# Patient Record
Sex: FEMALE | Race: BLACK OR AFRICAN AMERICAN | Employment: UNEMPLOYED | URBAN - METROPOLITAN AREA
[De-identification: names, ages, dates, MRNs, and addresses within clinical notes are randomized per-mention and may not be internally consistent; named-entity substitution may affect disease eponyms.]

---

## 2017-05-30 ENCOUNTER — HOSPITAL ENCOUNTER (OUTPATIENT)
Dept: INFUSION THERAPY | Age: 67
Discharge: HOME | End: 2017-05-30
Payer: MEDICARE

## 2017-05-30 PROCEDURE — 36415 COLL VENOUS BLD VENIPUNCTURE: CPT | Performed by: INTERNAL MEDICINE

## 2017-05-30 PROCEDURE — 86900 BLOOD TYPING SEROLOGIC ABO: CPT | Performed by: INTERNAL MEDICINE

## 2017-05-30 PROCEDURE — 86920 COMPATIBILITY TEST SPIN: CPT | Performed by: INTERNAL MEDICINE

## 2017-06-01 ENCOUNTER — HOSPITAL ENCOUNTER (OUTPATIENT)
Dept: INFUSION THERAPY | Age: 67
Discharge: HOME | End: 2017-06-01
Payer: MEDICARE

## 2017-06-01 VITALS
DIASTOLIC BLOOD PRESSURE: 84 MMHG | RESPIRATION RATE: 18 BRPM | OXYGEN SATURATION: 99 % | HEIGHT: 63 IN | BODY MASS INDEX: 28.17 KG/M2 | TEMPERATURE: 98.5 F | WEIGHT: 159 LBS | HEART RATE: 90 BPM | SYSTOLIC BLOOD PRESSURE: 152 MMHG

## 2017-06-01 LAB
ABO + RH BLD: NORMAL
BLD PROD TYP BPU: NORMAL
BLD PROD TYP BPU: NORMAL
BLOOD GROUP ANTIBODIES SERPL: NORMAL
BPU ID: NORMAL
BPU ID: NORMAL
CROSSMATCH RESULT,%XM: NORMAL
CROSSMATCH RESULT,%XM: NORMAL
SPECIMEN EXP DATE BLD: NORMAL
STATUS OF UNIT,%ST: NORMAL
STATUS OF UNIT,%ST: NORMAL
UNIT DIVISION, %UDIV: 0
UNIT DIVISION, %UDIV: 0

## 2017-06-01 PROCEDURE — 86920 COMPATIBILITY TEST SPIN: CPT | Performed by: INTERNAL MEDICINE

## 2017-06-01 PROCEDURE — P9016 RBC LEUKOCYTES REDUCED: HCPCS | Performed by: INTERNAL MEDICINE

## 2017-06-01 PROCEDURE — 77030013169 SET IV BLD ICUM -A

## 2017-06-01 PROCEDURE — 36430 TRANSFUSION BLD/BLD COMPNT: CPT

## 2017-06-01 PROCEDURE — 74011250636 HC RX REV CODE- 250/636: Performed by: INTERNAL MEDICINE

## 2017-06-01 PROCEDURE — 86900 BLOOD TYPING SEROLOGIC ABO: CPT | Performed by: INTERNAL MEDICINE

## 2017-06-01 PROCEDURE — 74011250637 HC RX REV CODE- 250/637: Performed by: INTERNAL MEDICINE

## 2017-06-01 RX ORDER — LANOLIN ALCOHOL/MO/W.PET/CERES
CREAM (GRAM) TOPICAL
COMMUNITY
End: 2018-01-10

## 2017-06-01 RX ORDER — ERGOCALCIFEROL 1.25 MG/1
50000 CAPSULE ORAL
COMMUNITY
End: 2018-01-10

## 2017-06-01 RX ORDER — ACETAMINOPHEN 325 MG/1
650 TABLET ORAL ONCE
Status: COMPLETED | OUTPATIENT
Start: 2017-06-01 | End: 2017-06-01

## 2017-06-01 RX ORDER — SODIUM CHLORIDE 9 MG/ML
250 INJECTION, SOLUTION INTRAVENOUS AS NEEDED
Status: DISCONTINUED | OUTPATIENT
Start: 2017-06-01 | End: 2017-06-05 | Stop reason: HOSPADM

## 2017-06-01 RX ORDER — HYDROCHLOROTHIAZIDE 25 MG/1
25 TABLET ORAL EVERY 8 HOURS
COMMUNITY
End: 2018-01-10

## 2017-06-01 RX ADMIN — SODIUM CHLORIDE 250 ML: 900 INJECTION, SOLUTION INTRAVENOUS at 09:15

## 2017-06-01 RX ADMIN — ACETAMINOPHEN 650 MG: 325 TABLET ORAL at 11:08

## 2017-06-01 NOTE — PROGRESS NOTES
[facility] Progress Note    Date: 2017    Name: Chauncey Hebert    MRN: [id number]         : 1950    Blood transfusion    Ms. Fenton arrived to [facility] at [address] [place] [address]  via wheelchair. Ms. Richmond Blair was assessed and education was provided. Discussed risks and benefits of blood transfusion with patient, including risk of transfusion reaction and disease transmission. Patient verbalized understanding and signed consent placed on chart. Patient arrived without blood armband and stated that she took it off last night because no one told her to leave it on her arm. Explained to Ms Richmond Blair that another type and cross would have to be drawn and that we would have to wait for it to be tested. Patient verbalized understanding. Ms. Nancy Finn vitals were reviewed. Visit Vitals    /77    Pulse 90    Temp 97.3 °F (36.3 °C)    Resp 18    SpO2 100%     Patient Vitals for the past 12 hrs:   Temp Pulse Resp BP SpO2   17 1559 98.5 °F (36.9 °C) 90 18 152/84 99 %   17 1449 98 °F (36.7 °C) 87 18 148/71 99 %   17 1423 98 °F (36.7 °C) 80 18 157/69 100 %   17 1408 97.6 °F (36.4 °C) 89 18 154/70 100 %   17 1211 98 °F (36.7 °C) 88 18 160/80 100 %   17 1142 97.3 °F (36.3 °C) 90 18 161/70 100 %   17 1126 97.3 °F (36.3 °C) 90 18 164/77 100 %   17 0847 98.6 °F (37 °C) 80 18 127/52 100 %         22g IV inserted in patient's Left forearm  X three  attempts. Two attempts by first nurse and one attempt be second nurse. Blood drawn and sent to laboratory for type and cross. Positive for blood return and flushes without difficulty. Normal saline initiated at [facility]. Pre-medication (Tylenol 650 mg) was administered as ordered. Patient refused benadryl. First unit of two ordered units of PRBCs initiated @ 75 ml/hr at 1111. Fifteen minutes into infusion, VS stable and pt denied c/o SOB, itching/hives, lip/tongue/facial swelling, CP or other complaints. Infusion rate increased to 100 ml/hr. Fifteen minutes later, VS stable and pt denied complaints; infusion rate increased to 150 ml/hr for the remainder of the transfusion. Unit finished @ [phone] 39 24. VS stable and no transfusion reaction suspected. Second unit of two ordered units of PRBCs initiated @ 75 ml/hr at 1350. Fifteen minutes into infusion, VS stable and pt denied c/o SOB, itching/hives, lip/tongue/facial swelling, CP or other complaints. Infusion rate increased to 100 ml/hr. Fifteen minutes later, VS stable and pt denied complaints; infusion rate increased to 150 ml/hr for the remainder of the transfusion. Unit finished @ [phone]. VS stable and no transfusion reaction suspected. Ms. Marcelo Lucio tolerated infusion without complaints. IV removed. No irritation, bleeding, or hematoma noted at site. Gauze and coban applied to site. Discharge instructions reviewed with pt. Pt instructed to report SOB, CP, elevated temp, back pain, or other symptoms of transfusion reaction to MD or ED. Pt verbalized understanding. Ms. Marcelo Lucio was discharged from [place] [address] in stable condition at 1620. She is to follow up with Dr Ismael May.     Olga Mahan, RN  June 1, 2017

## 2017-06-02 LAB
ABO + RH BLD: NORMAL
BLD PROD TYP BPU: NORMAL
BLD PROD TYP BPU: NORMAL
BLOOD GROUP ANTIBODIES SERPL: NORMAL
BPU ID: NORMAL
BPU ID: NORMAL
CALLED TO:,BCALL1: NORMAL
CROSSMATCH RESULT,%XM: NORMAL
CROSSMATCH RESULT,%XM: NORMAL
SPECIMEN EXP DATE BLD: NORMAL
STATUS OF UNIT,%ST: NORMAL
STATUS OF UNIT,%ST: NORMAL
UNIT DIVISION, %UDIV: 0
UNIT DIVISION, %UDIV: 0

## 2017-12-15 ENCOUNTER — HOSPITAL ENCOUNTER (INPATIENT)
Age: 67
LOS: 3 days | Discharge: HOME | DRG: 377 | End: 2017-12-18
Attending: EMERGENCY MEDICINE | Admitting: INTERNAL MEDICINE
Payer: MEDICARE

## 2017-12-15 ENCOUNTER — APPOINTMENT (OUTPATIENT)
Dept: GENERAL RADIOLOGY | Age: 67
DRG: 377 | End: 2017-12-15
Attending: PHYSICIAN ASSISTANT
Payer: MEDICARE

## 2017-12-15 DIAGNOSIS — N18.9 CHRONIC KIDNEY DISEASE, UNSPECIFIED CKD STAGE: ICD-10-CM

## 2017-12-15 DIAGNOSIS — K92.2 UPPER GI BLEED: Primary | ICD-10-CM

## 2017-12-15 DIAGNOSIS — D64.9 ANEMIA, UNSPECIFIED TYPE: ICD-10-CM

## 2017-12-15 PROBLEM — N18.6 ESRD (END STAGE RENAL DISEASE) (HCC): Status: ACTIVE | Noted: 2017-12-15

## 2017-12-15 PROBLEM — E11.9 DIABETES MELLITUS TYPE 2, CONTROLLED (HCC): Status: ACTIVE | Noted: 2017-12-15

## 2017-12-15 PROBLEM — I10 HTN (HYPERTENSION): Status: ACTIVE | Noted: 2017-12-15

## 2017-12-15 LAB
ALBUMIN SERPL-MCNC: 3.1 G/DL (ref 3.4–5)
ALBUMIN/GLOB SERPL: 0.9 {RATIO} (ref 0.8–1.7)
ALP SERPL-CCNC: 127 U/L (ref 45–117)
ALT SERPL-CCNC: 26 U/L (ref 13–56)
ANION GAP SERPL CALC-SCNC: 6 MMOL/L (ref 3–18)
AST SERPL-CCNC: 36 U/L (ref 15–37)
BASOPHILS # BLD: 0.1 K/UL (ref 0–0.06)
BASOPHILS NFR BLD: 1 % (ref 0–2)
BILIRUB SERPL-MCNC: 0.4 MG/DL (ref 0.2–1)
BUN SERPL-MCNC: 15 MG/DL (ref 7–18)
BUN/CREAT SERPL: 6 (ref 12–20)
CALCIUM SERPL-MCNC: 9 MG/DL (ref 8.5–10.1)
CHLORIDE SERPL-SCNC: 102 MMOL/L (ref 100–108)
CK MB CFR SERPL CALC: 4.2 % (ref 0–4)
CK MB SERPL-MCNC: 3.6 NG/ML (ref 5–25)
CK SERPL-CCNC: 86 U/L (ref 26–192)
CO2 SERPL-SCNC: 36 MMOL/L (ref 21–32)
CREAT SERPL-MCNC: 2.55 MG/DL (ref 0.6–1.3)
DIFFERENTIAL METHOD BLD: ABNORMAL
EOSINOPHIL # BLD: 0.1 K/UL (ref 0–0.4)
EOSINOPHIL NFR BLD: 2 % (ref 0–5)
ERYTHROCYTE [DISTWIDTH] IN BLOOD BY AUTOMATED COUNT: 20.2 % (ref 11.6–14.5)
EST. AVERAGE GLUCOSE BLD GHB EST-MCNC: NORMAL MG/DL
GLOBULIN SER CALC-MCNC: 3.5 G/DL (ref 2–4)
GLUCOSE BLD STRIP.AUTO-MCNC: 124 MG/DL (ref 70–110)
GLUCOSE SERPL-MCNC: 217 MG/DL (ref 74–99)
HBA1C MFR BLD: 4.9 % (ref 4.5–5.6)
HCT VFR BLD AUTO: 21 % (ref 35–45)
HGB BLD-MCNC: 6.2 G/DL (ref 12–16)
INR PPP: 1 (ref 0.8–1.2)
LYMPHOCYTES # BLD: 0.8 K/UL (ref 0.9–3.6)
LYMPHOCYTES NFR BLD: 12 % (ref 21–52)
MCH RBC QN AUTO: 28.6 PG (ref 24–34)
MCHC RBC AUTO-ENTMCNC: 29.5 G/DL (ref 31–37)
MCV RBC AUTO: 96.8 FL (ref 74–97)
MONOCYTES # BLD: 0.5 K/UL (ref 0.05–1.2)
MONOCYTES NFR BLD: 8 % (ref 3–10)
NEUTS SEG # BLD: 4.8 K/UL (ref 1.8–8)
NEUTS SEG NFR BLD: 77 % (ref 40–73)
PLATELET # BLD AUTO: 178 K/UL (ref 135–420)
PMV BLD AUTO: 9.6 FL (ref 9.2–11.8)
POTASSIUM SERPL-SCNC: 3.7 MMOL/L (ref 3.5–5.5)
PROT SERPL-MCNC: 6.6 G/DL (ref 6.4–8.2)
PROTHROMBIN TIME: 12.7 SEC (ref 11.5–15.2)
RBC # BLD AUTO: 2.17 M/UL (ref 4.2–5.3)
RBC MORPH BLD: ABNORMAL
SODIUM SERPL-SCNC: 144 MMOL/L (ref 136–145)
TROPONIN I SERPL-MCNC: 0.08 NG/ML (ref 0–0.06)
WBC # BLD AUTO: 6.3 K/UL (ref 4.6–13.2)

## 2017-12-15 PROCEDURE — 85610 PROTHROMBIN TIME: CPT | Performed by: INTERNAL MEDICINE

## 2017-12-15 PROCEDURE — C9113 INJ PANTOPRAZOLE SODIUM, VIA: HCPCS | Performed by: PHYSICIAN ASSISTANT

## 2017-12-15 PROCEDURE — 77030032490 HC SLV COMPR SCD KNE COVD -B

## 2017-12-15 PROCEDURE — 86920 COMPATIBILITY TEST SPIN: CPT | Performed by: PHYSICIAN ASSISTANT

## 2017-12-15 PROCEDURE — 86900 BLOOD TYPING SEROLOGIC ABO: CPT | Performed by: PHYSICIAN ASSISTANT

## 2017-12-15 PROCEDURE — 65660000000 HC RM CCU STEPDOWN

## 2017-12-15 PROCEDURE — 74011250636 HC RX REV CODE- 250/636: Performed by: PHYSICIAN ASSISTANT

## 2017-12-15 PROCEDURE — 99282 EMERGENCY DEPT VISIT SF MDM: CPT

## 2017-12-15 PROCEDURE — 36430 TRANSFUSION BLD/BLD COMPNT: CPT

## 2017-12-15 PROCEDURE — 82962 GLUCOSE BLOOD TEST: CPT

## 2017-12-15 PROCEDURE — 85025 COMPLETE CBC W/AUTO DIFF WBC: CPT | Performed by: PHYSICIAN ASSISTANT

## 2017-12-15 PROCEDURE — 80053 COMPREHEN METABOLIC PANEL: CPT | Performed by: PHYSICIAN ASSISTANT

## 2017-12-15 PROCEDURE — 71010 XR CHEST PORT: CPT

## 2017-12-15 PROCEDURE — 74011000250 HC RX REV CODE- 250: Performed by: PHYSICIAN ASSISTANT

## 2017-12-15 PROCEDURE — 93005 ELECTROCARDIOGRAM TRACING: CPT

## 2017-12-15 PROCEDURE — P9016 RBC LEUKOCYTES REDUCED: HCPCS | Performed by: PHYSICIAN ASSISTANT

## 2017-12-15 PROCEDURE — 83036 HEMOGLOBIN GLYCOSYLATED A1C: CPT | Performed by: INTERNAL MEDICINE

## 2017-12-15 PROCEDURE — 74011250637 HC RX REV CODE- 250/637: Performed by: INTERNAL MEDICINE

## 2017-12-15 PROCEDURE — 82550 ASSAY OF CK (CPK): CPT | Performed by: PHYSICIAN ASSISTANT

## 2017-12-15 RX ORDER — PANTOPRAZOLE SODIUM 40 MG/10ML
40 INJECTION, POWDER, LYOPHILIZED, FOR SOLUTION INTRAVENOUS
Status: DISCONTINUED | OUTPATIENT
Start: 2017-12-15 | End: 2017-12-15 | Stop reason: CLARIF

## 2017-12-15 RX ORDER — INSULIN LISPRO 100 [IU]/ML
INJECTION, SOLUTION INTRAVENOUS; SUBCUTANEOUS
Status: DISCONTINUED | OUTPATIENT
Start: 2017-12-15 | End: 2017-12-18 | Stop reason: HOSPADM

## 2017-12-15 RX ORDER — FLUTICASONE PROPIONATE 50 MCG
2 SPRAY, SUSPENSION (ML) NASAL AS NEEDED
Status: DISCONTINUED | OUTPATIENT
Start: 2017-12-15 | End: 2017-12-18 | Stop reason: HOSPADM

## 2017-12-15 RX ORDER — MAGNESIUM SULFATE 100 %
4 CRYSTALS MISCELLANEOUS AS NEEDED
Status: DISCONTINUED | OUTPATIENT
Start: 2017-12-15 | End: 2017-12-18 | Stop reason: HOSPADM

## 2017-12-15 RX ORDER — DEXTROSE 50 % IN WATER (D50W) INTRAVENOUS SYRINGE
25-50 AS NEEDED
Status: DISCONTINUED | OUTPATIENT
Start: 2017-12-15 | End: 2017-12-18 | Stop reason: HOSPADM

## 2017-12-15 RX ORDER — SIMETHICONE 80 MG
80 TABLET,CHEWABLE ORAL
Status: DISCONTINUED | OUTPATIENT
Start: 2017-12-15 | End: 2017-12-18 | Stop reason: HOSPADM

## 2017-12-15 RX ORDER — ONDANSETRON 2 MG/ML
4 INJECTION INTRAMUSCULAR; INTRAVENOUS
Status: DISCONTINUED | OUTPATIENT
Start: 2017-12-15 | End: 2017-12-18 | Stop reason: HOSPADM

## 2017-12-15 RX ORDER — SODIUM CHLORIDE 9 MG/ML
250 INJECTION, SOLUTION INTRAVENOUS AS NEEDED
Status: DISCONTINUED | OUTPATIENT
Start: 2017-12-15 | End: 2017-12-18 | Stop reason: HOSPADM

## 2017-12-15 RX ORDER — ACETAMINOPHEN 325 MG/1
650 TABLET ORAL
Status: DISCONTINUED | OUTPATIENT
Start: 2017-12-15 | End: 2017-12-18 | Stop reason: HOSPADM

## 2017-12-15 RX ADMIN — FLUTICASONE PROPIONATE 2 SPRAY: 50 SPRAY, METERED NASAL at 23:53

## 2017-12-15 RX ADMIN — SODIUM CHLORIDE 40 MG: 9 INJECTION INTRAMUSCULAR; INTRAVENOUS; SUBCUTANEOUS at 18:10

## 2017-12-15 NOTE — PROGRESS NOTES
1835 Patient arrived on unit, transferred from Ed to room 335 via stretcher. 1845 Skin assessment completed with JOAN Mcelroy. Keloids scattered to BLE, some scabs present. Patient stated \"I had these since I started Dialysis\". No other skin issues noted. Patient stated that she had a cell phone in ED, no cell phone found in belongings on arrival to unit. Artis Felty will check in ED to see if cell phone may have been left. Blood transfusion @ 125 mL/hr, no signs or symptoms of a reaction, vital signs stable, will continue to monitor.

## 2017-12-15 NOTE — IP AVS SNAPSHOT
Neida Cedeno [facility] [address] 
[phone] Patient: Jovan Thomas MRN: [id number] UTI:6/52/1889 About your hospitalization You were admitted on:  December 15, 2017 You last received care in the:  [address] [place] [address] You were discharged on:  December 18, 2017 Why you were hospitalized Your primary diagnosis was:  Not on File Your diagnoses also included:  Gi Bleed, Esrd (End Stage Renal Disease) (Hcc), Diabetes Mellitus Type 2, Controlled (Hcc), Htn (Hypertension), Anemia In Chronic Kidney Disease, Back Pain, Constipation, Cirrhosis (Hcc) Things You Need To Do (next 8 weeks) Follow up with GI in [place] tomorrow Pt has a previously scheduled appointment at the [place] [address]. Follow up with hepatology for cirrhosis  in 1 week(s) Follow up with [facility]osen to continue managing your healthcare needs. Where:  [phone] Follow up with Hunter Rico Pt. Danuta Zarate Patient will see physician while on dialysis. Where:  [phone] Thursday Dec 28, 2017 Follow up with Royal Arroyo MD  
Follow up appointment scheduled for December 28, 2017 at 9:00 a.m. with Dr. Nigel Bloch. Phone:  [phone] Where:  [facility] Discharge Orders None A check richmond indicates which time of day the medication should be taken. My Medications TAKE these medications as instructed Instructions Each Dose to Equal  
 Morning Noon Evening Bedtime  
 albuterol 90 mcg/actuation inhaler Commonly known as:  PROVENTIL HFA, VENTOLIN HFA, PROAIR HFA Your last dose was: Your next dose is: Take 2 Puffs by inhalation as needed for Wheezing. 2 Puff  
    
   
   
   
  
 ferrous sulfate 325 mg (65 mg iron) tablet Your last dose was: Your next dose is: Take  by mouth Daily (before breakfast). FLONASE 50 mcg/actuation nasal spray Generic drug:  fluticasone Your last dose was: Your next dose is: 2 Sprays by Both Nostrils route daily. 2 Spray  
    
   
   
   
  
 lactulose 10 gram/15 mL solution Commonly known as:  Conchita Common Your last dose was: Your next dose is: Take 30 g by mouth two (2) times a day. 30 g  
    
   
   
   
  
 metoprolol succinate 25 mg XL tablet Commonly known as:  TOPROL-XL Your last dose was: Your next dose is: Take 25 mg by mouth daily. 25 mg  
    
   
   
   
  
 psyllium packet Commonly known as:  METAMUCIL Your last dose was: Your next dose is: Take 1 Packet by mouth daily. 1 Packet RENVELA 800 mg Tab tab Generic drug:  sevelamer carbonate Your last dose was: Your next dose is: Take 2,400 mg by mouth three (3) times daily (with meals). 2400 mg  
    
   
   
   
  
 simethicone 80 mg chewable tablet Commonly known as:  Gilda Kussmaul Your last dose was: Your next dose is: Take 80 mg by mouth every six (6) hours as needed for Flatulence. 80 mg  
    
   
   
   
  
 VITAMIN D2 50,000 unit capsule Generic drug:  ergocalciferol Your last dose was: Your next dose is: Take 50,000 Units by mouth. 49264 Units ASK your physician about these medications Instructions Each Dose to Equal  
 Morning Noon Evening Bedtime  
 hydroCHLOROthiazide 25 mg tablet Commonly known as:  HYDRODIURIL Your last dose was: Your next dose is: Take 25 mg by mouth every eight (8) hours. 25 mg Discharge Instructions None [facility] Announcement We are excited to announce that we are making your provider's discharge notes available to you in [employer]. You will see these notes when they are completed and signed by the physician that discharged you from your recent hospital stay. If you have any questions or concerns about any information you see in [employer], please call the Health Information Department where you were seen or reach out to your Primary Care Provider for more information about your plan of care. Introducing [facility]! [facility] introduces [employer] patient portal. Now you can access parts of your medical record, email your doctor's office, and request medication refills online. 1. In your internet browser, go to [email or web address] 2. Click on the First Time User? Click Here link in the Sign In box. You will see the New Member Sign Up page. 3. Enter your [employer] Access Code exactly as it appears below. You will not need to use this code after youve completed the sign-up process. If you do not sign up before the expiration date, you must request a new code. · [employer] Access Code: [email or web address] Expires: 3/18/2018 11:55 AM 
 
4. Enter the last four digits of your Social Security Number (xxxx) and Date of Birth (mm/dd/yyyy) as indicated and click Submit. You will be taken to the next sign-up page. 5. Create a [employer] ID. This will be your [employer] login ID and cannot be changed, so think of one that is secure and easy to remember. 6. Create a [employer] password. You can change your password at any time. 7. Enter your Password Reset Question and Answer. This can be used at a later time if you forget your password. 8. Enter your e-mail address. You will receive e-mail notification when new information is available in [address] 9. Click Sign Up. You can now view and download portions of your medical record.  
10. Click the Download Summary menu link to download a portable copy of your medical information. If you have questions, please visit the Frequently Asked Questions section of the MyChart website. Remember, [employer] is NOT to be used for urgent needs. For medical emergencies, dial 911. Now available from your iPhone and Android! Unresulted Labs-Please follow up with your PCP about these lab tests Order Current Status AFP, TUMOR MARKER In process ALPHA-1-ANTITRYPSIN, TOTAL In process HEP A AB, TOTAL In process HEPATITIS B CORE AB, TOTAL In process EKG, 12 LEAD, INITIAL Preliminary result H PYLORI ABS, IGA AND IGG Preliminary result Providers Seen During Your Hospitalization Provider Specialty Primary office phone Sisi Bertrand MD Emergency Medicine [phone] [address] Internal Medicine [phone] Your Primary Care Physician (PCP) Primary Care Physician Office Phone Office Fax OTHER, PHYS ** None ** ** None ** You are allergic to the following Allergen Reactions Aspirin Other (comments) Heparin (Porcine) Unknown (comments) Metformin Not Reported This Time Norvasc (Amlodipine) Other (comments) Recent Documentation Height Weight Breastfeeding? BMI Smoking Status 1.6 m 76.2 kg Unknown 29.78 kg/m2 Never Smoker Emergency Contacts Name Discharge Info Relation Home Work Mobile Fenton,[place] DISCHARGE CAREGIVER [3] Son [22] [phone] Patient Belongings The following personal items are in your possession at time of discharge: 
  Dental Appliances: Partials (upper)  Visual Aid: None      Home Medications: None   Jewelry: Earrings (one earring)  Clothing: Hat, Socks, Sweater, With patient, Footwear, Undergarments    Other Valuables: Cell Phone (Flip phone)  Personal Items Sent to Safe: non Please provide this summary of care documentation to your next provider. Signatures-by signing, you are acknowledging that this After Visit Summary has been reviewed with you and you have received a copy. Patient Signature:  ____________________________________________________________ Date:  ____________________________________________________________  
  
Shelli Sleight Provider Signature:  ____________________________________________________________ Date:  ____________________________________________________________

## 2017-12-15 NOTE — H&P
History & Physical    Patient: Umu Barnes MRN: [id number]  CSN: [id number]    YOB: 1950  Age: 75 y.o. Sex: female      DOA: 12/15/2017  Primary Care Provider:  Afshin Peña MD      Assessment/Plan   1. GI bleed, likely upper. She is hemodynamically stable at this time  2. ESRD on HD, dialyzed today  3. HTn contorlled  4. DM2 diet controlled      PLAN:  - Admit to medical service with telemetry monitoring  - start IV protonix  - transfuse 2 units prbc  - check PT/INR  - monitor h/h q 8 and transfuse as required  - GI consulted by ER and Dr Mehul Abdalla to see  - npo for now  - monitor accuchecks and utilize correction insulin as required  - consult nephrology to maintain dialysis while inpt  - full code        Patient Active Problem List   Diagnosis Code    GI bleed K92.2    ESRD (end stage renal disease) ([facility] [address] N18.6    Diabetes mellitus type 2, controlled ([facility] [address] E11.9    HTN (hypertension) I10     HPI:   CC:\" Im bleeding\"  Umu Barnes is a 75 y.o. female with past medical history significant for ESRD on HD, HTN, Dm2, GERD presents to the ER with significant fatigue and was told she was anemic. She was recently hospitalized at the [place] and unfortunately records are unavailable, but for what Ms Cooper Arriaga states was GI bleed. She was told she had \" stopped\" and she was scheduled for outpt EGD and colonoscopy next week at [place] [address]. Over the last few days she has had progressive weakness, dyspnea on exertion and fatigue. Currently she is afebrile, normotensive and in no distress. Exam reveals a chronically ill appearing female w TCD in R chest cdi, and melena on rectal examination. Her hemoglobin is 6.8. Medicine is asked to admit for further management.        Past Medical History:   Diagnosis Date    Arthritis     Asthma     Chronic kidney disease     Chronic pain     Diabetes ([facility] [address] diet controlled    GERD (gastroesophageal reflux disease)     Hypertension      Past Surgical History:   Procedure Laterality Date    HX GYN  c section      Social History   Substance Use Topics    Smoking status: Never Smoker    Smokeless tobacco: Never Used    Alcohol use No     History reviewed. No pertinent family history. No current facility-administered medications on file prior to encounter. Current Outpatient Prescriptions on File Prior to Encounter   Medication Sig Dispense Refill    hydroCHLOROthiazide (HYDRODIURIL) 25 mg tablet Take 25 mg by mouth every eight (8) hours.  ferrous sulfate 325 mg (65 mg iron) tablet Take  by mouth Daily (before breakfast).  ergocalciferol (VITAMIN D2) 50,000 unit capsule Take 50,000 Units by mouth.         Allergies   Allergen Reactions    Aspirin Other (comments)    Heparin (Porcine) Unknown (comments)    Metformin Not Reported This Time    Norvasc [Amlodipine] Other (comments)       Review of Systems  Constitutional: No fever, chills, diaphoresis, malaise, +fatigue - weight gain/loss or falls  Skin: no itching or rashes  HEENT: no neck stiffness, hearing loss, tinnitus, epistaxis or sore throat  EYES: no blurry vision, double vision or photophobia  CARDIOVASCULAR: no, cp, palpitations, orthopnea, pnd or LE edema  PULMONARY: no cough, wheeze, shortness of breath or sputum production  GI: no nausea, vomiting, diarrhea, abdominal pain, +melena, -hematemesis or brbpr  : no dysuria, hematuria  MUSCULOSKELETAL: no back pain, joint pain or myalgias  ENDOCRINE: no heat/cold intolerance, polyuria or polydipsia  HEME: no easy bruising or bleeding  NEURO: no unilateral weakness, numbness, tingling or seizures      Physical Exam:        Visit Vitals    BP 97/50 (BP Patient Position: At rest)    Pulse 87    Temp 98.3 °F (36.8 °C)    Resp 20    Ht 5' 3\" (1.6 m)    Wt 70.3 kg (155 lb)    SpO2 100%    BMI 27.46 kg/m2      O2 Device: Room air    Temp (24hrs), Av.3 °F (36.8 °C), Min:98.3 °F (36.8 °C), Max:98.3 °F (36.8 °C)           Body mass index is 27.46 kg/(m^2). General:  Awake, cooperative, no distress. Head:  Normocephalic, without obvious abnormality, atraumatic. Eyes:  Conjunctivae/corneas clear, sclera anicteric, PERRL, EOMs intact. Nose: Nares normal. No drainage or sinus tenderness. Throat: Lips, mucosa, and tongue normal. .   Neck: Supple, symmetrical, trachea midline, no adenopathy. Lungs:   Clear to auscultation bilaterally, equal expansion       Heart:  Regular rate and rhythm, S1, S2 normal, no murmur, click, rub or gallop, cap refill normal      Abdomen: Soft, non-tender. Bowel sounds normal. No masses,  No organomegaly. Extremities: Extremities normal, atraumatic, no cyanosis or edema. Pulses: 2+ and symmetric all extremities. Skin: Skin color pale, texture, turgor normal. No rashes or lesions   Neurologic: CNII-XII intact. No focal motor or sensory deficit.            Laboratory Studies:    CMP:   Lab Results   Component Value Date/Time     12/15/2017 03:22 PM    K 3.7 12/15/2017 03:22 PM     12/15/2017 03:22 PM    CO2 36 (H) 12/15/2017 03:22 PM    AGAP 6 12/15/2017 03:22 PM     (H) 12/15/2017 03:22 PM    BUN 15 12/15/2017 03:22 PM    CREA 2.55 (H) 12/15/2017 03:22 PM    GFRAA 23 (L) 12/15/2017 03:22 PM    GFRNA 19 (L) 12/15/2017 03:22 PM    CA 9.0 12/15/2017 03:22 PM    ALB 3.1 (L) 12/15/2017 03:22 PM    TP 6.6 12/15/2017 03:22 PM    GLOB 3.5 12/15/2017 03:22 PM    AGRAT 0.9 12/15/2017 03:22 PM    SGOT 36 12/15/2017 03:22 PM    ALT 26 12/15/2017 03:22 PM     CBC:   Lab Results   Component Value Date/Time    WBC 6.3 12/15/2017 03:22 PM    HGB 6.2 (L) 12/15/2017 03:22 PM    HCT 21.0 (L) 12/15/2017 03:22 PM     12/15/2017 03:22 PM       Imaging studies personally reviewed:  CXR: cardiomegaly  EKG: LBBB      Oralee Patience, DO  Internal Medicine/Geriatrics

## 2017-12-15 NOTE — ED TRIAGE NOTES
Patient went to dialysis this morning and had started feeling bad. Patient was told that her blood count was low. Patient feeling weak and unable to walk.

## 2017-12-15 NOTE — IP AVS SNAPSHOT
Katharine Rodriguez 
 
 
 [address] [facility] [address] 
[phone] Patient: Cody Castillo MRN: [id number] NCD:0/02/5354 My Medications TAKE these medications as instructed Instructions Each Dose to Equal  
 Morning Noon Evening Bedtime  
 albuterol 90 mcg/actuation inhaler Commonly known as:  PROVENTIL HFA, VENTOLIN HFA, PROAIR HFA Your last dose was: Your next dose is: Take 2 Puffs by inhalation as needed for Wheezing. 2 Puff  
    
   
   
   
  
 ferrous sulfate 325 mg (65 mg iron) tablet Your last dose was: Your next dose is: Take  by mouth Daily (before breakfast). FLONASE 50 mcg/actuation nasal spray Generic drug:  fluticasone Your last dose was: Your next dose is: 2 Sprays by Both Nostrils route daily. 2 Spray  
    
   
   
   
  
 lactulose 10 gram/15 mL solution Commonly known as:  Frankey Haw Your last dose was: Your next dose is: Take 30 g by mouth two (2) times a day. 30 g  
    
   
   
   
  
 metoprolol succinate 25 mg XL tablet Commonly known as:  TOPROL-XL Your last dose was: Your next dose is: Take 25 mg by mouth daily. 25 mg  
    
   
   
   
  
 psyllium packet Commonly known as:  METAMUCIL Your last dose was: Your next dose is: Take 1 Packet by mouth daily. 1 Packet RENVELA 800 mg Tab tab Generic drug:  sevelamer carbonate Your last dose was: Your next dose is: Take 2,400 mg by mouth three (3) times daily (with meals). 2400 mg  
    
   
   
   
  
 simethicone 80 mg chewable tablet Commonly known as:  Lannette Libel Your last dose was: Your next dose is: Take 80 mg by mouth every six (6) hours as needed for Flatulence.   
 80 mg  
    
   
   
   
  
 VITAMIN D2 50,000 unit capsule Generic drug:  ergocalciferol Your last dose was: Your next dose is: Take 50,000 Units by mouth. 57096 Units ASK your physician about these medications Instructions Each Dose to Equal  
 Morning Noon Evening Bedtime  
 hydroCHLOROthiazide 25 mg tablet Commonly known as:  HYDRODIURIL Your last dose was: Your next dose is: Take 25 mg by mouth every eight (8) hours.   
 25 mg

## 2017-12-15 NOTE — ED PROVIDER NOTES
EMERGENCY DEPARTMENT HISTORY AND PHYSICAL EXAM    Date: 12/15/2017  Patient Name: uZlma Miller    History of Presenting Illness     Chief Complaint   Patient presents with    Abnormal Lab Results         History Provided By: Patient    Chief Complaint: weakness  Duration: 3 days  Timing:  Gradual and Worsening  Location: generalized  Associated Symptoms: low hgb. No other sxs    Additional History (Context):   1:29 PM   Zulma Miller is a 75 y.o. female dialysis patient with hx of anemia who presents to the emergency department C/O gradually worsening generalized weakness starting 3 days ago. Reports she had a low hemoglobin count which was a 6.8. Pt goes to dialysis on Mondays, Wednesdays, and Fridays; pt went to dialysis today and completed treatment. Reports hx of hospitalization 1 week ago due to weakness and low hgb count and received a blood transfusion. Reports Hx of approximately 7 blood transfusions. Pt denies hematochezia, melena, vaginal bleeding, and any other sxs or complaints. Patient is a poor historian.     PCP: Jennyfer Beckford MD    Current Facility-Administered Medications   Medication Dose Route Frequency Provider Last Rate Last Dose    pantoprazole (PROTONIX) 40 mg in sodium chloride 0.9 % 10 mL injection  40 mg IntraVENous NOW SUSAN Narvaez        0.9% sodium chloride infusion 250 mL  250 mL IntraVENous PRN SUSAN Leach        pantoprazole (PROTONIX) 40 mg in sodium chloride 0.9 % 10 mL injection  40 mg IntraVENous NOW SUSAN Isbell        [START ON 12/16/2017] pantoprazole (PROTONIX) 40 mg in sodium chloride 0.9 % 10 mL injection  40 mg IntraVENous DAILY Almeida Smoker, DO        acetaminophen (TYLENOL) tablet 650 mg  650 mg Oral Q4H PRN Almeida Smoker, DO        [facility]etron [facility]) [facility] 4 mg  4 mg IntraVENous Q4H PRN Almeida Smoker, DO         Current Outpatient Prescriptions   Medication Sig Dispense Refill    hydroCHLOROthiazide (HYDRODIURIL) 25 mg tablet Take 25 mg by mouth every eight (8) hours.  ferrous sulfate 325 mg (65 mg iron) tablet Take  by mouth Daily (before breakfast).  ergocalciferol (VITAMIN D2) 50,000 unit capsule Take 50,000 Units by mouth. Past History     Past Medical History:  Past Medical History:   Diagnosis Date    Arthritis     Asthma     Chronic kidney disease     Chronic pain     Diabetes ([address] diet controlled    GERD (gastroesophageal reflux disease)     Hypertension        Past Surgical History:  Past Surgical History:   Procedure Laterality Date    HX GYN  c section       Family History:  History reviewed. No pertinent family history. Social History:  Social History   Substance Use Topics    Smoking status: Never Smoker    Smokeless tobacco: Never Used    Alcohol use No       Allergies: Allergies   Allergen Reactions    Aspirin Other (comments)    Heparin (Porcine) Unknown (comments)    Metformin Not Reported This Time    Norvasc [Amlodipine] Other (comments)         Review of Systems   Review of Systems   Gastrointestinal: Negative for blood in stool. Genitourinary: Negative for vaginal bleeding. Neurological: Positive for weakness (generalized). All other systems reviewed and are negative. Physical Exam     Vitals:    12/15/17 1232   BP: 97/50   Pulse: 87   Resp: 20   Temp: 98.3 °F (36.8 °C)   SpO2: 100%   Weight: 70.3 kg (155 lb)   Height: 5' 3\" (1.6 m)     Physical Exam   Constitutional: She is oriented to person, place, and time. She appears well-developed and well-nourished. No distress. HENT:   Head: Normocephalic and atraumatic. Eyes: Conjunctivae and EOM are normal. Pupils are equal, round, and reactive to light. Neck: Normal range of motion. Neck supple. Cardiovascular: Normal rate and regular rhythm. Pulmonary/Chest: Effort normal and breath sounds normal.   Abdominal: Soft. Bowel sounds are normal. She exhibits no distension. There is no tenderness.  There is no rebound and no guarding. Genitourinary: Rectal exam shows guaiac positive stool (black stool). Musculoskeletal: Normal range of motion. She exhibits no edema or tenderness. Neurological: She is alert and oriented to person, place, and time. Skin: Skin is warm and dry. Psychiatric: She has a normal mood and affect. Her behavior is normal.   Nursing note and vitals reviewed. Diagnostic Study Results     Labs -     Recent Results (from the past 12 hour(s))   TYPE & SCREEN    Collection Time: 12/15/17  1:45 PM   Result Value Ref Range    Crossmatch Expiration 12/18/2017     ABO/Rh(D) Vernona Yuanfen~Flowâ„¢ POSITIVE     Antibody screen NEG    CBC WITH AUTOMATED DIFF    Collection Time: 12/15/17  3:22 PM   Result Value Ref Range    WBC 6.3 4.6 - 13.2 K/uL    RBC 2.17 (L) 4.20 - 5.30 M/uL    HGB 6.2 (L) 12.0 - 16.0 g/dL    HCT 21.0 (L) 35.0 - 45.0 %    MCV 96.8 74.0 - 97.0 FL    MCH 28.6 24.0 - 34.0 PG    MCHC 29.5 (L) 31.0 - 37.0 g/dL    RDW 20.2 (H) 11.6 - 14.5 %    PLATELET 943 926 - 982 K/uL    MPV 9.6 9.2 - 11.8 FL    NEUTROPHILS 77 (H) 40 - 73 %    LYMPHOCYTES 12 (L) 21 - 52 %    MONOCYTES 8 3 - 10 %    EOSINOPHILS 2 0 - 5 %    BASOPHILS 1 0 - 2 %    ABS. NEUTROPHILS 4.8 1.8 - 8.0 K/UL    ABS. LYMPHOCYTES 0.8 (L) 0.9 - 3.6 K/UL    ABS. MONOCYTES 0.5 0.05 - 1.2 K/UL    ABS. EOSINOPHILS 0.1 0.0 - 0.4 K/UL    ABS.  BASOPHILS 0.1 (H) 0.0 - 0.06 K/UL    RBC COMMENTS ANISOCYTOSIS  3+        RBC COMMENTS MICROCYTOSIS  1+        RBC COMMENTS HYPOCHROMIA  1+        RBC COMMENTS POLYCHROMASIA  SLIGHT  OVALOCYTES  1+        RBC COMMENTS SCHISTOCYTES  OCCASIONAL        DF AUTOMATED     METABOLIC PANEL, COMPREHENSIVE    Collection Time: 12/15/17  3:22 PM   Result Value Ref Range    Sodium 144 136 - 145 mmol/L    Potassium 3.7 3.5 - 5.5 mmol/L    Chloride 102 100 - 108 mmol/L    CO2 36 (H) 21 - 32 mmol/L    Anion gap 6 3.0 - 18 mmol/L    Glucose 217 (H) 74 - 99 mg/dL    BUN 15 7.0 - 18 MG/DL    Creatinine 2.55 (H) 0.6 - 1.3 MG/DL BUN/Creatinine ratio 6 (L) 12 - 20      GFR est AA 23 (L) >60 ml/min/1.73m2    GFR est non-AA 19 (L) >60 ml/min/1.73m2    Calcium 9.0 8.5 - 10.1 MG/DL    Bilirubin, total 0.4 0.2 - 1.0 MG/DL    ALT (SGPT) 26 13 - 56 U/L    AST (SGOT) 36 15 - 37 U/L    Alk. phosphatase 127 (H) 45 - 117 U/L    Protein, total 6.6 6.4 - 8.2 g/dL    Albumin 3.1 (L) 3.4 - 5.0 g/dL    Globulin 3.5 2.0 - 4.0 g/dL    A-G Ratio 0.9 0.8 - 1.7     CARDIAC PANEL,(CK, CKMB & TROPONIN)    Collection Time: 12/15/17  3:22 PM   Result Value Ref Range    CK 86 26 - 192 U/L    CK - MB 3.6 (H) <3.6 ng/ml    CK-MB Index 4.2 (H) 0.0 - 4.0 %    Troponin-I, Qt. 0.08 (H) 0.00 - 0.06 NG/ML   EKG, 12 LEAD, INITIAL    Collection Time: 12/15/17  4:03 PM   Result Value Ref Range    Ventricular Rate 79 BPM    Atrial Rate 79 BPM    P-R Interval 174 ms    QRS Duration 148 ms    Q-T Interval 454 ms    QTC Calculation (Bezet) 520 ms    Calculated P Axis 60 degrees    Calculated R Axis -41 degrees    Calculated T Axis 111 degrees    Diagnosis       Normal sinus rhythm  Left axis deviation  Left bundle branch block  Abnormal ECG  No previous ECGs available         Radiologic Studies -   CT Results  (Last 48 hours)    None        CXR Results  (Last 48 hours)               12/15/17 1436  XR CHEST PORT Final result    Impression:  Impression:       1. Right IJ approach dialysis catheter in position as above. 2. Cardiomegaly. No superimposed acute radiographic abnormality. Narrative:  Chest, single view       Indication: Chest pain and weakness       Comparison: None       Findings:  Portable upright AP view of the chest was obtained. There is a right   IJ approach dialysis catheter with tip projecting over the superior cavoatrial   junction. The lungs are clear. No focal pneumonic opacity, pneumothorax, or   pleural effusion. Cardiac size is enlarged. Normal mediastinal contours. No   acute osseous abnormality.                  Medications given in the ED-  Medications   pantoprazole (PROTONIX) 40 mg in sodium chloride 0.9 % 10 mL injection (not administered)   0.9% sodium chloride infusion 250 mL (not administered)   pantoprazole (PROTONIX) 40 mg in sodium chloride 0.9 % 10 mL injection (not administered)   pantoprazole (PROTONIX) 40 mg in sodium chloride 0.9 % 10 mL injection (not administered)   acetaminophen (TYLENOL) tablet 650 mg (not administered)   ondansetron (ZOFRAN) injection 4 mg (not administered)         Medical Decision Making   I am the first provider for this patient. I reviewed the vital signs, available nursing notes, past medical history, past surgical history, family history and social history. Vital Signs-Reviewed the patient's vital signs. Pulse Oximetry Analysis - 100 % on room air     Cardiac Monitor:  Rate: 87 bpm  Rhythm: NSR    EKG interpretation: (Preliminary)  4:03 PM   NSR. Rate 79 bpm. Left axis deviation. Left BBB. EKG read by Roly Shannon PA-C     Records Reviewed: Nursing Notes and Old Medical Records   Reviewed discharge summary from the [facility] on 12/8/17. Patient was admitted for severe anemia and rectal bleeding. Upon note review, patient has extensive GI history of recurrent anemia and GI bleeding. Of note, 12/5/16 the patient had a colonoscopy and EGD found with gastritis and duodenitis. The patient had routine polypectomy at that time and suffered life threatening bleeding despite clip placement and had to undergo emergency IR embolization. Also noted past history of GI bleeding following NSAID use. Patients H&H was 4.3 and 15.5 on 12/6/17. She was admitted to the ICU and received 3 units of PRBC. Given PPI. GI was consulted. Bleeding stopped and she has an EGD and colonoscopy scheduled at the [place] [address] under anesthesia for 12/19. Discharge hemoglobin on 12/8 was 7.5; Creatinine was 5.8.     Procedures:  Procedures     PROCEDURE NOTE - RECTAL EXAM:   4:25 PM  Performed by: Roly Shannon PA-C  Rectal exam performed. Black stool was collected. Stool was Hemoccult tested, and found to be heme Positive. The procedure took 1-15 minutes, and pt tolerated well. Written by Anais Daniels, ED Scribe, as dictated by Jace Velasco PA-C.       ED Course:   1:29 PM Initial assessment performed. The patients presenting problems have been discussed, and they are in agreement with the care plan formulated and outlined with them. I have encouraged them to ask questions as they arise throughout their visit.    4:26 PM 4:25 PM Went in to do a rectal exam. Patient is asking for medication for indigestion. Rectal exam performed revealed black stool which was heme positive. Will give Protonix. Will need to consult GI and hospitalist for hospital admission. 4:42 PM Discussed patient's history, exam, and available diagnostics results with Dionna Saavedra MD, ED Attending, who agrees with consulting out GI and hospitalist for admission and transfusion. 4:51 PM Discussed patient's history, exam, and available diagnostics results with JAILENE Perry MD, GI, who agrees that the patient needs to be admitted. Suggests 80 mg of Protonix, clear liquid diet, and admission. 4:59 PM Discussed patient's history, exam, and available diagnostics results with Juan Pablo Escobar DO, internal medicine, who agrees with admission to telemetry. Suggests 2 units of blood    Diagnosis and Disposition     Critical Care Time:  I have spent 40 minutes of critical care time involved in lab review, consultations with specialist, family decision-making, and documentation. During this entire length of time I was immediately available to the patient. Critical Care: The reason for providing this level of medical care for this critically ill patient was due a critical illness that impaired one or more vital organ systems such that there was a high probability of imminent or life threatening deterioration in the patients condition.  This care involved high complexity decision making to assess, manipulate, and support vital system functions, to treat this degreee vital organ system failure and to prevent further life threatening deterioration of the patients condition. Core Measures:  For Hospitalized Patients:    1. Hospitalization Decision Time:  The decision to hospitalize the patient was made by Emily Morales DO at 4:59 PM on 12/15/2017    2. Aspirin: Aspirin was not given because the patient is a bleeding risk    5:02 PM  Patient is being admitted to the hospital by Emily Morales DO. The results of their tests and reasons for their admission have been discussed with them and/or available family. They convey agreement and understanding for the need to be admitted and for their admission diagnosis. CONDITIONS ON ADMISSION:  Sepsis is not present at the time of admission. Deep Vein Thrombosis is not present at the time of admission. Thrombosis is not present at the time of admission. Pneumonia is not present at the time of admission. MRSA is not present at the time of admission. Wound infection is not present at the time of admission. Pressure Ulcer is not present at the time of admission. CLINICAL IMPRESSION:    1. Upper GI bleed    2. Anemia, unspecified type    3. Chronic kidney disease, unspecified CKD stage      _______________________________    Attestations: This note is prepared by Nasrin Evans, acting as Scribe for Jace Velasco PA-C. Jace Velasco PA-C:  The scribe's documentation has been prepared under my direction and personally reviewed by me in its entirety.   I confirm that the note above accurately reflects all work, treatment, procedures, and medical decision making performed by me.  _______________________________

## 2017-12-16 ENCOUNTER — APPOINTMENT (OUTPATIENT)
Dept: CT IMAGING | Age: 67
DRG: 377 | End: 2017-12-16
Attending: INTERNAL MEDICINE
Payer: MEDICARE

## 2017-12-16 ENCOUNTER — APPOINTMENT (OUTPATIENT)
Dept: ULTRASOUND IMAGING | Age: 67
DRG: 377 | End: 2017-12-16
Attending: INTERNAL MEDICINE
Payer: MEDICARE

## 2017-12-16 PROBLEM — D63.1 ANEMIA IN CHRONIC KIDNEY DISEASE: Status: ACTIVE | Noted: 2017-12-16

## 2017-12-16 PROBLEM — N18.9 ANEMIA IN CHRONIC KIDNEY DISEASE: Status: ACTIVE | Noted: 2017-12-16

## 2017-12-16 LAB
ABO + RH BLD: NORMAL
ANION GAP SERPL CALC-SCNC: 7 MMOL/L (ref 3–18)
BASOPHILS # BLD: 0.1 K/UL (ref 0–0.06)
BASOPHILS NFR BLD: 1 % (ref 0–2)
BLD PROD TYP BPU: NORMAL
BLD PROD TYP BPU: NORMAL
BLOOD GROUP ANTIBODIES SERPL: NORMAL
BPU ID: NORMAL
BPU ID: NORMAL
BUN SERPL-MCNC: 19 MG/DL (ref 7–18)
BUN/CREAT SERPL: 6 (ref 12–20)
CALCIUM SERPL-MCNC: 8.6 MG/DL (ref 8.5–10.1)
CALLED TO:,BCALL1: NORMAL
CHLORIDE SERPL-SCNC: 105 MMOL/L (ref 100–108)
CO2 SERPL-SCNC: 34 MMOL/L (ref 21–32)
CREAT SERPL-MCNC: 3.41 MG/DL (ref 0.6–1.3)
CROSSMATCH RESULT,%XM: NORMAL
CROSSMATCH RESULT,%XM: NORMAL
DIFFERENTIAL METHOD BLD: ABNORMAL
EOSINOPHIL # BLD: 0.2 K/UL (ref 0–0.4)
EOSINOPHIL NFR BLD: 3 % (ref 0–5)
ERYTHROCYTE [DISTWIDTH] IN BLOOD BY AUTOMATED COUNT: 20.7 % (ref 11.6–14.5)
GLUCOSE BLD STRIP.AUTO-MCNC: 186 MG/DL (ref 70–110)
GLUCOSE BLD STRIP.AUTO-MCNC: 280 MG/DL (ref 70–110)
GLUCOSE BLD STRIP.AUTO-MCNC: 89 MG/DL (ref 70–110)
GLUCOSE BLD STRIP.AUTO-MCNC: 94 MG/DL (ref 70–110)
GLUCOSE BLD STRIP.AUTO-MCNC: 99 MG/DL (ref 70–110)
GLUCOSE SERPL-MCNC: 107 MG/DL (ref 74–99)
HCT VFR BLD AUTO: 26.1 % (ref 35–45)
HCT VFR BLD AUTO: 27.7 % (ref 35–45)
HCT VFR BLD AUTO: 29.3 % (ref 35–45)
HGB BLD-MCNC: 8.1 G/DL (ref 12–16)
HGB BLD-MCNC: 8.4 G/DL (ref 12–16)
HGB BLD-MCNC: 9 G/DL (ref 12–16)
IRON SATN MFR SERPL: 27 %
IRON SERPL-MCNC: 83 UG/DL (ref 50–175)
LYMPHOCYTES # BLD: 1 K/UL (ref 0.9–3.6)
LYMPHOCYTES NFR BLD: 15 % (ref 21–52)
MAGNESIUM SERPL-MCNC: 2.1 MG/DL (ref 1.6–2.6)
MCH RBC QN AUTO: 28.7 PG (ref 24–34)
MCHC RBC AUTO-ENTMCNC: 31 G/DL (ref 31–37)
MCV RBC AUTO: 92.6 FL (ref 74–97)
MONOCYTES # BLD: 0.6 K/UL (ref 0.05–1.2)
MONOCYTES NFR BLD: 8 % (ref 3–10)
NEUTS SEG # BLD: 5.1 K/UL (ref 1.8–8)
NEUTS SEG NFR BLD: 73 % (ref 40–73)
PLATELET # BLD AUTO: 129 K/UL (ref 135–420)
PMV BLD AUTO: 10 FL (ref 9.2–11.8)
POTASSIUM SERPL-SCNC: 4.1 MMOL/L (ref 3.5–5.5)
RBC # BLD AUTO: 2.82 M/UL (ref 4.2–5.3)
RETICS/RBC NFR AUTO: 7.7 % (ref 0.5–2.3)
SODIUM SERPL-SCNC: 146 MMOL/L (ref 136–145)
SPECIMEN EXP DATE BLD: NORMAL
STATUS OF UNIT,%ST: NORMAL
STATUS OF UNIT,%ST: NORMAL
TIBC SERPL-MCNC: 308 UG/DL (ref 250–450)
UNIT DIVISION, %UDIV: 0
UNIT DIVISION, %UDIV: 0
WBC # BLD AUTO: 6.9 K/UL (ref 4.6–13.2)

## 2017-12-16 PROCEDURE — 85045 AUTOMATED RETICULOCYTE COUNT: CPT | Performed by: INTERNAL MEDICINE

## 2017-12-16 PROCEDURE — 83735 ASSAY OF MAGNESIUM: CPT | Performed by: INTERNAL MEDICINE

## 2017-12-16 PROCEDURE — 97161 PT EVAL LOW COMPLEX 20 MIN: CPT

## 2017-12-16 PROCEDURE — 86677 HELICOBACTER PYLORI ANTIBODY: CPT | Performed by: INTERNAL MEDICINE

## 2017-12-16 PROCEDURE — 74011250636 HC RX REV CODE- 250/636: Performed by: INTERNAL MEDICINE

## 2017-12-16 PROCEDURE — 97116 GAIT TRAINING THERAPY: CPT

## 2017-12-16 PROCEDURE — 74011636637 HC RX REV CODE- 636/637: Performed by: INTERNAL MEDICINE

## 2017-12-16 PROCEDURE — C9113 INJ PANTOPRAZOLE SODIUM, VIA: HCPCS | Performed by: INTERNAL MEDICINE

## 2017-12-16 PROCEDURE — 36415 COLL VENOUS BLD VENIPUNCTURE: CPT | Performed by: INTERNAL MEDICINE

## 2017-12-16 PROCEDURE — 65660000000 HC RM CCU STEPDOWN

## 2017-12-16 PROCEDURE — 85018 HEMOGLOBIN: CPT | Performed by: INTERNAL MEDICINE

## 2017-12-16 PROCEDURE — 76700 US EXAM ABDOM COMPLETE: CPT

## 2017-12-16 PROCEDURE — 82962 GLUCOSE BLOOD TEST: CPT

## 2017-12-16 PROCEDURE — 74176 CT ABD & PELVIS W/O CONTRAST: CPT

## 2017-12-16 PROCEDURE — 74011250637 HC RX REV CODE- 250/637: Performed by: INTERNAL MEDICINE

## 2017-12-16 PROCEDURE — 83540 ASSAY OF IRON: CPT | Performed by: INTERNAL MEDICINE

## 2017-12-16 PROCEDURE — 30233P1 TRANSFUSION OF NONAUTOLOGOUS FROZEN RED CELLS INTO PERIPHERAL VEIN, PERCUTANEOUS APPROACH: ICD-10-PCS | Performed by: INTERNAL MEDICINE

## 2017-12-16 PROCEDURE — 74011000250 HC RX REV CODE- 250: Performed by: INTERNAL MEDICINE

## 2017-12-16 PROCEDURE — 80048 BASIC METABOLIC PNL TOTAL CA: CPT | Performed by: INTERNAL MEDICINE

## 2017-12-16 PROCEDURE — 97165 OT EVAL LOW COMPLEX 30 MIN: CPT

## 2017-12-16 PROCEDURE — 85025 COMPLETE CBC W/AUTO DIFF WBC: CPT | Performed by: INTERNAL MEDICINE

## 2017-12-16 RX ORDER — SEVELAMER CARBONATE 800 MG/1
1600 TABLET, FILM COATED ORAL
Status: DISCONTINUED | OUTPATIENT
Start: 2017-12-17 | End: 2017-12-18 | Stop reason: HOSPADM

## 2017-12-16 RX ORDER — POLYETHYLENE GLYCOL 3350 17 G/17G
17 POWDER, FOR SOLUTION ORAL
Status: DISCONTINUED | OUTPATIENT
Start: 2017-12-16 | End: 2017-12-18 | Stop reason: HOSPADM

## 2017-12-16 RX ORDER — SEVELAMER CARBONATE 800 MG/1
1600 TABLET, FILM COATED ORAL
Status: DISCONTINUED | OUTPATIENT
Start: 2017-12-16 | End: 2017-12-17

## 2017-12-16 RX ORDER — MORPHINE SULFATE 2 MG/ML
2 INJECTION, SOLUTION INTRAMUSCULAR; INTRAVENOUS
Status: COMPLETED | OUTPATIENT
Start: 2017-12-16 | End: 2017-12-16

## 2017-12-16 RX ORDER — DOCUSATE SODIUM 100 MG/1
100 CAPSULE, LIQUID FILLED ORAL
Status: DISCONTINUED | OUTPATIENT
Start: 2017-12-16 | End: 2017-12-18 | Stop reason: HOSPADM

## 2017-12-16 RX ORDER — SEVELAMER CARBONATE 800 MG/1
800 TABLET, FILM COATED ORAL
Status: DISCONTINUED | OUTPATIENT
Start: 2017-12-16 | End: 2017-12-16

## 2017-12-16 RX ADMIN — MORPHINE SULFATE 2 MG: 2 INJECTION, SOLUTION INTRAMUSCULAR; INTRAVENOUS at 00:31

## 2017-12-16 RX ADMIN — ACETAMINOPHEN 650 MG: 325 TABLET ORAL at 20:57

## 2017-12-16 RX ADMIN — SODIUM CHLORIDE 40 MG: 9 INJECTION INTRAMUSCULAR; INTRAVENOUS; SUBCUTANEOUS at 09:04

## 2017-12-16 RX ADMIN — SEVELAMER CARBONATE 800 MG: 800 TABLET, FILM COATED ORAL at 18:39

## 2017-12-16 RX ADMIN — SEVELAMER CARBONATE 1600 MG: 800 TABLET, FILM COATED ORAL at 20:57

## 2017-12-16 RX ADMIN — SIMETHICONE CHEW TAB 80 MG 80 MG: 80 TABLET ORAL at 00:30

## 2017-12-16 RX ADMIN — INSULIN LISPRO 2 UNITS: 100 INJECTION, SOLUTION INTRAVENOUS; SUBCUTANEOUS at 23:44

## 2017-12-16 RX ADMIN — INSULIN LISPRO 6 UNITS: 100 INJECTION, SOLUTION INTRAVENOUS; SUBCUTANEOUS at 18:17

## 2017-12-16 RX ADMIN — POLYETHYLENE GLYCOL 3350 17 G: 17 POWDER, FOR SOLUTION ORAL at 23:44

## 2017-12-16 NOTE — PROGRESS NOTES
Problem: Mobility Impaired (Adult and Pediatric)  Goal: *Acute Goals and Plan of Care (Insert Text)  Physical Therapy Goals  Initiated 12/16/2017 and to be accomplished within 7 day(s)  1. Patient will move from supine to sit and sit to supine  in bed with supervision/set-up. 2.  Patient will transfer from bed to chair and chair to bed with supervision/set-up using the least restrictive device. 3.  Patient will perform sit to stand with supervision/set-up. 4.  Patient will ambulate with supervision/set-up for >150 feet with the least restrictive device. 5.  Patient will ascend/descend a full flight of stairs with handrail(s) with minimal assistance/contact guard assist for safe home entry. Outcome: Progressing Towards Goal  physical Therapy EVALUATION    Patient: Lucina Perez (1 y.o. female)  Date: 12/16/2017  Primary Diagnosis: GI bleed  Precautions:   Fall    ASSESSMENT :  Based on the objective data described below, the patient presents with Patient present to PT with decreased functional mobility with regard to bed mobility, transfers, gait, balance, weakness, and overall tolerance for activity. Pt very motivated to participate with PT at this time. Pt presented with fair- dynamic standing balance thus RW was utilized. During gait training pt ambulated a total of 60 feet with RW/GB use with CGA demonstrating an wide VERÓNICA and a slow gary with 3 standing rest breaks due to decreased tolerance to activity. Left pt sitting up at the EOB at the end of PT session with Dr. Kenroy Oliveros MD in the room. Recommend HHPT at time of discharge. Pt did report slight lightheadedness during gait training. Patient will benefit from skilled intervention to address the above impairments.   Patients rehabilitation potential is considered to be Good  Factors which may influence rehabilitation potential include:   []         None noted  []         Mental ability/status  [x]         Medical condition  [] Home/family situation and support systems  []         Safety awareness  []         Pain tolerance/management  []         Other:      PLAN :  Recommendations and Planned Interventions:  [x]           Bed Mobility Training             []    Neuromuscular Re-Education  [x]           Transfer Training                   []    Orthotic/Prosthetic Training  [x]           Gait Training                          []    Modalities  [x]           Therapeutic Exercises          []    Edema Management/Control  [x]           Therapeutic Activities            [x]    Patient and Family Training/Education  []           Other (comment):    Frequency/Duration: Patient will be followed by physical therapy 1-2 times per day/4-7 days per week to address goals. Discharge Recommendations: Home Health  Further Equipment Recommendations for Discharge: rolling walker     SUBJECTIVE:   Patient stated I am feeling a little better now.     OBJECTIVE DATA SUMMARY:     Past Medical History:   Diagnosis Date    Arthritis     Asthma     Chronic kidney disease     Chronic pain     Diabetes ([address] diet controlled    GERD (gastroesophageal reflux disease)     Hypertension      Past Surgical History:   Procedure Laterality Date    HX GYN  c section     Barriers to Learning/Limitations: None  Compensate with: visual, verbal, tactile, kinesthetic cues/model  Prior Level of Function/Home Situation: Pt stated she was independent with ADLs and mobility. Home Situation  Home Environment: Private residence  # Steps to Enter: 14  Rails to Enter: Yes  One/Two Story Residence: One story  # of Interior Steps: 0  Height of Each Step (in): 12 inches  Interior Rails: Right  Lift Chair Available: No  Living Alone: No  Support Systems: Child(yair), Family member(s)  Patient Expects to be Discharged to[de-identified] Private residence  Current DME Used/Available at Home: Shower chair  Critical Behavior:  Neurologic State: Alert; Appropriate for age  Orientation Level: Oriented X4  Cognition: Appropriate decision making; Appropriate for age attention/concentration; Appropriate safety awareness; Follows commands  Safety/Judgement: Awareness of environment; Fall prevention;Good awareness of safety precautions; Insight into deficits  Psychosocial  Patient Behaviors: Calm; Cooperative; Talkative  Purposeful Interaction: Yes  Pt Identified Daily Priority: Clinical issues (comment)  Caritas Process: Nurture loving kindness;Establish trust;Teaching/learning; Attend basic human needs;Create healing environment  Caring Interventions: Reassure; Therapeutic modalities  Reassure: Therapeutic listening; Informing; Acceptance;Caring rounds  Therapeutic Modalities: Humor; Intentional therapeutic touch  Skin Condition/Temp: Dry;Warm   Skin Integumentary  Skin Condition/Temp: Dry;Warm   Strength:    Strength: Generally decreased, functional   Tone & Sensation:   Tone: Normal   Sensation: Impaired   Range Of Motion:  AROM: Generally decreased, functional   PROM: Generally decreased, functional   Functional Mobility:  Bed Mobility:  Rolling: Supervision  Supine to Sit: Supervision; Additional time   Transfers:  Sit to Stand: Contact guard assistance; Additional time  Stand to Sit: Supervision; Additional time   Balance:   Sitting: Intact  Standing: Impaired; With support  Standing - Static: Fair  Standing - Dynamic : Fair  Ambulation/Gait Training:  Distance (ft): 60 Feet (ft)  Assistive Device: Walker, rolling;Gait belt  Ambulation - Level of Assistance: Contact guard assistance   Gait Description (WDL): Exceptions to WDL  Gait Abnormalities: Decreased step clearance   Base of Support: Widened  Stance: Weight shift  Speed/Cristy: Slow  Step Length: Right shortened;Left shortened  Swing Pattern: Right asymmetrical;Left asymmetrical   Interventions: Visual/Demos; Verbal cues; Tactile cues; Safety awareness training   Pain:  Pain Scale 1: Numeric (0 - 10)  Pain Intensity 1: 0   Activity Tolerance:   Fair  Please refer to the flowsheet for vital signs taken during this treatment. After treatment:   []         Patient left in no apparent distress sitting up in chair  [x]         Patient left in no apparent distress in bed  [x]         Call bell left within reach  [x]         Nursing notified  []         Caregiver present  []         Bed alarm activated    COMMUNICATION/EDUCATION:   [x]         Fall prevention education was provided and the patient/caregiver indicated understanding. [x]         Patient/family have participated as able in goal setting and plan of care. [x]         Patient/family agree to work toward stated goals and plan of care. []         Patient understands intent and goals of therapy, but is neutral about his/her participation. []         Patient is unable to participate in goal setting and plan of care.     Thank you for this referral.  Quirino Toro PT, DPT       Time Calculation: 33 mins

## 2017-12-16 NOTE — PROGRESS NOTES
OT orders received and chart reviewed. Pt is off floor for Ultrasound. Will follow-up to complete evaluation if time allows today.     Thank you for referral,  Silviano Demarco MS, OTR/L

## 2017-12-16 NOTE — PROGRESS NOTES
Problem: Falls - Risk of  Goal: *Absence of Falls  Document Iza Fall Risk and appropriate interventions in the flowsheet.    Outcome: Progressing Towards Goal  Fall Risk Interventions:            Medication Interventions: Teach patient to arise slowly    Elimination Interventions: Call light in reach, Patient to call for help with toileting needs

## 2017-12-16 NOTE — PROGRESS NOTES
Problem: Self Care Deficits Care Plan (Adult)  Goal: *Acute Goals and Plan of Care (Insert Text)  Occupational Therapy Goals  Initiated 12/16/2017 within 7 day(s). 1.  Patient will perform lower body dressing with  modified independence while sitting on EOB  2. Patient will perform grooming with modified independence while standing at sink. 3.  Patient will perform toilet transfers with  modified independence. 4.  Patient will perform all aspects of toileting with  modified independence. 5.  Patient will participate in standing for up to 10 minutes to complete basic cleaning tasks in her room to simulate home environment with Mod I.  6.  Patient will utilize energy conservation techniques during functional activities with verbal cues. Occupational Therapy EVALUATION    Patient: Cam Aldana (25 y.o. female)  Date: 12/16/2017  Primary Diagnosis: GI bleed  Precautions:   Fall    ASSESSMENT :  Based on the objective data described below, the patient presents with decreased ability to perform functional mobility, bed mobility, LE dressing and functional transfers. Pt limited by decreased strength/endurance secondary to lower hemoglobin which is affecting  activity tolerance to complete ADLs/IADLs and functional mobility/transfers. Pt was agreeable and pleasant upon arrival. Pt presented with limited endurance during session. Pt required CGA/HHA with functional mobility within her room/bathroom secondary to feeling \"woozy. \" Pt completed toilet transfer with CGA and washed leticia hands at sink. Pt washed her UB secondary to having US gel and changed gown with Setup. Pt would benefit from skilled OT during hospitalization to improve her strength/endurance to complete her ADLs and IADLs as safely and independently upon discharge to home. Pt is highly motivated and wishes to participate in OT to address her goals. Pt was left in supine with all needs met.     Patient will benefit from skilled intervention to address the above impairments. Patients rehabilitation potential is considered to be Good  Factors which may influence rehabilitation potential include:   [x]             None noted  []             Mental ability/status  []             Medical condition  []             Home/family situation and support systems  []             Safety awareness  []             Pain tolerance/management  []             Other:      PLAN :  Recommendations and Planned Interventions:  [x]               Self Care Training                  [x]        Therapeutic Activities  [x]               Functional Mobility Training    []        Cognitive Retraining  []               Therapeutic Exercises           []        Endurance Activities  []               Balance Training                   []        Neuromuscular Re-Education  []               Visual/Perceptual Training     [x]   Home Safety Training  [x]               Patient Education                 [x]        Family Training/Education  []               Other (comment):    Frequency/Duration: Patient will be followed by occupational therapy 3-5x/wk for 1week   to address goals. Discharge Recommendations: Home Health if needed at discharge  Further Equipment Recommendations for Discharge: TBD     SUBJECTIVE:   Patient stated I'm feeling better.     OBJECTIVE DATA SUMMARY:     Past Medical History:   Diagnosis Date    Arthritis     Asthma     Chronic kidney disease     Chronic pain     Diabetes ([address] diet controlled    GERD (gastroesophageal reflux disease)     Hypertension      Past Surgical History:   Procedure Laterality Date    HX GYN  c section     Barriers to Learning/Limitations: None  Compensate with: visual, verbal, tactile, kinesthetic cues/model  Prior Level of Function/Home Situation: Ind with ADLs/IADLs. Driving.   Home Situation  Home Environment: Private residence  # Steps to Enter: 15  Rails to Enter: Yes  One/Two Story Residence: One story  # of Interior Steps: 0  Height of Each Step (in): 12 inches  Interior Rails: Right  Lift Chair Available: No  Living Alone: No  Support Systems: Child(yair), Family member(s)  Patient Expects to be Discharged to[de-identified] Private residence  Current DME Used/Available at Home: Shower chair  Cognitive/Behavioral Status:  Neurologic State: Alert; Appropriate for age  Orientation Level: Appropriate for age;Oriented X4  Cognition: Appropriate decision making; Appropriate for age attention/concentration; Appropriate safety awareness; Follows commands  Safety/Judgement: Awareness of environment  Skin: BUE skin intact  Edema:  None noted  Coordination:  Coordination: Generally decreased, functional  Fine Motor Skills-Upper: Right Intact; Left Intact    Gross Motor Skills-Upper: Left Intact; Right Intact  Balance:  Sitting: Intact  Standing: Impaired; With support  Standing - Static: Fair  Standing - Dynamic : Fair  Strength:(B) UE   Strength: Generally decreased, functional   Tone & Sensation:(B) UE  Tone: Normal  Sensation: Impaired  Range of Motion:(B) UE  AROM: Generally decreased, functional  PROM: Generally decreased, functional   Functional Mobility and Transfers for ADLs:  Bed Mobility:  Rolling: Supervision  Supine to Sit: Supervision; Additional time   Sit to supine: Supervision; additional time   Transfers:  Sit to Stand: Contact guard assistance    Toilet Transfer : Contact guard assistance    ADL Assessment/Intervention:   Oral Facial Hygiene/Grooming: Stand-by assistance (standing at sink to wash leticia hands)   Upper Body Dressing: Setup  Lower Body Dressing: Setup (doffed/donned leticia socks)   Upper Body Bathing: Setup while seated on EOB   Cognitive Retraining  Safety/Judgement: Awareness of environment  Pain:  Pain Scale 1: Numeric (0 - 10)  Pain Intensity 1: 0  Activity Tolerance:   Pt tolerated tx/eval well with no signs of fatigue or increase in pain after treatment. Please refer to the flowsheet for vital signs taken during this treatment.   After treatment:   [] Patient left in no apparent distress sitting up in chair  [] Patient left sitting on EOB  [x] Patient left in no apparent distress in bed  [] Patient declined to be OOB at this time due to   [x] Call bell left within reach  [x] Nursing notified()  [] Caregiver present  [] Bed alarm activated  [] CPM placed on  knee  COMMUNICATION/EDUCATION:   [x] Home safety education was provided and the patient/caregiver indicated understanding. [x] Patient/family have participated as able in goal setting and plan of care. [x] Patient/family agree to work toward stated goals and plan of care. [] Patient understands intent and goals of therapy, but is neutral about his/her participation. [] Patient is unable to participate in goal setting and plan of care. Thank you for this referral.  Aileen Jara, OT  Time Calculation: 19 mins     G-Codes (GP)  Self Care   Current  CJ= 20-39%   Goal  CI= 1-19%    The severity rating is based on the Level of Assistance required for Functional Mobility and ADLs.

## 2017-12-16 NOTE — PROGRESS NOTES
Hospitalist Progress Note-critical care note     Patient: Jovan Thomas MRN: [id number]  CSN: [id number]    YOB: 1950  Age: 75 y.o. Sex: female    DOA: 12/15/2017 LOS:  LOS: 1 day            Chief complaint: anemia, gi bleeding, esrd , dm     Assessment/Plan         Hospital Problems  Date Reviewed: 12/15/2017          Codes Class Noted POA    GI bleed ICD-10-CM: K92.2  ICD-9-CM: 578.9  12/15/2017 Unknown        ESRD (end stage renal disease) ([facility] ICD-10-CM: N18.6  ICD-9-CM: 585.6  12/15/2017 Unknown        Diabetes mellitus type 2, controlled ([facility] ICD-10-CM: E11.9  ICD-9-CM: 250.00  12/15/2017 Unknown        HTN (hypertension) ICD-10-CM: I10  ICD-9-CM: 401.9  12/15/2017 Unknown            1. GI bleed,   H/h stable now ,supposed to have scheduled upper egd per [facility]  Gi was called   Will continue h/h monitor   On ppi   2. ESRD on HD,   dialyzed yesterday   4 anemia, acute on chronic   Received transfusion  2 units prbc, doing well. 3. HTn contorlled  Well controlled   4. DM2 diet controlled  Continue ssi     Subjective: no n/v, black -greenish stool, supposed to have upper egd on 19th       Nurse: no acute issue   Review of systems:    General: No fevers or chills. Cardiovascular: No chest pain or pressure. No palpitations. Pulmonary: No shortness of breath. Gastrointestinal: No nausea, vomiting. Black/greenish stool     Vital signs/Intake and Output:  Visit Vitals    /58 (BP 1 Location: Right leg, BP Patient Position: At rest)    Pulse 81    Temp 98.3 °F (36.8 °C)    Resp 20    Ht 5' 3\" (1.6 m)    Wt 72.5 kg (159 lb 13.3 oz)    SpO2 99%    Breastfeeding Unknown    BMI 28.31 kg/m2     Current Shift:     Last three shifts:  12/14 1901 - 12/16 0700  In: 620   Out: -     Physical Exam:  General: WD, WN. Alert, cooperative, no acute distress    HEENT: NC, Atraumatic. PERRLA, anicteric sclerae. Lungs: CTA Bilaterally. No Wheezing/Rhonchi/Rales.   Heart:  Regular  rhythm,  + murmur, No Rubs, No Gallops  Abdomen: Soft, Non distended, Non tender.  +Bowel sounds,   Extremities: No c/c/e  Psych:   Not anxious or agitated. Neurologic:  No acute neurological deficit. Labs: Results:       Chemistry Recent Labs      12/16/17   0339  12/15/17   1522   GLU  107*  217*   NA  146*  144   K  4.1  3.7   CL  105  102   CO2  34*  36*   BUN  19*  15   CREA  3.41*  2.55*   CA  8.6  9.0   AGAP  7  6   BUCR  6*  6*   AP   --   127*   TP   --   6.6   ALB   --   3.1*   GLOB   --   3.5   AGRAT   --   0.9      CBC w/Diff Recent Labs      12/16/17   0830  12/16/17   0339  12/15/17   1522   WBC   --   6.9  6.3   RBC   --   2.82*  2.17*   HGB  8.4*  8.1*  6.2*   HCT  27.7*  26.1*  21.0*   PLT   --   129*  178   GRANS   --   73  77*   LYMPH   --   15*  12*   EOS   --   3  2      Cardiac Enzymes Recent Labs      12/15/17   1522   CPK  86   CKND1  4.2*      Coagulation Recent Labs      12/15/17   1522   PTP  12.7   INR  1.0       Lipid Panel No results found for: CHOL, CHOLPOCT, CHOLX, CHLST, CHOLV, 963350, HDL, LDL, LDLC, DLDLP, 556447, VLDLC, VLDL, TGLX, TRIGL, TRIGP, TGLPOCT, CHHD, CHHDX   BNP No results for input(s): BNPP in the last 72 hours.    Liver Enzymes Recent Labs      12/15/17   1522   TP  6.6   ALB  3.1*   AP  127*   SGOT  36      Thyroid Studies No results found for: T4, T3U, TSH, TSHEXT     Procedures/imaging: see electronic medical records for all procedures/Xrays and details which were not copied into this note but were reviewed prior to creation of Veronica Butler MD

## 2017-12-16 NOTE — PROGRESS NOTES
Gastrointestinal Progress Note    Patient Name: Sadaf Mack    OAQRR'T Date: 12/16/2017    Admit Date: 12/15/2017    Subjective:     Diet: Patient is on NPO and is tolerating. Nausea is not present. Vomiting is not present. Pain: Patient does not complain of abdominal pain. Bowel Movements: None    Bleeding:  None    Current Facility-Administered Medications   Medication Dose Route Frequency    0.9% sodium chloride infusion 250 mL  250 mL IntraVENous PRN    pantoprazole (PROTONIX) 40 mg in sodium chloride 0.9 % 10 mL injection  40 mg IntraVENous DAILY    acetaminophen (TYLENOL) tablet 650 mg  650 mg Oral Q4H PRN    ondansetron (ZOFRAN) injection 4 mg  4 mg IntraVENous Q4H PRN    insulin lispro (HUMALOG) injection   SubCUTAneous AC&HS    glucose chewable tablet 16 g  4 Tab Oral PRN    glucagon (GLUCAGEN) injection 1 mg  1 mg IntraMUSCular PRN    dextrose (D50W) injection syrg 12.5-25 g  25-50 mL IntraVENous PRN    fluticasone (FLONASE) 50 mcg/actuation nasal spray 2 Spray  2 Spray Both Nostrils PRN    simethicone (MYLICON) tablet 80 mg  80 mg Oral Q8H PRN          Objective:     Visit Vitals    /58 (BP 1 Location: Right leg, BP Patient Position: At rest)    Pulse 81    Temp 98.3 °F (36.8 °C)    Resp 20    Ht 5' 3\" (1.6 m)    Wt 72.5 kg (159 lb 13.3 oz)    SpO2 99%    Breastfeeding Unknown    BMI 28.31 kg/m2       12/14 1901 - 12/16 0700  In: 620   Out: -     General appearance: alert, fatigued, cooperative, no distress, appears stated age, slow  Abdomen: distended protuberant with ascites and enlarged liver. soft, non-tender.  Bowel sounds normal. No masses,      Data Review:    Labs: Results:       Chemistry Recent Labs      12/16/17   0339  12/15/17   1522   GLU  107*  217*   NA  146*  144   K  4.1  3.7   CL  105  102   CO2  34*  36*   BUN  19*  15   CREA  3.41*  2.55*   CA  8.6  9.0   AGAP  7  6   BUCR  6*  6*   AP   --   127*   TP   --   6.6   ALB   --   3.1*   GLOB   --   3.5 AGRAT   --   0.9      CBC w/Diff Recent Labs      12/16/17   0830  12/16/17   0339  12/15/17   1522   WBC   --   6.9  6.3   RBC   --   2.82*  2.17*   HGB  8.4*  8.1*  6.2*   HCT  27.7*  26.1*  21.0*   PLT   --   129*  178   GRANS   --   73  77*   LYMPH   --   15*  12*   EOS   --   3  2   RETIC   --   7.7*   --       Coagulation Recent Labs      12/15/17   1522   PTP  12.7   INR  1.0       Liver Enzymes Recent Labs      12/15/17   1522   TP  6.6   ALB  3.1*   AP  127*   SGOT  36          Assessment:     Active Problems:    GI bleed (12/15/2017)      ESRD (end stage renal disease) ([facility] (12/15/2017)      Diabetes mellitus type 2, controlled ([facility] (12/15/2017)      HTN (hypertension) (12/15/2017)      Anemia in chronic kidney disease (12/16/2017)        Plan:     Hem positive Iron deficiency anemia. Appears to be related to the portal hypertension and cirrhosis with Ascites. No active GI bleeding but claimed to have had melena a week ago for 2 days. So possibly has varices. Plan - would be done Monday. Need to investigate the cause of her cirrhosis which is most likely related to ISAAC as she has been diabetic for 25 years. R/o chronic Hep C??? being on hemodialysis x 8 years. only    Cirrhosis confirmed by abdominal US and clinical presentation on physical exam with Ascites and enlarged liver. She denied Hx of alcohol abuse.  Strangely no thrombocytopenia 129  ESRD on HD  DM  Early dementia and or hepatic encephalopathy  History of 3 colonic polyps 3 removed 2 still in would need another colonoscopy           Edd Leon MD  December 16, 2017

## 2017-12-16 NOTE — ROUTINE PROCESS
Bedside and Verbal shift change report given to [address] (oncoming nurse)  Report included the following information SBAR, Kardex, Intake/Output, MAR and Recent Results.

## 2017-12-16 NOTE — CONSULTS
[id number] [facility]    Severiano Peach  MR#: [id number]  : 1950  ACCOUNT #: [de-identified]   DATE OF SERVICE: 12/15/2017    HISTORY OF PRESENT ILLNESS:  This is a 68-year-old female who was sent today from the dialysis facility because of severe anemia. Apparently on Monday, she was told that her hemoglobin was 6.8 and today was 6.2. Patient has been complaining of weakness. She claimed that about a week ago she had burgundy red stools, foul smelling. She went to [facility], she was given three blood transfusions and was told to come on Tuesday to the [facility] in [place] to have an EGD and colonoscopy. The patient apparently had a year ago an EGD and colonoscopy that were remarkable for 3 colonic polyps. One polyp was removed and was complicated by GI bleeding. Repeat colonoscopy was made to make endoscopic hemostasis, but the two other polyps apparently were not removed. The patient unfortunately, is a very poor historian. She claimed on 2017, she had a capsule enteroscopy at the [facility] and was told that the study was negative. The patient has no prior history of peptic ulcer disease. Denied taking any aspirin or NSAIDs. She is not on blood thinners. However, in the last week, she has been complaining of frequent belching, heartburn, and nausea. Last Monday and Wednesday, she vomited some clear mucus, but never any hematemesis. She denies having any dysphagia. Her weight has been stable. She denies having liver disease or history of alcohol abuse. She quit smoking many years ago. She has been diabetic for 25 years and hemodialysis for about 8 years. She has no coronary artery disease or CVA. She claims that she had 8 pregnancies and deliveries including 1 . She claims that her abdomen has been always large. There is no leg edema. She did not lose consciousness or fall.   In the last week; however, she has been very weak and has hard time ambulating. FAMILY HISTORY:  Negative for cancer. ALLERGIES:  SHE IS ALLERGIC TO ASPIRIN, HEPARIN, METFORMIN, AND NORVASC. MEDICATIONS AT HOME:  Hydrochlorothiazide, ferrous sulfate 325, vitamin D 500 mg capsule. FUNCTION INQUIRY:  The patient claims that she has regular bowel movement everyday. She denies having any more recent melena, but at the emergency room, her stools have been green, dark, because she has been taking iron pills for the last two months. We are not sure if this tested positive for occult blood. PHYSICAL EXAMINATION:  GENERAL:  We have a 59-year-old -American female who appears to be in no distress. She looks much older than her true age. She is not a good historian, but alert and oriented. VITAL SIGNS:  Weight 155 pounds, temperature 98.3, pulse 80, blood pressure 133/55, breathing 16, saturation is 100% on room air. SKIN:  Dry. No rash. HEENT:  Mucosa remarkable for pale conjunctivae. The pupils are equal, react to light. Sclerae are anicteric. The oropharyngeal cavity, she has her own teeth and mucous membranes are dry and pale. NECK:  Supple. No palpable mass. No enlarged thyroid. LUNGS:  Clear to auscultation. CARDIAC:  Rhythm is regular. S1, S2 normal.  There is a 2/6 systolic murmur at the left parasternal border and also at the apex, but there is no thrill, no gallop. ABDOMEN:  Large, protuberant, difficult to palpate in the epigastric area. I cannot exclude an enlarged liver. Bowel sounds are normal.  EXTREMITIES:  Unremarkable. No pedal edema, no clubbing, no tremor. NEUROLOGIC:  Nonfocal neurological signs. She moves all 4 extremities. She is alert and oriented, but has slow mentation. LABORATORY DATA:  Hemoglobin 6.2, normal MCV and MCH, but MCHC is 29.5, for a normal 31. Platelets 686, WBC 6.3, 77% neutrophils. Complete metabolic panels, we have a glucose of 217, creatinine 2.55, BUN 15.   LFT normal except for alkaline phosphatase of 127. CPK MB 3.6. Troponin 0.08. Hemoglobin A1c 4.9. IN CONCLUSION:  A 58-year-old female who comes with what appeared to be reoccurring iron deficiency anemia, but apparently she did have melena or red burgundy stools about a week ago. She claimed that she has a negative capsule endoscopy on December 4th that we need to get official record. She also did have an EGD and colonoscopy a year ago in [place] where 1 polyp was removed, complicated by bleeding, but two other polyps were left behind. Therefore, before we do anything we would like to get her records, but possibly tomorrow, I may repeat her EGD. I would like to have CT scan of the abdomen, mostly looking for any enlarged spleen or liver. This patient has been a diabetic for about 20 years with good control. She has been on hemodialysis for the past eight years. Only in the last week that she started to have symptoms of dyspepsia which may be reflecting peptic ulcer disease. At the [facility] she was prescribed Protonix, but she did not fill that prescription. We are going to also follow. LOYDA Benitez MD MBE / [place]  D: 12/15/2017 20:37     T: 12/16/2017 12:22  JOB #: [id number]  CC: Marlene Cunha M.D.  CC: Rosaura Fernandez DO

## 2017-12-16 NOTE — ROUTINE PROCESS
Bedside and Verbal shift change report given to  GEORGE Sultana (oncoming nurse) by Julio (offgoing nurse). Report included the following information SBAR, Kardex, Intake/Output, MAR and Recent Results. Admission assessment, database and urine sample need. Patient has 1 additional unit of Packed red blood cells to be transfused when 1 first unit completed.

## 2017-12-16 NOTE — PROGRESS NOTES
8164:  Assumed care for patient, received bedside report from Jaylyn Aly RN. Patient resting quietly with no complaints of pain or discomfort at the time. Whiteboard updated, bed at the lowest position with call bell within reach. 0820:  Patient given items to perform morning care, IV dressing reinforced. No complaints of pain or discomfort. 1100:  Patient working with PT.    [phone] [id number]:  Patient taken down for US.    1346:  Patient back from [facility]:  Spoke with Dr. Nova Otto in regard to patient diet status. 1507:  Paged Dr. Nova Otto in regard to diet, given verbal order for Diabetic/Renal diet. 1822:  Paged Dr. Maria Del Rosario Adame in regard to patient asking for order of Renvela 800mg, given verbal order for medication. 1840:  Patient stated that medication Renvela is 1600mg and not 800mg, order modified. 1940:  Bedside and Verbal shift change report given to Kaci Crawford RN (oncoming nurse) by Camelia Jeff RN   (offgoing nurse). Report included the following information SBAR, Kardex, ED Summary, Intake/Output, MAR, Med Rec Status, Cardiac Rhythm NSR and Alarm Parameters .

## 2017-12-16 NOTE — PROGRESS NOTES
Bedside and Verbal shift change report received from Vero Benedict (offgoing nurse). Report included the following information SBAR, Kardex, Intake/Output, MAR and Recent Results . First unit blood infusing at 125ml/hr. 2045 Admission assessment completed, see EMR. Blood infusion continues. Patient phone was found in bed with patient in a black socks. 2100 1st unit blood completed, no infusion reaction noted    2138 Patient complaint of gas, called Dr. Tracy Scott and notified him of patient's complaint. New order given for simethicone every 8 hours as needed PRN    2310 2nd unit blood started. Branden Scott to inform him that patient is NPO, and has only oral pain medication and schedule to have CT ABD and pelvis in the morning. New order given for morphine 2mg once and communicated that it was ok to give the chewable simethicone.      0030 Reassessment completed, see EMR.     0145 2nd Unit blood completed see EMR    0430 Reassessment completed,see EMR    6692 Patient off unit for CT of abdomin with PCT

## 2017-12-17 PROBLEM — K74.60 CIRRHOSIS (HCC): Status: ACTIVE | Noted: 2017-12-17

## 2017-12-17 PROBLEM — K59.00 CONSTIPATION: Status: ACTIVE | Noted: 2017-12-17

## 2017-12-17 PROBLEM — M54.9 BACK PAIN: Status: ACTIVE | Noted: 2017-12-17

## 2017-12-17 LAB
ANION GAP SERPL CALC-SCNC: 10 MMOL/L (ref 3–18)
BASOPHILS # BLD: 0.1 K/UL (ref 0–0.06)
BASOPHILS NFR BLD: 1 % (ref 0–2)
BUN SERPL-MCNC: 27 MG/DL (ref 7–18)
BUN/CREAT SERPL: 5 (ref 12–20)
CALCIUM SERPL-MCNC: 9 MG/DL (ref 8.5–10.1)
CHLORIDE SERPL-SCNC: 103 MMOL/L (ref 100–108)
CO2 SERPL-SCNC: 31 MMOL/L (ref 21–32)
CREAT SERPL-MCNC: 5.06 MG/DL (ref 0.6–1.3)
DIFFERENTIAL METHOD BLD: ABNORMAL
EOSINOPHIL # BLD: 0.2 K/UL (ref 0–0.4)
EOSINOPHIL NFR BLD: 2 % (ref 0–5)
ERYTHROCYTE [DISTWIDTH] IN BLOOD BY AUTOMATED COUNT: 22 % (ref 11.6–14.5)
GLUCOSE BLD STRIP.AUTO-MCNC: 165 MG/DL (ref 70–110)
GLUCOSE BLD STRIP.AUTO-MCNC: 171 MG/DL (ref 70–110)
GLUCOSE BLD STRIP.AUTO-MCNC: 189 MG/DL (ref 70–110)
GLUCOSE BLD STRIP.AUTO-MCNC: 241 MG/DL (ref 70–110)
GLUCOSE SERPL-MCNC: 242 MG/DL (ref 74–99)
HCT VFR BLD AUTO: 26.4 % (ref 35–45)
HCT VFR BLD AUTO: 27 % (ref 35–45)
HCT VFR BLD AUTO: 28.5 % (ref 35–45)
HGB BLD-MCNC: 8.2 G/DL (ref 12–16)
HGB BLD-MCNC: 8.4 G/DL (ref 12–16)
HGB BLD-MCNC: 8.6 G/DL (ref 12–16)
LYMPHOCYTES # BLD: 1.1 K/UL (ref 0.9–3.6)
LYMPHOCYTES NFR BLD: 16 % (ref 21–52)
MCH RBC QN AUTO: 28.6 PG (ref 24–34)
MCHC RBC AUTO-ENTMCNC: 30.2 G/DL (ref 31–37)
MCV RBC AUTO: 94.7 FL (ref 74–97)
MONOCYTES # BLD: 0.5 K/UL (ref 0.05–1.2)
MONOCYTES NFR BLD: 8 % (ref 3–10)
NEUTS SEG # BLD: 5 K/UL (ref 1.8–8)
NEUTS SEG NFR BLD: 73 % (ref 40–73)
PLATELET # BLD AUTO: 160 K/UL (ref 135–420)
PMV BLD AUTO: 9.7 FL (ref 9.2–11.8)
POTASSIUM SERPL-SCNC: 4 MMOL/L (ref 3.5–5.5)
RBC # BLD AUTO: 3.01 M/UL (ref 4.2–5.3)
SODIUM SERPL-SCNC: 144 MMOL/L (ref 136–145)
WBC # BLD AUTO: 6.8 K/UL (ref 4.6–13.2)

## 2017-12-17 PROCEDURE — 87340 HEPATITIS B SURFACE AG IA: CPT | Performed by: INTERNAL MEDICINE

## 2017-12-17 PROCEDURE — 86803 HEPATITIS C AB TEST: CPT | Performed by: INTERNAL MEDICINE

## 2017-12-17 PROCEDURE — 80048 BASIC METABOLIC PNL TOTAL CA: CPT | Performed by: INTERNAL MEDICINE

## 2017-12-17 PROCEDURE — 65660000000 HC RM CCU STEPDOWN

## 2017-12-17 PROCEDURE — 82105 ALPHA-FETOPROTEIN SERUM: CPT | Performed by: INTERNAL MEDICINE

## 2017-12-17 PROCEDURE — 74011636637 HC RX REV CODE- 636/637: Performed by: HOSPITALIST

## 2017-12-17 PROCEDURE — 86706 HEP B SURFACE ANTIBODY: CPT | Performed by: INTERNAL MEDICINE

## 2017-12-17 PROCEDURE — 85018 HEMOGLOBIN: CPT | Performed by: INTERNAL MEDICINE

## 2017-12-17 PROCEDURE — 74011250637 HC RX REV CODE- 250/637: Performed by: HOSPITALIST

## 2017-12-17 PROCEDURE — C9113 INJ PANTOPRAZOLE SODIUM, VIA: HCPCS | Performed by: INTERNAL MEDICINE

## 2017-12-17 PROCEDURE — 74011000250 HC RX REV CODE- 250: Performed by: INTERNAL MEDICINE

## 2017-12-17 PROCEDURE — 82103 ALPHA-1-ANTITRYPSIN TOTAL: CPT | Performed by: INTERNAL MEDICINE

## 2017-12-17 PROCEDURE — 74011636637 HC RX REV CODE- 636/637: Performed by: INTERNAL MEDICINE

## 2017-12-17 PROCEDURE — 82962 GLUCOSE BLOOD TEST: CPT

## 2017-12-17 PROCEDURE — 86704 HEP B CORE ANTIBODY TOTAL: CPT | Performed by: INTERNAL MEDICINE

## 2017-12-17 PROCEDURE — 74011250637 HC RX REV CODE- 250/637: Performed by: INTERNAL MEDICINE

## 2017-12-17 PROCEDURE — 74011250636 HC RX REV CODE- 250/636: Performed by: INTERNAL MEDICINE

## 2017-12-17 PROCEDURE — 85025 COMPLETE CBC W/AUTO DIFF WBC: CPT | Performed by: INTERNAL MEDICINE

## 2017-12-17 PROCEDURE — 97116 GAIT TRAINING THERAPY: CPT

## 2017-12-17 PROCEDURE — 86708 HEPATITIS A ANTIBODY: CPT | Performed by: INTERNAL MEDICINE

## 2017-12-17 PROCEDURE — 36415 COLL VENOUS BLD VENIPUNCTURE: CPT | Performed by: INTERNAL MEDICINE

## 2017-12-17 RX ORDER — FLUTICASONE PROPIONATE 50 MCG
2 SPRAY, SUSPENSION (ML) NASAL DAILY
Status: DISCONTINUED | OUTPATIENT
Start: 2017-12-18 | End: 2017-12-17 | Stop reason: SDUPTHER

## 2017-12-17 RX ORDER — ALBUTEROL SULFATE 90 UG/1
2 AEROSOL, METERED RESPIRATORY (INHALATION) AS NEEDED
Status: DISCONTINUED | OUTPATIENT
Start: 2017-12-17 | End: 2017-12-18 | Stop reason: HOSPADM

## 2017-12-17 RX ORDER — LANOLIN ALCOHOL/MO/W.PET/CERES
1 CREAM (GRAM) TOPICAL
Status: DISCONTINUED | OUTPATIENT
Start: 2017-12-18 | End: 2017-12-18 | Stop reason: HOSPADM

## 2017-12-17 RX ORDER — METOPROLOL SUCCINATE 25 MG/1
25 TABLET, EXTENDED RELEASE ORAL DAILY
Status: DISCONTINUED | OUTPATIENT
Start: 2017-12-18 | End: 2017-12-18 | Stop reason: HOSPADM

## 2017-12-17 RX ORDER — FLUTICASONE PROPIONATE 50 MCG
2 SPRAY, SUSPENSION (ML) NASAL
COMMUNITY
End: 2018-01-13

## 2017-12-17 RX ORDER — HYDROCODONE BITARTRATE AND ACETAMINOPHEN 10; 325 MG/1; MG/1
1 TABLET ORAL ONCE
Status: COMPLETED | OUTPATIENT
Start: 2017-12-17 | End: 2017-12-17

## 2017-12-17 RX ORDER — SIMETHICONE 80 MG
80 TABLET,CHEWABLE ORAL
COMMUNITY
End: 2018-01-13

## 2017-12-17 RX ORDER — ALBUTEROL SULFATE 90 UG/1
2 AEROSOL, METERED RESPIRATORY (INHALATION)
COMMUNITY

## 2017-12-17 RX ORDER — SEVELAMER CARBONATE 800 MG/1
2400 TABLET, FILM COATED ORAL
COMMUNITY
End: 2018-01-10

## 2017-12-17 RX ORDER — INSULIN GLARGINE 100 [IU]/ML
3 INJECTION, SOLUTION SUBCUTANEOUS
Status: DISCONTINUED | OUTPATIENT
Start: 2017-12-17 | End: 2017-12-18 | Stop reason: HOSPADM

## 2017-12-17 RX ORDER — METOPROLOL SUCCINATE 25 MG/1
25 TABLET, EXTENDED RELEASE ORAL DAILY
COMMUNITY
End: 2018-01-10

## 2017-12-17 RX ADMIN — SEVELAMER CARBONATE 1600 MG: 800 TABLET, FILM COATED ORAL at 12:01

## 2017-12-17 RX ADMIN — INSULIN LISPRO 2 UNITS: 100 INJECTION, SOLUTION INTRAVENOUS; SUBCUTANEOUS at 07:48

## 2017-12-17 RX ADMIN — LACTULOSE 30 G: 20 SOLUTION ORAL at 03:20

## 2017-12-17 RX ADMIN — SEVELAMER CARBONATE 1600 MG: 800 TABLET, FILM COATED ORAL at 17:25

## 2017-12-17 RX ADMIN — FLUTICASONE PROPIONATE 2 SPRAY: 50 SPRAY, METERED NASAL at 14:13

## 2017-12-17 RX ADMIN — INSULIN LISPRO 2 UNITS: 100 INJECTION, SOLUTION INTRAVENOUS; SUBCUTANEOUS at 22:33

## 2017-12-17 RX ADMIN — INSULIN LISPRO 2 UNITS: 100 INJECTION, SOLUTION INTRAVENOUS; SUBCUTANEOUS at 12:00

## 2017-12-17 RX ADMIN — SIMETHICONE CHEW TAB 80 MG 80 MG: 80 TABLET ORAL at 14:13

## 2017-12-17 RX ADMIN — SEVELAMER CARBONATE 1600 MG: 800 TABLET, FILM COATED ORAL at 08:00

## 2017-12-17 RX ADMIN — INSULIN GLARGINE 3 UNITS: 100 INJECTION, SOLUTION SUBCUTANEOUS at 22:32

## 2017-12-17 RX ADMIN — SODIUM CHLORIDE 40 MG: 9 INJECTION INTRAMUSCULAR; INTRAVENOUS; SUBCUTANEOUS at 08:37

## 2017-12-17 RX ADMIN — INSULIN LISPRO 4 UNITS: 100 INJECTION, SOLUTION INTRAVENOUS; SUBCUTANEOUS at 17:25

## 2017-12-17 RX ADMIN — HYDROCODONE BITARTRATE AND ACETAMINOPHEN 1 TABLET: 10; 325 TABLET ORAL at 01:57

## 2017-12-17 RX ADMIN — LACTULOSE 30 G: 20 SOLUTION ORAL at 12:00

## 2017-12-17 RX ADMIN — LACTULOSE 20 G: 20 SOLUTION ORAL at 22:31

## 2017-12-17 NOTE — PROGRESS NOTES
In patient nephrology progress note    Admit Date:    12/15/2017  Name:  Kirstie Stanley  Age :  75 y.o. Impression:   1. ESRD on hemodialysis MWF . No indication for HD today . Access ; right IJ TDC   2. Recurrent GIB , no active bleeding , EGD tomorrow per GI   3. DM   4. HTN   5.  Anemia s/p blood transfusion , hemoglobin is slight lower than yesterday      Recommendations:   Plan for dialysis tomorrow   Hope to have it done before endoscopy   Keep renal diet  Add EPO with HD   We will continue to follow   Discussed with nursing staff     Subjective:   No acute event over night   Patient denied CP/SOB/N/V     Objective:   Temp (24hrs), Av.2 °F (36.8 °C), Min:97.4 °F (36.3 °C), Max:98.9 °F (37.2 °C)      Intake/Output Summary (Last 24 hours) at 17 1617  Last data filed at 17 0912   Gross per 24 hour   Intake              960 ml   Output                0 ml   Net              960 ml     Last 3 Recorded Weights in this Encounter    12/15/17 1232 17 0329 17 0633   Weight: 70.3 kg (155 lb) 72.5 kg (159 lb 13.3 oz) 73.5 kg (162 lb)       Physical Exam:     General Appearance: no pain, no distress  Head: atraumatic  HEENT: no jaundice, moist oral mucosa  Neck: no JVD noted  Chest: LS clear to auscultation bilaterally  Heart: S1/S2, no murmur, gallop or rub, RRR  Adbomen: soft, nontender, BS active   : no flank tenderness, no cai catheter  Skin: intact, no rash  Extremity: no edema, cyanosis or clubbing  MS: No joint deformity or tenderness  Neuro: conscious, alert, oriented x 3, no focal deficit    Data Review:    BMP  Lab Results   Component Value Date/Time    Sodium 144 2017 01:13 AM    Potassium 4.0 2017 01:13 AM    Chloride 103 2017 01:13 AM    CO2 31 2017 01:13 AM    Anion gap 10 2017 01:13 AM    Glucose 242 2017 01:13 AM    BUN 27 2017 01:13 AM    Creatinine 5.06 2017 01:13 AM    BUN/Creatinine ratio 5 2017 01:13 AM    GFR est AA 10 12/17/2017 01:13 AM    GFR est non-AA 8 12/17/2017 01:13 AM    Calcium 9.0 12/17/2017 01:13 AM       CBC  Lab Results   Component Value Date/Time    WBC 6.8 12/17/2017 01:13 AM    HGB 8.4 12/17/2017 09:15 AM    HCT 27.0 12/17/2017 09:15 AM    PLATELET 720 03/83/9981 01:13 AM    MCV 94.7 12/17/2017 01:13 AM       No components found for: PTHINT  Lab Results   Component Value Date    IRON 83 12/16/2017         Yuly Bowen MD  [facility] [phone]  Pager [phone]

## 2017-12-17 NOTE — CONSULTS
Consult Note  Consult requested by: Dr. Basilio Baker is a 75 y.o. female [address] who is being seen on consult for ESRD and dialysis management . Chief Complaint   Patient presents with    Abnormal Lab Results       Impression:   1. ESRD on hemodialysis MWF . No indication for HD today . Access ; right IJ TDC   2. Recurrent GIB , no active bleeding   3. DM   4. HTN   5. Anemia s/p blood transfusion     Recommendations:   No indication for dialysis today   Plan for Monday if patient remain in hospital    May have urgent one if patient has developed respiratory distress or other indications  Keep renal diet  Add EPO with HD   We will continue to follow     HPI: This is 75years old AAF who is in ESRD on hemodialysis in center [facility] on MWF. Patient admitted last night to hospital for severe anemia and GIB. Renal consulted for hospital dialysis management. GI had seen patient . Patient had blood transfusion in ER . Hemoglobin is up to 9.0 gm from 6.2. She may need repeat EGD per GI. Patient feels well today , she wants to know if she can go home tomorrow     Past Medical History:   Diagnosis Date    Arthritis     Asthma     Chronic kidney disease     Chronic pain     Diabetes ([address] diet controlled    GERD (gastroesophageal reflux disease)     Hypertension      Past Surgical History:   Procedure Laterality Date    HX GYN  c section     Social History     Social History    Marital status: SINGLE     Spouse name: N/A    Number of children: N/A    Years of education: N/A     Occupational History    Not on file.      Social History Main Topics    Smoking status: Never Smoker    Smokeless tobacco: Never Used    Alcohol use No    Drug use: No    Sexual activity: Not on file     Other Topics Concern    Not on file     Social History Narrative       Allergies   Allergen Reactions    Aspirin Other (comments)    Heparin (Porcine) Unknown (comments)    Metformin Not Reported This Time    Norvasc [Amlodipine] Other (comments)       Medications:     Current Facility-Administered Medications   Medication Dose Route Frequency    [START ON 12/17/2017] sevelamer carbonate (RENVELA) tab 1,600 mg  1,600 mg Oral TID WITH MEALS    sevelamer carbonate (RENVELA) tab 1,600 mg  1,600 mg Oral TID WITH MEALS    0.9% sodium chloride infusion 250 mL  250 mL IntraVENous PRN    pantoprazole (PROTONIX) 40 mg in sodium chloride 0.9 % 10 mL injection  40 mg IntraVENous DAILY    acetaminophen (TYLENOL) tablet 650 mg  650 mg Oral Q4H PRN    ondansetron (ZOFRAN) injection 4 mg  4 mg IntraVENous Q4H PRN    insulin lispro (HUMALOG) injection   SubCUTAneous AC&HS    glucose chewable tablet 16 g  4 Tab Oral PRN    glucagon (GLUCAGEN) injection 1 mg  1 mg IntraMUSCular PRN    dextrose (D50W) injection syrg 12.5-25 g  25-50 mL IntraVENous PRN    fluticasone (FLONASE) 50 mcg/actuation nasal spray 2 Spray  2 Spray Both Nostrils PRN    simethicone (MYLICON) tablet 80 mg  80 mg Oral Q8H PRN     No current facility-administered medications on file prior to encounter. Current Outpatient Prescriptions on File Prior to Encounter   Medication Sig Dispense Refill    hydroCHLOROthiazide (HYDRODIURIL) 25 mg tablet Take 25 mg by mouth every eight (8) hours.  ferrous sulfate 325 mg (65 mg iron) tablet Take  by mouth Daily (before breakfast).  ergocalciferol (VITAMIN D2) 50,000 unit capsule Take 50,000 Units by mouth.          Review of Systems:   Review of System is negative except per HPI    Physical Assessment:     Visit Vitals    /67 (BP 1 Location: Right arm, BP Patient Position: Sitting)    Pulse 80    Temp 98 °F (36.7 °C)    Resp 20    Ht 5' 3\" (1.6 m)    Wt 72.5 kg (159 lb 13.3 oz)    SpO2 100%    Breastfeeding Unknown    BMI 28.31 kg/m2       Intake/Output Summary (Last 24 hours) at 12/16/17 2122  Last data filed at 12/16/17 0145   Gross per 24 hour   Intake              310 ml Output                0 ml   Net              310 ml     Last 3 Recorded Weights in this Encounter    12/15/17 1232 12/16/17 0329   Weight: 70.3 kg (155 lb) 72.5 kg (159 lb 13.3 oz)       General Appearance: no pain, no distress  Head: atraumatic  HEENT: no jaundice, moist oral mucosa  Neck: no JVD noted, right IJ TDC side no tender   Chest: LS clear to auscultation bilaterally  Heart: S1/S2, no murmur, gallop or rub, RRR  Adbomen: soft, nontender, BS active   : no flank tenderness, no cai catheter  Skin: intact, no rash  Extremity: no edema, cyanosis or clubbing  MS: No joint deformity or tenderness  Neuro: conscious, alert, oriented x 3, no focal deficit      BMP  Lab Results   Component Value Date/Time    Sodium 146 12/16/2017 03:39 AM    Potassium 4.1 12/16/2017 03:39 AM    Chloride 105 12/16/2017 03:39 AM    CO2 34 12/16/2017 03:39 AM    Anion gap 7 12/16/2017 03:39 AM    Glucose 107 12/16/2017 03:39 AM    BUN 19 12/16/2017 03:39 AM    Creatinine 3.41 12/16/2017 03:39 AM    BUN/Creatinine ratio 6 12/16/2017 03:39 AM    GFR est AA 16 12/16/2017 03:39 AM    GFR est non-AA 13 12/16/2017 03:39 AM    Calcium 8.6 12/16/2017 03:39 AM       CBC  Lab Results   Component Value Date/Time    WBC 6.9 12/16/2017 03:39 AM    HGB 9.0 12/16/2017 05:15 PM    HCT 29.3 12/16/2017 05:15 PM    PLATELET 730 73/44/1396 03:39 AM    MCV 92.6 12/16/2017 03:39 AM       No components found for: PTHINT  Lab Results   Component Value Date    IRON 83 12/16/2017           Cruz Jones MD    Pager: [phone]

## 2017-12-17 NOTE — PROGRESS NOTES
Problem: Falls - Risk of  Goal: *Absence of Falls  Document Iza Fall Risk and appropriate interventions in the flowsheet.    Outcome: Progressing Towards Goal  Fall Risk Interventions:            Medication Interventions: Teach patient to arise slowly, Patient to call before getting OOB    Elimination Interventions: Call light in reach, Toileting schedule/hourly rounds No

## 2017-12-17 NOTE — PROGRESS NOTES
Hospitalist Progress Note-critical care note     Patient: Fallon Ramirez MRN: [id number]  [facility]: [id number]    YOB: 1950  Age: 75 y.o. Sex: female    DOA: 12/15/2017 LOS:  LOS: 2 days            Chief complaint: anemia, gi bleeding, esrd , dm ,back pain. constipation    Assessment/Plan         Hospital Problems  Date Reviewed: 12/15/2017          Codes Class Noted POA    Back pain ICD-10-CM: M54.9  ICD-9-CM: 724.5  12/17/2017 Unknown        Constipation ICD-10-CM: K59.00  ICD-9-CM: 564.00  12/17/2017 Unknown        Cirrhosis ([facility] ICD-10-CM: K74.60  ICD-9-CM: 571.5  12/17/2017 Unknown        Anemia in chronic kidney disease ICD-10-CM: N18.9, D63.1  ICD-9-CM: 285.21  12/16/2017 Unknown        GI bleed ICD-10-CM: K92.2  ICD-9-CM: 578.9  12/15/2017 Unknown        ESRD (end stage renal disease) ([facility] ICD-10-CM: N18.6  ICD-9-CM: 585.6  12/15/2017 Unknown        Diabetes mellitus type 2, controlled ([facility] ICD-10-CM: E11.9  ICD-9-CM: 250.00  12/15/2017 Unknown        HTN (hypertension) ICD-10-CM: I10  ICD-9-CM: 401.9  12/15/2017 Unknown            1. GI bleed,   H/h stable now ,  Will have egd per gi tomorrow, will npo after midnight   Will continue h/h monitor   On ppi   2. ESRD on HD,   dialyzed tomorrow   4 anemia, acute on chronic   Received transfusion  2 units prbc, doing well. H/h stable   3. HTn contorlled  Well controlled   4. DM2 diet controlled  Continue ssi ,add low dose lantus    5back pain   due to muscular issue, will try heating pad, reported taking pain medication before, but not sure what it was , she will bring home medication list   6 constipation  bm regimen     7 cirrhosis   From ultrasound, will have upper egd to rule out varices    Need f/u with Dr. Jerry Tovar as out-pt       Subjective: back pain, tried lidocaine patch, not working     Nurse: asked for pain medication     Review of systems:    General: No fevers or chills. Cardiovascular: No chest pain or pressure. No palpitations. Pulmonary: No shortness of breath. Gastrointestinal: No nausea, vomiting. Constipation  Msk: rt side back pain     Vital signs/Intake and Output:  Visit Vitals    /68    Pulse 75    Temp 98.6 °F (37 °C)    Resp 18    Ht 5' 3\" (1.6 m)    Wt 73.5 kg (162 lb)    SpO2 100%    Breastfeeding Unknown    BMI 28.7 kg/m2     Current Shift:  12/17 0701 - 12/17 1900  In: 240 [P.O.:240]  Out: -   Last three shifts:  12/15 1901 - 12/17 0700  In: 1030 [P.O.:720]  Out: -     Physical Exam:  General: WD, WN. Alert, cooperative, no acute distress    HEENT: NC, Atraumatic. PERRLA, anicteric sclerae. Lungs: CTA Bilaterally. No Wheezing/Rhonchi/Rales. Heart:  Regular  rhythm,  + murmur, No Rubs, No Gallops  Abdomen: Soft, Non distended, Non tender.  +Bowel sounds,   Back: reproducible tenderness of rt side back, skin intact   Extremities: No c/c/e  Psych:   Not anxious or agitated. Neurologic:  No acute neurological deficit. Labs: Results:       Chemistry Recent Labs      12/17/17   0113  12/16/17   0339  12/15/17   1522   GLU  242*  107*  217*   NA  144  146*  144   K  4.0  4.1  3.7   CL  103  105  102   CO2  31  34*  36*   BUN  27*  19*  15   CREA  5.06*  3.41*  2.55*   CA  9.0  8.6  9.0   AGAP  10  7  6   BUCR  5*  6*  6*   AP   --    --   127*   TP   --    --   6.6   ALB   --    --   3.1*   GLOB   --    --   3.5   AGRAT   --    --   0.9      CBC w/Diff Recent Labs      12/17/17   0915  12/17/17   0113  12/16/17   1715   12/16/17   0339  12/15/17   1522   WBC   --   6.8   --    --   6.9  6.3   RBC   --   3.01*   --    --   2.82*  2.17*   HGB  8.4*  8.6*  9.0*   < >  8.1*  6.2*   HCT  27.0*  28.5*  29.3*   < >  26.1*  21.0*   PLT   --   160   --    --   129*  178   GRANS   --   73   --    --   73  77*   LYMPH   --   16*   --    --   15*  12*   EOS   --   2   --    --   3  2    < > = values in this interval not displayed.       Cardiac Enzymes Recent Labs      12/15/17   1522   CPK  86   CKND1  4.2* Coagulation Recent Labs      12/15/17   1522   PTP  12.7   INR  1.0       Lipid Panel No results found for: CHOL, CHOLPOCT, CHOLX, CHLST, CHOLV, 205604, HDL, LDL, LDLC, DLDLP, 077818, VLDLC, VLDL, TGLX, TRIGL, TRIGP, TGLPOCT, CHHD, CHHDX   BNP No results for input(s): BNPP in the last 72 hours.    Liver Enzymes Recent Labs      12/15/17   1522   TP  6.6   ALB  3.1*   AP  127*   SGOT  36      Thyroid Studies No results found for: T4, T3U, TSH, TSHEXT, TSHEXT     Procedures/imaging: see electronic medical records for all procedures/Xrays and details which were not copied into this note but were reviewed prior to creation of Mell Renteria MD

## 2017-12-17 NOTE — PROGRESS NOTES
0697- Patient in bed, talking on phone this time, AM medications tolerated well. A/O x 4. Lungs clear, abdomen soft and non-distended. Bowel sounds present, 20G IV to LFA, capped. HD port to right IJ. No signs of phlebitis or infiltration noted. Skin warm ,dry and intact. Patient denies pain or discomfort. Bed placed in lowest position, call bell within reach. 1413- Patient complained of gas and nasal congestion, medicated per [facility]    Shift Summary: Patient complained of pain early in shift,  contacted and made aware, order for heating pad given, patient informed to notify nurse when she lays in bed so pad can be applied. Patient has been sitting on side of bed for most of this shift, so pad has not been applied. New order has been placed for lactulose BID for constipation. Patient to be NPO after midnight for EGD in morning. Patient to have dialysis before procedure, passed on to oncoming nurse. IV remains intact and capped at this time.

## 2017-12-17 NOTE — PROGRESS NOTES
Problem: Mobility Impaired (Adult and Pediatric)  Goal: *Acute Goals and Plan of Care (Insert Text)  Physical Therapy Goals  Initiated 12/16/2017 and to be accomplished within 7 day(s)  1. Patient will move from supine to sit and sit to supine  in bed with supervision/set-up. 2.  Patient will transfer from bed to chair and chair to bed with supervision/set-up using the least restrictive device. 3.  Patient will perform sit to stand with supervision/set-up. 4.  Patient will ambulate with supervision/set-up for >150 feet with the least restrictive device. 5.  Patient will ascend/descend a full flight of stairs with handrail(s) with minimal assistance/contact guard assist for safe home entry. Outcome: Progressing Towards Goal  physical Therapy TREATMENT    Patient: Clifford Oneill (86 y.o. female)  Date: 12/17/2017  Diagnosis: GI bleed <principal problem not specified>  Precautions: Fall   Chart, physical therapy assessment, plan of care and goals were reviewed. ASSESSMENT:  Pt agreeable to participate. Pt ambulating 100' with RW CGA. Mild fatigue. No standing rest needed though. Pt willing to remain sitting EOB with family present. Cont POC. Progression toward goals:  []      Improving appropriately and progressing toward goals  [x]      Improving slowly and progressing toward goals  []      Not making progress toward goals and plan of care will be adjusted     PLAN:  Patient continues to benefit from skilled intervention to address the above impairments. Continue treatment per established plan of care.   Discharge Recommendations:  Home Health  Further Equipment Recommendations for Discharge:  rolling walker     SUBJECTIVE:   Patient stated \" My family is here visiting\"     OBJECTIVE DATA SUMMARY:   Critical Behavior:  Neurologic State: Alert, Appropriate for age  Orientation Level: Oriented X4  Cognition: Appropriate decision making, Appropriate for age attention/concentration, Appropriate safety awareness  Safety/Judgement: Awareness of environment  Functional Mobility Training:    Transfers:  Sit to Stand: Contact guard assistance  Stand to Sit: Supervision  Balance:  Sitting: Intact  Standing: Intact; With support  Standing - Static: Fair  Standing - Dynamic : Fair  Ambulation/Gait Training:  Distance (ft): 100 Feet (ft)  Assistive Device: Walker, rolling;Gait belt  Ambulation - Level of Assistance: Contact guard assistance  Gait Abnormalities: Decreased step clearance  Base of Support: Widened  Stance: Weight shift  Speed/Cristy: Slow  Step Length: Right shortened;Left shortened  Swing Pattern: Left asymmetrical;Right asymmetrical  Interventions: Verbal cues    Pain:  Pain Scale 1: Numeric (0 - 10)  Pain Intensity 1: 8  Pain Location 1: Back  Pain Intervention(s) 1: Relaxation technique  Activity Tolerance:   Fair     After treatment:   [] Patient left in no apparent distress sitting up in chair  [x] Patient left in no apparent distress in bed  [x] Call bell left within reach  [] Nursing notified  [x] Caregiver present  [] Bed alarm activated      Nereida Valdivia PTA   Time Calculation: 15 mins

## 2017-12-17 NOTE — CONSULTS
Consult Note  Consult requested by: Dr. Bruce Villela is a 75 y.o. female [address] who is being seen on consult for ESRD and dialysis management . Chief Complaint   Patient presents with    Abnormal Lab Results       Impression:   1. ESRD on hemodialysis MWF . No indication for HD today   2. Recurrent GIB   3. DM   4. HTN   5. Anemia s/p blood transfusion     Recommendations:   No indication for dialysis today   Plan for Monday   May have urgent one if patient has developed respiratory distress or other indications  Keep renal diet  Add EPO with HD   We will continue to follow     HPI: This is 75years old AAF who is in ESRD on hemodialysis in center [facility] on MWF. Patient admitted last night to hospital for severe anemia and GIB. Renal consulted for hospital dialysis management. GI had seen patient . Patient had blood transfusion in ER . Hemoglobin is up to 9.0 gm from 6.2. She may need repeat EGD per GI. Past Medical History:   Diagnosis Date    Arthritis     Asthma     Chronic kidney disease     Chronic pain     Diabetes ([address] diet controlled    GERD (gastroesophageal reflux disease)     Hypertension      Past Surgical History:   Procedure Laterality Date    HX GYN  c section     Social History     Social History    Marital status: SINGLE     Spouse name: N/A    Number of children: N/A    Years of education: N/A     Occupational History    Not on file.      Social History Main Topics    Smoking status: Never Smoker    Smokeless tobacco: Never Used    Alcohol use No    Drug use: No    Sexual activity: Not on file     Other Topics Concern    Not on file     Social History Narrative       Allergies   Allergen Reactions    Aspirin Other (comments)    Heparin (Porcine) Unknown (comments)    Metformin Not Reported This Time    Norvasc [Amlodipine] Other (comments)       Medications:     Current Facility-Administered Medications   Medication Dose Route Frequency    [START ON 12/17/2017] sevelamer carbonate (RENVELA) tab 1,600 mg  1,600 mg Oral TID WITH MEALS    sevelamer carbonate (RENVELA) tab 1,600 mg  1,600 mg Oral TID WITH MEALS    0.9% sodium chloride infusion 250 mL  250 mL IntraVENous PRN    pantoprazole (PROTONIX) 40 mg in sodium chloride 0.9 % 10 mL injection  40 mg IntraVENous DAILY    acetaminophen (TYLENOL) tablet 650 mg  650 mg Oral Q4H PRN    ondansetron (ZOFRAN) injection 4 mg  4 mg IntraVENous Q4H PRN    insulin lispro (HUMALOG) injection   SubCUTAneous AC&HS    glucose chewable tablet 16 g  4 Tab Oral PRN    glucagon (GLUCAGEN) injection 1 mg  1 mg IntraMUSCular PRN    dextrose (D50W) injection syrg 12.5-25 g  25-50 mL IntraVENous PRN    fluticasone (FLONASE) 50 mcg/actuation nasal spray 2 Spray  2 Spray Both Nostrils PRN    simethicone (MYLICON) tablet 80 mg  80 mg Oral Q8H PRN     No current facility-administered medications on file prior to encounter. Current Outpatient Prescriptions on File Prior to Encounter   Medication Sig Dispense Refill    hydroCHLOROthiazide (HYDRODIURIL) 25 mg tablet Take 25 mg by mouth every eight (8) hours.  ferrous sulfate 325 mg (65 mg iron) tablet Take  by mouth Daily (before breakfast).  ergocalciferol (VITAMIN D2) 50,000 unit capsule Take 50,000 Units by mouth.          Review of Systems:   Review of System is negative except per HPI    Physical Assessment:     Visit Vitals    /67 (BP 1 Location: Right arm, BP Patient Position: Sitting)    Pulse 80    Temp 98 °F (36.7 °C)    Resp 20    Ht 5' 3\" (1.6 m)    Wt 72.5 kg (159 lb 13.3 oz)    SpO2 100%    Breastfeeding Unknown    BMI 28.31 kg/m2       Intake/Output Summary (Last 24 hours) at 12/16/17 3827  Last data filed at 12/16/17 0145   Gross per 24 hour   Intake              310 ml   Output                0 ml   Net              310 ml     Last 3 Recorded Weights in this Encounter    12/15/17 1232 12/16/17 0329   Weight: 70.3 kg (155 lb) 72.5 kg (159 lb 13.3 oz)       General Appearance: no pain, no distress  Head: atraumatic  HEENT: no jaundice, moist oral mucosa  Neck: no JVD noted  Chest: LS clear to auscultation bilaterally  Heart: S1/S2, no murmur, gallop or rub, RRR  Adbomen: soft, nontender, BS active   : no flank tenderness, no cai catheter  Skin: intact, no rash  Extremity: no edema, cyanosis or clubbing  MS: No joint deformity or tenderness  Neuro: conscious, alert, oriented x 3, no focal deficit      BMP  Lab Results   Component Value Date/Time    Sodium 146 12/16/2017 03:39 AM    Potassium 4.1 12/16/2017 03:39 AM    Chloride 105 12/16/2017 03:39 AM    CO2 34 12/16/2017 03:39 AM    Anion gap 7 12/16/2017 03:39 AM    Glucose 107 12/16/2017 03:39 AM    BUN 19 12/16/2017 03:39 AM    Creatinine 3.41 12/16/2017 03:39 AM    BUN/Creatinine ratio 6 12/16/2017 03:39 AM    GFR est AA 16 12/16/2017 03:39 AM    GFR est non-AA 13 12/16/2017 03:39 AM    Calcium 8.6 12/16/2017 03:39 AM       CBC  Lab Results   Component Value Date/Time    WBC 6.9 12/16/2017 03:39 AM    HGB 9.0 12/16/2017 05:15 PM    HCT 29.3 12/16/2017 05:15 PM    PLATELET 401 79/86/0685 03:39 AM    MCV 92.6 12/16/2017 03:39 AM       No components found for: PTHINT  Lab Results   Component Value Date    IRON 83 12/16/2017           Fabiola Mane MD    Pager: [phone]

## 2017-12-17 NOTE — PROGRESS NOTES
26:  Assumed care for patient, received bedside report from Genoveva Nunes RN. Patient sitting on toilet requesting medication for \"constipation. \"  When asked if a BM has been successful, patient stated yes but \"small, hard balls. \"

## 2017-12-18 ENCOUNTER — HOME HEALTH ADMISSION (OUTPATIENT)
Dept: HOME HEALTH SERVICES | Facility: HOME HEALTH | Age: 67
End: 2017-12-18

## 2017-12-18 VITALS
BODY MASS INDEX: 29.79 KG/M2 | DIASTOLIC BLOOD PRESSURE: 82 MMHG | OXYGEN SATURATION: 100 % | TEMPERATURE: 98.7 F | HEIGHT: 63 IN | RESPIRATION RATE: 18 BRPM | WEIGHT: 168.1 LBS | SYSTOLIC BLOOD PRESSURE: 155 MMHG | HEART RATE: 77 BPM

## 2017-12-18 LAB
ANION GAP SERPL CALC-SCNC: 11 MMOL/L (ref 3–18)
ATRIAL RATE: 79 BPM
BASOPHILS # BLD: 0.1 K/UL (ref 0–0.06)
BASOPHILS NFR BLD: 1 % (ref 0–2)
BUN SERPL-MCNC: 35 MG/DL (ref 7–18)
BUN/CREAT SERPL: 6 (ref 12–20)
CALCIUM SERPL-MCNC: 9.3 MG/DL (ref 8.5–10.1)
CALCULATED P AXIS, ECG09: 60 DEGREES
CALCULATED R AXIS, ECG10: -41 DEGREES
CALCULATED T AXIS, ECG11: 111 DEGREES
CHLORIDE SERPL-SCNC: 103 MMOL/L (ref 100–108)
CO2 SERPL-SCNC: 30 MMOL/L (ref 21–32)
CREAT SERPL-MCNC: 6.23 MG/DL (ref 0.6–1.3)
DIAGNOSIS, 93000: NORMAL
DIFFERENTIAL METHOD BLD: ABNORMAL
EOSINOPHIL # BLD: 0.2 K/UL (ref 0–0.4)
EOSINOPHIL NFR BLD: 3 % (ref 0–5)
ERYTHROCYTE [DISTWIDTH] IN BLOOD BY AUTOMATED COUNT: 21 % (ref 11.6–14.5)
GLUCOSE BLD STRIP.AUTO-MCNC: 140 MG/DL (ref 70–110)
GLUCOSE BLD STRIP.AUTO-MCNC: 174 MG/DL (ref 70–110)
GLUCOSE BLD STRIP.AUTO-MCNC: 225 MG/DL (ref 70–110)
GLUCOSE BLD STRIP.AUTO-MCNC: 230 MG/DL (ref 70–110)
GLUCOSE SERPL-MCNC: 161 MG/DL (ref 74–99)
H PYLORI IGA SER-ACNC: <9 UNITS (ref 0–8.9)
H PYLORI IGG SER IA-ACNC: 1.1 U/ML (ref 0–0.8)
HBV SURFACE AB SER QL IA: POSITIVE
HBV SURFACE AB SERPL IA-ACNC: 32.2 MIU/ML
HBV SURFACE AG SER QL: <0.1 INDEX
HBV SURFACE AG SER QL: NEGATIVE
HCT VFR BLD AUTO: 27.2 % (ref 35–45)
HCT VFR BLD AUTO: 28.5 % (ref 35–45)
HCV AB SER IA-ACNC: 0.13 INDEX
HCV AB SERPL QL IA: NEGATIVE
HCV COMMENT,HCGAC: NORMAL
HEP BS AB COMMENT,HBSAC: NORMAL
HGB BLD-MCNC: 8.1 G/DL (ref 12–16)
HGB BLD-MCNC: 8.6 G/DL (ref 12–16)
LYMPHOCYTES # BLD: 1 K/UL (ref 0.9–3.6)
LYMPHOCYTES NFR BLD: 13 % (ref 21–52)
MCH RBC QN AUTO: 28.6 PG (ref 24–34)
MCHC RBC AUTO-ENTMCNC: 30.2 G/DL (ref 31–37)
MCV RBC AUTO: 94.7 FL (ref 74–97)
MONOCYTES # BLD: 0.6 K/UL (ref 0.05–1.2)
MONOCYTES NFR BLD: 8 % (ref 3–10)
NEUTS SEG # BLD: 5.5 K/UL (ref 1.8–8)
NEUTS SEG NFR BLD: 75 % (ref 40–73)
P-R INTERVAL, ECG05: 174 MS
PLATELET # BLD AUTO: 154 K/UL (ref 135–420)
PMV BLD AUTO: 10.3 FL (ref 9.2–11.8)
POTASSIUM SERPL-SCNC: 4.4 MMOL/L (ref 3.5–5.5)
Q-T INTERVAL, ECG07: 454 MS
QRS DURATION, ECG06: 148 MS
QTC CALCULATION (BEZET), ECG08: 520 MS
RBC # BLD AUTO: 3.01 M/UL (ref 4.2–5.3)
SODIUM SERPL-SCNC: 144 MMOL/L (ref 136–145)
VENTRICULAR RATE, ECG03: 79 BPM
WBC # BLD AUTO: 7.3 K/UL (ref 4.6–13.2)

## 2017-12-18 PROCEDURE — 74011636637 HC RX REV CODE- 636/637: Performed by: INTERNAL MEDICINE

## 2017-12-18 PROCEDURE — C9113 INJ PANTOPRAZOLE SODIUM, VIA: HCPCS | Performed by: INTERNAL MEDICINE

## 2017-12-18 PROCEDURE — 85025 COMPLETE CBC W/AUTO DIFF WBC: CPT | Performed by: HOSPITALIST

## 2017-12-18 PROCEDURE — 82962 GLUCOSE BLOOD TEST: CPT

## 2017-12-18 PROCEDURE — 74011250636 HC RX REV CODE- 250/636: Performed by: INTERNAL MEDICINE

## 2017-12-18 PROCEDURE — 74011250637 HC RX REV CODE- 250/637: Performed by: INTERNAL MEDICINE

## 2017-12-18 PROCEDURE — 97116 GAIT TRAINING THERAPY: CPT

## 2017-12-18 PROCEDURE — 36415 COLL VENOUS BLD VENIPUNCTURE: CPT | Performed by: HOSPITALIST

## 2017-12-18 PROCEDURE — 74011000250 HC RX REV CODE- 250: Performed by: INTERNAL MEDICINE

## 2017-12-18 PROCEDURE — 85018 HEMOGLOBIN: CPT | Performed by: INTERNAL MEDICINE

## 2017-12-18 PROCEDURE — 74011250637 HC RX REV CODE- 250/637: Performed by: HOSPITALIST

## 2017-12-18 PROCEDURE — 80048 BASIC METABOLIC PNL TOTAL CA: CPT | Performed by: HOSPITALIST

## 2017-12-18 PROCEDURE — 97530 THERAPEUTIC ACTIVITIES: CPT

## 2017-12-18 RX ADMIN — ERYTHROPOIETIN 10000 UNITS: 10000 INJECTION, SOLUTION INTRAVENOUS; SUBCUTANEOUS at 12:54

## 2017-12-18 RX ADMIN — LACTULOSE 30 G: 20 SOLUTION ORAL at 10:14

## 2017-12-18 RX ADMIN — INSULIN LISPRO 2 UNITS: 100 INJECTION, SOLUTION INTRAVENOUS; SUBCUTANEOUS at 07:00

## 2017-12-18 RX ADMIN — SODIUM CHLORIDE 40 MG: 9 INJECTION INTRAMUSCULAR; INTRAVENOUS; SUBCUTANEOUS at 10:14

## 2017-12-18 RX ADMIN — PSYLLIUM HUSK 1 PACKET: 3.4 POWDER ORAL at 10:22

## 2017-12-18 RX ADMIN — SEVELAMER CARBONATE 1600 MG: 800 TABLET, FILM COATED ORAL at 17:13

## 2017-12-18 RX ADMIN — SEVELAMER CARBONATE 1600 MG: 800 TABLET, FILM COATED ORAL at 10:14

## 2017-12-18 RX ADMIN — FERROUS SULFATE TAB 325 MG (65 MG ELEMENTAL FE) 325 MG: 325 (65 FE) TAB at 10:14

## 2017-12-18 NOTE — ROUTINE PROCESS
Bedside and Verbal shift change report given to QUINTIN Garcia by Zoraida Donovan. Report included the following information SBAR, Kardex, OR Summary, Intake/Output and MAR.

## 2017-12-18 NOTE — ROUTINE PROCESS
Bedside and Verbal shift change report given to Marie Lopez RN by Adriana Jimenez RN.  Report included the following information SBAR, Kardex, OR Summary, Intake/Output and MAR

## 2017-12-18 NOTE — PROGRESS NOTES
Pt scheduled for EGD/colonoscopy at [place] tomorrow. Dr. Caitlin Hartley paged - will cancel plan for EGD here, and pt can complete both tomorrow at [place]. Dr. Richi Soto notified of change in plan.

## 2017-12-18 NOTE — PROGRESS NOTES
[facility] Nephrology Daily Progress Note    Admitting time: 12/15/2017  1:08 PM  Today 12/18/2017      Impression:   1. ESRD on hemodialysis MWF   2. Recurrent GIB , no active bleeding, planned for colonoscopy at [address] [facility] tomorrow  3. DM   4. HTN   5. Anemia s/p blood transfusion , hemoglobin stable today     Recommendations:   Dialysis today for clearance and volume control   Keep renal diet  Cont EPO with HD   Discussed with Dr Dhaval Gomes and pt    Subjective  No SOB/CP/N/V, no new complaint. Planned for colonoscopy today, which was canceled, now planned for scope tomorrow at [address] [facility]. Objective  Blood pressure 155/82, pulse 77, temperature 98.7 °F (37.1 °C), resp. rate 18, height 5' 3\" (1.6 m), weight 76.2 kg (168 lb 1.6 oz), SpO2 100 %, unknown if currently breastfeeding.     Intake/Output Summary (Last 24 hours) at 12/18/17 1215  Last data filed at 12/18/17 0523   Gross per 24 hour   Intake              440 ml   Output                0 ml   Net              440 ml     Wt Readings from Last 3 Encounters:   12/18/17 76.2 kg (168 lb 1.6 oz)   06/01/17 72.1 kg (159 lb)       Gen: NAD  HEENT: NC/AT  Neck: supple, no JVD  Chest: CTAB  CVS: RRR  ABD: Soft, ND, NT  EXT: no edema  Access: right IJ [facility] c/d/i    Medications  [unfilled]    Lab      Basic Metabolic Profile   Recent Labs      12/18/17   0123  12/17/17   0113  12/16/17   0339   NA  144  144  146*   CO2  30  31  34*   BUN  35*  27*  19*      [unfilled] No components found for: PTHINT       CBC w/Diff    Recent Labs      12/18/17   0933  12/18/17   0123  12/17/17   1705   12/17/17   0113   12/16/17   0339   WBC   --   7.3   --    --   6.8   --   6.9   RBC   --   3.01*   --    --   3.01*   --   2.82*   HCT  27.2*  28.5*  26.4*   < >  28.5*   < >  26.1*   MCV   --   94.7   --    --   94.7   --   92.6   MCH   --   28.6   --    --   28.6   --   28.7   MCHC   --   30.2*   --    --   30.2*   --   31.0   RDW   --   21.0*   --    --   22.0* --   20.7*    < > = values in this interval not displayed.     Recent Labs      12/18/17   0123  12/17/17   0113  12/16/17   0339   MONOS  8  8  8   EOS  3  2  3   BASOS  1  1  1   RDW  21.0*  22.0*  20.7*        Ariel Ledezma MD  Pager: [phone]   Office: [phone]

## 2017-12-18 NOTE — PROGRESS NOTES
Problem: Mobility Impaired (Adult and Pediatric)  Goal: *Acute Goals and Plan of Care (Insert Text)  Physical Therapy Goals  Initiated 12/16/2017 and to be accomplished within 7 day(s)  1. Patient will move from supine to sit and sit to supine  in bed with supervision/set-up. 2.  Patient will transfer from bed to chair and chair to bed with supervision/set-up using the least restrictive device. 3.  Patient will perform sit to stand with supervision/set-up. 4.  Patient will ambulate with supervision/set-up for >150 feet with the least restrictive device. 5.  Patient will ascend/descend a full flight of stairs with handrail(s) with minimal assistance/contact guard assist for safe home entry. Outcome: Progressing Towards Goal  physical Therapy TREATMENT    Patient: Fallon Ramirez (43 y.o. female)  Date: 12/18/2017  Diagnosis: GI bleed <principal problem not specified>  Precautions: Fall   Chart, physical therapy assessment, plan of care and goals were reviewed. ASSESSMENT:  Pt showing continued progress with mobility. Pt also increasing ambulation distance with RW. Mild/moderate fatigue noted post tx. Cont POC. Progression toward goals:  []      Improving appropriately and progressing toward goals  [x]      Improving slowly and progressing toward goals  []      Not making progress toward goals and plan of care will be adjusted     PLAN:  Patient continues to benefit from skilled intervention to address the above impairments. Continue treatment per established plan of care.   Discharge Recommendations:  Home Health  Further Equipment Recommendations for Discharge:  rolling walker     SUBJECTIVE:   Patient stated  I am feeling much better  \"    OBJECTIVE DATA SUMMARY:   Critical Behavior:  Neurologic State: Alert, Appropriate for age  Orientation Level: Appropriate for age, Oriented X4  Cognition: Appropriate decision making, Appropriate for age attention/concentration, Appropriate safety awareness  Safety/Judgement: Awareness of environment  Functional Mobility Training:  Transfers:  Sit to Stand: Supervision  Stand to Sit: Supervision  Balance:  Sitting: Intact  Standing: Intact; With support  Standing - Static: Good  Standing - Dynamic : Fair  Ambulation/Gait Training:  Distance (ft): 120 Feet (ft)  Assistive Device: Walker, rolling;Gait belt  Ambulation - Level of Assistance: Contact guard assistance  Gait Abnormalities: Decreased step clearance  Base of Support: Widened  Stance: Weight shift  Speed/Cristy: Slow  Step Length: Right shortened;Left shortened  Swing Pattern: Left asymmetrical;Right asymmetrical  Interventions: Verbal cues    Pain:  Pain Scale 1: Numeric (0 - 10)  Pain Intensity 1: 0  Activity Tolerance:   Good-     After treatment:   [] Patient left in no apparent distress sitting up in chair  [x] Patient left in no apparent distress in bed(EOB)   [x] Call bell left within reach  [] Nursing notified  [] Caregiver present  [] Bed alarm activated      Rocio Morejon PTA   Time Calculation: 24 mins

## 2017-12-18 NOTE — PROGRESS NOTES
1925 - Bedside report received from [facility]. Patient in bed. Pain 2/10. Heating pad applied. 2238 - Patient in bed at this time. IV to L FA  intact and patent.  + CMS. Pt A & O x 4. LS clear, on RA. Abdomen soft, NT and ND. + BS to all 4 quadrants. Denies nausea. Pain 3/10. Call light within reach. Medications given. Potential side effects explained to patient, patient verbalizes understanding, opportunities for questions provided. Patient stable, No apparent distress at this time, bed in locked position, call bell and phone within reach. 0435-Pt sleeping comfortably. 0650-A nurse from [facility] outpatient clinic, Eloise Bernal, called to let RN know that pt had a recent EGD at the [place], and that pt is scheduled for a colonoscopy at the [place] tomorrow. She said to let MD know. Pt had uneventful shift. Uses IS every hour while awake. Pt ambulated without assistance. No issues/concerns at this time.  Call bell within reach

## 2017-12-18 NOTE — DISCHARGE SUMMARY
Discharge Summary    Patient: Jake Smith MRN: [id number]  CSN: [id number]    YOB: 1950  Age: 75 y.o. Sex: female    DOA: 12/15/2017 LOS:  LOS: 3 days   Discharge Date:      Primary Care Provider:  Molina Curtis MD    Admission Diagnoses: GI bleed    Discharge Diagnoses:    Hospital Problems  Date Reviewed: 12/15/2017          Codes Class Noted POA    Back pain ICD-10-CM: M54.9  ICD-9-CM: 724.5  12/17/2017 Unknown        Constipation ICD-10-CM: K59.00  ICD-9-CM: 564.00  12/17/2017 Unknown        Cirrhosis ([facility] ICD-10-CM: K74.60  ICD-9-CM: 571.5  12/17/2017 Unknown        Anemia in chronic kidney disease ICD-10-CM: N18.9, D63.1  ICD-9-CM: 285.21  12/16/2017 Unknown        GI bleed ICD-10-CM: K92.2  ICD-9-CM: 578.9  12/15/2017 Unknown        ESRD (end stage renal disease) ([facility] ICD-10-CM: N18.6  ICD-9-CM: 585.6  12/15/2017 Unknown        Diabetes mellitus type 2, controlled ([facility] ICD-10-CM: E11.9  ICD-9-CM: 250.00  12/15/2017 Unknown        HTN (hypertension) ICD-10-CM: I10  ICD-9-CM: 401.9  12/15/2017 Unknown              Discharge Condition: Stable    Discharge Medications:     Current Discharge Medication List      CONTINUE these medications which have NOT CHANGED    Details   lactulose (CHRONULAC) 10 gram/15 mL solution Take 30 g by mouth two (2) times a day. psyllium (METAMUCIL) packet Take 1 Packet by mouth daily. sevelamer carbonate (RENVELA) 800 mg tab tab Take 2,400 mg by mouth three (3) times daily (with meals). fluticasone (FLONASE) 50 mcg/actuation nasal spray 2 Sprays by Both Nostrils route daily. simethicone (MYLICON) 80 mg chewable tablet Take 80 mg by mouth every six (6) hours as needed for Flatulence. ferrous sulfate 325 mg (65 mg iron) tablet Take  by mouth Daily (before breakfast). ergocalciferol (VITAMIN D2) 50,000 unit capsule Take 50,000 Units by mouth.      metoprolol succinate (TOPROL-XL) 25 mg XL tablet Take 25 mg by mouth daily. albuterol (PROVENTIL HFA, VENTOLIN HFA, PROAIR HFA) 90 mcg/actuation inhaler Take 2 Puffs by inhalation as needed for Wheezing. Procedures : none     Consults: Gastroenterology and Nephrology      PHYSICAL EXAM   Visit Vitals    /64 (BP 1 Location: Left arm, BP Patient Position: At rest)    Pulse 74    Temp 98.9 °F (37.2 °C)    Resp 17    Ht 5' 3\" (1.6 m)    Wt 76.2 kg (168 lb 1.6 oz)    SpO2 100%    Breastfeeding Unknown    BMI 29.78 kg/m2     General: Awake, cooperative, no acute distress    HEENT: NC, Atraumatic. PERRLA, EOMI. Anicteric sclerae. Lungs:  CTA Bilaterally. No Wheezing/Rhonchi/Rales. Heart:  Regular  rhythm,  +murmur, No Rubs, No Gallops  Abdomen: Soft, Non distended, Non tender. +Bowel sounds,   Extremities: No c/c/e  Psych:   Not anxious or agitated. Neurologic:  No acute neurological deficits. Admission HPI :   Ailyn Awan is a 75 y.o. female with past medical history significant for ESRD on HD, HTN, Dm2, GERD presents to the ER with significant fatigue and was told she was anemic. She was recently hospitalized at the [place] and unfortunately records are unavailable, but for what Ms Denice Alfaro states was GI bleed. She was told she had \" stopped\" and she was scheduled for outpt EGD and colonoscopy next week at [place] [address]. Over the last few days she has had progressive weakness, dyspnea on exertion and fatigue.      Currently she is afebrile, normotensive and in no distress. Exam reveals a chronically ill appearing female w TCD in R chest cdi, and melena on rectal examination. Her hemoglobin is 6.8. Medicine is asked to admit for further management. Hospital Course : For her GI bleed, H/h was stable after blood transfusion and no active bleeding during her stay. ppi was continued. Gi was on board and recommend to have upper and lower EGD in [place] as scheduled tomorrow. She received 2 units prbc and h/h remained stable.  Also she received HD per nephrology. We controlled her DM per ssi which will be continued to be controlled per diet at home. She has chronic back pain which f/u with her pcm at [address] [place] [address].     Abdomen ultrasound was performed and cirrhosis noted . Recommend to f/u with hepatology. Activity: Activity as tolerated    Diet: Regular Diet and Diabetic Diet    Follow-up: pcm and gi tomorrow, HD clinic, hepatology     Disposition: home     Minutes spent on discharge: 45 min       Labs: Results:       Chemistry Recent Labs      12/18/17   0123  12/17/17   0113  12/16/17   0339  12/15/17   1522   GLU  161*  242*  107*  217*   NA  144  144  146*  144   K  4.4  4.0  4.1  3.7   CL  103  103  105  102   CO2  30  31  34*  36*   BUN  35*  27*  19*  15   CREA  6.23*  5.06*  3.41*  2.55*   CA  9.3  9.0  8.6  9.0   AGAP  11  10  7  6   BUCR  6*  5*  6*  6*   AP   --    --    --   127*   TP   --    --    --   6.6   ALB   --    --    --   3.1*   GLOB   --    --    --   3.5   AGRAT   --    --    --   0.9      CBC w/Diff Recent Labs      12/18/17   0933  12/18/17   0123  12/17/17   1705   12/17/17   0113   12/16/17   0339   WBC   --   7.3   --    --   6.8   --   6.9   RBC   --   3.01*   --    --   3.01*   --   2.82*   HGB  8.1*  8.6*  8.2*   < >  8.6*   < >  8.1*   HCT  27.2*  28.5*  26.4*   < >  28.5*   < >  26.1*   PLT   --   154   --    --   160   --   129*   GRANS   --   75*   --    --   73   --   73   LYMPH   --   13*   --    --   16*   --   15*   EOS   --   3   --    --   2   --   3    < > = values in this interval not displayed. Cardiac Enzymes Recent Labs      12/15/17   1522   CPK  86   CKND1  4.2*      Coagulation Recent Labs      12/15/17   1522   PTP  12.7   INR  1.0       Lipid Panel No results found for: CHOL, CHOLPOCT, CHOLX, CHLST, CHOLV, 870925, HDL, LDL, LDLC, DLDLP, 213561, VLDLC, VLDL, TGLX, TRIGL, TRIGP, TGLPOCT, CHHD, CHHDX   BNP No results for input(s): BNPP in the last 72 hours.    Liver Enzymes Recent Labs 12/15/17   1522   TP  6.6   ALB  3.1*   AP  127*   SGOT  36      Thyroid Studies No results found for: T4, T3U, TSH, TSHEXT         Significant Diagnostic Studies: Ct Abd Pelv Wo Cont    Result Date: 12/16/2017  EXAM: CT of the abdomen and pelvis INDICATION: Abdominal distention and anemia. Dialysis patient. COMPARISON: None. TECHNIQUE: Helical volumetric scanning through the abdomen and pelvis was performed without oral or intravenous contrast. Axial, sagittal and coronal reconstructions were generated. One or more dose reduction techniques were used on this CT: automated exposure control, adjustment of the mAs and/or kVp according to patient's size, and iterative reconstruction techniques. The specific techniques utilized on this CT exam have been documented in the patient's electronic medical record. _______________ FINDINGS: LOWER CHEST: Several small bilateral noncalcified pulmonary nodules are present, largest measures 4 mm in the right middle lobe. Cardiomegaly. Dense mitral annulus calcification is present. LIVER, BILIARY: A couple punctate calcifications are present in the central liver, probably calcified granulomata. No focal liver lesion appreciated. No biliary dilation. Gallbladder lumen is slightly more dense than expected, suggesting sludge or cholestasis. PANCREAS: Normal. SPLEEN: Normal. ADRENALS: Slight fullness right adrenal apex with a density reading of 3 Hounsfield units, very likely benign adenoma. KIDNEYS: Atrophic. Scattered renal artery vascular calcifications and possible tiny nonobstructing bilateral renal calculi. LYMPH NODES: No enlarged lymph nodes. GASTROINTESTINAL TRACT: Mild to moderate volume ascites is present in the abdomen and pelvis, water attenuation. No bowel dilatation or wall thickening. Normal appendix. PELVIC ORGANS: Prominent calcifications are present along the uterine arteries. No pelvic masses. VASCULATURE: Calcific atherosclerosis, no AAA.  BONES: No acute or aggressive osseous abnormalities identified. OTHER: No retroperitoneal, rectus sheath or other hematoma. Surgical clips are present adjacent to the distal right colon. _______________     IMPRESSION: 1. No hematoma. No signs of obstruction. 2. Mild to moderate volume ascites in the abdomen and pelvis. 3. Scattered bibasilar 4 mm and smaller noncalcified pulmonary nodules. If the patient is low risk, follow-up is not necessary. If the patient is high risk, complete CT of the chest is suggested. Us Abd Comp    Result Date: 12/16/2017  EXAM: Ultrasound abdomen complete INDICATION: Iron deficiency anemia. Ascites on CT. Normal LFTs. Rule out liver disease or portal hypertension. COMPARISON: CT 12/16/2017 _______________ FINDINGS: A complete ultrasound of the abdomen was performed including Doppler. Proximal abdominal aorta is mildly ectatic, transverse measurement of 3.4 cm. Mid and distal aorta are normal in caliber. Common iliac artery origins are normal in caliber. The IVC is patent. The head and body are normal, the pancreatic tail was incompletely seen due to shadowing from overlying bowel gas. Liver is prominent with a sagittal span of 20.4 cm. Hepatic echotexture is mildly increased throughout. Liver capsule is slightly undulating. No focal liver lesion. Portal venous flow is hepatopedal. Portal vein caliber is normal measuring 11 mm. Spleen is mildly prominent with a sagittal span of 14.4 cm and the volume measured at 503 cc. Low volume ascites is present in the right upper quadrant and adjacent to the liver, minimal ascites is noted adjacent to the spleen. Gallbladder is normal without calculi, or wall thickening. No biliary dilatation. The common duct is top normal for patient age measuring 7.7 mm. Kidneys are small with thin, echogenic parenchymal parenchymal mantles. No hydronephrosis.  A couple bilateral tiny cysts are present, largest on the right measures 5 mm at the upper pole, largest on the left measures 8 mm at the upper pole. A couple left renal echogenic foci are present, vascular versus calculi, largest measures 4 mm. IMPRESSION: 1. Hepatomegaly, with increased hepatic echotexture and slight undulation of the capsule. Differential considerations include hepatic steatosis, and cirrhosis. 2. Low volume ascites. 3. Mild splenomegaly. 4. Patent portal vein with appropriate flow direction. 5. Small, thin, echogenic kidneys in keeping with medical renal disease. Tiny bilateral renal cysts. Left renal nonobstructing calculi versus vascular calcifications. Xr Chest Port    Result Date: 12/15/2017  Chest, single view Indication: Chest pain and weakness Comparison: None Findings:  Portable upright AP view of the chest was obtained. There is a right IJ approach dialysis catheter with tip projecting over the superior cavoatrial junction. The lungs are clear. No focal pneumonic opacity, pneumothorax, or pleural effusion. Cardiac size is enlarged. Normal mediastinal contours. No acute osseous abnormality. Impression: 1. Right IJ approach dialysis catheter in position as above. 2. Cardiomegaly. No superimposed acute radiographic abnormality.             [facility]     CC: Manuel Mcdermott MD

## 2017-12-18 NOTE — PROGRESS NOTES
Pt admitted due to GI bleed. Pt is currently living with her son and does not have or use an assistive devise. Noted therapy has recommended HH and a RW for this pt. Met with pt to discuss plan of care and recommendations. Pt is agreeable to [facility]. Offered FOC and provided pt with a list of area [facility] agencies to refer to. Pt would like to have clinical sent to  [facility] as a first choice and [facility] as a second choice. RW has been provided. No other plan of care needs have been identified. Pt's son will transport pt home later today. CM remains available. Discharge Reassessment Plan:  High Risk and MSSP/Good Help ACO patients    RRAT Score:  21 or greater    High Risk Care Transition Interventions:  1. Discharge transition plan:  Home Health and Physician follow up   2. Involved patient/caregiver in assessment, planning, education and implement of intervention. 3. CM daily patient care huddles/interdisciplinary rounds were completed. 4. PCP/Specialist appointment to be scheduled within 48 hours unless otherwise specified. 5. Facilitated transportation and logistics for follow-up appointments. 6. Facilitated Medication reconciliation - Pharmacy. Date/Time  7. Formal handoff between hospital provider and post-acute provider to transition patient completed. 8. Handoff to [facility] Nurse Navigator or PCP practice completed. Discharge follow-up phone call within 2 - 4 days (NN, [facility], Nursing) CM follow up as assigned. Care Management Interventions  PCP Verified by CM: Yes  Mode of Transport at Discharge:  Other (see comment) (son)  Transition of Care Consult (CM Consult): [address]: Yes  Health Maintenance Reviewed: Yes  Physical Therapy Consult: Yes  Current Support Network: Relative's Home  Confirm Follow Up Transport: Self  Plan discussed with Pt/Family/Caregiver: Yes  Freedom of Choice Offered: Yes  Discharge Location  Discharge Placement: Home with home health        Pt will be going to her son's home ([address] [facility] ; [phone]) upon discharge.

## 2017-12-19 LAB
A1AT SERPL-MCNC: 174 MG/DL (ref 90–200)
AFP-TM SERPL-MCNC: 3.5 NG/ML (ref 0–8.3)
HAV AB SER QL IA: NEGATIVE
HBV CORE AB SERPL QL IA: NEGATIVE

## 2017-12-20 ENCOUNTER — HOME CARE VISIT (OUTPATIENT)
Dept: SCHEDULING | Facility: HOME HEALTH | Age: 67
End: 2017-12-20

## 2018-01-10 ENCOUNTER — HOSPITAL ENCOUNTER (INPATIENT)
Age: 68
LOS: 3 days | Discharge: HOME | DRG: 377 | End: 2018-01-13
Attending: EMERGENCY MEDICINE | Admitting: HOSPITALIST
Payer: MEDICARE

## 2018-01-10 ENCOUNTER — APPOINTMENT (OUTPATIENT)
Dept: GENERAL RADIOLOGY | Age: 68
DRG: 377 | End: 2018-01-10
Attending: PHYSICIAN ASSISTANT
Payer: MEDICARE

## 2018-01-10 DIAGNOSIS — D64.9 SYMPTOMATIC ANEMIA: ICD-10-CM

## 2018-01-10 DIAGNOSIS — Z99.2 ESRD ON HEMODIALYSIS (HCC): ICD-10-CM

## 2018-01-10 DIAGNOSIS — N18.6 ESRD ON HEMODIALYSIS (HCC): ICD-10-CM

## 2018-01-10 DIAGNOSIS — K92.2 UPPER GI BLEED: Primary | ICD-10-CM

## 2018-01-10 LAB
ALBUMIN SERPL-MCNC: 3 G/DL (ref 3.4–5)
ALBUMIN/GLOB SERPL: 0.9 {RATIO} (ref 0.8–1.7)
ALP SERPL-CCNC: 129 U/L (ref 45–117)
ALT SERPL-CCNC: 16 U/L (ref 13–56)
ANION GAP SERPL CALC-SCNC: 6 MMOL/L (ref 3–18)
AST SERPL-CCNC: 16 U/L (ref 15–37)
BASOPHILS # BLD: 0 K/UL (ref 0–0.06)
BASOPHILS NFR BLD: 1 % (ref 0–2)
BILIRUB SERPL-MCNC: 0.5 MG/DL (ref 0.2–1)
BUN SERPL-MCNC: 13 MG/DL (ref 7–18)
BUN/CREAT SERPL: 4 (ref 12–20)
CALCIUM SERPL-MCNC: 8.7 MG/DL (ref 8.5–10.1)
CHLORIDE SERPL-SCNC: 100 MMOL/L (ref 100–108)
CK MB CFR SERPL CALC: 5.9 % (ref 0–4)
CK MB SERPL-MCNC: 3.6 NG/ML (ref 5–25)
CK SERPL-CCNC: 61 U/L (ref 26–192)
CO2 SERPL-SCNC: 36 MMOL/L (ref 21–32)
CREAT SERPL-MCNC: 2.95 MG/DL (ref 0.6–1.3)
DIFFERENTIAL METHOD BLD: ABNORMAL
EOSINOPHIL # BLD: 0.1 K/UL (ref 0–0.4)
EOSINOPHIL NFR BLD: 3 % (ref 0–5)
ERYTHROCYTE [DISTWIDTH] IN BLOOD BY AUTOMATED COUNT: 20.8 % (ref 11.6–14.5)
GLOBULIN SER CALC-MCNC: 3.3 G/DL (ref 2–4)
GLUCOSE BLD STRIP.AUTO-MCNC: 196 MG/DL (ref 70–110)
GLUCOSE BLD STRIP.AUTO-MCNC: 237 MG/DL (ref 70–110)
GLUCOSE SERPL-MCNC: 268 MG/DL (ref 74–99)
HCT VFR BLD AUTO: 20.1 % (ref 35–45)
HGB BLD-MCNC: 6 G/DL (ref 12–16)
LYMPHOCYTES # BLD: 0.8 K/UL (ref 0.9–3.6)
LYMPHOCYTES NFR BLD: 18 % (ref 21–52)
MCH RBC QN AUTO: 29.9 PG (ref 24–34)
MCHC RBC AUTO-ENTMCNC: 29.9 G/DL (ref 31–37)
MCV RBC AUTO: 100 FL (ref 74–97)
MONOCYTES # BLD: 0.3 K/UL (ref 0.05–1.2)
MONOCYTES NFR BLD: 8 % (ref 3–10)
NEUTS SEG # BLD: 3 K/UL (ref 1.8–8)
NEUTS SEG NFR BLD: 70 % (ref 40–73)
PLATELET # BLD AUTO: 151 K/UL (ref 135–420)
PMV BLD AUTO: 10.1 FL (ref 9.2–11.8)
POTASSIUM SERPL-SCNC: 3.4 MMOL/L (ref 3.5–5.5)
PROT SERPL-MCNC: 6.3 G/DL (ref 6.4–8.2)
RBC # BLD AUTO: 2.01 M/UL (ref 4.2–5.3)
RBC MORPH BLD: ABNORMAL
SODIUM SERPL-SCNC: 142 MMOL/L (ref 136–145)
TROPONIN I SERPL-MCNC: 0.09 NG/ML (ref 0–0.06)
WBC # BLD AUTO: 4.2 K/UL (ref 4.6–13.2)

## 2018-01-10 PROCEDURE — 0D778ZZ DILATION OF STOMACH, PYLORUS, VIA NATURAL OR ARTIFICIAL OPENING ENDOSCOPIC: ICD-10-PCS | Performed by: INTERNAL MEDICINE

## 2018-01-10 PROCEDURE — 65660000000 HC RM CCU STEPDOWN

## 2018-01-10 PROCEDURE — 36430 TRANSFUSION BLD/BLD COMPNT: CPT

## 2018-01-10 PROCEDURE — C9113 INJ PANTOPRAZOLE SODIUM, VIA: HCPCS | Performed by: PHYSICIAN ASSISTANT

## 2018-01-10 PROCEDURE — 74011250636 HC RX REV CODE- 250/636: Performed by: PHYSICIAN ASSISTANT

## 2018-01-10 PROCEDURE — 99153 MOD SED SAME PHYS/QHP EA: CPT | Performed by: INTERNAL MEDICINE

## 2018-01-10 PROCEDURE — 77030022875 HC PRB AR PLSM COAG ERBE -C: Performed by: INTERNAL MEDICINE

## 2018-01-10 PROCEDURE — 74011250636 HC RX REV CODE- 250/636

## 2018-01-10 PROCEDURE — 82550 ASSAY OF CK (CPK): CPT | Performed by: PHYSICIAN ASSISTANT

## 2018-01-10 PROCEDURE — 74011000250 HC RX REV CODE- 250: Performed by: HOSPITALIST

## 2018-01-10 PROCEDURE — 74011250637 HC RX REV CODE- 250/637: Performed by: HOSPITALIST

## 2018-01-10 PROCEDURE — 86920 COMPATIBILITY TEST SPIN: CPT | Performed by: PHYSICIAN ASSISTANT

## 2018-01-10 PROCEDURE — P9016 RBC LEUKOCYTES REDUCED: HCPCS | Performed by: PHYSICIAN ASSISTANT

## 2018-01-10 PROCEDURE — 74011000250 HC RX REV CODE- 250: Performed by: INTERNAL MEDICINE

## 2018-01-10 PROCEDURE — 80053 COMPREHEN METABOLIC PANEL: CPT | Performed by: PHYSICIAN ASSISTANT

## 2018-01-10 PROCEDURE — 0W3P8ZZ CONTROL BLEEDING IN GASTROINTESTINAL TRACT, VIA NATURAL OR ARTIFICIAL OPENING ENDOSCOPIC: ICD-10-PCS | Performed by: INTERNAL MEDICINE

## 2018-01-10 PROCEDURE — 86900 BLOOD TYPING SEROLOGIC ABO: CPT | Performed by: PHYSICIAN ASSISTANT

## 2018-01-10 PROCEDURE — 86677 HELICOBACTER PYLORI ANTIBODY: CPT | Performed by: HOSPITALIST

## 2018-01-10 PROCEDURE — 74011250636 HC RX REV CODE- 250/636: Performed by: INTERNAL MEDICINE

## 2018-01-10 PROCEDURE — 77030011640 HC PAD GRND REM COVD -A: Performed by: INTERNAL MEDICINE

## 2018-01-10 PROCEDURE — C9113 INJ PANTOPRAZOLE SODIUM, VIA: HCPCS | Performed by: HOSPITALIST

## 2018-01-10 PROCEDURE — 93005 ELECTROCARDIOGRAM TRACING: CPT

## 2018-01-10 PROCEDURE — 74011250637 HC RX REV CODE- 250/637: Performed by: INTERNAL MEDICINE

## 2018-01-10 PROCEDURE — 74011636637 HC RX REV CODE- 636/637: Performed by: HOSPITALIST

## 2018-01-10 PROCEDURE — 74011250636 HC RX REV CODE- 250/636: Performed by: HOSPITALIST

## 2018-01-10 PROCEDURE — 30233N1 TRANSFUSION OF NONAUTOLOGOUS RED BLOOD CELLS INTO PERIPHERAL VEIN, PERCUTANEOUS APPROACH: ICD-10-PCS | Performed by: HOSPITALIST

## 2018-01-10 PROCEDURE — 85025 COMPLETE CBC W/AUTO DIFF WBC: CPT | Performed by: PHYSICIAN ASSISTANT

## 2018-01-10 PROCEDURE — G0500 MOD SEDAT ENDO SERVICE >5YRS: HCPCS | Performed by: INTERNAL MEDICINE

## 2018-01-10 PROCEDURE — 99284 EMERGENCY DEPT VISIT MOD MDM: CPT

## 2018-01-10 PROCEDURE — C1726 CATH, BAL DIL, NON-VASCULAR: HCPCS | Performed by: INTERNAL MEDICINE

## 2018-01-10 PROCEDURE — 82962 GLUCOSE BLOOD TEST: CPT

## 2018-01-10 PROCEDURE — 76040000008: Performed by: INTERNAL MEDICINE

## 2018-01-10 PROCEDURE — 71045 X-RAY EXAM CHEST 1 VIEW: CPT

## 2018-01-10 RX ORDER — DEXTROSE 50 % IN WATER (D50W) INTRAVENOUS SYRINGE
50 AS NEEDED
Status: DISCONTINUED | OUTPATIENT
Start: 2018-01-10 | End: 2018-01-13 | Stop reason: HOSPADM

## 2018-01-10 RX ORDER — HYDROXYZINE HYDROCHLORIDE 10 MG/1
10 TABLET, FILM COATED ORAL
COMMUNITY
End: 2019-05-06

## 2018-01-10 RX ORDER — NALOXONE HYDROCHLORIDE 0.4 MG/ML
0.4 INJECTION, SOLUTION INTRAMUSCULAR; INTRAVENOUS; SUBCUTANEOUS
Status: DISCONTINUED | OUTPATIENT
Start: 2018-01-10 | End: 2018-01-10 | Stop reason: HOSPADM

## 2018-01-10 RX ORDER — SODIUM CHLORIDE 9 MG/ML
1000 INJECTION, SOLUTION INTRAVENOUS CONTINUOUS
Status: DISCONTINUED | OUTPATIENT
Start: 2018-01-10 | End: 2018-01-10 | Stop reason: HOSPADM

## 2018-01-10 RX ORDER — CYCLOBENZAPRINE HCL 10 MG
5 TABLET ORAL
COMMUNITY
End: 2018-01-13

## 2018-01-10 RX ORDER — FLUMAZENIL 0.1 MG/ML
0.2 INJECTION INTRAVENOUS
Status: DISCONTINUED | OUTPATIENT
Start: 2018-01-10 | End: 2018-01-10 | Stop reason: HOSPADM

## 2018-01-10 RX ORDER — POLYETHYLENE GLYCOL 3350 17 G/17G
17 POWDER, FOR SOLUTION ORAL DAILY
COMMUNITY
End: 2018-01-13

## 2018-01-10 RX ORDER — OMEPRAZOLE 20 MG/1
20 CAPSULE, DELAYED RELEASE ORAL DAILY
COMMUNITY
End: 2018-01-13

## 2018-01-10 RX ORDER — MAGNESIUM SULFATE 100 %
16 CRYSTALS MISCELLANEOUS AS NEEDED
Status: DISCONTINUED | OUTPATIENT
Start: 2018-01-10 | End: 2018-01-13 | Stop reason: HOSPADM

## 2018-01-10 RX ORDER — AMOXICILLIN 500 MG/1
2000 CAPSULE ORAL AS NEEDED
COMMUNITY
End: 2018-01-13

## 2018-01-10 RX ORDER — SODIUM CHLORIDE 9 MG/ML
250 INJECTION, SOLUTION INTRAVENOUS AS NEEDED
Status: DISCONTINUED | OUTPATIENT
Start: 2018-01-10 | End: 2018-01-13 | Stop reason: HOSPADM

## 2018-01-10 RX ORDER — INSULIN LISPRO 100 [IU]/ML
INJECTION, SOLUTION INTRAVENOUS; SUBCUTANEOUS
Status: DISCONTINUED | OUTPATIENT
Start: 2018-01-10 | End: 2018-01-13 | Stop reason: HOSPADM

## 2018-01-10 RX ORDER — METOPROLOL SUCCINATE 50 MG/1
25 TABLET, EXTENDED RELEASE ORAL DAILY
Status: ON HOLD | COMMUNITY
End: 2018-05-03

## 2018-01-10 RX ORDER — ACETAMINOPHEN 325 MG/1
325 TABLET ORAL
Status: DISCONTINUED | OUTPATIENT
Start: 2018-01-10 | End: 2018-01-13 | Stop reason: HOSPADM

## 2018-01-10 RX ORDER — HYDRALAZINE HYDROCHLORIDE 25 MG/1
25 TABLET, FILM COATED ORAL 3 TIMES DAILY
Status: DISCONTINUED | OUTPATIENT
Start: 2018-01-10 | End: 2018-01-13 | Stop reason: HOSPADM

## 2018-01-10 RX ORDER — ACETAMINOPHEN 325 MG/1
325 TABLET ORAL
Status: ON HOLD | COMMUNITY
End: 2020-10-06 | Stop reason: SDUPTHER

## 2018-01-10 RX ORDER — CARBOXYMETHYLCELLULOSE SODIUM 5 MG/ML
1 SOLUTION/ DROPS OPHTHALMIC AS NEEDED
Status: ON HOLD | COMMUNITY
End: 2020-10-06 | Stop reason: RX

## 2018-01-10 RX ORDER — MELATONIN
2000 DAILY
Status: DISCONTINUED | OUTPATIENT
Start: 2018-01-11 | End: 2018-01-13 | Stop reason: HOSPADM

## 2018-01-10 RX ORDER — DOCUSATE SODIUM 100 MG/1
100 CAPSULE, LIQUID FILLED ORAL 2 TIMES DAILY
Status: DISCONTINUED | OUTPATIENT
Start: 2018-01-10 | End: 2018-01-13 | Stop reason: HOSPADM

## 2018-01-10 RX ORDER — METOPROLOL SUCCINATE 25 MG/1
25 TABLET, EXTENDED RELEASE ORAL DAILY
Status: DISCONTINUED | OUTPATIENT
Start: 2018-01-11 | End: 2018-01-13 | Stop reason: HOSPADM

## 2018-01-10 RX ORDER — SEVELAMER HYDROCHLORIDE 800 MG/1
2400 TABLET, FILM COATED ORAL
Status: ON HOLD | COMMUNITY
End: 2022-10-31

## 2018-01-10 RX ORDER — MIDAZOLAM HYDROCHLORIDE 1 MG/ML
.5-5 INJECTION, SOLUTION INTRAMUSCULAR; INTRAVENOUS
Status: DISCONTINUED | OUTPATIENT
Start: 2018-01-10 | End: 2018-01-10 | Stop reason: HOSPADM

## 2018-01-10 RX ORDER — POLYETHYLENE GLYCOL 3350 17 G/17G
17 POWDER, FOR SOLUTION ORAL 2 TIMES DAILY
Status: DISCONTINUED | OUTPATIENT
Start: 2018-01-10 | End: 2018-01-13 | Stop reason: HOSPADM

## 2018-01-10 RX ORDER — MELATONIN
4000 DAILY
COMMUNITY

## 2018-01-10 RX ORDER — DOCUSATE SODIUM 100 MG/1
100 CAPSULE, LIQUID FILLED ORAL 2 TIMES DAILY
Status: ON HOLD | COMMUNITY
End: 2018-03-09 | Stop reason: CLARIF

## 2018-01-10 RX ORDER — LANOLIN ALCOHOL/MO/W.PET/CERES
325 CREAM (GRAM) TOPICAL
Status: DISCONTINUED | OUTPATIENT
Start: 2018-01-11 | End: 2018-01-13 | Stop reason: HOSPADM

## 2018-01-10 RX ORDER — LANOLIN ALCOHOL/MO/W.PET/CERES
325 CREAM (GRAM) TOPICAL
COMMUNITY

## 2018-01-10 RX ORDER — CALCIUM CARBONATE 200(500)MG
200 TABLET,CHEWABLE ORAL
Status: COMPLETED | OUTPATIENT
Start: 2018-01-11 | End: 2018-01-11

## 2018-01-10 RX ORDER — SODIUM CHLORIDE 0.9 % (FLUSH) 0.9 %
5-10 SYRINGE (ML) INJECTION EVERY 8 HOURS
Status: DISCONTINUED | OUTPATIENT
Start: 2018-01-10 | End: 2018-01-13 | Stop reason: HOSPADM

## 2018-01-10 RX ORDER — HYDRALAZINE HYDROCHLORIDE 25 MG/1
25 TABLET, FILM COATED ORAL 3 TIMES DAILY
Status: ON HOLD | COMMUNITY
End: 2020-10-06

## 2018-01-10 RX ORDER — FENTANYL CITRATE 50 UG/ML
100 INJECTION, SOLUTION INTRAMUSCULAR; INTRAVENOUS
Status: DISCONTINUED | OUTPATIENT
Start: 2018-01-10 | End: 2018-01-10 | Stop reason: HOSPADM

## 2018-01-10 RX ORDER — PANTOPRAZOLE SODIUM 40 MG/10ML
40 INJECTION, POWDER, LYOPHILIZED, FOR SOLUTION INTRAVENOUS
Status: COMPLETED | OUTPATIENT
Start: 2018-01-10 | End: 2018-01-10

## 2018-01-10 RX ORDER — SEVELAMER HYDROCHLORIDE 800 MG/1
1600 TABLET, FILM COATED ORAL
Status: DISCONTINUED | OUTPATIENT
Start: 2018-01-11 | End: 2018-01-11 | Stop reason: CLARIF

## 2018-01-10 RX ORDER — SODIUM CHLORIDE 0.9 % (FLUSH) 0.9 %
5-10 SYRINGE (ML) INJECTION AS NEEDED
Status: DISCONTINUED | OUTPATIENT
Start: 2018-01-10 | End: 2018-01-13 | Stop reason: HOSPADM

## 2018-01-10 RX ADMIN — PANTOPRAZOLE SODIUM 40 MG: 40 INJECTION, POWDER, FOR SOLUTION INTRAVENOUS at 15:30

## 2018-01-10 RX ADMIN — SODIUM CHLORIDE 40 MG: 9 INJECTION INTRAMUSCULAR; INTRAVENOUS; SUBCUTANEOUS at 21:14

## 2018-01-10 RX ADMIN — Medication 10 ML: at 21:05

## 2018-01-10 RX ADMIN — INSULIN LISPRO 2 UNITS: 100 INJECTION, SOLUTION INTRAVENOUS; SUBCUTANEOUS at 21:15

## 2018-01-10 RX ADMIN — DOCUSATE SODIUM 100 MG: 100 CAPSULE, LIQUID FILLED ORAL at 21:14

## 2018-01-10 RX ADMIN — HYDRALAZINE HYDROCHLORIDE 25 MG: 25 TABLET, FILM COATED ORAL at 21:14

## 2018-01-10 RX ADMIN — POLYETHYLENE GLYCOL 3350 17 G: 17 POWDER, FOR SOLUTION ORAL at 21:18

## 2018-01-10 NOTE — H&P
History & Physical    Patient: Mirian Snell MRN: [id number]  CSN: [id number]    YOB: 1950  Age: 75 y.o. Sex: female      DOA: 1/10/2018  Primary Care Provider:  Savannah Farrell MD      Assessment/Plan     Patient Active Problem List   Diagnosis Code    GI bleed K92.2    ESRD (end stage renal disease) ([facility] N18.6    Diabetes mellitus type 2, controlled ([facility] E11.9    HTN (hypertension) I10    Anemia in chronic kidney disease N18.9, D63.1    Back pain M54.9    Constipation K59.00    Cirrhosis ([facility] K74.60    Symptomatic anemia D64.9       Admit to telemetry    Severe symptomatic anemia - transfuse PRBC, follow H/H. Admit to medical floor  GI bleed - Clear liquid. ER consulted GI, start on protonix. Stop any anticoagulation/antiplatelets. History of cirrhosis, possible varices. She was supposed to get EGD as outpatient at [facility] in [place]. ESRD - HD per nephrology    Cirrhosis - need to follow up with hepatology    DM - start on SSI. Chronic back pain,     Estimated length of stay : 1-2 da7s    CC: anemia       HPI:     Mirian Snlel is a 75 y.o. female who has past history of cirrhosis, ESRD, HTN, DM recurrent anemia is sent to ER from dialysis center secondary to anemia. Patient had blood work done that showed Hb <7. She reports her symptoms are fatigue, palpitations and occasional dizziness. She denies any chest pain, SOB on exertion or other symptoms. She was admitted last month for similar problem. Patient is followed at [facility] and she was scheduled to get EGD. She had colonoscopy about a year ago. She also has cirrhosis. In ER her H/H 6.0/20. 1.     Past Medical History:   Diagnosis Date    Arthritis     Asthma     Chronic kidney disease     Chronic pain     Cirrhosis ([facility] 12/17/2017    [facility] diet controlled    GERD (gastroesophageal reflux disease)     Hypertension        Past Surgical History:   Procedure Laterality Date    HX GYN  c section       History reviewed. No pertinent family history. Social History     Social History    Marital status: SINGLE     Spouse name: N/A    Number of children: N/A    Years of education: N/A     Social History Main Topics    Smoking status: Never Smoker    Smokeless tobacco: Never Used    Alcohol use No    Drug use: No    Sexual activity: Not Asked     Other Topics Concern    None     Social History Narrative       Prior to Admission medications    Medication Sig Start Date End Date Taking? Authorizing Provider   cholecalciferol (VITAMIN D3) 1,000 unit tablet Take 2,000 Units by mouth daily. Yes Historical Provider   ferrous sulfate 325 mg (65 mg iron) tablet Take 325 mg by mouth three (3) times daily (with meals). Yes Historical Provider   hydrALAZINE (APRESOLINE) 25 mg tablet Take 25 mg by mouth three (3) times daily. Yes Historical Provider   lactulose (CHRONULAC) 10 gram/15 mL solution Take 20 g by mouth daily as needed. Yes Historical Provider   metoprolol succinate (TOPROL XL) 50 mg XL tablet Take 25 mg by mouth daily. Yes Historical Provider   sevelamer (RENAGEL) 800 mg tablet Take 1,600 mg by mouth three (3) times daily (with meals). Yes Historical Provider   amoxicillin (AMOXIL) 500 mg capsule Take 2,000 mg by mouth as needed. Prior to dental appointment   Yes Historical Provider   b complex-vitamin c-folic acid (NEPHROCAPS) 1 mg capsule Take 1 Cap by mouth daily. Yes Historical Provider   acetaminophen (TYLENOL) 325 mg tablet Take 325 mg by mouth every six (6) hours as needed for Pain. Yes Historical Provider   AFLIBERCEPT INTRAVITREAL 1 Ampule by IntraVITreal route every twenty-eight (28) days. Yes Historical Provider   carboxymethylcellulose sodium (REFRESH PLUS) 0.5 % dpet Administer 1 Drop to both eyes as needed. Yes Historical Provider   cyclobenzaprine (FLEXERIL) 10 mg tablet Take 5 mg by mouth three (3) times daily as needed for Muscle Spasm(s).    Yes Historical Provider   docusate sodium (COLACE) 100 mg capsule Take 100 mg by mouth two (2) times a day. Yes Historical Provider   hydrOXYzine HCl (ATARAX) 10 mg tablet Take 10 mg by mouth nightly as needed for Itching. Yes Historical Provider   polyethylene glycol (MIRALAX) 17 gram packet Take 17 g by mouth daily. Yes Historical Provider   omeprazole (PRILOSEC) 20 mg capsule Take 20 mg by mouth daily. Yes Historical Provider   albuterol (PROVENTIL HFA, VENTOLIN HFA, PROAIR HFA) 90 mcg/actuation inhaler Take 2 Puffs by inhalation every four (4) hours as needed for Wheezing. Historical Provider   fluticasone (FLONASE) 50 mcg/actuation nasal spray 2 Sprays by Both Nostrils route daily as needed. Historical Provider   simethicone (MYLICON) 80 mg chewable tablet Take 80 mg by mouth every six (6) hours as needed for Flatulence. Historical Provider       Allergies   Allergen Reactions    Aspirin Other (comments)     GI bleed    Heparin (Porcine) Other (comments)     GI bleed    Metformin Other (comments)     swelling    Norvasc [Amlodipine] Rash       Review of Systems  Gen: No fever, chills, malaise, weight loss/gain. See above  Heent: No headache, rhinorrhea, epistaxis, ear pain, hearing loss, sinus pain, neck pain/stiffness, sore throat. Heart: No chest pain, SARKAR, pnd, or orthopnea. Resp: No cough, hemoptysis, wheezing and shortness of breath. GI:see above. : No urinary obstruction, dysuria or hematuria. Derm: No rash, new skin lesion or pruritis. Musc/skeletal: no bone or joint complains. Vasc: No edema, cyanosis or claudication. Endo: No heat/cold intolerance, no polyuria,polydipsia or polyphagia. Neuro: No unilateral weakness, numbness, tingling. No seizures. See above  Heme: No easy bruising or bleeding.           Physical Exam:     Physical Exam:  Visit Vitals    /52    Pulse 79    Temp 97.8 °F (36.6 °C)    Resp 16    Ht 5' 3\" (1.6 m)    Wt 70.7 kg (155 lb 13.8 oz)    SpO2 100%    BMI 27.61 kg/m2      O2 Device: Room air    Temp (24hrs), Av °F (36.7 °C), Min:97.6 °F (36.4 °C), Max:98.5 °F (36.9 °C)             General:  Awake, cooperative, no distress. Head:  Normocephalic, without obvious abnormality, atraumatic. Eyes:  Conjunctivae/corneas clear, sclera anicteric, PERRL, EOMs intact. Nose: Nares normal. No drainage or sinus tenderness. Throat: Lips, mucosa, and tongue normal.    Neck: Supple, symmetrical, trachea midline, no adenopathy. Lungs:   Clear to auscultation bilaterally. Heart:  Regular rate and rhythm, S1, S2 normal, no murmur, click, rub or gallop. Abdomen: Soft, non-tender. Bowel sounds normal. No masses,  No organomegaly. Extremities: Extremities normal, atraumatic, no cyanosis or edema. Capillary refill normal.   Pulses: 2+ and symmetric all extremities. Skin: Skin color pink, turgor normal. No rashes or lesions   Neurologic: CNII-XII intact. No focal motor or sensory deficit.        Labs Reviewed:    CMP:   Lab Results   Component Value Date/Time     01/10/2018 01:38 PM    K 3.4 (L) 01/10/2018 01:38 PM     01/10/2018 01:38 PM    CO2 36 (H) 01/10/2018 01:38 PM    AGAP 6 01/10/2018 01:38 PM     (H) 01/10/2018 01:38 PM    BUN 13 01/10/2018 01:38 PM    CREA 2.95 (H) 01/10/2018 01:38 PM    GFRAA 19 (L) 01/10/2018 01:38 PM    GFRNA 16 (L) 01/10/2018 01:38 PM    CA 8.7 01/10/2018 01:38 PM    ALB 3.0 (L) 01/10/2018 01:38 PM    TP 6.3 (L) 01/10/2018 01:38 PM    GLOB 3.3 01/10/2018 01:38 PM    AGRAT 0.9 01/10/2018 01:38 PM    SGOT 16 01/10/2018 01:38 PM    ALT 16 01/10/2018 01:38 PM     CBC:   Lab Results   Component Value Date/Time    WBC 4.2 (L) 01/10/2018 01:38 PM    HGB 6.0 (L) 01/10/2018 01:38 PM    HCT 20.1 (L) 01/10/2018 01:38 PM     01/10/2018 01:38 PM         Procedures/imaging: see electronic medical records for all procedures/Xrays and details which were not copied into this note but were reviewed prior to creation of Plan        CC: Phys Other, MD

## 2018-01-10 NOTE — ROUTINE PROCESS
TRANSFER - IN REPORT:    Verbal report received from [facility] ROSARN(name) on [facility]  being received from ED(unit) for routine progression of care      Report consisted of patients Situation, Background, Assessment and   Recommendations(SBAR). Information from the following report(s) SBAR, Kardex, Intake/Output, MAR, Accordion and Recent Results was reviewed with the receiving nurse. Opportunity for questions and clarification was provided.

## 2018-01-10 NOTE — ED TRIAGE NOTES
Pt states doctor's office instructed her to come because blood work done Monday showed she was anemic. Pt with H& H of 6 & 19. Pt went to dialysis today;  Denies vomiting, fever, illness;   Has had some abdominal pain that is intermittent

## 2018-01-10 NOTE — PROGRESS NOTES
Physical Therapy Screening:  Services are not indicated at this time. [facility] therapy based on results obtained during the nursing admission assessment. The patients chart was reviewed and the patient is not appropriate for a skilled therapy evaluation at this time. Please consult physical therapy if any therapy needs arise. Thank you.     Victoriano Kauffman, PT

## 2018-01-10 NOTE — ED NOTES
Second IV line started so that blood can be transfused;  Protonix given;  Pt to be transported upstairs to telemetry;  Called telemetry unit to speak with nurse to update her, unable to speak with her;  Report given previously by CAROLINE Esparza RN

## 2018-01-10 NOTE — IP AVS SNAPSHOT
Summary of Care Report The Summary of Care report has been created to help improve care coordination. Users with access to [facility] [employer] or [address] (Web-based application) may access additional patient information including the Discharge Summary. If you are not currently a [address] user and need more information, please call the number listed below in the [place] [address] section and ask to be connected with Medical Records. Facility Information Name Address Phone [address] [address] [phone] Patient Information Patient Name Sex NIELS Buck ([id number]) Female 1950 Discharge Information Admitting Provider Service Area Unit Sabine Burleson MD / [phone] [facility]/Med / [phone] Discharge Provider Discharge Date/Time Discharge Disposition Destination (none) 2018 (Pending) AHR (none) Patient Language Language ENGLISH [13] Hospital Problems as of 2018  Reviewed: 12/15/2017  5:49 PM by Reyes Merlos DO Class Noted - Resolved Last Modified POA Active Problems GI bleed  12/15/2017 - Present 1/10/2018 by SUSAN Lees Unknown Entered by SUSAN Molina Symptomatic anemia  1/10/2018 - Present 1/10/2018 by SUSAN Lees Unknown Entered by SUSAN Lees Non-Hospital Problems as of 2018  Reviewed: 12/15/2017  5:49 PM by Reyes Merlos DO Class Noted - Resolved Last Modified Active Problems ESRD (end stage renal disease) ([facility]  12/15/2017 - Present 12/15/2017 by Reyes Merlos DO Entered by Reyes Merlos DO Diabetes mellitus type 2, controlled ([facility]  12/15/2017 - Present 12/15/2017 by Reyes Merlos DO   Entered by Reeys Merlos DO  
 HTN (hypertension)  12/15/2017 - Present 12/15/2017 by Michelle Roach, DO Entered by Michelle Roach DO Anemia in chronic kidney disease  12/16/2017 - Present 12/16/2017 by Jennifer Otoole MD  
  Entered by Jennifer Otoole MD  
  Back pain  12/17/2017 - Present 12/17/2017 by Jennifer Otoole MD  
  Entered by Jennifer Otoole MD  
  Constipation  12/17/2017 - Present 12/17/2017 by Jennifer Otoole MD  
  Entered by Jennifer Otoole MD  
  Cirrhosis [facility])  12/17/2017 - Present 12/17/2017 by Jennifer Otoole MD  
  Entered by Jennifer Otoole MD  
  
You are allergic to the following Allergen Reactions Aspirin Other (comments) GI bleed Heparin (Porcine) Other (comments) GI bleed Metformin Other (comments)  
 swelling Norvasc (Amlodipine) Rash Current Discharge Medication List  
  
START taking these medications Dose & Instructions Dispensing Information Comments  
 pantoprazole 40 mg tablet Commonly known as:  PROTONIX Dose:  40 mg Take 1 Tab by mouth daily. Quantity:  30 Tab Refills:  2 CONTINUE these medications which have NOT CHANGED Dose & Instructions Dispensing Information Comments AFLIBERCEPT INTRAVITREAL Dose:  1 Ampule 1 Ampule by IntraVITreal route every twenty-eight (28) days. Refills:  0  
   
 albuterol 90 mcg/actuation inhaler Commonly known as:  PROVENTIL HFA, VENTOLIN HFA, PROAIR HFA Dose:  2 Puff Take 2 Puffs by inhalation every four (4) hours as needed for Wheezing. Refills:  0  
   
 carboxymethylcellulose sodium 0.5 % Dpet Commonly known as:  REFRESH PLUS Dose:  1 Drop Administer 1 Drop to both eyes as needed. Refills:  0  
   
 docusate sodium 100 mg capsule Commonly known as:  Carletha Benne Dose:  100 mg Take 100 mg by mouth two (2) times a day. Refills:  0  
   
 ferrous sulfate 325 mg (65 mg iron) tablet Dose:  325 mg Take 325 mg by mouth three (3) times daily (with meals). Refills:  0  
   
 hydrALAZINE 25 mg tablet Commonly known as:  APRESOLINE Dose:  25 mg Take 25 mg by mouth three (3) times daily. Refills:  0  
   
 hydrOXYzine HCl 10 mg tablet Commonly known as:  ATARAX Dose:  10 mg Take 10 mg by mouth nightly as needed for Itching. Refills:  0  
   
 lactulose 10 gram/15 mL solution Commonly known as:  Jeanell Angeles Dose:  20 g Take 20 g by mouth daily as needed. Refills:  0 NEPHROCAPS 1 mg capsule Generic drug:  b complex-vitamin c-folic acid Dose:  1 Cap Take 1 Cap by mouth daily. Refills:  0  
   
 sevelamer 800 mg tablet Commonly known as:  RENAGEL Dose:  1600 mg Take 1,600 mg by mouth three (3) times daily (with meals). Refills:  0  
   
 TOPROL XL 50 mg XL tablet Generic drug:  metoprolol succinate Dose:  25 mg Take 25 mg by mouth daily. Refills:  0  
   
 TYLENOL 325 mg tablet Generic drug:  acetaminophen Dose:  325 mg Take 325 mg by mouth every six (6) hours as needed for Pain. Refills:  0  
   
 VITAMIN D3 1,000 unit tablet Generic drug:  cholecalciferol Dose:  2000 Units Take 2,000 Units by mouth daily. Refills:  0 STOP taking these medications Comments  
 amoxicillin 500 mg capsule Commonly known as:  AMOXIL  
   
   
 cyclobenzaprine 10 mg tablet Commonly known as:  FLEXERIL  
   
   
 FLONASE 50 mcg/actuation nasal spray Generic drug:  fluticasone MIRALAX 17 gram packet Generic drug:  polyethylene glycol  
   
   
 omeprazole 20 mg capsule Commonly known as:  PRILOSEC  
   
   
 simethicone 80 mg chewable tablet Commonly known as:  [facility] Information ID Date/Time Status Primary Surgeon All Procedures Location [id number] 1/10/2018 Chrystal Parham MD ESOPHAGOGASTRODUODENOSCOPY (EGD), BALLOON DILATATION, APC CAUTERY OF AVM [facility] ENDOSCOPY    
 [id number] 1/12/2018 1400 Mike Mcgee MD COLONOSCOPY [facility] ENDOSCOPY Follow-up Information Follow up With Details Comments Contact Info Savannah Farrell MD   Patient can only remember the practice name and not the physician Discharge Instructions DISCHARGE SUMMARY from Nurse PATIENT INSTRUCTIONS: 
 
 
F-face looks uneven A-arms unable to move or move unevenly S-speech slurred or non-existent T-time-call 911 as soon as signs and symptoms begin-DO NOT go Back to bed or wait to see if you get better-TIME IS BRAIN. Warning Signs of HEART ATTACK Call 911 if you have these symptoms: 
? Chest discomfort. Most heart attacks involve discomfort in the center of the chest that lasts more than a few minutes, or that goes away and comes back. It can feel like uncomfortable pressure, squeezing, fullness, or pain. ? Discomfort in other areas of the upper body. Symptoms can include pain or discomfort in one or both arms, the back, neck, jaw, or stomach. ? Shortness of breath with or without chest discomfort. ? Other signs may include breaking out in a cold sweat, nausea, or lightheadedness. Don't wait more than five minutes to call [address]! Fast action can save your life. Calling 911 is almost always the fastest way to get lifesaving treatment. Emergency Medical Services staff can begin treatment when they arrive  up to an hour sooner than if someone gets to the hospital by car. The discharge information has been reviewed with the patient. The patient verbalized understanding. Discharge medications reviewed with the patient and appropriate educational materials and side effects teaching were provided. ___________________________________________________________________________________________________________________________________ Patient armband removed and shredded Chart Review Routing History Recipient Method Report Sent By Shae Guillaume DO Fax: [phone] Phone: [phone] Fax IP Auto Routed Trans Leta Iqbal MD [phone] [id number] 12/17/2017  7:16 PM 12/17/2017

## 2018-01-10 NOTE — PROGRESS NOTES
Admission Medication Reconciliation has been performed on this ED patient consisting of interview of the patient regarding their PTA Home Medication List, Allergies and PMH as well as obtaining outpatient pharmacy information. Interviewed patient who was not a good historian and is not health literate. Patient did not provide a written list of home medications. Patient's outpatient pharmacy is [facility]  Smoking status is denies  Alcohol use denies  Illicit drug use denies  Patient ABX use within the past 30 days = none  Has patient received any antineoplastics in the past 30 days? na  Has patient received any radiation treatments in the past 45 days? na      Medication Reconciliation Interventions:   Wrong Medication Identified 2  Wrong/missing medication strength or dose identified  2  Wrong/missing Interval Identified 3  Wrong/missing Route Identified 0  Medication Duplication 0  Omissions 10  Commissions 0  Other Issue(s) Identified (Indicate): 0            Medication Compliance Issues and/or Medication Concerns: pt states her PCP is Dr. Don Murguia (spelling?) [phone].   Obtained current med list from [employer] at [facility]    [address] [place] [address] Pharmacist  [phone]

## 2018-01-10 NOTE — PROGRESS NOTES
1600 Received client from ED . Client is A/O X 4. Client is calm and cooperative. Skin is warm , dry and skin color is appropriate to race. Bibasilar breath sounds clear. Bowel sounds active . Abdomen is soft and non-tender. Pt complaining of dizzy Client has 20gauge IV present in LFA . Clients pain is 0/10 on 0-10 scale. Client oriented to call bell use as well as bed use. Client oriented to phone and how to order meals. Call bell within reach. Bed in low position. Three side rails up.    1642 started blood transfusion at this time. Took the consent and informed her about the complications, pt verbalize understanding, no complain and concern at this time    1720 pt transported to Endoscopy and blood transfusion verify with the nurse. 1858 pt came from EGD. Pt is drowsy but Alert and oriented X3 . Still blood transfusion running.  Pt left on comfortable position

## 2018-01-10 NOTE — PROGRESS NOTES
EGD COMPLETED    PATIENT RECEIVED 9 MG OF VERSED, 100 MCG OF FENTANYL, 2 MG OF GLUCAGON,  ML OF NS. BENZOCAINE SPRAY RECEIVED AT 1745. PATIENT HAS TO WAIT ONE HOUR BEFORE EATING OR DRINKING.      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

## 2018-01-10 NOTE — IP AVS SNAPSHOT
[address] [address] 
[phone] Patient: Dmitri Tucker MRN: [id number] LSI:[id number] About your hospitalization You were admitted on:  January 10, 2018 You last received care in the:  [address] You were discharged on:  January 13, 2018 Why you were hospitalized Your primary diagnosis was:  Not on File Your diagnoses also included:  Gi Bleed, Symptomatic Anemia Follow-up Information Follow up With Details Comments Contact Info Savannah Farrell MD   Patient can only remember the practice name and not the physician Discharge Orders None A check richmond indicates which time of day the medication should be taken. My Medications START taking these medications Instructions Each Dose to Equal  
 Morning Noon Evening Bedtime  
 pantoprazole 40 mg tablet Commonly known as:  PROTONIX Your last dose was: Your next dose is: Take 1 Tab by mouth daily. 40 mg CONTINUE taking these medications Instructions Each Dose to Equal  
 Morning Noon Evening Bedtime AFLIBERCEPT INTRAVITREAL Your last dose was: Your next dose is:    
   
   
 1 Ampule by IntraVITreal route every twenty-eight (28) days. 1 Ampule  
    
   
   
   
  
 albuterol 90 mcg/actuation inhaler Commonly known as:  PROVENTIL HFA, VENTOLIN HFA, PROAIR HFA Your last dose was: Your next dose is: Take 2 Puffs by inhalation every four (4) hours as needed for Wheezing. 2 Puff  
    
   
   
   
  
 carboxymethylcellulose sodium 0.5 % Dpet Commonly known as:  REFRESH PLUS Your last dose was: Your next dose is:    
   
   
 Administer 1 Drop to both eyes as needed. 1 Drop  
    
   
   
   
  
 docusate sodium 100 mg capsule Commonly known as:  Krystal Caicedo Your last dose was: Your next dose is: Take 100 mg by mouth two (2) times a day. 100 mg  
    
   
   
   
  
 ferrous sulfate 325 mg (65 mg iron) tablet Your last dose was: Your next dose is: Take 325 mg by mouth three (3) times daily (with meals). 325 mg  
    
   
   
   
  
 hydrALAZINE 25 mg tablet Commonly known as:  APRESOLINE Your last dose was: Your next dose is: Take 25 mg by mouth three (3) times daily. 25 mg  
    
   
   
   
  
 hydrOXYzine HCl 10 mg tablet Commonly known as:  ATARAX Your last dose was: Your next dose is: Take 10 mg by mouth nightly as needed for Itching. 10 mg  
    
   
   
   
  
 lactulose 10 gram/15 mL solution Commonly known as:  Edil Akil Your last dose was: Your next dose is: Take 20 g by mouth daily as needed. 20 g NEPHROCAPS 1 mg capsule Generic drug:  b complex-vitamin c-folic acid Your last dose was: Your next dose is: Take 1 Cap by mouth daily. 1 Cap  
    
   
   
   
  
 sevelamer 800 mg tablet Commonly known as:  RENAGEL Your last dose was: Your next dose is: Take 1,600 mg by mouth three (3) times daily (with meals). 1600 mg  
    
   
   
   
  
 TOPROL XL 50 mg XL tablet Generic drug:  metoprolol succinate Your last dose was: Your next dose is: Take 25 mg by mouth daily. 25 mg  
    
   
   
   
  
 TYLENOL 325 mg tablet Generic drug:  acetaminophen Your last dose was: Your next dose is: Take 325 mg by mouth every six (6) hours as needed for Pain. 325 mg  
    
   
   
   
  
 VITAMIN D3 1,000 unit tablet Generic drug:  cholecalciferol Your last dose was: Your next dose is: Take 2,000 Units by mouth daily. 2000 Units STOP taking these medications   
 amoxicillin 500 mg capsule Commonly known as:  AMOXIL  
   
  
 cyclobenzaprine 10 mg tablet Commonly known as:  FLEXERIL  
   
  
 FLONASE 50 mcg/actuation nasal spray Generic drug:  fluticasone MIRALAX 17 gram packet Generic drug:  polyethylene glycol  
   
  
 omeprazole 20 mg capsule Commonly known as:  PRILOSEC  
   
  
 simethicone 80 mg chewable tablet Commonly known as:  Jonny Jeong Where to Get Your Medications Information on where to get these meds will be given to you by the nurse or doctor. ! Ask your nurse or doctor about these medications  
  pantoprazole 40 mg tablet Discharge Instructions DISCHARGE SUMMARY from Nurse PATIENT INSTRUCTIONS: 
 
 
F-face looks uneven A-arms unable to move or move unevenly S-speech slurred or non-existent T-time-call 911 as soon as signs and symptoms begin-DO NOT go Back to bed or wait to see if you get better-TIME IS BRAIN. Warning Signs of HEART ATTACK Call 911 if you have these symptoms: 
? Chest discomfort. Most heart attacks involve discomfort in the center of the chest that lasts more than a few minutes, or that goes away and comes back. It can feel like uncomfortable pressure, squeezing, fullness, or pain. ? Discomfort in other areas of the upper body. Symptoms can include pain or discomfort in one or both arms, the back, neck, jaw, or stomach. ? Shortness of breath with or without chest discomfort. ? Other signs may include breaking out in a cold sweat, nausea, or lightheadedness. Don't wait more than five minutes to call [address]! Fast action can save your life. Calling 911 is almost always the fastest way to get lifesaving treatment.  Emergency Medical Services staff can begin treatment when they arrive  up to an hour sooner than if someone gets to the hospital by car. The discharge information has been reviewed with the patient. The patient verbalized understanding. Discharge medications reviewed with the patient and appropriate educational materials and side effects teaching were provided. ___________________________________________________________________________________________________________________________________ Patient armband removed and shredded Introducing [facility]! Tyrell Patton introduces [facility] patient portal. Now you can access parts of your medical record, email your doctor's office, and request medication refills online. 1. In your internet browser, go to [email or web address] [employer] 2. Click on the First Time User? Click Here link in the Sign In box. You will see the New Member Sign Up page. 3. Enter your [facility] Access Code exactly as it appears below. You will not need to use this code after youve completed the sign-up process. If you do not sign up before the expiration date, you must request a new code. · [facility] Access Code: [email or web address] Expires: 3/18/2018 11:55 AM 
 
4. Enter the last four digits of your Social Security Number (xxxx) and Date of Birth (mm/dd/yyyy) as indicated and click Submit. You will be taken to the next sign-up page. 5. Create a [employer] ID. This will be your [facility] login ID and cannot be changed, so think of one that is secure and easy to remember. 6. Create a [facility] password. You can change your password at any time. 7. Enter your Password Reset Question and Answer. This can be used at a later time if you forget your password. 8. Enter your e-mail address. You will receive e-mail notification when new information is available in [address] 9. Click Sign Up. You can now view and download portions of your medical record. 10. Click the Download Summary menu link to download a portable copy of your medical information. If you have questions, please visit the Frequently Asked Questions section of the MyChart website. Remember, [employer] is NOT to be used for urgent needs. For medical emergencies, dial 911. Now available from your iPhone and Android! Unresulted Labs-Please follow up with your PCP about these lab tests Order Current Status AFP, TUMOR MARKER In process US LIVER In process Providers Seen During Your Hospitalization Provider Specialty Primary office phone Jordyn Olsen MD Emergency Medicine [phone] Coty Baeza MD [facility] [phone] Your Primary Care Physician (PCP) Primary Care Physician Office Phone Office Fax OTHER, PHYS ** None ** ** None ** You are allergic to the following Allergen Reactions Aspirin Other (comments) GI bleed Heparin (Porcine) Other (comments) GI bleed Metformin Other (comments)  
 swelling Norvasc (Amlodipine) Rash Recent Documentation Height Weight Breastfeeding? BMI Smoking Status 1.6 m 71.6 kg No 27.96 kg/m2 Never Smoker Emergency Contacts Name Discharge Info Relation Home Work Mobile [place] DISCHARGE CAREGIVER [3] Son [22] [phone] Patient Belongings The following personal items are in your possession at time of discharge: 
  Dental Appliances: With patient  Visual Aid: None      Home Medications: None   Jewelry: None  Clothing: At bedside, Footwear    Other Valuables: Cell Phone Please provide this summary of care documentation to your next provider. Signatures-by signing, you are acknowledging that this After Visit Summary has been reviewed with you and you have received a copy. Patient Signature:  ____________________________________________________________ Date:  ____________________________________________________________  
  
Ifeanyi Lion Provider Signature:  ____________________________________________________________ Date:  ____________________________________________________________

## 2018-01-10 NOTE — PROCEDURES
[facility]  Esophagogastroduodenoscopy Procedure Report  _______________________________________________________  Patient: Adriana Allan                                         Attending Physician: Rosina Fenton MD    Patient ID: [id number]                                      Referring Physician: Hoang Stacy MD    Exam Date: January 10, 2018 _______________________________________________________      Introduction: A  75 y.o. female patient, presents for Esophagogastroduodenoscopy Procedure. Indication: 26-year-old female diabetic for 25 years with ESRD on Hemodialysis for about 8 years. was admitted on Dec 15, 2017 because of severe anemia 6.2. Patient has been complaining of heartburns belching, nausea, weakness and was having intermittent melena in the last one month. She went before to [facility] [place] [facility] where she was given three blood transfusions and was set for an appointment at the [facility] to have an EGD and colonoscopy few days later. With her appointment already made the patient got released from our hospital after receiving 2 blood transfusions and a prescription of Omeprazole 20 mg to get these procedures done as planned at the [place]. Unfortunately, the procedure never took place and patient came back to us with the the same problem of severe anemia Hgb was once again very low at 6. She was found to have hem positive dark stools . The patient apparently had a year ago an EGD and colonoscopy that were remarkable for 3 colonic polyps. One polyp was removed and was complicated by GI bleeding. Repeat colonoscopy was made to make endoscopic hemostasis, but the two other polyps apparently were not removed. She claimed on December 1, 2017, she had a negative capsule enteroscopy at the [facility]. The patient has no prior history of peptic ulcer disease. She denied taking any aspirin or NSAIDs. She is not on blood thinners.   She denies having liver disease or history of alcohol abuse. She quit smoking many years ago. She claims that she she has regular bm daily with the colace apparently she used to be constipated and was poorly prepped during her last colonoscopy. She was told she needed two complete bowel preparation to clean her out.     : Salima Aleman MD    Sedation:    Versed 9 mg IV, fentanyl 100 mcg IV, topical Hurricaine spray, glucagon 2 mg IV  Procedure Details:  After infomed consent was obtained for the procedure, with all risks and benefits of procedure explained the patient was taken to the endoscopy suite and placed in the left lateral decubitus position. Following sequential administration of sedation as per above, the endoscope was inserted into the mouth and advanced under direct vision to fourth portion of the duodenum. A careful inspection was made as the gastroscope was withdrawn, including a retroflexed view of the proximal stomach; findings and interventions are described below. Findings:   HYPOPHARYNX AND LARYNX: Normal  Esophagus: Normal proximal, middle and distal esophagus. No esophagitis. Z line slightly irregular cm Hiatal hernia. Diaphragmatic pinch located at cm. Stomach: is very large contained large amount of thick liquid retention mixed with some food. Patient claims that she has not eaten for 24 hours. Large part of that liquid was laboriously suctioned as it made the exam difficult and the inspection of the stomach less optimal particularly of the fundus. Normal, cardia, body, lesser curvature, incisura and antrum. Mild prepyloric deformity with a large inflammatory fold converging on the pylorus and causing valve effect. The pylorus was also very spastic and hard to penetrate. Later I had to dilate it with gradual balloon from 18 to 20 mm which made the pylorus larger accepting later the pediatric colonocscope.  Mucosa is grossely normal.    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 Also in the second portion of the duodenum across from the major papilla there was a cystic submucosal swelling that deflated later it had a tiny hole that drained at one time tiny amount of blood then stopped draining. It was cauterized with a couple of pulses of APC. There is one medium sized AVM in the 4th portion of the duodenum that was hard to reach with the gastroscope but I was able to get at it and cauterize it with the APC with the help of the pediatric colonoscope. Therapies:    Pyloric dilation with a gradual pyloric balloon 18 to 20 mm  APC of one AVM lesion at D4      Specimen:   none           Complications:   None    EBL:  Minimal  IMPRESSION: LARGE QUANTITY OF GASTRIC LIQUID AND SOLID RETENTION APPEARS TO BE CAUSED MORE BY PYLORIC SPASM AND POSSIBLY STENOSIS. THE PYLORUS WAS DILATED USING AN 18 TO 20 MM BALLOON. MEDIUM SIZED AVM IN THE FOURTH PORTION OF THE DUODENUM WAS CAUTERIZED AND OBLITERATED USING APC. Recommendations: -Acid suppression with a proton pump inhibitor. , -Follow symptoms. , -Clear liquid diet and advance as tolerated but for now stay on low fat and low residue diet until her stomach starts to function properly. , -No NSAIDS, -do  colonoscopy this Friday in 2 days after giving her 2 separate bowel prep as apparently her exam was done twice last year she was poorly prepped and 2 polyps could not be removed  Assistant: none    Carrie Salgado MD  1/10/2018  5:30 PM

## 2018-01-10 NOTE — ED NOTES
TRANSFER - OUT REPORT:    Verbal report given to Cande RIBEIRO(name) on [facility]  being transferred to Room 334(unit) for routine progression of care       Report consisted of patients Situation, Background, Assessment and   Recommendations(SBAR). Information from the following report(s) SBAR, Kardex and MAR was reviewed with the receiving nurse. Lines:   Peripheral IV 01/10/18 Left Forearm (Active)        Opportunity for questions and clarification was provided.       Patient transported with:   Monitor

## 2018-01-10 NOTE — ED PROVIDER NOTES
EMERGENCY DEPARTMENT HISTORY AND PHYSICAL EXAM    Date: 1/10/2018  Patient Name: Moe Corona    History of Presenting Illness     Chief Complaint   Patient presents with    Anemia         History Provided By: Patient    Chief Complaint: Abnormal lab results (6.6 hemoglobin)  Duration: this morning  Timing:  Acute  Location: N/A  Quality: N/A  Severity: N/A  Modifying Factors: N/A  Associated Symptoms: generalized weakness, palpitations, left-sided chest tightness radiating to her left arm, suprapubic abdominal pain described as \"weak\". Additional History (Context):   12:24 PM  Moe Corona is a 75 y.o. female with PMHx of HTN, CKD, arthritis, asthma, GERD and diabetes who presents to the emergency department C/O abnormal lab results (6.6 hemoglobin) onset this morning. Associated sxs include generalized weakness, palpitations, left-sided chest tightness radiating to her left arm, suprapubic abdominal pain described as \"weak\". Pt reports she was at dialysis ([facility]) this morning, was told her hemoglobin was 6.6 and was told to go to the ED. States her hemoglobin was 7.4 5 days ago. Pt has MWF dialysis x8 years. Pt reports she was admitted approximately 3 weeks ago at THE [facility] GI bleed, anemia requiring blood transfusions . Pt reports she was supposed to have a endoscopy/colonoscopy on 12/19/17. Last bowel movement was within the past 30 minutes and was normal. Dr. Nora Roman is her nephrologist at the [place]. Pt reports her PCP is at the [place]. Pt denies nausea, vomiting, diarrhea, bloody/black stools, SOB, and any other sxs or complaints.      PCP: Savannah Farrell MD    Current Facility-Administered Medications   Medication Dose Route Frequency Provider Last Rate Last Dose    0.9% sodium chloride infusion 250 mL  250 mL IntraVENous PRN SUSAN Atkinson        midazolam (VERSED) injection 0.5-5 mg  0.5-5 mg IntraVENous Tyler Toro MD        fentaNYL citrate (PF) injection 100 mcg  100 mcg IntraVENous Multiple M Demarco Brink MD        naloxone [facility]) injection 0.4 mg  0.4 mg IntraVENous Multiple Alonso Mcgee MD        flumazenil (ROMAZICON) 0.1 mg/mL injection 0.2 mg  0.2 mg IntraVENous Multiple Alonso Mcgee MD        benzocaine (HURRICANE) 20 % spray   Mucous Membrane ONCE Alonso Mcgee MD        0.9% sodium chloride infusion 1,000 mL  1,000 mL IntraVENous CONTINUOUS Ardath Bilis Demarco Brink MD           Past History     Past Medical History:  Past Medical History:   Diagnosis Date    Arthritis     Asthma     Chronic kidney disease     Chronic pain     Cirrhosis ([facility] 12/17/2017    Diabetes ([facility] diet controlled    GERD (gastroesophageal reflux disease)     Hypertension        Past Surgical History:  Past Surgical History:   Procedure Laterality Date    HX GYN  c section       Family History:  History reviewed. No pertinent family history. Social History:  Social History   Substance Use Topics    Smoking status: Never Smoker    Smokeless tobacco: Never Used    Alcohol use No       Allergies: Allergies   Allergen Reactions    Aspirin Other (comments)     GI bleed    Heparin (Porcine) Other (comments)     GI bleed    Metformin Other (comments)     swelling    Norvasc [Amlodipine] Rash         Review of Systems   Review of Systems   Respiratory: Positive for chest tightness (left-sided). Negative for shortness of breath. Cardiovascular: Positive for palpitations. Gastrointestinal: Positive for abdominal pain. Negative for blood in stool, nausea and vomiting. Musculoskeletal: Positive for myalgias (left arm tightness). All other systems reviewed and are negative.       Physical Exam     Vitals:    01/10/18 1633 01/10/18 1643 01/10/18 1648 01/10/18 1657   BP: 135/52 132/47 125/56 131/52   Pulse: 81 81 79 79   Resp: 16 16 16 16   Temp: 97.8 °F (36.6 °C) 97.8 °F (36.6 °C) 97.6 °F (36.4 °C) 97.8 °F (36.6 °C)   SpO2: 100% 100% 100% 100%   Weight:       Height: Physical Exam   Nursing note and vitals reviewed. Vital signs and nursing notes reviewed. CONSTITUTIONAL: Alert. Well-appearing; well-nourished; in no apparent distress. HEAD: Normocephalic; atraumatic. EYES: PERRL; Conjunctiva clear. ENT:  Moist mucus membranes. NECK: Supple; FROM without difficulty, non-tender; no cervical lymphadenopathy. CV: Normal S1, S2; no murmurs, rubs, or gallops. No chest wall tenderness. RESPIRATORY: Normal chest excursion with respiration; breath sounds clear and equal bilaterally; no wheezes, rhonchi, or rales. GI: Normal bowel sounds; non-distended; non-tender; no guarding or rigidity; No CVA tenderness. RECTAL: Dark brown stool, heme positive. EXT: Normal ROM in all four extremities; non-tender to palpation. No edema. SKIN: Normal for age and race; warm; dry; good turgor; no apparent lesions or exudate. NEURO: A & O x3. Cranial nerves 2-12 intact. Motor 5/5 bilaterally. Sensation intact. PSYCH:  Mood and affect appropriate.        Diagnostic Study Results     Labs -     Recent Results (from the past 12 hour(s))   TYPE & SCREEN    Collection Time: 01/10/18 12:45 PM   Result Value Ref Range    Crossmatch Expiration 01/13/2018     ABO/Rh(D) Dawnaa Kalyan POSITIVE     Antibody screen NEG     CALLED TO: Seth Bansal AT 8663 ON 1/10/18     Unit number [id number]     Blood component type  LR AS1     Unit division 00     Status of unit ISSUED     Crossmatch result Compatible     Unit number [id number]     Blood component type  LR AS1     Unit division 00     Status of unit ALLOCATED     Crossmatch result Compatible    EKG, 12 LEAD, INITIAL    Collection Time: 01/10/18  1:20 PM   Result Value Ref Range    Ventricular Rate 86 BPM    Atrial Rate 86 BPM    P-R Interval 180 ms    QRS Duration 144 ms    Q-T Interval 460 ms    QTC Calculation (Bezet) 550 ms    Calculated P Axis 70 degrees    Calculated R Axis -43 degrees    Calculated T Axis 119 degrees    Diagnosis Normal sinus rhythm  Left axis deviation  Left bundle branch block  Abnormal ECG  When compared with ECG of 15-DEC-2017 16:03,  T wave inversion more evident in Lateral leads     CBC WITH AUTOMATED DIFF    Collection Time: 01/10/18  1:38 PM   Result Value Ref Range    WBC 4.2 (L) 4.6 - 13.2 K/uL    RBC 2.01 (L) 4.20 - 5.30 M/uL    HGB 6.0 (L) 12.0 - 16.0 g/dL    HCT 20.1 (L) 35.0 - 45.0 %    .0 (H) 74.0 - 97.0 FL    MCH 29.9 24.0 - 34.0 PG    MCHC 29.9 (L) 31.0 - 37.0 g/dL    RDW 20.8 (H) 11.6 - 14.5 %    PLATELET 218 755 - 235 K/uL    MPV 10.1 9.2 - 11.8 FL    NEUTROPHILS 70 40 - 73 %    LYMPHOCYTES 18 (L) 21 - 52 %    MONOCYTES 8 3 - 10 %    EOSINOPHILS 3 0 - 5 %    BASOPHILS 1 0 - 2 %    ABS. NEUTROPHILS 3.0 1.8 - 8.0 K/UL    ABS. LYMPHOCYTES 0.8 (L) 0.9 - 3.6 K/UL    ABS. MONOCYTES 0.3 0.05 - 1.2 K/UL    ABS. EOSINOPHILS 0.1 0.0 - 0.4 K/UL    ABS. BASOPHILS 0.0 0.0 - 0.06 K/UL    RBC COMMENTS ANISOCYTOSIS  1+        RBC COMMENTS MACROCYTOSIS  1+        RBC COMMENTS POIKILOCYTOSIS  1+        RBC COMMENTS OVALOCYTES  1+        DF AUTOMATED     METABOLIC PANEL, COMPREHENSIVE    Collection Time: 01/10/18  1:38 PM   Result Value Ref Range    Sodium 142 136 - 145 mmol/L    Potassium 3.4 (L) 3.5 - 5.5 mmol/L    Chloride 100 100 - 108 mmol/L    CO2 36 (H) 21 - 32 mmol/L    Anion gap 6 3.0 - 18 mmol/L    Glucose 268 (H) 74 - 99 mg/dL    BUN 13 7.0 - 18 MG/DL    Creatinine 2.95 (H) 0.6 - 1.3 MG/DL    BUN/Creatinine ratio 4 (L) 12 - 20      GFR est AA 19 (L) >60 ml/min/1.73m2    GFR est non-AA 16 (L) >60 ml/min/1.73m2    Calcium 8.7 8.5 - 10.1 MG/DL    Bilirubin, total 0.5 0.2 - 1.0 MG/DL    ALT (SGPT) 16 13 - 56 U/L    AST (SGOT) 16 15 - 37 U/L    Alk.  phosphatase 129 (H) 45 - 117 U/L    Protein, total 6.3 (L) 6.4 - 8.2 g/dL    Albumin 3.0 (L) 3.4 - 5.0 g/dL    Globulin 3.3 2.0 - 4.0 g/dL    A-G Ratio 0.9 0.8 - 1.7     CARDIAC PANEL,(CK, CKMB & TROPONIN)    Collection Time: 01/10/18  1:38 PM   Result Value Ref Range    CK 61 26 - 192 U/L    CK - MB 3.6 (H) <3.6 ng/ml    CK-MB Index 5.9 (H) 0.0 - 4.0 %    Troponin-I, Qt. 0.09 (H) 0.00 - 0.06 NG/ML   GLUCOSE, POC    Collection Time: 01/10/18  5:01 PM   Result Value Ref Range    Glucose (POC) 237 (H) 70 - 110 mg/dL       Radiologic Studies -   XR CHEST PORT   Final Result   IMPRESSION:     No significant change or acute pulmonary process identified. Stable enlarged  cardiac silhouette. As read by the radiologist.     CT Results  (Last 48 hours)    None        CXR Results  (Last 48 hours)               01/10/18 1248  XR CHEST PORT Final result    Impression:  IMPRESSION:       No significant change or acute pulmonary process identified. Stable enlarged   cardiac silhouette. Narrative:  EXAM: One-view chest       CLINICAL HISTORY: Chest pain ,       COMPARISON: 12/15/2017       FINDINGS:       Frontal view of the chest demonstrate clear lungs. Cardiac silhouette is is   enlarged, stable in size and contour. No acute bony or soft tissue abnormality. Right approach dialysis type catheter tip in the lower SVC. Medications given in the ED-  Medications   0.9% sodium chloride infusion 250 mL (not administered)   midazolam (VERSED) injection 0.5-5 mg (not administered)   fentaNYL citrate (PF) injection 100 mcg (not administered)   naloxone (NARCAN) injection 0.4 mg (not administered)   flumazenil (ROMAZICON) 0.1 mg/mL injection 0.2 mg (not administered)   benzocaine (HURRICANE) 20 % spray (not administered)   0.9% sodium chloride infusion 1,000 mL (not administered)   pantoprazole (PROTONIX) injection 40 mg (40 mg IntraVENous Given 1/10/18 1530)         Medical Decision Making   I am the first provider for this patient. I reviewed the vital signs, available nursing notes, past medical history, past surgical history, family history and social history. Vital Signs-Reviewed the patient's vital signs. Pulse Oximetry Analysis - 100% on RA.      Cardiac Monitor:  Rate: 90 bpm  Rhythm: NSR    EKG interpretation: (Preliminary)  Rhythm: NSR. Rate: 86 bpm; Left axis deviation, LBBB, Similar to 12/15/17. EKG read by Winston Toro PA-C at 1:20 PM     Records Reviewed: Nursing Notes and Old Medical Records  Review of recent hospitalization at THE [facility] 12/15 - 12/18 with diagnosis of recurrent GI bleed and anemia s/p blood transfusion. She received two units of PRVC's, hemodialysis and was discharged as she had an upper and lower EGD scheduled at the [place] the following day. She also had an US on the abdomen on 12/16/17, which showed hepatomegaly and cirrhosis. Procedures:  Procedures     PROCEDURE NOTE - RECTAL EXAM:   1:59 PM  Performed by: Winston Toro PA-C  Rectal exam performed. Dark brown stool was collected. Stool was Hemoccult tested, and found to be heme Positive. The procedure took 1-15 minutes, and pt tolerated well. Written by Alfonso Feliz, ED Scribe, as dictated by Winston Toro PA-C.    ED Course:   12:24 PM Initial assessment performed. The patients presenting problems have been discussed, and they are in agreement with the care plan formulated and outlined with them. I have encouraged them to ask questions as they arise throughout their visit. PROGRESS NOTE:   1:59 PM  Pt reports Hx of GI bleed. Discussed risks and benefits of blood transfusion. Pt understands and agrees with transfusion and admission. CONSULT NOTE:   2:07 PM  Winston Toro PA-C spoke with Layla Boone MD   Specialty: ED Attending  Discussed pt's hx, disposition, and available diagnostic and imaging results in person. Reviewed care plans. Consulting physician is aware of the pt and agrees with blood transfusion. States to start with 1 unit. CONSULT NOTE:   2:12 PM  Winston Toro PA-C spoke with Jayna Magaña MD   Specialty: Hospitalist  Discussed pt's hx, disposition, and available diagnostic and imaging results over the telephone. Reviewed care plans. Consulting physician agrees to admit pt to telemetry. CONSULT NOTE:   2:15 PM  Vernon Cortes PA-C spoke with Bhupendra Donovan MD   Specialty: GI  Discussed pt's hx, disposition, and available diagnostic and imaging results over the telephone. Reviewed care plans. Consulting physician states give PPI, add H. Pylori panel and will consult. It is unclear if pt is on PPI since last discharge. PROGRESS NOTE:   2:12 PM  Bhupendra Donovan MD called back. Pt has not eaten today. He plans to take her to endoscopy in the next 2 hours. She can have sips of water/clear liquids, but no solids. Diagnosis and Disposition     Critical Care Time:   I have spent 40 minutes of critical care time involved in lab review, consultations with specialist, family decision-making, and documentation. During this entire length of time I was immediately available to the patient. Critical Care: The reason for providing this level of medical care for this critically ill patient was due a critical illness that impaired one or more vital organ systems such that there was a high probability of imminent or life threatening deterioration in the patients condition. This care involved high complexity decision making to assess, manipulate, and support vital system functions, to treat this degreee vital organ system failure and to prevent further life threatening deterioration of the patients condition. Core Measures:  For Hospitalized Patients:    1. Hospitalization Decision Time:  The decision to hospitalize the patient was made by Vernon Cortes PA-C at 2:02 PM on 1/10/2018.    2. Aspirin: Aspirin was not given because the patient did not present with a stroke at the time of their Emergency Department evaluation    2:12 PM  Patient is being admitted to the hospital by Mariposa Treviño MD. The results of their tests and reasons for their admission have been discussed with them and/or available family.  They convey agreement and understanding for the need to be admitted and for their admission diagnosis. CONDITIONS ON ADMISSION:  Sepsis is not present at the time of admission. Deep Vein Thrombosis is not present at the time of admission. Thrombosis is not present at the time of admission. Urinary Tract Infection is not present at the time of admission. Pneumonia is not present at the time of admission. MRSA is not present at the time of admission. Wound infection is not present at the time of admission. Pressure Ulcer is not present at the time of admission. CLINICAL IMPRESSION:    1. Upper GI bleed    2. Symptomatic anemia    3. ESRD on hemodialysis ([facility]      _______________________________    Attestations: This note is prepared by Kuldip Gonzales, acting as Scribe for Melida Carrion PA-C. Melida Carrion PA-C:  The scribe's documentation has been prepared under my direction and personally reviewed by me in its entirety.   I confirm that the note above accurately reflects all work, treatment, procedures, and medical decision making performed by me.  _______________________________

## 2018-01-11 LAB
ANION GAP SERPL CALC-SCNC: 7 MMOL/L (ref 3–18)
BUN SERPL-MCNC: 22 MG/DL (ref 7–18)
BUN/CREAT SERPL: 6 (ref 12–20)
CALCIUM SERPL-MCNC: 8.7 MG/DL (ref 8.5–10.1)
CHLORIDE SERPL-SCNC: 103 MMOL/L (ref 100–108)
CO2 SERPL-SCNC: 34 MMOL/L (ref 21–32)
CREAT SERPL-MCNC: 3.92 MG/DL (ref 0.6–1.3)
ERYTHROCYTE [DISTWIDTH] IN BLOOD BY AUTOMATED COUNT: 21.1 % (ref 11.6–14.5)
GLUCOSE BLD STRIP.AUTO-MCNC: 134 MG/DL (ref 70–110)
GLUCOSE BLD STRIP.AUTO-MCNC: 139 MG/DL (ref 70–110)
GLUCOSE BLD STRIP.AUTO-MCNC: 204 MG/DL (ref 70–110)
GLUCOSE BLD STRIP.AUTO-MCNC: 99 MG/DL (ref 70–110)
GLUCOSE SERPL-MCNC: 134 MG/DL (ref 74–99)
HCT VFR BLD AUTO: 25.9 % (ref 35–45)
HGB BLD-MCNC: 7.9 G/DL (ref 12–16)
MCH RBC QN AUTO: 29.8 PG (ref 24–34)
MCHC RBC AUTO-ENTMCNC: 30.5 G/DL (ref 31–37)
MCV RBC AUTO: 97.7 FL (ref 74–97)
PHOSPHATE SERPL-MCNC: 3.4 MG/DL (ref 2.5–4.9)
PLATELET # BLD AUTO: 136 K/UL (ref 135–420)
PMV BLD AUTO: 9.5 FL (ref 9.2–11.8)
POTASSIUM SERPL-SCNC: 3.7 MMOL/L (ref 3.5–5.5)
RBC # BLD AUTO: 2.65 M/UL (ref 4.2–5.3)
SODIUM SERPL-SCNC: 144 MMOL/L (ref 136–145)
WBC # BLD AUTO: 5.7 K/UL (ref 4.6–13.2)

## 2018-01-11 PROCEDURE — 85027 COMPLETE CBC AUTOMATED: CPT | Performed by: HOSPITALIST

## 2018-01-11 PROCEDURE — 74011250636 HC RX REV CODE- 250/636: Performed by: HOSPITALIST

## 2018-01-11 PROCEDURE — 77010033678 HC OXYGEN DAILY

## 2018-01-11 PROCEDURE — 82962 GLUCOSE BLOOD TEST: CPT

## 2018-01-11 PROCEDURE — 74011250637 HC RX REV CODE- 250/637: Performed by: HOSPITALIST

## 2018-01-11 PROCEDURE — 65660000000 HC RM CCU STEPDOWN

## 2018-01-11 PROCEDURE — 74011250637 HC RX REV CODE- 250/637: Performed by: INTERNAL MEDICINE

## 2018-01-11 PROCEDURE — 84100 ASSAY OF PHOSPHORUS: CPT | Performed by: HOSPITALIST

## 2018-01-11 PROCEDURE — 80048 BASIC METABOLIC PNL TOTAL CA: CPT | Performed by: HOSPITALIST

## 2018-01-11 PROCEDURE — 74011000250 HC RX REV CODE- 250: Performed by: INTERNAL MEDICINE

## 2018-01-11 PROCEDURE — 74011000250 HC RX REV CODE- 250: Performed by: HOSPITALIST

## 2018-01-11 PROCEDURE — C9113 INJ PANTOPRAZOLE SODIUM, VIA: HCPCS | Performed by: HOSPITALIST

## 2018-01-11 PROCEDURE — 36415 COLL VENOUS BLD VENIPUNCTURE: CPT | Performed by: HOSPITALIST

## 2018-01-11 RX ORDER — ONDANSETRON 2 MG/ML
4 INJECTION INTRAMUSCULAR; INTRAVENOUS
Status: DISCONTINUED | OUTPATIENT
Start: 2018-01-11 | End: 2018-01-13 | Stop reason: HOSPADM

## 2018-01-11 RX ORDER — SEVELAMER HYDROCHLORIDE 800 MG/1
1600 TABLET, FILM COATED ORAL
Status: DISCONTINUED | OUTPATIENT
Start: 2018-01-11 | End: 2018-01-11 | Stop reason: CLARIF

## 2018-01-11 RX ORDER — POLYVINYL ALCOHOL 14 MG/ML
1 SOLUTION/ DROPS OPHTHALMIC AS NEEDED
Status: DISCONTINUED | OUTPATIENT
Start: 2018-01-11 | End: 2018-01-13 | Stop reason: HOSPADM

## 2018-01-11 RX ORDER — SEVELAMER CARBONATE 800 MG/1
1600 TABLET, FILM COATED ORAL
Status: DISCONTINUED | OUTPATIENT
Start: 2018-01-11 | End: 2018-01-13 | Stop reason: HOSPADM

## 2018-01-11 RX ADMIN — FERROUS SULFATE TAB 325 MG (65 MG ELEMENTAL FE) 325 MG: 325 (65 FE) TAB at 17:32

## 2018-01-11 RX ADMIN — HYDRALAZINE HYDROCHLORIDE 25 MG: 25 TABLET, FILM COATED ORAL at 17:32

## 2018-01-11 RX ADMIN — SODIUM CHLORIDE 40 MG: 9 INJECTION INTRAMUSCULAR; INTRAVENOUS; SUBCUTANEOUS at 08:33

## 2018-01-11 RX ADMIN — Medication 1 TABLET: at 08:33

## 2018-01-11 RX ADMIN — ANTACID TABLETS 200 MG: 500 TABLET, CHEWABLE ORAL at 00:23

## 2018-01-11 RX ADMIN — ONDANSETRON 4 MG: 2 INJECTION INTRAMUSCULAR; INTRAVENOUS at 17:32

## 2018-01-11 RX ADMIN — SEVELAMER CARBONATE 1600 MG: 800 TABLET, FILM COATED ORAL at 18:51

## 2018-01-11 RX ADMIN — ANTACID TABLETS 200 MG: 500 TABLET, CHEWABLE ORAL at 08:33

## 2018-01-11 RX ADMIN — ACETAMINOPHEN 325 MG: 325 TABLET ORAL at 10:47

## 2018-01-11 RX ADMIN — FERROUS SULFATE TAB 325 MG (65 MG ELEMENTAL FE) 325 MG: 325 (65 FE) TAB at 13:46

## 2018-01-11 RX ADMIN — ACETAMINOPHEN 325 MG: 325 TABLET ORAL at 17:31

## 2018-01-11 RX ADMIN — METOPROLOL SUCCINATE 25 MG: 25 TABLET, EXTENDED RELEASE ORAL at 08:34

## 2018-01-11 RX ADMIN — POLYETHYLENE GLYCOL 3350 17 G: 17 POWDER, FOR SOLUTION ORAL at 08:33

## 2018-01-11 RX ADMIN — HYDRALAZINE HYDROCHLORIDE 25 MG: 25 TABLET, FILM COATED ORAL at 08:33

## 2018-01-11 RX ADMIN — DOCUSATE SODIUM 100 MG: 100 CAPSULE, LIQUID FILLED ORAL at 08:34

## 2018-01-11 RX ADMIN — SEVELAMER CARBONATE 1600 MG: 800 TABLET, FILM COATED ORAL at 13:46

## 2018-01-11 RX ADMIN — Medication 10 ML: at 13:46

## 2018-01-11 RX ADMIN — ONDANSETRON 4 MG: 2 INJECTION INTRAMUSCULAR; INTRAVENOUS at 10:48

## 2018-01-11 RX ADMIN — POLYETHYLENE GLYCOL 3350, SODIUM CHLORIDE, POTASSIUM CHLORIDE, SODIUM BICARBONATE, AND SODIUM SULFATE 4000 ML: 240; 5.84; 2.98; 6.72; 22.72 POWDER, FOR SOLUTION ORAL at 19:30

## 2018-01-11 RX ADMIN — DOCUSATE SODIUM 100 MG: 100 CAPSULE, LIQUID FILLED ORAL at 20:30

## 2018-01-11 RX ADMIN — ACETAMINOPHEN 325 MG: 325 TABLET ORAL at 23:30

## 2018-01-11 RX ADMIN — VITAMIN D, TAB 1000IU (100/BT) 2000 UNITS: 25 TAB at 08:33

## 2018-01-11 RX ADMIN — SODIUM CHLORIDE 40 MG: 9 INJECTION INTRAMUSCULAR; INTRAVENOUS; SUBCUTANEOUS at 20:31

## 2018-01-11 RX ADMIN — FERROUS SULFATE TAB 325 MG (65 MG ELEMENTAL FE) 325 MG: 325 (65 FE) TAB at 08:33

## 2018-01-11 NOTE — PROGRESS NOTES
Problem: Falls - Risk of  Goal: *Absence of Falls  Document Iza Fall Risk and appropriate interventions in the flowsheet.    Outcome: Progressing Towards Goal  Fall Risk Interventions:  Mobility Interventions: Communicate number of staff needed for ambulation/transfer, Assess mobility with egress test, Patient to call before getting OOB, Utilize walker, cane, or other assitive device         Medication Interventions: Evaluate medications/consider consulting pharmacy, Patient to call before getting OOB, Teach patient to arise slowly

## 2018-01-11 NOTE — ROUTINE PROCESS
Bedside shift change report given to Preeti RIBERA RN (oncoming nurse) by Chaparrita DENT RN (offgoing nurse). Report included the following information SBAR, Kardex and MAR.

## 2018-01-11 NOTE — WOUND CARE
Pt assessed by wound care during monthly skin prevalence. Pt has a danyel score of 20, no pressure injuries noted at this time.

## 2018-01-11 NOTE — PROGRESS NOTES
0730 Assumed care of pt from [address] [facility]. Pt resting quietly  , no signs of distress, call bell within reach. [phone] by  to give one time dose of Zofran 4 mg IV     1200 Sent request to [facility] to get pts medication list, pt states that she cannot recall what she takes. 1800 Signed colonoscopy consent form, in chart. Waiting for Go Lytely to be sent up form pharmacy    Shift Summary- Shift uneventful. Pt rested throughout shift, given prn Zofran and Tylenol for pain in her abdomen.  aware no new orders were given. Pt signed colonoscopy consent form and bowel prep initiated. Denied chest pain or shortness of breath.

## 2018-01-11 NOTE — PROGRESS NOTES
Hospitalist Progress Note    Patient: Zaina Velasquez MRN: [id number]  CSN: [id number]    YOB: 1950  Age: 75 y.o. Sex: female    DOA: 1/10/2018 LOS:  LOS: 1 day                Assessment/Plan     Patient Active Problem List   Diagnosis Code    GI bleed K92.2    ESRD (end stage renal disease) ([facility] N18.6    Diabetes mellitus type 2, [facility] E11.9    HTN (hypertension) I10    Anemia in chronic kidney disease N18.9, D63.1    Back pain M54.9    Constipation K59.00    Cirrhosis ([facility] K74.60    Symptomatic anemia D64.9            74 yo female admitted for anemia    Severe symptomatic anemia - s/p PRBC, H/H improved.     GI bleed - Clear liquid. S/p EGD with findings of AVM, cauterized  For colonoscopy tomorrow     ESRD - HD per nephrology     Cirrhosis - need to follow up with hepatology     DM - on SSI.      Chronic back pain,     Disposition : 1-2 days    Review of systems  General: No fevers or chills. Cardiovascular: No chest pain or pressure. No palpitations. Pulmonary: No shortness of breath. Gastrointestinal: No nausea, vomiting. Physical Exam:  General: Awake, cooperative, no acute distress    HEENT: NC, Atraumatic. PERRLA, anicteric sclerae. Lungs: CTA Bilaterally. No Wheezing/Rhonchi/Rales. Heart:  Regular  rhythm,  No murmur, No Rubs, No Gallops  Abdomen: Soft, Non distended, Non tender.  +Bowel sounds,   Extremities: No c/c/e  Psych:   Not anxious or agitated. Neurologic:  No acute neurological deficit.            Vital signs/Intake and Output:  Visit Vitals    /50 (BP 1 Location: Right arm, BP Patient Position: At rest)    Pulse 70    Temp 98.2 °F (36.8 °C)    Resp 18    Ht 5' 3\" (1.6 m)    Wt 71.7 kg (158 lb 1.1 oz)    SpO2 98%    Breastfeeding No    BMI 28 kg/m2     Current Shift:  01/11 0701 - 01/11 1900  In: 480 [P.O.:480]  Out: -   Last three shifts:  01/09 1901 - 01/11 0700  In: 379   Out: -             Labs: Results:       Chemistry Recent Labs      01/11/18   0427  01/10/18   1338   GLU  134*  268*   NA  144  142   K  3.7  3.4*   CL  103  100   CO2  34*  36*   BUN  22*  13   CREA  3.92*  2.95*   CA  8.7  8.7   AGAP  7  6   BUCR  6*  4*   AP   --   129*   TP   --   6.3*   ALB   --   3.0*   GLOB   --   3.3   AGRAT   --   0.9      CBC w/Diff Recent Labs      01/11/18   0427  01/10/18   1338   WBC  5.7  4.2*   RBC  2.65*  2.01*   HGB  7.9*  6.0*   HCT  25.9*  20.1*   PLT  136  151   GRANS   --   70   LYMPH   --   18*   EOS   --   3      Cardiac Enzymes Recent Labs      01/10/18   1338   CPK  61   CKND1  5.9*      Coagulation No results for input(s): PTP, INR, APTT in the last 72 hours. No lab exists for component: INREXT    Lipid Panel No results found for: CHOL, CHOLPOCT, CHOLX, CHLST, CHOLV, 278576, HDL, LDL, LDLC, DLDLP, 941638, VLDLC, VLDL, TGLX, TRIGL, TRIGP, TGLPOCT, CHHD, CHHDX   BNP No results for input(s): BNPP in the last 72 hours.    Liver Enzymes Recent Labs      01/10/18   1338   TP  6.3*   ALB  3.0*   AP  129*   SGOT  16      Thyroid Studies No results found for: T4, T3U, TSH, TSHEXT     Procedures/imaging: see electronic medical records for all procedures/Xrays and details which were not copied into this note but were reviewed prior to creation of Plan

## 2018-01-11 NOTE — ROUTINE PROCESS
Bedside and Verbal shift change report given to Leopoldo Dallas, RN (oncoming nurse) by Janae Pastor RN  (offgoing nurse). Report included the following information SBAR, Kardex, Intake/Output and MAR.

## 2018-01-11 NOTE — PROGRESS NOTES
3470 Pt c/o indigestion after eating diabetic snack of crackers. Informed Dr. Christine Mcclain, on-call hospitalist. She will place order for Tums if appropriate.

## 2018-01-11 NOTE — PROGRESS NOTES
1945 Informed by charge nurse that nephrolgist Dr. Bryon Gordon has been informed about consult; pt to be seen by Nephrology in the morning. 2129 Pt alert. Asst to restroom. Pt states that she had dialysis prior to coming to hospital.     2317 Pt c/o indigestion after eating diabetic snack of crackers. Informed Dr. Glenn La, on-call hospitalist. She will place order for Tums if appropriate. 0023 Tums given. Informed pt that she is on clear liquid diet at this time, and recommended to avoid crackers. Pt agreed.

## 2018-01-11 NOTE — PROGRESS NOTES
1600 Received client from ED . Client is A/O X 4. Client is calm and cooperative. Skin is warm , dry and skin color is appropriate to race. Bibasilar breath sounds clear. Bowel sounds active . Abdomen is soft and non-tender. Pt complaining of dizzy Client has 20gauge IV present in LFA . Clients pain is 0/10 on 0-10 scale. Client oriented to call bell use as well as bed use. Client oriented to phone and how to order meals. Call bell within reach. Bed in low position. Three side rails up.    1642 started blood transfusion at this time. Took the consent and informed her about the complications, pt verbalize understanding, no complain and concern at this time    1720 pt transported to Endoscopy and blood transfusion verify with the nurse. 1858 pt came from EGD. Pt is drowsy . Still blood transfusion running.  Pt left on comfortable position

## 2018-01-11 NOTE — PROGRESS NOTES
Nephrology Progress Note    Subjective:     Margaret Owen is a 75 y.o. female with Anemia, chronic back pain, cirrhosis, DM, ESRD on HD MWF, who was admitted for GI bleed. S/p EGD with cauterization of AVM. GI plans to do colonoscopy tomorrow. Pt otherwise reports no cp/sob today.     Admit Date: 1/10/2018  Patient Active Problem List   Diagnosis Code    GI bleed K92.2    ESRD (end stage renal disease) ([facility] N18.6    Diabetes mellitus type 2, controlled ([facility] E11.9    HTN (hypertension) I10    Anemia in chronic kidney disease N18.9, D63.1    Back pain M54.9    Constipation K59.00    Cirrhosis ([facility] K74.60    Symptomatic anemia D64.9     Current Facility-Administered Medications   Medication Dose Route Frequency    0.9% sodium chloride infusion 250 mL  250 mL IntraVENous PRN    acetaminophen (TYLENOL) tablet 325 mg  325 mg Oral Q6H PRN    vit B Cmplx 3-FA-Vit C-Biotin (NEPHRO CATARINA RX) tablet 1 Tab  1 Tab Oral DAILY    cholecalciferol (VITAMIN D3) tablet 2,000 Units  2,000 Units Oral DAILY    ferrous sulfate tablet 325 mg  325 mg Oral TID WITH MEALS    hydrALAZINE (APRESOLINE) tablet 25 mg  25 mg Oral TID    metoprolol succinate (TOPROL-XL) XL tablet 25 mg  25 mg Oral DAILY    sevelamer (RENAGEL) tablet 1,600 mg  1,600 mg Oral TID WITH MEALS    sodium chloride (NS) flush 5-10 mL  5-10 mL IntraVENous Q8H    sodium chloride (NS) flush 5-10 mL  5-10 mL IntraVENous PRN    pantoprazole (PROTONIX) 40 mg in sodium chloride 0.9 % 10 mL injection  40 mg IntraVENous Q12H    glucose chewable tablet 16 g  16 g Oral PRN    glucagon (GLUCAGEN) injection 1 mg  1 mg IntraMUSCular PRN    dextrose (D50W) injection syrg 25 g  50 mL IntraVENous PRN    insulin lispro (HUMALOG) injection   SubCUTAneous AC&HS    polyethylene glycol (MIRALAX) packet 17 g  17 g Oral BID    docusate sodium (COLACE) capsule 100 mg  100 mg Oral BID    glucagon (GLUCAGEN) injection    PRN       Allergy:   Allergies   Allergen Reactions    Aspirin Other (comments)     GI bleed    Heparin (Porcine) Other (comments)     GI bleed    Metformin Other (comments)     swelling    Norvasc [Amlodipine] Rash        Objective:     Visit Vitals    /66 (BP 1 Location: Right arm, BP Patient Position: At rest)    Pulse 76    Temp 98.5 °F (36.9 °C)    Resp 18    Ht 5' 3\" (1.6 m)    Wt 71.7 kg (158 lb 1.1 oz)    SpO2 100%    Breastfeeding No    BMI 28 kg/m2       Intake/Output Summary (Last 24 hours) at 01/11/18 0929  Last data filed at 01/11/18 0800   Gross per 24 hour   Intake              379 ml   Output                0 ml   Net              379 ml       Physical Exam:       General: No acute distress   HENT: Atraumatic and normocephalic   Eyes: Normal conjunctiva   Neck: Supple    Cardiovascular: Normal S1 & S2, no m/r/g   Pulmonary/Chest Wall: Clear to auscultation bilaterally   Abdominal: Soft and non-tender   Musculoskeletal: No edema LE bilaterally   Neurological: No focal deficits       Data Review:  Recent Labs      01/11/18   0427  01/10/18   1338   NA  144  142   K  3.7  3.4*   CL  103  100   CO2  34*  36*   BUN  22*  13   CREA  3.92*  2.95*   GLU  134*  268*   CA  8.7  8.7     Recent Labs      01/11/18   0427  01/10/18   1338   WBC  5.7  4.2*   HGB  7.9*  6.0*   HCT  25.9*  20.1*   PLT  136  151     Recent Labs      01/10/18   1338   SGOT  16   AP  129*   TP  6.3*   ALB  3.0*   GLOB  3.3     No results for input(s): INR, PTP, APTT in the last 72 hours. No lab exists for component: INREXT   No results for input(s): IRON, FE, TIBC, IBCT, PSAT, FERR in the last 72 hours. Most recent labs: reviewed. CXR:  No significant change or acute pulmonary process identified. Stable enlarged  cardiac silhouette.       Impression:     ESRD on HD MWF  HTN  Anemia with worsening H/H, required multiple transfusion recently  DM    Plan:     Next HD Friday  Use current access for HD  GI plans to do a colonoscopy tomorrow  D/w pt    MD Jasbir Richardson  [phone]

## 2018-01-11 NOTE — CONSULTS
[id number] [facility]    Lilo Mcdonald  MR#: [id number]  : 1950  ACCOUNT #: [de-identified]   DATE OF SERVICE: 01/10/2018    HISTORY OF PRESENT ILLNESS:  A 56-year-old female who has diabetic nephropathy and has end-stage renal disease, has been on hemodialysis for the past eight years. She is known to have iron deficiency anemia. In fact, a year ago she had EGD/colonoscopy, and more recently on 2017 she has a capsule endoscopy that were apparently negative except for some polyps that were removed a year ago. She still has some other polyps which were not removed because of poor bowel prep. The patient was referred to our hospital a month ago on December 15, 2017 because her hemoglobin was at 6.2, and the patient had been feeling weak. She also admitted at that time that she was having intermittently black tarry stools. She was supposed to do an endoscopy, but then the patient got released as she already had an appointment at the [facility] in [place] for EGD and colonoscopy in a couple more days. She received two blood transfusions and was sent home with a hemoglobin of 8.1. About a week ago, she had three blood transfusions at the [facility] in [place]. There was no active bleeding at that time. The patient has been complaining in the last months of dyspepsia in the form of belching, heartburn and nausea, but no abdominal pain or vomiting. She claims that she has been having regular bowel movements every day with the help of Colace, but she used to be constipated when she was younger and she has had very poor bowel prep during her last 2 colonoscopies that were done about a year ago and was told that the next time she needs to have two separate bowel preps to clean her out. The patient denies taking any aspirin or NSAIDs. She is not on blood thinners. ALLERGIES:  SHE IS ALLERGIC TO ASPIRIN, HEPARIN, METFORMIN AND NORVASC.     HOME MEDICATIONS:  Vitamin D3, ferrous sulfate 325, Apresoline 25 mg t.i.d., lactulose 20 grams daily p.r.n., Toprol 25 mg, Renagel, multivitamins, B complex, Tylenol, aflibercept intravitreal every 28 days, Flexeril 5 mg t.i.d. as needed, Colace 100 mg b.i.d., Atarax 10 mg, Prilosec 20 mg, Proventil 2 puffs p.r.n., Flonase, Ventolin and simethicone. She told me that she was taking some painkillers, but I did not see that she was taking any narcotic medications. PAST MEDICAL HISTORY:  The patient besides her diabetes and end-stage renal disease, has hypertension, chronic anemia secondary to her chronic kidney disease, chronic back pain, constipation. She was told to have cirrhosis. The patient has no prior history of alcohol abuse. As well, the patient has no coronary artery disease, no stroke. The patient has at least 8 pregnancies, including one . LABORATORY DATA:  A CT scan of the abdomen and pelvis, 2017, spoke about mild to moderate volume ascites in the abdomen and pelvis, but the pancreas and spleen were normal.  The liver appeared to be of normal size and consistency. There was possibility of sludge in the gallbladder. Abdominal ultrasound on 2017, showed hepatomegaly with increased hepatic echo-structures and slight angulation of the capsule. There was mention of mild splenomegaly, low volume ascites, patent portal vein with appropriate flow direction. Serology for hepatitis A, B, C was remarkable only for positive antibodies for hepatitis B surface antibodies. Her LFTs have been normal except for alkaline phosphatase of 129. Her platelets have been in general normal.  Only once it dropped, on 2017 dropped to 129, but presently they are about 151-164. Blood tests: We have a WBC of 4.2, hemoglobin 6, , platelets 481, normal differential.  INR 1. Complete metabolic panel: We have BUN of 13, creatinine 2.95. Alkaline phosphatase is 129, albumin 3 normal AST and ALT.   Troponins are 0.09, CPK MB 3.6 and CPK-MB index 5.9. All of them are slightly elevated. This time, the patient received two blood transfusions. I do not have any control of her hemoglobin since her first one at 5. FAMILY HISTORY:  She has no family history of cancers. REVIEW OF SYSTEMS:  Her functional inquiry is mostly remarkable for chronic back pain, for which she takes Tylenol. PHYSICAL EXAMINATION:  GENERAL:  We have a 16-year-old -American female who appears to be in no distress. She is alert and oriented. VITAL SIGNS:  She weighs 155 pounds. Temperature is 97.9, pulse 70, blood pressure 103/60, breathing 20, saturation 100% on room air. SKIN:  Normal.  No evidence of any rash. HEENT:  Eyes are remarkable for pale conjunctivae. The sclerae are anicteric. The pupils are equal and react to light. Oropharynx clear cavity, she has upper denture. The mucous membranes are pale and moist.  NECK:  Supple. No palpable mass. No enlarged thyroid. LUNGS:  Clear to auscultation. CARDIAC:  Rhythm is regular. There is II/VI to III/VI systolic murmur at the left parasternal border. There is no thrill, no gallop. ABDOMEN:  Large, hard to palpate but soft, nontender, no mass or organomegaly. Bowel sounds are normal.  EXTREMITIES:  Unremarkable, no pedal edema, no clubbing, no tremor. NEUROLOGIC:  No focal neurological signs. She is alert and oriented. ASSESSMENT AND PLAN:  In conclusion, this is a 16-year-old female who is coming for reactive and heme-positive iron deficiency anemia. 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 She could have easily portal hypertensive gastropathy or varices. We need to do an endoscopy. This patient also has a history of polyps. Apparently, two of them have not been removed yet. Therefore, we would like to start with an esophagogastroduodenoscopy and complete with a colonoscopy to be complete.   The patient also has been complaining of some dyspepsia with heartburn and belching in the last months, which deserve to be investigated. The patient has been taking omeprazole 20 mg daily at least according to her list of medications, but we are not sure if she is taking it or not. This patient is on hemodialysis. 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 It was dilated up to 20 mm. There was in fact a tiny hiatus hernia and a possibility of a very tiny and esophageal varix. There was possibility of portal hypertensive gastropathy, but this was very hard to evaluate with the presence of the huge quantities of fluid in the stomach. 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 Patients in these conditions can have other lesions throughout the small bowel. She has to make sure that she does never consume any nonsteroidal anti-inflammatory drugs, and it is better for now to avoid any aspirin. On Friday, we are going to do her colonoscopy as the patient requested to have the procedure done here while she is still in the hospital.      Jim Toussaint MD MBE / Theron Damian  D: 01/10/2018 20:03     T: 01/10/2018 21:31  JOB #: [id number]

## 2018-01-11 NOTE — PROGRESS NOTES
Chart reviewed. Pt admitted to hospital for GI bleed. Pt has PMH of GI bleed, DM, ESRD, HTN, anemia, cirrhosis. CM will be available for discharge planning. 0945  Met with patient at bedside. Pt states [facility] [address] [place], [place]. Pt offered FOC for HH. Pt refusing at this time. No needs identified. CM will cont to follow. 1200  Attended IDRs. Pt discharge date: TBD. Discharge Reassessment Plan:  High Risk and MSSP/[facility] Help ACO patients    RRAT Score:  21 or greater    High Risk Care Transition Interventions:  1. Discharge transition plan:  Home with [facility] vs SNF (TBD)  2. Involved patient/caregiver in assessment, planning, education and implement of intervention. 3. CM daily patient care huddles/interdisciplinary rounds were completed. 4. PCP/Specialist appointment to be scheduled within 48 hours unless otherwise specified. 5. Facilitated transportation and logistics for follow-up appointments. 6. Facilitated Medication reconciliation - Pharmacy. Date/Time  7. Formal handoff between hospital provider and post-acute provider to transition patient completed. 8. Handoff to [address] [place] [address] Nurse Navigator or PCP practice completed. Discharge follow-up phone call within 2 - 4 days (NN, [facility], Nursing) CM follow up as assigned. Care Management Interventions  Mode of Transport at Discharge:  Other (see comment) (son)  Transition of Care Consult (CM Consult): Discharge Planning  Health Maintenance Reviewed: Yes  Current Support Network: Relative's Home  Confirm Follow Up Transport: Other (see comment) (son)  Discharge Location  Discharge Placement: Home with family assistance

## 2018-01-12 ENCOUNTER — APPOINTMENT (OUTPATIENT)
Dept: GENERAL RADIOLOGY | Age: 68
DRG: 377 | End: 2018-01-12
Attending: INTERNAL MEDICINE
Payer: MEDICARE

## 2018-01-12 LAB
ABO + RH BLD: NORMAL
ANION GAP SERPL CALC-SCNC: 6 MMOL/L (ref 3–18)
BLD PROD TYP BPU: NORMAL
BLD PROD TYP BPU: NORMAL
BLOOD GROUP ANTIBODIES SERPL: NORMAL
BPU ID: NORMAL
BPU ID: NORMAL
BUN SERPL-MCNC: 28 MG/DL (ref 7–18)
BUN/CREAT SERPL: 5 (ref 12–20)
CALCIUM SERPL-MCNC: 8.6 MG/DL (ref 8.5–10.1)
CALLED TO:,BCALL1: NORMAL
CHLORIDE SERPL-SCNC: 99 MMOL/L (ref 100–108)
CO2 SERPL-SCNC: 33 MMOL/L (ref 21–32)
CREAT SERPL-MCNC: 5.11 MG/DL (ref 0.6–1.3)
CROSSMATCH RESULT,%XM: NORMAL
CROSSMATCH RESULT,%XM: NORMAL
ERYTHROCYTE [DISTWIDTH] IN BLOOD BY AUTOMATED COUNT: 19.3 % (ref 11.6–14.5)
GLUCOSE BLD STRIP.AUTO-MCNC: 113 MG/DL (ref 70–110)
GLUCOSE BLD STRIP.AUTO-MCNC: 196 MG/DL (ref 70–110)
GLUCOSE BLD STRIP.AUTO-MCNC: 82 MG/DL (ref 70–110)
GLUCOSE BLD STRIP.AUTO-MCNC: 90 MG/DL (ref 70–110)
GLUCOSE BLD STRIP.AUTO-MCNC: 93 MG/DL (ref 70–110)
GLUCOSE SERPL-MCNC: 99 MG/DL (ref 74–99)
H PYLORI IGA SER-ACNC: <9 UNITS (ref 0–8.9)
H PYLORI IGG SER IA-ACNC: <0.8 INDEX VALUE (ref 0–0.79)
H PYLORI IGM SER-ACNC: <9 UNITS (ref 0–8.9)
HCT VFR BLD AUTO: 28.9 % (ref 35–45)
HGB BLD-MCNC: 8.5 G/DL (ref 12–16)
MCH RBC QN AUTO: 29.5 PG (ref 24–34)
MCHC RBC AUTO-ENTMCNC: 29.4 G/DL (ref 31–37)
MCV RBC AUTO: 100.3 FL (ref 74–97)
PLATELET # BLD AUTO: 154 K/UL (ref 135–420)
PMV BLD AUTO: 10.1 FL (ref 9.2–11.8)
POTASSIUM SERPL-SCNC: 4.4 MMOL/L (ref 3.5–5.5)
RBC # BLD AUTO: 2.88 M/UL (ref 4.2–5.3)
SODIUM SERPL-SCNC: 138 MMOL/L (ref 136–145)
SPECIMEN EXP DATE BLD: NORMAL
STATUS OF UNIT,%ST: NORMAL
STATUS OF UNIT,%ST: NORMAL
UNIT DIVISION, %UDIV: 0
UNIT DIVISION, %UDIV: 0
WBC # BLD AUTO: 6 K/UL (ref 4.6–13.2)

## 2018-01-12 PROCEDURE — 94640 AIRWAY INHALATION TREATMENT: CPT

## 2018-01-12 PROCEDURE — 36415 COLL VENOUS BLD VENIPUNCTURE: CPT | Performed by: HOSPITALIST

## 2018-01-12 PROCEDURE — 74011636637 HC RX REV CODE- 636/637: Performed by: HOSPITALIST

## 2018-01-12 PROCEDURE — 77030020256 HC SOL INJ NACL 0.9%  500ML: Performed by: INTERNAL MEDICINE

## 2018-01-12 PROCEDURE — 74011250636 HC RX REV CODE- 250/636

## 2018-01-12 PROCEDURE — 74011250636 HC RX REV CODE- 250/636: Performed by: INTERNAL MEDICINE

## 2018-01-12 PROCEDURE — 82105 ALPHA-FETOPROTEIN SERUM: CPT | Performed by: INTERNAL MEDICINE

## 2018-01-12 PROCEDURE — 85027 COMPLETE CBC AUTOMATED: CPT | Performed by: HOSPITALIST

## 2018-01-12 PROCEDURE — 74011250637 HC RX REV CODE- 250/637: Performed by: INTERNAL MEDICINE

## 2018-01-12 PROCEDURE — 82962 GLUCOSE BLOOD TEST: CPT

## 2018-01-12 PROCEDURE — G0500 MOD SEDAT ENDO SERVICE >5YRS: HCPCS | Performed by: INTERNAL MEDICINE

## 2018-01-12 PROCEDURE — 74011250636 HC RX REV CODE- 250/636: Performed by: HOSPITALIST

## 2018-01-12 PROCEDURE — 80048 BASIC METABOLIC PNL TOTAL CA: CPT | Performed by: HOSPITALIST

## 2018-01-12 PROCEDURE — 94760 N-INVAS EAR/PLS OXIMETRY 1: CPT

## 2018-01-12 PROCEDURE — 74011000250 HC RX REV CODE- 250: Performed by: HOSPITALIST

## 2018-01-12 PROCEDURE — 77030013991 HC SNR POLYP ENDOSC BSC -A: Performed by: INTERNAL MEDICINE

## 2018-01-12 PROCEDURE — 71045 X-RAY EXAM CHEST 1 VIEW: CPT

## 2018-01-12 PROCEDURE — 0DBL8ZZ EXCISION OF TRANSVERSE COLON, VIA NATURAL OR ARTIFICIAL OPENING ENDOSCOPIC: ICD-10-PCS | Performed by: INTERNAL MEDICINE

## 2018-01-12 PROCEDURE — 99153 MOD SED SAME PHYS/QHP EA: CPT | Performed by: INTERNAL MEDICINE

## 2018-01-12 PROCEDURE — 0DBP8ZZ EXCISION OF RECTUM, VIA NATURAL OR ARTIFICIAL OPENING ENDOSCOPIC: ICD-10-PCS | Performed by: INTERNAL MEDICINE

## 2018-01-12 PROCEDURE — 74011250637 HC RX REV CODE- 250/637: Performed by: HOSPITALIST

## 2018-01-12 PROCEDURE — 74011000250 HC RX REV CODE- 250: Performed by: INTERNAL MEDICINE

## 2018-01-12 PROCEDURE — 76040000009: Performed by: INTERNAL MEDICINE

## 2018-01-12 PROCEDURE — 88305 TISSUE EXAM BY PATHOLOGIST: CPT | Performed by: INTERNAL MEDICINE

## 2018-01-12 PROCEDURE — 65660000000 HC RM CCU STEPDOWN

## 2018-01-12 RX ORDER — ATROPINE SULFATE 0.1 MG/ML
0.5 INJECTION INTRAVENOUS
Status: ACTIVE | OUTPATIENT
Start: 2018-01-12 | End: 2018-01-12

## 2018-01-12 RX ORDER — SODIUM CHLORIDE 9 MG/ML
100 INJECTION, SOLUTION INTRAVENOUS CONTINUOUS
Status: DISPENSED | OUTPATIENT
Start: 2018-01-12 | End: 2018-01-12

## 2018-01-12 RX ORDER — FENTANYL CITRATE 50 UG/ML
100 INJECTION, SOLUTION INTRAMUSCULAR; INTRAVENOUS
Status: DISCONTINUED | OUTPATIENT
Start: 2018-01-12 | End: 2018-01-12 | Stop reason: HOSPADM

## 2018-01-12 RX ORDER — NALOXONE HYDROCHLORIDE 0.4 MG/ML
0.4 INJECTION, SOLUTION INTRAMUSCULAR; INTRAVENOUS; SUBCUTANEOUS
Status: DISCONTINUED | OUTPATIENT
Start: 2018-01-12 | End: 2018-01-12 | Stop reason: HOSPADM

## 2018-01-12 RX ORDER — DEXTROMETHORPHAN/PSEUDOEPHED 2.5-7.5/.8
1.2 DROPS ORAL
Status: DISCONTINUED | OUTPATIENT
Start: 2018-01-12 | End: 2018-01-13 | Stop reason: HOSPADM

## 2018-01-12 RX ORDER — EPINEPHRINE 0.1 MG/ML
1 INJECTION INTRACARDIAC; INTRAVENOUS
Status: ACTIVE | OUTPATIENT
Start: 2018-01-12 | End: 2018-01-12

## 2018-01-12 RX ORDER — FLUMAZENIL 0.1 MG/ML
0.2 INJECTION INTRAVENOUS
Status: DISCONTINUED | OUTPATIENT
Start: 2018-01-12 | End: 2018-01-12 | Stop reason: HOSPADM

## 2018-01-12 RX ORDER — DEXTROMETHORPHAN/PSEUDOEPHED 2.5-7.5/.8
1.2 DROPS ORAL
Status: DISCONTINUED | OUTPATIENT
Start: 2018-01-12 | End: 2018-01-13 | Stop reason: SDUPTHER

## 2018-01-12 RX ORDER — MIDAZOLAM HYDROCHLORIDE 1 MG/ML
.5-5 INJECTION, SOLUTION INTRAMUSCULAR; INTRAVENOUS
Status: DISCONTINUED | OUTPATIENT
Start: 2018-01-12 | End: 2018-01-12 | Stop reason: HOSPADM

## 2018-01-12 RX ORDER — IPRATROPIUM BROMIDE AND ALBUTEROL SULFATE 2.5; .5 MG/3ML; MG/3ML
3 SOLUTION RESPIRATORY (INHALATION)
Status: DISCONTINUED | OUTPATIENT
Start: 2018-01-12 | End: 2018-01-13 | Stop reason: HOSPADM

## 2018-01-12 RX ORDER — FUROSEMIDE 10 MG/ML
40 INJECTION INTRAMUSCULAR; INTRAVENOUS ONCE
Status: COMPLETED | OUTPATIENT
Start: 2018-01-12 | End: 2018-01-12

## 2018-01-12 RX ADMIN — HYDRALAZINE HYDROCHLORIDE 25 MG: 25 TABLET, FILM COATED ORAL at 17:41

## 2018-01-12 RX ADMIN — FUROSEMIDE 40 MG: 10 INJECTION, SOLUTION INTRAMUSCULAR; INTRAVENOUS at 21:13

## 2018-01-12 RX ADMIN — FERROUS SULFATE TAB 325 MG (65 MG ELEMENTAL FE) 325 MG: 325 (65 FE) TAB at 17:41

## 2018-01-12 RX ADMIN — SODIUM CHLORIDE 40 MG: 9 INJECTION INTRAMUSCULAR; INTRAVENOUS; SUBCUTANEOUS at 21:12

## 2018-01-12 RX ADMIN — Medication 10 ML: at 14:00

## 2018-01-12 RX ADMIN — SEVELAMER CARBONATE 1600 MG: 800 TABLET, FILM COATED ORAL at 17:41

## 2018-01-12 RX ADMIN — INSULIN LISPRO 2 UNITS: 100 INJECTION, SOLUTION INTRAVENOUS; SUBCUTANEOUS at 21:23

## 2018-01-12 RX ADMIN — Medication 10 ML: at 06:00

## 2018-01-12 RX ADMIN — HYDRALAZINE HYDROCHLORIDE 25 MG: 25 TABLET, FILM COATED ORAL at 21:12

## 2018-01-12 RX ADMIN — IPRATROPIUM BROMIDE AND ALBUTEROL SULFATE 3 ML: .5; 3 SOLUTION RESPIRATORY (INHALATION) at 19:59

## 2018-01-12 RX ADMIN — ACETAMINOPHEN 325 MG: 325 TABLET ORAL at 12:23

## 2018-01-12 NOTE — PROGRESS NOTES
Problem: Falls - Risk of  Goal: *Absence of Falls  Document Iza Fall Risk and appropriate interventions in the flowsheet.    Outcome: Progressing Towards Goal  Fall Risk Interventions:  Mobility Interventions: Bed/chair exit alarm, Assess mobility with egress test, PT Consult for mobility concerns, Patient to call before getting OOB         Medication Interventions: Bed/chair exit alarm, Patient to call before getting OOB, Teach patient to arise slowly

## 2018-01-12 NOTE — PROCEDURES
[facility]  Colonoscopy Procedure Report  _______________________________________________________  Patient: Leo Yo                                         Attending Physician: Amelia Engel MD    Patient ID: [id number]                                      Referring Physician: Keshav Mckeon MD    Exam Date: January 12, 2018 _______________________________________________________      Introduction: A  75 y.o. female patient, presents for outpatient Colonoscopy    Indications: hem positive iron deficiency anemia. History of multiple colonic polyps removed last year at the [facility] in [place] but some were not removed because of poor bowel prep on 2 occasions? Consent: The benefits, risks, and alternatives to the procedure were discussed and informed consent was obtained from the patient. Preparation: EKG, pulse, pulse oximetry and blood pressure were monitored throughout the procedure. ASA Classification: Class 3 - . The heart is an S1-S2 and regular heart rate and rhythm. Lungs are clear to auscultation and percussion. Abdomen is soft, nondistended, and nontender. Mental Status: awake, alert, and oriented to person, place, and time    Medications:  · Fentanyl 50 mcg IV and Versed 3 mg IV, Glucagon 1 mg IV throughout the procedure. Rectal Exam: Significantly decreased anal sphincter tone. No Blood. Pathology Specimens: Three specimens removed. Procedure:  Patient received already 8 gallons of Golytely over 2 days to assure adequate bowel prep. The pediatric colonoscope was passed with ease through the anus under direct visualization and advanced to the cecum The patient required positioning on the back to aid in the passage of the scope. The scope was withdrawn and the mucosa was carefully examined. The quality of the preparation was fair to good despite the agressive bowel prep. The views were good. The patient's toleration of the procedure was excellent.  Retroflexion was preformed in the ascending colon and hepatic flexure. The exam was done twice to the cecum. Patient unable to hold air because of severely hypotonic anal sphincter. Total time is 46 minutes and withdrawal time is 33 minutes. Findings:  Difficult colonoscopy fair bowel prep despite the intake of 2 gallon Golytely over 48 hours. Rectum:  8 mm sessile polyp in the distal rectum hot snared. Medium large internal hemorrhoids. Sigmoid:   Tortuous loopy sigmoid with mild diverticulosis. Many benign hyperplastic nodules in the distal sigmoid not touched   Descending Colon:   2 small 5 mm sessile polyps hot snared  Transverse Colon:   3 sessile polyps 5 to 10 mm in the transverse colon, all were hot snared  Ascending Colon:   Few non threatening and non bleeding AVM in the ascending colon not cautherized. Cecum:   normal  Terminal ileum:   not entered      Unplanned Events: There were no unplanned events. Estimated Blood Loss: None  Impressions:    Difficult and long colonoscopy procedure in part because of the fair bowel prep despite the intake of 2 gallon Golytely over 48 hours. Significantly decreased anal sphincter tone not able to hold air. 8 mm sessile polyp in the distal rectum hot snared. Medium to large internal hemorrhoids. Tortuous loopy sigmoid with mild diverticulosis. Many benign hyperplastic nodules in the distal sigmoid not touched. 2 small 5 mm sessile polyps hot snared. 3 sessile polyps 5 to 10 mm in the transverse colon, all were hot snared. Few non threatening and non bleeding AVM in the ascending colon not caustherized Normal Mucosa. Complications: None; patient tolerated the procedure well. Recommendations:  · Can go home when standard parameters are met. · Resume diabetic and renal diet. · Colonoscopy recommendation in no longer than 3 years always with at least 2 bowel preps and more.     Procedure Codes:    · Beverly Canalescairn [[id number]]    Endoscope Information:  Model Number(s) [id number]     Assistant: None    Signed By: Mason Otriz MD Date: January 12, 2018

## 2018-01-12 NOTE — PROGRESS NOTES
0030: Off going nurse began pt. drinking GoLytely earlier than ordered; Pt. to begin remainder of GoLytely after dialysis today per orders    0630: Shift Summary: Vital signs remained at pt.'s baseline throughout shift; Pain addressed with Tylenol; Cardiac rhythm: NSR; Call bell left at reach; bed at lowest position; and wheels locked     0800: Bedside shift change report given to ROSA Winters (oncoming nurse) by Sherice Krisnhan (offgoing nurse). Report included the following information SBAR and Kardex.

## 2018-01-12 NOTE — PROGRESS NOTES
Gastrointestinal Progress Note    Patient Name: Margaret Owne    URXTI'R Date: 1/12/2018    Admit Date: 1/10/2018    Subjective:     Diet: Patient is on Clear Liquid and is tolerating. Nausea is not present. Vomiting is not present. Pain: Patient does not complain of abdominal pain.       Bowel Movements: Diarrhea    Bleeding:  None    Current Facility-Administered Medications   Medication Dose Route Frequency    ondansetron (ZOFRAN) injection 4 mg  4 mg IntraVENous Q4H PRN    sevelamer carbonate (RENVELA) tab 1,600 mg  1,600 mg Oral TID WITH MEALS    polyvinyl alcohol (LIQUIFILM TEARS) 1.4 % ophthalmic solution 1 Drop  1 Drop Both Eyes PRN    epoetin reshma (EPOGEN;PROCRIT) injection 7,200 Units  100 Units/kg IntraVENous DIALYSIS MON, WED & FRI    0.9% sodium chloride infusion 250 mL  250 mL IntraVENous PRN    acetaminophen (TYLENOL) tablet 325 mg  325 mg Oral Q6H PRN    vit B Cmplx 3-FA-Vit C-Biotin (NEPHRO CATARINA RX) tablet 1 Tab  1 Tab Oral DAILY    cholecalciferol (VITAMIN D3) tablet 2,000 Units  2,000 Units Oral DAILY    ferrous sulfate tablet 325 mg  325 mg Oral TID WITH MEALS    hydrALAZINE (APRESOLINE) tablet 25 mg  25 mg Oral TID    metoprolol succinate (TOPROL-XL) XL tablet 25 mg  25 mg Oral DAILY    sodium chloride (NS) flush 5-10 mL  5-10 mL IntraVENous Q8H    sodium chloride (NS) flush 5-10 mL  5-10 mL IntraVENous PRN    pantoprazole (PROTONIX) 40 mg in sodium chloride 0.9 % 10 mL injection  40 mg IntraVENous Q12H    glucose chewable tablet 16 g  16 g Oral PRN    glucagon (GLUCAGEN) injection 1 mg  1 mg IntraMUSCular PRN    dextrose (D50W) injection syrg 25 g  50 mL IntraVENous PRN    insulin lispro (HUMALOG) injection   SubCUTAneous AC&HS    polyethylene glycol (MIRALAX) packet 17 g  17 g Oral BID    docusate sodium (COLACE) capsule 100 mg  100 mg Oral BID    glucagon (GLUCAGEN) injection    PRN          Objective:     Visit Vitals    /63 (BP 1 Location: Right arm)    Pulse 68    Temp 97.4 °F (36.3 °C)    Resp 20    Ht 5' 3\" (1.6 m)    Wt 73.2 kg (161 lb 4.8 oz)    SpO2 100%    Breastfeeding No    BMI 28.57 kg/m2       01/10 1901 - 01/12 0700  In: 3059 [P.O.:2680]  Out: -     General appearance: alert, cooperative, no distress, appears stated age  Abdomen: soft, non-tender. Bowel sounds normal. No masses,  no organomegaly    Data Review:    Labs: Results:       Chemistry Recent Labs      01/12/18   0318  01/11/18   0427  01/10/18   1338   GLU  99  134*  268*   NA  138  144  142   K  4.4  3.7  3.4*   CL  99*  103  100   CO2  33*  34*  36*   BUN  28*  22*  13   CREA  5.11*  3.92*  2.95*   CA  8.6  8.7  8.7   AGAP  6  7  6   BUCR  5*  6*  4*   AP   --    --   129*   TP   --    --   6.3*   ALB   --    --   3.0*   GLOB   --    --   3.3   AGRAT   --    --   0.9      CBC w/Diff Recent Labs      01/12/18   0318  01/11/18   0427  01/10/18   1338   WBC  6.0  5.7  4.2*   RBC  2.88*  2.65*  2.01*   HGB  8.5*  7.9*  6.0*   HCT  28.9*  25.9*  20.1*   PLT  154  136  151   GRANS   --    --   70   LYMPH   --    --   18*   EOS   --    --   3      Coagulation No results for input(s): PTP, INR, APTT in the last 72 hours. No lab exists for component: INREXT    Liver Enzymes Recent Labs      01/10/18   1338   TP  6.3*   ALB  3.0*   AP  129*   SGOT  16          Assessment:     Active Problems:    GI bleed (12/15/2017)      Symptomatic anemia (1/10/2018)        Plan:     Chronic Iron deficiency anemia requiring multiple blood transfusions. 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 There is a good chance that there other similar lesions in the small bowel. Need to FU the Hgb/ Hct on regular basis. Recommend repeat EGD in 2 to 3 months because the last EGD  Was sub-optimal because of the presence of large quantity of liquid and food in a very dilated stomach.   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 Was dilated on Molina 10, 2018 to 20 mm. Diabetes mellitus  ESRD on hemodialysis x 8 years  Cryptogenic Cirrhosis on abdominal US done Dec 16, 2017 with normal AST and ALT  Liver is prominent with a sagittal span of 20.4 cm. Hepatic echotexture is  mildly increased throughout. Liver capsule is slightly undulating. No focal  liver lesion. Portal venous flow is hepatopedal. Portal vein caliber is normal measuring 11  mm. Spleen is mildly prominent with a sagittal span of 14.4 cm and the volume  measured at 503 cc. Low volume ascites is present in the right upper quadrant and adjacent to the  liver, minimal ascites is noted adjacent to the spleen  But these finding were not seen on CT scan of the abdomen and Pelvis done the same day on Dec 16, 2017  FINDINGS:  LOWER CHEST: Several small bilateral noncalcified pulmonary nodules are present,  largest measures 4 mm in the right middle lobe. Cardiomegaly. Dense mitral  annulus calcification is present. LIVER, BILIARY: A couple punctate calcifications are present in the central  liver, probably calcified granulomata. No focal liver lesion appreciated. No  biliary dilation. Gallbladder lumen is slightly more dense than expected,  suggesting sludge or cholestasis. PANCREAS: Normal.  SPLEEN: Normal.  ADRENALS: Slight fullness right adrenal apex with a density reading of 3  Hounsfield units, very likely benign adenoma. KIDNEYS: Atrophic. Scattered renal artery vascular calcifications and possible  tiny nonobstructing bilateral renal calculi. LYMPH NODES: No enlarged lymph nodes. GASTROINTESTINAL TRACT: Mild to moderate volume ascites is present in the  abdomen and pelvis, water attenuation. No bowel dilatation or wall thickening. Normal appendix. PELVIC ORGANS: Prominent calcifications are present along the uterine arteries. No pelvic masses. VASCULATURE: Calcific atherosclerosis, no AAA.   BONES: No acute or aggressive osseous abnormalities identified.   OTHER: No retroperitoneal, rectus sheath or other hematoma. Surgical clips are  present adjacent to the distal right colon. History of colon polyps some were removed a year ago but others were left because of poor bowel prep on 2 occasions.  Today repeat colonoscopy and polypectomy                Kaykay Portillo MD  January 12, 2018

## 2018-01-12 NOTE — PROGRESS NOTES
D/c plan; anticipate home tomorrow  Met with patient during IDR's. Patient would like to retrun marc when she is ready for d/c. Patient states she goes to [facility] her dialysis needs on MWF. States she will need her HD today. Hd nurse in house and she is planning to do her HD today. Patient is scheduled for colonscopy today  At 1530. States she drives her self to HD. Pt. Declines HHC. States she does not have any needs for d/c home nor any DME equipment. Care Management Interventions  Mode of Transport at Discharge:  Other (see comment) (son)  Transition of Care Consult (CM Consult): Discharge Planning  Health Maintenance Reviewed: Yes  Current Support Network: Relative's Home  Confirm Follow Up Transport: Other (see comment) (son)  Discharge Location  Discharge Placement: Home with family assistance

## 2018-01-12 NOTE — PROGRESS NOTES
Assumed care of pt. Pt is alert no sign of distress. Pt to have dialysis early today. and colonoscopy at 1530.  1400: pt off floor for procedure. 1500: spoke with  regarding dialysis. Dr. Ambar Yen pt will receive dialysis in the morning. 1600: Pt came back from colonoscopy with no problems. Alert and oriented x4. See endo nurse notes for findings. 1630: pt alert no sign of distress. Vitals stable. 1700: Pt up on side of bed waiting to eat dinner.

## 2018-01-12 NOTE — PERIOP NOTES
Post Endoscopy SBAR report    Report given post procedure to: FLOOR RN  Patient tolerated procedure well.   Vital signs stable  Findings:  DIVERTICULOSIS, HEMORRHOIDS, POLYPS X 6, AVMS, TORTUOUS COLON      Medications given in the procedure:  Fentanyl  50  Mcg  Versed   3   Mg

## 2018-01-12 NOTE — ROUTINE PROCESS
Bedside and Verbal shift change report given to [address] [place] [address] (oncoming nurse) by Simab Fields RN (offgoing nurse). Report included the following information SBAR, Kardex, ED Summary, Intake/Output, MAR, Accordion, Recent Results and Med Rec Status.

## 2018-01-12 NOTE — PROGRESS NOTES
Hospitalist Progress Note    Patient: Chandni York MRN: [id number]  CSN: [id number]    YOB: 1950  Age: 75 y.o. Sex: female    DOA: 1/10/2018 LOS:  LOS: 2 days                Assessment/Plan     Patient Active Problem List   Diagnosis Code    GI bleed K92.2    ESRD (end stage renal disease) ([facility] N18.6    Diabetes mellitus type 2, controlled ([facility] [facility] E11.9    HTN (hypertension) I10    Anemia in chronic kidney disease N18.9, D63.1    Back pain M54.9    Constipation K59.00    Cirrhosis ([facility] [facility] K74.60    Symptomatic anemia D64.9            76 yo female admitted for anemia    Severe symptomatic anemia - s/p PRBC, H/H improved.     GI bleed - Clear liquid. S/p EGD with findings of AVM, cauterized  For colonoscopy today     ESRD - HD per nephrology     Cirrhosis - need to follow up with hepatology     DM - on SSI.      Chronic back pain,     Disposition : 1-2 days    Review of systems  General: No fevers or chills. Cardiovascular: No chest pain or pressure. No palpitations. Pulmonary: No shortness of breath. Gastrointestinal: No nausea, vomiting. Physical Exam:  General: Awake, cooperative, no acute distress    HEENT: NC, Atraumatic. PERRLA, anicteric sclerae. Lungs: CTA Bilaterally. No Wheezing/Rhonchi/Rales. Heart:  Regular  rhythm,  No murmur, No Rubs, No Gallops  Abdomen: Soft, Non distended, Non tender.  +Bowel sounds,   Extremities: No c/c/e  Psych:   Not anxious or agitated. Neurologic:  No acute neurological deficit.            Vital signs/Intake and Output:  Visit Vitals    /60    Pulse 65    Temp 98.7 °F (37.1 °C)    Resp 16    Ht 5' 3\" (1.6 m)    Wt 73.2 kg (161 lb 4.8 oz)    SpO2 99%    Breastfeeding No    BMI 28.57 kg/m2     Current Shift:     Last three shifts:  01/10 1901 - 01/12 0700  In: 5190 [P.O.:2680]  Out: -             Labs: Results:       Chemistry Recent Labs      01/12/18   0318  01/11/18   0427  01/10/18   1338   GLU  99  134*  268*   NA  138 144  142   K  4.4  3.7  3.4*   CL  99*  103  100   CO2  33*  34*  36*   BUN  28*  22*  13   CREA  5.11*  3.92*  2.95*   CA  8.6  8.7  8.7   AGAP  6  7  6   BUCR  5*  6*  4*   AP   --    --   129*   TP   --    --   6.3*   ALB   --    --   3.0*   GLOB   --    --   3.3   AGRAT   --    --   0.9      CBC w/Diff Recent Labs      01/12/18   0318  01/11/18   0427  01/10/18   1338   WBC  6.0  5.7  4.2*   RBC  2.88*  2.65*  2.01*   HGB  8.5*  7.9*  6.0*   HCT  28.9*  25.9*  20.1*   PLT  154  136  151   GRANS   --    --   70   LYMPH   --    --   18*   EOS   --    --   3      Cardiac Enzymes Recent Labs      01/10/18   1338   CPK  61   CKND1  5.9*      Coagulation No results for input(s): PTP, INR, APTT in the last 72 hours. No lab exists for component: INREXT, INREXT    Lipid Panel No results found for: CHOL, CHOLPOCT, CHOLX, CHLST, CHOLV, 747256, HDL, LDL, LDLC, DLDLP, 997334, VLDLC, VLDL, TGLX, TRIGL, TRIGP, TGLPOCT, CHHD, CHHDX   BNP No results for input(s): BNPP in the last 72 hours.    Liver Enzymes Recent Labs      01/10/18   1338   TP  6.3*   ALB  3.0*   AP  129*   SGOT  16      Thyroid Studies No results found for: T4, T3U, TSH, TSHEXT, TSHEXT     Procedures/imaging: see electronic medical records for all procedures/Xrays and details which were not copied into this note but were reviewed prior to creation of Plan

## 2018-01-13 ENCOUNTER — APPOINTMENT (OUTPATIENT)
Dept: ULTRASOUND IMAGING | Age: 68
DRG: 377 | End: 2018-01-13
Attending: INTERNAL MEDICINE
Payer: MEDICARE

## 2018-01-13 VITALS
BODY MASS INDEX: 27.97 KG/M2 | TEMPERATURE: 98.6 F | HEART RATE: 66 BPM | SYSTOLIC BLOOD PRESSURE: 120 MMHG | HEIGHT: 63 IN | DIASTOLIC BLOOD PRESSURE: 61 MMHG | OXYGEN SATURATION: 99 % | WEIGHT: 157.85 LBS | RESPIRATION RATE: 14 BRPM

## 2018-01-13 LAB
ANION GAP SERPL CALC-SCNC: 9 MMOL/L (ref 3–18)
ATRIAL RATE: 86 BPM
BUN SERPL-MCNC: 34 MG/DL (ref 7–18)
BUN/CREAT SERPL: 5 (ref 12–20)
CALCIUM SERPL-MCNC: 8.5 MG/DL (ref 8.5–10.1)
CALCULATED P AXIS, ECG09: 70 DEGREES
CALCULATED R AXIS, ECG10: -43 DEGREES
CALCULATED T AXIS, ECG11: 119 DEGREES
CHLORIDE SERPL-SCNC: 98 MMOL/L (ref 100–108)
CO2 SERPL-SCNC: 30 MMOL/L (ref 21–32)
CREAT SERPL-MCNC: 6.85 MG/DL (ref 0.6–1.3)
DIAGNOSIS, 93000: NORMAL
ERYTHROCYTE [DISTWIDTH] IN BLOOD BY AUTOMATED COUNT: 18.2 % (ref 11.6–14.5)
GLUCOSE BLD STRIP.AUTO-MCNC: 106 MG/DL (ref 70–110)
GLUCOSE BLD STRIP.AUTO-MCNC: 321 MG/DL (ref 70–110)
GLUCOSE BLD STRIP.AUTO-MCNC: 92 MG/DL (ref 70–110)
GLUCOSE SERPL-MCNC: 314 MG/DL (ref 74–99)
HCT VFR BLD AUTO: 27 % (ref 35–45)
HGB BLD-MCNC: 8 G/DL (ref 12–16)
MCH RBC QN AUTO: 29.6 PG (ref 24–34)
MCHC RBC AUTO-ENTMCNC: 29.6 G/DL (ref 31–37)
MCV RBC AUTO: 100 FL (ref 74–97)
P-R INTERVAL, ECG05: 180 MS
PLATELET # BLD AUTO: 153 K/UL (ref 135–420)
PMV BLD AUTO: 9.9 FL (ref 9.2–11.8)
POTASSIUM SERPL-SCNC: 4.3 MMOL/L (ref 3.5–5.5)
Q-T INTERVAL, ECG07: 460 MS
QRS DURATION, ECG06: 144 MS
QTC CALCULATION (BEZET), ECG08: 550 MS
RBC # BLD AUTO: 2.7 M/UL (ref 4.2–5.3)
SODIUM SERPL-SCNC: 137 MMOL/L (ref 136–145)
VENTRICULAR RATE, ECG03: 86 BPM
WBC # BLD AUTO: 6.8 K/UL (ref 4.6–13.2)

## 2018-01-13 PROCEDURE — 36415 COLL VENOUS BLD VENIPUNCTURE: CPT | Performed by: HOSPITALIST

## 2018-01-13 PROCEDURE — 80048 BASIC METABOLIC PNL TOTAL CA: CPT | Performed by: HOSPITALIST

## 2018-01-13 PROCEDURE — 77010033678 HC OXYGEN DAILY

## 2018-01-13 PROCEDURE — 74011250636 HC RX REV CODE- 250/636: Performed by: HOSPITALIST

## 2018-01-13 PROCEDURE — 82962 GLUCOSE BLOOD TEST: CPT

## 2018-01-13 PROCEDURE — 74011250637 HC RX REV CODE- 250/637: Performed by: HOSPITALIST

## 2018-01-13 PROCEDURE — 74011636637 HC RX REV CODE- 636/637: Performed by: HOSPITALIST

## 2018-01-13 PROCEDURE — 85027 COMPLETE CBC AUTOMATED: CPT | Performed by: HOSPITALIST

## 2018-01-13 PROCEDURE — 74011000250 HC RX REV CODE- 250: Performed by: HOSPITALIST

## 2018-01-13 PROCEDURE — 90935 HEMODIALYSIS ONE EVALUATION: CPT

## 2018-01-13 PROCEDURE — 74011250637 HC RX REV CODE- 250/637: Performed by: INTERNAL MEDICINE

## 2018-01-13 PROCEDURE — 74011250636 HC RX REV CODE- 250/636: Performed by: INTERNAL MEDICINE

## 2018-01-13 PROCEDURE — 76705 ECHO EXAM OF ABDOMEN: CPT

## 2018-01-13 RX ORDER — PANTOPRAZOLE SODIUM 40 MG/1
40 TABLET, DELAYED RELEASE ORAL DAILY
Qty: 30 TAB | Refills: 2 | Status: ON HOLD | OUTPATIENT
Start: 2018-01-13 | End: 2018-05-03

## 2018-01-13 RX ADMIN — FERROUS SULFATE TAB 325 MG (65 MG ELEMENTAL FE) 325 MG: 325 (65 FE) TAB at 17:29

## 2018-01-13 RX ADMIN — DOCUSATE SODIUM 100 MG: 100 CAPSULE, LIQUID FILLED ORAL at 09:00

## 2018-01-13 RX ADMIN — ACETAMINOPHEN 325 MG: 325 TABLET ORAL at 09:39

## 2018-01-13 RX ADMIN — SODIUM CHLORIDE 40 MG: 9 INJECTION INTRAMUSCULAR; INTRAVENOUS; SUBCUTANEOUS at 09:01

## 2018-01-13 RX ADMIN — HYDRALAZINE HYDROCHLORIDE 25 MG: 25 TABLET, FILM COATED ORAL at 09:01

## 2018-01-13 RX ADMIN — POLYETHYLENE GLYCOL 3350 17 G: 17 POWDER, FOR SOLUTION ORAL at 09:00

## 2018-01-13 RX ADMIN — FERROUS SULFATE TAB 325 MG (65 MG ELEMENTAL FE) 325 MG: 325 (65 FE) TAB at 09:01

## 2018-01-13 RX ADMIN — METOPROLOL SUCCINATE 25 MG: 25 TABLET, EXTENDED RELEASE ORAL at 09:01

## 2018-01-13 RX ADMIN — SEVELAMER CARBONATE 1600 MG: 800 TABLET, FILM COATED ORAL at 09:01

## 2018-01-13 RX ADMIN — VITAMIN D, TAB 1000IU (100/BT) 2000 UNITS: 25 TAB at 09:01

## 2018-01-13 RX ADMIN — SEVELAMER CARBONATE 1600 MG: 800 TABLET, FILM COATED ORAL at 17:29

## 2018-01-13 RX ADMIN — Medication 1 TABLET: at 09:01

## 2018-01-13 RX ADMIN — INSULIN LISPRO 8 UNITS: 100 INJECTION, SOLUTION INTRAVENOUS; SUBCUTANEOUS at 06:55

## 2018-01-13 RX ADMIN — Medication 5 ML: at 14:00

## 2018-01-13 RX ADMIN — HYDRALAZINE HYDROCHLORIDE 25 MG: 25 TABLET, FILM COATED ORAL at 17:29

## 2018-01-13 RX ADMIN — ERYTHROPOIETIN 7200 UNITS: 10000 INJECTION, SOLUTION INTRAVENOUS; SUBCUTANEOUS at 13:00

## 2018-01-13 RX ADMIN — ACETAMINOPHEN 325 MG: 325 TABLET ORAL at 04:34

## 2018-01-13 NOTE — ROUTINE PROCESS
Dual AVS reviewed with caitlin arteaga. All medications reviewed individually with patient. Opportunities for questions and concerns provided. Patient discharged via (mode of transport ie. Car, ambulance or air transport) car  Patient's arm band appropriately discarded.

## 2018-01-13 NOTE — PROGRESS NOTES
0730 Pt care and report received from previous nurse. Pt is alert and oriented x4. Previous nurse stated pt experienced SOB last night and orders were received for lasix, supplemental o2, and chest xray. Currently, pt denies SOB and is resting quietly in bed. Assessment completed and charting in progress. Pt awaiting to have dialysis and liver ultrasound.

## 2018-01-13 NOTE — PROGRESS NOTES
1928: Pt. C/o difficulty breathing; SpO2 100% on RA; On Auscultation bilateral lung sound clear; Pt. Placed on supplemental O2 @ 2L; Physician paged     1934: Dr. Jono An made aware; instructed to administered Nebulizer treatment, lasix 40mg IV Once; Chest XR     2210: Pt.  Voice improvement in respiratory status; Pt. Currently resting quietly with eyes closed; status stable

## 2018-01-13 NOTE — DISCHARGE INSTRUCTIONS
DISCHARGE SUMMARY from Nurse    PATIENT INSTRUCTIONS:    After general anesthesia or intravenous sedation, for 24 hours or while taking prescription Narcotics:  · Limit your activities  · Do not drive and operate hazardous machinery  · Do not make important personal or business decisions  · Do  not drink alcoholic beverages  · If you have not urinated within 8 hours after discharge, please contact your surgeon on call. Report the following to your surgeon:  · Excessive pain, swelling, redness or odor of or around the surgical area  · Temperature over 100.5  · Nausea and vomiting lasting longer than 4 hours or if unable to take medications  · Any signs of decreased circulation or nerve impairment to extremity: change in color, persistent  numbness, tingling, coldness or increase pain  · Any questions    What to do at Home:  Recommended activity: Activity as tolerated    *  Please give a list of your current medications to your Primary Care Provider. *  Please update this list whenever your medications are discontinued, doses are      changed, or new medications (including over-the-counter products) are added. *  Please carry medication information at all times in case of emergency situations. These are general instructions for a healthy lifestyle:    No smoking/ No tobacco products/ Avoid exposure to second hand smoke  Surgeon General's Warning:  Quitting smoking now greatly reduces serious risk to your health. Obesity, smoking, and sedentary lifestyle greatly increases your risk for illness    A healthy diet, regular physical exercise & weight monitoring are important for maintaining a healthy lifestyle    You may be retaining fluid if you have a history of heart failure or if you experience any of the following symptoms:  Weight gain of 3 pounds or more overnight or 5 pounds in a week, increased swelling in our hands or feet or shortness of breath while lying flat in bed.   Please call your doctor as soon as you notice any of these symptoms; do not wait until your next office visit. Recognize signs and symptoms of STROKE:    F-face looks uneven    A-arms unable to move or move unevenly    S-speech slurred or non-existent    T-time-call 911 as soon as signs and symptoms begin-DO NOT go       Back to bed or wait to see if you get better-TIME IS BRAIN. Warning Signs of HEART ATTACK     Call 911 if you have these symptoms:   Chest discomfort. Most heart attacks involve discomfort in the center of the chest that lasts more than a few minutes, or that goes away and comes back. It can feel like uncomfortable pressure, squeezing, fullness, or pain.  Discomfort in other areas of the upper body. Symptoms can include pain or discomfort in one or both arms, the back, neck, jaw, or stomach.  Shortness of breath with or without chest discomfort.  Other signs may include breaking out in a cold sweat, nausea, or lightheadedness. Don't wait more than five minutes to call 911 - MINUTES MATTER! Fast action can save your life. Calling 911 is almost always the fastest way to get lifesaving treatment. Emergency Medical Services staff can begin treatment when they arrive -- up to an hour sooner than if someone gets to the hospital by car. The discharge information has been reviewed with the patient. The patient verbalized understanding. Discharge medications reviewed with the patient and appropriate educational materials and side effects teaching were provided.   ___________________________________________________________________________________________________________________________________      Patient armband removed and shredded

## 2018-01-13 NOTE — PROGRESS NOTES
Nephrology Progress Note    Subjective:     Everette Telles is a 75 y.o. female with Anemia, chronic back pain, cirrhosis, DM, ESRD on HD MWF, who was admitted for GI bleed. S/p EGD with cauterization of AVM. Colonoscopy shows diverticulosis, AVM. Pt otherwise reports no cp/sob today.   Seen on HD- no complaints  Admit Date: 1/10/2018  Patient Active Problem List   Diagnosis Code    GI bleed K92.2    ESRD (end stage renal disease) ([facility] [address] N18.6    Diabetes mellitus type 2, controlled ([facility] [address] E11.9    HTN (hypertension) I10    Anemia in chronic kidney disease N18.9, D63.1    Back pain M54.9    Constipation K59.00    Cirrhosis ([facility]) K74.60    Symptomatic anemia D64.9     Current Facility-Administered Medications   Medication Dose Route Frequency    simethicone (MYLICON) 92AW/9.7IV oral drops 80 mg  1.2 mL Oral Multiple    glucagon (GLUCAGEN) injection 0.5 mg  0.5 mg IntraVENous PRN    simethicone (MYLICON) 59RT/9.2RB oral drops 80 mg  1.2 mL Oral Multiple    albuterol-ipratropium (DUO-NEB) 2.5 MG-0.5 MG/3 ML  3 mL Nebulization Q4H PRN    ondansetron (ZOFRAN) injection 4 mg  4 mg IntraVENous Q4H PRN    sevelamer carbonate (RENVELA) tab 1,600 mg  1,600 mg Oral TID WITH MEALS    polyvinyl alcohol (LIQUIFILM TEARS) 1.4 % ophthalmic solution 1 Drop  1 Drop Both Eyes PRN    epoetin reshma (EPOGEN;PROCRIT) injection 7,200 Units  100 Units/kg IntraVENous DIALYSIS MON, WED & FRI    0.9% sodium chloride infusion 250 mL  250 mL IntraVENous PRN    acetaminophen (TYLENOL) tablet 325 mg  325 mg Oral Q6H PRN    vit B Cmplx 3-FA-Vit C-Biotin (NEPHRO CATARINA RX) tablet 1 Tab  1 Tab Oral DAILY    cholecalciferol (VITAMIN D3) tablet 2,000 Units  2,000 Units Oral DAILY    ferrous sulfate tablet 325 mg  325 mg Oral TID WITH MEALS    hydrALAZINE (APRESOLINE) tablet 25 mg  25 mg Oral TID    metoprolol succinate (TOPROL-XL) XL tablet 25 mg  25 mg Oral DAILY    sodium chloride (NS) flush 5-10 mL  5-10 mL IntraVENous Q8H    sodium chloride (NS) flush 5-10 mL  5-10 mL IntraVENous PRN    pantoprazole (PROTONIX) 40 mg in sodium chloride 0.9 % 10 mL injection  40 mg IntraVENous Q12H    glucose chewable tablet 16 g  16 g Oral PRN    glucagon (GLUCAGEN) injection 1 mg  1 mg IntraMUSCular PRN    dextrose (D50W) injection syrg 25 g  50 mL IntraVENous PRN    insulin lispro (HUMALOG) injection   SubCUTAneous AC&HS    polyethylene glycol (MIRALAX) packet 17 g  17 g Oral BID    docusate sodium (COLACE) capsule 100 mg  100 mg Oral BID    glucagon (GLUCAGEN) injection    PRN       Allergy:   Allergies   Allergen Reactions    Aspirin Other (comments)     GI bleed    Heparin (Porcine) Other (comments)     GI bleed    Metformin Other (comments)     swelling    Norvasc [Amlodipine] Rash        Objective:     Visit Vitals    /60 (BP 1 Location: Left arm, BP Patient Position: At rest;Supine; Head of bed elevated (Comment degrees))    Pulse 70    Temp 97.5 °F (36.4 °C)    Resp 14    Ht 5' 3\" (1.6 m)    Wt 71.6 kg (157 lb 13.6 oz)    SpO2 99%    Breastfeeding No    BMI 27.96 kg/m2       Intake/Output Summary (Last 24 hours) at 01/13/18 1413  Last data filed at 01/13/18 0917   Gross per 24 hour   Intake              480 ml   Output                0 ml   Net              480 ml       Physical Exam:       General: No acute distress   HENT: Atraumatic and normocephalic   Eyes: Normal conjunctiva   Neck: Supple    Cardiovascular: Normal S1 & S2, no m/r/g   Pulmonary/Chest Wall: Clear to auscultation bilaterally   Abdominal: Soft and non-tender   Musculoskeletal: No edema LE bilaterally   Neurological: No focal deficits       Data Review:  Recent Labs      01/13/18 0344 01/12/18   0318  01/11/18   0427   NA  137  138  144   K  4.3  4.4  3.7   CL  98*  99*  103   CO2  30  33*  34*   BUN  34*  28*  22*   CREA  6.85*  5.11*  3.92*   GLU  314*  99  134*   PHOS   --    --   3.4   CA  8.5  8.6  8.7     Recent Labs      01/13/18   0343 01/12/18   0318   WBC  6.8  6.0   HGB  8.0*  8.5*   HCT  27.0*  28.9*   PLT  153  154     No results for input(s): SGOT, GPT, AP, TBIL, TP, ALB, GLOB, GGT in the last 72 hours. No results for input(s): INR, PTP, APTT in the last 72 hours. No lab exists for component: INREXT, INREXT   No results for input(s): IRON, FE, TIBC, IBCT, PSAT, FERR in the last 72 hours. Most recent labs: reviewed. CXR:  No significant change or acute pulmonary process identified. Stable enlarged  cardiac silhouette.       Impression:     ESRD on HD MWF  HTN  Anemia with worsening H/H, required multiple transfusion recently  DM  Diverticulosis, AVM  Plan:     HD today  Use current access for HD  D/w pt    MD Jasbir Gonzalez  [phone]

## 2018-01-13 NOTE — PROGRESS NOTES
1051: Shift Summary: Uneventful shift; Vital signs remained stable; Pain addressed with PRN Tylenol; Cardiac rhythm: NSR: Pm meds administered without diffculty; Call bell left at reach; bed at lowest position; and wheels locked     0738: Bedside shift change report given to MARKO Lion (oncoming nurse) by BRIDGER Gee (offgoing nurse). Report included the following information SBAR and Kardex.

## 2018-01-13 NOTE — DISCHARGE SUMMARY
Discharge Summary    Patient: Deshawn Roach MRN: [id number]  CSN: [id number]    YOB: 1950  Age: 75 y.o. Sex: female    DOA: 1/10/2018 LOS:  LOS: 3 days   Discharge Date:      Primary Care Provider:  Savannah Farrell MD    Admission Diagnoses: GI bleed  Symptomatic anemia  GI BLEED  GI BLEED    Discharge Diagnoses:    Problem List as of 1/13/2018  Date Reviewed: 12/15/2017          Codes Class Noted - Resolved    Symptomatic anemia ICD-10-CM: D64.9  ICD-9-CM: 285.9  1/10/2018 - Present        Back pain ICD-10-CM: M54.9  ICD-9-CM: 724.5  12/17/2017 - Present        Constipation ICD-10-CM: K59.00  ICD-9-CM: 564.00  12/17/2017 - Present        Cirrhosis ([facility] ICD-10-CM: K74.60  ICD-9-CM: 571.5  12/17/2017 - Present        Anemia in chronic kidney disease ICD-10-CM: N18.9, D63.1  ICD-9-CM: 285.21  12/16/2017 - Present        GI bleed ICD-10-CM: K92.2  ICD-9-CM: 578.9  12/15/2017 - Present        ESRD (end stage renal disease) ([facility] ICD-10-CM: N18.6  ICD-9-CM: 585.6  12/15/2017 - Present        Diabetes mellitus type 2, controlled ([facility] ICD-10-CM: E11.9  ICD-9-CM: 250.00  12/15/2017 - Present        HTN (hypertension) ICD-10-CM: I10  ICD-9-CM: 401.9  12/15/2017 - Present              Discharge Medications:     Current Discharge Medication List      START taking these medications    Details   pantoprazole (PROTONIX) 40 mg tablet Take 1 Tab by mouth daily. Qty: 30 Tab, Refills: 2         CONTINUE these medications which have NOT CHANGED    Details   cholecalciferol (VITAMIN D3) 1,000 unit tablet Take 2,000 Units by mouth daily. ferrous sulfate 325 mg (65 mg iron) tablet Take 325 mg by mouth three (3) times daily (with meals). hydrALAZINE (APRESOLINE) 25 mg tablet Take 25 mg by mouth three (3) times daily. lactulose (CHRONULAC) 10 gram/15 mL solution Take 20 g by mouth daily as needed. metoprolol succinate (TOPROL XL) 50 mg XL tablet Take 25 mg by mouth daily.       sevelamer (RENAGEL) 800 mg tablet Take 1,600 mg by mouth three (3) times daily (with meals). b complex-vitamin c-folic acid (NEPHROCAPS) 1 mg capsule Take 1 Cap by mouth daily. acetaminophen (TYLENOL) 325 mg tablet Take 325 mg by mouth every six (6) hours as needed for Pain. AFLIBERCEPT INTRAVITREAL 1 Ampule by IntraVITreal route every twenty-eight (28) days. carboxymethylcellulose sodium (REFRESH PLUS) 0.5 % dpet Administer 1 Drop to both eyes as needed. docusate sodium (COLACE) 100 mg capsule Take 100 mg by mouth two (2) times a day.      hydrOXYzine HCl (ATARAX) 10 mg tablet Take 10 mg by mouth nightly as needed for Itching. albuterol (PROVENTIL HFA, VENTOLIN HFA, PROAIR HFA) 90 mcg/actuation inhaler Take 2 Puffs by inhalation every four (4) hours as needed for Wheezing. STOP taking these medications       amoxicillin (AMOXIL) 500 mg capsule Comments:   Reason for Stopping:         cyclobenzaprine (FLEXERIL) 10 mg tablet Comments:   Reason for Stopping:         polyethylene glycol (MIRALAX) 17 gram packet Comments:   Reason for Stopping:         omeprazole (PRILOSEC) 20 mg capsule Comments:   Reason for Stopping:         fluticasone (FLONASE) 50 mcg/actuation nasal spray Comments:   Reason for Stopping:         simethicone (MYLICON) 80 mg chewable tablet Comments:   Reason for Stopping:               Discharge Condition: Good    Procedures : EGD, colonoscopy    Consults: Gastroenterology and Nephrology      PHYSICAL EXAM   Visit Vitals    /61 (BP 1 Location: Left arm, BP Patient Position: At rest;Supine; Head of bed elevated (Comment degrees))    Pulse 66    Temp 98.6 °F (37 °C)    Resp 14    Ht 5' 3\" (1.6 m)    Wt 71.6 kg (157 lb 13.6 oz)    SpO2 99%    Breastfeeding No    BMI 27.96 kg/m2     General: Awake, cooperative, no acute distress    HEENT: NC, Atraumatic. PERRLA, EOMI. Anicteric sclerae. Lungs:  CTA Bilaterally. No Wheezing/Rhonchi/Rales.   Heart:  Regular  rhythm,  No murmur, No Rubs, No Gallops  Abdomen: Soft, Non distended, Non tender. +Bowel sounds,   Extremities: No c/c/e  Psych:   Not anxious or agitated. Neurologic:  No acute neurological deficits. Admission HPI : Megan Tidwell is a 75 y.o. female who has past history of cirrhosis, ESRD, HTN, DM recurrent anemia is sent to ER from dialysis center secondary to anemia. Patient had blood work done that showed Hb <7. She reports her symptoms are fatigue, palpitations and occasional dizziness. She denies any chest pain, SOB on exertion or other symptoms. She was admitted last month for similar problem. Patient is followed at [place] and she was scheduled to get EGD. She had colonoscopy about a year ago. She also has cirrhosis. In ER her H/H 6.0/20. 1. Hospital Course :   She was admitted to telemetry, she was seen and followed by GI and nephrology. Anemia - secondary to GI bleed and ESRD. She received blood transfusion with improvement in her H/H.     GI bleed - she was admitted and started on clear liquid diet and protonix. She underwent EGD with findings of medium sized AVM in the fourth portion of duodenum which was cauterized. Pyloric spasm and possible stenosis that was dilated. GI recommended continuing on PPI. She also underwent colonoscopy with findings of8 mm sessile polyp in the distal rectum hot snared. Medium to large internal hemorrhoids. Tortuous loopy sigmoid with mild diverticulosis. Many benign hyperplastic nodules in the distal sigmoid not touched. 2 small 5 mm sessile polyps hot snared. 3 sessile polyps 5 to 10 mm in the transverse colon, all were hot snared. Few non threatening and non bleeding AVM in the ascending colon not caustherized Normal Mucosa. Recommended colonoscopy no later than 3 years. ESRD - received HD per nephrology. She need to follow up with her PCP and GI.     Activity: Activity as tolerated    Diet: Diabetic Diet    Follow-up: PCP, GI    Disposition: home    Minutes spent on discharge: 45       Labs: Results:       Chemistry Recent Labs      01/13/18 0344 01/12/18 0318 01/11/18   0427   GLU  314*  99  134*   NA  137  138  144   K  4.3  4.4  3.7   CL  98*  99*  103   CO2  30  33*  34*   BUN  34*  28*  22*   CREA  6.85*  5.11*  3.92*   CA  8.5  8.6  8.7   AGAP  9  6  7   BUCR  5*  5*  6*      CBC w/Diff Recent Labs      01/13/18 0344 01/12/18 0318 01/11/18 0427   WBC  6.8  6.0  5.7   RBC  2.70*  2.88*  2.65*   HGB  8.0*  8.5*  7.9*   HCT  27.0*  28.9*  25.9*   PLT  153  154  136      Cardiac Enzymes No results for input(s): CPK, CKND1, NICOLE in the last 72 hours. No lab exists for component: CKRMB, TROIP   Coagulation No results for input(s): PTP, INR, APTT in the last 72 hours. No lab exists for component: INREXT    Lipid Panel No results found for: CHOL, CHOLPOCT, CHOLX, CHLST, CHOLV, 587110, HDL, LDL, LDLC, DLDLP, 289856, VLDLC, VLDL, TGLX, TRIGL, TRIGP, TGLPOCT, CHHD, CHHDX   BNP No results for input(s): BNPP in the last 72 hours. Liver Enzymes No results for input(s): TP, ALB, TBIL, AP, SGOT, GPT in the last 72 hours. No lab exists for component: DBIL   Thyroid Studies No results found for: T4, T3U, TSH, TSHEXT         Significant Diagnostic Studies: Ct Abd Pelv Wo Cont    Result Date: 12/16/2017  EXAM: CT of the abdomen and pelvis INDICATION: Abdominal distention and anemia. Dialysis patient. COMPARISON: None. TECHNIQUE: Helical volumetric scanning through the abdomen and pelvis was performed without oral or intravenous contrast. Axial, sagittal and coronal reconstructions were generated. One or more dose reduction techniques were used on this CT: automated exposure control, adjustment of the mAs and/or kVp according to patient's size, and iterative reconstruction techniques.  The specific techniques utilized on this CT exam have been documented in the patient's electronic medical record. _______________ FINDINGS: LOWER CHEST: Several small bilateral noncalcified pulmonary nodules are present, largest measures 4 mm in the right middle lobe. Cardiomegaly. Dense mitral annulus calcification is present. LIVER, BILIARY: A couple punctate calcifications are present in the central liver, probably calcified granulomata. No focal liver lesion appreciated. No biliary dilation. Gallbladder lumen is slightly more dense than expected, suggesting sludge or cholestasis. PANCREAS: Normal. SPLEEN: Normal. ADRENALS: Slight fullness right adrenal apex with a density reading of 3 Hounsfield units, very likely benign adenoma. KIDNEYS: Atrophic. Scattered renal artery vascular calcifications and possible tiny nonobstructing bilateral renal calculi. LYMPH NODES: No enlarged lymph nodes. GASTROINTESTINAL TRACT: Mild to moderate volume ascites is present in the abdomen and pelvis, water attenuation. No bowel dilatation or wall thickening. Normal appendix. PELVIC ORGANS: Prominent calcifications are present along the uterine arteries. No pelvic masses. VASCULATURE: Calcific atherosclerosis, no AAA. BONES: No acute or aggressive osseous abnormalities identified. OTHER: No retroperitoneal, rectus sheath or other hematoma. Surgical clips are present adjacent to the distal right colon. _______________     IMPRESSION: 1. No hematoma. No signs of obstruction. 2. Mild to moderate volume ascites in the abdomen and pelvis. 3. Scattered bibasilar 4 mm and smaller noncalcified pulmonary nodules. If the patient is low risk, follow-up is not necessary. If the patient is high risk, complete CT of the chest is suggested. Us Abd Comp    Result Date: 12/16/2017  EXAM: Ultrasound abdomen complete INDICATION: Iron deficiency anemia. Ascites on CT. Normal LFTs. Rule out liver disease or portal hypertension. COMPARISON: CT 12/16/2017 _______________ FINDINGS: A complete ultrasound of the abdomen was performed including Doppler.  Proximal abdominal aorta is mildly ectatic, transverse measurement of 3.4 cm. Mid and distal aorta are normal in caliber. Common iliac artery origins are normal in caliber. The IVC is patent. The head and body are normal, the pancreatic tail was incompletely seen due to shadowing from overlying bowel gas. Liver is prominent with a sagittal span of 20.4 cm. Hepatic echotexture is mildly increased throughout. Liver capsule is slightly undulating. No focal liver lesion. Portal venous flow is hepatopedal. Portal vein caliber is normal measuring 11 mm. Spleen is mildly prominent with a sagittal span of 14.4 cm and the volume measured at 503 cc. Low volume ascites is present in the right upper quadrant and adjacent to the liver, minimal ascites is noted adjacent to the spleen. Gallbladder is normal without calculi, or wall thickening. No biliary dilatation. The common duct is top normal for patient age measuring 7.7 mm. Kidneys are small with thin, echogenic parenchymal parenchymal mantles. No hydronephrosis. A couple bilateral tiny cysts are present, largest on the right measures 5 mm at the upper pole, largest on the left measures 8 mm at the upper pole. A couple left renal echogenic foci are present, vascular versus calculi, largest measures 4 mm. IMPRESSION: 1. Hepatomegaly, with increased hepatic echotexture and slight undulation of the capsule. Differential considerations include hepatic steatosis, and cirrhosis. 2. Low volume ascites. 3. Mild splenomegaly. 4. Patent portal vein with appropriate flow direction. 5. Small, thin, echogenic kidneys in keeping with medical renal disease. Tiny bilateral renal cysts. Left renal nonobstructing calculi versus vascular calcifications.      Xr Chest Port    Result Date: 1/12/2018  EXAM: AP portable upright chest INDICATION: Shortness of breath COMPARISON: January 10, 2018 _______________ FINDINGS: There is a right internal jugular dialysis catheter with tip at the junction of the right atrium and superior vena cava. The heart size is enlarged. The mediastinal contour is normal. The lungs are clear. There are no effusions or pneumothorax. _______________     IMPRESSION: No acute process      Xr Chest Port    Result Date: 1/10/2018  EXAM: One-view chest CLINICAL HISTORY: Chest pain , COMPARISON: 12/15/2017 FINDINGS: Frontal view of the chest demonstrate clear lungs. Cardiac silhouette is is enlarged, stable in size and contour. No acute bony or soft tissue abnormality. Right approach dialysis type catheter tip in the lower SVC. IMPRESSION: No significant change or acute pulmonary process identified. Stable enlarged cardiac silhouette. Xr Chest Port    Result Date: 12/15/2017  Chest, single view Indication: Chest pain and weakness Comparison: None Findings:  Portable upright AP view of the chest was obtained. There is a right IJ approach dialysis catheter with tip projecting over the superior cavoatrial junction. The lungs are clear. No focal pneumonic opacity, pneumothorax, or pleural effusion. Cardiac size is enlarged. Normal mediastinal contours. No acute osseous abnormality. Impression: 1. Right IJ approach dialysis catheter in position as above. 2. Cardiomegaly. No superimposed acute radiographic abnormality. No results found for this or any previous visit.         CC: Savannah Farrell MD

## 2018-01-13 NOTE — ROUTINE PROCESS
Bedside and Verbal shift change report given to BRIDGER Drake RN  (oncoming nurse) by ROSA Darling RN  (offgoing nurse). Report included the following information SBAR, Kardex, Intake/Output, MAR and Recent Results.

## 2018-01-13 NOTE — PROGRESS NOTES
Problem: Falls - Risk of  Goal: *Absence of Falls  Document Iza Fall Risk and appropriate interventions in the flowsheet.    Fall Risk Interventions:  Mobility Interventions: Bed/chair exit alarm         Medication Interventions: Bed/chair exit alarm

## 2018-01-14 LAB — AFP-TM SERPL-MCNC: 3.5 NG/ML (ref 0–8.3)

## 2018-03-09 ENCOUNTER — HOSPITAL ENCOUNTER (INPATIENT)
Age: 68
LOS: 3 days | Discharge: HOME | DRG: 377 | End: 2018-03-12
Attending: EMERGENCY MEDICINE | Admitting: HOSPITALIST
Payer: MEDICARE

## 2018-03-09 ENCOUNTER — APPOINTMENT (OUTPATIENT)
Dept: NUCLEAR MEDICINE | Age: 68
DRG: 377 | End: 2018-03-09
Attending: EMERGENCY MEDICINE
Payer: MEDICARE

## 2018-03-09 DIAGNOSIS — N18.6 ESRD (END STAGE RENAL DISEASE) ON DIALYSIS (HCC): ICD-10-CM

## 2018-03-09 DIAGNOSIS — Z99.2 ESRD (END STAGE RENAL DISEASE) ON DIALYSIS (HCC): ICD-10-CM

## 2018-03-09 DIAGNOSIS — K92.1 MELENA: Primary | ICD-10-CM

## 2018-03-09 DIAGNOSIS — K92.1 GASTROINTESTINAL HEMORRHAGE WITH MELENA: ICD-10-CM

## 2018-03-09 PROBLEM — D64.9 SYMPTOMATIC ANEMIA: Status: RESOLVED | Noted: 2018-01-10 | Resolved: 2018-03-09

## 2018-03-09 PROBLEM — D62 ACUTE BLOOD LOSS ANEMIA: Status: ACTIVE | Noted: 2018-03-09

## 2018-03-09 PROBLEM — K59.00 CONSTIPATION: Status: RESOLVED | Noted: 2017-12-17 | Resolved: 2018-03-09

## 2018-03-09 PROBLEM — M54.9 BACK PAIN: Status: RESOLVED | Noted: 2017-12-17 | Resolved: 2018-03-09

## 2018-03-09 LAB
ALBUMIN SERPL-MCNC: 3.7 G/DL (ref 3.4–5)
ALBUMIN/GLOB SERPL: 1.1 {RATIO} (ref 0.8–1.7)
ALP SERPL-CCNC: 152 U/L (ref 45–117)
ALT SERPL-CCNC: 18 U/L (ref 13–56)
ANION GAP SERPL CALC-SCNC: 13 MMOL/L (ref 3–18)
APTT PPP: 20.7 SEC (ref 23–36.4)
AST SERPL-CCNC: 29 U/L (ref 15–37)
BASOPHILS # BLD: 0.1 K/UL (ref 0–0.06)
BASOPHILS NFR BLD: 1 % (ref 0–2)
BILIRUB SERPL-MCNC: 0.5 MG/DL (ref 0.2–1)
BUN SERPL-MCNC: 58 MG/DL (ref 7–18)
BUN/CREAT SERPL: 9 (ref 12–20)
CALCIUM SERPL-MCNC: 9.6 MG/DL (ref 8.5–10.1)
CHLORIDE SERPL-SCNC: 102 MMOL/L (ref 100–108)
CO2 SERPL-SCNC: 28 MMOL/L (ref 21–32)
CREAT SERPL-MCNC: 6.14 MG/DL (ref 0.6–1.3)
DIFFERENTIAL METHOD BLD: ABNORMAL
EOSINOPHIL # BLD: 0.3 K/UL (ref 0–0.4)
EOSINOPHIL NFR BLD: 4 % (ref 0–5)
ERYTHROCYTE [DISTWIDTH] IN BLOOD BY AUTOMATED COUNT: 17.7 % (ref 11.6–14.5)
GLOBULIN SER CALC-MCNC: 3.4 G/DL (ref 2–4)
GLUCOSE SERPL-MCNC: 157 MG/DL (ref 74–99)
HCT VFR BLD AUTO: 24.8 % (ref 35–45)
HGB BLD-MCNC: 7.3 G/DL (ref 12–16)
INR PPP: 1 (ref 0.8–1.2)
LYMPHOCYTES # BLD: 1 K/UL (ref 0.9–3.6)
LYMPHOCYTES NFR BLD: 14 % (ref 21–52)
MCH RBC QN AUTO: 27.5 PG (ref 24–34)
MCHC RBC AUTO-ENTMCNC: 29.4 G/DL (ref 31–37)
MCV RBC AUTO: 93.6 FL (ref 74–97)
MONOCYTES # BLD: 0.5 K/UL (ref 0.05–1.2)
MONOCYTES NFR BLD: 8 % (ref 3–10)
NEUTS SEG # BLD: 4.9 K/UL (ref 1.8–8)
NEUTS SEG NFR BLD: 73 % (ref 40–73)
PLATELET # BLD AUTO: 108 K/UL (ref 135–420)
PMV BLD AUTO: 10.8 FL (ref 9.2–11.8)
POTASSIUM SERPL-SCNC: 5.4 MMOL/L (ref 3.5–5.5)
PROT SERPL-MCNC: 7.1 G/DL (ref 6.4–8.2)
PROTHROMBIN TIME: 12.6 SEC (ref 11.5–15.2)
RBC # BLD AUTO: 2.65 M/UL (ref 4.2–5.3)
RBC MORPH BLD: ABNORMAL
SODIUM SERPL-SCNC: 143 MMOL/L (ref 136–145)
WBC # BLD AUTO: 6.8 K/UL (ref 4.6–13.2)

## 2018-03-09 PROCEDURE — A9560 TC99M LABELED RBC: HCPCS

## 2018-03-09 PROCEDURE — C9113 INJ PANTOPRAZOLE SODIUM, VIA: HCPCS | Performed by: HOSPITALIST

## 2018-03-09 PROCEDURE — 85025 COMPLETE CBC W/AUTO DIFF WBC: CPT | Performed by: EMERGENCY MEDICINE

## 2018-03-09 PROCEDURE — 36430 TRANSFUSION BLD/BLD COMPNT: CPT

## 2018-03-09 PROCEDURE — 30233N1 TRANSFUSION OF NONAUTOLOGOUS RED BLOOD CELLS INTO PERIPHERAL VEIN, PERCUTANEOUS APPROACH: ICD-10-PCS | Performed by: HOSPITALIST

## 2018-03-09 PROCEDURE — 86677 HELICOBACTER PYLORI ANTIBODY: CPT | Performed by: HOSPITALIST

## 2018-03-09 PROCEDURE — 94762 N-INVAS EAR/PLS OXIMTRY CONT: CPT

## 2018-03-09 PROCEDURE — 85610 PROTHROMBIN TIME: CPT | Performed by: EMERGENCY MEDICINE

## 2018-03-09 PROCEDURE — 86900 BLOOD TYPING SEROLOGIC ABO: CPT | Performed by: EMERGENCY MEDICINE

## 2018-03-09 PROCEDURE — 99285 EMERGENCY DEPT VISIT HI MDM: CPT

## 2018-03-09 PROCEDURE — 65660000000 HC RM CCU STEPDOWN

## 2018-03-09 PROCEDURE — 86920 COMPATIBILITY TEST SPIN: CPT | Performed by: EMERGENCY MEDICINE

## 2018-03-09 PROCEDURE — 80053 COMPREHEN METABOLIC PANEL: CPT | Performed by: EMERGENCY MEDICINE

## 2018-03-09 PROCEDURE — P9016 RBC LEUKOCYTES REDUCED: HCPCS | Performed by: EMERGENCY MEDICINE

## 2018-03-09 PROCEDURE — 74011250636 HC RX REV CODE- 250/636: Performed by: HOSPITALIST

## 2018-03-09 PROCEDURE — C9113 INJ PANTOPRAZOLE SODIUM, VIA: HCPCS | Performed by: EMERGENCY MEDICINE

## 2018-03-09 PROCEDURE — 74011250636 HC RX REV CODE- 250/636: Performed by: EMERGENCY MEDICINE

## 2018-03-09 PROCEDURE — 85730 THROMBOPLASTIN TIME PARTIAL: CPT | Performed by: EMERGENCY MEDICINE

## 2018-03-09 PROCEDURE — 74011250637 HC RX REV CODE- 250/637: Performed by: HOSPITALIST

## 2018-03-09 RX ORDER — SODIUM CHLORIDE 9 MG/ML
25 INJECTION, SOLUTION INTRAVENOUS
Status: DISCONTINUED | OUTPATIENT
Start: 2018-03-09 | End: 2018-03-13 | Stop reason: HOSPADM

## 2018-03-09 RX ORDER — MELATONIN
2000 DAILY
Status: DISCONTINUED | OUTPATIENT
Start: 2018-03-10 | End: 2018-03-13 | Stop reason: HOSPADM

## 2018-03-09 RX ORDER — FLUCONAZOLE 100 MG/1
100 TABLET ORAL DAILY
Status: DISCONTINUED | OUTPATIENT
Start: 2018-03-09 | End: 2018-03-11

## 2018-03-09 RX ORDER — ONDANSETRON 2 MG/ML
4 INJECTION INTRAMUSCULAR; INTRAVENOUS
Status: DISCONTINUED | OUTPATIENT
Start: 2018-03-09 | End: 2018-03-13 | Stop reason: HOSPADM

## 2018-03-09 RX ORDER — METOPROLOL SUCCINATE 25 MG/1
25 TABLET, EXTENDED RELEASE ORAL DAILY
Status: DISCONTINUED | OUTPATIENT
Start: 2018-03-10 | End: 2018-03-13 | Stop reason: HOSPADM

## 2018-03-09 RX ORDER — SODIUM CHLORIDE 9 MG/ML
250 INJECTION, SOLUTION INTRAVENOUS AS NEEDED
Status: DISCONTINUED | OUTPATIENT
Start: 2018-03-09 | End: 2018-03-13 | Stop reason: HOSPADM

## 2018-03-09 RX ORDER — HYDROXYZINE 25 MG/1
25 TABLET, FILM COATED ORAL
Status: DISCONTINUED | OUTPATIENT
Start: 2018-03-09 | End: 2018-03-13 | Stop reason: HOSPADM

## 2018-03-09 RX ORDER — PANTOPRAZOLE SODIUM 40 MG/10ML
40 INJECTION, POWDER, LYOPHILIZED, FOR SOLUTION INTRAVENOUS
Status: DISCONTINUED | OUTPATIENT
Start: 2018-03-09 | End: 2018-03-09

## 2018-03-09 RX ORDER — HYDROCORTISONE 25 MG/G
CREAM TOPICAL 4 TIMES DAILY
Status: DISCONTINUED | OUTPATIENT
Start: 2018-03-09 | End: 2018-03-13 | Stop reason: HOSPADM

## 2018-03-09 RX ORDER — HYDROXYZINE HYDROCHLORIDE 10 MG/1
10 TABLET, FILM COATED ORAL
Status: DISCONTINUED | OUTPATIENT
Start: 2018-03-09 | End: 2018-03-13 | Stop reason: HOSPADM

## 2018-03-09 RX ORDER — METOCLOPRAMIDE HYDROCHLORIDE 5 MG/ML
5 INJECTION INTRAMUSCULAR; INTRAVENOUS ONCE
Status: COMPLETED | OUTPATIENT
Start: 2018-03-09 | End: 2018-03-09

## 2018-03-09 RX ORDER — NYSTATIN 100000 [USP'U]/G
POWDER TOPICAL 2 TIMES DAILY
Status: DISCONTINUED | OUTPATIENT
Start: 2018-03-09 | End: 2018-03-13 | Stop reason: HOSPADM

## 2018-03-09 RX ORDER — SEVELAMER HYDROCHLORIDE 800 MG/1
1600 TABLET, FILM COATED ORAL
Status: DISCONTINUED | OUTPATIENT
Start: 2018-03-09 | End: 2018-03-09 | Stop reason: RX

## 2018-03-09 RX ORDER — HYDRALAZINE HYDROCHLORIDE 25 MG/1
25 TABLET, FILM COATED ORAL 3 TIMES DAILY
Status: DISCONTINUED | OUTPATIENT
Start: 2018-03-09 | End: 2018-03-13 | Stop reason: HOSPADM

## 2018-03-09 RX ORDER — ACETAMINOPHEN 325 MG/1
650 TABLET ORAL
Status: DISCONTINUED | OUTPATIENT
Start: 2018-03-09 | End: 2018-03-13 | Stop reason: HOSPADM

## 2018-03-09 RX ORDER — PANTOPRAZOLE SODIUM 40 MG/1
40 TABLET, DELAYED RELEASE ORAL DAILY
Status: DISCONTINUED | OUTPATIENT
Start: 2018-03-10 | End: 2018-03-09

## 2018-03-09 RX ORDER — SEVELAMER CARBONATE 800 MG/1
1600 TABLET, FILM COATED ORAL
Status: DISCONTINUED | OUTPATIENT
Start: 2018-03-09 | End: 2018-03-13 | Stop reason: HOSPADM

## 2018-03-09 RX ADMIN — PANTOPRAZOLE SODIUM 40 MG: 40 INJECTION, POWDER, FOR SOLUTION INTRAVENOUS at 11:32

## 2018-03-09 RX ADMIN — SEVELAMER CARBONATE 1600 MG: 800 TABLET, FILM COATED ORAL at 19:00

## 2018-03-09 RX ADMIN — METOCLOPRAMIDE 5 MG: 5 INJECTION, SOLUTION INTRAMUSCULAR; INTRAVENOUS at 14:44

## 2018-03-09 RX ADMIN — FLUCONAZOLE 100 MG: 100 TABLET ORAL at 14:37

## 2018-03-09 RX ADMIN — SODIUM CHLORIDE 40 MG: 9 INJECTION INTRAMUSCULAR; INTRAVENOUS; SUBCUTANEOUS at 14:37

## 2018-03-09 RX ADMIN — SODIUM CHLORIDE 40 MG: 9 INJECTION INTRAMUSCULAR; INTRAVENOUS; SUBCUTANEOUS at 22:07

## 2018-03-09 RX ADMIN — HYDRALAZINE HYDROCHLORIDE 25 MG: 25 TABLET, FILM COATED ORAL at 19:00

## 2018-03-09 RX ADMIN — HYDROXYZINE HYDROCHLORIDE 25 MG: 25 TABLET, FILM COATED ORAL at 22:06

## 2018-03-09 RX ADMIN — HYDRALAZINE HYDROCHLORIDE 25 MG: 25 TABLET, FILM COATED ORAL at 22:06

## 2018-03-09 NOTE — H&P
[facility] [place]  HISTORY AND PHYSICAL      Mariam Dubose  MR#: [id number]  : 1950  ACCOUNT #: [de-identified]   ADMIT DATE: 2018    DIAGNOSES ON ADMISSION:  1. Gastrointestinal bleeding. 2.  Acute blood loss anemia. 3.  End-stage renal disease, on dialysis. 4.  History of gastrointestinal bleeding. 5.  Hypertension. 6.  Calciphylaxis. HISTORY OF PRESENT ILLNESS:  This is a 59-year-old  female on dialysis Monday, Wednesday and Friday. She had abdominal pain this morning, noticed blood in the stool and in the toilet, and went to dialysis, where she was told that she should come to the emergency room. She has hemoglobin of 7.4. She has had GI bleeding in the past and was admitted in January, where she had an AVM in her duodenum that was cauterized and multiple colonic polyps that were removed. MEDICATIONS:  At home include Protonix, vitamin D, ferrous sulfate, hydralazine, lactulose, Toprol, Renagel, Nephrocaps, Tylenol Refresh eyedrops, Colace, Atarax and Proventil. PAST MEDICAL HISTORY:  Includes chronic kidney disease, on dialysis, cirrhosis, diabetes, not currently on treatment, asthma, arthritis, GERD and hypertension. PAST SURGICAL HISTORY:  GYN surgery. FAMILY HISTORY:  Hypertension and diabetes. SOCIAL HISTORY:  Nonsmoker, does not drink alcohol at this point. Lives independently. ALLERGIES:  ASPIRIN, HEPARIN, METFORMIN AND NORVASC. REVIEW OF SYSTEMS:  GENERAL:  No fevers or chills at home. CARDIOVASCULAR:  No chest pain, palpitations or leg swelling. GASTROINTESTINAL:  She had abdominal pain and \"stomach gurgling\" this morning, with rectal bleeding, blood in the stool, noted blood in the toilet water. She had been constipated prior to that, and states she was passing balls of stool. She thought it may be her hemorrhoids only that were bleeding. No nausea or vomiting.   NEUROLOGIC:  No syncopal symptoms, headaches, dizziness or lightheadedness. HEMATOLOGIC:  No other bleeding or bruisability recently. GENITOURINARY:  No hematuria, but she does complain of vaginal itching, and thinks she has a yeast infection. SKIN:  She has multiple calciphylaxis lesions on her lower extremities, which have been there for about 2 or 3 years. They started to itch recently, and she has been taking Atarax for those. PHYSICAL EXAMINATION:  GENERAL:  This is an elderly-appearing, debilitated  female in no acute distress. VITAL SIGNS:  Her blood pressure is 119/92, pulse 98, temp 97.7, respiratory rate 18, SaO2 100% on room air. HEENT:  Sclerae are anicteric. Conjunctivae pink. Oropharynx is dry, no lesions. NECK:  Supple. No thyromegaly or lymphadenopathy. LUNGS:  Clear bilaterally. No rales, rhonchi or wheezes. HEART:  Regular rate and rhythm. No murmur, rub or gallop. ABDOMEN:  Soft, nontender, no distention. She has normal bowel sounds. EXTREMITIES:  Lower extremities, multiple calciphylactic lesions on the legs. These are black in appearance. They are hard and excoriated. There is no surrounding erythema, discharge or fluctuance. They are raised. LABORATORY DATA:  Show hemoglobin and hematocrit of 7.3 and 24.8, her platelets are 550. Her BUN and creatinine 58 and 6.14. Her LFTs were unremarkable. She had no other testing done in the emergency room. ASSESSMENT:  1. 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 She also has hemorrhoids, which could have bled. 2.  Acute blood loss anemia. 3.  End-stage renal disease. 4.  Hypertension. 5.  History of diabetes. PLAN:  Admission to the telemetry unit. We are going to resume her vitamin D, Procrit with dialysis. We are going to treat her vaginitis with Diflucan for 3 days.   We will continue her hydralazine for blood pressure, give her 1 dose of Reglan IV until she can eat, and then take maybe Maalox for gaseous distention or pain. We will resume her Toprol-XL. We will give her Protonix IV q.12 h. Continue her binders with Renvela with meals, and Nephro-Danna 1 tablet daily. We have a CBC ordered for tomorrow morning, a BMP for tomorrow. If she has further bleeding, we will do a repeat hemoglobin and hematocrit. We are trying to get an acute GI bleeding scan this afternoon, as recommended by Gastroenterology. They have been consulted from the ER. We will put her on SCDs for DVT prophylaxis and transfuse 1 unit of packed red blood cells this afternoon. Otherwise, we expect her to be in the hospital about 3 days. We will consult PT for tomorrow. After she has had her bleeding scan, she can be on a renal diet.       MD FRANCESCA Gramajo/AUGUSTIN  D: 03/09/2018 14:29     T: 03/09/2018 14:52  JOB #: [id number]

## 2018-03-09 NOTE — IP AVS SNAPSHOT
Summary of Care Report The Summary of Care report has been created to help improve care coordination. Users with access to [employer] or [address] (Web-based application) may access additional patient information including the Discharge Summary. If you are not currently a [address] user and need more information, please call the number listed below in the [place] [address] section and ask to be connected with Medical Records. Facility Information Name Address Phone [address] [address] [phone] Patient Information Patient Name Sex NIELS Castillo ([id number]) Female 1950 Discharge Information Admitting Provider Service Area Unit Bell Garcia MD / [phone] [address]/Med / [phone] Discharge Provider Discharge Date/Time Discharge Disposition Destination (none) 3/12/2018 (Pending) AHR (none) Patient Language Language ENGLISH [13] Hospital Problems as of 3/12/2018  Reviewed: 12/15/2017  5:49 PM by Hollis Sales DO Class Noted - Resolved Last Modified POA Active Problems * (Principal)GI bleed  12/15/2017 - Present 3/9/2018 by Bell Garcia MD Yes Entered by SUSAN Doshi  
  ESRD (end stage renal disease) ([facility] [address]  12/15/2017 - Present 3/9/2018 by Bell Garcia MD Yes Entered by Hollis Sales DO Diabetes mellitus type 2, controlled ([facility] [address]  12/15/2017 - Present 3/9/2018 by Bell Garcia MD Yes Entered by Hollis Sales DO  
  HTN (hypertension)  12/15/2017 - Present 3/9/2018 by Bell Garcia MD Yes Entered by Hollis Sales DO Anemia in chronic kidney disease  2017 - Present 3/9/2018 by Bell Garcia MD Yes Entered by Lynette Dakin, MD  
  Cirrhosis ([facility] [address]  2017 - Present 3/9/2018 by Bell Garcia MD Yes Entered by Milena Miller MD  
  Acute blood loss anemia  3/9/2018 - Present 3/9/2018 by Tate Cisneros MD Yes Entered by Tate Cisneros MD  
  
Non-Hospital Problems as of 3/12/2018  Reviewed: 12/15/2017  5:49 PM by John Bowling, DO None You are allergic to the following Allergen Reactions Aspirin Other (comments) GI bleed Heparin (Porcine) Other (comments) GI bleed Metformin Other (comments)  
 swelling Norvasc (Amlodipine) Rash Current Discharge Medication List  
  
START taking these medications Dose & Instructions Dispensing Information Comments  
 nystatin powder Commonly known as:  MYCOSTATIN Apply  to affected area two (2) times a day. Quantity:  1 Bottle Refills:  0 CONTINUE these medications which have NOT CHANGED Dose & Instructions Dispensing Information Comments AFLIBERCEPT INTRAVITREAL Dose:  1 Ampule 1 Ampule by IntraVITreal route every twenty-eight (28) days. Refills:  0  
   
 albuterol 90 mcg/actuation inhaler Commonly known as:  PROVENTIL HFA, VENTOLIN HFA, PROAIR HFA Dose:  2 Puff Take 2 Puffs by inhalation every four (4) hours as needed for Wheezing. Refills:  0  
   
 carboxymethylcellulose sodium 0.5 % Dpet Commonly known as:  REFRESH PLUS Dose:  1 Drop Administer 1 Drop to both eyes as needed. Refills:  0  
   
 ferrous sulfate 325 mg (65 mg iron) tablet Dose:  325 mg Take 325 mg by mouth three (3) times daily (with meals). Refills:  0  
   
 hydrALAZINE 25 mg tablet Commonly known as:  APRESOLINE Dose:  25 mg Take 25 mg by mouth three (3) times daily. Refills:  0  
   
 hydrOXYzine HCl 10 mg tablet Commonly known as:  ATARAX Dose:  10 mg Take 10 mg by mouth nightly as needed for Itching. Refills:  0  
   
 lactulose 10 gram/15 mL solution Commonly known as:  Hammad Mixer Dose:  20 g Take 20 g by mouth daily as needed. Refills:  0 NEPHROCAPS 1 mg capsule Generic drug:  b complex-vitamin c-folic acid Dose:  1 Cap Take 1 Cap by mouth daily. Refills:  0  
   
 pantoprazole 40 mg tablet Commonly known as:  PROTONIX Dose:  40 mg Take 1 Tab by mouth daily. Quantity:  30 Tab Refills:  2  
   
 sevelamer 800 mg tablet Commonly known as:  RENAGEL Dose:  1600 mg Take 1,600 mg by mouth three (3) times daily (with meals). Refills:  0  
   
 TOPROL XL 50 mg XL tablet Generic drug:  metoprolol succinate Dose:  25 mg Take 25 mg by mouth daily. Refills:  0  
   
 TYLENOL 325 mg tablet Generic drug:  acetaminophen Dose:  325 mg Take 325 mg by mouth every six (6) hours as needed for Pain. Refills:  0  
   
 VITAMIN D3 1,000 unit tablet Generic drug:  cholecalciferol Dose:  2000 Units Take 2,000 Units by mouth daily. Refills:  0 Surgery Information ID Date/Time Status Primary Surgeon All Procedures Location [id number] 3/12/2018 1400 Unposted Maricarmen Hong MD ESOPHAGOGASTRODUODENAL (EGD) BIOPSY [facility] ENDOSCOPY Follow-up Information Follow up With Details Comments Contact Info Patient has a dermatology appointment on 3/13 and a podiatry appointment on 3/14. [address] D[facility]  Patient will see physician while on dialysis. [phone] Discharge Instructions Anemia From Chronic Disease: Care Instructions Your Care Instructions Anemia is a low level of red blood cells. Red blood cells carry oxygen from your lungs to the rest of your body. Sometimes when you have a long-term (chronic) disease, such as kidney disease, arthritis, diabetes, cancer, or an infection, your body does not make enough red blood cells. Follow-up care is a key part of your treatment and safety. Be sure to make and go to all appointments, and call your doctor if you are having problems.  It's also a good idea to know your test results and keep a list of the medicines you take. How can you care for yourself at home? · Follow your doctor's instructions to treat the chronic condition that is causing the anemia. · Be safe with medicines. Take your medicine to treat your chronic condition exactly as prescribed. Call your doctor if you think you are having a problem with your medicine. · Take your medicine for anemia exactly as prescribed. Call your doctor if you think you are having a problem with your medicine. Medicines to increase the number of red blood cells (such as epoetin or darbepoetin) may be given as an injection. ¨ If you miss a dose, take it as soon as you can, unless it is almost time for your next dose. In that case, get back on your regular schedule and take only one dose. ¨ Do not freeze this medicine. Store it in the refrigerator. Do not shake the bottle before you prepare the shot. · Keep all your appointments for blood tests to check on your hemoglobin levels. When should you call for help? Call 911 anytime you think you may need emergency care. For example, call if: 
? · You passed out (lost consciousness). ?Call your doctor now or seek immediate medical care if: 
? · You are short of breath. ? · You are dizzy or light-headed, or you feel like you may faint. ? · You have new or worse bleeding. ? Watch closely for changes in your health, and be sure to contact your doctor if: 
? · You feel weaker or more tired than usual.  
? · You do not get better as expected. Where can you learn more? Go to http://rosi-awa.info/. Enter E502 in the search box to learn more about \"Anemia From Chronic Disease: Care Instructions. \" Current as of: October 13, 2016 Content Version: 11.4 © 9480-7631 [email or web address]. Care instructions adapted under license by [employer] (which disclaims liability or warranty for this information).  If you have questions about a medical condition or this instruction, always ask your healthcare professional. [address] any warranty or liability for your use of this information. Learning About Blood Transfusions What is a blood transfusion? Blood transfusion is a medical treatment to replace the blood or parts of blood that your body has lost. The blood goes through a tube from a bag to an intravenous (IV) catheter and into your vein. You may need a blood transfusion after losing blood from an injury, a major surgery, an illness that causes bleeding, or an illness that destroys blood cells. Transfusions are also used to give you the parts of blood-such as platelets, plasma, or substances that cause clotting-that your body needs to fight an illness or stop bleeding. How is a blood transfusion done? Before you receive a blood transfusion, your blood is tested to find out what your blood type is. Blood or blood parts that are a match with your blood type are ordered by your doctor. Blood is typed as A, B, AB, or O. It is also typed as Rh-positive or Rh-negative. Your blood is also screened to look for antibodies that might react with the blood that is given to you. The blood you are getting is checked and rechecked to make sure that it's the right type for you. A sample of your blood is mixed with a sample of the blood you will receive to check for problems. Before actually giving you the transfusion, a doctor and nurses will look at the label on the package of blood and compare it to your hospital ID bracelet and medical records. The transfusion begins only when all agree that this is the correct blood and that you are the correct person to receive it. To receive the transfusion, you will have an intravenous (IV) catheter inserted into a vein. A tube connects the catheter to the bag containing the blood, which is placed higher than your body.  The blood then flows slowly into your vein. A doctor or nurse will check you several times during the transfusion to watch for a reaction or other problems. What are the possible risks? Blood transfusions have many benefits and are often life-saving. But they also have a few risks. Possible risks include: 
· Your body's reaction to receiving new blood. This may include: ¨ Fever. ¨ Allergic reactions. ¨ Breathing problems. · An infection from the blood. This risk is small because of the strict rules placed on handling and storing blood. Getting a viral infection, such as HIV or hepatitis B or C, through blood transfusions has become very rare. The U.S. Food and Drug Administration (FDA) enforces strict guidelines on the collection, testing, storage, and use of blood. · Getting the wrong blood type by accident. Severe reactions, which can be life-threatening, are very rare. What can you expect after a blood transfusion? Here are some things you can do at home to help prevent infection at the transfusion site: 
· Wash the area daily with warm, soapy water, and pat it dry. Don't use hydrogen peroxide or alcohol, which can slow healing. You may cover the area with a gauze bandage if it weeps or rubs against clothing. Change the bandage every day. · Keep the area clean and dry. When should you call for help? Call 911 anytime you think you may need emergency care. For example, call if: 
· You have severe trouble breathing. Call your doctor now or seek immediate medical care if: 
· You have a fever. · You feel weaker or more tired than usual. 
· You have a yellow tint to your skin or the whites of your eyes. Watch closely for changes in your health, and be sure to contact your doctor if you have any problems. Follow-up care is a key part of your treatment and safety. Be sure to make and go to all appointments, and call your doctor if you are having problems.  It's also a good idea to know your test results and keep a list of the medicines you take. Where can you learn more? Go to http://rosi-awa.info/. Enter V588 in the search box to learn more about \"Learning About Blood Transfusions. \" Current as of: October 13, 2016 Content Version: 11.4 © 4881-7973 Finalta. Care instructions adapted under license by [employer] (which disclaims liability or warranty for this information). If you have questions about a medical condition or this instruction, always ask your healthcare professional. [address] any warranty or liability for your use of this information. Colonoscopy: Before Your Procedure What is a colonoscopy? A colonoscopy is a test that lets a doctor look inside your colon. The doctor uses a thin, lighted tube called a colonoscope to look for problems. These include small growths called polyps, cancer, or bleeding. During the test, the doctor can take samples of tissue that can be checked for cancer or other problems. This is called a biopsy. The doctor can also take out polyps. Before the test, you will need to stop eating solid foods. You also will drink a liquid or take a tablet that cleans out your colon. This helps your doctor be able to see inside your colon during the test. 
Follow-up care is a key part of your treatment and safety. Be sure to make and go to all appointments, and call your doctor if you are having problems. It's also a good idea to know your test results and keep a list of the medicines you take. What happens before the procedure? ?Preparing for the procedure ? · Understand exactly what procedure is planned, along with the risks, benefits, and other options. · Tell your doctors ALL the medicines, vitamins, supplements, and herbal remedies you take. Some of these can increase the risk of bleeding or interact with anesthesia. ?  · If you take blood thinners, such as warfarin (Coumadin), clopidogrel (Plavix), or aspirin, be sure to talk to your doctor. He or she will tell you if you should stop taking these medicines before your procedure. Make sure that you understand exactly what your doctor wants you to do.  
? · Your doctor will tell you which medicines to take or stop before your procedure. You may need to stop taking certain medicines a week or more before the procedure. So talk to your doctor as soon as you can.  
? · If you have an advance directive, let your doctor know. It may include a living will and a durable power of  for health care. Bring a copy to the hospital. If you don't have one, you may want to prepare one. It lets your doctor and loved ones know your health care wishes. Doctors advise that everyone prepare these papers before any type of surgery or procedure. ? Before the procedure ? · Follow your doctor's directions about when to stop eating solid foods and drink only clear liquids. You can drink water, clear juices, clear broths, flavored ice pops, and gelatin (such as Jell-O). Do not eat or drink anything red or purple. This includes grape juice and grape-flavored ice pops. It also includes fruit punch and cherry gelatin. ? · Drink the \"colon prep\" liquid as your doctor tells you. You will want to stay home, because the liquid will make you go to the bathroom a lot. Your stools will be loose and watery. It is very important to drink all of the liquid. If you have problems drinking it, call your doctor. Some doctors may have you take a tablet rather than drink a liquid. ? · Do not eat any solid foods after you drink the colon prep. ? · Stop drinking clear liquids 6 to 8 hours before the test.  
Procedures can be stressful. This information will help you understand what you can expect. And it will help you safely prepare for your procedure. What happens on the day of the procedure? · Follow the instructions exactly about when to stop eating and drinking. If you don't, your procedure may be canceled. If your doctor told you to take your medicines on the day of the procedure, take them with only a sip of water. ? · Take a bath or shower before you come in for your procedure. Do not apply lotions, perfumes, deodorants, or nail polish. ? · Take off all jewelry and piercings. And take out contact lenses, if you wear them. ? At the [address] [place] [address] or [facility] 
 · Bring a picture ID. ? · You will be kept comfortable and safe by your anesthesia provider. The anesthesia may make you sleep. ? · You will lie on your back or your side with your knees drawn up toward your belly. The doctor will gently put a gloved finger into your anus. Then the doctor puts the scope in and moves it into your colon. The scope goes in easily because it is lubricated. ? · The doctor may also use small tools to take tissue samples for a biopsy or to remove polyps. This does not hurt. ? · The test usually takes 30 to 45 minutes. But it may take longer. It depends on what is found and what is done. Going home · Be sure you have someone to drive you home. Anesthesia and pain medicine make it unsafe for you to drive. ? · You will be given more specific instructions about recovering from your procedure. When should you call your doctor? · You have questions or concerns. ? · You don't understand how to prepare for your procedure. ? · You are having trouble with the bowel prep. ? · You become ill before the procedure (such as fever, flu, or a cold). ? · You need to reschedule or have changed your mind about having the procedure. Where can you learn more? Go to http://rosi-awa.info/. Enter C315 in the search box to learn more about \"Colonoscopy: Before Your Procedure. \" Current as of: May 12, 2017 Content Version: 11.4 © 2799-3136 [employer]wise[employer].  Care instructions adapted under license by [facility] [facility] (which disclaims liability or warranty for this information). If you have questions about a medical condition or this instruction, always ask your healthcare professional. [address] any warranty or liability for your use of this information. Learning About Diabetes Food Guidelines Your Care Instructions Meal planning is important to manage diabetes. It helps keep your blood sugar at a target level (which you set with your doctor). You don't have to eat special foods. You can eat what your family eats, including sweets once in a while. But you do have to pay attention to how often you eat and how much you eat of certain foods. You may want to work with a dietitian or a certified diabetes educator (CDE) to help you plan meals and snacks. A dietitian or CDE can also help you lose weight if that is one of your goals. What should you know about eating carbs? Managing the amount of carbohydrate (carbs) you eat is an important part of healthy meals when you have diabetes. Carbohydrate is found in many foods. · Learn which foods have carbs. And learn the amounts of carbs in different foods. ¨ Bread, cereal, pasta, and rice have about 15 grams of carbs in a serving. A serving is 1 slice of bread (1 ounce), ½ cup of cooked cereal, or 1/3 cup of cooked pasta or rice. ¨ Fruits have 15 grams of carbs in a serving. A serving is 1 small fresh fruit, such as an apple or orange; ½ of a banana; ½ cup of cooked or canned fruit; ½ cup of fruit juice; 1 cup of melon or raspberries; or 2 tablespoons of dried fruit. ¨ Milk and no-sugar-added yogurt have 15 grams of carbs in a serving. A serving is 1 cup of milk or 2/3 cup of no-sugar-added yogurt. ¨ Starchy vegetables have 15 grams of carbs in a serving.  A serving is ½ cup of mashed potatoes or sweet potato; 1 cup winter squash; ½ of a small baked potato; ½ cup of cooked beans; or ½ cup cooked corn or green peas. · Learn how much carbs to eat each day and at each meal. A dietitian or CDE can teach you how to keep track of the amount of carbs you eat. This is called carbohydrate counting. · If you are not sure how to count carbohydrate grams, use the Plate Method to plan meals. It is a good, quick way to make sure that you have a balanced meal. It also helps you spread carbs throughout the day. ¨ Divide your plate by types of foods. Put non-starchy vegetables on half the plate, meat or other protein food on one-quarter of the plate, and a grain or starchy vegetable in the final quarter of the plate. To this you can add a small piece of fruit and 1 cup of milk or yogurt, depending on how many carbs you are supposed to eat at a meal. 
· Try to eat about the same amount of carbs at each meal. Do not \"save up\" your daily allowance of carbs to eat at one meal. 
· Proteins have very little or no carbs per serving. Examples of proteins are beef, chicken, turkey, fish, eggs, tofu, cheese, cottage cheese, and peanut butter. A serving size of meat is 3 ounces, which is about the size of a deck of cards. Examples of meat substitute serving sizes (equal to 1 ounce of meat) are 1/4 cup of cottage cheese, 1 egg, 1 tablespoon of peanut butter, and ½ cup of tofu. How can you eat out and still eat healthy? · Learn to estimate the serving sizes of foods that have carbohydrate. If you measure food at home, it will be easier to estimate the amount in a serving of restaurant food. · If the meal you order has too much carbohydrate (such as potatoes, corn, or baked beans), ask to have a low-carbohydrate food instead. Ask for a salad or green vegetables. · If you use insulin, check your blood sugar before and after eating out to help you plan how much to eat in the future.  
· If you eat more carbohydrate at a meal than you had planned, take a walk or do other exercise. This will help lower your blood sugar. What else should you know? · Limit saturated fat, such as the fat from meat and dairy products. This is a healthy choice because people who have diabetes are at higher risk of heart disease. So choose lean cuts of meat and nonfat or low-fat dairy products. Use olive or canola oil instead of butter or shortening when cooking. · Don't skip meals. Your blood sugar may drop too low if you skip meals and take insulin or certain medicines for diabetes. · Check with your doctor before you drink alcohol. Alcohol can cause your blood sugar to drop too low. Alcohol can also cause a bad reaction if you take certain diabetes medicines. Follow-up care is a key part of your treatment and safety. Be sure to make and go to all appointments, and call your doctor if you are having problems. It's also a good idea to know your test results and keep a list of the medicines you take. Where can you learn more? Go to http://rosi-awa.info/. Enter F971 in the search box to learn more about \"Learning About Diabetes Food Guidelines. \" Current as of: March 13, 2017 Content Version: 11.4 © 3583-1055 St[employer]. Care instructions adapted under license by [employer] (which disclaims liability or warranty for this information). If you have questions about a medical condition or this instruction, always ask your healthcare professional. [place] [address] any warranty or liability for your use of this information. Upper GI Endoscopy: What to Expect at [facility] Your Recovery After you have an endoscopy, you will stay at the hospital or clinic for 1 to 2 hours. This will allow the medicine to wear off. You will be able to go home after your doctor or nurse checks to make sure you are not having any problems.  
You may have to stay overnight if you had treatment during the test. You may have a sore throat for a day or two after the test. 
This care sheet gives you a general idea about what to expect after the test. 
How can you care for yourself at home? Activity · Rest as much as you need to after you go home. · You should be able to go back to your usual activities the day after the test. 
Diet · Follow your doctor's directions for eating after the test. 
· Drink plenty of fluids (unless your doctor has told you not to). Medications · If you have a sore throat the day after the test, use an over-the-counter spray to numb your throat. Follow-up care is a key part of your treatment and safety. Be sure to make and go to all appointments, and call your doctor if you are having problems. It's also a good idea to know your test results and keep a list of the medicines you take. When should you call for help? Call 911 anytime you think you may need emergency care. For example, call if: 
? · You passed out (loses consciousness). ? · You have trouble breathing. ? · You pass maroon or bloody stools. ?Call your doctor now or seek immediate medical care if: 
? · You have pain that does not get better after your take pain medicine. ? · You have new or worse belly pain. ? · You have blood in your stools. ? · You are sick to your stomach and cannot keep fluids down. ? · You have a fever. ? · You cannot pass stools or gas. ? Watch closely for changes in your health, and be sure to contact your doctor if: 
? · Your throat still hurts after a day or two. ? · You do not get better as expected. Where can you learn more? Go to http://rosi-awa.info/. Enter [phone] in the search box to learn more about \"Upper GI Endoscopy: What to Expect at Home. \" Current as of: May 12, 2017 Content Version: 11.4 © 9752-5260 [employer], [employer].  Care instructions adapted under license by [employer] (which disclaims liability or warranty for this information). If you have questions about a medical condition or this instruction, always ask your healthcare professional. [address] any warranty or liability for your use of this information. High Blood Pressure: Care Instructions Your Care Instructions If your blood pressure is usually above 140/90, you have high blood pressure, or hypertension. That means the top number is 140 or higher or the bottom number is 90 or higher, or both. Despite what a lot of people think, high blood pressure usually doesn't cause headaches or make you feel dizzy or lightheaded. It usually has no symptoms. But it does increase your risk for heart attack, stroke, and kidney or eye damage. The higher your blood pressure, the more your risk increases. Your doctor will give you a goal for your blood pressure. Your goal will be based on your health and your age. An example of a goal is to keep your blood pressure below 140/90. Lifestyle changes, such as eating healthy and being active, are always important to help lower blood pressure. You might also take medicine to reach your blood pressure goal. 
Follow-up care is a key part of your treatment and safety. Be sure to make and go to all appointments, and call your doctor if you are having problems. It's also a good idea to know your test results and keep a list of the medicines you take. How can you care for yourself at home? Medical treatment · If you stop taking your medicine, your blood pressure will go back up. You may take one or more types of medicine to lower your blood pressure. Be safe with medicines. Take your medicine exactly as prescribed. Call your doctor if you think you are having a problem with your medicine. · Talk to your doctor before you start taking aspirin every day. Aspirin can help certain people lower their risk of a heart attack or stroke.  But taking aspirin isn't right for everyone, because it can cause serious bleeding. · See your doctor regularly. You may need to see the doctor more often at first or until your blood pressure comes down. · If you are taking blood pressure medicine, talk to your doctor before you take decongestants or anti-inflammatory medicine, such as ibuprofen. Some of these medicines can raise blood pressure. · Learn how to check your blood pressure at home. Lifestyle changes · Stay at a healthy weight. This is especially important if you put on weight around the waist. Losing even 10 pounds can help you lower your blood pressure. · If your doctor recommends it, get more exercise. Walking is a good choice. Bit by bit, increase the amount you walk every day. Try for at least 30 minutes on most days of the week. You also may want to swim, bike, or do other activities. · Avoid or limit alcohol. Talk to your doctor about whether you can drink any alcohol. · Try to limit how much sodium you eat to less than 2,300 milligrams (mg) a day. Your doctor may ask you to try to eat less than 1,500 mg a day. · Eat plenty of fruits (such as bananas and oranges), vegetables, legumes, whole grains, and low-fat dairy products. · Lower the amount of saturated fat in your diet. Saturated fat is found in animal products such as milk, cheese, and meat. Limiting these foods may help you lose weight and also lower your risk for heart disease. · Do not smoke. Smoking increases your risk for heart attack and stroke. If you need help quitting, talk to your doctor about stop-smoking programs and medicines. These can increase your chances of quitting for good. When should you call for help? Call 911 anytime you think you may need emergency care. This may mean having symptoms that suggest that your blood pressure is causing a serious heart or blood vessel problem. Your blood pressure may be over 180/110. ? For example, call 911 if: ? · You have symptoms of a heart attack. These may include: ¨ Chest pain or pressure, or a strange feeling in the chest. 
¨ Sweating. ¨ Shortness of breath. ¨ Nausea or vomiting. ¨ Pain, pressure, or a strange feeling in the back, neck, jaw, or upper belly or in one or both shoulders or arms. ¨ Lightheadedness or sudden weakness. ¨ A fast or irregular heartbeat. ? · You have symptoms of a stroke. These may include: 
¨ Sudden numbness, tingling, weakness, or loss of movement in your face, arm, or leg, especially on only one side of your body. ¨ Sudden vision changes. ¨ Sudden trouble speaking. ¨ Sudden confusion or trouble understanding simple statements. ¨ Sudden problems with walking or balance. ¨ A sudden, severe headache that is different from past headaches. ? · You have severe back or belly pain. ?Do not wait until your blood pressure comes down on its own. Get help right away. ?Call your doctor now or seek immediate care if: 
? · Your blood pressure is much higher than normal (such as 180/110 or higher), but you don't have symptoms. ? · You think high blood pressure is causing symptoms, such as: ¨ Severe headache. ¨ Blurry vision. ? Watch closely for changes in your health, and be sure to contact your doctor if: 
? · Your blood pressure measures 140/90 or higher at least 2 times. That means the top number is 140 or higher or the bottom number is 90 or higher, or both. ? · You think you may be having side effects from your blood pressure medicine. ? · Your blood pressure is usually normal, but it goes above normal at least 2 times. Where can you learn more? Go to http://rosi-awa.info/. DISCHARGE SUMMARY from Nurse PATIENT INSTRUCTIONS: 
 
 
F-face looks uneven A-arms unable to move or move unevenly S-speech slurred or non-existent T-time-call 911 as soon as signs and symptoms begin-DO NOT go Back to bed or wait to see if you get better-TIME IS BRAIN. Warning Signs of HEART ATTACK Call 911 if you have these symptoms: 
? Chest discomfort. Most heart attacks involve discomfort in the center of the chest that lasts more than a few minutes, or that goes away and comes back. It can feel like uncomfortable pressure, squeezing, fullness, or pain. ? Discomfort in other areas of the upper body. Symptoms can include pain or discomfort in one or both arms, the back, neck, jaw, or stomach. ? Shortness of breath with or without chest discomfort. ? Other signs may include breaking out in a cold sweat, nausea, or lightheadedness. Don't wait more than five minutes to call [address]! Fast action can save your life. Calling 911 is almost always the fastest way to get lifesaving treatment. Emergency Medical Services staff can begin treatment when they arrive  up to an hour sooner than if someone gets to the hospital by car. The discharge information has been reviewed with the patient. The patient verbalized understanding. Discharge medications reviewed with the patient and appropriate educational materials and side effects teaching were provided. ___________________________________________________________________________________________________________________________________ Enter U449 in the search box to learn more about \"High Blood Pressure: Care Instructions. \" Current as of: September 21, 2016 Content Version: 11.4 © 4504-0299 [employer]. Care instructions adapted under license by "[employer] (which disclaims liability or warranty for this information). If you have questions about a medical condition or this instruction, always ask your healthcare professional. [place] [address] any warranty or liability for your use of this information. Anemia From Chronic Disease: Care Instructions Your Care Instructions Anemia is a low level of red blood cells. Red blood cells carry oxygen from your lungs to the rest of your body. Sometimes when you have a long-term (chronic) disease, such as kidney disease, arthritis, diabetes, cancer, or an infection, your body does not make enough red blood cells. Follow-up care is a key part of your treatment and safety. Be sure to make and go to all appointments, and call your doctor if you are having problems. It's also a good idea to know your test results and keep a list of the medicines you take. How can you care for yourself at home? · Follow your doctor's instructions to treat the chronic condition that is causing the anemia. · Be safe with medicines. Take your medicine to treat your chronic condition exactly as prescribed. Call your doctor if you think you are having a problem with your medicine. · Take your medicine for anemia exactly as prescribed. Call your doctor if you think you are having a problem with your medicine. Medicines to increase the number of red blood cells (such as epoetin or darbepoetin) may be given as an injection. ¨ If you miss a dose, take it as soon as you can, unless it is almost time for your next dose. In that case, get back on your regular schedule and take only one dose. ¨ Do not freeze this medicine. Store it in the refrigerator. Do not shake the bottle before you prepare the shot. · Keep all your appointments for blood tests to check on your hemoglobin levels. When should you call for help? Call 911 anytime you think you may need emergency care. For example, call if: 
? · You passed out (lost consciousness). ?Call your doctor now or seek immediate medical care if: 
? · You are short of breath. ? · You are dizzy or light-headed, or you feel like you may faint. ? · You have new or worse bleeding. ? Watch closely for changes in your health, and be sure to contact your doctor if: 
? · You feel weaker or more tired than usual.  
? · You do not get better as expected. Where can you learn more? Go to http://rosi-awa.info/. Enter E502 in the search box to learn more about \"Anemia From Chronic Disease: Care Instructions. \" Current as of: October 13, 2016 Content Version: 11.4 © 8214-5630 [employer]. Care instructions adapted under license by [employer] (which disclaims liability or warranty for this information). If you have questions about a medical condition or this instruction, always ask your healthcare professional. [address] any warranty or liability for your use of this information. Iron Deficiency Anemia: Care Instructions Your Care Instructions Anemia means that you do not have enough red blood cells. Red blood cells carry oxygen around your body. When you have anemia, it can make you pale, weak, and tired. Many things can cause anemia. The most common cause is loss of blood. This can happen if you have heavy menstrual periods. It can also happen if you have bleeding in your stomach or bowel. You can also get anemia if you don't have enough iron in your diet or if it's hard for your body to absorb iron. In some cases, pregnancy causes anemia. That's because a pregnant woman needs more iron. Your doctor may do more tests to find the cause of your anemia. If a disease or other health problem is causing it, your doctor will treat that problem. It's important to follow up with your doctor to make sure that your iron level returns to normal. 
Follow-up care is a key part of your treatment and safety. Be sure to make and go to all appointments, and call your doctor if you are having problems. It's also a good idea to know your test results and keep a list of the medicines you take. How can you care for yourself at home? · If your doctor recommended iron pills, take them as directed. ¨ Try to take the pills on an empty stomach. You can do this about 1 hour before or 2 hours after meals. But you may need to take iron with food to avoid an upset stomach. ¨ Do not take antacids or drink milk or anything with caffeine within 2 hours of when you take your iron. They can keep your body from absorbing the iron well. ¨ Vitamin C helps your body absorb iron. You may want to take iron pills with a glass of orange juice or some other food high in vitamin C. 
¨ Iron pills may cause stomach problems. These include heartburn, nausea, diarrhea, constipation, and cramps. It can help to drink plenty of fluids and include fruits, vegetables, and fiber in your diet. ¨ It's normal for iron pills to make your stool a greenish or grayish black. But internal bleeding can also cause dark stool. So it's important to tell your doctor about any color changes. ¨ Call your doctor if you think you are having a problem with your iron pills. Even after you start to feel better, it will take several months for your body to build up its supply of iron. ¨ If you miss a pill, don't take a double dose. ¨ Keep iron pills out of the reach of small children. Too much iron can be very dangerous. · Eat foods with a lot of iron. These include red meat, shellfish, poultry, and eggs. They also include beans, raisins, whole-grain bread, and leafy green vegetables. · Steam your vegetables. This is the best way to prepare them if you want to get as much iron as possible. · Be safe with medicines. Do not take nonsteroidal anti-inflammatory pain relievers unless your doctor tells you to. These include aspirin, naproxen (Aleve), and ibuprofen (Advil, Motrin). · Liquid iron can stain your teeth. But you can mix it with water or juice and drink it with a straw. Then it won't get on your teeth. When should you call for help? Call 911 anytime you think you may need emergency care. For example, call if: ? · You passed out (lost consciousness). ?Call your doctor now or seek immediate medical care if: 
? · You are short of breath. ? · You are dizzy or light-headed, or you feel like you may faint. ? · You have new or worse bleeding. ? Watch closely for changes in your health, and be sure to contact your doctor if: 
? · You feel weaker or more tired than usual.  
? · You do not get better as expected. Where can you learn more? Go to http://rosi-awa.info/. Enter L753 in the search box to learn more about \"Iron Deficiency Anemia: Care Instructions. \" Current as of: October 13, 2016 Content Version: 11.4 © 5941-1398 [employer]. Care instructions adapted under license by [employer] (which disclaims liability or warranty for this information). If you have questions about a medical condition or this instruction, always ask your healthcare professional. [place] [address] any warranty or liability for your use of this information. Kidney Dialysis: Care Instructions Your Care Instructions Dialysis is a process that filters wastes from the blood when your kidneys can no longer do the job. It is not a cure, but it can help you live longer and feel better. It is a lifesaving treatment when you have kidney failure. Normal kidneys work 24 hours a day to clean wastes from your blood. Your kidneys are not able to do this job, so a process called dialysis will do some of the work for your kidneys. You and your doctor will decide which type of dialysis you should have. Peritoneal dialysis uses the lining of your belly (peritoneum) to filter your blood. You can do it at home, on a daily basis. Hemodialysis uses a man-made filter called a dialyzer to clean your blood. Most people need to go to a hospital or clinic 3 days a week for several hours each time. Sometimes hemodialysis can be done at home. It is normal to have questions about your treatment, and you have a right to know what is happening to you. Learning about dialysis can help you take an active role in your treatment. Dialysis does not cure kidney disease, but it can help you live longer and feel better. You will need to follow your diet and treatment schedule carefully. Follow-up care is a key part of your treatment and safety. Be sure to make and go to all appointments, and call your doctor if you are having problems. It's also a good idea to know your test results and keep a list of the medicines you take. What do you need to know about peritoneal dialysis? Peritoneal dialysis uses the lining of your belly (or peritoneal membrane) to filter your blood. Before you can begin peritoneal dialysis, your doctor will need to place a thin tube called a catheter in your belly. This is the dialysis access. · Peritoneal dialysis can be done at home or in any clean place. You may be able to do it while you sleep. · You can do it by yourself. You do not have to rely on help from others. · You can do it at the times you choose as long as you do the right number of treatments. · It has to be done every day of the week. · Some people find it hard to do all the required steps. · It increases your chance for a serious infection of the lining of the belly (peritoneum). What do you need to know about hemodialysis? Hemodialysis uses a man-made membrane called a dialyzer to clean your blood. You are connected to the dialyzer by tubes attached to your blood vessels. Before you start hemodialysis, your doctor will create a site where the blood can flow in and out of your body during your dialysis sessions. This site is called the vascular access. It may be a fistula, made by connecting an artery and a vein. Or it may be a graft, which is a tube implanted under your skin. · Hemodialysis is done mainly by trained health workers who can watch for any problems. · It allows you to be in contact with other people having dialysis. This can help provide emotional support. · You can schedule your treatments in the evenings so you can keep working. · You may be able to do home hemodialysis, which gives you more control over your schedule. · It usually needs to be done on a set schedule 3 times a week. · It can cause side effects. The most common side effects are low blood pressure and muscle cramps. These can often be treated easily. · It requires needle sticks during every treatment, which bothers some people. Others get used to it and even do the needle sticks themselves. How can you care for yourself at home? · Be sure to have all of your dialysis sessions. Do not try to shorten or skip your sessions. You have a better chance of a longer and healthier life by getting your full treatment. · Your doctor or health care team will show you the steps you need to go through each day before, during, and after dialysis. Be sure to follow these steps. If you do not understand a step, talk to your team. 
· Your doctor and dietitian will help you design menus that follow your diet. Be sure to follow your diet guidelines. ¨ You will need to limit fluids and certain foods that contain salt (sodium), potassium, and phosphorus. ¨ You may need to follow a heart-healthy diet to keep the fat (cholesterol) in your blood under control. ¨ You may need higher levels of protein in your diet. · Your doctor may recommend certain vitamins. But do not take any other medicine, including over-the-counter medicines, vitamins, and herbal products, without talking to your doctor first. 
· Do not smoke. Smoking raises your risk of many health problems, including more kidney damage. If you need help quitting, talk to your doctor about stop-smoking programs and medicines. These can increase your chances of quitting for good.  
· Do not take ibuprofen (Advil, Motrin), naproxen (Aleve), or similar medicines, unless your doctor tells you to. These medicines may make kidney problems worse. When should you call for help? Call your doctor now or seek immediate medical care if: 
? · You have a fever. ? · You are dizzy or lightheaded, or you feel like you may faint. ? · You are confused or cannot think clearly. ? · You have new or worse nausea or vomiting. ? · You have new or more blood in your urine. ? · You have new swelling. ? Watch closely for changes in your health, and be sure to contact your doctor if: 
? · You do not get better as expected. Where can you learn more? Go to http://rosi-awa.info/. Enter W516 in the search box to learn more about \"Kidney Dialysis: Care Instructions. \" Current as of: May 12, 2017 Content Version: 11.4 © 3318-4853 [employer]. Care instructions adapted under license by "[employer]" (which disclaims liability or warranty for this information). If you have questions about a medical condition or this instruction, always ask your healthcare professional. [place] [address] any warranty or liability for your use of this information. Patient armband removed and shredded Chart Review Routing History Recipient Method Report Sent By Tapan Peoples DO Fax: [phone] Phone: [phone] Fax IP Auto Routed Maddy Oliver MD [phone] [id number] 12/17/2017  7:16 PM 12/17/2017 Sharon Marie MD  
Phone: [phone] In Basket IP Auto Routed Maddy Oliver MD [[id number]] 3/12/2018 11:57 AM 03/12/2018

## 2018-03-09 NOTE — ROUTINE PROCESS
SBAR read waiting for patient arrival to unit. Pt arrived to unit no sign of distress. call light within reach. 140: Pt educated on blood transfusion. Consent was signed. 1519: Transfusion started. 1530 No signs of distress. Call light within reach.   1640: pt off floor with this nurse for NM test.

## 2018-03-09 NOTE — ED TRIAGE NOTES
Pt states she started having rectal bleeding this am;  Stomach has felt full off and on for a month;   Hx of polyps in the past;  Went to dialysis, did not receiving dialysis treatment, sent to ED for eval first

## 2018-03-09 NOTE — IP AVS SNAPSHOT
[address] [place] [id number] 
[phone] Patient: Mat Jain MRN: [id number] ROMEL:[id number] About your hospitalization You were admitted on:  March 9, 2018 You last received care in the:  [facility] You were discharged on:  March 12, 2018 Why you were hospitalized Your primary diagnosis was:  Gi Bleed Your diagnoses also included:  Acute Blood Loss Anemia, Esrd (End Stage Renal Disease) (Hcc), Diabetes Mellitus Type 2, Controlled (Hcc), Htn (Hypertension), Anemia In Chronic Kidney Disease, Cirrhosis (Hcc) Follow-up Information Follow up With Details Comments Contact Info Patient has a dermatology appointment on 3/13 and a podiatry appointment on 3/14. [facility]  Patient will see physician while on dialysis. [phone] Discharge Orders None A check richmond indicates which time of day the medication should be taken. My Medications START taking these medications Instructions Each Dose to Equal  
 Morning Noon Evening Bedtime  
 nystatin powder Commonly known as:  MYCOSTATIN Your last dose was: Your next dose is:    
   
   
 Apply  to affected area two (2) times a day. CONTINUE taking these medications Instructions Each Dose to Equal  
 Morning Noon Evening Bedtime AFLIBERCEPT INTRAVITREAL Your last dose was: Your next dose is:    
   
   
 1 Ampule by IntraVITreal route every twenty-eight (28) days. 1 Ampule  
    
   
   
   
  
 albuterol 90 mcg/actuation inhaler Commonly known as:  PROVENTIL HFA, VENTOLIN HFA, PROAIR HFA Your last dose was: Your next dose is: Take 2 Puffs by inhalation every four (4) hours as needed for Wheezing. 2 Puff  
    
   
   
   
  
 carboxymethylcellulose sodium 0.5 % Dpet Commonly known as:  REFRESH PLUS  
   
 Your last dose was: Your next dose is:    
   
   
 Administer 1 Drop to both eyes as needed. 1 Drop  
    
   
   
   
  
 ferrous sulfate 325 mg (65 mg iron) tablet Your last dose was: Your next dose is: Take 325 mg by mouth three (3) times daily (with meals). 325 mg  
    
   
   
   
  
 hydrALAZINE 25 mg tablet Commonly known as:  APRESOLINE Your last dose was: Your next dose is: Take 25 mg by mouth three (3) times daily. 25 mg  
    
   
   
   
  
 hydrOXYzine HCl 10 mg tablet Commonly known as:  ATARAX Your last dose was: Your next dose is: Take 10 mg by mouth nightly as needed for Itching. 10 mg  
    
   
   
   
  
 lactulose 10 gram/15 mL solution Commonly known as:  Ivin Stalker Your last dose was: Your next dose is: Take 20 g by mouth daily as needed. 20 g NEPHROCAPS 1 mg capsule Generic drug:  b complex-vitamin c-folic acid Your last dose was: Your next dose is: Take 1 Cap by mouth daily. 1 Cap  
    
   
   
   
  
 pantoprazole 40 mg tablet Commonly known as:  PROTONIX Your last dose was: Your next dose is: Take 1 Tab by mouth daily. 40 mg  
    
   
   
   
  
 sevelamer 800 mg tablet Commonly known as:  RENAGEL Your last dose was: Your next dose is: Take 1,600 mg by mouth three (3) times daily (with meals). 1600 mg  
    
   
   
   
  
 TOPROL XL 50 mg XL tablet Generic drug:  metoprolol succinate Your last dose was: Your next dose is: Take 25 mg by mouth daily. 25 mg  
    
   
   
   
  
 TYLENOL 325 mg tablet Generic drug:  acetaminophen Your last dose was: Your next dose is: Take 325 mg by mouth every six (6) hours as needed for Pain.   
 325 mg  
    
   
   
   
  
 VITAMIN D3 1,000 unit tablet Generic drug:  cholecalciferol Your last dose was: Your next dose is: Take 2,000 Units by mouth daily. 2000 Units Where to Get Your Medications Information on where to get these meds will be given to you by the nurse or doctor. ! Ask your nurse or doctor about these medications  
  nystatin powder Discharge Instructions Anemia From Chronic Disease: Care Instructions Your Care Instructions Anemia is a low level of red blood cells. Red blood cells carry oxygen from your lungs to the rest of your body. Sometimes when you have a long-term (chronic) disease, such as kidney disease, arthritis, diabetes, cancer, or an infection, your body does not make enough red blood cells. Follow-up care is a key part of your treatment and safety. Be sure to make and go to all appointments, and call your doctor if you are having problems. It's also a good idea to know your test results and keep a list of the medicines you take. How can you care for yourself at home? · Follow your doctor's instructions to treat the chronic condition that is causing the anemia. · Be safe with medicines. Take your medicine to treat your chronic condition exactly as prescribed. Call your doctor if you think you are having a problem with your medicine. · Take your medicine for anemia exactly as prescribed. Call your doctor if you think you are having a problem with your medicine. Medicines to increase the number of red blood cells (such as epoetin or darbepoetin) may be given as an injection. ¨ If you miss a dose, take it as soon as you can, unless it is almost time for your next dose. In that case, get back on your regular schedule and take only one dose. ¨ Do not freeze this medicine. Store it in the refrigerator. Do not shake the bottle before you prepare the shot. · Keep all your appointments for blood tests to check on your hemoglobin levels. When should you call for help? Call 911 anytime you think you may need emergency care. For example, call if: 
? · You passed out (lost consciousness). ?Call your doctor now or seek immediate medical care if: 
? · You are short of breath. ? · You are dizzy or light-headed, or you feel like you may faint. ? · You have new or worse bleeding. ? Watch closely for changes in your health, and be sure to contact your doctor if: 
? · You feel weaker or more tired than usual.  
? · You do not get better as expected. Where can you learn more? Go to http://rosi-awa.info/. Enter E502 in the search box to learn more about \"Anemia From Chronic Disease: Care Instructions. \" Current as of: October 13, 2016 Content Version: 11.4 © 8899-7434 [employer]. Care instructions adapted under license by [employer] (which disclaims liability or warranty for this information). If you have questions about a medical condition or this instruction, always ask your healthcare professional. [place] [address] any warranty or liability for your use of this information. Learning About Blood Transfusions What is a blood transfusion? Blood transfusion is a medical treatment to replace the blood or parts of blood that your body has lost. The blood goes through a tube from a bag to an intravenous (IV) catheter and into your vein. You may need a blood transfusion after losing blood from an injury, a major surgery, an illness that causes bleeding, or an illness that destroys blood cells. Transfusions are also used to give you the parts of blood-such as platelets, plasma, or substances that cause clotting-that your body needs to fight an illness or stop bleeding. How is a blood transfusion done?  
Before you receive a blood transfusion, your blood is tested to find out what your blood type is. Blood or blood parts that are a match with your blood type are ordered by your doctor. Blood is typed as A, B, AB, or O. It is also typed as Rh-positive or Rh-negative. Your blood is also screened to look for antibodies that might react with the blood that is given to you. The blood you are getting is checked and rechecked to make sure that it's the right type for you. A sample of your blood is mixed with a sample of the blood you will receive to check for problems. Before actually giving you the transfusion, a doctor and nurses will look at the label on the package of blood and compare it to your hospital ID bracelet and medical records. The transfusion begins only when all agree that this is the correct blood and that you are the correct person to receive it. To receive the transfusion, you will have an intravenous (IV) catheter inserted into a vein. A tube connects the catheter to the bag containing the blood, which is placed higher than your body. The blood then flows slowly into your vein. A doctor or nurse will check you several times during the transfusion to watch for a reaction or other problems. What are the possible risks? Blood transfusions have many benefits and are often life-saving. But they also have a few risks. Possible risks include: 
· Your body's reaction to receiving new blood. This may include: ¨ Fever. ¨ Allergic reactions. ¨ Breathing problems. · An infection from the blood. This risk is small because of the strict rules placed on handling and storing blood. Getting a viral infection, such as HIV or hepatitis B or C, through blood transfusions has become very rare. The U.S. Food and Drug Administration (FDA) enforces strict guidelines on the collection, testing, storage, and use of blood. · Getting the wrong blood type by accident. Severe reactions, which can be life-threatening, are very rare. What can you expect after a blood transfusion? Here are some things you can do at home to help prevent infection at the transfusion site: 
· Wash the area daily with warm, soapy water, and pat it dry. Don't use hydrogen peroxide or alcohol, which can slow healing. You may cover the area with a gauze bandage if it weeps or rubs against clothing. Change the bandage every day. · Keep the area clean and dry. When should you call for help? Call 911 anytime you think you may need emergency care. For example, call if: 
· You have severe trouble breathing. Call your doctor now or seek immediate medical care if: 
· You have a fever. · You feel weaker or more tired than usual. 
· You have a yellow tint to your skin or the whites of your eyes. Watch closely for changes in your health, and be sure to contact your doctor if you have any problems. Follow-up care is a key part of your treatment and safety. Be sure to make and go to all appointments, and call your doctor if you are having problems. It's also a good idea to know your test results and keep a list of the medicines you take. Where can you learn more? Go to http://rosi-awa.info/. Enter V588 in the search box to learn more about \"Learning About Blood Transfusions. \" Current as of: October 13, 2016 Content Version: 11.4 © 8906-7822 [employer]. Care instructions adapted under license by [facility] (which disclaims liability or warranty for this information). If you have questions about a medical condition or this instruction, always ask your healthcare professional. [place] [address] any warranty or liability for your use of this information. Colonoscopy: Before Your Procedure What is a colonoscopy? A colonoscopy is a test that lets a doctor look inside your colon. The doctor uses a thin, lighted tube called a colonoscope to look for problems. These include small growths called polyps, cancer, or bleeding. During the test, the doctor can take samples of tissue that can be checked for cancer or other problems. This is called a biopsy. The doctor can also take out polyps. Before the test, you will need to stop eating solid foods. You also will drink a liquid or take a tablet that cleans out your colon. This helps your doctor be able to see inside your colon during the test. 
Follow-up care is a key part of your treatment and safety. Be sure to make and go to all appointments, and call your doctor if you are having problems. It's also a good idea to know your test results and keep a list of the medicines you take. What happens before the procedure? ?Preparing for the procedure ? · Understand exactly what procedure is planned, along with the risks, benefits, and other options. · Tell your doctors ALL the medicines, vitamins, supplements, and herbal remedies you take. Some of these can increase the risk of bleeding or interact with anesthesia. ? · If you take blood thinners, such as warfarin (Coumadin), clopidogrel (Plavix), or aspirin, be sure to talk to your doctor. He or she will tell you if you should stop taking these medicines before your procedure. Make sure that you understand exactly what your doctor wants you to do.  
? · Your doctor will tell you which medicines to take or stop before your procedure. You may need to stop taking certain medicines a week or more before the procedure. So talk to your doctor as soon as you can.  
? · If you have an advance directive, let your doctor know. It may include a living will and a durable power of  for health care. Bring a copy to the hospital. If you don't have one, you may want to prepare one. It lets your doctor and loved ones know your health care wishes. Doctors advise that everyone prepare these papers before any type of surgery or procedure. ? Before the procedure ?  · Follow your doctor's directions about when to stop eating solid foods and drink only clear liquids. You can drink water, clear juices, clear broths, flavored ice pops, and gelatin (such as Jell-O). Do not eat or drink anything red or purple. This includes grape juice and grape-flavored ice pops. It also includes fruit punch and cherry gelatin. ? · Drink the \"colon prep\" liquid as your doctor tells you. You will want to stay home, because the liquid will make you go to the bathroom a lot. Your stools will be loose and watery. It is very important to drink all of the liquid. If you have problems drinking it, call your doctor. Some doctors may have you take a tablet rather than drink a liquid. ? · Do not eat any solid foods after you drink the colon prep. ? · Stop drinking clear liquids 6 to 8 hours before the test.  
Procedures can be stressful. This information will help you understand what you can expect. And it will help you safely prepare for your procedure. What happens on the day of the procedure? · Follow the instructions exactly about when to stop eating and drinking. If you don't, your procedure may be canceled. If your doctor told you to take your medicines on the day of the procedure, take them with only a sip of water. ? · Take a bath or shower before you come in for your procedure. Do not apply lotions, perfumes, deodorants, or nail polish. ? · Take off all jewelry and piercings. And take out contact lenses, if you wear them. ? At the [address] [place] [address] or hospital 
 · Bring a picture ID. ? · You will be kept comfortable and safe by your anesthesia provider. The anesthesia may make you sleep. ? · You will lie on your back or your side with your knees drawn up toward your belly. The doctor will gently put a gloved finger into your anus. Then the doctor puts the scope in and moves it into your colon. The scope goes in easily because it is lubricated.   
? · The doctor may also use small tools to take tissue samples for a biopsy or to remove polyps. This does not hurt. ? · The test usually takes 30 to 45 minutes. But it may take longer. It depends on what is found and what is done. Going home · Be sure you have someone to drive you home. Anesthesia and pain medicine make it unsafe for you to drive. ? · You will be given more specific instructions about recovering from your procedure. When should you call your doctor? · You have questions or concerns. ? · You don't understand how to prepare for your procedure. ? · You are having trouble with the bowel prep. ? · You become ill before the procedure (such as fever, flu, or a cold). ? · You need to reschedule or have changed your mind about having the procedure. Where can you learn more? Go to http://rosi-awa.info/. Enter C315 in the search box to learn more about \"Colonoscopy: Before Your Procedure. \" Current as of: May 12, 2017 Content Version: 11.4 © 3869-8894 [employer]. Care instructions adapted under license by [employer] (which disclaims liability or warranty for this information). If you have questions about a medical condition or this instruction, always ask your healthcare professional. [address] any warranty or liability for your use of this information. Learning About Diabetes Food Guidelines Your Care Instructions Meal planning is important to manage diabetes. It helps keep your blood sugar at a target level (which you set with your doctor). You don't have to eat special foods. You can eat what your family eats, including sweets once in a while. But you do have to pay attention to how often you eat and how much you eat of certain foods. You may want to work with a dietitian or a certified diabetes educator (CDE) to help you plan meals and snacks. A dietitian or CDE can also help you lose weight if that is one of your goals. What should you know about eating carbs? Managing the amount of carbohydrate (carbs) you eat is an important part of healthy meals when you have diabetes. Carbohydrate is found in many foods. · Learn which foods have carbs. And learn the amounts of carbs in different foods. ¨ Bread, cereal, pasta, and rice have about 15 grams of carbs in a serving. A serving is 1 slice of bread (1 ounce), ½ cup of cooked cereal, or 1/3 cup of cooked pasta or rice. ¨ Fruits have 15 grams of carbs in a serving. A serving is 1 small fresh fruit, such as an apple or orange; ½ of a banana; ½ cup of cooked or canned fruit; ½ cup of fruit juice; 1 cup of melon or raspberries; or 2 tablespoons of dried fruit. ¨ Milk and no-sugar-added yogurt have 15 grams of carbs in a serving. A serving is 1 cup of milk or 2/3 cup of no-sugar-added yogurt. ¨ Starchy vegetables have 15 grams of carbs in a serving. A serving is ½ cup of mashed potatoes or sweet potato; 1 cup winter squash; ½ of a small baked potato; ½ cup of cooked beans; or ½ cup cooked corn or green peas. · Learn how much carbs to eat each day and at each meal. A dietitian or CDE can teach you how to keep track of the amount of carbs you eat. This is called carbohydrate counting. · If you are not sure how to count carbohydrate grams, use the Plate Method to plan meals. It is a good, quick way to make sure that you have a balanced meal. It also helps you spread carbs throughout the day. ¨ Divide your plate by types of foods. Put non-starchy vegetables on half the plate, meat or other protein food on one-quarter of the plate, and a grain or starchy vegetable in the final quarter of the plate.  To this you can add a small piece of fruit and 1 cup of milk or yogurt, depending on how many carbs you are supposed to eat at a meal. 
· Try to eat about the same amount of carbs at each meal. Do not \"save up\" your daily allowance of carbs to eat at one meal. 
 · Proteins have very little or no carbs per serving. Examples of proteins are beef, chicken, turkey, fish, eggs, tofu, cheese, cottage cheese, and peanut butter. A serving size of meat is 3 ounces, which is about the size of a deck of cards. Examples of meat substitute serving sizes (equal to 1 ounce of meat) are 1/4 cup of cottage cheese, 1 egg, 1 tablespoon of peanut butter, and ½ cup of tofu. How can you eat out and still eat healthy? · Learn to estimate the serving sizes of foods that have carbohydrate. If you measure food at home, it will be easier to estimate the amount in a serving of restaurant food. · If the meal you order has too much carbohydrate (such as potatoes, corn, or baked beans), ask to have a low-carbohydrate food instead. Ask for a salad or green vegetables. · If you use insulin, check your blood sugar before and after eating out to help you plan how much to eat in the future. · If you eat more carbohydrate at a meal than you had planned, take a walk or do other exercise. This will help lower your blood sugar. What else should you know? · Limit saturated fat, such as the fat from meat and dairy products. This is a healthy choice because people who have diabetes are at higher risk of heart disease. So choose lean cuts of meat and nonfat or low-fat dairy products. Use olive or canola oil instead of butter or shortening when cooking. · Don't skip meals. Your blood sugar may drop too low if you skip meals and take insulin or certain medicines for diabetes. · Check with your doctor before you drink alcohol. Alcohol can cause your blood sugar to drop too low. Alcohol can also cause a bad reaction if you take certain diabetes medicines. Follow-up care is a key part of your treatment and safety. Be sure to make and go to all appointments, and call your doctor if you are having problems. It's also a good idea to know your test results and keep a list of the medicines you take. Where can you learn more? Go to http://rosi-awa.info/. Enter U632 in the search box to learn more about \"Learning About Diabetes Food Guidelines. \" Current as of: March 13, 2017 Content Version: 11.4 © 8749-7888 [employer]. Care instructions adapted under license by [employer] (which disclaims liability or warranty for this information). If you have questions about a medical condition or this instruction, always ask your healthcare professional. [address] any warranty or liability for your use of this information. Upper GI Endoscopy: What to Expect at [facility] Your Recovery After you have an endoscopy, you will stay at the hospital or clinic for 1 to 2 hours. This will allow the medicine to wear off. You will be able to go home after your doctor or nurse checks to make sure you are not having any problems. You may have to stay overnight if you had treatment during the test. You may have a sore throat for a day or two after the test. 
This care sheet gives you a general idea about what to expect after the test. 
How can you care for yourself at home? Activity · Rest as much as you need to after you go home. · You should be able to go back to your usual activities the day after the test. 
Diet · Follow your doctor's directions for eating after the test. 
· Drink plenty of fluids (unless your doctor has told you not to). Medications · If you have a sore throat the day after the test, use an over-the-counter spray to numb your throat. Follow-up care is a key part of your treatment and safety. Be sure to make and go to all appointments, and call your doctor if you are having problems. It's also a good idea to know your test results and keep a list of the medicines you take. When should you call for help? Call 911 anytime you think you may need emergency care. For example, call if: 
? · You passed out (loses consciousness). ? · You have trouble breathing. ? · You pass maroon or bloody stools. ?Call your doctor now or seek immediate medical care if: 
? · You have pain that does not get better after your take pain medicine. ? · You have new or worse belly pain. ? · You have blood in your stools. ? · You are sick to your stomach and cannot keep fluids down. ? · You have a fever. ? · You cannot pass stools or gas. ? Watch closely for changes in your health, and be sure to contact your doctor if: 
? · Your throat still hurts after a day or two. ? · You do not get better as expected. Where can you learn more? Go to http://rosi-awa.info/. Enter [phone] in the search box to learn more about \"Upper GI Endoscopy: What to Expect at Home. \" Current as of: May 12, 2017 Content Version: 11.4 © 8465-2198 [employer]. Care instructions adapted under license by [employer] (which disclaims liability or warranty for this information). If you have questions about a medical condition or this instruction, always ask your healthcare professional. [place] [address] any warranty or liability for your use of this information. High Blood Pressure: Care Instructions Your Care Instructions If your blood pressure is usually above 140/90, you have high blood pressure, or hypertension. That means the top number is 140 or higher or the bottom number is 90 or higher, or both. Despite what a lot of people think, high blood pressure usually doesn't cause headaches or make you feel dizzy or lightheaded. It usually has no symptoms. But it does increase your risk for heart attack, stroke, and kidney or eye damage. The higher your blood pressure, the more your risk increases. Your doctor will give you a goal for your blood pressure. Your goal will be based on your health and your age. An example of a goal is to keep your blood pressure below 140/90. Lifestyle changes, such as eating healthy and being active, are always important to help lower blood pressure. You might also take medicine to reach your blood pressure goal. 
Follow-up care is a key part of your treatment and safety. Be sure to make and go to all appointments, and call your doctor if you are having problems. It's also a good idea to know your test results and keep a list of the medicines you take. How can you care for yourself at home? Medical treatment · If you stop taking your medicine, your blood pressure will go back up. You may take one or more types of medicine to lower your blood pressure. Be safe with medicines. Take your medicine exactly as prescribed. Call your doctor if you think you are having a problem with your medicine. · Talk to your doctor before you start taking aspirin every day. Aspirin can help certain people lower their risk of a heart attack or stroke. But taking aspirin isn't right for everyone, because it can cause serious bleeding. · See your doctor regularly. You may need to see the doctor more often at first or until your blood pressure comes down. · If you are taking blood pressure medicine, talk to your doctor before you take decongestants or anti-inflammatory medicine, such as ibuprofen. Some of these medicines can raise blood pressure. · Learn how to check your blood pressure at home. Lifestyle changes · Stay at a healthy weight. This is especially important if you put on weight around the waist. Losing even 10 pounds can help you lower your blood pressure. · If your doctor recommends it, get more exercise. Walking is a good choice. Bit by bit, increase the amount you walk every day. Try for at least 30 minutes on most days of the week. You also may want to swim, bike, or do other activities. · Avoid or limit alcohol. Talk to your doctor about whether you can drink any alcohol.  
· Try to limit how much sodium you eat to less than 2,300 milligrams (mg) a day. Your doctor may ask you to try to eat less than 1,500 mg a day. · Eat plenty of fruits (such as bananas and oranges), vegetables, legumes, whole grains, and low-fat dairy products. · Lower the amount of saturated fat in your diet. Saturated fat is found in animal products such as milk, cheese, and meat. Limiting these foods may help you lose weight and also lower your risk for heart disease. · Do not smoke. Smoking increases your risk for heart attack and stroke. If you need help quitting, talk to your doctor about stop-smoking programs and medicines. These can increase your chances of quitting for good. When should you call for help? Call 911 anytime you think you may need emergency care. This may mean having symptoms that suggest that your blood pressure is causing a serious heart or blood vessel problem. Your blood pressure may be over 180/110. ? For example, call 911 if: 
? · You have symptoms of a heart attack. These may include: ¨ Chest pain or pressure, or a strange feeling in the chest. 
¨ Sweating. ¨ Shortness of breath. ¨ Nausea or vomiting. ¨ Pain, pressure, or a strange feeling in the back, neck, jaw, or upper belly or in one or both shoulders or arms. ¨ Lightheadedness or sudden weakness. ¨ A fast or irregular heartbeat. ? · You have symptoms of a stroke. These may include: 
¨ Sudden numbness, tingling, weakness, or loss of movement in your face, arm, or leg, especially on only one side of your body. ¨ Sudden vision changes. ¨ Sudden trouble speaking. ¨ Sudden confusion or trouble understanding simple statements. ¨ Sudden problems with walking or balance. ¨ A sudden, severe headache that is different from past headaches. ? · You have severe back or belly pain. ?Do not wait until your blood pressure comes down on its own. Get help right away. ?Call your doctor now or seek immediate care if: 
? · Your blood pressure is much higher than normal (such as 180/110 or higher), but you don't have symptoms. ? · You think high blood pressure is causing symptoms, such as: ¨ Severe headache. ¨ Blurry vision. ? Watch closely for changes in your health, and be sure to contact your doctor if: 
? · Your blood pressure measures 140/90 or higher at least 2 times. That means the top number is 140 or higher or the bottom number is 90 or higher, or both. ? · You think you may be having side effects from your blood pressure medicine. ? · Your blood pressure is usually normal, but it goes above normal at least 2 times. Where can you learn more? Go to http://rosi-awa.info/. DISCHARGE SUMMARY from Nurse PATIENT INSTRUCTIONS: 
 
 
F-face looks uneven A-arms unable to move or move unevenly S-speech slurred or non-existent T-time-call 911 as soon as signs and symptoms begin-DO NOT go Back to bed or wait to see if you get better-TIME IS BRAIN. Warning Signs of HEART ATTACK Call 911 if you have these symptoms: 
? Chest discomfort. Most heart attacks involve discomfort in the center of the chest that lasts more than a few minutes, or that goes away and comes back. It can feel like uncomfortable pressure, squeezing, fullness, or pain. ? Discomfort in other areas of the upper body. Symptoms can include pain or discomfort in one or both arms, the back, neck, jaw, or stomach. ? Shortness of breath with or without chest discomfort. ? Other signs may include breaking out in a cold sweat, nausea, or lightheadedness. Don't wait more than five minutes to call [address]! Fast action can save your life. Calling 911 is almost always the fastest way to get lifesaving treatment. Emergency Medical Services staff can begin treatment when they arrive  up to an hour sooner than if someone gets to the hospital by car. The discharge information has been reviewed with the patient. The patient verbalized understanding. Discharge medications reviewed with the patient and appropriate educational materials and side effects teaching were provided. ___________________________________________________________________________________________________________________________________ Enter T802 in the search box to learn more about \"High Blood Pressure: Care Instructions. \" Current as of: September 21, 2016 Content Version: 11.4 © 5427-4965 [address] Care instructions adapted under license by [email or web address] (which disclaims liability or warranty for this information). If you have questions about a medical condition or this instruction, always ask your healthcare professional. [place] [address] any warranty or liability for your use of this information. Anemia From Chronic Disease: Care Instructions Your Care Instructions Anemia is a low level of red blood cells. Red blood cells carry oxygen from your lungs to the rest of your body. Sometimes when you have a long-term (chronic) disease, such as kidney disease, arthritis, diabetes, cancer, or an infection, your body does not make enough red blood cells. Follow-up care is a key part of your treatment and safety. Be sure to make and go to all appointments, and call your doctor if you are having problems. It's also a good idea to know your test results and keep a list of the medicines you take. How can you care for yourself at home? · Follow your doctor's instructions to treat the chronic condition that is causing the anemia. · Be safe with medicines. Take your medicine to treat your chronic condition exactly as prescribed. Call your doctor if you think you are having a problem with your medicine. · Take your medicine for anemia exactly as prescribed. Call your doctor if you think you are having a problem with your medicine.  Medicines to increase the number of red blood cells (such as epoetin or darbepoetin) may be given as an injection. ¨ If you miss a dose, take it as soon as you can, unless it is almost time for your next dose. In that case, get back on your regular schedule and take only one dose. ¨ Do not freeze this medicine. Store it in the refrigerator. Do not shake the bottle before you prepare the shot. · Keep all your appointments for blood tests to check on your hemoglobin levels. When should you call for help? Call 911 anytime you think you may need emergency care. For example, call if: 
? · You passed out (lost consciousness). ?Call your doctor now or seek immediate medical care if: 
? · You are short of breath. ? · You are dizzy or light-headed, or you feel like you may faint. ? · You have new or worse bleeding. ? Watch closely for changes in your health, and be sure to contact your doctor if: 
? · You feel weaker or more tired than usual.  
? · You do not get better as expected. Where can you learn more? Go to http://rosi-awa.info/. Enter E502 in the search box to learn more about \"Anemia From Chronic Disease: Care Instructions. \" Current as of: October 13, 2016 Content Version: 11.4 © 0772-3813 [employer]. Care instructions adapted under license by [employer] (which disclaims liability or warranty for this information). If you have questions about a medical condition or this instruction, always ask your healthcare professional. [place] [address] any warranty or liability for your use of this information. Iron Deficiency Anemia: Care Instructions Your Care Instructions Anemia means that you do not have enough red blood cells. Red blood cells carry oxygen around your body. When you have anemia, it can make you pale, weak, and tired. Many things can cause anemia. The most common cause is loss of blood.  This can happen if you have heavy menstrual periods. It can also happen if you have bleeding in your stomach or bowel. You can also get anemia if you don't have enough iron in your diet or if it's hard for your body to absorb iron. In some cases, pregnancy causes anemia. That's because a pregnant woman needs more iron. Your doctor may do more tests to find the cause of your anemia. If a disease or other health problem is causing it, your doctor will treat that problem. It's important to follow up with your doctor to make sure that your iron level returns to normal. 
Follow-up care is a key part of your treatment and safety. Be sure to make and go to all appointments, and call your doctor if you are having problems. It's also a good idea to know your test results and keep a list of the medicines you take. How can you care for yourself at home? · If your doctor recommended iron pills, take them as directed. ¨ Try to take the pills on an empty stomach. You can do this about 1 hour before or 2 hours after meals. But you may need to take iron with food to avoid an upset stomach. ¨ Do not take antacids or drink milk or anything with caffeine within 2 hours of when you take your iron. They can keep your body from absorbing the iron well. ¨ Vitamin C helps your body absorb iron. You may want to take iron pills with a glass of orange juice or some other food high in vitamin C. 
¨ Iron pills may cause stomach problems. These include heartburn, nausea, diarrhea, constipation, and cramps. It can help to drink plenty of fluids and include fruits, vegetables, and fiber in your diet. ¨ It's normal for iron pills to make your stool a greenish or grayish black. But internal bleeding can also cause dark stool. So it's important to tell your doctor about any color changes. ¨ Call your doctor if you think you are having a problem with your iron pills.  Even after you start to feel better, it will take several months for your body to build up its supply of iron. ¨ If you miss a pill, don't take a double dose. ¨ Keep iron pills out of the reach of small children. Too much iron can be very dangerous. · Eat foods with a lot of iron. These include red meat, shellfish, poultry, and eggs. They also include beans, raisins, whole-grain bread, and leafy green vegetables. · Steam your vegetables. This is the best way to prepare them if you want to get as much iron as possible. · Be safe with medicines. Do not take nonsteroidal anti-inflammatory pain relievers unless your doctor tells you to. These include aspirin, naproxen (Aleve), and ibuprofen (Advil, Motrin). · Liquid iron can stain your teeth. But you can mix it with water or juice and drink it with a straw. Then it won't get on your teeth. When should you call for help? Call 911 anytime you think you may need emergency care. For example, call if: 
? · You passed out (lost consciousness). ?Call your doctor now or seek immediate medical care if: 
? · You are short of breath. ? · You are dizzy or light-headed, or you feel like you may faint. ? · You have new or worse bleeding. ? Watch closely for changes in your health, and be sure to contact your doctor if: 
? · You feel weaker or more tired than usual.  
? · You do not get better as expected. Where can you learn more? Go to http://rosi-awa.info/. Enter M603 in the search box to learn more about \"Iron Deficiency Anemia: Care Instructions. \" Current as of: October 13, 2016 Content Version: 11.4 © 6284-3265 [employer]. Care instructions adapted under license by [employer] (which disclaims liability or warranty for this information). If you have questions about a medical condition or this instruction, always ask your healthcare professional. [address] any warranty or liability for your use of this information. Kidney Dialysis: Care Instructions Your Care Instructions Dialysis is a process that filters wastes from the blood when your kidneys can no longer do the job. It is not a cure, but it can help you live longer and feel better. It is a lifesaving treatment when you have kidney failure. Normal kidneys work 24 hours a day to clean wastes from your blood. Your kidneys are not able to do this job, so a process called dialysis will do some of the work for your kidneys. You and your doctor will decide which type of dialysis you should have. Peritoneal dialysis uses the lining of your belly (peritoneum) to filter your blood. You can do it at home, on a daily basis. Hemodialysis uses a man-made filter called a dialyzer to clean your blood. Most people need to go to a hospital or clinic 3 days a week for several hours each time. Sometimes hemodialysis can be done at home. It is normal to have questions about your treatment, and you have a right to know what is happening to you. Learning about dialysis can help you take an active role in your treatment. Dialysis does not cure kidney disease, but it can help you live longer and feel better. You will need to follow your diet and treatment schedule carefully. Follow-up care is a key part of your treatment and safety. Be sure to make and go to all appointments, and call your doctor if you are having problems. It's also a good idea to know your test results and keep a list of the medicines you take. What do you need to know about peritoneal dialysis? Peritoneal dialysis uses the lining of your belly (or peritoneal membrane) to filter your blood. Before you can begin peritoneal dialysis, your doctor will need to place a thin tube called a catheter in your belly. This is the dialysis access. · Peritoneal dialysis can be done at home or in any clean place. You may be able to do it while you sleep. · You can do it by yourself. You do not have to rely on help from others. · You can do it at the times you choose as long as you do the right number of treatments. · It has to be done every day of the week. · Some people find it hard to do all the required steps. · It increases your chance for a serious infection of the lining of the belly (peritoneum). What do you need to know about hemodialysis? Hemodialysis uses a man-made membrane called a dialyzer to clean your blood. You are connected to the dialyzer by tubes attached to your blood vessels. Before you start hemodialysis, your doctor will create a site where the blood can flow in and out of your body during your dialysis sessions. This site is called the vascular access. It may be a fistula, made by connecting an artery and a vein. Or it may be a graft, which is a tube implanted under your skin. · Hemodialysis is done mainly by trained health workers who can watch for any problems. · It allows you to be in contact with other people having dialysis. This can help provide emotional support. · You can schedule your treatments in the evenings so you can keep working. · You may be able to do home hemodialysis, which gives you more control over your schedule. · It usually needs to be done on a set schedule 3 times a week. · It can cause side effects. The most common side effects are low blood pressure and muscle cramps. These can often be treated easily. · It requires needle sticks during every treatment, which bothers some people. Others get used to it and even do the needle sticks themselves. How can you care for yourself at home? · Be sure to have all of your dialysis sessions. Do not try to shorten or skip your sessions. You have a better chance of a longer and healthier life by getting your full treatment. · Your doctor or health care team will show you the steps you need to go through each day before, during, and after dialysis. Be sure to follow these steps.  If you do not understand a step, talk to your team. 
 · Your doctor and dietitian will help you design menus that follow your diet. Be sure to follow your diet guidelines. ¨ You will need to limit fluids and certain foods that contain salt (sodium), potassium, and phosphorus. ¨ You may need to follow a heart-healthy diet to keep the fat (cholesterol) in your blood under control. ¨ You may need higher levels of protein in your diet. · Your doctor may recommend certain vitamins. But do not take any other medicine, including over-the-counter medicines, vitamins, and herbal products, without talking to your doctor first. 
· Do not smoke. Smoking raises your risk of many health problems, including more kidney damage. If you need help quitting, talk to your doctor about stop-smoking programs and medicines. These can increase your chances of quitting for good. · Do not take ibuprofen (Advil, Motrin), naproxen (Aleve), or similar medicines, unless your doctor tells you to. These medicines may make kidney problems worse. When should you call for help? Call your doctor now or seek immediate medical care if: 
? · You have a fever. ? · You are dizzy or lightheaded, or you feel like you may faint. ? · You are confused or cannot think clearly. ? · You have new or worse nausea or vomiting. ? · You have new or more blood in your urine. ? · You have new swelling. ? Watch closely for changes in your health, and be sure to contact your doctor if: 
? · You do not get better as expected. Where can you learn more? Go to http://rosi-awa.info/. Enter C438 in the search box to learn more about \"Kidney Dialysis: Care Instructions. \" Current as of: May 12, 2017 Content Version: 11.4 © 9763-1005 "[employer]. Care instructions adapted under license by [employer] (which disclaims liability or warranty for this information).  If you have questions about a medical condition or this instruction, always ask your healthcare professional. [place] [address] any warranty or liability for your use of this information. Patient armband removed and shredded [facility] Announcement We are excited to announce that we are making your provider's discharge notes available to you in [facility]. You will see these notes when they are completed and signed by the physician that discharged you from your recent hospital stay. If you have any questions or concerns about any information you see in [facility], please call the Health Information Department where you were seen or reach out to your Primary Care Provider for more information about your plan of care. Introducing [facility]! [address] introduces [facility] patient portal. Now you can access parts of your medical record, email your doctor's office, and request medication refills online. 1. In your internet browser, go to [email or web address] 2. Click on the First Time User? Click Here link in the Sign In box. You will see the New Member Sign Up page. 3. Enter your [facility] Access Code exactly as it appears below. You will not need to use this code after youve completed the sign-up process. If you do not sign up before the expiration date, you must request a new code. · [facility] Access Code: [email or web address] Expires: 3/18/2018 12:55 PM 
 
4. Enter the last four digits of your Social Security Number (xxxx) and Date of Birth (mm/dd/yyyy) as indicated and click Submit. You will be taken to the next sign-up page. 5. Create a [facility] ID. This will be your [facility] login ID and cannot be changed, so think of one that is secure and easy to remember. 6. Create a [facility] password. You can change your password at any time. 7. Enter your Password Reset Question and Answer. This can be used at a later time if you forget your password. 8. Enter your e-mail address. You will receive e-mail notification when new information is available in [address]. 9. Click Sign Up. You can now view and download portions of your medical record. 10. Click the Download Summary menu link to download a portable copy of your medical information. If you have questions, please visit the Frequently Asked Questions section of the [employer] website. Remember, [employer] is NOT to be used for urgent needs. For medical emergencies, dial 911. Now available from your iPhone and Android! Unresulted Labs-Please follow up with your PCP about these lab tests Order Current Status H PYLORI AB, IGA In process Providers Seen During Your Hospitalization Provider Specialty Primary office phone Neihsa Mayen MD Emergency Medicine [phone] Leslie Kidd MD Family Practice [phone] Your Primary Care Physician (PCP) Primary Care Physician Office Phone Office Fax OTHER, PHYS ** None ** ** None ** You are allergic to the following Allergen Reactions Aspirin Other (comments) GI bleed Heparin (Porcine) Other (comments) GI bleed Metformin Other (comments)  
 swelling Norvasc (Amlodipine) Rash Recent Documentation Weight BMI OB Status Smoking Status 73.1 kg 28.55 kg/m2 Postmenopausal Never Smoker Emergency Contacts Name Discharge Info Relation Home Work Mobile [place] DISCHARGE CAREGIVER [3] Son [22] [phone] Patient Belongings The following personal items are in your possession at time of discharge: 
  Dental Appliances: None  Visual Aid: None      Home Medications: None   Jewelry: Earrings  Clothing: Footwear, Shirt, Pants, Socks    Other Valuables: None Please provide this summary of care documentation to your next provider.  
  
  
 
  
Signatures-by signing, you are acknowledging that this After Visit Summary has been reviewed with you and you have received a copy. Patient Signature:  ____________________________________________________________ Date:  ____________________________________________________________  
  
Kiley Deiters Provider Signature:  ____________________________________________________________ Date:  ____________________________________________________________

## 2018-03-09 NOTE — PROGRESS NOTES
Nephrology Progress note    Subjective:     Vidhi Pérez is a 75 y.o. female with PMH DM, HTN, cirrhosis, hx dicerticulosis/AVMs s/p cautery, ESRD on HD MWF at [facility] sent over from dialysis for rectal bleeding which started this AM. Pt did not receive dialysis today. No SOB, CP. She c/o abdominal discomfort, no N/V. Had episode GIB in January.     Admit Date: 3/9/2018    Allergy:  Allergies   Allergen Reactions    Aspirin Other (comments)     GI bleed    Heparin (Porcine) Other (comments)     GI bleed    Metformin Other (comments)     swelling    Norvasc [Amlodipine] Rash        Objective:     Visit Vitals    BP (!) 119/92    Pulse 98    Temp 97.7 °F (36.5 °C)    Resp 18    SpO2 100%       No intake or output data in the 24 hours ending 03/09/18 1331    Physical Exam:       General: No acute distress   HENT: Atraumatic and normocephalic   Eyes: Normal conjunctiva   Neck: Supple    Cardiovascular: Normal S1 & S2, no m/r/g   Pulmonary/Chest Wall: Clear to auscultation bilaterally   Abdominal: Soft and non-tender   Musculoskeletal: no edema   Neurological: No focal deficits       Data Review:    Lab Results   Component Value Date/Time    WBC 6.8 03/09/2018 09:30 AM    RBC 2.65 (L) 03/09/2018 09:30 AM    HCT 24.8 (L) 03/09/2018 09:30 AM    MCV 93.6 03/09/2018 09:30 AM    MCH 27.5 03/09/2018 09:30 AM    MCHC 29.4 (L) 03/09/2018 09:30 AM    RDW 17.7 (H) 03/09/2018 09:30 AM      Lab Results   Component Value Date    IRON 83 12/16/2017   No components found for: FERRITIN  No components found for: PTHINT  Urinalysis  No results found for: UGLU         Impression:     ESRD  Rectal bleeding, hx diverticulosis, AVMs  DM  HTN  Anemia  Cirrhosis    Plan:     HD today per usual schedule, no heparin  Epo w/ HD  transfuse prn  GI consulted      MD Jasbir Benton  [phone]

## 2018-03-09 NOTE — ED PROVIDER NOTES
EMERGENCY DEPARTMENT HISTORY AND PHYSICAL EXAM    Date: 3/9/2018  Patient Name: Joan Root    History of Presenting Illness     Chief Complaint   Patient presents with    Rectal Bleeding         History Provided By: Patient    Chief Complaint: rectal bleeding/hematochezia  Duration: since this morning prior to going to her dialysis   Timing:  Acute  Location: GI  Quality: \"gurgling\"  Modifying Factors: ASA makes sxs worse, so advised not to take it. Associated Symptoms: \"stomach gurgling\" and constipation prior to this morning    Additional History (Context):   8:34 AM  Joan Root is a 75 y.o. female with PMHX of HTN, CKD, arthritis, GERD, DM, chronic pain, hemorrhoids, and liver cirrhosis and PSHx of colonoscopy (with polyp removal with lots of bleeding and need for blood transfusions; scheduled every 3 years) who presents to the emergency department C/O rectal bleeding/ blood in stoolonset this morning while on her way to dialysis causing dialysis not to be done today (as part of her [facility] schedule). Associated sxs include \"stomach gurgling\" and constipation prior to this morning. Pt still able to make urine. Pt not able to take ASA due to past hx of bleeding. Other PSHx of GYN surgery. Pt was told that her hemoglobin was 7.4 this morning. Pt is a non smoker and a non EtOH user. Allergies reported to ASA, Heparin, Metformin, and Norvasc. Pt denies N/V, chest pain, HA, and any other sxs or complaints. PCP: Savannah Farrell MD    Current Facility-Administered Medications   Medication Dose Route Frequency Provider Last Rate Last Dose    pantoprazole (PROTONIX) 40 mg in sodium chloride 10 mL injection  40 mg IntraVENous NOW Storm Arredondo MD         Current Outpatient Prescriptions   Medication Sig Dispense Refill    pantoprazole (PROTONIX) 40 mg tablet Take 1 Tab by mouth daily. 30 Tab 2    cholecalciferol (VITAMIN D3) 1,000 unit tablet Take 2,000 Units by mouth daily.       ferrous sulfate 325 mg (65 mg iron) tablet Take 325 mg by mouth three (3) times daily (with meals).  hydrALAZINE (APRESOLINE) 25 mg tablet Take 25 mg by mouth three (3) times daily.  lactulose (CHRONULAC) 10 gram/15 mL solution Take 20 g by mouth daily as needed.  metoprolol succinate (TOPROL XL) 50 mg XL tablet Take 25 mg by mouth daily.  sevelamer (RENAGEL) 800 mg tablet Take 1,600 mg by mouth three (3) times daily (with meals).  b complex-vitamin c-folic acid (NEPHROCAPS) 1 mg capsule Take 1 Cap by mouth daily.  acetaminophen (TYLENOL) 325 mg tablet Take 325 mg by mouth every six (6) hours as needed for Pain.  AFLIBERCEPT INTRAVITREAL 1 Ampule by IntraVITreal route every twenty-eight (28) days.  carboxymethylcellulose sodium (REFRESH PLUS) 0.5 % dpet Administer 1 Drop to both eyes as needed.  docusate sodium (COLACE) 100 mg capsule Take 100 mg by mouth two (2) times a day.  hydrOXYzine HCl (ATARAX) 10 mg tablet Take 10 mg by mouth nightly as needed for Itching.  albuterol (PROVENTIL HFA, VENTOLIN HFA, PROAIR HFA) 90 mcg/actuation inhaler Take 2 Puffs by inhalation every four (4) hours as needed for Wheezing. Past History     Past Medical History:  Past Medical History:   Diagnosis Date    Arthritis     Asthma     Chronic kidney disease     Chronic pain     Cirrhosis ([facility] [address] 12/17/2017    Diabetes ([facility] [address] diet controlled    GERD (gastroesophageal reflux disease)     Hypertension        Past Surgical History:  Past Surgical History:   Procedure Laterality Date    COLONOSCOPY N/A 1/12/2018    COLONOSCOPY performed by Derinda Shone, MD at [facility] ENDOSCOPY    HX GYN  c section       Family History:  No family history on file. Social History:  Social History   Substance Use Topics    Smoking status: Never Smoker    Smokeless tobacco: Never Used    Alcohol use No       Allergies:   Allergies   Allergen Reactions    Aspirin Other (comments)     GI bleed    Heparin (Porcine) Other (comments)     GI bleed    Metformin Other (comments)     swelling    Norvasc [Amlodipine] Rash         Review of Systems   Review of Systems   Cardiovascular: Negative for chest pain. Gastrointestinal: Positive for abdominal pain (\"stomach gurgling\"), anal bleeding, blood in stool and constipation. Negative for nausea and vomiting. Neurological: Negative for headaches. All other systems reviewed and are negative. Physical Exam     Vitals:    03/09/18 0900 03/09/18 0930 03/09/18 1000 03/09/18 1100   BP: 134/53 129/59 123/59 142/67   Pulse: 81  78 86   Resp: 16  22 29   Temp:       SpO2: 100%        Physical Exam   Nursing note and vitals reviewed. Constitutional: Elderly appearing, no acute distress  Head: Normocephalic, Atraumatic  Eyes: EOMI  Neck: Supple  Cardiovascular: Regular rate and rhythm, no murmurs, rubs, or gallops  Chest: Normal work of breathing and chest excursion bilaterally  Lungs: Clear to ausculation bilaterally  Abdomen: Soft, non tender, non distended, normoactive bowel sounds  Back: No evidence of trauma or deformity  Rectal: Renetta Allen Melena and heme positive on rectal exam.  Extremities: No evidence of trauma or deformity, no LE edema  Skin: Warm and dry, normal cap refill  Neuro: Alert and appropriate. Psychiatric: Normal mood and affect      Diagnostic Study Results     Labs -     Recent Results (from the past 12 hour(s))   CBC WITH AUTOMATED DIFF    Collection Time: 03/09/18  9:30 AM   Result Value Ref Range    WBC 6.8 4.6 - 13.2 K/uL    RBC 2.65 (L) 4.20 - 5.30 M/uL    HGB 7.3 (L) 12.0 - 16.0 g/dL    HCT 24.8 (L) 35.0 - 45.0 %    MCV 93.6 74.0 - 97.0 FL    MCH 27.5 24.0 - 34.0 PG    MCHC 29.4 (L) 31.0 - 37.0 g/dL    RDW 17.7 (H) 11.6 - 14.5 %    PLATELET 853 (L) 452 - 420 K/uL    MPV 10.8 9.2 - 11.8 FL    NEUTROPHILS 73 40 - 73 %    LYMPHOCYTES 14 (L) 21 - 52 %    MONOCYTES 8 3 - 10 %    EOSINOPHILS 4 0 - 5 %    BASOPHILS 1 0 - 2 %    ABS.  NEUTROPHILS 4.9 1.8 - 8.0 K/UL ABS. LYMPHOCYTES 1.0 0.9 - 3.6 K/UL    ABS. MONOCYTES 0.5 0.05 - 1.2 K/UL    ABS. EOSINOPHILS 0.3 0.0 - 0.4 K/UL    ABS. BASOPHILS 0.1 (H) 0.0 - 0.06 K/UL    RBC COMMENTS ANISOCYTOSIS  1+        RBC COMMENTS OVALOCYTES  3+        RBC COMMENTS HYPOCHROMIA  1+        RBC COMMENTS TARGET CELLS  1+        RBC COMMENTS TEARDROP CELLS  1+        RBC COMMENTS SCHISTOCYTES  OCCASIONAL  POIKILOCYTOSIS  2+        DF AUTOMATED     TYPE & SCREEN    Collection Time: 03/09/18  9:30 AM   Result Value Ref Range    Crossmatch Expiration 03/12/2018     ABO/Rh(D) Claudia Castillo POSITIVE     Antibody screen NEG    PROTHROMBIN TIME + INR    Collection Time: 03/09/18  9:30 AM   Result Value Ref Range    Prothrombin time 12.6 11.5 - 15.2 sec    INR 1.0 0.8 - 1.2     METABOLIC PANEL, COMPREHENSIVE    Collection Time: 03/09/18  9:30 AM   Result Value Ref Range    Sodium 143 136 - 145 mmol/L    Potassium 5.4 3.5 - 5.5 mmol/L    Chloride 102 100 - 108 mmol/L    CO2 28 21 - 32 mmol/L    Anion gap 13 3.0 - 18 mmol/L    Glucose 157 (H) 74 - 99 mg/dL    BUN 58 (H) 7.0 - 18 MG/DL    Creatinine 6.14 (H) 0.6 - 1.3 MG/DL    BUN/Creatinine ratio 9 (L) 12 - 20      GFR est AA 8 (L) >60 ml/min/1.73m2    GFR est non-AA 7 (L) >60 ml/min/1.73m2    Calcium 9.6 8.5 - 10.1 MG/DL    Bilirubin, total 0.5 0.2 - 1.0 MG/DL    ALT (SGPT) 18 13 - 56 U/L    AST (SGOT) 29 15 - 37 U/L    Alk.  phosphatase 152 (H) 45 - 117 U/L    Protein, total 7.1 6.4 - 8.2 g/dL    Albumin 3.7 3.4 - 5.0 g/dL    Globulin 3.4 2.0 - 4.0 g/dL    A-G Ratio 1.1 0.8 - 1.7     PTT    Collection Time: 03/09/18  9:30 AM   Result Value Ref Range    aPTT 20.7 (L) 23.0 - 36.4 SEC       Radiologic Studies -     NM ACUTE GI BLEED SCAN    (Results Pending)         CT Results  (Last 48 hours)    None        CXR Results  (Last 48 hours)    None          Medications given in the ED-  Medications   pantoprazole (PROTONIX) 40 mg in sodium chloride 10 mL injection (not administered)         Medical Decision Making   I am the first provider for this patient. I reviewed the vital signs, available nursing notes, past medical history, past surgical history, family history and social history. Vital Signs-Reviewed the patient's vital signs. Pulse Oximetry Analysis - 100% on room air. Cardiac Monitor:  Rate: 95 bpm  Rhythm: Ventricular rhythm    Records Reviewed: Nursing Notes    Provider Notes (Medical Decision Making): 75year old female with ESRD on HD presenting for Melena. Hemodynamically stable. Very anemic. Discussed with GI and hospitalist for further inpatient management. Procedures:  Procedures    PROCEDURE NOTE - RECTAL EXAM:   8:50 AM  Performed by: Rima Vergara MD  Rectal exam performed. Dark stool was collected. Stool was Hemoccult tested, and found to be heme Positive. The procedure took 1-15 minutes, and pt tolerated well. Written by Brandi Foley, ED Scribe, as dictated by Rima Vergara MD.     ED Course:   8:34 AM Initial assessment performed. The patients presenting problems have been discussed, and they are in agreement with the care plan formulated and outlined with them. I have encouraged them to ask questions as they arise throughout their visit. 10:25 AM Discussed patient's history, exam, and available diagnostics results with Tanya Sandhu MD, gastroenterology, who recommends a GI bleeding scan and Protonix. 10:34 AM Discussed patient's history, exam, and available diagnostics results with Shena Linares MD, internal medicine, who agree with accepting pt to telemetry. Diagnosis and Disposition     Critical Care Time:     Core Measures:  For Hospitalized Patients:    1. Hospitalization Decision Time:  The decision to hospitalize the patient was made by Rima Vergara MD at 10:25 AM on 3/9/2018    2. Aspirin: Aspirin was not given because the patient is a bleeding risk and did not present for CVA like sxs.     11:20 AM  Patient is being admitted to the hospital by Shena Linares MD. The results of their tests and reasons for their admission have been discussed with them and/or available family. They convey agreement and understanding for the need to be admitted and for their admission diagnosis. CONDITIONS ON ADMISSION:  Sepsis is not present at the time of admission. Deep Vein Thrombosis is not present at the time of admission. Thrombosis is not present at the time of admission. Urinary Tract Infection is not present at the time of admission. Pneumonia is not present at the time of admission. MRSA is not present at the time of admission. Wound infection is not present at the time of admission. Pressure Ulcer is not present at the time of admission. CLINICAL IMPRESSION:    1. Melena    2. Gastrointestinal hemorrhage with melena    3. ESRD (end stage renal disease) on dialysis ([facility] [address]        PLAN:  1. ADMIT  _______________________________    Attestations: This note is prepared by Caitlyn Boo, acting as Scribe for Afua Hooper MD.    Afua Hooper MD:  The scribe's documentation has been prepared under my direction and personally reviewed by me in its entirety.   I confirm that the note above accurately reflects all work, treatment, procedures, and medical decision making performed by me.  _______________________________

## 2018-03-09 NOTE — ED NOTES
Skin assessment done by Nghia Giron RN and this RN, no skin breakdown noted. Noted to have multiple painful \"knots\" on both legs from knees down. Pt states they started from not taking phosphorus binders when she eats. Pt states she is supposed to be getting these area removed.

## 2018-03-09 NOTE — IP AVS SNAPSHOT
[address] [address] 
[phone] Patient: Tresea Blocker MRN: [id number] OM A check richmond indicates which time of day the medication should be taken. My Medications START taking these medications Instructions Each Dose to Equal  
 Morning Noon Evening Bedtime  
 nystatin powder Commonly known as:  MYCOSTATIN Your last dose was: Your next dose is:    
   
   
 Apply  to affected area two (2) times a day. CONTINUE taking these medications Instructions Each Dose to Equal  
 Morning Noon Evening Bedtime AFLIBERCEPT INTRAVITREAL Your last dose was: Your next dose is:    
   
   
 1 Ampule by IntraVITreal route every twenty-eight (28) days. 1 Ampule  
    
   
   
   
  
 albuterol 90 mcg/actuation inhaler Commonly known as:  PROVENTIL HFA, VENTOLIN HFA, PROAIR HFA Your last dose was: Your next dose is: Take 2 Puffs by inhalation every four (4) hours as needed for Wheezing. 2 Puff  
    
   
   
   
  
 carboxymethylcellulose sodium 0.5 % Dpet Commonly known as:  REFRESH PLUS Your last dose was: Your next dose is:    
   
   
 Administer 1 Drop to both eyes as needed. 1 Drop  
    
   
   
   
  
 ferrous sulfate 325 mg (65 mg iron) tablet Your last dose was: Your next dose is: Take 325 mg by mouth three (3) times daily (with meals). 325 mg  
    
   
   
   
  
 hydrALAZINE 25 mg tablet Commonly known as:  APRESOLINE Your last dose was: Your next dose is: Take 25 mg by mouth three (3) times daily. 25 mg  
    
   
   
   
  
 hydrOXYzine HCl 10 mg tablet Commonly known as:  ATARAX Your last dose was: Your next dose is: Take 10 mg by mouth nightly as needed for Itching.   
 10 mg  
    
   
   
   
  
 lactulose 10 gram/15 mL solution Commonly known as:  Angelo Angeles Your last dose was: Your next dose is: Take 20 g by mouth daily as needed. 20 g NEPHROCAPS 1 mg capsule Generic drug:  b complex-vitamin c-folic acid Your last dose was: Your next dose is: Take 1 Cap by mouth daily. 1 Cap  
    
   
   
   
  
 pantoprazole 40 mg tablet Commonly known as:  PROTONIX Your last dose was: Your next dose is: Take 1 Tab by mouth daily. 40 mg  
    
   
   
   
  
 sevelamer 800 mg tablet Commonly known as:  RENAGEL Your last dose was: Your next dose is: Take 1,600 mg by mouth three (3) times daily (with meals). 1600 mg  
    
   
   
   
  
 TOPROL XL 50 mg XL tablet Generic drug:  metoprolol succinate Your last dose was: Your next dose is: Take 25 mg by mouth daily. 25 mg  
    
   
   
   
  
 TYLENOL 325 mg tablet Generic drug:  acetaminophen Your last dose was: Your next dose is: Take 325 mg by mouth every six (6) hours as needed for Pain. 325 mg  
    
   
   
   
  
 VITAMIN D3 1,000 unit tablet Generic drug:  cholecalciferol Your last dose was: Your next dose is: Take 2,000 Units by mouth daily. 2000 Units Where to Get Your Medications Information on where to get these meds will be given to you by the nurse or doctor. ! Ask your nurse or doctor about these medications  
  nystatin powder

## 2018-03-10 LAB
ABO + RH BLD: NORMAL
ANION GAP SERPL CALC-SCNC: 14 MMOL/L (ref 3–18)
BLD PROD TYP BPU: NORMAL
BLOOD GROUP ANTIBODIES SERPL: NORMAL
BPU ID: NORMAL
BUN SERPL-MCNC: 74 MG/DL (ref 7–18)
BUN/CREAT SERPL: 9 (ref 12–20)
CALCIUM SERPL-MCNC: 8.9 MG/DL (ref 8.5–10.1)
CALLED TO:,BCALL1: NORMAL
CHLORIDE SERPL-SCNC: 104 MMOL/L (ref 100–108)
CO2 SERPL-SCNC: 25 MMOL/L (ref 21–32)
CREAT SERPL-MCNC: 7.81 MG/DL (ref 0.6–1.3)
CROSSMATCH RESULT,%XM: NORMAL
ERYTHROCYTE [DISTWIDTH] IN BLOOD BY AUTOMATED COUNT: 18.7 % (ref 11.6–14.5)
GLUCOSE BLD STRIP.AUTO-MCNC: 129 MG/DL (ref 70–110)
GLUCOSE BLD STRIP.AUTO-MCNC: 192 MG/DL (ref 70–110)
GLUCOSE BLD STRIP.AUTO-MCNC: 204 MG/DL (ref 70–110)
GLUCOSE BLD STRIP.AUTO-MCNC: 258 MG/DL (ref 70–110)
GLUCOSE SERPL-MCNC: 210 MG/DL (ref 74–99)
HCT VFR BLD AUTO: 25.6 % (ref 35–45)
HGB BLD-MCNC: 7.7 G/DL (ref 12–16)
MCH RBC QN AUTO: 26.7 PG (ref 24–34)
MCHC RBC AUTO-ENTMCNC: 30.1 G/DL (ref 31–37)
MCV RBC AUTO: 88.9 FL (ref 74–97)
PLATELET # BLD AUTO: 140 K/UL (ref 135–420)
PMV BLD AUTO: 10.6 FL (ref 9.2–11.8)
POTASSIUM SERPL-SCNC: 4.7 MMOL/L (ref 3.5–5.5)
RBC # BLD AUTO: 2.88 M/UL (ref 4.2–5.3)
SODIUM SERPL-SCNC: 143 MMOL/L (ref 136–145)
SPECIMEN EXP DATE BLD: NORMAL
STATUS OF UNIT,%ST: NORMAL
UNIT DIVISION, %UDIV: 0
WBC # BLD AUTO: 4.9 K/UL (ref 4.6–13.2)

## 2018-03-10 PROCEDURE — 90935 HEMODIALYSIS ONE EVALUATION: CPT

## 2018-03-10 PROCEDURE — 97162 PT EVAL MOD COMPLEX 30 MIN: CPT

## 2018-03-10 PROCEDURE — 74011250637 HC RX REV CODE- 250/637: Performed by: HOSPITALIST

## 2018-03-10 PROCEDURE — 85027 COMPLETE CBC AUTOMATED: CPT | Performed by: HOSPITALIST

## 2018-03-10 PROCEDURE — 65660000000 HC RM CCU STEPDOWN

## 2018-03-10 PROCEDURE — 74011250636 HC RX REV CODE- 250/636: Performed by: HOSPITALIST

## 2018-03-10 PROCEDURE — 74011250637 HC RX REV CODE- 250/637: Performed by: INTERNAL MEDICINE

## 2018-03-10 PROCEDURE — 80048 BASIC METABOLIC PNL TOTAL CA: CPT | Performed by: HOSPITALIST

## 2018-03-10 PROCEDURE — 74011000250 HC RX REV CODE- 250: Performed by: HOSPITALIST

## 2018-03-10 PROCEDURE — C9113 INJ PANTOPRAZOLE SODIUM, VIA: HCPCS | Performed by: HOSPITALIST

## 2018-03-10 PROCEDURE — 82962 GLUCOSE BLOOD TEST: CPT

## 2018-03-10 PROCEDURE — 36415 COLL VENOUS BLD VENIPUNCTURE: CPT | Performed by: HOSPITALIST

## 2018-03-10 RX ORDER — ALBUTEROL SULFATE 90 UG/1
2 AEROSOL, METERED RESPIRATORY (INHALATION)
Status: DISCONTINUED | OUTPATIENT
Start: 2018-03-10 | End: 2018-03-13 | Stop reason: HOSPADM

## 2018-03-10 RX ORDER — MAG HYDROX/ALUMINUM HYD/SIMETH 200-200-20
30 SUSPENSION, ORAL (FINAL DOSE FORM) ORAL
Status: DISCONTINUED | OUTPATIENT
Start: 2018-03-10 | End: 2018-03-13 | Stop reason: HOSPADM

## 2018-03-10 RX ADMIN — ALUMINUM HYDROXIDE, MAGNESIUM HYDROXIDE, AND SIMETHICONE 30 ML: 200; 200; 20 SUSPENSION ORAL at 15:50

## 2018-03-10 RX ADMIN — SEVELAMER CARBONATE 1600 MG: 800 TABLET, FILM COATED ORAL at 18:06

## 2018-03-10 RX ADMIN — VITAMIN D, TAB 1000IU (100/BT) 2000 UNITS: 25 TAB at 08:58

## 2018-03-10 RX ADMIN — HYDROCORTISONE 2.5%: 25 CREAM TOPICAL at 13:40

## 2018-03-10 RX ADMIN — HYDROCORTISONE 2.5%: 25 CREAM TOPICAL at 02:54

## 2018-03-10 RX ADMIN — FLUCONAZOLE 100 MG: 100 TABLET ORAL at 13:37

## 2018-03-10 RX ADMIN — SEVELAMER CARBONATE 1600 MG: 800 TABLET, FILM COATED ORAL at 08:33

## 2018-03-10 RX ADMIN — HYDROXYZINE HYDROCHLORIDE 10 MG: 10 TABLET ORAL at 20:48

## 2018-03-10 RX ADMIN — NYSTATIN: 100000 POWDER TOPICAL at 02:56

## 2018-03-10 RX ADMIN — HYDROCORTISONE 2.5%: 25 CREAM TOPICAL at 22:13

## 2018-03-10 RX ADMIN — NYSTATIN: 100000 POWDER TOPICAL at 08:55

## 2018-03-10 RX ADMIN — HYDRALAZINE HYDROCHLORIDE 25 MG: 25 TABLET, FILM COATED ORAL at 22:12

## 2018-03-10 RX ADMIN — SODIUM CHLORIDE 40 MG: 9 INJECTION INTRAMUSCULAR; INTRAVENOUS; SUBCUTANEOUS at 20:48

## 2018-03-10 RX ADMIN — ACETAMINOPHEN 650 MG: 325 TABLET ORAL at 20:48

## 2018-03-10 RX ADMIN — HYDRALAZINE HYDROCHLORIDE 25 MG: 25 TABLET, FILM COATED ORAL at 08:32

## 2018-03-10 RX ADMIN — HYDROCORTISONE 2.5%: 25 CREAM TOPICAL at 18:00

## 2018-03-10 RX ADMIN — SODIUM CHLORIDE 40 MG: 9 INJECTION INTRAMUSCULAR; INTRAVENOUS; SUBCUTANEOUS at 08:31

## 2018-03-10 RX ADMIN — ACETAMINOPHEN 650 MG: 325 TABLET ORAL at 08:31

## 2018-03-10 RX ADMIN — SEVELAMER CARBONATE 1600 MG: 800 TABLET, FILM COATED ORAL at 13:37

## 2018-03-10 RX ADMIN — NYSTATIN: 100000 POWDER TOPICAL at 20:52

## 2018-03-10 RX ADMIN — Medication 1 TABLET: at 08:32

## 2018-03-10 NOTE — PROGRESS NOTES
Gastrointestinal Progress Note    Patient Name: Adriana STONE Date: 3/10/2018    Admit Date: 3/9/2018    Subjective:     Diet: Patient is on Regular diabetic and renal diet and is not tolerating. Nausea is present. Vomiting is not present. Pain: Patient does not complain of abdominal pain. Bowel Movements: None    Bleeding:  None    Current Facility-Administered Medications   Medication Dose Route Frequency    alum-mag hydroxide-simeth (MYLANTA) oral suspension 30 mL  30 mL Oral Q4H PRN    albuterol (PROVENTIL HFA, VENTOLIN HFA, PROAIR HFA) inhaler 2 Puff  2 Puff Inhalation Q4H PRN    vit B Cmplx 3-FA-Vit C-Biotin (NEPHRO CATARINA RX) tablet 1 Tab  1 Tab Oral DAILY    cholecalciferol (VITAMIN D3) tablet 2,000 Units  2,000 Units Oral DAILY    hydrALAZINE (APRESOLINE) tablet 25 mg  25 mg Oral TID    hydrOXYzine HCl (ATARAX) tablet 10 mg  10 mg Oral QHS PRN    metoprolol succinate (TOPROL-XL) XL tablet 25 mg  25 mg Oral DAILY    0.9% sodium chloride infusion  25 mL/hr IntraVENous DIALYSIS PRN    [START ON 3/12/2018] epoetin reshma (EPOGEN;PROCRIT) injection 10,000 Units  10,000 Units IntraVENous DIALYSIS MON, WED & FRI    0.9% sodium chloride infusion 250 mL  250 mL IntraVENous PRN    hydrOXYzine HCl (ATARAX) tablet 25 mg  25 mg Oral TID PRN    fluconazole (DIFLUCAN) tablet 100 mg  100 mg Oral DAILY    hydrocortisone (ANUSOL-HC) 2.5 % rectal cream   PeriANAL QID    nystatin (MYCOSTATIN) 100,000 unit/gram powder   Topical BID    pantoprazole (PROTONIX) 40 mg in sodium chloride 10 mL injection  40 mg IntraVENous Q12H    acetaminophen (TYLENOL) tablet 650 mg  650 mg Oral Q6H PRN    ondansetron (ZOFRAN) injection 4 mg  4 mg IntraVENous Q6H PRN    sevelamer carbonate (RENVELA) tab 1,600 mg  1,600 mg Oral TID WITH MEALS          Objective:     Visit Vitals    /49 (BP 1 Location: Left arm, BP Patient Position: At rest;Supine; Head of bed elevated (Comment degrees))    Pulse 69    Temp 97.8 °F (36.6 °C)    Resp 12    Wt 72.1 kg (158 lb 15.2 oz)    SpO2 100%    BMI 28.16 kg/m2       03/08 1901 - 03/10 0700  In: 720 [P.O.:720]  Out: -     General appearance: alert, cooperative, no distress, appears stated age. She is on the dialysis machine  Abdomen: large small ascites. soft, non-tender. Bowel sounds normal. No masses,  no organomegaly  Extremities: no edema    Data Review:    Labs: Results:       Chemistry Recent Labs      03/10/18   0530  03/09/18   0930   GLU  210*  157*   NA  143  143   K  4.7  5.4   CL  104  102   CO2  25  28   BUN  74*  58*   CREA  7.81*  6.14*   CA  8.9  9.6   AGAP  14  13   BUCR  9*  9*   AP   --   152*   TP   --   7.1   ALB   --   3.7   GLOB   --   3.4   AGRAT   --   1.1      CBC w/Diff Recent Labs      03/10/18   0530  03/09/18   0930   WBC  4.9  6.8   RBC  2.88*  2.65*   HGB  7.7*  7.3*   HCT  25.6*  24.8*   PLT  140  108*   GRANS   --   73   LYMPH   --   14*   EOS   --   4      Coagulation Recent Labs      03/09/18 0930   PTP  12.6   INR  1.0   APTT  20.7*       Liver Enzymes Recent Labs      03/09/18 0930   TP  7.1   ALB  3.7   AP  152*   SGOT  29          Assessment:     Principal Problem:    GI bleed (12/15/2017)    Active Problems:    ESRD (end stage renal disease) ([facility] [address] (12/15/2017)      Diabetes mellitus type 2, controlled ([facility] [address] (12/15/2017)      HTN (hypertension) (12/15/2017)      Anemia in chronic kidney disease (12/16/2017)      Cirrhosis ([facility] [address] (12/17/2017)      Acute blood loss anemia (3/9/2018)        Plan:     Acute GI bleeding started on 3/9/ 2018 at 6 am in the form of multiple large rectal bleeding and then darker. Last yesterday 4 pm. Hgb dropped from 8 to 7. In my opinion the bleeding is most likely diverticular but it could be also from AVM in the right colon however it didn't look threatening or a culprit during colonoscopy on January 12, 2018.  The other is distal duodenal AVM in the distal duodenum cauterized on Molina 10, 2018 or other small bowel lesions? ?? However it is rare that AVM bleed acutely and profusely, most of the time the bleeding is more occult and intermittent causing iron deficiency anemia instead of acute blood loss anemia. Complicating all of these the patient has Cirrhosis with splenomegaly and Ascites but no Varices. Plan - in case of recurrence of bleeding I would like to obtain acute GI bleeding scan to try to localize the source of the bleeding. I would not repeat the colonoscopy for now but in my opinion need to repeat the EGD, It is not urgent. Would be done Monday but may do it tomorrow if conditions permit. Cirrhosis most likely related to ISAAC caused by DM x 30 years and history of morbid obesity where for years she was >300 lbs/ She has splenomegaly , ascites but no varices according to EGD 1/10/ 2018 Platelet 635 and today 140. INR 1. She denied hx of alcohol abuse. No viral or autoimmune hepatitis    GERD and Dyspepsia in the form of postprandial nausea and belching most likely related to diabetic gastroparesis and possibly gastric outlet obstruction? There was significant gastric food retention on the last EGD 1/10. 2018 where the pylorus was tight and spastic and had to be dilated with balloon to 20 mm. Need to repeat the EGD. For now keep the Protonix 40 mg at bid. Avoid all narcotics/ May need to put her on Reglan if no gastric obstruction! DM with ESRD on Hemodialysis x 8 years  Acute blood loss on chronic anemia from CKD and AVM. Hgb stable now at 7.7 was before the GI  Bleeding. Thrombocytopenia mild 108 to 140 is related to the cirrhosis  History of multiple large colonic adenoma polyps removed in 12/ 2016 and 6 in 1/12/ 2018 next colonoscopy in 3 years  She has very redundant difficult colon and chronic constipation requiring double bowel prep for the colonoscopy.  Keep on daily MIRALX  Duodenal and right colonic AVM not sure if they are the culprit in GI blood losses or there are others more prominent in the small bowel?                  Emma Bonner MD  March 10, 2018

## 2018-03-10 NOTE — PROGRESS NOTES
[id number]  Received report. Patient A/OX4. Denies pain. No acute distress noted. Call light within reach. 1125  Patient anxious to be discharged home. Patient to be discharged if ok with gastro per Dr. Ike Posadas. 1400  At this time, patient receiving dialysis at bedside. Vitals stable no acute distress noted. Bedside and Verbal shift change report given to [facility] (oncoming nurse) by Anna Main (offgoing nurse). Report included the following information SBAR and Kardex.

## 2018-03-10 NOTE — PROGRESS NOTES
Shift Summary Note:  Assumed care of patient in bed awake with multiple requests throughout the night. MD Julieta Alfredo in to see and talk with patient. Remains on telemetry, Dialysis not done last night. No change in status VSS, call bell within reach.   Patient Vitals for the past 12 hrs:   Temp Pulse Resp BP SpO2   03/10/18 0246 98.3 °F (36.8 °C) 81 16 (!) 127/92 100 %   03/09/18 2318 98.2 °F (36.8 °C) 81 18 120/58 100 %   03/09/18 2037 98.2 °F (36.8 °C) 85 18 114/63 100 %

## 2018-03-10 NOTE — PROGRESS NOTES
Hospitalist Progress Note    Patient: Mat Jain MRN: [id number]  CSN: [id number]    YOB: 1950  Age: 75 y.o. Sex: female    DOA: 3/9/2018 LOS:  LOS: 1 day            Assessment/Plan     1. GI bleed, had negative bleeding scan and no further episodes of bleeding noted. H/H impoved w transfusion  2. ABL anemia  3. ESRD on HD  4. Vaginitis    Plan:  - continue PPI  - repeat H/H this afternoon  - awaiting GI recommendations, disucssed w Dr Nikki Colbert   - HD per nephrology      Patient Active Problem List   Diagnosis Code    GI bleed K92.2    ESRD (end stage renal disease) ([facility] N18.6    Diabetes mellitus type 2, controlled ([facility] E11.9    HTN (hypertension) I10    Anemia in chronic kidney disease N18.9, D63.1    Cirrhosis ([facility] K74.60    Acute blood loss anemia D62               Subjective:    cc: GI bleed  No acute events overnight  She reports no further episodes of rectal bleeding    REVIEW OF SYSTEMS:  General: No fevers or chills. Cardiovascular: No chest pain or pressure. No palpitations. Pulmonary: No shortness of breath. Gastrointestinal: No nausea, vomiting. Objective:        Vital signs/Intake and Output:  Visit Vitals    /61 (BP 1 Location: Left arm, BP Patient Position: At rest;Lying right side; Head of bed elevated (Comment degrees))    Pulse 91    Temp 98.1 °F (36.7 °C)    Resp 14    Wt 72.1 kg (158 lb 15.2 oz)    SpO2 100%    BMI 28.16 kg/m2     Current Shift:     Last three shifts:  03/08 1901 - 03/10 0700  In: 720 [P.O.:720]  Out: -     Body mass index is 28.16 kg/(m^2).     Physical Exam:  GEN: Alert and oriented times three NAD  EYES: conjunctiva normal, lids with out lesions  HEENT: MMM, No thyromegaly, no lymphadenopathy  HEART: RRR +S1 +S2, no JVD, pulses 2+ distally  LUNGS: CTA B/L no wheezes, rales or rhonchi  ABDOMEN: + BS, soft NT/ND no organomegaly,  No rebound  EXTREMITIES: No edema cyanosis, cap refill normal   SKIN: no rashes or skin breakdown, no nodules, normal turgor  Current Facility-Administered Medications   Medication Dose Route Frequency    vit B Cmplx 3-FA-Vit C-Biotin (NEPHRO CATARINA RX) tablet 1 Tab  1 Tab Oral DAILY    cholecalciferol (VITAMIN D3) tablet 2,000 Units  2,000 Units Oral DAILY    hydrALAZINE (APRESOLINE) tablet 25 mg  25 mg Oral TID    hydrOXYzine HCl (ATARAX) tablet 10 mg  10 mg Oral QHS PRN    metoprolol succinate (TOPROL-XL) XL tablet 25 mg  25 mg Oral DAILY    0.9% sodium chloride infusion  25 mL/hr IntraVENous DIALYSIS PRN    [START ON 3/12/2018] epoetin reshma (EPOGEN;PROCRIT) injection 10,000 Units  10,000 Units IntraVENous DIALYSIS MON, WED & FRI    0.9% sodium chloride infusion 250 mL  250 mL IntraVENous PRN    hydrOXYzine HCl (ATARAX) tablet 25 mg  25 mg Oral TID PRN    fluconazole (DIFLUCAN) tablet 100 mg  100 mg Oral DAILY    hydrocortisone (ANUSOL-HC) 2.5 % rectal cream   PeriANAL QID    nystatin (MYCOSTATIN) 100,000 unit/gram powder   Topical BID    pantoprazole (PROTONIX) 40 mg in sodium chloride 10 mL injection  40 mg IntraVENous Q12H    acetaminophen (TYLENOL) tablet 650 mg  650 mg Oral Q6H PRN    ondansetron (ZOFRAN) injection 4 mg  4 mg IntraVENous Q6H PRN    sevelamer carbonate (RENVELA) tab 1,600 mg  1,600 mg Oral TID WITH MEALS         All the patient's labs over the past 24 hours were reviewed both during my initial daily workflow process and at the time notated as \"note time\" in [address] [place] [address]. (It is not time stamped separately in this workflow.)  Select labs are listed below.         Labs: Results:       Chemistry Recent Labs      03/10/18   0530  03/09/18   0930   GLU  210*  157*   NA  143  143   K  4.7  5.4   CL  104  102   CO2  25  28   BUN  74*  58*   CREA  7.81*  6.14*   CA  8.9  9.6   AGAP  14  13   BUCR  9*  9*   AP   --   152*   TP   --   7.1   ALB   --   3.7   GLOB   --   3.4   AGRAT   --   1.1      CBC w/Diff Recent Labs      03/10/18   0530  03/09/18   0930 WBC  4.9  6.8   RBC  2.88*  2.65*   HGB  7.7*  7.3*   HCT  25.6*  24.8*   PLT  140  108*   GRANS   --   73   LYMPH   --   14*   EOS   --   4          Coagulation Recent Labs      03/09/18   0930   PTP  12.6   INR  1.0   APTT  20.7*               Liver Enzymes Recent Labs      03/09/18 0930   TP  7.1   ALB  3.7   AP  152*   SGOT  29          Procedures/imaging: see electronic medical records for all procedures/Xrays and details which were not copied into this note but were reviewed prior to creation of Plan    Imaging personally reviewed:  Bleeding scan negative            Durga Hernandez, DO  Internal Medicine/Geriatrics

## 2018-03-10 NOTE — PROGRESS NOTES
Assumed care of pt at this time. Assessment complete. Pt alert and oriented x 4. Denies SOB and chest pain. Pt lungs clear/ diminished  bilaterally. Cap refill  less than 3 seconds. Pt denies numbness and tingling to all extremities. Stated pain 7/10. Pt has 20 G IV to left forearm. Pt has transparent dressing to right chest for hemodialysis cath . SCD's in place. Call light and possessions within reach. Bed in low position. Will continue to monitor.

## 2018-03-10 NOTE — PROGRESS NOTES
Attempted to see pt for PT session. Pt washing up at EOB. Will return later for PT eval as pt schedule allows.

## 2018-03-10 NOTE — ROUTINE PROCESS
Bedside shift change report given to Ashish Tomlinson RN (oncoming nurse) by Ludy Min RN (offgoing nurse). Report included the following information SBAR, Kardex and MAR.

## 2018-03-10 NOTE — ROUTINE PROCESS
Bedside and Verbal shift change report given to LAVONNE Loomis (oncoming nurse) by Leonides Forte (offgoing nurse). Report included the following information SBAR, Kardex, Intake/Output, MAR and Recent Results.

## 2018-03-10 NOTE — PROGRESS NOTES
Nephrology Progress note    Subjective:     Megan Tidwell is a 75 y.o. female with PMH DM, HTN, cirrhosis, hx dicerticulosis/AVMs s/p cautery, ESRD on HD MWF at [facility] sent over from dialysis for rectal bleeding which started this AM. Pt did not receive dialysis today. No SOB, CP. She c/o abdominal discomfort, no N/V. Had episode GIB in January. Pt was not dialyzed yesterday. Reports nausea after eating this morning. Admit Date: 3/9/2018    Allergy:  Allergies   Allergen Reactions    Aspirin Other (comments)     GI bleed    Heparin (Porcine) Other (comments)     GI bleed    Metformin Other (comments)     swelling    Norvasc [Amlodipine] Rash        Objective:     Visit Vitals    /61 (BP 1 Location: Left arm, BP Patient Position: At rest;Lying right side; Head of bed elevated (Comment degrees))    Pulse 91    Temp 98.1 °F (36.7 °C)    Resp 14    Wt 72.1 kg (158 lb 15.2 oz)    SpO2 100%    BMI 28.16 kg/m2         Intake/Output Summary (Last 24 hours) at 03/10/18 1001  Last data filed at 03/10/18 0900   Gross per 24 hour   Intake              840 ml   Output                0 ml   Net              840 ml       Physical Exam:       General: No acute distress   HENT: Atraumatic and normocephalic   Eyes: Normal conjunctiva   Neck: Supple    Cardiovascular: Normal S1 & S2, no m/r/g   Pulmonary/Chest Wall: Clear to auscultation bilaterally   Abdominal: Soft and non-tender   Musculoskeletal: no edema   Neurological: No focal deficits       Data Review:    Lab Results   Component Value Date/Time    WBC 4.9 03/10/2018 05:30 AM    RBC 2.88 (L) 03/10/2018 05:30 AM    HCT 25.6 (L) 03/10/2018 05:30 AM    MCV 88.9 03/10/2018 05:30 AM    MCH 26.7 03/10/2018 05:30 AM    MCHC 30.1 (L) 03/10/2018 05:30 AM    RDW 18.7 (H) 03/10/2018 05:30 AM      Lab Results   Component Value Date    IRON 83 12/16/2017   No components found for: FERRITIN  No components found for: PTHINT  Urinalysis  No results found for: UGLU Impression:     ESRD on HD MWF  Rectal bleeding, hx diverticulosis, AVMs  DM  HTN  Anemia  Cirrhosis    Plan:     HD today for clearance and volume control, no heparin  Resume HD MWF next week  Epo w/ HD  Monitor H&H, transfuse prn      Saran Mcdowell MD  VIA [facility]

## 2018-03-10 NOTE — PROGRESS NOTES
Problem: Mobility Impaired (Adult and Pediatric)  Goal: *Acute Goals and Plan of Care (Insert Text)  Physical Therapy Goals  Initiated 3/10/2018 and to be accomplished within 3-7 day(s)  1. Patient will move from supine to sit and sit to supine  in bed with supervision/set-up. 2.  Patient will transfer from bed to chair and chair to bed with supervision/set-up using the least restrictive device. 3.  Patient will perform sit to stand with supervision/set-up. 4.  Patient will ambulate with supervision/set-up for 150 feet with the least restrictive device. 5.  Patient will ascend/descend 7 stairs with B handrail(s) with supervision/set-up. Outcome: Progressing Towards Goal  physical Therapy EVALUATION    Patient: Everette Telles (82 y.o. female)  Date: 3/10/2018  Primary Diagnosis: GI bleed        Precautions:   Fall    ASSESSMENT :  Based on the objective data described below, the patient presents with lower extremity weakness, decreased gait quality and endurance, and overall limitations in functional mobility. Pt performed supine to sit with supervision, sit to stand with CGA. Patient ambulated 5 feet with RW, GB applied, CGA; gait duration limited by lightheadedness/dizziness. Pt has walking boot in room. She reports L foot fracture 2 years ago for which she was casted then transitioned to walking boot. She reports the MD told her she did not have to wear the boot after appointment 9 minutes ago. However recently she has noticed some increased pain so she has been wearing in again. Patient would benefit from skilled inpatient physical therapy to address deficits, progress as tolerated to achieve long term goals and allow safe discharge. Patient will benefit from skilled intervention to address the above impairments.   Patients rehabilitation potential is considered to be Good  Factors which may influence rehabilitation potential include:   []         None noted  []         Mental ability/status  [x] Medical condition  [x]         Home/family situation and support systems  [x]         Safety awareness  [x]         Pain tolerance/management  []         Other:      PLAN :  Recommendations and Planned Interventions:  [x]           Bed Mobility Training             [x]    Neuromuscular Re-Education  [x]           Transfer Training                   []    Orthotic/Prosthetic Training  [x]           Gait Training                          []    Modalities  [x]           Therapeutic Exercises          []    Edema Management/Control  [x]           Therapeutic Activities            [x]    Patient and Family Training/Education  []           Other (comment):    Frequency/Duration: Patient will be followed by physical therapy 1-2 times per day to address goals. Discharge Recommendations: Home Health  Further Equipment Recommendations for Discharge: N/A     SUBJECTIVE:   Patient stated I am not feeling so good.     OBJECTIVE DATA SUMMARY:     Past Medical History:   Diagnosis Date    Arthritis     Asthma     Chronic kidney disease     Chronic pain     Cirrhosis ([address] 12/17/2017    Diabetes (HCC) diet controlled    GERD (gastroesophageal reflux disease)     Hypertension      Past Surgical History:   Procedure Laterality Date    COLONOSCOPY N/A 1/12/2018    COLONOSCOPY performed [facility] Sam[facility]ilva [facility] at [facility] ENDOSCOPY    HX GYN  c section     Barriers to Learning/Limitations: None  Compensate with: N/A  Prior Level of Function/Home Situation: Independent amb c/RW  Home Situation  Home Environment: Private residence  # Steps to Enter: 7  Rails to Enter: Yes  Hand Rails : Bilateral  One/Two Story Residence: One story  Living Alone: No  Support Systems: Family member(s)  Patient Expects to be Discharged to[de-identified] Private residence  Current DME Used/Available at Home: [place] Behavior:  Psychosocial  Purposeful Interaction: Yes  Pt Identified Daily Priority: Clinical issues (comment)  Saidas Process: Establish trust  Caring Interventions: Reassure  Reassure: Caring rounds  Therapeutic Modalities: Intentional therapeutic touch  Skin Condition/Temp: Poor turgor (comment)  Skin Integrity: Lesion (comment)  Skin Integumentary  Skin Color: Pink  Skin Condition/Temp: Poor turgor (comment)  Skin Integrity: Lesion (comment)  Turgor: Epidermis thin w/ loss of subcut tissue  Hair Growth: Absent  Varicosities: Absent  Strength:    Strength: Generally decreased, functional  Tone & Sensation:   Tone: Normal  Sensation: Impaired  Range Of Motion:  AROM: Generally decreased, functional  Functional Mobility:  Bed Mobility:  Supine to Sit: Supervision  Sit to Supine: Supervision  Transfers:  Sit to Stand: Contact guard assistance  Stand to Sit: Contact guard assistance  Balance:   Sitting: Intact  Standing: Intact; With support  Ambulation/Gait Training:  Distance (ft): 5 Feet (ft)  Assistive Device: Gait belt;Walker, rolling  Ambulation - Level of Assistance: Contact guard assistance  Gait Description (WDL): Exceptions to WDL  Gait Abnormalities: Decreased step clearance  Base of Support: Shift to right  Speed/Cristy: Slow  Step Length: Right shortened;Left shortened  Swing Pattern: Left asymmetrical;Right asymmetrical  Pain:  Pain Scale 1: Numeric (0 - 10)  Pain Intensity 1: 5  Pain Location 1: Back  Pain Description 1: Dull  Pain Intervention(s) 1: Medication (see MAR)  Activity Tolerance:   Good  Please refer to the flowsheet for vital signs taken during this treatment. After treatment:   []         Patient left in no apparent distress sitting up in chair  [x]         Patient left in no apparent distress in bed  [x]         Call bell left within reach  [x]         Nursing notified  []         Caregiver present  []         Bed alarm activated    COMMUNICATION/EDUCATION:   [x]         Fall prevention education was provided and the patient/caregiver indicated understanding.   [x]         Patient/family have participated as able in goal setting and plan of care. [x]         Patient/family agree to work toward stated goals and plan of care. []         Patient understands intent and goals of therapy, but is neutral about his/her participation. []         Patient is unable to participate in goal setting and plan of care. Thank you for this referral.  Julianna Nielsenarieldarcie Samara   Time Calculation: 16 mins   Eval Complexity: History: MEDIUM  Complexity : 1-2 comorbidities / personal factors will impact the outcome/ POC Exam:LOW Complexity : 1-2 Standardized tests and measures addressing body structure, function, activity limitation and / or participation in recreation  Presentation: MEDIUM Complexity : Evolving with changing characteristics  Clinical Decision Making:Medium Complexity amb <30 ft c/RW, CG/supervision for safety Overall Complexity:LOW  Mobility  Current  CJ= 20-39%   Goal  CI= 1-19%. The severity rating is based on the Level of Assistance required for Functional Mobility and ADLs.

## 2018-03-10 NOTE — CONSULTS
[address] [facility]    Aj Gomez  MR#: [id number]  : 1950  ACCOUNT #: [de-identified]   DATE OF SERVICE: 2018    HISTORY OF PRESENT ILLNESS:  This is a 58-year-old pleasant female who was admitted today because of rectal bleeding. It started around 5:30 this morning; she passed on three occasions, first of bright blood per rectum in moderate quantities. Then it happened on two other occasions, one at around 1:30 this afternoon and with this, the blood was darker and around 4 o'clock where the blood was maroon. Initially this morning, the patient felt lightheaded and dizzy. She was supposed to get her dialysis, but this was canceled because of the GI bleeding. She denied having abdominal pain. She claims that she has been complaining of frequent belching and daily heartburn after eating. She used to take PPI, but she was told to stop during her last admission in December. In fact, I have seen her in 12/15/2017 because of severe anemia down to 6.2. The patient at that time was released because she had an appointment at the [facility] in [place] for EGD and colonoscopy, but she was unable to go for this appointment and came back to us a second time on 01/10/2018 for persistent anemia requiring multiple blood transfusions. So finally I performed an EGD and colonoscopy. EGD on 01/10/2018 initially with the gastroscope but then with a pediatric colonoscope. It showed a very large distended stomach containing large amount of thick liquid retention mixed with some food when the patient has been fasting for 24 hours. There was mild prepyloric deformity with a large inflammatory folds converging to the pylorus causing valve effect. The pylorus itself was very spastic and tight, making it hard to intubate.   This was achieved; however, after I performed pyloric channel dilatation from 18-20 mm, there was some benign polypoid nodule 8 mm in the distal bulb, which appeared to be benign. There was submucosal cyst that drained a small amount of blood when it was punctured. It was cauterized, but at a fourth portion of the duodenum, there was a medium size AVM that was cauterized with the help of APC after we changed to the pediatric colonoscope as it was very hard to reach. This patient has been complaining in the last 2 months of constipation where she has 2 very hard pellet-like stools twice a week where before she used to have a 2-3 times daily and it used to be soft. There is, however, no history of melena. At this time there is no history of melena. There is no abdominal pain, nausea, or vomiting. Her colonoscopy was done on 2018 after giving her 8 liters of GoLYTELY because apparently she had difficult and suboptimal colonoscopy a year earlier at the [facility]. Her bowel preparation that time was fair to good. The colonoscopy itself was difficult, done with the pediatric colonoscope. She has a very tortuous, loopy sigmoid with mild sigmoid diverticulosis, many benign hyperplastic polyps that were not in the sigmoid that were not touched. There were a total of 6 polyps, the largest one was 8 mm in the distal rectum, two 5 mm sessile polyps in the descending colon and 3 sessile between 5-10 mm in the transverse colon, all of them were hot snared and recovered. There was a medium size internal hemorrhoids. There were a few nonthreatening AVM in the proximal ascending colon that were not cauterized because they would bleed. Cecum however, appeared to be normal anatomy and the terminal ileum was not intubated. We recommended in this case repeat colonoscopy in 3 years with better bowel prep. This patient has been diabetic for at least 25-30 years, has been on hemodialysis for about 8 years. She has no coronary artery disease, no stroke. She has only previous . She has hypertension.   She has also cirrhosis of the liver that was discovered in 2018 of unknown cause because serology for autoimmune and viral hepatitis were negative, alpha antitrypsin was normal.  In fact, on the CT scan there was a mention of ascites. The patient denied having any leg edema or increase in abdominal volume. She denied ever abusing alcohol. She does not smoke. She has been on hemodialysis three times a week. Last one was about Wednesday. She has asthma, arthritis, GERD. FAMILY HISTORY:  Remarkable for hypertension, diabetes. ALLERGIES:  ASPIRIN, HEPARIN, METFORMIN, AND NORVASC. HOME MEDICATIONS:  Protonix 40 mg once a day, but the patient tells me that she is not taking any, vitamin D3, ferrous sulfate 325, Apresoline 25 mg 3 times daily, lactulose 10 grams, metoprolol XL 25 mg, Renagel 1600 mg t.i.d., B complex, Tylenol, Proventil. REVIEW OF SYSTEMS:  The patient has no chest pain, no shortness of breath. Her weight has been stable. No dysphagia. No dysuria, hematuria, or burning sensation. She has been having a raised eruption on her legs and knees in the last few months, but she does not know the reason for it. PHYSICAL EXAMINATION:  GENERAL:  We have a 57-year-old [facility] American female who appears to be older than her true age, in no distress. She weighs 156 pounds. VITAL SIGNS:  Temperature 98.2, pulse 85, blood pressure 114/63, breathing 18, saturating 100% on room air. HEENT:  Eyes are remarkable for pale conjunctivae. The sclerae are anicteric. The pupils are equal and reactive to light. Oropharyngeal cavity, she has pale and moist mucous membranes. She has her own teeth. NECK:  Supple. No palpable mass or enlarged thyroid. LUNGS:  Clear to auscultation. CARDIAC:  Regular. S1, S2 normal with intermittent ventricular ectopy. There is a II/VI systolic murmur at the apex and also at the left parasternal border. There is no gallop. ABDOMEN:  Large, rounded, even protuberant but soft, nontender, no mass, organomegaly. I do not feel any ascites. Her bowel sounds are slightly increased and gurgling. EXTREMITIES:  There is no leg edema or clubbing. She does have these dark, raised skin  lesions that looks like multiple impetigo, but there is no erythema. NEUROLOGIC:  She is alert and oriented, moves all her four extremities. LABORATORY DATA:  Hemoglobin on arrival 7.3, was 8 on 01/13/2018. She has low MCHC 29.4, MCV 93.6, platelets dropped to 108 when it used to be 153-136. Coagulation normal.  Complete metabolic panel is remarkable for a BUN of 58, creatinine 6.14, albumin 3.7, alkaline phosphatase 152. Troponin 0.09 and CPK MB 3.6. On 12/16/2017 her CT scan of the abdomen and pelvis was remarkable for mild to moderate volume ascites, nothing else. The ultrasound of the abdomen on 12/16/2017 spoke about hepatomegaly with increased hepatic echo structures and slight angulation of the capsule suggestive of cirrhosis, a low volume ascites, mild splenomegaly, patent portal vein, echogenic kidneys atrophied. A repeat ultrasound of the abdomen on 01/13/2018 showed that there was hepatomegaly 20 cm with a nodular surface, but no focal hepatic lesions. No bile duct dilatation. Portal vein patent at 13 mm. Gallbladder contracted, but no gallstones. Pancreas not well seen. In conclusion, this is a 48-year-old female who comes with repeated episodes of acute GI bleeding with acute blood loss anemia, so most likely the bleeding is colonic. 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 The problem is that visually AVMs cause rather a chronic, reoccurring iron-deficiency anemia and rarely can cause significant GI bleeding. The patient is not taking any anticoagulant or antiplatelet agent to exacerbate her bleeding. Unfortunately, the GI bleeding scan was completely negative, so we do not know the source of the bleeding.   I do think that at present, I would repeat her colonoscopy, but I may want to repeat her EGD, especially at the last time, there was mention of a gastric outlet obstruction versus diabetic gastroparesis with dilated stomach containing a huge amount of liquid and food  This could explain the frequent belching and heartburns. In my opinion the patient needs to be on PPI for life. She did have an AVM at the fourth portion of the duodenum that was cauterized and it is not impossible that she may have other similar lesions further down into the small bowel. For now, I recommend for sure that she goes back on the PPI. On Monday, I will try to do an EGD. I told her that we need to monitor her stools by allowing the nurse to look at them. We keep repeating her hemoglobin to see whether it is going to remain stable or she may require to have other blood transfusions. So as this patient also has cirrhosis of the liver, appears to be cryptogenic from being diabetic for 25-30 years. Her cirrhosis is well compensated with mild splenomegaly, new onset thrombocytopenia, but normal LFTs. 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 According to the last endoscopy on 01/13 there were no varices and there was no mention of portal hypertensive gastropathy, but the endoscopy was suboptimal because of the presence of large quantities of food. So as this patient has been diabetic for 25 years and has been on hemodialysis for eight years, needs to go back on her hemodialysis. Her Helicobacter pylori serology was negative. This patient has also a rash on her lower extremities that needs to be investigated by dermatology. Also, she has multiple colonic polyps. I removed 6 and I believe 4 were removed a year earlier in [place]. Her next colonoscopy should be done in 3 more years.   She does have internal hemorrhoids, mild sigmoid diverticulosis, and deep AVM of the ascending colon that sometime it may cause GI bleeding or even chronic anemia. Further notes will follow. LOYDA Lima MD MBE / TN  D: 03/09/2018 20:55     T: 03/09/2018 22:34  JOB #: [id number]  CC: Troy Coker MD

## 2018-03-11 LAB
ANION GAP SERPL CALC-SCNC: 11 MMOL/L (ref 3–18)
BASOPHILS # BLD: 0 K/UL (ref 0–0.06)
BASOPHILS NFR BLD: 1 % (ref 0–2)
BUN SERPL-MCNC: 49 MG/DL (ref 7–18)
BUN/CREAT SERPL: 9 (ref 12–20)
CALCIUM SERPL-MCNC: 8.5 MG/DL (ref 8.5–10.1)
CHLORIDE SERPL-SCNC: 105 MMOL/L (ref 100–108)
CO2 SERPL-SCNC: 29 MMOL/L (ref 21–32)
CREAT SERPL-MCNC: 5.54 MG/DL (ref 0.6–1.3)
DIFFERENTIAL METHOD BLD: ABNORMAL
EOSINOPHIL # BLD: 0.1 K/UL (ref 0–0.4)
EOSINOPHIL NFR BLD: 3 % (ref 0–5)
ERYTHROCYTE [DISTWIDTH] IN BLOOD BY AUTOMATED COUNT: 18.7 % (ref 11.6–14.5)
GLUCOSE BLD STRIP.AUTO-MCNC: 134 MG/DL (ref 70–110)
GLUCOSE BLD STRIP.AUTO-MCNC: 152 MG/DL (ref 70–110)
GLUCOSE BLD STRIP.AUTO-MCNC: 205 MG/DL (ref 70–110)
GLUCOSE BLD STRIP.AUTO-MCNC: 250 MG/DL (ref 70–110)
GLUCOSE SERPL-MCNC: 175 MG/DL (ref 74–99)
HCT VFR BLD AUTO: 24.1 % (ref 35–45)
HGB BLD-MCNC: 7.2 G/DL (ref 12–16)
LYMPHOCYTES # BLD: 0.8 K/UL (ref 0.9–3.6)
LYMPHOCYTES NFR BLD: 19 % (ref 21–52)
MCH RBC QN AUTO: 26.9 PG (ref 24–34)
MCHC RBC AUTO-ENTMCNC: 29.9 G/DL (ref 31–37)
MCV RBC AUTO: 89.9 FL (ref 74–97)
MONOCYTES # BLD: 0.4 K/UL (ref 0.05–1.2)
MONOCYTES NFR BLD: 10 % (ref 3–10)
NEUTS SEG # BLD: 3.1 K/UL (ref 1.8–8)
NEUTS SEG NFR BLD: 67 % (ref 40–73)
PLATELET # BLD AUTO: 142 K/UL (ref 135–420)
PLATELET COMMENTS,PCOM: ABNORMAL
PMV BLD AUTO: 10.4 FL (ref 9.2–11.8)
POTASSIUM SERPL-SCNC: 4.4 MMOL/L (ref 3.5–5.5)
RBC # BLD AUTO: 2.68 M/UL (ref 4.2–5.3)
RBC MORPH BLD: ABNORMAL
SODIUM SERPL-SCNC: 145 MMOL/L (ref 136–145)
WBC # BLD AUTO: 4.4 K/UL (ref 4.6–13.2)

## 2018-03-11 PROCEDURE — 36415 COLL VENOUS BLD VENIPUNCTURE: CPT | Performed by: INTERNAL MEDICINE

## 2018-03-11 PROCEDURE — 74011636637 HC RX REV CODE- 636/637: Performed by: INTERNAL MEDICINE

## 2018-03-11 PROCEDURE — 97530 THERAPEUTIC ACTIVITIES: CPT

## 2018-03-11 PROCEDURE — 85025 COMPLETE CBC W/AUTO DIFF WBC: CPT | Performed by: INTERNAL MEDICINE

## 2018-03-11 PROCEDURE — 74011250636 HC RX REV CODE- 250/636: Performed by: HOSPITALIST

## 2018-03-11 PROCEDURE — 74011250637 HC RX REV CODE- 250/637: Performed by: INTERNAL MEDICINE

## 2018-03-11 PROCEDURE — 74011000250 HC RX REV CODE- 250: Performed by: HOSPITALIST

## 2018-03-11 PROCEDURE — C9113 INJ PANTOPRAZOLE SODIUM, VIA: HCPCS | Performed by: HOSPITALIST

## 2018-03-11 PROCEDURE — 82962 GLUCOSE BLOOD TEST: CPT

## 2018-03-11 PROCEDURE — 74011250637 HC RX REV CODE- 250/637: Performed by: HOSPITALIST

## 2018-03-11 PROCEDURE — 80048 BASIC METABOLIC PNL TOTAL CA: CPT | Performed by: INTERNAL MEDICINE

## 2018-03-11 PROCEDURE — 97116 GAIT TRAINING THERAPY: CPT

## 2018-03-11 PROCEDURE — 65660000000 HC RM CCU STEPDOWN

## 2018-03-11 RX ORDER — LIDOCAINE 50 MG/G
1 PATCH TOPICAL ONCE
Status: COMPLETED | OUTPATIENT
Start: 2018-03-11 | End: 2018-03-12

## 2018-03-11 RX ORDER — POLYETHYLENE GLYCOL 3350 17 G/17G
17 POWDER, FOR SOLUTION ORAL DAILY
Status: DISCONTINUED | OUTPATIENT
Start: 2018-03-12 | End: 2018-03-13 | Stop reason: HOSPADM

## 2018-03-11 RX ORDER — INSULIN LISPRO 100 [IU]/ML
INJECTION, SOLUTION INTRAVENOUS; SUBCUTANEOUS
Status: DISCONTINUED | OUTPATIENT
Start: 2018-03-11 | End: 2018-03-13 | Stop reason: HOSPADM

## 2018-03-11 RX ADMIN — INSULIN LISPRO 2 UNITS: 100 INJECTION, SOLUTION INTRAVENOUS; SUBCUTANEOUS at 11:58

## 2018-03-11 RX ADMIN — ALUMINUM HYDROXIDE, MAGNESIUM HYDROXIDE, AND SIMETHICONE 30 ML: 200; 200; 20 SUSPENSION ORAL at 10:30

## 2018-03-11 RX ADMIN — MENTHOL, CAMPHOR: 1.11; 1.11 LOTION TOPICAL at 20:38

## 2018-03-11 RX ADMIN — SODIUM CHLORIDE 40 MG: 9 INJECTION INTRAMUSCULAR; INTRAVENOUS; SUBCUTANEOUS at 08:15

## 2018-03-11 RX ADMIN — ACETAMINOPHEN 650 MG: 325 TABLET ORAL at 04:54

## 2018-03-11 RX ADMIN — INSULIN LISPRO 6 UNITS: 100 INJECTION, SOLUTION INTRAVENOUS; SUBCUTANEOUS at 09:44

## 2018-03-11 RX ADMIN — MENTHOL, CAMPHOR: 1.11; 1.11 LOTION TOPICAL at 17:41

## 2018-03-11 RX ADMIN — SEVELAMER CARBONATE 1600 MG: 800 TABLET, FILM COATED ORAL at 11:56

## 2018-03-11 RX ADMIN — VITAMIN D, TAB 1000IU (100/BT) 2000 UNITS: 25 TAB at 08:14

## 2018-03-11 RX ADMIN — HYDROCORTISONE 2.5%: 25 CREAM TOPICAL at 17:43

## 2018-03-11 RX ADMIN — HYDROCORTISONE 2.5%: 25 CREAM TOPICAL at 22:44

## 2018-03-11 RX ADMIN — HYDROCORTISONE 2.5%: 25 CREAM TOPICAL at 12:00

## 2018-03-11 RX ADMIN — HYDROCORTISONE 2.5%: 25 CREAM TOPICAL at 08:17

## 2018-03-11 RX ADMIN — NYSTATIN: 100000 POWDER TOPICAL at 20:39

## 2018-03-11 RX ADMIN — NYSTATIN: 100000 POWDER TOPICAL at 08:17

## 2018-03-11 RX ADMIN — SODIUM CHLORIDE 40 MG: 9 INJECTION INTRAMUSCULAR; INTRAVENOUS; SUBCUTANEOUS at 20:46

## 2018-03-11 RX ADMIN — INSULIN LISPRO 4 UNITS: 100 INJECTION, SOLUTION INTRAVENOUS; SUBCUTANEOUS at 22:34

## 2018-03-11 RX ADMIN — Medication 1 TABLET: at 08:15

## 2018-03-11 RX ADMIN — HYDROXYZINE HYDROCHLORIDE 25 MG: 25 TABLET, FILM COATED ORAL at 22:43

## 2018-03-11 RX ADMIN — ALUMINUM HYDROXIDE, MAGNESIUM HYDROXIDE, AND SIMETHICONE 30 ML: 200; 200; 20 SUSPENSION ORAL at 20:46

## 2018-03-11 RX ADMIN — SEVELAMER CARBONATE 1600 MG: 800 TABLET, FILM COATED ORAL at 08:14

## 2018-03-11 RX ADMIN — SEVELAMER CARBONATE 1600 MG: 800 TABLET, FILM COATED ORAL at 17:42

## 2018-03-11 RX ADMIN — HYDROXYZINE HYDROCHLORIDE 25 MG: 25 TABLET, FILM COATED ORAL at 06:57

## 2018-03-11 RX ADMIN — ACETAMINOPHEN 650 MG: 325 TABLET ORAL at 20:46

## 2018-03-11 NOTE — ROUTINE PROCESS
Bedside and Verbal shift change report given to Dominga RN (oncoming nurse) by Yeny Powell RN (offgoing nurse). Report included the following information SBAR, Kardex, MAR and Recent Results.

## 2018-03-11 NOTE — PROGRESS NOTES
1910 Received bedside report from [address]. Assumed patient care. Patient AO x4, in bed no complains voiced at this point. 2230 Patient complained of back pain. Received verbal orders from Dr. Van Medina for one time lidocaine patch. Patch applied. And tylenol administered. Patient had uneventful night. Bedside and Verbal shift change report given to Geovanna Lorenzo RN (oncoming nurse) by Kaykay Carranza (offgoing nurse). Report included the following information SBAR, Kardex and MAR.

## 2018-03-11 NOTE — PROGRESS NOTES
Received bedside report from [address] [place] [address]. Assumed patient care. Assessment completed meds administered, Pt stated a pain of 9/10, pain meds administered. Continued to monitor. The beginning of Daylight Saving Time occurred at 0200 hrs. Documentation of patient care and medications administered is done with respect to the time change. 0600 Patient had uneventful night. Bedside and Verbal shift change report given to Ry LEE RN (oncoming nurse) by Makenna Lacey (offgoing nurse). Report included the following information SBAR, Kardex and MAR.

## 2018-03-11 NOTE — PROGRESS NOTES
Nephrology Progress note    Subjective:     Franco Astudillo is a 75 y.o. female with PMH DM, HTN, cirrhosis, hx dicerticulosis/AVMs s/p cautery, ESRD on HD MWF at [facility] sent over from dialysis for rectal bleeding which started this AM. Pt did not receive dialysis today. No SOB, CP. She c/o abdominal discomfort, no N/V. Had episode GIB in January. Pt c/o constipation and dysdigestion, denies CP/SOB. Tolerated dialysis well yesterday, no issues.       Admit Date: 3/9/2018    Allergy:  Allergies   Allergen Reactions    Aspirin Other (comments)     GI bleed    Heparin (Porcine) Other (comments)     GI bleed    Metformin Other (comments)     swelling    Norvasc [Amlodipine] Rash        Objective:     Visit Vitals    /54 (BP 1 Location: Left arm, BP Patient Position: At rest;Supine)    Pulse 63    Temp 97.7 °F (36.5 °C)    Resp 18    Wt 71.7 kg (158 lb 1.1 oz)    SpO2 100%    BMI 28 kg/m2       No intake or output data in the 24 hours ending 03/11/18 1021    Physical Exam:       General: No acute distress   HENT: Atraumatic and normocephalic   Eyes: Normal conjunctiva   Neck: Supple    Cardiovascular: Normal S1 & S2, no m/r/g   Pulmonary/Chest Wall: Clear to auscultation bilaterally   Abdominal: Soft and non-tender   Musculoskeletal: no edema   Neurological: No focal deficits       Data Review:    Lab Results   Component Value Date/Time    WBC 4.4 (L) 03/11/2018 09:35 AM    RBC 2.68 (L) 03/11/2018 09:35 AM    HCT 24.1 (L) 03/11/2018 09:35 AM    MCV 89.9 03/11/2018 09:35 AM    MCH 26.9 03/11/2018 09:35 AM    MCHC 29.9 (L) 03/11/2018 09:35 AM    RDW 18.7 (H) 03/11/2018 09:35 AM      Lab Results   Component Value Date    IRON 83 12/16/2017   No components found for: FERRITIN  No components found for: PTHINT  Urinalysis  No results found for: UGLU         Impression:     ESRD on HD MWF  Rectal bleeding, hx diverticulosis, AVMs  DM  HTN  Anemia  Cirrhosis    Plan:     No need for HD today, plan for tomorrow  Epo w/ HD  Monitor H&H, transfuse prn      Mirian Hernández MD  VIA [facility]

## 2018-03-11 NOTE — PROGRESS NOTES
Problem: Mobility Impaired (Adult and Pediatric)  Goal: *Acute Goals and Plan of Care (Insert Text)  Physical Therapy Goals  Initiated 3/10/2018 and to be accomplished within 3-7 day(s)  1. Patient will move from supine to sit and sit to supine  in bed with supervision/set-up. 2.  Patient will transfer from bed to chair and chair to bed with supervision/set-up using the least restrictive device. 3.  Patient will perform sit to stand with supervision/set-up. 4.  Patient will ambulate with supervision/set-up for 150 feet with the least restrictive device. 5.  Patient will ascend/descend 7 stairs with B handrail(s) with supervision/set-up. Outcome: Progressing Towards Goal  physical Therapy TREATMENT    Patient: Arcelia Bowling (57 y.o. female)  Date: 3/11/2018  Diagnosis: GI bleed GI bleed       Precautions: Fall   Chart, physical therapy assessment, plan of care and goals were reviewed. ASSESSMENT:  Pt reports feeling unable to push herself for activity increase today. Pt able to amb T/F wall, with use of walking boot. UE strength is a limiting factor. Placement may be needed, but pt refuses to consider at this time. Progression toward goals:  []      Improving appropriately and progressing toward goals  [x]      Improving slowly and progressing toward goals  []      Not making progress toward goals and plan of care will be adjusted     PLAN:  Patient continues to benefit from skilled intervention to address the above impairments. Continue treatment per established plan of care. Discharge Recommendations:  Home Health Or SNF  Further Equipment Recommendations for Discharge:  bedside commode and rolling walker     SUBJECTIVE:   Patient stated I can't do it today.     OBJECTIVE DATA SUMMARY:   Functional Mobility Training:  Bed Mobility:  Supine to Sit: Minimum assistance  Sit to Supine: Minimum assistance  Transfers:  Sit to Stand: Minimum assistance  Stand to Sit: Minimum assistance  Balance:  Sitting: Intact  Standing: Intact; With support  Ambulation/Gait Training:  Distance (ft): 10 Feet (ft)  Assistive Device: Gait belt;Walker, rolling  Ambulation - Level of Assistance: Contact guard assistance  Gait Abnormalities: Decreased step clearance  Base of Support: Shift to right  Speed/Cristy: Slow  Step Length: Right shortened;Left shortened  Swing Pattern: Left asymmetrical;Right asymmetrical  Pain:  Pain Scale 1: Numeric (0 - 10)  Pain Intensity 1: 10 (Bilateral LE)  Pain out: 10  Activity Tolerance:   Fair-  Please refer to the flowsheet for vital signs taken during this treatment.   After treatment:   [] Patient left in no apparent distress sitting up in chair  [x] Patient left in no apparent distress in bed  [x] Call bell left within reach  [x] Nursing notified  [x] Caregiver present  [] Bed alarm activated      Vani Real PTA   Time Calculation: 35 mins

## 2018-03-11 NOTE — PROGRESS NOTES
Problem: Falls - Risk of  Goal: *Absence of Falls  Document Iza Fall Risk and appropriate interventions in the flowsheet.    Outcome: Progressing Towards Goal  Fall Risk Interventions:  Mobility Interventions: Patient to call before getting OOB, Bed/chair exit alarm         Medication Interventions: Patient to call before getting OOB, Teach patient to arise slowly    Elimination Interventions: Call light in reach, Patient to call for help with toileting needs

## 2018-03-11 NOTE — PROGRESS NOTES
Hospitalist Progress Note    Patient: Francisco J Garner MRN: [id number]  CSN: [id number]    YOB: 1950  Age: 75 y.o. Sex: female    DOA: 3/9/2018 LOS:  LOS: 2 days            Assessment/Plan     1. GI bleed, had negative bleeding scan and no further episodes of bleeding noted. H/H impoved w transfusion, awaiting AM h/h  2. ABL anemia  3. ESRD on HD  4. Vaginitis     Plan:  - cbc now  - for EGD Monday  - continue PPI  - DC diflucan  - contineu antihypertensives  - HD per nephrology    Patient Active Problem List   Diagnosis Code    GI bleed K92.2    ESRD (end stage renal disease) ([facility] N18.6    Diabetes mellitus type 2, controlled ([facility] E11.9    HTN (hypertension) I10    Anemia in chronic kidney disease N18.9, D63.1    Cirrhosis ([facility] [facility] K74.60    Acute blood loss anemia D62               Subjective:    cc: can I go home? No acute events overnight  No furtehr bleeding episodes      REVIEW OF SYSTEMS:  General: No fevers or chills. Cardiovascular: No chest pain or pressure. No palpitations. Pulmonary: No shortness of breath. Gastrointestinal: No nausea, vomiting. Objective:        Vital signs/Intake and Output:  Visit Vitals    /54 (BP 1 Location: Left arm, BP Patient Position: At rest;Supine)    Pulse 63    Temp 97.7 °F (36.5 °C)    Resp 18    Wt 71.7 kg (158 lb 1.1 oz)    SpO2 100%    BMI 28 kg/m2     Current Shift:     Last three shifts:  03/09 1901 - 03/11 0700  In: 840 [P.O.:840]  Out: -     Body mass index is 28 kg/(m^2).     Physical Exam:  GEN: Alert and oriented times three NAD  EYES: conjunctiva normal, lids with out lesions  HEENT: MMM, No thyromegaly, no lymphadenopathy  HEART: RRR +S1 +S2, no JVD, pulses 2+ distally  LUNGS: CTA B/L no wheezes, rales or rhonchi  ABDOMEN: + BS, soft NT/ND no organomegaly,  No rebound  EXTREMITIES: No edema cyanosis, cap refill normal   SKIN: no rashes or skin breakdown, no nodules, normal turgor  Current Facility-Administered Medications   Medication Dose Route Frequency    insulin lispro (HUMALOG) injection   SubCUTAneous AC&HS    alum-mag hydroxide-simeth (MYLANTA) oral suspension 30 mL  30 mL Oral Q4H PRN    albuterol (PROVENTIL HFA, VENTOLIN HFA, PROAIR HFA) inhaler 2 Puff  2 Puff Inhalation Q4H PRN    vit B Cmplx 3-FA-Vit C-Biotin (NEPHRO CATARINA RX) tablet 1 Tab  1 Tab Oral DAILY    cholecalciferol (VITAMIN D3) tablet 2,000 Units  2,000 Units Oral DAILY    hydrALAZINE (APRESOLINE) tablet 25 mg  25 mg Oral TID    hydrOXYzine HCl (ATARAX) tablet 10 mg  10 mg Oral QHS PRN    metoprolol succinate (TOPROL-XL) XL tablet 25 mg  25 mg Oral DAILY    0.9% sodium chloride infusion  25 mL/hr IntraVENous DIALYSIS PRN    [START ON 3/12/2018] epoetin reshma (EPOGEN;PROCRIT) injection 10,000 Units  10,000 Units IntraVENous DIALYSIS MON, WED & FRI    0.9% sodium chloride infusion 250 mL  250 mL IntraVENous PRN    hydrOXYzine HCl (ATARAX) tablet 25 mg  25 mg Oral TID PRN    hydrocortisone (ANUSOL-HC) 2.5 % rectal cream   PeriANAL QID    nystatin (MYCOSTATIN) 100,000 unit/gram powder   Topical BID    pantoprazole (PROTONIX) 40 mg in sodium chloride 10 mL injection  40 mg IntraVENous Q12H    acetaminophen (TYLENOL) tablet 650 mg  650 mg Oral Q6H PRN    ondansetron (ZOFRAN) injection 4 mg  4 mg IntraVENous Q6H PRN    sevelamer carbonate (RENVELA) tab 1,600 mg  1,600 mg Oral TID WITH MEALS         All the patient's labs over the past 24 hours were reviewed both during my initial daily workflow process and at the time notated as \"note time\" in [employer]. (It is not time stamped separately in this workflow.)  Select labs are listed below.         Labs: Results:       Chemistry Recent Labs      03/10/18   0530  03/09/18   0930   GLU  210*  157*   NA  143  143   K  4.7  5.4   CL  104  102   CO2  25  28   BUN  74*  58*   CREA  7.81*  6.14*   CA  8.9  9.6   AGAP  14  13   BUCR  9*  9*   AP   --   152*   TP   --   7.1 ALB   --   3.7   GLOB   --   3.4   AGRAT   --   1.1      CBC w/Diff Recent Labs      03/10/18   0530  03/09/18   0930   WBC  4.9  6.8   RBC  2.88*  2.65*   HGB  7.7*  7.3*   HCT  25.6*  24.8*   PLT  140  108*   GRANS   --   73   LYMPH   --   14*   EOS   --   4          Coagulation Recent Labs      03/09/18   0930   PTP  12.6   INR  1.0   APTT  20.7*               Liver Enzymes Recent Labs      03/09/18   0930   TP  7.1   ALB  3.7   AP  152*   SGOT  29          Procedures/imaging: see electronic medical records for all procedures/Xrays and details which were not copied into this note but were reviewed prior to creation of  [address], DO  Internal Medicine/Geriatrics

## 2018-03-11 NOTE — ROUTINE PROCESS
Bedside and Verbal shift change report given to Ingrid Collins RN (oncoming nurse) by Sammy Ornelas RN   (offgoing nurse). Report included the following information SBAR, Kardex, Intake/Output and MAR.

## 2018-03-12 VITALS
BODY MASS INDEX: 28.55 KG/M2 | RESPIRATION RATE: 16 BRPM | OXYGEN SATURATION: 100 % | DIASTOLIC BLOOD PRESSURE: 66 MMHG | SYSTOLIC BLOOD PRESSURE: 121 MMHG | WEIGHT: 161.16 LBS | TEMPERATURE: 98.2 F | HEART RATE: 77 BPM

## 2018-03-12 LAB
GLUCOSE BLD STRIP.AUTO-MCNC: 118 MG/DL (ref 70–110)
GLUCOSE BLD STRIP.AUTO-MCNC: 194 MG/DL (ref 70–110)
GLUCOSE BLD STRIP.AUTO-MCNC: 250 MG/DL (ref 70–110)
H PYLORI IGA SER-ACNC: <9 UNITS (ref 0–8.9)

## 2018-03-12 PROCEDURE — 0DJ08ZZ INSPECTION OF UPPER INTESTINAL TRACT, VIA NATURAL OR ARTIFICIAL OPENING ENDOSCOPIC: ICD-10-PCS | Performed by: INTERNAL MEDICINE

## 2018-03-12 PROCEDURE — 97530 THERAPEUTIC ACTIVITIES: CPT

## 2018-03-12 PROCEDURE — 74011250636 HC RX REV CODE- 250/636: Performed by: HOSPITALIST

## 2018-03-12 PROCEDURE — 74011250636 HC RX REV CODE- 250/636: Performed by: INTERNAL MEDICINE

## 2018-03-12 PROCEDURE — 74011250637 HC RX REV CODE- 250/637: Performed by: PHYSICIAN ASSISTANT

## 2018-03-12 PROCEDURE — 74011250636 HC RX REV CODE- 250/636

## 2018-03-12 PROCEDURE — 5A1D70Z PERFORMANCE OF URINARY FILTRATION, INTERMITTENT, LESS THAN 6 HOURS PER DAY: ICD-10-PCS | Performed by: HOSPITALIST

## 2018-03-12 PROCEDURE — 65660000000 HC RM CCU STEPDOWN

## 2018-03-12 PROCEDURE — 97116 GAIT TRAINING THERAPY: CPT

## 2018-03-12 PROCEDURE — 77030011640 HC PAD GRND REM COVD -A: Performed by: INTERNAL MEDICINE

## 2018-03-12 PROCEDURE — 74011250637 HC RX REV CODE- 250/637: Performed by: HOSPITALIST

## 2018-03-12 PROCEDURE — 82962 GLUCOSE BLOOD TEST: CPT

## 2018-03-12 PROCEDURE — 76040000007: Performed by: INTERNAL MEDICINE

## 2018-03-12 PROCEDURE — C9113 INJ PANTOPRAZOLE SODIUM, VIA: HCPCS | Performed by: HOSPITALIST

## 2018-03-12 PROCEDURE — G0500 MOD SEDAT ENDO SERVICE >5YRS: HCPCS | Performed by: INTERNAL MEDICINE

## 2018-03-12 PROCEDURE — 74011000250 HC RX REV CODE- 250: Performed by: INTERNAL MEDICINE

## 2018-03-12 PROCEDURE — 90935 HEMODIALYSIS ONE EVALUATION: CPT

## 2018-03-12 PROCEDURE — 74011636637 HC RX REV CODE- 636/637: Performed by: INTERNAL MEDICINE

## 2018-03-12 RX ORDER — METOCLOPRAMIDE HYDROCHLORIDE 5 MG/ML
5 INJECTION INTRAMUSCULAR; INTRAVENOUS EVERY 6 HOURS
Status: DISCONTINUED | OUTPATIENT
Start: 2018-03-12 | End: 2018-03-13 | Stop reason: HOSPADM

## 2018-03-12 RX ORDER — MIDAZOLAM HYDROCHLORIDE 1 MG/ML
.5-5 INJECTION, SOLUTION INTRAMUSCULAR; INTRAVENOUS
Status: DISCONTINUED | OUTPATIENT
Start: 2018-03-12 | End: 2018-03-12 | Stop reason: HOSPADM

## 2018-03-12 RX ORDER — NALOXONE HYDROCHLORIDE 0.4 MG/ML
0.4 INJECTION, SOLUTION INTRAMUSCULAR; INTRAVENOUS; SUBCUTANEOUS
Status: DISCONTINUED | OUTPATIENT
Start: 2018-03-12 | End: 2018-03-12 | Stop reason: HOSPADM

## 2018-03-12 RX ORDER — ATROPINE SULFATE 0.1 MG/ML
0.5 INJECTION INTRAVENOUS
Status: CANCELLED | OUTPATIENT
Start: 2018-03-12 | End: 2018-03-12

## 2018-03-12 RX ORDER — EPINEPHRINE 0.1 MG/ML
1 INJECTION INTRACARDIAC; INTRAVENOUS
Status: CANCELLED | OUTPATIENT
Start: 2018-03-12 | End: 2018-03-12

## 2018-03-12 RX ORDER — FLUMAZENIL 0.1 MG/ML
0.2 INJECTION INTRAVENOUS
Status: DISCONTINUED | OUTPATIENT
Start: 2018-03-12 | End: 2018-03-12 | Stop reason: HOSPADM

## 2018-03-12 RX ORDER — SODIUM CHLORIDE 9 MG/ML
100 INJECTION, SOLUTION INTRAVENOUS CONTINUOUS
Status: CANCELLED | OUTPATIENT
Start: 2018-03-12 | End: 2018-03-12

## 2018-03-12 RX ORDER — BISACODYL 5 MG
5 TABLET, DELAYED RELEASE (ENTERIC COATED) ORAL DAILY PRN
Status: DISCONTINUED | OUTPATIENT
Start: 2018-03-12 | End: 2018-03-13 | Stop reason: HOSPADM

## 2018-03-12 RX ORDER — SIMETHICONE 80 MG
80 TABLET,CHEWABLE ORAL
Status: DISCONTINUED | OUTPATIENT
Start: 2018-03-12 | End: 2018-03-13 | Stop reason: HOSPADM

## 2018-03-12 RX ORDER — FENTANYL CITRATE 50 UG/ML
100 INJECTION, SOLUTION INTRAMUSCULAR; INTRAVENOUS
Status: DISCONTINUED | OUTPATIENT
Start: 2018-03-12 | End: 2018-03-12 | Stop reason: HOSPADM

## 2018-03-12 RX ORDER — DEXTROMETHORPHAN/PSEUDOEPHED 2.5-7.5/.8
1.2 DROPS ORAL
Status: CANCELLED | OUTPATIENT
Start: 2018-03-12

## 2018-03-12 RX ORDER — NYSTATIN 100000 [USP'U]/G
POWDER TOPICAL 2 TIMES DAILY
Qty: 1 BOTTLE | Refills: 0 | Status: ON HOLD | OUTPATIENT
Start: 2018-03-12 | End: 2020-10-06

## 2018-03-12 RX ADMIN — ACETAMINOPHEN 650 MG: 325 TABLET ORAL at 04:47

## 2018-03-12 RX ADMIN — SEVELAMER CARBONATE 1600 MG: 800 TABLET, FILM COATED ORAL at 09:02

## 2018-03-12 RX ADMIN — MENTHOL, CAMPHOR: 1.11; 1.11 LOTION TOPICAL at 09:09

## 2018-03-12 RX ADMIN — HYDROCORTISONE 2.5%: 25 CREAM TOPICAL at 09:07

## 2018-03-12 RX ADMIN — Medication 1 TABLET: at 09:01

## 2018-03-12 RX ADMIN — NYSTATIN: 100000 POWDER TOPICAL at 09:08

## 2018-03-12 RX ADMIN — INSULIN LISPRO 6 UNITS: 100 INJECTION, SOLUTION INTRAVENOUS; SUBCUTANEOUS at 06:49

## 2018-03-12 RX ADMIN — HYDROCORTISONE 2.5%: 25 CREAM TOPICAL at 18:25

## 2018-03-12 RX ADMIN — BISACODYL 5 MG: 5 TABLET, COATED ORAL at 12:36

## 2018-03-12 RX ADMIN — INSULIN LISPRO 2 UNITS: 100 INJECTION, SOLUTION INTRAVENOUS; SUBCUTANEOUS at 12:36

## 2018-03-12 RX ADMIN — VITAMIN D, TAB 1000IU (100/BT) 2000 UNITS: 25 TAB at 09:02

## 2018-03-12 RX ADMIN — SODIUM CHLORIDE 40 MG: 9 INJECTION INTRAMUSCULAR; INTRAVENOUS; SUBCUTANEOUS at 09:03

## 2018-03-12 RX ADMIN — SIMETHICONE CHEW TAB 80 MG 80 MG: 80 TABLET ORAL at 12:36

## 2018-03-12 RX ADMIN — POLYETHYLENE GLYCOL 3350 17 G: 17 POWDER, FOR SOLUTION ORAL at 09:39

## 2018-03-12 RX ADMIN — ERYTHROPOIETIN 10000 UNITS: 10000 INJECTION, SOLUTION INTRAVENOUS; SUBCUTANEOUS at 18:14

## 2018-03-12 NOTE — PROGRESS NOTES
Nephrology Progress/HD note    Subjective:     Francisco J Garner is a 75 y.o. female with PMH DM, HTN, cirrhosis, hx dicerticulosis/AVMs s/p cautery, ESRD on HD MWF at [facility] sent over from dialysis for rectal bleeding which started this AM. Pt did not receive dialysis today. No SOB, CP. She c/o abdominal discomfort, no N/V. Had episode GIB in January. Seen this AM and later on HD in the afternoon. Stable with no issue.     Admit Date: 3/9/2018    Allergy:  Allergies   Allergen Reactions    Aspirin Other (comments)     GI bleed    Heparin (Porcine) Other (comments)     GI bleed    Metformin Other (comments)     swelling    Norvasc [Amlodipine] Rash        Objective:     Visit Vitals    /82 (BP 1 Location: Left arm, BP Patient Position: At rest)    Pulse 70    Temp 98 °F (36.7 °C)    Resp 18    Wt 73.1 kg (161 lb 2.5 oz)    SpO2 100%    BMI 28.55 kg/m2       No intake or output data in the 24 hours ending 03/12/18 1040    Physical Exam:       General: No acute distress   HENT: Atraumatic and normocephalic   Eyes: Normal conjunctiva   Neck: Supple with no JVD   Cardiovascular: Normal S1 & S2, no m/r/g   Pulmonary/Chest Wall: Clear to auscultation bilaterally   Abdominal: Soft and non-tender   Musculoskeletal: no edema   Neurological: No focal deficits   Access: TDC with good Qb    Data Review:    Lab Results   Component Value Date/Time    WBC 4.4 (L) 03/11/2018 09:35 AM    RBC 2.68 (L) 03/11/2018 09:35 AM    HCT 24.1 (L) 03/11/2018 09:35 AM    MCV 89.9 03/11/2018 09:35 AM    MCH 26.9 03/11/2018 09:35 AM    MCHC 29.9 (L) 03/11/2018 09:35 AM    RDW 18.7 (H) 03/11/2018 09:35 AM      Lab Results   Component Value Date    IRON 83 12/16/2017   No components found for: FERRITIN  No components found for: PTHINT  Urinalysis  No results found for: UGLU         Impression:     ESRD on HD MWF schedule  Rectal bleeding, hx diverticulosis, AVMs  DM  HTN  Anemia in CKD  Cirrhosis    Plan:     HD today  Use current access for HD  Cont Epo w/ HD  Monitor H&H, transfuse prn  D/w pt and HD staff    Miri Castelan MD  VIA [facility]

## 2018-03-12 NOTE — PROGRESS NOTES
18:    3673:  Called Dr. FIGUEROA [facility] in regard to status of possible EGD procedure today. No answer, unable to leave a message. Will try again later. 1032:  Called Endo to verify time of EGD, told scheduled for 1400.    5313:  Spoke with SUSAN Gray in regards of patient request for \"something for gas. \"  Orders put in.    2092:  Patient off floor for EGD.    9111:  Call from Maricarmen Cabrera from Endo, finding are small esophogeal varices, hiatal hernia, and gastroparesis. HR68, , R19, /46. Patient on clear liquid diet. 1415: Patient back on floor from EGD.    1650:  Patient in with Dialysis. 1850:  Preparing patient for discharge.

## 2018-03-12 NOTE — PROCEDURES
[facility]  Esophagogastroduodenoscopy Procedure Report  _______________________________________________________  Patient: Mercedez Lennon                                         Attending Physician: Kirt Castellanos MD    Patient ID: [id number]                                      Referring Physician: Sujit Raymond MD    Exam Date: March 12, 2018 _______________________________________________________      Introduction: A  75 y.o. female patient, presents for Esophagogastroduodenoscopy Procedure. 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 GI bleeding started on 3/9/ 2018 at 6 am in the form of multiple large rectal bleeding and then darker. Hgb dropped from 8.5  to 7.3. So far one blood transfusion. 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 EGD on Molina 10, 2018 gastroparesis and  distal duodenal AVM cauterized on Mol[place] 10, 2018. 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 INR 1. She has been c/o GERD and Dyspepsia in the form of postprandial nausea and belching There was significant gastric food retention on the last EGD 1/10. 2018 where the pylorus was tight and spastic and had to be dilated with balloon to 20 mm. CT scan of the abdomen showed thickening of the distal esophagus and cardia       : Kirt Castellanos MD    Sedation:    Versed 3 mg IV, fentanyl 25 mcg IV, topical Hurricaine spray    Procedure Details:  After infomed consent was obtained for the procedure, with all risks and benefits of procedure explained the patient was taken to the endoscopy suite and placed in the left lateral decubitus position.   Following sequential administration of sedation as per above, the endoscope was inserted into the mouth and advanced under direct vision to proximal jejunum. A careful inspection was made as the gastroscope was withdrawn, including a retroflexed view of the proximal stomach; findings and interventions are described below. Findings:   HYPOPHARYNX AND LARYNX: Normal  Esophagus: Normal proximal esophagus. Very tiny mid and distal esophageal varices barely visible. Normal Z line. No evidence of esophagitis, scarring or tumor. 1 cm reducible Hiatal hernia. Stomach: Large quantitiy of food retention. Normal, cardia, fundus, body, lesser curvature, incisura, antrum and pylorus. Small 5 mm sessile benign polyp in the distal gastric body on the greater curvature. No removed  Mucosa is normal. No portal hypertensive gastropathy. The pylorus was not spastic or stenotic  Duodenum/jejunum: small 7 mm mucosal nodule or sessile polyp in the distal bulb not removed. It appeared to be benign. The second, third, fourth portions and major papilla are normal No AVM    Therapies:    none    Specimen:   none           Complications:   None    EBL:  None  IMPRESSION  DIABETIC GASTROPARESIS with significant solid food retention. TINY ESOPHAGEAL VARICES AND A TINY < 1 CM HIATAL HERNIA  BUT NO ESOPHAGITIS, AVM OR TUMOR. Small distal gastric body benign polyp and distal bulbar 7 mm polyp           Recommendations: -Continue acid suppression with a Protonix 40 mg once or twice daily. Recommend to try small dose Reglan 5 mg ac and HS for a month and then as needed and reevaluate, -Low fat and residue diabetic diet. Avoid roughage, fried and fatty foods. peppermint, chocolate, alcohol, coffee, tomoato products; avoid lying down for 2 to 3 hours after eating., -No NSAID'S.     Assistant: none    Jazz Daugherty MD  3/12/2018  1:30 PM

## 2018-03-12 NOTE — PROGRESS NOTES
D/c plan: anticipate home when medically cleared    Met with patient at bedside during rounds. Patient is to have egd  and HD today if egd is cleared she will be d/c home this pm. Patient denies needs she [facility] Linda is her PCP, she also has an appointment with Dermatologist 3/13 and fooot doctor on 3/14 per jacinta. States she lives with her son. She goess to [facility] for HD on [facility] chair time 06:30 am. Cms will send out referrals and updates. Patient declines [facility] or any other dme at this time. States her son will pick her up when she is ready for d/c home. Denies other needs or DME for d/c home with family  Care Management Interventions  PCP Verified by CM: Yes ([facility])  Mode of Transport at Discharge: Other (see comment) (family)  Transition of Care Consult (CM Consult): Discharge Planning  Current Support Network:  Other (lives with son and grandson)  Confirm Follow Up Transport: Family  Plan discussed with Pt/Family/Caregiver: Yes  Freedom of Choice Offered: Yes  Discharge Location  Discharge Placement: Home with family assistance

## 2018-03-12 NOTE — DIABETES MGMT
GLYCEMIC CONTROL PROGRESS NOTE:    -discussed in rounds, known h/o T2DM, ESRD on HD, pt not currently on DM med regimen  -BG out of target range non-ICU: < 140 mg/dL, requiring corrective coverage  -TDD = 12 - Humalog Normal Insulin Sensitivity Corrective Coverage  -POCT + corrective coverage in place, recommend continue & obtain current A1C (orders entered per protocol) monitor BG trends and make adjustments as needed    Recent Glucose Results:   Lab Results   Component Value Date/Time    GLUCPOC 194 (H) 03/12/2018 12:20 PM    GLUCPOC 250 (H) 03/12/2018 06:20 AM    GLUCPOC 205 (H) 03/11/2018 09:03 PM           Zarina Park RN, MS  Glycemic Control Team

## 2018-03-12 NOTE — DIALYSIS
Received Report from  Lena Madera RN , reviewed labs and HD order which includes a 2K bath with 2.5CA. Attempt to remove 1000 ML as tolerated by PT.         Equipment set up per order and policy and Pt assessed prior to [place]. Pt was found to have a  RIJ which was patent. Aseptically accessed  & initialized tx w/0 issues. PT ran entire 3 hours of tx. Removed 1000 ml. BVP 56.0 All blood returned to Pt. Aseptically de-accessed ,  Both ports  Saline Locked and  capped. Pt stable upon departure.   Handoff given to Lena Madera RN

## 2018-03-12 NOTE — DISCHARGE SUMMARY
Discharge Summary    Patient: Franco Astudillo MRN: [id number]  CSN: [id number]    YOB: 1950  Age: 75 y.o. Sex: female    DOA: 3/9/2018 LOS:  LOS: 3 days   Discharge Date:      Primary Care Provider:  Savannah Farrell MD    Admission Diagnoses: GI bleed  gi bleeding     Discharge Diagnoses:    Problem List as of 3/12/2018  Date Reviewed: 12/15/2017          Codes Class Noted - Resolved    Acute blood loss anemia ICD-10-CM: D62  ICD-9-CM: 285.1  3/9/2018 - Present        Cirrhosis ([facility] ICD-10-CM: K74.60  ICD-9-CM: 571.5  12/17/2017 - Present        Anemia in chronic kidney disease ICD-10-CM: N18.9, D63.1  ICD-9-CM: 285.21  12/16/2017 - Present        * (Principal)GI bleed ICD-10-CM: K92.2  ICD-9-CM: 578.9  12/15/2017 - Present        ESRD (end stage renal disease) ([facility] ICD-10-CM: N18.6  ICD-9-CM: 585.6  12/15/2017 - Present        Diabetes mellitus type 2, controlled ([facility] ICD-10-CM: E11.9  ICD-9-CM: 250.00  12/15/2017 - Present        HTN (hypertension) ICD-10-CM: I10  ICD-9-CM: 401.9  12/15/2017 - Present        RESOLVED: Symptomatic anemia ICD-10-CM: D64.9  ICD-9-CM: 285.9  1/10/2018 - 3/9/2018        RESOLVED: Back pain ICD-10-CM: M54.9  ICD-9-CM: 724.5  12/17/2017 - 3/9/2018        RESOLVED: Constipation ICD-10-CM: K59.00  ICD-9-CM: 564.00  12/17/2017 - 3/9/2018              Discharge Medications:     Current Discharge Medication List      START taking these medications    Details   nystatin (MYCOSTATIN) powder Apply  to affected area two (2) times a day. Qty: 1 Bottle, Refills: 0         CONTINUE these medications which have NOT CHANGED    Details   pantoprazole (PROTONIX) 40 mg tablet Take 1 Tab by mouth daily. Qty: 30 Tab, Refills: 2      cholecalciferol (VITAMIN D3) 1,000 unit tablet Take 2,000 Units by mouth daily. ferrous sulfate 325 mg (65 mg iron) tablet Take 325 mg by mouth three (3) times daily (with meals).       hydrALAZINE (APRESOLINE) 25 mg tablet Take 25 mg by mouth three (3) times daily. lactulose (CHRONULAC) 10 gram/15 mL solution Take 20 g by mouth daily as needed. metoprolol succinate (TOPROL XL) 50 mg XL tablet Take 25 mg by mouth daily. sevelamer (RENAGEL) 800 mg tablet Take 1,600 mg by mouth three (3) times daily (with meals). b complex-vitamin c-folic acid (NEPHROCAPS) 1 mg capsule Take 1 Cap by mouth daily. acetaminophen (TYLENOL) 325 mg tablet Take 325 mg by mouth every six (6) hours as needed for Pain. AFLIBERCEPT INTRAVITREAL 1 Ampule by IntraVITreal route every twenty-eight (28) days. carboxymethylcellulose sodium (REFRESH PLUS) 0.5 % dpet Administer 1 Drop to both eyes as needed. hydrOXYzine HCl (ATARAX) 10 mg tablet Take 10 mg by mouth nightly as needed for Itching. albuterol (PROVENTIL HFA, VENTOLIN HFA, PROAIR HFA) 90 mcg/actuation inhaler Take 2 Puffs by inhalation every four (4) hours as needed for Wheezing. Discharge Condition: Stable    Procedures : EGD    Consults: Gastroenterology and Nephrology      PHYSICAL EXAM    Visit Vitals    /57    Pulse 68    Temp 98 °F (36.7 °C)    Resp 17    Wt 73.1 kg (161 lb 2.5 oz)    SpO2 100%    BMI 28.55 kg/m2     General: Awake, cooperative, no acute distress    HEENT: NC, Atraumatic. PERRLA, EOMI. Anicteric sclerae. Lungs:  CTA Bilaterally. No Wheezing/Rhonchi/Rales. Heart:  Regular  rhythm,  No murmur, No Rubs, No Gallops  Abdomen: Soft, Non distended, Non tender. +Bowel sounds,   Extremities: No c/c/e  Psych:   Not anxious or agitated. Neurologic:  No acute neurological deficits. Admission HPI : [facility] 67[facility] Monday, Wednesday and Friday. She had abdominal pain this morning, noticed blood in the stool and in the toilet, and went to dialysis, where she was told that she should come to the emergency room. She has hemoglobin of 7.4.   She has had GI bleeding in the past and was admitted in January, where she had an AVM in her duodenum that was cauterized and multiple colonic polyps that were removed. Hospital Course : This patient was admitted to medical services on March 9, 2018 for abdominal pain, anemia, and blood per rectum. The patient was resumed on her usual MWF dialysis schedule upon admission. The patient's H/H remained stable throughout her admission. She underwent a bleeding scan which was negative, and was seen by Dr Mike Esquivel in consultation. She underwent EGD on the day of her discharge which was largely unremarkable. She was instructed to continue her PPI upon discharge. Activity: Activity as tolerated    Diet: Diabetic Diet and Renal Diet    Follow-up: This patient has appointments already scheduled at the [place], which she was instructed to keep upon discharge. Disposition: This patient will be discharged home in stable condition. Her hospital course was largely unremarkable and the patient has appointments at the [place], which she is anxious to be discharged so she does not miss them. Minutes spent on discharge: 35       Labs: Results:       Chemistry Recent Labs      03/11/18   0935  03/10/18   0530   GLU  175*  210*   NA  145  143   K  4.4  4.7   CL  105  104   CO2  29  25   BUN  49*  74*   CREA  5.54*  7.81*   CA  8.5  8.9   AGAP  11  14   BUCR  9*  9*      CBC w/Diff Recent Labs      03/11/18   0935  03/10/18   0530   WBC  4.4*  4.9   RBC  2.68*  2.88*   HGB  7.2*  7.7*   HCT  24.1*  25.6*   PLT  142  140   GRANS  67   --    LYMPH  19*   --    EOS  3   --       Cardiac Enzymes No results for input(s): CPK, CKND1, NICOLE in the last 72 hours. No lab exists for component: CKRMB, TROIP   Coagulation No results for input(s): PTP, INR, APTT in the last 72 hours.     No lab exists for component: INREXT    Lipid Panel No results found for: CHOL, CHOLPOCT, CHOLX, CHLST, CHOLV, 288122, HDL, LDL, LDLC, DLDLP, 087209, VLDLC, VLDL, TGLX, TRIGL, TRIGP, TGLPOCT, CHHD, CHHDX BNP No results for input(s): BNPP in the last 72 hours. Liver Enzymes No results for input(s): TP, ALB, TBIL, AP, SGOT, GPT in the last 72 hours. No lab exists for component: DBIL   Thyroid Studies No results found for: T4, T3U, TSH, TSHEXT         Significant Diagnostic Studies: Nm Acute Gi Bleed Scan    Result Date: 3/9/2018  EXAM: Nuclear medicine acute GI bleeding scan INDICATION: GI bleed COMPARISON: None. _______________ FINDINGS: Following the administration of 32 mCi of technetium 99 M red blood cells injected in the left wrist, abdominal imaging was performed. There is no abnormal focus of radiotracer activity which increases with time to suggest intraluminal hemorrhage. _______________     IMPRESSION: No definite evidence of acute intraluminal hemorrhage         No results found for this or any previous visit.         CC: Savannah Farrell MD

## 2018-03-12 NOTE — PROGRESS NOTES
Problem: Mobility Impaired (Adult and Pediatric)  Goal: *Acute Goals and Plan of Care (Insert Text)  Physical Therapy Goals  Initiated 3/10/2018 and to be accomplished within 3-7 day(s)  1. Patient will move from supine to sit and sit to supine  in bed with supervision/set-up. 2.  Patient will transfer from bed to chair and chair to bed with supervision/set-up using the least restrictive device. 3.  Patient will perform sit to stand with supervision/set-up. 4.  Patient will ambulate with supervision/set-up for 150 feet with the least restrictive device. 5.  Patient will ascend/descend 7 stairs with B handrail(s) with supervision/set-up. physical Therapy TREATMENT    Patient: Edwin Singh (85 y.o. female)  Date: 3/12/2018  Diagnosis: GI bleed  gi bleeding  GI bleed  Procedure(s) (LRB):  ESOPHAGOGASTRODUODENAL (EGD) BIOPSY (N/A)    Precautions: Fall   Chart, physical therapy assessment, plan of care and goals were reviewed. ASSESSMENT:  Pt motivated to participate with PT at this time. Assisted pt with donning walking boot to L LE; pt indicated her family assists with donning and doffing boot. Pt was able to complete sit-stand transfer with Juan with RW/GB use. During gait training pt ambulated a total of 12 feet with RW/GB and walking boot with 1 LOB requiring Juan to regain VERÓNICA. Left pt in a chair as requested with all needs met and call bell within reach. Recommend rehab vs. HHPT with care at home. Progression toward goals:  []      Improving appropriately and progressing toward goals  [x]      Improving slowly and progressing toward goals  []      Not making progress toward goals and plan of care will be adjusted     PLAN:  Patient continues to benefit from skilled intervention to address the above impairments. Continue treatment per established plan of care.   Discharge Recommendations:  Home Health with caregivers at home vs. [facility]  Further Equipment Recommendations for Discharge:  rolling walker     SUBJECTIVE:   Patient stated I am ok, just having indigestion and can't stop burping.     OBJECTIVE DATA SUMMARY:   Critical Behavior:  Neurologic State: Alert, Appropriate for age  Orientation Level: Appropriate for age, Oriented X4  Cognition: Appropriate decision making, Appropriate safety awareness, Appropriate for age attention/concentration, Follows commands  Safety/Judgement: Awareness of environment, Fall prevention, Good awareness of safety precautions, Insight into deficits  Functional Mobility Training:  Bed Mobility:   Supine to Sit: Contact guard assistance   Transfers:  Sit to Stand: Minimum assistance; Additional time  Stand to Sit: Contact guard assistance; Additional time  Balance:  Sitting: Intact  Standing: Intact; With support  Ambulation/Gait Training:  Distance (ft): 12 Feet (ft)  Assistive Device: Walker, rolling;Gait belt; Other (comment) (waling boot on the left)  Ambulation - Level of Assistance: Minimal assistance   Gait Abnormalities: Decreased step clearance; Step to gait   Base of Support: Shift to right   Speed/Cristy: Slow  Step Length: Right shortened;Left shortened  Swing Pattern: Left asymmetrical;Right asymmetrical   Therapeutic Exercises:   HEP included LAQ and seated marching x 5 reps  Pain:  Pain Scale 1: Numeric (0 - 10)  Pain Intensity 1: 0  Pain Location 1: Back  Pain Orientation 1: Right  Pain Description 1: Aching  Pain Intervention(s) 1: Medication (see MAR)  Activity Tolerance:   Fair  Please refer to the flowsheet for vital signs taken during this treatment.   After treatment:   [x] Patient left in no apparent distress sitting up in chair  [] Patient left in no apparent distress in bed  [x] Call bell left within reach  [x] Nursing notified  [] Caregiver present  [] Bed alarm activated        Jacey Toro PT, DPT     Time Calculation: 23 mins

## 2018-03-12 NOTE — PROGRESS NOTES
EGD COMPLETED    Patient received 3 mg of versed and 25 mcg of fentanyl. 150 ml of normal saline    Benzocaine spray given at 1335. Patient needs to wait one hour prior eating or drinking    Findings: hiatal hernia, diabetic gastroparesis, small esophageal varices. vss at this time.  Patient arouses to name and light touch      Report called to Laney Cruz

## 2018-03-12 NOTE — PROGRESS NOTES
Problem: Chronic Renal Failure  Goal: *Fluid and electrolytes stabilized  Outcome: Progressing Towards Goal  PROBLEM: HEMODIALYSIS (ADULT)  Goal: Signs and symptoms of listed potential  Problems will be absent, minimized or managed by discharge/transtion of care    INTERVENTION: Prevent/manage dialysis procedure complications    INTERVENTION: Hemodynamic Stabilization  Maintain BP WNL for this patient while on Hemodialysis    INTERVENTION: Fluid Management  Will attempt 1000 ml total fluid removal as tolerated     INTERVENTION: Metabolic/ Electrolyte Imbalance Management  K+ 2 Potassium. 2.5 CA  bath today with Dialysis    INTERVENTION:  Hemodialysis Access Site Management  Right IJ accessed and de-accessed  Using aseptic technique     GOAL: Signs and symptoms of listed potential problems will be absent  or managed.     OUTCOME: Progressing  TX  planned for 3.0 hours today

## 2018-03-12 NOTE — PROGRESS NOTES
Chart Reviewed. Noted patient admitted to the hospital for further medical management. Care Management to follow up with patient and/or family for transition of care needs prn. Readmission Risk Assessment:     High Risk and MSSP/Good Help ACO patients    RRAT Score:  21 or greater    Initial Assessment: presents to the emergency department C/O rectal bleeding/ blood in stoolonset this morning while on her way to dialysis causing dialysis  Patient admitted for medical management of GI bleed     Emergency Contact:  See face sheet    Pertinent Medical Hx:    HTN, CKD, arthritis, GERD, Chroniic pain, Cirrhosis    PCP/Specialists:  None listed on chart at time of chart review     Community Services:      DME:         High Risk Care Transition Plan:  1. Evaluate for [facility] or [facility], SNF, acute rehab, community care coordination of Resources. 2. Involve patient/caregiver in assessment, planning, education and implement of intervention. 3. CM daily patient care huddles/interdisciplinary rounds. 4. PCP/Specialist appointment within 48 hours of discharge unless otherwise specified. 5. Facilitate transportation and logistics for follow-up appointments. 6. Medication reconciliation - Pharmacy  7. Discharge follow-up phone call within 2 - 4 days (NN, [facility], Nursing) CM follow up as assigned. 8. Formal handoff between hospital provider and post-acute provider to transition patient  9. Handoff to [address] Nurse Navigator or PCP practice.

## 2018-03-12 NOTE — DISCHARGE INSTRUCTIONS
Anemia From Chronic Disease: Care Instructions  Your Care Instructions    Anemia is a low level of red blood cells. Red blood cells carry oxygen from your lungs to the rest of your body. Sometimes when you have a long-term (chronic) disease, such as kidney disease, arthritis, diabetes, cancer, or an infection, your body does not make enough red blood cells. Follow-up care is a key part of your treatment and safety. Be sure to make and go to all appointments, and call your doctor if you are having problems. It's also a good idea to know your test results and keep a list of the medicines you take. How can you care for yourself at home? · Follow your doctor's instructions to treat the chronic condition that is causing the anemia. · Be safe with medicines. Take your medicine to treat your chronic condition exactly as prescribed. Call your doctor if you think you are having a problem with your medicine. · Take your medicine for anemia exactly as prescribed. Call your doctor if you think you are having a problem with your medicine. Medicines to increase the number of red blood cells (such as epoetin or darbepoetin) may be given as an injection. ¨ If you miss a dose, take it as soon as you can, unless it is almost time for your next dose. In that case, get back on your regular schedule and take only one dose. ¨ Do not freeze this medicine. Store it in the refrigerator. Do not shake the bottle before you prepare the shot. · Keep all your appointments for blood tests to check on your hemoglobin levels. When should you call for help? Call 911 anytime you think you may need emergency care. For example, call if:  ? · You passed out (lost consciousness). ?Call your doctor now or seek immediate medical care if:  ? · You are short of breath. ? · You are dizzy or light-headed, or you feel like you may faint. ? · You have new or worse bleeding. ? Watch closely for changes in your health, and be sure to contact your doctor if:  ? · You feel weaker or more tired than usual.   ? · You do not get better as expected. Where can you learn more? Go to http://rosi-awa.info/. Enter E502 in the search box to learn more about \"Anemia From Chronic Disease: Care Instructions. \"  Current as of: October 13, 2016  Content Version: 11.4  © 2601-5450 [employer]. Care instructions adapted under license by [employer] (which disclaims liability or warranty for this information). If you have questions about a medical condition or this instruction, always ask your healthcare professional. [place] [address] any warranty or liability for your use of this information. Learning About Blood Transfusions  What is a blood transfusion? Blood transfusion is a medical treatment to replace the blood or parts of blood that your body has lost. The blood goes through a tube from a bag to an intravenous (IV) catheter and into your vein. You may need a blood transfusion after losing blood from an injury, a major surgery, an illness that causes bleeding, or an illness that destroys blood cells. Transfusions are also used to give you the parts of blood-such as platelets, plasma, or substances that cause clotting-that your body needs to fight an illness or stop bleeding. How is a blood transfusion done? Before you receive a blood transfusion, your blood is tested to find out what your blood type is. Blood or blood parts that are a match with your blood type are ordered by your doctor. Blood is typed as A, B, AB, or O. It is also typed as Rh-positive or Rh-negative. Your blood is also screened to look for antibodies that might react with the blood that is given to you. The blood you are getting is checked and rechecked to make sure that it's the right type for you. A sample of your blood is mixed with a sample of the blood you will receive to check for problems.  Before actually giving you the transfusion, a doctor and nurses will look at the label on the package of blood and compare it to your hospital ID bracelet and medical records. The transfusion begins only when all agree that this is the correct blood and that you are the correct person to receive it. To receive the transfusion, you will have an intravenous (IV) catheter inserted into a vein. A tube connects the catheter to the bag containing the blood, which is placed higher than your body. The blood then flows slowly into your vein. A doctor or nurse will check you several times during the transfusion to watch for a reaction or other problems. What are the possible risks? Blood transfusions have many benefits and are often life-saving. But they also have a few risks. Possible risks include:  · Your body's reaction to receiving new blood. This may include:  ¨ Fever. ¨ Allergic reactions. ¨ Breathing problems. · An infection from the blood. This risk is small because of the strict rules placed on handling and storing blood. Getting a viral infection, such as HIV or hepatitis B or C, through blood transfusions has become very rare. The U.S. Food and Drug Administration (FDA) enforces strict guidelines on the collection, testing, storage, and use of blood. · Getting the wrong blood type by accident. Severe reactions, which can be life-threatening, are very rare. What can you expect after a blood transfusion? Here are some things you can do at home to help prevent infection at the transfusion site:  · Wash the area daily with warm, soapy water, and pat it dry. Don't use hydrogen peroxide or alcohol, which can slow healing. You may cover the area with a gauze bandage if it weeps or rubs against clothing. Change the bandage every day. · Keep the area clean and dry. When should you call for help? Call 911 anytime you think you may need emergency care. For example, call if:  · You have severe trouble breathing.   Call your doctor now or seek immediate medical care if:  · You have a fever. · You feel weaker or more tired than usual.  · You have a yellow tint to your skin or the whites of your eyes. Watch closely for changes in your health, and be sure to contact your doctor if you have any problems. Follow-up care is a key part of your treatment and safety. Be sure to make and go to all appointments, and call your doctor if you are having problems. It's also a good idea to know your test results and keep a list of the medicines you take. Where can you learn more? Go to http://rosi-awa.info/. Enter V588 in the search box to learn more about \"Learning About Blood Transfusions. \"  Current as of: October 13, 2016  Content Version: 11.4  © 3120-8643 [employer]. Care instructions adapted under license by [employer] (which disclaims liability or warranty for this information). If you have questions about a medical condition or this instruction, always ask your healthcare professional. [place] [address] any warranty or liability for your use of this information. Colonoscopy: Before Your Procedure  What is a colonoscopy? A colonoscopy is a test that lets a doctor look inside your colon. The doctor uses a thin, lighted tube called a colonoscope to look for problems. These include small growths called polyps, cancer, or bleeding. During the test, the doctor can take samples of tissue that can be checked for cancer or other problems. This is called a biopsy. The doctor can also take out polyps. Before the test, you will need to stop eating solid foods. You also will drink a liquid or take a tablet that cleans out your colon. This helps your doctor be able to see inside your colon during the test.  Follow-up care is a key part of your treatment and safety. Be sure to make and go to all appointments, and call your doctor if you are having problems.  It's also a good idea to know your test results and keep a list of the medicines you take. What happens before the procedure? ?Preparing for the procedure  ? · Understand exactly what procedure is planned, along with the risks, benefits, and other options. · Tell your doctors ALL the medicines, vitamins, supplements, and herbal remedies you take. Some of these can increase the risk of bleeding or interact with anesthesia. ? · If you take blood thinners, such as warfarin (Coumadin), clopidogrel (Plavix), or aspirin, be sure to talk to your doctor. He or she will tell you if you should stop taking these medicines before your procedure. Make sure that you understand exactly what your doctor wants you to do.   ? · Your doctor will tell you which medicines to take or stop before your procedure. You may need to stop taking certain medicines a week or more before the procedure. So talk to your doctor as soon as you can.   ? · If you have an advance directive, let your doctor know. It may include a living will and a durable power of  for health care. Bring a copy to the hospital. If you don't have one, you may want to prepare one. It lets your doctor and loved ones know your health care wishes. Doctors advise that everyone prepare these papers before any type of surgery or procedure. ? Before the procedure  ? · Follow your doctor's directions about when to stop eating solid foods and drink only clear liquids. You can drink water, clear juices, clear broths, flavored ice pops, and gelatin (such as Jell-O). Do not eat or drink anything red or purple. This includes grape juice and grape-flavored ice pops. It also includes fruit punch and cherry gelatin. ? · Drink the \"colon prep\" liquid as your doctor tells you. You will want to stay home, because the liquid will make you go to the bathroom a lot. Your stools will be loose and watery. It is very important to drink all of the liquid.  If you have problems drinking it, call your doctor. Some doctors may have you take a tablet rather than drink a liquid. ? · Do not eat any solid foods after you drink the colon prep. ? · Stop drinking clear liquids 6 to 8 hours before the test.   Procedures can be stressful. This information will help you understand what you can expect. And it will help you safely prepare for your procedure. What happens on the day of the procedure? · Follow the instructions exactly about when to stop eating and drinking. If you don't, your procedure may be canceled. If your doctor told you to take your medicines on the day of the procedure, take them with only a sip of water. ? · Take a bath or shower before you come in for your procedure. Do not apply lotions, perfumes, deodorants, or nail polish. ? · Take off all jewelry and piercings. And take out contact lenses, if you wear them. ? At the [address] [place] [address] or [facility]   · Bring a picture ID. ? · You will be kept comfortable and safe by your anesthesia provider. The anesthesia may make you sleep. ? · You will lie on your back or your side with your knees drawn up toward your belly. The doctor will gently put a gloved finger into your anus. Then the doctor puts the scope in and moves it into your colon. The scope goes in easily because it is lubricated. ? · The doctor may also use small tools to take tissue samples for a biopsy or to remove polyps. This does not hurt. ? · The test usually takes 30 to 45 minutes. But it may take longer. It depends on what is found and what is done. Going home   · Be sure you have someone to drive you home. Anesthesia and pain medicine make it unsafe for you to drive. ? · You will be given more specific instructions about recovering from your procedure. When should you call your doctor? · You have questions or concerns. ? · You don't understand how to prepare for your procedure. ? · You are having trouble with the bowel prep.    ? · You become ill before the procedure (such as fever, flu, or a cold). ? · You need to reschedule or have changed your mind about having the procedure. Where can you learn more? Go to http://rosi-awa.info/. Enter C315 in the search box to learn more about \"Colonoscopy: Before Your Procedure. \"  Current as of: May 12, 2017  Content Version: 11.4  © 5180-2970 [employer]. Care instructions adapted under license by [employer] (which disclaims liability or warranty for this information). If you have questions about a medical condition or this instruction, always ask your healthcare professional. [place] [address] any warranty or liability for your use of this information. Learning About Diabetes Food Guidelines  Your Care Instructions    Meal planning is important to manage diabetes. It helps keep your blood sugar at a target level (which you set with your doctor). You don't have to eat special foods. You can eat what your family eats, including sweets once in a while. But you do have to pay attention to how often you eat and how much you eat of certain foods. You may want to work with a dietitian or a certified diabetes educator (CDE) to help you plan meals and snacks. A dietitian or CDE can also help you lose weight if that is one of your goals. What should you know about eating carbs? Managing the amount of carbohydrate (carbs) you eat is an important part of healthy meals when you have diabetes. Carbohydrate is found in many foods. · Learn which foods have carbs. And learn the amounts of carbs in different foods. ¨ Bread, cereal, pasta, and rice have about 15 grams of carbs in a serving. A serving is 1 slice of bread (1 ounce), ½ cup of cooked cereal, or 1/3 cup of cooked pasta or rice. ¨ Fruits have 15 grams of carbs in a serving.  A serving is 1 small fresh fruit, such as an apple or orange; ½ of a banana; ½ cup of cooked or canned fruit; ½ cup of fruit juice; 1 cup of melon or raspberries; or 2 tablespoons of dried fruit. ¨ Milk and no-sugar-added yogurt have 15 grams of carbs in a serving. A serving is 1 cup of milk or 2/3 cup of no-sugar-added yogurt. ¨ Starchy vegetables have 15 grams of carbs in a serving. A serving is ½ cup of mashed potatoes or sweet potato; 1 cup winter squash; ½ of a small baked potato; ½ cup of cooked beans; or ½ cup cooked corn or green peas. · Learn how much carbs to eat each day and at each meal. A dietitian or CDE can teach you how to keep track of the amount of carbs you eat. This is called carbohydrate counting. · If you are not sure how to count carbohydrate grams, use the Plate Method to plan meals. It is a good, quick way to make sure that you have a balanced meal. It also helps you spread carbs throughout the day. ¨ Divide your plate by types of foods. Put non-starchy vegetables on half the plate, meat or other protein food on one-quarter of the plate, and a grain or starchy vegetable in the final quarter of the plate. To this you can add a small piece of fruit and 1 cup of milk or yogurt, depending on how many carbs you are supposed to eat at a meal.  · Try to eat about the same amount of carbs at each meal. Do not \"save up\" your daily allowance of carbs to eat at one meal.  · Proteins have very little or no carbs per serving. Examples of proteins are beef, chicken, turkey, fish, eggs, tofu, cheese, cottage cheese, and peanut butter. A serving size of meat is 3 ounces, which is about the size of a deck of cards. Examples of meat substitute serving sizes (equal to 1 ounce of meat) are 1/4 cup of cottage cheese, 1 egg, 1 tablespoon of peanut butter, and ½ cup of tofu. How can you eat out and still eat healthy? · Learn to estimate the serving sizes of foods that have carbohydrate. If you measure food at home, it will be easier to estimate the amount in a serving of restaurant food.   · If the meal you order has too much carbohydrate (such as potatoes, corn, or baked beans), ask to have a low-carbohydrate food instead. Ask for a salad or green vegetables. · If you use insulin, check your blood sugar before and after eating out to help you plan how much to eat in the future. · If you eat more carbohydrate at a meal than you had planned, take a walk or do other exercise. This will help lower your blood sugar. What else should you know? · Limit saturated fat, such as the fat from meat and dairy products. This is a healthy choice because people who have diabetes are at higher risk of heart disease. So choose lean cuts of meat and nonfat or low-fat dairy products. Use olive or canola oil instead of butter or shortening when cooking. · Don't skip meals. Your blood sugar may drop too low if you skip meals and take insulin or certain medicines for diabetes. · Check with your doctor before you drink alcohol. Alcohol can cause your blood sugar to drop too low. Alcohol can also cause a bad reaction if you take certain diabetes medicines. Follow-up care is a key part of your treatment and safety. Be sure to make and go to all appointments, and call your doctor if you are having problems. It's also a good idea to know your test results and keep a list of the medicines you take. Where can you learn more? Go to http://rosi-awa.info/. Enter R446 in the search box to learn more about \"Learning About Diabetes Food Guidelines. \"  Current as of: March 13, 2017  Content Version: 11.4  © 7223-8504 [employer]. Care instructions adapted under license by [employer] (which disclaims liability or warranty for this information). If you have questions about a medical condition or this instruction, always ask your healthcare professional. [place] [address] any warranty or liability for your use of this information.          Upper GI Endoscopy: What to Expect at [facility]  After you have an endoscopy, you will stay at the hospital or clinic for 1 to 2 hours. This will allow the medicine to wear off. You will be able to go home after your doctor or nurse checks to make sure you are not having any problems. You may have to stay overnight if you had treatment during the test. You may have a sore throat for a day or two after the test.  This care sheet gives you a general idea about what to expect after the test.  How can you care for yourself at home? Activity  · Rest as much as you need to after you go home. · You should be able to go back to your usual activities the day after the test.  Diet  · Follow your doctor's directions for eating after the test.  · Drink plenty of fluids (unless your doctor has told you not to). Medications  · If you have a sore throat the day after the test, use an over-the-counter spray to numb your throat. Follow-up care is a key part of your treatment and safety. Be sure to make and go to all appointments, and call your doctor if you are having problems. It's also a good idea to know your test results and keep a list of the medicines you take. When should you call for help? Call 911 anytime you think you may need emergency care. For example, call if:  ? · You passed out (loses consciousness). ? · You have trouble breathing. ? · You pass maroon or bloody stools. ?Call your doctor now or seek immediate medical care if:  ? · You have pain that does not get better after your take pain medicine. ? · You have new or worse belly pain. ? · You have blood in your stools. ? · You are sick to your stomach and cannot keep fluids down. ? · You have a fever. ? · You cannot pass stools or gas. ? Watch closely for changes in your health, and be sure to contact your doctor if:  ? · Your throat still hurts after a day or two. ? · You do not get better as expected. Where can you learn more? Go to http://rosi-awa.info/.   Enter J454 in the search box to learn more about \"Upper GI Endoscopy: What to Expect at Home. \"  Current as of: May 12, 2017  Content Version: 11.4  © 1729-0185 [employer]. Care instructions adapted under license by [employer] (which disclaims liability or warranty for this information). If you have questions about a medical condition or this instruction, always ask your healthcare professional. [place] [address] any warranty or liability for your use of this information. High Blood Pressure: Care Instructions  Your Care Instructions    If your blood pressure is usually above 140/90, you have high blood pressure, or hypertension. That means the top number is 140 or higher or the bottom number is 90 or higher, or both. Despite what a lot of people think, high blood pressure usually doesn't cause headaches or make you feel dizzy or lightheaded. It usually has no symptoms. But it does increase your risk for heart attack, stroke, and kidney or eye damage. The higher your blood pressure, the more your risk increases. Your doctor will give you a goal for your blood pressure. Your goal will be based on your health and your age. An example of a goal is to keep your blood pressure below 140/90. Lifestyle changes, such as eating healthy and being active, are always important to help lower blood pressure. You might also take medicine to reach your blood pressure goal.  Follow-up care is a key part of your treatment and safety. Be sure to make and go to all appointments, and call your doctor if you are having problems. It's also a good idea to know your test results and keep a list of the medicines you take. How can you care for yourself at home? Medical treatment  · If you stop taking your medicine, your blood pressure will go back up. You may take one or more types of medicine to lower your blood pressure. Be safe with medicines. Take your medicine exactly as prescribed.  Call your doctor if you think you are having a problem with your medicine. · Talk to your doctor before you start taking aspirin every day. Aspirin can help certain people lower their risk of a heart attack or stroke. But taking aspirin isn't right for everyone, because it can cause serious bleeding. · See your doctor regularly. You may need to see the doctor more often at first or until your blood pressure comes down. · If you are taking blood pressure medicine, talk to your doctor before you take decongestants or anti-inflammatory medicine, such as ibuprofen. Some of these medicines can raise blood pressure. · Learn how to check your blood pressure at home. Lifestyle changes  · Stay at a healthy weight. This is especially important if you put on weight around the waist. Losing even 10 pounds can help you lower your blood pressure. · If your doctor recommends it, get more exercise. Walking is a good choice. Bit by bit, increase the amount you walk every day. Try for at least 30 minutes on most days of the week. You also may want to swim, bike, or do other activities. · Avoid or limit alcohol. Talk to your doctor about whether you can drink any alcohol. · Try to limit how much sodium you eat to less than 2,300 milligrams (mg) a day. Your doctor may ask you to try to eat less than 1,500 mg a day. · Eat plenty of fruits (such as bananas and oranges), vegetables, legumes, whole grains, and low-fat dairy products. · Lower the amount of saturated fat in your diet. Saturated fat is found in animal products such as milk, cheese, and meat. Limiting these foods may help you lose weight and also lower your risk for heart disease. · Do not smoke. Smoking increases your risk for heart attack and stroke. If you need help quitting, talk to your doctor about stop-smoking programs and medicines. These can increase your chances of quitting for good. When should you call for help?   Call 911 anytime you think you may need emergency care. This may mean having symptoms that suggest that your blood pressure is causing a serious heart or blood vessel problem. Your blood pressure may be over 180/110. ? For example, call 911 if:  ? · You have symptoms of a heart attack. These may include:  ¨ Chest pain or pressure, or a strange feeling in the chest.  ¨ Sweating. ¨ Shortness of breath. ¨ Nausea or vomiting. ¨ Pain, pressure, or a strange feeling in the back, neck, jaw, or upper belly or in one or both shoulders or arms. ¨ Lightheadedness or sudden weakness. ¨ A fast or irregular heartbeat. ? · You have symptoms of a stroke. These may include:  ¨ Sudden numbness, tingling, weakness, or loss of movement in your face, arm, or leg, especially on only one side of your body. ¨ Sudden vision changes. ¨ Sudden trouble speaking. ¨ Sudden confusion or trouble understanding simple statements. ¨ Sudden problems with walking or balance. ¨ A sudden, severe headache that is different from past headaches. ? · You have severe back or belly pain. ?Do not wait until your blood pressure comes down on its own. Get help right away. ?Call your doctor now or seek immediate care if:  ? · Your blood pressure is much higher than normal (such as 180/110 or higher), but you don't have symptoms. ? · You think high blood pressure is causing symptoms, such as:  ¨ Severe headache. ¨ Blurry vision. ? Watch closely for changes in your health, and be sure to contact your doctor if:  ? · Your blood pressure measures 140/90 or higher at least 2 times. That means the top number is 140 or higher or the bottom number is 90 or higher, or both. ? · You think you may be having side effects from your blood pressure medicine. ? · Your blood pressure is usually normal, but it goes above normal at least 2 times. Where can you learn more? Go to http://rosi-awa.info/.   DISCHARGE SUMMARY from Nurse    PATIENT INSTRUCTIONS:    After general anesthesia or intravenous sedation, for 24 hours or while taking prescription Narcotics:  · Limit your activities  · Do not drive and operate hazardous machinery  · Do not make important personal or business decisions  · Do  not drink alcoholic beverages  · If you have not urinated within 8 hours after discharge, please contact your surgeon on call. Report the following to your surgeon:  · Excessive pain, swelling, redness or odor of or around the surgical area  · Temperature over 100.5  · Nausea and vomiting lasting longer than 4 hours or if unable to take medications  · Any signs of decreased circulation or nerve impairment to extremity: change in color, persistent  numbness, tingling, coldness or increase pain  · Any questions    What to do at Home:  Recommended activity: Activity as tolerated,       *  Please give a list of your current medications to your Primary Care Provider. *  Please update this list whenever your medications are discontinued, doses are      changed, or new medications (including over-the-counter products) are added. *  Please carry medication information at all times in case of emergency situations. These are general instructions for a healthy lifestyle:    No smoking/ No tobacco products/ Avoid exposure to second hand smoke  Surgeon General's Warning:  Quitting smoking now greatly reduces serious risk to your health. Obesity, smoking, and sedentary lifestyle greatly increases your risk for illness    A healthy diet, regular physical exercise & weight monitoring are important for maintaining a healthy lifestyle    You may be retaining fluid if you have a history of heart failure or if you experience any of the following symptoms:  Weight gain of 3 pounds or more overnight or 5 pounds in a week, increased swelling in our hands or feet or shortness of breath while lying flat in bed.   Please call your doctor as soon as you notice any of these symptoms; do not wait until your next office visit. Recognize signs and symptoms of STROKE:    F-face looks uneven    A-arms unable to move or move unevenly    S-speech slurred or non-existent    T-time-call 911 as soon as signs and symptoms begin-DO NOT go       Back to bed or wait to see if you get better-TIME IS BRAIN. Warning Signs of HEART ATTACK     Call 911 if you have these symptoms:   Chest discomfort. Most heart attacks involve discomfort in the center of the chest that lasts more than a few minutes, or that goes away and comes back. It can feel like uncomfortable pressure, squeezing, fullness, or pain.  Discomfort in other areas of the upper body. Symptoms can include pain or discomfort in one or both arms, the back, neck, jaw, or stomach.  Shortness of breath with or without chest discomfort.  Other signs may include breaking out in a cold sweat, nausea, or lightheadedness. Don't wait more than five minutes to call 911 - MINUTES MATTER! Fast action can save your life. Calling 911 is almost always the fastest way to get lifesaving treatment. Emergency Medical Services staff can begin treatment when they arrive -- up to an hour sooner than if someone gets to the hospital by car. The discharge information has been reviewed with the patient. The patient verbalized understanding. Discharge medications reviewed with the patient and appropriate educational materials and side effects teaching were provided. __________________________________________________________________________________________________________________________________Maria Esther Marion J998 in the search box to learn more about \"High Blood Pressure: Care Instructions. \"  Current as of: September 21, 2016  Content Version: 11.4  © 3659-0742 [employer]. Care instructions adapted under license by [employer] (which disclaims liability or warranty for this information).  If you have questions about a medical condition or this instruction, always ask your healthcare professional. [place] [address] any warranty or liability for your use of this information. Anemia From Chronic Disease: Care Instructions  Your Care Instructions    Anemia is a low level of red blood cells. Red blood cells carry oxygen from your lungs to the rest of your body. Sometimes when you have a long-term (chronic) disease, such as kidney disease, arthritis, diabetes, cancer, or an infection, your body does not make enough red blood cells. Follow-up care is a key part of your treatment and safety. Be sure to make and go to all appointments, and call your doctor if you are having problems. It's also a good idea to know your test results and keep a list of the medicines you take. How can you care for yourself at home? · Follow your doctor's instructions to treat the chronic condition that is causing the anemia. · Be safe with medicines. Take your medicine to treat your chronic condition exactly as prescribed. Call your doctor if you think you are having a problem with your medicine. · Take your medicine for anemia exactly as prescribed. Call your doctor if you think you are having a problem with your medicine. Medicines to increase the number of red blood cells (such as epoetin or darbepoetin) may be given as an injection. ¨ If you miss a dose, take it as soon as you can, unless it is almost time for your next dose. In that case, get back on your regular schedule and take only one dose. ¨ Do not freeze this medicine. Store it in the refrigerator. Do not shake the bottle before you prepare the shot. · Keep all your appointments for blood tests to check on your hemoglobin levels. When should you call for help? Call 911 anytime you think you may need emergency care. For example, call if:  ? · You passed out (lost consciousness). ?Call your doctor now or seek immediate medical care if:  ? · You are short of breath.    ? · You are dizzy or light-headed, or you feel like you may faint. ? · You have new or worse bleeding. ? Watch closely for changes in your health, and be sure to contact your doctor if:  ? · You feel weaker or more tired than usual.   ? · You do not get better as expected. Where can you learn more? Go to http://rosi-awa.info/. Enter E502 in the search box to learn more about \"Anemia From Chronic Disease: Care Instructions. \"  Current as of: October 13, 2016  Content Version: 11.4  © 9331-8866 [employer]. Care instructions adapted under license by [employer] (which disclaims liability or warranty for this information). If you have questions about a medical condition or this instruction, always ask your healthcare professional. [address] any warranty or liability for your use of this information. Iron Deficiency Anemia: Care Instructions  Your Care Instructions    Anemia means that you do not have enough red blood cells. Red blood cells carry oxygen around your body. When you have anemia, it can make you pale, weak, and tired. Many things can cause anemia. The most common cause is loss of blood. This can happen if you have heavy menstrual periods. It can also happen if you have bleeding in your stomach or bowel. You can also get anemia if you don't have enough iron in your diet or if it's hard for your body to absorb iron. In some cases, pregnancy causes anemia. That's because a pregnant woman needs more iron. Your doctor may do more tests to find the cause of your anemia. If a disease or other health problem is causing it, your doctor will treat that problem. It's important to follow up with your doctor to make sure that your iron level returns to normal.  Follow-up care is a key part of your treatment and safety. Be sure to make and go to all appointments, and call your doctor if you are having problems.  It's also a good idea to know your test results and keep a list of the medicines you take. How can you care for yourself at home? · If your doctor recommended iron pills, take them as directed. ¨ Try to take the pills on an empty stomach. You can do this about 1 hour before or 2 hours after meals. But you may need to take iron with food to avoid an upset stomach. ¨ Do not take antacids or drink milk or anything with caffeine within 2 hours of when you take your iron. They can keep your body from absorbing the iron well. ¨ Vitamin C helps your body absorb iron. You may want to take iron pills with a glass of orange juice or some other food high in vitamin C.  ¨ Iron pills may cause stomach problems. These include heartburn, nausea, diarrhea, constipation, and cramps. It can help to drink plenty of fluids and include fruits, vegetables, and fiber in your diet. ¨ It's normal for iron pills to make your stool a greenish or grayish black. But internal bleeding can also cause dark stool. So it's important to tell your doctor about any color changes. ¨ Call your doctor if you think you are having a problem with your iron pills. Even after you start to feel better, it will take several months for your body to build up its supply of iron. ¨ If you miss a pill, don't take a double dose. ¨ Keep iron pills out of the reach of small children. Too much iron can be very dangerous. · Eat foods with a lot of iron. These include red meat, shellfish, poultry, and eggs. They also include beans, raisins, whole-grain bread, and leafy green vegetables. · Steam your vegetables. This is the best way to prepare them if you want to get as much iron as possible. · Be safe with medicines. Do not take nonsteroidal anti-inflammatory pain relievers unless your doctor tells you to. These include aspirin, naproxen (Aleve), and ibuprofen (Advil, Motrin). · Liquid iron can stain your teeth. But you can mix it with water or juice and drink it with a straw. Then it won't get on your teeth.   When should you call for help? Call 911 anytime you think you may need emergency care. For example, call if:  ? · You passed out (lost consciousness). ?Call your doctor now or seek immediate medical care if:  ? · You are short of breath. ? · You are dizzy or light-headed, or you feel like you may faint. ? · You have new or worse bleeding. ? Watch closely for changes in your health, and be sure to contact your doctor if:  ? · You feel weaker or more tired than usual.   ? · You do not get better as expected. Where can you learn more? Go to http://rosi-awa.info/. Enter O858 in the search box to learn more about \"Iron Deficiency Anemia: Care Instructions. \"  Current as of: October 13, 2016  Content Version: 11.4  © 7238-7191 [employer]. Care instructions adapted under license by [employer] (which disclaims liability or warranty for this information). If you have questions about a medical condition or this instruction, always ask your healthcare professional. [place] [address] any warranty or liability for your use of this information. Kidney Dialysis: Care Instructions  Your Care Instructions    Dialysis is a process that filters wastes from the blood when your kidneys can no longer do the job. It is not a cure, but it can help you live longer and feel better. It is a lifesaving treatment when you have kidney failure. Normal kidneys work 24 hours a day to clean wastes from your blood. Your kidneys are not able to do this job, so a process called dialysis will do some of the work for your kidneys. You and your doctor will decide which type of dialysis you should have. Peritoneal dialysis uses the lining of your belly (peritoneum) to filter your blood. You can do it at home, on a daily basis. Hemodialysis uses a man-made filter called a dialyzer to clean your blood.  Most people need to go to a hospital or clinic 3 days a week for several hours each time. Sometimes hemodialysis can be done at home. It is normal to have questions about your treatment, and you have a right to know what is happening to you. Learning about dialysis can help you take an active role in your treatment. Dialysis does not cure kidney disease, but it can help you live longer and feel better. You will need to follow your diet and treatment schedule carefully. Follow-up care is a key part of your treatment and safety. Be sure to make and go to all appointments, and call your doctor if you are having problems. It's also a good idea to know your test results and keep a list of the medicines you take. What do you need to know about peritoneal dialysis? Peritoneal dialysis uses the lining of your belly (or peritoneal membrane) to filter your blood. Before you can begin peritoneal dialysis, your doctor will need to place a thin tube called a catheter in your belly. This is the dialysis access. · Peritoneal dialysis can be done at home or in any clean place. You may be able to do it while you sleep. · You can do it by yourself. You do not have to rely on help from others. · You can do it at the times you choose as long as you do the right number of treatments. · It has to be done every day of the week. · Some people find it hard to do all the required steps. · It increases your chance for a serious infection of the lining of the belly (peritoneum). What do you need to know about hemodialysis? Hemodialysis uses a man-made membrane called a dialyzer to clean your blood. You are connected to the dialyzer by tubes attached to your blood vessels. Before you start hemodialysis, your doctor will create a site where the blood can flow in and out of your body during your dialysis sessions. This site is called the vascular access. It may be a fistula, made by connecting an artery and a vein. Or it may be a graft, which is a tube implanted under your skin.   · Hemodialysis is done mainly by trained health workers who can watch for any problems. · It allows you to be in contact with other people having dialysis. This can help provide emotional support. · You can schedule your treatments in the evenings so you can keep working. · You may be able to do home hemodialysis, which gives you more control over your schedule. · It usually needs to be done on a set schedule 3 times a week. · It can cause side effects. The most common side effects are low blood pressure and muscle cramps. These can often be treated easily. · It requires needle sticks during every treatment, which bothers some people. Others get used to it and even do the needle sticks themselves. How can you care for yourself at home? · Be sure to have all of your dialysis sessions. Do not try to shorten or skip your sessions. You have a better chance of a longer and healthier life by getting your full treatment. · Your doctor or health care team will show you the steps you need to go through each day before, during, and after dialysis. Be sure to follow these steps. If you do not understand a step, talk to your team.  · Your doctor and dietitian will help you design menus that follow your diet. Be sure to follow your diet guidelines. ¨ You will need to limit fluids and certain foods that contain salt (sodium), potassium, and phosphorus. ¨ You may need to follow a heart-healthy diet to keep the fat (cholesterol) in your blood under control. ¨ You may need higher levels of protein in your diet. · Your doctor may recommend certain vitamins. But do not take any other medicine, including over-the-counter medicines, vitamins, and herbal products, without talking to your doctor first.  · Do not smoke. Smoking raises your risk of many health problems, including more kidney damage. If you need help quitting, talk to your doctor about stop-smoking programs and medicines. These can increase your chances of quitting for good.   · Do not take ibuprofen (Advil, Motrin), naproxen (Aleve), or similar medicines, unless your doctor tells you to. These medicines may make kidney problems worse. When should you call for help? Call your doctor now or seek immediate medical care if:  ? · You have a fever. ? · You are dizzy or lightheaded, or you feel like you may faint. ? · You are confused or cannot think clearly. ? · You have new or worse nausea or vomiting. ? · You have new or more blood in your urine. ? · You have new swelling. ? Watch closely for changes in your health, and be sure to contact your doctor if:  ? · You do not get better as expected. Where can you learn more? Go to http://rosi-awa.info/. Enter G340 in the search box to learn more about \"Kidney Dialysis: Care Instructions. \"  Current as of: May 12, 2017  Content Version: 11.4  © 0996-7837 [email or web address]. Care instructions adapted under license by [employer] (which disclaims liability or warranty for this information). If you have questions about a medical condition or this instruction, always ask your healthcare professional. [place] [address] any warranty or liability for your use of this information.   Patient armband removed and shredded

## 2018-03-16 ENCOUNTER — HOSPITAL ENCOUNTER (INPATIENT)
Age: 68
LOS: 3 days | Discharge: HOME HEALTH | DRG: 377 | End: 2018-03-19
Attending: EMERGENCY MEDICINE | Admitting: HOSPITALIST
Payer: MEDICARE

## 2018-03-16 ENCOUNTER — APPOINTMENT (OUTPATIENT)
Dept: GENERAL RADIOLOGY | Age: 68
DRG: 377 | End: 2018-03-16
Attending: EMERGENCY MEDICINE
Payer: MEDICARE

## 2018-03-16 DIAGNOSIS — R53.1 GENERALIZED WEAKNESS: ICD-10-CM

## 2018-03-16 DIAGNOSIS — N18.6 ESRD (END STAGE RENAL DISEASE) (HCC): ICD-10-CM

## 2018-03-16 DIAGNOSIS — K62.5 RECTAL BLEEDING: Primary | ICD-10-CM

## 2018-03-16 DIAGNOSIS — R42 DIZZINESS: ICD-10-CM

## 2018-03-16 DIAGNOSIS — D64.9 SYMPTOMATIC ANEMIA: ICD-10-CM

## 2018-03-16 LAB
ALBUMIN SERPL-MCNC: 3.4 G/DL (ref 3.4–5)
ALBUMIN/GLOB SERPL: 0.9 {RATIO} (ref 0.8–1.7)
ALP SERPL-CCNC: 152 U/L (ref 45–117)
ALT SERPL-CCNC: 20 U/L (ref 13–56)
ANION GAP SERPL CALC-SCNC: 8 MMOL/L (ref 3–18)
APTT PPP: 30.4 SEC (ref 23–36.4)
AST SERPL-CCNC: 28 U/L (ref 15–37)
BASOPHILS # BLD: 0 K/UL (ref 0–0.06)
BASOPHILS NFR BLD: 1 % (ref 0–2)
BILIRUB SERPL-MCNC: 0.4 MG/DL (ref 0.2–1)
BUN SERPL-MCNC: 14 MG/DL (ref 7–18)
BUN/CREAT SERPL: 5 (ref 12–20)
CALCIUM SERPL-MCNC: 9.2 MG/DL (ref 8.5–10.1)
CHLORIDE SERPL-SCNC: 99 MMOL/L (ref 100–108)
CK MB CFR SERPL CALC: 2.8 % (ref 0–4)
CK MB SERPL-MCNC: 3.4 NG/ML (ref 5–25)
CK SERPL-CCNC: 120 U/L (ref 26–192)
CO2 SERPL-SCNC: 34 MMOL/L (ref 21–32)
CREAT SERPL-MCNC: 2.76 MG/DL (ref 0.6–1.3)
DIFFERENTIAL METHOD BLD: ABNORMAL
EOSINOPHIL # BLD: 0.1 K/UL (ref 0–0.4)
EOSINOPHIL NFR BLD: 3 % (ref 0–5)
ERYTHROCYTE [DISTWIDTH] IN BLOOD BY AUTOMATED COUNT: 18.3 % (ref 11.6–14.5)
EST. AVERAGE GLUCOSE BLD GHB EST-MCNC: 128 MG/DL
GLOBULIN SER CALC-MCNC: 3.6 G/DL (ref 2–4)
GLUCOSE BLD STRIP.AUTO-MCNC: 198 MG/DL (ref 70–110)
GLUCOSE SERPL-MCNC: 234 MG/DL (ref 74–99)
HBA1C MFR BLD: 6.1 % (ref 4.5–5.6)
HCT VFR BLD AUTO: 24.2 % (ref 35–45)
HCT VFR BLD AUTO: 24.5 % (ref 35–45)
HGB BLD-MCNC: 6.9 G/DL (ref 12–16)
HGB BLD-MCNC: 7.1 G/DL (ref 12–16)
INR PPP: 1.1 (ref 0.8–1.2)
LYMPHOCYTES # BLD: 0.7 K/UL (ref 0.9–3.6)
LYMPHOCYTES NFR BLD: 17 % (ref 21–52)
MCH RBC QN AUTO: 25.3 PG (ref 24–34)
MCHC RBC AUTO-ENTMCNC: 28.5 G/DL (ref 31–37)
MCV RBC AUTO: 88.6 FL (ref 74–97)
MONOCYTES # BLD: 0.4 K/UL (ref 0.05–1.2)
MONOCYTES NFR BLD: 9 % (ref 3–10)
NEUTS SEG # BLD: 3.1 K/UL (ref 1.8–8)
NEUTS SEG NFR BLD: 70 % (ref 40–73)
PLATELET # BLD AUTO: 173 K/UL (ref 135–420)
PMV BLD AUTO: 10.7 FL (ref 9.2–11.8)
POTASSIUM SERPL-SCNC: 3.5 MMOL/L (ref 3.5–5.5)
PROT SERPL-MCNC: 7 G/DL (ref 6.4–8.2)
PROTHROMBIN TIME: 13.7 SEC (ref 11.5–15.2)
RBC # BLD AUTO: 2.73 M/UL (ref 4.2–5.3)
RBC MORPH BLD: ABNORMAL
SODIUM SERPL-SCNC: 141 MMOL/L (ref 136–145)
TROPONIN I SERPL-MCNC: 0.08 NG/ML (ref 0–0.06)
WBC # BLD AUTO: 4.3 K/UL (ref 4.6–13.2)

## 2018-03-16 PROCEDURE — 80053 COMPREHEN METABOLIC PANEL: CPT | Performed by: EMERGENCY MEDICINE

## 2018-03-16 PROCEDURE — 74011636637 HC RX REV CODE- 636/637: Performed by: HOSPITALIST

## 2018-03-16 PROCEDURE — 65660000000 HC RM CCU STEPDOWN

## 2018-03-16 PROCEDURE — 99284 EMERGENCY DEPT VISIT MOD MDM: CPT

## 2018-03-16 PROCEDURE — 83036 HEMOGLOBIN GLYCOSYLATED A1C: CPT | Performed by: HOSPITALIST

## 2018-03-16 PROCEDURE — 93005 ELECTROCARDIOGRAM TRACING: CPT

## 2018-03-16 PROCEDURE — 82962 GLUCOSE BLOOD TEST: CPT

## 2018-03-16 PROCEDURE — 71045 X-RAY EXAM CHEST 1 VIEW: CPT

## 2018-03-16 PROCEDURE — 86901 BLOOD TYPING SEROLOGIC RH(D): CPT | Performed by: EMERGENCY MEDICINE

## 2018-03-16 PROCEDURE — 85730 THROMBOPLASTIN TIME PARTIAL: CPT | Performed by: EMERGENCY MEDICINE

## 2018-03-16 PROCEDURE — 74011250636 HC RX REV CODE- 250/636: Performed by: HOSPITALIST

## 2018-03-16 PROCEDURE — 85018 HEMOGLOBIN: CPT | Performed by: HOSPITALIST

## 2018-03-16 PROCEDURE — 82553 CREATINE MB FRACTION: CPT | Performed by: EMERGENCY MEDICINE

## 2018-03-16 PROCEDURE — 85610 PROTHROMBIN TIME: CPT | Performed by: EMERGENCY MEDICINE

## 2018-03-16 PROCEDURE — 74011250637 HC RX REV CODE- 250/637: Performed by: HOSPITALIST

## 2018-03-16 PROCEDURE — 86920 COMPATIBILITY TEST SPIN: CPT | Performed by: EMERGENCY MEDICINE

## 2018-03-16 PROCEDURE — 74011250637 HC RX REV CODE- 250/637: Performed by: INTERNAL MEDICINE

## 2018-03-16 PROCEDURE — 85025 COMPLETE CBC W/AUTO DIFF WBC: CPT | Performed by: EMERGENCY MEDICINE

## 2018-03-16 PROCEDURE — 36415 COLL VENOUS BLD VENIPUNCTURE: CPT | Performed by: HOSPITALIST

## 2018-03-16 RX ORDER — METOCLOPRAMIDE HYDROCHLORIDE 5 MG/ML
5 INJECTION INTRAMUSCULAR; INTRAVENOUS
Status: DISCONTINUED | OUTPATIENT
Start: 2018-03-16 | End: 2018-03-19 | Stop reason: HOSPADM

## 2018-03-16 RX ORDER — METOCLOPRAMIDE HYDROCHLORIDE 5 MG/ML
5 INJECTION INTRAMUSCULAR; INTRAVENOUS EVERY 8 HOURS
Status: DISCONTINUED | OUTPATIENT
Start: 2018-03-16 | End: 2018-03-19 | Stop reason: HOSPADM

## 2018-03-16 RX ORDER — MAGNESIUM SULFATE 100 %
4 CRYSTALS MISCELLANEOUS AS NEEDED
Status: DISCONTINUED | OUTPATIENT
Start: 2018-03-16 | End: 2018-03-19 | Stop reason: HOSPADM

## 2018-03-16 RX ORDER — SODIUM CHLORIDE 9 MG/ML
250 INJECTION, SOLUTION INTRAVENOUS AS NEEDED
Status: DISCONTINUED | OUTPATIENT
Start: 2018-03-16 | End: 2018-03-19 | Stop reason: HOSPADM

## 2018-03-16 RX ORDER — NALOXONE HYDROCHLORIDE 0.4 MG/ML
0.4 INJECTION, SOLUTION INTRAMUSCULAR; INTRAVENOUS; SUBCUTANEOUS AS NEEDED
Status: DISCONTINUED | OUTPATIENT
Start: 2018-03-16 | End: 2018-03-19 | Stop reason: HOSPADM

## 2018-03-16 RX ORDER — HYDROXYZINE HYDROCHLORIDE 10 MG/1
10 TABLET, FILM COATED ORAL
Status: DISCONTINUED | OUTPATIENT
Start: 2018-03-16 | End: 2018-03-19 | Stop reason: HOSPADM

## 2018-03-16 RX ORDER — ACETAMINOPHEN 325 MG/1
650 TABLET ORAL
Status: DISCONTINUED | OUTPATIENT
Start: 2018-03-16 | End: 2018-03-19 | Stop reason: HOSPADM

## 2018-03-16 RX ORDER — DEXTROSE 50 % IN WATER (D50W) INTRAVENOUS SYRINGE
25-50 AS NEEDED
Status: DISCONTINUED | OUTPATIENT
Start: 2018-03-16 | End: 2018-03-19 | Stop reason: HOSPADM

## 2018-03-16 RX ORDER — ALBUTEROL SULFATE 90 UG/1
2 AEROSOL, METERED RESPIRATORY (INHALATION)
Status: DISCONTINUED | OUTPATIENT
Start: 2018-03-16 | End: 2018-03-19 | Stop reason: HOSPADM

## 2018-03-16 RX ORDER — SEVELAMER CARBONATE 800 MG/1
1600 TABLET, FILM COATED ORAL
Status: DISCONTINUED | OUTPATIENT
Start: 2018-03-16 | End: 2018-03-19 | Stop reason: HOSPADM

## 2018-03-16 RX ORDER — LUBIPROSTONE 8 UG/1
24 CAPSULE, GELATIN COATED ORAL 2 TIMES DAILY WITH MEALS
Status: DISCONTINUED | OUTPATIENT
Start: 2018-03-16 | End: 2018-03-18

## 2018-03-16 RX ORDER — DIPHENHYDRAMINE HYDROCHLORIDE 50 MG/ML
12.5 INJECTION, SOLUTION INTRAMUSCULAR; INTRAVENOUS
Status: DISCONTINUED | OUTPATIENT
Start: 2018-03-16 | End: 2018-03-19 | Stop reason: HOSPADM

## 2018-03-16 RX ORDER — INSULIN LISPRO 100 [IU]/ML
INJECTION, SOLUTION INTRAVENOUS; SUBCUTANEOUS
Status: DISCONTINUED | OUTPATIENT
Start: 2018-03-16 | End: 2018-03-19 | Stop reason: HOSPADM

## 2018-03-16 RX ORDER — SODIUM CHLORIDE 9 MG/ML
100 INJECTION, SOLUTION INTRAVENOUS CONTINUOUS
Status: DISPENSED | OUTPATIENT
Start: 2018-03-16 | End: 2018-03-16

## 2018-03-16 RX ORDER — HYDRALAZINE HYDROCHLORIDE 25 MG/1
25 TABLET, FILM COATED ORAL 3 TIMES DAILY
Status: DISCONTINUED | OUTPATIENT
Start: 2018-03-16 | End: 2018-03-19 | Stop reason: HOSPADM

## 2018-03-16 RX ORDER — ONDANSETRON 2 MG/ML
4 INJECTION INTRAMUSCULAR; INTRAVENOUS
Status: DISCONTINUED | OUTPATIENT
Start: 2018-03-16 | End: 2018-03-19 | Stop reason: HOSPADM

## 2018-03-16 RX ORDER — METOPROLOL SUCCINATE 25 MG/1
25 TABLET, EXTENDED RELEASE ORAL DAILY
Status: DISCONTINUED | OUTPATIENT
Start: 2018-03-17 | End: 2018-03-19 | Stop reason: HOSPADM

## 2018-03-16 RX ORDER — DOCUSATE SODIUM 100 MG/1
100 CAPSULE, LIQUID FILLED ORAL 2 TIMES DAILY
Status: DISCONTINUED | OUTPATIENT
Start: 2018-03-16 | End: 2018-03-18

## 2018-03-16 RX ORDER — POLYETHYLENE GLYCOL 3350 17 G/17G
17 POWDER, FOR SOLUTION ORAL 2 TIMES DAILY
Status: DISCONTINUED | OUTPATIENT
Start: 2018-03-16 | End: 2018-03-18

## 2018-03-16 RX ADMIN — SEVELAMER CARBONATE 1600 MG: 800 TABLET, FILM COATED ORAL at 18:41

## 2018-03-16 RX ADMIN — POLYETHYLENE GLYCOL 3350 17 G: 17 POWDER, FOR SOLUTION ORAL at 21:26

## 2018-03-16 RX ADMIN — METOCLOPRAMIDE 5 MG: 5 INJECTION, SOLUTION INTRAMUSCULAR; INTRAVENOUS at 21:26

## 2018-03-16 RX ADMIN — DOCUSATE SODIUM 100 MG: 100 CAPSULE, LIQUID FILLED ORAL at 21:23

## 2018-03-16 RX ADMIN — INSULIN LISPRO 2 UNITS: 100 INJECTION, SOLUTION INTRAVENOUS; SUBCUTANEOUS at 19:20

## 2018-03-16 RX ADMIN — LUBIPROSTONE 24 MCG: 8 CAPSULE, GELATIN COATED ORAL at 18:41

## 2018-03-16 RX ADMIN — ACETAMINOPHEN 650 MG: 325 TABLET ORAL at 18:42

## 2018-03-16 NOTE — IP AVS SNAPSHOT
[address] [place] [id number] 
[phone] Patient: Nirali Santamaria MRN: [id number] A check richmond indicates which time of day the medication should be taken. My Medications CONTINUE taking these medications Instructions Each Dose to Equal  
 Morning Noon Evening Bedtime AFLIBERCEPT INTRAVITREAL Your last dose was: Your next dose is:    
   
   
 1 Ampule by IntraVITreal route every twenty-eight (28) days. 1 Ampule  
    
   
   
   
  
 albuterol 90 mcg/actuation inhaler Commonly known as:  PROVENTIL HFA, VENTOLIN HFA, PROAIR HFA Your last dose was: Your next dose is: Take 2 Puffs by inhalation every four (4) hours as needed for Wheezing. 2 Puff  
    
   
   
   
  
 carboxymethylcellulose sodium 0.5 % Dpet Commonly known as:  REFRESH PLUS Your last dose was: Your next dose is:    
   
   
 Administer 1 Drop to both eyes as needed. 1 Drop  
    
   
   
   
  
 ferrous sulfate 325 mg (65 mg iron) tablet Your last dose was: Your next dose is: Take 325 mg by mouth three (3) times daily (with meals). 325 mg  
    
   
   
   
  
 hydrALAZINE 25 mg tablet Commonly known as:  APRESOLINE Your last dose was: Your next dose is: Take 25 mg by mouth three (3) times daily. 25 mg  
    
   
   
   
  
 hydrOXYzine HCl 10 mg tablet Commonly known as:  ATARAX Your last dose was: Your next dose is: Take 10 mg by mouth nightly as needed for Itching. 10 mg  
    
   
   
   
  
 lactulose 10 gram/15 mL solution Commonly known as:  Tamia Balk Your last dose was: Your next dose is: Take 20 g by mouth daily as needed. 20 g NEPHROCAPS 1 mg capsule Generic drug:  b complex-vitamin c-folic acid Your last dose was: Your next dose is: Take 1 Cap by mouth daily. 1 Cap  
    
   
   
   
  
 nystatin powder Commonly known as:  MYCOSTATIN Your last dose was: Your next dose is:    
   
   
 Apply  to affected area two (2) times a day. pantoprazole 40 mg tablet Commonly known as:  PROTONIX Your last dose was: Your next dose is: Take 1 Tab by mouth daily. 40 mg  
    
   
   
   
  
 sevelamer 800 mg tablet Commonly known as:  RENAGEL Your last dose was: Your next dose is: Take 1,600 mg by mouth three (3) times daily (with meals). 1600 mg  
    
   
   
   
  
 TOPROL XL 50 mg XL tablet Generic drug:  metoprolol succinate Your last dose was: Your next dose is: Take 25 mg by mouth daily. 25 mg  
    
   
   
   
  
 TYLENOL 325 mg tablet Generic drug:  acetaminophen Your last dose was: Your next dose is: Take 325 mg by mouth every six (6) hours as needed for Pain. 325 mg  
    
   
   
   
  
 VITAMIN D3 1,000 unit tablet Generic drug:  cholecalciferol Your last dose was: Your next dose is: Take 2,000 Units by mouth daily. 2000 Units

## 2018-03-16 NOTE — IP AVS SNAPSHOT
[address] [address] 
[phone] Patient: Matteo Marrero MRN: [id number] About your hospitalization You were admitted on:  March 16, 2018 You last received care in the:  [facility] You were discharged on:  March 19, 2018 Why you were hospitalized Your primary diagnosis was:  Symptomatic Anemia Your diagnoses also included:  Esrd (End Stage Renal Disease) (Hcc), Dizziness, Generalized Weakness, Rectal Bleed, Symptomatic Anemia, Diabetes Mellitus Type 2, Controlled (Hcc), Htn (Hypertension), Constipation Follow-up Information Follow up With Details Comments Contact Info Savannah Farrell MD In 3 days  Patient can only remember the practice name and not the physician HD clinic as scheduled      
 dr. Calin Contreras  In 1 week Discharge Orders None A check richmond indicates which time of day the medication should be taken. My Medications CONTINUE taking these medications Instructions Each Dose to Equal  
 Morning Noon Evening Bedtime AFLIBERCEPT INTRAVITREAL Your last dose was: Your next dose is:    
   
   
 1 Ampule by IntraVITreal route every twenty-eight (28) days. 1 Ampule  
    
   
   
   
  
 albuterol 90 mcg/actuation inhaler Commonly known as:  PROVENTIL HFA, VENTOLIN HFA, PROAIR HFA Your last dose was: Your next dose is: Take 2 Puffs by inhalation every four (4) hours as needed for Wheezing. 2 Puff  
    
   
   
   
  
 carboxymethylcellulose sodium 0.5 % Dpet Commonly known as:  REFRESH PLUS Your last dose was: Your next dose is:    
   
   
 Administer 1 Drop to both eyes as needed. 1 Drop  
    
   
   
   
  
 ferrous sulfate 325 mg (65 mg iron) tablet Your last dose was: Your next dose is: Take 325 mg by mouth three (3) times daily (with meals). 325 mg  
    
   
   
   
  
 hydrALAZINE 25 mg tablet Commonly known as:  APRESOLINE Your last dose was: Your next dose is: Take 25 mg by mouth three (3) times daily. 25 mg  
    
   
   
   
  
 hydrOXYzine HCl 10 mg tablet Commonly known as:  ATARAX Your last dose was: Your next dose is: Take 10 mg by mouth nightly as needed for Itching. 10 mg  
    
   
   
   
  
 lactulose 10 gram/15 mL solution Commonly known as:  Ivin Stalker Your last dose was: Your next dose is: Take 20 g by mouth daily as needed. 20 g NEPHROCAPS 1 mg capsule Generic drug:  b complex-vitamin c-folic acid Your last dose was: Your next dose is: Take 1 Cap by mouth daily. 1 Cap  
    
   
   
   
  
 nystatin powder Commonly known as:  MYCOSTATIN Your last dose was: Your next dose is:    
   
   
 Apply  to affected area two (2) times a day. pantoprazole 40 mg tablet Commonly known as:  PROTONIX Your last dose was: Your next dose is: Take 1 Tab by mouth daily. 40 mg  
    
   
   
   
  
 sevelamer 800 mg tablet Commonly known as:  RENAGEL Your last dose was: Your next dose is: Take 1,600 mg by mouth three (3) times daily (with meals). 1600 mg  
    
   
   
   
  
 TOPROL XL 50 mg XL tablet Generic drug:  metoprolol succinate Your last dose was: Your next dose is: Take 25 mg by mouth daily. 25 mg  
    
   
   
   
  
 TYLENOL 325 mg tablet Generic drug:  acetaminophen Your last dose was: Your next dose is: Take 325 mg by mouth every six (6) hours as needed for Pain. 325 mg  
    
   
   
   
  
 VITAMIN D3 1,000 unit tablet Generic drug:  cholecalciferol Your last dose was: Your next dose is: Take 2,000 Units by mouth daily. 2000 Units Discharge Instructions DISCHARGE SUMMARY from Nurse PATIENT INSTRUCTIONS: 
 
Recommended activity: Activity as tolerated. *  Please give a list of your current medications to your Primary Care Provider. *  Please update this list whenever your medications are discontinued, doses are 
    changed, or new medications (including over-the-counter products) are added. *  Please carry medication information at all times in case of emergency situations. These are general instructions for a healthy lifestyle: No smoking/ No tobacco products/ Avoid exposure to second hand smoke Surgeon General's Warning:  Quitting smoking now greatly reduces serious risk to your health. Obesity, smoking, and sedentary lifestyle greatly increases your risk for illness A healthy diet, regular physical exercise & weight monitoring are important for maintaining a healthy lifestyle You may be retaining fluid if you have a history of heart failure or if you experience any of the following symptoms:  Weight gain of 3 pounds or more overnight or 5 pounds in a week, increased swelling in our hands or feet or shortness of breath while lying flat in bed. Please call your doctor as soon as you notice any of these symptoms; do not wait until your next office visit. Recognize signs and symptoms of STROKE: 
 
F-face looks uneven A-arms unable to move or move unevenly S-speech slurred or non-existent T-time-call 911 as soon as signs and symptoms begin-DO NOT go Back to bed or wait to see if you get better-TIME IS BRAIN. Warning Signs of HEART ATTACK Call 911 if you have these symptoms: 
? Chest discomfort.  Most heart attacks involve discomfort in the center of the chest that lasts more than a few minutes, or that goes away and comes back. It can feel like uncomfortable pressure, squeezing, fullness, or pain. ? Discomfort in other areas of the upper body. Symptoms can include pain or discomfort in one or both arms, the back, neck, jaw, or stomach. ? Shortness of breath with or without chest discomfort. ? Other signs may include breaking out in a cold sweat, nausea, or lightheadedness. Don't wait more than five minutes to call [address]! Fast action can save your life. Calling 911 is almost always the fastest way to get lifesaving treatment. Emergency Medical Services staff can begin treatment when they arrive  up to an hour sooner than if someone gets to the hospital by car. The discharge information has been reviewed with the {PATIENT PARENT GUARDIAN:67115}. The {PATIENT PARENT GUARDIAN:65687} verbalized understanding. Discharge medications reviewed with the {Dishcarge meds reviewed EIEW:41602} and appropriate educational materials and side effects teaching were provided. ___________________________________________________________________________________________________________________________________ Anemia: Care Instructions Your Care Instructions Anemia is a low level of red blood cells, which carry oxygen throughout your body. Many things can cause anemia. Lack of iron is one of the most common causes. Your body needs iron to make hemoglobin, a substance in red blood cells that carries oxygen from the lungs to your body's cells. Without enough iron, the body produces fewer and smaller red blood cells. As a result, your body's cells do not get enough oxygen, and you feel tired and weak. And you may have trouble concentrating. Bleeding is the most common cause of a lack of iron. You may have heavy menstrual bleeding or bleeding caused by conditions such as ulcers, hemorrhoids, or cancer. Regular use of aspirin or other anti-inflammatory medicines (such as ibuprofen) also can cause bleeding in some people.  A lack of iron in your diet also can cause anemia, especially at times when the body needs more iron, such as during pregnancy, infancy, and the teen years. Your doctor may have prescribed iron pills. It may take several months of treatment for your iron levels to return to normal. Your doctor also may suggest that you eat foods that are rich in iron, such as meat and beans. There are many other causes of anemia. It is not always due to a lack of iron. Finding the specific cause of your anemia will help your doctor find the right treatment for you. Follow-up care is a key part of your treatment and safety. Be sure to make and go to all appointments, and call your doctor if you are having problems. It's also a good idea to know your test results and keep a list of the medicines you take. How can you care for yourself at home? · Take your medicines exactly as prescribed. Call your doctor if you think you are having a problem with your medicine. · If your doctor recommends iron pills, take them as directed: ¨ Try to take the pills on an empty stomach about 1 hour before or 2 hours after meals. But you may need to take iron with food to avoid an upset stomach. ¨ Do not take antacids or drink milk or caffeine drinks (such as coffee, tea, or cola) at the same time or within 2 hours of the time that you take your iron. They can make it hard for your body to absorb the iron. ¨ Vitamin C (from food or supplements) helps your body absorb iron. Try taking iron pills with a glass of orange juice or some other food that is high in vitamin C, such as citrus fruits. ¨ Iron pills may cause stomach problems, such as heartburn, nausea, diarrhea, constipation, and cramps. Be sure to drink plenty of fluids, and include fruits, vegetables, and fiber in your diet each day. Iron pills often make your bowel movements dark or green.  
¨ If you forget to take an iron pill, do not take a double dose of iron the next time you take a pill. ¨ Keep iron pills out of the reach of small children. An overdose of iron can be very dangerous. · Follow your doctor's advice about eating iron-rich foods. These include red meat, shellfish, poultry, eggs, beans, raisins, whole-grain bread, and leafy green vegetables. · Steam vegetables to help them keep their iron content. When should you call for help? Call 911 anytime you think you may need emergency care. For example, call if: 
? · You have symptoms of a heart attack. These may include: ¨ Chest pain or pressure, or a strange feeling in the chest. 
¨ Sweating. ¨ Shortness of breath. ¨ Nausea or vomiting. ¨ Pain, pressure, or a strange feeling in the back, neck, jaw, or upper belly or in one or both shoulders or arms. ¨ Lightheadedness or sudden weakness. ¨ A fast or irregular heartbeat. After you call 911, the  may tell you to chew 1 adult-strength or 2 to 4 low-dose aspirin. Wait for an ambulance. Do not try to drive yourself. ? · You passed out (lost consciousness). ?Call your doctor now or seek immediate medical care if: 
? · You have new or increased shortness of breath. ? · You are dizzy or lightheaded, or you feel like you may faint. ? · Your fatigue and weakness continue or get worse. ? · You have any abnormal bleeding, such as: 
¨ Nosebleeds. ¨ Vaginal bleeding that is different (heavier, more frequent, at a different time of the month) than what you are used to. ¨ Bloody or black stools, or rectal bleeding. ¨ Bloody or pink urine. ? Watch closely for changes in your health, and be sure to contact your doctor if: 
? · You do not get better as expected. Where can you learn more? Go to http://rosi-awa.info/. Enter R301 in the search box to learn more about \"Anemia: Care Instructions. \" Current as of: October 13, 2016 Content Version: 11.4 © 6534-8107 Healthwise, [employer].  Care instructions adapted under license by [address] (which disclaims liability or warranty for this information). If you have questions about a medical condition or this instruction, always ask your healthcare professional. [address] any warranty or liability for your use of this information. Colonoscopy: What to Expect at [facility] Your Recovery After you have a colonoscopy, you will stay at the clinic for 1 to 2 hours until the medicines wear off. Then you can go home. But you will need to arrange for a ride. Your doctor will tell you when you can eat and do your other usual activities. Your doctor will talk to you about when you will need your next colonoscopy. Your doctor can help you decide how often you need to be checked. This will depend on the results of your test and your risk for colorectal cancer. After the test, you may be bloated or have gas pains. You may need to pass gas. If a biopsy was done or a polyp was removed, you may have streaks of blood in your stool (feces) for a few days. This care sheet gives you a general idea about how long it will take for you to recover. But each person recovers at a different pace. Follow the steps below to get better as quickly as possible. How can you care for yourself at home? Activity ? · Rest when you feel tired. ? · You can do your normal activities when it feels okay to do so. Diet ? · Follow your doctor's directions for eating. ? · Unless your doctor has told you not to, drink plenty of fluids. This helps to replace the fluids that were lost during the colon prep. ? · Do not drink alcohol. Medicines ? · Your doctor will tell you if and when you can restart your medicines. He or she will also give you instructions about taking any new medicines. ? · If you take blood thinners, such as warfarin (Coumadin), clopidogrel (Plavix), or aspirin, be sure to talk to your doctor.  He or she will tell you if and when to start taking those medicines again. Make sure that you understand exactly what your doctor wants you to do. ? · If polyps were removed or a biopsy was done during the test, your doctor may tell you not to take aspirin or other anti-inflammatory medicines for a few days. These include ibuprofen (Advil, Motrin) and naproxen (Aleve). Other instructions ? · For your safety, do not drive or operate machinery until the medicine wears off and you can think clearly. Your doctor may tell you not to drive or operate machinery until the day after your test.  
? · Do not sign legal documents or make major decisions until the medicine wears off and you can think clearly. The anesthesia can make it hard for you to fully understand what you are agreeing to. Follow-up care is a key part of your treatment and safety. Be sure to make and go to all appointments, and call your doctor if you are having problems. It's also a good idea to know your test results and keep a list of the medicines you take. When should you call for help? Call 911 anytime you think you may need emergency care. For example, call if: 
? · You passed out (lost consciousness). ? · You pass maroon or bloody stools. ? · You have trouble breathing. ?Call your doctor now or seek immediate medical care if: 
? · You have pain that does not get better after you take pain medicine. ? · You are sick to your stomach or cannot drink fluids. ? · You have new or worse belly pain. ? · You have blood in your stools. ? · You have a fever. ? · You cannot pass stools or gas. ? Watch closely for changes in your health, and be sure to contact your doctor if you have any problems. Where can you learn more? Go to http://rosi-awa.info/. Enter E264 in the search box to learn more about \"Colonoscopy: What to Expect at Home. \" Current as of: May 12, 2017 Content Version: 11.4 © 7311-3066 Healthwise, [employer]. Care instructions adapted under license by [employer] (which disclaims liability or warranty for this information). If you have questions about a medical condition or this instruction, always ask your healthcare professional. [address] any warranty or liability for your use of this information. Lower Gastrointestinal Bleeding: Care Instructions Your Care Instructions The digestive or gastrointestinal tract goes from the mouth to the anus. It is often called the GI tract. Bleeding in the lower GI tract can happen anywhere in your small or large intestine. It can also happen in your rectum or anus. In some cases, it is caused by an infection, cancer, or inflammatory bowel disease. Or it may be caused by hemorrhoids, diverticulitis, or clotting problems. Light bleeding may not cause any symptoms at first. But if you continue to bleed for a while, you may feel very weak or tired. Sudden, heavy bleeding means you need to see a doctor right away. This kind of bleeding can be very dangerous. But it can usually be cured or controlled. The doctor may do some tests to find the cause of your bleeding. Follow-up care is a key part of your treatment and safety. Be sure to make and go to all appointments, and call your doctor if you are having problems. It's also a good idea to know your test results and keep a list of the medicines you take. How can you care for yourself at home? · Be safe with medicines. Take your medicines exactly as prescribed. Call your doctor if you think you are having a problem with your medicine. You will get more details on the specific medicines your doctor prescribes. · Do not take aspirin or other anti-inflammatory medicines, such as naproxen (Aleve) or ibuprofen (Advil, Motrin), without talking to your doctor first. Ask your doctor if it is okay to use acetaminophen (Tylenol). · Do not drink alcohol. · The bleeding may make you lose iron. So it's important to eat foods that have a lot of iron. These include red meat, shellfish, poultry, and eggs. They also include beans, raisins, whole-grain breads, and leafy green vegetables. If you want help planning meals, you can meet with a dietitian. When should you call for help? Call 911 anytime you think you may need emergency care. For example, call if: 
? · You have sudden, severe belly pain. ? · You vomit blood or what looks like coffee grounds. ? · You passed out (lost consciousness). ? · Your stools are maroon or very bloody. ?Call your doctor now or seek immediate medical care if: 
? · You are dizzy or lightheaded, or you feel like you may faint. ? · Your stools are black and look like tar, or they have streaks of blood. ? · You have belly pain. ? · You vomit or have nausea. ? Watch closely for changes in your health, and be sure to contact your doctor if you do not get better as expected. Where can you learn more? Go to http://rosi-awa.info/. Enter L881 in the search box to learn more about \"Lower Gastrointestinal Bleeding: Care Instructions. \" Current as of: March 20, 2017 Content Version: 11.4 © 2792-8991 [employer]. Care instructions adapted under license by [employer] (which disclaims liability or warranty for this information). If you have questions about a medical condition or this instruction, always ask your healthcare professional. [place] [address] any warranty or liability for your use of this information. [employer] Announcement We are excited to announce that we are making your provider's discharge notes available to you in [employer]. You will see these notes when they are completed and signed by the physician that discharged you from your recent hospital stay.   If you have any questions or concerns about any information you see in [employer], please call the Health Information Department where you were seen or reach out to your Primary Care Provider for more information about your plan of care. Introducing [facility]! [facility] introduces [employer] patient portal. Now you can access parts of your medical record, email your doctor's office, and request medication refills online. 1. In your internet browser, go to [email or web address] 2. Click on the First Time User? Click Here link in the Sign In box. You will see the New Member Sign Up page. 3. Enter your [employer] Access Code exactly as it appears below. You will not need to use this code after youve completed the sign-up process. If you do not sign up before the expiration date, you must request a new code. · [employer] Access Code: [email or web address] Expires: 6/17/2018  4:17 PM 
 
4. Enter the last four digits of your Social Security Number (xxxx) and Date of Birth (mm/dd/yyyy) as indicated and click Submit. You will be taken to the next sign-up page. 5. Create a [employer] ID. This will be your [employer] login ID and cannot be changed, so think of one that is secure and easy to remember. 6. Create a [employer] password. You can change your password at any time. 7. Enter your Password Reset Question and Answer. This can be used at a later time if you forget your password. 8. Enter your e-mail address. You will receive e-mail notification when new information is available in [address] 9. Click Sign Up. You can now view and download portions of your medical record. 10. Click the Download Summary menu link to download a portable copy of your medical information. If you have questions, please visit the Frequently Asked Questions section of the [employer] website. Remember, [employer] is NOT to be used for urgent needs. For medical emergencies, dial 911. Now available from your iPhone and Android! Providers Seen During Your Hospitalization Provider Specialty Primary office phone [employer], DO Emergency Medicine [phone] Desiree Devries MD Internal Medicine [phone] Your Primary Care Physician (PCP) Primary Care Physician Office Phone Office Fax OTHER, PHYS ** None ** ** None ** You are allergic to the following Allergen Reactions Aspirin Other (comments) GI bleed Heparin (Porcine) Other (comments) GI bleed Metformin Other (comments)  
 swelling Norvasc (Amlodipine) Rash Recent Documentation Height Weight BMI OB Status Smoking Status 1.6 m 77 kg 30.07 kg/m2 Postmenopausal Never Smoker Emergency Contacts Name Discharge Info Relation Home Work Mobile Fenton,Mejia DISCHARGE CAREGIVER [3] Son [22] [phone] Patient Belongings The following personal items are in your possession at time of discharge: 
     Visual Aid: None             Clothing: At bedside    Other Valuables: At bedside, Cell Phone, Splint Please provide this summary of care documentation to your next provider. Signatures-by signing, you are acknowledging that this After Visit Summary has been reviewed with you and you have received a copy. Patient Signature:  ____________________________________________________________ Date:  ____________________________________________________________  
  
Mayito Pena Provider Signature:  ____________________________________________________________ Date:  ____________________________________________________________

## 2018-03-16 NOTE — PROGRESS NOTES
ESRD on HD has Progressive iron deficiency anemia at 6.9 hem positive stools. LAst time she was here on Wednesday she declined the colonoscopy because she refused to drink the Colyte bowel prep. Would re attempt on Monday. She has diabetic gastroparesis so we need to give her Reglan.

## 2018-03-16 NOTE — ROUTINE PROCESS
Bedside and Verbal shift change report given to LESTER Conn RN (oncoming nurse) by Khris Rothman   (offgoing nurse). Report included the following information SBAR, Intake/Output and MAR.

## 2018-03-16 NOTE — IP AVS SNAPSHOT
Summary of Care Report The Summary of Care report has been created to help improve care coordination. Users with access to [facility] or [address] (Web-based application) may access additional patient information including the Discharge Summary. If you are not currently a [address] user and need more information, please call the number listed below in the [place] [address] section and ask to be connected with Medical Records. Facility Information Name Address Phone [address] [address] [phone] Patient Information Patient Name Sex DOB Duwaine Severin ([id number]) Female 1950 Discharge Information Admitting Provider Service Area Unit Melissa Apple MD / [id number] [facility]/Med / [phone] Discharge Provider Discharge Date/Time Discharge Disposition Destination (none) 3/19/2018 (Pending) AHR (none) Patient Language Language ENGLISH [13] Hospital Problems as of 3/19/2018  Reviewed: 12/15/2017  5:49 PM by Wendi Augustine,  Class Noted - Resolved Last Modified POA Active Problems ESRD (end stage renal disease) ([facility] [address]  12/15/2017 - Present 3/16/2018 by Miguelina Cifuentes, DO Unknown Entered by Wendi Augustine,  Diabetes mellitus type 2, controlled ([facility] [address]  12/15/2017 - Present 3/16/2018 by Melissa Apple MD Yes Entered by Wendi Augustine,   
  HTN (hypertension)  12/15/2017 - Present 3/16/2018 by Melissa Apple MD Yes Entered by Wendi Augustine, DO  
  Dizziness  3/16/2018 - Present 3/16/2018 by Miguelina Cifuentes, DO Unknown Entered by Miguelina Cifuentes, DO Generalized weakness  3/16/2018 - Present 3/16/2018 by Miguelina Cifuentes, DO Unknown Entered by Miguelina Cifuentes, DO  
  Rectal bleed  3/16/2018 - Present 3/16/2018 by Miguelina Cifuentes, DO Unknown   Entered by Miguelina Cifuentes, DO  
 Symptomatic anemia  3/16/2018 - Present 3/16/2018 by Edel Grullon DO Unknown Entered by Edel Grullon DO Non-Hospital Problems as of 3/19/2018  Reviewed: 12/15/2017  5:49 PM by Senthil Alba DO Class Noted - Resolved Last Modified Active Problems GI bleed  12/15/2017 - Present 3/9/2018 by Dante Meadows MD  
  Entered by SUSAN Holland Anemia in chronic kidney disease  12/16/2017 - Present 3/9/2018 by Dante Meadows MD  
  Entered by Wei Thakkar MD  
  [facility])  12/17/2017 - Present 3/9/2018 by Dante Meadows MD  
  Entered by Wei Thakkar MD  
  Acute blood loss anemia  3/9/2018 - Present 3/9/2018 by Dante Meadows MD  
  Entered by Dante Meadows MD  
  
You are allergic to the following Allergen Reactions Aspirin Other (comments) GI bleed Heparin (Porcine) Other (comments) GI bleed Metformin Other (comments)  
 swelling Norvasc (Amlodipine) Rash Current Discharge Medication List  
  
CONTINUE these medications which have NOT CHANGED Dose & Instructions Dispensing Information Comments AFLIBERCEPT INTRAVITREAL Dose:  1 Ampule 1 Ampule by IntraVITreal route every twenty-eight (28) days. Refills:  0  
   
 albuterol 90 mcg/actuation inhaler Commonly known as:  PROVENTIL HFA, VENTOLIN HFA, PROAIR HFA Dose:  2 Puff Take 2 Puffs by inhalation every four (4) hours as needed for Wheezing. Refills:  0  
   
 carboxymethylcellulose sodium 0.5 % Dpet Commonly known as:  REFRESH PLUS Dose:  1 Drop Administer 1 Drop to both eyes as needed. Refills:  0  
   
 ferrous sulfate 325 mg (65 mg iron) tablet Dose:  325 mg Take 325 mg by mouth three (3) times daily (with meals). Refills:  0  
   
 hydrALAZINE 25 mg tablet Commonly known as:  APRESOLINE Dose:  25 mg Take 25 mg by mouth three (3) times daily. Refills:  0  
   
 hydrOXYzine HCl 10 mg tablet Commonly known as:  ATARAX  Dose:  10 mg  
 Take 10 mg by mouth nightly as needed for Itching. Refills:  0  
   
 lactulose 10 gram/15 mL solution Commonly known as:  Caye Purpura Dose:  20 g Take 20 g by mouth daily as needed. Refills:  0 NEPHROCAPS 1 mg capsule Generic drug:  b complex-vitamin c-folic acid Dose:  1 Cap Take 1 Cap by mouth daily. Refills:  0  
   
 nystatin powder Commonly known as:  MYCOSTATIN Apply  to affected area two (2) times a day. Quantity:  1 Bottle Refills:  0  
   
 pantoprazole 40 mg tablet Commonly known as:  PROTONIX Dose:  40 mg Take 1 Tab by mouth daily. Quantity:  30 Tab Refills:  2  
   
 sevelamer 800 mg tablet Commonly known as:  RENAGEL Dose:  1600 mg Take 1,600 mg by mouth three (3) times daily (with meals). Refills:  0  
   
 TOPROL XL 50 mg XL tablet Generic drug:  metoprolol succinate Dose:  25 mg Take 25 mg by mouth daily. Refills:  0  
   
 TYLENOL 325 mg tablet Generic drug:  acetaminophen Dose:  325 mg Take 325 mg by mouth every six (6) hours as needed for Pain. Refills:  0  
   
 VITAMIN D3 1,000 unit tablet Generic drug:  cholecalciferol Dose:  2000 Units Take 2,000 Units by mouth daily. Refills:  0 Surgery Information ID Date/Time Status Primary Surgeon All Procedures Location [id number] 3/19/2018 1615 Unposted Bruce Toro MD COLONOSCOPY [facility] ENDOSCOPY Follow-up Information Follow up With Details Comments Contact Meño Farrell MD In 3 days  Patient can only remember the practice name and not the physician HD clinic as scheduled      
 dr. Shira Barrios  In 1 week Discharge Instructions DISCHARGE SUMMARY from Nurse PATIENT INSTRUCTIONS: 
 
Recommended activity: Activity as tolerated. *  Please give a list of your current medications to your Primary Care Provider.  
 
*  Please update this list whenever your medications are discontinued, doses are 
 changed, or new medications (including over-the-counter products) are added. *  Please carry medication information at all times in case of emergency situations. These are general instructions for a healthy lifestyle: No smoking/ No tobacco products/ Avoid exposure to second hand smoke Surgeon General's Warning:  Quitting smoking now greatly reduces serious risk to your health. Obesity, smoking, and sedentary lifestyle greatly increases your risk for illness A healthy diet, regular physical exercise & weight monitoring are important for maintaining a healthy lifestyle You may be retaining fluid if you have a history of heart failure or if you experience any of the following symptoms:  Weight gain of 3 pounds or more overnight or 5 pounds in a week, increased swelling in our hands or feet or shortness of breath while lying flat in bed. Please call your doctor as soon as you notice any of these symptoms; do not wait until your next office visit. Recognize signs and symptoms of STROKE: 
 
F-face looks uneven A-arms unable to move or move unevenly S-speech slurred or non-existent T-time-call 911 as soon as signs and symptoms begin-DO NOT go Back to bed or wait to see if you get better-TIME IS BRAIN. Warning Signs of HEART ATTACK Call 911 if you have these symptoms: 
? Chest discomfort. Most heart attacks involve discomfort in the center of the chest that lasts more than a few minutes, or that goes away and comes back. It can feel like uncomfortable pressure, squeezing, fullness, or pain. ? Discomfort in other areas of the upper body. Symptoms can include pain or discomfort in one or both arms, the back, neck, jaw, or stomach. ? Shortness of breath with or without chest discomfort. ? Other signs may include breaking out in a cold sweat, nausea, or lightheadedness. Don't wait more than five minutes to call [address]!  Fast action can save your life. Calling 911 is almost always the fastest way to get lifesaving treatment. Emergency Medical Services staff can begin treatment when they arrive  up to an hour sooner than if someone gets to the hospital by car. The discharge information has been reviewed with the {PATIENT PARENT GUARDIAN:[id number]}. The {PATIENT PARENT GUARDIAN:42703} verbalized understanding. Discharge medications reviewed with the {Dishcarge meds reviewed [facility]} and appropriate educational materials and side effects teaching were provided. ___________________________________________________________________________________________________________________________________ Anemia: Care Instructions Your Care Instructions Anemia is a low level of red blood cells, which carry oxygen throughout your body. Many things can cause anemia. Lack of iron is one of the most common causes. Your body needs iron to make hemoglobin, a substance in red blood cells that carries oxygen from the lungs to your body's cells. Without enough iron, the body produces fewer and smaller red blood cells. As a result, your body's cells do not get enough oxygen, and you feel tired and weak. And you may have trouble concentrating. Bleeding is the most common cause of a lack of iron. You may have heavy menstrual bleeding or bleeding caused by conditions such as ulcers, hemorrhoids, or cancer. Regular use of aspirin or other anti-inflammatory medicines (such as ibuprofen) also can cause bleeding in some people. A lack of iron in your diet also can cause anemia, especially at times when the body needs more iron, such as during pregnancy, infancy, and the teen years. Your doctor may have prescribed iron pills. It may take several months of treatment for your iron levels to return to normal. Your doctor also may suggest that you eat foods that are rich in iron, such as meat and beans. There are many other causes of anemia. It is not always due to a lack of iron. Finding the specific cause of your anemia will help your doctor find the right treatment for you. Follow-up care is a key part of your treatment and safety. Be sure to make and go to all appointments, and call your doctor if you are having problems. It's also a good idea to know your test results and keep a list of the medicines you take. How can you care for yourself at home? · Take your medicines exactly as prescribed. Call your doctor if you think you are having a problem with your medicine. · If your doctor recommends iron pills, take them as directed: ¨ Try to take the pills on an empty stomach about 1 hour before or 2 hours after meals. But you may need to take iron with food to avoid an upset stomach. ¨ Do not take antacids or drink milk or caffeine drinks (such as coffee, tea, or cola) at the same time or within 2 hours of the time that you take your iron. They can make it hard for your body to absorb the iron. ¨ Vitamin C (from food or supplements) helps your body absorb iron. Try taking iron pills with a glass of orange juice or some other food that is high in vitamin C, such as citrus fruits. ¨ Iron pills may cause stomach problems, such as heartburn, nausea, diarrhea, constipation, and cramps. Be sure to drink plenty of fluids, and include fruits, vegetables, and fiber in your diet each day. Iron pills often make your bowel movements dark or green. ¨ If you forget to take an iron pill, do not take a double dose of iron the next time you take a pill. ¨ Keep iron pills out of the reach of small children. An overdose of iron can be very dangerous. · Follow your doctor's advice about eating iron-rich foods. These include red meat, shellfish, poultry, eggs, beans, raisins, whole-grain bread, and leafy green vegetables. · Steam vegetables to help them keep their iron content. When should you call for help? Call 911 anytime you think you may need emergency care. For example, call if: 
? · You have symptoms of a heart attack. These may include: ¨ Chest pain or pressure, or a strange feeling in the chest. 
¨ Sweating. ¨ Shortness of breath. ¨ Nausea or vomiting. ¨ Pain, pressure, or a strange feeling in the back, neck, jaw, or upper belly or in one or both shoulders or arms. ¨ Lightheadedness or sudden weakness. ¨ A fast or irregular heartbeat. After you call 911, the  may tell you to chew 1 adult-strength or 2 to 4 low-dose aspirin. Wait for an ambulance. Do not try to drive yourself. ? · You passed out (lost consciousness). ?Call your doctor now or seek immediate medical care if: 
? · You have new or increased shortness of breath. ? · You are dizzy or lightheaded, or you feel like you may faint. ? · Your fatigue and weakness continue or get worse. ? · You have any abnormal bleeding, such as: 
¨ Nosebleeds. ¨ Vaginal bleeding that is different (heavier, more frequent, at a different time of the month) than what you are used to. ¨ Bloody or black stools, or rectal bleeding. ¨ Bloody or pink urine. ? Watch closely for changes in your health, and be sure to contact your doctor if: 
? · You do not get better as expected. Where can you learn more? Go to http://rosi-awa.info/. Enter R301 in the search box to learn more about \"Anemia: Care Instructions. \" Current as of: October 13, 2016 Content Version: 11.4 © 3086-9226 [employer]. Care instructions adapted under license by [employer] (which disclaims liability or warranty for this information). If you have questions about a medical condition or this instruction, always ask your healthcare professional. [place] [address] any warranty or liability for your use of this information. Colonoscopy: What to Expect at [facility] Your Recovery After you have a colonoscopy, you will stay at the clinic for 1 to 2 hours until the medicines wear off. Then you can go home. But you will need to arrange for a ride. Your doctor will tell you when you can eat and do your other usual activities. Your doctor will talk to you about when you will need your next colonoscopy. Your doctor can help you decide how often you need to be checked. This will depend on the results of your test and your risk for colorectal cancer. After the test, you may be bloated or have gas pains. You may need to pass gas. If a biopsy was done or a polyp was removed, you may have streaks of blood in your stool (feces) for a few days. This care sheet gives you a general idea about how long it will take for you to recover. But each person recovers at a different pace. Follow the steps below to get better as quickly as possible. How can you care for yourself at home? Activity ? · Rest when you feel tired. ? · You can do your normal activities when it feels okay to do so. Diet ? · Follow your doctor's directions for eating. ? · Unless your doctor has told you not to, drink plenty of fluids. This helps to replace the fluids that were lost during the colon prep. ? · Do not drink alcohol. Medicines ? · Your doctor will tell you if and when you can restart your medicines. He or she will also give you instructions about taking any new medicines. ? · If you take blood thinners, such as warfarin (Coumadin), clopidogrel (Plavix), or aspirin, be sure to talk to your doctor. He or she will tell you if and when to start taking those medicines again. Make sure that you understand exactly what your doctor wants you to do. ? · If polyps were removed or a biopsy was done during the test, your doctor may tell you not to take aspirin or other anti-inflammatory medicines for a few days. These include ibuprofen (Advil, Motrin) and naproxen (Aleve). Other instructions ? · For your safety, do not drive or operate machinery until the medicine wears off and you can think clearly. Your doctor may tell you not to drive or operate machinery until the day after your test.  
? · Do not sign legal documents or make major decisions until the medicine wears off and you can think clearly. The anesthesia can make it hard for you to fully understand what you are agreeing to. Follow-up care is a key part of your treatment and safety. Be sure to make and go to all appointments, and call your doctor if you are having problems. It's also a good idea to know your test results and keep a list of the medicines you take. When should you call for help? Call 911 anytime you think you may need emergency care. For example, call if: 
? · You passed out (lost consciousness). ? · You pass maroon or bloody stools. ? · You have trouble breathing. ?Call your doctor now or seek immediate medical care if: 
? · You have pain that does not get better after you take pain medicine. ? · You are sick to your stomach or cannot drink fluids. ? · You have new or worse belly pain. ? · You have blood in your stools. ? · You have a fever. ? · You cannot pass stools or gas. ? Watch closely for changes in your health, and be sure to contact your doctor if you have any problems. Where can you learn more? Go to http://rosi-awa.info/. Enter E264 in the search box to learn more about \"Colonoscopy: What to Expect at Home. \" Current as of: May 12, 2017 Content Version: 11.4 © 7323-5424 [employer]wise[employer]orporated. Care instructions adapted under license by [employer] (which disclaims liability or warranty for this information). If you have questions about a medical condition or this instruction, always ask your healthcare professional. [address] any warranty or liability for your use of this information. Lower Gastrointestinal Bleeding: Care Instructions Your Care Instructions The digestive or gastrointestinal tract goes from the mouth to the anus. It is often called the GI tract. Bleeding in the lower GI tract can happen anywhere in your small or large intestine. It can also happen in your rectum or anus. In some cases, it is caused by an infection, cancer, or inflammatory bowel disease. Or it may be caused by hemorrhoids, diverticulitis, or clotting problems. Light bleeding may not cause any symptoms at first. But if you continue to bleed for a while, you may feel very weak or tired. Sudden, heavy bleeding means you need to see a doctor right away. This kind of bleeding can be very dangerous. But it can usually be cured or controlled. The doctor may do some tests to find the cause of your bleeding. Follow-up care is a key part of your treatment and safety. Be sure to make and go to all appointments, and call your doctor if you are having problems. It's also a good idea to know your test results and keep a list of the medicines you take. How can you care for yourself at home? · Be safe with medicines. Take your medicines exactly as prescribed. Call your doctor if you think you are having a problem with your medicine. You will get more details on the specific medicines your doctor prescribes. · Do not take aspirin or other anti-inflammatory medicines, such as naproxen (Aleve) or ibuprofen (Advil, Motrin), without talking to your doctor first. Ask your doctor if it is okay to use acetaminophen (Tylenol). · Do not drink alcohol. · The bleeding may make you lose iron. So it's important to eat foods that have a lot of iron. These include red meat, shellfish, poultry, and eggs. They also include beans, raisins, whole-grain breads, and leafy green vegetables. If you want help planning meals, you can meet with a dietitian. When should you call for help? Call 911 anytime you think you may need emergency care. For example, call if: 
? · You have sudden, severe belly pain. ? · You vomit blood or what looks like coffee grounds. ? · You passed out (lost consciousness). ? · Your stools are maroon or very bloody. ?Call your doctor now or seek immediate medical care if: 
? · You are dizzy or lightheaded, or you feel like you may faint. ? · Your stools are black and look like tar, or they have streaks of blood. ? · You have belly pain. ? · You vomit or have nausea. ? Watch closely for changes in your health, and be sure to contact your doctor if you do not get better as expected. Where can you learn more? Go to http://rosi-awa.info/. Enter D123 in the search box to learn more about \"Lower Gastrointestinal Bleeding: Care Instructions. \" Current as of: March 20, 2017 Content Version: 11.4 © 7003-2446 [employer]. Care instructions adapted under license by [employer] (which disclaims liability or warranty for this information). If you have questions about a medical condition or this instruction, always ask your healthcare professional. [place] [address] any warranty or liability for your use of this information. Chart Review Routing History Recipient Method Report Sent By Minh Madden DO Fax: [phone] Phone: [phone] Fax IP Auto Routed Maddy Malcolm MD [phone] [id number] 12/17/2017  7:16 PM 12/17/2017 Reese Longo MD  
Phone: [phone] In Basket IP Auto Routed Maddy Malcolm MD [[id number]] 3/12/2018 11:57 AM 03/12/2018

## 2018-03-16 NOTE — ROUTINE PROCESS
TRANSFER - IN REPORT:    Verbal report received from BRIAN Pedraza RN(name) on [facility]  being received from ED(unit) for routine progression of care      Report consisted of patients Situation, Background, Assessment and   Recommendations(SBAR). Information from the following report(s) SBAR, ED Summary, Intake/Output and MAR was reviewed with the receiving nurse. Opportunity for questions and clarification was provided. Assessment completed upon patients arrival to unit and care assumed.

## 2018-03-16 NOTE — ED TRIAGE NOTES
Pt sent from dialysis for low blood count level. Pt reports finishing dialysis today. Sepsis Screening completed    (  )Patient meets SIRS criteria. ( x )Patient does not meet SIRS criteria.       SIRS Criteria is achieved when two or more of the following are present   Temperature < 96.8°F (36°C) or > 100.9°F (38.3°C)   Heart Rate > 90 beats per minute   Respiratory Rate > 20 breaths per minute   WBC count > 12,000 or <4,000 or > 10% bands

## 2018-03-16 NOTE — ED NOTES
Late entry:    Primary Nurse Evelio Mendez and QUINTIN Barrera performed a dual skin assessment on this patient No impairment noted  Vic score is 21    TRANSFER - OUT REPORT:    Verbal report given to Bruce RIBEIRO(name) on [facility]  being transferred to Telemetry(unit) for routine progression of care       Report consisted of patients Situation, Background, Assessment and   Recommendations(SBAR). Information from the following report(s) SBAR, Kardex, ED Summary, MAR, Recent Results and Cardiac Rhythm sinus rhythm was reviewed with the receiving nurse. Lines:   Peripheral IV 03/16/18 Right Forearm (Active)   Site Assessment Clean, dry, & intact 3/16/2018  1:19 PM   Phlebitis Assessment 0 3/16/2018  1:19 PM   Infiltration Assessment 0 3/16/2018  1:19 PM   Dressing Status Clean, dry, & intact 3/16/2018  1:19 PM   Dressing Type Transparent 3/16/2018  1:19 PM   Hub Color/Line Status Patent; Flushed 3/16/2018  1:19 PM   Action Taken Blood drawn 3/16/2018  1:19 PM   Alcohol Cap Used Yes 3/16/2018  1:19 PM        Opportunity for questions and clarification was provided.       Patient transported with:   Monitor  Registered Nurse

## 2018-03-16 NOTE — ED NOTES
MD at bedside. IV placed and specimens sent to lab, pt tolerated well. Pt resting in stretcher on CCM and pulse ox in NAD. Side rails raised x 2 and call light within reach. No expressed at this time. Primary RN aware.

## 2018-03-16 NOTE — PROGRESS NOTES
Chart reviewed noted pt recently discharged 3-12-18 from [facility] at time of discharge Home health services offered pt declined , came to [facility] from [place] [facility] for low blood level,lives at home with son if discharge home pt likely would benefit from home health services cm will remain available if needed.

## 2018-03-16 NOTE — ED PROVIDER NOTES
EMERGENCY DEPARTMENT HISTORY AND PHYSICAL EXAM    Date: 3/16/2018  Patient Name: Severo Finn    History of Presenting Illness     Chief Complaint   Patient presents with    Abnormal Lab Results         History Provided By: Patient    Chief Complaint: abnormal lab result  Duration: drawn a few days ago at dialysis which pt was told about the results of today  Timing:  Acute and 1 lab draw  Location: generalized  Quality: blood work  Severity: \"Low blood level\"  Associated Symptoms: generalized weakness this morning, hematochezia, leg swelling, abd soreness, and dizziness    Additional History (Context):   1:22 PM  Severo Finn is a 75 y.o. female with PMHX of HTN, ESRD on HD (Monday, Wednesday, Friday; doing dialysis for 8 years), Arthritis, GERD, DM, and Cirrhosis and PSHx of  and colonoscopy who presents to the emergency department for evaluation of a lab result (low blood count level, but pt unable to recall specific results). Pt did finish dialysis today where she had the blood checked 2 days ago and learned today her results. Associated sxs include generalized weakness this morning, hematochezia, leg swelling, abd soreness, and dizziness. Pt came here 1 week ago and got 1 unit of blood. Pt states her blood pressure was also low this morning. Pt not ambulating due to being told not to in the past after breaking ankle in the past and is wearing an ortho boot on the LLE. Her foot is taken care of at the [facility]. Allergies reported to ASA, Heparin, Metformin, and Norvasc. Followed by Dr. Liliana Mayo as PCP. Pt is a non smoker and a non EtOH user. Pt denies SOB, hematuria, fever, cough, vomiting, and any other sxs or complaints.          PCP: Savannah Farrell MD    Current Facility-Administered Medications   Medication Dose Route Frequency Provider Last Rate Last Dose    0.9% sodium chloride infusion 250 mL  250 mL IntraVENous PRN Stef Soda, DO         Current Outpatient Prescriptions   Medication Sig Dispense Refill    nystatin (MYCOSTATIN) powder Apply  to affected area two (2) times a day. 1 Bottle 0    pantoprazole (PROTONIX) 40 mg tablet Take 1 Tab by mouth daily. 30 Tab 2    cholecalciferol (VITAMIN D3) 1,000 unit tablet Take 2,000 Units by mouth daily.  ferrous sulfate 325 mg (65 mg iron) tablet Take 325 mg by mouth three (3) times daily (with meals).  hydrALAZINE (APRESOLINE) 25 mg tablet Take 25 mg by mouth three (3) times daily.  lactulose (CHRONULAC) 10 gram/15 mL solution Take 20 g by mouth daily as needed.  metoprolol succinate (TOPROL XL) 50 mg XL tablet Take 25 mg by mouth daily.  sevelamer (RENAGEL) 800 mg tablet Take 1,600 mg by mouth three (3) times daily (with meals).  b complex-vitamin c-folic acid (NEPHROCAPS) 1 mg capsule Take 1 Cap by mouth daily.  acetaminophen (TYLENOL) 325 mg tablet Take 325 mg by mouth every six (6) hours as needed for Pain.  AFLIBERCEPT INTRAVITREAL 1 Ampule by IntraVITreal route every twenty-eight (28) days.  carboxymethylcellulose sodium (REFRESH PLUS) 0.5 % dpet Administer 1 Drop to both eyes as needed.  hydrOXYzine HCl (ATARAX) 10 mg tablet Take 10 mg by mouth nightly as needed for Itching.  albuterol (PROVENTIL HFA, VENTOLIN HFA, PROAIR HFA) 90 mcg/actuation inhaler Take 2 Puffs by inhalation every four (4) hours as needed for Wheezing. Past History     Past Medical History:  Past Medical History:   Diagnosis Date    Arthritis     Asthma     Chronic kidney disease     Chronic pain     Cirrhosis ([facility] [address] 12/17/2017    Diabetes ([facility] [address] diet controlled    GERD (gastroesophageal reflux disease)     Hypertension        Past Surgical History:  Past Surgical History:   Procedure Laterality Date    COLONOSCOPY N/A 1/12/2018    COLONOSCOPY performed by Alonso Mcgee MD at [address] [facility] GYN  c section       Family History:  History reviewed. No pertinent family history.     Social History:  Social History   Substance Use Topics    Smoking status: Never Smoker    Smokeless tobacco: Never Used    Alcohol use No       Allergies: Allergies   Allergen Reactions    Aspirin Other (comments)     GI bleed    Heparin (Porcine) Other (comments)     GI bleed    Metformin Other (comments)     swelling    Norvasc [Amlodipine] Rash         Review of Systems   Review of Systems   Constitutional: Negative for fever. HENT: Negative for congestion and sore throat. Eyes: Negative for redness. Respiratory: Negative for cough, shortness of breath and wheezing. Cardiovascular: Positive for leg swelling. Negative for chest pain. Gastrointestinal: Positive for blood in stool. Negative for abdominal distention, constipation, nausea and vomiting. Genitourinary: Negative for dysuria and hematuria. Musculoskeletal: Negative for arthralgias, back pain, myalgias, neck pain and neck stiffness. Skin: Negative for pallor. Neurological: Positive for dizziness and weakness. Negative for headaches. Hematological: Does not bruise/bleed easily. All other systems reviewed and are negative. Physical Exam     Vitals:    03/16/18 1202 03/16/18 1316 03/16/18 1330 03/16/18 1415   BP: 120/50 131/56 115/71 153/44   Pulse: 84 83 82 86   Resp: 18 16 16 19   Temp: 97.9 °F (36.6 °C)      SpO2: 99% 100% 100%    Weight: 70.3 kg (155 lb)      Height: 5' 3\" (1.6 m)        Physical Exam   Constitutional: She is oriented to person, place, and time. She appears well-developed and well-nourished. No distress. HENT:   Head: Normocephalic and atraumatic. Mouth/Throat: Oropharynx is clear and moist.   Eyes: Pupils are equal, round, and reactive to light. No scleral icterus. Pale conjunctiva. Neck: Normal range of motion. Neck supple. Cardiovascular: Normal rate and intact distal pulses. Capillary refill < 3 seconds   Pulmonary/Chest: Effort normal and breath sounds normal. No respiratory distress.  She has no wheezes. Lungs clear   Abdominal: Soft. Bowel sounds are normal. She exhibits no distension. There is no tenderness. Genitourinary:   Genitourinary Comments: No hemorrhoids, good tone. Positive guaiac. No gross blood per rectum. Musculoskeletal: Normal range of motion. She exhibits edema (mild lower extremity). No calf tenderness. Lymphadenopathy:     She has no cervical adenopathy. Neurological: She is alert and oriented to person, place, and time. No cranial nerve deficit. She exhibits normal muscle tone. Coordination normal.   Generalized weakness. No slurred speech. No facial droop. Sensation intact. Strength 5/5. Skin: Skin is warm and dry. She is not diaphoretic. Right chest wall dialysis catheter without signs of infection. Ortho boot on the left lower extremity. Psychiatric: Her behavior is normal.   Nursing note and vitals reviewed. Diagnostic Study Results     Labs -     Recent Results (from the past 12 hour(s))   CBC WITH AUTOMATED DIFF    Collection Time: 03/16/18  1:18 PM   Result Value Ref Range    WBC 4.3 (L) 4.6 - 13.2 K/uL    RBC 2.73 (L) 4.20 - 5.30 M/uL    HGB 6.9 (L) 12.0 - 16.0 g/dL    HCT 24.2 (L) 35.0 - 45.0 %    MCV 88.6 74.0 - 97.0 FL    MCH 25.3 24.0 - 34.0 PG    MCHC 28.5 (L) 31.0 - 37.0 g/dL    RDW 18.3 (H) 11.6 - 14.5 %    PLATELET 207 894 - 822 K/uL    MPV 10.7 9.2 - 11.8 FL    NEUTROPHILS 70 40 - 73 %    LYMPHOCYTES 17 (L) 21 - 52 %    MONOCYTES 9 3 - 10 %    EOSINOPHILS 3 0 - 5 %    BASOPHILS 1 0 - 2 %    ABS. NEUTROPHILS 3.1 1.8 - 8.0 K/UL    ABS. LYMPHOCYTES 0.7 (L) 0.9 - 3.6 K/UL    ABS. MONOCYTES 0.4 0.05 - 1.2 K/UL    ABS. EOSINOPHILS 0.1 0.0 - 0.4 K/UL    ABS.  BASOPHILS 0.0 0.0 - 0.06 K/UL    RBC COMMENTS ANISOCYTOSIS  1+        RBC COMMENTS HYPOCHROMIA  2+        RBC COMMENTS POIKILOCYTOSIS  2+        RBC COMMENTS TARGET CELLS  1+        RBC COMMENTS OVALOCYTES  1+        RBC COMMENTS SCHISTOCYTES  FEW  ACANTHOCYTES  FEW        DF AUTOMATED METABOLIC PANEL, COMPREHENSIVE    Collection Time: 03/16/18  1:18 PM   Result Value Ref Range    Sodium 141 136 - 145 mmol/L    Potassium 3.5 3.5 - 5.5 mmol/L    Chloride 99 (L) 100 - 108 mmol/L    CO2 34 (H) 21 - 32 mmol/L    Anion gap 8 3.0 - 18 mmol/L    Glucose 234 (H) 74 - 99 mg/dL    BUN 14 7.0 - 18 MG/DL    Creatinine 2.76 (H) 0.6 - 1.3 MG/DL    BUN/Creatinine ratio 5 (L) 12 - 20      GFR est AA 21 (L) >60 ml/min/1.73m2    GFR est non-AA 17 (L) >60 ml/min/1.73m2    Calcium 9.2 8.5 - 10.1 MG/DL    Bilirubin, total 0.4 0.2 - 1.0 MG/DL    ALT (SGPT) 20 13 - 56 U/L    AST (SGOT) 28 15 - 37 U/L    Alk. phosphatase 152 (H) 45 - 117 U/L    Protein, total 7.0 6.4 - 8.2 g/dL    Albumin 3.4 3.4 - 5.0 g/dL    Globulin 3.6 2.0 - 4.0 g/dL    A-G Ratio 0.9 0.8 - 1.7     PROTHROMBIN TIME + INR    Collection Time: 03/16/18  1:18 PM   Result Value Ref Range    Prothrombin time 13.7 11.5 - 15.2 sec    INR 1.1 0.8 - 1.2     PTT    Collection Time: 03/16/18  1:18 PM   Result Value Ref Range    aPTT 30.4 23.0 - 36.4 SEC   TYPE & SCREEN    Collection Time: 03/16/18  2:00 PM   Result Value Ref Range    Crossmatch Expiration 03/19/2018     ABO/Rh(D) O POSITIVE     Antibody screen NEG        Radiologic Studies -   XR CHEST PORT   Final Result   IMPRESSION:  Stable enlarged cardiac silhouette. No acute pulmonary process identified. As read by the radiologist.    CT Results  (Last 48 hours)    None        CXR Results  (Last 48 hours)    None          Medications given in the ED-  Medications   0.9% sodium chloride infusion 250 mL (not administered)         Medical Decision Making   I am the first provider for this patient. I reviewed the vital signs, available nursing notes, past medical history, past surgical history, family history and social history. Vital Signs-Reviewed the patient's vital signs.     Pulse Oximetry Analysis - 99% on room air     Cardiac Monitor:  Rate: 91 bpm  Rhythm: sinus rhythm    EKG interpretation: (Preliminary)  2:40 PM  NSR at 83 bpm. LBBB. LAD. No ST elevation. There is an ST depression in V6. EKG is similar to EKG on 1/13/2018, old LBBB, but depression now is not in V5. EKG read by Nannette Jimenez DO     Records Reviewed: Nursing Notes    Provider Notes (Medical Decision Making): DDx: anemia of chronic disease, metabolic, malaise, generalized weakness. Pt had dialysis today. Found to have apparently low hgb. Will check labs. May need to transfuse blood. Procedures:  Procedures    PROCEDURE NOTE - RECTAL EXAM:   2:35 PM  Performed by: Nannette Jimenez DO  Rectal exam performed. Brown stool was collected. Stool was Hemoccult tested, and found to be heme Positive. Good rectal tone. No gross blood per rectum. Supervised by Miah female RN. The procedure took 1-15 minutes, and pt tolerated well. Written by Eduard Pedersen ED Scribe, as dictated by Nannette Jimenez DO.       ED Course:   1:22 PM Initial assessment performed. The patients presenting problems have been discussed, and they are in agreement with the care plan formulated and outlined with them. I have encouraged them to ask questions as they arise throughout their visit.    hbg 6.9, (+)guaiac. Pt symptomatic with dizziness and generalized weakness. 2:08 PM Discussed in person patient's history, exam, and available diagnostics results with Vilma Greer MD, internal medicine, who recommends checking Guaiac. May need admission and GI consult as GI bleed may be needing colonoscopy to evaluate. She agrees with type and cross for red blood cells. States she will accept pt pending GI consult. Will call her back with plan. 2:38 PM Discussed patient's history, exam, and available diagnostics results with JAILENE Tipton MD, gastroenterology, who agree with transfusing pt and to bring her in. States he will have to prep her slowly due to her dialysis and that this will take a couple days to do that. I will type and screen her for 2 units of prbc's. Admit. Discussed plan with pt who agrees. 3:00 PM Updated Wei Thakkar MD, internal medicine, on pt plan. She agrees to admit pt to telemetry. Diagnosis and Disposition       Critical Care Time:   2:41 PM  I have spent 30 minutes of critical care time involved in lab review, consultations with specialist, family decision-making, and documentation. During this entire length of time I was immediately available to the patient. Critical Care: The reason for providing this level of medical care for this critically ill patient was due a critical illness that impaired one or more vital organ systems such that there was a high probability of imminent or life threatening deterioration in the patients condition. This care involved high complexity decision making to assess, manipulate, and support vital system functions, to treat this degreee vital organ system failure and to prevent further life threatening deterioration of the patients condition. Core Measures:  For Hospitalized Patients:    1. Hospitalization Decision Time:  The decision to hospitalize the patient was made by Edel Grullon DO at 2:38 PM on 3/16/2018    2. Aspirin: Aspirin was not given because the patient did not present with a stroke at the time of their Emergency Department evaluation    2:41 PM  Patient is being admitted to the hospital by Wei Thakkar MD. The results of their tests and reasons for their admission have been discussed with them and/or available family. They convey agreement and understanding for the need to be admitted and for their admission diagnosis. CONDITIONS ON ADMISSION:  Sepsis is not present at the time of admission. Deep Vein Thrombosis is not present at the time of admission. Thrombosis is not present at the time of admission. Pneumonia is not present at the time of admission. Wound infection is not present at the time of admission. Pressure Ulcer is not present at the time of admission.          CLINICAL IMPRESSION:    1. Rectal bleeding    2. Symptomatic anemia    3. Dizziness    4. Generalized weakness    5. ESRD (end stage renal disease) ([facility] [address]        PLAN:  1. ADMIT    _______________________________    Attestations: This note is prepared by Trenton Crespo, acting as Scribe for [employer], DO. [employer], DO:  The scribe's documentation has been prepared under my direction and personally reviewed by me in its entirety.   I confirm that the note above accurately reflects all work, treatment, procedures, and medical decision making performed by me.  _______________________________

## 2018-03-16 NOTE — H&P
History & Physical    Patient: Edwin Singh MRN: [id number]  CSN: [id number]    YOB: 1950  Age: 75 y.o. Sex: female      DOA: 3/16/2018  Primary Care Provider:  Shreyas Armstrong MD      Assessment/Plan     Hospital Problems  Date Reviewed: 12/15/2017          Codes Class Noted POA    Dizziness ICD-10-CM: R42  ICD-9-CM: 780.4  3/16/2018 Unknown        Generalized weakness ICD-10-CM: R53.1  ICD-9-CM: 780.79  3/16/2018 Unknown        Rectal bleed ICD-10-CM: K62.5  ICD-9-CM: 569.3  3/16/2018 Unknown        Symptomatic anemia ICD-10-CM: D64.9  ICD-9-CM: 285.9  3/16/2018 Unknown        ESRD (end stage renal disease) ([facility] ICD-10-CM: N18.6  ICD-9-CM: 585.6  12/15/2017 Unknown        Diabetes mellitus type 2, controlled ([facility] ICD-10-CM: E11.9  ICD-9-CM: 250.00  12/15/2017 Yes        HTN (hypertension) ICD-10-CM: I10  ICD-9-CM: 401.9  12/15/2017 Yes              Admit to tele     1 symptomatic  Anemia   Will give two units prbc   Continue h/h monitoring     2. Gi bleeding   completed upper egd last admission, will have colonoscopy on Monday, will prap tomorrow , case discussed with Dr. Natalia Fernández   Will continue ppi and h/h monitoring     4 DM type II ,   -ssi, diabetic diet , hypoglycemia protocol       5 HTN, accelerated  Continue home medication. 6 ESRD on HD  HD MWF , completed HD today. F/u with Dr. Lorena Lujan , case discussed with Dr. Isaias Menendez      7 foot pain, left    f/u with podiatry as out pt   Pain control   8 constipation   amitiza ordered per gi     Full code   DVT : scd. ppi proph  CC: abnormal lab        HPI:     Edwin Singh is a 75 y.o. female who hx of DM, HTN, chronic anemia was sent to ER per due to abnormal lab. She was told her h/h was low. She reported on and off dizziness. She was admitted recently due to gi bleeding, upper egd was done per Dr. Tad Sidhu. She reported on and off dizziness, no chest pain. Occult stool was positive. H/h 6.9/24.2 .  Reported one time black stool since discharge and constipation     Denies any slurred speech/headache/cp/n/v/blurred vission/d/c/palpitation/gait change. Denies smoking/ any alcohol or drug use. Visit Vitals    /61    Pulse 79    Temp 97.9 °F (36.6 °C)    Resp 14    Ht 5' 3\" (1.6 m)    Wt 70.3 kg (155 lb)    SpO2 100%    BMI 27.46 kg/m2      O2 Device: Room air      Past Medical History:   Diagnosis Date    Arthritis     Asthma     Chronic kidney disease     Chronic pain     Cirrhosis ([facility] [address] 12/17/2017    Diabetes ([facility] [address] diet controlled    GERD (gastroesophageal reflux disease)     Hypertension        Past Surgical History:   Procedure Laterality Date    COLONOSCOPY N/A 1/12/2018    COLONOSCOPY performed by Reina Aquino MD at [address] [facility] GYN  c section       History reviewed. No pertinent family history. Social History     Social History    Marital status: SINGLE     Spouse name: N/A    Number of children: N/A    Years of education: N/A     Social History Main Topics    Smoking status: Never Smoker    Smokeless tobacco: Never Used    Alcohol use No    Drug use: No    Sexual activity: Not Asked     Other Topics Concern    None     Social History Narrative       Prior to Admission medications    Medication Sig Start Date End Date Taking? Authorizing Provider   nystatin (MYCOSTATIN) powder Apply  to affected area two (2) times a day. 3/12/18   Ning Cortés PA-C   pantoprazole (PROTONIX) 40 mg tablet Take 1 Tab by mouth daily. 1/13/18   Sabine Burleson MD   cholecalciferol (VITAMIN D3) 1,000 unit tablet Take 2,000 Units by mouth daily. Historical Provider   ferrous sulfate 325 mg (65 mg iron) tablet Take 325 mg by mouth three (3) times daily (with meals). Historical Provider   hydrALAZINE (APRESOLINE) 25 mg tablet Take 25 mg by mouth three (3) times daily. Historical Provider   lactulose (CHRONULAC) 10 gram/15 mL solution Take 20 g by mouth daily as needed.     Historical Provider metoprolol succinate (TOPROL XL) 50 mg XL tablet Take 25 mg by mouth daily. Historical Provider   sevelamer (RENAGEL) 800 mg tablet Take 1,600 mg by mouth three (3) times daily (with meals). Historical Provider   b complex-vitamin c-folic acid (NEPHROCAPS) 1 mg capsule Take 1 Cap by mouth daily. Historical Provider   acetaminophen (TYLENOL) 325 mg tablet Take 325 mg by mouth every six (6) hours as needed for Pain. Historical Provider   AFLIBERCEPT INTRAVITREAL 1 Ampule by IntraVITreal route every twenty-eight (28) days. Historical Provider   carboxymethylcellulose sodium (REFRESH PLUS) 0.5 % dpet Administer 1 Drop to both eyes as needed. Historical Provider   hydrOXYzine HCl (ATARAX) 10 mg tablet Take 10 mg by mouth nightly as needed for Itching. Historical Provider   albuterol (PROVENTIL HFA, VENTOLIN HFA, PROAIR HFA) 90 mcg/actuation inhaler Take 2 Puffs by inhalation every four (4) hours as needed for Wheezing. Historical Provider       Allergies   Allergen Reactions    Aspirin Other (comments)     GI bleed    Heparin (Porcine) Other (comments)     GI bleed    Metformin Other (comments)     swelling    Norvasc [Amlodipine] Rash       Review of Systems  Gen: No fever, chills, +malaise, no  weight loss/gain. Heent: No headache, rhinorrhea, epistaxis, ear pain, hearing loss, sinus pain, neck pain/stiffness, sore throat. Heart: No chest pain, palpitations, SARKAR, pnd, or orthopnea. Resp: No cough, hemoptysis, wheezing and shortness of breath. GI: No nausea, vomiting, diarrhea, + constipation, melena or hematochezia. : No urinary obstruction, dysuria or hematuria. Derm: No rash, new skin lesion or pruritis. Musc/skeletal: left foot pain   Vasc: No edema, cyanosis or claudication. Endo: No heat/cold intolerance, no polyuria,polydipsia or polyphagia. Neuro: No unilateral weakness, numbness, tingling. No seizures. Heme: No easy bruising or bleeding.           Physical Exam:     Physical Exam:  Visit Vitals    /61    Pulse 79    Temp 97.9 °F (36.6 °C)    Resp 14    Ht 5' 3\" (1.6 m)    Wt 70.3 kg (155 lb)    SpO2 100%    BMI 27.46 kg/m2      O2 Device: Room air    Temp (24hrs), Av.9 °F (36.6 °C), Min:97.9 °F (36.6 °C), Max:97.9 °F (36.6 °C)             General:  Awake, cooperative, no distress. Head:  Normocephalic, without obvious abnormality, atraumatic. Eyes:  Conjunctivae/corneas clear, sclera anicteric, PERRL, EOMs intact. Nose: Nares normal. No drainage or sinus tenderness. Throat: Lips, mucosa, and tongue normal. .   Neck: Supple, symmetrical, trachea midline, no adenopathy. Lungs:   Clear to auscultation bilaterally. TDC noted        Heart:  Regular rate and rhythm, S1, S2 normal, + murmur, click, rub or gallop. Abdomen: Soft, non-tender. Bowel sounds normal. No masses,  No organomegaly. Extremities: Extremities normal, atraumatic, no cyanosis or edema. Pulses: 2+ and symmetric all extremities. Skin: Skin color-pink, texture, turgor normal. No rashes or lesions. Capillary refill normal    Neurologic: CNII-XII intact. No focal motor or sensory deficit.        Labs Reviewed:    BMP:   Lab Results   Component Value Date/Time     2018 01:18 PM    K 3.5 2018 01:18 PM    CL 99 (L) 2018 01:18 PM    CO2 34 (H) 2018 01:18 PM    AGAP 8 2018 01:18 PM     (H) 2018 01:18 PM    BUN 14 2018 01:18 PM    CREA 2.76 (H) 2018 01:18 PM    GFRAA 21 (L) 2018 01:18 PM    GFRNA 17 (L) 2018 01:18 PM     CMP:   Lab Results   Component Value Date/Time     2018 01:18 PM    K 3.5 2018 01:18 PM    CL 99 (L) 2018 01:18 PM    CO2 34 (H) 2018 01:18 PM    AGAP 8 2018 01:18 PM     (H) 2018 01:18 PM    BUN 14 2018 01:18 PM    CREA 2.76 (H) 2018 01:18 PM    GFRAA 21 (L) 2018 01:18 PM    GFRNA 17 (L) 2018 01:18 PM    CA 9.2 03/16/2018 01:18 PM    ALB 3.4 03/16/2018 01:18 PM    TP 7.0 03/16/2018 01:18 PM    GLOB 3.6 03/16/2018 01:18 PM    AGRAT 0.9 03/16/2018 01:18 PM    SGOT 28 03/16/2018 01:18 PM    ALT 20 03/16/2018 01:18 PM     CBC:   Lab Results   Component Value Date/Time    WBC 4.3 (L) 03/16/2018 01:18 PM    HGB 6.9 (L) 03/16/2018 01:18 PM    HCT 24.2 (L) 03/16/2018 01:18 PM     03/16/2018 01:18 PM     All Cardiac Markers in the last 24 hours:   Lab Results   Component Value Date/Time     03/16/2018 01:18 PM    CKMB 3.4 03/16/2018 01:18 PM    CKND1 2.8 03/16/2018 01:18 PM    TROIQ 0.08 (H) 03/16/2018 01:18 PM     Recent Glucose Results:   Lab Results   Component Value Date/Time     (H) 03/16/2018 01:18 PM     ABG: No results found for: PH, PHI, PCO2, PCO2I, PO2, PO2I, HCO3, HCO3I, FIO2, FIO2I  COAGS:   Lab Results   Component Value Date/Time    APTT 30.4 03/16/2018 01:18 PM    PTP 13.7 03/16/2018 01:18 PM    INR 1.1 03/16/2018 01:18 PM     Liver Panel:   Lab Results   Component Value Date/Time    ALB 3.4 03/16/2018 01:18 PM    TP 7.0 03/16/2018 01:18 PM    GLOB 3.6 03/16/2018 01:18 PM    AGRAT 0.9 03/16/2018 01:18 PM    SGOT 28 03/16/2018 01:18 PM    ALT 20 03/16/2018 01:18 PM     (H) 03/16/2018 01:18 PM     Pancreatic Markers: No results found for: AMYLPOCT, AML, LIPPOCT, LPSE    Procedures/imaging: see electronic medical records for all procedures/Xrays and details which were not copied into this note but were reviewed prior to creation of Marsha Bacon MD, Internal Medicine     CC: Savannah Farrell MD

## 2018-03-17 LAB
ANION GAP SERPL CALC-SCNC: 7 MMOL/L (ref 3–18)
BASOPHILS # BLD: 0 K/UL (ref 0–0.06)
BASOPHILS NFR BLD: 0 % (ref 0–2)
BUN SERPL-MCNC: 22 MG/DL (ref 7–18)
BUN/CREAT SERPL: 5 (ref 12–20)
CALCIUM SERPL-MCNC: 8.8 MG/DL (ref 8.5–10.1)
CHLORIDE SERPL-SCNC: 100 MMOL/L (ref 100–108)
CO2 SERPL-SCNC: 33 MMOL/L (ref 21–32)
CREAT SERPL-MCNC: 4.31 MG/DL (ref 0.6–1.3)
DIFFERENTIAL METHOD BLD: ABNORMAL
EOSINOPHIL # BLD: 0.2 K/UL (ref 0–0.4)
EOSINOPHIL NFR BLD: 3 % (ref 0–5)
ERYTHROCYTE [DISTWIDTH] IN BLOOD BY AUTOMATED COUNT: 16.7 % (ref 11.6–14.5)
GLUCOSE BLD STRIP.AUTO-MCNC: 120 MG/DL (ref 70–110)
GLUCOSE BLD STRIP.AUTO-MCNC: 123 MG/DL (ref 70–110)
GLUCOSE BLD STRIP.AUTO-MCNC: 131 MG/DL (ref 70–110)
GLUCOSE BLD STRIP.AUTO-MCNC: 158 MG/DL (ref 70–110)
GLUCOSE SERPL-MCNC: 168 MG/DL (ref 74–99)
HCT VFR BLD AUTO: 30.7 % (ref 35–45)
HCT VFR BLD AUTO: 31.6 % (ref 35–45)
HGB BLD-MCNC: 9.3 G/DL (ref 12–16)
HGB BLD-MCNC: 9.8 G/DL (ref 12–16)
LYMPHOCYTES # BLD: 0.9 K/UL (ref 0.9–3.6)
LYMPHOCYTES NFR BLD: 15 % (ref 21–52)
MAGNESIUM SERPL-MCNC: 1.9 MG/DL (ref 1.6–2.6)
MCH RBC QN AUTO: 27 PG (ref 24–34)
MCHC RBC AUTO-ENTMCNC: 30.3 G/DL (ref 31–37)
MCV RBC AUTO: 89.2 FL (ref 74–97)
MONOCYTES # BLD: 0.4 K/UL (ref 0.05–1.2)
MONOCYTES NFR BLD: 7 % (ref 3–10)
NEUTS SEG # BLD: 4.2 K/UL (ref 1.8–8)
NEUTS SEG NFR BLD: 75 % (ref 40–73)
PLATELET # BLD AUTO: 136 K/UL (ref 135–420)
PMV BLD AUTO: 9.3 FL (ref 9.2–11.8)
POTASSIUM SERPL-SCNC: 3.7 MMOL/L (ref 3.5–5.5)
RBC # BLD AUTO: 3.44 M/UL (ref 4.2–5.3)
SODIUM SERPL-SCNC: 140 MMOL/L (ref 136–145)
WBC # BLD AUTO: 5.7 K/UL (ref 4.6–13.2)

## 2018-03-17 PROCEDURE — 74011250637 HC RX REV CODE- 250/637: Performed by: INTERNAL MEDICINE

## 2018-03-17 PROCEDURE — 83735 ASSAY OF MAGNESIUM: CPT | Performed by: HOSPITALIST

## 2018-03-17 PROCEDURE — 97116 GAIT TRAINING THERAPY: CPT

## 2018-03-17 PROCEDURE — 74011000250 HC RX REV CODE- 250: Performed by: HOSPITALIST

## 2018-03-17 PROCEDURE — P9016 RBC LEUKOCYTES REDUCED: HCPCS | Performed by: EMERGENCY MEDICINE

## 2018-03-17 PROCEDURE — 74011250637 HC RX REV CODE- 250/637: Performed by: HOSPITALIST

## 2018-03-17 PROCEDURE — 97161 PT EVAL LOW COMPLEX 20 MIN: CPT

## 2018-03-17 PROCEDURE — 74011250636 HC RX REV CODE- 250/636: Performed by: EMERGENCY MEDICINE

## 2018-03-17 PROCEDURE — 80048 BASIC METABOLIC PNL TOTAL CA: CPT | Performed by: HOSPITALIST

## 2018-03-17 PROCEDURE — 65660000000 HC RM CCU STEPDOWN

## 2018-03-17 PROCEDURE — 82962 GLUCOSE BLOOD TEST: CPT

## 2018-03-17 PROCEDURE — 74011250636 HC RX REV CODE- 250/636: Performed by: HOSPITALIST

## 2018-03-17 PROCEDURE — 74011636637 HC RX REV CODE- 636/637: Performed by: HOSPITALIST

## 2018-03-17 PROCEDURE — 97165 OT EVAL LOW COMPLEX 30 MIN: CPT

## 2018-03-17 PROCEDURE — 85018 HEMOGLOBIN: CPT | Performed by: HOSPITALIST

## 2018-03-17 PROCEDURE — 77030011256 HC DRSG MEPILEX <16IN NO BORD MOLN -A

## 2018-03-17 PROCEDURE — 36430 TRANSFUSION BLD/BLD COMPNT: CPT

## 2018-03-17 PROCEDURE — 36415 COLL VENOUS BLD VENIPUNCTURE: CPT | Performed by: HOSPITALIST

## 2018-03-17 PROCEDURE — 85025 COMPLETE CBC W/AUTO DIFF WBC: CPT | Performed by: HOSPITALIST

## 2018-03-17 PROCEDURE — 30233N1 TRANSFUSION OF NONAUTOLOGOUS RED BLOOD CELLS INTO PERIPHERAL VEIN, PERCUTANEOUS APPROACH: ICD-10-PCS | Performed by: HOSPITALIST

## 2018-03-17 RX ORDER — LIDOCAINE 50 MG/G
1 PATCH TOPICAL EVERY 24 HOURS
Status: DISCONTINUED | OUTPATIENT
Start: 2018-03-17 | End: 2018-03-19 | Stop reason: HOSPADM

## 2018-03-17 RX ORDER — SODIUM CHLORIDE 9 MG/ML
100 INJECTION, SOLUTION INTRAVENOUS
Status: DISCONTINUED | OUTPATIENT
Start: 2018-03-17 | End: 2018-03-19 | Stop reason: HOSPADM

## 2018-03-17 RX ADMIN — INSULIN LISPRO 2 UNITS: 100 INJECTION, SOLUTION INTRAVENOUS; SUBCUTANEOUS at 17:40

## 2018-03-17 RX ADMIN — ACETAMINOPHEN 650 MG: 325 TABLET ORAL at 21:40

## 2018-03-17 RX ADMIN — SODIUM CHLORIDE 250 ML: 900 INJECTION, SOLUTION INTRAVENOUS at 01:14

## 2018-03-17 RX ADMIN — SEVELAMER CARBONATE 1600 MG: 800 TABLET, FILM COATED ORAL at 17:40

## 2018-03-17 RX ADMIN — METOCLOPRAMIDE 5 MG: 5 INJECTION, SOLUTION INTRAMUSCULAR; INTRAVENOUS at 05:51

## 2018-03-17 RX ADMIN — LUBIPROSTONE 24 MCG: 8 CAPSULE, GELATIN COATED ORAL at 08:29

## 2018-03-17 RX ADMIN — SEVELAMER CARBONATE 1600 MG: 800 TABLET, FILM COATED ORAL at 08:30

## 2018-03-17 RX ADMIN — DOCUSATE SODIUM 100 MG: 100 CAPSULE, LIQUID FILLED ORAL at 08:30

## 2018-03-17 RX ADMIN — POLYETHYLENE GLYCOL 3350, SODIUM CHLORIDE, POTASSIUM CHLORIDE, SODIUM BICARBONATE, AND SODIUM SULFATE 4000 ML: 240; 5.84; 2.98; 6.72; 22.72 POWDER, FOR SOLUTION ORAL at 17:49

## 2018-03-17 RX ADMIN — Medication 1 TABLET: at 08:29

## 2018-03-17 RX ADMIN — SEVELAMER CARBONATE 1600 MG: 800 TABLET, FILM COATED ORAL at 12:51

## 2018-03-17 RX ADMIN — ACETAMINOPHEN 650 MG: 325 TABLET ORAL at 01:48

## 2018-03-17 RX ADMIN — LUBIPROSTONE 24 MCG: 8 CAPSULE, GELATIN COATED ORAL at 17:40

## 2018-03-17 NOTE — PROGRESS NOTES
0730 Assumed responsibility for patient from Brielle Chowdhury RN    0830 Morning assessment and medications given    1251 Meds administered    1410 Patient refused reglan. Checked for incontinence episode as patient complained of being wet. No incontinence episode. New brief applied per patient preference. 1750 Started bowel prep    Bedside shift change report given to Marilyn Peres RN (oncoming nurse) by Laura Velez RN (offgoing nurse). Report included the following information SBAR, Kardex and MAR.

## 2018-03-17 NOTE — PROGRESS NOTES
Problem: Falls - Risk of  Goal: *Absence of Falls  Document Iza Fall Risk and appropriate interventions in the flowsheet.    Outcome: Progressing Towards Goal  Fall Risk Interventions:  Mobility Interventions: Patient to call before getting OOB         Medication Interventions: Patient to call before getting OOB         History of Falls Interventions: Door open when patient unattended

## 2018-03-17 NOTE — PROGRESS NOTES
3/17/2018 PT note: consult received and chart reviewed. Spoke with OT who has just completed evaluation and advise pt be allowed to rest prior to PT evaluation. Will f/u at later time as pt schedule allows. Thank you.    Riri Amador, PT

## 2018-03-17 NOTE — ROUTINE PROCESS
Bedside and Verbal shift change report given to Mehran Miles RN (oncoming nurse) by Maddy Choi RN   (offgoing nurse). Report included the following information SBAR, Kardex, MAR and Recent Results.

## 2018-03-17 NOTE — PROGRESS NOTES
Pt completed 1st unit of blood. Second unit currently infusing. Pt tolerated first transfusion without reaction. Vitals remained stable. Second unit due to complete at approx. 0900.

## 2018-03-17 NOTE — PROGRESS NOTES
Nephrology Progress note    Subjective:     Lala Yi is a 75 y.o. female with PMH DM, HTN, Cirrhosis, Diverticulosis/AVMs s/p cautery, ESRD on HD MWF at [facility] who was referred to the ED due weakness and decrease in  Hgb 6.9. Patient was admitted and transfused 2 units PRBC. Hgb today has increased to 9.3. She reports feeling less fatigued today. She denies CP/SOB at rest.  She has been seen by GI specialist with plans for Colonoscopy on Monday.     Admit Date: 3/16/2018  Active Problems:    ESRD (end stage renal disease) ([facility] (12/15/2017)      Diabetes mellitus type 2, controlled ([facility] (12/15/2017)      HTN (hypertension) (12/15/2017)      Dizziness (3/16/2018)      Generalized weakness (3/16/2018)      Rectal bleed (3/16/2018)      Symptomatic anemia (3/16/2018)      Current Facility-Administered Medications   Medication Dose Route Frequency    0.9% sodium chloride infusion 250 mL  250 mL IntraVENous PRN    hydrALAZINE (APRESOLINE) tablet 25 mg  25 mg Oral TID    hydrOXYzine HCl (ATARAX) tablet 10 mg  10 mg Oral QHS PRN    metoprolol succinate (TOPROL-XL) XL tablet 25 mg  25 mg Oral DAILY    sevelamer carbonate (RENVELA) tab 1,600 mg  1,600 mg Oral TID WITH MEALS    albuterol (PROVENTIL HFA, VENTOLIN HFA, PROAIR HFA) inhaler 2 Puff  2 Puff Inhalation Q4H PRN    acetaminophen (TYLENOL) tablet 650 mg  650 mg Oral Q4H PRN    naloxone (NARCAN) injection 0.4 mg  0.4 mg IntraVENous PRN    diphenhydrAMINE (BENADRYL) injection 12.5 mg  12.5 mg IntraVENous Q4H PRN    ondansetron (ZOFRAN) injection 4 mg  4 mg IntraVENous Q4H PRN    docusate sodium (COLACE) capsule 100 mg  100 mg Oral BID    polyethylene glycol (MIRALAX) packet 17 g  17 g Oral BID    metoclopramide HCl (REGLAN) injection 5 mg  5 mg IntraVENous Q6H PRN    lubiPROStone (AMITIZA) capsule 24 mcg  24 mcg Oral BID WITH MEALS    insulin lispro (HUMALOG) injection   SubCUTAneous TIDAC    glucose chewable tablet 16 g  4 Tab Oral PRN    glucagon (GLUCAGEN) injection 1 mg  1 mg IntraMUSCular PRN    dextrose (D50W) injection syrg 12.5-25 g  25-50 mL IntraVENous PRN    metoclopramide HCl (REGLAN) injection 5 mg  5 mg IntraVENous Q8H    vit B Cmplx 3-FA-Vit C-Biotin (NEPHRO CATARINA RX) tablet 1 Tab  1 Tab Oral DAILY         Allergy:   Allergies   Allergen Reactions    Aspirin Other (comments)     GI bleed    Heparin (Porcine) Other (comments)     GI bleed    Metformin Other (comments)     swelling    Norvasc [Amlodipine] Rash        Objective:     Visit Vitals    /87 (BP 1 Location: Left arm, BP Patient Position: At rest)    Pulse 73    Temp 97.3 °F (36.3 °C)    Resp 18    Ht 5' 3\" (1.6 m)    Wt 70.6 kg (155 lb 10.3 oz)    SpO2 99%    BMI 27.57 kg/m2         Intake/Output Summary (Last 24 hours) at 03/17/18 1441  Last data filed at 03/17/18 0827   Gross per 24 hour   Intake            882.5 ml   Output                0 ml   Net            882.5 ml       Physical Exam:       General: No acute distress   HENT: Atraumatic and normocephalic   Eyes: Normal conjunctiva   Neck: No JVD   Cardiovascular: Normal S1 & S2, no m/r/g   Pulmonary/Chest Wall: Clear to auscultation bilaterally   Abdominal: Soft and non-tender   Musculoskeletal: No edema   Neurological: No focal deficits     [facility] TDC in place  Data Review:  Lab Results   Component Value Date/Time    Sodium 140 03/17/2018 10:45 AM    Potassium 3.7 03/17/2018 10:45 AM    Chloride 100 03/17/2018 10:45 AM    CO2 33 (H) 03/17/2018 10:45 AM    Anion gap 7 03/17/2018 10:45 AM    Glucose 168 (H) 03/17/2018 10:45 AM    BUN 22 (H) 03/17/2018 10:45 AM    Creatinine 4.31 (H) 03/17/2018 10:45 AM    BUN/Creatinine ratio 5 (L) 03/17/2018 10:45 AM    GFR est AA 12 (L) 03/17/2018 10:45 AM    GFR est non-AA 10 (L) 03/17/2018 10:45 AM    Calcium 8.8 03/17/2018 10:45 AM     Lab Results   Component Value Date/Time    WBC 5.7 03/17/2018 10:45 AM    HGB 9.3 (L) 03/17/2018 10:45 AM    HCT 30.7 (L) 03/17/2018 10:45 AM    PLATELET 881 27/19/5160 10:45 AM    MCV 89.2 03/17/2018 10:45 AM     Lab Results   Component Value Date/Time    Calcium 8.8 03/17/2018 10:45 AM    Phosphorus 3.4 01/11/2018 04:27 AM     Lab Results   Component Value Date/Time    Iron 83 12/16/2017 03:39 AM    TIBC 308 12/16/2017 03:39 AM    Iron % saturation 27 12/16/2017 03:39 AM     No results found for: FERR      Impression:     ESRD on HD MWF schedule  Anemia of CKD + GI blood loss  H/o Diverticulosis, AVMs  DM  HTN  Cirrhosis    Plan:     1. Transfuse prn  2. HD without Heparin on Monday 3/19  3. Procrit on HD  4.  Anticipate Colonoscopy on Monday    Alfonzo Yin MD, [address] [facility]  [phone]

## 2018-03-17 NOTE — PROGRESS NOTES
Hospitalist Progress Note-critical care note     Patient: Julia Carlin MRN: [id number]  CSN: [id number]    YOB: 1950  Age: 75 y.o. Sex: female    DOA: 3/16/2018 LOS:  LOS: 1 day            Chief complaint: anemia, gi bleeding. esrd on hd ,weakness     Assessment/Plan         Hospital Problems  Date Reviewed: 12/15/2017          Codes Class Noted POA    Dizziness ICD-10-CM: R42  ICD-9-CM: 780.4  3/16/2018 Unknown        Generalized weakness ICD-10-CM: R53.1  ICD-9-CM: 780.79  3/16/2018 Unknown        Rectal bleed ICD-10-CM: K62.5  ICD-9-CM: 569.3  3/16/2018 Unknown        Symptomatic anemia ICD-10-CM: D64.9  ICD-9-CM: 285.9  3/16/2018 Unknown        ESRD (end stage renal disease) ([facility] ICD-10-CM: N18.6  ICD-9-CM: 585.6  12/15/2017 Unknown        Diabetes mellitus type 2, controlled ([facility] ICD-10-CM: E11.9  ICD-9-CM: 250.00  12/15/2017 Yes        HTN (hypertension) ICD-10-CM: I10  ICD-9-CM: 401.9  12/15/2017 Yes            1 symptomatic  Anemia   Received two units prbc and respond it very well   Continue h/h monitoring      2. Gi bleeding   case discussed with Dr. Janneth Arriaga yesterday   [place] Last today   Will continue ppi and h/h monitoring    reported two episodes per nurse   4 DM type II ,   -ssi, diabetic diet , hypoglycemia protocol        5 HTN, accelerated  Continue home medication.     6 ESRD on HD  HD MWF , renal on board         7 foot pain, left    f/u with podiatry as out pt   Pain control   8 constipation   Resolved     Subjective: having bm , not sure what color, feel better now   Nurse; black stool two times   Disposition :2-3 days   Review of systems:    General: No fevers or chills. Cardiovascular: No chest pain or pressure. No palpitations. Pulmonary: No shortness of breath. Gastrointestinal: No nausea, vomiting.      Vital signs/Intake and Output:  Visit Vitals    /87 (BP 1 Location: Left arm, BP Patient Position: At rest)    Pulse 73    Temp 97.3 °F (36.3 °C)    Resp 18    Ht 5' 3\" (1.6 m)    Wt 70.6 kg (155 lb 10.3 oz)    SpO2 99%    BMI 27.57 kg/m2     Current Shift:  03/17 0701 - 03/17 1900  In: 290   Out: -   Last three shifts:  03/15 1901 - 03/17 0700  In: 592.5 [P.O.:240]  Out: -     Physical Exam:  General: WD, WN. Alert, cooperative, no acute distress    HEENT: NC, Atraumatic. PERRLA, anicteric sclerae. Lungs: CTA Bilaterally. No Wheezing/Rhonchi/Rales. Heart:  Regular  rhythm,  + murmur, No Rubs, No Gallops  Abdomen: Soft, Non distended, Non tender.  +Bowel sounds,   Extremities: No c/c/e  Psych:   Not anxious or agitated. Neurologic:  No acute neurological deficit. Labs: Results:       Chemistry Recent Labs      03/17/18   1045  03/16/18   1318   GLU  168*  234*   NA  140  141   K  3.7  3.5   CL  100  99*   CO2  33*  34*   BUN  22*  14   CREA  4.31*  2.76*   CA  8.8  9.2   AGAP  7  8   BUCR  5*  5*   AP   --   152*   TP   --   7.0   ALB   --   3.4   GLOB   --   3.6   AGRAT   --   0.9      CBC w/Diff Recent Labs      03/17/18   1045  03/16/18   1817  03/16/18   1318   WBC  5.7   --   4.3*   RBC  3.44*   --   2.73*   HGB  9.3*  7.1*  6.9*   HCT  30.7*  24.5*  24.2*   PLT  136   --   173   GRANS  75*   --   70   LYMPH  15*   --   17*   EOS  3   --   3      Cardiac Enzymes Recent Labs      03/16/18   1318   CPK  120   CKND1  2.8      Coagulation Recent Labs      03/16/18   1318   PTP  13.7   INR  1.1   APTT  30.4       Lipid Panel No results found for: CHOL, CHOLPOCT, CHOLX, CHLST, CHOLV, 802177, HDL, LDL, LDLC, DLDLP, 788843, VLDLC, VLDL, TGLX, TRIGL, TRIGP, TGLPOCT, CHHD, CHHDX   BNP No results for input(s): BNPP in the last 72 hours.    Liver Enzymes Recent Labs      03/16/18   1318   TP  7.0   ALB  3.4   AP  152*   SGOT  28      Thyroid Studies No results found for: T4, T3U, TSH, TSHEXT     Procedures/imaging: see electronic medical records for all procedures/Xrays and details which were not copied into this note but were reviewed prior to creation of Jennifer Mcgill MD

## 2018-03-17 NOTE — PROGRESS NOTES
Problem: Mobility Impaired (Adult and Pediatric)  Goal: *Acute Goals and Plan of Care (Insert Text)  Physical Therapy Goals LT/ST  Initiated 3/17/2018 and to be accomplished within 3-5 day(s)  1. Patient will move from supine <> sit with independence in prep for out of bed activity and change of position. 2.  Patient will perform sit<> stand with mod I-S/RW/walking boot L foot in prep for transfers/ambulation. 3.  Patient will transfer from bed <> chair with mod I-S/RW/walking boot L foot for time up in chair for completion of ADL activity. 4.  Patient will ambulate 30-40 feet mod I-S/RW/walking boot L foot for increased independence during short distance home mobility/safe discharge. 5.  Patient will ascend/descend 3-5 stairs with handrail(s) with minimal assistance/contact guard assist for home re-entry as needed. Outcome: Progressing Towards Goal  physical Therapy EVALUATION    Patient: Raul Huddleston (79 y.o. female)  Date: 3/17/2018  Primary Diagnosis: Rectal bleed  Symptomatic anemia  Dizziness  Generalized weakness  ESRD (end stage renal disease) ([facility])  Precautions:Fall    ASSESSMENT :  Based on the objective data described below, the patient presents with decreased motor performance U/LE's, decreased independence in functional mobility with regard to bed mobility, transfers, gait, and activity tolerance following admission for diagnosis as noted above. Pt reports ambulation with RW/walking boot L foot PTA, though admits not wearing boot when recent fall occurred ~1 month ago. Required assist for donning/doffing walking boot. Pt able to complete bed mobility tasks with supervision/mod I; transfers with CGA and ambulate with RW/CGA/walking boot L foot 35ft using reciprocal gt pattern without LOB. Patient reports pain 8/10 throughout session in L foot (h/o metatarsal fx and awaiting surgery since not healing). Nurse Blanca Rodriguez notified of pt request for pain.   Pt left supine in bed with all needs in reach. Recommend HHPT for follow up physical therapy upon discharge to reach maximal level of independence/safety with functional mobility. Pt Education: pt educated in safety/technique during functional mobility tasks     Patient will benefit from skilled intervention to address the above impairments. Patients rehabilitation potential is considered to be Good  Factors which may influence rehabilitation potential include:   []         None noted  []         Mental ability/status  [x]         Medical condition  []         Home/family situation and support systems  []         Safety awareness  []         Pain tolerance/management  []         Other:      PLAN :  Recommendations and Planned Interventions:  [x]           Bed Mobility Training             []    Neuromuscular Re-Education  [x]           Transfer Training                   []    Orthotic/Prosthetic Training  [x]           Gait Training                          []    Modalities  [x]           Therapeutic Exercises          []    Edema Management/Control  [x]           Therapeutic Activities            [x]    Patient and Family Training/Education  []           Other (comment):    Frequency/Duration: Patient will be followed by physical therapy 1-2 times per day to address goals. Discharge Recommendations: Home Health  Further Equipment Recommendations for Discharge: N/A     SUBJECTIVE:   Patient stated I'm tired and worn out.     OBJECTIVE DATA SUMMARY:     Past Medical History:   Diagnosis Date    Arthritis     Asthma     Chronic kidney disease     Chronic pain     Cirrhosis ([facility] 12/17/2017    Diabetes ([facility] diet controlled    GERD (gastroesophageal reflux disease)     Hypertension      Past Surgical History:   Procedure Laterality Date    COLONOSCOPY N/A 1/12/2018    COLON[facility] Vishal Davidson MD at [facility] END[facility]COPY    HX GYN  c section     Barriers to Learning/Limitations: yes;  physical  Compensate with:  Other physical re: left foot-pt reports being told to \"stay off foot as much as possible\"   Prior Level of Function/Home Situation: as noted above  Home Situation  Home Environment: Private residence  # Steps to Enter: 7  Rails to Enter: Yes  Hand Rails : Bilateral  One/Two Story Residence: One story  Living Alone: No  Support Systems: Family member(s), Child(yair)  Patient Expects to be Discharged to[de-identified] Private residence  Current DME Used/Available at Home: Ly Juniper, rolling, Brace/Splint (has RW, but reports furniture walking at home)  Tub or Shower Type: Tub/Shower combination  Critical Behavior:  Neurologic State: Alert; Appropriate for age  Orientation Level: Appropriate for age;Oriented X4  Cognition: Appropriate safety awareness; Appropriate decision making; Appropriate for age attention/concentration; Follows commands  Safety/Judgement: Awareness of environment  Psychosocial  Patient Behaviors: Calm; Cooperative  Purposeful Interaction: Yes  Pt Identified Daily Priority: Clinical issues (comment)  Caritas Process: Establish trust  Caring Interventions: Reassure  Reassure: Informing  Therapeutic Modalities: Intentional therapeutic touch  Strength:    Strength: Generally decreased, functional  Range Of Motion:  AROM: Generally decreased, functional  Functional Mobility:  Bed Mobility:  Rolling: Independent  Supine to Sit: Modified independent;Supervision  Sit to Supine: Modified independent;Supervision  Scooting: Modified independent  Transfers:  Sit to Stand: Contact guard assistance  Stand to Sit: Contact guard assistance  Balance:   Sitting: Intact  Standing: Intact; With support  Ambulation/Gait Training:  Distance (ft): 35 Feet (ft)  Assistive Device: Brace/Splint;Gait belt;Walker, rolling (walking boot R foot)  Ambulation - Level of Assistance: Contact guard assistance  Gait Description (WDL): Exceptions to WDL  Gait Abnormalities: Decreased step clearance  Speed/Cristy: Pace decreased (<100 feet/min); Slow  Step Length: Left shortened;Right shortened  Swing Pattern: Left asymmetrical;Right asymmetrical  Interventions: Safety awareness training  Pain:  Pain Scale 1: Numeric (0 - 10)  Pain Intensity 1: 8  Pain Location 1: Back; Foot (L foot )  Pain Orientation 1: Lower;Right (lower right back/side)  Pain Description 1: Sore;Aching  Pain Intervention(s) 1: Nurse notified  Activity Tolerance:   Fair   Please refer to the flowsheet for vital signs taken during this treatment. After treatment:   []         Patient left in no apparent distress sitting up in chair  [x]         Patient left in no apparent distress in bed  [x]         Call bell left within reach  [x]         Nursing notified  []         Caregiver present  []         Bed alarm activated    COMMUNICATION/EDUCATION:   [x]         Fall prevention education was provided and the patient/caregiver indicated understanding. [x]         Patient/family have participated as able in goal setting and plan of care. [x]         Patient/family agree to work toward stated goals and plan of care. []         Patient understands intent and goals of therapy, but is neutral about his/her participation. []         Patient is unable to participate in goal setting and plan of care. Eval Complexity: History: HIGH Complexity :3+ comorbidities / personal factors will impact the outcome/ POC Exam:MEDIUM Complexity : 3 Standardized tests and measures addressing body structure, function, activity limitation and / or participation in recreation  Presentation: LOW Complexity : Stable, uncomplicated  Clinical Decision Making:Low Complexity amb >30ft with RW/CGA/walking boot in place L foot Overall Complexity:LOW     G-Codes (GP)  Mobility  [id number] Current  CI= 1-19%   Goal  CI= 1-19%  The severity rating is based on the functional mobility assessment.     Thank you for this referral.  Deven Vargas, PT   Time Calculation: 23 mins

## 2018-03-17 NOTE — PROGRESS NOTES
Problem: Falls - Risk of  Goal: *Absence of Falls  Document Iza Fall Risk and appropriate interventions in the flowsheet. Outcome: Progressing Towards Goal  Fall Risk Interventions:  Mobility Interventions: Patient to call before getting OOB         Medication Interventions: Patient to call before getting OOB         History of Falls Interventions:  Investigate reason for fall, Door open when patient unattended

## 2018-03-17 NOTE — PROGRESS NOTES
Problem: Self Care Deficits Care Plan (Adult)  Goal: *Acute Goals and Plan of Care (Insert Text)  Occupational Therapy Goals  Initiated 3/17/2018 within 7 day(s). 1.  Patient will perform lower body dressing with  modified independence while sitting on EOB  2. Patient will perform grooming with modified independence while standing at sink. 3.  Patient will perform toilet transfers with  modified independence. 4.  Patient will perform all aspects of toileting with  modified independence. 5.  Patient will participate in upper extremity therapeutic exercise/activities with  modified independence for 15 minutes. 6.  Patient will utilize energy conservation techniques during functional activities with verbal cues. Occupational Therapy EVALUATION    Patient: Severo Finn (42 y.o. female)  Date: 3/17/2018  Primary Diagnosis: Rectal bleed  Symptomatic anemia  Dizziness  Generalized weakness  ESRD (end stage renal disease) ([facility])  Precautions: fall risk    ASSESSMENT :  Based on the objective data described below, the patient presents with decreased ability to perform functional mobility, bed mobility, LE dressing and functional transfers. Pt limited by pain of L foot, decreased strength, decreased endurance which is affecting  activity tolerance to complete ADLs and functional mobility/transfers. Pt was sitting on EOB upon arrival--had completed her bathing tasks. Pt was setup with items needed and had completed on her own. Pt did require max A to don brief secondary to having tabs instead of like underwear-briefs. Pt performed sit/stand transfer with RW and CGA--was able to tolerate standing for approx 30 seconds prior to needing to sit back down. After seated break, pt performed SPT from EOB to [facility] with RW and CGA then back. Pt reported of that task to be fatiguing. Pt transferred back to supine with SBA and all needs met.  Pt would benefit from OT services to work on performance with ADLs and functional mobility/transfers in order to return closely to PLOF of being Mod I. Pt is highly motivated and agreeable with OT goals. Patient will benefit from skilled intervention to address the above impairments. Patients rehabilitation potential is considered to be Excellent  Factors which may influence rehabilitation potential include:   [x]             None noted  []             Mental ability/status  []             Medical condition  []             Home/family situation and support systems  []             Safety awareness  []             Pain tolerance/management  []             Other:      PLAN :  Recommendations and Planned Interventions:  [x]               Self Care Training                  [x]        Therapeutic Activities  [x]               Functional Mobility Training    []        Cognitive Retraining  [x]               Therapeutic Exercises           [x]        Endurance Activities  []               Balance Training                   []        Neuromuscular Re-Education  []               Visual/Perceptual Training     [x]   Home Safety Training  [x]               Patient Education                 [x]        Family Training/Education  []               Other (comment):    Frequency/Duration: Patient will be followed by occupational therapy 3-5x/wk for 1week   to address goals. Discharge Recommendations: Home Health OT  Further Equipment Recommendations for Discharge: TBD     SUBJECTIVE:   Patient stated I just finished washing up.     OBJECTIVE DATA SUMMARY:     Past Medical History:   Diagnosis Date    Arthritis     Asthma     Chronic kidney disease     Chronic pain     Cirrhosis ([facility] 12/17/2017    Diabetes ([facility] diet controlled    GERD (gastroesophageal reflux disease)     Hypertension      Past Surgical History:   Procedure Laterality Date    COLONOSCOPY N/A 1/12/2018    COLONOSCOPY performed by Coco Farah MD at [address]  [facility]  c section     Barriers to Learning/Limitations: None  Compensate with: visual, verbal, tactile, kinesthetic cues/model  Prior Level of Function/Home Situation: Pt was Mod I with ADLs and used RW for functional mobility/transfers. Home Situation  Home Environment: Private residence  # Steps to Enter: 7  Rails to Enter: Yes  Hand Rails : Bilateral  One/Two Story Residence: One story  Living Alone: No  Support Systems: Family member(s), Child(yair)  Patient Expects to be Discharged to[de-identified] Private residence  Current DME Used/Available at Home: flower Rm, [address] [facility] [email or web address] chair  Tub or Shower Type: Tub/Shower combination  Cognitive/Behavioral Status:  Neurologic State: Alert; Appropriate for age  Orientation Level: Appropriate for age;Oriented X4  Cognition: Appropriate safety awareness; Appropriate decision making; Appropriate for age attention/concentration; Follows commands  Safety/Judgement: Awareness of environment  Skin: BUE skin intact  Edema: none noted  Vision/Perceptual:    Corrective Lenses: Reading glasses  Coordination:  Coordination: Generally decreased, functional  Fine Motor Skills-Upper: Left Intact; Right Intact    Gross Motor Skills-Upper: Left Intact; Right Intact  Strength:(B) UE   Strength: Generally decreased, functional  Range of Motion:(B) UE  AROM: Generally decreased, functional   Functional Mobility and Transfers for ADLs:  Bed Mobility:   Sit to Supine: Stand-by assistance   Transfers:  Sit to Stand: Contact guard assistance    Toilet Transfer : Contact guard assistance    ADL Assessment/Intervention:   Bathing: Setup (completed bathing tasks while sitting on EOB w/ setup)      Cognitive Retraining  Safety/Judgement: Awareness of environment  Pain:  Pain Scale 1: Numeric (0 - 10)  Pain Intensity 1: 0  Pain Location 1: Hand  Pain Orientation 1: Left;Right  Pain Description 1: Aching  Pain Intervention(s) 1: Medication (see MAR)  Activity Tolerance:   Pt tolerated tx/eval well with no increase in pain after treatment.      Please refer to the flowsheet for vital signs taken during this treatment. After treatment:   [] Patient left in no apparent distress sitting up in chair  [] Patient left sitting on EOB  [x] Patient left in no apparent distress in bed  [] Patient declined to be OOB at this time due to   [x] Call bell left within reach  [x] Nursing notified(Simba)  [] Caregiver present  [] Bed alarm activated  [] CPM placed on  knee  COMMUNICATION/EDUCATION:   [] Home safety education was provided and the patient/caregiver indicated understanding. [x] Patient/family have participated as able in goal setting and plan of care. [x] Patient/family agree to work toward stated goals and plan of care. [] Patient understands intent and goals of therapy, but is neutral about his/her participation. [] Patient is unable to participate in goal setting and plan of care. Thank you for this referral.  Gabby Moore OT  Time Calculation: 20 mins    G-Codes (GP)  Self Care   Current  CJ= 20-39%   Goal  CI= 1-19%    The severity rating is based on the Level of Assistance required for Functional Mobility and ADLs.

## 2018-03-17 NOTE — PROGRESS NOTES
Chart reviewed. Pt admitted to hospital for symptomatic anemia, rectal bleed. CM will follow for discharge planning needs. [address] with patient at bedside. Pt states she lives with her son, Tiffani Ramos and his family. Pt denies using any home health services at home. Pt states she has a RW at home for use. Pt goes to dialysis [facility]. Pt states she lives in [place] and sees PCP in [place]. Readmission Risk Assessment:     High Risk and MSSP/Good Help ACO patients    RRAT Score:  21 or greater    Initial Assessment:  Per chart, pt is a 75 y.o. female who hx of DM, HTN, chronic anemia was sent to ER per due to abnormal lab. She was told her h/h was low. She reported on and off dizziness. She was admitted recently due to gi bleeding, upper egd was done per Dr. Pat Gaines. She reported on and off dizziness, no chest pain. Occult stool was positive. H/h 6.9/24.2 . Reported one time black stool since discharge and constipation      Emergency Contact:    Name Relation Home Work Mobile     Fenton,Mejia Son [phone]          Pertinent Medical Hx:  DM, HTN, ESRD (on HD on MWF, MD [facility]ol[facility] Claude)      PCP/Specialists:       Community Services:      DME:         High Risk Care Transition Plan:  1. Evaluate for [facility], SNF, acute rehab, community care coordination of Resources. 2. Involve patient/caregiver in assessment, planning, education and implement of intervention. 3. CM daily patient care huddles/interdisciplinary rounds. 4. PCP/Specialist appointment within 48 hours of discharge unless otherwise specified. 5. Facilitate transportation and logistics for follow-up appointments. 6. Medication reconciliation - Pharmacy  7. Discharge follow-up phone call within 2 - 4 days (NN, [employer] [employer], Nursing) CM follow up as assigned. 8. Formal handoff between hospital provider and post-acute provider to transition patient  9. Handoff to [facility] Nurse Navigator or PCP practice.     Care Management Interventions  Transition of Care Consult (CM Consult): Discharge Planning  Physical Therapy Consult: Yes  Occupational Therapy Consult:  Yes

## 2018-03-18 ENCOUNTER — APPOINTMENT (OUTPATIENT)
Dept: GENERAL RADIOLOGY | Age: 68
DRG: 377 | End: 2018-03-18
Attending: HOSPITALIST
Payer: MEDICARE

## 2018-03-18 LAB
ABO + RH BLD: NORMAL
ALBUMIN SERPL-MCNC: 3 G/DL (ref 3.4–5)
ANION GAP SERPL CALC-SCNC: 8 MMOL/L (ref 3–18)
ATRIAL RATE: 83 BPM
BASOPHILS # BLD: 0 K/UL (ref 0–0.06)
BASOPHILS NFR BLD: 1 % (ref 0–2)
BLD PROD TYP BPU: NORMAL
BLD PROD TYP BPU: NORMAL
BLOOD GROUP ANTIBODIES SERPL: NORMAL
BPU ID: NORMAL
BPU ID: NORMAL
BUN SERPL-MCNC: 24 MG/DL (ref 7–18)
BUN/CREAT SERPL: 4 (ref 12–20)
CALCIUM SERPL-MCNC: 9 MG/DL (ref 8.5–10.1)
CALCULATED P AXIS, ECG09: 65 DEGREES
CALCULATED R AXIS, ECG10: -46 DEGREES
CALCULATED T AXIS, ECG11: 107 DEGREES
CALLED TO:,BCALL1: NORMAL
CHLORIDE SERPL-SCNC: 100 MMOL/L (ref 100–108)
CO2 SERPL-SCNC: 31 MMOL/L (ref 21–32)
CREAT SERPL-MCNC: 5.4 MG/DL (ref 0.6–1.3)
CROSSMATCH RESULT,%XM: NORMAL
CROSSMATCH RESULT,%XM: NORMAL
DIAGNOSIS, 93000: NORMAL
DIFFERENTIAL METHOD BLD: ABNORMAL
EOSINOPHIL # BLD: 0.2 K/UL (ref 0–0.4)
EOSINOPHIL NFR BLD: 4 % (ref 0–5)
ERYTHROCYTE [DISTWIDTH] IN BLOOD BY AUTOMATED COUNT: 16.9 % (ref 11.6–14.5)
GLUCOSE BLD STRIP.AUTO-MCNC: 125 MG/DL (ref 70–110)
GLUCOSE BLD STRIP.AUTO-MCNC: 188 MG/DL (ref 70–110)
GLUCOSE BLD STRIP.AUTO-MCNC: 99 MG/DL (ref 70–110)
GLUCOSE SERPL-MCNC: 92 MG/DL (ref 74–99)
HCT VFR BLD AUTO: 30.5 % (ref 35–45)
HCT VFR BLD AUTO: 34.7 % (ref 35–45)
HGB BLD-MCNC: 10.4 G/DL (ref 12–16)
HGB BLD-MCNC: 9.1 G/DL (ref 12–16)
LYMPHOCYTES # BLD: 1 K/UL (ref 0.9–3.6)
LYMPHOCYTES NFR BLD: 18 % (ref 21–52)
MAGNESIUM SERPL-MCNC: 2 MG/DL (ref 1.6–2.6)
MCH RBC QN AUTO: 26.8 PG (ref 24–34)
MCHC RBC AUTO-ENTMCNC: 29.8 G/DL (ref 31–37)
MCV RBC AUTO: 89.7 FL (ref 74–97)
MONOCYTES # BLD: 0.5 K/UL (ref 0.05–1.2)
MONOCYTES NFR BLD: 10 % (ref 3–10)
NEUTS SEG # BLD: 3.7 K/UL (ref 1.8–8)
NEUTS SEG NFR BLD: 67 % (ref 40–73)
P-R INTERVAL, ECG05: 174 MS
PHOSPHATE SERPL-MCNC: 3.5 MG/DL (ref 2.5–4.9)
PLATELET # BLD AUTO: 153 K/UL (ref 135–420)
PMV BLD AUTO: 9.9 FL (ref 9.2–11.8)
POTASSIUM SERPL-SCNC: 3.2 MMOL/L (ref 3.5–5.5)
Q-T INTERVAL, ECG07: 446 MS
QRS DURATION, ECG06: 152 MS
QTC CALCULATION (BEZET), ECG08: 524 MS
RBC # BLD AUTO: 3.4 M/UL (ref 4.2–5.3)
SODIUM SERPL-SCNC: 139 MMOL/L (ref 136–145)
SPECIMEN EXP DATE BLD: NORMAL
STATUS OF UNIT,%ST: NORMAL
STATUS OF UNIT,%ST: NORMAL
UNIT DIVISION, %UDIV: 0
UNIT DIVISION, %UDIV: 0
VENTRICULAR RATE, ECG03: 83 BPM
WBC # BLD AUTO: 5.5 K/UL (ref 4.6–13.2)

## 2018-03-18 PROCEDURE — 82962 GLUCOSE BLOOD TEST: CPT

## 2018-03-18 PROCEDURE — 36415 COLL VENOUS BLD VENIPUNCTURE: CPT | Performed by: INTERNAL MEDICINE

## 2018-03-18 PROCEDURE — 80069 RENAL FUNCTION PANEL: CPT | Performed by: INTERNAL MEDICINE

## 2018-03-18 PROCEDURE — 73090 X-RAY EXAM OF FOREARM: CPT

## 2018-03-18 PROCEDURE — 83735 ASSAY OF MAGNESIUM: CPT | Performed by: INTERNAL MEDICINE

## 2018-03-18 PROCEDURE — 85025 COMPLETE CBC W/AUTO DIFF WBC: CPT | Performed by: INTERNAL MEDICINE

## 2018-03-18 PROCEDURE — 74011250637 HC RX REV CODE- 250/637: Performed by: HOSPITALIST

## 2018-03-18 PROCEDURE — 74011250636 HC RX REV CODE- 250/636: Performed by: HOSPITALIST

## 2018-03-18 PROCEDURE — 73060 X-RAY EXAM OF HUMERUS: CPT

## 2018-03-18 PROCEDURE — 65660000000 HC RM CCU STEPDOWN

## 2018-03-18 PROCEDURE — 85018 HEMOGLOBIN: CPT | Performed by: HOSPITALIST

## 2018-03-18 PROCEDURE — 74011250637 HC RX REV CODE- 250/637: Performed by: INTERNAL MEDICINE

## 2018-03-18 PROCEDURE — 74011636637 HC RX REV CODE- 636/637: Performed by: HOSPITALIST

## 2018-03-18 RX ORDER — MAG HYDROX/ALUMINUM HYD/SIMETH 200-200-20
30 SUSPENSION, ORAL (FINAL DOSE FORM) ORAL
Status: DISCONTINUED | OUTPATIENT
Start: 2018-03-18 | End: 2018-03-19 | Stop reason: HOSPADM

## 2018-03-18 RX ORDER — AMOXICILLIN 250 MG/1
250 CAPSULE ORAL EVERY 24 HOURS
Status: DISCONTINUED | OUTPATIENT
Start: 2018-03-19 | End: 2018-03-19 | Stop reason: HOSPADM

## 2018-03-18 RX ORDER — SIMETHICONE 80 MG
80 TABLET,CHEWABLE ORAL
Status: DISCONTINUED | OUTPATIENT
Start: 2018-03-18 | End: 2018-03-19 | Stop reason: HOSPADM

## 2018-03-18 RX ORDER — OXYCODONE HYDROCHLORIDE 5 MG/1
5 TABLET ORAL ONCE
Status: COMPLETED | OUTPATIENT
Start: 2018-03-18 | End: 2018-03-18

## 2018-03-18 RX ORDER — AMOXICILLIN 250 MG/1
250 CAPSULE ORAL EVERY 8 HOURS
Status: DISCONTINUED | OUTPATIENT
Start: 2018-03-18 | End: 2018-03-18

## 2018-03-18 RX ADMIN — SEVELAMER CARBONATE 1600 MG: 800 TABLET, FILM COATED ORAL at 11:50

## 2018-03-18 RX ADMIN — SEVELAMER CARBONATE 1600 MG: 800 TABLET, FILM COATED ORAL at 17:48

## 2018-03-18 RX ADMIN — INSULIN LISPRO 2 UNITS: 100 INJECTION, SOLUTION INTRAVENOUS; SUBCUTANEOUS at 11:49

## 2018-03-18 RX ADMIN — ACETAMINOPHEN 650 MG: 325 TABLET ORAL at 13:08

## 2018-03-18 RX ADMIN — OXYCODONE HYDROCHLORIDE 5 MG: 5 TABLET ORAL at 21:44

## 2018-03-18 RX ADMIN — AMOXICILLIN 250 MG: 250 CAPSULE ORAL at 13:08

## 2018-03-18 RX ADMIN — HYDRALAZINE HYDROCHLORIDE 25 MG: 25 TABLET, FILM COATED ORAL at 17:58

## 2018-03-18 RX ADMIN — Medication 1 TABLET: at 08:30

## 2018-03-18 RX ADMIN — HYDRALAZINE HYDROCHLORIDE 25 MG: 25 TABLET, FILM COATED ORAL at 21:44

## 2018-03-18 RX ADMIN — METOCLOPRAMIDE 5 MG: 5 INJECTION, SOLUTION INTRAMUSCULAR; INTRAVENOUS at 22:08

## 2018-03-18 RX ADMIN — SEVELAMER CARBONATE 1600 MG: 800 TABLET, FILM COATED ORAL at 08:30

## 2018-03-18 RX ADMIN — ACETAMINOPHEN 650 MG: 325 TABLET ORAL at 17:48

## 2018-03-18 NOTE — PROGRESS NOTES
Problem: Falls - Risk of  Goal: *Absence of Falls  Document Iza Fall Risk and appropriate interventions in the flowsheet.    Outcome: Progressing Towards Goal  Fall Risk Interventions:  Mobility Interventions: Patient to call before getting OOB, Assess mobility with egress test         Medication Interventions: Patient to call before getting OOB         History of Falls Interventions: Door open when patient unattended

## 2018-03-18 NOTE — PROGRESS NOTES
18 Assumed responsibility for patient from Nilo Block RN    4408 Patient assessment completed and medications administered. Patient refused some medications because, \"I am fine and don't need that. \"    1181 Blood sugar checked    1155 Insulin given. Patch taken off    1236 Patient called about lidocaine patch. Reviewed with patient that patch is on for 12 hours and then off for 12 hours. Patient requesting tylenol     Brought patient tylenol and amoxicillin to patient. Gave patient medications in pill cup. Asked patient what she would like to drink pills with. Patient requested water in a clean cup. Got patient a new cup with ice and water. Patient then asked, \"How do I know this is a clean cup? Corazon Wills . . Get me a new cup! \"  I asked patient if she was not going to take her pills I would have to take them as I cannot leave a patient with pills. Patient started yelling at this nurse prior to recovering pills. Patient wanted to see Charge nurse. Notified charge nurse.  Katerina Fields was at door of room upon leaving. Charge nurse brought pills to patient and is currently in room. Bedside shift change report given to [employer]  (oncoming nurse) by Jinny Brown RN (offgoing nurse). Report included the following information SBAR, Kardex and MAR.

## 2018-03-18 NOTE — PROGRESS NOTES
Met with patient at bedside. Pt offered FOC for home health and given list.  Addressed HH questions. Pt would like to review list before giving choice. CM will cont to follow. 1315  Received call from [employer] that patient wanted to see care manager. Went to bedside. Pt complaining about her care from primary RN. Charge RN Aretha made aware and went to bedside.

## 2018-03-18 NOTE — PROGRESS NOTES
Problem: Mobility Impaired (Adult and Pediatric)  Goal: *Acute Goals and Plan of Care (Insert Text)  Physical Therapy Goals LT/ST  Initiated 3/17/2018 and to be accomplished within 3-5 day(s)  1. Patient will move from supine <> sit with independence in prep for out of bed activity and change of position. 2.  Patient will perform sit<> stand with mod I-S/RW/walking boot L foot in prep for transfers/ambulation. 3.  Patient will transfer from bed <> chair with mod I-S/RW/walking boot L foot for time up in chair for completion of ADL activity. 4.  Patient will ambulate 30-40 feet mod I-S/RW/walking boot L foot for increased independence during short distance home mobility/safe discharge. 5.  Patient will ascend/descend 3-5 stairs with handrail(s) with minimal assistance/contact guard assist for home re-entry as needed. Outcome: Not Progressing Towards Goal    Holding PT at this time due to patient and nursing report of incident that occurred prior to arrival. Per patient report, patient's nurse fell on her R wrist, which is now painful. Patient's wrist has been X-rayed with results pending. Will hold PT at this time pending results. Will follow up as indicated.

## 2018-03-18 NOTE — ROUTINE PROCESS
Bedside shift change report given to Velna Homans RN (oncoming nurse) by Trell Butler RN (offgoing nurse). Report included the following information SBAR, Kardex and MAR.

## 2018-03-18 NOTE — PROGRESS NOTES
Assumed care of pt from [address]. Pt is alert and c/o pain in right arm from incident with previous nurse. Pt's story is constantly changing. Supervisor was made aware of incident. This RN is taking over. Security had to be called per pt request. Family has also been spoken with. Dr. Jorge Tenorio came to assess pt for arm pain and ordered Xray. This RN spoke with son and son Asked about the situation and what was being done to correct it. This RN explained that Nurse supervisor has spoken with pt and RN with which the incident occurred but pts story keeps changing with every telling. The son stated thank you very much for your help. Son went to see patient.

## 2018-03-18 NOTE — ROUTINE PROCESS
Bedside and Verbal shift change report given to Eugenia Alvarez RN (oncoming nurse) by Jessy Forde RN (offgoing nurse). Report included the following information SBAR and Kardex.

## 2018-03-18 NOTE — PROGRESS NOTES
Gastrointestinal Progress Note    Patient Name: César Majano    LHRPK'S Date: 3/18/2018    Admit Date: 3/16/2018    Subjective:     Diet: Patient is on Clear Liquid and is tolerating. Nausea is not present. Vomiting is not present. Pain: Patient does complain of abdominal pain. The pain is located in the entire abdomen. The pain is described as pressure, and is 4/10 in intensity present for a week.      Bowel Movements: Diarrhea because of the bowel prep    Bleeding:  None    Current Facility-Administered Medications   Medication Dose Route Frequency    alum-mag hydroxide-simeth (MYLANTA) oral suspension 30 mL  30 mL Oral Q4H PRN    phenyleph-min oil-petrolatum (PREPARATION H) 0.25-14-74.9 % ointment   PeriANAL QID PRN    simethicone (MYLICON) tablet 80 mg  80 mg Oral QID PRN    [START ON 3/19/2018] amoxicillin (AMOXIL) capsule 250 mg  250 mg Oral Q24H    0.9% sodium chloride infusion  100 mL/hr IntraVENous DIALYSIS PRN    [START ON 3/19/2018] epoetin reshma (EPOGEN;PROCRIT) 6,000 Units  6,000 Units IntraVENous Q MON, WED & FRI    lidocaine (LIDODERM) 5 % patch 1 Patch  1 Patch TransDERmal Q24H    0.9% sodium chloride infusion 250 mL  250 mL IntraVENous PRN    hydrALAZINE (APRESOLINE) tablet 25 mg  25 mg Oral TID    hydrOXYzine HCl (ATARAX) tablet 10 mg  10 mg Oral QHS PRN    metoprolol succinate (TOPROL-XL) XL tablet 25 mg  25 mg Oral DAILY    sevelamer carbonate (RENVELA) tab 1,600 mg  1,600 mg Oral TID WITH MEALS    albuterol (PROVENTIL HFA, VENTOLIN HFA, PROAIR HFA) inhaler 2 Puff  2 Puff Inhalation Q4H PRN    acetaminophen (TYLENOL) tablet 650 mg  650 mg Oral Q4H PRN    naloxone (NARCAN) injection 0.4 mg  0.4 mg IntraVENous PRN    diphenhydrAMINE (BENADRYL) injection 12.5 mg  12.5 mg IntraVENous Q4H PRN    ondansetron (ZOFRAN) injection 4 mg  4 mg IntraVENous Q4H PRN    metoclopramide HCl (REGLAN) injection 5 mg  5 mg IntraVENous Q6H PRN    insulin lispro (HUMALOG) injection SubCUTAneous TIDAC    glucose chewable tablet 16 g  4 Tab Oral PRN    glucagon (GLUCAGEN) injection 1 mg  1 mg IntraMUSCular PRN    dextrose (D50W) injection syrg 12.5-25 g  25-50 mL IntraVENous PRN    metoclopramide HCl (REGLAN) injection 5 mg  5 mg IntraVENous Q8H    vit B Cmplx 3-FA-Vit C-Biotin (NEPHRO CATARINA RX) tablet 1 Tab  1 Tab Oral DAILY          Objective:     Visit Vitals    /75    Pulse 78    Temp 98.1 °F (36.7 °C)    Resp 17    Ht 5' 3\" (1.6 m)    Wt 73.9 kg (162 lb 14.7 oz)    SpO2 97%    BMI 28.86 kg/m2       03/17 0701 - 03/18 1900  In: 770 [P.O.:480]  Out: 0     General appearance: alert, cooperative, no distress, appears stated age, moderately obese  Head: Normocephalic, without obvious abnormality, atraumatic  Eyes: negative findings: anicteric sclera and pupils equal, round, reactive to light and accomodation, positive findings: conjunctivae: pale  Throat: Lips, pale mucosa. Tongue Teeth and gums normal  Neck: supple, symmetrical, trachea midline, no adenopathy and thyroid: not enlarged, symmetric, no tenderness/mass/nodules  Lungs: clear to auscultation bilaterally  Heart: regular rate and rhythm, S1, S2 normal, no murmur, click, rub or gallop  Abdomen: obese distended rounded soft, non-tender.  Bowel sounds normal. No masses,  no organomegaly  Extremities: no edema    Data Review:    Labs: Results:       Chemistry Recent Labs      03/18/18   0415  03/17/18   1045  03/16/18   1318   GLU  92  168*  234*   NA  139  140  141   K  3.2*  3.7  3.5   CL  100  100  99*   CO2  31  33*  34*   BUN  24*  22*  14   CREA  5.40*  4.31*  2.76*   CA  9.0  8.8  9.2   AGAP  8  7  8   BUCR  4*  5*  5*   AP   --    --   152*   TP   --    --   7.0   ALB  3.0*   --   3.4   GLOB   --    --   3.6   AGRAT   --    --   0.9      CBC w/Diff Recent Labs      03/18/18   1225  03/18/18   0415  03/17/18   1709  03/17/18   1045   03/16/18   1318   WBC   --   5.5   --   5.7   --   4.3*   RBC   --   3.40*   -- 3.44*   --   2.73*   HGB  10.4*  9.1*  9.8*  9.3*   < >  6.9*   HCT  34.7*  30.5*  31.6*  30.7*   < >  24.2*   PLT   --   153   --   136   --   173   GRANS   --   67   --   75*   --   70   LYMPH   --   18*   --   15*   --   17*   EOS   --   4   --   3   --   3    < > = values in this interval not displayed.       Coagulation Recent Labs      03/16/18   1318   PTP  13.7   INR  1.1   APTT  30.4       Liver Enzymes Recent Labs      03/18/18   0415  03/16/18   1318   TP   --   7.0   ALB  3.0*  3.4   AP   --   152*   SGOT   --   28          Assessment:     Active Problems:    ESRD (end stage renal disease) ([facility] (12/15/2017)      Diabetes mellitus type 2, controlled ([facility] (12/15/2017)      HTN (hypertension) (12/15/2017)      Dizziness (3/16/2018)      Generalized weakness (3/16/2018)      Rectal bleed (3/16/2018)      Symptomatic anemia (3/16/2018)        Plan:     Severe hem positive iron deficiency anemia Plan EGD a tiny distal duodenal AVM cauterized in Jan 2018 and diabetic  Gastroparesis need to check her colon on Monday as her last colonoscopy was > 10 years ago and she has been chronically constipated small hard and large stools pellets with incomplete evacuation  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

## 2018-03-18 NOTE — PROGRESS NOTES
Pharmacy Dosing Services:   Pharmacist Renal Dosing Progress Note for Amoxicillin  Physician: Phil Fonseca    The following medication: Amoxicillin was automatically dose-adjusted per [facility] P&T Committee Protocol, with respect to renal function. Consult provided for this   75 y.o. , female , for the indication of URI for 5 days. Pt Weight:   Wt Readings from Last 1 Encounters:   03/18/18 73.9 kg (162 lb 14.7 oz)         Previous Regimen  250 mg PO every 8 hours    Serum Creatinine Lab Results   Component Value Date/Time    Creatinine 5.40 (H) 03/18/2018 04:15 AM       Creatinine Clearance Estimated Creatinine Clearance: 9.6 mL/min (based on Cr of 5.4). BUN Lab Results   Component Value Date/Time    BUN 24 (H) 03/18/2018 04:15 AM       Dosage changed to: 250 mg PO every 24 hours  Additional notes: Give after dialysis on dialysis days (MWF)    Pharmacy to continue to monitor patient daily. Will make dosage adjustments based upon changing renal function.   Signed Stephan Tee PHARMD. Contact information:[phone]

## 2018-03-18 NOTE — PROGRESS NOTES
Hospitalist Progress Note-critical care note     Patient: Francisco J Garner MRN: [id number]  CSN: [id number]    YOB: 1950  Age: 75 y.o. Sex: female    DOA: 3/16/2018 LOS:  LOS: 2 days            Chief complaint: anemia, gi bleeding. esrd on hd ,weakness     Assessment/Plan         Hospital Problems  Date Reviewed: 12/15/2017          Codes Class Noted POA    Dizziness ICD-10-CM: R42  ICD-9-CM: 780.4  3/16/2018 Unknown        Generalized weakness ICD-10-CM: R53.1  ICD-9-CM: 780.79  3/16/2018 Unknown        Rectal bleed ICD-10-CM: K62.5  ICD-9-CM: 569.3  3/16/2018 Unknown        Symptomatic anemia ICD-10-CM: D64.9  ICD-9-CM: 285.9  3/16/2018 Unknown        ESRD (end stage renal disease) ([facility] ICD-10-CM: N18.6  ICD-9-CM: 585.6  12/15/2017 Unknown        Diabetes mellitus type 2, controlled ([facility] ICD-10-CM: E11.9  ICD-9-CM: 250.00  12/15/2017 Yes        HTN (hypertension) ICD-10-CM: I10  ICD-9-CM: 401.9  12/15/2017 Yes            1 symptomatic  Anemia   No black stool noted   Received two units prbc   Continue h/h monitoring      2. Gi bleeding   Dr. Sydnie Frost plans to do colonoscopy tomorrow   Bowel prap -not drinking too much   Will continue ppi and h/h monitoring    reported two episodes per nurse   4 DM type II ,   -ssi, diabetic diet , hypoglycemia protocol        5 HTN, accelerated  Continue home medication.     6 ESRD on HD  HD MWF , renal on board   Plan hd tomorrow       7 foot pain, left    f/u with podiatry as out pt   Pain control   8 constipation   Resolved     Subjective: [facility], also I got too much bm , I have gas   Nurse; refused to drink bowel prap   Disposition : Monday after colonoscopy and hd    Went back to the room with RN, pt reported rt arm pain and request x-ray     RT arm exam: skin intact, no erythema/no swelling ,  mild tenderness -per pt report  On palpation. ROM wnl-normal exam except tenderness per pt report. Son was at the bedside.    X-ray ordered. 55 total min's spent on patient care including >50% on counseling/coordinating care. Discussed the above assessments. also discussed labs, medications and hospital course        Encourage pt to have bowel prap . Review of systems:    General: No fevers or chills. Cardiovascular: No chest pain or pressure. No palpitations. Pulmonary: No shortness of breath. Gastrointestinal: No nausea, vomiting. Vital signs/Intake and Output:  Visit Vitals    /67    Pulse 74    Temp 97.5 °F (36.4 °C)    Resp 18    Ht 5' 3\" (1.6 m)    Wt 73.9 kg (162 lb 14.7 oz)    SpO2 100%    BMI 28.86 kg/m2     Current Shift:     Last three shifts:  03/16 1901 - 03/18 0700  In: 1362.5 [P.O.:720]  Out: 0     Physical Exam:  General: WD, WN. Alert, cooperative, no acute distress    HEENT: NC, Atraumatic. PERRLA, anicteric sclerae. Lungs: CTA Bilaterally. No Wheezing/Rhonchi/Rales. Heart:  Regular  rhythm,  + murmur, No Rubs, No Gallops  Abdomen: Soft, Non distended, Non tender.  +Bowel sounds,   Extremities: No c/c/e  Psych:   Not anxious or agitated. Neurologic:  No acute neurological deficit.              Labs: Results:       Chemistry Recent Labs      03/18/18 0415  03/17/18   1045  03/16/18   1318   GLU  92  168*  234*   NA  139  140  141   K  3.2*  3.7  3.5   CL  100  100  99*   CO2  31  33*  34*   BUN  24*  22*  14   CREA  5.40*  4.31*  2.76*   CA  9.0  8.8  9.2   AGAP  8  7  8   BUCR  4*  5*  5*   AP   --    --   152*   TP   --    --   7.0   ALB  3.0*   --   3.4   GLOB   --    --   3.6   AGRAT   --    --   0.9      CBC w/Diff Recent Labs      03/18/18   0415  03/17/18   1709  03/17/18   1045   03/16/18   1318   WBC  5.5   --   5.7   --   4.3*   RBC  3.40*   --   3.44*   --   2.73*   HGB  9.1*  9.8*  9.3*   < >  6.9*   HCT  30.5*  31.6*  30.7*   < >  24.2*   PLT  153   --   136   --   173   GRANS  67   --   75*   --   70   LYMPH  18*   --   15*   --   17*   EOS  4   --   3   --   3    < > = values in this interval not displayed. Cardiac Enzymes Recent Labs      03/16/18   1318   CPK  120   CKND1  2.8      Coagulation Recent Labs      03/16/18   1318   PTP  13.7   INR  1.1   APTT  30.4       Lipid Panel No results found for: CHOL, CHOLPOCT, CHOLX, CHLST, CHOLV, 032784, HDL, LDL, LDLC, DLDLP, 661813, VLDLC, VLDL, TGLX, TRIGL, TRIGP, TGLPOCT, CHHD, CHHDX   BNP No results for input(s): BNPP in the last 72 hours.    Liver Enzymes Recent Labs      03/18/18   0415  03/16/18   1318   TP   --   7.0   ALB  3.0*  3.4   AP   --   152*   SGOT   --   28      Thyroid Studies No results found for: T4, T3U, TSH, TSHEXT, TSHEXT     Procedures/imaging: see electronic medical records for all procedures/Xrays and details which were not copied into this note but were reviewed prior to creation of Darius Akhtar MD

## 2018-03-18 NOTE — PROGRESS NOTES
2140- Tylenol administered for back pain. 2238- Pt complaining of back pain, reported she normally uses Lidocaine patch at home and Tylenol is not working, Md Paged and orders received. 0110- Pt reported she is hurting from hemorrhoids pain and also complaining of upset stomach/indigestion, Dr Dmitry Bravo made aware and orders received foe Preparation H and Mylanta. 0200- Pt resting in bed, no needs at this time.   0400- Pt resting in bed

## 2018-03-18 NOTE — PROGRESS NOTES
Nephrology Progress note    Subjective:     Adriana Allan is a 75 y.o. female with PMH DM, HTN, Cirrhosis, Diverticulosis/AVMs s/p cautery, ESRD on HD MWF at [facility] who was referred to the ED due weakness and decrease in  Hgb 6.9. Patient was admitted and transfused 2 units PRBC. Hgb today has increased to 9.3. She reports feeling less fatigued today. She denies CP/SOB at rest.  She has been seen by GI specialist with plans for Colonoscopy on Monday.     Admit Date: 3/16/2018  Active Problems:    ESRD (end stage renal disease) ([facility] (12/15/2017)      Diabetes mellitus type 2, controlled ([facility] (12/15/2017)      HTN (hypertension) (12/15/2017)      Dizziness (3/16/2018)      Generalized weakness (3/16/2018)      Rectal bleed (3/16/2018)      Symptomatic anemia (3/16/2018)      Current Facility-Administered Medications   Medication Dose Route Frequency    alum-mag hydroxide-simeth (MYLANTA) oral suspension 30 mL  30 mL Oral Q4H PRN    phenyleph-min oil-petrolatum (PREPARATION H) 0.25-14-74.9 % ointment   PeriANAL QID PRN    0.9% sodium chloride infusion  100 mL/hr IntraVENous DIALYSIS PRN    [START ON 3/19/2018] epoetin reshma (EPOGEN;PROCRIT) 6,000 Units  6,000 Units IntraVENous Q MON, WED & FRI    lidocaine (LIDODERM) 5 % patch 1 Patch  1 Patch TransDERmal Q24H    0.9% sodium chloride infusion 250 mL  250 mL IntraVENous PRN    hydrALAZINE (APRESOLINE) tablet 25 mg  25 mg Oral TID    hydrOXYzine HCl (ATARAX) tablet 10 mg  10 mg Oral QHS PRN    metoprolol succinate (TOPROL-XL) XL tablet 25 mg  25 mg Oral DAILY    sevelamer carbonate (RENVELA) tab 1,600 mg  1,600 mg Oral TID WITH MEALS    albuterol (PROVENTIL HFA, VENTOLIN HFA, PROAIR HFA) inhaler 2 Puff  2 Puff Inhalation Q4H PRN    acetaminophen (TYLENOL) tablet 650 mg  650 mg Oral Q4H PRN    naloxone (NARCAN) injection 0.4 mg  0.4 mg IntraVENous PRN    diphenhydrAMINE (BENADRYL) injection 12.5 mg  12.5 mg IntraVENous Q4H PRN    ondansetron [facility]) injection 4 mg  4 mg IntraVENous Q4H PRN    docusate sodium (COLACE) capsule 100 mg  100 mg Oral BID    polyethylene glycol (MIRALAX) packet 17 g  17 g Oral BID    metoclopramide HCl (REGLAN) injection 5 mg  5 mg IntraVENous Q6H PRN    lubiPROStone (AMITIZA) capsule 24 mcg  24 mcg Oral BID WITH MEALS    insulin lispro (HUMALOG) injection   SubCUTAneous TIDAC    glucose chewable tablet 16 g  4 Tab Oral PRN    glucagon (GLUCAGEN) injection 1 mg  1 mg IntraMUSCular PRN    dextrose (D50W) injection syrg 12.5-25 g  25-50 mL IntraVENous PRN    metoclopramide HCl (REGLAN) injection 5 mg  5 mg IntraVENous Q8H    vit B Cmplx 3-FA-Vit C-Biotin (NEPHRO CATARINA RX) tablet 1 Tab  1 Tab Oral DAILY         Allergy:   Allergies   Allergen Reactions    Aspirin Other (comments)     GI bleed    Heparin (Porcine) Other (comments)     GI bleed    Metformin Other (comments)     swelling    Norvasc [Amlodipine] Rash        Objective:     Visit Vitals    /67 (BP 1 Location: Left arm, BP Patient Position: At rest)    Pulse 82    Temp 98.1 °F (36.7 °C)    Resp 18    Ht 5' 3\" (1.6 m)    Wt 73.9 kg (162 lb 14.7 oz)    SpO2 100%    BMI 28.86 kg/m2         Intake/Output Summary (Last 24 hours) at 03/18/18 1024  Last data filed at 03/18/18 5452   Gross per 24 hour   Intake              480 ml   Output                0 ml   Net              480 ml       Physical Exam:       General: No acute distress   HENT: Atraumatic and normocephalic   Eyes: Normal conjunctiva   Neck: No JVD   Cardiovascular: Normal S1 & S2, no m/r/g   Pulmonary/Chest Wall: Clear to auscultation bilaterally   Abdominal: Soft and non-tender   Musculoskeletal: No edema   Neurological: No focal deficits     RIJ TDC in place  Data Review:  Lab Results   Component Value Date/Time    Sodium 139 03/18/2018 04:15 AM    Potassium 3.2 (L) 03/18/2018 04:15 AM    Chloride 100 03/18/2018 04:15 AM    CO2 31 03/18/2018 04:15 AM    Anion gap 8 03/18/2018 04:15 AM    Glucose 92 03/18/2018 04:15 AM    BUN 24 (H) 03/18/2018 04:15 AM    Creatinine 5.40 (H) 03/18/2018 04:15 AM    BUN/Creatinine ratio 4 (L) 03/18/2018 04:15 AM    GFR est AA 10 (L) 03/18/2018 04:15 AM    GFR est non-AA 8 (L) 03/18/2018 04:15 AM    Calcium 9.0 03/18/2018 04:15 AM     Lab Results   Component Value Date/Time    WBC 5.5 03/18/2018 04:15 AM    HGB 9.1 (L) 03/18/2018 04:15 AM    HCT 30.5 (L) 03/18/2018 04:15 AM    PLATELET 581 16/62/5389 04:15 AM    MCV 89.7 03/18/2018 04:15 AM     Lab Results   Component Value Date/Time    Calcium 9.0 03/18/2018 04:15 AM    Phosphorus 3.5 03/18/2018 04:15 AM     Lab Results   Component Value Date/Time    Iron 83 12/16/2017 03:39 AM    TIBC 308 12/16/2017 03:39 AM    Iron % saturation 27 12/16/2017 03:39 AM     No results found for: FERR      Impression:     ESRD on HD MWF schedule  Anemia of CKD + GI blood loss  H/o Diverticulosis, AVMs  DM  HTN  Cirrhosis    Plan:     1. Transfuse prn  2. HD without Heparin on Monday 3/19  3. Procrit on HD  4. Anticipate Colonoscopy on Monday a.m.     Betito Denney MD, MPH [facility]  [phone]

## 2018-03-19 ENCOUNTER — HOME HEALTH ADMISSION (OUTPATIENT)
Dept: HOME HEALTH SERVICES | Facility: HOME HEALTH | Age: 68
End: 2018-03-19

## 2018-03-19 VITALS
HEIGHT: 63 IN | SYSTOLIC BLOOD PRESSURE: 156 MMHG | DIASTOLIC BLOOD PRESSURE: 67 MMHG | TEMPERATURE: 97.5 F | HEART RATE: 80 BPM | OXYGEN SATURATION: 100 % | BODY MASS INDEX: 30.08 KG/M2 | RESPIRATION RATE: 17 BRPM | WEIGHT: 169.75 LBS

## 2018-03-19 LAB
ANION GAP SERPL CALC-SCNC: 12 MMOL/L (ref 3–18)
BASOPHILS # BLD: 0 K/UL (ref 0–0.06)
BASOPHILS NFR BLD: 1 % (ref 0–2)
BUN SERPL-MCNC: 27 MG/DL (ref 7–18)
BUN/CREAT SERPL: 4 (ref 12–20)
CALCIUM SERPL-MCNC: 8.4 MG/DL (ref 8.5–10.1)
CHLORIDE SERPL-SCNC: 101 MMOL/L (ref 100–108)
CO2 SERPL-SCNC: 28 MMOL/L (ref 21–32)
CREAT SERPL-MCNC: 6.38 MG/DL (ref 0.6–1.3)
DIFFERENTIAL METHOD BLD: ABNORMAL
EOSINOPHIL # BLD: 0.2 K/UL (ref 0–0.4)
EOSINOPHIL NFR BLD: 4 % (ref 0–5)
ERYTHROCYTE [DISTWIDTH] IN BLOOD BY AUTOMATED COUNT: 17.2 % (ref 11.6–14.5)
GLUCOSE BLD STRIP.AUTO-MCNC: 115 MG/DL (ref 70–110)
GLUCOSE BLD STRIP.AUTO-MCNC: 82 MG/DL (ref 70–110)
GLUCOSE BLD STRIP.AUTO-MCNC: 84 MG/DL (ref 70–110)
GLUCOSE SERPL-MCNC: 87 MG/DL (ref 74–99)
HCT VFR BLD AUTO: 33.1 % (ref 35–45)
HCT VFR BLD AUTO: 33.2 % (ref 35–45)
HGB BLD-MCNC: 10 G/DL (ref 12–16)
HGB BLD-MCNC: 9.8 G/DL (ref 12–16)
LYMPHOCYTES # BLD: 0.8 K/UL (ref 0.9–3.6)
LYMPHOCYTES NFR BLD: 16 % (ref 21–52)
MAGNESIUM SERPL-MCNC: 1.9 MG/DL (ref 1.6–2.6)
MCH RBC QN AUTO: 26.3 PG (ref 24–34)
MCHC RBC AUTO-ENTMCNC: 29.5 G/DL (ref 31–37)
MCV RBC AUTO: 89.2 FL (ref 74–97)
MONOCYTES # BLD: 0.5 K/UL (ref 0.05–1.2)
MONOCYTES NFR BLD: 10 % (ref 3–10)
NEUTS SEG # BLD: 3.2 K/UL (ref 1.8–8)
NEUTS SEG NFR BLD: 69 % (ref 40–73)
PLATELET # BLD AUTO: 132 K/UL (ref 135–420)
PMV BLD AUTO: 9.8 FL (ref 9.2–11.8)
POTASSIUM SERPL-SCNC: 3.4 MMOL/L (ref 3.5–5.5)
RBC # BLD AUTO: 3.72 M/UL (ref 4.2–5.3)
SODIUM SERPL-SCNC: 141 MMOL/L (ref 136–145)
WBC # BLD AUTO: 4.6 K/UL (ref 4.6–13.2)

## 2018-03-19 PROCEDURE — 77030020887 HC CRM SKN PROT DIMTH S&N -A

## 2018-03-19 PROCEDURE — 82962 GLUCOSE BLOOD TEST: CPT

## 2018-03-19 PROCEDURE — 85025 COMPLETE CBC W/AUTO DIFF WBC: CPT | Performed by: HOSPITALIST

## 2018-03-19 PROCEDURE — G0500 MOD SEDAT ENDO SERVICE >5YRS: HCPCS | Performed by: INTERNAL MEDICINE

## 2018-03-19 PROCEDURE — 77030020256 HC SOL INJ NACL 0.9%  500ML: Performed by: INTERNAL MEDICINE

## 2018-03-19 PROCEDURE — 99153 MOD SED SAME PHYS/QHP EA: CPT | Performed by: INTERNAL MEDICINE

## 2018-03-19 PROCEDURE — 74011250637 HC RX REV CODE- 250/637: Performed by: HOSPITALIST

## 2018-03-19 PROCEDURE — 83735 ASSAY OF MAGNESIUM: CPT | Performed by: HOSPITALIST

## 2018-03-19 PROCEDURE — 36415 COLL VENOUS BLD VENIPUNCTURE: CPT | Performed by: HOSPITALIST

## 2018-03-19 PROCEDURE — 80048 BASIC METABOLIC PNL TOTAL CA: CPT | Performed by: HOSPITALIST

## 2018-03-19 PROCEDURE — 76040000008: Performed by: INTERNAL MEDICINE

## 2018-03-19 PROCEDURE — 0DJD8ZZ INSPECTION OF LOWER INTESTINAL TRACT, VIA NATURAL OR ARTIFICIAL OPENING ENDOSCOPIC: ICD-10-PCS | Performed by: INTERNAL MEDICINE

## 2018-03-19 PROCEDURE — 74011250636 HC RX REV CODE- 250/636

## 2018-03-19 PROCEDURE — 85018 HEMOGLOBIN: CPT | Performed by: HOSPITALIST

## 2018-03-19 RX ORDER — FLUMAZENIL 0.1 MG/ML
0.2 INJECTION INTRAVENOUS
Status: DISCONTINUED | OUTPATIENT
Start: 2018-03-19 | End: 2018-03-19 | Stop reason: HOSPADM

## 2018-03-19 RX ORDER — MIDAZOLAM HYDROCHLORIDE 1 MG/ML
.5-5 INJECTION, SOLUTION INTRAMUSCULAR; INTRAVENOUS
Status: DISCONTINUED | OUTPATIENT
Start: 2018-03-19 | End: 2018-03-19 | Stop reason: HOSPADM

## 2018-03-19 RX ORDER — EPINEPHRINE 0.1 MG/ML
1 INJECTION INTRACARDIAC; INTRAVENOUS
Status: DISCONTINUED | OUTPATIENT
Start: 2018-03-19 | End: 2018-03-19 | Stop reason: HOSPADM

## 2018-03-19 RX ORDER — DEXTROMETHORPHAN/PSEUDOEPHED 2.5-7.5/.8
1.2 DROPS ORAL
Status: DISCONTINUED | OUTPATIENT
Start: 2018-03-19 | End: 2018-03-19 | Stop reason: HOSPADM

## 2018-03-19 RX ORDER — NALOXONE HYDROCHLORIDE 0.4 MG/ML
0.4 INJECTION, SOLUTION INTRAMUSCULAR; INTRAVENOUS; SUBCUTANEOUS
Status: DISCONTINUED | OUTPATIENT
Start: 2018-03-19 | End: 2018-03-19 | Stop reason: HOSPADM

## 2018-03-19 RX ORDER — FENTANYL CITRATE 50 UG/ML
100 INJECTION, SOLUTION INTRAMUSCULAR; INTRAVENOUS
Status: DISCONTINUED | OUTPATIENT
Start: 2018-03-19 | End: 2018-03-19 | Stop reason: HOSPADM

## 2018-03-19 RX ORDER — ATROPINE SULFATE 0.1 MG/ML
0.5 INJECTION INTRAVENOUS
Status: DISCONTINUED | OUTPATIENT
Start: 2018-03-19 | End: 2018-03-19 | Stop reason: HOSPADM

## 2018-03-19 RX ADMIN — METOPROLOL SUCCINATE 25 MG: 25 TABLET, EXTENDED RELEASE ORAL at 16:10

## 2018-03-19 RX ADMIN — HYDRALAZINE HYDROCHLORIDE 25 MG: 25 TABLET, FILM COATED ORAL at 16:10

## 2018-03-19 RX ADMIN — SEVELAMER CARBONATE 1600 MG: 800 TABLET, FILM COATED ORAL at 16:19

## 2018-03-19 RX ADMIN — ACETAMINOPHEN 650 MG: 325 TABLET ORAL at 01:47

## 2018-03-19 RX ADMIN — AMOXICILLIN 250 MG: 250 CAPSULE ORAL at 16:10

## 2018-03-19 RX ADMIN — Medication 1 TABLET: at 16:10

## 2018-03-19 NOTE — DISCHARGE INSTRUCTIONS
DISCHARGE SUMMARY from Nurse    PATIENT INSTRUCTIONS:    Recommended activity: Activity as tolerated. *  Please give a list of your current medications to your Primary Care Provider. *  Please update this list whenever your medications are discontinued, doses are      changed, or new medications (including over-the-counter products) are added. *  Please carry medication information at all times in case of emergency situations. These are general instructions for a healthy lifestyle:    No smoking/ No tobacco products/ Avoid exposure to second hand smoke  Surgeon General's Warning:  Quitting smoking now greatly reduces serious risk to your health. Obesity, smoking, and sedentary lifestyle greatly increases your risk for illness    A healthy diet, regular physical exercise & weight monitoring are important for maintaining a healthy lifestyle    You may be retaining fluid if you have a history of heart failure or if you experience any of the following symptoms:  Weight gain of 3 pounds or more overnight or 5 pounds in a week, increased swelling in our hands or feet or shortness of breath while lying flat in bed. Please call your doctor as soon as you notice any of these symptoms; do not wait until your next office visit. Recognize signs and symptoms of STROKE:    F-face looks uneven    A-arms unable to move or move unevenly    S-speech slurred or non-existent    T-time-call 911 as soon as signs and symptoms begin-DO NOT go       Back to bed or wait to see if you get better-TIME IS BRAIN. Warning Signs of HEART ATTACK     Call 911 if you have these symptoms:   Chest discomfort. Most heart attacks involve discomfort in the center of the chest that lasts more than a few minutes, or that goes away and comes back. It can feel like uncomfortable pressure, squeezing, fullness, or pain.  Discomfort in other areas of the upper body.  Symptoms can include pain or discomfort in one or both arms, the back, neck, jaw, or stomach.  Shortness of breath with or without chest discomfort.  Other signs may include breaking out in a cold sweat, nausea, or lightheadedness. Don't wait more than five minutes to call 911 - MINUTES MATTER! Fast action can save your life. Calling 911 is almost always the fastest way to get lifesaving treatment. Emergency Medical Services staff can begin treatment when they arrive -- up to an hour sooner than if someone gets to the hospital by car. The discharge information has been reviewed with the patient. The patient verbalized understanding. Discharge medications reviewed with the patient and appropriate educational materials and side effects teaching were provided. ___________________________________________________________________________________________________________________________________  Anemia: Care Instructions  Your Care Instructions    Anemia is a low level of red blood cells, which carry oxygen throughout your body. Many things can cause anemia. Lack of iron is one of the most common causes. Your body needs iron to make hemoglobin, a substance in red blood cells that carries oxygen from the lungs to your body's cells. Without enough iron, the body produces fewer and smaller red blood cells. As a result, your body's cells do not get enough oxygen, and you feel tired and weak. And you may have trouble concentrating. Bleeding is the most common cause of a lack of iron. You may have heavy menstrual bleeding or bleeding caused by conditions such as ulcers, hemorrhoids, or cancer. Regular use of aspirin or other anti-inflammatory medicines (such as ibuprofen) also can cause bleeding in some people. A lack of iron in your diet also can cause anemia, especially at times when the body needs more iron, such as during pregnancy, infancy, and the teen years. Your doctor may have prescribed iron pills.  It may take several months of treatment for your iron levels to return to normal. Your doctor also may suggest that you eat foods that are rich in iron, such as meat and beans. There are many other causes of anemia. It is not always due to a lack of iron. Finding the specific cause of your anemia will help your doctor find the right treatment for you. Follow-up care is a key part of your treatment and safety. Be sure to make and go to all appointments, and call your doctor if you are having problems. It's also a good idea to know your test results and keep a list of the medicines you take. How can you care for yourself at home? · Take your medicines exactly as prescribed. Call your doctor if you think you are having a problem with your medicine. · If your doctor recommends iron pills, take them as directed:  ¨ Try to take the pills on an empty stomach about 1 hour before or 2 hours after meals. But you may need to take iron with food to avoid an upset stomach. ¨ Do not take antacids or drink milk or caffeine drinks (such as coffee, tea, or cola) at the same time or within 2 hours of the time that you take your iron. They can make it hard for your body to absorb the iron. ¨ Vitamin C (from food or supplements) helps your body absorb iron. Try taking iron pills with a glass of orange juice or some other food that is high in vitamin C, such as citrus fruits. ¨ Iron pills may cause stomach problems, such as heartburn, nausea, diarrhea, constipation, and cramps. Be sure to drink plenty of fluids, and include fruits, vegetables, and fiber in your diet each day. Iron pills often make your bowel movements dark or green. ¨ If you forget to take an iron pill, do not take a double dose of iron the next time you take a pill. ¨ Keep iron pills out of the reach of small children. An overdose of iron can be very dangerous. · Follow your doctor's advice about eating iron-rich foods.  These include red meat, shellfish, poultry, eggs, beans, raisins, whole-grain bread, and leafy green vegetables. · Steam vegetables to help them keep their iron content. When should you call for help? Call 911 anytime you think you may need emergency care. For example, call if:  ? · You have symptoms of a heart attack. These may include:  ¨ Chest pain or pressure, or a strange feeling in the chest.  ¨ Sweating. ¨ Shortness of breath. ¨ Nausea or vomiting. ¨ Pain, pressure, or a strange feeling in the back, neck, jaw, or upper belly or in one or both shoulders or arms. ¨ Lightheadedness or sudden weakness. ¨ A fast or irregular heartbeat. After you call 911, the  may tell you to chew 1 adult-strength or 2 to 4 low-dose aspirin. Wait for an ambulance. Do not try to drive yourself. ? · You passed out (lost consciousness). ?Call your doctor now or seek immediate medical care if:  ? · You have new or increased shortness of breath. ? · You are dizzy or lightheaded, or you feel like you may faint. ? · Your fatigue and weakness continue or get worse. ? · You have any abnormal bleeding, such as:  ¨ Nosebleeds. ¨ Vaginal bleeding that is different (heavier, more frequent, at a different time of the month) than what you are used to. ¨ Bloody or black stools, or rectal bleeding. ¨ Bloody or pink urine. ? Watch closely for changes in your health, and be sure to contact your doctor if:  ? · You do not get better as expected. Where can you learn more? Go to http://rosi-awa.info/. Enter R301 in the search box to learn more about \"Anemia: Care Instructions. \"  Current as of: October 13, 2016  Content Version: 11.4  © 5566-0497 [employer]. Care instructions adapted under license by [employer] (which disclaims liability or warranty for this information).  If you have questions about a medical condition or this instruction, always ask your healthcare professional. [address] any warranty or liability for your use of this information. Colonoscopy: What to Expect at [address] [place] [address]  After you have a colonoscopy, you will stay at the clinic for 1 to 2 hours until the medicines wear off. Then you can go home. But you will need to arrange for a ride. Your doctor will tell you when you can eat and do your other usual activities. Your doctor will talk to you about when you will need your next colonoscopy. Your doctor can help you decide how often you need to be checked. This will depend on the results of your test and your risk for colorectal cancer. After the test, you may be bloated or have gas pains. You may need to pass gas. If a biopsy was done or a polyp was removed, you may have streaks of blood in your stool (feces) for a few days. This care sheet gives you a general idea about how long it will take for you to recover. But each person recovers at a different pace. Follow the steps below to get better as quickly as possible. How can you care for yourself at home? Activity  ? · Rest when you feel tired. ? · You can do your normal activities when it feels okay to do so. Diet  ? · Follow your doctor's directions for eating. ? · Unless your doctor has told you not to, drink plenty of fluids. This helps to replace the fluids that were lost during the colon prep. ? · Do not drink alcohol. Medicines  ? · Your doctor will tell you if and when you can restart your medicines. He or she will also give you instructions about taking any new medicines. ? · If you take blood thinners, such as warfarin (Coumadin), clopidogrel (Plavix), or aspirin, be sure to talk to your doctor. He or she will tell you if and when to start taking those medicines again. Make sure that you understand exactly what your doctor wants you to do. ? · If polyps were removed or a biopsy was done during the test, your doctor may tell you not to take aspirin or other anti-inflammatory medicines for a few days.  These include ibuprofen (Advil, Motrin) and naproxen (Aleve). Other instructions  ? · For your safety, do not drive or operate machinery until the medicine wears off and you can think clearly. Your doctor may tell you not to drive or operate machinery until the day after your test.   ? · Do not sign legal documents or make major decisions until the medicine wears off and you can think clearly. The anesthesia can make it hard for you to fully understand what you are agreeing to. Follow-up care is a key part of your treatment and safety. Be sure to make and go to all appointments, and call your doctor if you are having problems. It's also a good idea to know your test results and keep a list of the medicines you take. When should you call for help? Call 911 anytime you think you may need emergency care. For example, call if:  ? · You passed out (lost consciousness). ? · You pass maroon or bloody stools. ? · You have trouble breathing. ?Call your doctor now or seek immediate medical care if:  ? · You have pain that does not get better after you take pain medicine. ? · You are sick to your stomach or cannot drink fluids. ? · You have new or worse belly pain. ? · You have blood in your stools. ? · You have a fever. ? · You cannot pass stools or gas. ? Watch closely for changes in your health, and be sure to contact your doctor if you have any problems. Where can you learn more? Go to http://rosi-awa.info/. Enter E264 in the search box to learn more about \"Colonoscopy: What to Expect at Home. \"  Current as of: May 12, 2017  Content Version: 11.4  © 6256-7547 [employer]. Care instructions adapted under license by [employer] (which disclaims liability or warranty for this information).  If you have questions about a medical condition or this instruction, always ask your healthcare professional. [place] [address] any warranty or liability for your use of this information. Lower Gastrointestinal Bleeding: Care Instructions  Your Care Instructions    The digestive or gastrointestinal tract goes from the mouth to the anus. It is often called the GI tract. Bleeding in the lower GI tract can happen anywhere in your small or large intestine. It can also happen in your rectum or anus. In some cases, it is caused by an infection, cancer, or inflammatory bowel disease. Or it may be caused by hemorrhoids, diverticulitis, or clotting problems. Light bleeding may not cause any symptoms at first. But if you continue to bleed for a while, you may feel very weak or tired. Sudden, heavy bleeding means you need to see a doctor right away. This kind of bleeding can be very dangerous. But it can usually be cured or controlled. The doctor may do some tests to find the cause of your bleeding. Follow-up care is a key part of your treatment and safety. Be sure to make and go to all appointments, and call your doctor if you are having problems. It's also a good idea to know your test results and keep a list of the medicines you take. How can you care for yourself at home? · Be safe with medicines. Take your medicines exactly as prescribed. Call your doctor if you think you are having a problem with your medicine. You will get more details on the specific medicines your doctor prescribes. · Do not take aspirin or other anti-inflammatory medicines, such as naproxen (Aleve) or ibuprofen (Advil, Motrin), without talking to your doctor first. Ask your doctor if it is okay to use acetaminophen (Tylenol). · Do not drink alcohol. · The bleeding may make you lose iron. So it's important to eat foods that have a lot of iron. These include red meat, shellfish, poultry, and eggs. They also include beans, raisins, whole-grain breads, and leafy green vegetables. If you want help planning meals, you can meet with a dietitian. When should you call for help?   Call 911 anytime you think you may need emergency care. For example, call if:  ? · You have sudden, severe belly pain. ? · You vomit blood or what looks like coffee grounds. ? · You passed out (lost consciousness). ? · Your stools are maroon or very bloody. ?Call your doctor now or seek immediate medical care if:  ? · You are dizzy or lightheaded, or you feel like you may faint. ? · Your stools are black and look like tar, or they have streaks of blood. ? · You have belly pain. ? · You vomit or have nausea. ? Watch closely for changes in your health, and be sure to contact your doctor if you do not get better as expected. Where can you learn more? Go to http://rosi-awa.info/. Enter F465 in the search box to learn more about \"Lower Gastrointestinal Bleeding: Care Instructions. \"  Current as of: March 20, 2017  Content Version: 11.4  © 0588-1766 [employer]. Care instructions adapted under license by [employer] (which disclaims liability or warranty for this information). If you have questions about a medical condition or this instruction, always ask your healthcare professional. [place] [address] any warranty or liability for your use of this information.

## 2018-03-19 NOTE — PROGRESS NOTES
D/c plan: anticipate home in next 24 hours  Met with patient during IDR's. Patient states she is independent and still drives. Patient is having HD at this time. Plan to retrun to [facility] on MWF schedule at 630am. States she lives with her son and his family. She was given a list of [address] however she reports she is not homebound. Patient is to have colonoscopy this pm if negative she plans to retrun home. Cms will send out clinicals and updates to [facility]. Patient has a pcp and has medicare when she is d/cd. Patient does have history of diabetes and RRAT above 20 cm would recommend [facility] for follow up. CM will place order. Patient is at colonoscopy at this time  Care Management Interventions  PCP Verified by CM:  Yes  Transition of Care Consult (CM Consult): Home Health [facility] )  [address]: Yes  Physical Therapy Consult: Yes  Occupational Therapy Consult: Yes  Current Support Network: Relative's Home  Confirm Follow Up Transport: Family  Plan discussed with Pt/Family/Caregiver: Yes  Freedom of Choice Offered: Yes  Discharge Location  Discharge Placement: Home with home health ([facility])

## 2018-03-19 NOTE — ROUTINE PROCESS
Bedside and Verbal shift change report given to Keitha Gosselin, RN (oncoming nurse) by Ragini Okeefe RN   (offgoing nurse). Report included the following information SBAR, Kardex, Intake/Output, MAR, Recent Results and Med Rec Status.

## 2018-03-19 NOTE — PROGRESS NOTES
Nephrology Progress note    Subjective:     Qasim Mcwilliams is a 75 y.o. female with PMH DM, HTN, Cirrhosis, Diverticulosis/AVMs s/p cautery, ESRD on HD MWF at [facility] who was referred to the ED due weakness and decrease in  Hgb 6.9. Patient was admitted and transfused 2 units PRBC. Hgb today has increased to 9.3. She reports feeling less fatigued today. She denies CP/SOB at rest.  She has been seen by GI specialist with plans for Colonoscopy on today.  Feels full today, asks to pull more fluid    Admit Date: 3/16/2018  Active Problems:    ESRD (end stage renal disease) ([facility] [address] (12/15/2017)      [facility] (12/15/2017)      HTN (hypertension) (12/15/2017)      Dizziness (3/16/2018)      Generalized weakness (3/16/2018)      Rectal bleed (3/16/2018)      Symptomatic anemia (3/16/2018)      Current Facility-Administered Medications   Medication Dose Route Frequency    alum-mag hydroxide-simeth (MYLANTA) oral suspension 30 mL  30 mL Oral Q4H PRN    phenyleph-min oil-petrolatum (PREPARATION H) 0.25-14-74.9 % ointment   PeriANAL QID PRN    simethicone (MYLICON) tablet 80 mg  80 mg Oral QID PRN    amoxicillin (AMOXIL) capsule 250 mg  250 mg Oral Q24H    0.9% sodium chloride infusion  100 mL/hr IntraVENous DIALYSIS PRN    epoetin reshma (EPOGEN;PROCRIT) 6,000 Units  6,000 Units IntraVENous Q MON, WED & FRI    lidocaine (LIDODERM) 5 % patch 1 Patch  1 Patch TransDERmal Q24H    0.9% sodium chloride infusion 250 mL  250 mL IntraVENous PRN    hydrALAZINE (APRESOLINE) tablet 25 mg  25 mg Oral TID    hydrOXYzine HCl (ATARAX) tablet 10 mg  10 mg Oral QHS PRN    metoprolol succinate (TOPROL-XL) XL tablet 25 mg  25 mg Oral DAILY    sevelamer carbonate (RENVELA) tab 1,600 mg  1,600 mg Oral TID WITH MEALS    albuterol (PROVENTIL HFA, VENTOLIN HFA, PROAIR HFA) inhaler 2 Puff  2 Puff Inhalation Q4H PRN    acetaminophen (TYLENOL) tablet 650 mg  650 mg Oral Q4H PRN    naloxone [facility]) injection 0.4 mg  0.4 mg IntraVENous PRN    diphenhydrAMINE (BENADRYL) injection 12.5 mg  12.5 mg IntraVENous Q4H PRN    ondansetron (ZOFRAN) injection 4 mg  4 mg IntraVENous Q4H PRN    metoclopramide HCl (REGLAN) injection 5 mg  5 mg IntraVENous Q6H PRN    insulin lispro (HUMALOG) injection   SubCUTAneous TIDAC    glucose chewable tablet 16 g  4 Tab Oral PRN    glucagon (GLUCAGEN) injection 1 mg  1 mg IntraMUSCular PRN    dextrose (D50W) injection syrg 12.5-25 g  25-50 mL IntraVENous PRN    metoclopramide HCl (REGLAN) injection 5 mg  5 mg IntraVENous Q8H    vit B Cmplx 3-FA-Vit C-Biotin (NEPHRO CATARINA RX) tablet 1 Tab  1 Tab Oral DAILY         Allergy:   Allergies   Allergen Reactions    Aspirin Other (comments)     GI bleed    Heparin (Porcine) Other (comments)     GI bleed    Metformin Other (comments)     swelling    Norvasc [Amlodipine] Rash        Objective:     Visit Vitals    /73 (BP 1 Location: Left arm, BP Patient Position: At rest)    Pulse 90    Temp 97.6 °F (36.4 °C)    Resp 18    Ht 5' 3\" (1.6 m)    Wt 77 kg (169 lb 12.1 oz)    SpO2 (P) 100%    BMI 30.07 kg/m2       No intake or output data in the 24 hours ending 03/19/18 0915    Physical Exam:       General: No acute distress   HENT: Atraumatic and normocephalic   Eyes: Normal conjunctiva   Neck: No JVD   Cardiovascular: Normal S1 & S2, no m/r/g   Pulmonary/Chest Wall: Clear to auscultation bilaterally   Abdominal: Soft and non-tender   Musculoskeletal: No edema   Neurological: No focal deficits     [facility] TDC in place  Data Review:  Lab Results   Component Value Date/Time    Sodium 141 03/19/2018 03:43 AM    Potassium 3.4 (L) 03/19/2018 03:43 AM    Chloride 101 03/19/2018 03:43 AM    CO2 28 03/19/2018 03:43 AM    Anion gap 12 03/19/2018 03:43 AM    Glucose 87 03/19/2018 03:43 AM    BUN 27 (H) 03/19/2018 03:43 AM    Creatinine 6.38 (H) 03/19/2018 03:43 AM    BUN/Creatinine ratio 4 (L) 03/19/2018 03:43 AM    GFR est AA 8 (L) 03/19/2018 03:43 AM    GFR est non-AA 6 (L) 03/19/2018 03:43 AM    Calcium 8.4 (L) 03/19/2018 03:43 AM     Lab Results   Component Value Date/Time    WBC 4.6 03/19/2018 03:43 AM    HGB 10.0 (L) 03/19/2018 08:34 AM    HCT 33.1 (L) 03/19/2018 08:34 AM    PLATELET 848 (L) 25/19/8035 03:43 AM    MCV 89.2 03/19/2018 03:43 AM     Lab Results   Component Value Date/Time    Calcium 8.4 (L) 03/19/2018 03:43 AM    Phosphorus 3.5 03/18/2018 04:15 AM     Lab Results   Component Value Date/Time    Iron 83 12/16/2017 03:39 AM    TIBC 308 12/16/2017 03:39 AM    Iron % saturation 27 12/16/2017 03:39 AM     No results found for: FERR      Impression:     ESRD on HD MWF schedule  Anemia of CKD + GI blood loss  H/o Diverticulosis, AVMs  DM  HTN  Cirrhosis    Plan:     1. Transfuse prn  2. HD without Heparin today  3. Procrit on HD  4.  Anticipate Colonoscopy this afternoon    MD Jasbir Ugarte  [phone]

## 2018-03-19 NOTE — ADVANCED PRACTICE NURSE
Nurse mentioned that the patient has had all of her prep for colonoscopy but she learned that she was not on endoscopy's case list.  Called and spoke w/Zena and requested she please touch bases with Dr. Chary Vega to ensure the procedure gets done today. Also communicated that the patient is presently receiving dialysis and we anticipate her completing her session around 1P. [facility] indicated that she will likely be added onto the end of their scheduled cases.

## 2018-03-19 NOTE — PROGRESS NOTES
1930 received bedside report from Maria Alejandra Valderrama RN. Assumed patient care. Patient AOx4. Demanding and very anxious    2000 Assessment completed. Patient complains of pain addressed. Meds given    2200 Patient complains of being constipated. She stated that she is in pain but r/t constipation. Dr. Marquez Corcoran gave orders for Miralax and docusate sodium. Miralax administered. 0000 Patient complained of pain. Norco administered. 0200 Patient restless and complains that she is still constipated, paged Dr. Marquez Corcoran and got orders for lactulose 30mg. Meds administered. 0600 Patient had no other complains. Bedside and Verbal shift change report given to [address] (oncoming nurse) by Caryle Kern RN (offgoing nurse). Report included the following information SBAR, Kardex and MAR. Patient Education     Nonspecific Dermatitis  Dermatitis is a skin rash caused by something that touches the skin and makes it irritated and inflamed.  Your skin may be red, swollen, dry, and may be cracked. Blisters may form and ooze. The rash will itch.  Dermatitis can form on the face and neck, backs of hands, forearms, genitals, and lower legs. Dermatitis is not passed from person to person.  Talk with your health care provider about what may have caused the rash. Common things that cause skin allergies are metal in jewelry, plants like poison ivy or poison oak, and certain skin care products. You will need to avoid the source of your rash in the future to prevent it from coming back. In some cases, the cause of the dermatitis may not be found.  Treatment is done to relieve itching and prevent the rash from coming back. The rash should go away in a few days to a few weeks.  Home care  The health care provider may prescribe medications to relieve swelling and itching. Follow all instructions when using these medications.  · Avoid anything that heats up your skin, such as hot showers or baths, or direct sunlight. This can make itching worse.  · Stay away from whatever you think caused the rash.  · Bathe in warm, not hot, water. Apply a moisturizing lotion after bathing to prevent dry skin.  · Avoid skin irritants such as wool or silk clothing, grease, oils, harsh soaps, and detergents.  · Apply cold compresses to soothe your sores to help relieve your symptoms. Do this for 30 minutes 3 to 4 times a day. You can make a cold compress by soaking a cloth in cold water. Squeeze out excess water. You can add colloidal oatmeal to the water to help reduce itching. For severe itching in a small area, apply an ice pack wrapped in a thin towel. Do this for 20 minutes 3 to 4 times a day.  · You can also help relieve large areas of itching by taking a lukewarm bath with colloidal oatmeal added to the water.  · Use  hydrocortisone cream for redness and irritation, unless another medicine was prescribed. You can also use benzocaine anesthetic cream or spray.  · Use oral diphenhydramine to help reduce itching. This is an antihistamine you can buy at drug and grocery stores. It can make you sleepy, so use lower doses during the daytime. Or you can use loratadine. This is an antihistamine that will not make you sleepy. Don’t use diphenhydramine if you have glaucoma or have trouble urinating because of an enlarged prostate.  · Wash your hands or use an antibacterial gel often to prevent the spread of the rash.  Follow-up care  Follow up with your health care provider. Make an appointment with your health care provider if your symptoms do not get better in the next 1 to 2 weeks.  When to seek medical advice  Call your health care provider right away if any of these occur:  · Spreading of the rash to other parts of your body  · Severe swelling of your face, eyelids, mouth, throat or tongue  · Trouble urinating due to swelling in the genital area  · Fever of 100.4°F (38°C) or higher  · Redness or swelling that gets worse  · Pain that gets worse  · Foul-smelling fluid leaking from the skin  · Yellow-brown crusts on the open blisters  · Joint pain   © 0174-8368 [employer]. [address] [place] [address], [place] [address]. All rights reserved. This information is not intended as a substitute for professional medical care. Always follow your healthcare professional's instructions.

## 2018-03-19 NOTE — PROGRESS NOTES
Problem: Mobility Impaired (Adult and Pediatric)  Goal: *Acute Goals and Plan of Care (Insert Text)  Physical Therapy Goals LT/ST  Initiated 3/17/2018 and to be accomplished within 3-5 day(s)  1. Patient will move from supine <> sit with independence in prep for out of bed activity and change of position. 2.  Patient will perform sit<> stand with mod I-S/RW/walking boot L foot in prep for transfers/ambulation. 3.  Patient will transfer from bed <> chair with mod I-S/RW/walking boot L foot for time up in chair for completion of ADL activity. 4.  Patient will ambulate 30-40 feet mod I-S/RW/walking boot L foot for increased independence during short distance home mobility/safe discharge. 5.  Patient will ascend/descend 3-5 stairs with handrail(s) with minimal assistance/contact guard assist for home re-entry as needed. 3/19/2018  PT treatment not completed due to:  [x] Off Unit for testing/procedure  [] Pain  [] Eating  [] Patient too lethargic  [] Nausea/vomiting  [] Dialysis treatment in progress   [x] Other:pt at ENDO at this time. Will f/u tomorrow as pt schedule allows.     Citlali Nieves, PT

## 2018-03-19 NOTE — PERIOP NOTES
Post Endoscopy SBAR report    Report given post procedure to: Floor RN  Patient tolerated procedure well.   Vital signs stable  Findings: skin tags, hemorrhoids, decreased anal tone, AVMs- not bleeding      Medications given in the procedure:    Versed  4    Mg

## 2018-03-19 NOTE — PROGRESS NOTES
Shift summary: Pt A&Ox4, up to bedside commode, finished go lightly. Pt reporting pain in right arm. Dr. Candy Schofield made aware. Pt received a one time dose of oxycodone IR 5mg.

## 2018-03-19 NOTE — PROGRESS NOTES
Problem: Mobility Impaired (Adult and Pediatric)  Goal: *Acute Goals and Plan of Care (Insert Text)  Physical Therapy Goals LT/ST  Initiated 3/17/2018 and to be accomplished within 3-5 day(s)  1. Patient will move from supine <> sit with independence in prep for out of bed activity and change of position. 2.  Patient will perform sit<> stand with mod I-S/RW/walking boot L foot in prep for transfers/ambulation. 3.  Patient will transfer from bed <> chair with mod I-S/RW/walking boot L foot for time up in chair for completion of ADL activity. 4.  Patient will ambulate 30-40 feet mod I-S/RW/walking boot L foot for increased independence during short distance home mobility/safe discharge. 5.  Patient will ascend/descend 3-5 stairs with handrail(s) with minimal assistance/contact guard assist for home re-entry as needed. 3/19/2018  PT treatment not completed due to:  [] Off Unit for testing/procedure  [] Pain  [] Eating  [] Patient too lethargic  [] Nausea/vomiting  [x] Dialysis treatment in progress   [x] Other: HD in progress until 1230 per HD nurse; then pt scheduled for colonoscopy. Will f/u at later time as pt schedule allows. Thank you.    Aida Oropeza, PT

## 2018-03-19 NOTE — PROGRESS NOTES
[id number] [facility] from [facility] KEENAN FINCH RN. Pt is alert and oriented x 4. Explained she had an issue with pervious nurse. Shift uneventful. Pt is stable with no signs of distress.

## 2018-03-19 NOTE — PROGRESS NOTES
800 Assumed care of patient from Travis Kaplan, RN    603 Assessment completed, patient is alert and oriented x's 4, no c/o pain. NSR on the monitor with a HR in the 90's. Lung fields are clear throughout on RA, 02 sat sustained at 100% with no cough noted. Patient is voiding in the [facility] without any complications. Patient is tolerating a clear liquid diet without any N/V or gastric distention. This nurse reached out to GI for approx time for procedure, GI nurse could not inform the time the patient will be receiving procedure. Dialysis nurse contacted, but stated that she was at [facility] today and would have the nurse that is assuming [facility] to return call. Patient was updated on both situations. Patient is currently sitting up on side of bed, no s/s of any distress, VSS, call bell and personal belongings within reach, will continue to monitor. 1003 patient is in transition to receive hemodialysis. Patient is currently sitting up on side of bed, no s/s of any pain or distress, will continue to monitor    1408 patient successfully completed hemodialysis without any complications. Patient is currently off the unit via bed for scheduled colonoscopy. 1620 patient returned back to the unit from [facility] in stable condition. Scheduled medications administered as ordered without any complications. Patient is currently sitting up on side of bed conversing on cell phone. Patient will be discharged home after completion of dinner per patient's request, no s/s of any pain or distress, will continue to monitor. 1747 patient escorted out of facility via wheelchair in stable condition.

## 2018-03-19 NOTE — ROUTINE PROCESS
Dual AVS reviewed with Kelsie Hughes RN. All medications reviewed individually with patient. Opportunities for questions and concerns provided. Patient discharged via (mode of transport ie. Car, ambulance or air transport) car  Patient's arm band appropriately discarded.

## 2018-03-19 NOTE — ROUTINE PROCESS
Written shift change report given to ROSA Montano Officer, RN (oncoming nurse) by JOAN Darnell RN (offgoing nurse). Report included the following information SBAR, Kardex, Intake/Output, MAR and Recent Results.

## 2018-03-19 NOTE — PROCEDURES
[facility]  Colonoscopy Procedure Report  _______________________________________________________  Patient: Arcelia Bowling                                         Attending Physician: Derinda Shone, MD    Patient ID: [id number]                                      Referring Physician: Antionette De MD    Exam Date: March 19, 2018 _______________________________________________________      Introduction: A  75 y.o. female patient with End Stage diabetic Renal Disease on Hemodyalsis, presents for inpatient Colonoscopy    Indications: Severe recurring hem positive iron deficiency anemia 6.9 but on Molina 10, 2018 was 6. 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 Her last colonoscopy was > 10 years ago and she has been chronically constipated small hard and large stools pellets with incomplete evacuation. 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 Preparation: EKG, pulse, pulse oximetry and blood pressure were monitored throughout the procedure. ASA Classification: Class 3 - . The heart is an S1-S2 and regular heart rate and rhythm. Lungs are clear to auscultation and percussion. Abdomen is soft, nondistended, and nontender. Mental Status: awake, alert, and oriented to person, place, and time    Medications:  · Versed 4 mg IV throughout the procedure. Rectal Exam: Significantly Decreased anal sphincter tone. No Blood. Pathology Specimens: No specimens removed. Procedure: The colonoscope was passed with ease through the anus under direct visualization and advanced to the cecum and 15 cm inside the terminal ileum. The patient required positioning on the back to aid in the passage of the scope. The scope was withdrawn and the mucosa was carefully examined. The quality of the preparation was good. The views were excellent. The patient's toleration of the procedure was excellent. Retroflexion was preformed in the ascending colon and hepatic flexure. The exam was done twice to the cecum. Total time is 27 minutes and withdrawal time is 18 minutes. Findings:    Rectum:   Medium sized Internal hemorrhoids. Sigmoid:   Slightly tortuous sigmoid colon. Few small plaques of hyperplastic flat hyperplastic plaques in the proximal sigmoid at 30 cm. Descending Colon:   Normal  Transverse Colon:   Normal  Ascending Colon:   Multiple non threatening AVM in the right colon,(cecum, ascending colon and the hepatic flexure none of them appear to be the source of the bleeding. Cecum:   Significantly Decreased anal sphincter tone. Medium sized Internal hemorrhoids. Slightly tortuous sigmoid colon. Few small plaques of hyperplastic flat hyperplastic plaques in the proximal sigmoid at 30 cm. Multiple non threatening AVM in the right colon,(cecum, ascending colon and the hepatic flexure none of them appear to be the source of the bleeding. Terminal Ileum:   Normal      Unplanned Events: There were no unplanned events. Estimated Blood Loss: None  Impressions:  Significantly Decreased anal sphincter tone. Medium sized Internal hemorrhoids. Slightly tortuous sigmoid colon. Few small plaques of hyperplastic flat hyperplastic plaques in the proximal sigmoid at 30 cm. Multiple non threatening AVM in the right colon,(cecum, ascending colon and the hepatic flexure none of them appear to be the source of the bleeding. Normal Mucosa. No diverticula or polyps found. Complications: None; patient tolerated the procedure well. Recommendations:  · Discharge home today. · Resume diabetic renal diet. · Colonoscopy recommendation in 10 years. · Recommend capsule enteroscopy to r/o other AVM in the small bowel? ? This procedure could be done as an outpatient at my office.   · Take Miralax and or Stool softeners on regular basis to help with constipation    Procedure Codes:    COLONOSCOPY [TBD1034 (Type: Custom)]  Endoscope Information:  Model Number(s)    [id number]       Assistant: None      Signed By: Maxim Fields MD Date: March 19, 2018

## 2018-03-19 NOTE — DISCHARGE SUMMARY
Discharge Summary    Patient: Megan Tidwell MRN: [id number]  CSN: [id number]    YOB: 1950  Age: 75 y.o. Sex: female    DOA: 3/16/2018 LOS:  LOS: 3 days   Discharge Date:      Primary Care Provider:  Savannah Farrell MD    Admission Diagnoses: Rectal bleed  Symptomatic anemia  Dizziness  Generalized weakness  ESRD (end stage renal disease) ([facility]  anemia     Discharge Diagnoses:    Hospital Problems  Date Reviewed: 12/15/2017          Codes Class Noted POA    Dizziness ICD-10-CM: R42  ICD-9-CM: 780.4  3/16/2018 Unknown        Generalized weakness ICD-10-CM: R53.1  ICD-9-CM: 780.79  3/16/2018 Unknown        Rectal bleed ICD-10-CM: K62.5  ICD-9-CM: 569.3  3/16/2018 Unknown        Symptomatic anemia ICD-10-CM: D64.9  ICD-9-CM: 285.9  3/16/2018 Unknown        ESRD (end stage renal disease) ([facility] ICD-10-CM: N18.6  ICD-9-CM: 585.6  12/15/2017 Unknown        Diabetes mellitus type 2, controlled ([facility] ICD-10-CM: E11.9  ICD-9-CM: 250.00  12/15/2017 Yes        HTN (hypertension) ICD-10-CM: I10  ICD-9-CM: 401.9  12/15/2017 Yes              Discharge Condition: Stable    Discharge Medications:     Current Discharge Medication List      CONTINUE these medications which have NOT CHANGED    Details   nystatin (MYCOSTATIN) powder Apply  to affected area two (2) times a day. Qty: 1 Bottle, Refills: 0      pantoprazole (PROTONIX) 40 mg tablet Take 1 Tab by mouth daily. Qty: 30 Tab, Refills: 2      cholecalciferol (VITAMIN D3) 1,000 unit tablet Take 2,000 Units by mouth daily. ferrous sulfate 325 mg (65 mg iron) tablet Take 325 mg by mouth three (3) times daily (with meals). lactulose (CHRONULAC) 10 gram/15 mL solution Take 20 g by mouth daily as needed. sevelamer (RENAGEL) 800 mg tablet Take 1,600 mg by mouth three (3) times daily (with meals). b complex-vitamin c-folic acid (NEPHROCAPS) 1 mg capsule Take 1 Cap by mouth daily.       acetaminophen (TYLENOL) 325 mg tablet Take 325 mg by mouth every six (6) hours as needed for Pain. carboxymethylcellulose sodium (REFRESH PLUS) 0.5 % dpet Administer 1 Drop to both eyes as needed. hydrOXYzine HCl (ATARAX) 10 mg tablet Take 10 mg by mouth nightly as needed for Itching. albuterol (PROVENTIL HFA, VENTOLIN HFA, PROAIR HFA) 90 mcg/actuation inhaler Take 2 Puffs by inhalation every four (4) hours as needed for Wheezing. hydrALAZINE (APRESOLINE) 25 mg tablet Take 25 mg by mouth three (3) times daily. metoprolol succinate (TOPROL XL) 50 mg XL tablet Take 25 mg by mouth daily. AFLIBERCEPT INTRAVITREAL 1 Ampule by IntraVITreal route every twenty-eight (28) days. Procedures : colonoscopy     Consults: Gastroenterology and Nephrology      PHYSICAL EXAM   Visit Vitals    /67    Pulse 80    Temp 97.5 °F (36.4 °C)    Resp 17    Ht 5' 3\" (1.6 m)    Wt 77 kg (169 lb 12.1 oz)    SpO2 100%    BMI 30.07 kg/m2     General: Awake, cooperative, no acute distress    HEENT: NC, Atraumatic. PERRLA, EOMI. Anicteric sclerae. Lungs:  CTA Bilaterally. No Wheezing/Rhonchi/Rales. Heart:  Regular  rhythm,  +murmur, No Rubs, No Gallops  Abdomen: Soft, Non distended, Non tender. +Bowel sounds,   Extremities: No c/c/e  Psych:   Not anxious or agitated. Neurologic:  No acute neurological deficits. Admission HPI :   Franco Astudillo is a 75 y.o. female who hx of DM, HTN, chronic anemia was sent to ER per due to abnormal lab. She was told her h/h was low. She reported on and off dizziness. She was admitted recently due to gi bleeding, upper egd was done per Dr. Alexx Mcintyre. She reported on and off dizziness, no chest pain. Occult stool was positive. H/h 6.9/24.2 . Reported one time black stool since discharge and constipation      Denies any slurred speech/headache/cp/n/v/blurred vission/d/c/palpitation/gait change.  Denies smoking/ any alcohol or drug use    Hospital Course :   Since she was admitted, h/h was monitored closely. ppi was given , blood transfusion was given for symptomatic anemia. Colonoscopy was performed, no active bleeding noted, GI recommended as followings    · Resume diabetic renal diet. · Colonoscopy recommendation in 10 years. · Recommend capsule enteroscopy to r/o other AVM in the small bowel? ? This procedure could be done as an outpatient at my office. Renal on board for HD and she tolerated procedure very well. Activity: Activity as tolerated    Diet: Diabetic Diet and Renal Diet    Follow-up: pcm and hd clinic     Disposition: home     Minutes spent on discharge: 45 min       Labs: Results:       Chemistry Recent Labs      03/19/18   0343  03/18/18   0415  03/17/18   1045   GLU  87  92  168*   NA  141  139  140   K  3.4*  3.2*  3.7   CL  101  100  100   CO2  28  31  33*   BUN  27*  24*  22*   CREA  6.38*  5.40*  4.31*   CA  8.4*  9.0  8.8   AGAP  12  8  7   BUCR  4*  4*  5*   ALB   --   3.0*   --       CBC w/Diff Recent Labs      03/19/18   0834  03/19/18   0343  03/18/18   1225  03/18/18   0415   03/17/18   1045   WBC   --   4.6   --   5.5   --   5.7   RBC   --   3.72*   --   3.40*   --   3.44*   HGB  10.0*  9.8*  10.4*  9.1*   < >  9.3*   HCT  33.1*  33.2*  34.7*  30.5*   < >  30.7*   PLT   --   132*   --   153   --   136   GRANS   --   69   --   67   --   75*   LYMPH   --   16*   --   18*   --   15*   EOS   --   4   --   4   --   3    < > = values in this interval not displayed. Cardiac Enzymes No results for input(s): CPK, CKND1, NICOLE in the last 72 hours. No lab exists for component: CKRMB, TROIP   Coagulation No results for input(s): PTP, INR, APTT in the last 72 hours. No lab exists for component: INREXT    Lipid Panel No results found for: CHOL, CHOLPOCT, CHOLX, CHLST, CHOLV, 788548, HDL, LDL, LDLC, DLDLP, 605253, VLDLC, VLDL, TGLX, TRIGL, TRIGP, TGLPOCT, CHHD, CHHDX   BNP No results for input(s): BNPP in the last 72 hours.    Liver Enzymes Recent Labs      03/18/18   0432 ALB  3.0*      Thyroid Studies No results found for: T4, T3U, TSH, TSHEXT         Significant Diagnostic Studies: Nm Acute Gi Bleed Scan    Result Date: 3/9/2018  EXAM: Nuclear medicine acute GI bleeding scan INDICATION: GI bleed COMPARISON: None. _______________ FINDINGS: Following the administration of 32 mCi of technetium 99 M red blood cells injected in the left wrist, abdominal imaging was performed. There is no abnormal focus of radiotracer activity which increases with time to suggest intraluminal hemorrhage. _______________     IMPRESSION: No definite evidence of acute intraluminal hemorrhage     Xr Chest Port    Result Date: 3/16/2018  EXAM: One-view chest CLINICAL HISTORY: dizziness , COMPARISON: Several prior radiographs FINDINGS: Frontal view of the chest demonstrate clear lungs. Cardiac silhouette is enlarged, stable in size and contour. No acute bony or soft tissue abnormality. Right approach dialysis type catheter tip in lower SVC. IMPRESSION: Stable enlarged cardiac silhouette. No acute pulmonary process identified. Xr Forearm Rt 1 V    Result Date: 3/18/2018  INDICATION: Arm pain. TECHNIQUE: Single view of the right forearm. COMPARISON: None. FINDINGS: Normal mineralization and alignment without acute fracture or osseous abnormality identified on a single view. Vascular calcifications. Intravenous line present. IMPRESSION: No acute osseous abnormality identified on a single view of the right forearm. Xr Humerus Rt 1 V    Result Date: 3/18/2018  INDICATION: Arm pain. TECHNIQUE: Single view of the right humerus. COMPARISON: None. FINDINGS: Normal mineralization and alignment without acute fracture or osseous abnormality. Joints and soft tissues appear normal.     IMPRESSION: No acute osseous abnormality is evident on a single view of the right humerus.             [facility]     CC: Savannah Farrell MD

## 2018-03-20 ENCOUNTER — HOME CARE VISIT (OUTPATIENT)
Dept: HOME HEALTH SERVICES | Facility: HOME HEALTH | Age: 68
End: 2018-03-20

## 2018-03-23 ENCOUNTER — HOME CARE VISIT (OUTPATIENT)
Dept: HOME HEALTH SERVICES | Facility: HOME HEALTH | Age: 68
End: 2018-03-23

## 2018-03-26 ENCOUNTER — HOME CARE VISIT (OUTPATIENT)
Dept: HOME HEALTH SERVICES | Facility: HOME HEALTH | Age: 68
End: 2018-03-26

## 2018-05-02 ENCOUNTER — APPOINTMENT (OUTPATIENT)
Dept: GENERAL RADIOLOGY | Age: 68
End: 2018-05-02
Attending: INTERNAL MEDICINE
Payer: MEDICARE

## 2018-05-02 ENCOUNTER — HOSPITAL ENCOUNTER (OUTPATIENT)
Age: 68
Setting detail: OBSERVATION
Discharge: HOME | End: 2018-05-03
Attending: INTERNAL MEDICINE | Admitting: INTERNAL MEDICINE
Payer: MEDICARE

## 2018-05-02 DIAGNOSIS — N18.5 CRF (CHRONIC RENAL FAILURE), STAGE 5 (HCC): ICD-10-CM

## 2018-05-02 DIAGNOSIS — R19.5 HEME + STOOL: ICD-10-CM

## 2018-05-02 DIAGNOSIS — K64.1 GRADE II HEMORRHOIDS: ICD-10-CM

## 2018-05-02 DIAGNOSIS — R42 DIZZINESS: ICD-10-CM

## 2018-05-02 DIAGNOSIS — D64.9 SEVERE ANEMIA: Primary | ICD-10-CM

## 2018-05-02 LAB
ALBUMIN SERPL-MCNC: 3.5 G/DL (ref 3.4–5)
ALBUMIN/GLOB SERPL: 1.1 {RATIO} (ref 0.8–1.7)
ALP SERPL-CCNC: 189 U/L (ref 45–117)
ALT SERPL-CCNC: 17 U/L (ref 13–56)
ANION GAP SERPL CALC-SCNC: 5 MMOL/L (ref 3–18)
APTT PPP: 31.4 SEC (ref 23–36.4)
AST SERPL-CCNC: 16 U/L (ref 15–37)
BASOPHILS # BLD: 0 K/UL (ref 0–0.06)
BASOPHILS NFR BLD: 1 % (ref 0–2)
BILIRUB SERPL-MCNC: 0.4 MG/DL (ref 0.2–1)
BUN SERPL-MCNC: 17 MG/DL (ref 7–18)
BUN/CREAT SERPL: 5 (ref 12–20)
CALCIUM SERPL-MCNC: 8.6 MG/DL (ref 8.5–10.1)
CHLORIDE SERPL-SCNC: 103 MMOL/L (ref 100–108)
CK MB CFR SERPL CALC: 4.6 % (ref 0–4)
CK MB SERPL-MCNC: 3.2 NG/ML (ref 5–25)
CK SERPL-CCNC: 69 U/L (ref 26–192)
CO2 SERPL-SCNC: 35 MMOL/L (ref 21–32)
CREAT SERPL-MCNC: 3.42 MG/DL (ref 0.6–1.3)
DIFFERENTIAL METHOD BLD: ABNORMAL
EOSINOPHIL # BLD: 0.1 K/UL (ref 0–0.4)
EOSINOPHIL NFR BLD: 3 % (ref 0–5)
ERYTHROCYTE [DISTWIDTH] IN BLOOD BY AUTOMATED COUNT: 18.8 % (ref 11.6–14.5)
GLOBULIN SER CALC-MCNC: 3.2 G/DL (ref 2–4)
GLUCOSE BLD STRIP.AUTO-MCNC: 238 MG/DL (ref 70–110)
GLUCOSE BLD STRIP.AUTO-MCNC: 261 MG/DL (ref 70–110)
GLUCOSE SERPL-MCNC: 278 MG/DL (ref 74–99)
HCT VFR BLD AUTO: 21.6 % (ref 35–45)
HGB BLD-MCNC: 6.2 G/DL (ref 12–16)
INR PPP: 1.1 (ref 0.8–1.2)
LYMPHOCYTES # BLD: 0.7 K/UL (ref 0.9–3.6)
LYMPHOCYTES NFR BLD: 20 % (ref 21–52)
MCH RBC QN AUTO: 29 PG (ref 24–34)
MCHC RBC AUTO-ENTMCNC: 28.7 G/DL (ref 31–37)
MCV RBC AUTO: 100.9 FL (ref 74–97)
MONOCYTES # BLD: 0.3 K/UL (ref 0.05–1.2)
MONOCYTES NFR BLD: 10 % (ref 3–10)
NEUTS SEG # BLD: 2.3 K/UL (ref 1.8–8)
NEUTS SEG NFR BLD: 66 % (ref 40–73)
PLATELET # BLD AUTO: 131 K/UL (ref 135–420)
PMV BLD AUTO: 9.6 FL (ref 9.2–11.8)
POTASSIUM SERPL-SCNC: 3 MMOL/L (ref 3.5–5.5)
PROT SERPL-MCNC: 6.7 G/DL (ref 6.4–8.2)
PROTHROMBIN TIME: 14.1 SEC (ref 11.5–15.2)
RBC # BLD AUTO: 2.14 M/UL (ref 4.2–5.3)
RBC MORPH BLD: ABNORMAL
SODIUM SERPL-SCNC: 143 MMOL/L (ref 136–145)
TROPONIN I SERPL-MCNC: 0.07 NG/ML (ref 0–0.06)
WBC # BLD AUTO: 3.4 K/UL (ref 4.6–13.2)

## 2018-05-02 PROCEDURE — P9016 RBC LEUKOCYTES REDUCED: HCPCS | Performed by: INTERNAL MEDICINE

## 2018-05-02 PROCEDURE — 93005 ELECTROCARDIOGRAM TRACING: CPT

## 2018-05-02 PROCEDURE — 86922 COMPATIBILITY TEST ANTIGLOB: CPT | Performed by: INTERNAL MEDICINE

## 2018-05-02 PROCEDURE — 74011636637 HC RX REV CODE- 636/637: Performed by: INTERNAL MEDICINE

## 2018-05-02 PROCEDURE — 96365 THER/PROPH/DIAG IV INF INIT: CPT

## 2018-05-02 PROCEDURE — 74011250636 HC RX REV CODE- 250/636: Performed by: INTERNAL MEDICINE

## 2018-05-02 PROCEDURE — 86900 BLOOD TYPING SEROLOGIC ABO: CPT | Performed by: INTERNAL MEDICINE

## 2018-05-02 PROCEDURE — 74011250637 HC RX REV CODE- 250/637: Performed by: INTERNAL MEDICINE

## 2018-05-02 PROCEDURE — 77030013169 SET IV BLD ICUM -A

## 2018-05-02 PROCEDURE — 85025 COMPLETE CBC W/AUTO DIFF WBC: CPT | Performed by: INTERNAL MEDICINE

## 2018-05-02 PROCEDURE — 82550 ASSAY OF CK (CPK): CPT | Performed by: INTERNAL MEDICINE

## 2018-05-02 PROCEDURE — 85730 THROMBOPLASTIN TIME PARTIAL: CPT | Performed by: INTERNAL MEDICINE

## 2018-05-02 PROCEDURE — 82962 GLUCOSE BLOOD TEST: CPT

## 2018-05-02 PROCEDURE — 86920 COMPATIBILITY TEST SPIN: CPT | Performed by: INTERNAL MEDICINE

## 2018-05-02 PROCEDURE — 99285 EMERGENCY DEPT VISIT HI MDM: CPT

## 2018-05-02 PROCEDURE — 80053 COMPREHEN METABOLIC PANEL: CPT | Performed by: INTERNAL MEDICINE

## 2018-05-02 PROCEDURE — 74011636637 HC RX REV CODE- 636/637: Performed by: HOSPITALIST

## 2018-05-02 PROCEDURE — 99218 HC RM OBSERVATION: CPT

## 2018-05-02 PROCEDURE — 86921 COMPATIBILITY TEST INCUBATE: CPT | Performed by: INTERNAL MEDICINE

## 2018-05-02 PROCEDURE — 85610 PROTHROMBIN TIME: CPT | Performed by: INTERNAL MEDICINE

## 2018-05-02 PROCEDURE — 36430 TRANSFUSION BLD/BLD COMPNT: CPT

## 2018-05-02 PROCEDURE — 86870 RBC ANTIBODY IDENTIFICATION: CPT | Performed by: INTERNAL MEDICINE

## 2018-05-02 PROCEDURE — 71045 X-RAY EXAM CHEST 1 VIEW: CPT

## 2018-05-02 RX ORDER — SODIUM CHLORIDE 9 MG/ML
250 INJECTION, SOLUTION INTRAVENOUS AS NEEDED
Status: DISCONTINUED | OUTPATIENT
Start: 2018-05-02 | End: 2018-05-03 | Stop reason: HOSPADM

## 2018-05-02 RX ORDER — DIPHENHYDRAMINE HCL 25 MG
25 CAPSULE ORAL
Status: COMPLETED | OUTPATIENT
Start: 2018-05-02 | End: 2018-05-02

## 2018-05-02 RX ORDER — METOPROLOL SUCCINATE 25 MG/1
25 TABLET, EXTENDED RELEASE ORAL DAILY
Status: DISCONTINUED | OUTPATIENT
Start: 2018-05-03 | End: 2018-05-03

## 2018-05-02 RX ORDER — ACETAMINOPHEN AND CODEINE PHOSPHATE 300; 30 MG/1; MG/1
1 TABLET ORAL
Status: COMPLETED | OUTPATIENT
Start: 2018-05-02 | End: 2018-05-02

## 2018-05-02 RX ORDER — PANTOPRAZOLE SODIUM 40 MG/1
40 TABLET, DELAYED RELEASE ORAL
Status: DISCONTINUED | OUTPATIENT
Start: 2018-05-03 | End: 2018-05-03 | Stop reason: HOSPADM

## 2018-05-02 RX ORDER — FAMOTIDINE 20 MG/50ML
20 INJECTION, SOLUTION INTRAVENOUS
Status: COMPLETED | OUTPATIENT
Start: 2018-05-02 | End: 2018-05-02

## 2018-05-02 RX ORDER — HYDRALAZINE HYDROCHLORIDE 25 MG/1
25 TABLET, FILM COATED ORAL 3 TIMES DAILY
Status: DISCONTINUED | OUTPATIENT
Start: 2018-05-02 | End: 2018-05-03 | Stop reason: HOSPADM

## 2018-05-02 RX ORDER — INSULIN LISPRO 100 [IU]/ML
INJECTION, SOLUTION INTRAVENOUS; SUBCUTANEOUS
Status: DISCONTINUED | OUTPATIENT
Start: 2018-05-02 | End: 2018-05-03 | Stop reason: HOSPADM

## 2018-05-02 RX ADMIN — ACETAMINOPHEN AND CODEINE PHOSPHATE 1 TABLET: 300; 30 TABLET ORAL at 15:19

## 2018-05-02 RX ADMIN — INSULIN LISPRO 4 UNITS: 100 INJECTION, SOLUTION INTRAVENOUS; SUBCUTANEOUS at 21:45

## 2018-05-02 RX ADMIN — FAMOTIDINE 20 MG: 20 INJECTION, SOLUTION INTRAVENOUS at 14:28

## 2018-05-02 RX ADMIN — INSULIN HUMAN 4 UNITS: 100 INJECTION, SOLUTION PARENTERAL at 19:12

## 2018-05-02 RX ADMIN — HYDRALAZINE HYDROCHLORIDE 25 MG: 25 TABLET, FILM COATED ORAL at 21:39

## 2018-05-02 RX ADMIN — DIPHENHYDRAMINE HYDROCHLORIDE 25 MG: 25 CAPSULE ORAL at 15:19

## 2018-05-02 NOTE — H&P
History & Physical    Patient: Kate Zapien MRN: [id number]  CSN: [id number]    YOB: 1950  Age: 75 y.o. Sex: female      DOA: 5/2/2018  Primary Care Provider:  Savannah Farrell MD      Assessment/Plan     76 y/o female with symptomatic anemia. Hx of chronic anemia and gi bleeding. Up to date on EGD and colonoscopy. Both completed this calander year. 1.  Anemia. 2.  Possible GI bleeding. Occult blood testing pending. 3.  ESRD. 4. ISAAC. 5.  HTN. -Transfuse.  -Follow volume status.  -Had HD today. Will need HD in the next day or two. Follows with Dr. Chip Field group.  -Follow Hgb.  -Test stool for occult blood. Overall seems like a chronic problem that needs to be tended to by gi as an outpatient e.g. capsule endoscopy etc.  Her anemia should/could be managed outpatient. Can receive blood at [facility] and or with dialysis. -DVT px scd. Hold off on anticoagulants now.   -Dispo - Home tomorrow most likely. Corazon Palencia to get home fast.       CC: Anemia       HPI:     Kate Zapien is a 75 y.o. female on dialysis who was referred to the ER for low hemoglobin. Hx of chronic anemia requiring transfusions from time to time. Was here not too long ago. Received blood and underwent an colonoscopy with Dr. Jn Márquez. Hemorrhoids noted. Had an EGD in January 2018. 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 Was stabilized and relased with instruction to f/u with GI and complete an outpatient capsule endoscopy.       Past Medical History:   Diagnosis Date    Arthritis     Asthma     Chronic kidney disease     Chronic pain     Cirrhosis ([facility] [address] 12/17/2017    Diabetes ([facility] [address] diet controlled    GERD (gastroesophageal reflux disease)     Hypertension        Past Surgical History:   Procedure Laterality Date    COLONOSCOPY N/A 1/12/2018    COLONOSCOPY performed by Rosamaria Lara MD at [address] [facility] COLONOSCOPY N/A 3/19/2018    COLONOSCOPY performed by Clark Duarte MD at [address] HX GYN  c section       History reviewed. No pertinent family history. Social History     Social History    Marital status: SINGLE     Spouse name: N/A    Number of children: N/A    Years of education: N/A     Social History Main Topics    Smoking status: Never Smoker    Smokeless tobacco: Never Used    Alcohol use No    Drug use: No    Sexual activity: Not Asked     Other Topics Concern    None     Social History Narrative       Prior to Admission medications    Medication Sig Start Date End Date Taking? Authorizing Provider   nystatin (MYCOSTATIN) powder Apply  to affected area two (2) times a day. 3/12/18   Brijesh Hendrickson PA-C   pantoprazole (PROTONIX) 40 mg tablet Take 1 Tab by mouth daily. 1/13/18   Wendy Anaya MD   cholecalciferol (VITAMIN D3) 1,000 unit tablet Take 2,000 Units by mouth daily. Historical Provider   ferrous sulfate 325 mg (65 mg iron) tablet Take 325 mg by mouth three (3) times daily (with meals). Historical Provider   hydrALAZINE (APRESOLINE) 25 mg tablet Take 25 mg by mouth three (3) times daily. Historical Provider   lactulose (CHRONULAC) 10 gram/15 mL solution Take 20 g by mouth daily as needed. Historical Provider   metoprolol succinate (TOPROL XL) 50 mg XL tablet Take 25 mg by mouth daily. Historical Provider   sevelamer (RENAGEL) 800 mg tablet Take 1,600 mg by mouth three (3) times daily (with meals). Historical Provider   b complex-vitamin c-folic acid (NEPHROCAPS) 1 mg capsule Take 1 Cap by mouth daily. Historical Provider   acetaminophen (TYLENOL) 325 mg tablet Take 325 mg by mouth every six (6) hours as needed for Pain. Historical Provider   AFLIBERCEPT INTRAVITREAL 1 Ampule by IntraVITreal route every twenty-eight (28) days.     Historical Provider   carboxymethylcellulose sodium (REFRESH PLUS) 0.5 % dpet Administer 1 Drop to both eyes as needed. Historical Provider   hydrOXYzine HCl (ATARAX) 10 mg tablet Take 10 mg by mouth nightly as needed for Itching. Historical Provider   albuterol (PROVENTIL HFA, VENTOLIN HFA, PROAIR HFA) 90 mcg/actuation inhaler Take 2 Puffs by inhalation every four (4) hours as needed for Wheezing. Historical Provider       Allergies   Allergen Reactions    Aspirin Other (comments)     GI bleed    Heparin (Porcine) Other (comments)     GI bleed    Metformin Other (comments)     swelling    Norvasc [Amlodipine] Rash       Review of Systems  Gen: +fatigue. Heent: No headache, rhinorrhea, epistaxis, ear pain, hearing loss, sinus pain, neck pain/stiffness, sore throat. Heart: No chest pain, palpitations, SARKAR, pnd, or orthopnea. Resp: No cough, hemoptysis, wheezing and shortness of breath. GI: No nausea, vomiting, diarrhea, constipation, melena or hematochezia. : No urinary obstruction, dysuria or hematuria. Derm: No rash, new skin lesion or pruritis. Musc/skeletal: no bone or joint complains. Vasc: No edema, cyanosis or claudication. Endo: No heat/cold intolerance, no polyuria,polydipsia or polyphagia. Neuro: No unilateral weakness, numbness, tingling. No seizures. Heme: No easy bruising or bleeding. Physical Exam:     Physical Exam:  Visit Vitals    BP (!) 107/91    Pulse 79    Temp 98 °F (36.7 °C)    Resp 17    Ht 5' 3\" (1.6 m)    Wt 68.9 kg (152 lb)    SpO2 100%    BMI 26.93 kg/m2      O2 Device: Room air    Temp (24hrs), Av °F (36.7 °C), Min:98 °F (36.7 °C), Max:98 °F (36.7 °C)             General:  Awake, cooperative, no distress. Head:  Normocephalic, without obvious abnormality, atraumatic. Eyes:  Conjunctivae/corneas clear, sclera anicteric, PERRL, EOMs intact. Nose: Nares normal. No drainage or sinus tenderness. Throat: Lips, mucosa, and tongue normal.    Neck: Supple, symmetrical, trachea midline, no adenopathy.        Lungs:   Clear to auscultation bilaterally. Heart:  Regular rate and rhythm, S1, S2 normal, no murmur, click, rub or gallop. Abdomen: Soft, non-tender. Bowel sounds normal. No masses,  No organomegaly. Extremities: Extremities normal, atraumatic, no cyanosis or edema. Capillary refill normal.   Pulses: 2+ and symmetric all extremities. Skin: Skin color pink/pale/mottled/flushed, turgor normal. No rashes or lesions   Neurologic: CNII-XII intact. No focal motor or sensory deficit. Labs Reviewed: All lab results for the last 24 hours reviewed. Recent Results (from the past 24 hour(s))   CBC WITH AUTOMATED DIFF    Collection Time: 05/02/18  1:25 PM   Result Value Ref Range    WBC 3.4 (L) 4.6 - 13.2 K/uL    RBC 2.14 (L) 4.20 - 5.30 M/uL    HGB 6.2 (L) 12.0 - 16.0 g/dL    HCT 21.6 (L) 35.0 - 45.0 %    .9 (H) 74.0 - 97.0 FL    MCH 29.0 24.0 - 34.0 PG    MCHC 28.7 (L) 31.0 - 37.0 g/dL    RDW 18.8 (H) 11.6 - 14.5 %    PLATELET 991 (L) 483 - 420 K/uL    MPV 9.6 9.2 - 11.8 FL    NEUTROPHILS 66 40 - 73 %    LYMPHOCYTES 20 (L) 21 - 52 %    MONOCYTES 10 3 - 10 %    EOSINOPHILS 3 0 - 5 %    BASOPHILS 1 0 - 2 %    ABS. NEUTROPHILS 2.2 1.8 - 8.0 K/UL    ABS. LYMPHOCYTES 0.7 (L) 0.9 - 3.6 K/UL    ABS. MONOCYTES 0.3 0.05 - 1.2 K/UL    ABS. EOSINOPHILS 0.1 0.0 - 0.4 K/UL    ABS. BASOPHILS 0.0 0.0 - 0.06 K/UL    DF AUTOMATED     METABOLIC PANEL, COMPREHENSIVE    Collection Time: 05/02/18  1:25 PM   Result Value Ref Range    Sodium 143 136 - 145 mmol/L    Potassium 3.0 (L) 3.5 - 5.5 mmol/L    Chloride 103 100 - 108 mmol/L    CO2 35 (H) 21 - 32 mmol/L    Anion gap 5 3.0 - 18 mmol/L    Glucose 278 (H) 74 - 99 mg/dL    BUN 17 7.0 - 18 MG/DL    Creatinine 3.42 (H) 0.6 - 1.3 MG/DL    BUN/Creatinine ratio 5 (L) 12 - 20      GFR est AA 16 (L) >60 ml/min/1.73m2    GFR est non-AA 13 (L) >60 ml/min/1.73m2    Calcium 8.6 8.5 - 10.1 MG/DL    Bilirubin, total 0.4 0.2 - 1.0 MG/DL    ALT (SGPT) 17 13 - 56 U/L    AST (SGOT) 16 15 - 37 U/L    Alk. phosphatase 189 (H) 45 - 117 U/L    Protein, total 6.7 6.4 - 8.2 g/dL    Albumin 3.5 3.4 - 5.0 g/dL    Globulin 3.2 2.0 - 4.0 g/dL    A-G Ratio 1.1 0.8 - 1.7     CARDIAC PANEL,(CK, CKMB & TROPONIN)    Collection Time: 05/02/18  1:25 PM   Result Value Ref Range    CK 69 26 - 192 U/L    CK - MB 3.2 <3.6 ng/ml    CK-MB Index 4.6 (H) 0.0 - 4.0 %    Troponin-I, Qt. 0.07 (H) 0.00 - 0.06 NG/ML   PROTHROMBIN TIME + INR    Collection Time: 05/02/18  1:25 PM   Result Value Ref Range    Prothrombin time 14.1 11.5 - 15.2 sec    INR 1.1 0.8 - 1.2     PTT    Collection Time: 05/02/18  1:25 PM   Result Value Ref Range    aPTT 31.4 23.0 - 36.4 SEC   TYPE & SCREEN    Collection Time: 05/02/18  1:25 PM   Result Value Ref Range    Crossmatch Expiration 05/05/2018     ABO/Rh(D) PENDING     Antibody screen PENDING    EKG, 12 LEAD, INITIAL    Collection Time: 05/02/18  2:06 PM   Result Value Ref Range    Ventricular Rate 76 BPM    Atrial Rate 76 BPM    P-R Interval 186 ms    QRS Duration 156 ms    Q-T Interval 482 ms    QTC Calculation (Bezet) 542 ms    Calculated P Axis 51 degrees    Calculated R Axis -34 degrees    Calculated T Axis 118 degrees    Diagnosis       Normal sinus rhythm  Left axis deviation  Left ventricular hypertrophy with QRS widening and repolarization abnormality  Abnormal ECG  When compared with ECG of 16-MAR-2018 15:34,  Left bundle branch block is no longer present               CC: Phys Other, MD

## 2018-05-02 NOTE — PROGRESS NOTES
Reason for Admission:   Severe anamia               RRAT Score:   31               Resources/supports as identified by patient/family: family                   Top Challenges facing patient (as identified by patient/family and CM): Finances/Medication cost?                    Transportation? Pt drives               Support system or lack thereof? Living arrangements? Lives with son and his family              Self-care/ADLs/Cognition? Current Advanced Directive/Advance Care Plan:  Full code                          Plan for utilizing home health:   undetermined                       Likelihood of readmission: undetermined                 Transition of Care Plan:  Chart reviewed per ED note pt came to ED with c/o generalized weakness was advised after getting blood drawn to follow up in ED,pt goes to [facility] -[facility] with 0630 time, at this time pt will be admitted for further monitoring waiting on bed.

## 2018-05-02 NOTE — ED NOTES
Monitors removed due to pt non compliance. Pt sitting on side of bed to eat meal tray. Hand sanitizers packet given to pt.

## 2018-05-02 NOTE — IP AVS SNAPSHOT
[address] [address] 
[phone] Patient: Javier Flores MRN: [id number]:[id number] About your hospitalization You were admitted on:  May 2, 2018 You last received care in the:  [address] You were discharged on:  May 3, 2018 Why you were hospitalized Your primary diagnosis was:  Not on File Your diagnoses also included:  Severe Anemia Follow-up Information None Discharge Orders None A check richmond indicates which time of day the medication should be taken. My Medications CONTINUE taking these medications Instructions Each Dose to Equal  
 Morning Noon Evening Bedtime  
 pantoprazole 40 mg tablet Commonly known as:  PROTONIX Your last dose was: Your next dose is: Take 1 Tab by mouth daily. 40 mg ASK your doctor about these medications Instructions Each Dose to Equal  
 Morning Noon Evening Bedtime AFLIBERCEPT INTRAVITREAL Your last dose was: Your next dose is:    
   
   
 1 Ampule by IntraVITreal route every twenty-eight (28) days. 1 Ampule  
    
   
   
   
  
 albuterol 90 mcg/actuation inhaler Commonly known as:  PROVENTIL HFA, VENTOLIN HFA, PROAIR HFA Your last dose was: Your next dose is: Take 2 Puffs by inhalation every four (4) hours as needed for Wheezing. 2 Puff  
    
   
   
   
  
 carboxymethylcellulose sodium 0.5 % Dpet Commonly known as:  REFRESH PLUS Your last dose was: Your next dose is:    
   
   
 Administer 1 Drop to both eyes as needed. 1 Drop  
    
   
   
   
  
 ferrous sulfate 325 mg (65 mg iron) tablet Your last dose was: Your next dose is: Take 325 mg by mouth three (3) times daily (with meals). 325 mg  
    
   
   
   
  
 hydrALAZINE 25 mg tablet Commonly known as:  APRESOLINE Your last dose was: Your next dose is: Take 25 mg by mouth three (3) times daily. 25 mg  
    
   
   
   
  
 hydrOXYzine HCl 10 mg tablet Commonly known as:  ATARAX Your last dose was: Your next dose is: Take 10 mg by mouth nightly as needed for Itching. 10 mg  
    
   
   
   
  
 lactulose 10 gram/15 mL solution Commonly known as:  Junnie Rick Your last dose was: Your next dose is: Take 20 g by mouth daily as needed. 20 g NEPHROCAPS 1 mg capsule Generic drug:  b complex-vitamin c-folic acid Your last dose was: Your next dose is: Take 1 Cap by mouth daily. 1 Cap  
    
   
   
   
  
 nystatin powder Commonly known as:  MYCOSTATIN Your last dose was: Your next dose is:    
   
   
 Apply  to affected area two (2) times a day. sevelamer 800 mg tablet Commonly known as:  RENAGEL Your last dose was: Your next dose is: Take 1,600 mg by mouth three (3) times daily (with meals). 1600 mg  
    
   
   
   
  
 TYLENOL 325 mg tablet Generic drug:  acetaminophen Your last dose was: Your next dose is: Take 325 mg by mouth every six (6) hours as needed for Pain. 325 mg  
    
   
   
   
  
 VITAMIN D3 1,000 unit tablet Generic drug:  cholecalciferol Your last dose was: Your next dose is: Take 2,000 Units by mouth daily. 2000 Units Where to Get Your Medications Information on where to get these meds will be given to you by the nurse or doctor. ! Ask your nurse or doctor about these medications  
  pantoprazole 40 mg tablet Discharge Instructions DISCHARGE SUMMARY from Nurse PATIENT INSTRUCTIONS: 
 
 
F-face looks uneven A-arms unable to move or move unevenly S-speech slurred or non-existent T-time-call 911 as soon as signs and symptoms begin-DO NOT go Back to bed or wait to see if you get better-TIME IS BRAIN. Warning Signs of HEART ATTACK Call 911 if you have these symptoms: 
? Chest discomfort. Most heart attacks involve discomfort in the center of the chest that lasts more than a few minutes, or that goes away and comes back. It can feel like uncomfortable pressure, squeezing, fullness, or pain. ? Discomfort in other areas of the upper body. Symptoms can include pain or discomfort in one or both arms, the back, neck, jaw, or stomach. ? Shortness of breath with or without chest discomfort. ? Other signs may include breaking out in a cold sweat, nausea, or lightheadedness. Don't wait more than five minutes to call [address]! Fast action can save your life. Calling 911 is almost always the fastest way to get lifesaving treatment. Emergency Medical Services staff can begin treatment when they arrive  up to an hour sooner than if someone gets to the hospital by car. The discharge information has been reviewed with the patient. The patient verbalized understanding. Discharge medications reviewed with the patient and appropriate educational materials and side effects teaching were provided. ___________________________________________________________________________________________________________________________________ Patient armband removed and shredded Introducing [facility]! Hubert Khan introduces [facility] patient portal. Now you can access parts of your medical record, email your doctor's office, and request medication refills online.    
 
1. In your internet browser, go to [email or web address] 2. Click on the First Time User? Click Here link in the Sign In box. You will see the New Member Sign Up page. 3. Enter your Viratech Access Code exactly as it appears below. You will not need to use this code after youve completed the sign-up process. If you do not sign up before the expiration date, you must request a new code. · Viratech Access Code: [email or web address] Expires: 6/17/2018  4:17 PM 
 
4. Enter the last four digits of your Social Security Number (xxxx) and Date of Birth (mm/dd/yyyy) as indicated and click Submit. You will be taken to the next sign-up page. 5. Create a [employer] ID. This will be your Viratech login ID and cannot be changed, so think of one that is secure and easy to remember. 6. Create a Viratech password. You can change your password at any time. 7. Enter your Password Reset Question and Answer. This can be used at a later time if you forget your password. 8. Enter your e-mail address. You will receive e-mail notification when new information is available in [address] 9. Click Sign Up. You can now view and download portions of your medical record. 10. Click the Download Summary menu link to download a portable copy of your medical information. If you have questions, please visit the Frequently Asked Questions section of the Viratech website. Remember, Viratech is NOT to be used for urgent needs. For medical emergencies, dial 911. Now available from your iPhone and Android! Introducing Abdelrahman Salmon As a [facility] patient, I wanted to make you aware of our electronic visit tool called Abdelrahman Salmon. [facility] 24/7 allows you to connect within minutes with a medical provider 24 hours a day, seven days a week via a mobile device or tablet or logging into a secure website from your computer. You can access Abdelrahman Salmon from anywhere in the [place]. A virtual visit might be right for you when you have a simple condition and feel like you just dont want to get out of bed, or cant get away from work for an appointment, when your regular [facility] provider is not available (evenings, weekends or holidays), or when youre out of town and need minor care. Electronic visits cost only $49 and if the [facility] 24/7 provider determines a prescription is needed to treat your condition, one can be electronically transmitted to a nearby pharmacy*. Please take a moment to enroll today if you have not already done so. The enrollment process is free and takes just a few minutes. To enroll, please download the [facility] 24/7 chrystal to your tablet or phone, or visit [email or web address] to enroll on your computer. And, as an [address] [place] [address] patient with a [employer] account, the results of your visits will be scanned into your electronic medical record and your primary care provider will be able to view the scanned results. We urge you to continue to see your regular [facility] provider for your ongoing medical care. And while your primary care provider may not be the one available when you seek a [facility] virtual visit, the peace of mind you get from getting a real diagnosis real time can be priceless. For more information on [facility], view our Frequently Asked Questions (FAQs) at [email or web address]. Sincerely, 
 
Sanket Ruano MD 
Chief Medical Officer Johnathon Chavez *:  certain medications cannot be prescribed via [facility] Unresulted Labs-Please follow up with your PCP about these lab tests Order Current Status EKG, 12 LEAD, INITIAL Preliminary result Providers Seen During Your Hospitalization Provider Specialty Primary office phone Reina Jones MD Emergency Medicine [phone] Liliana Geiger MD Internal Medicine [phone] Your Primary Care Physician (PCP) Primary Care Physician Office Phone Office Fax OTHER, PHYS ** None ** ** None ** You are allergic to the following Allergen Reactions Aspirin Other (comments) GI bleed Heparin (Porcine) Other (comments) GI bleed Metformin Other (comments)  
 swelling Norvasc (Amlodipine) Rash Recent Documentation Height Weight Breastfeeding? BMI OB Status Smoking Status 1.6 m 73.3 kg No 28.61 kg/m2 Postmenopausal Never Smoker Emergency Contacts Name Discharge Info Relation Home Work Mobile Fenton,Mejia DISCHARGE CAREGIVER [3] Son [22] [phone] Patient Belongings The following personal items are in your possession at time of discharge: 
  Dental Appliances: None  Visual Aid: None      Home Medications: None   Jewelry: Earrings  Clothing: Sweater, Undergarments, Pants, Footwear    Other Valuables: Cell Phone  Personal Items Sent to Safe: none Discharge Instructions Attachments/References ANEMIA: HEAVY BLEEDING (ENGLISH) ANEMIA (ENGLISH) Patient Handouts Anemia From Heavy Bleeding: Care Instructions Your Care Instructions Anemia means that your body does not have enough red blood cells. Red blood cells carry oxygen around the body. When you have anemia, you may feel dizzy, tired, and weak. You may also feel your heart pounding. For some people, it's hard to focus and think clearly. One common cause of anemia is bleeding. Bleeding from ulcers, hemorrhoids, cancer, or other problems can cause anemia. It may also be caused by heavy menstrual periods. Your treatment may include iron pills. Iron helps your body make hemoglobin. Hemoglobin is the part of the red blood cell that carries oxygen. If you have severe anemia, you may need a blood transfusion to give you red blood cells as quickly as possible.  
Sometimes it takes several months to get iron levels back to normal. 
 Follow-up care is a key part of your treatment and safety. Be sure to make and go to all appointments, and call your doctor if you are having problems. It's also a good idea to know your test results and keep a list of the medicines you take. How can you care for yourself at home? · Be safe with medicines. Take your medicines exactly as prescribed. Call your doctor if you think you are having a problem with your medicine. · Follow your doctor's advice about eating foods that have a lot of iron in them. These include red meat, shellfish, poultry, and eggs. They also include beans, raisins, whole-grain bread, and leafy green vegetables. · Steam your vegetables. This is the best way to prepare them if you want to get as much iron as possible. · Iron pills can cause constipation. If you take them, there are things you can do to avoid constipation. Drink plenty of fluids, eat foods with a lot of fiber, and exercise every day. When should you call for help? Call 911 anytime you think you may need emergency care. For example, call if: 
? · You passed out (lost consciousness). ? · Your stools are maroon or very bloody. ?Call your doctor now or seek immediate medical care if: 
? · You are short of breath. ? · You have new or worse bleeding. ? · You are dizzy or light-headed, or you feel like you may faint. ? Watch closely for changes in your health, and be sure to contact your doctor if: 
? · You feel weaker or more tired than usual.  
? · You do not get better as expected. Where can you learn more? Go to http://rosi-awa.info/. Enter O560 in the search box to learn more about \"Anemia From Heavy Bleeding: Care Instructions. \" Current as of: October 13, 2016 Content Version: 11.4 © 7400-6539 Healthwise, [employer].  Care instructions adapted under license by [employer] (which disclaims liability or warranty for this information). If you have questions about a medical condition or this instruction, always ask your healthcare professional. [address] any warranty or liability for your use of this information. Anemia: Care Instructions Your Care Instructions Anemia is a low level of red blood cells, which carry oxygen throughout your body. Many things can cause anemia. Lack of iron is one of the most common causes. Your body needs iron to make hemoglobin, a substance in red blood cells that carries oxygen from the lungs to your body's cells. Without enough iron, the body produces fewer and smaller red blood cells. As a result, your body's cells do not get enough oxygen, and you feel tired and weak. And you may have trouble concentrating. Bleeding is the most common cause of a lack of iron. You may have heavy menstrual bleeding or bleeding caused by conditions such as ulcers, hemorrhoids, or cancer. Regular use of aspirin or other anti-inflammatory medicines (such as ibuprofen) also can cause bleeding in some people. A lack of iron in your diet also can cause anemia, especially at times when the body needs more iron, such as during pregnancy, infancy, and the teen years. Your doctor may have prescribed iron pills. It may take several months of treatment for your iron levels to return to normal. Your doctor also may suggest that you eat foods that are rich in iron, such as meat and beans. There are many other causes of anemia. It is not always due to a lack of iron. Finding the specific cause of your anemia will help your doctor find the right treatment for you. Follow-up care is a key part of your treatment and safety. Be sure to make and go to all appointments, and call your doctor if you are having problems. It's also a good idea to know your test results and keep a list of the medicines you take. How can you care for yourself at home? · Take your medicines exactly as prescribed. Call your doctor if you think you are having a problem with your medicine. · If your doctor recommends iron pills, take them as directed: ¨ Try to take the pills on an empty stomach about 1 hour before or 2 hours after meals. But you may need to take iron with food to avoid an upset stomach. ¨ Do not take antacids or drink milk or caffeine drinks (such as coffee, tea, or cola) at the same time or within 2 hours of the time that you take your iron. They can make it hard for your body to absorb the iron. ¨ Vitamin C (from food or supplements) helps your body absorb iron. Try taking iron pills with a glass of orange juice or some other food that is high in vitamin C, such as citrus fruits. ¨ Iron pills may cause stomach problems, such as heartburn, nausea, diarrhea, constipation, and cramps. Be sure to drink plenty of fluids, and include fruits, vegetables, and fiber in your diet each day. Iron pills often make your bowel movements dark or green. ¨ If you forget to take an iron pill, do not take a double dose of iron the next time you take a pill. ¨ Keep iron pills out of the reach of small children. An overdose of iron can be very dangerous. · Follow your doctor's advice about eating iron-rich foods. These include red meat, shellfish, poultry, eggs, beans, raisins, whole-grain bread, and leafy green vegetables. · Steam vegetables to help them keep their iron content. When should you call for help? Call 911 anytime you think you may need emergency care. For example, call if: 
? · You have symptoms of a heart attack. These may include: ¨ Chest pain or pressure, or a strange feeling in the chest. 
¨ Sweating. ¨ Shortness of breath. ¨ Nausea or vomiting. ¨ Pain, pressure, or a strange feeling in the back, neck, jaw, or upper belly or in one or both shoulders or arms. ¨ Lightheadedness or sudden weakness. ¨ A fast or irregular heartbeat. After you call 911, the  may tell you to chew 1 adult-strength or 2 to 4 low-dose aspirin. Wait for an ambulance. Do not try to drive yourself. ? · You passed out (lost consciousness). ?Call your doctor now or seek immediate medical care if: 
? · You have new or increased shortness of breath. ? · You are dizzy or lightheaded, or you feel like you may faint. ? · Your fatigue and weakness continue or get worse. ? · You have any abnormal bleeding, such as: 
¨ Nosebleeds. ¨ Vaginal bleeding that is different (heavier, more frequent, at a different time of the month) than what you are used to. ¨ Bloody or black stools, or rectal bleeding. ¨ Bloody or pink urine. ? Watch closely for changes in your health, and be sure to contact your doctor if: 
? · You do not get better as expected. Where can you learn more? Go to http://rosi-awa.info/. Enter R301 in the search box to learn more about \"Anemia: Care Instructions. \" Current as of: October 13, 2016 Content Version: 11.4 © 1596-6203 [employer]. Care instructions adapted under license by "[employer] (which disclaims liability or warranty for this information). If you have questions about a medical condition or this instruction, always ask your healthcare professional. [address] any warranty or liability for your use of this information. Please provide this summary of care documentation to your next provider. Signatures-by signing, you are acknowledging that this After Visit Summary has been reviewed with you and you have received a copy. Patient Signature:  ____________________________________________________________ Date:  ____________________________________________________________  
  
Marva Mcneal Provider Signature:  ____________________________________________________________ Date:  ____________________________________________________________

## 2018-05-02 NOTE — IP AVS SNAPSHOT
Summary of Care Report The Summary of Care report has been created to help improve care coordination. Users with access to [employer] or [address] (Web-based application) may access additional patient information including the Discharge Summary. If you are not currently a [address] user and need more information, please call the number listed below in the [place] [address] section and ask to be connected with Medical Records. Facility Information Name Address Phone [place] [id number] [phone] Patient Information Patient Name Sex NIELS Valladares ([id number]) Female 1950 Discharge Information Admitting Provider Service Area Unit Julia Herrera MD / [address] [place] [facility]/Med / [phone] Discharge Provider Discharge Date/Time Discharge Disposition Destination (none) (none) (none) (none) Patient Language Language ENGLISH [13] Hospital Problems as of 5/3/2018  Reviewed: 12/15/2017  5:49 PM by Chela Max DO Class Noted - Resolved Last Modified POA Active Problems Severe anemia  2018 - Present 2018 by Yenny Narayanan MD Unknown Entered by Yenny Narayanan MD  
  
Non-Hospital Problems as of 5/3/2018  Reviewed: 12/15/2017  5:49 PM by Chela Max DO Class Noted - Resolved Last Modified Active Problems GI bleed  12/15/2017 - Present 3/9/2018 by Liam Ware MD  
  Entered by SUSAN Ricks  
  ESRD (end stage renal disease) ([facility] [address]  12/15/2017 - Present 3/16/2018 by Jordon Barr DO Entered by Chela Max DO   Diabetes mellitus type 2, controlled ([facility] [address]  12/15/2017 - Present 3/16/2018 by Silvia Mccoy MD  
  Entered by Chela Max DO  
  HTN (hypertension)  12/15/2017 - Present 3/16/2018 by Silvia Mccoy MD  
 Entered by Chela Max,  Anemia in chronic kidney disease  12/16/2017 - Present 3/9/2018 by Liam Ware MD  
  Entered by Silvia Mccoy MD  
  Cirrhosis [facility])  12/17/2017 - Present 3/9/2018 by Liam Ware MD  
  Entered by Silvia Mccoy MD  
  Acute blood loss anemia  3/9/2018 - Present 3/9/2018 by Liam Ware MD  
  Entered by Liam Ware MD  
  Dizziness  3/16/2018 - Present 3/16/2018 by Jordon Barr, DO Entered by Jordon Barr, DO Generalized weakness  3/16/2018 - Present 3/16/2018 by Jordon Barr, DO Entered by Jordon Barr, DO  
  Rectal bleed  3/16/2018 - Present 3/16/2018 by Jordon Barr, DO Entered by Jordon Barr, DO Symptomatic anemia  3/16/2018 - Present 3/16/2018 by Jordon Barr, DO Entered by Jordon Barr, DO You are allergic to the following Allergen Reactions Aspirin Other (comments) GI bleed Heparin (Porcine) Other (comments) GI bleed Metformin Other (comments)  
 swelling Norvasc (Amlodipine) Rash Current Discharge Medication List  
  
CONTINUE these medications which have NOT CHANGED Dose & Instructions Dispensing Information Comments  
 pantoprazole 40 mg tablet Commonly known as:  PROTONIX Dose:  40 mg Take 1 Tab by mouth daily. Quantity:  30 Tab Refills:  2 ASK your doctor about these medications Dose & Instructions Dispensing Information Comments AFLIBERCEPT INTRAVITREAL Dose:  1 Ampule 1 Ampule by IntraVITreal route every twenty-eight (28) days. Refills:  0  
   
 albuterol 90 mcg/actuation inhaler Commonly known as:  PROVENTIL HFA, VENTOLIN HFA, PROAIR HFA Dose:  2 Puff Take 2 Puffs by inhalation every four (4) hours as needed for Wheezing. Refills:  0  
   
 carboxymethylcellulose sodium 0.5 % Dpet Commonly known as:  REFRESH PLUS Dose:  1 Drop Administer 1 Drop to both eyes as needed. Refills:  0  
   
 ferrous sulfate 325 mg (65 mg iron) tablet Dose:  325 mg Take 325 mg by mouth three (3) times daily (with meals). Refills:  0  
   
 hydrALAZINE 25 mg tablet Commonly known as:  APRESOLINE Dose:  25 mg Take 25 mg by mouth three (3) times daily. Refills:  0  
   
 hydrOXYzine HCl 10 mg tablet Commonly known as:  ATARAX Dose:  10 mg Take 10 mg by mouth nightly as needed for Itching. Refills:  0  
   
 lactulose 10 gram/15 mL solution Commonly known as:  Laine Basket Dose:  20 g Take 20 g by mouth daily as needed. Refills:  0 NEPHROCAPS 1 mg capsule Generic drug:  b complex-vitamin c-folic acid Dose:  1 Cap Take 1 Cap by mouth daily. Refills:  0  
   
 nystatin powder Commonly known as:  MYCOSTATIN Apply  to affected area two (2) times a day. Quantity:  1 Bottle Refills:  0  
   
 sevelamer 800 mg tablet Commonly known as:  RENAGEL Dose:  1600 mg Take 1,600 mg by mouth three (3) times daily (with meals). Refills:  0  
   
 TYLENOL 325 mg tablet Generic drug:  acetaminophen Dose:  325 mg Take 325 mg by mouth every six (6) hours as needed for Pain. Refills:  0  
   
 VITAMIN D3 1,000 unit tablet Generic drug:  cholecalciferol Dose:  2000 Units Take 2,000 Units by mouth daily. Refills:  0 Follow-up Information None Discharge Instructions DISCHARGE SUMMARY from Nurse PATIENT INSTRUCTIONS: 
 
 
F-face looks uneven A-arms unable to move or move unevenly S-speech slurred or non-existent T-time-call 911 as soon as signs and symptoms begin-DO NOT go Back to bed or wait to see if you get better-TIME IS BRAIN. Warning Signs of HEART ATTACK Call 911 if you have these symptoms: 
? Chest discomfort.  Most heart attacks involve discomfort in the center of the chest that lasts more than a few minutes, or that goes away and comes back. It can feel like uncomfortable pressure, squeezing, fullness, or pain. ? Discomfort in other areas of the upper body. Symptoms can include pain or discomfort in one or both arms, the back, neck, jaw, or stomach. ? Shortness of breath with or without chest discomfort. ? Other signs may include breaking out in a cold sweat, nausea, or lightheadedness. Don't wait more than five minutes to call [address]! Fast action can save your life. Calling 911 is almost always the fastest way to get lifesaving treatment. Emergency Medical Services staff can begin treatment when they arrive  up to an hour sooner than if someone gets to the hospital by car. The discharge information has been reviewed with the patient. The patient verbalized understanding. Discharge medications reviewed with the patient and appropriate educational materials and side effects teaching were provided. ___________________________________________________________________________________________________________________________________ Patient armband removed and shredded Chart Review Routing History Recipient Method Report Sent By Angel Kahn DO Fax: [phone] Phone: [phone] Fax IP Auto Routed Trans Carmen Fields MD [phone] 12/17/2017  7:16 PM 12/17/2017 Boy Sanchez MD  
Phone: [phone] In Basket IP Auto Routed Trans Carmen Fields MD [[id number]] 3/12/2018 11:57 AM 03/12/2018

## 2018-05-02 NOTE — ROUTINE PROCESS
TRANSFER - OUT REPORT:    Verbal report given to QUINTIN Fierro (name) on [facility]  being transferred to Critical access hospital (unit) for routine progression of care       Report consisted of patients Situation, Background, Assessment and   Recommendations(SBAR). Information from the following report(s) SBAR, Kardex, ED Summary, MAR, Recent Results and Cardiac Rhythm NSR was reviewed with the receiving nurse. Lines:   Peripheral IV 05/02/18 Left Forearm (Active)   Site Assessment Clean, dry, & intact 5/2/2018  1:25 PM   Phlebitis Assessment 0 5/2/2018  1:25 PM   Infiltration Assessment 0 5/2/2018  1:25 PM   Dressing Status Clean, dry, & intact 5/2/2018  1:25 PM   Dressing Type Transparent 5/2/2018  1:25 PM   Hub Color/Line Status Green 5/2/2018  1:25 PM        Opportunity for questions and clarification was provided.       Patient transported with:   Registered Nurse

## 2018-05-02 NOTE — ED NOTES
Pt requesting pain medication for her back and Benadryl for itching. Dr. Juan Carlos Jarrett notified.

## 2018-05-02 NOTE — PROGRESS NOTES
1855 TRANSFER - IN REPORT:    Verbal report received from Greg Mims (name) on [facility]  being received from ED(unit) for routine progression of care      Report consisted of patients Situation, Background, Assessment and   Recommendations(SBAR). Information from the following report(s) SBAR, Intake/Output, MAR and Recent Results was reviewed with the receiving nurse. Opportunity for questions and clarification was provided. Assessment completed upon patients arrival to unit and care assumed. Asked RN to recheck blood sugar that was reported to be 270 at 1330 and ask for coverage if necessary so that patient would not have an extremely high blood sugar at 2130. Was told by RN that she would recheck the blood sugar and it could be covered with standing orders on the unit. Attempted to explain to RN that the orders would be sliding scale at 2130. Was told by RN that the patient was no longer an ER patient and was now under the hospitalist care.

## 2018-05-02 NOTE — ED PROVIDER NOTES
EMERGENCY DEPARTMENT HISTORY AND PHYSICAL EXAM    Date: 5/2/2018  Patient Name: Amy Peterson    History of Presenting Illness     Chief Complaint   Patient presents with    Abnormal Lab Results         History Provided By: Patient    Chief Complaint: Abnormal lab results  Duration: 2 Days  Timing:  Acute  Location: N/A  Quality: hematocrit of 28 and hemoglobin of 8.4  Associated Symptoms: generalized weakness, fatigue and dizziness    Additional History (Context):   1:47 PM  Amy Peterson is a 75 y.o. female with PMHx of HTN, arthritis, GERD, diabetes, Cirrhosis, and GERD who presents to the emergency department C/O abnormal lab results (hematocrit of 28 and hemoglobin of 8.4) onset 2 days ago. Associated sxs include generalized weakness, fatigue and dizziness. Pt reports she had her blood drawn 2 days ago at dialysis, was told she had low hemoglobin and was sent here for further evaluation. Pt receives dialysis (MWF) at [facility]. Is unable to recall her nephrologist. Reports Hx of blood transfusions and GI bleed. Last BM was this morning and was green. Pt denies chest pain, SOB, LOC, tobacco/EtOH/illicit drug use, and any other sxs or complaints. PCP: Savannah Farrell MD    Current Facility-Administered Medications   Medication Dose Route Frequency Provider Last Rate Last Dose    0.9% sodium chloride infusion 250 mL  250 mL IntraVENous PRN Alexander Henriquez MD        [START ON 5/3/2018] pantoprazole (PROTONIX) tablet 40 mg  40 mg Oral ACB Herbert Stevenson MD         Current Outpatient Prescriptions   Medication Sig Dispense Refill    nystatin (MYCOSTATIN) powder Apply  to affected area two (2) times a day. 1 Bottle 0    pantoprazole (PROTONIX) 40 mg tablet Take 1 Tab by mouth daily. 30 Tab 2    cholecalciferol (VITAMIN D3) 1,000 unit tablet Take 2,000 Units by mouth daily.  ferrous sulfate 325 mg (65 mg iron) tablet Take 325 mg by mouth three (3) times daily (with meals).       hydrALAZINE (APRESOLINE) 25 mg tablet Take 25 mg by mouth three (3) times daily.  lactulose (CHRONULAC) 10 gram/15 mL solution Take 20 g by mouth daily as needed.  metoprolol succinate (TOPROL XL) 50 mg XL tablet Take 25 mg by mouth daily.  sevelamer (RENAGEL) 800 mg tablet Take 1,600 mg by mouth three (3) times daily (with meals).  b complex-vitamin c-folic acid (NEPHROCAPS) 1 mg capsule Take 1 Cap by mouth daily.  acetaminophen (TYLENOL) 325 mg tablet Take 325 mg by mouth every six (6) hours as needed for Pain.  AFLIBERCEPT INTRAVITREAL 1 Ampule by IntraVITreal route every twenty-eight (28) days.  carboxymethylcellulose sodium (REFRESH PLUS) 0.5 % dpet Administer 1 Drop to both eyes as needed.  hydrOXYzine HCl (ATARAX) 10 mg tablet Take 10 mg by mouth nightly as needed for Itching.  albuterol (PROVENTIL HFA, VENTOLIN HFA, PROAIR HFA) 90 mcg/actuation inhaler Take 2 Puffs by inhalation every four (4) hours as needed for Wheezing. Past History     Past Medical History:  Past Medical History:   Diagnosis Date    Arthritis     Asthma     Chronic kidney disease     Chronic pain     Cirrhosis ([facility] [address] 12/17/2017    Diabetes ([facility] [address] diet controlled    GERD (gastroesophageal reflux disease)     Hypertension        Past Surgical History:  Past Surgical History:   Procedure Laterality Date    COLONOSCOPY N/A 1/12/2018    COLONOSCOPY performed by Kiley Stanley MD at [facility] END[facility]    COLONOSCOPY N/A 3/19/2018    COLONOSCOPY performed by Kiley Stanley MD at [address] [place] [address] GYN  c section       Family History:  History reviewed. No pertinent family history. Social History:  Social History   Substance Use Topics    Smoking status: Never Smoker    Smokeless tobacco: Never Used    Alcohol use No       Allergies:   Allergies   Allergen Reactions    Aspirin Other (comments)     GI bleed    Heparin (Porcine) Other (comments)     GI bleed    Metformin Other (comments)     swelling    Norvasc [Amlodipine] Rash         Review of Systems   Review of Systems   Constitutional:        (+) Abnormal lab results (low hemoglobin)   Respiratory: Negative for shortness of breath. Cardiovascular: Negative for chest pain. Neurological: Positive for dizziness and weakness (generalized). Negative for syncope. All other systems reviewed and are negative. Physical Exam     Vitals:    05/02/18 1303 05/02/18 1336 05/02/18 1430   BP: 130/49 126/51 (!) 107/91   Pulse: 99 75 79   Resp: 20 20 17   Temp: 98 °F (36.7 °C)     SpO2: 100% 98% 100%   Weight: 68.9 kg (152 lb)     Height: 5' 3\" (1.6 m)       Physical Exam   Constitutional: She is oriented to person, place, and time. She appears well-developed and well-nourished. HENT:   Head: Normocephalic and atraumatic. Right Ear: External ear normal.   Left Ear: External ear normal.   Nose: Nose normal.   Mouth/Throat: Oropharynx is clear and moist.   Eyes: Conjunctivae and EOM are normal. Pupils are equal, round, and reactive to light. Right eye exhibits no discharge. Left eye exhibits no discharge. No scleral icterus. Neck: Normal range of motion. Neck supple. No JVD present. No tracheal deviation present. Cardiovascular: Normal rate, regular rhythm and intact distal pulses. Exam reveals no gallop. Murmur heard. Systolic murmur is present with a grade of 2/6   Pulmonary/Chest: Effort normal. She has decreased breath sounds (right lower lobe). RU chest catheter site without signs of infection or bleed. Abdominal: Soft. Bowel sounds are normal. She exhibits no distension. There is no tenderness. No HSM   Musculoskeletal: Normal range of motion. Neurological: She is alert and oriented to person, place, and time. She has normal reflexes. No focal motor weakness. Skin: Skin is warm and dry. No rash noted.    Hyperkeratotic scaly growth to lower extremities without signs of infections   Psychiatric: She has a normal mood and affect. Her behavior is normal.   Nursing note and vitals reviewed. Diagnostic Study Results     Labs -     Recent Results (from the past 12 hour(s))   CBC WITH AUTOMATED DIFF    Collection Time: 05/02/18  1:25 PM   Result Value Ref Range    WBC 3.4 (L) 4.6 - 13.2 K/uL    RBC 2.14 (L) 4.20 - 5.30 M/uL    HGB 6.2 (L) 12.0 - 16.0 g/dL    HCT 21.6 (L) 35.0 - 45.0 %    .9 (H) 74.0 - 97.0 FL    MCH 29.0 24.0 - 34.0 PG    MCHC 28.7 (L) 31.0 - 37.0 g/dL    RDW 18.8 (H) 11.6 - 14.5 %    PLATELET 131 (L) 933 - 420 K/uL    MPV 9.6 9.2 - 11.8 FL    NEUTROPHILS 66 40 - 73 %    LYMPHOCYTES 20 (L) 21 - 52 %    MONOCYTES 10 3 - 10 %    EOSINOPHILS 3 0 - 5 %    BASOPHILS 1 0 - 2 %    ABS. NEUTROPHILS 2.2 1.8 - 8.0 K/UL    ABS. LYMPHOCYTES 0.7 (L) 0.9 - 3.6 K/UL    ABS. MONOCYTES 0.3 0.05 - 1.2 K/UL    ABS. EOSINOPHILS 0.1 0.0 - 0.4 K/UL    ABS. BASOPHILS 0.0 0.0 - 0.06 K/UL    DF AUTOMATED     METABOLIC PANEL, COMPREHENSIVE    Collection Time: 05/02/18  1:25 PM   Result Value Ref Range    Sodium 143 136 - 145 mmol/L    Potassium 3.0 (L) 3.5 - 5.5 mmol/L    Chloride 103 100 - 108 mmol/L    CO2 35 (H) 21 - 32 mmol/L    Anion gap 5 3.0 - 18 mmol/L    Glucose 278 (H) 74 - 99 mg/dL    BUN 17 7.0 - 18 MG/DL    Creatinine 3.42 (H) 0.6 - 1.3 MG/DL    BUN/Creatinine ratio 5 (L) 12 - 20      GFR est AA 16 (L) >60 ml/min/1.73m2    GFR est non-AA 13 (L) >60 ml/min/1.73m2    Calcium 8.6 8.5 - 10.1 MG/DL    Bilirubin, total 0.4 0.2 - 1.0 MG/DL    ALT (SGPT) 17 13 - 56 U/L    AST (SGOT) 16 15 - 37 U/L    Alk.  phosphatase 189 (H) 45 - 117 U/L    Protein, total 6.7 6.4 - 8.2 g/dL    Albumin 3.5 3.4 - 5.0 g/dL    Globulin 3.2 2.0 - 4.0 g/dL    A-G Ratio 1.1 0.8 - 1.7     CARDIAC PANEL,(CK, CKMB & TROPONIN)    Collection Time: 05/02/18  1:25 PM   Result Value Ref Range    CK 69 26 - 192 U/L    CK - MB 3.2 <3.6 ng/ml    CK-MB Index 4.6 (H) 0.0 - 4.0 %    Troponin-I, Qt. 0.07 (H) 0.00 - 0.06 NG/ML   PROTHROMBIN TIME + INR Collection Time: 05/02/18  1:25 PM   Result Value Ref Range    Prothrombin time 14.1 11.5 - 15.2 sec    INR 1.1 0.8 - 1.2     PTT    Collection Time: 05/02/18  1:25 PM   Result Value Ref Range    aPTT 31.4 23.0 - 36.4 SEC   TYPE & SCREEN    Collection Time: 05/02/18  1:25 PM   Result Value Ref Range    Crossmatch Expiration 05/05/2018     ABO/Rh(D) O POSITIVE     Antibody screen POS    EKG, 12 LEAD, INITIAL    Collection Time: 05/02/18  2:06 PM   Result Value Ref Range    Ventricular Rate 76 BPM    Atrial Rate 76 BPM    P-R Interval 186 ms    QRS Duration 156 ms    Q-T Interval 482 ms    QTC Calculation (Bezet) 542 ms    Calculated P Axis 51 degrees    Calculated R Axis -34 degrees    Calculated T Axis 118 degrees    Diagnosis       Normal sinus rhythm  Left axis deviation  Left ventricular hypertrophy with QRS widening and repolarization abnormality  Abnormal ECG  When compared with ECG of 16-MAR-2018 15:34,  Left bundle branch block is no longer present         Radiologic Studies -   XR CHEST PORT   Final Result   IMPRESSION:      No acute cardiopulmonary disease. As read by the radiologist.     CT Results  (Last 48 hours)    None        CXR Results  (Last 48 hours)               05/02/18 1412  XR CHEST PORT Final result    Impression:  IMPRESSION:        No acute cardiopulmonary disease. Narrative:  EXAM: Chest x-ray single AP view       INDICATION: Weakness for 3 days. COMPARISON: 3/16/2018       _____________       FINDINGS: Right transvenous approach dialysis catheter is in stable positioning. The lungs are clear. Stable moderate cardiomegaly. No pleural effusion. No   acute osseus abnormality. _____________               3:08 PM  RADIOLOGY FINDINGS  Chest X-ray shows cardiomegaly. No heart failure. No infiltrate. Dialysis catheter is in place.    Pending review by Radiologist  Recorded by MARY KAY Conti, as dictated by Cara Chawla MD.    Medications given in the ED-  Medications   0.9% sodium chloride infusion 250 mL (not administered)   pantoprazole (PROTONIX) tablet 40 mg (not administered)   famotidine (PEPCID) IVPB 20 mg (0 mg IntraVENous IV Completed 5/2/18 6649)   diphenhydrAMINE (BENADRYL) capsule 25 mg (25 mg Oral Given 5/2/18 1519)   acetaminophen-codeine (TYLENOL #3) per tablet 1 Tab (1 Tab Oral Given 5/2/18 1519)         Medical Decision Making   I am the first provider for this patient. I reviewed the vital signs, available nursing notes, past medical history, past surgical history, family history and social history. Vital Signs-Reviewed the patient's vital signs. Pulse Oximetry Analysis - 100% on RA     Cardiac Monitor:  Rate: 83 bpm  Rhythm: NSR    EKG interpretation: (Preliminary)  Rhythm: NSR. Rate: 76 bpm; Left axis deviation, No STEMI  EKG read by Vane Crawley MD at 2:06 PM     Records Reviewed: Nursing Notes and Old Medical Records    Provider Notes (Medical Decision Making):   DDX: anemia, GI bleed, uremia, electrolyte abnormalities, infection, ACS    Procedures:  Procedures     PROCEDURE NOTE - RECTAL EXAM:   1:55 PM  Performed by: Vane Crawley MD  Rectal exam performed. Light brown stool was collected. Stool was Hemoccult tested, and found to be slightly heme positive. External hemorrhoids present. No active bleeding. Normal tone. Some hard stool in rectal vault. The procedure took 1-15 minutes, and pt tolerated well. Written by Marilee Bashir, ED Scribe, as dictated by Vane Crawley MD.    ED Course:   1:47 PM Initial assessment performed. The patients presenting problems have been discussed, and they are in agreement with the care plan formulated and outlined with them. I have encouraged them to ask questions as they arise throughout their visit.    3:06 PM Discussed patient's history, exam, and available diagnostics results with Nataliya Granger MD, Hospitalist, who agrees to admit pt to telemetry.     Diagnosis and Disposition Critical Care Time: NONE    Core Measures:  For Hospitalized Patients:    1. Hospitalization Decision Time:  The decision to hospitalize the patient was made by Mary Wills MD at 2:00 PM on 5/2/2018.    2. Aspirin: Aspirin was not given because the patient did not present with a stroke at the time of their Emergency Department evaluation    3:06 PM  Patient is being admitted to the hospital by aJcqui Dixon MD. The results of their tests and reasons for their admission have been discussed with them and/or available family. They convey agreement and understanding for the need to be admitted and for their admission diagnosis. CLINICAL IMPRESSION:    1. Severe anemia    2. Dizziness    3. CRF (chronic renal failure), stage 5 ([address]    4. Grade II hemorrhoids    5. Heme + stool      _______________________________    Attestations: This note is prepared by Thanh Arnett, acting as Scribe for Mary Wills MD.    Mary Wills MD:  The scribe's documentation has been prepared under my direction and personally reviewed by me in its entirety.   I confirm that the note above accurately reflects all work, treatment, procedures, and medical decision making performed by me.  _______________________________

## 2018-05-03 ENCOUNTER — APPOINTMENT (OUTPATIENT)
Dept: NUCLEAR MEDICINE | Age: 68
End: 2018-05-03
Attending: PHYSICIAN ASSISTANT
Payer: MEDICARE

## 2018-05-03 VITALS
OXYGEN SATURATION: 100 % | TEMPERATURE: 97.6 F | RESPIRATION RATE: 18 BRPM | BODY MASS INDEX: 28.62 KG/M2 | HEART RATE: 79 BPM | HEIGHT: 63 IN | SYSTOLIC BLOOD PRESSURE: 151 MMHG | DIASTOLIC BLOOD PRESSURE: 63 MMHG | WEIGHT: 161.5 LBS

## 2018-05-03 LAB
ANION GAP SERPL CALC-SCNC: 11 MMOL/L (ref 3–18)
BUN SERPL-MCNC: 24 MG/DL (ref 7–18)
BUN/CREAT SERPL: 5 (ref 12–20)
CALCIUM SERPL-MCNC: 8.5 MG/DL (ref 8.5–10.1)
CHLORIDE SERPL-SCNC: 103 MMOL/L (ref 100–108)
CO2 SERPL-SCNC: 31 MMOL/L (ref 21–32)
CREAT SERPL-MCNC: 4.74 MG/DL (ref 0.6–1.3)
ERYTHROCYTE [DISTWIDTH] IN BLOOD BY AUTOMATED COUNT: 21.2 % (ref 11.6–14.5)
GLUCOSE BLD STRIP.AUTO-MCNC: 167 MG/DL (ref 70–110)
GLUCOSE BLD STRIP.AUTO-MCNC: 167 MG/DL (ref 70–110)
GLUCOSE SERPL-MCNC: 176 MG/DL (ref 74–99)
HCT VFR BLD AUTO: 29.8 % (ref 35–45)
HGB BLD-MCNC: 8.8 G/DL (ref 12–16)
MCH RBC QN AUTO: 28.4 PG (ref 24–34)
MCHC RBC AUTO-ENTMCNC: 29.5 G/DL (ref 31–37)
MCV RBC AUTO: 96.1 FL (ref 74–97)
PLATELET # BLD AUTO: 149 K/UL (ref 135–420)
PMV BLD AUTO: 10.5 FL (ref 9.2–11.8)
POTASSIUM SERPL-SCNC: 3.7 MMOL/L (ref 3.5–5.5)
RBC # BLD AUTO: 3.1 M/UL (ref 4.2–5.3)
SODIUM SERPL-SCNC: 145 MMOL/L (ref 136–145)
WBC # BLD AUTO: 4.8 K/UL (ref 4.6–13.2)

## 2018-05-03 PROCEDURE — P9016 RBC LEUKOCYTES REDUCED: HCPCS | Performed by: INTERNAL MEDICINE

## 2018-05-03 PROCEDURE — 82962 GLUCOSE BLOOD TEST: CPT

## 2018-05-03 PROCEDURE — 99218 HC RM OBSERVATION: CPT

## 2018-05-03 PROCEDURE — 85027 COMPLETE CBC AUTOMATED: CPT | Performed by: INTERNAL MEDICINE

## 2018-05-03 PROCEDURE — 74011250637 HC RX REV CODE- 250/637: Performed by: INTERNAL MEDICINE

## 2018-05-03 PROCEDURE — 36430 TRANSFUSION BLD/BLD COMPNT: CPT

## 2018-05-03 PROCEDURE — 36415 COLL VENOUS BLD VENIPUNCTURE: CPT | Performed by: INTERNAL MEDICINE

## 2018-05-03 PROCEDURE — 80048 BASIC METABOLIC PNL TOTAL CA: CPT | Performed by: INTERNAL MEDICINE

## 2018-05-03 PROCEDURE — 74011636637 HC RX REV CODE- 636/637: Performed by: HOSPITALIST

## 2018-05-03 RX ORDER — PANTOPRAZOLE SODIUM 40 MG/1
40 TABLET, DELAYED RELEASE ORAL DAILY
Qty: 30 TAB | Refills: 2 | Status: ON HOLD | OUTPATIENT
Start: 2018-05-03 | End: 2020-10-06 | Stop reason: SDUPTHER

## 2018-05-03 RX ADMIN — HYDRALAZINE HYDROCHLORIDE 25 MG: 25 TABLET, FILM COATED ORAL at 10:18

## 2018-05-03 RX ADMIN — PANTOPRAZOLE SODIUM 40 MG: 40 TABLET, DELAYED RELEASE ORAL at 07:39

## 2018-05-03 RX ADMIN — INSULIN LISPRO 2 UNITS: 100 INJECTION, SOLUTION INTRAVENOUS; SUBCUTANEOUS at 12:47

## 2018-05-03 RX ADMIN — METOPROLOL SUCCINATE 25 MG: 25 TABLET, EXTENDED RELEASE ORAL at 10:18

## 2018-05-03 RX ADMIN — INSULIN LISPRO 2 UNITS: 100 INJECTION, SOLUTION INTRAVENOUS; SUBCUTANEOUS at 07:39

## 2018-05-03 NOTE — PROGRESS NOTES
0030 Bedside and Verbal shift change report given to KONG Weldon RN (oncoming nurse) by Grace Fenton RN (offgoing nurse). Report included the following information SBAR, Intake/Output, MAR and Recent Results. 9994: Reassessment completed. VSS. 2nd unit of PRBCs started. Pt laying in bed NAD.     0110: VSS. NAD. Pt resting in bed. Chest rise and fall. 0125: VSS. NAD. Pt resting in bed. Chest rise and fall    0155: Pt receiving blood transfusion without complications. Will continue to monitor. 0220: Pt resting in bed. Chest rise and fall NAD. VSS.     0250: Pt resting in bed. Chest rise and fall. NAD. VSS    0330: Infusion complete. VSS. NAD. Pt resting in bed. Chest rise and fall. IV flushed with 10 mL NS. IV site assessment complete. 0430: Pt resting in bed. Chest rise and fall. NAD    0530: Pt awake in room. Assist out of bed with boot to perform hygiene. 0630: Pt back to bed with 1 person assist. Pt tolerated well. Sitting on side of bed NAD.    0739: Pt sitting in bed. Scheduled protonix administered as ordered. Humalog, 2 units SQ administered for . Bedside and verbal shift change report given to Agustin Duron RN (oncoming nurse) by Reese Tavera RN (offgoing nurse). Report included the following information SBAR, Intake/Output, MAR and Recent Results.  Pt stable in NAD    Peña Guillaume RN

## 2018-05-03 NOTE — PROGRESS NOTES
Problem: Falls - Risk of  Goal: *Absence of Falls  Document Iza Fall Risk and appropriate interventions in the flowsheet.    Outcome: Progressing Towards Goal  Fall Risk Interventions:  Mobility Interventions: Bed/chair exit alarm, Communicate number of staff needed for ambulation/transfer, Patient to call before getting OOB, PT Consult for mobility concerns, Utilize walker, cane, or other assitive device         Medication Interventions: Patient to call before getting OOB, Teach patient to arise slowly    Elimination Interventions: Call light in reach, Patient to call for help with toileting needs, Toileting schedule/hourly rounds    History of Falls Interventions: Bed/chair exit alarm, Door open when patient unattended

## 2018-05-03 NOTE — PROGRESS NOTES
Reason for Admission:  Darylene Gros is a 75 y.o. female with PMHx of HTN, arthritis, GERD, diabetes, Cirrhosis, and GERD who presents to the emergency department C/O abnormal lab results (hematocrit of 28 and hemoglobin of 8.4) onset 2 days ago. Associated sxs include generalized weakness, fatigue and dizziness. Pt reports she had her blood drawn 2 days ago at dialysis, was told she had low hemoglobin and was sent here for further evaluation. Pt receives dialysis (MWF) at [facility]. Is unable to recall her nephrologist. Reports Hx of blood transfusions and GI bleed. Last BM was this morning and was green. Pt denies chest pain, SOB, LOC, tobacco/EtOH/illicit drug use, and any other sxs or complaints. Patient admitted on observation for  symptomatic anemia  On telemetry under observation               RRAT Score:     36             Resources/supports as identified by patient/family:                   Top Challenges facing patient (as identified by patient/family and CM): Patient has 2 child rojas age grandchildren she cares for patient is HD                      Finances/Medication cost?                    Transportation? Patient drives              Support system or lack thereof?  goes HD on MWF                     Living arrangements? Patient has 2 grandchildren that lives with her and she is the cg           Self-care/ADLs/Cognition? Current Advanced Directive/Advance Care Plan:  Not on file                          Plan for utilizing home health:    declines                      Likelihood of readmission: tbd                 Transition of Care Plan:           Patient lives with 2 grandchildren  She goes to HD on MWF. Patient left AMA states she can follow up with a GI doctor on the outside she informed QUINTIN hernandez she can follow up wit a GI dr. Vivi Diop she has to get home to 2 grandchildren.     Patient declines assistance from cm or dme left AMA  Care Management Interventions  PCP Verified by CM: Yes  Transition of Care Consult (CM Consult): Discharge Planning  Current Support Network:  Other  Confirm Follow Up Transport: Self  Plan discussed with Pt/Family/Caregiver: Yes  Freedom of Choice Offered: Yes  Discharge Location  Discharge Placement: Home

## 2018-05-03 NOTE — PROGRESS NOTES
Hospitalist Progress Note    Patient: Cinthya Monroe MRN: [id number]  CSN: [id number]    YOB: 1950  Age: 75 y.o. Sex: female    DOA: 5/2/2018 LOS:  LOS: 0 days          Chief Complaint:    Symptomatic Anemia    Assessment/Plan     Patient seen and examined today. Discussed the case with Dr Jonnie Gooden. Given the patient's history of repeated episodes of symptomatic anemia and recurrent bloody BM, it was recommended to the patient that she stay for a nuclear bleeding scan and a GI consultation with potential repeat colonoscopy. When the patient was given this information, she stated that she had two grandchildren to take care of at home, and could not prolong her hospitalization any further. Lengthy discussion was held with the patient in regards to her condition, potential for complications, and benefits of remaining hospitalized. The patient still would like to be discharged. Informed the patient that we will not be medically discharging her, and she would like to complete an AMA form and sign out AMA. AMA paperwork completed, Rx given for protonix. Patient Active Problem List   Diagnosis Code    GI bleed K92.2    ESRD (end stage renal disease) ([facility] [address] N18.6    Diabetes mellitus type 2, controlled ([facility] [address] E11.9    HTN (hypertension) I10    Anemia in chronic kidney disease N18.9, D63.1    Cirrhosis (HCC) K74.60    Acute blood loss anemia D62    Dizziness R42    Generalized weakness R53.1    Rectal bleed K62.5    Symptomatic anemia D64.9    Severe anemia D64.9       Subjective:    No concerns or complaints. Patient would like to be discharged.      Review of systems:    Constitutional: denies fevers, chills, myalgias  Respiratory: denies SOB, cough  Cardiovascular: denies chest pain, palpitations  Gastrointestinal: denies nausea, vomiting, diarrhea      Vital signs/Intake and Output:  Visit Vitals    /63 (BP 1 Location: Left arm, BP Patient Position: Sitting)    Pulse 79    Temp 97.6 °F (36.4 °C)    Resp 18    Ht 5' 3\" (1.6 m)    Wt 73.3 kg (161 lb 8 oz)    SpO2 100%    Breastfeeding No    BMI 28.61 kg/m2     Current Shift:     Last three shifts:  05/01 1901 - 05/03 0700  In: 421.3 [P.O.:120]  Out: -     Exam:    General: Well developed, alert, NAD, OX3  Head/Neck: NCAT, supple, No masses, No lymphadenopathy  CVS:Regular rate and rhythm, no M/R/G, S1/S2 heard, no thrill  Lungs:Clear to auscultation bilaterally, no wheezes, rhonchi, or rales  Abdomen: Soft, Nontender, No distention, Normal Bowel sounds, No hepatomegaly  Extremities: No C/C/E, pulses palpable 2+  Skin:normal texture and turgor, no rashes, no lesions  Neuro:grossly normal , follows commands  Psych:appropriate                Labs: Results:       Chemistry Recent Labs      05/03/18   0439  05/02/18   1325   GLU  176*  278*   NA  145  143   K  3.7  3.0*   CL  103  103   CO2  31  35*   BUN  24*  17   CREA  4.74*  3.42*   CA  8.5  8.6   AGAP  11  5   BUCR  5*  5*   AP   --   189*   TP   --   6.7   ALB   --   3.5   GLOB   --   3.2   AGRAT   --   1.1      CBC w/Diff Recent Labs      05/03/18   0439  05/02/18   1325   WBC  4.8  3.4*   RBC  3.10*  2.14*   HGB  8.8*  6.2*   HCT  29.8*  21.6*   PLT  149  131*   GRANS   --   66   LYMPH   --   20*   EOS   --   3      Cardiac Enzymes Recent Labs      05/02/18   1325   CPK  69   CKND1  4.6*      Coagulation Recent Labs      05/02/18   1325   PTP  14.1   INR  1.1   APTT  31.4       Lipid Panel No results found for: CHOL, CHOLPOCT, CHOLX, CHLST, CHOLV, 667188, HDL, LDL, LDLC, DLDLP, 105136, VLDLC, VLDL, TGLX, TRIGL, TRIGP, TGLPOCT, CHHD, CHHDX   BNP No results for input(s): BNPP in the last 72 hours.    Liver Enzymes Recent Labs      05/02/18   1325   TP  6.7   ALB  3.5   AP  189*   SGOT  16      Thyroid Studies No results found for: T4, T3U, TSH, TSHEXT     Procedures/imaging: see electronic medical records for all procedures/Xrays and details which were not copied into this note but were reviewed prior to creation of [facility] PA-C

## 2018-05-03 NOTE — PROGRESS NOTES

## 2018-05-03 NOTE — PROGRESS NOTES
0730 Assumed care of pt from Bogdan Lebron. Pt resting quietly in bed , no signs of distress, call bell within reach. 0900 Assessment completed Pt denies any chest pain or shortness of breath. States that she does not take Metoprolol anymore, does not want to take it while she is here in the hospital.      1200 Pt states that she is unable to use Heparin, because she hasn't had it in over 8 years after it caused her to have a GI bleed. She is aware that the MDs ordered a NM GI Scan, but pt states that she is the sole caregiver to her grandchildren and she really needs to get home. States that she will see GI as an outpatient, if she is able to go home today  -SUSAN White at bedside talking to pt     Shift Uneventful. Pt denied chest pain or shortness of breath. Had one BM today, notes that \"its dark from my iron pills\", and hard.

## 2018-05-03 NOTE — PROGRESS NOTES
2126 Assessment completed and documented in flow sheet. Pt denies any further needs at this time. Pt in NAD with bed in low position, wheels locked and call bell within reach. 2139 Scheduled medications administered as ordered. 0030 Bedside and Verbal shift change report given to KONG Garcias RN (oncoming nurse) by Gume Garcia RN (offgoing nurse). Report included the following information SBAR, Intake/Output, MAR and Recent Results.

## 2018-05-04 LAB
ABO + RH BLD: NORMAL
ANTIGENS PRESENT RBC DONR: NORMAL
BLD PROD TYP BPU: NORMAL
BLOOD BANK CMNT PATIENT-IMP: NORMAL
BLOOD GROUP ANTIBODIES SERPL: NORMAL
BLOOD GROUP ANTIBODIES SERPL: NORMAL
BPU ID: NORMAL
CALLED TO:,BCALL1: NORMAL
CROSSMATCH RESULT,%XM: NORMAL
SPECIMEN EXP DATE BLD: NORMAL
STATUS OF UNIT,%ST: NORMAL
UNIT DIVISION, %UDIV: 0

## 2018-05-06 LAB
ATRIAL RATE: 76 BPM
CALCULATED P AXIS, ECG09: 51 DEGREES
CALCULATED R AXIS, ECG10: -34 DEGREES
CALCULATED T AXIS, ECG11: 118 DEGREES
DIAGNOSIS, 93000: NORMAL
P-R INTERVAL, ECG05: 186 MS
Q-T INTERVAL, ECG07: 482 MS
QRS DURATION, ECG06: 156 MS
QTC CALCULATION (BEZET), ECG08: 542 MS
VENTRICULAR RATE, ECG03: 76 BPM

## 2018-05-16 ENCOUNTER — HOSPITAL ENCOUNTER (OUTPATIENT)
Dept: INFUSION THERAPY | Age: 68
Discharge: HOME | End: 2018-05-16
Payer: MEDICARE

## 2018-05-16 ENCOUNTER — HOSPITAL ENCOUNTER (EMERGENCY)
Age: 68
Discharge: HOME | End: 2018-05-16
Attending: INTERNAL MEDICINE
Payer: MEDICARE

## 2018-05-16 VITALS
HEIGHT: 63 IN | SYSTOLIC BLOOD PRESSURE: 143 MMHG | DIASTOLIC BLOOD PRESSURE: 53 MMHG | HEART RATE: 80 BPM | BODY MASS INDEX: 27.46 KG/M2 | TEMPERATURE: 98.3 F | WEIGHT: 155 LBS | RESPIRATION RATE: 16 BRPM | OXYGEN SATURATION: 100 %

## 2018-05-16 DIAGNOSIS — D64.9 ANEMIA, UNSPECIFIED TYPE: Primary | ICD-10-CM

## 2018-05-16 LAB
ATRIAL RATE: 73 BPM
BASOPHILS # BLD: 0 K/UL (ref 0–0.06)
BASOPHILS NFR BLD: 1 % (ref 0–2)
CALCULATED P AXIS, ECG09: 67 DEGREES
CALCULATED R AXIS, ECG10: -52 DEGREES
CALCULATED T AXIS, ECG11: 102 DEGREES
DIAGNOSIS, 93000: NORMAL
DIFFERENTIAL METHOD BLD: ABNORMAL
EOSINOPHIL # BLD: 0.2 K/UL (ref 0–0.4)
EOSINOPHIL NFR BLD: 5 % (ref 0–5)
ERYTHROCYTE [DISTWIDTH] IN BLOOD BY AUTOMATED COUNT: 17.5 % (ref 11.6–14.5)
HCT VFR BLD AUTO: 24.9 % (ref 35–45)
HGB BLD-MCNC: 7.1 G/DL (ref 12–16)
LYMPHOCYTES # BLD: 0.7 K/UL (ref 0.9–3.6)
LYMPHOCYTES NFR BLD: 20 % (ref 21–52)
MCH RBC QN AUTO: 26.3 PG (ref 24–34)
MCHC RBC AUTO-ENTMCNC: 28.5 G/DL (ref 31–37)
MCV RBC AUTO: 92.2 FL (ref 74–97)
MONOCYTES # BLD: 0.4 K/UL (ref 0.05–1.2)
MONOCYTES NFR BLD: 10 % (ref 3–10)
NEUTS SEG # BLD: 2.3 K/UL (ref 1.8–8)
NEUTS SEG NFR BLD: 64 % (ref 40–73)
P-R INTERVAL, ECG05: 190 MS
PLATELET # BLD AUTO: 126 K/UL (ref 135–420)
PMV BLD AUTO: 9.4 FL (ref 9.2–11.8)
Q-T INTERVAL, ECG07: 480 MS
QRS DURATION, ECG06: 158 MS
QTC CALCULATION (BEZET), ECG08: 528 MS
RBC # BLD AUTO: 2.7 M/UL (ref 4.2–5.3)
RBC MORPH BLD: ABNORMAL
RBC MORPH BLD: ABNORMAL
VENTRICULAR RATE, ECG03: 73 BPM
WBC # BLD AUTO: 3.6 K/UL (ref 4.6–13.2)

## 2018-05-16 PROCEDURE — 86922 COMPATIBILITY TEST ANTIGLOB: CPT | Performed by: INTERNAL MEDICINE

## 2018-05-16 PROCEDURE — 86920 COMPATIBILITY TEST SPIN: CPT | Performed by: INTERNAL MEDICINE

## 2018-05-16 PROCEDURE — 99284 EMERGENCY DEPT VISIT MOD MDM: CPT

## 2018-05-16 PROCEDURE — 86870 RBC ANTIBODY IDENTIFICATION: CPT | Performed by: INTERNAL MEDICINE

## 2018-05-16 PROCEDURE — 86900 BLOOD TYPING SEROLOGIC ABO: CPT | Performed by: INTERNAL MEDICINE

## 2018-05-16 PROCEDURE — 86921 COMPATIBILITY TEST INCUBATE: CPT | Performed by: INTERNAL MEDICINE

## 2018-05-16 PROCEDURE — 93005 ELECTROCARDIOGRAM TRACING: CPT

## 2018-05-16 PROCEDURE — 36415 COLL VENOUS BLD VENIPUNCTURE: CPT | Performed by: INTERNAL MEDICINE

## 2018-05-16 NOTE — ED NOTES
Pt refuses to lay flat for orthostatics. Pt verbally aggressive to RN, demands food. Reports she will not be stuck again for IV attempt, pt refuses to walk \"I don't walk anytime Im not now\". MD aware. Pt provided with crackers and drink.

## 2018-05-16 NOTE — ED TRIAGE NOTES
Pt states at dialysis was told blood count low, 7.1, pt states she feels weak, onset for about 2 days . Sepsis Screening completed    (  )Patient meets SIRS criteria. (  x)Patient does not meet SIRS criteria.       SIRS Criteria is achieved when two or more of the following are present   Temperature < 96.8°F (36°C) or > 100.9°F (38.3°C)   Heart Rate > 90 beats per minute   Respiratory Rate > 20 breaths per minute   WBC count > 12,000 or <4,000 or > 10% bands

## 2018-05-16 NOTE — ED PROVIDER NOTES
EMERGENCY DEPARTMENT HISTORY AND PHYSICAL EXAM    Date: 2018  Patient Name: Vicki Cuevas    History of Presenting Illness     Chief Complaint   Patient presents with    Other         History Provided By: Patient    Chief Complaint: low blood count at 7.1  Duration: labs drawn 2 days ago   Location: blood  Quality: low  Severity: 7.1  Associated Symptoms:  gradually worsening generalized fatigue onset 2 days ago and leg cramping    Additional History (Context):   12:51 PM  Vicki Cuevas is a 75 y.o. female who presents to the emergency department from Dialysis for evaluation of low blood count at 7.1. Associated sxs include gradually worsening generalized fatigue onset 2 days ago and leg cramping. Pt was dialyzed from her catheter today. Pt has required recent blood transfusions. Pt is on Protonix. Allergies reported to ASA, Heparin, Metformin, and Norvasc. PMHx of HTN, CHKD, arthritis, asthma, GERD, DM, and Cirrhosis. PSHx of , vascular access, and colonoscopy. Pt is a non smoker and a non EtOH user. Pt denies hematuria, hematochezia, syncope, N/V, CP, abd pain, and any other sxs or complaints. PCP: Savannah Farrell MD    Current Outpatient Prescriptions   Medication Sig Dispense Refill    pantoprazole (PROTONIX) 40 mg tablet Take 1 Tab by mouth daily. 30 Tab 2    nystatin (MYCOSTATIN) powder Apply  to affected area two (2) times a day. 1 Bottle 0    cholecalciferol (VITAMIN D3) 1,000 unit tablet Take 2,000 Units by mouth daily.  ferrous sulfate 325 mg (65 mg iron) tablet Take 325 mg by mouth three (3) times daily (with meals).  hydrALAZINE (APRESOLINE) 25 mg tablet Take 25 mg by mouth three (3) times daily.  lactulose (CHRONULAC) 10 gram/15 mL solution Take 20 g by mouth daily as needed.  sevelamer (RENAGEL) 800 mg tablet Take 1,600 mg by mouth three (3) times daily (with meals).  b complex-vitamin c-folic acid (NEPHROCAPS) 1 mg capsule Take 1 Cap by mouth daily.       acetaminophen (TYLENOL) 325 mg tablet Take 325 mg by mouth every six (6) hours as needed for Pain.  AFLIBERCEPT INTRAVITREAL 1 Ampule by IntraVITreal route every twenty-eight (28) days.  carboxymethylcellulose sodium (REFRESH PLUS) 0.5 % dpet Administer 1 Drop to both eyes as needed.  hydrOXYzine HCl (ATARAX) 10 mg tablet Take 10 mg by mouth nightly as needed for Itching.  albuterol (PROVENTIL HFA, VENTOLIN HFA, PROAIR HFA) 90 mcg/actuation inhaler Take 2 Puffs by inhalation every four (4) hours as needed for Wheezing. Past History     Past Medical History:  Past Medical History:   Diagnosis Date    Arthritis     Asthma     Chronic kidney disease     Chronic pain     Cirrhosis ([facility] [address] 12/17/2017    Diabetes ([facility] [address] diet controlled    GERD (gastroesophageal reflux disease)     Hypertension        Past Surgical History:  Past Surgical History:   Procedure Laterality Date    COLONOSCOPY N/A 1/12/2018    COLONOSCOPY performed by Ofelia Squires MD at [facility] ENDOSCOPY    COLONOSCOPY N/A 3/19/2018    COLONOSCOPY performed by Ofelia Squires MD at [facility] ENDOSCOPY    HX GYN  c section    HX VASCULAR ACCESS      dialysis        Family History:  History reviewed. No pertinent family history. Social History:  Social History   Substance Use Topics    Smoking status: Never Smoker    Smokeless tobacco: Never Used    Alcohol use No       Allergies: Allergies   Allergen Reactions    Aspirin Other (comments)     GI bleed    Heparin (Porcine) Other (comments)     GI bleed    Metformin Other (comments)     swelling    Norvasc [Amlodipine] Rash         Review of Systems   Review of Systems   Constitutional: Positive for fatigue. Cardiovascular: Negative for chest pain. Gastrointestinal: Negative for abdominal pain, blood in stool, nausea and vomiting. Genitourinary: Negative for hematuria.    Musculoskeletal:        (+) Leg cramping   All other systems reviewed and are negative. Physical Exam     Vitals:    05/16/18 1207   BP: 143/53   Pulse: 80   Resp: 16   Temp: 98.3 °F (36.8 °C)   SpO2: 100%   Weight: 70.3 kg (155 lb)   Height: 5' 3\" (1.6 m)     Physical Exam   Constitutional: She is oriented to person, place, and time. She appears well-developed and well-nourished. HENT:   Head: Normocephalic and atraumatic. Right Ear: External ear normal.   Left Ear: External ear normal.   Nose: Nose normal.   Mouth/Throat: Oropharynx is clear and moist.   Eyes: Conjunctivae and EOM are normal. Pupils are equal, round, and reactive to light. Right eye exhibits no discharge. Left eye exhibits no discharge. No scleral icterus. Neck: Normal range of motion. Neck supple. No JVD present. No tracheal deviation present. Cardiovascular: Normal rate, regular rhythm, normal heart sounds and intact distal pulses. Pulmonary/Chest: Effort normal and breath sounds normal.   Abdominal: Soft. Bowel sounds are normal. She exhibits distension. There is no tenderness. No HSM   Musculoskeletal: Normal range of motion. She exhibits no edema. No LE edema. Neurological: She is alert and oriented to person, place, and time. She has normal reflexes. No focal motor weakness. Skin: Skin is warm and dry. No rash noted. Right upper chest dialysis catheter without signs of infection or bleeding. Psychiatric: She has a normal mood and affect. Her behavior is normal.   Nursing note and vitals reviewed.         Diagnostic Study Results     Labs -     Recent Results (from the past 12 hour(s))   CBC WITH AUTOMATED DIFF    Collection Time: 05/16/18  2:12 PM   Result Value Ref Range    WBC 3.6 (L) 4.6 - 13.2 K/uL    RBC 2.70 (L) 4.20 - 5.30 M/uL    HGB 7.1 (L) 12.0 - 16.0 g/dL    HCT 24.9 (L) 35.0 - 45.0 %    MCV 92.2 74.0 - 97.0 FL    MCH 26.3 24.0 - 34.0 PG    MCHC 28.5 (L) 31.0 - 37.0 g/dL    RDW 17.5 (H) 11.6 - 14.5 %    PLATELET 451 (L) 504 - 420 K/uL    MPV 9.4 9.2 - 11.8 FL    NEUTROPHILS 64 40 - 73 %    LYMPHOCYTES 20 (L) 21 - 52 %    MONOCYTES 10 3 - 10 %    EOSINOPHILS 5 0 - 5 %    BASOPHILS 1 0 - 2 %    ABS. NEUTROPHILS 2.3 1.8 - 8.0 K/UL    ABS. LYMPHOCYTES 0.7 (L) 0.9 - 3.6 K/UL    ABS. MONOCYTES 0.4 0.05 - 1.2 K/UL    ABS. EOSINOPHILS 0.2 0.0 - 0.4 K/UL    ABS. BASOPHILS 0.0 0.0 - 0.06 K/UL    RBC COMMENTS ANISOCYTOSIS  1+        RBC COMMENTS HYPOCHROMIA  1+        DF AUTOMATED     TYPE & SCREEN    Collection Time: 05/16/18  2:12 PM   Result Value Ref Range    Crossmatch Expiration 05/19/2018     ABO/Rh(D) [place] POSITIVE     Antibody screen POS    EKG, 12 LEAD, INITIAL    Collection Time: 05/16/18  2:27 PM   Result Value Ref Range    Ventricular Rate 73 BPM    Atrial Rate 73 BPM    P-R Interval 190 ms    QRS Duration 158 ms    Q-T Interval 480 ms    QTC Calculation (Bezet) 528 ms    Calculated P Axis 67 degrees    Calculated R Axis -52 degrees    Calculated T Axis 102 degrees    Diagnosis       Normal sinus rhythm  Left axis deviation  Left bundle branch block  Abnormal ECG  Confirmed by Jamie Thomas MD, Lala Killian ([id number]) on 5/16/2018 4:36:49 PM     TYPE + CROSSMATCH    Collection Time: 05/16/18  4:13 PM   Result Value Ref Range    Crossmatch Expiration 05/19/2018     ABO/Rh(D) [place] POSITIVE     Antibody screen POS     Antibody ID [email or web address]        Radiologic Studies -   No orders to display     CT Results  (Last 48 hours)    None        CXR Results  (Last 48 hours)    None          Medications given in the ED-  Medications - No data to display      Medical Decision Making   I am the first provider for this patient. I reviewed the vital signs, available nursing notes, past medical history, past surgical history, family history and social history. Vital Signs-Reviewed the patient's vital signs. Pulse Oximetry Analysis - 100% on room air. Cardiac Monitor:  Rate: 75 bpm  Rhythm: sinus rhythm    EKG interpretation: (Preliminary)  4:34 PM   NSR at 73 bpm. No ST changes. Old LBBB.   EKG read by Ibis Pillai, MD at 2:27 PM     Records Reviewed: Nursing Notes    Provider Notes (Medical Decision Making): DDx: acute on chronic anemia, CKD, no gross GI bleed by hx, no other bleed by hx. Hemodynamically stable. Has required multiple blood transfusions in the recent past. Pt is on Protonix. Procedures:  Procedures    ED Course:   12:51 PM Initial assessment performed. The patients presenting problems have been discussed, and they are in agreement with the care plan formulated and outlined with them. I have encouraged them to ask questions as they arise throughout their visit. 1:54 PM Discussed patient's history, exam, and available diagnostics results with King Andrea MD, internal medicine, who states that pt is stable enough for d/c, as nl vitals, and if neg orthostatics. 2:31 PM Orthostatics were okay, per nurses. Will walk pt. Hgb was over 7 which was probably chronic for her which is why she wasnt admitted. Pt can possibly get dialysis and blood transufsion as outpatient if hg continues to drop further and/or abnormal vitals. Diagnosis and Disposition       DISCHARGE NOTE:  3:02 PM  Yolande Fernandes  results have been reviewed with her. She has been counseled regarding her diagnosis, treatment, and plan. She verbally conveys understanding and agreement of the signs, symptoms, diagnosis, treatment and prognosis and additionally agrees to follow up as discussed. She also agrees with the care-plan and conveys that all of her questions have been answered. I have also provided discharge instructions for her that include: educational information regarding their diagnosis and treatment, and list of reasons why they would want to return to the ED prior to their follow-up appointment, should her condition change. She has been provided with education for proper emergency department utilization. CLINICAL IMPRESSION:    1. Anemia, unspecified type        PLAN:  1. D/C Home  2.    Current Discharge Medication List        3. Follow-up Information     Follow up With Details Comments Contact Info    Julia Herrera MD Schedule an appointment as soon as possible for a visit For Primary Care Follow Up, or follow up with your PCP [address] [facility], DO Schedule an appointment as soon as possible for a visit in 2 days  [address] [facility] [id number]  [phone]      [facility] EMERGENCY DEPT  As needed, If symptoms worsen [address] [place] [id number]  [phone]        _______________________________    Attestations: This note is prepared by Radha Monge, acting as Scribe for Yenny Narayanan MD.    Yenny Narayanan MD:  The scribe's documentation has been prepared under my direction and personally reviewed by me in its entirety. I confirm that the note above accurately reflects all work, treatment, procedures, and medical decision making performed by me.  _______________________________       Paloma Sebastianos back at 635 pm, pt apparently not reachable at her cell phone, left message. Able to contact son, who is NOK, d/w the son to have pt stay with him or he can stay with her, as she only stays with at times to ensure pt is ok. Meanwhile, she has been trasnfused in past when hg around 6, though can get blood transfusion should her sxs worsen and/or she drop below hg 7, so recommended to repeat hg within 12 to 24 hours. Answered other qs and pt's son thankful, and says he will f/u on above.

## 2018-05-16 NOTE — DISCHARGE INSTRUCTIONS
Anemia: Care Instructions  Your Care Instructions    Anemia is a low level of red blood cells, which carry oxygen throughout your body. Many things can cause anemia. Lack of iron is one of the most common causes. Your body needs iron to make hemoglobin, a substance in red blood cells that carries oxygen from the lungs to your body's cells. Without enough iron, the body produces fewer and smaller red blood cells. As a result, your body's cells do not get enough oxygen, and you feel tired and weak. And you may have trouble concentrating. Bleeding is the most common cause of a lack of iron. You may have heavy menstrual bleeding or bleeding caused by conditions such as ulcers, hemorrhoids, or cancer. Regular use of aspirin or other anti-inflammatory medicines (such as ibuprofen) also can cause bleeding in some people. A lack of iron in your diet also can cause anemia, especially at times when the body needs more iron, such as during pregnancy, infancy, and the teen years. Your doctor may have prescribed iron pills. It may take several months of treatment for your iron levels to return to normal. Your doctor also may suggest that you eat foods that are rich in iron, such as meat and beans. There are many other causes of anemia. It is not always due to a lack of iron. Finding the specific cause of your anemia will help your doctor find the right treatment for you. Follow-up care is a key part of your treatment and safety. Be sure to make and go to all appointments, and call your doctor if you are having problems. It's also a good idea to know your test results and keep a list of the medicines you take. How can you care for yourself at home? · Take your medicines exactly as prescribed. Call your doctor if you think you are having a problem with your medicine. · If your doctor recommends iron pills, take them as directed:  ¨ Try to take the pills on an empty stomach about 1 hour before or 2 hours after meals. But you may need to take iron with food to avoid an upset stomach. ¨ Do not take antacids or drink milk or caffeine drinks (such as coffee, tea, or cola) at the same time or within 2 hours of the time that you take your iron. They can make it hard for your body to absorb the iron. ¨ Vitamin C (from food or supplements) helps your body absorb iron. Try taking iron pills with a glass of orange juice or some other food that is high in vitamin C, such as citrus fruits. ¨ Iron pills may cause stomach problems, such as heartburn, nausea, diarrhea, constipation, and cramps. Be sure to drink plenty of fluids, and include fruits, vegetables, and fiber in your diet each day. Iron pills often make your bowel movements dark or green. ¨ If you forget to take an iron pill, do not take a double dose of iron the next time you take a pill. ¨ Keep iron pills out of the reach of small children. An overdose of iron can be very dangerous. · Follow your doctor's advice about eating iron-rich foods. These include red meat, shellfish, poultry, eggs, beans, raisins, whole-grain bread, and leafy green vegetables. · Steam vegetables to help them keep their iron content. When should you call for help? Call 911 anytime you think you may need emergency care. For example, call if:  ? · You have symptoms of a heart attack. These may include:  ¨ Chest pain or pressure, or a strange feeling in the chest.  ¨ Sweating. ¨ Shortness of breath. ¨ Nausea or vomiting. ¨ Pain, pressure, or a strange feeling in the back, neck, jaw, or upper belly or in one or both shoulders or arms. ¨ Lightheadedness or sudden weakness. ¨ A fast or irregular heartbeat. After you call 911, the  may tell you to chew 1 adult-strength or 2 to 4 low-dose aspirin. Wait for an ambulance. Do not try to drive yourself. ? · You passed out (lost consciousness).    ?Call your doctor now or seek immediate medical care if:  ? · You have new or increased shortness of breath. ? · You are dizzy or lightheaded, or you feel like you may faint. ? · Your fatigue and weakness continue or get worse. ? · You have any abnormal bleeding, such as:  ¨ Nosebleeds. ¨ Vaginal bleeding that is different (heavier, more frequent, at a different time of the month) than what you are used to. ¨ Bloody or black stools, or rectal bleeding. ¨ Bloody or pink urine. ? Watch closely for changes in your health, and be sure to contact your doctor if:  ? · You do not get better as expected. Where can you learn more? Go to http://rosi-awa.info/. Enter R301 in the search box to learn more about \"Anemia: Care Instructions. \"  Current as of: October 13, 2016  Content Version: 11.4  © 1508-5024 [employer]. Care instructions adapted under license by [employer] (which disclaims liability or warranty for this information). If you have questions about a medical condition or this instruction, always ask your healthcare professional. [place] [address] any warranty or liability for your use of this information.

## 2018-05-16 NOTE — ED NOTES
RN attempted IV stick,L forearm unsuccessful. ED Techs attempted 3 x's prior to RN . Benny Spencer All sticks unsuccessful, MD aware not all blood tubes were able to be obtained.

## 2018-05-16 NOTE — ED NOTES
Pt alert and oriented reports she had her dialysis tx today was told her blood count ws low and told to come here. Pt reports she has felt weak past few days. Denies any discomfort or other complaints.

## 2018-05-17 ENCOUNTER — HOSPITAL ENCOUNTER (OUTPATIENT)
Dept: INFUSION THERAPY | Age: 68
Discharge: HOME | End: 2018-05-17
Payer: MEDICARE

## 2018-05-17 VITALS
SYSTOLIC BLOOD PRESSURE: 164 MMHG | RESPIRATION RATE: 18 BRPM | DIASTOLIC BLOOD PRESSURE: 73 MMHG | OXYGEN SATURATION: 99 % | HEART RATE: 82 BPM | TEMPERATURE: 98.4 F

## 2018-05-17 LAB
ABO + RH BLD: NORMAL
BLD PROD TYP BPU: NORMAL
BLD PROD TYP BPU: NORMAL
BLOOD BANK CMNT PATIENT-IMP: NORMAL
BLOOD GROUP ANTIBODIES SERPL: NORMAL
BPU ID: NORMAL
BPU ID: NORMAL
CROSSMATCH RESULT,%XM: NORMAL
CROSSMATCH RESULT,%XM: NORMAL
SPECIMEN EXP DATE BLD: NORMAL
STATUS OF UNIT,%ST: NORMAL
STATUS OF UNIT,%ST: NORMAL
UNIT DIVISION, %UDIV: 0
UNIT DIVISION, %UDIV: 0

## 2018-05-17 PROCEDURE — 74011250636 HC RX REV CODE- 250/636: Performed by: INTERNAL MEDICINE

## 2018-05-17 PROCEDURE — P9016 RBC LEUKOCYTES REDUCED: HCPCS | Performed by: INTERNAL MEDICINE

## 2018-05-17 PROCEDURE — 36430 TRANSFUSION BLD/BLD COMPNT: CPT

## 2018-05-17 PROCEDURE — 77030013169 SET IV BLD ICUM -A

## 2018-05-17 PROCEDURE — 86902 BLOOD TYPE ANTIGEN DONOR EA: CPT | Performed by: INTERNAL MEDICINE

## 2018-05-17 RX ORDER — SODIUM CHLORIDE 9 MG/ML
250 INJECTION, SOLUTION INTRAVENOUS AS NEEDED
Status: DISCONTINUED | OUTPATIENT
Start: 2018-05-17 | End: 2018-05-21 | Stop reason: HOSPADM

## 2018-05-17 RX ORDER — ACETAMINOPHEN 325 MG/1
650 TABLET ORAL ONCE
Status: CANCELLED | OUTPATIENT
Start: 2018-05-17 | End: 2018-05-17

## 2018-05-17 RX ORDER — DIPHENHYDRAMINE HCL 25 MG
25 CAPSULE ORAL ONCE
Status: CANCELLED | OUTPATIENT
Start: 2018-05-17 | End: 2018-05-17

## 2018-05-17 RX ADMIN — SODIUM CHLORIDE 250 ML: 900 INJECTION, SOLUTION INTRAVENOUS at 10:40

## 2018-05-17 NOTE — DISCHARGE INSTRUCTIONS
OUTPATIENT INFUSION CENTER    DISCHARGE INSTRUCTIONS FOR:  BLOOD TRANSFUSION    We hope you are feeling better after your blood transfusion. Some mild tenderness or slight bruising at your IV site is normal.  Avoid lifting or heavy use of that extremity for the rest of the day. Drink plenty of fluids, eat a normal diet and get some rest.    There are some important signs that you need to watch for in case you experience a delayed reaction to the blood you have received. Call your physician immediately if you develop any of the following symptoms:    1. Severe headache or backache;    2. Fever above 100 degrees;    3. Chills;    4. Difficulty breathing;    5.  Blood or red color in urine;    6. The feeling of weakness or constant fatigue;    7. Yellowing of the whites of your eyes or skin (jaundice). If your physician is not available, call or go to the nearest emergency room, or dial 911.     Georgia Joe, Signature: ___________________________ 5/17/2018  Jorge Luis Cordoba RN

## 2018-05-17 NOTE — PROGRESS NOTES
[facility] Progress [facility]    Date: May 17, 2018    Name: Rosas Serrano    MRN: [id number]         : 1950     Blood Transfusion      Ms. Fenton arrived to [facility] at 7235. Ms. Harmeet Grant was assessed and education was provided. Ms. Harmeet Grant arrived via wheelchair after being transported to appointment by son. Ms. Nusrat Lindsay vitals were reviewed. Patient Vitals for the past 12 hrs:   Temp Pulse Resp BP SpO2   18 1615 98.4 °F (36.9 °C) 82 18 164/73 99 %   18 1515 98.2 °F (36.8 °C) 86 18 168/71 98 %   18 1445 98 °F (36.7 °C) 86 18 169/74 97 %   18 1415 98.4 °F (36.9 °C) 82 18 164/73 99 %   18 1400 98.5 °F (36.9 °C) 88 18 158/72 98 %   18 1345 97.6 °F (36.4 °C) 86 18 160/72 98 %   18 1330 98.7 °F (37.1 °C) 76 18 168/66 97 %   18 1315 98.1 °F (36.7 °C) 78 18 166/64 98 %   18 1310 97.7 °F (36.5 °C) 74 18 168/70 98 %   18 1300 98.7 °F (37.1 °C) 77 18 169/62 99 %   18 1245 98.1 °F (36.7 °C) 76 18 160/70 97 %   18 1215 97.8 °F (36.6 °C) 75 18 161/72 98 %   18 1145 98.5 °F (36.9 °C) 86 18 148/70 97 %   18 1130 97.4 °F (36.3 °C) 77 18 143/58 97 %   18 1115 98.3 °F (36.8 °C) 79 18 145/66 97 %   18 1100 97.4 °F (36.3 °C) 76 18 146/62 98 %   18 1045 97.9 °F (36.6 °C) 76 18 155/64 97 %   18 1035 97.8 °F (36.6 °C) 75 18 157/66 98 %         22g IV inserted in patient's Right hand right, condition patent and no redness x4 attempts by 2 nurses. Positive for blood return and flushes without difficulty. Normal saline initiated at [facility]. Pre-medications were administered as ordered. 2 units of PRBCs administered as ordered followed by normal saline flush. Ms. Harmeet Grant tolerated infusion without complaints. IV removed. Gauze and coban applied to site. Ms. Harmeet Grant was observed in office for 1 hour post transfusion. No s/s of reaction noted. Discharge instructions reviewed with pt. Pt verbalized understanding.     Ms. Harmeet Grant was discharged from [place] [address] in stable condition at 1615. She has no further appointments scheduled at this office and will follow up with Dr. Ildefonso Snider as advised. Armband removed and shredded.      Azam Multani RN  May 17, 2018

## 2018-05-18 LAB
ABO + RH BLD: NORMAL
ANTIGENS PRESENT RBC DONR: NORMAL
ANTIGENS PRESENT RBC DONR: NORMAL
BLD PROD TYP BPU: NORMAL
BLD PROD TYP BPU: NORMAL
BLOOD GROUP ANTIBODIES SERPL: NORMAL
BLOOD GROUP ANTIBODIES SERPL: NORMAL
BPU ID: NORMAL
BPU ID: NORMAL
CROSSMATCH RESULT,%XM: NORMAL
CROSSMATCH RESULT,%XM: NORMAL
SPECIMEN EXP DATE BLD: NORMAL
STATUS OF UNIT,%ST: NORMAL
STATUS OF UNIT,%ST: NORMAL
UNIT DIVISION, %UDIV: 0
UNIT DIVISION, %UDIV: 0

## 2018-06-22 ENCOUNTER — HOSPITAL ENCOUNTER (EMERGENCY)
Age: 68
Discharge: HOME | End: 2018-06-22
Attending: EMERGENCY MEDICINE | Admitting: EMERGENCY MEDICINE
Payer: MEDICARE

## 2018-06-22 VITALS
OXYGEN SATURATION: 100 % | RESPIRATION RATE: 16 BRPM | HEART RATE: 81 BPM | WEIGHT: 156 LBS | SYSTOLIC BLOOD PRESSURE: 121 MMHG | TEMPERATURE: 98.1 F | DIASTOLIC BLOOD PRESSURE: 91 MMHG | BODY MASS INDEX: 27.64 KG/M2 | HEIGHT: 63 IN

## 2018-06-22 DIAGNOSIS — Z92.89 HISTORY OF TRANSFUSION: ICD-10-CM

## 2018-06-22 DIAGNOSIS — D64.9 CHRONIC ANEMIA: Primary | ICD-10-CM

## 2018-06-22 DIAGNOSIS — Z99.2 ESRD ON DIALYSIS (HCC): ICD-10-CM

## 2018-06-22 DIAGNOSIS — N18.6 ESRD ON DIALYSIS (HCC): ICD-10-CM

## 2018-06-22 LAB
ABO + RH BLD: NORMAL
ALBUMIN SERPL-MCNC: 3.6 G/DL (ref 3.4–5)
ALBUMIN/GLOB SERPL: 1 {RATIO} (ref 0.8–1.7)
ALP SERPL-CCNC: 189 U/L (ref 45–117)
ALT SERPL-CCNC: 19 U/L (ref 13–56)
ANION GAP SERPL CALC-SCNC: 7 MMOL/L (ref 3–18)
AST SERPL-CCNC: 27 U/L (ref 15–37)
BASOPHILS # BLD: 0 K/UL (ref 0–0.06)
BASOPHILS NFR BLD: 1 % (ref 0–2)
BILIRUB SERPL-MCNC: 0.6 MG/DL (ref 0.2–1)
BLOOD BANK CMNT PATIENT-IMP: NORMAL
BLOOD GROUP ANTIBODIES SERPL: NORMAL
BLOOD GROUP ANTIBODIES SERPL: NORMAL
BUN SERPL-MCNC: 10 MG/DL (ref 7–18)
BUN/CREAT SERPL: 3 (ref 12–20)
CALCIUM SERPL-MCNC: 9.1 MG/DL (ref 8.5–10.1)
CHLORIDE SERPL-SCNC: 100 MMOL/L (ref 100–108)
CO2 SERPL-SCNC: 36 MMOL/L (ref 21–32)
CREAT SERPL-MCNC: 2.99 MG/DL (ref 0.6–1.3)
DIFFERENTIAL METHOD BLD: ABNORMAL
EOSINOPHIL # BLD: 0.1 K/UL (ref 0–0.4)
EOSINOPHIL NFR BLD: 3 % (ref 0–5)
ERYTHROCYTE [DISTWIDTH] IN BLOOD BY AUTOMATED COUNT: 19.3 % (ref 11.6–14.5)
GLOBULIN SER CALC-MCNC: 3.5 G/DL (ref 2–4)
GLUCOSE SERPL-MCNC: 82 MG/DL (ref 74–99)
HCT VFR BLD AUTO: 24.9 % (ref 35–45)
HGB BLD-MCNC: 7.1 G/DL (ref 12–16)
LYMPHOCYTES # BLD: 0.7 K/UL (ref 0.9–3.6)
LYMPHOCYTES NFR BLD: 16 % (ref 21–52)
MCH RBC QN AUTO: 28.1 PG (ref 24–34)
MCHC RBC AUTO-ENTMCNC: 28.5 G/DL (ref 31–37)
MCV RBC AUTO: 98.4 FL (ref 74–97)
MONOCYTES # BLD: 0.5 K/UL (ref 0.05–1.2)
MONOCYTES NFR BLD: 11 % (ref 3–10)
NEUTS SEG # BLD: 3.2 K/UL (ref 1.8–8)
NEUTS SEG NFR BLD: 69 % (ref 40–73)
PLATELET # BLD AUTO: 153 K/UL (ref 135–420)
PMV BLD AUTO: 9.6 FL (ref 9.2–11.8)
POTASSIUM SERPL-SCNC: 3.5 MMOL/L (ref 3.5–5.5)
PROT SERPL-MCNC: 7.1 G/DL (ref 6.4–8.2)
RBC # BLD AUTO: 2.53 M/UL (ref 4.2–5.3)
RBC MORPH BLD: ABNORMAL
SODIUM SERPL-SCNC: 143 MMOL/L (ref 136–145)
SPECIMEN EXP DATE BLD: NORMAL
WBC # BLD AUTO: 4.5 K/UL (ref 4.6–13.2)

## 2018-06-22 PROCEDURE — 99282 EMERGENCY DEPT VISIT SF MDM: CPT

## 2018-06-22 PROCEDURE — 86900 BLOOD TYPING SEROLOGIC ABO: CPT | Performed by: EMERGENCY MEDICINE

## 2018-06-22 PROCEDURE — 85025 COMPLETE CBC W/AUTO DIFF WBC: CPT | Performed by: EMERGENCY MEDICINE

## 2018-06-22 PROCEDURE — 80053 COMPREHEN METABOLIC PANEL: CPT | Performed by: EMERGENCY MEDICINE

## 2018-06-22 PROCEDURE — 86870 RBC ANTIBODY IDENTIFICATION: CPT | Performed by: EMERGENCY MEDICINE

## 2018-06-22 NOTE — DISCHARGE INSTRUCTIONS
Learning About Blood Transfusions  What is a blood transfusion? Blood transfusion is a medical treatment to replace the blood or parts of blood that your body has lost. The blood goes through a tube from a bag to an intravenous (IV) catheter and into your vein. You may need a blood transfusion after losing blood from an injury, a major surgery, an illness that causes bleeding, or an illness that destroys blood cells. Transfusions are also used to give you the parts of blood-such as platelets, plasma, or substances that cause clotting-that your body needs to fight an illness or stop bleeding. How is a blood transfusion done? Before you receive a blood transfusion, your blood is tested to find out what your blood type is. Blood or blood parts that are a match with your blood type are ordered by your doctor. Blood is typed as A, B, AB, or O. It is also typed as Rh-positive or Rh-negative. Your blood is also screened to look for antibodies that might react with the blood that is given to you. The blood you are getting is checked and rechecked to make sure that it's the right type for you. A sample of your blood is mixed with a sample of the blood you will receive to check for problems. Before actually giving you the transfusion, a doctor and nurses will look at the label on the package of blood and compare it to your hospital ID bracelet and medical records. The transfusion begins only when all agree that this is the correct blood and that you are the correct person to receive it. To receive the transfusion, you will have an intravenous (IV) catheter inserted into a vein. A tube connects the catheter to the bag containing the blood, which is placed higher than your body. The blood then flows slowly into your vein. A doctor or nurse will check you several times during the transfusion to watch for a reaction or other problems. What are the possible risks?   Blood transfusions have many benefits and are often life-saving. But they also have a few risks. Possible risks include:  · Your body's reaction to receiving new blood. This may include:  ¨ Fever. ¨ Allergic reactions. ¨ Breathing problems. · An infection from the blood. This risk is small because of the strict rules placed on handling and storing blood. Getting a viral infection, such as HIV or hepatitis B or C, through blood transfusions has become very rare. The U.S. Food and Drug Administration (FDA) enforces strict guidelines on the collection, testing, storage, and use of blood. · Getting the wrong blood type by accident. Severe reactions, which can be life-threatening, are very rare. What can you expect after a blood transfusion? Here are some things you can do at home to help prevent infection at the transfusion site:  · Wash the area daily with warm, soapy water, and pat it dry. Don't use hydrogen peroxide or alcohol, which can slow healing. You may cover the area with a gauze bandage if it weeps or rubs against clothing. Change the bandage every day. · Keep the area clean and dry. When should you call for help? Call 911 anytime you think you may need emergency care. For example, call if:  · You have severe trouble breathing. Call your doctor now or seek immediate medical care if:  · You have a fever. · You feel weaker or more tired than usual.  · You have a yellow tint to your skin or the whites of your eyes. Watch closely for changes in your health, and be sure to contact your doctor if you have any problems. Follow-up care is a key part of your treatment and safety. Be sure to make and go to all appointments, and call your doctor if you are having problems. It's also a good idea to know your test results and keep a list of the medicines you take. Where can you learn more? Go to http://rosi-awa.info/. Enter V518 in the search box to learn more about \"Learning About Blood Transfusions. \"  Current as of: October 13, 2016  Content Version: 11.4  © 3049-4808 [employer]. Care instructions adapted under license by [employer] (which disclaims liability or warranty for this information). If you have questions about a medical condition or this instruction, always ask your healthcare professional. [address] any warranty or liability for your use of this information. Anemia: Care Instructions  Your Care Instructions    Anemia is a low level of red blood cells, which carry oxygen throughout your body. Many things can cause anemia. Lack of iron is one of the most common causes. Your body needs iron to make hemoglobin, a substance in red blood cells that carries oxygen from the lungs to your body's cells. Without enough iron, the body produces fewer and smaller red blood cells. As a result, your body's cells do not get enough oxygen, and you feel tired and weak. And you may have trouble concentrating. Bleeding is the most common cause of a lack of iron. You may have heavy menstrual bleeding or bleeding caused by conditions such as ulcers, hemorrhoids, or cancer. Regular use of aspirin or other anti-inflammatory medicines (such as ibuprofen) also can cause bleeding in some people. A lack of iron in your diet also can cause anemia, especially at times when the body needs more iron, such as during pregnancy, infancy, and the teen years. Your doctor may have prescribed iron pills. It may take several months of treatment for your iron levels to return to normal. Your doctor also may suggest that you eat foods that are rich in iron, such as meat and beans. There are many other causes of anemia. It is not always due to a lack of iron. Finding the specific cause of your anemia will help your doctor find the right treatment for you. Follow-up care is a key part of your treatment and safety. Be sure to make and go to all appointments, and call your doctor if you are having problems. It's also a good idea to know your test results and keep a list of the medicines you take. How can you care for yourself at home? · Take your medicines exactly as prescribed. Call your doctor if you think you are having a problem with your medicine. · If your doctor recommends iron pills, take them as directed:  ¨ Try to take the pills on an empty stomach about 1 hour before or 2 hours after meals. But you may need to take iron with food to avoid an upset stomach. ¨ Do not take antacids or drink milk or caffeine drinks (such as coffee, tea, or cola) at the same time or within 2 hours of the time that you take your iron. They can make it hard for your body to absorb the iron. ¨ Vitamin C (from food or supplements) helps your body absorb iron. Try taking iron pills with a glass of orange juice or some other food that is high in vitamin C, such as citrus fruits. ¨ Iron pills may cause stomach problems, such as heartburn, nausea, diarrhea, constipation, and cramps. Be sure to drink plenty of fluids, and include fruits, vegetables, and fiber in your diet each day. Iron pills often make your bowel movements dark or green. ¨ If you forget to take an iron pill, do not take a double dose of iron the next time you take a pill. ¨ Keep iron pills out of the reach of small children. An overdose of iron can be very dangerous. · Follow your doctor's advice about eating iron-rich foods. These include red meat, shellfish, poultry, eggs, beans, raisins, whole-grain bread, and leafy green vegetables. · Steam vegetables to help them keep their iron content. When should you call for help? Call 911 anytime you think you may need emergency care. For example, call if:  ? · You have symptoms of a heart attack. These may include:  ¨ Chest pain or pressure, or a strange feeling in the chest.  ¨ Sweating. ¨ Shortness of breath. ¨ Nausea or vomiting.   ¨ Pain, pressure, or a strange feeling in the back, neck, jaw, or upper belly or in one or both shoulders or arms. ¨ Lightheadedness or sudden weakness. ¨ A fast or irregular heartbeat. After you call 911, the  may tell you to chew 1 adult-strength or 2 to 4 low-dose aspirin. Wait for an ambulance. Do not try to drive yourself. ? · You passed out (lost consciousness). ?Call your doctor now or seek immediate medical care if:  ? · You have new or increased shortness of breath. ? · You are dizzy or lightheaded, or you feel like you may faint. ? · Your fatigue and weakness continue or get worse. ? · You have any abnormal bleeding, such as:  ¨ Nosebleeds. ¨ Vaginal bleeding that is different (heavier, more frequent, at a different time of the month) than what you are used to. ¨ Bloody or black stools, or rectal bleeding. ¨ Bloody or pink urine. ? Watch closely for changes in your health, and be sure to contact your doctor if:  ? · You do not get better as expected. Where can you learn more? Go to http://rosi-awa.info/. Enter R301 in the search box to learn more about \"Anemia: Care Instructions. \"  Current as of: October 13, 2016  Content Version: 11.4  © 4478-3450 [employer]. Care instructions adapted under license by [employer] (which disclaims liability or warranty for this information). If you have questions about a medical condition or this instruction, always ask your healthcare professional. [place] [address] any warranty or liability for your use of this information.

## 2018-06-22 NOTE — ED PROVIDER NOTES
EMERGENCY DEPARTMENT HISTORY AND PHYSICAL EXAM    Date: 6/22/2018  Patient Name: Vicki Cuevas    History of Presenting Illness     Chief Complaint   Patient presents with    Other         History Provided By: Patient    Chief Complaint: Blood transfusion  Duration: PTA  Timing:  Acute  Modifying Factors: No relieving or worsening factors  Associated Symptoms: weakness and numbness    Additional History (Context):   1:35 PM  Vicki Cuevas is a 75 y.o. female with PMHX of anemia and chronic kidney disease who presents to the emergency department due to [facility] numbers\" when she was at [facility] earlier today and advised to report to the emergency department for a blood transfusion. Associated sxs include weakness and numbness. She is followed by Marva Valle M.D. for her ESRD. Pt has been on dialysis for about 8 years and was advised to visit the emergency department due to a low hemoglobin count. Adds that the last time she had her dialysis treatment was earlier this month or last month. She noted that the nephrologist wanted to increase her transfusion treatments to every month. Reports that she is no longer making urine. Pt denies fevers, coughing, hematochezia, and any other sxs or complaints. PCP: Savannah Farrell MD    Current Outpatient Prescriptions   Medication Sig Dispense Refill    pantoprazole (PROTONIX) 40 mg tablet Take 1 Tab by mouth daily. 30 Tab 2    nystatin (MYCOSTATIN) powder Apply  to affected area two (2) times a day. 1 Bottle 0    cholecalciferol (VITAMIN D3) 1,000 unit tablet Take 2,000 Units by mouth daily.  ferrous sulfate 325 mg (65 mg iron) tablet Take 325 mg by mouth three (3) times daily (with meals).  hydrALAZINE (APRESOLINE) 25 mg tablet Take 25 mg by mouth three (3) times daily.  lactulose (CHRONULAC) 10 gram/15 mL solution Take 20 g by mouth daily as needed.  sevelamer (RENAGEL) 800 mg tablet Take 1,600 mg by mouth three (3) times daily (with meals).  b complex-vitamin c-folic acid (NEPHROCAPS) 1 mg capsule Take 1 Cap by mouth daily.  acetaminophen (TYLENOL) 325 mg tablet Take 325 mg by mouth every six (6) hours as needed for Pain.  AFLIBERCEPT INTRAVITREAL 1 Ampule by IntraVITreal route every twenty-eight (28) days.  carboxymethylcellulose sodium (REFRESH PLUS) 0.5 % dpet Administer 1 Drop to both eyes as needed.  hydrOXYzine HCl (ATARAX) 10 mg tablet Take 10 mg by mouth nightly as needed for Itching.  albuterol (PROVENTIL HFA, VENTOLIN HFA, PROAIR HFA) 90 mcg/actuation inhaler Take 2 Puffs by inhalation every four (4) hours as needed for Wheezing. Past History     Past Medical History:  Past Medical History:   Diagnosis Date    Arthritis     Asthma     Chronic kidney disease     Chronic pain     Cirrhosis ([facility] [address] 12/17/2017    Diabetes ([facility] [address] diet controlled    GERD (gastroesophageal reflux disease)     Hypertension        Past Surgical History:  Past Surgical History:   Procedure Laterality Date    COLONOSCOPY N/A 1/12/2018    COLONOSCOPY performed by Severino Alex MD at [facility] ENDOSCOPY    COLONOSCOPY N/A 3/19/2018    COLONOSCOPY performed by Severino Alex MD at [facility] ENDOSCOPY    HX GYN  c section    HX VASCULAR ACCESS      dialysis        Family History:  No family history on file. Social History:  Social History   Substance Use Topics    Smoking status: Never Smoker    Smokeless tobacco: Never Used    Alcohol use No       Allergies: Allergies   Allergen Reactions    Aspirin Other (comments)     GI bleed    Heparin (Porcine) Other (comments)     GI bleed    Metformin Other (comments)     swelling    Norvasc [Amlodipine] Rash         Review of Systems   Review of Systems   Constitutional: Negative for fever. Respiratory: Negative for cough and shortness of breath. Cardiovascular: Negative for chest pain and palpitations.    Gastrointestinal: Negative for abdominal distention, blood in stool, nausea and vomiting. Genitourinary: Negative for hematuria, urgency, vaginal bleeding and vaginal discharge. Musculoskeletal: Negative for back pain. Neurological: Positive for weakness (generalized) and numbness. Negative for dizziness and light-headedness. Hematological: Does not bruise/bleed easily. All other systems reviewed and are negative. Physical Exam     Vitals:    06/22/18 1219   BP: (!) 121/91   Pulse: 81   Resp: 16   Temp: 98.1 °F (36.7 °C)   SpO2: 100%   Weight: 70.8 kg (156 lb)   Height: 5' 3\" (1.6 m)     Physical Exam   Constitutional: She is oriented to person, place, and time. She appears well-developed and well-nourished. No distress. AA female in NAD. Alert. Appears comfortable. HENT:   Head: Normocephalic and atraumatic. Right Ear: External ear normal.   Left Ear: External ear normal.   Nose: Nose normal.   Eyes: Conjunctivae are normal. Right eye exhibits no discharge. Left eye exhibits no discharge. No scleral icterus. Neck: Normal range of motion. Cardiovascular: Normal rate, regular rhythm, normal heart sounds and intact distal pulses. Exam reveals no gallop and no friction rub. No murmur heard. Pulmonary/Chest: Effort normal and breath sounds normal. No accessory muscle usage. No tachypnea. No respiratory distress. She has no decreased breath sounds. She has no wheezes. She has no rhonchi. She has no rales. Abdominal: Soft. Musculoskeletal: Normal range of motion. Neurological: She is alert and oriented to person, place, and time. Skin: Skin is warm and dry. She is not diaphoretic. Psychiatric: She has a normal mood and affect. Judgment normal.   Nursing note and vitals reviewed. Diagnostic Study Results     Labs -   No results found for this or any previous visit (from the past 12 hour(s)).     Radiologic Studies -   No orders to display     CT Results  (Last 48 hours)    None        CXR Results  (Last 48 hours)    None          Medications given in the ED-  Medications - No data to display      Medical Decision Making   I am the first provider for this patient. I reviewed the vital signs, available nursing notes, past medical history, past surgical history, family history and social history. Vital Signs-Reviewed the patient's vital signs. Pulse Oximetry Analysis - 100% on room air     Records Reviewed: Nursing Notes and Old Medical Records    Provider Notes (Medical Decision Making): anemia, GI bleed, chronic anemia of disease    Procedures:  Procedures    ED Course:   1:35 PM Initial assessment performed. The patients presenting problems have been discussed, and they are in agreement with the care plan formulated and outlined with them. I have encouraged them to ask questions as they arise throughout their visit. 4:07 PM Discussed patient's history, exam, and available diagnostics results with Ellen Richardson MD, nephrology, who knows the pt well. He states that his partner Desi Caldera M.D. instructed the pt to go to the Infusion center for transfusion and not the ED. He states that transfusion tomorrow as an outpatient at  infusion center is fine. He asked that I insure that they have orders there for her. 4:15 PM Nursing staff talked to the infusion center and the infusion center states that there is no standing order for Ms. Fenton. I will call Dr. Cristine Baldwin again. 4:16 PM Spoke with Ellen Richardson MD on the phone and he states that he will place the orders to have the infusion done on Monday at the infusion center as an outpatient. He asked to ensure that she is typed and crossed. They will call the patient to schedule. 4:36 PM Ellen Richardson MD called back and states that his office has talked to the infusion center. He says that they are set to infuse her in the infusion center on Monday after dialysis. Diagnosis and Disposition       DISCHARGE NOTE:  Aleksandra Sykes  results have been reviewed with her. She has been counseled regarding her diagnosis, treatment, and plan. She verbally conveys understanding and agreement of the signs, symptoms, diagnosis, treatment and prognosis and additionally agrees to follow up as discussed. She also agrees with the care-plan and conveys that all of her questions have been answered. I have also provided discharge instructions for her that include: educational information regarding their diagnosis and treatment, and list of reasons why they would want to return to the ED prior to their follow-up appointment, should her condition change. She has been provided with education for proper emergency department utilization. CLINICAL IMPRESSION:    1. Chronic anemia    2. ESRD on dialysis ([facility] [address]    3. History of transfusion        PLAN:  1. D/C Home  2. Discharge Medication List as of 6/22/2018  4:23 PM        3. Follow-up Information     Follow up With Details Comments [address], [place] Schedule an appointment as soon as possible for a visit in 2 days For nephrology follow up [address] to For blood transfusion. They will call you to schedule an appointment. [facility] EMERGENCY DEPT Go to As needed, if symptoms worsen [address] [id number]  [phone]        _______________________________    Attestations: This note is prepared by Jean Carlos Jones, acting as Scribe for Micha Eng PA-C. Micha Eng PA-C:  The scribe's documentation has been prepared under my direction and personally reviewed by me in its entirety.   I confirm that the note above accurately reflects all work, treatment, procedures, and medical decision making performed by me.  _______________________________

## 2018-06-22 NOTE — ED TRIAGE NOTES
States she was told by jaime gillespie today to come to ED for a blood transfusion because her numbers are \"low\".

## 2018-06-25 ENCOUNTER — APPOINTMENT (OUTPATIENT)
Dept: INFUSION THERAPY | Age: 68
End: 2018-06-25

## 2018-06-26 ENCOUNTER — HOSPITAL ENCOUNTER (OUTPATIENT)
Dept: INFUSION THERAPY | Age: 68
End: 2018-06-26

## 2018-06-27 ENCOUNTER — HOSPITAL ENCOUNTER (OUTPATIENT)
Dept: INFUSION THERAPY | Age: 68
Discharge: HOME | End: 2018-06-27

## 2018-06-27 ENCOUNTER — APPOINTMENT (OUTPATIENT)
Dept: INFUSION THERAPY | Age: 68
End: 2018-06-27

## 2018-06-28 ENCOUNTER — HOSPITAL ENCOUNTER (OUTPATIENT)
Dept: INFUSION THERAPY | Age: 68
End: 2018-06-28

## 2018-07-02 ENCOUNTER — HOSPITAL ENCOUNTER (OUTPATIENT)
Dept: INFUSION THERAPY | Age: 68
Discharge: HOME | End: 2018-07-02
Payer: MEDICARE

## 2018-07-02 PROCEDURE — 86870 RBC ANTIBODY IDENTIFICATION: CPT | Performed by: INTERNAL MEDICINE

## 2018-07-02 PROCEDURE — 36415 COLL VENOUS BLD VENIPUNCTURE: CPT | Performed by: INTERNAL MEDICINE

## 2018-07-02 PROCEDURE — 86920 COMPATIBILITY TEST SPIN: CPT | Performed by: INTERNAL MEDICINE

## 2018-07-02 PROCEDURE — 86900 BLOOD TYPING SEROLOGIC ABO: CPT | Performed by: INTERNAL MEDICINE

## 2018-07-03 ENCOUNTER — HOSPITAL ENCOUNTER (OUTPATIENT)
Dept: INFUSION THERAPY | Age: 68
End: 2018-07-03
Payer: MEDICARE

## 2018-07-03 RX ORDER — SODIUM CHLORIDE 9 MG/ML
250 INJECTION, SOLUTION INTRAVENOUS AS NEEDED
Status: CANCELLED | OUTPATIENT
Start: 2018-07-03

## 2018-07-04 ENCOUNTER — HOSPITAL ENCOUNTER (EMERGENCY)
Age: 68
Discharge: HOME | End: 2018-07-04
Attending: EMERGENCY MEDICINE
Payer: MEDICARE

## 2018-07-04 VITALS
SYSTOLIC BLOOD PRESSURE: 162 MMHG | HEART RATE: 80 BPM | WEIGHT: 155 LBS | DIASTOLIC BLOOD PRESSURE: 64 MMHG | BODY MASS INDEX: 27.46 KG/M2 | TEMPERATURE: 98.6 F | RESPIRATION RATE: 18 BRPM | OXYGEN SATURATION: 100 % | HEIGHT: 63 IN

## 2018-07-04 DIAGNOSIS — Z99.2 ANEMIA IN CHRONIC KIDNEY DISEASE, ON CHRONIC DIALYSIS (HCC): Primary | ICD-10-CM

## 2018-07-04 DIAGNOSIS — N18.6 ANEMIA IN CHRONIC KIDNEY DISEASE, ON CHRONIC DIALYSIS (HCC): Primary | ICD-10-CM

## 2018-07-04 DIAGNOSIS — D63.1 ANEMIA IN CHRONIC KIDNEY DISEASE, ON CHRONIC DIALYSIS (HCC): Primary | ICD-10-CM

## 2018-07-04 LAB
ABO + RH BLD: NORMAL
ANION GAP SERPL CALC-SCNC: 6 MMOL/L (ref 3–18)
BASOPHILS # BLD: 0 K/UL (ref 0–0.06)
BASOPHILS NFR BLD: 1 % (ref 0–2)
BLD PROD TYP BPU: NORMAL
BLD PROD TYP BPU: NORMAL
BLOOD GROUP ANTIBODIES SERPL: NORMAL
BLOOD GROUP ANTIBODIES SERPL: NORMAL
BPU ID: NORMAL
BPU ID: NORMAL
BUN SERPL-MCNC: 21 MG/DL (ref 7–18)
BUN/CREAT SERPL: 6 (ref 12–20)
CALCIUM SERPL-MCNC: 9.5 MG/DL (ref 8.5–10.1)
CHLORIDE SERPL-SCNC: 99 MMOL/L (ref 100–108)
CO2 SERPL-SCNC: 38 MMOL/L (ref 21–32)
CREAT SERPL-MCNC: 3.56 MG/DL (ref 0.6–1.3)
CROSSMATCH RESULT,%XM: NORMAL
CROSSMATCH RESULT,%XM: NORMAL
DIFFERENTIAL METHOD BLD: ABNORMAL
EOSINOPHIL # BLD: 0.1 K/UL (ref 0–0.4)
EOSINOPHIL NFR BLD: 3 % (ref 0–5)
ERYTHROCYTE [DISTWIDTH] IN BLOOD BY AUTOMATED COUNT: 18.3 % (ref 11.6–14.5)
GLUCOSE SERPL-MCNC: 218 MG/DL (ref 74–99)
HCT VFR BLD AUTO: 22.7 % (ref 35–45)
HGB BLD-MCNC: 6.3 G/DL (ref 12–16)
LYMPHOCYTES # BLD: 0.8 K/UL (ref 0.9–3.6)
LYMPHOCYTES NFR BLD: 18 % (ref 21–52)
MCH RBC QN AUTO: 25.3 PG (ref 24–34)
MCHC RBC AUTO-ENTMCNC: 27.8 G/DL (ref 31–37)
MCV RBC AUTO: 91.2 FL (ref 74–97)
MONOCYTES # BLD: 0.4 K/UL (ref 0.05–1.2)
MONOCYTES NFR BLD: 8 % (ref 3–10)
NEUTS SEG # BLD: 3.1 K/UL (ref 1.8–8)
NEUTS SEG NFR BLD: 70 % (ref 40–73)
PLATELET # BLD AUTO: 139 K/UL (ref 135–420)
PLATELET COMMENTS,PCOM: ABNORMAL
PMV BLD AUTO: 10.2 FL (ref 9.2–11.8)
POTASSIUM SERPL-SCNC: 3.1 MMOL/L (ref 3.5–5.5)
RBC # BLD AUTO: 2.49 M/UL (ref 4.2–5.3)
RBC MORPH BLD: ABNORMAL
SODIUM SERPL-SCNC: 143 MMOL/L (ref 136–145)
SPECIMEN EXP DATE BLD: NORMAL
STATUS OF UNIT,%ST: NORMAL
STATUS OF UNIT,%ST: NORMAL
UNIT DIVISION, %UDIV: 0
UNIT DIVISION, %UDIV: 0
WBC # BLD AUTO: 4.4 K/UL (ref 4.6–13.2)

## 2018-07-04 PROCEDURE — 86901 BLOOD TYPING SEROLOGIC RH(D): CPT | Performed by: EMERGENCY MEDICINE

## 2018-07-04 PROCEDURE — 74011250637 HC RX REV CODE- 250/637: Performed by: EMERGENCY MEDICINE

## 2018-07-04 PROCEDURE — 99283 EMERGENCY DEPT VISIT LOW MDM: CPT

## 2018-07-04 PROCEDURE — 86900 BLOOD TYPING SEROLOGIC ABO: CPT | Performed by: INTERNAL MEDICINE

## 2018-07-04 PROCEDURE — 86870 RBC ANTIBODY IDENTIFICATION: CPT | Performed by: EMERGENCY MEDICINE

## 2018-07-04 PROCEDURE — 86870 RBC ANTIBODY IDENTIFICATION: CPT | Performed by: INTERNAL MEDICINE

## 2018-07-04 PROCEDURE — 85025 COMPLETE CBC W/AUTO DIFF WBC: CPT | Performed by: EMERGENCY MEDICINE

## 2018-07-04 PROCEDURE — 80048 BASIC METABOLIC PNL TOTAL CA: CPT | Performed by: EMERGENCY MEDICINE

## 2018-07-04 PROCEDURE — 86920 COMPATIBILITY TEST SPIN: CPT | Performed by: INTERNAL MEDICINE

## 2018-07-04 PROCEDURE — 86922 COMPATIBILITY TEST ANTIGLOB: CPT | Performed by: EMERGENCY MEDICINE

## 2018-07-04 PROCEDURE — 86921 COMPATIBILITY TEST INCUBATE: CPT | Performed by: EMERGENCY MEDICINE

## 2018-07-04 PROCEDURE — 86920 COMPATIBILITY TEST SPIN: CPT | Performed by: EMERGENCY MEDICINE

## 2018-07-04 RX ORDER — DIPHENHYDRAMINE HCL 25 MG
25 CAPSULE ORAL
Status: COMPLETED | OUTPATIENT
Start: 2018-07-04 | End: 2018-07-04

## 2018-07-04 RX ADMIN — DIPHENHYDRAMINE HYDROCHLORIDE 25 MG: 25 CAPSULE ORAL at 12:33

## 2018-07-04 NOTE — ED NOTES
I have reviewed discharge instructions with the patient. The patient verbalized understanding. Patient is A/O X 4 at time of discharge. Requested to be wheeled to her car.

## 2018-07-04 NOTE — ED PROVIDER NOTES
EMERGENCY DEPARTMENT HISTORY AND PHYSICAL EXAM    Date: 7/4/2018  Patient Name: Fallon Ramirez    History of Presenting Illness     Chief Complaint   Patient presents with    Abnormal Lab Results         History Provided By: Patient    Chief Complaint: Abnormal Lab Results  Duration: PTA   Timing:  Constant  Location: Generalized  Severity: Moderate  Associated Symptoms: fatigue, generalized itching    Additional History (Context):   12:00 PM  Fallon Ramirez is a 75 y.o. female with PMHX CKD, HTN, DM presents to the emergency department C/O HGB of 5.3, onset today. Associated sxs include fatigue and generalized itching. Pt states she went to dialysis today and was told her HGB is 5.3. Pt states she was scheduled for a transfusion yesterday, but the [facility] was broken so they couldn't do it. \" She is scheduled for a transfusion at the infusion center tomorrow, but states \"I need it today. \" Pt denies any other sxs or complaints. PCP: Savannah Farrell MD    Current Outpatient Prescriptions   Medication Sig Dispense Refill    pantoprazole (PROTONIX) 40 mg tablet Take 1 Tab by mouth daily. 30 Tab 2    nystatin (MYCOSTATIN) powder Apply  to affected area two (2) times a day. 1 Bottle 0    cholecalciferol (VITAMIN D3) 1,000 unit tablet Take 2,000 Units by mouth daily.  ferrous sulfate 325 mg (65 mg iron) tablet Take 325 mg by mouth three (3) times daily (with meals).  hydrALAZINE (APRESOLINE) 25 mg tablet Take 25 mg by mouth three (3) times daily.  lactulose (CHRONULAC) 10 gram/15 mL solution Take 20 g by mouth daily as needed.  sevelamer (RENAGEL) 800 mg tablet Take 1,600 mg by mouth three (3) times daily (with meals).  b complex-vitamin c-folic acid (NEPHROCAPS) 1 mg capsule Take 1 Cap by mouth daily.  acetaminophen (TYLENOL) 325 mg tablet Take 325 mg by mouth every six (6) hours as needed for Pain.  AFLIBERCEPT INTRAVITREAL 1 Ampule by IntraVITreal route every twenty-eight (28) days.       carboxymethylcellulose sodium (REFRESH PLUS) 0.5 % dpet Administer 1 Drop to both eyes as needed.  hydrOXYzine HCl (ATARAX) 10 mg tablet Take 10 mg by mouth nightly as needed for Itching.  albuterol (PROVENTIL HFA, VENTOLIN HFA, PROAIR HFA) 90 mcg/actuation inhaler Take 2 Puffs by inhalation every four (4) hours as needed for Wheezing. Past History     Past Medical History:  Past Medical History:   Diagnosis Date    Arthritis     Asthma     Chronic kidney disease     Chronic pain     Cirrhosis ([facility] [address] 12/17/2017    Diabetes ([facility] [address] diet controlled    GERD (gastroesophageal reflux disease)     Hypertension        Past Surgical History:  Past Surgical History:   Procedure Laterality Date    COLONOSCOPY N/A 1/12/2018    COLONOSCOPY performed by Arsalan Reynoso MD at [facility] ENDOSCOPY    COLONOSCOPY N/A 3/19/2018    COLONOSCOPY performed by Arsalan Reynoso MD at [facility] ENDOSCOPY    HX GYN  c section    HX VASCULAR ACCESS      dialysis        Family History:  History reviewed. No pertinent family history. Social History:  Social History   Substance Use Topics    Smoking status: Never Smoker    Smokeless tobacco: Never Used    Alcohol use No       Allergies: Allergies   Allergen Reactions    Aspirin Other (comments)     GI bleed    Heparin (Porcine) Other (comments)     GI bleed    Metformin Other (comments)     swelling    Norvasc [Amlodipine] Rash         Review of Systems   Review of Systems   Constitutional: Positive for fatigue. Negative for fever. Skin:        (+) generalized itching   All other systems reviewed and are negative. Physical Exam     Vitals:    07/04/18 1133 07/04/18 1453   BP: 156/50 162/64   Pulse: 85 80   Resp: 20 18   Temp: 98.6 °F (37 °C)    SpO2: 100% 100%   Weight: 70.3 kg (155 lb)    Height: 5' 3\" (1.6 m)      Physical Exam   Nursing note and vitals reviewed. Constitutional: Elderly appearing, mild distress, chronically ill appearing.   Head: Normocephalic, Atraumatic  Eyes: Pupils are equal, round, and reactive to light, EOMI  Neck: Supple  Cardiovascular: 4/6 murmur. Regular rate and rhythm, no murmurs, rubs, or gallops  Chest: Normal work of breathing and chest excursion bilaterally  Lungs: Clear to ausculation bilaterally  Back: No evidence of trauma or deformity  Extremities: No evidence of trauma or deformity, no LE edema  Skin: Warm and dry, normal cap refill  Neuro: Alert and appropriate, CN intact, normal speech, strength and sensation full and symmetric bilaterally, normal coordination  Psychiatric: Normal mood and affect       Diagnostic Study Results     Labs -     Recent Results (from the past 12 hour(s))   TYPE & SCREEN    Collection Time: 07/04/18 12:15 PM   Result Value Ref Range    Crossmatch Expiration 07/07/2018     ABO/Rh(D) Lucille David POSITIVE     Antibody screen POS     Antibody ID [id number]     Unit number [id number]     Blood component type  LR,2     Unit division 00     Status of unit ALLOCATED     ANTIGEN/ANTIBODY INFO E NEGATIVE,  c NEGATIVE,       Crossmatch result PENDING     Unit number [id number]     Blood component type  LR,1     Unit division 00     Status of unit ALLOCATED     ANTIGEN/ANTIBODY INFO E NEGATIVE,  c NEGATIVE,       Crossmatch result PENDING    CBC WITH AUTOMATED DIFF    Collection Time: 07/04/18 12:46 PM   Result Value Ref Range    WBC 4.4 (L) 4.6 - 13.2 K/uL    RBC 2.49 (L) 4.20 - 5.30 M/uL    HGB 6.3 (L) 12.0 - 16.0 g/dL    HCT 22.7 (L) 35.0 - 45.0 %    MCV 91.2 74.0 - 97.0 FL    MCH 25.3 24.0 - 34.0 PG    MCHC 27.8 (L) 31.0 - 37.0 g/dL    RDW 18.3 (H) 11.6 - 14.5 %    PLATELET 090 165 - 686 K/uL    MPV 10.2 9.2 - 11.8 FL    NEUTROPHILS 70 40 - 73 %    LYMPHOCYTES 18 (L) 21 - 52 %    MONOCYTES 8 3 - 10 %    EOSINOPHILS 3 0 - 5 %    BASOPHILS 1 0 - 2 %    ABS. NEUTROPHILS 3.1 1.8 - 8.0 K/UL    ABS. LYMPHOCYTES 0.8 (L) 0.9 - 3.6 K/UL    ABS. MONOCYTES 0.4 0.05 - 1.2 K/UL    ABS.  EOSINOPHILS 0.1 0.0 - 0.4 K/UL ABS. BASOPHILS 0.0 0.0 - 0.06 K/UL    PLATELET COMMENTS ADEQUATE PLATELETS      RBC COMMENTS ANISOCYTOSIS  2+        RBC COMMENTS HYPOCHROMIA  2+        RBC COMMENTS POIKILOCYTOSIS  1+        RBC COMMENTS OVALOCYTES  1+        DF AUTOMATED     METABOLIC PANEL, BASIC    Collection Time: 07/04/18 12:46 PM   Result Value Ref Range    Sodium 143 136 - 145 mmol/L    Potassium 3.1 (L) 3.5 - 5.5 mmol/L    Chloride 99 (L) 100 - 108 mmol/L    CO2 38 (H) 21 - 32 mmol/L    Anion gap 6 3.0 - 18 mmol/L    Glucose 218 (H) 74 - 99 mg/dL    BUN 21 (H) 7.0 - 18 MG/DL    Creatinine 3.56 (H) 0.6 - 1.3 MG/DL    BUN/Creatinine ratio 6 (L) 12 - 20      GFR est AA 15 (L) >60 ml/min/1.73m2    GFR est non-AA 13 (L) >60 ml/min/1.73m2    Calcium 9.5 8.5 - 10.1 MG/DL       Radiologic Studies -   No orders to display     CT Results  (Last 48 hours)    None        CXR Results  (Last 48 hours)    None            Medical Decision Making   I am the first provider for this patient. I reviewed the vital signs, available nursing notes, past medical history, past surgical history, family history and social history. Vital Signs-Reviewed the patient's vital signs. Pulse Oximetry Analysis - 100% on RA     Records Reviewed: Nursing Notes    Procedures:  Procedures    ED Course:   12:00 PM Initial assessment performed. The patients presenting problems have been discussed, and they are in agreement with the care plan formulated and outlined with them. I have encouraged them to ask questions as they arise throughout their visit.    2:22 PM Discussed lab results with pt. Offered transfusion and admission. She would like to be discharged and will go to her appointment tomorrow at the [facility]. Diagnosis and Disposition       DISCHARGE NOTE:  2:23 PM  [facility] End[facility]  results have been reviewed with her. She has been counseled regarding her diagnosis, treatment, and plan.   She verbally conveys understanding and agreement of the signs, symptoms, diagnosis, treatment and prognosis and additionally agrees to follow up as discussed. She also agrees with the care-plan and conveys that all of her questions have been answered. I have also provided discharge instructions for her that include: educational information regarding their diagnosis and treatment, and list of reasons why they would want to return to the ED prior to their follow-up appointment, should her condition change. She has been provided with education for proper emergency department utilization. CLINICAL IMPRESSION:    1. Anemia in chronic kidney disease, on chronic dialysis [facility])        Discussion: 75 y.o. female presenting to the ED requesting a blood transfusion. States she was told her HGB was 5.3 at dialysis. She has a transfusion scheduled tomorrow at the infusion center. Her HGB in the ED is 6.3. Offered transfusion and admission. Pt declined. She prefers to follow up at the infusion clinic tomorrow. Will d/c with return precautions. Pt understands and agrees with this plan. PLAN:  1. D/C Home  2. Current Discharge Medication List        3. Follow-up Information     Follow up With Details Comments Contact Info    Your PCP Schedule an appointment as soon as possible for a visit in 2 days      [facility] EMERGENCY DEPT  As needed, if symptoms worsen [address] [address]  [phone]        ___________________________   Attestations:     SCRIBE ATTESTATION:  This note is prepared by Mary Beth Cornelius, acting as Scribe for Chantel Levy MD.    PROVIDER ATTESTATION:  Chantel Levy MD: The scribe's documentation has been prepared under my direction and personally reviewed by me in its entirety.  I confirm that the note above accurately reflects all work, treatment, procedures, and medical decision making performed by me.   _______________________________

## 2018-07-04 NOTE — DISCHARGE INSTRUCTIONS
Anemia From Chronic Disease: Care Instructions  Your Care Instructions    Anemia is a low level of red blood cells. Red blood cells carry oxygen from your lungs to the rest of your body. Sometimes when you have a long-term (chronic) disease, such as kidney disease, arthritis, diabetes, cancer, or an infection, your body does not make enough red blood cells. Follow-up care is a key part of your treatment and safety. Be sure to make and go to all appointments, and call your doctor if you are having problems. It's also a good idea to know your test results and keep a list of the medicines you take. How can you care for yourself at home? · Follow your doctor's instructions to treat the chronic condition that is causing the anemia. · Be safe with medicines. Take your medicine to treat your chronic condition exactly as prescribed. Call your doctor if you think you are having a problem with your medicine. · Take your medicine for anemia exactly as prescribed. Call your doctor if you think you are having a problem with your medicine. Medicines to increase the number of red blood cells (such as epoetin or darbepoetin) may be given as an injection. ¨ If you miss a dose, take it as soon as you can, unless it is almost time for your next dose. In that case, get back on your regular schedule and take only one dose. ¨ Do not freeze this medicine. Store it in the refrigerator. Do not shake the bottle before you prepare the shot. · Keep all your appointments for blood tests to check on your hemoglobin levels. When should you call for help? Call 911 anytime you think you may need emergency care. For example, call if:  ? · You passed out (lost consciousness). ?Call your doctor now or seek immediate medical care if:  ? · You are short of breath. ? · You are dizzy or light-headed, or you feel like you may faint. ? · You have new or worse bleeding. ? Watch closely for changes in your health, and be sure to contact your doctor if:  ? · You feel weaker or more tired than usual.   ? · You do not get better as expected. Where can you learn more? Go to http://rosi-awa.info/. Enter E502 in the search box to learn more about \"Anemia From Chronic Disease: Care Instructions. \"  Current as of: October 13, 2016  Content Version: 11.4  © 2609-9828 [employer]. Care instructions adapted under license by [employer] (which disclaims liability or warranty for this information). If you have questions about a medical condition or this instruction, always ask your healthcare professional. [place] [address] any warranty or liability for your use of this information.

## 2018-07-05 ENCOUNTER — HOSPITAL ENCOUNTER (OUTPATIENT)
Dept: INFUSION THERAPY | Age: 68
Discharge: HOME | End: 2018-07-05
Payer: MEDICARE

## 2018-07-05 VITALS
OXYGEN SATURATION: 98 % | TEMPERATURE: 98.6 F | RESPIRATION RATE: 18 BRPM | SYSTOLIC BLOOD PRESSURE: 177 MMHG | HEART RATE: 81 BPM | DIASTOLIC BLOOD PRESSURE: 80 MMHG

## 2018-07-05 LAB
ABO + RH BLD: NORMAL
ANTIGENS PRESENT RBC DONR: NORMAL
ANTIGENS PRESENT RBC DONR: NORMAL
BLD PROD TYP BPU: NORMAL
BLD PROD TYP BPU: NORMAL
BLOOD BANK CMNT PATIENT-IMP: NORMAL
BLOOD GROUP ANTIBODIES SERPL: NORMAL
BLOOD GROUP ANTIBODIES SERPL: NORMAL
BPU ID: NORMAL
BPU ID: NORMAL
CROSSMATCH RESULT,%XM: NORMAL
CROSSMATCH RESULT,%XM: NORMAL
SPECIMEN EXP DATE BLD: NORMAL
STATUS OF UNIT,%ST: NORMAL
STATUS OF UNIT,%ST: NORMAL
UNIT DIVISION, %UDIV: 0
UNIT DIVISION, %UDIV: 0

## 2018-07-05 PROCEDURE — 74011250636 HC RX REV CODE- 250/636: Performed by: INTERNAL MEDICINE

## 2018-07-05 PROCEDURE — P9016 RBC LEUKOCYTES REDUCED: HCPCS | Performed by: INTERNAL MEDICINE

## 2018-07-05 PROCEDURE — 77030013169 SET IV BLD ICUM -A

## 2018-07-05 PROCEDURE — 86922 COMPATIBILITY TEST ANTIGLOB: CPT | Performed by: INTERNAL MEDICINE

## 2018-07-05 PROCEDURE — 86902 BLOOD TYPE ANTIGEN DONOR EA: CPT | Performed by: INTERNAL MEDICINE

## 2018-07-05 PROCEDURE — 86921 COMPATIBILITY TEST INCUBATE: CPT | Performed by: INTERNAL MEDICINE

## 2018-07-05 PROCEDURE — 36430 TRANSFUSION BLD/BLD COMPNT: CPT

## 2018-07-05 RX ORDER — SODIUM CHLORIDE 9 MG/ML
250 INJECTION, SOLUTION INTRAVENOUS AS NEEDED
Status: DISCONTINUED | OUTPATIENT
Start: 2018-07-05 | End: 2018-07-09 | Stop reason: HOSPADM

## 2018-07-05 RX ORDER — SODIUM CHLORIDE 0.9 % (FLUSH) 0.9 %
10 SYRINGE (ML) INJECTION EVERY 8 HOURS
Status: DISCONTINUED | OUTPATIENT
Start: 2018-07-05 | End: 2018-07-09 | Stop reason: HOSPADM

## 2018-07-05 RX ADMIN — Medication 10 ML: at 16:30

## 2018-07-05 RX ADMIN — SODIUM CHLORIDE 250 ML: 900 INJECTION, SOLUTION INTRAVENOUS at 09:38

## 2018-07-05 RX ADMIN — Medication 10 ML: at 09:34

## 2018-07-05 NOTE — DISCHARGE INSTRUCTIONS
OUTPATIENT INFUSION CENTER    DISCHARGE INSTRUCTIONS FOR:  BLOOD TRANSFUSION    We hope you are feeling better after your blood transfusion. Some mild tenderness or slight bruising at your IV site is normal.  Avoid lifting or heavy use of that extremity for the rest of the day. Drink plenty of fluids, eat a normal diet and get some rest.    There are some important signs that you need to watch for in case you experience a delayed reaction to the blood you have received. Call your physician immediately if you develop any of the following symptoms:    1. Severe headache or backache;    2. Fever above 100 degrees;    3. Chills;    4. Difficulty breathing;    5.  Blood or red color in urine;    6. The feeling of weakness or constant fatigue;    7. Yellowing of the whites of your eyes or skin (jaundice). If your physician is not available, call or go to the nearest emergency room, or dial 911.     Sanjay Zimmerman, Signature: ___________________________ 7/5/2018  Noe Mir RN

## 2018-07-06 LAB
ABO + RH BLD: NORMAL
ANTIGENS PRESENT RBC DONR: NORMAL
ANTIGENS PRESENT RBC DONR: NORMAL
BLD PROD TYP BPU: NORMAL
BLD PROD TYP BPU: NORMAL
BLOOD BANK CMNT PATIENT-IMP: NORMAL
BLOOD GROUP ANTIBODIES SERPL: NORMAL
BLOOD GROUP ANTIBODIES SERPL: NORMAL
BPU ID: NORMAL
BPU ID: NORMAL
CALLED TO:,BCALL1: NORMAL
CROSSMATCH RESULT,%XM: NORMAL
CROSSMATCH RESULT,%XM: NORMAL
SPECIMEN EXP DATE BLD: NORMAL
STATUS OF UNIT,%ST: NORMAL
STATUS OF UNIT,%ST: NORMAL
UNIT DIVISION, %UDIV: 0
UNIT DIVISION, %UDIV: 0

## 2018-07-18 ENCOUNTER — HOSPITAL ENCOUNTER (OUTPATIENT)
Dept: INFUSION THERAPY | Age: 68
Discharge: HOME | End: 2018-07-18
Payer: MEDICARE

## 2018-07-18 PROCEDURE — 86870 RBC ANTIBODY IDENTIFICATION: CPT | Performed by: INTERNAL MEDICINE

## 2018-07-18 PROCEDURE — 86922 COMPATIBILITY TEST ANTIGLOB: CPT | Performed by: INTERNAL MEDICINE

## 2018-07-18 PROCEDURE — 86900 BLOOD TYPING SEROLOGIC ABO: CPT | Performed by: INTERNAL MEDICINE

## 2018-07-18 PROCEDURE — 86920 COMPATIBILITY TEST SPIN: CPT | Performed by: INTERNAL MEDICINE

## 2018-07-18 PROCEDURE — 36415 COLL VENOUS BLD VENIPUNCTURE: CPT | Performed by: INTERNAL MEDICINE

## 2018-07-18 PROCEDURE — 86902 BLOOD TYPE ANTIGEN DONOR EA: CPT | Performed by: INTERNAL MEDICINE

## 2018-07-18 PROCEDURE — 86921 COMPATIBILITY TEST INCUBATE: CPT | Performed by: INTERNAL MEDICINE

## 2018-07-19 ENCOUNTER — HOSPITAL ENCOUNTER (OUTPATIENT)
Dept: INFUSION THERAPY | Age: 68
Discharge: HOME | End: 2018-07-19
Payer: MEDICARE

## 2018-07-19 VITALS
OXYGEN SATURATION: 97 % | HEART RATE: 82 BPM | RESPIRATION RATE: 18 BRPM | TEMPERATURE: 98 F | DIASTOLIC BLOOD PRESSURE: 76 MMHG | SYSTOLIC BLOOD PRESSURE: 174 MMHG

## 2018-07-19 PROCEDURE — 86922 COMPATIBILITY TEST ANTIGLOB: CPT | Performed by: INTERNAL MEDICINE

## 2018-07-19 PROCEDURE — P9016 RBC LEUKOCYTES REDUCED: HCPCS | Performed by: INTERNAL MEDICINE

## 2018-07-19 PROCEDURE — 36430 TRANSFUSION BLD/BLD COMPNT: CPT

## 2018-07-19 PROCEDURE — 74011250636 HC RX REV CODE- 250/636: Performed by: INTERNAL MEDICINE

## 2018-07-19 PROCEDURE — 86921 COMPATIBILITY TEST INCUBATE: CPT | Performed by: INTERNAL MEDICINE

## 2018-07-19 RX ORDER — SODIUM CHLORIDE 9 MG/ML
250 INJECTION, SOLUTION INTRAVENOUS AS NEEDED
Status: DISCONTINUED | OUTPATIENT
Start: 2018-07-19 | End: 2018-07-23 | Stop reason: HOSPADM

## 2018-07-19 RX ORDER — SODIUM CHLORIDE 0.9 % (FLUSH) 0.9 %
10 SYRINGE (ML) INJECTION AS NEEDED
Status: DISCONTINUED | OUTPATIENT
Start: 2018-07-19 | End: 2018-07-23 | Stop reason: HOSPADM

## 2018-07-19 RX ADMIN — SODIUM CHLORIDE 250 ML: 900 INJECTION, SOLUTION INTRAVENOUS at 08:58

## 2018-07-19 RX ADMIN — Medication 10 ML: at 14:44

## 2018-07-19 RX ADMIN — Medication 10 ML: at 08:57

## 2018-07-19 NOTE — DISCHARGE INSTRUCTIONS
OUTPATIENT INFUSION CENTER    DISCHARGE INSTRUCTIONS FOR:  BLOOD TRANSFUSION    We hope you are feeling better after your blood transfusion. Some mild tenderness or slight bruising at your IV site is normal.  Avoid lifting or heavy use of that extremity for the rest of the day. Drink plenty of fluids, eat a normal diet and get some rest.    There are some important signs that you need to watch for in case you experience a delayed reaction to the blood you have received. Call your physician immediately if you develop any of the following symptoms:    1. Severe headache or backache;    2. Fever above 100 degrees;    3. Chills;    4. Difficulty breathing;    5.  Blood or red color in urine;    6. The feeling of weakness or constant fatigue;    7. Yellowing of the whites of your eyes or skin (jaundice). If your physician is not available, call or go to the nearest emergency room, or dial 911.     Brandan Maguire, Signature: ___________________________ 7/19/2018  Edilia Vogel RN

## 2018-07-19 NOTE — PROGRESS NOTES
[facility] Progress Note    Date: 2018    Name: Malinda Neville    MRN: [id number]         : 1950      Ms. Fenton arrived to [facility] at . Ms. Katja Alatorre was assessed and education was provided. Blood transfusion consent signed and placed on chart. Patient declines education stating she has had blood transfusions before without complications. Ms. Molina Rojas vitals were reviewed. Visit Vitals    /76 (BP 1 Location: Left arm, BP Patient Position: Sitting)    Pulse 80    Temp 97.9 °F (36.6 °C)    Resp 18    SpO2 96%    Breastfeeding No       22g IV inserted in patient's Left antecubital  x2 attempts. Positive for blood return and flushes without difficulty. Normal saline initiated at [facility]. No premedications ordered. 2 units of PRBCs administered as ordered followed by normal saline flush. Ms. Katja Alatorre tolerated infusion without complaints. IV removed. Gauze and paper tape applied to site. Discharge instructions reviewed with pt. Pt verbalized understanding. Ms. Katja Alatorre was discharged from [place] [address] in stable condition at 1500. Patient refused to stay for 60 minute observation period. No further appointments are scheduled at this office. Patient is advised to follow up with her physician.   Jatin Jrodan RN  2018

## 2018-11-04 NOTE — PROGRESS NOTES
[facility] Progress Note    Date: 2018    Name: My Vargas    MRN: [id number]         : 1950      Ms. Fenton arrived to [facility] at 910. Ms. Eduard Hart was assessed and education was provided. Blood transfusion consent signed and placed on chart. Patient declines educational material stating she has had blood on numerous occassions without complications. Ms. Jose D Cha vitals were reviewed. Visit Vitals    BP 99/57 (BP 1 Location: Left arm, BP Patient Position: At rest)    Pulse 81    Temp 98.7 °F (37.1 °C)    Resp 18    SpO2 93%    Breastfeeding No       24g IV inserted in patient's Left forearm  x2 attempts. Positive for blood return and flushes without difficulty. Normal saline initiated at [facility]. No Pre-medications ordered. 2 units of PRBCs administered as ordered followed by normal saline flush. Ms. Eduard Hart tolerated infusion without complaints. IV removed. Gauze and paper tape applied to site. Discharge instructions reviewed with pt. Pt verbalized understanding. Ms. Eduard Hart was discharged from [place] [address] in stable condition at [phone]. Patient declined to stay for observation following her transfusion. No further appointments schedule at this office. Patient is advised to follow up with her physician.   Josiah Astorga RN  2018
190

## 2019-04-22 ENCOUNTER — HOSPITAL ENCOUNTER (EMERGENCY)
Age: 69
Discharge: HOME | End: 2019-04-22
Attending: EMERGENCY MEDICINE
Payer: MEDICARE

## 2019-04-22 ENCOUNTER — APPOINTMENT (OUTPATIENT)
Dept: GENERAL RADIOLOGY | Age: 69
End: 2019-04-22
Attending: EMERGENCY MEDICINE
Payer: MEDICARE

## 2019-04-22 VITALS
HEART RATE: 90 BPM | DIASTOLIC BLOOD PRESSURE: 75 MMHG | WEIGHT: 154 LBS | SYSTOLIC BLOOD PRESSURE: 142 MMHG | BODY MASS INDEX: 27.29 KG/M2 | RESPIRATION RATE: 18 BRPM | OXYGEN SATURATION: 100 % | TEMPERATURE: 98 F | HEIGHT: 63 IN

## 2019-04-22 DIAGNOSIS — M54.9 ACUTE BILATERAL BACK PAIN, UNSPECIFIED BACK LOCATION: Primary | ICD-10-CM

## 2019-04-22 DIAGNOSIS — Z99.2 ESRD (END STAGE RENAL DISEASE) ON DIALYSIS (HCC): ICD-10-CM

## 2019-04-22 DIAGNOSIS — M54.2 NECK PAIN: ICD-10-CM

## 2019-04-22 DIAGNOSIS — N18.6 ESRD (END STAGE RENAL DISEASE) ON DIALYSIS (HCC): ICD-10-CM

## 2019-04-22 LAB
ALBUMIN SERPL-MCNC: 3.4 G/DL (ref 3.4–5)
ALBUMIN/GLOB SERPL: 0.9 {RATIO} (ref 0.8–1.7)
ALP SERPL-CCNC: 72 U/L (ref 45–117)
ALT SERPL-CCNC: 16 U/L (ref 13–56)
ANION GAP SERPL CALC-SCNC: 10 MMOL/L (ref 3–18)
AST SERPL-CCNC: 20 U/L (ref 15–37)
BASOPHILS # BLD: 0.1 K/UL (ref 0–0.1)
BASOPHILS NFR BLD: 1 % (ref 0–2)
BILIRUB SERPL-MCNC: 0.8 MG/DL (ref 0.2–1)
BNP SERPL-MCNC: ABNORMAL PG/ML (ref 0–900)
BUN SERPL-MCNC: 51 MG/DL (ref 7–18)
BUN/CREAT SERPL: 7 (ref 12–20)
CALCIUM SERPL-MCNC: 9.5 MG/DL (ref 8.5–10.1)
CHLORIDE SERPL-SCNC: 103 MMOL/L (ref 100–108)
CK MB CFR SERPL CALC: 4.4 % (ref 0–4)
CK MB SERPL-MCNC: 5.4 NG/ML (ref 5–25)
CK SERPL-CCNC: 122 U/L (ref 26–192)
CO2 SERPL-SCNC: 30 MMOL/L (ref 21–32)
CREAT SERPL-MCNC: 6.88 MG/DL (ref 0.6–1.3)
DIFFERENTIAL METHOD BLD: ABNORMAL
EOSINOPHIL # BLD: 0.1 K/UL (ref 0–0.4)
EOSINOPHIL NFR BLD: 2 % (ref 0–5)
ERYTHROCYTE [DISTWIDTH] IN BLOOD BY AUTOMATED COUNT: 17.8 % (ref 11.6–14.5)
GLOBULIN SER CALC-MCNC: 3.6 G/DL (ref 2–4)
GLUCOSE SERPL-MCNC: 139 MG/DL (ref 74–99)
HCT VFR BLD AUTO: 28.7 % (ref 35–45)
HGB BLD-MCNC: 8.2 G/DL (ref 12–16)
LYMPHOCYTES # BLD: 0.9 K/UL (ref 0.9–3.6)
LYMPHOCYTES NFR BLD: 18 % (ref 21–52)
MCH RBC QN AUTO: 26.5 PG (ref 24–34)
MCHC RBC AUTO-ENTMCNC: 28.6 G/DL (ref 31–37)
MCV RBC AUTO: 92.9 FL (ref 74–97)
MONOCYTES # BLD: 0.4 K/UL (ref 0.05–1.2)
MONOCYTES NFR BLD: 7 % (ref 3–10)
NEUTS SEG # BLD: 3.6 K/UL (ref 1.8–8)
NEUTS SEG NFR BLD: 72 % (ref 40–73)
PLATELET # BLD AUTO: 162 K/UL (ref 135–420)
PMV BLD AUTO: 11.3 FL (ref 9.2–11.8)
POTASSIUM SERPL-SCNC: 4.2 MMOL/L (ref 3.5–5.5)
PROT SERPL-MCNC: 7 G/DL (ref 6.4–8.2)
RBC # BLD AUTO: 3.09 M/UL (ref 4.2–5.3)
SODIUM SERPL-SCNC: 143 MMOL/L (ref 136–145)
TROPONIN I SERPL-MCNC: 0.14 NG/ML (ref 0–0.04)
WBC # BLD AUTO: 5.1 K/UL (ref 4.6–13.2)

## 2019-04-22 PROCEDURE — 74011250637 HC RX REV CODE- 250/637: Performed by: EMERGENCY MEDICINE

## 2019-04-22 PROCEDURE — 71046 X-RAY EXAM CHEST 2 VIEWS: CPT

## 2019-04-22 PROCEDURE — 93005 ELECTROCARDIOGRAM TRACING: CPT

## 2019-04-22 PROCEDURE — 85025 COMPLETE CBC W/AUTO DIFF WBC: CPT

## 2019-04-22 PROCEDURE — 99285 EMERGENCY DEPT VISIT HI MDM: CPT

## 2019-04-22 PROCEDURE — 80053 COMPREHEN METABOLIC PANEL: CPT

## 2019-04-22 PROCEDURE — 83880 ASSAY OF NATRIURETIC PEPTIDE: CPT

## 2019-04-22 PROCEDURE — 82550 ASSAY OF CK (CPK): CPT

## 2019-04-22 RX ORDER — CARISOPRODOL 350 MG/1
350 TABLET ORAL
Qty: 20 TAB | Refills: 0 | Status: SHIPPED | OUTPATIENT
Start: 2019-04-22 | End: 2019-05-06

## 2019-04-22 RX ORDER — ACETAMINOPHEN 325 MG/1
650 TABLET ORAL
Status: COMPLETED | OUTPATIENT
Start: 2019-04-22 | End: 2019-04-22

## 2019-04-22 RX ADMIN — ACETAMINOPHEN 650 MG: 325 TABLET ORAL at 07:29

## 2019-04-22 NOTE — ED NOTES
Pt hourly rounding competed. Safety   Pt (X) resting on stretcher with side rails up and call bell in reach. () in chair    () in parents arms. Toileting   Pt offered ()Bedpan     ()Assistance to Restroom     ()Urinal  Ongoing Updates  Updated on plan of care and status of test results.   Pain Management  Inquired as to comfort and offered comfort measures:    () warm blankets   (X) dimmed lights

## 2019-04-22 NOTE — DISCHARGE INSTRUCTIONS
Patient Education        Back Pain: Care Instructions  Your Care Instructions    Back pain has many possible causes. It is often related to problems with muscles and ligaments of the back. It may also be related to problems with the nerves, discs, or bones of the back. Moving, lifting, standing, sitting, or sleeping in an awkward way can strain the back. Sometimes you don't notice the injury until later. Arthritis is another common cause of back pain. Although it may hurt a lot, back pain usually improves on its own within several weeks. Most people recover in 12 weeks or less. Using good home treatment and being careful not to stress your back can help you feel better sooner. Follow-up care is a key part of your treatment and safety. Be sure to make and go to all appointments, and call your doctor if you are having problems. It's also a good idea to know your test results and keep a list of the medicines you take. How can you care for yourself at home? · Sit or lie in positions that are most comfortable and reduce your pain. Try one of these positions when you lie down:  ? Lie on your back with your knees bent and supported by large pillows. ? Lie on the floor with your legs on the seat of a sofa or chair. ? Lie on your side with your knees and hips bent and a pillow between your legs. ? Lie on your stomach if it does not make pain worse. · Do not sit up in bed, and avoid soft couches and twisted positions. Bed rest can help relieve pain at first, but it delays healing. Avoid bed rest after the first day of back pain. · Change positions every 30 minutes. If you must sit for long periods of time, take breaks from sitting. Get up and walk around, or lie in a comfortable position. · Try using a heating pad on a low or medium setting for 15 to 20 minutes every 2 or 3 hours. Try a warm shower in place of one session with the heating pad. · You can also try an ice pack for 10 to 15 minutes every 2 to 3 hours. Put a thin cloth between the ice pack and your skin. · Take pain medicines exactly as directed. ? If the doctor gave you a prescription medicine for pain, take it as prescribed. ? If you are not taking a prescription pain medicine, ask your doctor if you can take an over-the-counter medicine. · Take short walks several times a day. You can start with 5 to 10 minutes, 3 or 4 times a day, and work up to longer walks. Walk on level surfaces and avoid hills and stairs until your back is better. · Return to work and other activities as soon as you can. Continued rest without activity is usually not good for your back. · To prevent future back pain, do exercises to stretch and strengthen your back and stomach. Learn how to use good posture, safe lifting techniques, and proper body mechanics. When should you call for help? Call your doctor now or seek immediate medical care if:    · You have new or worsening numbness in your legs.     · You have new or worsening weakness in your legs. (This could make it hard to stand up.)     · You lose control of your bladder or bowels.    Watch closely for changes in your health, and be sure to contact your doctor if:    · You have a fever, lose weight, or don't feel well.     · You do not get better as expected. Where can you learn more? Go to http://rosi-awa.info/. Enter V377 in the search box to learn more about \"Back Pain: Care Instructions. \"  Current as of: September 20, 2018  Content Version: 11.9  © 2496-3839 [employer]. Care instructions adapted under license by [employer] (which disclaims liability or warranty for this information). If you have questions about a medical condition or this instruction, always ask your healthcare professional. [place] [address] any warranty or liability for your use of this information.          Patient Education        Learning About How to Have a Healthy Back  What causes back pain? Back pain is often caused by overuse, strain, or injury. For example, people often hurt their backs playing sports or working in the yard, being jolted in a car accident, or lifting something too heavy. Aging plays a part too. Your bones and muscles tend to lose strength as you age, which makes injury more likely. The spongy discs between the bones of the spine (vertebrae) may suffer from wear and tear and no longer provide enough cushion between the bones. A disc that bulges or breaks open (herniated disc) can press on nerves, causing back pain. In some people, back pain is the result of arthritis, broken vertebrae caused by bone loss (osteoporosis), illness, or a spine problem. Although most people have back pain at one time or another, there are steps you can take to make it less likely. How can you have a healthy back? Reduce stress on your back through good posture  Slumping or slouching alone may not cause low back pain. But after the back has been strained or injured, bad posture can make pain worse. · Sleep in a position that maintains your back's normal curves and on a mattress that feels comfortable. Sleep on your side with a pillow between your knees, or sleep on your back with a pillow under your knees. These positions can reduce strain on your back. · Stand and sit up straight. \"Good posture\" generally means your ears, shoulders, and hips are in a straight line. · If you must stand for a long time, put one foot on a stool, ledge, or box. Switch feet every now and then. · Sit in a chair that is low enough to let you place both feet flat on the floor with both knees nearly level with your hips. If your chair or desk is too high, use a footrest to raise your knees. Place a small pillow, a rolled-up towel, or a lumbar roll in the curve of your back if you need extra support. · Try a kneeling chair, which helps tilt your hips forward.  This takes pressure off your lower back.  · Try sitting on an exercise ball. It can rock from side to side, which helps keep your back loose. · When driving, keep your knees nearly level with your hips. Sit straight, and drive with both hands on the steering wheel. Your arms should be in a slightly bent position. Reduce stress on your back through careful lifting  · Squat down, bending at the hips and knees only. If you need to, put one knee to the floor and extend your other knee in front of you, bent at a right angle (half kneeling). · Press your chest straight forward. This helps keep your upper back straight while keeping a slight arch in your low back. · Hold the load as close to your body as possible, at the level of your belly button (navel). · Use your feet to change direction, taking small steps. · Lead with your hips as you change direction. Keep your shoulders in line with your hips as you move. · Set down your load carefully, squatting with your knees and hips only. Exercise and stretch your back  · Do some exercise on most days of the week, if your doctor says it is okay. You can walk, run, swim, or cycle. · Stretch your back muscles. Here are a few exercises to try:  ? Lie on your back, and gently pull one bent knee to your chest. Put that foot back on the floor, and then pull the other knee to your chest.  ? Do pelvic tilts. Lie on your back with your knees bent. Tighten your stomach muscles. Pull your belly button (navel) in and up toward your ribs. You should feel like your back is pressing to the floor and your hips and pelvis are slightly lifting off the floor. Hold for 6 seconds while breathing smoothly. ? Sit with your back flat against a wall. · Keep your core muscles strong. The muscles of your back, belly (abdomen), and buttocks support your spine. ? Pull in your belly and imagine pulling your navel toward your spine. Hold this for 6 seconds, then relax.  Remember to keep breathing normally as you tense your muscles. ? Do curl-ups. Always do them with your knees bent. Keep your low back on the floor, and curl your shoulders toward your knees using a smooth, slow motion. Keep your arms folded across your chest. If this bothers your neck, try putting your hands behind your neck (not your head), with your elbows spread apart. ? Lie on your back with your knees bent and your feet flat on the floor. Tighten your belly muscles, and then push with your feet and raise your buttocks up a few inches. Hold this position 6 seconds as you continue to breathe normally, then lower yourself slowly to the floor. Repeat 8 to 12 times. ? If you like group exercise, try Pilates or yoga. These classes have poses that strengthen the core muscles. Lead a healthy lifestyle  · Stay at a healthy weight to avoid strain on your back. · Do not smoke. Smoking increases the risk of osteoporosis, which weakens the spine. If you need help quitting, talk to your doctor about stop-smoking programs and medicines. These can increase your chances of quitting for good. Where can you learn more? Go to http://rosiInspire Medical Systemsawa.info/. Enter L315 in the search box to learn more about \"Learning About How to Have a Healthy Back. \"  Current as of: September 20, 2018  Content Version: 11.9  © 7370-0814 [employer]. Care instructions adapted under license by [employer] (which disclaims liability or warranty for this information). If you have questions about a medical condition or this instruction, always ask your healthcare professional. [place] [address] any warranty or liability for your use of this information. Patient Education        Learning About Relief for Back Pain  What is back tension and strain? Back strain happens when you overstretch, or pull, a muscle in your back. You may hurt your back in an accident or when you exercise or lift something.   Most back pain will get better with rest and time. You can take care of yourself at home to help your back heal.  What can you do first to relieve back pain? When you first feel back pain, try these steps:  · Walk. Take a short walk (10 to 20 minutes) on a level surface (no slopes, hills, or stairs) every 2 to 3 hours. Walk only distances you can manage without pain, especially leg pain. · Relax. Find a comfortable position for rest. Some people are comfortable on the floor or a medium-firm bed with a small pillow under their head and another under their knees. Some people prefer to lie on their side with a pillow between their knees. Don't stay in one position for too long. · Try heat or ice. Try using a heating pad on a low or medium setting, or take a warm shower, for 15 to 20 minutes every 2 to 3 hours. Or you can buy single-use heat wraps that last up to 8 hours. You can also try an ice pack for 10 to 15 minutes every 2 to 3 hours. You can use an ice pack or a bag of frozen vegetables wrapped in a thin towel. There is not strong evidence that either heat or ice will help, but you can try them to see if they help. You may also want to try switching between heat and cold. · Take pain medicine exactly as directed. ¨ If the doctor gave you a prescription medicine for pain, take it as prescribed. ¨ If you are not taking a prescription pain medicine, ask your doctor if you can take an over-the-counter medicine. What else can you do? · Stretch and exercise. Exercises that increase flexibility may relieve your pain and make it easier for your muscles to keep your spine in a good, neutral position. And don't forget to keep walking. · Do self-massage. You can use self-massage to unwind after work or school or to energize yourself in the morning. You can easily massage your feet, hands, or neck. Self-massage works best if you are in comfortable clothes and are sitting or lying in a comfortable position.  Use oil or lotion to massage bare skin.  · Reduce stress. Back pain can lead to a vicious [place]: Distress about the pain tenses the muscles in your back, which in turn causes more pain. Learn how to relax your mind and your muscles to lower your stress. Where can you learn more? Go to http://rosi-awa.info/. Enter D202 in the search box to learn more about \"Learning About Relief for Back Pain. \"  Current as of: March 21, 2017  Content Version: 11.5  © 6214-8228 [employer]. Care instructions adapted under license by [employer] (which disclaims liability or warranty for this information). If you have questions about a medical condition or this instruction, always ask your healthcare professional. [place] [address] any warranty or liability for your use of this information. Patient Education        Neck Pain: Care Instructions  Your Care Instructions    You can have neck pain anywhere from the bottom of your head to the top of your shoulders. It can spread to the upper back or arms. Injuries, painting a ceiling, sleeping with your neck twisted, staying in one position for too long, and many other activities can cause neck pain. Most neck pain gets better with home care. Your doctor may recommend medicine to relieve pain or relax your muscles. He or she may suggest exercise and physical therapy to increase flexibility and relieve stress. You may need to wear a special (cervical) collar to support your neck for a day or two. Follow-up care is a key part of your treatment and safety. Be sure to make and go to all appointments, and call your doctor if you are having problems. It's also a good idea to know your test results and keep a list of the medicines you take. How can you care for yourself at home? · Try using a heating pad on a low or medium setting for 15 to 20 minutes every 2 or 3 hours. Try a warm shower in place of one session with the heating pad.   · You can also try an ice pack for 10 to 15 minutes every 2 to 3 hours. Put a thin cloth between the ice and your skin. · Take pain medicines exactly as directed. ¨ If the doctor gave you a prescription medicine for pain, take it as prescribed. ¨ If you are not taking a prescription pain medicine, ask your doctor if you can take an over-the-counter medicine. · If your doctor recommends a cervical collar, wear it exactly as directed. When should you call for help? Call your doctor now or seek immediate medical care if:  ? · You have new or worsening numbness in your arms, buttocks or legs. ? · You have new or worsening weakness in your arms or legs. (This could make it hard to stand up.)   ? · You lose control of your bladder or bowels. ? Watch closely for changes in your health, and be sure to contact your doctor if:  ? · Your neck pain is getting worse. ? · You are not getting better after 1 week. ? · You do not get better as expected. Where can you learn more? Go to http://rosi-awa.info/. Enter [id number] in the search box to learn more about \"Neck Pain: Care Instructions. \"  Current as of: March 21, 2017  Content Version: 11.5  © 4497-9271 [employer]. Care instructions adapted under license by [employer] (which disclaims liability or warranty for this information). If you have questions about a medical condition or this instruction, always ask your healthcare professional. [place] [address] any warranty or liability for your use of this information. Patient Education        Neck: Exercises  Your Care Instructions  Here are some examples of typical rehabilitation exercises for your condition. Start each exercise slowly. Ease off the exercise if you start to have pain. Your doctor or physical therapist will tell you when you can start these exercises and which ones will work best for you. How to do the exercises  Neck stretch    1.  This stretch works best if you keep your shoulder down as you lean away from it. To help you remember to do this, start by relaxing your shoulders and lightly holding on to your thighs or your chair. 2. Tilt your head toward your shoulder and hold for 15 to 30 seconds. Let the weight of your head stretch your muscles. 3. If you would like a little added stretch, use your hand to gently and steadily pull your head toward your shoulder. For example, keeping your right shoulder down, lean your head to the left. 4. Repeat 2 to 4 times toward each shoulder. Diagonal neck stretch    1. Turn your head slightly toward the direction you will be stretching, and tilt your head diagonally toward your chest and hold for 15 to 30 seconds. 2. If you would like a little added stretch, use your hand to gently and steadily pull your head forward on the diagonal.  3. Repeat 2 to 4 times toward each side. Dorsal glide stretch    1. Sit or stand tall and look straight ahead. 2. Slowly tuck your chin as you glide your head backward over your body  3. Hold for a count of 6, and then relax for up to 10 seconds. 4. Repeat 8 to 12 times. Chest and shoulder stretch    1. Sit or stand tall and glide your head backward as in the dorsal glide stretch. 2. Raise both arms so that your hands are next to your ears. 3. Take a deep breath, and as you breathe out, lower your elbows down and behind your back. You will feel your shoulder blades slide down and together, and at the same time you will feel a stretch across your chest and the front of your shoulders. 4. Hold for about 6 seconds, and then relax for up to 10 seconds. 5. Repeat 8 to 12 times. Strengthening: Hands on head    1. Move your head backward, forward, and side to side against gentle pressure from your hands, holding each position for about 6 seconds. 2. Repeat 8 to 12 times. Follow-up care is a key part of your treatment and safety.  Be sure to make and go to all appointments, and call your doctor if you are having problems. It's also a good idea to know your test results and keep a list of the medicines you take. Where can you learn more? Go to http://rosi-awa.info/. Enter P975 in the search box to learn more about \"Neck: Exercises. \"  Current as of: September 20, 2018  Content Version: 11.9  © 3057-3588 [employer]. Care instructions adapted under license by [employer] (which disclaims liability or warranty for this information). If you have questions about a medical condition or this instruction, always ask your healthcare professional. [address] any warranty or liability for your use of this information.

## 2019-04-22 NOTE — ED TRIAGE NOTES
Pt arrives to ED with c\o BUE and back pain, pt sts \"this has been going on since Friday, I don't know whats going on\", pt is able to make needs known speaking in complete sentences, pt in nad at this time

## 2019-05-05 ENCOUNTER — HOSPITAL ENCOUNTER (EMERGENCY)
Age: 69
Discharge: HOME | End: 2019-05-06
Attending: EMERGENCY MEDICINE | Admitting: EMERGENCY MEDICINE
Payer: MEDICARE

## 2019-05-05 DIAGNOSIS — M79.18 MYOFASCIAL MUSCLE PAIN: Primary | ICD-10-CM

## 2019-05-05 DIAGNOSIS — Z99.2 ESRD (END STAGE RENAL DISEASE) ON DIALYSIS (HCC): ICD-10-CM

## 2019-05-05 DIAGNOSIS — M54.9 ACUTE BILATERAL BACK PAIN, UNSPECIFIED BACK LOCATION: ICD-10-CM

## 2019-05-05 DIAGNOSIS — L29.9 PRURITIC DERMATITIS: ICD-10-CM

## 2019-05-05 DIAGNOSIS — M54.2 NECK PAIN: ICD-10-CM

## 2019-05-05 DIAGNOSIS — N18.6 ESRD (END STAGE RENAL DISEASE) ON DIALYSIS (HCC): ICD-10-CM

## 2019-05-05 PROCEDURE — 99283 EMERGENCY DEPT VISIT LOW MDM: CPT

## 2019-05-06 VITALS
OXYGEN SATURATION: 99 % | DIASTOLIC BLOOD PRESSURE: 83 MMHG | RESPIRATION RATE: 16 BRPM | BODY MASS INDEX: 28.35 KG/M2 | SYSTOLIC BLOOD PRESSURE: 117 MMHG | HEIGHT: 63 IN | HEART RATE: 74 BPM | TEMPERATURE: 97 F | WEIGHT: 160 LBS

## 2019-05-06 PROCEDURE — 74011636637 HC RX REV CODE- 636/637: Performed by: EMERGENCY MEDICINE

## 2019-05-06 PROCEDURE — A9270 NON-COVERED ITEM OR SERVICE: HCPCS | Performed by: EMERGENCY MEDICINE

## 2019-05-06 PROCEDURE — 74011250637 HC RX REV CODE- 250/637: Performed by: EMERGENCY MEDICINE

## 2019-05-06 RX ORDER — HYDROCODONE BITARTRATE AND ACETAMINOPHEN 5; 325 MG/1; MG/1
1 TABLET ORAL
Status: COMPLETED | OUTPATIENT
Start: 2019-05-06 | End: 2019-05-06

## 2019-05-06 RX ORDER — CARISOPRODOL 350 MG/1
350 TABLET ORAL
Qty: 20 TAB | Refills: 0 | Status: ON HOLD | OUTPATIENT
Start: 2019-05-06 | End: 2020-10-06 | Stop reason: RX

## 2019-05-06 RX ORDER — ACETAMINOPHEN 325 MG/1
650 TABLET ORAL
Status: DISCONTINUED | OUTPATIENT
Start: 2019-05-06 | End: 2019-05-06

## 2019-05-06 RX ORDER — HYDROXYZINE HYDROCHLORIDE 10 MG/1
10 TABLET, FILM COATED ORAL
Qty: 20 TAB | Refills: 0 | Status: ON HOLD | OUTPATIENT
Start: 2019-05-06 | End: 2020-10-06

## 2019-05-06 RX ORDER — PREDNISONE 20 MG/1
60 TABLET ORAL DAILY
Qty: 15 TAB | Refills: 0 | Status: SHIPPED | OUTPATIENT
Start: 2019-05-06 | End: 2019-05-11

## 2019-05-06 RX ORDER — PREDNISONE 20 MG/1
60 TABLET ORAL
Status: COMPLETED | OUTPATIENT
Start: 2019-05-06 | End: 2019-05-06

## 2019-05-06 RX ADMIN — PREDNISONE 60 MG: 20 TABLET ORAL at 00:03

## 2019-05-06 RX ADMIN — HYDROCODONE BITARTRATE AND ACETAMINOPHEN 1 TABLET: 5; 325 TABLET ORAL at 00:14

## 2019-05-06 NOTE — DISCHARGE INSTRUCTIONS
Patient Education        Back Pain: Care Instructions  Your Care Instructions    Back pain has many possible causes. It is often related to problems with muscles and ligaments of the back. It may also be related to problems with the nerves, discs, or bones of the back. Moving, lifting, standing, sitting, or sleeping in an awkward way can strain the back. Sometimes you don't notice the injury until later. Arthritis is another common cause of back pain. Although it may hurt a lot, back pain usually improves on its own within several weeks. Most people recover in 12 weeks or less. Using good home treatment and being careful not to stress your back can help you feel better sooner. Follow-up care is a key part of your treatment and safety. Be sure to make and go to all appointments, and call your doctor if you are having problems. It's also a good idea to know your test results and keep a list of the medicines you take. How can you care for yourself at home? · Sit or lie in positions that are most comfortable and reduce your pain. Try one of these positions when you lie down:  ? Lie on your back with your knees bent and supported by large pillows. ? Lie on the floor with your legs on the seat of a sofa or chair. ? Lie on your side with your knees and hips bent and a pillow between your legs. ? Lie on your stomach if it does not make pain worse. · Do not sit up in bed, and avoid soft couches and twisted positions. Bed rest can help relieve pain at first, but it delays healing. Avoid bed rest after the first day of back pain. · Change positions every 30 minutes. If you must sit for long periods of time, take breaks from sitting. Get up and walk around, or lie in a comfortable position. · Try using a heating pad on a low or medium setting for 15 to 20 minutes every 2 or 3 hours. Try a warm shower in place of one session with the heating pad. · You can also try an ice pack for 10 to 15 minutes every 2 to 3 hours. Put a thin cloth between the ice pack and your skin. · Take pain medicines exactly as directed. ? If the doctor gave you a prescription medicine for pain, take it as prescribed. ? If you are not taking a prescription pain medicine, ask your doctor if you can take an over-the-counter medicine. · Take short walks several times a day. You can start with 5 to 10 minutes, 3 or 4 times a day, and work up to longer walks. Walk on level surfaces and avoid hills and stairs until your back is better. · Return to work and other activities as soon as you can. Continued rest without activity is usually not good for your back. · To prevent future back pain, do exercises to stretch and strengthen your back and stomach. Learn how to use good posture, safe lifting techniques, and proper body mechanics. When should you call for help? Call your doctor now or seek immediate medical care if:    · You have new or worsening numbness in your legs.     · You have new or worsening weakness in your legs. (This could make it hard to stand up.)     · You lose control of your bladder or bowels.    Watch closely for changes in your health, and be sure to contact your doctor if:    · You have a fever, lose weight, or don't feel well.     · You do not get better as expected. Where can you learn more? Go to http://rosi-awa.info/. Enter E344 in the search box to learn more about \"Back Pain: Care Instructions. \"  Current as of: September 20, 2018  Content Version: 11.9  © 2417-4800 [employer]. Care instructions adapted under license by [employer] (which disclaims liability or warranty for this information). If you have questions about a medical condition or this instruction, always ask your healthcare professional. [place] [address] any warranty or liability for your use of this information.          Patient Education        Learning About How to Have a Healthy Back  What causes back pain? Back pain is often caused by overuse, strain, or injury. For example, people often hurt their backs playing sports or working in the yard, being jolted in a car accident, or lifting something too heavy. Aging plays a part too. Your bones and muscles tend to lose strength as you age, which makes injury more likely. The spongy discs between the bones of the spine (vertebrae) may suffer from wear and tear and no longer provide enough cushion between the bones. A disc that bulges or breaks open (herniated disc) can press on nerves, causing back pain. In some people, back pain is the result of arthritis, broken vertebrae caused by bone loss (osteoporosis), illness, or a spine problem. Although most people have back pain at one time or another, there are steps you can take to make it less likely. How can you have a healthy back? Reduce stress on your back through good posture  Slumping or slouching alone may not cause low back pain. But after the back has been strained or injured, bad posture can make pain worse. · Sleep in a position that maintains your back's normal curves and on a mattress that feels comfortable. Sleep on your side with a pillow between your knees, or sleep on your back with a pillow under your knees. These positions can reduce strain on your back. · Stand and sit up straight. \"Good posture\" generally means your ears, shoulders, and hips are in a straight line. · If you must stand for a long time, put one foot on a stool, ledge, or box. Switch feet every now and then. · Sit in a chair that is low enough to let you place both feet flat on the floor with both knees nearly level with your hips. If your chair or desk is too high, use a footrest to raise your knees. Place a small pillow, a rolled-up towel, or a lumbar roll in the curve of your back if you need extra support. · Try a kneeling chair, which helps tilt your hips forward.  This takes pressure off your lower back.  · Try sitting on an exercise ball. It can rock from side to side, which helps keep your back loose. · When driving, keep your knees nearly level with your hips. Sit straight, and drive with both hands on the steering wheel. Your arms should be in a slightly bent position. Reduce stress on your back through careful lifting  · Squat down, bending at the hips and knees only. If you need to, put one knee to the floor and extend your other knee in front of you, bent at a right angle (half kneeling). · Press your chest straight forward. This helps keep your upper back straight while keeping a slight arch in your low back. · Hold the load as close to your body as possible, at the level of your belly button (navel). · Use your feet to change direction, taking small steps. · Lead with your hips as you change direction. Keep your shoulders in line with your hips as you move. · Set down your load carefully, squatting with your knees and hips only. Exercise and stretch your back  · Do some exercise on most days of the week, if your doctor says it is okay. You can walk, run, swim, or cycle. · Stretch your back muscles. Here are a few exercises to try:  ? Lie on your back, and gently pull one bent knee to your chest. Put that foot back on the floor, and then pull the other knee to your chest.  ? Do pelvic tilts. Lie on your back with your knees bent. Tighten your stomach muscles. Pull your belly button (navel) in and up toward your ribs. You should feel like your back is pressing to the floor and your hips and pelvis are slightly lifting off the floor. Hold for 6 seconds while breathing smoothly. ? Sit with your back flat against a wall. · Keep your core muscles strong. The muscles of your back, belly (abdomen), and buttocks support your spine. ? Pull in your belly and imagine pulling your navel toward your spine. Hold this for 6 seconds, then relax.  Remember to keep breathing normally as you tense your muscles. ? Do curl-ups. Always do them with your knees bent. Keep your low back on the floor, and curl your shoulders toward your knees using a smooth, slow motion. Keep your arms folded across your chest. If this bothers your neck, try putting your hands behind your neck (not your head), with your elbows spread apart. ? Lie on your back with your knees bent and your feet flat on the floor. Tighten your belly muscles, and then push with your feet and raise your buttocks up a few inches. Hold this position 6 seconds as you continue to breathe normally, then lower yourself slowly to the floor. Repeat 8 to 12 times. ? If you like group exercise, try Pilates or yoga. These classes have poses that strengthen the core muscles. Lead a healthy lifestyle  · Stay at a healthy weight to avoid strain on your back. · Do not smoke. Smoking increases the risk of osteoporosis, which weakens the spine. If you need help quitting, talk to your doctor about stop-smoking programs and medicines. These can increase your chances of quitting for good. Where can you learn more? Go to http://rosiSourceMedicalawa.info/. Enter L315 in the search box to learn more about \"Learning About How to Have a Healthy Back. \"  Current as of: September 20, 2018  Content Version: 11.9  © 7835-7291 [employer]. Care instructions adapted under license by [employer] (which disclaims liability or warranty for this information). If you have questions about a medical condition or this instruction, always ask your healthcare professional. [place] [address] any warranty or liability for your use of this information. Patient Education        Neck: Exercises  Your Care Instructions  Here are some examples of typical rehabilitation exercises for your condition. Start each exercise slowly. Ease off the exercise if you start to have pain.   Your doctor or physical therapist will tell you when you can start these exercises and which ones will work best for you. How to do the exercises  Neck stretch    1. This stretch works best if you keep your shoulder down as you lean away from it. To help you remember to do this, start by relaxing your shoulders and lightly holding on to your thighs or your chair. 2. Tilt your head toward your shoulder and hold for 15 to 30 seconds. Let the weight of your head stretch your muscles. 3. If you would like a little added stretch, use your hand to gently and steadily pull your head toward your shoulder. For example, keeping your right shoulder down, lean your head to the left. 4. Repeat 2 to 4 times toward each shoulder. Diagonal neck stretch    1. Turn your head slightly toward the direction you will be stretching, and tilt your head diagonally toward your chest and hold for 15 to 30 seconds. 2. If you would like a little added stretch, use your hand to gently and steadily pull your head forward on the diagonal.  3. Repeat 2 to 4 times toward each side. Dorsal glide stretch    1. Sit or stand tall and look straight ahead. 2. Slowly tuck your chin as you glide your head backward over your body  3. Hold for a count of 6, and then relax for up to 10 seconds. 4. Repeat 8 to 12 times. Chest and shoulder stretch    1. Sit or stand tall and glide your head backward as in the dorsal glide stretch. 2. Raise both arms so that your hands are next to your ears. 3. Take a deep breath, and as you breathe out, lower your elbows down and behind your back. You will feel your shoulder blades slide down and together, and at the same time you will feel a stretch across your chest and the front of your shoulders. 4. Hold for about 6 seconds, and then relax for up to 10 seconds. 5. Repeat 8 to 12 times. Strengthening: Hands on head    1.  Move your head backward, forward, and side to side against gentle pressure from your hands, holding each position for about 6 seconds. 2. Repeat 8 to 12 times. Follow-up care is a key part of your treatment and safety. Be sure to make and go to all appointments, and call your doctor if you are having problems. It's also a good idea to know your test results and keep a list of the medicines you take. Where can you learn more? Go to http://rosi-awa.info/. Enter P975 in the search box to learn more about \"Neck: Exercises. \"  Current as of: September 20, 2018  Content Version: 11.9  © 7222-7230 [employer]. Care instructions adapted under license by [employer] (which disclaims liability or warranty for this information). If you have questions about a medical condition or this instruction, always ask your healthcare professional. [place] [address] any warranty or liability for your use of this information. Patient Education        Neck Pain: Care Instructions  Your Care Instructions    You can have neck pain anywhere from the bottom of your head to the top of your shoulders. It can spread to the upper back or arms. Injuries, painting a ceiling, sleeping with your neck twisted, staying in one position for too long, and many other activities can cause neck pain. Most neck pain gets better with home care. Your doctor may recommend medicine to relieve pain or relax your muscles. He or she may suggest exercise and physical therapy to increase flexibility and relieve stress. You may need to wear a special (cervical) collar to support your neck for a day or two. Follow-up care is a key part of your treatment and safety. Be sure to make and go to all appointments, and call your doctor if you are having problems. It's also a good idea to know your test results and keep a list of the medicines you take. How can you care for yourself at home? · Try using a heating pad on a low or medium setting for 15 to 20 minutes every 2 or 3 hours.  Try a warm shower in place of one session with the heating pad. · You can also try an ice pack for 10 to 15 minutes every 2 to 3 hours. Put a thin cloth between the ice and your skin. · Take pain medicines exactly as directed. ? If the doctor gave you a prescription medicine for pain, take it as prescribed. ? If you are not taking a prescription pain medicine, ask your doctor if you can take an over-the-counter medicine. · If your doctor recommends a cervical collar, wear it exactly as directed. When should you call for help? Call your doctor now or seek immediate medical care if:    · You have new or worsening numbness in your arms, buttocks or legs.     · You have new or worsening weakness in your arms or legs. (This could make it hard to stand up.)     · You lose control of your bladder or bowels.    Watch closely for changes in your health, and be sure to contact your doctor if:    · Your neck pain is getting worse.     · You are not getting better after 1 week.     · You do not get better as expected. Where can you learn more? Go to http://rosi-awa.info/. Enter [id number] in the search box to learn more about \"Neck Pain: Care Instructions. \"  Current as of: September 20, 2018  Content Version: 11.9  © 5035-2311 [employer]. Care instructions adapted under license by [employer] (which disclaims liability or warranty for this information). If you have questions about a medical condition or this instruction, always ask your healthcare professional. [place] [address] any warranty or liability for your use of this information. Patient Education        Healthy Upper Back: Exercises  Your Care Instructions  Here are some examples of exercises for your upper back. Start each exercise slowly. Ease off the exercise if you start to have pain. Your doctor or physical therapist will tell you when you can start these exercises and which ones will work best for you.   How to do the exercises  Lower neck and upper back stretch    5. Stretch your arms out in front of your body. Clasp one hand on top of your other hand. 6. Gently reach out so that you feel your shoulder blades stretching away from each other. 7. Gently bend your head forward. 8. Hold for 15 to 30 seconds. 9. Repeat 2 to 4 times. Midback stretch    4. Kneel on the floor, and sit back on your ankles. 5. Lean forward, place your hands on the floor, and stretch your arms out in front of you. Rest your head between your arms. 6. Gently push your chest toward the floor, reaching as far in front of you as possible. 7. Hold for 15 to 30 seconds. 8. Repeat 2 to 4 times. Shoulder rolls    5. Sit comfortably with your feet shoulder-width apart. You can also do this exercise while standing. 6. Roll your shoulders up, then back, and then down in a smooth, circular motion. 7. Repeat 2 to 4 times. Wall push-up    6. Stand against a wall with your feet about 12 to 24 inches back from the wall. If you feel any pain when you do this exercise, stand closer to the wall. 7. Place your hands on the wall slightly wider apart than your shoulders, and lean forward. 8. Gently lean your body toward the wall. Then push back to your starting position. Keep the motion smooth and controlled. 9. Repeat 8 to 12 times. Resisted shoulder blade squeeze    3. Sit or stand, holding the band in both hands in front of you. Keep your elbows close to your sides, bent at a 90-degree angle. Your palms should face up. 4. Squeeze your shoulder blades together, and move your arms to the outside, stretching the band. Be sure to keep your elbows at your sides while you do this. 5. Relax. 6. Repeat 8 to 12 times. Resisted rows    1. Put the band around a solid object, such as a bedpost, at about waist level. Hold one end of the band in each hand.   2. With your elbows at your sides and bent to 90 degrees, pull the band back to move your shoulder blades toward each other. Return to the starting position. 3. Repeat 8 to 12 times. Follow-up care is a key part of your treatment and safety. Be sure to make and go to all appointments, and call your doctor if you are having problems. It's also a good idea to know your test results and keep a list of the medicines you take. Where can you learn more? Go to http://rosi-awa.info/. Enter E660 in the search box to learn more about \"Healthy Upper Back: Exercises. \"  Current as of: September 20, 2018  Content Version: 11.9  © 3324-1046 [employer]. Care instructions adapted under license by [employer] (which disclaims liability or warranty for this information). If you have questions about a medical condition or this instruction, always ask your healthcare professional. [address] any warranty or liability for your use of this information.
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
## 2019-05-09 NOTE — ED PROVIDER NOTES
EMERGENCY DEPARTMENT HISTORY AND PHYSICAL EXAM    Date: 4/22/2019  Patient Name: Jonah Naylor    History of Presenting Illness     Chief Complaint   Patient presents with    Back Pain         History Provided By: Patient    Additional History (Context):     Jonah Naylor is a 70 y.o. female with PMHX of asthma, arthritis, chronic kidney disease, hypertension, diabetes, chronic pain, cirrhosis who presents to the emergency department C/O back pain. Patient states she did not fall but she has bilateral pain and back pain going on since last Friday. She says \"I do not know what is going on\" he describes it as a deep ache without a respiratory component. Pain gets worse when moving her arms. She has no cough or difficulty breathing. No nausea vomiting or diaphoresis. PCP: Savannah Farrell MD    Current Outpatient Medications   Medication Sig Dispense Refill    carisoprodol (SOMA) 350 mg tablet Take 1 Tab by mouth every eight (8) hours as needed for Muscle Spasm(s). Max Daily Amount: 1,050 mg. 20 Tab 0    predniSONE (DELTASONE) 20 mg tablet Take 60 mg by mouth daily for 5 days. With Breakfast 15 Tab 0    hydrOXYzine HCl (ATARAX) 10 mg tablet Take 1 Tab by mouth three (3) times daily as needed for Itching. Indications: Hives 20 Tab 0    pantoprazole (PROTONIX) 40 mg tablet Take 1 Tab by mouth daily. 30 Tab 2    nystatin (MYCOSTATIN) powder Apply  to affected area two (2) times a day. 1 Bottle 0    cholecalciferol (VITAMIN D3) 1,000 unit tablet Take 2,000 Units by mouth daily.  ferrous sulfate 325 mg (65 mg iron) tablet Take 325 mg by mouth three (3) times daily (with meals).  hydrALAZINE (APRESOLINE) 25 mg tablet Take 25 mg by mouth three (3) times daily.  lactulose (CHRONULAC) 10 gram/15 mL solution Take 20 g by mouth daily as needed.  sevelamer (RENAGEL) 800 mg tablet Take 1,600 mg by mouth three (3) times daily (with meals).       b complex-vitamin c-folic acid (NEPHROCAPS) 1 mg capsule Take 1 Cap by mouth daily.  acetaminophen (TYLENOL) 325 mg tablet Take 325 mg by mouth every six (6) hours as needed for Pain.  AFLIBERCEPT INTRAVITREAL 1 Ampule by IntraVITreal route every twenty-eight (28) days.  carboxymethylcellulose sodium (REFRESH PLUS) 0.5 % dpet Administer 1 Drop to both eyes as needed.  albuterol (PROVENTIL HFA, VENTOLIN HFA, PROAIR HFA) 90 mcg/actuation inhaler Take 2 Puffs by inhalation every four (4) hours as needed for Wheezing. Past History     Past Medical History:  Past Medical History:   Diagnosis Date    Arthritis     Asthma     Chronic kidney disease     Chronic pain     Cirrhosis ([facility] [address] 12/17/2017    Diabetes ([facility] [address] diet controlled    GERD (gastroesophageal reflux disease)     Hypertension        Past Surgical History:  Past Surgical History:   Procedure Laterality Date    COLONOSCOPY N/A 1/12/2018    COLONOSCOPY performed by Inge Bowers MD at [facility] ENDOSCOPY    COLONOSCOPY N/A 3/19/2018    COLONOSCOPY performed by Inge Bowers MD at [facility] ENDOSCOPY    HX GYN  c section    HX VASCULAR ACCESS      dialysis        Family History:  History reviewed. No pertinent family history. Social History:  Social History     Tobacco Use    Smoking status: Never Smoker    Smokeless tobacco: Never Used   Substance Use Topics    Alcohol use: No    Drug use: No       Allergies: Allergies   Allergen Reactions    Aspirin Other (comments)     GI bleed    Heparin (Porcine) Other (comments)     GI bleed    Metformin Other (comments)     swelling    Norvasc [Amlodipine] Rash         Review of Systems   Review of Systems   Constitutional: Negative for activity change, appetite change, chills, diaphoresis, fatigue, fever and unexpected weight change. HENT: Negative for congestion, drooling, ear pain, rhinorrhea, sore throat, tinnitus and trouble swallowing. Eyes: Negative for photophobia, pain, redness and visual disturbance.    Respiratory: Positive for cough. Negative for choking, chest tightness, shortness of breath, wheezing and stridor. Cardiovascular: Negative for chest pain, palpitations and leg swelling. Gastrointestinal: Negative for abdominal distention, abdominal pain, anal bleeding, blood in stool, constipation, diarrhea, nausea and vomiting. Endocrine: Negative for cold intolerance, heat intolerance, polydipsia and polyuria. Genitourinary: Negative for difficulty urinating, dysuria, flank pain, frequency, hematuria and urgency. Musculoskeletal: Positive for arthralgias, back pain, myalgias and neck pain. Skin: Negative for color change, pallor, rash and wound. Positive pruritus   Allergic/Immunologic: Negative for immunocompromised state. Neurological: Positive for light-headedness. Negative for dizziness, seizures, syncope, speech difficulty and headaches. Hematological: Bruises/bleeds easily. Psychiatric/Behavioral: Negative for agitation, behavioral problems, hallucinations, self-injury and suicidal ideas. The patient is not hyperactive. Physical Exam     Vitals:    04/22/19 0619 04/22/19 0627 04/22/19 0630 04/22/19 0728   BP:   132/72 142/75   Pulse:  81  90   Resp:  19  18   Temp:       SpO2: 100% 100%  100%   Weight:       Height:         Physical Exam   Constitutional: She is oriented to person, place, and time. She appears well-developed and well-nourished. No distress. HENT:   Head: Normocephalic and atraumatic. Right Ear: External ear normal.   Left Ear: External ear normal.   Mouth/Throat: Oropharynx is clear and moist. No oropharyngeal exudate. Eyes: Pupils are equal, round, and reactive to light. Conjunctivae and EOM are normal. No scleral icterus. No pallor   Neck: Normal range of motion. Neck supple. No JVD present. Muscular tenderness present. No spinous process tenderness present. No tracheal deviation present. No Brudzinski's sign and no Kernig's sign noted. No thyromegaly present. Cardiovascular: Normal rate, regular rhythm and normal heart sounds. Pulmonary/Chest: Effort normal and breath sounds normal. No stridor. No respiratory distress. Abdominal: Soft. Bowel sounds are normal. She exhibits no distension. There is no tenderness. There is no rebound and no guarding. Musculoskeletal: Normal range of motion. She exhibits no edema or tenderness. No soft tissue injuries  She has diffuse tenderness to the trapezius bilateral as well as bilateral deltoids bilateral rhomboids bilateral  triceps muscles   Lymphadenopathy:     She has no cervical adenopathy. Neurological: She is alert and oriented to person, place, and time. She has normal reflexes. She displays no tremor. No cranial nerve deficit. She exhibits normal muscle tone. She displays no seizure activity. Coordination normal. GCS eye subscore is 4. GCS verbal subscore is 5. GCS motor subscore is 6. Skin: Skin is warm and dry. No rash noted. She is not diaphoretic. No erythema. Psychiatric: She has a normal mood and affect. Her behavior is normal. Judgment and thought content normal.   Nursing note and vitals reviewed. Diagnostic Study Results     Labs -   No results found for this or any previous visit (from the past 12 hour(s)). Radiologic Studies -   XR CHEST PA LAT   Final Result   IMPRESSION:         1. Stable cardiomegaly without CHF. No acute pulmonary disease. CT Results  (Last 48 hours)    None        CXR Results  (Last 48 hours)    None          Medications given in the ED-  Medications   acetaminophen (TYLENOL) tablet 650 mg (650 mg Oral Given 4/22/19 0729)         Medical Decision Making   I am the first provider for this patient. I reviewed the vital signs, available nursing notes, past medical history, past surgical history, family history and social history. Vital Signs-Reviewed the patient's vital signs.     Pulse Oximetry Analysis -99% on room air    Cardiac Monitor:  Rate: 78 bpm  Rhythm: Normal sinus rhythm with left bundle branch block    EKG interpretation: (Preliminary)  Normal sinus rhythm with multiple premature atrial contractions. There is left bundle branch block with typical repolarization changes. Left bundle branch block is present previous EKG 16 May 2018. The premature atrial contractions are noted    Records Reviewed: NURSING NOTES AND PREVIOUS MEDICAL RECORDS    Provider Notes (Medical Decision Making):   Back pain arm pain and neck pain do not suggest pulmonary embolism given the type of etiology. The deep ache not pleuritic nature. No swelling or edema no lateral to the arms or legs. Pelvic clot bets translating is possible but unlikely. She has chronic anemia which is actually improved does not reveal significant pulmonary edema effusion or pneumothorax or other inflammatory process  Procedures:  Procedures    ED Course:   Initial assessment performed. The patients presenting problems have been discussed, and they are in agreement with the care plan formulated and outlined with them. I have encouraged them to ask questions as they arise throughout their visit. Diagnosis and Disposition       DISCHARGE NOTE:    Tayler Kang  results have been reviewed with her. She has been counseled regarding her diagnosis, treatment, and plan. She verbally conveys understanding and agreement of the signs, symptoms, diagnosis, treatment and prognosis and additionally agrees to follow up as discussed. She also agrees with the care-plan and conveys that all of her questions have been answered. I have also provided discharge instructions for her that include: educational information regarding their diagnosis and treatment, and list of reasons why they would want to return to the ED prior to their follow-up appointment, should her condition change. She has been provided with education for proper emergency department utilization. CLINICAL IMPRESSION:    1.  Acute bilateral back pain, unspecified back location    2. Neck pain    3. ESRD (end stage renal disease) on dialysis ([facility]        PLAN:  1. D/C Home  2. Discharge Medication List as of 4/22/2019  7:19 AM      START taking these medications    Details   carisoprodol (SOMA) 350 mg tablet Take 1 Tab by mouth every eight (8) hours as needed for Muscle Spasm(s). Max Daily Amount: 1,050 mg., Print, Disp-20 Tab, R-0         CONTINUE these medications which have NOT CHANGED    Details   pantoprazole (PROTONIX) 40 mg tablet Take 1 Tab by mouth daily. , Print, Disp-30 Tab, R-2      nystatin (MYCOSTATIN) powder Apply  to affected area two (2) times a day., Print, Disp-1 Bottle, R-0      cholecalciferol (VITAMIN D3) 1,000 unit tablet Take 2,000 Units by mouth daily. , Historical Med      ferrous sulfate 325 mg (65 mg iron) tablet Take 325 mg by mouth three (3) times daily (with meals). , Historical Med      hydrALAZINE (APRESOLINE) 25 mg tablet Take 25 mg by mouth three (3) times daily. , Historical Med      lactulose (CHRONULAC) 10 gram/15 mL solution Take 20 g by mouth daily as needed., Historical Med      sevelamer (RENAGEL) 800 mg tablet Take 1,600 mg by mouth three (3) times daily (with meals). , Historical Med      b complex-vitamin c-folic acid (NEPHROCAPS) 1 mg capsule Take 1 Cap by mouth daily. , Historical Med      acetaminophen (TYLENOL) 325 mg tablet Take 325 mg by mouth every six (6) hours as needed for Pain., Historical Med      AFLIBERCEPT INTRAVITREAL 1 Ampule by IntraVITreal route every twenty-eight (28) days. , Historical Med      carboxymethylcellulose sodium (REFRESH PLUS) 0.5 % dpet Administer 1 Drop to both eyes as needed., Historical Med      hydrOXYzine HCl (ATARAX) 10 mg tablet Take 10 mg by mouth nightly as needed for Itching., Historical Med      albuterol (PROVENTIL HFA, VENTOLIN HFA, PROAIR HFA) 90 mcg/actuation inhaler Take 2 Puffs by inhalation every four (4) hours as needed for Wheezing., Historical Med 3.   Follow-up Information     Follow up With Specialties Details Why Angeles Plaza MD Nephrology, Internal Medicine   [place] [id number]  In 1 week  [address] [place] [address] EMERGENCY DEPT Emergency Medicine  As needed, If symptoms worsen [address] [id number]  [phone]        _______________________________    This note was partially transcribed via voice recognition software. Although efforts have been made to catch any discrepancies, it may contain sound alike words, grammatical errors, or nonsensical words.
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
## 2019-05-13 ENCOUNTER — HOSPITAL ENCOUNTER (OUTPATIENT)
Dept: INFUSION THERAPY | Age: 69
Discharge: HOME | End: 2019-05-13
Payer: MEDICARE

## 2019-05-13 LAB
ATRIAL RATE: 82 BPM
CALCULATED P AXIS, ECG09: 81 DEGREES
CALCULATED R AXIS, ECG10: -45 DEGREES
CALCULATED T AXIS, ECG11: 115 DEGREES
DIAGNOSIS, 93000: NORMAL
P-R INTERVAL, ECG05: 180 MS
Q-T INTERVAL, ECG07: 444 MS
QRS DURATION, ECG06: 142 MS
QTC CALCULATION (BEZET), ECG08: 518 MS
VENTRICULAR RATE, ECG03: 82 BPM

## 2019-05-13 PROCEDURE — 86900 BLOOD TYPING SEROLOGIC ABO: CPT

## 2019-05-13 PROCEDURE — 86905 BLOOD TYPING RBC ANTIGENS: CPT

## 2019-05-13 PROCEDURE — 86920 COMPATIBILITY TEST SPIN: CPT

## 2019-05-13 PROCEDURE — 86922 COMPATIBILITY TEST ANTIGLOB: CPT

## 2019-05-13 PROCEDURE — 86902 BLOOD TYPE ANTIGEN DONOR EA: CPT

## 2019-05-13 PROCEDURE — 86870 RBC ANTIBODY IDENTIFICATION: CPT

## 2019-05-13 PROCEDURE — 86921 COMPATIBILITY TEST INCUBATE: CPT

## 2019-05-13 PROCEDURE — 36415 COLL VENOUS BLD VENIPUNCTURE: CPT

## 2019-05-14 ENCOUNTER — HOSPITAL ENCOUNTER (OUTPATIENT)
Dept: INFUSION THERAPY | Age: 69
Discharge: HOME | End: 2019-05-14
Payer: MEDICARE

## 2019-05-14 VITALS
OXYGEN SATURATION: 97 % | DIASTOLIC BLOOD PRESSURE: 77 MMHG | TEMPERATURE: 98.6 F | RESPIRATION RATE: 18 BRPM | SYSTOLIC BLOOD PRESSURE: 152 MMHG | HEART RATE: 78 BPM

## 2019-05-14 PROCEDURE — 74011250636 HC RX REV CODE- 250/636

## 2019-05-14 PROCEDURE — P9016 RBC LEUKOCYTES REDUCED: HCPCS

## 2019-05-14 PROCEDURE — 77030013169 SET IV BLD ICUM -A

## 2019-05-14 PROCEDURE — 36430 TRANSFUSION BLD/BLD COMPNT: CPT

## 2019-05-14 RX ORDER — SODIUM CHLORIDE 9 MG/ML
250 INJECTION, SOLUTION INTRAVENOUS CONTINUOUS
Status: DISCONTINUED | OUTPATIENT
Start: 2019-05-14 | End: 2019-05-15 | Stop reason: HOSPADM

## 2019-05-14 RX ADMIN — SODIUM CHLORIDE 250 ML/HR: 900 INJECTION, SOLUTION INTRAVENOUS at 09:00

## 2019-05-14 NOTE — PROGRESS NOTES
[facility] OPIC Progress Note    Date: May 14, 2019    Name: Candice Antoine    MRN: [id number]         : 1950     One Unit of PRBCs. Ms. Lou Garduno arrived to [facility] at 8225 via wheelchair. OPIC manager Nicolette Dubin RN had to assist patient in the parking lot out of her car into a wheelchair. Patient states she did not have anyone today to come with her today for her transfusion. Ms. Lou Garduno was assessed and education was provided. Blood transfusion consent signed and placed on chart. Patient declines education stating she has had blood transfusions before without complications. Ms. Tu Allen vitals were reviewed. Visit Vitals  /77   Pulse 83   Temp 98.2 °F (36.8 °C)   Resp 18   SpO2 97%   Breastfeeding? No       22g IV inserted in patient's Left Forearm X 1 attempts. Positive for blood return and flushes without difficulty. Normal saline initiated at [facility]. No premedications ordered. 1 unit of PRBCs administered as ordered followed by normal saline flush. Ms. Lou Garduno tolerated infusion without complaints. Patient Vitals for the past 12 hrs:   Temp Pulse Resp BP SpO2   19 1140 98.6 °F (37 °C) 78 18 152/77 97 %   19 1110 98.7 °F (37.1 °C) 83 18 157/80 --   19 1040 98.3 °F (36.8 °C) 74 18 146/71 --   19 1010 98.4 °F (36.9 °C) 75 18 145/73 --   19 0940 98.4 °F (36.9 °C) 76 18 153/78 --   19 0925 98.6 °F (37 °C) 77 18 152/72 --   19 0910 98.2 °F (36.8 °C) 83 18 153/77 --   19 0855 98.4 °F (36.9 °C) 85 18 156/76 97 %   19 0853 98.4 °F (36.9 °C) 85 18 156/76 97 %         IV removed. Gauze and paper tape applied to site. Patient was observed for 30 minutes post transfusion, patient states she has to get home to her grandson. Discharge instructions reviewed with pt. Pt verbalized understanding. Ms. Lou Garduno was discharged from [place] [address] in stable condition at 1140 via wheelchair.  [facility] escorted patient out of the infusion center to her car. No further appointments are scheduled at this office. Patient is advised to follow up with her physician.     Governor Sanford  May 14, 2019

## 2019-05-18 LAB
ABO + RH BLD: NORMAL
ANTIGENS PRESENT BLD: NORMAL
ANTIGENS PRESENT RBC DONR: NORMAL
ANTIGENS PRESENT RBC DONR: NORMAL
BLD PROD TYP BPU: NORMAL
BLD PROD TYP BPU: NORMAL
BLOOD GROUP ANTIBODIES SERPL: NORMAL
BLOOD GROUP ANTIBODIES SERPL: NORMAL
BPU ID: NORMAL
BPU ID: NORMAL
CROSSMATCH RESULT,%XM: NORMAL
CROSSMATCH RESULT,%XM: NORMAL
SPECIMEN EXP DATE BLD: NORMAL
STATUS OF UNIT,%ST: NORMAL
STATUS OF UNIT,%ST: NORMAL
UNIT DIVISION, %UDIV: 0
UNIT DIVISION, %UDIV: 0

## 2019-09-25 ENCOUNTER — HOSPITAL ENCOUNTER (EMERGENCY)
Age: 69
Discharge: HOME | End: 2019-09-25
Attending: EMERGENCY MEDICINE
Payer: MEDICARE

## 2019-09-25 ENCOUNTER — APPOINTMENT (OUTPATIENT)
Dept: GENERAL RADIOLOGY | Age: 69
End: 2019-09-25
Attending: PHYSICIAN ASSISTANT
Payer: MEDICARE

## 2019-09-25 VITALS
HEIGHT: 63 IN | OXYGEN SATURATION: 99 % | WEIGHT: 150 LBS | TEMPERATURE: 98 F | RESPIRATION RATE: 18 BRPM | BODY MASS INDEX: 26.58 KG/M2 | DIASTOLIC BLOOD PRESSURE: 55 MMHG | HEART RATE: 80 BPM | SYSTOLIC BLOOD PRESSURE: 135 MMHG

## 2019-09-25 DIAGNOSIS — D64.9 CHRONIC ANEMIA: ICD-10-CM

## 2019-09-25 DIAGNOSIS — N18.9 CKD, PATIENT PREFERRED TREATMENT MODALITY IN-CENTER HEMODIALYSIS: ICD-10-CM

## 2019-09-25 DIAGNOSIS — R53.83 MALAISE AND FATIGUE: Primary | ICD-10-CM

## 2019-09-25 DIAGNOSIS — R53.81 MALAISE AND FATIGUE: Primary | ICD-10-CM

## 2019-09-25 LAB
ALBUMIN SERPL-MCNC: 3.6 G/DL (ref 3.4–5)
ALBUMIN/GLOB SERPL: 1.2 {RATIO} (ref 0.8–1.7)
ALP SERPL-CCNC: 109 U/L (ref 45–117)
ALT SERPL-CCNC: 16 U/L (ref 13–56)
ANION GAP SERPL CALC-SCNC: 7 MMOL/L (ref 3–18)
APTT PPP: 28.6 SEC (ref 23–36.4)
AST SERPL-CCNC: 19 U/L (ref 10–38)
BASOPHILS # BLD: 0 K/UL (ref 0–0.1)
BASOPHILS NFR BLD: 1 % (ref 0–2)
BILIRUB SERPL-MCNC: 0.5 MG/DL (ref 0.2–1)
BUN SERPL-MCNC: 22 MG/DL (ref 7–18)
BUN/CREAT SERPL: 6 (ref 12–20)
CALCIUM SERPL-MCNC: 9.3 MG/DL (ref 8.5–10.1)
CHLORIDE SERPL-SCNC: 98 MMOL/L (ref 100–111)
CK MB CFR SERPL CALC: 4.5 % (ref 0–4)
CK MB SERPL-MCNC: 3.5 NG/ML (ref 5–25)
CK SERPL-CCNC: 78 U/L (ref 26–192)
CO2 SERPL-SCNC: 35 MMOL/L (ref 21–32)
CREAT SERPL-MCNC: 3.98 MG/DL (ref 0.6–1.3)
DIFFERENTIAL METHOD BLD: ABNORMAL
EOSINOPHIL # BLD: 0.2 K/UL (ref 0–0.4)
EOSINOPHIL NFR BLD: 4 % (ref 0–5)
ERYTHROCYTE [DISTWIDTH] IN BLOOD BY AUTOMATED COUNT: 18 % (ref 11.6–14.5)
GLOBULIN SER CALC-MCNC: 3 G/DL (ref 2–4)
GLUCOSE SERPL-MCNC: 236 MG/DL (ref 74–99)
HCT VFR BLD AUTO: 31.2 % (ref 35–45)
HGB BLD-MCNC: 8.9 G/DL (ref 12–16)
INR PPP: 1.1 (ref 0.8–1.2)
LYMPHOCYTES # BLD: 0.6 K/UL (ref 0.9–3.6)
LYMPHOCYTES NFR BLD: 14 % (ref 21–52)
MCH RBC QN AUTO: 28.7 PG (ref 24–34)
MCHC RBC AUTO-ENTMCNC: 28.5 G/DL (ref 31–37)
MCV RBC AUTO: 100.6 FL (ref 74–97)
MONOCYTES # BLD: 0.4 K/UL (ref 0.05–1.2)
MONOCYTES NFR BLD: 9 % (ref 3–10)
NEUTS SEG # BLD: 3.2 K/UL (ref 1.8–8)
NEUTS SEG NFR BLD: 72 % (ref 40–73)
PLATELET # BLD AUTO: 138 K/UL (ref 135–420)
PMV BLD AUTO: 10.8 FL (ref 9.2–11.8)
POTASSIUM SERPL-SCNC: 3.2 MMOL/L (ref 3.5–5.5)
PROT SERPL-MCNC: 6.6 G/DL (ref 6.4–8.2)
PROTHROMBIN TIME: 14.2 SEC (ref 11.5–15.2)
RBC # BLD AUTO: 3.1 M/UL (ref 4.2–5.3)
RBC MORPH BLD: ABNORMAL
SODIUM SERPL-SCNC: 140 MMOL/L (ref 136–145)
TROPONIN I SERPL-MCNC: 0.08 NG/ML (ref 0–0.04)
WBC # BLD AUTO: 4.4 K/UL (ref 4.6–13.2)

## 2019-09-25 PROCEDURE — 85025 COMPLETE CBC W/AUTO DIFF WBC: CPT

## 2019-09-25 PROCEDURE — 86900 BLOOD TYPING SEROLOGIC ABO: CPT

## 2019-09-25 PROCEDURE — 86870 RBC ANTIBODY IDENTIFICATION: CPT

## 2019-09-25 PROCEDURE — 80053 COMPREHEN METABOLIC PANEL: CPT

## 2019-09-25 PROCEDURE — 93005 ELECTROCARDIOGRAM TRACING: CPT

## 2019-09-25 PROCEDURE — 82550 ASSAY OF CK (CPK): CPT

## 2019-09-25 PROCEDURE — 99284 EMERGENCY DEPT VISIT MOD MDM: CPT

## 2019-09-25 PROCEDURE — 85610 PROTHROMBIN TIME: CPT

## 2019-09-25 PROCEDURE — 71045 X-RAY EXAM CHEST 1 VIEW: CPT

## 2019-09-25 PROCEDURE — 85730 THROMBOPLASTIN TIME PARTIAL: CPT

## 2019-09-25 NOTE — ED PROVIDER NOTES
EMERGENCY DEPARTMENT HISTORY AND PHYSICAL EXAM    Date: 9/25/2019  Patient Name: Rosaura Muller    History of Presenting Illness     Chief Complaint   Patient presents with    Fatigue         History Provided By: Patient    Rosaura Muller is a 78 y.o. female with PMHX of CKD on hemodialysis MWF, type 2 diabetes, hypertension, chronic pain who presents to the emergency department C/O generalized fatigue. Patient reports that over the last 3 or 4 days she has had increasing worsening generalized fatigue. Patient states that she was at dialysis today at 2:00 mention to her nurse that she had had this generalized fatigue. Patient states that the nurse at the dialysis center told her that her hemoglobin was 6.2 and that she should come the emergency department for transfusion. Patient states that she has had no recent fevers, no cough no congestion. Was able to complete her dialysis today. Pt denies chest pain, shortness of breath, fever, abdominal pain, nausea vomiting diarrhea, and any other sxs or complaints. PCP: Bud, MD [place]    Current Outpatient Medications   Medication Sig Dispense Refill    carisoprodol (SOMA) 350 mg tablet Take 1 Tab by mouth every eight (8) hours as needed for Muscle Spasm(s). Max Daily Amount: 1,050 mg. 20 Tab 0    hydrOXYzine HCl (ATARAX) 10 mg tablet Take 1 Tab by mouth three (3) times daily as needed for Itching. Indications: Hives 20 Tab 0    pantoprazole (PROTONIX) 40 mg tablet Take 1 Tab by mouth daily. 30 Tab 2    nystatin (MYCOSTATIN) powder Apply  to affected area two (2) times a day. 1 Bottle 0    cholecalciferol (VITAMIN D3) 1,000 unit tablet Take 2,000 Units by mouth daily.  ferrous sulfate 325 mg (65 mg iron) tablet Take 325 mg by mouth three (3) times daily (with meals).  hydrALAZINE (APRESOLINE) 25 mg tablet Take 25 mg by mouth three (3) times daily.  lactulose (CHRONULAC) 10 gram/15 mL solution Take 20 g by mouth daily as needed.       sevelamer (RENAGEL) 800 mg tablet Take 1,600 mg by mouth three (3) times daily (with meals).  b complex-vitamin c-folic acid (NEPHROCAPS) 1 mg capsule Take 1 Cap by mouth daily.  acetaminophen (TYLENOL) 325 mg tablet Take 325 mg by mouth every six (6) hours as needed for Pain.  AFLIBERCEPT INTRAVITREAL 1 Ampule by IntraVITreal route every twenty-eight (28) days.  carboxymethylcellulose sodium (REFRESH PLUS) 0.5 % dpet Administer 1 Drop to both eyes as needed.  albuterol (PROVENTIL HFA, VENTOLIN HFA, PROAIR HFA) 90 mcg/actuation inhaler Take 2 Puffs by inhalation every four (4) hours as needed for Wheezing. Past History     Past Medical History:  Past Medical History:   Diagnosis Date    Arthritis     Asthma     Chronic kidney disease     Chronic pain     [address] [address] 12/17/2017    Diabetes ([facility] [address] diet controlled    GERD (gastroesophageal reflux disease)     Hypertension        Past Surgical History:  Past Surgical History:   Procedure Laterality Date    COLONOSCOPY N/A 1/12/2018    [facility]    COLONOSCOPY N/A 3/19/2018    COLONOSCOPY performed [facility] MD at [facility]    HX GYN  c section    HX VASCULAR ACCESS      dialysis        Family History:  History reviewed. No pertinent family history. Social History:  Social History     Tobacco Use    Smoking status: Never Smoker    Smokeless tobacco: Never Used   Substance Use Topics    Alcohol use: No    Drug use: No       Allergies: Allergies   Allergen Reactions    Aspirin Other (comments)     GI bleed    Heparin (Porcine) Other (comments)     GI bleed    Metformin Other (comments)     swelling    Norvasc [Amlodipine] Rash         Review of Systems   Review of Systems   Constitutional: Positive for fatigue. Negative for appetite change and fever. Respiratory: Negative for cough. Cardiovascular: Negative for chest pain.    Gastrointestinal: Negative for abdominal pain, blood in stool and vomiting. Neurological: Negative for dizziness, syncope and weakness. All other systems reviewed and are negative. Physical Exam     Vitals:    09/25/19 1959 09/25/19 2000 09/25/19 2042 09/25/19 2045   BP:  133/45 160/73 135/55   Pulse: 76   80   Resp: 19   18   Temp:       SpO2:    99%   Weight:       Height:         Physical Exam   Constitutional: She is oriented to person, place, and time. She appears well-developed and well-nourished. No distress. HENT:   Head: Normocephalic and atraumatic. Eyes: Pupils are equal, round, and reactive to light. Conjunctivae and EOM are normal.   Neck: Normal range of motion. Neck supple. Cardiovascular: Normal rate and regular rhythm. Pulmonary/Chest: Effort normal and breath sounds normal.   Abdominal: Soft. Bowel sounds are normal. She exhibits no distension. There is no tenderness. There is no rebound and no guarding. Musculoskeletal: Normal range of motion. She exhibits no edema or tenderness. Neurological: She is alert and oriented to person, place, and time. No cranial nerve deficit. Coordination normal.   Skin: Skin is warm and dry. Psychiatric: She has a normal mood and affect. Her behavior is normal.   Vitals reviewed.         Diagnostic Study Results     Labs -     Recent Results (from the past 12 hour(s))   TYPE & SCREEN    Collection Time: 09/25/19  7:00 PM   Result Value Ref Range    Crossmatch Expiration 09/28/2019     ABO/Rh(D) Juliana Bark POSITIVE     Antibody screen POS    EKG, 12 LEAD, INITIAL    Collection Time: 09/25/19  7:28 PM   Result Value Ref Range    Ventricular Rate 74 BPM    Atrial Rate 74 BPM    P-R Interval 168 ms    QRS Duration 148 ms    Q-T Interval 470 ms    QTC Calculation (Bezet) 521 ms    Calculated P Axis 62 degrees    Calculated R Axis -45 degrees    Calculated T Axis 111 degrees    Diagnosis       Normal sinus rhythm  Left axis deviation  Left bundle branch block  Abnormal ECG  When compared with ECG of 22-APR-2019 05:03,  premature supraventricular complexes are no longer present  T wave inversion more evident in Lateral leads     CBC WITH AUTOMATED DIFF    Collection Time: 09/25/19  7:30 PM   Result Value Ref Range    WBC 4.4 (L) 4.6 - 13.2 K/uL    RBC 3.10 (L) 4.20 - 5.30 M/uL    HGB 8.9 (L) 12.0 - 16.0 g/dL    HCT 31.2 (L) 35.0 - 45.0 %    .6 (H) 74.0 - 97.0 FL    MCH 28.7 24.0 - 34.0 PG    MCHC 28.5 (L) 31.0 - 37.0 g/dL    RDW 18.0 (H) 11.6 - 14.5 %    PLATELET 280 438 - 968 K/uL    MPV 10.8 9.2 - 11.8 FL    NEUTROPHILS 72 40 - 73 %    LYMPHOCYTES 14 (L) 21 - 52 %    MONOCYTES 9 3 - 10 %    EOSINOPHILS 4 0 - 5 %    BASOPHILS 1 0 - 2 %    ABS. NEUTROPHILS 3.2 1.8 - 8.0 K/UL    ABS. LYMPHOCYTES 0.6 (L) 0.9 - 3.6 K/UL    ABS. MONOCYTES 0.4 0.05 - 1.2 K/UL    ABS. EOSINOPHILS 0.2 0.0 - 0.4 K/UL    ABS.  BASOPHILS 0.0 0.0 - 0.1 K/UL    RBC COMMENTS ANISOCYTOSIS  1+        RBC COMMENTS MICROCYTOSIS  1+        RBC COMMENTS MACROCYTOSIS  1+        RBC COMMENTS POLYCHROMASIA  1+        RBC COMMENTS HYPOCHROMIA  2+        RBC COMMENTS BASOPHILIC STIPPLING  1+        RBC COMMENTS ACANTHOCYTES  1+        DF AUTOMATED     PROTHROMBIN TIME + INR    Collection Time: 09/25/19  7:30 PM   Result Value Ref Range    Prothrombin time 14.2 11.5 - 15.2 sec    INR 1.1 0.8 - 1.2     PTT    Collection Time: 09/25/19  7:30 PM   Result Value Ref Range    aPTT 28.6 23.0 - 75.8 SEC   METABOLIC PANEL, COMPREHENSIVE    Collection Time: 09/25/19  7:30 PM   Result Value Ref Range    Sodium 140 136 - 145 mmol/L    Potassium 3.2 (L) 3.5 - 5.5 mmol/L    Chloride 98 (L) 100 - 111 mmol/L    CO2 35 (H) 21 - 32 mmol/L    Anion gap 7 3.0 - 18 mmol/L    Glucose 236 (H) 74 - 99 mg/dL    BUN 22 (H) 7.0 - 18 MG/DL    Creatinine 3.98 (H) 0.6 - 1.3 MG/DL    BUN/Creatinine ratio 6 (L) 12 - 20      GFR est AA 13 (L) >60 ml/min/1.73m2    GFR est non-AA 11 (L) >60 ml/min/1.73m2    Calcium 9.3 8.5 - 10.1 MG/DL    Bilirubin, total 0.5 0.2 - 1.0 MG/DL    ALT (SGPT) 16 13 - 56 U/L    AST (SGOT) 19 10 - 38 U/L    Alk. phosphatase 109 45 - 117 U/L    Protein, total 6.6 6.4 - 8.2 g/dL    Albumin 3.6 3.4 - 5.0 g/dL    Globulin 3.0 2.0 - 4.0 g/dL    A-G Ratio 1.2 0.8 - 1.7     CARDIAC PANEL,(CK, CKMB & TROPONIN)    Collection Time: 09/25/19  7:30 PM   Result Value Ref Range    CK 78 26 - 192 U/L    CK - MB 3.5 <3.6 ng/ml    CK-MB Index 4.5 (H) 0.0 - 4.0 %    Troponin-I, QT 0.08 (H) 0.0 - 0.045 NG/ML       Radiologic Studies -   XR CHEST PORT    (Results Pending)     CT Results  (Last 48 hours)    None        CXR Results  (Last 48 hours)    None          Medications given in the ED-  Medications - No data to display      Medical Decision Making   I am the first provider for this patient. I reviewed the vital signs, available nursing notes, past medical history, past surgical history, family history and social history. Vital Signs-Reviewed the patient's vital signs. Pulse Oximetry Analysis - 100% on RA     Records Reviewed: Nursing Notes    Procedures:  Procedures    ED Course:   7:16 PM   Initial assessment performed. The patients presenting problems have been discussed, and they are in agreement with the care plan formulated and outlined with them. I have encouraged them to ask questions as they arise throughout their visit. 9:10 PM  Reviewed laboratory findings with patient. Patient very surprised to hear that her hemoglobin was 8.9 not the previous reported value of 6. Patient pleasantly pleased that she will get to go home. She has felt well since she has been here she has had no chest pain or shortness of breath she is eager for discharge.     9:20 PM   Reviewed case with Dr. Jeanne Ragland who agrees with the discharge and close follow-up with her nephrologist and PCP    Discussion: 78 y.o. female with history of chronic kidney disease on hemodialysis Monday Wednesday Friday, hypertension, type 2 diabetes complaining of 3 to 4 days of generalized weakness. Patient states that she went to dialysis today completed the entire round was told by the dialysis nurse that her hemoglobin was 6.2 which prompted ER visit. She is afebrile with appropriate vital signs, no complaints of chest pain or shortness of breath, hemoglobin of 8.9 (highest she has had), remainder of labs at patient's baseline, no EKG changes. She has had no chest pain, shortness of breath, or recent illness, & is otherwise doing well. Will plan for discharge with close PCP follow-up and return precautions discussed. Diagnosis and Disposition       DISCHARGE NOTE:  Sade Perales  results have been reviewed with her. She has been counseled regarding her diagnosis, treatment, and plan. She verbally conveys understanding and agreement of the signs, symptoms, diagnosis, treatment and prognosis and additionally agrees to follow up as discussed. She also agrees with the care-plan and conveys that all of her questions have been answered. I have also provided discharge instructions for her that include: educational information regarding their diagnosis and treatment, and list of reasons why they would want to return to the ED prior to their follow-up appointment, should her condition change. She has been provided with education for proper emergency department utilization. CLINICAL IMPRESSION:    1. Malaise and fatigue    2. CKD, patient preferred treatment modality in-center hemodialysis    3. Chronic anemia        PLAN:  1. D/C Home  2. Current Discharge Medication List        3.    Follow-up Information     Follow up With Specialties Details Why Contact Info    your nephrologist  Schedule an appointment as soon as possible for a visit      your PCP  Schedule an appointment as soon as possible for a visit      Elizabeth Jones DO Internal Medicine  for primary care follow up [address] [facility]  [phone]                    Please note that this dictation was completed with [employer], the computer voice recognition software. Quite often unanticipated grammatical, syntax, homophones, and other interpretive errors are inadvertently transcribed by the computer software. Please disregard these errors. Please excuse any errors that have escaped final proofreading.

## 2019-09-25 NOTE — ED TRIAGE NOTES
Pt c/o weakness, reports she does dialysis M,W,F dialysis told her today her blood count was 6.2 to come to ED.  Pt reports blood transfusions in the past.

## 2019-09-26 LAB
ABO + RH BLD: NORMAL
BLOOD BANK CMNT PATIENT-IMP: NORMAL
BLOOD GROUP ANTIBODIES SERPL: NORMAL
BLOOD GROUP ANTIBODIES SERPL: NORMAL
SPECIMEN EXP DATE BLD: NORMAL

## 2019-09-26 NOTE — DISCHARGE INSTRUCTIONS

## 2019-09-26 NOTE — ED NOTES
Lab requesting two additional pink tops to be sent to [facility] for additional testing d/t presence of antibodies

## 2019-09-27 LAB
ATRIAL RATE: 74 BPM
CALCULATED P AXIS, ECG09: 62 DEGREES
CALCULATED R AXIS, ECG10: -45 DEGREES
CALCULATED T AXIS, ECG11: 111 DEGREES
DIAGNOSIS, 93000: NORMAL
P-R INTERVAL, ECG05: 168 MS
Q-T INTERVAL, ECG07: 470 MS
QRS DURATION, ECG06: 148 MS
QTC CALCULATION (BEZET), ECG08: 521 MS
VENTRICULAR RATE, ECG03: 74 BPM

## 2020-02-27 ENCOUNTER — IP HISTORICAL/CONVERTED ENCOUNTER (OUTPATIENT)
Dept: OTHER | Age: 70
End: 2020-02-27

## 2020-09-17 ENCOUNTER — HOSPITAL ENCOUNTER (OUTPATIENT)
Age: 70
Setting detail: OBSERVATION
Discharge: HOME | End: 2020-09-18
Attending: EMERGENCY MEDICINE | Admitting: INTERNAL MEDICINE
Payer: MEDICARE

## 2020-09-17 ENCOUNTER — APPOINTMENT (OUTPATIENT)
Dept: GENERAL RADIOLOGY | Age: 70
End: 2020-09-17
Attending: EMERGENCY MEDICINE
Payer: MEDICARE

## 2020-09-17 DIAGNOSIS — N18.5 ANEMIA DUE TO STAGE 5 CHRONIC KIDNEY DISEASE, NOT ON CHRONIC DIALYSIS (HCC): ICD-10-CM

## 2020-09-17 DIAGNOSIS — R10.13 DYSPEPSIA: Primary | ICD-10-CM

## 2020-09-17 DIAGNOSIS — D63.1 ANEMIA DUE TO STAGE 5 CHRONIC KIDNEY DISEASE, NOT ON CHRONIC DIALYSIS (HCC): ICD-10-CM

## 2020-09-17 PROBLEM — D64.9 ANEMIA: Status: ACTIVE | Noted: 2020-09-17

## 2020-09-17 LAB
ALBUMIN SERPL-MCNC: 2.9 G/DL (ref 3.5–5)
ALBUMIN/GLOB SERPL: 0.9 {RATIO} (ref 1.1–2.2)
ALP SERPL-CCNC: 229 U/L (ref 45–117)
ALT SERPL-CCNC: 17 U/L (ref 12–78)
ANION GAP SERPL CALC-SCNC: 7 MMOL/L (ref 5–15)
AST SERPL W P-5'-P-CCNC: 16 U/L (ref 15–37)
BASOPHILS # BLD: 0.1 K/UL (ref 0–0.2)
BASOPHILS NFR BLD: 2 % (ref 0–2.5)
BILIRUB SERPL-MCNC: 0.4 MG/DL (ref 0.2–1)
BUN SERPL-MCNC: 23 MG/DL (ref 6–20)
BUN/CREAT SERPL: 6 (ref 12–20)
CA-I BLD-MCNC: 8.4 MG/DL (ref 8.5–10.1)
CHLORIDE SERPL-SCNC: 105 MMOL/L (ref 97–108)
CO2 SERPL-SCNC: 30 MMOL/L (ref 21–32)
CREAT SERPL-MCNC: 3.74 MG/DL (ref 0.55–1.02)
EOSINOPHIL # BLD: 0.1 K/UL (ref 0–0.7)
EOSINOPHIL NFR BLD: 3 % (ref 0.9–2.9)
ERYTHROCYTE [DISTWIDTH] IN BLOOD BY AUTOMATED COUNT: 21.2 % (ref 11.5–14.5)
GLOBULIN SER CALC-MCNC: 3.3 G/DL (ref 2–4)
GLUCOSE SERPL-MCNC: 144 MG/DL (ref 65–100)
HCT VFR BLD AUTO: 21.7 % (ref 36–46)
HGB BLD-MCNC: 6.8 % (ref 13.5–17.5)
INR PPP: 1.1 (ref 0.9–1.1)
LYMPHOCYTES # BLD: 0.4 K/UL (ref 1–4.8)
LYMPHOCYTES NFR BLD: 10 % (ref 20.5–51.1)
MCH RBC QN AUTO: 28.9 PG (ref 31–34)
MCHC RBC AUTO-ENTMCNC: 31.4 G/DL (ref 31–36)
MCV RBC AUTO: 92 FL (ref 80–100)
MONOCYTES # BLD: 0.3 K/UL (ref 0.2–2.4)
MONOCYTES NFR BLD: 7 % (ref 1.7–9.3)
NEUTS SEG # BLD: 3.3 K/UL (ref 1.8–7.7)
NEUTS SEG NFR BLD: 78 % (ref 42–75)
NRBC # BLD: 0.01 K/UL
NRBC BLD-RTO: 20 PER 100 WBC
PLATELET # BLD AUTO: 141 K/UL
PMV BLD AUTO: 8.5 FL (ref 6.5–11.5)
POTASSIUM SERPL-SCNC: 3.5 MMOL/L (ref 3.5–5.1)
PROT SERPL-MCNC: 6.2 G/DL (ref 6.4–8.2)
PROTHROMBIN TIME: 10.9 SEC (ref 9–11.1)
RBC # BLD AUTO: 2.36 M/UL (ref 4.5–5.9)
SODIUM SERPL-SCNC: 142 MMOL/L (ref 136–145)
TROPONIN I SERPL-MCNC: <0.05 NG/ML
WBC # BLD AUTO: 4.3 K/UL (ref 4.4–11.3)

## 2020-09-17 PROCEDURE — 84484 ASSAY OF TROPONIN QUANT: CPT

## 2020-09-17 PROCEDURE — 86920 COMPATIBILITY TEST SPIN: CPT

## 2020-09-17 PROCEDURE — 86870 RBC ANTIBODY IDENTIFICATION: CPT

## 2020-09-17 PROCEDURE — 85610 PROTHROMBIN TIME: CPT

## 2020-09-17 PROCEDURE — 71045 X-RAY EXAM CHEST 1 VIEW: CPT

## 2020-09-17 PROCEDURE — 93005 ELECTROCARDIOGRAM TRACING: CPT

## 2020-09-17 PROCEDURE — 86900 BLOOD TYPING SEROLOGIC ABO: CPT

## 2020-09-17 PROCEDURE — 36415 COLL VENOUS BLD VENIPUNCTURE: CPT

## 2020-09-17 PROCEDURE — 74011250637 HC RX REV CODE- 250/637: Performed by: EMERGENCY MEDICINE

## 2020-09-17 PROCEDURE — 99284 EMERGENCY DEPT VISIT MOD MDM: CPT

## 2020-09-17 PROCEDURE — 85025 COMPLETE CBC W/AUTO DIFF WBC: CPT

## 2020-09-17 PROCEDURE — 99218 HC RM OBSERVATION: CPT

## 2020-09-17 PROCEDURE — 80053 COMPREHEN METABOLIC PANEL: CPT

## 2020-09-17 PROCEDURE — 74011250637 HC RX REV CODE- 250/637: Performed by: PHYSICIAN ASSISTANT

## 2020-09-17 RX ORDER — SODIUM CHLORIDE 0.9 % (FLUSH) 0.9 %
5-40 SYRINGE (ML) INJECTION EVERY 8 HOURS
Status: DISCONTINUED | OUTPATIENT
Start: 2020-09-17 | End: 2020-09-18 | Stop reason: HOSPADM

## 2020-09-17 RX ORDER — ACETAMINOPHEN 650 MG/1
650 SUPPOSITORY RECTAL
Status: DISCONTINUED | OUTPATIENT
Start: 2020-09-17 | End: 2020-09-18 | Stop reason: HOSPADM

## 2020-09-17 RX ORDER — ALBUTEROL SULFATE 90 UG/1
2 AEROSOL, METERED RESPIRATORY (INHALATION)
Status: DISCONTINUED | OUTPATIENT
Start: 2020-09-18 | End: 2020-09-18

## 2020-09-17 RX ORDER — PROMETHAZINE HYDROCHLORIDE 25 MG/1
12.5 TABLET ORAL
Status: DISCONTINUED | OUTPATIENT
Start: 2020-09-17 | End: 2020-09-18 | Stop reason: HOSPADM

## 2020-09-17 RX ORDER — POLYETHYLENE GLYCOL 3350 17 G/17G
17 POWDER, FOR SOLUTION ORAL DAILY PRN
Status: DISCONTINUED | OUTPATIENT
Start: 2020-09-17 | End: 2020-09-18 | Stop reason: HOSPADM

## 2020-09-17 RX ORDER — INSULIN LISPRO 100 [IU]/ML
INJECTION, SOLUTION INTRAVENOUS; SUBCUTANEOUS
Status: DISCONTINUED | OUTPATIENT
Start: 2020-09-18 | End: 2020-09-18 | Stop reason: HOSPADM

## 2020-09-17 RX ORDER — SEVELAMER CARBONATE 800 MG/1
1600 TABLET, FILM COATED ORAL
Status: DISCONTINUED | OUTPATIENT
Start: 2020-09-18 | End: 2020-09-18 | Stop reason: HOSPADM

## 2020-09-17 RX ORDER — TRAMADOL HYDROCHLORIDE 50 MG/1
50 TABLET ORAL
Status: DISCONTINUED | OUTPATIENT
Start: 2020-09-17 | End: 2020-09-18 | Stop reason: HOSPADM

## 2020-09-17 RX ORDER — MELATONIN
2000 DAILY
Status: DISCONTINUED | OUTPATIENT
Start: 2020-09-18 | End: 2020-09-18 | Stop reason: HOSPADM

## 2020-09-17 RX ORDER — PANTOPRAZOLE SODIUM 40 MG/1
40 TABLET, DELAYED RELEASE ORAL DAILY
Status: DISCONTINUED | OUTPATIENT
Start: 2020-09-18 | End: 2020-09-18 | Stop reason: HOSPADM

## 2020-09-17 RX ORDER — ACETAMINOPHEN 325 MG/1
325 TABLET ORAL
Status: DISCONTINUED | OUTPATIENT
Start: 2020-09-17 | End: 2020-09-18

## 2020-09-17 RX ORDER — HYDRALAZINE HYDROCHLORIDE 25 MG/1
25 TABLET, FILM COATED ORAL 3 TIMES DAILY
Status: DISCONTINUED | OUTPATIENT
Start: 2020-09-18 | End: 2020-09-18 | Stop reason: HOSPADM

## 2020-09-17 RX ORDER — DEXTROSE 50 % IN WATER (D50W) INTRAVENOUS SYRINGE
25-50 AS NEEDED
Status: DISCONTINUED | OUTPATIENT
Start: 2020-09-17 | End: 2020-09-18 | Stop reason: HOSPADM

## 2020-09-17 RX ORDER — MAGNESIUM SULFATE 100 %
4 CRYSTALS MISCELLANEOUS AS NEEDED
Status: DISCONTINUED | OUTPATIENT
Start: 2020-09-17 | End: 2020-09-18 | Stop reason: HOSPADM

## 2020-09-17 RX ORDER — ONDANSETRON 2 MG/ML
4 INJECTION INTRAMUSCULAR; INTRAVENOUS
Status: DISCONTINUED | OUTPATIENT
Start: 2020-09-17 | End: 2020-09-18 | Stop reason: HOSPADM

## 2020-09-17 RX ORDER — LANOLIN ALCOHOL/MO/W.PET/CERES
325 CREAM (GRAM) TOPICAL
Status: DISCONTINUED | OUTPATIENT
Start: 2020-09-18 | End: 2020-09-18 | Stop reason: HOSPADM

## 2020-09-17 RX ORDER — SODIUM CHLORIDE 0.9 % (FLUSH) 0.9 %
5-40 SYRINGE (ML) INJECTION AS NEEDED
Status: DISCONTINUED | OUTPATIENT
Start: 2020-09-17 | End: 2020-09-18 | Stop reason: HOSPADM

## 2020-09-17 RX ORDER — HYDROXYZINE HYDROCHLORIDE 10 MG/1
10 TABLET, FILM COATED ORAL
Status: DISCONTINUED | OUTPATIENT
Start: 2020-09-17 | End: 2020-09-18 | Stop reason: HOSPADM

## 2020-09-17 RX ORDER — CYCLOBENZAPRINE HCL 10 MG
10 TABLET ORAL
Status: DISCONTINUED | OUTPATIENT
Start: 2020-09-18 | End: 2020-09-18 | Stop reason: HOSPADM

## 2020-09-17 RX ORDER — MAG HYDROX/ALUMINUM HYD/SIMETH 200-200-20
30 SUSPENSION, ORAL (FINAL DOSE FORM) ORAL
Status: DISCONTINUED | OUTPATIENT
Start: 2020-09-17 | End: 2020-09-18 | Stop reason: HOSPADM

## 2020-09-17 RX ORDER — SODIUM CHLORIDE 9 MG/ML
250 INJECTION, SOLUTION INTRAVENOUS AS NEEDED
Status: DISCONTINUED | OUTPATIENT
Start: 2020-09-17 | End: 2020-09-18

## 2020-09-17 RX ORDER — ACETAMINOPHEN 325 MG/1
650 TABLET ORAL
Status: DISCONTINUED | OUTPATIENT
Start: 2020-09-17 | End: 2020-09-18 | Stop reason: HOSPADM

## 2020-09-17 RX ORDER — CARBOXYMETHYLCELLULOSE SODIUM 5 MG/ML
1 SOLUTION/ DROPS OPHTHALMIC AS NEEDED
Status: DISCONTINUED | OUTPATIENT
Start: 2020-09-17 | End: 2020-09-18 | Stop reason: HOSPADM

## 2020-09-17 RX ADMIN — Medication 10 ML: at 22:13

## 2020-09-17 RX ADMIN — HYDRALAZINE HYDROCHLORIDE 25 MG: 25 TABLET, FILM COATED ORAL at 23:58

## 2020-09-17 RX ADMIN — ALUMINUM HYDROXIDE, MAGNESIUM HYDROXIDE, SIMETHICONE 30 ML: 400; 400; 40 SUSPENSION ORAL at 09:57

## 2020-09-17 NOTE — ED PROVIDER NOTES
69-year-old female dialysis patient completed 3 hours of dialysis this morning experienced belching epigastric sensation of indigestion was transferred to the ED for evaluation. Denies chest pain shortness of breath fever recent illness or extremity pain. No recent illness dialysis without complication this morning. Past Medical History:   Diagnosis Date    Arthritis     Asthma     Chronic kidney disease     Chronic pain     Cirrhosis ([facility] [address] 12/17/2017    Diabetes ([facility] [address] diet controlled    GERD (gastroesophageal reflux disease)     Hypertension        Past Surgical History:   Procedure Laterality Date    COLONOSCOPY N/A 1/12/2018    COLONOSCOPY performed by Jaclyn Cardona MD at [facility] ENDOSCOPY    COLONOSCOPY N/A 3/19/2018    COL[facility] HX GYN  c section    HX VASCULAR ACCESS      dialysis          History reviewed. No pertinent family history.     Social History     Socioeconomic History    Marital status: SINGLE     Spouse name: Not on file    Number of children: Not on file    Years of education: Not on file    Highest education level: Not on file   Occupational History    Not on file   Social Needs    Financial resource strain: Not on file    Food insecurity     Worry: Not on file     Inability: Not on file    Transportation needs     Medical: Not on file     Non-medical: Not on file   Tobacco Use    Smoking status: Never Smoker    Smokeless tobacco: Never Used   Substance and Sexual Activity    Alcohol use: No    Drug use: No    Sexual activity: Not on file   Lifestyle    Physical activity     Days per week: Not on file     Minutes per session: Not on file    Stress: Not on file   Relationships    Social connections     Talks on phone: Not on file     Gets together: Not on file     Attends Restorationism service: Not on file     Active member of club or organization: Not on file     Attends meetings of clubs or organizations: Not on file     Relationship status: Not on file    Intimate partner violence     Fear of current or ex partner: Not on file     Emotionally abused: Not on file     Physically abused: Not on file     Forced sexual activity: Not on file   Other Topics Concern    Not on file   Social History Narrative    Not on file         ALLERGIES: Aspirin; Heparin (porcine); Metformin; and Norvasc [amlodipine]    Review of Systems   All other systems reviewed and are negative. Vitals:    09/17/20 0859   BP: 123/61   Pulse: 82   Resp: 16   Temp: 98.1 °F (36.7 °C)   SpO2: 99%   Weight: 66.7 kg (147 lb)   Height: 5' 3\" (1.6 m)            Physical Exam  Constitutional:       Appearance: Normal appearance. Comments: Elderly debilitated chronic ascites, comfortable in no immediate distress; actively and repetitively belching   HENT:      Head: Normocephalic and atraumatic. Nose: Nose normal.      Mouth/Throat:      Mouth: Mucous membranes are dry. Pharynx: Oropharynx is clear. Eyes:      Extraocular Movements: Extraocular movements intact. Pupils: Pupils are equal, round, and reactive to light. Neck:      Musculoskeletal: Normal range of motion and neck supple. Cardiovascular:      Rate and Rhythm: Regular rhythm. Pulses: Normal pulses. Heart sounds: Normal heart sounds. Comments: Right subclavian dialysis catheter without inflammation or tenderness  Pulmonary:      Breath sounds: Normal breath sounds. Abdominal:      Comments: Chronic ascites-moderate by palpation; nontender examination   Musculoskeletal: Normal range of motion. Skin:     General: Skin is warm and dry. Comments: Chronic lower extremity lesions that are well cared for   Neurological:      General: No focal deficit present. Mental Status: She is alert and oriented to person, place, and time.    Psychiatric:         Mood and Affect: Mood normal.         Behavior: Behavior normal. MDM  Number of Diagnoses or Management Options  Dyspepsia: established and improving  Diagnosis management comments: 67-year-old female experienced belching and dyspepsia on finishing dialysis this morning no other complaints symptoms relieved promptly with Maalox EKG shows a left bundle branch block awaiting labs. Clinically suspicion is very low for ACS or other significant chest pathology. Troponin is negative labs are fairly unremarkable will place on observation for transfusion    Risk of Complications, Morbidity, and/or Mortality  Presenting problems: moderate  Diagnostic procedures: moderate  Management options: moderate  General comments: CBC shows a hemoglobin of 6.8. Patient has chronic recurring anemia which requires periodic transfusion review of old records show she is often transfused at this level. Troponin and CMP are still pending discussed with hospitalist regarding observation/admission for transfusion.       ED Course as of Sep 17 1028   Thu Sep 17, 2020   1019 Indigestion and belching relieved promptly with Maalox patient feels completely symptom-free at this point    [KS]   1026 Sinus rhythm at 81 with a left bundle branch block no old EKG available for comparison single PVC no acute injury pattern is noted   EKG, 12 LEAD, INITIAL [KS]      ED Course User Index  [KS] Carlos Alberto Miner MD       Procedures

## 2020-09-17 NOTE — PROGRESS NOTES
Bedside shift change report given to Reynaldo Dixon LPN (oncoming nurse) by Zac Hutchins RN (offgoing nurse). Report included the following information SBAR. Patient Seen in: [facility] AND [facility] Emergency Department    History   Patient presents with:  Fatigue (constitutional, neurologic)  Nausea/Vomiting/Diarrhea (gastrointestinal)    Stated Complaint: weakness/ diarrhea with recent cdiff    HPI    76-year-old ELECTROCARDIOGRAM, COMPLETE      Comment:  Scanned to media tab - DOS 11- 12/8/2017: ESOPHAGOGASTRODUODENOSCOPY (EGD); N/A      Comment:  Performed by Guillermina Chauhan MD at [facility] ENDOSCOPY  8/23/2017: ESOPHAGOGASTRODUODENOSCOPY (EGD); N/A      Commen pulses  Pulmonary/Chest: CTA b/l with no rales, wheezes. No chest wall tenderness  Abdominal: Mild lower abdominal tenderness to palpation with no rebound or guarding. Nondistended. Soft. Bowel sounds are normal.   Back:   :    Musculoskeletal: Normal r largely unremarkable.   I believe patient has deconditioning from her 7-day stay in the hospital.  Will discuss with case management for possible rehab placement as patient feels she is unsafe to go back to assisted living as she believes she is a fall risk

## 2020-09-17 NOTE — DISCHARGE INSTRUCTIONS
Patient Education        Indigestion (Dyspepsia or Heartburn): Care Instructions  Your Care Instructions  Sometimes it can be hard to pinpoint the cause of indigestion. (It is also called dyspepsia or heartburn.) Most cases of an upset stomach with bloating, burning, burping, and nausea are minor and go away within several hours. Home treatment and over-the-counter medicine often are able to control symptoms. But if you take medicine to relieve your indigestion without making diet and lifestyle changes, your symptoms are likely to return again and again. If you get indigestion often, it may be a sign of a more serious medical problem. Be sure to follow up with your doctor, who may want to do tests to be sure of the cause of your indigestion. Follow-up care is a key part of your treatment and safety. Be sure to make and go to all appointments, and call your doctor if you are having problems. It's also a good idea to know your test results and keep a list of the medicines you take. How can you care for yourself at home? · Your doctor may recommend over-the-counter medicine. For mild or occasional indigestion, antacids such as Gaviscon, Mylanta, Maalox, or Tums, may help. Be safe with medicines. Be careful when you take over-the-counter antacid medicines. Many of these medicines have aspirin in them. Read the label to make sure that you are not taking more than the recommended dose. Too much aspirin can be harmful. · Your doctor also may recommend over-the-counter acid reducers, such as Pepcid AC (famotidine), Tagamet HB (cimetidine), or Prilosec (omeprazole). Read and follow all instructions on the label. If you use these medicines often, talk with your doctor. · Change your eating habits. ? It's best to eat several small meals instead of two or three large meals. ? After you eat, wait 2 to 3 hours before you lie down. ? Chocolate, mint, and alcohol can make GERD worse. ?  Spicy foods, foods that have a lot of acid (like tomatoes and oranges), and coffee can make GERD symptoms worse in some people. If your symptoms are worse after you eat a certain food, you may want to stop eating that food to see if your symptoms get better. · Do not smoke or chew tobacco. Smoking can make GERD worse. If you need help quitting, talk to your doctor about stop-smoking programs and medicines. These can increase your chances of quitting for good. · If you have GERD symptoms at night, raise the head of your bed 6 to 8 inches. You can do this by putting the frame on blocks or placing a foam wedge under the head of your mattress. (Adding extra pillows does not work.)  · Do not wear tight clothing around your middle. · Lose weight if you need to. Losing just 5 to 10 pounds can help. · Do not take anti-inflammatory medicines, such as aspirin, ibuprofen (Advil, Motrin), or naproxen (Aleve). These can irritate the stomach. If you need a pain medicine, try acetaminophen (Tylenol), which does not cause stomach upset. When should you call for help? Call your doctor now or seek immediate medical care if:    · You have new or worse belly pain.     · You are vomiting. Watch closely for changes in your health, and be sure to contact your doctor if:    · You have new or worse symptoms of indigestion.     · You have trouble or pain swallowing.     · You are losing weight.     · You do not get better as expected. Where can you learn more? Go to http://www.gray.com/  Enter [id number] in the search box to learn more about \"Indigestion (Dyspepsia or Heartburn): Care Instructions. \"  Current as of: April 15, 2020               Content Version: 12.6  © 9749-4138 [employer]. Care instructions adapted under license by [employer] (which disclaims liability or warranty for this information).  If you have questions about a medical condition or this instruction, always ask your healthcare professional. [employer] disclaims any warranty or liability for your use of this information.

## 2020-09-17 NOTE — CONSULTS
History & Physical    Primary Care Provider: Other, MD [place]annah  Source of Information: Patient     Requesting physician: Dr Aryan Peters    History of Presenting Illness:   Cece Phillips is a 69 y.o. female who presents with history of end-stage renal disease on hemodialysis with recurrent blood transfusions secondary to anemia of chronic disease. She had a complete hemodialysis today and after dialysis session,started  Belching and was sent over to the ED for evaluation. Denies chest pain or shortness of breath. No dark-colored stools, hematemesis or hemoptysis. No reported fevers or chills. No shortness of breath. Incidentally noted with hemoglobin of 6.8. Review of Systems:  A comprehensive review of systems was negative. Past Medical History:   Diagnosis Date    Arthritis     Asthma     Chronic kidney disease     Chronic pain     Cirrhosis ([facility] [address] 12/17/2017    Diabetes ([facility] [address] diet controlled    GERD (gastroesophageal reflux disease)     Hypertension       Past Surgical History:   Procedure Laterality Date    COLONOSCOPY N/A 1/12/2018    COLONOSCOPY performed by Jyoti Desai MD at [facility] ENDOSCOPY    COLONOSCOPY N/A 3/19/2018    COLONOSCOPY performed by Jyoti Desai MD at [facility] [address] HX GYN  c section    HX VASCULAR ACCESS      dialysis      Prior to Admission medications    Medication Sig Start Date End Date Taking? Authorizing Provider   carisoprodol (SOMA) 350 mg tablet Take 1 Tab by mouth every eight (8) hours as needed for Muscle Spasm(s). Max Daily Amount: 1,050 mg. 5/6/19   Shaji Mathew MD   hydrOXYzine HCl (ATARAX) 10 mg tablet Take 1 Tab by mouth three (3) times daily as needed for Itching. Indications: Hives 5/6/19   Shaji Mathew MD   pantoprazole (PROTONIX) 40 mg tablet Take 1 Tab by mouth daily. 5/3/18   Santa Wasserman PA-C   nystatin (MYCOSTATIN) powder Apply  to affected area two (2) times a day.  3/12/18 Shey Alicea PA-C   cholecalciferol (VITAMIN D3) 1,000 unit tablet Take 2,000 Units by mouth daily. Provider, Historical   ferrous sulfate 325 mg (65 mg iron) tablet Take 325 mg by mouth three (3) times daily (with meals). Provider, Historical   hydrALAZINE (APRESOLINE) 25 mg tablet Take 25 mg by mouth three (3) times daily. Provider, Historical   lactulose (CHRONULAC) 10 gram/15 mL solution Take 20 g by mouth daily as needed. Provider, Historical   sevelamer (RENAGEL) 800 mg tablet Take 1,600 mg by mouth three (3) times daily (with meals). Provider, Historical   b complex-vitamin c-folic acid (NEPHROCAPS) 1 mg capsule Take 1 Cap by mouth daily. Provider, Historical   acetaminophen (TYLENOL) 325 mg tablet Take 325 mg by mouth every six (6) hours as needed for Pain. Provider, Historical   AFLIBERCEPT INTRAVITREAL 1 Ampule by IntraVITreal route every twenty-eight (28) days. Provider, Historical   carboxymethylcellulose sodium (REFRESH PLUS) 0.5 % dpet Administer 1 Drop to both eyes as needed. Provider, Historical   albuterol (PROVENTIL HFA, VENTOLIN HFA, PROAIR HFA) 90 mcg/actuation inhaler Take 2 Puffs by inhalation every four (4) hours as needed for Wheezing. Provider, Historical     Allergies   Allergen Reactions    Aspirin Other (comments)     GI bleed    Heparin (Porcine) Other (comments)     GI bleed    Metformin Other (comments)     swelling    Norvasc [Amlodipine] Rash      History reviewed. No pertinent family history.      SOCIAL HISTORY:  Patient resides:  Independently    Assisted Living    SNF    With family care       Smoking history:   None    Former    Chronic      Alcohol history:   None    Social    Chronic      Ambulates:   Independently    w/cane    w/walker    w/wc    CODE STATUS:  DNR    Full    Other      Objective:     Physical Exam:     Visit Vitals  /61 (BP 1 Location: Left arm, BP Patient Position: At rest;Sitting)   Pulse 82   Temp 98.1 °F (36.7 °C)   Resp 16   Ht 5' 3\" (1.6 m)   Wt 66.7 kg (147 lb)   SpO2 99%   BMI 26.04 kg/m²      O2 Device: Room air    General:  Alert, cooperative, no distress, appears stated age. Head:  Normocephalic, without obvious abnormality, atraumatic. Eyes:  Conjunctivae/corneas clear. PERRL, EOMs intact. Nose: Nares normal. Septum midline. Mucosa normal. No drainage or sinus tenderness. Throat: Lips, mucosa, and tongue normal. Teeth and gums normal.   Neck: Supple, symmetrical, trachea midline, no adenopathy, thyroid: no enlargement/tenderness/nodules, no carotid bruit and no JVD. Back:   Symmetric, no curvature. ROM normal. No CVA tenderness. Lungs:   Clear to auscultation bilaterally. Chest wall:  No tenderness or deformity. Heart:  Regular rate and rhythm, S1, S2 normal, no murmur, click, rub or gallop. Abdomen:   Soft, non-tender. Bowel sounds normal. No masses,  No organomegaly. Extremities: Extremities normal, atraumatic, no cyanosis or edema. Pulses: 2+ and symmetric all extremities. Skin: Skin color, texture, turgor normal. No rashes or lesions   Neurologic: CNII-XII intact. No motor or sensory deficits. EKG:  normal EKG, normal sinus rhythm, unchanged from previous tracings, nonspecific ST and T waves changes. Data Review:     Recent Days:  Recent Labs     09/17/20  0940   WBC 4.3*   HGB 6.8*   HCT 21.7*        Recent Labs     09/17/20  0940      K 3.5      CO2 30   *   BUN 23*   CREA 3.74*   CA 8.4*   ALB 2.9*   TBILI 0.4   ALT 17   INR 1.1     No results for input(s): PH, PCO2, PO2, HCO3, FIO2 in the last 72 hours.     24 Hour Results:  Recent Results (from the past 24 hour(s))   METABOLIC PANEL, COMPREHENSIVE    Collection Time: 09/17/20  9:40 AM   Result Value Ref Range    Sodium 142 136 - 145 mmol/L    Potassium 3.5 3.5 - 5.1 mmol/L    Chloride 105 97 - 108 mmol/L    CO2 30 21 - 32 mmol/L    Anion gap 7 5 - 15 mmol/L    Glucose 144 (H) 65 - 100 mg/dL BUN 23 (H) 6 - 20 mg/dL    Creatinine 3.74 (H) 0.55 - 1.02 mg/dL    BUN/Creatinine ratio 6 (L) 12 - 20      GFR est AA 14 (L) >60 ml/min/1.73m2    GFR est non-AA 12 (L) >60 ml/min/1.73m2    Calcium 8.4 (L) 8.5 - 10.1 mg/dL    Bilirubin, total 0.4 0.2 - 1.0 mg/dL    AST (SGOT) 16 15 - 37 U/L    ALT (SGPT) 17 12 - 78 U/L    Alk. phosphatase 229 (H) 45 - 117 U/L    Protein, total 6.2 (L) 6.4 - 8.2 g/dL    Albumin 2.9 (L) 3.5 - 5.0 g/dL    Globulin 3.3 2.0 - 4.0 g/dL    A-G Ratio 0.9 (L) 1.1 - 2.2     CBC WITH AUTOMATED DIFF    Collection Time: 09/17/20  9:40 AM   Result Value Ref Range    WBC 4.3 (L) 4.4 - 11.3 K/uL    RBC 2.36 (L) 4.50 - 5.90 M/uL    HGB 6.8 (L) 13.5 - 17.5 %    HCT 21.7 (L) 36 - 46 %    MCV 92.0 80 - 100 FL    MCH 28.9 (L) 31 - 34 PG    MCHC 31.4 31.0 - 36.0 g/dL    RDW 21.2 (H) 11.5 - 14.5 %    PLATELET 658 K/uL    MPV 8.5 6.5 - 11.5 FL    NRBC 20.0  WBC    ABSOLUTE NRBC 0.01 K/uL    NEUTROPHILS 78 (H) 42 - 75 %    LYMPHOCYTES 10 (L) 20.5 - 51.1 %    MONOCYTES 7 1.7 - 9.3 %    EOSINOPHILS 3 (H) 0.9 - 2.9 %    BASOPHILS 2 0.0 - 2.5 %    ABS. NEUTROPHILS 3.3 1.8 - 7.7 K/UL    ABS. LYMPHOCYTES 0.4 (L) 1.0 - 4.8 K/UL    ABS. MONOCYTES 0.3 0.2 - 2.4 K/UL    ABS. EOSINOPHILS 0.1 0.0 - 0.7 K/UL    ABS. BASOPHILS 0.1 0.0 - 0.2 K/UL   TROPONIN I    Collection Time: 09/17/20  9:40 AM   Result Value Ref Range    Troponin-I, Qt. <0.05 <0.05 ng/mL   PROTHROMBIN TIME + INR    Collection Time: 09/17/20  9:40 AM   Result Value Ref Range    Prothrombin time 10.9 9.0 - 11.1 sec    INR 1.1 0.9 - 1.1           Imaging:     Assessment:     Acute on chronic anemia  End-stage renal disease on hemodialysis Tuesday Thursdays and Saturdays  Essential hypertension. Fairly stable         Plan:     1. Patient clinically stable and okay to receive 1 unit of packed red blood cells in the ED with discharge to home later. No acute inpatient hospitalization need. Thank you for involving us in this consultation.   Case was discussed with nursing supervisor, case management, and Dr Nedra Eason    Signed By: Toma Brandon MD     September 17, 2020

## 2020-09-17 NOTE — H&P
History and Physical    Subjective:     Jimenez Krause is a 69 y.o. female  has a past medical history of Arthritis, Asthma, Chronic kidney disease, Chronic pain, Cirrhosis ([address] (12/17/2017), Diabetes ([address] (diet controlled), GERD (gastroesophageal reflux disease), and Hypertension. She also has no past medical history of Arrhythmia, CAD (coronary artery disease), Cancer ([address] Chronic obstructive pulmonary disease ([address] Coagulation disorder ([address] Endocarditis, Heart failure ([address] Morbid obesity ([address] Nicotine vapor product user, Non-nicotine vapor product user, Psychiatric disorder, PUD (peptic ulcer disease), Seizures ([address] Sleep apnea, Stroke ([address] Thromboembolus ([address] or Thyroid disease. Patient was evaluated in the emergency department lying in bed in no acute distress. She indicates that she is feeling weak especially after her dialysis this morning which she gets on Tuesday Thursday and Saturday. She has had an ongoing problem with anemia and being managed for anemia of CKD. She has required frequent transfusions previously. Case was discussed with on-call nephrology who agreed with 1 to 2 units. CBC will be checked after first unit to determine if a second unit is required. Patient will be admitted in the emergency department as observation for transfusion possible discharge from the department upon completion. Past Medical History:   Diagnosis Date    Arthritis     Asthma     Chronic kidney disease     Chronic pain     Cirrhosis ([address] 12/17/2017    Diabetes ([address] diet controlled    GERD (gastroesophageal reflux disease)     Hypertension       Past Surgical History:   Procedure Laterality Date    COLONOSCOPY N/A 1/12/2018    COLONOSCOPY performed by Cirilo Ventura MD at [facility] ENDOSCOPY    COLONOSCOPY N/A 3/19/2018    COLONOSCOPY performed by Cirilo Ventura MD at [address] [facility] HX GYN  c section    HX VASCULAR ACCESS      dialysis      History reviewed.  No pertinent family history. Social History     Tobacco Use    Smoking status: Never Smoker    Smokeless tobacco: Never Used   Substance Use Topics    Alcohol use: No       Prior to Admission medications    Medication Sig Start Date End Date Taking? Authorizing Provider   b complex-vitamin c-folic acid (NEPHROCAPS) 1 mg capsule Take 1 Cap by mouth daily. Yes Provider, Historical   carboxymethylcellulose sodium (REFRESH PLUS) 0.5 % dpet Administer 1 Drop to both eyes as needed. Yes Provider, Historical   albuterol (PROVENTIL HFA, VENTOLIN HFA, PROAIR HFA) 90 mcg/actuation inhaler Take 2 Puffs by inhalation every four (4) hours as needed for Wheezing. Yes Provider, Historical   carisoprodol (SOMA) 350 mg tablet Take 1 Tab by mouth every eight (8) hours as needed for Muscle Spasm(s). Max Daily Amount: 1,050 mg. 5/6/19   Jez Varner MD   hydrOXYzine HCl (ATARAX) 10 mg tablet Take 1 Tab by mouth three (3) times daily as needed for Itching. Indications: Hives 5/6/19   Jez Varner MD   pantoprazole (PROTONIX) 40 mg tablet Take 1 Tab by mouth daily. 5/3/18   Marilyn Maldonado PA-C   nystatin (MYCOSTATIN) powder Apply  to affected area two (2) times a day. 3/12/18   Marilyn Maldonado PA-C   cholecalciferol (VITAMIN D3) 1,000 unit tablet Take 2,000 Units by mouth daily. Provider, Historical   ferrous sulfate 325 mg (65 mg iron) tablet Take 325 mg by mouth three (3) times daily (with meals). Provider, Historical   hydrALAZINE (APRESOLINE) 25 mg tablet Take 25 mg by mouth three (3) times daily. Provider, Historical   lactulose (CHRONULAC) 10 gram/15 mL solution Take 20 g by mouth daily as needed. Provider, Historical   sevelamer (RENAGEL) 800 mg tablet Take 1,600 mg by mouth three (3) times daily (with meals). Provider, Historical   acetaminophen (TYLENOL) 325 mg tablet Take 325 mg by mouth every six (6) hours as needed for Pain.     Provider, Historical   AFLIBERCEPT INTRAVITREAL 1 Ampule by IntraVITreal route every twenty-eight (28) days. Provider, Historical     Allergies   Allergen Reactions    Amlodipine Rash, Hives and Itching    Aspirin Other (comments), Nausea Only and Unknown (comments)     GI bleed  GI bleeding      Heparin Unknown (comments)     bleeding      Metformin Other (comments)     swelling        Review of Systems:  Review of Systems   Constitutional: Positive for fatigue. Negative for appetite change, chills, diaphoresis and fever. HENT: Negative for congestion, drooling, ear pain, postnasal drip, rhinorrhea and tinnitus. Eyes: Negative. Respiratory: Negative for apnea, cough, choking, chest tightness, shortness of breath and wheezing. Cardiovascular: Negative for chest pain, palpitations and leg swelling. Gastrointestinal: Positive for constipation. Negative for abdominal distention, abdominal pain, anal bleeding, blood in stool, diarrhea, nausea and vomiting. Endocrine: Positive for cold intolerance. Negative for heat intolerance, polydipsia, polyphagia and polyuria. Genitourinary: Negative for difficulty urinating, dysuria, flank pain, frequency and urgency. Skin: Negative. Allergic/Immunologic: Negative. Neurological: Negative. Hematological: Negative for adenopathy. Does not bruise/bleed easily. Psychiatric/Behavioral: Negative. Objective:     Vitals:  Visit Vitals  BP (!) 134/53 (BP Patient Position: At rest;Lying left side)   Pulse 85   Temp 97.4 °F (36.3 °C)   Resp 18   Ht 5' 3\" (1.6 m)   Wt 147 kg (324 lb 1.2 oz)   SpO2 98%   BMI 57.41 kg/m²       Physical Exam:  General: Alert, cooperative, no distress. Head:  Normocephalic, without obvious abnormality, atraumatic. Eyes:  Conjunctivae/corneas clear. Pupils equal, round, reactive to light. Extraocular movements intact. Lungs:  Clear to auscultation bilaterally, no wheezes, crackles  Chest wall: No tenderness or deformity.   Heart:  Regular rate and rhythm, S1, S2 normal, no murmur, click, rub, or gallop. Abdomen:   Soft, non-tender. Bowel sounds normal. No masses. No organomegaly. Back:  No spine tenderness to palpation  Extremities: Extremities normal, atraumatic, no cyanosis or edema. Pulses: Symmetric all extremities. Skin: Skin color, texture, turgor normal.   Lymph nodes: Cervical nodes normal.  Neurologic: Awake and oriented,       Labs:  Recent Results (from the past 24 hour(s))   METABOLIC PANEL, COMPREHENSIVE    Collection Time: 09/17/20  9:40 AM   Result Value Ref Range    Sodium 142 136 - 145 mmol/L    Potassium 3.5 3.5 - 5.1 mmol/L    Chloride 105 97 - 108 mmol/L    CO2 30 21 - 32 mmol/L    Anion gap 7 5 - 15 mmol/L    Glucose 144 (H) 65 - 100 mg/dL    BUN 23 (H) 6 - 20 mg/dL    Creatinine 3.74 (H) 0.55 - 1.02 mg/dL    BUN/Creatinine ratio 6 (L) 12 - 20      GFR est AA 14 (L) >60 ml/min/1.73m2    GFR est non-AA 12 (L) >60 ml/min/1.73m2    Calcium 8.4 (L) 8.5 - 10.1 mg/dL    Bilirubin, total 0.4 0.2 - 1.0 mg/dL    AST (SGOT) 16 15 - 37 U/L    ALT (SGPT) 17 12 - 78 U/L    Alk. phosphatase 229 (H) 45 - 117 U/L    Protein, total 6.2 (L) 6.4 - 8.2 g/dL    Albumin 2.9 (L) 3.5 - 5.0 g/dL    Globulin 3.3 2.0 - 4.0 g/dL    A-G Ratio 0.9 (L) 1.1 - 2.2     CBC WITH AUTOMATED DIFF    Collection Time: 09/17/20  9:40 AM   Result Value Ref Range    WBC 4.3 (L) 4.4 - 11.3 K/uL    RBC 2.36 (L) 4.50 - 5.90 M/uL    HGB 6.8 (L) 13.5 - 17.5 %    HCT 21.7 (L) 36 - 46 %    MCV 92.0 80 - 100 FL    MCH 28.9 (L) 31 - 34 PG    MCHC 31.4 31.0 - 36.0 g/dL    RDW 21.2 (H) 11.5 - 14.5 %    PLATELET 659 K/uL    MPV 8.5 6.5 - 11.5 FL    NRBC 20.0  WBC    ABSOLUTE NRBC 0.01 K/uL    NEUTROPHILS 78 (H) 42 - 75 %    LYMPHOCYTES 10 (L) 20.5 - 51.1 %    MONOCYTES 7 1.7 - 9.3 %    EOSINOPHILS 3 (H) 0.9 - 2.9 %    BASOPHILS 2 0.0 - 2.5 %    ABS. NEUTROPHILS 3.3 1.8 - 7.7 K/UL    ABS. LYMPHOCYTES 0.4 (L) 1.0 - 4.8 K/UL    ABS. MONOCYTES 0.3 0.2 - 2.4 K/UL    ABS.  EOSINOPHILS 0.1 0.0 - 0.7 K/UL ABS. BASOPHILS 0.1 0.0 - 0.2 K/UL   TROPONIN I    Collection Time: 09/17/20  9:40 AM   Result Value Ref Range    Troponin-I, Qt. <0.05 <0.05 ng/mL   PROTHROMBIN TIME + INR    Collection Time: 09/17/20  9:40 AM   Result Value Ref Range    Prothrombin time 10.9 9.0 - 11.1 sec    INR 1.1 0.9 - 1.1     TYPE & SCREEN    Collection Time: 09/17/20 12:15 PM   Result Value Ref Range    Crossmatch Expiration 09/20/2020,2359     ABO/Rh(D) Sydnie Doan Positive     Antibody screen Positive     Antibody ID No addtional antibodies detected     XXAUTO CONTROL Negative     Comment Previously identified anti E and anti VS        Imaging:  Xr Chest Port    Result Date: 9/17/2020  Chest single view. The lungs are clear. No interstitial or alveolar pulmonary edema. Cardiac silhouette enlargement. Right-sided permacath appropriately positioned. No pneumothorax or sizable pleural effusion.        Assessment & Plan:     Patient Active Problem List    Diagnosis Date Noted    Symptomatic anemia 03/16/2018     Priority: 1 - One    Diabetes mellitus type 2, controlled ([address] 12/15/2017     Priority: 2 - Two    ESRD (end stage renal disease) ([address] 12/15/2017     Priority: 3 - Three    HTN (hypertension) 12/15/2017     Priority: 4 - Four    Rectal bleed 03/16/2018     Priority: 5 - Five    Anemia in chronic kidney disease 12/16/2017     Priority: 5 - Five    Generalized weakness 03/16/2018     Priority: 6 - Six    Anemia 09/17/2020    Severe anemia 05/02/2018    Dizziness 03/16/2018    Acute blood loss anemia 03/09/2018    Cirrhosis ([address] 12/17/2017    GI bleed 12/15/2017      Symptomatic anemia  -Transfuse 2 units PRBCs  -CBC repeat every 6 hours  -If CBC H&H greater than 8 discharge from ED tonight otherwise continue a second unit    Diabetes mellitus type 2, controlled ([address]  -Insulin sliding scale  -New home medication    ESRD (end stage renal disease) ([address]  -Just completed dialysis this morning  -Nephrology consultation      HTN (hypertension)  -Continue home medication  -Blood pressure stable      Code Status: Full    Prophylaxis: SCD and hold Lovenox        Electronically Signed : Melissa Bowling, PhD, PA-C, Hospital Medicine Service

## 2020-09-17 NOTE — ED TRIAGE NOTES
Patient brought in by EMS from dialysis. Patient had 3 hours of dialysis today. Was reporting indigestion and they wanted her to be assessed. Pt denies chest pain. Last meal for breakfast this am, shetty and eggs. Denies SOB, nausea, vomiting.

## 2020-09-18 VITALS
HEIGHT: 63 IN | RESPIRATION RATE: 18 BRPM | OXYGEN SATURATION: 98 % | BODY MASS INDEX: 51.91 KG/M2 | DIASTOLIC BLOOD PRESSURE: 74 MMHG | HEART RATE: 57 BPM | WEIGHT: 293 LBS | SYSTOLIC BLOOD PRESSURE: 139 MMHG | TEMPERATURE: 97.5 F

## 2020-09-18 LAB
GLUCOSE BLD STRIP.AUTO-MCNC: 125 MG/DL (ref 65–100)
GLUCOSE BLD STRIP.AUTO-MCNC: 95 MG/DL (ref 65–100)
PERFORMED BY, TECHID: ABNORMAL
PERFORMED BY, TECHID: NORMAL
PHOSPHATE SERPL-MCNC: 3.9 MG/DL (ref 2.6–4.7)
POTASSIUM SERPL-SCNC: 4.8 MMOL/L (ref 3.5–5.1)

## 2020-09-18 PROCEDURE — 74011250637 HC RX REV CODE- 250/637: Performed by: PHYSICIAN ASSISTANT

## 2020-09-18 PROCEDURE — 74011250636 HC RX REV CODE- 250/636

## 2020-09-18 PROCEDURE — 94760 N-INVAS EAR/PLS OXIMETRY 1: CPT

## 2020-09-18 PROCEDURE — 74011250637 HC RX REV CODE- 250/637: Performed by: INTERNAL MEDICINE

## 2020-09-18 PROCEDURE — 84100 ASSAY OF PHOSPHORUS: CPT

## 2020-09-18 PROCEDURE — 83036 HEMOGLOBIN GLYCOSYLATED A1C: CPT

## 2020-09-18 PROCEDURE — P9016 RBC LEUKOCYTES REDUCED: HCPCS

## 2020-09-18 PROCEDURE — 94640 AIRWAY INHALATION TREATMENT: CPT

## 2020-09-18 PROCEDURE — 74011000250 HC RX REV CODE- 250: Performed by: PHYSICIAN ASSISTANT

## 2020-09-18 PROCEDURE — 82962 GLUCOSE BLOOD TEST: CPT

## 2020-09-18 PROCEDURE — 99218 HC RM OBSERVATION: CPT

## 2020-09-18 PROCEDURE — 36415 COLL VENOUS BLD VENIPUNCTURE: CPT

## 2020-09-18 PROCEDURE — 84132 ASSAY OF SERUM POTASSIUM: CPT

## 2020-09-18 PROCEDURE — 36430 TRANSFUSION BLD/BLD COMPNT: CPT

## 2020-09-18 RX ORDER — SODIUM CHLORIDE 9 MG/ML
INJECTION, SOLUTION INTRAVENOUS
Status: COMPLETED
Start: 2020-09-18 | End: 2020-09-18

## 2020-09-18 RX ORDER — ALBUTEROL SULFATE 90 UG/1
2 AEROSOL, METERED RESPIRATORY (INHALATION)
Status: DISCONTINUED | OUTPATIENT
Start: 2020-09-18 | End: 2020-09-18 | Stop reason: HOSPADM

## 2020-09-18 RX ORDER — OXYCODONE HYDROCHLORIDE 5 MG/1
5 TABLET ORAL
Status: DISCONTINUED | OUTPATIENT
Start: 2020-09-18 | End: 2020-09-18 | Stop reason: HOSPADM

## 2020-09-18 RX ORDER — ALBUTEROL SULFATE 0.83 MG/ML
2.5 SOLUTION RESPIRATORY (INHALATION)
Status: DISCONTINUED | OUTPATIENT
Start: 2020-09-18 | End: 2020-09-18 | Stop reason: HOSPADM

## 2020-09-18 RX ORDER — SODIUM CHLORIDE 9 MG/ML
250 INJECTION, SOLUTION INTRAVENOUS AS NEEDED
Status: DISCONTINUED | OUTPATIENT
Start: 2020-09-18 | End: 2020-09-18 | Stop reason: HOSPADM

## 2020-09-18 RX ADMIN — SODIUM CHLORIDE: 900 INJECTION, SOLUTION INTRAVENOUS at 10:00

## 2020-09-18 RX ADMIN — SEVELAMER CARBONATE 800 MG: 800 TABLET, FILM COATED ORAL at 12:09

## 2020-09-18 RX ADMIN — FERROUS SULFATE TAB 325 MG (65 MG ELEMENTAL FE) 325 MG: 325 (65 FE) TAB at 12:09

## 2020-09-18 RX ADMIN — HYDRALAZINE HYDROCHLORIDE 25 MG: 25 TABLET, FILM COATED ORAL at 17:14

## 2020-09-18 RX ADMIN — FERROUS SULFATE TAB 325 MG (65 MG ELEMENTAL FE) 325 MG: 325 (65 FE) TAB at 17:14

## 2020-09-18 RX ADMIN — SEVELAMER CARBONATE 1600 MG: 800 TABLET, FILM COATED ORAL at 09:02

## 2020-09-18 RX ADMIN — HYDRALAZINE HYDROCHLORIDE 25 MG: 25 TABLET, FILM COATED ORAL at 09:02

## 2020-09-18 RX ADMIN — ACETAMINOPHEN 650 MG: 325 TABLET ORAL at 00:01

## 2020-09-18 RX ADMIN — TRAMADOL HYDROCHLORIDE 50 MG: 50 TABLET, FILM COATED ORAL at 03:49

## 2020-09-18 RX ADMIN — Medication 1 TABLET: at 12:09

## 2020-09-18 RX ADMIN — SODIUM CHLORIDE: 9 INJECTION, SOLUTION INTRAVENOUS at 10:00

## 2020-09-18 RX ADMIN — Medication 10 ML: at 05:12

## 2020-09-18 RX ADMIN — PANTOPRAZOLE SODIUM 40 MG: 40 TABLET, DELAYED RELEASE ORAL at 09:02

## 2020-09-18 RX ADMIN — ALUMINUM HYDROXIDE, MAGNESIUM HYDROXIDE, SIMETHICONE 30 ML: 400; 400; 40 SUSPENSION ORAL at 02:27

## 2020-09-18 RX ADMIN — SEVELAMER CARBONATE 1600 MG: 800 TABLET, FILM COATED ORAL at 17:13

## 2020-09-18 RX ADMIN — SEVELAMER CARBONATE 800 MG: 800 TABLET, FILM COATED ORAL at 09:01

## 2020-09-18 RX ADMIN — ALBUTEROL SULFATE 2.5 MG: 2.5 SOLUTION RESPIRATORY (INHALATION) at 13:36

## 2020-09-18 RX ADMIN — ALBUTEROL SULFATE 2.5 MG: 2.5 SOLUTION RESPIRATORY (INHALATION) at 09:56

## 2020-09-18 RX ADMIN — TRAMADOL HYDROCHLORIDE 50 MG: 50 TABLET, FILM COATED ORAL at 18:24

## 2020-09-18 RX ADMIN — VITAMIN D, TAB 1000IU (100/BT) 2 TABLET: 25 TAB at 09:03

## 2020-09-18 RX ADMIN — FERROUS SULFATE TAB 325 MG (65 MG ELEMENTAL FE) 325 MG: 325 (65 FE) TAB at 09:02

## 2020-09-18 RX ADMIN — ALBUTEROL SULFATE 2.5 MG: 2.5 SOLUTION RESPIRATORY (INHALATION) at 16:07

## 2020-09-18 RX ADMIN — OXYCODONE HYDROCHLORIDE 5 MG: 5 TABLET ORAL at 10:43

## 2020-09-18 NOTE — ASSESSMENT & PLAN NOTE
-Transfuse 2 units PRBCs  -CBC repeat every 6 hours  -If CBC H&H greater than 8 discharge from ED tonight otherwise continue a second unit

## 2020-09-18 NOTE — CONSULTS
Nephrology Consult Date: 9/18/2020    Consults    Subjective   68y F w/ pmhx remarkable for ESRD on HD, hx of End stage liver cirhosis, hx of DM, DJD and GERD BIBEMS due to feeling weak and tired after dialysis. Pt has had multiple hospital admissions in the past w/ similar complaints (recurrent symptomatic anemia) with adequate response to PRBC during hospital admissions. Previously referred to hematology and GI for additional work up however pt unable to attend to appointments due to non compliance. At present, seen and evaluated at bedside, AOx3, NAD speaking full sentences. Routine labs showed no evidence of hyperkalemia or acid base disturbances w/ hb within 6.8g. Nephrology consulted for ESRD     Past Medical History:   Diagnosis Date    Arthritis     Asthma     Chronic kidney disease     Chronic pain     Cirrhosis ([address] 12/17/2017    Diabetes ([facility] [address] diet controlled    GERD (gastroesophageal reflux disease)     Hypertension       Past Surgical History:   Procedure Laterality Date    COLONOSCOPY N/A 1/12/2018    COLONOSCOPY performed by Klever Thorpe MD at [facility] ENDOSCOPY    COLONOSCOPY N/A 3/19/2018    COLONOSCOPY performed by Klever Thorpe MD at [address] [place] [address] HX GYN  c section    HX VASCULAR ACCESS      dialysis      History reviewed. No pertinent family history.    Social History     Tobacco Use    Smoking status: Never Smoker    Smokeless tobacco: Never Used   Substance Use Topics    Alcohol use: No       Current Facility-Administered Medications   Medication Dose Route Frequency Provider Last Rate Last Dose    albuterol (PROVENTIL HFA, VENTOLIN HFA, PROAIR HFA) inhaler 2 Puff  2 Puff Inhalation Q2H PRN Abimbola Mak MD        albuterol (PROVENTIL VENTOLIN) nebulizer solution 2.5 mg  2.5 mg Nebulization Q4HWA RT SUSAN Romero   2.5 mg at 09/18/20 0956    oxyCODONE IR (ROXICODONE) tablet 5 mg  5 mg Oral Q4H PRN Lico Callahan MD   5 mg at 09/18/20 1043    0.9% sodium chloride infusion 250 mL  250 mL IntraVENous PRN Fer Galdamez MD        [START ON 9/19/2020] epoetin reshma 15,000 units  15,000 Units IntraVENous DIALYSIS MA[facility] DAIGLE & SAT Fer Galdamez MD        0.9% sodium chloride infusion             alum-mag hydroxide-simeth (MYLANTA) oral suspension 30 mL  30 mL Oral Q4H PRN Virgia Coad, PA   30 mL at 09/18/20 0227    B complex-vitamin C-folic acid (NEPHRO-CATARINA) 0.8 mg tab  1 Tab Oral DAILY Virgia Coad, PA   1 Tab at 09/18/20 1209    carboxymethylcellulose sodium (REFRESH PLUS) 0.5 % ophthalmic solution 1 Drop  1 Drop Both Eyes PRN Virgia Coad, PA        cyclobenzaprine (FLEXERIL) tablet 10 mg  10 mg Oral Q8H PRN Deberah Lucie D, PA        cholecalciferol (VITAMIN D3) (1000 Units /25 mcg) tablet 2 Tab  2,000 Units Oral DAILY Virgia Coad, PA   2 Tab at 09/18/20 2741    hydrALAZINE (APRESOLINE) tablet 25 mg  25 mg Oral TID Virgia Coad, PA   25 mg at 09/18/20 4191    ferrous sulfate tablet 325 mg  325 mg Oral TID WITH MEALS Virgia Coad, PA   325 mg at 09/18/20 1209    hydrOXYzine HCL (ATARAX) tablet 10 mg  10 mg Oral TID PRN Virgia Coad, PA        lactulose (CHRONULAC) 10 gram/15 mL solution 30 mL  20 g Oral DAILY PRN Virgia Coad, PA        pantoprazole (PROTONIX) tablet 40 mg  40 mg Oral DAILY Virgia Coad, PA   40 mg at 09/18/20 0902    sevelamer carbonate (RENVELA) tab 1,600 mg  1,600 mg Oral TID WITH MEALS Virgia Coad, PA   800 mg at 09/18/20 1209    sodium chloride (NS) flush 5-40 mL  5-40 mL IntraVENous Q8H Deberah [place] D, PA   10 mL at 09/18/20 0512    sodium chloride (NS) flush 5-40 mL  5-40 mL IntraVENous PRN Virgia Coad, PA        acetaminophen (TYLENOL) tablet 650 mg  650 mg Oral Q6H PRN Virgia Coad, PA   650 mg at 09/18/20 0001    Or    acetaminophen (TYLENOL) suppository 650 mg  650 mg Rectal Q6H PRN SUSAN Escobedo        polyethylene glycol (MIRALAX) packet 17 g  17 g Oral DAILY PRN SUSAN [place]les        promethazine (PHENERGAN) tablet 12.5 mg  12.5 mg Oral Q6H PRN SUSAN [place]les        Or    [facility]ron [facility]) injection 4 mg  4 mg IntraVENous Q6H PRN SUSAN [place]les        traMADoL Sheryl Gallagher) tablet 50 mg  50 mg Oral QID PRN SUSAN Pearlta   50 mg at 09/18/20 0349    glucose chewable tablet 16 g  4 Tab Oral PRN SUSAN [place]les        dextrose (D50W) injection syrg 12.5-25 g  25-50 mL IntraVENous PRN SUSAN [place]les        glucagon (GLUCAGEN) injection 1 mg  1 mg IntraMUSCular PRN SUSAN [place]les        insulin lispro (HUMALOG) injection   SubCUTAneous ACB&D SUSAN [place]les            Review of Systems   Constitutional: Negative. HENT: Negative. Eyes: Negative. Respiratory: Negative. Cardiovascular: Negative. Gastrointestinal: Positive for abdominal distention. Endocrine: Negative. Genitourinary: Negative. Skin: Negative. Neurological: Negative. Objective     Vital signs for last 24 hours:  Visit Vitals  /66   Pulse 77   Temp 97.5 °F (36.4 °C)   Resp 20   Ht 5' 3\" (1.6 m)   Wt 147 kg (324 lb 1.2 oz)   SpO2 99%   BMI 57.41 kg/m²       Intake/Output this shift:  Current Shift: No intake/output data recorded. Last 3 Shifts: No intake/output data recorded. Data Review:   Recent Results (from the past 24 hour(s))   POTASSIUM    Collection Time: 09/18/20  7:55 AM   Result Value Ref Range    Potassium 4.8 3.5 - 5.1 mmol/L   GLUCOSE, POC    Collection Time: 09/18/20  8:15 AM   Result Value Ref Range    Glucose (POC) 95 65 - 100 mg/dL    Performed by Tash Gutierrez        Physical Exam  Constitutional:       General: She is not in acute distress. HENT:      Head: Normocephalic. Neck:      Musculoskeletal: Neck supple. Cardiovascular:      Rate and Rhythm: Normal rate and regular rhythm. Pulses: Normal pulses. Pulmonary:      Effort: No respiratory distress. Breath sounds: Normal breath sounds.    Skin: General: Skin is warm. Neurological:      General: No focal deficit present. Mental Status: She is alert and oriented to person, place, and time.              Current Facility-Administered Medications:     albuterol (PROVENTIL HFA, VENTOLIN HFA, PROAIR HFA) inhaler 2 Puff, 2 Puff, Inhalation, Q2H PRN, Earnestine Spivey MD    albuterol (PROVENTIL VENTOLIN) nebulizer solution 2.5 mg, 2.5 mg, Nebulization, Q4HWA RT, SUSAN Chappell, 2.5 mg at 09/18/20 0956    oxyCODONE IR (ROXICODONE) tablet 5 mg, 5 mg, Oral, Q4H PRN, Mary Quarles MD, 5 mg at 09/18/20 1043    0.9% sodium chloride infusion 250 mL, 250 mL, IntraVENous, PRN, Bernie San MD    [START ON 9/19/2020] epoetin reshma 15,000 units, 15,000 Units, IntraVENous, DIALYSIS DARRINE, THU & SAT, Flory BATISTA MD    0.9% sodium chloride infusion, , , ,     alum-mag hydroxide-simeth (MYLANTA) oral suspension 30 mL, 30 mL, Oral, Q4H PRN, SUSAN Moulton, 30 mL at 09/18/20 0227    B complex-vitamin C-folic acid (NEPHRO-CATARINA) 0.8 mg tab, 1 Tab, Oral, DAILY, SUSAN Chappell, 1 Tab at 09/18/20 1209    carboxymethylcellulose sodium (REFRESH PLUS) 0.5 % ophthalmic solution 1 Drop, 1 Drop, Both Eyes, PRN, SUSAN Chappell    cyclobenzaprine (FLEXERIL) tablet 10 mg, 10 mg, Oral, Q8H PRN, SUSAN Moulton    cholecalciferol (VITAMIN D3) (1000 Units /25 mcg) tablet 2 Tab, 2,000 Units, Oral, DAILY, Ryan Chappell, 2 Tab at 09/18/20 4305    hydrALAZINE (APRESOLINE) tablet 25 mg, 25 mg, Oral, TID, SUSAN Moulton, 25 mg at 09/18/20 0902    ferrous sulfate tablet 325 mg, 325 mg, Oral, TID WITH MEALS, SUSAN Chappell, 325 mg at 09/18/20 1209    hydrOXYzine HCL (ATARAX) tablet 10 mg, 10 mg, Oral, TID PRN, SUSAN Chappell    lactulose (CHRONULAC) 10 gram/15 mL solution 30 mL, 20 g, Oral, DAILY PRN, SUSAN Chappell    pantoprazole (PROTONIX) tablet 40 mg, 40 mg, Oral, DAILY, Ryan Chappell, 40 mg at 09/18/20 0902   sevelamer carbonate (RENVELA) tab 1,600 mg, 1,600 mg, Oral, TID WITH MEALS, SUSAN [place]les, 800 mg at 09/18/20 1209    sodium chloride (NS) flush 5-40 mL, 5-40 mL, IntraVENous, Q8H, SUSAN Peralta, 10 mL at 09/18/20 0512    sodium chloride (NS) flush 5-40 mL, 5-40 mL, IntraVENous, PRN, SUSAN [place]les    acetaminophen (TYLENOL) tablet 650 mg, 650 mg, Oral, Q6H PRN, 650 mg at 09/18/20 0001 **OR** acetaminophen (TYLENOL) suppository 650 mg, 650 mg, Rectal, Q6H PRN, SUSAN Robles    polyethylene glycol (MIRALAX) packet 17 g, 17 g, Oral, DAILY PRN, SUSAN [place]les    promethazine (PHENERGAN) tablet 12.5 mg, 12.5 mg, Oral, Q6H PRN **OR** ondansetron (ZOFRAN) injection 4 mg, 4 mg, IntraVENous, Q6H PRN, SUSAN Robles    traMADoL (ULTRAM) tablet 50 mg, 50 mg, Oral, QID PRN, SUSAN Robles, 50 mg at 09/18/20 0349    glucose chewable tablet 16 g, 4 Tab, Oral, PRN, SUSAN Robles    dextrose (D50W) injection syrg 12.5-25 g, 25-50 mL, IntraVENous, PRN, SUSAN Peralta    glucagon (GLUCAGEN) injection 1 mg, 1 mg, IntraMUSCular, PRN, SUSAN Robles    insulin lispro (HUMALOG) injection, , SubCUTAneous, ACB&D, [place] [facility], [facility]    Assessment   Plan       1. ESRD on HD  - No indications for RRT at present. Expect next HD on 9/19  2. Acute on chronic anemia of renal disease -   Ordered PRBC for transfusions today. New cbc post transfusion in order to determine additional PRBC for tomorrow. EPO w/ dialysis tomorrow  3. Secondary hyperparathyroidism- c/w sevelamer 1600mg PO TID  4. HTN - hold BP meds before dialysis tomorrow  5.  Hep B serologies non reactive  6. Hx of End stage Liver cirrhosis - management as per primary team

## 2020-09-18 NOTE — DISCHARGE SUMMARY
Physician Discharge Summary     Patient ID:    Jermain Butler  [id number]  69 y.o.  1950    Admit date: 9/17/2020    Discharge date : 9/18/2020    Chronic Diagnoses:    Problem List as of 9/18/2020 Date Reviewed: 12/15/2017          Codes Class Noted - Resolved    Anemia ICD-10-CM: D64.9  ICD-9-CM: 285.9  9/17/2020 - Present        Severe anemia ICD-10-CM: D64.9  ICD-9-CM: 285.9  5/2/2018 - Present        Dizziness ICD-10-CM: R42  ICD-9-CM: 780.4  3/16/2018 - Present        Generalized weakness ICD-10-CM: R53.1  ICD-9-CM: 780.79  3/16/2018 - Present        Rectal bleed ICD-10-CM: K62.5  ICD-9-CM: 569.3  3/16/2018 - Present        * (Principal) Symptomatic anemia ICD-10-CM: D64.9  ICD-9-CM: 285.9  3/16/2018 - Present        Acute blood loss anemia ICD-10-CM: D62  ICD-9-CM: 285.1  3/9/2018 - Present        Cirrhosis ([facility] ICD-10-CM: K74.60  ICD-9-CM: 571.5  12/17/2017 - Present        Anemia in chronic kidney disease ICD-10-CM: N18.9, D63.1  ICD-9-CM: 285.21  12/16/2017 - Present        GI bleed ICD-10-CM: K92.2  ICD-9-CM: 578.9  12/15/2017 - Present        ESRD (end stage renal disease) ([facility] ICD-10-CM: N18.6  ICD-9-CM: 585.6  12/15/2017 - Present        Diabetes mellitus type 2, controlled ([facility] ICD-10-CM: E11.9  ICD-9-CM: 250.00  12/15/2017 - Present        HTN (hypertension) ICD-10-CM: I10  ICD-9-CM: 401.9  12/15/2017 - Present        RESOLVED: Symptomatic anemia ICD-10-CM: D64.9  ICD-9-CM: 285.9  1/10/2018 - 3/9/2018        RESOLVED: Back pain ICD-10-CM: M54.9  ICD-9-CM: 724.5  12/17/2017 - 3/9/2018        RESOLVED: Constipation ICD-10-CM: K59.00  ICD-9-CM: 564.00  12/17/2017 - 3/9/2018          22    Final Diagnoses:   Anemia [D64.9]   Acute on chronic anemia  Anemia of chronic disease  Chronic back pain on narcotics at home  COPD without acute exacerbation  End-stage renal disease on hemodialysis Tuesday Thursday Saturday  History of recurrent anemia.   Prior work-up negative    Reason for Hospitalization: Anemia        Hospital Course: 60-year-old lady with history of recurrent anemia requiring blood transfusion admitted overnight for blood transfusion. Initial hemoglobin 6.8. She has been given 2 units of packed red blood cells. No reports of dizziness lightheadedness chest pain or shortness of breath. Once transfusion is completed, she will discharge home with outpatient follow-up. Advised to follow-up with the [place] for further work-up regarding anemia. If no reaction at the end of blood transfusion patient may discharge and follow-up tomorrow at dialysis center for routine dialysis          Discharge Medications:   Current Discharge Medication List      CONTINUE these medications which have NOT CHANGED    Details   b complex-vitamin c-folic acid (NEPHROCAPS) 1 mg capsule Take 1 Cap by mouth daily. carboxymethylcellulose sodium (REFRESH PLUS) 0.5 % dpet Administer 1 Drop to both eyes as needed. albuterol (PROVENTIL HFA, VENTOLIN HFA, PROAIR HFA) 90 mcg/actuation inhaler Take 2 Puffs by inhalation every four (4) hours as needed for Wheezing. carisoprodol (SOMA) 350 mg tablet Take 1 Tab by mouth every eight (8) hours as needed for Muscle Spasm(s). Max Daily Amount: 1,050 mg.  Qty: 20 Tab, Refills: 0    Associated Diagnoses: Acute bilateral back pain, unspecified back location; Neck pain      hydrOXYzine HCl (ATARAX) 10 mg tablet Take 1 Tab by mouth three (3) times daily as needed for Itching. Indications: Hives  Qty: 20 Tab, Refills: 0      pantoprazole (PROTONIX) 40 mg tablet Take 1 Tab by mouth daily. Qty: 30 Tab, Refills: 2      nystatin (MYCOSTATIN) powder Apply  to affected area two (2) times a day. Qty: 1 Bottle, Refills: 0      cholecalciferol (VITAMIN D3) 1,000 unit tablet Take 2,000 Units by mouth daily. ferrous sulfate 325 mg (65 mg iron) tablet Take 325 mg by mouth three (3) times daily (with meals).       hydrALAZINE (APRESOLINE) 25 mg tablet Take 25 mg by mouth three (3) times daily. lactulose (CHRONULAC) 10 gram/15 mL solution Take 20 g by mouth daily as needed. sevelamer (RENAGEL) 800 mg tablet Take 1,600 mg by mouth three (3) times daily (with meals). acetaminophen (TYLENOL) 325 mg tablet Take 325 mg by mouth every six (6) hours as needed for Pain. AFLIBERCEPT INTRAVITREAL 1 Ampule by IntraVITreal route every twenty-eight (28) days. Follow up Care:    1. Savannah Farrell MD in 1-2 weeks. Please call to set up an appointment shortly after discharge. Diet:  Renal Diet    Disposition:  Home. Advanced Directive:   FULL x   DNR      Discharge Exam:  See today's note. CONSULTATIONS: Nephrology    Significant Diagnostic Studies:   9/17/2020: BUN 23 mg/dL* (Ref range: 6 - 20 mg/dL); Calcium 8.4 mg/dL* (Ref range: 8.5 - 10.1 mg/dL); CO2 30 mmol/L (Ref range: 21 - 32 mmol/L); Creatinine 3.74 mg/dL* (Ref range: 0.55 - 1.02 mg/dL); Glucose 144 mg/dL* (Ref range: 65 - 100 mg/dL); HCT 21.7 %* (Ref range: 36 - 46 %); HGB 6.8 %* (Ref range: 13.5 - 17.5 %); Potassium 3.5 mmol/L (Ref range: 3.5 - 5.1 mmol/L); Sodium 142 mmol/L (Ref range: 136 - 145 mmol/L)  9/18/2020: Potassium 4.8 mmol/L (Ref range: 3.5 - 5.1 mmol/L)  Recent Labs     09/17/20  0940   WBC 4.3*   HGB 6.8*   HCT 21.7*        Recent Labs     09/18/20  0755 09/17/20  0940   NA  --  142   K 4.8 3.5   CL  --  105   CO2  --  30   BUN  --  23*   CREA  --  3.74*   GLU  --  144*   CA  --  8.4*     Recent Labs     09/17/20  0940   ALT 17   *   TBILI 0.4   TP 6.2*   ALB 2.9*   GLOB 3.3     Recent Labs     09/17/20  0940   INR 1.1   PTP 10.9      No results for input(s): FE, TIBC, PSAT, FERR in the last 72 hours. No results for input(s): PH, PCO2, PO2 in the last 72 hours. No results for input(s): CPK, CKMB in the last 72 hours.     No lab exists for component: TROPONINI  Lab Results   Component Value Date/Time    Glucose (POC) 95 09/18/2020 08:15 AM Glucose (POC) 167 (H) 05/03/2018 11:38 AM    Glucose (POC) 167 (H) 05/03/2018 06:12 AM    Glucose (POC) 238 (H) 05/02/2018 09:02 PM    Glucose (POC) 261 (H) 05/02/2018 07:04 PM    Glucose (POC) 82 03/19/2018 04:54 PM           Signed:  Aroldo Banks MD  9/18/2020  2:17 PM

## 2020-09-18 NOTE — PROGRESS NOTES
Discussed with the hospitalist this morning about the PT consult that was ordered overnight. He advised that a PT consult was not appropriate on this patient at this time, and this patient could be removed from the therapy roster.

## 2020-09-21 LAB
ABO + RH BLD: NORMAL
ANTIGENS PRESENT RBC DONR: NORMAL
ANTIGENS PRESENT RBC DONR: NORMAL
BLD PROD TYP BPU: NORMAL
BLD PROD TYP BPU: NORMAL
BLOOD BANK CMNT PATIENT-IMP: NORMAL
BLOOD GROUP ANTIBODIES SERPL: NORMAL
BLOOD GROUP ANTIBODIES SERPL: POSITIVE
BPU ID: NORMAL
BPU ID: NORMAL
CROSSMATCH RESULT,%XM: NORMAL
CROSSMATCH RESULT,%XM: NORMAL
SPECIMEN EXP DATE BLD: NORMAL
STATUS OF UNIT,%ST: NORMAL
STATUS OF UNIT,%ST: NORMAL
TRANSFUSION STATUS PATIENT QL: NORMAL
TRANSFUSION STATUS PATIENT QL: NORMAL
UNIT DIVISION, %UDIV: 0
UNIT DIVISION, %UDIV: 0
XXAUTO CONTROL: NEGATIVE

## 2020-09-22 LAB
EST. AVERAGE GLUCOSE BLD GHB EST-MCNC: ABNORMAL MG/DL
HBA1C MFR BLD: <3.8 % (ref 4–5.6)

## 2020-10-03 ENCOUNTER — HOSPITAL ENCOUNTER (EMERGENCY)
Age: 70
Discharge: HOME | End: 2020-10-03
Attending: EMERGENCY MEDICINE
Payer: MEDICARE

## 2020-10-03 VITALS
HEART RATE: 83 BPM | RESPIRATION RATE: 16 BRPM | HEIGHT: 63 IN | DIASTOLIC BLOOD PRESSURE: 72 MMHG | BODY MASS INDEX: 24.98 KG/M2 | SYSTOLIC BLOOD PRESSURE: 149 MMHG | OXYGEN SATURATION: 100 % | TEMPERATURE: 97.5 F | WEIGHT: 141 LBS

## 2020-10-03 DIAGNOSIS — R10.9 LEFT FLANK PAIN: Primary | ICD-10-CM

## 2020-10-03 PROCEDURE — 99283 EMERGENCY DEPT VISIT LOW MDM: CPT

## 2020-10-03 PROCEDURE — 96372 THER/PROPH/DIAG INJ SC/IM: CPT

## 2020-10-03 PROCEDURE — 74011250637 HC RX REV CODE- 250/637: Performed by: EMERGENCY MEDICINE

## 2020-10-03 PROCEDURE — 74011250636 HC RX REV CODE- 250/636: Performed by: EMERGENCY MEDICINE

## 2020-10-03 RX ORDER — KETOROLAC TROMETHAMINE 30 MG/ML
30 INJECTION, SOLUTION INTRAMUSCULAR; INTRAVENOUS
Status: COMPLETED | OUTPATIENT
Start: 2020-10-03 | End: 2020-10-03

## 2020-10-03 RX ORDER — AZITHROMYCIN 250 MG/1
TABLET, FILM COATED ORAL
Qty: 6 TAB | Refills: 0 | Status: ON HOLD | OUTPATIENT
Start: 2020-10-03 | End: 2020-10-06

## 2020-10-03 RX ORDER — AZITHROMYCIN 250 MG/1
500 TABLET, FILM COATED ORAL
Status: COMPLETED | OUTPATIENT
Start: 2020-10-03 | End: 2020-10-03

## 2020-10-03 RX ORDER — ACETAMINOPHEN AND CODEINE PHOSPHATE 300; 30 MG/1; MG/1
1 TABLET ORAL
Qty: 18 TAB | Refills: 0 | Status: ON HOLD | OUTPATIENT
Start: 2020-10-03 | End: 2020-10-06 | Stop reason: SDUPTHER

## 2020-10-03 RX ORDER — ACETAMINOPHEN AND CODEINE PHOSPHATE 300; 30 MG/1; MG/1
1 TABLET ORAL
Status: COMPLETED | OUTPATIENT
Start: 2020-10-03 | End: 2020-10-03

## 2020-10-03 RX ORDER — NITROFURANTOIN MACROCRYSTALS 50 MG/1
100 CAPSULE ORAL EVERY 6 HOURS
Status: DISCONTINUED | OUTPATIENT
Start: 2020-10-04 | End: 2020-10-04 | Stop reason: HOSPADM

## 2020-10-03 RX ADMIN — KETOROLAC TROMETHAMINE 30 MG: 30 INJECTION, SOLUTION INTRAMUSCULAR at 23:37

## 2020-10-03 RX ADMIN — AZITHROMYCIN MONOHYDRATE 500 MG: 250 TABLET ORAL at 23:36

## 2020-10-03 RX ADMIN — ACETAMINOPHEN AND CODEINE PHOSPHATE 1 TABLET: 300; 30 TABLET ORAL at 22:52

## 2020-10-04 NOTE — ED TRIAGE NOTES
Pt arrived via ems from home reporting that she was at dialysis earlier today and afterward began having right sided flank and back pain. Pt states that the pain radiates all the way up to her shoulder on the right side, but has no pain on the left side. Pt reports that the dialysis team took off a liter of fluid from her and that she get sick with a UTI every time they do. Pt denies any n/v/d at this time.

## 2020-10-04 NOTE — ED PROVIDER NOTES
Patient developed right sided back pain after dialysis today. This occurs when she has a UTI. No fever or chills. Worse with movement  Duration:  1 day    Intensity: severe    Modified by: movement               Past Medical History:   Diagnosis Date    Arthritis     Asthma     Chronic kidney disease     Chronic pain     Cirrhosis ([facility] [address] 12/17/2017    Diabetes ([facility] [address] diet controlled    GERD (gastroesophageal reflux disease)     Hypertension        Past Surgical History:   Procedure Laterality Date    COLONOSCOPY N/A 1/12/2018    COLONOSCOPY performed by Chanel Cedeno MD at [facility] ENDOSCOPY    COLONOSCOPY N/A 3/19/2018    COLONOSCOPY performed by Chanel Cedeno MD at [address] [place] [address] Av HX GYN  c section    HX VASCULAR ACCESS      dialysis          History reviewed. No pertinent family history.     Social History     Socioeconomic History    Marital status: SINGLE     Spouse name: Not on file    Number of children: Not on file    Years of education: Not on file    Highest education level: Not on file   Occupational History    Not on file   Social Needs    Financial resource strain: Not on file    Food insecurity     Worry: Not on file     Inability: Not on file    Transportation needs     Medical: Not on file     Non-medical: Not on file   Tobacco Use    Smoking status: Never Smoker    Smokeless tobacco: Never Used   Substance and Sexual Activity    Alcohol use: No    Drug use: No    Sexual activity: Not on file   Lifestyle    Physical activity     Days per week: Not on file     Minutes per session: Not on file    Stress: Not on file   Relationships    Social connections     Talks on phone: Not on file     Gets together: Not on file     Attends Mandaeism service: Not on file     Active member of club or organization: Not on file     Attends meetings of clubs or organizations: Not on file     Relationship status: Not on file    Intimate partner violence     Fear of current or ex partner: Not on file     Emotionally abused: Not on file     Physically abused: Not on file     Forced sexual activity: Not on file   Other Topics Concern    Not on file   Social History Narrative    Not on file         ALLERGIES: Amlodipine; Aspirin; Heparin; Ibuprofen; and Metformin    Review of Systems   Constitutional: Negative. HENT: Negative. Eyes: Negative. Respiratory: Negative. Cardiovascular: Negative. Gastrointestinal: Negative. Endocrine: Negative. Genitourinary: Negative. Musculoskeletal: Positive for back pain. Skin: Negative. Allergic/Immunologic: Negative. Neurological: Negative. Hematological: Negative. Psychiatric/Behavioral: Negative. All other systems reviewed and are negative. Vitals:    10/03/20 2050   BP: (!) 128/55   Pulse: 80   Resp: 16   Temp: 97.5 °F (36.4 °C)   SpO2: 100%   Weight: 64 kg (141 lb)   Height: 5' 3\" (1.6 m)            Physical Exam  Vitals signs and nursing note reviewed. Constitutional:       Appearance: She is well-developed. HENT:      Head: Normocephalic and atraumatic. Neck:      Musculoskeletal: Normal range of motion and neck supple. Cardiovascular:      Rate and Rhythm: Normal rate and regular rhythm. Heart sounds: Normal heart sounds. Pulmonary:      Breath sounds: Normal breath sounds. Abdominal:      General: Bowel sounds are normal.      Palpations: Abdomen is soft. Musculoskeletal: Normal range of motion. Right shoulder: She exhibits tenderness. She exhibits no swelling. Arms:    Neurological:      General: No focal deficit present. Mental Status: She is alert.    Psychiatric:         Mood and Affect: Mood normal.         Behavior: Behavior normal.          MDM       Procedures

## 2020-10-06 ENCOUNTER — APPOINTMENT (OUTPATIENT)
Dept: CT IMAGING | Age: 70
DRG: 291 | End: 2020-10-06
Attending: NURSE PRACTITIONER
Payer: MEDICARE

## 2020-10-06 ENCOUNTER — HOSPITAL ENCOUNTER (INPATIENT)
Age: 70
LOS: 1 days | Discharge: HOME | DRG: 291 | End: 2020-10-10
Attending: EMERGENCY MEDICINE | Admitting: FAMILY MEDICINE
Payer: MEDICARE

## 2020-10-06 DIAGNOSIS — Z99.2 ANEMIA DUE TO CHRONIC KIDNEY DISEASE, ON CHRONIC DIALYSIS (HCC): Primary | ICD-10-CM

## 2020-10-06 DIAGNOSIS — D63.1 ANEMIA DUE TO CHRONIC KIDNEY DISEASE, ON CHRONIC DIALYSIS (HCC): Primary | ICD-10-CM

## 2020-10-06 DIAGNOSIS — N18.6 ANEMIA DUE TO CHRONIC KIDNEY DISEASE, ON CHRONIC DIALYSIS (HCC): Primary | ICD-10-CM

## 2020-10-06 PROBLEM — K74.60 UNSPECIFIED CIRRHOSIS OF LIVER (HCC): Status: ACTIVE | Noted: 2020-10-06

## 2020-10-06 PROBLEM — I13.2 HYPERTENSIVE HEART AND CHRONIC KIDNEY DISEASE WITH HEART FAILURE AND WITH STAGE 5 CHRONIC KIDNEY DISEASE, OR END STAGE RENAL DISEASE (HCC): Status: ACTIVE | Noted: 2020-10-06

## 2020-10-06 PROBLEM — E11.65 TYPE 2 DIABETES MELLITUS WITH HYPERGLYCEMIA (HCC): Status: ACTIVE | Noted: 2020-10-06

## 2020-10-06 PROBLEM — I48.0 PAROXYSMAL ATRIAL FIBRILLATION (HCC): Status: ACTIVE | Noted: 2020-10-06

## 2020-10-06 PROBLEM — I95.89 OTHER HYPOTENSION: Status: ACTIVE | Noted: 2020-10-06

## 2020-10-06 PROBLEM — R18.8 OTHER ASCITES: Status: ACTIVE | Noted: 2020-10-06

## 2020-10-06 PROBLEM — G89.29 OTHER CHRONIC PAIN: Status: ACTIVE | Noted: 2020-10-06

## 2020-10-06 LAB
ALBUMIN SERPL-MCNC: 2.5 G/DL (ref 3.5–5)
ALBUMIN/GLOB SERPL: 0.8 {RATIO} (ref 1.1–2.2)
ALP SERPL-CCNC: 139 U/L (ref 45–117)
ALT SERPL-CCNC: 15 U/L (ref 12–78)
ANION GAP SERPL CALC-SCNC: 8 MMOL/L (ref 5–15)
AST SERPL W P-5'-P-CCNC: 18 U/L (ref 15–37)
BILIRUB SERPL-MCNC: 0.4 MG/DL (ref 0.2–1)
BUN SERPL-MCNC: 40 MG/DL (ref 6–20)
BUN/CREAT SERPL: 8 (ref 12–20)
CA-I BLD-MCNC: 8 MG/DL (ref 8.5–10.1)
CHLORIDE SERPL-SCNC: 104 MMOL/L (ref 97–108)
CO2 SERPL-SCNC: 30 MMOL/L (ref 21–32)
CREAT SERPL-MCNC: 5.02 MG/DL (ref 0.55–1.02)
ERYTHROCYTE [DISTWIDTH] IN BLOOD BY AUTOMATED COUNT: 21 % (ref 11.5–14.5)
GLOBULIN SER CALC-MCNC: 3 G/DL (ref 2–4)
GLUCOSE BLD STRIP.AUTO-MCNC: 116 MG/DL (ref 65–100)
GLUCOSE SERPL-MCNC: 116 MG/DL (ref 65–100)
HCT VFR BLD AUTO: 20.1 % (ref 36–46)
HGB BLD-MCNC: 6.1 G/DL (ref 13.5–17.5)
MCH RBC QN AUTO: 27.2 PG (ref 31–34)
MCHC RBC AUTO-ENTMCNC: 30.6 G/DL (ref 31–36)
MCV RBC AUTO: 88.9 FL (ref 80–100)
NRBC # BLD: 0.01 K/UL
NRBC BLD-RTO: 30 PER 100 WBC
PERFORMED BY, TECHID: ABNORMAL
PLATELET # BLD AUTO: 227 K/UL
PMV BLD AUTO: 8.7 FL (ref 6.5–11.5)
POTASSIUM SERPL-SCNC: 4.1 MMOL/L (ref 3.5–5.1)
PROT SERPL-MCNC: 5.5 G/DL (ref 6.4–8.2)
RBC # BLD AUTO: 2.26 M/UL (ref 4.5–5.9)
SODIUM SERPL-SCNC: 142 MMOL/L (ref 136–145)
WBC # BLD AUTO: 4.5 K/UL (ref 4.4–11.3)

## 2020-10-06 PROCEDURE — 74176 CT ABD & PELVIS W/O CONTRAST: CPT

## 2020-10-06 PROCEDURE — 74011250636 HC RX REV CODE- 250/636: Performed by: NURSE PRACTITIONER

## 2020-10-06 PROCEDURE — 86870 RBC ANTIBODY IDENTIFICATION: CPT

## 2020-10-06 PROCEDURE — 99285 EMERGENCY DEPT VISIT HI MDM: CPT

## 2020-10-06 PROCEDURE — 80053 COMPREHEN METABOLIC PANEL: CPT

## 2020-10-06 PROCEDURE — 36415 COLL VENOUS BLD VENIPUNCTURE: CPT

## 2020-10-06 PROCEDURE — 86920 COMPATIBILITY TEST SPIN: CPT

## 2020-10-06 PROCEDURE — 96374 THER/PROPH/DIAG INJ IV PUSH: CPT

## 2020-10-06 PROCEDURE — 96375 TX/PRO/DX INJ NEW DRUG ADDON: CPT

## 2020-10-06 PROCEDURE — 99218 HC RM OBSERVATION: CPT

## 2020-10-06 PROCEDURE — 74011250637 HC RX REV CODE- 250/637: Performed by: NURSE PRACTITIONER

## 2020-10-06 PROCEDURE — C9113 INJ PANTOPRAZOLE SODIUM, VIA: HCPCS | Performed by: NURSE PRACTITIONER

## 2020-10-06 PROCEDURE — 74011000250 HC RX REV CODE- 250: Performed by: NURSE PRACTITIONER

## 2020-10-06 PROCEDURE — 82962 GLUCOSE BLOOD TEST: CPT

## 2020-10-06 PROCEDURE — 85027 COMPLETE CBC AUTOMATED: CPT

## 2020-10-06 PROCEDURE — 86900 BLOOD TYPING SEROLOGIC ABO: CPT

## 2020-10-06 RX ORDER — ACETAMINOPHEN 650 MG/1
650 SUPPOSITORY RECTAL
Status: DISCONTINUED | OUTPATIENT
Start: 2020-10-06 | End: 2020-10-10 | Stop reason: HOSPADM

## 2020-10-06 RX ORDER — MORPHINE SULFATE 2 MG/ML
1 INJECTION, SOLUTION INTRAMUSCULAR; INTRAVENOUS
Status: DISCONTINUED | OUTPATIENT
Start: 2020-10-06 | End: 2020-10-10 | Stop reason: HOSPADM

## 2020-10-06 RX ORDER — SODIUM CHLORIDE 0.9 % (FLUSH) 0.9 %
5-40 SYRINGE (ML) INJECTION EVERY 8 HOURS
Status: DISCONTINUED | OUTPATIENT
Start: 2020-10-06 | End: 2020-10-10 | Stop reason: HOSPADM

## 2020-10-06 RX ORDER — SEVELAMER CARBONATE 800 MG/1
1600 TABLET, FILM COATED ORAL
Status: DISCONTINUED | OUTPATIENT
Start: 2020-10-07 | End: 2020-10-06

## 2020-10-06 RX ORDER — POLYETHYLENE GLYCOL 3350 17 G/17G
17 POWDER, FOR SOLUTION ORAL DAILY PRN
Status: DISCONTINUED | OUTPATIENT
Start: 2020-10-06 | End: 2020-10-10 | Stop reason: HOSPADM

## 2020-10-06 RX ORDER — ONDANSETRON 2 MG/ML
4 INJECTION INTRAMUSCULAR; INTRAVENOUS
Status: DISCONTINUED | OUTPATIENT
Start: 2020-10-06 | End: 2020-10-10 | Stop reason: HOSPADM

## 2020-10-06 RX ORDER — PROMETHAZINE HYDROCHLORIDE 25 MG/1
12.5 TABLET ORAL
Status: DISCONTINUED | OUTPATIENT
Start: 2020-10-06 | End: 2020-10-10 | Stop reason: HOSPADM

## 2020-10-06 RX ORDER — ALBUTEROL SULFATE 90 UG/1
2 AEROSOL, METERED RESPIRATORY (INHALATION)
Status: DISCONTINUED | OUTPATIENT
Start: 2020-10-06 | End: 2020-10-07

## 2020-10-06 RX ORDER — DEXTROSE 50 % IN WATER (D50W) INTRAVENOUS SYRINGE
25-50 AS NEEDED
Status: DISCONTINUED | OUTPATIENT
Start: 2020-10-06 | End: 2020-10-10 | Stop reason: HOSPADM

## 2020-10-06 RX ORDER — SODIUM CHLORIDE 0.9 % (FLUSH) 0.9 %
5-40 SYRINGE (ML) INJECTION AS NEEDED
Status: DISCONTINUED | OUTPATIENT
Start: 2020-10-06 | End: 2020-10-10 | Stop reason: HOSPADM

## 2020-10-06 RX ORDER — SEVELAMER CARBONATE 800 MG/1
800 TABLET, FILM COATED ORAL
Status: DISCONTINUED | OUTPATIENT
Start: 2020-10-06 | End: 2020-10-10 | Stop reason: HOSPADM

## 2020-10-06 RX ORDER — ACETAMINOPHEN 325 MG/1
650 TABLET ORAL
Status: DISCONTINUED | OUTPATIENT
Start: 2020-10-06 | End: 2020-10-10 | Stop reason: HOSPADM

## 2020-10-06 RX ORDER — OXYCODONE HYDROCHLORIDE 5 MG/1
5 TABLET ORAL
Status: DISCONTINUED | OUTPATIENT
Start: 2020-10-06 | End: 2020-10-10 | Stop reason: HOSPADM

## 2020-10-06 RX ORDER — LANOLIN ALCOHOL/MO/W.PET/CERES
325 CREAM (GRAM) TOPICAL
Status: DISCONTINUED | OUTPATIENT
Start: 2020-10-07 | End: 2020-10-10 | Stop reason: HOSPADM

## 2020-10-06 RX ORDER — MELATONIN
2000 DAILY
Status: DISCONTINUED | OUTPATIENT
Start: 2020-10-07 | End: 2020-10-10 | Stop reason: HOSPADM

## 2020-10-06 RX ORDER — SODIUM CHLORIDE 9 MG/ML
250 INJECTION, SOLUTION INTRAVENOUS AS NEEDED
Status: DISCONTINUED | OUTPATIENT
Start: 2020-10-06 | End: 2020-10-10 | Stop reason: HOSPADM

## 2020-10-06 RX ORDER — MAGNESIUM SULFATE 100 %
4 CRYSTALS MISCELLANEOUS AS NEEDED
Status: DISCONTINUED | OUTPATIENT
Start: 2020-10-06 | End: 2020-10-10 | Stop reason: HOSPADM

## 2020-10-06 RX ORDER — INSULIN LISPRO 100 [IU]/ML
INJECTION, SOLUTION INTRAVENOUS; SUBCUTANEOUS
Status: DISCONTINUED | OUTPATIENT
Start: 2020-10-06 | End: 2020-10-10 | Stop reason: HOSPADM

## 2020-10-06 RX ORDER — MIDODRINE HYDROCHLORIDE 5 MG/1
10 TABLET ORAL
Status: DISCONTINUED | OUTPATIENT
Start: 2020-10-06 | End: 2020-10-08

## 2020-10-06 RX ADMIN — SODIUM CHLORIDE 40 MG: 9 INJECTION, SOLUTION INTRAMUSCULAR; INTRAVENOUS; SUBCUTANEOUS at 20:09

## 2020-10-06 RX ADMIN — OXYCODONE HYDROCHLORIDE 5 MG: 5 TABLET ORAL at 20:10

## 2020-10-06 RX ADMIN — MORPHINE SULFATE 1 MG: 2 INJECTION, SOLUTION INTRAMUSCULAR; INTRAVENOUS at 17:17

## 2020-10-06 RX ADMIN — PROMETHAZINE HYDROCHLORIDE 12.5 MG: 25 TABLET ORAL at 20:09

## 2020-10-06 RX ADMIN — Medication 10 ML: at 21:24

## 2020-10-06 NOTE — CONSULTS
Consult Date: 10/6/2020    Consults    Subjective   69 yr old female with hx of liver cirrhosis, CHF with low EF, tchronic LE wounds, prior GI hemorrhage, DM, atrial flutter, chronic hypotension and ESRD on HD -TTS schedule, was at her regular dialysis appointment, and completed her dialysis treatment today. Towards the end, felt dizzy and sick. Reports haing headaches, blurry vision, rapid breathing, and feeling sick. Patient noted to have drop in hemoglobin to 6.1. Patient recently had large volume paracentesis 1 day ago that was unremarkable. Patient denies diarrhea, bloody stool, cough, fevers, chills, nausea, vomiting or other symptoms of noted. Past Medical History:   Diagnosis Date    Arthritis     Asthma     Chronic kidney disease     Chronic pain     Cirrhosis ([facility] [address] 12/17/2017    Diabetes ([facility] [address] diet controlled    GERD (gastroesophageal reflux disease)     Hypertension       Past Surgical History:   Procedure Laterality Date    COLONOSCOPY N/A 1/12/2018    COLONOSCOPY performed by Chelsea Meade MD at THE [facility] ENDOSCOPY    COLONOSCOPY N/A 3/19/2018    COLONOSCOPY performed by Chelsea Meade MD at [address] [place] [address] GYN  c section    HX VASCULAR ACCESS      dialysis      History reviewed. No pertinent family history. Social History     Tobacco Use    Smoking status: Never Smoker    Smokeless tobacco: Never Used   Substance Use Topics    Alcohol use: No       Current Outpatient Medications   Medication Sig Dispense Refill    hydrOXYzine HCl (ATARAX) 10 mg tablet Take 1 Tab by mouth three (3) times daily as needed for Itching. Indications: Hives 20 Tab 0    azithromycin (Zithromax Z-Marlon) 250 mg tablet Take as directed 6 Tab 0    acetaminophen-codeine (Tylenol-Codeine #3) 300-30 mg per tablet Take 1 Tab by mouth every six (6) hours as needed for Pain for up to 3 days. Max Daily Amount: 4 Tabs.  18 Tab 0    carisoprodol (SOMA) 350 mg tablet Take 1 Tab by mouth every eight (8) hours as needed for Muscle Spasm(s). Max Daily Amount: 1,050 mg. 20 Tab 0    pantoprazole (PROTONIX) 40 mg tablet Take 1 Tab by mouth daily. 30 Tab 2    nystatin (MYCOSTATIN) powder Apply  to affected area two (2) times a day. 1 Bottle 0    cholecalciferol (VITAMIN D3) 1,000 unit tablet Take 2,000 Units by mouth daily.  ferrous sulfate 325 mg (65 mg iron) tablet Take 325 mg by mouth three (3) times daily (with meals).  hydrALAZINE (APRESOLINE) 25 mg tablet Take 25 mg by mouth three (3) times daily.  lactulose (CHRONULAC) 10 gram/15 mL solution Take 20 g by mouth daily as needed.  sevelamer (RENAGEL) 800 mg tablet Take 1,600 mg by mouth three (3) times daily (with meals).  b complex-vitamin c-folic acid (NEPHROCAPS) 1 mg capsule Take 1 Cap by mouth daily.  acetaminophen (TYLENOL) 325 mg tablet Take 325 mg by mouth every six (6) hours as needed for Pain.  AFLIBERCEPT INTRAVITREAL 1 Ampule by IntraVITreal route every twenty-eight (28) days.  carboxymethylcellulose sodium (REFRESH PLUS) 0.5 % dpet Administer 1 Drop to both eyes as needed.  albuterol (PROVENTIL HFA, VENTOLIN HFA, PROAIR HFA) 90 mcg/actuation inhaler Take 2 Puffs by inhalation every four (4) hours as needed for Wheezing. Review of Systems   Constitutional: Positive for activity change and fatigue. Negative for chills. HENT: Negative. Eyes: Positive for visual disturbance. Respiratory: Positive for apnea. Negative for cough. Cardiovascular: Negative for chest pain, palpitations and leg swelling. Gastrointestinal: Negative for abdominal distention, abdominal pain and anal bleeding. Endocrine: Positive for cold intolerance. Genitourinary: Negative. Musculoskeletal: Negative. Skin: Negative. Neurological: Negative. Hematological: Negative. Psychiatric/Behavioral: Negative.         Objective     Vital signs for last 24 hours:  Visit Vitals  /64 (BP 1 Location: Right arm, BP Patient Position: At rest)   Pulse 86   Temp 98.6 °F (37 °C)   Resp 18   Ht 5' 3\" (1.6 m)   Wt 64.5 kg (142 lb 3.2 oz)   SpO2 100%   BMI 25.19 kg/m²       Intake/Output this shift:  Current Shift: No intake/output data recorded. Last 3 Shifts: No intake/output data recorded. Data Review:   Recent Results (from the past 24 hour(s))   CBC W/O DIFF    Collection Time: 10/06/20 11:15 AM   Result Value Ref Range    WBC 4.5 4.4 - 11.3 K/uL    RBC 2.26 (L) 4.50 - 5.90 M/uL    HGB 6.1 (L) 13.5 - 17.5 g/dL    HCT 20.1 (L) 36 - 46 %    MCV 88.9 80 - 100 FL    MCH 27.2 (L) 31 - 34 PG    MCHC 30.6 (L) 31.0 - 36.0 g/dL    RDW 21.0 (H) 11.5 - 14.5 %    PLATELET 039 K/uL    MPV 8.7 6.5 - 11.5 FL    NRBC 30.0  WBC    ABSOLUTE NRBC 1.99 K/uL   METABOLIC PANEL, COMPREHENSIVE    Collection Time: 10/06/20 11:15 AM   Result Value Ref Range    Sodium 142 136 - 145 mmol/L    Potassium 4.1 3.5 - 5.1 mmol/L    Chloride 104 97 - 108 mmol/L    CO2 30 21 - 32 mmol/L    Anion gap 8 5 - 15 mmol/L    Glucose 116 (H) 65 - 100 mg/dL    BUN 40 (H) 6 - 20 mg/dL    Creatinine 5.02 (H) 0.55 - 1.02 mg/dL    BUN/Creatinine ratio 8 (L) 12 - 20      GFR est AA 10 (L) >60 ml/min/1.73m2    GFR est non-AA 8 (L) >60 ml/min/1.73m2    Calcium 8.0 (L) 8.5 - 10.1 mg/dL    Bilirubin, total 0.4 0.2 - 1.0 mg/dL    AST (SGOT) 18 15 - 37 U/L    ALT (SGPT) 15 12 - 78 U/L    Alk. phosphatase 139 (H) 45 - 117 U/L    Protein, total 5.5 (L) 6.4 - 8.2 g/dL    Albumin 2.5 (L) 3.5 - 5.0 g/dL    Globulin 3.0 2.0 - 4.0 g/dL    A-G Ratio 0.8 (L) 1.1 - 2.2         Physical Exam  Constitutional:       General: She is in acute distress. Appearance: She is ill-appearing. HENT:      Head: Normocephalic. Mouth/Throat:      Mouth: Mucous membranes are moist.   Eyes:      Extraocular Movements: Extraocular movements intact. Neck:      Musculoskeletal: Normal range of motion and neck supple. Cardiovascular:      Rate and Rhythm: Regular rhythm. Tachycardia present. Heart sounds: Murmur present. Pulmonary:      Effort: No respiratory distress. Breath sounds: Normal breath sounds. Comments: tachypneic  Abdominal:      General: Abdomen is flat. Palpations: Abdomen is soft. Musculoskeletal: Normal range of motion. Skin:     Findings: Lesion present. Neurological:      Mental Status: She is alert. ASSESSMENT AND PLAN  #ESRD ON HD -MWF SCHEDULE  #NORMOCYTIC ANEMIA  -SYMPTOMATIC  # VOLUME DEPLETION  # LIVER CIRRHOSIS WITH RECURRENT ASCITES  # CHF WITH REDUCED EF      Plan:   Patient would need blood transfusion  Patient has received 500 cc fluid resuscitation.  Can monitor for vitals following blood transfusion  Continue midodrine 10mg tid  Renvela 800mg tid

## 2020-10-06 NOTE — ED NOTES
Denice patient while at end of dialysis treatment she did not feel good and was dizzy and BP per dialysis nurse was 79/40 and was given IVF with BP increase.

## 2020-10-06 NOTE — ROUTINE PROCESS
TRANSFER - OUT REPORT:    Verbal report given to Madyson Ba on Isabela Wills  being transferred to [facility] for routine progression of care       Report consisted of patients Situation, Background, Assessment and   Recommendations(SBAR). Information from the following report(s) SBAR was reviewed with the receiving nurse. Opportunity for questions and clarification was provided.       Patient transported with:   Registered Nurse

## 2020-10-06 NOTE — PROGRESS NOTES
Problem: Falls - Risk of  Goal: *Absence of Falls  Description: Document Latanya Vides Fall Risk and appropriate interventions in the flowsheet.   10/6/2020 1946 by Bettie Rueda  Outcome: Progressing Towards Goal  Note: Fall Risk Interventions:  Mobility Interventions: Patient to call before getting OOB         Medication Interventions: Patient to call before getting OOB    Elimination Interventions: Call light in reach           10/6/2020 1945 by Bettie Rueda  Outcome: Progressing Towards Goal  Note: Fall Risk Interventions:  Mobility Interventions: Patient to call before getting OOB         Medication Interventions: Patient to call before getting OOB    Elimination Interventions: Call light in reach              Problem: Patient Education: Go to Patient Education Activity  Goal: Patient/Family Education  10/6/2020 1946 by Bettie Rueda  Outcome: Progressing Towards Goal  10/6/2020 1945 by Bettie Rueda  Outcome: Progressing Towards Goal     Problem: Patient Education: Go to Patient Education Activity  Goal: Patient/Family Education  Outcome: Progressing Towards Goal     Problem: Upper and Lower GI Bleed: Day 1  Goal: Off Pathway (Use only if patient is Off Pathway)  Outcome: Progressing Towards Goal  Goal: Activity/Safety  Outcome: Progressing Towards Goal  Goal: Consults, if ordered  Outcome: Progressing Towards Goal  Goal: Diagnostic Test/Procedures  Outcome: Progressing Towards Goal  Goal: Discharge Planning  Outcome: Progressing Towards Goal  Goal: Respiratory  Outcome: Progressing Towards Goal  Goal: Treatments/Interventions/Procedures  Outcome: Progressing Towards Goal  Goal: Psychosocial  Outcome: Progressing Towards Goal  Goal: *Optimal pain control at patient's stated goal  Outcome: Progressing Towards Goal  Goal: *Hemodynamically stable  Outcome: Progressing Towards Goal  Goal: *Demonstrates progressive activity  Outcome: Progressing Towards Goal

## 2020-10-06 NOTE — ROUTINE PROCESS
TRANSFER - IN REPORT:    Verbal report received from [id number] [place] [address] r.n.name) on Jerome Briseno  being received from mylene) for routine progression of care      Report consisted of patients Situation, Background, Assessment and   Recommendations(SBAR). Information from the following report(s) SBAR was reviewed with the receiving nurse. Opportunity for questions and clarification was provided. Assessment completed upon patients arrival to unit and care assumed.

## 2020-10-06 NOTE — H&P
History and Physical    Patient: Emiliana Henriquez MRN: [id number]  SSN: [id number]    YOB: 1950  Age: 69 y.o. Sex: female      Subjective:      Emiliana Henriquez is a 69 y.o. female with PMH significant for Diabetes, Hypertension, ESRD on hemodialysis T/Th/S, Atrial Flutter, not on anti-coagulation 2/2 prior GI Bleed, Chronic pain 2/2 L4/5 herniation, HFrEF last know EF 20%, followed by Dr. Bert Maynard, Chronic hypotension and Cirrhosis, [facility] who [facility] to ED from dialysis today after completing treatment, reporting weakness and dizziness. She reports a 2 day history of generalized weakness, dizziness, and feeling generally unwell. She reports associated lower abdominal pain, as well as right flank pain. She also reports having flatus which she states has a strong heme odor, accompanied by dark stools, but denies any brittany blood. She denies N/V, but endorses poor appetite. She denies chest pain, dyspnea, cough or fever. In ED, labs shows BUN/Cr elevated consistent w ESRD, and anemia with HGB 6.1. She has been seen in consultation by Nephrology, and orders given for HD tomorrow. She has been typed and crossmatched for PRBC's. Hospitalist consulted for Observation and further management. Past Medical History:   Diagnosis Date    Arthritis     Asthma     Chronic kidney disease     Chronic pain     Cirrhosis ([facility] [address] 12/17/2017    Diabetes ([facility] [address] diet controlled    GERD (gastroesophageal reflux disease)     Hypertension      Past Surgical History:   Procedure Laterality Date    COLONOSCOPY N/A 1/12/2018    COLONOSCOPY performed by Dav Mcintyre MD at THE [facility] ENDOSCOPY    COLONOSCOPY N/A 3/19/2018    COLONOSCOPY performed by Dav Mcintyre MD at [address] HX GYN  c section    HX VASCULAR ACCESS      dialysis       History reviewed. No pertinent family history.   Social History     Tobacco Use    Smoking status: Never Smoker    Smokeless tobacco: Never Used   Substance Use Topics    Alcohol use: No      Prior to Admission medications    Medication Sig Start Date End Date Taking? Authorizing Provider   hydrOXYzine HCl (ATARAX) 10 mg tablet Take 1 Tab by mouth three (3) times daily as needed for Itching. Indications: Hives 5/6/19  Yes Loreto Hussein MD   azithromycin (Zithromax Z-Marlon) 250 mg tablet Take as directed 10/3/20 10/8/20  Makenzie Robles MD   acetaminophen-codeine (Tylenol-Codeine #3) 300-30 mg per tablet Take 1 Tab by mouth every six (6) hours as needed for Pain for up to 3 days. Max Daily Amount: 4 Tabs. 10/3/20 10/6/20  Makenzie Robles MD   carisoprodol (SOMA) 350 mg tablet Take 1 Tab by mouth every eight (8) hours as needed for Muscle Spasm(s). Max Daily Amount: 1,050 mg. 5/6/19   Loreto Hussein MD   pantoprazole (PROTONIX) 40 mg tablet Take 1 Tab by mouth daily. 5/3/18   Haylee Santa PA-C   nystatin (MYCOSTATIN) powder Apply  to affected area two (2) times a day. 3/12/18   Haylee Santa PA-C   cholecalciferol (VITAMIN D3) 1,000 unit tablet Take 2,000 Units by mouth daily. Provider, Historical   ferrous sulfate 325 mg (65 mg iron) tablet Take 325 mg by mouth three (3) times daily (with meals). Provider, Historical   hydrALAZINE (APRESOLINE) 25 mg tablet Take 25 mg by mouth three (3) times daily. Provider, Historical   lactulose (CHRONULAC) 10 gram/15 mL solution Take 20 g by mouth daily as needed. Provider, Historical   sevelamer (RENAGEL) 800 mg tablet Take 1,600 mg by mouth three (3) times daily (with meals). Provider, Historical   b complex-vitamin c-folic acid (NEPHROCAPS) 1 mg capsule Take 1 Cap by mouth daily. Provider, Historical   acetaminophen (TYLENOL) 325 mg tablet Take 325 mg by mouth every six (6) hours as needed for Pain. Provider, Historical   AFLIBERCEPT INTRAVITREAL 1 Ampule by IntraVITreal route every twenty-eight (28) days.     Provider, Historical carboxymethylcellulose sodium (REFRESH PLUS) 0.5 % dpet Administer 1 Drop to both eyes as needed. Provider, Historical   albuterol (PROVENTIL HFA, VENTOLIN HFA, PROAIR HFA) 90 mcg/actuation inhaler Take 2 Puffs by inhalation every four (4) hours as needed for Wheezing. Provider, Historical        Allergies   Allergen Reactions    Amlodipine Rash, Hives and Itching    Aspirin Other (comments), Nausea Only and Unknown (comments)     GI bleed  GI bleeding      Heparin Unknown (comments)     bleeding      Ibuprofen Nausea and Vomiting    Metformin Other (comments)     swelling       Review of Systems:  Pertinent items are noted in the History of Present Illness. Objective:     Vitals:    10/06/20 1049 10/06/20 1221   BP: (!) 133/59 130/64   Pulse: 90 86   Resp: 18 18   Temp: 98.6 °F (37 °C)    SpO2: 100% 100%   Weight: 64.5 kg (142 lb 3.2 oz)    Height: 5' 3\" (1.6 m)         Physical Exam:  GENERAL: alert, cooperative, no distress  THROAT & NECK: normal and no erythema or exudate noted. LUNG: clear to auscultation bilaterally  HEART: systolic murmur: systolic ejection 3/6, harsh throughout the precordium  ABDOMEN: obese, soft, TTP lower quads. Bowel sounds normal.  EXTREMITIES:  extremities normal, atraumatic, no cyanosis or edema  SKIN: no rash or abnormalities  NEUROLOGIC: negative findings: alert, oriented x3  cranial nerves II-XII intact  PSYCHIATRIC: non focal    Assessment:     Hospital Problems  Date Reviewed: 12/15/2017          Codes Class Noted POA    Symptomatic anemia ICD-10-CM: D64.9  ICD-9-CM: 285.9  3/16/2018 Unknown              Plan:     Place in Observation  DX: Symptomatic Anemia  ELOS <2MN  Activity as tolerated  Renal-Diabetic Diet  Reconcile home meds  GI ppx: PPI every day  VTE ppx: SCDs only 2/2 ? GIB  Medical surrogates are daughter Benito Calderon, son Ramírez Powers  Pt is a Full Code    Symptomatic Anemia  - 2/2 Chronic Disease, but possible multi-factorial given hx GIB  - T&C - Transfuse 2 units PRBCs  - Repeat H&H 4 hours after second tx  - Hemmocult stool  - CT ABD wo  - Consult to GI     ESRD on HD  - Nephrology consulted  - HD as per their recommendations  - 1L Fluid Restriction    Right Flank pain/Lower abd pain  - Reports no urine o/p  - CT ABD wo  - No fever, no leukocytosis     Hypotension  - Home meds unclear, reconcile   - Continue Midodrine if taking  - Monitor BP    Diabetes  - Accuchecks ACHS  - SSI coverage  - Continue home meds    HFrEF  - Last known EF 20%  - No s/s volume overload on exam    Atrial Fib/Flutter  - No anti-coag 2/2 GI Bleed  - Rate controlled  - Continue home meds    Chronic Pain  - Morphine IV Q3H prn severe pain    Cirrhosis  - Large volume paracentesis done yesterday  - Hold Lactulose x 1 day, until determine if GI Bleed present     Total time spent on admission - 50 mins     Signed By: Erma Leahy NP     October 6, 2020

## 2020-10-06 NOTE — ED TRIAGE NOTES
Sent from dialysis after having full treatment of dialysis for feeling like hemoglobin is low, was seen in ER over weekend for UTI

## 2020-10-07 LAB
ANION GAP SERPL CALC-SCNC: 10 MMOL/L (ref 5–15)
BASOPHILS # BLD: 0 K/UL (ref 0–0.2)
BASOPHILS NFR BLD: 0 % (ref 0–2.5)
BUN SERPL-MCNC: 54 MG/DL (ref 6–20)
BUN/CREAT SERPL: 8 (ref 12–20)
CA-I BLD-MCNC: 7 MG/DL (ref 8.5–10.1)
CHLORIDE SERPL-SCNC: 105 MMOL/L (ref 97–108)
CO2 SERPL-SCNC: 28 MMOL/L (ref 21–32)
CREAT SERPL-MCNC: 6.43 MG/DL (ref 0.55–1.02)
EOSINOPHIL # BLD: 0.1 K/UL (ref 0–0.7)
EOSINOPHIL NFR BLD: 3 % (ref 0.9–2.9)
ERYTHROCYTE [DISTWIDTH] IN BLOOD BY AUTOMATED COUNT: 21.1 % (ref 11.5–14.5)
GLUCOSE BLD STRIP.AUTO-MCNC: 106 MG/DL (ref 65–100)
GLUCOSE BLD STRIP.AUTO-MCNC: 147 MG/DL (ref 65–100)
GLUCOSE BLD STRIP.AUTO-MCNC: 155 MG/DL (ref 65–100)
GLUCOSE BLD STRIP.AUTO-MCNC: 219 MG/DL (ref 65–100)
GLUCOSE SERPL-MCNC: 220 MG/DL (ref 65–100)
HCT VFR BLD AUTO: 19.9 % (ref 36–46)
HGB BLD-MCNC: 6 G/DL (ref 13.5–17.5)
LYMPHOCYTES # BLD: 0.7 K/UL (ref 1–4.8)
LYMPHOCYTES NFR BLD: 14 % (ref 20.5–51.1)
MCH RBC QN AUTO: 27.7 PG (ref 31–34)
MCHC RBC AUTO-ENTMCNC: 30.4 G/DL (ref 31–36)
MCV RBC AUTO: 90.8 FL (ref 80–100)
MONOCYTES # BLD: 0.5 K/UL (ref 0.2–2.4)
MONOCYTES NFR BLD: 10 % (ref 1.7–9.3)
NEUTS SEG # BLD: 3.8 K/UL (ref 1.8–7.7)
NEUTS SEG NFR BLD: 73 % (ref 42–75)
NRBC # BLD: 0.01 K/UL
NRBC BLD-RTO: 20 PER 100 WBC
PERFORMED BY, TECHID: ABNORMAL
PLATELET # BLD AUTO: 198 K/UL
PMV BLD AUTO: 8.7 FL (ref 6.5–11.5)
POTASSIUM SERPL-SCNC: 5.2 MMOL/L (ref 3.5–5.1)
RBC # BLD AUTO: 2.19 M/UL (ref 4.5–5.9)
SODIUM SERPL-SCNC: 143 MMOL/L (ref 136–145)
WBC # BLD AUTO: 5.1 K/UL (ref 4.4–11.3)

## 2020-10-07 PROCEDURE — P9016 RBC LEUKOCYTES REDUCED: HCPCS

## 2020-10-07 PROCEDURE — 74011250636 HC RX REV CODE- 250/636: Performed by: INTERNAL MEDICINE

## 2020-10-07 PROCEDURE — 74011250637 HC RX REV CODE- 250/637: Performed by: INTERNAL MEDICINE

## 2020-10-07 PROCEDURE — 36415 COLL VENOUS BLD VENIPUNCTURE: CPT

## 2020-10-07 PROCEDURE — 82962 GLUCOSE BLOOD TEST: CPT

## 2020-10-07 PROCEDURE — 74011636637 HC RX REV CODE- 636/637: Performed by: NURSE PRACTITIONER

## 2020-10-07 PROCEDURE — 74011250637 HC RX REV CODE- 250/637: Performed by: NURSE PRACTITIONER

## 2020-10-07 PROCEDURE — 74011250636 HC RX REV CODE- 250/636: Performed by: NURSE PRACTITIONER

## 2020-10-07 PROCEDURE — 74011000250 HC RX REV CODE- 250: Performed by: NURSE PRACTITIONER

## 2020-10-07 PROCEDURE — 30233N1 TRANSFUSION OF NONAUTOLOGOUS RED BLOOD CELLS INTO PERIPHERAL VEIN, PERCUTANEOUS APPROACH: ICD-10-PCS | Performed by: FAMILY MEDICINE

## 2020-10-07 PROCEDURE — C9113 INJ PANTOPRAZOLE SODIUM, VIA: HCPCS | Performed by: NURSE PRACTITIONER

## 2020-10-07 PROCEDURE — 90935 HEMODIALYSIS ONE EVALUATION: CPT

## 2020-10-07 PROCEDURE — 85025 COMPLETE CBC W/AUTO DIFF WBC: CPT

## 2020-10-07 PROCEDURE — 80048 BASIC METABOLIC PNL TOTAL CA: CPT

## 2020-10-07 PROCEDURE — 5A1D70Z PERFORMANCE OF URINARY FILTRATION, INTERMITTENT, LESS THAN 6 HOURS PER DAY: ICD-10-PCS | Performed by: FAMILY MEDICINE

## 2020-10-07 PROCEDURE — 99222 1ST HOSP IP/OBS MODERATE 55: CPT | Performed by: INTERNAL MEDICINE

## 2020-10-07 PROCEDURE — 96376 TX/PRO/DX INJ SAME DRUG ADON: CPT

## 2020-10-07 PROCEDURE — 74011250637 HC RX REV CODE- 250/637: Performed by: HOSPITALIST

## 2020-10-07 PROCEDURE — 99218 HC RM OBSERVATION: CPT

## 2020-10-07 RX ORDER — HYDROCORTISONE ACETATE 25 MG/1
25 SUPPOSITORY RECTAL EVERY 12 HOURS
Status: DISCONTINUED | OUTPATIENT
Start: 2020-10-07 | End: 2020-10-10 | Stop reason: HOSPADM

## 2020-10-07 RX ORDER — FLUCONAZOLE 100 MG/1
150 TABLET ORAL
Status: COMPLETED | OUTPATIENT
Start: 2020-10-07 | End: 2020-10-07

## 2020-10-07 RX ORDER — ALBUTEROL SULFATE 0.83 MG/ML
2.5 SOLUTION RESPIRATORY (INHALATION)
Status: DISCONTINUED | OUTPATIENT
Start: 2020-10-07 | End: 2020-10-10 | Stop reason: HOSPADM

## 2020-10-07 RX ADMIN — FERROUS SULFATE TAB 325 MG (65 MG ELEMENTAL FE) 325 MG: 325 (65 FE) TAB at 17:07

## 2020-10-07 RX ADMIN — Medication 5 ML: at 13:05

## 2020-10-07 RX ADMIN — FLUCONAZOLE 150 MG: 100 TABLET ORAL at 17:06

## 2020-10-07 RX ADMIN — MIDODRINE HYDROCHLORIDE 10 MG: 5 TABLET ORAL at 13:03

## 2020-10-07 RX ADMIN — SEVELAMER CARBONATE 800 MG: 800 TABLET, FILM COATED ORAL at 08:21

## 2020-10-07 RX ADMIN — MIDODRINE HYDROCHLORIDE 10 MG: 5 TABLET ORAL at 08:21

## 2020-10-07 RX ADMIN — OXYCODONE HYDROCHLORIDE 5 MG: 5 TABLET ORAL at 13:07

## 2020-10-07 RX ADMIN — Medication 10 ML: at 21:30

## 2020-10-07 RX ADMIN — SODIUM CHLORIDE 40 MG: 9 INJECTION, SOLUTION INTRAMUSCULAR; INTRAVENOUS; SUBCUTANEOUS at 08:21

## 2020-10-07 RX ADMIN — FERROUS SULFATE TAB 325 MG (65 MG ELEMENTAL FE) 325 MG: 325 (65 FE) TAB at 08:21

## 2020-10-07 RX ADMIN — SODIUM CHLORIDE 1000 ML: 9 INJECTION, SOLUTION INTRAVENOUS at 17:22

## 2020-10-07 RX ADMIN — INSULIN LISPRO 3 UNITS: 100 INJECTION, SOLUTION INTRAVENOUS; SUBCUTANEOUS at 08:20

## 2020-10-07 RX ADMIN — OXYCODONE HYDROCHLORIDE 5 MG: 5 TABLET ORAL at 03:38

## 2020-10-07 RX ADMIN — FERROUS SULFATE TAB 325 MG (65 MG ELEMENTAL FE) 325 MG: 325 (65 FE) TAB at 13:04

## 2020-10-07 RX ADMIN — Medication 2 TABLET: at 08:21

## 2020-10-07 RX ADMIN — SEVELAMER CARBONATE 800 MG: 800 TABLET, FILM COATED ORAL at 17:07

## 2020-10-07 RX ADMIN — Medication 10 ML: at 05:09

## 2020-10-07 RX ADMIN — SODIUM CHLORIDE 40 MG: 9 INJECTION, SOLUTION INTRAMUSCULAR; INTRAVENOUS; SUBCUTANEOUS at 21:30

## 2020-10-07 RX ADMIN — SEVELAMER CARBONATE 800 MG: 800 TABLET, FILM COATED ORAL at 13:03

## 2020-10-07 RX ADMIN — SODIUM CHLORIDE 250 ML: 9 INJECTION, SOLUTION INTRAVENOUS at 17:29

## 2020-10-07 RX ADMIN — HYDROCORTISONE ACETATE 25 MG: 25 SUPPOSITORY RECTAL at 17:07

## 2020-10-07 RX ADMIN — INSULIN LISPRO 2 UNITS: 100 INJECTION, SOLUTION INTRAVENOUS; SUBCUTANEOUS at 17:06

## 2020-10-07 RX ADMIN — MIDODRINE HYDROCHLORIDE 10 MG: 5 TABLET ORAL at 17:08

## 2020-10-07 NOTE — ED PROVIDER NOTES
HPI   Patient is a 69 y.o. female PATIENT REFUSED  [address] who presents to the ER with a chief complaint of low anemia and weakness.  Came from dialysis center  Chief Complaint   Patient presents with    Abnormal Lab Results      Past Medical History:   Diagnosis Date    Arthritis     Asthma     Chronic kidney disease     Chronic pain     Cirrhosis ([facility] [address] 12/17/2017    Diabetes ([facility] [address] diet controlled    GERD (gastroesophageal reflux disease)     Hypertension     Paroxysmal atrial fibrillation ([facility] [address] 10/6/2020       Past Surgical History:   Procedure Laterality Date    COLONOSCOPY N/A 1/12/2018    COLONOSCOPY performed [facility] Chanel Cedeno MD at [facility]OSCOPY    COLONOSCOPY N/A 3/19/2018    COLONOSCOPY performed by Chanel Cedeno MD at [facility] ENDOSCOPY    HX GYN  c section    HX VASCULAR ACCESS      dialysis          Family History:   Family history unknown: Yes       Social History     Socioeconomic History    Marital status: SINGLE     Spouse name: Not on file    Number of children: Not on file    Years of education: Not on file    Highest education level: Not on file   Occupational History    Not on file   Social Needs    Financial resource strain: Not on file    Food insecurity     Worry: Not on file     Inability: Not on file    Transportation needs     Medical: Not on file     Non-medical: Not on file   Tobacco Use    Smoking status: Never Smoker    Smokeless tobacco: Never Used   Substance and Sexual Activity    Alcohol use: No    Drug use: No    Sexual activity: Not on file   Lifestyle    Physical activity     Days per week: Not on file     Minutes per session: Not on file    Stress: Not on file   Relationships    Social connections     Talks on phone: Not on file     Gets together: Not on file     Attends Islam service: Not on file     Active member of club or organization: Not on file     Attends meetings of clubs or organizations: Not on file Relationship status: Not on file    Intimate partner violence     Fear of current or ex partner: Not on file     Emotionally abused: Not on file     Physically abused: Not on file     Forced sexual activity: Not on file   Other Topics Concern     Service Not Asked    Blood Transfusions Yes    Caffeine Concern Not Asked    Occupational Exposure Not Asked    Hobby Hazards Not Asked    Sleep Concern Not Asked    Stress Concern Not Asked    Weight Concern Not Asked    Special Diet Not Asked    Back Care Not Asked    Exercise Not Asked    Bike Helmet Not Asked   [address] Not Asked    Self-Exams Not Asked   Social History Narrative    Not on file         ALLERGIES: Amlodipine; Aspirin; Heparin; Ibuprofen; and Metformin    Review of Systems   Constitutional: Negative. HENT: Negative. Eyes: Negative. Respiratory: Negative. Cardiovascular: Negative. Gastrointestinal: Negative. Endocrine: Negative. Genitourinary: Negative. Musculoskeletal: Negative. Skin: Negative. Allergic/Immunologic: Negative. Neurological: Negative. Hematological: Negative. Psychiatric/Behavioral: Negative. Vitals:    10/07/20 0053 10/07/20 0400 10/07/20 0800 10/07/20 0822   BP: (!) 110/58 114/60  (!) 122/38   Pulse: 77 70 93 82   Resp: 20 20  22   Temp: 98 °F (36.7 °C) 98.2 °F (36.8 °C)  99 °F (37.2 °C)   SpO2:    98%   Weight:       Height:                Physical Exam  Vitals signs and nursing note reviewed. Constitutional:       Appearance: Normal appearance. She is normal weight. HENT:      Head: Normocephalic and atraumatic. Nose: Nose normal.   Eyes:      Extraocular Movements: Extraocular movements intact. Pupils: Pupils are equal, round, and reactive to light. Neck:      Musculoskeletal: Normal range of motion and neck supple. Cardiovascular:      Rate and Rhythm: Normal rate and regular rhythm. Pulses: Normal pulses.    Pulmonary:      Effort: Pulmonary effort is normal.      Breath sounds: Normal breath sounds. Abdominal:      General: Abdomen is flat. Palpations: Abdomen is soft. Musculoskeletal: Normal range of motion. Skin:     General: Skin is warm and dry. Neurological:      General: No focal deficit present. Mental Status: She is alert and oriented to person, place, and time. Psychiatric:         Mood and Affect: Mood normal.          MDM  Number of Diagnoses or Management Options  Anemia due to chronic kidney disease, on chronic dialysis [facility]):   Risk of Complications, Morbidity, and/or Mortality  Presenting problems: moderate  Diagnostic procedures: moderate  Management options: moderate           Procedures     ICD-10-CM ICD-9-CM    1.  Anemia due to chronic kidney disease, on chronic dialysis (HCC)  N18.6 585.6     D63.1 285.21     Z99.2 V45.11

## 2020-10-07 NOTE — CONSULTS
Gastroenterology Consult      Patient: Shanae Fitzpatrick MRN: [id number]  SSN: [id number]    YOB: 1950  Age: 69 y.o. Sex: female        Assessment:     1. Symptomatic anemia  -This is a recurrent problem and work-up has been done at the [facility] which includes a recent EGD and colonoscopy 1 month ago which was unremarkable according the patient. Also capsule endoscopy done in the past which she states was unremarkable also. Patient states the [place] has concluded that her anemia is secondary to blood loss during dialysis. We will have to determine whether she actually has GI blood loss since stools become dark at times along with a foul smelling stool suggestive of blood loss in the GI tract. 2.  Cirrhosis of liver with portal hypertension  -Patient had recent abdominal paracentesis last week where 5.5 L of fluid removed. 3.  Ascites    4. End-stage renal disease dialysis dependent  -Per nephrology recommendation    5. Hypotension  -Probably occurred secondary to severe anemia and dehydration after her hemodialysis. 6.  Gastroesophageal reflux disease    7. Congestive cardiomyopathy ejection fraction of 20%    8. History of asthma    Plan:     1. Closely monitor H&H and stools of blood. Make sure we obtain Hemoccult of her stool to determine whether there is gross GI bleeding. Patient does have inflamed internal hemorrhoids which may contribute to a positive sample. 2.  Obtain records from [facility] ([facility] AT [place] from her recent EGD and colonoscopy as well as possible her capsule endoscopy. 3.  Would transfuse patient during dialysis when available because of her increased risks with overload. 4.  May start back on her lactulose and she presently appears to be more constipated.   5.  Avoid IV fluids    Subjective:     Reason for consultation: Severe anemia    History: 65-year-old female with history of hypertension, diabetes mellitus, end-stage renal disease dialysis dependent, cirrhosis of the liver with portal hypertension and ascites, atrial flutter, as of heart failure, gastroesophageal reflux disease, asthma, and recurrent anemia who presented to the emergency room after dialysis with severe weakness and fatigue. The patient was found to be anemic with hemoglobin of 6.1. Patient had also received recent abdominal paracentesis. Patient states she had no gross GI bleeding. Her stools have been very dark intermittently. She has had nausea without vomiting. She does admit to soreness around lower rib regions bilaterally which started after her paracentesis. Patient is not on any blood thinners. She denies use of any NSAIDs. Patient states approximately 1 month ago she had an EGD and colonoscopy done at the [facility] and told everything was okay. She states she also had a capsule endoscopy done in the past which was unremarkable according to patient. Patient states that the [facility] they feel her recurrent anemia secondary to her blood loss doing the dialysis process. Patient also states she had recently been to the emergency room and found to have a urinary tract infection, but could not afford the antibiotic therapy so that was never started.     Hospital Problems  Date Reviewed: 10/6/2020          Codes Class Noted POA    Anemia ICD-10-CM: D64.9  ICD-9-CM: 285.9  9/17/2020 Unknown        Symptomatic anemia ICD-10-CM: D64.9  ICD-9-CM: 285.9  3/16/2018 Unknown            Past Medical History:   Diagnosis Date    Arthritis     Asthma     Chronic kidney disease     Chronic pain     Cirrhosis ([facility] [address] 12/17/2017    Diabetes ([facility] [address] diet controlled    GERD (gastroesophageal reflux disease)     Hypertension     Paroxysmal atrial fibrillation ([facility] [address] 10/6/2020     Past Surgical History:   Procedure Laterality Date    COLONOSCOPY N/A 1/12/2018    COLONOSCOPY performed by Nicolas Rodriguez MD at [address] N/A 3/19/2018    COLONOSCOPY performed by Alex Harris MD [place] at [facility] ENDOSCOPY    HX GYN  c section    HX VASCULAR ACCESS      dialysis       Family History   Family history unknown: Yes     Social History     Tobacco Use    Smoking status: Never Smoker    Smokeless tobacco: Never Used   Substance Use Topics    Alcohol use: No      Current Facility-Administered Medications   Medication Dose Route Frequency Provider Last Rate Last Dose    albuterol (PROVENTIL VENTOLIN) nebulizer solution 2.5 mg  2.5 mg Nebulization Q4H PRN Sophie Messer MD        midodrine (PROAMATINE) tablet 10 mg  10 mg Oral TID WITH MEALS Rica Alexander MD   10 mg at 10/07/20 5556    sevelamer carbonate (RENVELA) tab 800 mg  800 mg Oral TID WITH MEALS Rica Alexander MD   800 mg at 10/07/20 8242    morphine injection 1 mg  1 mg IntraVENous Q3H PRN Jaycee HUGGINS NP   1 mg at 10/06/20 1717    sodium chloride (NS) flush 5-40 mL  5-40 mL IntraVENous Q8H Zena Dorsey NP   10 mL at 10/07/20 0509    sodium chloride (NS) flush 5-40 mL  5-40 mL IntraVENous PRN New Dorsey NP        acetaminophen (TYLENOL) tablet 650 mg  650 mg Oral Q6H PRN New Dorsey NP        Or    acetaminophen (TYLENOL) suppository 650 mg  650 mg Rectal Q6H PRN New Dorsey NP        polyethylene glycol (MIRALAX) packet 17 g  17 g Oral DAILY PRN New Dorsey NP        promethazine (PHENERGAN) tablet 12.5 mg  12.5 mg Oral Q6H PRN Jaycee HUGGINS NP   12.5 mg at 10/06/20 2009    Or    ondansetron (ZOFRAN) injection 4 mg  4 mg IntraVENous Q6H PRN New Dorsey NP        glucose chewable tablet 16 g  4 Tab Oral PRN New Dorsey NP        dextrose (D50W) injection syrg 12.5-25 g  25-50 mL IntraVENous PRN New Dorsey NP        glucagon (GLUCAGEN) injection 1 mg  1 mg IntraMUSCular PRN New Dorsey NP        insulin lispro (HUMALOG) injection   SubCUTAneous AC&HS Jocelyn Longo, NP   3 Units at 10/07/20 0820    pantoprazole (PROTONIX) 40 mg in 0.9% sodium chloride 10 mL injection  40 mg IntraVENous Q12H SunitaPatrice Merari D, NP   40 mg at 10/07/20 0457    oxyCODONE IR (ROXICODONE) tablet 5 mg  5 mg Oral Q6H PRN Hernan Jj D, NP   5 mg at 10/07/20 9012    cholecalciferol (VITAMIN D3) (1000 Units /25 mcg) tablet 2 Tab  2,000 Units Oral DAILY Loann Pounds, NP   2 Tab at 10/07/20 8154    ferrous sulfate tablet 325 mg  325 mg Oral TID WITH MEALS Payamalcides Richard Lainair, NP   325 mg at 10/07/20 0313    0.9% sodium chloride infusion 250 mL  250 mL IntraVENous PRN Sunita Richard Lainair, NP            Allergies   Allergen Reactions    Amlodipine Rash, Hives and Itching    Aspirin Other (comments), Nausea Only and Unknown (comments)     GI bleed  GI bleeding      Heparin Unknown (comments)     bleeding      Ibuprofen Nausea and Vomiting    Metformin Other (comments)     swelling       Review of Systems:  Constitutional: No recent weight change, fever;(+)fatigue and weakness, loss of appetite. ENT/Mouth: No hearing loss, nose bleeds, sore throat, voice change, hoarseness, or difficulties with chewing and swallowing. Cardiovascular: No chest pain, palpitations, (+)swelling of feet/ankles/hands. Respiratory: No chronic or frequent coughs, spitting up blood, shortness of breath, or wheezing. Gastrointestinal: (+) abdominal pain, heartburn, (+)nausea, vomiting, constipation, No rectal bleeding, or blood in stool. Genitourinary: No frequent urination, burning or painful urination, blood in urine. Musculoskeletal:  No joint pain, stiffness/swelling, weakness of muscles, muscle pain/cramp, or back pain. Integument:  No rash/itching, change in skin color. Neurological: No dizziness/vertigo, loss of consciousness, numbness/tingling sensation, tremors, weakness in limbs, difficulty with balance, frequent or recurring headaches, memory loss or confusion.   Psychiatric:  No nervousness, depression, hallucinations, paranoia or suspiciousness. Endocrine: No excessive thirst or urination, heat or cold intolerance. Hematologic/Lymphatic: No bleeding/bruising tendency, phlebitis, or past transfusion. Objective:     Vitals:    10/07/20 0800 10/07/20 0822 10/07/20 1158 10/07/20 1222   BP:  (!) 122/38  (!) 119/45   Pulse: 93 82 72 73   Resp:  22  22   Temp:  99 °F (37.2 °C)  98.9 °F (37.2 °C)   SpO2:  98%  98%   Weight:       Height:            Physical Exam:  General: Alert, cooperative, no distress. Head:  Normocephalic, without obvious abnormality, atraumatic. Eyes:  Conjunctivae/corneas clear. Pupils equal, round, reactive to light. Extraocular movements intact. Lungs:  Clear to auscultation bilaterally. Chest wall: No tenderness or deformity. Heart:  Irregular rate and rhythm, S1, S2 normal, (+) murmur; No  click, rub, or gallop. Abdomen:  Soft, mild generalized tenderness. Bowel sounds normal. No masses. No organomegaly. Extremities: Extremities normal, atraumatic, no cyanosis or edema. Pulses: 2+ and symmetric all extremities. Skin: Skin color, texture, turgor normal. No rashes or lesions. Lymph nodes: Cervical, supraclavicular, and axillary nodes normal.  Neurologic: CNII-XII intact. Normal strength, sensation, and reflexes throughout. CBC w/Diff Recent Labs     10/07/20  0607 10/06/20  1115   WBC 5.1 4.5   RBC 2.19* 2.26*   HGB 6.0* 6.1*   HCT 19.9* 20.1*    227   GRANS 73  --    LYMPH 14*  --    EOS 3*  --         Chemistry Recent Labs     10/07/20  0607 10/06/20  1115   * 116*    142   K 5.2* 4.1    104   CO2 28 30   BUN 54* 40*   CREA 6.43* 5.02*   CA 7.0* 8.0*   AGAP 10 8   BUCR 8* 8*   AP  --  139*   TP  --  5.5*   ALB  --  2.5*   GLOB  --  3.0   AGRAT  --  0.8*        Lactic Acid No results found for: LAC  No results for input(s): LAC in the last 72 hours. Micro  No results for input(s): SDES, CULT in the last 72 hours.   No results for input(s): CULT in the last 72 hours.     Liver Enzymes Protein, total   Date Value Ref Range Status   10/06/2020 5.5 (L) 6.4 - 8.2 g/dL Final     Albumin   Date Value Ref Range Status   10/06/2020 2.5 (L) 3.5 - 5.0 g/dL Final     Globulin   Date Value Ref Range Status   10/06/2020 3.0 2.0 - 4.0 g/dL Final     A-G Ratio   Date Value Ref Range Status   10/06/2020 0.8 (L) 1.1 - 2.2   Final     Alk. phosphatase   Date Value Ref Range Status   10/06/2020 139 (H) 45 - 117 U/L Final     Recent Labs     10/06/20  1115   TP 5.5*   ALB 2.5*   GLOB 3.0   AGRAT 0.8*   *          Cardiac Enzymes No results found for: CPK, CK, CKMMB, CKMB, RCK3, CKMBT, CKNDX, CKND1, NICOLE, TROPT, TROIQ, JAIME, TROPT, TNIPOC, BNP, BNPP     BNP No results found for: BNP, BNPP, XBNPT     Coagulation No results for input(s): PTP, INR, APTT, INREXT in the last 72 hours. Thyroid  No results found for: T4, T3U, TSH, TSHEXT       Lipid Panel No results found for: CHOL, CHOLPOCT, CHOLX, CHLST, CHOLV, 275067, HDL, HDLP, LDL, LDLC, DLDLP, 376387, VLDLC, VLDL, TGLX, TRIGL, TRIGP, TGLPOCT, CHHD, CHHDX     Urinalysis No results found for: COLOR, APPRN, SPGRU, REFSG, VERENA, PROTU, GLUCU, KETU, BILU, UROU, TIBURCIO, LEUKU, GLUKE, EPSU, BACTU, WBCU, RBCU, CASTS, UCRY     XR (Most Recent). CXR reviewed by me and compared with previous CXR Results from Hospital Encounter encounter on 09/17/20   XR CHEST PORT    Narrative Chest single view. The lungs are clear. No interstitial or alveolar pulmonary edema. Cardiac  silhouette enlargement. Right-sided permacath appropriately positioned. No  pneumothorax or sizable pleural effusion. CT (Most Recent) Results from Hospital Encounter encounter on 10/06/20   CT ABD PELV WO CONT    Narrative Study: Abdominopelvic CT without contrast.    Clinical indication: Abdominal pain. Technique: Contiguous axial images of the abdomen and pelvis were obtained  without contrast. Coronal and sagittal reconstructions were obtained. Dose  reduction:  All CT scans at this facility are performed using dose reduction  optimization techniques as appropriate to a performed exam including the  following: Automated exposure control, adjustments of the mA and/or kV according  to patient size, or use of iterative reconstruction technique. Comparison: None available. Findings:    Heart is enlarged. No pericardial effusion. Low density of the cardiac chambers  relative to the myocardium, suggesting anemia. Multiple small nodules in the  right middle lobe, measuring up to 6 mm. Unenhanced liver, spleen, pancreas and adrenal glands are unremarkable. Gallbladder is grossly unremarkable. No biliary ductal dilatation. Kidneys are  atrophic. No hydronephrosis. No discrete renal mass. Urinary bladder is  decompressed. Vascular opacifications in the uterus. No discrete adnexal mass. Mild abdominopelvic ascites. No evidence of bowel obstruction or ileus. No significant bowel wall thickening. Appendix is not clearly visualized. No pneumoperitoneum. Complex moderate sized  umbilical hernia containing fluid. Moderate aortoiliac atherosclerosis. Vessel patency not assessed without  contrast. No lymphadenopathy by CT size criteria. Patchy sclerosis throughout the visualized vertebral bodies, likely due to renal  osteodystrophy. No acute osseous abnormality identified. Nonspecific suprapubic subcutaneous stranding and skin thickening. Impression Impression:  1. Nonspecific suprapubic subcutaneous stranding and skin thickening. Correlate  for cellulitis. 2.  Mild to moderate abdominopelvic ascites. Fluid-containing paraumbilical  hernia. 3.  No evidence of bowel obstruction. 4.  Cardiomegaly with imaging features of anemia. 5.  Bilateral renal cortical atrophy. 6.  Multiple small right middle lobe nodules, measuring up to 6 mm. Suggest  follow-up chest CT in 3-6 months. 7.  See full report for detailed findings.               Ariane Cummins MD  10/7/2020  12:52 PM.

## 2020-10-07 NOTE — PROGRESS NOTES
Progress Note  Date:10/7/2020       Room:[facility]/  Patient Name:Lizzette Fenton     YOB: 1950     Age:70 y.o. Subjective    Michelle Daniels is a 69 y.o. female with PMH significant for Diabetes, Hypertension, ESRD on hemodialysis T/Th/S, Atrial Flutter, not on anti-coagulation 2/2 prior GI Bleed, Chronic pain 2/2 L4/5 herniation, HFrEF last know EF 20%, followed by Dr. Mil Colvin, Chronic hypotension and Cirrhosis, [facility] today after completing treatment, reporting weakness and dizziness. She reports a 2 day history of generalized weakness, dizziness, and feeling generally unwell. She reports associated lower abdominal pain, as well as right flank pain. She also reports having flatus which she states has a strong heme odor, accompanied by dark stools, but denies any brittany blood. She denies N/V, but endorses poor appetite. She denies chest pain, dyspnea, cough or fever. In ED, labs shows BUN/Cr elevated consistent w ESRD, and anemia with HGB 6.1. She has been seen in consultation by Nephrology, and orders given for HD tomorrow. She has been typed and crossmatched for PRBC's. Hospitalist consulted for Observation and further management    Patient was seen and evaluated resting comfortably bed no acute distress is awake alert and oriented. Patient is still waiting for blood transfusion and plan for dialysis. Complains of hemorrhoidal pain as well as vaginal itch. Review of Systems   Constitutional: Negative. HENT: Negative. Eyes: Negative. Respiratory: Negative. Cardiovascular: Negative. Gastrointestinal: Negative. Hemorrhoids   Endocrine: Negative. Genitourinary: Negative. Vaginal itching    Musculoskeletal: Negative. Skin: Negative. Allergic/Immunologic: Negative. Neurological: Negative. Hematological: Negative. Psychiatric/Behavioral: Negative.     All other systems reviewed and are negative. Objective           Vitals Last 24 Hours:  Patient Vitals for the past 24 hrs:   Temp Pulse Resp BP SpO2   10/07/20 1222 98.9 °F (37.2 °C) 73 22 (!) 119/45 98 %   10/07/20 1158 -- 72 -- -- --   10/07/20 0822 99 °F (37.2 °C) 82 22 (!) 122/38 98 %   10/07/20 0800 -- 93 -- -- --   10/07/20 0400 98.2 °F (36.8 °C) 70 20 114/60 --   10/07/20 0053 98 °F (36.7 °C) 77 20 (!) 110/58 --   10/07/20 0000 -- 80 -- -- --   10/06/20 1951 -- 82 -- -- --   10/06/20 1940 98.7 °F (37.1 °C) 82 20 125/60 --   10/06/20 1709 -- 81 20 115/69 100 %        I/O (24Hr): No intake or output data in the 24 hours ending 10/07/20 1538    Physical Exam:  General: Alert, cooperative, no distress, appears stated age. Head:  Normocephalic, without obvious abnormality, atraumatic. Eyes:  Conjunctivae/corneas clear. Pupils equal, round, reactive to light. Extraocular movements intact. Lungs:  Clear to auscultation bilaterally. no wheeze, rales, crackles, rhonchi   Chest wall: No tenderness or deformity. Heart:  Regular rate and rhythm, S1, S2 normal, no murmur, click, rub or gallop. Abdomen:  Soft, non-tender. Bowel sounds normal. No masses,  No organomegaly. Extremities: Extremities normal, atraumatic, no cyanosis or edema. Pulses: 2+ and symmetric all extremities. Skin: Skin color, texture, turgor normal. No rashes or lesions  Neurologic: Awake, Alert, oriented.  No obvious gross sensory or motor deficits      Medications           Current Facility-Administered Medications   Medication Dose Route Frequency    albuterol (PROVENTIL VENTOLIN) nebulizer solution 2.5 mg  2.5 mg Nebulization Q4H PRN    midodrine (PROAMATINE) tablet 10 mg  10 mg Oral TID WITH MEALS    sevelamer carbonate (RENVELA) tab 800 mg  800 mg Oral TID WITH MEALS    morphine injection 1 mg  1 mg IntraVENous Q3H PRN    sodium chloride (NS) flush 5-40 mL  5-40 mL IntraVENous Q8H    sodium chloride (NS) flush 5-40 mL  5-40 mL IntraVENous PRN    acetaminophen (TYLENOL) tablet 650 mg  650 mg Oral Q6H PRN    Or    acetaminophen (TYLENOL) suppository 650 mg  650 mg Rectal Q6H PRN    polyethylene glycol (MIRALAX) packet 17 g  17 g Oral DAILY PRN    promethazine (PHENERGAN) tablet 12.5 mg  12.5 mg Oral Q6H PRN    Or    ondansetron (ZOFRAN) injection 4 mg  4 mg IntraVENous Q6H PRN    glucose chewable tablet 16 g  4 Tab Oral PRN    dextrose (D50W) injection syrg 12.5-25 g  25-50 mL IntraVENous PRN    glucagon (GLUCAGEN) injection 1 mg  1 mg IntraMUSCular PRN    insulin lispro (HUMALOG) injection   SubCUTAneous AC&HS    pantoprazole (PROTONIX) 40 mg in 0.9% sodium chloride 10 mL injection  40 mg IntraVENous Q12H    oxyCODONE IR (ROXICODONE) tablet 5 mg  5 mg Oral Q6H PRN    cholecalciferol (VITAMIN D3) (1000 Units /25 mcg) tablet 2 Tab  2,000 Units Oral DAILY    ferrous sulfate tablet 325 mg  325 mg Oral TID WITH MEALS    0.9% sodium chloride infusion 250 mL  250 mL IntraVENous PRN         Allergies         Amlodipine; Aspirin; Heparin; Ibuprofen; and Metformin       Labs/Imaging/Diagnostics      Labs:  Recent Results (from the past 48 hour(s))   CBC W/O DIFF    Collection Time: 10/06/20 11:15 AM   Result Value Ref Range    WBC 4.5 4.4 - 11.3 K/uL    RBC 2.26 (L) 4.50 - 5.90 M/uL    HGB 6.1 (L) 13.5 - 17.5 g/dL    HCT 20.1 (L) 36 - 46 %    MCV 88.9 80 - 100 FL    MCH 27.2 (L) 31 - 34 PG    MCHC 30.6 (L) 31.0 - 36.0 g/dL    RDW 21.0 (H) 11.5 - 14.5 %    PLATELET 098 K/uL    MPV 8.7 6.5 - 11.5 FL    NRBC 30.0  WBC    ABSOLUTE NRBC 1.95 K/uL   METABOLIC PANEL, COMPREHENSIVE    Collection Time: 10/06/20 11:15 AM   Result Value Ref Range    Sodium 142 136 - 145 mmol/L    Potassium 4.1 3.5 - 5.1 mmol/L    Chloride 104 97 - 108 mmol/L    CO2 30 21 - 32 mmol/L    Anion gap 8 5 - 15 mmol/L    Glucose 116 (H) 65 - 100 mg/dL    BUN 40 (H) 6 - 20 mg/dL    Creatinine 5.02 (H) 0.55 - 1.02 mg/dL    BUN/Creatinine ratio 8 (L) 12 - 20      GFR est AA 10 (L) >60 ml/min/1.73m2    GFR est non-AA 8 (L) >60 ml/min/1.73m2    Calcium 8.0 (L) 8.5 - 10.1 mg/dL    Bilirubin, total 0.4 0.2 - 1.0 mg/dL    AST (SGOT) 18 15 - 37 U/L    ALT (SGPT) 15 12 - 78 U/L    Alk.  phosphatase 139 (H) 45 - 117 U/L    Protein, total 5.5 (L) 6.4 - 8.2 g/dL    Albumin 2.5 (L) 3.5 - 5.0 g/dL    Globulin 3.0 2.0 - 4.0 g/dL    A-G Ratio 0.8 (L) 1.1 - 2.2     TYPE & SCREEN    Collection Time: 10/06/20 11:15 AM   Result Value Ref Range    Crossmatch Expiration 10/09/2020,2359     ABO/Rh(D) O Positive     Antibody screen Positive     Antibody ID No additional antibodies detected     XXAUTO CONTROL Negative     Unit number [id number]     Blood component type RC LR     Unit division 00     Status of unit Allocated     TRANSFUSION STATUS Ok to transfuse     Crossmatch result Compatible     ANTIGEN/ANTIBODY INFO E Negative     Unit number [id number]     Blood component type RC LR,1     Unit division 00     Status of unit Allocated     TRANSFUSION STATUS Ok to transfuse     Crossmatch result Compatible     ANTIGEN/ANTIBODY INFO E Negative    GLUCOSE, POC    Collection Time: 10/06/20  7:57 PM   Result Value Ref Range    Glucose (POC) 116 (H) 65 - 100 mg/dL    Performed by [facility]    METABOLIC PANEL, BASIC    Collection Time: 10/07/20  6:07 AM   Result Value Ref Range    Sodium 143 136 - 145 mmol/L    Potassium 5.2 (H) 3.5 - 5.1 mmol/L    Chloride 105 97 - 108 mmol/L    CO2 28 21 - 32 mmol/L    Anion gap 10 5 - 15 mmol/L    Glucose 220 (H) 65 - 100 mg/dL    BUN 54 (H) 6 - 20 mg/dL    Creatinine 6.43 (H) 0.55 - 1.02 mg/dL    BUN/Creatinine ratio 8 (L) 12 - 20      GFR est AA 8 (L) >60 ml/min/1.73m2    GFR est non-AA 6 (L) >60 ml/min/1.73m2    Calcium 7.0 (L) 8.5 - 10.1 mg/dL   CBC WITH AUTOMATED DIFF    Collection Time: 10/07/20  6:07 AM   Result Value Ref Range    WBC 5.1 4.4 - 11.3 K/uL    RBC 2.19 (L) 4.50 - 5.90 M/uL    HGB 6.0 (L) 13.5 - 17.5 g/dL    HCT 19.9 (L) 36 - 46 %    MCV 90.8 80 - 100 FL MCH 27.7 (L) 31 - 34 PG    MCHC 30.4 (L) 31.0 - 36.0 g/dL    RDW 21.1 (H) 11.5 - 14.5 %    PLATELET 892 K/uL    MPV 8.7 6.5 - 11.5 FL    NRBC 20.0  WBC    ABSOLUTE NRBC 0.01 K/uL    NEUTROPHILS 73 42 - 75 %    LYMPHOCYTES 14 (L) 20.5 - 51.1 %    MONOCYTES 10 (H) 1.7 - 9.3 %    EOSINOPHILS 3 (H) 0.9 - 2.9 %    BASOPHILS 0 0.0 - 2.5 %    ABS. NEUTROPHILS 3.8 1.8 - 7.7 K/UL    ABS. LYMPHOCYTES 0.7 (L) 1.0 - 4.8 K/UL    ABS. MONOCYTES 0.5 0.2 - 2.4 K/UL    ABS. EOSINOPHILS 0.1 0.0 - 0.7 K/UL    ABS. BASOPHILS 0.0 0.0 - 0.2 K/UL   GLUCOSE, POC    Collection Time: 10/07/20  7:48 AM   Result Value Ref Range    Glucose (POC) 219 (H) 65 - 100 mg/dL    Performed by Neha Boogie, POC    Collection Time: 10/07/20 12:05 PM   Result Value Ref Range    Glucose (POC) 106 (H) 65 - 100 mg/dL    Performed by Efra Solano         Imaging:  Ct Abd Pelv Wo Cont    Result Date: 10/6/2020  Impression: 1. Nonspecific suprapubic subcutaneous stranding and skin thickening. Correlate for cellulitis. 2.  Mild to moderate abdominopelvic ascites. Fluid-containing paraumbilical hernia. 3.  No evidence of bowel obstruction. 4.  Cardiomegaly with imaging features of anemia. 5.  Bilateral renal cortical atrophy. 6.  Multiple small right middle lobe nodules, measuring up to 6 mm. Suggest follow-up chest CT in 3-6 months. 7.  See full report for detailed findings.        Assessment//Plan           Problem List:  Hospital Problems  Date Reviewed: 10/6/2020          Codes Class Noted POA    Anemia ICD-10-CM: D64.9  ICD-9-CM: 285.9  9/17/2020 Unknown        Symptomatic anemia ICD-10-CM: D64.9  ICD-9-CM: 285.9  3/16/2018 Unknown               Symptomatic Anemia  - 2/2 Chronic Disease, but possible multi-factorial given hx GIB  - T&C - Transfuse 2 units PRBCs  - Repeat H&H 4 hours after second tx  - Hemmocult stool  - CT ABD wo resulting in nonspecific findings-   - Consult to GI appreciated commending to monitor H&H closely as well as to obtain records from the [place]     ESRD on HD  - Nephrology consulted  - HD as per their recommendations  - 1L Fluid Restriction  -New home dose of Midrine and sevelamer     Right Flank pain/Lower abd pain  - Reports no urine o/p  - CT ABD wo without specific findings  - No fever, no leukocytosis      Hypotension  - Home meds unclear, reconcile   - Continue Midodrine as ordered by nephrology- Monitor BP     Diabetes  - Accuchecks ACHS  - SSI coverage  - Continue home meds     HFrEF  - Last known EF 20%  - No s/s volume overload on exam     Atrial Fib/Flutter  - No anti-coag 2/2 GI Bleed  - Rate controlled  - Continue home meds     Chronic Pain  - Morphine IV Q3H prn severe pain     Cirrhosis  - Large volume paracentesis done yesterday  - Hold Lactulose x 1 day, until determine if GI Bleed present     GI ppx: PPI every day    VTE ppx: SCDs only 2/2 ? GIB  Medical surrogates are daughter Marilin Boone, son Regina Saldana  Pt is a Full Code    Full Code     Spent 25 minutes evaluting and coordinating patient care of which >50% was spent coordinating and counseling.    Electronically signed by Elijah Gamez MD on 10/7/2020 at 3:38 PM

## 2020-10-07 NOTE — DIALYSIS
[facility] Acutes  [phone]    Vitals   Pre   Post   Assessment   Pre   Post     Temp  Temp: 98.6 °F (37 °C) (10/07/20 1746)  98.5 LOC  Alert and oriented x4 Alert and oriented x4   HR   Pulse (Heart Rate): 66 (10/07/20 1746) 62 Lungs   Diminished on room air  diminished on room air   B/P   BP: (!) 156/58 (10/07/20 1746) 141/65 Cardiac   B/p wnl  b/p wnl   Resp   Resp Rate: 20 (10/07/20 1746) 20 Skin   intact  intact   Pain level  0 0 Edema  Abd distended     Abd distended   Orders:    Duration:   Start:    7304 End:    2118 Total:   3.5hrs   Dialyzer:   Dialyzer/Set Up Inspection: Ward [place] (10/07/20 1746)   K Bath:   Dialysate K (mEq/L): 3 (10/07/20 1746)   Ca Bath:   Dialysate CA (mEq/L): 2.5 (10/07/20 1746)   Na/Bicarb:   Dialysate NA (mEq/L): 138 (10/07/20 1746)   Target Fluid Removal:   Goal/Amount of Fluid to Remove (mL): 2000 mL (10/07/20 1746)   Access     Type & Location:   Rt subclavian perma-cath. Each catheter limb disinfected per p&p, caps removed, hubs disinfected per p&p. Blood aspirated and lines flushed without any issues. Good blood flow.  Dressing changed, no signs of infection noted       Labs     Obtained/Reviewed   Critical Results Called   Date when labs were drawn-  Hgb-    HGB   Date Value Ref Range Status   10/07/2020 6.0 (L) 13.5 - 17.5 g/dL Final     Comment:     Results verified, phoned to and read back by [facility] @0625 10/07/20 GKB     K-    Potassium   Date Value Ref Range Status   10/07/2020 5.2 (H) 3.5 - 5.1 mmol/L Final     Ca-   Calcium   Date Value Ref Range Status   10/07/2020 7.0 (L) 8.5 - 10.1 mg/dL Final     Bun-   BUN   Date Value Ref Range Status   10/07/2020 54 (H) 6 - 20 mg/dL Final     Creat-   Creatinine   Date Value Ref Range Status   10/07/2020 6.43 (H) 0.55 - 1.02 mg/dL Final        Medications/ Blood Products Given     Name   Dose   Route and Time     PRBC's 2 units Started at 1827             Blood Volume Processed (BVP): 77.5 Net Fluid   Removed:  3kg-1kg for blood transfusion=   2kg REMOVED   Comments   Time Out Done: 1700  Primary Nurse Rpt Pre: Rosio Witt LPN  Primary Nurse Rpt Randal Braswell RN  Pt Education: ESRD  Care Plan ESRD  Tx Summary:  3949 Dialysis started after consent  1827 1st unit of blood started  1915 Blood completed, no adverse reactions noted   1935 2nd unit of blood started  2030 Blood completed   2118 Dialysis completed and blood rinsed back. Each dialysis catheter limb disinfected per p&p, blood returned per p&p, each dialysis hub disinfected per p&p, post dialysis catheter dwell instilled with saline per order, and caps applied. Admiting Diagnosis: Anemia  Pt's previous clinic-  Consent signed - Informed Consent Verified: Yes (10/07/20 1746)  Hepatitis Status- Immune 8/11/20 clinic  Machine #- Machine Number: [id number] (10/07/20 1746)  Telemetry status-  Pre-dialysis wt. -

## 2020-10-07 NOTE — PROGRESS NOTES
Progress Note  Date:10/7/2020       Room:[facility]  Patient Name:Lizzette DIMAS Copake     YOB: 1950     Age:70 y.o. Subjective    Subjective:  Symptoms:  Stable. She reports malaise and weakness. No shortness of breath or chest pain. Diet:  Poor intake. Activity level: Impaired due to weakness. Pain:  She complains of pain that is moderate. Review of Systems   Constitutional: Positive for fever. Respiratory: Negative for shortness of breath. Cardiovascular: Negative for chest pain. Neurological: Positive for weakness. Objective         Vitals Last 24 Hours:  TEMPERATURE:  Temp  Av.6 °F (37 °C)  Min: 98 °F (36.7 °C)  Max: 99 °F (37.2 °C)  RESPIRATIONS RANGE: Resp  Av  Min: 18  Max: 22  PULSE OXIMETRY RANGE: SpO2  Av.7 %  Min: 98 %  Max: 100 %  PULSE RANGE: Pulse  Av.2  Min: 70  Max: 93  BLOOD PRESSURE RANGE: Systolic (42NFN), WFL:000 , Min:110 , EEB:530   ; Diastolic (77HWS), IMN:70, Min:38, Max:69    I/O (24Hr): No intake or output data in the 24 hours ending 10/07/20 1437  Objective:  General Appearance:  Comfortable. Vital signs: (most recent): Blood pressure (!) 119/45, pulse 73, temperature 98.9 °F (37.2 °C), resp. rate 22, height 5' 4\" (1.626 m), weight 69.6 kg (153 lb 8 oz), SpO2 98 %. Fever. HEENT: Normal HEENT exam.    Lungs:  Normal effort and normal respiratory rate. Breath sounds clear to auscultation. Heart: Normal rate. Regular rhythm. S1 normal and S2 normal.    Skin:  Warm and dry. Labs/Imaging/Diagnostics    Labs:  CBC:  Recent Labs     10/07/20  0607 10/06/20  1115   WBC 5.1 4.5   RBC 2.19* 2.26*   HGB 6.0* 6.1*   HCT 19.9* 20.1*   MCV 90.8 88.9   RDW 21.1* 21.0*    227     CHEMISTRIES:  Recent Labs     10/07/20  0607 10/06/20  1115    142   K 5.2* 4.1    104   CO2 28 30   BUN 54* 40*   CA 7.0* 8.0*   PT/INR:No results for input(s): INR, INREXT in the last 72 hours.     No lab exists for component: PROTIME  APTT:No results for input(s): APTT in the last 72 hours. LIVER PROFILE:  Recent Labs     10/06/20  1115   AST 18   ALT 15     Lab Results   Component Value Date/Time    ALT (SGPT) 15 10/06/2020 11:15 AM    AST (SGOT) 18 10/06/2020 11:15 AM    Alk. phosphatase 139 (H) 10/06/2020 11:15 AM    Bilirubin, total 0.4 10/06/2020 11:15 AM       Imaging Last 24 Hours:  Ct Abd Pelv Wo Cont    Result Date: 10/6/2020  Study: Abdominopelvic CT without contrast. Clinical indication: Abdominal pain. Technique: Contiguous axial images of the abdomen and pelvis were obtained without contrast. Coronal and sagittal reconstructions were obtained. Dose reduction: All CT scans at this facility are performed using dose reduction optimization techniques as appropriate to a performed exam including the following: Automated exposure control, adjustments of the mA and/or kV according to patient size, or use of iterative reconstruction technique. Comparison: None available. Findings: Heart is enlarged. No pericardial effusion. Low density of the cardiac chambers relative to the myocardium, suggesting anemia. Multiple small nodules in the right middle lobe, measuring up to 6 mm. Unenhanced liver, spleen, pancreas and adrenal glands are unremarkable. Gallbladder is grossly unremarkable. No biliary ductal dilatation. Kidneys are atrophic. No hydronephrosis. No discrete renal mass. Urinary bladder is decompressed. Vascular opacifications in the uterus. No discrete adnexal mass. Mild abdominopelvic ascites. No evidence of bowel obstruction or ileus. No significant bowel wall thickening. Appendix is not clearly visualized. No pneumoperitoneum. Complex moderate sized umbilical hernia containing fluid. Moderate aortoiliac atherosclerosis. Vessel patency not assessed without contrast. No lymphadenopathy by CT size criteria.  Patchy sclerosis throughout the visualized vertebral bodies, likely due to renal osteodystrophy. No acute osseous abnormality identified. Nonspecific suprapubic subcutaneous stranding and skin thickening. Impression: 1. Nonspecific suprapubic subcutaneous stranding and skin thickening. Correlate for cellulitis. 2.  Mild to moderate abdominopelvic ascites. Fluid-containing paraumbilical hernia. 3.  No evidence of bowel obstruction. 4.  Cardiomegaly with imaging features of anemia. 5.  Bilateral renal cortical atrophy. 6.  Multiple small right middle lobe nodules, measuring up to 6 mm. Suggest follow-up chest CT in 3-6 months. 7.  See full report for detailed findings. Assessment//Plan   Active Problems:    Symptomatic anemia (3/16/2018)      Anemia (9/17/2020)      Assessment & Plan  #ESRD ON HD -TTS SCHEDULE  #NORMOCYTIC ANEMIA  -SYMPTOMATIC  # VOLUME DEPLETION  # LIVER CIRRHOSIS WITH RECURRENT ASCITES  # CHF WITH REDUCED EF        Plan:   Planning on HD as per her regular TTS schedule. Can transfuse blood when available.   Continue midodrine 10mg tid  Renvela 800mg tid  Continued monitoring for hemodynamics and respiratory symptoms.               Electronically signed by Nkechi Gimenez MD on 10/7/2020 at 2:37 PM

## 2020-10-08 LAB
ANION GAP SERPL CALC-SCNC: 6 MMOL/L (ref 5–15)
BASOPHILS # BLD: 0.1 K/UL (ref 0–0.2)
BASOPHILS NFR BLD: 1 % (ref 0–2.5)
BUN SERPL-MCNC: 28 MG/DL (ref 6–20)
BUN/CREAT SERPL: 7 (ref 12–20)
CA-I BLD-MCNC: 7.9 MG/DL (ref 8.5–10.1)
CHLORIDE SERPL-SCNC: 104 MMOL/L (ref 97–108)
CO2 SERPL-SCNC: 31 MMOL/L (ref 21–32)
COLLECT DATE STL: POSITIVE
CREAT SERPL-MCNC: 4.15 MG/DL (ref 0.55–1.02)
EOSINOPHIL # BLD: 0.2 K/UL (ref 0–0.7)
EOSINOPHIL NFR BLD: 3 % (ref 0.9–2.9)
ERYTHROCYTE [DISTWIDTH] IN BLOOD BY AUTOMATED COUNT: 18.9 % (ref 11.5–14.5)
GLUCOSE BLD STRIP.AUTO-MCNC: 138 MG/DL (ref 65–100)
GLUCOSE BLD STRIP.AUTO-MCNC: 141 MG/DL (ref 65–100)
GLUCOSE BLD STRIP.AUTO-MCNC: 152 MG/DL (ref 65–100)
GLUCOSE BLD STRIP.AUTO-MCNC: 83 MG/DL (ref 65–100)
GLUCOSE SERPL-MCNC: 133 MG/DL (ref 65–100)
HCT VFR BLD AUTO: 27.5 % (ref 36–46)
HEMOCCULT SP1 STL QL: POSITIVE
HGB BLD-MCNC: 8.8 G/DL (ref 13.5–17.5)
LYMPHOCYTES # BLD: 0.7 K/UL (ref 1–4.8)
LYMPHOCYTES NFR BLD: 11 % (ref 20.5–51.1)
MCH RBC QN AUTO: 28.2 PG (ref 31–34)
MCHC RBC AUTO-ENTMCNC: 31.9 G/DL (ref 31–36)
MCV RBC AUTO: 88.5 FL (ref 80–100)
MONOCYTES # BLD: 0.5 K/UL (ref 0.2–2.4)
MONOCYTES NFR BLD: 9 % (ref 1.7–9.3)
NEUTS SEG # BLD: 4.9 K/UL (ref 1.8–7.7)
NEUTS SEG NFR BLD: 76 % (ref 42–75)
NRBC # BLD: 0.01 K/UL
NRBC BLD-RTO: 10 PER 100 WBC
PERFORMED BY, TECHID: ABNORMAL
PERFORMED BY, TECHID: NORMAL
PLATELET # BLD AUTO: 202 K/UL
PMV BLD AUTO: 7.6 FL (ref 6.5–11.5)
POTASSIUM SERPL-SCNC: 4.6 MMOL/L (ref 3.5–5.1)
RBC # BLD AUTO: 3.11 M/UL (ref 4.5–5.9)
SODIUM SERPL-SCNC: 141 MMOL/L (ref 136–145)
WBC # BLD AUTO: 6.3 K/UL (ref 4.4–11.3)

## 2020-10-08 PROCEDURE — 74011250637 HC RX REV CODE- 250/637: Performed by: HOSPITALIST

## 2020-10-08 PROCEDURE — 74011636637 HC RX REV CODE- 636/637: Performed by: NURSE PRACTITIONER

## 2020-10-08 PROCEDURE — 99218 HC RM OBSERVATION: CPT

## 2020-10-08 PROCEDURE — C9113 INJ PANTOPRAZOLE SODIUM, VIA: HCPCS | Performed by: NURSE PRACTITIONER

## 2020-10-08 PROCEDURE — 82962 GLUCOSE BLOOD TEST: CPT

## 2020-10-08 PROCEDURE — 74011000250 HC RX REV CODE- 250: Performed by: NURSE PRACTITIONER

## 2020-10-08 PROCEDURE — 74011250636 HC RX REV CODE- 250/636: Performed by: NURSE PRACTITIONER

## 2020-10-08 PROCEDURE — 74011250637 HC RX REV CODE- 250/637: Performed by: NURSE PRACTITIONER

## 2020-10-08 PROCEDURE — 74011250637 HC RX REV CODE- 250/637: Performed by: FAMILY MEDICINE

## 2020-10-08 PROCEDURE — 85025 COMPLETE CBC W/AUTO DIFF WBC: CPT

## 2020-10-08 PROCEDURE — 82272 OCCULT BLD FECES 1-3 TESTS: CPT

## 2020-10-08 PROCEDURE — 36415 COLL VENOUS BLD VENIPUNCTURE: CPT

## 2020-10-08 PROCEDURE — 96376 TX/PRO/DX INJ SAME DRUG ADON: CPT

## 2020-10-08 PROCEDURE — 74011250637 HC RX REV CODE- 250/637: Performed by: INTERNAL MEDICINE

## 2020-10-08 PROCEDURE — 80048 BASIC METABOLIC PNL TOTAL CA: CPT

## 2020-10-08 RX ADMIN — FERROUS SULFATE TAB 325 MG (65 MG ELEMENTAL FE) 325 MG: 325 (65 FE) TAB at 11:30

## 2020-10-08 RX ADMIN — SEVELAMER CARBONATE 800 MG: 800 TABLET, FILM COATED ORAL at 11:30

## 2020-10-08 RX ADMIN — ACETAMINOPHEN 650 MG: 325 TABLET ORAL at 11:33

## 2020-10-08 RX ADMIN — HYDROCORTISONE ACETATE 25 MG: 25 SUPPOSITORY RECTAL at 21:11

## 2020-10-08 RX ADMIN — OXYCODONE HYDROCHLORIDE 5 MG: 5 TABLET ORAL at 21:16

## 2020-10-08 RX ADMIN — FERROUS SULFATE TAB 325 MG (65 MG ELEMENTAL FE) 325 MG: 325 (65 FE) TAB at 16:43

## 2020-10-08 RX ADMIN — LACTULOSE 30 ML: 20 SOLUTION ORAL at 11:30

## 2020-10-08 RX ADMIN — MORPHINE SULFATE 1 MG: 2 INJECTION, SOLUTION INTRAMUSCULAR; INTRAVENOUS at 21:16

## 2020-10-08 RX ADMIN — Medication 2 TABLET: at 08:05

## 2020-10-08 RX ADMIN — OXYCODONE HYDROCHLORIDE 5 MG: 5 TABLET ORAL at 15:38

## 2020-10-08 RX ADMIN — Medication 10 ML: at 08:08

## 2020-10-08 RX ADMIN — SODIUM CHLORIDE 40 MG: 9 INJECTION, SOLUTION INTRAMUSCULAR; INTRAVENOUS; SUBCUTANEOUS at 21:11

## 2020-10-08 RX ADMIN — Medication 10 ML: at 21:12

## 2020-10-08 RX ADMIN — Medication 5 ML: at 12:23

## 2020-10-08 RX ADMIN — HYDROCORTISONE ACETATE 25 MG: 25 SUPPOSITORY RECTAL at 08:03

## 2020-10-08 RX ADMIN — SEVELAMER CARBONATE 800 MG: 800 TABLET, FILM COATED ORAL at 08:02

## 2020-10-08 RX ADMIN — FERROUS SULFATE TAB 325 MG (65 MG ELEMENTAL FE) 325 MG: 325 (65 FE) TAB at 08:02

## 2020-10-08 RX ADMIN — INSULIN LISPRO 2 UNITS: 100 INJECTION, SOLUTION INTRAVENOUS; SUBCUTANEOUS at 16:43

## 2020-10-08 RX ADMIN — SEVELAMER CARBONATE 800 MG: 800 TABLET, FILM COATED ORAL at 16:43

## 2020-10-08 RX ADMIN — MORPHINE SULFATE 1 MG: 2 INJECTION, SOLUTION INTRAMUSCULAR; INTRAVENOUS at 12:23

## 2020-10-08 RX ADMIN — SODIUM CHLORIDE 40 MG: 9 INJECTION, SOLUTION INTRAMUSCULAR; INTRAVENOUS; SUBCUTANEOUS at 08:03

## 2020-10-08 RX ADMIN — Medication 10 ML: at 05:40

## 2020-10-08 RX ADMIN — OXYCODONE HYDROCHLORIDE 5 MG: 5 TABLET ORAL at 08:07

## 2020-10-08 NOTE — PROGRESS NOTES
Spiritual Care Assessment/Progress Note  [facility]      NAME: Oj Polanco      MRN: [id number]  AGE: 69 y.o.  SEX: female  [employer] Affiliation: [employer]   Language: English     10/8/2020     Total Time (in minutes): (P) 30     Spiritual Assessment begun in [facility] [address] through conversation with:         [x]Patient        [] Family    [] Friend(s)        Reason for Consult: (P) Request by staff     Spiritual beliefs: (Please include comment if needed)     [x] Identifies with a nadine tradition:         [] Supported by a nadine community:            [] Claims no spiritual orientation:           [] Seeking spiritual identity:                [] Adheres to an individual form of spirituality:           [] Not able to assess:                           Identified resources for coping:      [x] Prayer                               [] Music                  [] Guided Imagery     [] Family/friends                 [] Pet visits     [] Devotional reading                         [] Unknown     [] Other:                                            Interventions offered during this visit: (See comments for more details)    Patient Interventions: (P) Affirmation of emotions/emotional suffering, Affirmation of nadine, Catharsis/review of pertinent events in supportive environment, Iconic (affirming the presence of God/Higher Power), Initial/Spiritual assessment, Critical care, Life review/legacy, Normalization of emotional/spiritual concerns, Prayer (assurance of)           Plan of Care:     [] Support spiritual and/or cultural needs    [] Support AMD and/or advance care planning process      [] Support grieving process   [] Coordinate Rites and/or Rituals    [] Coordination with community clergy   [] No spiritual needs identified at this time   [] Detailed Plan of Care below (See Comments)  [] Make referral to Music Therapy  [] Make referral to Pet Therapy     [] Make referral to Addiction services  [] Make referral to [facility]  [] Make referral to Spiritual Care Partner  [] No future visits requested        [x] Follow up visits as needed     Comments: Consult request for patient MsVivek Fenton in Acute Care. Only the patient was present during the visit. Patient shared about her nadine in God which she stated was essential in her life. She gave thanks to Yuridia Duvall who recently provided her earthly needs in a tangible way. Patient shared about the struggles of her physical illness preventing her from taking care of other family members, especially her grandchildren. She shared in detail some of the relational challenges surrounding her. I normalized her experience and reflected her thoughts and feelings. Ms. Giselle Carroll seemed to appreciate the spiritual support provided and expressed genuine gratitude for the visit. I advised of  availability should she feel the need.   Chaplain Jossy Chicas M.Div.

## 2020-10-08 NOTE — PROGRESS NOTES
Readmission Assessment  Number of days since last admission?: 8-30 days  Previous disposition: Home with Family  Who is being interviewed?: Patient  What was the patient's/caregiver's perception as to why they think they needed to return back to the hospital?: Other (Comment)(was sent by dialysis)  Did you visit your Primary Care Physician after you left the hospital, before you returned this time?: Yes  Did you see a specialist, such as Cardiac, Pulmonary, Orthopedic Physician, etc. after you left the hospital?: No  Who advised the patient to return to the hospital?: Physician, Physician's Nurse/Office Staff  Does the patient report anything that got in the way of taking their medications?: No  In our efforts to provide the best possible care to you and others like you, can you think of anything that we could have done to help you after you left the hospital the first time, so that you might not have needed to return so soon?: Other (Comment)(HH to potentially draw labs to prevent ED visit for chronic low hgb)

## 2020-10-08 NOTE — PROGRESS NOTES
Progress Note    Patient: Wendelin Schirmer MRN: [id number]  SSN: [id number]    YOB: 1950  Age: 69 y.o. Sex: female      Admit Date: 10/6/2020    LOS: 0 days     Subjective:     Presents with generalized weakness and found to be anemic. Status post transfusion of 2 units PRBCs with dialysis yesterday. Patient is feeling better. Objective:     Vitals:    10/07/20 2100 10/07/20 2118 10/08/20 0100 10/08/20 0620   BP: 127/65 (!) 141/65 135/76 (!) 137/92   Pulse: 62 62 70 73   Resp: 18 20 18 22   Temp:  98.5 °F (36.9 °C) 98.5 °F (36.9 °C) 98.1 °F (36.7 °C)   SpO2:   98% 100%   Weight:       Height:            Intake and Output:  Current Shift: No intake/output data recorded. Last three shifts: 10/06 1901 - 10/08 0700  In: 700   Out: 2000     Physical Exam:   GENERAL: alert, cooperative, no distress, appears stated age  THROAT & NECK: normal and no erythema or exudates noted. LUNG: clear to auscultation bilaterally  HEART: regular rate and rhythm, S1, S2 normal, no murmur, click, rub or gallop  ABDOMEN: soft, non-tender. Bowel sounds normal. No masses,  no organomegaly  EXTREMITIES:  extremities normal, atraumatic, no cyanosis or edema  SKIN: no rash or abnormalities  NEUROLOGIC: AOx3. Gait normal. Reflexes and motor strength normal and symmetric. Cranial nerves 2-12 and sensation grossly intact. PSYCHIATRIC: non focal    Lab/Data Review: All lab results for the last 24 hours reviewed.      Recent Results (from the past 24 hour(s))   GLUCOSE, POC    Collection Time: 10/07/20 12:05 PM   Result Value Ref Range    Glucose (POC) 106 (H) 65 - 100 mg/dL    Performed by Jase Miranda, POC    Collection Time: 10/07/20  4:24 PM   Result Value Ref Range    Glucose (POC) 155 (H) 65 - 100 mg/dL    Performed by Jase Miranda, POC    Collection Time: 10/07/20  8:59 PM   Result Value Ref Range    Glucose (POC) 147 (H) 65 - 100 mg/dL    Performed by [id number] [facility] Collection Time: 10/08/20  7:31 AM   Result Value Ref Range    Glucose (POC) 83 65 - 100 mg/dL    Performed by [facility]    METABOLIC PANEL, BASIC    Collection Time: 10/08/20  9:30 AM   Result Value Ref Range    Sodium 141 136 - 145 mmol/L    Potassium 4.6 3.5 - 5.1 mmol/L    Chloride 104 97 - 108 mmol/L    CO2 31 21 - 32 mmol/L    Anion gap 6 5 - 15 mmol/L    Glucose 133 (H) 65 - 100 mg/dL    BUN 28 (H) 6 - 20 mg/dL    Creatinine 4.15 (H) 0.55 - 1.02 mg/dL    BUN/Creatinine ratio 7 (L) 12 - 20      GFR est AA 13 (L) >60 ml/min/1.73m2    GFR est non-AA 11 (L) >60 ml/min/1.73m2    Calcium 7.9 (L) 8.5 - 10.1 mg/dL   CBC WITH AUTOMATED DIFF    Collection Time: 10/08/20  9:30 AM   Result Value Ref Range    WBC 6.3 4.4 - 11.3 K/uL    RBC 3.11 (L) 4.50 - 5.90 M/uL    HGB 8.8 (L) 13.5 - 17.5 g/dL    HCT 27.5 (L) 36 - 46 %    MCV 88.5 80 - 100 FL    MCH 28.2 (L) 31 - 34 PG    MCHC 31.9 31.0 - 36.0 g/dL    RDW 18.9 (H) 11.5 - 14.5 %    PLATELET 221 K/uL    MPV 7.6 6.5 - 11.5 FL    NRBC 10.0  WBC    ABSOLUTE NRBC 0.01 K/uL    NEUTROPHILS 76 (H) 42 - 75 %    LYMPHOCYTES 11 (L) 20.5 - 51.1 %    MONOCYTES 9 1.7 - 9.3 %    EOSINOPHILS 3 (H) 0.9 - 2.9 %    BASOPHILS 1 0.0 - 2.5 %    ABS. NEUTROPHILS 4.9 1.8 - 7.7 K/UL    ABS. LYMPHOCYTES 0.7 (L) 1.0 - 4.8 K/UL    ABS. MONOCYTES 0.5 0.2 - 2.4 K/UL    ABS. EOSINOPHILS 0.2 0.0 - 0.7 K/UL    ABS. BASOPHILS 0.1 0.0 - 0.2 K/UL        Imaging:    No results found.        Assessment and Plan:     Anemia  -Patient with acute on chronic anemia, being investigated for GI blood loss, pending occult blood  -Recently had work-up including EGD, colonoscopy and capsule endoscopy at [place] without finding suggestive of source of GI bleed  -GI following  -Continue Protonix    End-stage renal disease  -On hemodialysis Tuesday Thursday Saturday schedule  -On 1 L fluid restriction per 24-hour  -Continue vitamin D and Renvela  -Nephrology following    Liver cirrhosis  -Patient with portal hypertension and ascites  -Patient with recent paracentesis per GI  -Patient is now constipated, resume lactulose    Cardiomyopathy  -EF of 20%  -Currently stable  -Volume management through hemodialysis    Diabetes  -Continue insulin sliding scale coverage  -Monitor blood sugars    Asthma  -Able, breathing treatment PRN    Chronic pain  -Continue chronic pain management    Lung nodules  -Lung nodules on CT  -Follow-up with repeat CT in 3 to 6 months    Anticipated disposition is 24 to 48 hours pending progress.     Signed By: Joaquina Bernabe MD     October 8, 2020

## 2020-10-08 NOTE — PROGRESS NOTES
Care Management Interventions  PCP Verified by CM:  Yes  Transition of Care Consult (CM Consult): [address]: No  Reason Outside [place]: Out of service area  The Plan for Transition of Care is Related to the Following Treatment Goals : Home health can draw labs and hopefully catch the drop in hgb prior to ED visits  Discharge Location  Discharge Placement: Home with home health

## 2020-10-08 NOTE — PROGRESS NOTES
Progress Note  Date:10/8/2020       Room:[facility]  Patient Name:Lizzette DIMAS Rome     YOB: 1950     Age:70 y.o. Subjective     C/O back pain. Received 2 Units of prbc on HD yesterday. Denies sob. No abdominal fullness. Wants Abx for \" Urinary tract infection\"    Review of Systems   Respiratory: Negative for shortness of breath. Cardiovascular: Negative for chest pain. Musculoskeletal: Positive for back pain. Neurological: Positive for weakness. Objective         Vitals Last 24 Hours:  TEMPERATURE:  Temp  Av.5 °F (36.9 °C)  Min: 98.1 °F (36.7 °C)  Max: 98.8 °F (37.1 °C)  RESPIRATIONS RANGE: Resp  Av.4  Min: 18  Max: 22  PULSE OXIMETRY RANGE: SpO2  Av.2 %  Min: 98 %  Max: 100 %  PULSE RANGE: Pulse  Av.6  Min: 60  Max: 74  BLOOD PRESSURE RANGE: Systolic (24PON), IJM:220 , Min:127 , MLI:797   ; Diastolic (53LHI), AWM:74, Min:53, Max:92    I/O (24Hr): Intake/Output Summary (Last 24 hours) at 10/8/2020 1225  Last data filed at 10/7/2020 2118  Gross per 24 hour   Intake 700 ml   Output 2000 ml   Net -1300 ml   Lying supine  Neck: No JVD  Lungs: decreased BS  Heart: S1S2  Abd: distended, + BS  Back: + presacral edema, tender R of spinous process around T12 - L1    Labs/Imaging/Diagnostics    Labs:  CBC:  Recent Labs     10/08/20  0930 10/07/20  0607 10/06/20  1115   WBC 6.3 5.1 4.5   RBC 3.11* 2.19* 2.26*   HGB 8.8* 6.0* 6.1*   HCT 27.5* 19.9* 20.1*   MCV 88.5 90.8 88.9   RDW 18.9* 21.1* 21.0*    198 227     CHEMISTRIES:  Recent Labs     10/08/20  0930 10/07/20  0607 10/06/20  1115    143 142   K 4.6 5.2* 4.1    105 104   CO2 31 28 30   BUN 28* 54* 40*   CA 7.9* 7.0* 8.0*   PT/INR:No results for input(s): INR, INREXT, INREXT in the last 72 hours. No lab exists for component: PROTIME  APTT:No results for input(s): APTT in the last 72 hours.   LIVER PROFILE:  Recent Labs     10/06/20  1115   AST 18   ALT 15     Lab Results Component Value Date/Time    ALT (SGPT) 15 10/06/2020 11:15 AM    AST (SGOT) 18 10/06/2020 11:15 AM    Alk. phosphatase 139 (H) 10/06/2020 11:15 AM    Bilirubin, total 0.4 10/06/2020 11:15 AM       Imaging Last 24 Hours:  No results found. Assessment//Plan   Active Problems:    Symptomatic anemia (3/16/2018)      Anemia (9/17/2020)      Assessment & Plan  #ESRD ON HD -. HD in AM on a TTS schedule. #NORMOCYTIC ANEMIA  -Transfuse. Hgb is better  # VOLUME DEPLETION  # LIVER CIRRHOSIS WITH RECURRENT ASCITES  # Chronic HFrEF        Plan:   HD in AM with 2-3 kg fluid removal as tolerated.   CAROL ANN on HD             Electronically signed by Leti Arroyo MD on 10/8/2020 at 2:37 PM

## 2020-10-09 LAB
ANION GAP SERPL CALC-SCNC: 10 MMOL/L (ref 5–15)
BASOPHILS # BLD: 0.1 K/UL (ref 0–0.2)
BASOPHILS NFR BLD: 1 % (ref 0–2.5)
BUN SERPL-MCNC: 36 MG/DL (ref 6–20)
BUN/CREAT SERPL: 7 (ref 12–20)
CA-I BLD-MCNC: 7.8 MG/DL (ref 8.5–10.1)
CHLORIDE SERPL-SCNC: 103 MMOL/L (ref 97–108)
CO2 SERPL-SCNC: 28 MMOL/L (ref 21–32)
CREAT SERPL-MCNC: 5.43 MG/DL (ref 0.55–1.02)
EOSINOPHIL # BLD: 0.2 K/UL (ref 0–0.7)
EOSINOPHIL NFR BLD: 4 % (ref 0.9–2.9)
ERYTHROCYTE [DISTWIDTH] IN BLOOD BY AUTOMATED COUNT: 19.5 % (ref 11.5–14.5)
GLUCOSE BLD STRIP.AUTO-MCNC: 100 MG/DL (ref 65–100)
GLUCOSE BLD STRIP.AUTO-MCNC: 160 MG/DL (ref 65–100)
GLUCOSE BLD STRIP.AUTO-MCNC: 168 MG/DL (ref 65–100)
GLUCOSE SERPL-MCNC: 156 MG/DL (ref 65–100)
HCT VFR BLD AUTO: 27.3 % (ref 36–46)
HGB BLD-MCNC: 8.6 G/DL (ref 13.5–17.5)
LYMPHOCYTES # BLD: 0.6 K/UL (ref 1–4.8)
LYMPHOCYTES NFR BLD: 13 % (ref 20.5–51.1)
MCH RBC QN AUTO: 28.1 PG (ref 31–34)
MCHC RBC AUTO-ENTMCNC: 31.4 G/DL (ref 31–36)
MCV RBC AUTO: 89.6 FL (ref 80–100)
MONOCYTES # BLD: 0.6 K/UL (ref 0.2–2.4)
MONOCYTES NFR BLD: 12 % (ref 1.7–9.3)
NEUTS SEG # BLD: 3.4 K/UL (ref 1.8–7.7)
NEUTS SEG NFR BLD: 70 % (ref 42–75)
NRBC # BLD: 0 K/UL
NRBC BLD-RTO: 10 PER 100 WBC
PERFORMED BY, TECHID: ABNORMAL
PERFORMED BY, TECHID: ABNORMAL
PERFORMED BY, TECHID: NORMAL
PLATELET # BLD AUTO: 169 K/UL
PMV BLD AUTO: 8.1 FL (ref 6.5–11.5)
POTASSIUM SERPL-SCNC: 4.6 MMOL/L (ref 3.5–5.1)
RBC # BLD AUTO: 3.05 M/UL (ref 4.5–5.9)
SODIUM SERPL-SCNC: 141 MMOL/L (ref 136–145)
WBC # BLD AUTO: 4.9 K/UL (ref 4.4–11.3)

## 2020-10-09 PROCEDURE — 94640 AIRWAY INHALATION TREATMENT: CPT

## 2020-10-09 PROCEDURE — 80048 BASIC METABOLIC PNL TOTAL CA: CPT

## 2020-10-09 PROCEDURE — 74011000250 HC RX REV CODE- 250: Performed by: HOSPITALIST

## 2020-10-09 PROCEDURE — C9113 INJ PANTOPRAZOLE SODIUM, VIA: HCPCS | Performed by: NURSE PRACTITIONER

## 2020-10-09 PROCEDURE — 82962 GLUCOSE BLOOD TEST: CPT

## 2020-10-09 PROCEDURE — 65270000029 HC RM PRIVATE

## 2020-10-09 PROCEDURE — 74011250636 HC RX REV CODE- 250/636: Performed by: INTERNAL MEDICINE

## 2020-10-09 PROCEDURE — 85025 COMPLETE CBC W/AUTO DIFF WBC: CPT

## 2020-10-09 PROCEDURE — 5A1D70Z PERFORMANCE OF URINARY FILTRATION, INTERMITTENT, LESS THAN 6 HOURS PER DAY: ICD-10-PCS | Performed by: FAMILY MEDICINE

## 2020-10-09 PROCEDURE — 74011250637 HC RX REV CODE- 250/637: Performed by: HOSPITALIST

## 2020-10-09 PROCEDURE — 74011250637 HC RX REV CODE- 250/637: Performed by: EMERGENCY MEDICINE

## 2020-10-09 PROCEDURE — 74011250637 HC RX REV CODE- 250/637: Performed by: FAMILY MEDICINE

## 2020-10-09 PROCEDURE — 99232 SBSQ HOSP IP/OBS MODERATE 35: CPT | Performed by: INTERNAL MEDICINE

## 2020-10-09 PROCEDURE — 74011250637 HC RX REV CODE- 250/637: Performed by: INTERNAL MEDICINE

## 2020-10-09 PROCEDURE — 96376 TX/PRO/DX INJ SAME DRUG ADON: CPT

## 2020-10-09 PROCEDURE — 90935 HEMODIALYSIS ONE EVALUATION: CPT

## 2020-10-09 PROCEDURE — 74011636637 HC RX REV CODE- 636/637: Performed by: NURSE PRACTITIONER

## 2020-10-09 PROCEDURE — 74011250637 HC RX REV CODE- 250/637: Performed by: NURSE PRACTITIONER

## 2020-10-09 PROCEDURE — 99218 HC RM OBSERVATION: CPT

## 2020-10-09 PROCEDURE — 74011250636 HC RX REV CODE- 250/636: Performed by: NURSE PRACTITIONER

## 2020-10-09 PROCEDURE — 36415 COLL VENOUS BLD VENIPUNCTURE: CPT

## 2020-10-09 PROCEDURE — 74011000250 HC RX REV CODE- 250: Performed by: NURSE PRACTITIONER

## 2020-10-09 RX ORDER — CYCLOBENZAPRINE HCL 10 MG
10 TABLET ORAL 3 TIMES DAILY
Status: DISCONTINUED | OUTPATIENT
Start: 2020-10-09 | End: 2020-10-10 | Stop reason: HOSPADM

## 2020-10-09 RX ORDER — MICONAZOLE NITRATE 2 %
1 CREAM WITH APPLICATOR VAGINAL EVERY EVENING
Status: DISCONTINUED | OUTPATIENT
Start: 2020-10-09 | End: 2020-10-09

## 2020-10-09 RX ORDER — MICONAZOLE NITRATE 2 %
1 CREAM WITH APPLICATOR VAGINAL DAILY
Status: DISCONTINUED | OUTPATIENT
Start: 2020-10-09 | End: 2020-10-10 | Stop reason: HOSPADM

## 2020-10-09 RX ORDER — NYSTATIN 100000 U/G
CREAM TOPICAL 2 TIMES DAILY
Status: DISCONTINUED | OUTPATIENT
Start: 2020-10-09 | End: 2020-10-10 | Stop reason: HOSPADM

## 2020-10-09 RX ADMIN — HYDROCORTISONE ACETATE 25 MG: 25 SUPPOSITORY RECTAL at 08:02

## 2020-10-09 RX ADMIN — SEVELAMER CARBONATE 800 MG: 800 TABLET, FILM COATED ORAL at 17:16

## 2020-10-09 RX ADMIN — Medication 10 ML: at 06:24

## 2020-10-09 RX ADMIN — FERROUS SULFATE TAB 325 MG (65 MG ELEMENTAL FE) 325 MG: 325 (65 FE) TAB at 08:01

## 2020-10-09 RX ADMIN — SEVELAMER CARBONATE 800 MG: 800 TABLET, FILM COATED ORAL at 08:01

## 2020-10-09 RX ADMIN — LACTULOSE 30 ML: 20 SOLUTION ORAL at 17:16

## 2020-10-09 RX ADMIN — INSULIN LISPRO 2 UNITS: 100 INJECTION, SOLUTION INTRAVENOUS; SUBCUTANEOUS at 17:16

## 2020-10-09 RX ADMIN — EPOETIN ALFA-EPBX 7000 UNITS: 4000 INJECTION, SOLUTION INTRAVENOUS; SUBCUTANEOUS at 14:00

## 2020-10-09 RX ADMIN — Medication 2 TABLET: at 08:01

## 2020-10-09 RX ADMIN — INSULIN LISPRO 2 UNITS: 100 INJECTION, SOLUTION INTRAVENOUS; SUBCUTANEOUS at 07:23

## 2020-10-09 RX ADMIN — CYCLOBENZAPRINE 10 MG: 10 TABLET, FILM COATED ORAL at 21:32

## 2020-10-09 RX ADMIN — Medication 10 ML: at 14:22

## 2020-10-09 RX ADMIN — MORPHINE SULFATE 1 MG: 2 INJECTION, SOLUTION INTRAMUSCULAR; INTRAVENOUS at 12:20

## 2020-10-09 RX ADMIN — SODIUM CHLORIDE 40 MG: 9 INJECTION, SOLUTION INTRAMUSCULAR; INTRAVENOUS; SUBCUTANEOUS at 21:30

## 2020-10-09 RX ADMIN — OXYCODONE HYDROCHLORIDE 5 MG: 5 TABLET ORAL at 09:43

## 2020-10-09 RX ADMIN — SODIUM CHLORIDE 40 MG: 9 INJECTION, SOLUTION INTRAMUSCULAR; INTRAVENOUS; SUBCUTANEOUS at 08:00

## 2020-10-09 RX ADMIN — HYDROCORTISONE ACETATE 25 MG: 25 SUPPOSITORY RECTAL at 21:30

## 2020-10-09 RX ADMIN — MORPHINE SULFATE 1 MG: 2 INJECTION, SOLUTION INTRAMUSCULAR; INTRAVENOUS at 03:12

## 2020-10-09 RX ADMIN — MORPHINE SULFATE 1 MG: 2 INJECTION, SOLUTION INTRAMUSCULAR; INTRAVENOUS at 21:30

## 2020-10-09 RX ADMIN — MORPHINE SULFATE 1 MG: 2 INJECTION, SOLUTION INTRAMUSCULAR; INTRAVENOUS at 07:30

## 2020-10-09 RX ADMIN — FERROUS SULFATE TAB 325 MG (65 MG ELEMENTAL FE) 325 MG: 325 (65 FE) TAB at 12:20

## 2020-10-09 RX ADMIN — SEVELAMER CARBONATE 800 MG: 800 TABLET, FILM COATED ORAL at 12:20

## 2020-10-09 RX ADMIN — Medication 1 TABLET: at 08:01

## 2020-10-09 RX ADMIN — ALBUTEROL SULFATE 2.5 MG: 2.5 SOLUTION RESPIRATORY (INHALATION) at 19:24

## 2020-10-09 RX ADMIN — SODIUM CHLORIDE 1000 ML: 9 INJECTION, SOLUTION INTRAVENOUS at 13:25

## 2020-10-09 RX ADMIN — Medication 10 ML: at 21:32

## 2020-10-09 RX ADMIN — MICONAZOLE NITRATE 1 APPLICATOR: 20 CREAM VAGINAL at 10:14

## 2020-10-09 RX ADMIN — FERROUS SULFATE TAB 325 MG (65 MG ELEMENTAL FE) 325 MG: 325 (65 FE) TAB at 17:16

## 2020-10-09 RX ADMIN — OXYCODONE HYDROCHLORIDE 5 MG: 5 TABLET ORAL at 21:30

## 2020-10-09 RX ADMIN — LACTULOSE 30 ML: 20 SOLUTION ORAL at 08:02

## 2020-10-09 RX ADMIN — OXYCODONE HYDROCHLORIDE 5 MG: 5 TABLET ORAL at 03:12

## 2020-10-09 NOTE — PROGRESS NOTES
Left message with Dr. Jacqui Faye office to call me regarding discharge plan/ transfusion plan as outpatient

## 2020-10-09 NOTE — ROUTINE PROCESS
Patient stated she did not want to be straight cath for an urine sample. She states it it made her uncomfortable and it was painful.

## 2020-10-09 NOTE — ROUTINE PROCESS
Bedside shift change report given to Jas Le by [place], [place]. Report included the following information SBAR.

## 2020-10-09 NOTE — PROGRESS NOTES
GI Consultant Progress Note      Patient Name: Silvia Sotelo    SHABNAMTYRA Date: 10/9/2020    Admit Date: 10/6/2020    Reason for consultation: Anemia    Subjective:     Patient complaints: Patient states that she feels better since she received the 2 units of PRBCs. She states she had a large dark stool this morning which is the first stool she has had since being hospitalized. The stool was checked and noted to be Hemoccult positive. Patient has had work-up at the [facility] recently with an EGD, colonoscopy, and capsule endoscopy and no source of the blood loss was found with those studies according to patient.     Current Facility-Administered Medications   Medication Dose Route Frequency    nystatin (MYCOSTATIN) 100,000 unit/gram cream   Topical BID    cyclobenzaprine (FLEXERIL) tablet 10 mg  10 mg Oral TID    miconazole (MICOTIN) 2 % vaginal cream 1 Applicator  1 Applicator Vaginal DAILY    epoetin reshma-epbx (RETACRIT) 7,000 Units  7,000 Units IntraVENous DIALYSIS MON, WED & FRI    lactulose (CHRONULAC) 10 gram/15 mL solution 30 mL  20 g Oral BID    B complex-vitamin C-folic acid (NEPHRO-CATARINA) 0.8 mg tab  1 Tab Oral DAILY    albuterol (PROVENTIL VENTOLIN) nebulizer solution 2.5 mg  2.5 mg Nebulization Q4H PRN    hydrocortisone (ANUSOL-HC) suppository 25 mg  25 mg Rectal Q12H    sodium chloride 0.9 % bolus infusion 1,000 mL  1,000 mL IntraVENous DIALYSIS PRN    sevelamer carbonate (RENVELA) tab 800 mg  800 mg Oral TID WITH MEALS    morphine injection 1 mg  1 mg IntraVENous Q3H PRN    sodium chloride (NS) flush 5-40 mL  5-40 mL IntraVENous Q8H    sodium chloride (NS) flush 5-40 mL  5-40 mL IntraVENous PRN    acetaminophen (TYLENOL) tablet 650 mg  650 mg Oral Q6H PRN    Or    acetaminophen (TYLENOL) suppository 650 mg  650 mg Rectal Q6H PRN    polyethylene glycol (MIRALAX) packet 17 g  17 g Oral DAILY PRN    promethazine (PHENERGAN) tablet 12.5 mg  12.5 mg Oral Q6H PRN    Or    ondansetron (ZOFRAN) injection 4 mg  4 mg IntraVENous Q6H PRN    glucose chewable tablet 16 g  4 Tab Oral PRN    dextrose (D50W) injection syrg 12.5-25 g  25-50 mL IntraVENous PRN    glucagon (GLUCAGEN) injection 1 mg  1 mg IntraMUSCular PRN    insulin lispro (HUMALOG) injection   SubCUTAneous AC&HS    pantoprazole (PROTONIX) 40 mg in 0.9% sodium chloride 10 mL injection  40 mg IntraVENous Q12H    oxyCODONE IR (ROXICODONE) tablet 5 mg  5 mg Oral Q6H PRN    cholecalciferol (VITAMIN D3) (1000 Units /25 mcg) tablet 2 Tab  2,000 Units Oral DAILY    ferrous sulfate tablet 325 mg  325 mg Oral TID WITH MEALS    0.9% sodium chloride infusion 250 mL  250 mL IntraVENous PRN          Objective:     Visit Vitals  BP (!) 157/73   Pulse 90   Temp 98 °F (36.7 °C)   Resp 20   Ht 5' 4\" (1.626 m)   Wt 69.6 kg (153 lb 8 oz)   SpO2 99%   BMI 26.35 kg/m²       Examination: The abdomen was distended with ascites but not tense. Mild generalized tenderness noted. No rebound or guarding. Data Review:       CBC w/Diff Recent Labs     10/09/20  0544 10/08/20  0930 10/07/20  0607   WBC 4.9 6.3 5.1   RBC 3.05* 3.11* 2.19*   HGB 8.6* 8.8* 6.0*   HCT 27.3* 27.5* 19.9*    202 198   GRANS 70 76* 73   LYMPH 13* 11* 14*   EOS 4* 3* 3*        Chemistry Recent Labs     10/09/20  0544 10/08/20  0930 10/07/20  0607   * 133* 220*    141 143   K 4.6 4.6 5.2*    104 105   CO2 28 31 28   BUN 36* 28* 54*   CREA 5.43* 4.15* 6.43*   CA 7.8* 7.9* 7.0*   AGAP 10 6 10   BUCR 7* 7* 8*        Lactic Acid No results found for: LAC  No results for input(s): LAC in the last 72 hours. ABG No results for input(s): PHI, PHI, POC2, PCO2I, PO2, PO2I, HCO3, HCO3I, FIO2, FIO2I in the last 72 hours. Micro  No results for input(s): SDES, CULT in the last 72 hours. No results for input(s): CULT in the last 72 hours.      Liver Enzymes Protein, total   Date Value Ref Range Status   10/06/2020 5.5 (L) 6.4 - 8.2 g/dL Final Albumin   Date Value Ref Range Status   10/06/2020 2.5 (L) 3.5 - 5.0 g/dL Final     Globulin   Date Value Ref Range Status   10/06/2020 3.0 2.0 - 4.0 g/dL Final     A-G Ratio   Date Value Ref Range Status   10/06/2020 0.8 (L) 1.1 - 2.2   Final     Alk. phosphatase   Date Value Ref Range Status   10/06/2020 139 (H) 45 - 117 U/L Final     No results for input(s): TP, ALB, GLOB, AGRAT, AP, TBIL in the last 72 hours. No lab exists for component: SGOT, GPT, DBIL       Cardiac Enzymes No results found for: CPK, CK, CKMMB, CKMB, RCK3, CKMBT, CKNDX, CKND1, NICOLE, TROPT, TROIQ, JAIME, TROPT, TNIPOC, BNP, BNPP     BNP No results found for: BNP, BNPP, XBNPT     Coagulation No results for input(s): PTP, INR, APTT, INREXT in the last 72 hours. Thyroid  No results found for: T4, T3U, TSH, TSHEXT       Lipid Panel No results found for: CHOL, CHOLPOCT, CHOLX, CHLST, CHOLV, 764915, HDL, HDLP, LDL, LDLC, DLDLP, 324076, VLDLC, VLDL, TGLX, TRIGL, TRIGP, TGLPOCT, CHHD, CHHDX       Urinalysis No results found for: COLOR, APPRN, SPGRU, REFSG, VERENA, PROTU, GLUCU, KETU, BILU, UROU, TIBURCIO, LEUKU, GLUKE, EPSU, BACTU, WBCU, RBCU, CASTS, UCRY     XR (Most Recent). CXR reviewed by me and compared with previous CXR Results from Hospital Encounter encounter on 09/17/20   XR CHEST PORT    Narrative Chest single view. The lungs are clear. No interstitial or alveolar pulmonary edema. Cardiac  silhouette enlargement. Right-sided permacath appropriately positioned. No  pneumothorax or sizable pleural effusion. CT (Most Recent) Results from Hospital Encounter encounter on 10/06/20   CT ABD PELV WO CONT    Narrative Study: Abdominopelvic CT without contrast.    Clinical indication: Abdominal pain. Technique: Contiguous axial images of the abdomen and pelvis were obtained  without contrast. Coronal and sagittal reconstructions were obtained.  Dose  reduction: All CT scans at this facility are performed using dose reduction  optimization techniques as appropriate to a performed exam including the  following: Automated exposure control, adjustments of the mA and/or kV according  to patient size, or use of iterative reconstruction technique. Comparison: None available. Findings:    Heart is enlarged. No pericardial effusion. Low density of the cardiac chambers  relative to the myocardium, suggesting anemia. Multiple small nodules in the  right middle lobe, measuring up to 6 mm. Unenhanced liver, spleen, pancreas and adrenal glands are unremarkable. Gallbladder is grossly unremarkable. No biliary ductal dilatation. Kidneys are  atrophic. No hydronephrosis. No discrete renal mass. Urinary bladder is  decompressed. Vascular opacifications in the uterus. No discrete adnexal mass. Mild abdominopelvic ascites. No evidence of bowel obstruction or ileus. No significant bowel wall thickening. Appendix is not clearly visualized. No pneumoperitoneum. Complex moderate sized  umbilical hernia containing fluid. Moderate aortoiliac atherosclerosis. Vessel patency not assessed without  contrast. No lymphadenopathy by CT size criteria. Patchy sclerosis throughout the visualized vertebral bodies, likely due to renal  osteodystrophy. No acute osseous abnormality identified. Nonspecific suprapubic subcutaneous stranding and skin thickening. Impression Impression:  1. Nonspecific suprapubic subcutaneous stranding and skin thickening. Correlate  for cellulitis. 2.  Mild to moderate abdominopelvic ascites. Fluid-containing paraumbilical  hernia. 3.  No evidence of bowel obstruction. 4.  Cardiomegaly with imaging features of anemia. 5.  Bilateral renal cortical atrophy. 6.  Multiple small right middle lobe nodules, measuring up to 6 mm. Suggest  follow-up chest CT in 3-6 months. 7.  See full report for detailed findings. Assessment:     1.   Anemia  -Anemia is most likely is secondary to a combination of chronic anemia from her end-stage renal disease plus intermittent GI tract blood loss. The fact that her stools are heme positive and patient is passing foul tarry stools suggesting upper GI tract cause of this blood loss. 2.  GI bleeding  -Upper GI tract source is the likely cause. Recommendation:     1. Continue to closely monitor H&H and stools for blood. 2.  Patient was informed that she should have an upper endoscopy to reevaluate source of this GI blood loss. She is willing to have a upper endoscopy done. She was also informed that we could do this here at our hospital as an inpatient if she remains hospitalized as an outpatient if she is discharged home. If she remains hospitalized we will plan for the procedure on 10/12/2020.           Mattie Tyson MD  October 9, 2020  6:13 PM

## 2020-10-09 NOTE — PROGRESS NOTES
Problem: Falls - Risk of  Goal: *Absence of Falls  Description: Document Hartley Pipjose Fall Risk and appropriate interventions in the flowsheet.   Outcome: Progressing Towards Goal  Note: Fall Risk Interventions:  Mobility Interventions: Patient to call before getting OOB         Medication Interventions: Patient to call before getting OOB    Elimination Interventions: Call light in reach

## 2020-10-09 NOTE — PROGRESS NOTES
Progress Note    Patient: Naeem Pérez MRN: [id number]  SSN: [id number]    YOB: 1950  Age: 69 y.o. Sex: female      Admit Date: 10/6/2020    LOS: 0 days     Subjective:     Presents with generalized weakness and found to be anemic. Status post transfusion of 2 units PRBCs with dialysis 10/8. Patient complaining of right-sided back pain, CT unremarkable. He is concerned about urinary tract infection. Urinalysis is not available, ordered today. Objective:     Vitals:    10/08/20 2000 10/09/20 0000 10/09/20 0400 10/09/20 0752   BP: (!) 117/57 121/69 127/60 (!) (P) 147/59   Pulse: 67 70 72 (P) 73   Resp: 16 16 16 (P) 20   Temp: 98.8 °F (37.1 °C) 98.5 °F (36.9 °C) 98.5 °F (36.9 °C) (P) 97.9 °F (36.6 °C)   SpO2: 98% 97% 98% (P) 99%   Weight:       Height:            Intake and Output:  Current Shift: No intake/output data recorded. Last three shifts: 10/07 1901 - 10/09 0700  In: 1120 [P.O.:420]  Out: 2000     Physical Exam:   GENERAL: alert, cooperative, no distress, appears stated age  THROAT & NECK: normal and no erythema or exudates noted. LUNG: clear to auscultation bilaterally  HEART: regular rate and rhythm, S1, S2 normal, no murmur, click, rub or gallop  ABDOMEN: soft, non-tender. Bowel sounds normal. No masses,  no organomegaly  EXTREMITIES:  extremities normal, atraumatic, no cyanosis or edema  SKIN: no rash or abnormalities  NEUROLOGIC: AOx3. Gait normal. Reflexes and motor strength normal and symmetric. Cranial nerves 2-12 and sensation grossly intact. PSYCHIATRIC: non focal    Lab/Data Review: All lab results for the last 24 hours reviewed.      Recent Results (from the past 24 hour(s))   GLUCOSE, POC    Collection Time: 10/08/20 11:08 AM   Result Value Ref Range    Glucose (POC) 138 (H) 65 - 100 mg/dL    Performed by LADONNA CHILEL    OCCULT BLOOD, STOOL    Collection Time: 10/08/20  3:00 PM   Result Value Ref Range    Occult Blood,day 1 Positive      Day 1 date: Positive GLUCOSE, POC    Collection Time: 10/08/20  4:23 PM   Result Value Ref Range    Glucose (POC) 141 (H) 65 - 100 mg/dL    Performed by Eliu Gracia, POC    Collection Time: 10/08/20  8:09 PM   Result Value Ref Range    Glucose (POC) 152 (H) 65 - 100 mg/dL    Performed by [facility]    METABOLIC PANEL, BASIC    Collection Time: 10/09/20  5:44 AM   Result Value Ref Range    Sodium 141 136 - 145 mmol/L    Potassium 4.6 3.5 - 5.1 mmol/L    Chloride 103 97 - 108 mmol/L    CO2 28 21 - 32 mmol/L    Anion gap 10 5 - 15 mmol/L    Glucose 156 (H) 65 - 100 mg/dL    BUN 36 (H) 6 - 20 mg/dL    Creatinine 5.43 (H) 0.55 - 1.02 mg/dL    BUN/Creatinine ratio 7 (L) 12 - 20      GFR est AA 9 (L) >60 ml/min/1.73m2    GFR est non-AA 8 (L) >60 ml/min/1.73m2    Calcium 7.8 (L) 8.5 - 10.1 mg/dL   CBC WITH AUTOMATED DIFF    Collection Time: 10/09/20  5:44 AM   Result Value Ref Range    WBC 4.9 4.4 - 11.3 K/uL    RBC 3.05 (L) 4.50 - 5.90 M/uL    HGB 8.6 (L) 13.5 - 17.5 g/dL    HCT 27.3 (L) 36 - 46 %    MCV 89.6 80 - 100 FL    MCH 28.1 (L) 31 - 34 PG    MCHC 31.4 31.0 - 36.0 g/dL    RDW 19.5 (H) 11.5 - 14.5 %    PLATELET 535 K/uL    MPV 8.1 6.5 - 11.5 FL    NRBC 10.0  WBC    ABSOLUTE NRBC 0.00 K/uL    NEUTROPHILS 70 42 - 75 %    LYMPHOCYTES 13 (L) 20.5 - 51.1 %    MONOCYTES 12 (H) 1.7 - 9.3 %    EOSINOPHILS 4 (H) 0.9 - 2.9 %    BASOPHILS 1 0.0 - 2.5 %    ABS. NEUTROPHILS 3.4 1.8 - 7.7 K/UL    ABS. LYMPHOCYTES 0.6 (L) 1.0 - 4.8 K/UL    ABS. MONOCYTES 0.6 0.2 - 2.4 K/UL    ABS. EOSINOPHILS 0.2 0.0 - 0.7 K/UL    ABS. BASOPHILS 0.1 0.0 - 0.2 K/UL        Imaging:    No results found.        Assessment and Plan:     Anemia  -Patient with acute on chronic anemia, post transfusion of 2 unit PRBCs 10/8  -Repeat H&H stable  -Recently had work-up including EGD, colonoscopy and capsule endoscopy at [address] [facility] without finding suggestive of source of GI bleed  -Stool occult blood is positive this admission  -GI reconsulted for evaluation, endoscopy inpatient versus outpatient if stable  -Continue Protonix    Back pain  -Patient is worried about urinary tract infection   -No urinalysis available, ordered  -CT abdomen and pelvis without acute pathology  -Added baclofen, likely muscle spasm    End-stage renal disease  -On hemodialysis Tuesday Thursday Saturday schedule  -On 1 L fluid restriction per 24-hour  -Continue vitamin D and Renvela  -Nephrology following    Liver cirrhosis  -Patient with portal hypertension and ascites  -Patient with recent paracentesis per GI  -Patient is now constipated, resume lactulose    Cardiomyopathy  -EF of 20%  -Currently stable  -Volume management through hemodialysis    Diabetes  -Continue insulin sliding scale coverage  -Monitor blood sugars    Asthma  -Able, breathing treatment PRN    Chronic pain  -Continue chronic pain management    Lung nodules  -Lung nodules on CT  -Follow-up with repeat CT in 3 to 6 months    Anticipated disposition is 24 to 48 hours pending progress.     Signed By: Kelley Olguin MD     October 9, 2020

## 2020-10-09 NOTE — DIALYSIS
[facility]  [phone]    Vitals   Pre   Post   Assessment   Pre   Post     Temp  Temp: 98 °F (36.7 °C) (10/09/20 1400)  98 LOC  Alert and oriented x4 Oriented x4   HR   Pulse (Heart Rate): 73 (10/09/20 1400) 90 Lungs   Diminished on room air  diminished   B/P   BP: (!) 153/72 (10/09/20 1400) 157/73 Cardiac   B/p wnl  b/p wnl   Resp   Resp Rate: 20 (10/09/20 1400) 20 Skin   intact  intact   Pain level  0 0 Edema  abd distended     Abd distended   Orders:    Duration:   Start:    1401 End:    1730 Total:   3.5hrs   Dialyzer:   Dialyzer/Set Up Inspection: Revaclear (10/09/20 1400)   K Bath:   Dialysate K (mEq/L): 3 (10/09/20 1400)   Ca Bath:   Dialysate CA (mEq/L): 2.5 (10/09/20 1400)   Na/Bicarb:   Dialysate NA (mEq/L): 138 (10/09/20 1400)   Target Fluid Removal:   Goal/Amount of Fluid to Remove (mL): 2000 mL (10/09/20 1400)   Access     Type & Location:   RT subclavian perma-cath. Dressing c/d/i from 10/7/20. Each catheter limb disinfected per p&p, caps removed, hubs disinfected per p&p. Blood aspirated and lines flushed without any issues. No s/s of infection noted.        Labs     Obtained/Reviewed   Critical Results Called   Date when labs were drawn-  Hgb-    HGB   Date Value Ref Range Status   10/09/2020 8.6 (L) 13.5 - 17.5 g/dL Final     K-    Potassium   Date Value Ref Range Status   10/09/2020 4.6 3.5 - 5.1 mmol/L Final     Ca-   Calcium   Date Value Ref Range Status   10/09/2020 7.8 (L) 8.5 - 10.1 mg/dL Final     Bun-   BUN   Date Value Ref Range Status   10/09/2020 36 (H) 6 - 20 mg/dL Final     Creat-   Creatinine   Date Value Ref Range Status   10/09/2020 5.43 (H) 0.55 - 1.02 mg/dL Final        Medications/ Blood Products Given     Name   Dose   Route and Time     Epogen 7000units Given at 1415             Blood Volume Processed (BVP):    77.4 Net Fluid   Removed:  800ml   Comments   Time Out Done: 1300  Primary Nurse Rpt Irma Dears KIKON  Primary Nurse Rpt [place] LPN  Pt Education:ESRD  Care Plan:ESRD  Tx Summary:  [phone] with Dr Td West about pt current b/p and possibility of not be able to pull the ordered 3kg. Order received to pull 2kg instead. 1401 Dialysis started  1515 Pt c/o leg cramps, Uf turned off and 100ml of saline given at this time  1545 Uf turned back on but goal lowered at this time  1730 Dialysis completed. Each dialysis catheter limb disinfected per p&p, blood returned per p&p, each dialysis hub disinfected per p&p, post dialysis catheter dwell instilled with saline per order, and caps applied. Unable to pull target goal due to pt having leg cramps    Admiting Diagnosis:Anemia  Pt's previous clinic-   Consent signed - Informed Consent Verified: Yes (10/09/20 1400)  Hepatitis Status-  Immune 8/11/20 Clinic  Machine #- Machine Number: [id number] (10/09/20 1400)  Telemetry status-NSR  Pre-dialysis wt. -

## 2020-10-09 NOTE — PROGRESS NOTES
Progress Note  Date:10/9/2020       Room:[facility]  Patient Name:Lizzette DIMAS [place]     YOB: 1950     Age:70 y.o. Subjective    Subjective     Seen and evaluated at bedside, no complaints reported by the pt. Review of Systems   Constitutional: Negative. HENT: Negative. Eyes: Negative. Respiratory: Negative. Cardiovascular: Negative. Gastrointestinal: Negative. Genitourinary: Negative. Skin: Negative. Neurological: Negative. All other systems reviewed and are negative. Objective         Vitals Last 24 Hours:  TEMPERATURE:  Temp  Av.3 °F (36.8 °C)  Min: 97.9 °F (36.6 °C)  Max: 98.8 °F (37.1 °C)  RESPIRATIONS RANGE: Resp  Av.8  Min: 16  Max: 21  PULSE OXIMETRY RANGE: SpO2  Av %  Min: 93 %  Max: 99 %  PULSE RANGE: Pulse  Av.6  Min: 67  Max: 73  BLOOD PRESSURE RANGE: Systolic (03KYJ), PFE:731 , Min:117 , GEB:796   ; Diastolic (22XLA), MLR:98, Min:57, Max:71    I/O (24Hr): Intake/Output Summary (Last 24 hours) at 10/9/2020 1337  Last data filed at 10/9/2020 0400  Gross per 24 hour   Intake 420 ml   Output --   Net 420 ml     Objective:  General Appearance: In no acute distress. Vital signs: (most recent): Blood pressure (!) (P) 147/59, pulse (P) 73, temperature (P) 97.9 °F (36.6 °C), resp. rate (P) 20, height 5' 4\" (1.626 m), weight 69.6 kg (153 lb 8 oz), SpO2 (P) 99 %. Vital signs are normal.    HEENT: Normal HEENT exam.    Heart: Normal rate. Abdomen: Abdomen is soft. There is no abdominal tenderness. Neurological: Patient is alert. Skin:  Warm.       Labs/Imaging/Diagnostics    Labs:  CBC:  Recent Labs     10/09/20  0544 10/08/20  0930 10/07/20  0607   WBC 4.9 6.3 5.1   RBC 3.05* 3.11* 2.19*   HGB 8.6* 8.8* 6.0*   HCT 27.3* 27.5* 19.9*   MCV 89.6 88.5 90.8   RDW 19.5* 18.9* 21.1*    202 198     CHEMISTRIES:  Recent Labs     10/09/20  0544 10/08/20  0930 10/07/20  0607    141 143   K 4.6 4.6 5.2*    104 105   CO2 28 31 28   BUN 36* 28* 54*   CA 7.8* 7.9* 7.0*   PT/INR:No results for input(s): INR, INREXT in the last 72 hours. No lab exists for component: PROTIME  APTT:No results for input(s): APTT in the last 72 hours. LIVER PROFILE:No results for input(s): AST, ALT in the last 72 hours. No lab exists for component: Thresa Radha, ALKPHOS  Lab Results   Component Value Date/Time    ALT (SGPT) 15 10/06/2020 11:15 AM    AST (SGOT) 18 10/06/2020 11:15 AM    Alk. phosphatase 139 (H) 10/06/2020 11:15 AM    Bilirubin, total 0.4 10/06/2020 11:15 AM       Imaging Last 24 Hours:  No results found. Assessment//Plan   Active Problems:    Symptomatic anemia (3/16/2018)      Anemia (9/17/2020)      Assessment & Plan    1. ESRD on HD - dialysis indicated today for metabolic clearance. Will continue w/ MWF schedule while in patient  2. Anemia of chronic disease- s/p PRBC w/ acceptable response. Trend cbc for additional monitoring  3. Secondary hyperparathyroidism- will monitor phosp levels periodically. 4. End stage liver disease- as per primary team   5. Hep B serologies non reactive  6.  Hx of CHF - as per primary team      Electronically signed by Roverto Aleman MD on 10/9/2020

## 2020-10-10 VITALS
DIASTOLIC BLOOD PRESSURE: 57 MMHG | SYSTOLIC BLOOD PRESSURE: 121 MMHG | BODY MASS INDEX: 26.21 KG/M2 | TEMPERATURE: 98.1 F | WEIGHT: 153.5 LBS | OXYGEN SATURATION: 100 % | RESPIRATION RATE: 20 BRPM | HEIGHT: 64 IN | HEART RATE: 83 BPM

## 2020-10-10 PROBLEM — N18.6 ESRD (END STAGE RENAL DISEASE) (HCC): Status: RESOLVED | Noted: 2017-12-15 | Resolved: 2020-10-10

## 2020-10-10 PROBLEM — I10 HTN (HYPERTENSION): Status: RESOLVED | Noted: 2017-12-15 | Resolved: 2020-10-10

## 2020-10-10 LAB
ANION GAP SERPL CALC-SCNC: 7 MMOL/L (ref 5–15)
BASOPHILS # BLD: 0.1 K/UL (ref 0–0.2)
BASOPHILS NFR BLD: 1 % (ref 0–2.5)
BUN SERPL-MCNC: 16 MG/DL (ref 6–20)
BUN/CREAT SERPL: 5 (ref 12–20)
CA-I BLD-MCNC: 8.3 MG/DL (ref 8.5–10.1)
CHLORIDE SERPL-SCNC: 103 MMOL/L (ref 97–108)
CO2 SERPL-SCNC: 30 MMOL/L (ref 21–32)
CREAT SERPL-MCNC: 3.39 MG/DL (ref 0.55–1.02)
EOSINOPHIL # BLD: 0.2 K/UL (ref 0–0.7)
EOSINOPHIL NFR BLD: 3 % (ref 0.9–2.9)
ERYTHROCYTE [DISTWIDTH] IN BLOOD BY AUTOMATED COUNT: 19 % (ref 11.5–14.5)
GLUCOSE BLD STRIP.AUTO-MCNC: 120 MG/DL (ref 65–100)
GLUCOSE BLD STRIP.AUTO-MCNC: 97 MG/DL (ref 65–100)
GLUCOSE SERPL-MCNC: 103 MG/DL (ref 65–100)
HCT VFR BLD AUTO: 26.4 % (ref 36–46)
HGB BLD-MCNC: 8.3 G/DL (ref 13.5–17.5)
LYMPHOCYTES # BLD: 0.6 K/UL (ref 1–4.8)
LYMPHOCYTES NFR BLD: 12 % (ref 20.5–51.1)
MCH RBC QN AUTO: 27.8 PG (ref 31–34)
MCHC RBC AUTO-ENTMCNC: 31.4 G/DL (ref 31–36)
MCV RBC AUTO: 88.5 FL (ref 80–100)
MONOCYTES # BLD: 0.6 K/UL (ref 0.2–2.4)
MONOCYTES NFR BLD: 11 % (ref 1.7–9.3)
NEUTS SEG # BLD: 3.8 K/UL (ref 1.8–7.7)
NEUTS SEG NFR BLD: 73 % (ref 42–75)
NRBC # BLD: 0.01 K/UL
NRBC BLD-RTO: 10 PER 100 WBC
PERFORMED BY, TECHID: ABNORMAL
PERFORMED BY, TECHID: NORMAL
PLATELET # BLD AUTO: 152 K/UL
PMV BLD AUTO: 7.9 FL (ref 6.5–11.5)
POTASSIUM SERPL-SCNC: 4.3 MMOL/L (ref 3.5–5.1)
RBC # BLD AUTO: 2.98 M/UL (ref 4.5–5.9)
SODIUM SERPL-SCNC: 140 MMOL/L (ref 136–145)
WBC # BLD AUTO: 5.2 K/UL (ref 4.4–11.3)

## 2020-10-10 PROCEDURE — 74011250637 HC RX REV CODE- 250/637: Performed by: INTERNAL MEDICINE

## 2020-10-10 PROCEDURE — 74011250637 HC RX REV CODE- 250/637: Performed by: EMERGENCY MEDICINE

## 2020-10-10 PROCEDURE — 82962 GLUCOSE BLOOD TEST: CPT

## 2020-10-10 PROCEDURE — 74011000250 HC RX REV CODE- 250: Performed by: NURSE PRACTITIONER

## 2020-10-10 PROCEDURE — 85025 COMPLETE CBC W/AUTO DIFF WBC: CPT

## 2020-10-10 PROCEDURE — C9113 INJ PANTOPRAZOLE SODIUM, VIA: HCPCS | Performed by: NURSE PRACTITIONER

## 2020-10-10 PROCEDURE — 74011250637 HC RX REV CODE- 250/637: Performed by: NURSE PRACTITIONER

## 2020-10-10 PROCEDURE — 74011250636 HC RX REV CODE- 250/636: Performed by: NURSE PRACTITIONER

## 2020-10-10 PROCEDURE — 80048 BASIC METABOLIC PNL TOTAL CA: CPT

## 2020-10-10 PROCEDURE — 74011250637 HC RX REV CODE- 250/637: Performed by: HOSPITALIST

## 2020-10-10 PROCEDURE — 36415 COLL VENOUS BLD VENIPUNCTURE: CPT

## 2020-10-10 PROCEDURE — 74011250637 HC RX REV CODE- 250/637: Performed by: FAMILY MEDICINE

## 2020-10-10 RX ORDER — CYCLOBENZAPRINE HCL 10 MG
10 TABLET ORAL
Qty: 15 TAB | Refills: 0 | Status: SHIPPED | OUTPATIENT
Start: 2020-10-10 | End: 2020-10-15

## 2020-10-10 RX ORDER — HYDRALAZINE HYDROCHLORIDE 25 MG/1
25 TABLET, FILM COATED ORAL 3 TIMES DAILY
Qty: 90 TAB | Refills: 0 | Status: ON HOLD
Start: 2020-10-10 | End: 2020-10-17 | Stop reason: ALTCHOICE

## 2020-10-10 RX ADMIN — NYSTATIN: 100000 CREAM TOPICAL at 08:34

## 2020-10-10 RX ADMIN — FERROUS SULFATE TAB 325 MG (65 MG ELEMENTAL FE) 325 MG: 325 (65 FE) TAB at 08:34

## 2020-10-10 RX ADMIN — LACTULOSE 30 ML: 20 SOLUTION ORAL at 08:34

## 2020-10-10 RX ADMIN — SODIUM CHLORIDE 40 MG: 9 INJECTION, SOLUTION INTRAMUSCULAR; INTRAVENOUS; SUBCUTANEOUS at 08:34

## 2020-10-10 RX ADMIN — SEVELAMER CARBONATE 800 MG: 800 TABLET, FILM COATED ORAL at 08:34

## 2020-10-10 RX ADMIN — OXYCODONE HYDROCHLORIDE 5 MG: 5 TABLET ORAL at 08:34

## 2020-10-10 RX ADMIN — FERROUS SULFATE TAB 325 MG (65 MG ELEMENTAL FE) 325 MG: 325 (65 FE) TAB at 13:45

## 2020-10-10 RX ADMIN — HYDROCORTISONE ACETATE 25 MG: 25 SUPPOSITORY RECTAL at 08:33

## 2020-10-10 RX ADMIN — Medication 10 ML: at 06:05

## 2020-10-10 RX ADMIN — SEVELAMER CARBONATE 800 MG: 800 TABLET, FILM COATED ORAL at 13:45

## 2020-10-10 RX ADMIN — Medication 1 TABLET: at 08:34

## 2020-10-10 RX ADMIN — Medication 2 TABLET: at 08:33

## 2020-10-10 RX ADMIN — OXYCODONE HYDROCHLORIDE 5 MG: 5 TABLET ORAL at 14:00

## 2020-10-10 NOTE — DISCHARGE INSTRUCTIONS

## 2020-10-10 NOTE — PROGRESS NOTES
Pt was given Miconazole and educated on how to administer the individualized prefilled tubes on 10/9/20; however when asked today, 10/10/20,  about the medication the patient reported that she administers the medication herself. When asked if the nurse can obtain the medication in order to scan and verify the medication the patient then indicated that she does not have it and that the nurse took it back on 10/9/20. Medication was not available to administer due to the patient wanting to keep it for herself.

## 2020-10-10 NOTE — DISCHARGE SUMMARY
Physician Discharge Summary       Patient: Paula Georges MRN: [id number]     YOB: 1950  Age: 69 y.o. Sex: female    PCP: Bud, MD [place]    Allergies: Amlodipine; Aspirin; Heparin; Ibuprofen; and Metformin    Admit date: 10/6/2020  Admitting Provider: Velma Juarez MD    Discharge date: 10/10/2020  Discharging Provider: Tono Higuera MD    * Admission Diagnoses: Symptomatic anemia [D64.9]  Anemia [D64.9]  Anemia [D64.9]    * Discharge Diagnoses:    Hospital Problems as of 10/10/2020 Date Reviewed: 10/6/2020          Codes Class Noted - Resolved POA    Anemia ICD-10-CM: D64.9  ICD-9-CM: 285.9  9/17/2020 - Present Yes        Symptomatic anemia ICD-10-CM: D64.9  ICD-9-CM: 285.9  3/16/2018 - Present Yes              * Hospital Course: Paula Georges is a 69 y.o. female with PMH significant for Diabetes, Hypertension, ESRD on hemodialysis T/Th/S, Atrial Flutter, not on anti-coagulation 2/2 prior GI Bleed, Chronic pain 2/2 L4/5 herniation, HFrEF last know EF 20%, followed by Dr. Radha Fraire, Chronic hypotension and Cirrhosis, [facility] who [facility] to ED from dialysis today after completing treatment, reporting weakness and dizziness. She reported a 2 day history of generalized weakness, dizziness, and feeling generally unwell associated lower abdominal pain, as well as right flank pain having flatus which she states has a strong heme odor, accompanied by dark stools, but denied any brittany blood. In ED, labs shows BUN/Cr elevated consistent w ESRD, and anemia with HGB 6.1. She was seen in consultation by Nephrology, and orders given for HD while in the hospital and she was typed and crossmatched for PRBC's. Patient was admitted to the hospital was transfused a total of 3 units of packed red blood cells. She was also seen in consultation by Dr. Brenda Alvarez who wrote The stool was checked and noted to be Hemoccult positive.   Patient has had work-up at the [address] [place] [address] [facility] recently with an EGD, colonoscopy, and capsule endoscopy and no source of the blood loss was found with those studies according to patient. He felt that the edema was worse likely secondary to combination of chronic anemia for her end-stage renal disease plus intermittent GI tract blood loss. The plan was to do an upper endoscopy as an outpatient which patient has agreed to. And did receive hemodialysis while she was in the hospital.  Patient had a continuing complaint of right-sided flank pain for which CT of the abdomen pelvis was done which was within normal limits. Patient was given Flexeril for suspected musculoskeletal complaints of the pain. She also received morphine which she states made her sleepy while in the hospital and she was made aware that she would be discharged on a muscle relaxant for the pain and that no clear etiology was noted on imaging. She was agreeable to being discharged with a muscle relaxant which she will take as needed for the pain. After the packed red blood cells patient's hemoglobin remained stable around 8 and she had no new symptoms for the continued dizziness which she has from time to time. Patient did complain of passing her urine and was told that she has been dialyzed at should not be an issue. Patient was ready to be discharged home so she was discharged      Consults:    Gastroenterology  Nephrology    Discharge Exam:  General Appearance:  Comfortable, well-appearing. No acute distress. HEENT: NCAT, PERRL, No deformities or discharge, Neck supple with trachea midline. Respiratory: Normal respiratory rate. Breath sounds clear to auscultation. Heart: S1 normal and S2 normal.  No tachycardia  Abdomen: Soft and flat. Bowel sounds are normal. No rebound or guarding. No organomegaly. Extremities: Normal range of motion. No acute deformities. No peripheral edema. Pulses: Distal pulses are intact.     Neurological: Alert and oriented to person, place and time. Grossly intact. Skin:  Warm and dry. No acute lesions. * Discharge Condition: stable  * Disposition: Home    Discharge Medications:  Current Discharge Medication List      START taking these medications    Details   lactulose (CHRONULAC) 10 gram/15 mL solution Take 30 mL by mouth two (2) times a day. Qty: 1 Bottle, Refills: 0      cyclobenzaprine (FLEXERIL) 10 mg tablet Take 1 Tab by mouth three (3) times daily as needed for Muscle Spasm(s) for up to 5 days. Qty: 15 Tab, Refills: 0         CONTINUE these medications which have CHANGED    Details   hydrALAZINE (APRESOLINE) 25 mg tablet Take 1 Tab by mouth three (3) times daily. Qty: 90 Tab, Refills: 0         CONTINUE these medications which have NOT CHANGED    Details   cholecalciferol (VITAMIN D3) 1,000 unit tablet Take 2,000 Units by mouth daily. ferrous sulfate 325 mg (65 mg iron) tablet Take 325 mg by mouth three (3) times daily (with meals). sevelamer (RENAGEL) 800 mg tablet Take 1,600 mg by mouth three (3) times daily (with meals). albuterol (PROVENTIL HFA, VENTOLIN HFA, PROAIR HFA) 90 mcg/actuation inhaler Take 2 Puffs by inhalation every four (4) hours as needed for Wheezing. * Follow-up Care/Patient Instructions:   Activity: Activity as tolerated  Diet: Diabetic Diet      Follow-up Information     Follow up With Specialties Details Why Contact Info    Blanca Griffin MD Family Medicine In 3 days  [address] [facility] [address] [place] [address]  [phone]      Other, MD [place]    Patient can only remember the practice name and not the physician            Total time in minutes spent coordinating this discharge (includes going over instructions, follow-up, prescriptions, and documentation) :  30 minutes    Signed: Belle Meeks MD, Magali Bah  10/10/2020  12:00 PM

## 2020-10-10 NOTE — PROGRESS NOTES
Pt was educated on her After Visit Summary, Medication, follow-up appointment and her printed prescription. Patient called several family members and/or friends to come and pick her up from the hospital due to being discharged; however, the patient could not get anyone to come and pick her up. Pt was evaluated to determine if she was capable of being transported to her home and enter her home safely by herself due to her daughter is currently at work (per patient). Patient verbalized that she would be fine and able to safely enter her home. [facility] was called and patient was discharged home with all of her belongings, PIV was removed, Pt's Dialysis catheter was covered and dressed without signs of drainage. Pt denied pain at the time of her discharge.  Patient's AVS paperwork was given and Pt was discharged at 16:43 am

## 2020-10-14 LAB
ABO + RH BLD: NORMAL
ANTIGENS PRESENT RBC DONR: NORMAL
ANTIGENS PRESENT RBC DONR: NORMAL
BLD PROD TYP BPU: NORMAL
BLD PROD TYP BPU: NORMAL
BLOOD GROUP ANTIBODIES SERPL: NORMAL
BLOOD GROUP ANTIBODIES SERPL: POSITIVE
BPU ID: NORMAL
BPU ID: NORMAL
CROSSMATCH RESULT,%XM: NORMAL
CROSSMATCH RESULT,%XM: NORMAL
SPECIMEN EXP DATE BLD: NORMAL
STATUS OF UNIT,%ST: NORMAL
STATUS OF UNIT,%ST: NORMAL
TRANSFUSION STATUS PATIENT QL: NORMAL
TRANSFUSION STATUS PATIENT QL: NORMAL
UNIT DIVISION, %UDIV: 0
UNIT DIVISION, %UDIV: 0
XXAUTO CONTROL: NEGATIVE

## 2020-10-17 ENCOUNTER — HOSPITAL ENCOUNTER (OUTPATIENT)
Age: 70
Setting detail: OBSERVATION
Discharge: ANOTHER HOSPITAL | End: 2020-10-18
Attending: EMERGENCY MEDICINE | Admitting: INTERNAL MEDICINE
Payer: MEDICARE

## 2020-10-17 DIAGNOSIS — N18.6 END STAGE RENAL DISEASE (HCC): ICD-10-CM

## 2020-10-17 DIAGNOSIS — Z99.2 ANEMIA DUE TO CHRONIC KIDNEY DISEASE, ON CHRONIC DIALYSIS (HCC): Primary | ICD-10-CM

## 2020-10-17 DIAGNOSIS — D63.1 ANEMIA DUE TO CHRONIC KIDNEY DISEASE, ON CHRONIC DIALYSIS (HCC): Primary | ICD-10-CM

## 2020-10-17 DIAGNOSIS — N18.6 ANEMIA DUE TO CHRONIC KIDNEY DISEASE, ON CHRONIC DIALYSIS (HCC): Primary | ICD-10-CM

## 2020-10-17 PROBLEM — I10 HTN (HYPERTENSION): Status: ACTIVE | Noted: 2020-10-17

## 2020-10-17 LAB
ABO + RH BLD: NORMAL
ALBUMIN SERPL-MCNC: 2.8 G/DL (ref 3.5–5)
ALBUMIN/GLOB SERPL: 0.8 {RATIO} (ref 1.1–2.2)
ALP SERPL-CCNC: 136 U/L (ref 45–117)
ALT SERPL-CCNC: 17 U/L (ref 12–78)
ANION GAP SERPL CALC-SCNC: 7 MMOL/L (ref 5–15)
AST SERPL W P-5'-P-CCNC: 18 U/L (ref 15–37)
BASOPHILS # BLD: 0.1 K/UL (ref 0–0.2)
BASOPHILS NFR BLD: 2 % (ref 0–2.5)
BILIRUB SERPL-MCNC: 0.5 MG/DL (ref 0.2–1)
BUN SERPL-MCNC: 33 MG/DL (ref 6–20)
BUN/CREAT SERPL: 9 (ref 12–20)
CA-I BLD-MCNC: 8.3 MG/DL (ref 8.5–10.1)
CHLORIDE SERPL-SCNC: 100 MMOL/L (ref 97–108)
CO2 SERPL-SCNC: 30 MMOL/L (ref 21–32)
COLLECT DATE STL: POSITIVE
CREAT SERPL-MCNC: 3.63 MG/DL (ref 0.55–1.02)
EOSINOPHIL # BLD: 0.1 K/UL (ref 0–0.7)
EOSINOPHIL NFR BLD: 2 % (ref 0.9–2.9)
ERYTHROCYTE [DISTWIDTH] IN BLOOD BY AUTOMATED COUNT: 19.5 % (ref 11.5–14.5)
GLOBULIN SER CALC-MCNC: 3.3 G/DL (ref 2–4)
GLUCOSE BLD STRIP.AUTO-MCNC: 224 MG/DL (ref 65–100)
GLUCOSE SERPL-MCNC: 117 MG/DL (ref 65–100)
HCT VFR BLD AUTO: 17.6 % (ref 36–46)
HEMOCCULT SP1 STL QL: POSITIVE
HGB BLD-MCNC: 5.5 G/DL (ref 13.5–17.5)
INR PPP: 1.1 (ref 0.9–1.1)
LYMPHOCYTES # BLD: 0.5 K/UL (ref 1–4.8)
LYMPHOCYTES NFR BLD: 11 % (ref 20.5–51.1)
MCH RBC QN AUTO: 27.5 PG (ref 31–34)
MCHC RBC AUTO-ENTMCNC: 31.4 G/DL (ref 31–36)
MCV RBC AUTO: 87.7 FL (ref 80–100)
MONOCYTES # BLD: 0.3 K/UL (ref 0.2–2.4)
MONOCYTES NFR BLD: 8 % (ref 1.7–9.3)
NEUTS SEG # BLD: 3.5 K/UL (ref 1.8–7.7)
NEUTS SEG NFR BLD: 77 % (ref 42–75)
NRBC # BLD: 0 K/UL
NRBC BLD-RTO: 0 PER 100 WBC
PERFORMED BY, TECHID: ABNORMAL
PLATELET # BLD AUTO: 132 K/UL
PMV BLD AUTO: 7.7 FL (ref 6.5–11.5)
POTASSIUM SERPL-SCNC: 4.1 MMOL/L (ref 3.5–5.1)
PROT SERPL-MCNC: 6.1 G/DL (ref 6.4–8.2)
PROTHROMBIN TIME: 10.6 SEC (ref 9–11.1)
RBC # BLD AUTO: 2 M/UL (ref 4.5–5.9)
SODIUM SERPL-SCNC: 137 MMOL/L (ref 136–145)
WBC # BLD AUTO: 4.4 K/UL (ref 4.4–11.3)

## 2020-10-17 PROCEDURE — 99218 HC RM OBSERVATION: CPT

## 2020-10-17 PROCEDURE — 86900 BLOOD TYPING SEROLOGIC ABO: CPT

## 2020-10-17 PROCEDURE — 86920 COMPATIBILITY TEST SPIN: CPT

## 2020-10-17 PROCEDURE — 86870 RBC ANTIBODY IDENTIFICATION: CPT

## 2020-10-17 PROCEDURE — 85610 PROTHROMBIN TIME: CPT

## 2020-10-17 PROCEDURE — 80053 COMPREHEN METABOLIC PANEL: CPT

## 2020-10-17 PROCEDURE — 99284 EMERGENCY DEPT VISIT MOD MDM: CPT

## 2020-10-17 PROCEDURE — 74011636637 HC RX REV CODE- 636/637: Performed by: PHYSICIAN ASSISTANT

## 2020-10-17 PROCEDURE — 74011250637 HC RX REV CODE- 250/637: Performed by: PHYSICIAN ASSISTANT

## 2020-10-17 PROCEDURE — 85025 COMPLETE CBC W/AUTO DIFF WBC: CPT

## 2020-10-17 PROCEDURE — 82272 OCCULT BLD FECES 1-3 TESTS: CPT

## 2020-10-17 PROCEDURE — 82962 GLUCOSE BLOOD TEST: CPT

## 2020-10-17 PROCEDURE — 93005 ELECTROCARDIOGRAM TRACING: CPT

## 2020-10-17 RX ORDER — SEVELAMER CARBONATE 800 MG/1
1600 TABLET, FILM COATED ORAL
Status: DISCONTINUED | OUTPATIENT
Start: 2020-10-17 | End: 2020-10-18 | Stop reason: HOSPADM

## 2020-10-17 RX ORDER — TRAMADOL HYDROCHLORIDE 50 MG/1
50 TABLET ORAL
Status: DISCONTINUED | OUTPATIENT
Start: 2020-10-17 | End: 2020-10-17

## 2020-10-17 RX ORDER — SODIUM CHLORIDE 0.9 % (FLUSH) 0.9 %
5-40 SYRINGE (ML) INJECTION EVERY 8 HOURS
Status: DISCONTINUED | OUTPATIENT
Start: 2020-10-17 | End: 2020-10-18 | Stop reason: HOSPADM

## 2020-10-17 RX ORDER — SODIUM CHLORIDE 9 MG/ML
250 INJECTION, SOLUTION INTRAVENOUS AS NEEDED
Status: DISCONTINUED | OUTPATIENT
Start: 2020-10-17 | End: 2020-10-18 | Stop reason: HOSPADM

## 2020-10-17 RX ORDER — POLYETHYLENE GLYCOL 3350 17 G/17G
17 POWDER, FOR SOLUTION ORAL DAILY PRN
Status: DISCONTINUED | OUTPATIENT
Start: 2020-10-17 | End: 2020-10-18 | Stop reason: HOSPADM

## 2020-10-17 RX ORDER — ASCORBIC ACID 500 MG
500 TABLET ORAL DAILY
COMMUNITY

## 2020-10-17 RX ORDER — DEXTROSE 50 % IN WATER (D50W) INTRAVENOUS SYRINGE
25-50 AS NEEDED
Status: DISCONTINUED | OUTPATIENT
Start: 2020-10-17 | End: 2020-10-18 | Stop reason: HOSPADM

## 2020-10-17 RX ORDER — INSULIN LISPRO 100 [IU]/ML
INJECTION, SOLUTION INTRAVENOUS; SUBCUTANEOUS
Status: DISCONTINUED | OUTPATIENT
Start: 2020-10-17 | End: 2020-10-18 | Stop reason: HOSPADM

## 2020-10-17 RX ORDER — HYDRALAZINE HYDROCHLORIDE 25 MG/1
25 TABLET, FILM COATED ORAL 3 TIMES DAILY
Status: DISCONTINUED | OUTPATIENT
Start: 2020-10-17 | End: 2020-10-18 | Stop reason: HOSPADM

## 2020-10-17 RX ORDER — PROMETHAZINE HYDROCHLORIDE 25 MG/1
12.5 TABLET ORAL
Status: DISCONTINUED | OUTPATIENT
Start: 2020-10-17 | End: 2020-10-18 | Stop reason: HOSPADM

## 2020-10-17 RX ORDER — MAGNESIUM SULFATE 100 %
4 CRYSTALS MISCELLANEOUS AS NEEDED
Status: DISCONTINUED | OUTPATIENT
Start: 2020-10-17 | End: 2020-10-18 | Stop reason: HOSPADM

## 2020-10-17 RX ORDER — FAMOTIDINE 20 MG/1
20 TABLET, FILM COATED ORAL 2 TIMES DAILY
Status: DISCONTINUED | OUTPATIENT
Start: 2020-10-17 | End: 2020-10-18 | Stop reason: HOSPADM

## 2020-10-17 RX ORDER — ACETAMINOPHEN 325 MG/1
650 TABLET ORAL
Status: DISCONTINUED | OUTPATIENT
Start: 2020-10-17 | End: 2020-10-18 | Stop reason: HOSPADM

## 2020-10-17 RX ORDER — ACETAMINOPHEN 650 MG/1
650 SUPPOSITORY RECTAL
Status: DISCONTINUED | OUTPATIENT
Start: 2020-10-17 | End: 2020-10-18 | Stop reason: HOSPADM

## 2020-10-17 RX ORDER — OXYCODONE HYDROCHLORIDE 5 MG/1
5 TABLET ORAL
Status: DISCONTINUED | OUTPATIENT
Start: 2020-10-17 | End: 2020-10-18 | Stop reason: HOSPADM

## 2020-10-17 RX ORDER — LANOLIN ALCOHOL/MO/W.PET/CERES
325 CREAM (GRAM) TOPICAL
Status: DISCONTINUED | OUTPATIENT
Start: 2020-10-17 | End: 2020-10-18 | Stop reason: HOSPADM

## 2020-10-17 RX ORDER — MELATONIN
2000 DAILY
Status: DISCONTINUED | OUTPATIENT
Start: 2020-10-18 | End: 2020-10-18 | Stop reason: HOSPADM

## 2020-10-17 RX ORDER — ONDANSETRON 2 MG/ML
4 INJECTION INTRAMUSCULAR; INTRAVENOUS
Status: DISCONTINUED | OUTPATIENT
Start: 2020-10-17 | End: 2020-10-18 | Stop reason: HOSPADM

## 2020-10-17 RX ORDER — ALBUTEROL SULFATE 90 UG/1
2 AEROSOL, METERED RESPIRATORY (INHALATION)
Status: DISCONTINUED | OUTPATIENT
Start: 2020-10-17 | End: 2020-10-18

## 2020-10-17 RX ORDER — SODIUM CHLORIDE 0.9 % (FLUSH) 0.9 %
5-40 SYRINGE (ML) INJECTION AS NEEDED
Status: DISCONTINUED | OUTPATIENT
Start: 2020-10-17 | End: 2020-10-18 | Stop reason: HOSPADM

## 2020-10-17 RX ADMIN — Medication 10 ML: at 21:20

## 2020-10-17 RX ADMIN — FAMOTIDINE 20 MG: 20 TABLET, FILM COATED ORAL at 19:50

## 2020-10-17 RX ADMIN — FERROUS SULFATE TAB 325 MG (65 MG ELEMENTAL FE) 325 MG: 325 (65 FE) TAB at 18:18

## 2020-10-17 RX ADMIN — OXYCODONE HYDROCHLORIDE 5 MG: 5 TABLET ORAL at 19:50

## 2020-10-17 RX ADMIN — Medication 10 ML: at 18:20

## 2020-10-17 RX ADMIN — LACTULOSE 30 ML: 20 SOLUTION ORAL at 18:18

## 2020-10-17 RX ADMIN — SEVELAMER CARBONATE 1600 MG: 800 TABLET, FILM COATED ORAL at 18:18

## 2020-10-17 RX ADMIN — INSULIN LISPRO 2 UNITS: 100 INJECTION, SOLUTION INTRAVENOUS; SUBCUTANEOUS at 21:20

## 2020-10-17 NOTE — ASSESSMENT & PLAN NOTE
-Type and screen 2 units PRBCs  -Transfused 2 units PRBCs  -H&H every 6 hours  -Current H&H shows globin 5.5 and hematocrit 17.6 with symptoms of anemia  -No acute complaints or symptoms of blood loss

## 2020-10-17 NOTE — ED PROVIDER NOTES
EMERGENCY DEPARTMENT HISTORY AND PHYSICAL EXAM      Date: 10/17/2020  Patient Name: Daisy Snyder      History of Presenting Illness     Chief Complaint   Patient presents with    Abnormal Lab Results       History Provided By: Patient    HPI: Daisy Snyder, 69 y.o. female with a past medical history significant hypertension, asthma and Cirrhosis end-stage renal disease on hemodialysis presents to the ED with cc of anemia with low blood count sent to the ED for transfusion. There are no other complaints, changes, or physical findings at this time. PCP: [place] Farrell MD    Current Outpatient Medications   Medication Sig Dispense Refill    hydrALAZINE (APRESOLINE) 25 mg tablet Take 1 Tab by mouth three (3) times daily. 90 Tab 0    lactulose (CHRONULAC) 10 gram/15 mL solution Take 30 mL by mouth two (2) times a day. 1 Bottle 0    cholecalciferol (VITAMIN D3) 1,000 unit tablet Take 2,000 Units by mouth daily.  ferrous sulfate 325 mg (65 mg iron) tablet Take 325 mg by mouth three (3) times daily (with meals).  sevelamer (RENAGEL) 800 mg tablet Take 1,600 mg by mouth three (3) times daily (with meals).  albuterol (PROVENTIL HFA, VENTOLIN HFA, PROAIR HFA) 90 mcg/actuation inhaler Take 2 Puffs by inhalation every four (4) hours as needed for Wheezing.          Past History     Past Medical History:  Past Medical History:   Diagnosis Date    Arthritis     Asthma     Chronic kidney disease     Chronic pain     Cirrhosis ([facility] [address] 12/17/2017    Diabetes (HCC) diet controlled    GERD (gastroesophageal reflux disease)     Hypertension     Paroxysmal atrial fibrillation ([facility] [address] 10/6/2020       Past Surgical History:  Past Surgical History:   Procedure Laterality Date    COLONOSCOPY N/A 1/12/2018    COLONOSCOPY performed by Chanel Cedeno MD at [facility] ENDOSCOPY    COLONOSCOPY N/A 3/19/2018    COLONOSCOPY performed by Chanel Cedeno MD at [facility] ENDOSCOPY    HX GYN  c section    HX VASCULAR ACCESS      dialysis        Family History:  Family History   Family history unknown: Yes       Social History:  Social History     Tobacco Use    Smoking status: Never Smoker    Smokeless tobacco: Never Used   Substance Use Topics    Alcohol use: No    Drug use: No       Allergies: Allergies   Allergen Reactions    Amlodipine Rash, Hives and Itching    Aspirin Other (comments), Nausea Only and Unknown (comments)     GI bleed  GI bleeding      Heparin Unknown (comments)     bleeding      Ibuprofen Nausea and Vomiting    Metformin Other (comments)     swelling         Review of Systems   All other systems are negative  Review of Systems    Physical Exam   Elderly chronically ill-appearing female with icteric sclera no immediate distress  Physical Exam  Vitals signs and nursing note reviewed. Constitutional:       General: She is not in acute distress. Appearance: She is not ill-appearing, toxic-appearing or diaphoretic. HENT:      Head: Normocephalic and atraumatic. Nose: Nose normal.      Mouth/Throat:      Mouth: Mucous membranes are moist.   Eyes:      General: Scleral icterus present. Extraocular Movements: Extraocular movements intact. Conjunctiva/sclera: Conjunctivae normal.      Pupils: Pupils are equal, round, and reactive to light. Neck:      Musculoskeletal: Normal range of motion and neck supple. No neck rigidity or muscular tenderness. Cardiovascular:      Rate and Rhythm: Normal rate. Rhythm irregular. Pulses: Normal pulses. Heart sounds: Normal heart sounds. No murmur. Pulmonary:      Effort: Pulmonary effort is normal. No respiratory distress. Breath sounds: Normal breath sounds. No wheezing, rhonchi or rales. Chest:      Chest wall: No tenderness. Abdominal:      General: Abdomen is flat. Palpations: Abdomen is soft. There is no mass. Tenderness: There is no abdominal tenderness.  There is no right CVA tenderness, left CVA tenderness, guarding or rebound. Hernia: No hernia is present. Musculoskeletal: Normal range of motion. General: No tenderness, deformity or signs of injury. Right lower leg: No edema. Left lower leg: No edema. Skin:     General: Skin is warm. Findings: No erythema or rash. Neurological:      General: No focal deficit present. Mental Status: She is alert and oriented to person, place, and time. Cranial Nerves: No cranial nerve deficit. Sensory: No sensory deficit. Motor: No weakness. Psychiatric:         Mood and Affect: Mood normal.         Behavior: Behavior normal.         Thought Content: Thought content normal.         Diagnostic Study Results     Labs -     Recent Results (from the past 12 hour(s))   CBC WITH AUTOMATED DIFF    Collection Time: 10/17/20  1:10 PM   Result Value Ref Range    WBC 4.4 4.4 - 11.3 K/uL    RBC 2.00 (L) 4.50 - 5.90 M/uL    HGB 5.5 (LL) 13.5 - 17.5 g/dL    HCT 17.6 (LL) 36 - 46 %    MCV 87.7 80 - 100 FL    MCH 27.5 (L) 31 - 34 PG    MCHC 31.4 31.0 - 36.0 g/dL    RDW 19.5 (H) 11.5 - 14.5 %    PLATELET 385 K/uL    MPV 7.7 6.5 - 11.5 FL    NRBC 0.0  WBC    ABSOLUTE NRBC 0.00 K/uL    NEUTROPHILS 77 (H) 42 - 75 %    LYMPHOCYTES 11 (L) 20.5 - 51.1 %    MONOCYTES 8 1.7 - 9.3 %    EOSINOPHILS 2 0.9 - 2.9 %    BASOPHILS 2 0.0 - 2.5 %    ABS. NEUTROPHILS 3.5 1.8 - 7.7 K/UL    ABS. LYMPHOCYTES 0.5 (L) 1.0 - 4.8 K/UL    ABS. MONOCYTES 0.3 0.2 - 2.4 K/UL    ABS. EOSINOPHILS 0.1 0.0 - 0.7 K/UL    ABS.  BASOPHILS 0.1 0.0 - 0.2 K/UL   BLOOD TYPE, (ABO+RH)    Collection Time: 10/17/20  1:10 PM   Result Value Ref Range    ABO/Rh(D) O Positive    METABOLIC PANEL, COMPREHENSIVE    Collection Time: 10/17/20  1:10 PM   Result Value Ref Range    Sodium 137 136 - 145 mmol/L    Potassium 4.1 3.5 - 5.1 mmol/L    Chloride 100 97 - 108 mmol/L    CO2 30 21 - 32 mmol/L    Anion gap 7 5 - 15 mmol/L    Glucose 117 (H) 65 - 100 mg/dL    BUN 33 (H) 6 - 20 mg/dL Creatinine 3.63 (H) 0.55 - 1.02 mg/dL    BUN/Creatinine ratio 9 (L) 12 - 20      GFR est AA 15 (L) >60 ml/min/1.73m2    GFR est non-AA 12 (L) >60 ml/min/1.73m2    Calcium 8.3 (L) 8.5 - 10.1 mg/dL    Bilirubin, total 0.5 0.2 - 1.0 mg/dL    AST (SGOT) 18 15 - 37 U/L    ALT (SGPT) 17 12 - 78 U/L    Alk. phosphatase 136 (H) 45 - 117 U/L    Protein, total 6.1 (L) 6.4 - 8.2 g/dL    Albumin 2.8 (L) 3.5 - 5.0 g/dL    Globulin 3.3 2.0 - 4.0 g/dL    A-G Ratio 0.8 (L) 1.1 - 2.2     PROTHROMBIN TIME + INR    Collection Time: 10/17/20  1:10 PM   Result Value Ref Range    Prothrombin time 10.6 9.0 - 11.1 sec    INR 1.1 0.9 - 1.1         Radiologic Studies -   [unfilled]  CT Results  (Last 48 hours)    None        CXR Results  (Last 48 hours)    None          Medical Decision Making and ED Course     Vital Signs-Reviewed the patient's vital signs. Patient Vitals for the past 12 hrs:   Temp Pulse Resp BP SpO2   10/17/20 1243 98.1 °F (36.7 °C) 92 18 (!) 134/91 99 %       EKG interpretation: (Preliminary)  Rhythm: normal sinus rhythm; and regular . Rate (approx.): 90; Axis: normal; MS interval: normal; QRS interval: prolonged; ST/T wave: non-specific changes; Other findings: left ventricular hypertrophy. Records Reviewed: Nursing Notes    The patient presents with anemia with a differential diagnosis of anemia of chronic disease chronic GI blood loss anemia of chronic renal failure. Provider Notes (Medical Decision Making):   67-year-old female referred to the ED for transfusion from the dialysis center. Like anemia requiring periodic blood transfusions will discuss with hospitalist.  [facility]       ED Course:     - I am the first and primary provider for this patient. - I reviewed the vital signs, available nursing notes, past medical history, past surgical history, family history and social history. Initial assessment performed.  The patients presenting problems have been discussed, and they are in agreement with the care plan formulated and outlined with them. I have encouraged them to ask questions as they arise throughout their visit. CONSULTATIONS:          Procedures       Melo Tovar MD  Procedures none            CRITICAL CARE NOTE :  2:44 PM  Amount of Critical Care Time: ___30_(minutes)__    IMPENDING DETERIORATION -Cardiovascular  ASSOCIATED RISK FACTORS - Bleeding, Dysrhythmia and Vascular Compromise  MANAGEMENT- Bedside Assessment  INTERPRETATION -  Blood Pressure  INTERVENTIONS - hemodynamic mngmt, vascular control and Metobolic interventions  CASE REVIEW - Hospitalist  TREATMENT RESPONSE -Unchanged   PERFORMED BY - Self    NOTES   :  I have spent critical care time involved in lab review, consultations with specialist, family decision- making, bedside attention and documentation. This time excludes time spent in any separate billed procedures. During this entire length of time I was immediately available to the patient . Melo Tovar MD        Disposition     Discussed with hospitalist will admit for transfusion. DISCHARGE PLAN:  1. Current Discharge Medication List      CONTINUE these medications which have NOT CHANGED    Details   hydrALAZINE (APRESOLINE) 25 mg tablet Take 1 Tab by mouth three (3) times daily. Qty: 90 Tab, Refills: 0      lactulose (CHRONULAC) 10 gram/15 mL solution Take 30 mL by mouth two (2) times a day. Qty: 1 Bottle, Refills: 0      cholecalciferol (VITAMIN D3) 1,000 unit tablet Take 2,000 Units by mouth daily. ferrous sulfate 325 mg (65 mg iron) tablet Take 325 mg by mouth three (3) times daily (with meals). sevelamer (RENAGEL) 800 mg tablet Take 1,600 mg by mouth three (3) times daily (with meals). albuterol (PROVENTIL HFA, VENTOLIN HFA, PROAIR HFA) 90 mcg/actuation inhaler Take 2 Puffs by inhalation every four (4) hours as needed for Wheezing. 2.   Follow-up Information    None       3. Return to ED if worse   4.    Current Discharge Medication List          Diagnosis     Clinical Impression: Severe anemia admitted for transfusion  Attestations:    Karen Begum MD    Please note that this dictation was completed with [employer], the computer voice recognition software. Quite often unanticipated grammatical, syntax, homophones, and other interpretive errors are inadvertently transcribed by the computer software. Please disregard these errors. Please excuse any errors that have escaped final proofreading. Thank you.

## 2020-10-17 NOTE — ASSESSMENT & PLAN NOTE
-Blood pressure mildly elevated upon admission  -Continue with home medication  -Continue to monitor

## 2020-10-17 NOTE — ROUTINE PROCESS
Received report from [facility]. No s/s of distress. Pt is c/o of pain 10/10 of the back and neck. Call bell is within reach. Will continue to monitor.

## 2020-10-17 NOTE — ROUTINE PROCESS
Pt. Stated that she is suppose to take eye drops and Flonase. Call patient pharmacy, pharmacy was closed. Will pass information to next shift nurse.

## 2020-10-17 NOTE — ASSESSMENT & PLAN NOTE
03/30/20 1539   Medicare Message   Important Message from Medicare regarding Discharge Appeal Rights Given to patient/caregiver;Explained to patient/caregiver;Signed/date by patient/caregiver   Date IMM was signed 03/30/20   Time IMM was signed 103      -Insulin sliding scale  -Accu-Chek before meals bedtime

## 2020-10-17 NOTE — ASSESSMENT & PLAN NOTE
-Dialysis on Tuesday Thursday and Saturday  -Only received two thirds of her standard hemodialysis session today  -Discontinued due to lower extremity cramping/pain  -Discussed plan of care with Dr Lupis Sandra who agreed with plan of care

## 2020-10-17 NOTE — ROUTINE PROCESS
Administered a  dressing to patient left leg.  Pt. Stated that was going to a wound specialist and that she is no longer going to the wound specialist.

## 2020-10-17 NOTE — ED TRIAGE NOTES
Pt states she was at dialysis and her nurse told her that her HGB was 5.9 and to come to the ED.  Pt states she had black tar stool last night

## 2020-10-18 ENCOUNTER — HOSPITAL ENCOUNTER (INPATIENT)
Age: 70
LOS: 7 days | Discharge: HOME | DRG: 377 | End: 2020-10-25
Attending: FAMILY MEDICINE | Admitting: FAMILY MEDICINE
Payer: MEDICARE

## 2020-10-18 VITALS
DIASTOLIC BLOOD PRESSURE: 71 MMHG | RESPIRATION RATE: 19 BRPM | SYSTOLIC BLOOD PRESSURE: 149 MMHG | HEART RATE: 86 BPM | OXYGEN SATURATION: 100 % | TEMPERATURE: 97.6 F

## 2020-10-18 DIAGNOSIS — E11.65 TYPE 2 DIABETES MELLITUS WITH HYPERGLYCEMIA, WITHOUT LONG-TERM CURRENT USE OF INSULIN (HCC): ICD-10-CM

## 2020-10-18 PROBLEM — Z20.822 SUSPECTED COVID-19 VIRUS INFECTION: Status: ACTIVE | Noted: 2020-10-18

## 2020-10-18 PROBLEM — N18.6 ESRD (END STAGE RENAL DISEASE) (HCC): Status: ACTIVE | Noted: 2020-10-18

## 2020-10-18 LAB
ANION GAP SERPL CALC-SCNC: 9 MMOL/L (ref 5–15)
BASOPHILS # BLD: 0.1 K/UL (ref 0–0.2)
BASOPHILS NFR BLD: 1 % (ref 0–2.5)
BUN SERPL-MCNC: 51 MG/DL (ref 6–20)
BUN/CREAT SERPL: 10 (ref 12–20)
CA-I BLD-MCNC: 8.6 MG/DL (ref 8.5–10.1)
CHLORIDE SERPL-SCNC: 101 MMOL/L (ref 97–108)
CO2 SERPL-SCNC: 28 MMOL/L (ref 21–32)
CREAT SERPL-MCNC: 5.18 MG/DL (ref 0.55–1.02)
EOSINOPHIL # BLD: 0.1 K/UL (ref 0–0.7)
EOSINOPHIL NFR BLD: 3 % (ref 0.9–2.9)
ERYTHROCYTE [DISTWIDTH] IN BLOOD BY AUTOMATED COUNT: 19.7 % (ref 11.5–14.5)
GLUCOSE SERPL-MCNC: 139 MG/DL (ref 65–100)
HCT VFR BLD AUTO: 17.2 % (ref 36–46)
HGB BLD-MCNC: 5.3 G/DL (ref 13.5–17.5)
LYMPHOCYTES # BLD: 0.6 K/UL (ref 1–4.8)
LYMPHOCYTES NFR BLD: 12 % (ref 20.5–51.1)
MCH RBC QN AUTO: 27.4 PG (ref 31–34)
MCHC RBC AUTO-ENTMCNC: 30.9 G/DL (ref 31–36)
MCV RBC AUTO: 88.6 FL (ref 80–100)
MONOCYTES # BLD: 0.7 K/UL (ref 0.2–2.4)
MONOCYTES NFR BLD: 15 % (ref 1.7–9.3)
NEUTS SEG # BLD: 3.4 K/UL (ref 1.8–7.7)
NEUTS SEG NFR BLD: 69 % (ref 42–75)
NRBC # BLD: 0.01 K/UL
NRBC BLD-RTO: 10 PER 100 WBC
PLATELET # BLD AUTO: 175 K/UL
PMV BLD AUTO: 8.3 FL (ref 6.5–11.5)
POTASSIUM SERPL-SCNC: 5 MMOL/L (ref 3.5–5.1)
RBC # BLD AUTO: 1.94 M/UL (ref 4.5–5.9)
SODIUM SERPL-SCNC: 138 MMOL/L (ref 136–145)
WBC # BLD AUTO: 5 K/UL (ref 4.4–11.3)

## 2020-10-18 PROCEDURE — 86870 RBC ANTIBODY IDENTIFICATION: CPT

## 2020-10-18 PROCEDURE — 65660000000 HC RM CCU STEPDOWN

## 2020-10-18 PROCEDURE — 74011250637 HC RX REV CODE- 250/637: Performed by: PHYSICIAN ASSISTANT

## 2020-10-18 PROCEDURE — 86850 RBC ANTIBODY SCREEN: CPT

## 2020-10-18 PROCEDURE — 86920 COMPATIBILITY TEST SPIN: CPT

## 2020-10-18 PROCEDURE — 80048 BASIC METABOLIC PNL TOTAL CA: CPT

## 2020-10-18 PROCEDURE — 84145 PROCALCITONIN (PCT): CPT

## 2020-10-18 PROCEDURE — 80053 COMPREHEN METABOLIC PANEL: CPT

## 2020-10-18 PROCEDURE — 36430 TRANSFUSION BLD/BLD COMPNT: CPT

## 2020-10-18 PROCEDURE — 82728 ASSAY OF FERRITIN: CPT

## 2020-10-18 PROCEDURE — 86900 BLOOD TYPING SEROLOGIC ABO: CPT

## 2020-10-18 PROCEDURE — 36415 COLL VENOUS BLD VENIPUNCTURE: CPT

## 2020-10-18 PROCEDURE — 85384 FIBRINOGEN ACTIVITY: CPT

## 2020-10-18 PROCEDURE — 86078 PHYS BLOOD BANK SERV REACTJ: CPT

## 2020-10-18 PROCEDURE — P9016 RBC LEUKOCYTES REDUCED: HCPCS

## 2020-10-18 PROCEDURE — 85025 COMPLETE CBC W/AUTO DIFF WBC: CPT

## 2020-10-18 PROCEDURE — 87040 BLOOD CULTURE FOR BACTERIA: CPT

## 2020-10-18 PROCEDURE — 74011636637 HC RX REV CODE- 636/637: Performed by: PHYSICIAN ASSISTANT

## 2020-10-18 PROCEDURE — 99218 HC RM OBSERVATION: CPT

## 2020-10-18 PROCEDURE — 85379 FIBRIN DEGRADATION QUANT: CPT

## 2020-10-18 PROCEDURE — 83036 HEMOGLOBIN GLYCOSYLATED A1C: CPT

## 2020-10-18 PROCEDURE — 84484 ASSAY OF TROPONIN QUANT: CPT

## 2020-10-18 PROCEDURE — 85610 PROTHROMBIN TIME: CPT

## 2020-10-18 PROCEDURE — 83605 ASSAY OF LACTIC ACID: CPT

## 2020-10-18 PROCEDURE — 83735 ASSAY OF MAGNESIUM: CPT

## 2020-10-18 PROCEDURE — 86901 BLOOD TYPING SEROLOGIC RH(D): CPT

## 2020-10-18 PROCEDURE — 85730 THROMBOPLASTIN TIME PARTIAL: CPT

## 2020-10-18 RX ORDER — DEXTROSE MONOHYDRATE 100 MG/ML
0-250 INJECTION, SOLUTION INTRAVENOUS AS NEEDED
Status: DISCONTINUED | OUTPATIENT
Start: 2020-10-18 | End: 2020-10-25 | Stop reason: HOSPADM

## 2020-10-18 RX ORDER — INSULIN LISPRO 100 [IU]/ML
INJECTION, SOLUTION INTRAVENOUS; SUBCUTANEOUS
Status: DISCONTINUED | OUTPATIENT
Start: 2020-10-18 | End: 2020-10-25 | Stop reason: HOSPADM

## 2020-10-18 RX ORDER — ACETAMINOPHEN 325 MG/1
650 TABLET ORAL
Status: DISCONTINUED | OUTPATIENT
Start: 2020-10-18 | End: 2020-10-19

## 2020-10-18 RX ORDER — MAGNESIUM SULFATE 100 %
4 CRYSTALS MISCELLANEOUS AS NEEDED
Status: DISCONTINUED | OUTPATIENT
Start: 2020-10-18 | End: 2020-10-25 | Stop reason: HOSPADM

## 2020-10-18 RX ORDER — ACETAMINOPHEN 650 MG/1
650 SUPPOSITORY RECTAL
Status: DISCONTINUED | OUTPATIENT
Start: 2020-10-18 | End: 2020-10-19

## 2020-10-18 RX ORDER — SODIUM CHLORIDE 9 MG/ML
250 INJECTION, SOLUTION INTRAVENOUS AS NEEDED
Status: DISCONTINUED | OUTPATIENT
Start: 2020-10-18 | End: 2020-10-25 | Stop reason: HOSPADM

## 2020-10-18 RX ORDER — ALBUTEROL SULFATE 0.83 MG/ML
2.5 SOLUTION RESPIRATORY (INHALATION)
Status: DISCONTINUED | OUTPATIENT
Start: 2020-10-18 | End: 2020-10-18 | Stop reason: HOSPADM

## 2020-10-18 RX ADMIN — FERROUS SULFATE TAB 325 MG (65 MG ELEMENTAL FE) 325 MG: 325 (65 FE) TAB at 17:08

## 2020-10-18 RX ADMIN — OXYCODONE HYDROCHLORIDE 5 MG: 5 TABLET ORAL at 16:02

## 2020-10-18 RX ADMIN — FERROUS SULFATE TAB 325 MG (65 MG ELEMENTAL FE) 325 MG: 325 (65 FE) TAB at 12:30

## 2020-10-18 RX ADMIN — SEVELAMER CARBONATE 1600 MG: 800 TABLET, FILM COATED ORAL at 09:00

## 2020-10-18 RX ADMIN — INSULIN LISPRO 3 UNITS: 100 INJECTION, SOLUTION INTRAVENOUS; SUBCUTANEOUS at 12:29

## 2020-10-18 RX ADMIN — OXYCODONE HYDROCHLORIDE 5 MG: 5 TABLET ORAL at 09:06

## 2020-10-18 RX ADMIN — FERROUS SULFATE TAB 325 MG (65 MG ELEMENTAL FE) 325 MG: 325 (65 FE) TAB at 09:00

## 2020-10-18 RX ADMIN — FAMOTIDINE 20 MG: 20 TABLET, FILM COATED ORAL at 17:08

## 2020-10-18 RX ADMIN — ACETAMINOPHEN 650 MG: 325 TABLET ORAL at 12:40

## 2020-10-18 RX ADMIN — SEVELAMER CARBONATE 1600 MG: 800 TABLET, FILM COATED ORAL at 17:08

## 2020-10-18 RX ADMIN — Medication 2 TABLET: at 09:00

## 2020-10-18 RX ADMIN — OXYCODONE HYDROCHLORIDE 5 MG: 5 TABLET ORAL at 02:05

## 2020-10-18 RX ADMIN — FAMOTIDINE 20 MG: 20 TABLET, FILM COATED ORAL at 09:00

## 2020-10-18 RX ADMIN — SEVELAMER CARBONATE 1600 MG: 800 TABLET, FILM COATED ORAL at 12:30

## 2020-10-18 NOTE — PROGRESS NOTES
Problem: Falls - Risk of  Goal: *Absence of Falls  Description: Document Deonna Davila Fall Risk and appropriate interventions in the flowsheet.   Outcome: Progressing Towards Goal  Note: Fall Risk Interventions:  Mobility Interventions: Utilize walker, cane, or other assistive device, Patient to call before getting OOB         Medication Interventions: Patient to call before getting OOB    Elimination Interventions: Call light in reach, Patient to call for help with toileting needs              Problem: Patient Education: Go to Patient Education Activity  Goal: Patient/Family Education  Outcome: Progressing Towards Goal

## 2020-10-18 NOTE — ROUTINE PROCESS
Notified Hipolito DAVIS that patient requesting oxycodone 5 mg prn q6h as that's what she takes at home. New orders to d/c tramadol and start oxycodone. Patient was noted to have black tarry stool. New order to test for occult blood.

## 2020-10-18 NOTE — ROUTINE PROCESS
First unit of PRBCs started at 0231, patient started complaining of pain at the IV site in her left forearm within five minutes. Blood was paused at 0237 due to localized swelling at the IV site, likely due to IV infiltrating. New IV was started in patient's right forearm, flushed with 10 mL of normal saline with no issues. Blood was restarted at 0250. Rate was increased to 150 mL/hr at 0253. Patient almost immediately started complaining of pain in her arm, and blood was stopped at 0258 . It was noted that vein from IV and up the arm appeared to be hardening, without any other discoloration or swelling. Blood pressure noted to have dropped from 138/55 at start of transfusion to 113/48, but no change in other vital signs. Blood bank notified at 0300 of suspected reaction. Blood bank called Dr. Theo Lizama, who then spoke with this nurse. MD agreed that it sounded like a possible reaction, and to stop the transfusion of the first unit. Per MD, notify current provider on call, start second unit when possible, and order a third unit to be transfused. Per blood bank, RN to obtain blood culture. Unable to obtain urine specimen as patient is anuric. Notified House Supervisor and provider on call [employer]. No new orders from provider on call. Both House Supervisor and this RN unable to obtain blood culture despite multiple attempts. Will endorse to lab.

## 2020-10-18 NOTE — ROUTINE PROCESS
Blood draw related to transfusion reaction still pending. Per [employer], waiting to administer second unit until blood has been drawn.

## 2020-10-18 NOTE — PROGRESS NOTES
1852-Paged Dr. Brady Valadez to notify him of patient's arrival. Admitting called. 1915-Swabbed pt for MRSA per protocol (Dialysis patient). 1920-PIV placed. Bedside shift change report given to Theodora Curtis RN (oncoming nurse) by QUINTIN Heck (offgoing nurse). Report included the following information SBAR, Kardex, Intake/Output, MAR, Accordion and Cardiac Rhythm NSR.

## 2020-10-18 NOTE — DISCHARGE SUMMARY
Physician Discharge Summary       Patient: Jacklyn Phillips MRN: [id number]     YOB: 1950  Age: 69 y.o. Sex: female    PCP: Bud, MD [place]    Allergies: Aleve [naproxen sodium]; Amlodipine; Aspirin; Heparin; Ibuprofen; and Metformin    Admit date: 10/17/2020  Admitting Provider: Brigette Kennedy MD    Discharge date: 10/18/2020  Discharging Provider: Ramirez Otriz MD    * Admission Diagnoses:   Symptomatic anemia [D64.9]      * Discharge Diagnoses:    Hospital Problems as of 10/18/2020 Date Reviewed: 10/6/2020          Codes Class Noted - Resolved POA    HTN (hypertension) ICD-10-CM: I10  ICD-9-CM: 401.9  10/17/2020 - Present Yes        Dependence on renal dialysis ([facility] [address] ICD-10-CM: Z99.2  ICD-9-CM: V45.11  10/6/2020 - Present Yes        Severe anemia ICD-10-CM: D64.9  ICD-9-CM: 285.9  5/2/2018 - Present         * (Principal) Symptomatic anemia ICD-10-CM: D64.9  ICD-9-CM: 285.9  3/16/2018 - Present Yes        Diabetes mellitus type 2, controlled ([facility] [address] ICD-10-CM: E11.9  ICD-9-CM: 250.00  12/15/2017 - Present Yes                * Hospital Course:   Jacklyn Phillips is a 69 y.o. female  has a past medical history of Arthritis, Asthma, Chronic kidney disease, Chronic pain, Cirrhosis ([address] (12/17/2017), Diabetes ([address] (diet controlled), GERD (gastroesophageal reflux disease), Hypertension, and Paroxysmal atrial fibrillation ([facility] [address] (10/6/2020). She also has no past medical history of CAD (coronary artery disease), Cancer ([address] Chronic obstructive pulmonary disease ([address] Coagulation disorder ([address] Endocarditis, Heart failure ([address] Morbid obesity ([address] Nicotine vapor product user, Non-nicotine vapor product user, Psychiatric disorder, PUD (peptic ulcer disease), Seizures ([address] Sleep apnea, Stroke ([address] Thromboembolus ([address] or Thyroid disease. Patient was most recently admitted for similar issues and has had multiple transfusions since April totalling 15-20 units.  Patient is noted to have had work up with EGD/Colonoscopy and capsule endocopy at the [place] but have not been able to obtain any records and patient states it was negative. Also followed with hematologist at [place]. During her most recent hopsitilization patient received 2 units of blood and improved hg to 8.9 and was 8.3 on discharge on 10/12. Dr. Michelle Joseph consulted and had initial plan to do EGD on 10/12 but patient was discharged prior to on 10/10 home with follow up. On discharge she had hg of 8.3 and in one week hg dropped to 5.5. patient has never had issues with transfusion reaction but with her numerous blood transfusions and reaction that was noted by overnight nurse it was noted as a transfusion reaction. Patient had low grade fever during the event and also noted increased burning and swelling to new IV site. Patient denies any chest pain or sob, puruitits. No further treatment was needed. Repeat hg this AM was at 5.3, vitals conitnue to be stable. Patient had additional bowel movment which noted dark tary in consistency. With patient issue with blood transfusion and her follow up at the [place] with hematology did attempt via transfer center to transfer patient to the [address] center but they did not accept the patient even though they had bed availability because of my facility inability to perform a rapid COVID testing. Because of this, I discussed the case with Dr. Radha Bateman and he was kind enough to accept the patient on transfer to medical tele [facility] and continue work up of blood transfusion reaction as well as administer blood transfusion in control monitored setting. Patient is awake, alert oriented and explained plan of care and she was in agreeement to be transferred to [facility] for further work up and blood transfusion.        * Procedures:   * No surgery found *        Consults:   NONE    Vitals Last 24 Hours:  Patient Vitals for the past 24 hrs:   Temp Pulse Resp BP SpO2   10/18/20 1144 98.3 °F (36.8 °C) 85 18 (!) 145/56 99 %   10/18/20 0642 97.3 °F (36.3 °C) 85 18 128/73 99 %   10/18/20 0330 98.2 °F (36.8 °C) 85 18 (!) 122/51 98 %   10/18/20 0258 97.3 °F (36.3 °C) -- -- -- --   10/18/20 0253 97.2 °F (36.2 °C) 83 18 (!) 113/48 98 %   10/18/20 0225 97.2 °F (36.2 °C) 86 18 (!) 138/55 98 %   10/18/20 0042 97.9 °F (36.6 °C) 71 18 (!) 118/59 98 %   10/17/20 2035 98 °F (36.7 °C) 72 18 (!) 122/55 97 %   10/17/20 1658 97.6 °F (36.4 °C) 88 18 (!) 152/65 98 %   10/17/20 1621 -- 93 14 (!) 154/72 99 %   10/17/20 1546 -- 90 16 (!) 155/79 98 %        Discharge Exam:  General: Alert, cooperative, no distress, appears stated age. Head:  Normocephalic, without obvious abnormality, atraumatic. Eyes:  Conjunctivae/corneas clear. Pupils equal, round, reactive to light. Extraocular movements intact. Lungs:  Clear to auscultation bilaterally. no wheeze, rales, crackles, rhonchi   Chest wall: No tenderness or deformity. Heart:  Regular rate and rhythm, S1, S2 normal, no murmur, click, rub or gallop. Abdomen:  Soft, non-tender. Bowel sounds normal. No masses,  No organomegaly. Extremities: Extremities normal, atraumatic, no cyanosis or edema. Pulses: 2+ and symmetric all extremities. Skin: Skin color, texture, turgor normal. No rashes or lesions  Neurologic: Awake, Alert, oriented.  No obvious gross sensory or motor deficits    Labs:  Recent Results (from the past 24 hour(s))   TYPE & SCREEN    Collection Time: 10/17/20  5:45 PM   Result Value Ref Range    Crossmatch Expiration 10/17/2020,2359     ABO/Rh(D) Leva Pean Positive     Antibody screen Positive     Antibody ID No additional antibodies detected     XXAUTO CONTROL Negative     Comment Previously identified anti E and anti VS     Unit number [id number]     Blood component type RC LR,1     Unit division 00     Status of unit Rel from [facility] to transfuse     Crossmatch result Compatible     ANTIGEN/ANTIBODY INFO E Negative     Unit number [id number]     Blood component type RC LR,2     Unit division 00     Status of unit [address] to transfuse     Crossmatch result Compatible     ANTIGEN/ANTIBODY INFO E Negative    OCCULT BLOOD, STOOL    Collection Time: 10/17/20  7:05 PM   Result Value Ref Range    Occult Blood,day 1 Positive      Day 1 date: Positive     GLUCOSE, POC    Collection Time: 10/17/20  7:56 PM   Result Value Ref Range    Glucose (POC) 224 (H) 65 - 100 mg/dL    Performed by [facility]    METABOLIC PANEL, BASIC    Collection Time: 10/18/20  7:09 AM   Result Value Ref Range    Sodium 138 136 - 145 mmol/L    Potassium 5.0 3.5 - 5.1 mmol/L    Chloride 101 97 - 108 mmol/L    CO2 28 21 - 32 mmol/L    Anion gap 9 5 - 15 mmol/L    Glucose 139 (H) 65 - 100 mg/dL    BUN 51 (H) 6 - 20 mg/dL    Creatinine 5.18 (H) 0.55 - 1.02 mg/dL    BUN/Creatinine ratio 10 (L) 12 - 20      GFR est AA 10 (L) >60 ml/min/1.73m2    GFR est non-AA 8 (L) >60 ml/min/1.73m2    Calcium 8.6 8.5 - 10.1 mg/dL   CBC WITH AUTOMATED DIFF    Collection Time: 10/18/20  7:09 AM   Result Value Ref Range    WBC 5.0 4.4 - 11.3 K/uL    RBC 1.94 (L) 4.50 - 5.90 M/uL    HGB 5.3 (LL) 13.5 - 17.5 g/dL    HCT 17.2 (LL) 36 - 46 %    MCV 88.6 80 - 100 FL    MCH 27.4 (L) 31 - 34 PG    MCHC 30.9 (L) 31.0 - 36.0 g/dL    RDW 19.7 (H) 11.5 - 14.5 %    PLATELET 445 K/uL    MPV 8.3 6.5 - 11.5 FL    NRBC 10.0  WBC    ABSOLUTE NRBC 0.01 K/uL    NEUTROPHILS 69 42 - 75 %    LYMPHOCYTES 12 (L) 20.5 - 51.1 %    MONOCYTES 15 (H) 1.7 - 9.3 %    EOSINOPHILS 3 (H) 0.9 - 2.9 %    BASOPHILS 1 0.0 - 2.5 %    ABS. NEUTROPHILS 3.4 1.8 - 7.7 K/UL    ABS. LYMPHOCYTES 0.6 (L) 1.0 - 4.8 K/UL    ABS. MONOCYTES 0.7 0.2 - 2.4 K/UL    ABS. EOSINOPHILS 0.1 0.0 - 0.7 K/UL    ABS. BASOPHILS 0.1 0.0 - 0.2 K/UL         Imaging:  No results found.        * Discharge Condition: fair  * Disposition: [facility]       Discharge Medications:  Current Discharge Medication List      Please see transfer med list * Follow-up Care/Patient Instructions: Activity: Bedrest  Diet: Renal Diet with 1L Fluid restriction      Follow-up Information    Dr Alonso Thompson upon arrival to [facility]        Spent 45 minutes evaluting and coordinating patient care of which >50% was spent coordinating and counseling.     code Status: Full Code   Patient Loni Tolbert is her medical decision maker     Signed:  Joey Razo MD  10/18/2020  3:26 PM

## 2020-10-19 LAB
ALBUMIN SERPL-MCNC: 2.8 G/DL (ref 3.5–5)
ALBUMIN SERPL-MCNC: 2.8 G/DL (ref 3.5–5)
ALBUMIN SERPL-MCNC: 3 G/DL
ALBUMIN/GLOB SERPL: 0.8 {RATIO}
ALBUMIN/GLOB SERPL: 0.8 {RATIO} (ref 1.1–2.2)
ALBUMIN/GLOB SERPL: 0.9 {RATIO} (ref 1.1–2.2)
ALP SERPL-CCNC: 133 U/L (ref 45–117)
ALP SERPL-CCNC: 142 U/L (ref 45–117)
ALP SERPL-CCNC: 148 U/L
ALT SERPL-CCNC: 13 U/L (ref 12–78)
ALT SERPL-CCNC: 15 U/L (ref 12–78)
ALT SERPL-CCNC: 16 U/L
ANION GAP SERPL CALC-SCNC: 5 MMOL/L
ANION GAP SERPL CALC-SCNC: 7 MMOL/L (ref 5–15)
ANION GAP SERPL CALC-SCNC: 8 MMOL/L (ref 5–15)
APTT PPP: 22.2 SEC (ref 22.1–32)
APTT PPP: 26.6 SEC (ref 22.1–32)
AST SERPL-CCNC: 11 U/L (ref 15–37)
AST SERPL-CCNC: 13 U/L (ref 15–37)
AST SERPL-CCNC: 20 U/L
ATRIAL RATE: 90 BPM
BASOPHILS # BLD: 0 K/UL (ref 0–0.1)
BASOPHILS # BLD: 0.1 K/UL (ref 0–0.1)
BASOPHILS NFR BLD: 1 % (ref 0–1)
BASOPHILS NFR BLD: 1 % (ref 0–1)
BILIRUB SERPL-MCNC: 0.4 MG/DL
BILIRUB SERPL-MCNC: 0.4 MG/DL (ref 0.2–1)
BILIRUB SERPL-MCNC: 0.4 MG/DL (ref 0.2–1)
BUN SERPL-MCNC: 60 MG/DL
BUN SERPL-MCNC: 60 MG/DL (ref 6–20)
BUN SERPL-MCNC: 63 MG/DL (ref 6–20)
BUN/CREAT SERPL: 10
BUN/CREAT SERPL: 10 (ref 12–20)
BUN/CREAT SERPL: 10 (ref 12–20)
CALCIUM SERPL-MCNC: 8.1 MG/DL (ref 8.5–10.1)
CALCIUM SERPL-MCNC: 8.5 MG/DL (ref 8.5–10.1)
CALCIUM SERPL-MCNC: 8.7 MG/DL
CALCULATED P AXIS, ECG09: 60 DEGREES
CALCULATED R AXIS, ECG10: 162 DEGREES
CALCULATED T AXIS, ECG11: 47 DEGREES
CHLORIDE SERPL-SCNC: 103 MMOL/L (ref 97–108)
CHLORIDE SERPL-SCNC: 103 MMOL/L (ref 97–108)
CHLORIDE SERPL-SCNC: 105 MMOL/L
CO2 SERPL-SCNC: 24 MMOL/L
CO2 SERPL-SCNC: 26 MMOL/L (ref 21–32)
CO2 SERPL-SCNC: 26 MMOL/L (ref 21–32)
CREAT SERPL-MCNC: 5.74 MG/DL (ref 0.55–1.02)
CREAT SERPL-MCNC: 6.01 MG/DL (ref 0.55–1.02)
CREAT SERPL-MCNC: 6.02 MG/DL
CRP SERPL-MCNC: 1.03 MG/DL
D DIMER PPP FEU-MCNC: 1.83 MG/L FEU
D DIMER PPP FEU-MCNC: 2.05 MG/L FEU (ref 0–0.65)
DIAGNOSIS, 93000: NORMAL
DIFFERENTIAL METHOD BLD: ABNORMAL
DIFFERENTIAL METHOD BLD: ABNORMAL
EOSINOPHIL # BLD: 0.2 K/UL (ref 0–0.4)
EOSINOPHIL # BLD: 0.2 K/UL (ref 0–0.4)
EOSINOPHIL NFR BLD: 4 % (ref 0–7)
EOSINOPHIL NFR BLD: 4 % (ref 0–7)
ERYTHROCYTE [DISTWIDTH] IN BLOOD BY AUTOMATED COUNT: 17.7 % (ref 11.5–14.5)
ERYTHROCYTE [DISTWIDTH] IN BLOOD BY AUTOMATED COUNT: 18.9 % (ref 11.5–14.5)
EST. AVERAGE GLUCOSE BLD GHB EST-MCNC: ABNORMAL MG/DL
FERRITIN SERPL-MCNC: 33 NG/ML (ref 26–388)
FIBRINOGEN PPP-MCNC: 365 MG/DL (ref 200–475)
FOLATE SERPL-MCNC: 16.4 NG/ML (ref 5–21)
GLOBULIN SER CALC-MCNC: 3.1 G/DL (ref 2–4)
GLOBULIN SER CALC-MCNC: 3.4 G/DL (ref 2–4)
GLOBULIN SER CALC-MCNC: 3.6 G/DL
GLUCOSE BLD STRIP.AUTO-MCNC: 110 MG/DL (ref 65–100)
GLUCOSE BLD STRIP.AUTO-MCNC: 129 MG/DL (ref 65–100)
GLUCOSE BLD STRIP.AUTO-MCNC: 143 MG/DL (ref 65–100)
GLUCOSE BLD STRIP.AUTO-MCNC: 96 MG/DL (ref 65–100)
GLUCOSE SERPL-MCNC: 155 MG/DL (ref 65–100)
GLUCOSE SERPL-MCNC: 88 MG/DL
GLUCOSE SERPL-MCNC: 88 MG/DL (ref 65–100)
HBA1C MFR BLD: <3.8 % (ref 4–5.6)
HCT VFR BLD AUTO: 18.5 % (ref 35–47)
HCT VFR BLD AUTO: 21.2 % (ref 35–47)
HEALTH STATUS, XMCV2T: NORMAL
HGB BLD-MCNC: 5.2 G/DL (ref 11.5–16)
HGB BLD-MCNC: 6.3 G/DL (ref 11.5–16)
HISTORY CHECKED?,CKHIST: NORMAL
HISTORY CHECKED?,CKHIST: NORMAL
IMM GRANULOCYTES # BLD AUTO: 0 K/UL (ref 0–0.04)
IMM GRANULOCYTES # BLD AUTO: 0.1 K/UL (ref 0–0.04)
IMM GRANULOCYTES NFR BLD AUTO: 0 % (ref 0–0.5)
IMM GRANULOCYTES NFR BLD AUTO: 1 % (ref 0–0.5)
INR PPP: 1.1 (ref 0.9–1.1)
INR PPP: 1.1 (ref 0.9–1.1)
LACTATE SERPL-SCNC: 1.4 MMOL/L (ref 0.4–2)
LDH SERPL L TO P-CCNC: 225 U/L
LYMPHOCYTES # BLD: 0.7 K/UL (ref 0.8–3.5)
LYMPHOCYTES # BLD: 0.8 K/UL (ref 0.8–3.5)
LYMPHOCYTES NFR BLD: 14 % (ref 12–49)
LYMPHOCYTES NFR BLD: 15 % (ref 12–49)
MAGNESIUM SERPL-MCNC: 2 MG/DL (ref 1.6–2.4)
MCH RBC QN AUTO: 26.8 PG (ref 26–34)
MCH RBC QN AUTO: 28 PG (ref 26–34)
MCHC RBC AUTO-ENTMCNC: 28.1 G/DL (ref 30–36.5)
MCHC RBC AUTO-ENTMCNC: 29.7 G/DL (ref 30–36.5)
MCV RBC AUTO: 94.2 FL (ref 80–99)
MCV RBC AUTO: 95.4 FL (ref 80–99)
MONOCYTES # BLD: 0.6 K/UL (ref 0–1)
MONOCYTES # BLD: 0.6 K/UL (ref 0–1)
MONOCYTES NFR BLD: 12 % (ref 5–13)
MONOCYTES NFR BLD: 12 % (ref 5–13)
NEUTS SEG # BLD: 3.2 K/UL (ref 1.8–8)
NEUTS SEG # BLD: 3.3 K/UL (ref 1.8–8)
NEUTS SEG NFR BLD: 67 % (ref 32–75)
NEUTS SEG NFR BLD: 69 % (ref 32–75)
NRBC # BLD: 0 K/UL (ref 0–0.01)
NRBC # BLD: 0 K/UL (ref 0–0.01)
NRBC BLD-RTO: 0 PER 100 WBC
NRBC BLD-RTO: 0 PER 100 WBC
P-R INTERVAL, ECG05: 182 MS
PLATELET # BLD AUTO: 194 K/UL (ref 150–400)
PLATELET # BLD AUTO: 206 K/UL (ref 150–400)
PLATELET COMMENTS,PCOM: ABNORMAL
PMV BLD AUTO: 10.6 FL (ref 8.9–12.9)
PMV BLD AUTO: 10.8 FL (ref 8.9–12.9)
POTASSIUM SERPL-SCNC: 5 MMOL/L (ref 3.5–5.1)
POTASSIUM SERPL-SCNC: 5.3 MMOL/L
POTASSIUM SERPL-SCNC: 5.3 MMOL/L (ref 3.5–5.1)
PROCALCITONIN SERPL-MCNC: 1.77 NG/ML
PROT SERPL-MCNC: 5.9 G/DL (ref 6.4–8.2)
PROT SERPL-MCNC: 6.2 G/DL (ref 6.4–8.2)
PROT SERPL-MCNC: 6.6 G/DL
PROTHROMBIN TIME: 11.3 SEC (ref 9–11.1)
PROTHROMBIN TIME: 11.3 SEC (ref 9–11.1)
Q-T INTERVAL, ECG07: 419 MS
QRS DURATION, ECG06: 143 MS
QTC CALCULATION (BEZET), ECG08: 513 MS
RBC # BLD AUTO: 1.94 M/UL (ref 3.8–5.2)
RBC # BLD AUTO: 2.25 M/UL (ref 3.8–5.2)
RBC MORPH BLD: ABNORMAL
SARS-COV-2, COV2: NOT DETECTED
SERVICE CMNT-IMP: ABNORMAL
SERVICE CMNT-IMP: NORMAL
SODIUM SERPL-SCNC: 134 MMOL/L
SODIUM SERPL-SCNC: 136 MMOL/L (ref 136–145)
SODIUM SERPL-SCNC: 137 MMOL/L (ref 136–145)
SOURCE, COVRS: NORMAL
SPECIMEN SOURCE, FCOV2M: NORMAL
SPECIMEN TYPE, XMCV1T: NORMAL
THERAPEUTIC RANGE,PTTT: NORMAL SECS (ref 58–77)
THERAPEUTIC RANGE,PTTT: NORMAL SECS (ref 58–77)
TROPONIN I SERPL-MCNC: 0.06 NG/ML
VENTRICULAR RATE, ECG03: 90 BPM
VIT B12 SERPL-MCNC: 942 PG/ML (ref 193–986)
WBC # BLD AUTO: 4.8 K/UL (ref 3.6–11)
WBC # BLD AUTO: 5 K/UL (ref 3.6–11)

## 2020-10-19 PROCEDURE — 83615 LACTATE (LD) (LDH) ENZYME: CPT

## 2020-10-19 PROCEDURE — P9016 RBC LEUKOCYTES REDUCED: HCPCS

## 2020-10-19 PROCEDURE — 36430 TRANSFUSION BLD/BLD COMPNT: CPT

## 2020-10-19 PROCEDURE — 80053 COMPREHEN METABOLIC PANEL: CPT

## 2020-10-19 PROCEDURE — 87635 SARS-COV-2 COVID-19 AMP PRB: CPT

## 2020-10-19 PROCEDURE — 86140 C-REACTIVE PROTEIN: CPT

## 2020-10-19 PROCEDURE — C9113 INJ PANTOPRAZOLE SODIUM, VIA: HCPCS | Performed by: NURSE PRACTITIONER

## 2020-10-19 PROCEDURE — 36415 COLL VENOUS BLD VENIPUNCTURE: CPT

## 2020-10-19 PROCEDURE — 85379 FIBRIN DEGRADATION QUANT: CPT

## 2020-10-19 PROCEDURE — 86922 COMPATIBILITY TEST ANTIGLOB: CPT

## 2020-10-19 PROCEDURE — 30233N1 TRANSFUSION OF NONAUTOLOGOUS RED BLOOD CELLS INTO PERIPHERAL VEIN, PERCUTANEOUS APPROACH: ICD-10-PCS | Performed by: FAMILY MEDICINE

## 2020-10-19 PROCEDURE — 74011000250 HC RX REV CODE- 250: Performed by: NURSE PRACTITIONER

## 2020-10-19 PROCEDURE — 82962 GLUCOSE BLOOD TEST: CPT

## 2020-10-19 PROCEDURE — 82607 VITAMIN B-12: CPT

## 2020-10-19 PROCEDURE — 86921 COMPATIBILITY TEST INCUBATE: CPT

## 2020-10-19 PROCEDURE — 65660000000 HC RM CCU STEPDOWN

## 2020-10-19 PROCEDURE — 86902 BLOOD TYPE ANTIGEN DONOR EA: CPT

## 2020-10-19 PROCEDURE — 74011250637 HC RX REV CODE- 250/637: Performed by: FAMILY MEDICINE

## 2020-10-19 PROCEDURE — 74011250637 HC RX REV CODE- 250/637: Performed by: HOSPITALIST

## 2020-10-19 PROCEDURE — 99231 SBSQ HOSP IP/OBS SF/LOW 25: CPT | Performed by: CLINICAL NURSE SPECIALIST

## 2020-10-19 PROCEDURE — 83540 ASSAY OF IRON: CPT

## 2020-10-19 PROCEDURE — 85025 COMPLETE CBC W/AUTO DIFF WBC: CPT

## 2020-10-19 PROCEDURE — 74011250636 HC RX REV CODE- 250/636: Performed by: NURSE PRACTITIONER

## 2020-10-19 PROCEDURE — 82746 ASSAY OF FOLIC ACID SERUM: CPT

## 2020-10-19 PROCEDURE — 85730 THROMBOPLASTIN TIME PARTIAL: CPT

## 2020-10-19 RX ORDER — SEVELAMER CARBONATE 800 MG/1
1600 TABLET, FILM COATED ORAL
Status: DISCONTINUED | OUTPATIENT
Start: 2020-10-19 | End: 2020-10-25 | Stop reason: HOSPADM

## 2020-10-19 RX ORDER — LANOLIN ALCOHOL/MO/W.PET/CERES
325 CREAM (GRAM) TOPICAL
Status: DISCONTINUED | OUTPATIENT
Start: 2020-10-19 | End: 2020-10-25 | Stop reason: HOSPADM

## 2020-10-19 RX ORDER — ALBUTEROL SULFATE 0.83 MG/ML
2.5 SOLUTION RESPIRATORY (INHALATION)
Status: DISCONTINUED | OUTPATIENT
Start: 2020-10-19 | End: 2020-10-25 | Stop reason: HOSPADM

## 2020-10-19 RX ORDER — OXYCODONE HYDROCHLORIDE 5 MG/1
TABLET ORAL
Status: DISPENSED
Start: 2020-10-19 | End: 2020-10-19

## 2020-10-19 RX ORDER — OXYCODONE HYDROCHLORIDE 5 MG/1
5 TABLET ORAL
Status: COMPLETED | OUTPATIENT
Start: 2020-10-19 | End: 2020-10-19

## 2020-10-19 RX ORDER — ALBUTEROL SULFATE 90 UG/1
2 AEROSOL, METERED RESPIRATORY (INHALATION)
Status: DISCONTINUED | OUTPATIENT
Start: 2020-10-19 | End: 2020-10-19 | Stop reason: SDUPTHER

## 2020-10-19 RX ORDER — OXYCODONE HYDROCHLORIDE 5 MG/1
5 TABLET ORAL
COMMUNITY

## 2020-10-19 RX ORDER — ALBUTEROL SULFATE 0.83 MG/ML
2.5 SOLUTION RESPIRATORY (INHALATION)
Status: DISCONTINUED | OUTPATIENT
Start: 2020-10-19 | End: 2020-10-19 | Stop reason: CLARIF

## 2020-10-19 RX ORDER — ASCORBIC ACID 500 MG
500 TABLET ORAL DAILY
Status: DISCONTINUED | OUTPATIENT
Start: 2020-10-19 | End: 2020-10-25 | Stop reason: HOSPADM

## 2020-10-19 RX ORDER — MELATONIN
2000 DAILY
Status: DISCONTINUED | OUTPATIENT
Start: 2020-10-19 | End: 2020-10-25 | Stop reason: HOSPADM

## 2020-10-19 RX ORDER — SODIUM CHLORIDE 9 MG/ML
250 INJECTION, SOLUTION INTRAVENOUS AS NEEDED
Status: DISCONTINUED | OUTPATIENT
Start: 2020-10-19 | End: 2020-10-25 | Stop reason: HOSPADM

## 2020-10-19 RX ORDER — OXYCODONE HYDROCHLORIDE 5 MG/1
5 TABLET ORAL
Status: DISCONTINUED | OUTPATIENT
Start: 2020-10-19 | End: 2020-10-25 | Stop reason: HOSPADM

## 2020-10-19 RX ADMIN — SEVELAMER CARBONATE 1600 MG: 800 TABLET, FILM COATED ORAL at 18:06

## 2020-10-19 RX ADMIN — FERROUS SULFATE TAB 325 MG (65 MG ELEMENTAL FE) 325 MG: 325 (65 FE) TAB at 09:43

## 2020-10-19 RX ADMIN — SEVELAMER CARBONATE 1600 MG: 800 TABLET, FILM COATED ORAL at 09:43

## 2020-10-19 RX ADMIN — SEVELAMER CARBONATE 1600 MG: 800 TABLET, FILM COATED ORAL at 13:20

## 2020-10-19 RX ADMIN — OXYCODONE 5 MG: 5 TABLET ORAL at 16:16

## 2020-10-19 RX ADMIN — OXYCODONE HYDROCHLORIDE AND ACETAMINOPHEN 500 MG: 500 TABLET ORAL at 09:29

## 2020-10-19 RX ADMIN — OXYCODONE 5 MG: 5 TABLET ORAL at 09:43

## 2020-10-19 RX ADMIN — FERROUS SULFATE TAB 325 MG (65 MG ELEMENTAL FE) 325 MG: 325 (65 FE) TAB at 13:20

## 2020-10-19 RX ADMIN — Medication 2 TABLET: at 09:29

## 2020-10-19 RX ADMIN — FERROUS SULFATE TAB 325 MG (65 MG ELEMENTAL FE) 325 MG: 325 (65 FE) TAB at 18:06

## 2020-10-19 RX ADMIN — OXYCODONE 5 MG: 5 TABLET ORAL at 02:16

## 2020-10-19 RX ADMIN — OXYCODONE 5 MG: 5 TABLET ORAL at 22:44

## 2020-10-19 RX ADMIN — SODIUM CHLORIDE 40 MG: 9 INJECTION, SOLUTION INTRAMUSCULAR; INTRAVENOUS; SUBCUTANEOUS at 20:15

## 2020-10-19 NOTE — PROGRESS NOTES
2030  Page admitting MD for pt due to c/o pain and indigestion. Awaiting call back. [id number]  Paged hospitalist second time due to pt still complaining of pain. Spoke with Dr. Alexander Sarabia, he stated he would be up to see pt. Pt placed in droplet plus isolation. Explained to pt why she was placed in isolation. Lab work collected and send        [phone]  Pt transferred to room 408. RN updated on most recent HGB and blood glucose. TRANSFER - OUT REPORT:    Verbal report given to maxwell(name) on Oj Polanco  being transferred to [facility]) for routine progression of care       Report consisted of patients Situation, Background, Assessment and   Recommendations(SBAR). Information from the following report(s) SBAR, Kardex, Intake/Output, MAR, Recent Results and Cardiac Rhythm nsr was reviewed with the receiving nurse. Lines:   Peripheral IV 10/18/20 Anterior;Right Forearm (Active)   Site Assessment Clean, dry, & intact 10/18/20 2000   Phlebitis Assessment 0 10/18/20 2000   Infiltration Assessment 0 10/18/20 2000   Dressing Status Clean, dry, & intact 10/18/20 2000   Dressing Type Transparent 10/18/20 2000   Hub Color/Line Status Capped 10/18/20 2000   Action Taken Open ports on tubing capped 10/18/20 2000   Alcohol Cap Used Yes 10/18/20 2000        Opportunity for questions and clarification was provided.       Patient transported with:   Registered Nurse

## 2020-10-19 NOTE — CONSULTS
[facility]   [address] [facility], [address] [place] [address]  Phone: [phone]   [phone] NOTE     Patient: Sanaz Canseco MRN: [id number]  PCP: None   :     1950  Age:   69 y.o. Sex:  female      Referring physician: Arianna Aly MD  Reason for consultation: 69 y.o. female with Anemia [D64.9]  ESRD (end stage renal disease) ([facility] [N18.6]  Suspected COVID-19 virus infection [Z20.828]  Type 2 diabetes mellitus with hyperglycemia ([facility] [E11.65]   Admission Date: 10/18/2020  7:13 PM  LOS: 1 day      ASSESSMENT and PLAN :   ESRD- HD  - Dialyzes TTS at [address] [facility] dialysis in [place], [place]  - followed by Dr Logan Leroy   - has Saul Calderón for access  - HD tomorrow per schedule   - continue Midodrine     Cirrhosis of Liver : ? Cause   - she has significant ascites   - consider Paracentesis as it may be contributing to her Dyspnea    Anemia in CKD  Blood loss anemia :  - ? Variceal bleed  - consider another unit of RBC    SOB/ SARKAR  - COVID test pending     Mild HyperK   - watch for now   - scheduled for HD tomorrow    Chronic HFrEF   - consider Echo     Care Plan discussed with: Dr Lionel Tijerina         Thank you for consulting [facility] in the care of your patient. Subjective:   HPI: Sanaz Canseco is a 69 y.o.  female who has been admitted to the hospital for SOB and for COVID testing./ she was transferred from [facility] for management of severe anemia. Her Hb was reportedly 5.3 gm. She was transfused one unit of blood so far. However there was a questionable transfusion reaction. She was hospitalized 10 days prior to that for the same reason and she had transfusion and paracentesis during that episode.  We were asked to see her for management of ESRd as her nephrologist does not come to this hospital. She has Saul Calderón for access     Per admission H&p, She has history of anemia requiring multiple blood transfusion this year up to 15 - 20 units as noted in discharge summary records. Transferring physician at [facility] attempted to transfer patient to the [facility] but per report, transfer was not accepted due to lack of rapid COVID 19 testing capability at [facility]. Request then was made for transfer to [facility]. Past Medical Hx:   Past Medical History:   Diagnosis Date    Arthritis     Asthma     Chronic kidney disease     Chronic pain     Cirrhosis ([facility] 12/17/2017    Diabetes (HCC) diet controlled    GERD (gastroesophageal reflux disease)     Hypertension     Paroxysmal atrial fibrillation ([facility] 10/6/2020        Past Surgical Hx:     Past Surgical History:   Procedure Laterality Date    COLONOSCOPY N/A 1/12/2018    COLONOSCOPY performed by Lucy Johnson MD at [facility] ENDOSCOPY    COLONOSCOPY N/A 3/19/2018    COLONOSCOPY [facility] at [facility] HX GYN  c section    HX VASCULAR ACCESS      dialysis          Allergies   Allergen Reactions    Aleve [Naproxen Sodium] Itching, Swelling and Other (comments)    Amlodipine Rash, Hives and Itching    Aspirin Other (comments), Nausea Only and Unknown (comments)     GI bleed  GI bleeding      Heparin Unknown (comments)     bleeding      Ibuprofen Nausea and Vomiting    Metformin Other (comments)     swelling       Social Hx:  reports that she has never smoked. She has never used smokeless tobacco. She reports that she does not drink alcohol or use drugs. Family History   Family history unknown: Yes       Review of Systems:  A thorough twelve point review of system was performed today. Pertinent positives and negatives are mentioned in the HPI. The reminder of the ROS is negative and noncontributory.      Objective:    Vitals:    Vitals:    10/19/20 0234 10/19/20 0253 10/19/20 0318 10/19/20 0440   BP: (!) 148/79 (!) 161/76 (!) 152/78 (!) 140/71   Pulse: 87 86 80 81   Resp: 18 18 16 18   Temp: 98.1 °F (36.7 °C) 97.1 °F (36.2 °C) 98.1 °F (36.7 °C) 98.1 °F (36.7 °C)   SpO2: 100% 100% 100% 100%   Weight:    72.4 kg (159 lb 9.8 oz)     I&O's:  10/18 0701 - 10/19 0700  In: 313.8   Out: -   Visit Vitals  BP (!) 140/71 (BP 1 Location: Right arm, BP Patient Position: At rest)   Pulse 81   Temp 98.1 °F (36.7 °C)   Resp 18   Wt 72.4 kg (159 lb 9.8 oz)   SpO2 100%   BMI 27.40 kg/m²       Physical Exam:  General:  Chronically ill looking   HEENT: PERRL, ++ Pallor , No Icterus  Neck: RIJ permacath +  Lungs : diminished at bases   CVS: RRR, S1 S2 normal, No murmur   Abdomen: distended, NT  Extremities: 1+ Edema  Skin: No rash or lesions. MS: No joint swelling, erythema, warmth  Neurologic: non focal, AAO x 3  Psych: normal affect  Laboratory Results:    Recent Labs     10/19/20  0706 10/19/20  0630 10/18/20  2348  10/17/20  1310    134 137   < > 137   K 5.3* 5.3 5.0   < > 4.1    105 103   < > 100   CO2 26 24 26   < > 30   GLU 88 88 155*   < > 117*   BUN 63* 60 60*   < > 33*   CREA 6.01* 6.02 5.74*   < > 3.63*   CA 8.5 8.7 8.1*   < > 8.3*   MG  --   --  2.0  --   --    ALB 2.8* 3.0 2.8*  --  2.8*   ALT 13 16 15  --  17   INR  --  1.1 1.1  --  1.1    < > = values in this interval not displayed. Recent Labs     10/19/20  0706 10/18/20  2348 10/18/20  0709   WBC 4.8 5.0 5.0   HGB 6.3* 5.2* 5.3*   HCT 21.2* 18.5* 17.2*    194 175     No results found for: SDES  Lab Results   Component Value Date/Time    Culture result: No growth after 22 hours 10/18/2020 07:09 AM     Recent Results (from the past 24 hour(s))   RBC, ALLOCATE    Collection Time: 10/18/20 11:30 PM   Result Value Ref Range    HISTORY CHECKED?  Historical check performed    HEMOGLOBIN A1C WITH EAG    Collection Time: 10/18/20 11:48 PM   Result Value Ref Range    Hemoglobin A1c <3.8 (L) 4.0 - 5.6 %    Est. average glucose Cannot be calculated mg/dL   CBC WITH AUTOMATED DIFF    Collection Time: 10/18/20 11:48 PM   Result Value Ref Range    WBC 5.0 3.6 - 11.0 K/uL    RBC 1.94 (L) 3.80 - 5.20 M/uL    HGB 5.2 (LL) 11.5 - 16.0 g/dL    HCT 18.5 (L) 35.0 - 47.0 %    MCV 95.4 80.0 - 99.0 FL    MCH 26.8 26.0 - 34.0 PG    MCHC 28.1 (L) 30.0 - 36.5 g/dL    RDW 18.9 (H) 11.5 - 14.5 %    PLATELET 281 617 - 614 K/uL    MPV 10.6 8.9 - 12.9 FL    NRBC 0.0 0  WBC    ABSOLUTE NRBC 0.00 0.00 - 0.01 K/uL    NEUTROPHILS 67 32 - 75 %    LYMPHOCYTES 15 12 - 49 %    MONOCYTES 12 5 - 13 %    EOSINOPHILS 4 0 - 7 %    BASOPHILS 1 0 - 1 %    IMMATURE GRANULOCYTES 1 (H) 0.0 - 0.5 %    ABS. NEUTROPHILS 3.2 1.8 - 8.0 K/UL    ABS. LYMPHOCYTES 0.8 0.8 - 3.5 K/UL    ABS. MONOCYTES 0.6 0.0 - 1.0 K/UL    ABS. EOSINOPHILS 0.2 0.0 - 0.4 K/UL    ABS. BASOPHILS 0.1 0.0 - 0.1 K/UL    ABS. IMM.  GRANS. 0.1 (H) 0.00 - 0.04 K/UL    DF SMEAR SCANNED      PLATELET COMMENTS Large Platelets      RBC COMMENTS ANISOCYTOSIS  1+        RBC COMMENTS MACROCYTOSIS  1+        RBC COMMENTS OVALOCYTES  PRESENT        RBC COMMENTS RAINE CELLS  PRESENT        RBC COMMENTS POLYCHROMASIA  PRESENT       TYPE & SCREEN    Collection Time: 10/18/20 11:48 PM   Result Value Ref Range    Crossmatch Expiration 10/21/2020     ABO/Rh(D) O POSITIVE     Antibody screen POS     Antibody ID NO ADDITIONAL ANTIBODIES DETECTED     Comment Previously identified Anti E and Anti VS     Unit number [id number]     Blood component type RC LR     Unit division 00     Status of unit ISSUED     ANTIGEN/ANTIBODY INFO E NEGATIVE,  VS Negative,       Crossmatch result Compatible    METABOLIC PANEL, COMPREHENSIVE    Collection Time: 10/18/20 11:48 PM   Result Value Ref Range    Sodium 137 136 - 145 mmol/L    Potassium 5.0 3.5 - 5.1 mmol/L    Chloride 103 97 - 108 mmol/L    CO2 26 21 - 32 mmol/L    Anion gap 8 5 - 15 mmol/L    Glucose 155 (H) 65 - 100 mg/dL    BUN 60 (H) 6 - 20 MG/DL    Creatinine 5.74 (H) 0.55 - 1.02 MG/DL    BUN/Creatinine ratio 10 (L) 12 - 20      GFR est AA 9 (L) >60 ml/min/1.73m2    GFR est non-AA 7 (L) >60 ml/min/1.73m2    Calcium 8.1 (L) 8.5 - 10.1 MG/DL    Bilirubin, total 0.4 0.2 - 1.0 MG/DL    ALT (SGPT) 15 12 - 78 U/L    AST (SGOT) 11 (L) 15 - 37 U/L    Alk.  phosphatase 142 (H) 45 - 117 U/L    Protein, total 6.2 (L) 6.4 - 8.2 g/dL    Albumin 2.8 (L) 3.5 - 5.0 g/dL    Globulin 3.4 2.0 - 4.0 g/dL    A-G Ratio 0.8 (L) 1.1 - 2.2     PROCALCITONIN    Collection Time: 10/18/20 11:48 PM   Result Value Ref Range    Procalcitonin 1.77 ng/mL   PROTHROMBIN TIME + INR    Collection Time: 10/18/20 11:48 PM   Result Value Ref Range    INR 1.1 0.9 - 1.1      Prothrombin time 11.3 (H) 9.0 - 11.1 sec   PTT    Collection Time: 10/18/20 11:48 PM   Result Value Ref Range    aPTT 26.6 22.1 - 32.0 sec    aPTT, therapeutic range     58.0 - 77.0 SECS   FIBRINOGEN    Collection Time: 10/18/20 11:48 PM   Result Value Ref Range    Fibrinogen 365 200 - 475 mg/dL   FERRITIN    Collection Time: 10/18/20 11:48 PM   Result Value Ref Range    Ferritin 33 26 - 388 NG/ML   D DIMER    Collection Time: 10/18/20 11:48 PM   Result Value Ref Range    D-dimer 2.05 (H) 0.00 - 0.65 mg/L FEU   MAGNESIUM    Collection Time: 10/18/20 11:48 PM   Result Value Ref Range    Magnesium 2.0 1.6 - 2.4 mg/dL   TROPONIN I    Collection Time: 10/18/20 11:48 PM   Result Value Ref Range    Troponin-I, Qt. 0.06 (H) <0.05 ng/mL   LACTIC ACID    Collection Time: 10/18/20 11:48 PM   Result Value Ref Range    Lactic acid 1.4 0.4 - 2.0 MMOL/L   GLUCOSE, POC    Collection Time: 10/19/20 12:21 AM   Result Value Ref Range    Glucose (POC) 143 (H) 65 - 100 mg/dL    Performed by Nathan Shipley    SARS-COV-2    Collection Time: 10/19/20  1:00 AM   Result Value Ref Range    Specimen source Nasopharyngeal      SARS-CoV-2 Not detected NOTD      Specimen source Nasopharyngeal      Specimen type NP Swab      Health status Symptomatic Testing     PTT    Collection Time: 10/19/20  6:30 AM   Result Value Ref Range    aPTT 22.2 22.1 - 32.0 sec    aPTT, therapeutic range     58.0 - 77.0 SECS   PROTHROMBIN TIME + INR    Collection Time: 10/19/20  6:30 AM   Result Value Ref Range    INR 1.1 0.9 - 1.1      Prothrombin time 11.3 (H) 9.0 - 54.7 sec   METABOLIC PANEL, COMPREHENSIVE    Collection Time: 10/19/20  6:30 AM   Result Value Ref Range    Sodium 134 mmol/L    Potassium 5.3 mmol/L    Chloride 105 mmol/L    CO2 24 mmol/L    Anion gap 5 mmol/L    Glucose 88 mg/dL    BUN 60 MG/DL    Creatinine 6.02 MG/DL    BUN/Creatinine ratio 10      GFR est AA 8 ml/min/1.73m2    GFR est non-AA 7 ml/min/1.73m2    Calcium 8.7 MG/DL    Bilirubin, total 0.4 MG/DL    ALT (SGPT) 16 U/L    AST (SGOT) 20 U/L    Alk. phosphatase 148 U/L    Protein, total 6.6 g/dL    Albumin 3.0 g/dL    Globulin 3.6 g/dL    A-G Ratio 0.8     C REACTIVE PROTEIN, QT    Collection Time: 10/19/20  6:30 AM   Result Value Ref Range    C-Reactive protein 1.03 mg/dL   LD    Collection Time: 10/19/20  6:30 AM   Result Value Ref Range     U/L   D DIMER    Collection Time: 10/19/20  6:30 AM   Result Value Ref Range    D-dimer 1.83 mg/L FEU   CBC WITH AUTOMATED DIFF    Collection Time: 10/19/20  7:06 AM   Result Value Ref Range    WBC 4.8 3.6 - 11.0 K/uL    RBC 2.25 (L) 3.80 - 5.20 M/uL    HGB 6.3 (L) 11.5 - 16.0 g/dL    HCT 21.2 (L) 35.0 - 47.0 %    MCV 94.2 80.0 - 99.0 FL    MCH 28.0 26.0 - 34.0 PG    MCHC 29.7 (L) 30.0 - 36.5 g/dL    RDW 17.7 (H) 11.5 - 14.5 %    PLATELET 331 263 - 696 K/uL    MPV 10.8 8.9 - 12.9 FL    NRBC 0.0 0  WBC    ABSOLUTE NRBC 0.00 0.00 - 0.01 K/uL    NEUTROPHILS 69 32 - 75 %    LYMPHOCYTES 14 12 - 49 %    MONOCYTES 12 5 - 13 %    EOSINOPHILS 4 0 - 7 %    BASOPHILS 1 0 - 1 %    IMMATURE GRANULOCYTES 0 0.0 - 0.5 %    ABS. NEUTROPHILS 3.3 1.8 - 8.0 K/UL    ABS. LYMPHOCYTES 0.7 (L) 0.8 - 3.5 K/UL    ABS. MONOCYTES 0.6 0.0 - 1.0 K/UL    ABS. EOSINOPHILS 0.2 0.0 - 0.4 K/UL    ABS. BASOPHILS 0.0 0.0 - 0.1 K/UL    ABS. IMM.  GRANS. 0.0 0.00 - 0.04 K/UL    DF SMEAR SCANNED      RBC COMMENTS ANISOCYTOSIS  1+       METABOLIC PANEL, COMPREHENSIVE    Collection Time: 10/19/20  7:06 AM   Result Value Ref Range    Sodium 136 136 - 145 mmol/L    Potassium 5.3 (H) 3.5 - 5.1 mmol/L    Chloride 103 97 - 108 mmol/L    CO2 26 21 - 32 mmol/L    Anion gap 7 5 - 15 mmol/L    Glucose 88 65 - 100 mg/dL    BUN 63 (H) 6 - 20 MG/DL    Creatinine 6.01 (H) 0.55 - 1.02 MG/DL    BUN/Creatinine ratio 10 (L) 12 - 20      GFR est AA 8 (L) >60 ml/min/1.73m2    GFR est non-AA 7 (L) >60 ml/min/1.73m2    Calcium 8.5 8.5 - 10.1 MG/DL    Bilirubin, total 0.4 0.2 - 1.0 MG/DL    ALT (SGPT) 13 12 - 78 U/L    AST (SGOT) 13 (L) 15 - 37 U/L    Alk. phosphatase 133 (H) 45 - 117 U/L    Protein, total 5.9 (L) 6.4 - 8.2 g/dL    Albumin 2.8 (L) 3.5 - 5.0 g/dL    Globulin 3.1 2.0 - 4.0 g/dL    A-G Ratio 0.9 (L) 1.1 - 2.2     GLUCOSE, POC    Collection Time: 10/19/20  8:08 AM   Result Value Ref Range    Glucose (POC) 96 65 - 100 mg/dL    Performed by Mark Sol, ALLOCATE    Collection Time: 10/19/20  8:30 AM   Result Value Ref Range    HISTORY CHECKED? Historical check performed          Urine dipstick:   No results found for: COLOR, APPRN, SPGRU, REFSG, VERENA, PROTU, GLUCU, KETU, BILU, UROU, TIBURCIO, LEUKU, GLUKE, EPSU, BACTU, WBCU, RBCU, CASTS, UCRY    I have reviewed the following: All pertinent labs, microbiology data, radiology imaging for my assessment     Medications list Personally Reviewed   [x]      Yes     []               No       Medications:  Prior to Admission medications    Medication Sig Start Date End Date Taking? Authorizing Provider   oxyCODONE IR (ROXICODONE) 5 mg immediate release tablet Take 5 mg by mouth every six (6) hours as needed for Pain. Yes Provider, Historical   ascorbic acid, vitamin C, (Vitamin C) 500 mg tablet Take 500 mg by mouth daily. Yes Provider, Historical   lactulose (CHRONULAC) 10 gram/15 mL solution Take 30 mL by mouth two (2) times a day. 10/10/20  Yes Ricky Simon MD   cholecalciferol (VITAMIN D3) 1,000 unit tablet Take 2,000 Units by mouth daily.    Yes Provider, Historical   ferrous sulfate 325 mg (65 mg iron) tablet Take 325 mg by mouth three (3) times daily (with meals). Yes Provider, Historical   sevelamer (RENAGEL) 800 mg tablet Take 1,600 mg by mouth three (3) times daily (with meals). Yes Provider, Historical   albuterol (PROVENTIL HFA, VENTOLIN HFA, PROAIR HFA) 90 mcg/actuation inhaler Take 2 Puffs by inhalation every four (4) hours as needed for Wheezing. Yes Provider, Historical        Thank you for allowing us to participate in the care of this patient. We will follow patient. Please dont hesitate to call with any questions    Bettina Foster MD  [facility]   [address] [facility] [address]  Phone - [phone]   Fax - [phone]  www. [facility] [employer]

## 2020-10-19 NOTE — ROUTINE PROCESS
Bedside shift change report given to  Ilsa Van by Alejandro. Report included the following information SBAR.

## 2020-10-19 NOTE — PROGRESS NOTES
Bedside shift change report given to Vivi Gage RN by Carmine Hernandez RN . Report included the following information SBAR, Kardex, ED Summary, Intake/Output, MAR, and Recent Results. Pt oriented to time, place, person and situation, Normal Sinus Rhythm , room air, Pain present - adequately treated. No acute events overnight. TRANSFER - IN REPORT:    Verbal report received from [address] [place] [address], RN(name) on Cheryl Ledezma  being received from (unit) for routine progression of care      Report consisted of patients Situation, Background, Assessment and   Recommendations(SBAR). Information from the following report(s) SBAR, Kardex, ED Summary, [facility] and Recent Results was reviewed with the receiving nurse. Opportunity for questions and clarification was provided. Assessment completed upon patients arrival to unit and care assumed. Primary Nurse Tacho Iniguez RN and QUINTIN August performed a dual skin assessment on this patient Impairment noted- lesions on BLE  Vic score is 20        Problem: Falls - Risk of  Goal: *Absence of Falls  Description: Document Isaac Click Fall Risk and appropriate interventions in the flowsheet.   Outcome: Progressing Towards Goal  Note: Fall Risk Interventions:  Mobility Interventions: Patient to call before getting OOB, Communicate number of staff needed for ambulation/transfer       Medication Interventions: Evaluate medications/consider consulting pharmacy, Patient to call before getting OOB    Elimination Interventions: Call light in reach     Problem: Anemia Care Plan (Adult and Pediatric)  Goal: *Labs within defined limits  Outcome: Progressing Towards Goal  Note: Pt hgb 5.2

## 2020-10-19 NOTE — PROGRESS NOTES
I have received first and only page from nurse at 22:16 and now have spoken with nurse who notifies me that patient has arrived here at the hospital.   I will place admission orders at this time.

## 2020-10-19 NOTE — PROGRESS NOTES
[facility]  Hospitalist Group                                                                                          Hospitalist Progress Note  Veronica Treviño MD  Answering service: [phone] from in house phone        Date of Service:  10/19/2020  NAME:  Naeem Pérez  :  1950  MRN:  [id number]      Admission Summary:   Naeem Pérez is a 69 y.o. female with past medical history of arthritis, asthma, ESRD, chronic pain, cirrhosis, type 2 DM, GERD, hypertension, and paroxysmal afib presented as a direct admission/ transfer from [facility] to [facility]) with chief complaint of low blood count (anemia). Patient was initially admitted to [facility] on 10/17/2020 with symptomatic anemia; hemoglobin = 5.5 on admission. PRBC was ordered by [facility] for transfusion. Interval history / Subjective:     SARS- Cov 2 PENDING   Need EGD/ Colon , paracentesis after COVID test     Assessment & Plan:     1. Symptomatic anemia ? Blood loss somewhere         - s/p BT  1 unit PRBCs       - hgb 6.3 can get 1 more with HD        - repeat CBC post transfusion will need Epo with HD        - stool occult and GI consult      2. Type 2 diabetes mellitus with hyperglycemia         - Order Humalog insulin correctional coverage, scheduled blood glucose checks and check hemoglobin A1c level.     3. ESRD (end stage renal disease)        - consult nephrologist for hemodialysis TTS      4. Suspected COVID-19 virus infection       - arriving from [facility] where it was noted that COVID testing was not available. Will place test for COVID 19 with focused hospital protocols and PPE / isolation precautions     5. Hypertension       -monitor BP and provide anti-hypertensive therapy as needed     6. Chronic pain syndrome       - was given Oxycodone at [facility]. May order prn     7.   Liver cirrhosis       - order CMP in a.m.       - continue Lactulose       - tap belly after COVID neg may be tomorrow      Code status Full  DVT prophylaxis: SCD    Care Plan discussed with: Patient/Family and Nurse  Anticipated Disposition: Home w/Family  Anticipated Discharge: 24 hours to 48 hours     Hospital Problems  Date Reviewed: 10/6/2020          Codes Class Noted POA    ESRD (end stage renal disease) ([facility] ICD-10-CM: N18.6  ICD-9-CM: 585.6  10/18/2020 Unknown        Suspected COVID-19 virus infection ICD-10-CM: Z20.828  ICD-9-CM: V01.79  10/18/2020 Unknown        Type 2 diabetes mellitus with hyperglycemia ([facility] ICD-10-CM: E11.65  ICD-9-CM: 250.00  10/6/2020 Unknown        Anemia ICD-10-CM: D64.9  ICD-9-CM: 285.9  9/17/2020 Unknown                Review of Systems:   A comprehensive review of systems was negative. Vital Signs:    Last 24hrs VS reviewed since prior progress note. Most recent are:  Visit Vitals  BP (!) 140/71 (BP 1 Location: Right arm, BP Patient Position: At rest)   Pulse 81   Temp 98.1 °F (36.7 °C)   Resp 18   Wt 72.4 kg (159 lb 9.8 oz)   SpO2 100%   BMI 27.40 kg/m²         Intake/Output Summary (Last 24 hours) at 10/19/2020 0815  Last data filed at 10/19/2020 0440  Gross per 24 hour   Intake 313.8 ml   Output --   Net 313.8 ml        Physical Examination:             Constitutional:  No acute distress, cooperative, pleasant    ENT:  Oral mucosa moist, oropharynx benign. Resp:  CTA bilaterally. No wheezing/rhonchi/rales. No accessory muscle use   CV:  Regular rhythm, normal rate, no murmurs, gallops, rubs    GI:  Soft, non distended, non tender. normoactive bowel sounds, no hepatosplenomegaly     Musculoskeletal:  No edema, warm, 2+ pulses throughout    Neurologic:  Moves all extremities. AAOx3, CN II-XII reviewed     Psych:  Good insight, Not anxious nor agitated.        Data Review:    I personally reviewed  Image and labs      Labs:     Recent Labs     10/19/20  0706 10/18/20  2348   WBC 4.8 5.0   HGB 6.3* 5.2*   HCT 21.2* 18.5*    194 Recent Labs     10/19/20  0706 10/19/20  0630 10/18/20  2348    134 137   K 5.3* 5.3 5.0    105 103   CO2 26 24 26   BUN 63* 60 60*   CREA 6.01* 6.02 5.74*   GLU 88 88 155*   CA 8.5 8.7 8.1*   MG  --   --  2.0     Recent Labs     10/19/20  0706 10/19/20  0630 10/18/20  2348   ALT 13 16 15   * 148 142*   TBILI 0.4 0.4 0.4   TP 5.9* 6.6 6.2*   ALB 2.8* 3.0 2.8*   GLOB 3.1 3.6 3.4     Recent Labs     10/19/20  0630 10/18/20  2348 10/17/20  1310   INR 1.1 1.1 1.1   PTP 11.3* 11.3* 10.6   APTT 22.2 26.6  --       Recent Labs     10/18/20  2348   FERR 33      No results found for: FOL, RBCF   No results for input(s): PH, PCO2, PO2 in the last 72 hours.   Recent Labs     10/18/20  2348   TROIQ 0.06*     No results found for: CHOL, CHOLX, CHLST, CHOLV, HDL, HDLP, LDL, LDLC, DLDLP, TGLX, TRIGL, TRIGP, CHHD, CHHDX  Lab Results   Component Value Date/Time    Glucose (POC) 96 10/19/2020 08:08 AM    Glucose (POC) 143 (H) 10/19/2020 12:21 AM    Glucose (POC) 224 (H) 10/17/2020 07:56 PM    Glucose (POC) 120 (H) 10/10/2020 11:29 AM    Glucose (POC) 97 10/10/2020 08:06 AM     No results found for: COLOR, APPRN, SPGRU, REFSG, VERENA, PROTU, GLUCU, KETU, BILU, UROU, TIBURCIO, LEUKU, GLUKE, EPSU, BACTU, WBCU, RBCU, CASTS, UCRY      Medications Reviewed:     Current Facility-Administered Medications   Medication Dose Route Frequency    ascorbic acid (vitamin C) (VITAMIN C) tablet 500 mg  500 mg Oral DAILY    cholecalciferol (VITAMIN D3) (1000 Units /25 mcg) tablet 2 Tab  2,000 Units Oral DAILY    ferrous sulfate tablet 325 mg  325 mg Oral TID WITH MEALS    lactulose (CHRONULAC) 10 gram/15 mL solution 30 mL  20 g Oral BID    sevelamer carbonate (RENVELA) tab 1,600 mg  1,600 mg Oral TID WITH MEALS    oxyCODONE IR (ROXICODONE) tablet 5 mg  5 mg Oral Q6H PRN    albuterol (PROVENTIL HFA, VENTOLIN HFA, PROAIR HFA) inhaler 2 Puff  2 Puff Inhalation Q6H PRN    glucose chewable tablet 16 g  4 Tab Oral PRN    glucagon (GLUCAGEN) injection 1 mg  1 mg IntraMUSCular PRN    dextrose 10% infusion 0-250 mL  0-250 mL IntraVENous PRN    insulin lispro (HUMALOG) injection   SubCUTAneous AC&HS    acetaminophen (TYLENOL) tablet 650 mg  650 mg Oral Q6H PRN    Or    acetaminophen (TYLENOL) suppository 650 mg  650 mg Rectal Q6H PRN    0.9% sodium chloride infusion 250 mL  250 mL IntraVENous PRN     ______________________________________________________________________  EXPECTED LENGTH OF STAY: - - -  ACTUAL LENGTH OF STAY:          1                 Rupali King MD

## 2020-10-19 NOTE — PROGRESS NOTES
Problem: Anemia Care Plan (Adult and Pediatric)  Goal: *Labs within defined limits  Outcome: Progressing Towards Goal  Note: Pts hgb 6.3 this AM, orders received for 1 unit PRBCs with dialysis. 1200- pt won't be getting dialysis today, waiting on blood from blood bank    1604- 1 unit PRBCs picked up from blood bank  1616- pts vital signs taken, pt educated on transfusion reactions  1620- dual verification with Nathan Hope RN  0356- transfusion started at 75 ml/hr  1637- no signs and symptoms of transfusion reaction, rate changed to 125 ml/hr, will continue to monitor vitals    TRANSFER - OUT REPORT:    Verbal report given to Elizabeth Jeter RN(name) on Emiliana Henriquez  being transferred to (unit) for routine progression of care       Report consisted of patients Situation, Background, Assessment and   Recommendations(SBAR). Information from the following report(s) SBAR, Kardex, ED Summary, Procedure Summary, Intake/Output, MAR, Accordion, Recent Results, Med Rec Status, Cardiac Rhythm NSR and Alarm Parameters  was reviewed with the receiving nurse. Lines:   Peripheral IV 10/18/20 Anterior;Right Forearm (Active)   Site Assessment Clean, dry, & intact 10/19/20 1616   Phlebitis Assessment 0 10/19/20 1616   Infiltration Assessment 0 10/19/20 1616   Dressing Status Clean, dry, & intact 10/19/20 1616   Dressing Type Tape;Transparent 10/19/20 1616   Hub Color/Line Status Pink;Flushed; Infusing 10/19/20 1616   Action Taken Open ports on tubing capped 10/19/20 1616   Alcohol Cap Used Yes 10/19/20 1616        Opportunity for questions and clarification was provided.       Patient transported with:   [employer]

## 2020-10-19 NOTE — CONSULTS
[address] [facility] [address]  [address], [address]  [phone]                     GI CONSULTATION NOTE      NAME:  Lorenzo Hernandez   :   1950   MRN:   [id number]       Referring Physician: Dr. Gilda Olmos Date: 10/19/2020     Chief Complaint: Heme + stool    History of Present Illness:  Patient is a 69 y.o. who is seen in consultation at the request of Dr. Fang Moura  for heme + stool. Patient admitted on 10/18/2020 for anemia. Past medical history of arthritis, asthma, ESRD (dialysis Tuesday, Thursday, Saturday schedule, chronic pain, cirrhosis (follows by [facility]), type 2 DM, GERD, hypertension, and paroxysmal afib presented as a direct admission/ transfer from [facility]  to [facility] with chief complaint of anemia. Patient reports on this past Saturday 10/17/2020 she had large bowel movement with black stool. Denies diarrhea, no constipation, no rectal pain, no abdominal pain. Denies nausea, no vomiting, intermittent shortness of breath. Patient with previous history of anemia and GI bleed. Patient report recent work up with EGD, capsule study, colonoscopy at [address] [place] [address] for GI bleed in 2020. Patient reports negative findings. Patient with colonoscopy at [facility] in 2018 with multiple non-bleeding AVM's found in right colon, slight tortuous sigmoid colon. EGD 2018 with mild mid/distal esophageal varices, food rentention in stomach. Patient denies smoking, no NSAID usage, no alcohol consumption, no anticoagulant in use. Patient stated mild change in having less appetite.  Patient report changes related to ascites, patient report intermittent paracentesis     PMH:  Past Medical History:   Diagnosis Date    Arthritis     Asthma     Chronic kidney disease     Chronic pain     Cirrhosis ([facility] 2017    Diabetes ([facility] diet controlled    GERD (gastroesophageal reflux disease)     Hypertension     Paroxysmal atrial fibrillation ([facility] 10/6/2020       PSH:  Past Surgical History:   Procedure Laterality Date    COLONOSCOPY N/A 1/12/2018    COLONOSCOPY performed by Dav Mcintyre MD at [facility] ENDOSCOPY    COLONOSCOPY N/A 3/19/2018    COLONOSCOPY performed by Dav Mcintyre MD at [address] HX GYN  c section    HX VASCULAR ACCESS      dialysis        Allergies: Allergies   Allergen Reactions    Aleve [Naproxen Sodium] Itching, Swelling and Other (comments)    Amlodipine Rash, Hives and Itching    Aspirin Other (comments), Nausea Only and Unknown (comments)     GI bleed  GI bleeding      Heparin Unknown (comments)     bleeding      Ibuprofen Nausea and Vomiting    Metformin Other (comments)     swelling       Home Medications:  Prior to Admission Medications   Prescriptions Last Dose Informant Patient Reported? Taking? albuterol (PROVENTIL HFA, VENTOLIN HFA, PROAIR HFA) 90 mcg/actuation inhaler   Yes Yes   Sig: Take 2 Puffs by inhalation every four (4) hours as needed for Wheezing. ascorbic acid, vitamin C, (Vitamin C) 500 mg tablet   Yes Yes   Sig: Take 500 mg by mouth daily. cholecalciferol (VITAMIN D3) 1,000 unit tablet   Yes Yes   Sig: Take 2,000 Units by mouth daily. ferrous sulfate 325 mg (65 mg iron) tablet   Yes Yes   Sig: Take 325 mg by mouth three (3) times daily (with meals). lactulose (CHRONULAC) 10 gram/15 mL solution   No Yes   Sig: Take 30 mL by mouth two (2) times a day. oxyCODONE IR (ROXICODONE) 5 mg immediate release tablet   Yes Yes   Sig: Take 5 mg by mouth every six (6) hours as needed for Pain. sevelamer (RENAGEL) 800 mg tablet   Yes Yes   Sig: Take 1,600 mg by mouth three (3) times daily (with meals).       Facility-Administered Medications: None       Hospital Medications:  Current Facility-Administered Medications   Medication Dose Route Frequency    ascorbic acid (vitamin C) (VITAMIN C) tablet 500 mg  500 mg Oral DAILY    cholecalciferol (VITAMIN D3) (1000 Units /25 mcg) tablet 2 Tab  2,000 Units Oral DAILY    ferrous sulfate tablet 325 mg  325 mg Oral TID WITH MEALS    lactulose (CHRONULAC) 10 gram/15 mL solution 30 mL  20 g Oral BID    sevelamer carbonate (RENVELA) tab 1,600 mg  1,600 mg Oral TID WITH MEALS    albuterol (PROVENTIL HFA, VENTOLIN HFA, PROAIR HFA) inhaler 2 Puff  2 Puff Inhalation Q6H PRN    0.9% sodium chloride infusion 250 mL  250 mL IntraVENous PRN    [START ON 10/20/2020] epoetin reshma-epbx (RETACRIT) injection 20,000 Units  20,000 Units SubCUTAneous Q TUE, THU & SAT    oxyCODONE IR (ROXICODONE) tablet 5 mg  5 mg Oral Q6H PRN    glucose chewable tablet 16 g  4 Tab Oral PRN    glucagon (GLUCAGEN) injection 1 mg  1 mg IntraMUSCular PRN    dextrose 10% infusion 0-250 mL  0-250 mL IntraVENous PRN    insulin lispro (HUMALOG) injection   SubCUTAneous AC&HS    0.9% sodium chloride infusion 250 mL  250 mL IntraVENous PRN       Social History:  Social History     Tobacco Use    Smoking status: Never Smoker    Smokeless tobacco: Never Used   Substance Use Topics    Alcohol use: No       Family History:  Family History   Family history unknown: Yes       Review of Systems:    Constitutional: negative fever, negative chills, negative weight loss  Eyes:   negative visual changes  ENT:   negative sore throat, tongue or lip swelling  Respiratory:  negative cough, negative dyspnea  Cards:  negative for chest pain, palpitations, lower extremity edema  GI:   See HPI  :  negative for frequency, dysuria  Integument:  negative for rash and pruritus  Heme:  negative for easy bruising and gum/nose bleeding  Musculoskel: negative for myalgias,  back pain and muscle weakness  Neuro: negative for headaches, dizziness, vertigo  Psych:  negative for feelings of anxiety, depression      Objective:     Patient Vitals for the past 8 hrs:   BP Temp Pulse Resp SpO2   10/19/20 1616 (!) 164/80 97.6 °F (36.4 °C) 81 18 100 % 10/19/20 1502 (!) 153/71 98.2 °F (36.8 °C) 87 18 100 %   10/19/20 1203 (!) 146/76 98.3 °F (36.8 °C) 76 16 98 %   10/19/20 0830 (!) 142/74 98 °F (36.7 °C) 77 18 98 %     No intake/output data recorded. 10/17 1901 - 10/19 0700  In: 313.8   Out: -         PHYSICAL EXAM:  General: WD, WN. Alert, cooperative, no acute distress    HEENT: NC, Atraumatic. PERRLA, EOMI. Anicteric sclerae. Lungs:  CTA Bilaterally. No Wheezing/Rhonchi/Rales. Heart:  Regular  rhythm,  No murmur   Abdomen: Soft, distended, generalized tenderness with deep palpation. Bowel sounds present, no palpable masses   Extremities: No edema   Neurologic:  CN 2-12 gi, Alert and oriented X 3. No acute neurological distress   Psych:   Good insight. Not anxious nor agitated.     Data Review     Recent Labs     10/19/20  0706 10/18/20  2348   WBC 4.8 5.0   HGB 6.3* 5.2*   HCT 21.2* 18.5*    194     Recent Labs     10/19/20  0706 10/19/20  0630    134   K 5.3* 5.3    105   CO2 26 24   BUN 63* 60   CREA 6.01* 6.02   GLU 88 88   CA 8.5 8.7     Recent Labs     10/19/20  0706 10/19/20  0630   * 148   TP 5.9* 6.6   ALB 2.8* 3.0   GLOB 3.1 3.6     Recent Labs     10/19/20  0630 10/18/20  2348   INR 1.1 1.1   PTP 11.3* 11.3*   APTT 22.2 26.6        Imaging studies reviewed          Assessment:   Patient Active Problem List   Diagnosis Code    GI bleed K92.2    Diabetes mellitus type 2, controlled ([facility] [address] E11.9    Anemia in chronic kidney disease N18.9, D63.1    Cirrhosis (HCC) K74.60    Acute blood loss anemia D62    Dizziness R42    Generalized weakness R53.1    Rectal bleed K62.5    Symptomatic anemia D64.9    Severe anemia D64.9    Anemia D64.9    Dependence on renal dialysis (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, or end stage renal disease (HCC) I13.2    Other ascites R18.8    Other chronic pain G89.29    Paroxysmal atrial fibrillation ([facility] [address] I48.0    Type 2 diabetes mellitus with hyperglycemia ([facility] [address] E11.65    Unspecified cirrhosis of liver ([facility] [address] K74.60    Other hypotension I95.89    HTN (hypertension) I10    ESRD (end stage renal disease) (HCC) N18.6    Suspected COVID-19 virus infection Z20.828            Plan:   Melena/Anemia/ GI Bleed/GERD  -Hemoglobin 6.3 today, patient receiving unit of PRBC's  -Continue to monitor H&H, transfuse if hemoglobin less then 7.0  -BID PPI with Protonix  -Plans for EGD and capsule study for tomorrow 10/20/2020for evaluation for possible bleeding ulcer, AVMs's, etc.  -NPO after mignight   Will attempt to retrieve records from [facility] of recent GI follow up     Liver disease/Cirhosis/Ascites:  -Receives paracentesis  -Patient stated follow up at [address] consulted     ESRD, diabetes, COVID 19 negative, hypertension, chronic pain, diabetes  -management per hospitalist   -nephrology following   -patient to receive dialysis     Will discuss with Dr. Nuria Alvarado

## 2020-10-19 NOTE — PROGRESS NOTES
Bora MEZA about high blood pressure (178/75). Was told to continue to monitor. If BP reaches 778-410 systolic, page MD again.

## 2020-10-19 NOTE — H&P
History & Physical    Date of admission: 10/18/2020    Patient name: Sofia Aguero  MRN: [id number]  YOB: 1950  Age: 69 y.o. Primary care provider:  None     Source of Information: patient, ED and electronic medical records                                 Chief complaint:  Low blood count    History of present illness  Sofia Aguero is a 69 y.o. female with past medical history of arthritis, asthma, ESRD, chronic pain, cirrhosis, type 2 DM, GERD, hypertension, and paroxysmal afib presented as a direct admission[facility] to [facility]) with chief complaint of low blood count (anemia). Patient was initially admitted to [facility] on 10/17/2020 with symptomatic anemia; hemoglobin = 5.5 on admission. PRBC was ordered by [facility] for transfusion. Per their report, patient was thought to have had a possible blood transfusion reaction when patient developed a some redness locally at site of IV infusing blood. No further attempts of transfusion was performed. Repeat hemoglobin = 5.3, on 10/18/2020 at 07:09. Per later discussion with patient (on subsequent arrival at [facility]), she clarifies that actually she witnessed that the IV line infiltrated with blood transfusing, resulting in local redness and swelling due to the infiltrated IV. She denies having any allergic reaction such as itching, rash, hives, shortness of breath. Patient was adamant that she had this was due to her IV infiltrating and not an allergic reaction. She denies any prior history of a blood transfusion reaction. On 10/10/2020, patient's hemoglobin = 8.3. She has history of anemia requiring multiple blood transfusion this year up to 15 - 20 units as noted in discharge summary records.  Transferring physician at [facility] attempted to transfer patient to the [facility] but per report, transfer was not accepted due to lack of rapid COVID 19 testing capability at [facility]. Request then was made for transfer to [facility]. She was accepted by our day hospitalist staff and she arrived tonight for admission. There were no reports of new onset syncope, loss of consciousness, headache, neck pain, visual disturbance, numbness, paresthesias, focal weakness, chest pain, palpitations, abdominal pain, nausea, vomiting, diarrhea, calf pain, increased leg swelling/ edema, fever, chills, rash, or known exposure to COVID 19. Past Medical History:   Diagnosis Date    Arthritis     Asthma     Chronic kidney disease     Chronic pain     Cirrhosis ([facility] 12/17/2017    Diabetes (HCC) diet controlled    GERD (gastroesophageal reflux disease)     Hypertension     Paroxysmal atrial fibrillation ([facility] [address] 10/6/2020      Past Surgical History:   Procedure Laterality Date    COLONOSCOPY N/A 1/12/2018    COLONOSCOPY performed by Eduardo Jacobson MD at [facility] ENDOSCOPY    COLONOSCOPY N/A 3/19/2018    COLONOSCOPY performed by Eduardo Jacobson MD at [address] [place] [address] HX GYN  c section    HX VASCULAR ACCESS      dialysis      Prior to Admission medications    Medication Sig Start Date End Date Taking? Authorizing Provider   ascorbic acid, vitamin C, (Vitamin C) 500 mg tablet Take 500 mg by mouth daily. Yes Provider, Historical   lactulose (CHRONULAC) 10 gram/15 mL solution Take 30 mL by mouth two (2) times a day. 10/10/20  Yes Rosi Turk MD   cholecalciferol (VITAMIN D3) 1,000 unit tablet Take 2,000 Units by mouth daily. Yes Provider, Historical   ferrous sulfate 325 mg (65 mg iron) tablet Take 325 mg by mouth three (3) times daily (with meals). Yes Provider, Historical   sevelamer (RENAGEL) 800 mg tablet Take 1,600 mg by mouth three (3) times daily (with meals).    Yes Provider, Historical   albuterol (PROVENTIL HFA, VENTOLIN HFA, PROAIR HFA) 90 mcg/actuation inhaler Take 2 Puffs by inhalation every four (4) hours as needed for Wheezing. Yes Provider, Historical     Allergies   Allergen Reactions    Aleve [Naproxen Sodium] Itching, Swelling and Other (comments)    Amlodipine Rash, Hives and Itching    Aspirin Other (comments), Nausea Only and Unknown (comments)     GI bleed  GI bleeding      Heparin Unknown (comments)     bleeding      Ibuprofen Nausea and Vomiting    Metformin Other (comments)     swelling      Family History   Family history unknown: Yes    CAD       Social history  Patient resides  x  Independently                Ambulates          x  Assisted walker         Alcohol history   x  None           Smoking history  x  None             Social History     Tobacco Use   Smoking Status Never Smoker   Smokeless Tobacco Never Used       Code status  x  Full code            Code status discussed with the patient/caregivers. Prior    Review of systems  Pertinent positives as noted in HPI. All other systems were reviewed and were negative. Physical Examination   Visit Vitals  BP (!) 140/47   Pulse 82   Temp 98.7 °F (37.1 °C)   Resp 18   SpO2 100%          O2 Device: Room air    General:  Patient in no acute respiratory distress   Head:  Normocephalic, without obvious abnormality, atraumatic   Eyes:  Conjunctivae/corneas clear. PERRL, EOMs intact   E/N/M/T: Nares normal. Septum midline.  No nasal drainage or sinus tenderness  Lips, mucosa, and tongue normal   Teeth and gums normal  Clear oropharynx   Neck: Normal appearance and movements, symmetrical, trachea midline  No palpable adenopathy  No thyroid enlargement, tenderness or nodules  No carotid bruit   No JVD   Lungs:   Symmetrical chest expansion and respiratory effort  Clear to auscultation bilaterally   Chest wall:  No tenderness or deformity   Heart:  Regular rate and rhythm   Sounds normal; no murmur, click, rub or gallop   Abdomen:   Soft, no tenderness  No rebound, guarding, or rigidity  Non-distended  Bowel sounds normal  No masses or hepatosplenomegaly  No hernias present   Back: No CVA tenderness   Extremities: Extremities normal, atraumatic  No cyanosis or edema     Vascular/  Pulses 2+ radial/ 1+ DP bilateral pulses  Tunneled right internal jugular hemodialysis catheter in right upper chest   Skin: Multiple old appearing, hard, black scars/ nodules on both legs  Unhealed wound on medial surface of right leg  Warm/ dry   Musculo-      skeletal: Gait not tested     Neuro: GCS 15. Moves all extremities x 4. No slurred speech. No facial droop. Sensation grossly intact. Psych: Alert, oriented x 3  Very angry, anxious         Data Review      24 Hour Results:  Recent Results (from the past 24 hour(s))   METABOLIC PANEL, BASIC    Collection Time: 10/18/20  7:09 AM   Result Value Ref Range    Sodium 138 136 - 145 mmol/L    Potassium 5.0 3.5 - 5.1 mmol/L    Chloride 101 97 - 108 mmol/L    CO2 28 21 - 32 mmol/L    Anion gap 9 5 - 15 mmol/L    Glucose 139 (H) 65 - 100 mg/dL    BUN 51 (H) 6 - 20 mg/dL    Creatinine 5.18 (H) 0.55 - 1.02 mg/dL    BUN/Creatinine ratio 10 (L) 12 - 20      GFR est AA 10 (L) >60 ml/min/1.73m2    GFR est non-AA 8 (L) >60 ml/min/1.73m2    Calcium 8.6 8.5 - 10.1 mg/dL   CBC WITH AUTOMATED DIFF    Collection Time: 10/18/20  7:09 AM   Result Value Ref Range    WBC 5.0 4.4 - 11.3 K/uL    RBC 1.94 (L) 4.50 - 5.90 M/uL    HGB 5.3 (LL) 13.5 - 17.5 g/dL    HCT 17.2 (LL) 36 - 46 %    MCV 88.6 80 - 100 FL    MCH 27.4 (L) 31 - 34 PG    MCHC 30.9 (L) 31.0 - 36.0 g/dL    RDW 19.7 (H) 11.5 - 14.5 %    PLATELET 217 K/uL    MPV 8.3 6.5 - 11.5 FL    NRBC 10.0  WBC    ABSOLUTE NRBC 0.01 K/uL    NEUTROPHILS 69 42 - 75 %    LYMPHOCYTES 12 (L) 20.5 - 51.1 %    MONOCYTES 15 (H) 1.7 - 9.3 %    EOSINOPHILS 3 (H) 0.9 - 2.9 %    BASOPHILS 1 0.0 - 2.5 %    ABS. NEUTROPHILS 3.4 1.8 - 7.7 K/UL    ABS. LYMPHOCYTES 0.6 (L) 1.0 - 4.8 K/UL    ABS. MONOCYTES 0.7 0.2 - 2.4 K/UL    ABS. EOSINOPHILS 0.1 0.0 - 0.7 K/UL    ABS.  BASOPHILS 0.1 0.0 - 0.2 K/UL     Recent Labs     10/18/20  0709 10/17/20  1310   WBC 5.0 4.4   HGB 5.3* 5.5*   HCT 17.2* 17.6*    132     Recent Labs     10/18/20  0709 10/17/20  1310    137   K 5.0 4.1    100   CO2 28 30   * 117*   BUN 51* 33*   CREA 5.18* 3.63*   CA 8.6 8.3*   ALB  --  2.8*   TBILI  --  0.5   ALT  --  17   INR  --  1.1         Assessment and Plan   1. Symptomatic anemia       - Admit to telemetry       - I discussed with patient the risks, benefits, and potential complications of blood transfusion and she provided informed, written consent freely and without coercion. - I spoke with blood bank regarding patient's blood specimen. It was noted patient's type specific blood was available at the transferring hospital but it was noted given. It was not known if a blood transfusion workup was performed prior to transfer here. It was noted that patient has blood available for transfusion but may take some time to arrive from the [facility]; I informed patient of the same. - ordered 1 unit PRBCs       - repeat CBC post transfusion    2. Type 2 diabetes mellitus with hyperglycemia         - Order Humalog insulin correctional coverage, scheduled blood glucose checks and check hemoglobin A1c level. 3.  ESRD (end stage renal disease)        - consult nephrologist for hemodialysis       4. Suspected COVID-19 virus infection       - arriving from [facility] where it was noted that COVID testing was not available. Will place test for COVID 19 with focused hospital protocols and PPE / isolation precautions    5. Hypertension       -monitor BP and provide anti-hypertensive therapy as needed    6. Chronic pain syndrome       - was given Oxycodone at [facility]. May order prn    7. Liver cirrhosis       - order CMP in a.m.       - continue Lactulose    8. VTE prophylaxis        - SCDs to BLEs    CODE STATUS:  FULL CODE, as discussed with patient and she requested the same.        Signed by: Grace Gabriel MD    October 18, 2020 at 11:34 PM

## 2020-10-19 NOTE — CONSULTS
LYNDON MALAVE  CLINICAL NURSE SPECIALIST CONSULT  PROGRAM FOR DIABETES HEALTH    INITIAL NOTE    Presentation   Silvia Sotelo is a 69 y.o. female admitted  with low blood count that necessitates blood transfusion. She was transferred to [facility] from [facility] for rapid COVID-19 testing capability here. Initial PRBC infusion interrupted for concern for transfusion reaction which was later determined just an infiltrated IV site. She is currently COVID-19  Rule out with results pending. Per notes, she has required multiple blood transfusions this year, 15-20 units total.   HX:PMH:  Arthritis, asthma, ESRD-HD Tu/Thurs/Sat, cirrhosis (2017), chronic pain, DM2-diet controlled, GERD, HTN, and afib. DX:     TX:PRBCs transfused x1? Current clinical course has been complicated by low hemoglobin count with PRBC infusion. Diabetes: Patient has known diet controlled Type 2 diabetes,Family history unknown for diabetes. Consulted by Provider for advanced diabetes nursing assessment and care, specifically related to   [] Transitioning off Izaiah Messing   [x] Inpatient management strategy  [x] Home management assessment  [] Survival skill education    Diabetes-related medical history  Acute complications  NONE  Neurological complications  TBD  Microvascular disease  Nephropathy  Macrovascular disease  TBD  Other associated conditions     Cirrhosis/ HTN/ chronic anemia    Diabetes medication history:NONE  Drug class Currently in use Discontinued Never used   Biguanide      DDP-4 inhibitor       Sulfonylurea      Thiazolidinedione      GLP-1 RA      SGLT-2 inhibitors      Basal insulin      Fixed Dose  Combinations      Bolus insulin        Subjective   This patient is currently being ruled out for COVID-19, a face to face assessment and interview is deferred today. Will collaborate with nurse and other providers.     Patient reports the following home diabetes self-care practices:  Eating pattern-deferred    Physical activity pattern-deferred    Monitoring pattern-deferred    Taking medications pattern-N/A-takes no diabetes meds  [] Consistent administration  [] Affordable    Objective   Physical exam-deferred for COVID-19 rule out  General   Vital Signs   Visit Vitals  BP (!) 140/71 (BP 1 Location: Right arm, BP Patient Position: At rest)   Pulse 81   Temp 98.1 °F (36.7 °C)   Resp 18   Wt 72.4 kg (159 lb 9.8 oz)   SpO2 100%   BMI 27.40 kg/m²       Diabetic foot exam: Vibratory and Filament test: deferred due to clinical condition  . Laboratory  Lab Results   Component Value Date/Time    Hemoglobin A1c <3.8 (L) 10/18/2020 11:48 PM     No results found for: LDL, LDLC, DLDLP  Lab Results   Component Value Date/Time    Creatinine 6.01 (H) 10/19/2020 07:06 AM     Lab Results   Component Value Date/Time    Sodium 136 10/19/2020 07:06 AM    Potassium 5.3 (H) 10/19/2020 07:06 AM    Chloride 103 10/19/2020 07:06 AM    CO2 26 10/19/2020 07:06 AM    Anion gap 7 10/19/2020 07:06 AM    Glucose 88 10/19/2020 07:06 AM    BUN 63 (H) 10/19/2020 07:06 AM    Creatinine 6.01 (H) 10/19/2020 07:06 AM    BUN/Creatinine ratio 10 (L) 10/19/2020 07:06 AM    GFR est AA 8 (L) 10/19/2020 07:06 AM    GFR est non-AA 7 (L) 10/19/2020 07:06 AM    Calcium 8.5 10/19/2020 07:06 AM    Bilirubin, total 0.4 10/19/2020 07:06 AM    Alk. phosphatase 133 (H) 10/19/2020 07:06 AM    Protein, total 5.9 (L) 10/19/2020 07:06 AM    Albumin 2.8 (L) 10/19/2020 07:06 AM    Globulin 3.1 10/19/2020 07:06 AM    A-G Ratio 0.9 (L) 10/19/2020 07:06 AM    ALT (SGPT) 13 10/19/2020 07:06 AM     Lab Results   Component Value Date/Time    ALT (SGPT) 13 10/19/2020 07:06 AM       Factors affecting BG pattern  Factor Dose Comments   Nutrition:  Carb-controlled meals     60 grams/meal      Pain Chronic pain    Infection COVID-19 rule out    Other: PRBC transfusion ?  Units transfused      Blood glucose pattern        Assessment and Plan   Nursing Diagnosis Risk for unstable blood glucose pattern   Nursing Intervention Domain 2865 Decision-making Support   Nursing Interventions Examined current inpatient diabetes control   Explored factors facilitating and impeding inpatient management  Identified self-management practices impeding diabetes control  Explored corrective strategies with patient and responsible inpatient provider   Informed patient of rational for insulin strategy while hospitalized     Evaluation   Ms. Fenton, with diet contnrolled Type 2 diabetes, has  achieved inpatient blood glucose target of 100-180mg/dl. Current A1C not accurate due to recent blood transfusions. She has had several admissions recently with symptomatic anemia necessitating blood transfusions. BG trends since admission within target without lows. Given her ESRD, and cirrhosis, the insulin clearance will be prolonged, so high sensitivity dosing  with Humalog recommended. Inpatient blood glucose management has been impacted by  [x] Kidney dysfunction-ESRD- HD   [x] Erratic meal consumption  [] Glucocorticoid use  Recommendations   1. CONTINUE High Sensitivity Corrective insulin starting at .      2. Diet modified to include carb consistent options. 3. Accuchecks AC/HS     Billing Code(s)   I personally reviewed chart, notes, data and current medications in the medical record, and examined the patient at bedside before making care recommendations. Thank you for including us in their care. I spent 25 minutes in direct patient care today for this patient.   Time includes chart review, face to face with patient and collaboration with interdisciplinary care team.       Aurelia Seals, CNS  Program for Diabetes Health  Access via [address] [place] [address]  [phone]

## 2020-10-20 LAB
ABO + RH BLD: NORMAL
ABO + RH BLD: NORMAL
ALBUMIN SERPL-MCNC: 3 G/DL (ref 3.5–5)
ANION GAP SERPL CALC-SCNC: 11 MMOL/L (ref 5–15)
ANTIGENS PRESENT RBC DONR: NORMAL
BASOPHILS # BLD: 0.1 K/UL (ref 0–0.1)
BASOPHILS NFR BLD: 1 % (ref 0–1)
BLD PROD TYP BPU: NORMAL
BLOOD BANK CMNT PATIENT-IMP: NORMAL
BLOOD BANK CMNT PATIENT-IMP: NORMAL
BLOOD GROUP ANTIBODIES SERPL: NORMAL
BLOOD GROUP ANTIBODIES SERPL: POSITIVE
BPU ID: NORMAL
BUN SERPL-MCNC: 74 MG/DL (ref 6–20)
BUN/CREAT SERPL: 10 (ref 12–20)
CALCIUM SERPL-MCNC: 8.8 MG/DL (ref 8.5–10.1)
CHLORIDE SERPL-SCNC: 100 MMOL/L (ref 97–108)
CO2 SERPL-SCNC: 26 MMOL/L (ref 21–32)
CREAT SERPL-MCNC: 7.69 MG/DL (ref 0.55–1.02)
CROSSMATCH RESULT,%XM: NORMAL
DIFFERENTIAL METHOD BLD: ABNORMAL
EOSINOPHIL # BLD: 0.2 K/UL (ref 0–0.4)
EOSINOPHIL NFR BLD: 4 % (ref 0–7)
ERYTHROCYTE [DISTWIDTH] IN BLOOD BY AUTOMATED COUNT: 18.5 % (ref 11.5–14.5)
GLUCOSE BLD STRIP.AUTO-MCNC: 101 MG/DL (ref 65–100)
GLUCOSE BLD STRIP.AUTO-MCNC: 165 MG/DL (ref 65–100)
GLUCOSE BLD STRIP.AUTO-MCNC: 172 MG/DL (ref 65–100)
GLUCOSE SERPL-MCNC: 73 MG/DL (ref 65–100)
HCT VFR BLD AUTO: 27.4 % (ref 35–47)
HGB BLD-MCNC: 8.2 G/DL (ref 11.5–16)
HGB BLD-MCNC: 8.3 G/DL (ref 11.5–16)
IMM GRANULOCYTES # BLD AUTO: 0.1 K/UL (ref 0–0.04)
IMM GRANULOCYTES NFR BLD AUTO: 1 % (ref 0–0.5)
LYMPHOCYTES # BLD: 0.7 K/UL (ref 0.8–3.5)
LYMPHOCYTES NFR BLD: 14 % (ref 12–49)
MCH RBC QN AUTO: 27.8 PG (ref 26–34)
MCHC RBC AUTO-ENTMCNC: 30.3 G/DL (ref 30–36.5)
MCV RBC AUTO: 91.6 FL (ref 80–99)
MONOCYTES # BLD: 0.6 K/UL (ref 0–1)
MONOCYTES NFR BLD: 11 % (ref 5–13)
NEUTS SEG # BLD: 3.3 K/UL (ref 1.8–8)
NEUTS SEG NFR BLD: 69 % (ref 32–75)
NRBC # BLD: 0 K/UL (ref 0–0.01)
NRBC BLD-RTO: 0 PER 100 WBC
PHOSPHATE SERPL-MCNC: 6.1 MG/DL (ref 2.6–4.7)
PLATELET # BLD AUTO: 217 K/UL (ref 150–400)
PMV BLD AUTO: 10.7 FL (ref 8.9–12.9)
POTASSIUM SERPL-SCNC: 5.8 MMOL/L (ref 3.5–5.1)
RBC # BLD AUTO: 2.99 M/UL (ref 3.8–5.2)
RBC MORPH BLD: ABNORMAL
SERVICE CMNT-IMP: ABNORMAL
SODIUM SERPL-SCNC: 137 MMOL/L (ref 136–145)
SPECIMEN EXP DATE BLD: NORMAL
SPECIMEN EXP DATE BLD: NORMAL
STATUS OF UNIT,%ST: NORMAL
TRANSFUSION STATUS PATIENT QL: NORMAL
UNIT DIVISION, %UDIV: 0
WBC # BLD AUTO: 5 K/UL (ref 3.6–11)
XXAUTO CONTROL: NEGATIVE

## 2020-10-20 PROCEDURE — 74011250637 HC RX REV CODE- 250/637: Performed by: HOSPITALIST

## 2020-10-20 PROCEDURE — C9113 INJ PANTOPRAZOLE SODIUM, VIA: HCPCS | Performed by: NURSE PRACTITIONER

## 2020-10-20 PROCEDURE — P9047 ALBUMIN (HUMAN), 25%, 50ML: HCPCS | Performed by: INTERNAL MEDICINE

## 2020-10-20 PROCEDURE — 99231 SBSQ HOSP IP/OBS SF/LOW 25: CPT | Performed by: CLINICAL NURSE SPECIALIST

## 2020-10-20 PROCEDURE — 65660000000 HC RM CCU STEPDOWN

## 2020-10-20 PROCEDURE — 74011250636 HC RX REV CODE- 250/636: Performed by: INTERNAL MEDICINE

## 2020-10-20 PROCEDURE — 36415 COLL VENOUS BLD VENIPUNCTURE: CPT

## 2020-10-20 PROCEDURE — 85025 COMPLETE CBC W/AUTO DIFF WBC: CPT

## 2020-10-20 PROCEDURE — 85018 HEMOGLOBIN: CPT

## 2020-10-20 PROCEDURE — 74011000250 HC RX REV CODE- 250: Performed by: NURSE PRACTITIONER

## 2020-10-20 PROCEDURE — 74011250637 HC RX REV CODE- 250/637: Performed by: FAMILY MEDICINE

## 2020-10-20 PROCEDURE — 90935 HEMODIALYSIS ONE EVALUATION: CPT

## 2020-10-20 PROCEDURE — 80069 RENAL FUNCTION PANEL: CPT

## 2020-10-20 PROCEDURE — 5A1D70Z PERFORMANCE OF URINARY FILTRATION, INTERMITTENT, LESS THAN 6 HOURS PER DAY: ICD-10-PCS | Performed by: INTERNAL MEDICINE

## 2020-10-20 PROCEDURE — 94640 AIRWAY INHALATION TREATMENT: CPT

## 2020-10-20 PROCEDURE — 74011000250 HC RX REV CODE- 250: Performed by: HOSPITALIST

## 2020-10-20 PROCEDURE — 74011250636 HC RX REV CODE- 250/636: Performed by: NURSE PRACTITIONER

## 2020-10-20 PROCEDURE — 82962 GLUCOSE BLOOD TEST: CPT

## 2020-10-20 RX ORDER — DEXTROMETHORPHAN/PSEUDOEPHED 2.5-7.5/.8
1.2 DROPS ORAL
Status: CANCELLED | OUTPATIENT
Start: 2020-10-20

## 2020-10-20 RX ORDER — MIDAZOLAM HYDROCHLORIDE 1 MG/ML
.25-1 INJECTION, SOLUTION INTRAMUSCULAR; INTRAVENOUS
Status: CANCELLED | OUTPATIENT
Start: 2020-10-20 | End: 2020-10-21

## 2020-10-20 RX ORDER — ALBUMIN HUMAN 250 G/1000ML
12.5 SOLUTION INTRAVENOUS
Status: DISCONTINUED | OUTPATIENT
Start: 2020-10-20 | End: 2020-10-25 | Stop reason: HOSPADM

## 2020-10-20 RX ORDER — FENTANYL CITRATE 50 UG/ML
100 INJECTION, SOLUTION INTRAMUSCULAR; INTRAVENOUS
Status: CANCELLED | OUTPATIENT
Start: 2020-10-20

## 2020-10-20 RX ORDER — NALOXONE HYDROCHLORIDE 0.4 MG/ML
0.4 INJECTION, SOLUTION INTRAMUSCULAR; INTRAVENOUS; SUBCUTANEOUS
Status: CANCELLED | OUTPATIENT
Start: 2020-10-20 | End: 2020-10-21

## 2020-10-20 RX ORDER — SODIUM CHLORIDE 9 MG/ML
50 INJECTION, SOLUTION INTRAVENOUS CONTINUOUS
Status: CANCELLED | OUTPATIENT
Start: 2020-10-20 | End: 2020-10-21

## 2020-10-20 RX ORDER — SODIUM CHLORIDE 0.9 % (FLUSH) 0.9 %
5-40 SYRINGE (ML) INJECTION AS NEEDED
Status: CANCELLED | OUTPATIENT
Start: 2020-10-20

## 2020-10-20 RX ORDER — FLUMAZENIL 0.1 MG/ML
0.2 INJECTION INTRAVENOUS
Status: CANCELLED | OUTPATIENT
Start: 2020-10-20 | End: 2020-10-21

## 2020-10-20 RX ORDER — EPINEPHRINE 0.1 MG/ML
1 INJECTION INTRACARDIAC; INTRAVENOUS
Status: CANCELLED | OUTPATIENT
Start: 2020-10-20 | End: 2020-10-21

## 2020-10-20 RX ORDER — ATROPINE SULFATE 0.1 MG/ML
0.5 INJECTION INTRAVENOUS
Status: CANCELLED | OUTPATIENT
Start: 2020-10-20 | End: 2020-10-21

## 2020-10-20 RX ORDER — SODIUM CHLORIDE 0.9 % (FLUSH) 0.9 %
5-40 SYRINGE (ML) INJECTION EVERY 8 HOURS
Status: CANCELLED | OUTPATIENT
Start: 2020-10-20

## 2020-10-20 RX ADMIN — SEVELAMER CARBONATE 1600 MG: 800 TABLET, FILM COATED ORAL at 17:06

## 2020-10-20 RX ADMIN — ALBUTEROL SULFATE 2.5 MG: 2.5 SOLUTION RESPIRATORY (INHALATION) at 00:00

## 2020-10-20 RX ADMIN — FERROUS SULFATE TAB 325 MG (65 MG ELEMENTAL FE) 325 MG: 325 (65 FE) TAB at 17:06

## 2020-10-20 RX ADMIN — SODIUM CHLORIDE 40 MG: 9 INJECTION, SOLUTION INTRAMUSCULAR; INTRAVENOUS; SUBCUTANEOUS at 10:18

## 2020-10-20 RX ADMIN — OXYCODONE 5 MG: 5 TABLET ORAL at 04:49

## 2020-10-20 RX ADMIN — SODIUM CHLORIDE 40 MG: 9 INJECTION, SOLUTION INTRAMUSCULAR; INTRAVENOUS; SUBCUTANEOUS at 20:26

## 2020-10-20 RX ADMIN — OXYCODONE 5 MG: 5 TABLET ORAL at 23:22

## 2020-10-20 RX ADMIN — ALBUMIN (HUMAN) 12.5 G: 0.25 INJECTION, SOLUTION INTRAVENOUS at 13:56

## 2020-10-20 RX ADMIN — OXYCODONE 5 MG: 5 TABLET ORAL at 17:06

## 2020-10-20 RX ADMIN — EPOETIN ALFA-EPBX 20000 UNITS: 10000 INJECTION, SOLUTION INTRAVENOUS; SUBCUTANEOUS at 20:24

## 2020-10-20 RX ADMIN — OXYCODONE 5 MG: 5 TABLET ORAL at 10:51

## 2020-10-20 NOTE — PROCEDURES
[facility]  [phone]    Vitals   Pre   Post   Assessment   Pre   Post     Temp  Temp: 97.9 °F (36.6 °C) (10/20/20 1200)  98.1 LOC  Alert and oriented Easily awakens to voice    HR   Pulse (Heart Rate): 73 (10/20/20 1200) 83 Lungs   Clear to auscultation, even and unlabored   unlabored, even on room air 100% saturation   B/P   BP: 136/78 (10/20/20 1200) 131/63 Cardiac   RRR remote telemetry   RRR    Resp   Resp Rate: 18 (10/20/20 1200) 18 Skin   Warm and dry, diminished integrity lower extremities   warm and dry    Pain level  Pain Intensity 1: 10 (10/20/20 1052) No verbal complaints at this time Edema    Abdominal ascites, 1+ BLE   Abdominal and BLE   Orders:    Duration:   Start:    1200 End:    1535 Total:   3.5   Dialyzer:   Dialyzer/Set Up Inspection: Revaclear (10/20/20 1200)   K Bath:   Dialysate K (mEq/L): 2 (10/20/20 1200)   Ca Bath:   Dialysate CA (mEq/L): 2.5 (10/20/20 1200)   Na/Bicarb:   Dialysate NA (mEq/L): 140 (10/20/20 1200)   Target Fluid Removal:   Goal/Amount of Fluid to Remove (mL): 2500 mL (10/20/20 1200)   Access     Type & Location:   Right tunneled cvc, site without redness or drainage, dressing dated 10/20/20, Each catheter limb disinfected per p&p, caps removed, hubs disinfected per p&p.        Labs     Obtained/Reviewed   Critical Results Called   Date when labs were drawn-  Hgb-    HGB   Date Value Ref Range Status   10/20/2020 8.2 (L) 11.5 - 16.0 g/dL Final     K-    Potassium   Date Value Ref Range Status   10/20/2020 5.8 (H) 3.5 - 5.1 mmol/L Final     Ca-   Calcium   Date Value Ref Range Status   10/20/2020 8.8 8.5 - 10.1 MG/DL Final     Bun-   BUN   Date Value Ref Range Status   10/20/2020 74 (H) 6 - 20 MG/DL Final     Creat-   Creatinine   Date Value Ref Range Status   10/20/2020 7.69 (H) 0.55 - 1.02 MG/DL Final        Medications/ Blood Products Given     Name   Dose   Route and Time     Albumin 25% 12.5 GRAMS Iv drip              Blood Volume Processed (BVP):    71 Net Fluid   Removed:  1000   Comments   Time Out Done: 6041  Primary Nurse Rpt Pre: Susanna Hare RN   Primary Nurse Rpt Post: Susanna Hare RN   Pt Education: informed consent for hemodialysis   Care Plan: continue current HD plan of care   Tx Summary:  1200 HD initiated as ordered. 1315 patient cramping both lower extremities discussed with Dr. Mildred Drake UF as tolerated but attempt to get one kiologram.  1330 continues to complain of cramping, continues in minimum given one cup ice patient request.  1350:  Blood pressure 115/54 continues in minimum patient states \"I feel weak\", discussed with Dr. Mildred Drake, albumin ordered. 1400 albumin initiated, blood pressure 121/58 resting with eyes closed. 1415:  Resting with eyes closed, UF resumed for loss of 1 kg.  1535 treatment completed all possible blood returned Each dialysis catheter limb disinfected per p&p, blood returned per p&p, each dialysis hub disinfected per p&p, post dialysis catheter dwell instilled per order, and caps applied. Dwell with normal saline heparin listed as allergy. SBAR to primary nurse. All dialysis related medications have been reviewed. Admiting Diagnosis: anemia/bleeding  Pt's previous [facility]- [facility]  Consent signed - Informed Consent Verified: Yes (10/20/20 1200)  Hepatitis Status- physician portal negative antigen 01/16/20 immune 08/11/20  Machine #- Machine Number: [id number] (10/20/20 1200)  Telemetry status- monitored remotely   Pre-dialysis wt. -  n/a

## 2020-10-20 NOTE — PROGRESS NOTES
Primary Nurse Alessandro Strickland RN and Tana Dixon RN performed a dual skin assessment on this patient Impairment noted- see wound doc flow sheet  Vic score is 19  Patient has lesions on both lower extremities and a wound with a mepilex on RLE.

## 2020-10-20 NOTE — PROGRESS NOTES
DIANA:  RUR: 22%  TRANSITIONS OF CARE PLAN:   1. DESTINATION: Home with home health vs home with family support  2. TRANSPORT: friends, Jenney Pretty or Sophronia Sleet or pt's two daughters, [phone] or Tez Reeves [phone]  3. ADDITIONAL SUPPORT: friends, Jenney Pretty or Sophronia Sleet  4. DME:  Glucometer, rollator, home 02 @ 2L/NC prn  5. HOME HEALTH: TBD, pt had prior HH one year ago; does not recall name of provider  6. CODE STATUS/AMD STATUS: Full code; no amd on file  7. FOLLOW UP APPOINTMENTS: TBD  8. STILL NEEDS: medical clearance, hemodialysis, gi recommendations  Reason for Admission:   Anemia, ESRD                  RUR Score:   22%               PCP: First and Last name:  Dr. Alla Mon)   Name of Practice:    Are you a current patient: Yes/No: YEs   Approximate date of last visit: 2 weeks ago   Can you participate in a virtual visit if needed: YES    Do you (patient/family) have any concerns for transition/discharge? No, pt stated she hopes to feel better. Plan for utilizing home health:  Home with Home health vs home with family support    Current Advanced Directive/Advance Care Plan:  No amd on file            Transition of Care Plan:       1600-CM reviewed pt chart & met with pt at bedside. Pt stated that she resides with her dtr & two grandchildren in a one story home with 2 steps to entrance. Pt reported she is independent with ads, reported dme of: glucometer, home o2 of 2L/NC, rollator. [facility]. Pt stated she she PCP, Dr. Carmen Laws of [place] & declined to transfer to [facility] at this time stating she had been at various hospitals. Dtrs(Deisi or Souleymane) to provide transport at d/c. Pt received Hemodialysis servi es at [facility] with Tues/Thurs/Sat. Schedule with chair time that varies but is usually either 7am or 9am. Family & friends provide transport to HD appts. CM to follow for transitions of care.     Care Management Interventions  PCP Verified by CM: Yes(Pt stated that she last saw Dr. Apoorva Lyons at [place] two weeks ago.)  Mode of Transport at Discharge:  Other (see comment)(family(dtrs) or friends)  Transition of Care Consult (CM Consult): Discharge Planning  Health Maintenance Reviewed: Yes(CM met with pt at bedside, pt is AO x 4.)  Current Support Network: Own Home(pt resides with dtr & 2 sons)  [address] Provided?: Yes([facility] as she stated she has benefit, pt declined.)  Discharge Location  Discharge Placement: Home with home health  Readmission Assessment  Who is being interviewed?: Patient  What was the patient's/caregiver's perception as to why they think they needed to return back to the hospital?: Other (Comment)(pt stated she was weak.)  Did you visit your Primary Care Physician after you left the hospital, before you returned this time?: No  Why weren't you able to visit your PCP?: Other (Comment)(pt stated she did attend recent appt.)  Did you see a specialist, such as Cardiac, Pulmonary, Orthopedic Physician, etc. after you left the hospital?: No  Who advised the patient to return to the hospital?: Self-referral  Does the patient report anything that got in the way of taking their medications?: No  In our efforts to provide the best possible care to you and others like you, can you think of anything that we could have done to help you after you left the hospital the first time, so that you might not have needed to return so soon?: Additional Community resources available for illness support   Rebecca Love RN BSN CCM  CRM

## 2020-10-20 NOTE — PROGRESS NOTES
[facility]   [address], [address], [facility]  Phone: [phone]   [phone]       Nephrology Progress Note  Nadine Fenton     [birth year]     [id number]  Date of Admission : 10/18/2020  10/20/20    CC: Follow up for ESRD     Assessment and Plan   ESRD- HD  - Dialyzes TTS at [address] dialysis in [place], [place]  - followed by Dr Nasima West   - melodie Good for access  - HD today per schedule   - continue Midodrine      Cirrhosis of Liver : ? Cause   - consider Paracentesis and cellcount   - consider Echo     Anemia in CKD  Blood loss anemia :  - continue epogen   - w/u per primary team      SOB/ SARKAR  - COVID -19 neg     Mild HyperK   - HD today   - labs pending      Chronic HFrEF   - consider Echo      Care Plan discussed with: Dr Abhishek Webster        Interval History:  Seen and examined   Reports B/L subcostal chest pains   Abdomen remains distended but not tense   No N/V/    Review of Systems: A comprehensive review of systems was negative.     Current Medications:   Current Facility-Administered Medications   Medication Dose Route Frequency    ascorbic acid (vitamin C) (VITAMIN C) tablet 500 mg  500 mg Oral DAILY    cholecalciferol (VITAMIN D3) (1000 Units /25 mcg) tablet 2 Tab  2,000 Units Oral DAILY    ferrous sulfate tablet 325 mg  325 mg Oral TID WITH MEALS    lactulose (CHRONULAC) 10 gram/15 mL solution 30 mL  20 g Oral BID    sevelamer carbonate (RENVELA) tab 1,600 mg  1,600 mg Oral TID WITH MEALS    0.9% sodium chloride infusion 250 mL  250 mL IntraVENous PRN    epoetin reshma-epbx (RETACRIT) injection 20,000 Units  20,000 Units SubCUTAneous Q TUE, THU & SAT    oxyCODONE IR (ROXICODONE) tablet 5 mg  5 mg Oral Q6H PRN    pantoprazole (PROTONIX) 40 mg in 0.9% sodium chloride 10 mL injection  40 mg IntraVENous Q12H    albuterol (PROVENTIL VENTOLIN) nebulizer solution 2.5 mg  2.5 mg Nebulization Q6H PRN    glucose chewable tablet 16 g  4 Tab Oral PRN    glucagon (GLUCAGEN) injection 1 mg  1 mg IntraMUSCular PRN    dextrose 10% infusion 0-250 mL  0-250 mL IntraVENous PRN    insulin lispro (HUMALOG) injection   SubCUTAneous AC&HS    0.9% sodium chloride infusion 250 mL  250 mL IntraVENous PRN      Allergies   Allergen Reactions    Aleve [Naproxen Sodium] Itching, Swelling and Other (comments)    Amlodipine Rash, Hives and Itching    Aspirin Other (comments), Nausea Only and Unknown (comments)     GI bleed  GI bleeding      Heparin Unknown (comments)     bleeding      Ibuprofen Nausea and Vomiting    Metformin Other (comments)     swelling       Objective:  Vitals:    Vitals:    10/20/20 0004 10/20/20 0409 10/20/20 0543 10/20/20 0833   BP: (!) 149/81 (!) 162/75  129/83   Pulse: 80 84  98   Resp: 16 16  18   Temp: 98.3 °F (36.8 °C) 97.9 °F (36.6 °C)  98 °F (36.7 °C)   SpO2: 100% 100%  98%   Weight:   70.6 kg (155 lb 10.3 oz)    Height:   5' 4\" (1.626 m)      Intake and Output:  No intake/output data recorded. 10/18 1901 - 10/20 0700  In: 688   Out: -     Physical Examination:  General:  Chronically ill looking   HEENT: PERRL, ++ Pallor , No Icterus  Neck: RIJ permacath +  Lungs : diminished at bases   CVS: RRR, S1 S2 normal, No murmur   Abdomen: distended, NT  Extremities: 1+ Edema  Skin: No rash or lesions. MS: No joint swelling, erythema, warmth  Neurologic: non focal, AAO x 3  Psych: normal affect  []    High complexity decision making was performed  []    Patient is at high-risk of decompensation with multiple organ involvement    Lab Data Personally Reviewed: I have reviewed all the pertinent labs, microbiology data and radiology studies during assessment.     Recent Labs     10/19/20  0706 10/19/20  0630 10/18/20  2348 10/18/20  0709 10/17/20  1310    134 137 138 137   K 5.3* 5.3 5.0 5.0 4.1    105 103 101 100   CO2 26 24 26 28 30   GLU 88 88 155* 139* 117*   BUN 63* 60 60* 51* 33*   CREA 6.01* 6.02 5.74* 5.18* 3.63*   CA 8.5 8.7 8.1* 8.6 8.3*   MG  --   --  2.0  --   --    ALB 2.8* 3.0 2.8*  --  2.8*   ALT 13 16 15  --  17   INR  --  1.1 1.1  --  1.1     Recent Labs     10/19/20  0706 10/18/20  2348 10/18/20  0709 10/17/20  1310   WBC 4.8 5.0 5.0 4.4   HGB 6.3* 5.2* 5.3* 5.5*   HCT 21.2* 18.5* 17.2* 17.6*    194 175 132     No results found for: SDES  Lab Results   Component Value Date/Time    Culture result: No growth 2 days 10/18/2020 07:09 AM     Recent Results (from the past 24 hour(s))   VITAMIN B12    Collection Time: 10/19/20  9:34 AM   Result Value Ref Range    Vitamin B12 942 193 - 986 pg/mL   FOLATE    Collection Time: 10/19/20  9:34 AM   Result Value Ref Range    Folate 16.4 5.0 - 21.0 ng/mL   GLUCOSE, POC    Collection Time: 10/19/20  1:19 PM   Result Value Ref Range    Glucose (POC) 110 (H) 65 - 100 mg/dL    Performed by Holli George    GLUCOSE, POC    Collection Time: 10/19/20  9:17 PM   Result Value Ref Range    Glucose (POC) 129 (H) 65 - 100 mg/dL    Performed by [facility]    GLUCOSE, POC    Collection Time: 10/20/20  6:17 AM   Result Value Ref Range    Glucose (POC) 101 (H) 65 - 100 mg/dL    Performed by [facility]            Total time spent with patient:  xxx   min. Care Plan discussed with:  Patient     Family      RN      Consulting Physician [address],         I have reviewed the flowsheets. Chart and Pertinent Notes have been reviewed. No change in PMH ,family and social history from Consult note.       Alvin Garduno MD

## 2020-10-20 NOTE — DIABETES MGMT
[address] [facility] FOR DIABETES HEALTH    FOLLOW UP NOTE    Presentation   Angelita Henriquez is a 69 y.o. female who was transferred from [facility] with symptomatic anemia. HX:PMH:  Arthritis, asthma, ESRD-HD Tu/Thurs/Sat, cirrhosis (2017), chronic pain, DM2-diet controlled, GERD, HTN, afib, anemia s/p multiple blood transfusions this year, 15-20 units total.       DX: Melena/GI Bleed, symptomatic anemia    TX:PRBC transfusion, GI consultation, plans for EGD with capsule study    Current clinical course has been complicated by low hemoglobin count with PRBC infusion. Diabetes: Patient has known diet controlled Type 2 diabetes, admitting A1C unreliable due to PRBC transfusions. Consulted by Provider for advanced diabetes nursing assessment and care, specifically related to   [] Transitioning off Shantel Nile   [x] Inpatient management strategy  [x] Home management assessment  [] Survival skill education    Diabetes-related medical history  Acute complications: None  Neurological complications: None  Microvascular disease  Nephropathy  Macrovascular disease: None  Other associated conditions     Cirrhosis/ HTN/ chronic anemia      Subjective   Fasting Glucose 74 (88 10/19)  GFR 5  Plans for EGD tomorrow      Objective   Physical exam:  General: Awake, alert oriented, conversant, cooperative    Vital Signs   Visit Vitals  BP (!) 119/58   Pulse 82   Temp 97.9 °F (36.6 °C) (Oral)   Resp 18   Ht 5' 4\" (1.626 m)   Wt 70.6 kg (155 lb 10.3 oz)   SpO2 98%   BMI 26.72 kg/m²     Skin                 Warm and dry. No acanthosis noted along neckline. No lipohypertrophy or lipoatrophy noted at injection sites   Heart               Regular rate and rhythm. No murmurs, rubs or gallops  Lungs             Clear to auscultation without rales or rhonchi  Extremities     Right lower extremity chronic wound. Various scabs on LLE.      Laboratory  Lab Results   Component Value Date/Time Hemoglobin A1c <3.8 (L) 10/18/2020 11:48 PM     No results found for: LDL, LDLC, DLDLP  Lab Results   Component Value Date/Time    Creatinine 7.69 (H) 10/20/2020 09:34 AM     Lab Results   Component Value Date/Time    Sodium 137 10/20/2020 09:34 AM    Potassium 5.8 (H) 10/20/2020 09:34 AM    Chloride 100 10/20/2020 09:34 AM    CO2 26 10/20/2020 09:34 AM    Anion gap 11 10/20/2020 09:34 AM    Glucose 73 10/20/2020 09:34 AM    BUN 74 (H) 10/20/2020 09:34 AM    Creatinine 7.69 (H) 10/20/2020 09:34 AM    BUN/Creatinine ratio 10 (L) 10/20/2020 09:34 AM    GFR est AA 6 (L) 10/20/2020 09:34 AM    GFR est non-AA 5 (L) 10/20/2020 09:34 AM    Calcium 8.8 10/20/2020 09:34 AM    Bilirubin, total 0.4 10/19/2020 07:06 AM    Alk. phosphatase 133 (H) 10/19/2020 07:06 AM    Protein, total 5.9 (L) 10/19/2020 07:06 AM    Albumin 3.0 (L) 10/20/2020 09:34 AM    Globulin 3.1 10/19/2020 07:06 AM    A-G Ratio 0.9 (L) 10/19/2020 07:06 AM    ALT (SGPT) 13 10/19/2020 07:06 AM     Lab Results   Component Value Date/Time    ALT (SGPT) 13 10/19/2020 07:06 AM         Blood glucose pattern        Assessment and Plan   Nursing Diagnosis Risk for unstable blood glucose pattern   Nursing Intervention Domain 8491 Decision-making Support   Nursing Interventions Examined current inpatient diabetes control   Explored factors facilitating and impeding inpatient management  Identified self-management practices impeding diabetes control  Explored corrective strategies with patient and responsible inpatient provider   Informed patient of rational for insulin strategy while hospitalized     Evaluation   Ms. Fenton, with diet contnrolled Type 2 diabetes, has  achieved inpatient blood glucose target of 100-180mg/dl. Current A1C not accurate due to recent blood transfusions. She has had several admissions recently with symptomatic anemia necessitating blood transfusions. BG trends since admission within target without lows.   Given her ESRD, and cirrhosis, the insulin clearance will be prolonged, so high sensitivity dosing  with Humalog recommended. Inpatient blood glucose management has been impacted by  [x] Kidney dysfunction-ESRD- HD   [x] Erratic meal consumption  [] Glucocorticoid use  Recommendations   1. CONTINUE High Sensitivity Corrective insulin starting at .      2. Diet modified to include carb consistent options. 3. Accuchecks AC/HS         Diabetes Management Team to sign off at this point as patient's blood glucose remains stable. Please re-consult us if patient needs change. Thank you for including us in their care. Billing Code(s)   I personally reviewed chart, notes, data and current medications in the medical record, and examined the patient at bedside before making care recommendations. Thank you for including us in their care. I spent 15 minutes in direct patient care today for this patient.   Time includes chart review, face to face with patient and collaboration with interdisciplinary care team.       KALA Clemente  Program for Diabetes Health  Access via [address] [place] [address]  [phone]

## 2020-10-20 NOTE — PROGRESS NOTES
Maryjo Elizondo [address]  [address] [facility] [address], [address]  [phone]                GI PROGRESS NOTE      NAME:   Cheryl Ledezma   :    1950   MRN:    [id number]     Assessment/Plan   Melena/Anemia/ GI Bleed/GERD  -Hemoglobin 8.3 today, received 2 units of PRBCs on yesterday.  Continue to monitor H & H  -Continue to monitor H&H, transfuse if hemoglobin less then 7.0  -BID PPI with Protonix  -Plans for EGD and capsule study for tomorrow 10/21/2020 for evaluation for possible bleeding ulcer, AVMs's, etc.  -GI lite diet for now, NPO after mignight   -Will attempt to retrieve records from [facility] of recent GI follow up      Liver disease/Cirhosis/Ascites:  -Receives paracentesis  -Patient stated follow up at [facility] consulted   -LFT's ALT 13, AST 13, Alk phos 133, total bilirubin 0.4 on 10/19/2020     ESRD, diabetes, COVID 19 negative, hypertension, chronic pain, diabetes  -management per hospitalist   -nephrology following   -patient to receive dialysis Tuesday, Thursday, Saturday     Will discuss with Dr. 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  female Patient to have dialysis today. Patient denies nausea, no vomiting,no abdominal pain. No bowel movements overnight    Objective:     VITALS:   Last 24hrs VS reviewed since prior hospitalist progress note. Most recent are:  Visit Vitals  /63   Pulse 82   Temp 97.9 °F (36.6 °C) (Oral)   Resp 18   Ht 5' 4\" (1.626 m)   Wt 70.6 kg (155 lb 10.3 oz)   SpO2 98%   BMI 26.72 kg/m²       Intake/Output Summary (Last 24 hours) at 10/20/2020 1314  Last data filed at 10/19/2020 1926  Gross per 24 hour   Intake 374.2 ml   Output --   Net 374.2 ml        PHYSICAL EXAM:    General:          WD, WN. Alert, cooperative, no acute distress    HEENT:           NC, Atraumatic. PERRLA, EOMI. Anicteric sclerae. Lungs:             CTA Bilaterally. No Wheezing/Rhonchi/Rales. Heart:              Regular  rhythm,  No murmur   Abdomen:        Soft, distended, generalized tenderness with deep palpation. Bowel sounds present, no palpable masses   Extremities:     No edema   Neurologic:      CN 2-12 gi, Alert and oriented X 3. No acute neurological distress   Psych:             Good insight. Not anxious nor agitated.      Lab Data   Recent Results (from the past 12 hour(s))   GLUCOSE, POC    Collection Time: 10/20/20  6:17 AM   Result Value Ref Range    Glucose (POC) 101 (H) 65 - 100 mg/dL    Performed by Andrea Huang    CBC WITH AUTOMATED DIFF    Collection Time: 10/20/20  9:34 AM   Result Value Ref Range    WBC 5.0 3.6 - 11.0 K/uL    RBC 2.99 (L) 3.80 - 5.20 M/uL    HGB 8.3 (L) 11.5 - 16.0 g/dL    HCT 27.4 (L) 35.0 - 47.0 %    MCV 91.6 80.0 - 99.0 FL    MCH 27.8 26.0 - 34.0 PG    MCHC 30.3 30.0 - 36.5 g/dL    RDW 18.5 (H) 11.5 - 14.5 %    PLATELET 554 745 - 775 K/uL    MPV 10.7 8.9 - 12.9 FL    NRBC 0.0 0  WBC    ABSOLUTE NRBC 0.00 0.00 - 0.01 K/uL    NEUTROPHILS 69 32 - 75 %    LYMPHOCYTES 14 12 - 49 %    MONOCYTES 11 5 - 13 %    EOSINOPHILS 4 0 - 7 %    BASOPHILS 1 0 - 1 %    IMMATURE GRANULOCYTES 1 (H) 0.0 - 0.5 %    ABS. NEUTROPHILS 3.3 1.8 - 8.0 K/UL    ABS. LYMPHOCYTES 0.7 (L) 0.8 - 3.5 K/UL    ABS. MONOCYTES 0.6 0.0 - 1.0 K/UL    ABS. EOSINOPHILS 0.2 0.0 - 0.4 K/UL    ABS. BASOPHILS 0.1 0.0 - 0.1 K/UL    ABS. IMM.  GRANS. 0.1 (H) 0.00 - 0.04 K/UL    DF SMEAR SCANNED      RBC COMMENTS OVALOCYTES  PRESENT        RBC COMMENTS ANISOCYTOSIS  1+        RBC COMMENTS RAINE CELLS  PRESENT        RBC COMMENTS POLYCHROMASIA  1+       RENAL FUNCTION PANEL    Collection Time: 10/20/20  9:34 AM   Result Value Ref Range    Sodium 137 136 - 145 mmol/L    Potassium 5.8 (H) 3.5 - 5.1 mmol/L    Chloride 100 97 - 108 mmol/L    CO2 26 21 - 32 mmol/L    Anion gap 11 5 - 15 mmol/L    Glucose 73 65 - 100 mg/dL    BUN 74 (H) 6 - 20 MG/DL    Creatinine 7.69 (H) 0.55 - 1.02 MG/DL    BUN/Creatinine ratio 10 (L) 12 - 20      GFR est AA 6 (L) >60 ml/min/1.73m2    GFR est non-AA 5 (L) >60 ml/min/1.73m2    Calcium 8.8 8.5 - 10.1 MG/DL    Phosphorus 6.1 (H) 2.6 - 4.7 MG/DL    Albumin 3.0 (L) 3.5 - 5.0 g/dL   HEMOGLOBIN    Collection Time: 10/20/20  9:35 AM   Result Value Ref Range    HGB 8.2 (L) 11.5 - 16.0 g/dL         Medications: Reviewed  Heather Otto NP

## 2020-10-20 NOTE — PROGRESS NOTES
Pt with chronic anemia. 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 She reports recent egd/colon/capsule at the [place] was unremarkable, though we do not have these records. She gives a history of melena intermittently over past month.  High suspicion for AVMs?, plan for egd and capsule tomorrow if can coordinate with HD.

## 2020-10-21 ENCOUNTER — ANESTHESIA (OUTPATIENT)
Dept: ENDOSCOPY | Age: 70
DRG: 377 | End: 2020-10-21
Payer: MEDICARE

## 2020-10-21 ENCOUNTER — APPOINTMENT (OUTPATIENT)
Dept: ULTRASOUND IMAGING | Age: 70
DRG: 377 | End: 2020-10-21
Attending: HOSPITALIST
Payer: MEDICARE

## 2020-10-21 ENCOUNTER — ANESTHESIA EVENT (OUTPATIENT)
Dept: ENDOSCOPY | Age: 70
DRG: 377 | End: 2020-10-21
Payer: MEDICARE

## 2020-10-21 LAB
ALBUMIN SERPL-MCNC: 2.7 G/DL (ref 3.5–5)
ANION GAP SERPL CALC-SCNC: 9 MMOL/L (ref 5–15)
BASOPHILS # BLD: 0 K/UL (ref 0–0.1)
BASOPHILS NFR BLD: 1 % (ref 0–1)
BUN SERPL-MCNC: 32 MG/DL (ref 6–20)
BUN/CREAT SERPL: 7 (ref 12–20)
CALCIUM SERPL-MCNC: 8.4 MG/DL (ref 8.5–10.1)
CHLORIDE SERPL-SCNC: 104 MMOL/L (ref 97–108)
CO2 SERPL-SCNC: 27 MMOL/L (ref 21–32)
CREAT SERPL-MCNC: 4.43 MG/DL (ref 0.55–1.02)
DIFFERENTIAL METHOD BLD: ABNORMAL
EOSINOPHIL # BLD: 0.1 K/UL (ref 0–0.4)
EOSINOPHIL NFR BLD: 3 % (ref 0–7)
ERYTHROCYTE [DISTWIDTH] IN BLOOD BY AUTOMATED COUNT: 18.5 % (ref 11.5–14.5)
GLUCOSE BLD STRIP.AUTO-MCNC: 114 MG/DL (ref 65–100)
GLUCOSE BLD STRIP.AUTO-MCNC: 196 MG/DL (ref 65–100)
GLUCOSE BLD STRIP.AUTO-MCNC: 82 MG/DL (ref 65–100)
GLUCOSE BLD STRIP.AUTO-MCNC: 97 MG/DL (ref 65–100)
GLUCOSE SERPL-MCNC: 97 MG/DL (ref 65–100)
HCT VFR BLD AUTO: 23.1 % (ref 35–47)
HCT VFR BLD AUTO: 24.7 % (ref 35–47)
HGB BLD-MCNC: 6.9 G/DL (ref 11.5–16)
HGB BLD-MCNC: 7.3 G/DL (ref 11.5–16)
IMM GRANULOCYTES # BLD AUTO: 0 K/UL (ref 0–0.04)
IMM GRANULOCYTES NFR BLD AUTO: 0 % (ref 0–0.5)
IRON SATN MFR SERPL: 30 % (ref 20–50)
IRON SERPL-MCNC: 97 UG/DL (ref 35–150)
LYMPHOCYTES # BLD: 0.4 K/UL (ref 0.8–3.5)
LYMPHOCYTES NFR BLD: 9 % (ref 12–49)
MCH RBC QN AUTO: 27.7 PG (ref 26–34)
MCHC RBC AUTO-ENTMCNC: 29.9 G/DL (ref 30–36.5)
MCV RBC AUTO: 92.8 FL (ref 80–99)
MONOCYTES # BLD: 0.5 K/UL (ref 0–1)
MONOCYTES NFR BLD: 12 % (ref 5–13)
NEUTS SEG # BLD: 3.1 K/UL (ref 1.8–8)
NEUTS SEG NFR BLD: 75 % (ref 32–75)
NRBC # BLD: 0 K/UL (ref 0–0.01)
NRBC BLD-RTO: 0 PER 100 WBC
PHOSPHATE SERPL-MCNC: 4.5 MG/DL (ref 2.6–4.7)
PLATELET # BLD AUTO: 191 K/UL (ref 150–400)
PMV BLD AUTO: 10.5 FL (ref 8.9–12.9)
POTASSIUM SERPL-SCNC: 4.4 MMOL/L (ref 3.5–5.1)
RBC # BLD AUTO: 2.49 M/UL (ref 3.8–5.2)
RBC MORPH BLD: ABNORMAL
SERVICE CMNT-IMP: ABNORMAL
SERVICE CMNT-IMP: ABNORMAL
SERVICE CMNT-IMP: NORMAL
SERVICE CMNT-IMP: NORMAL
SODIUM SERPL-SCNC: 140 MMOL/L (ref 136–145)
TIBC SERPL-MCNC: 321 UG/DL (ref 250–450)
WBC # BLD AUTO: 4.1 K/UL (ref 3.6–11)

## 2020-10-21 PROCEDURE — 36415 COLL VENOUS BLD VENIPUNCTURE: CPT

## 2020-10-21 PROCEDURE — 76040000007: Performed by: INTERNAL MEDICINE

## 2020-10-21 PROCEDURE — 74011250636 HC RX REV CODE- 250/636: Performed by: STUDENT IN AN ORGANIZED HEALTH CARE EDUCATION/TRAINING PROGRAM

## 2020-10-21 PROCEDURE — 76060000032 HC ANESTHESIA 0.5 TO 1 HR: Performed by: INTERNAL MEDICINE

## 2020-10-21 PROCEDURE — 94664 DEMO&/EVAL PT USE INHALER: CPT

## 2020-10-21 PROCEDURE — 77030022875 HC PRB AR PLSM COAG ERBE -C: Performed by: INTERNAL MEDICINE

## 2020-10-21 PROCEDURE — 77030036656: Performed by: INTERNAL MEDICINE

## 2020-10-21 PROCEDURE — 77010033678 HC OXYGEN DAILY

## 2020-10-21 PROCEDURE — 74011000250 HC RX REV CODE- 250: Performed by: STUDENT IN AN ORGANIZED HEALTH CARE EDUCATION/TRAINING PROGRAM

## 2020-10-21 PROCEDURE — 80069 RENAL FUNCTION PANEL: CPT

## 2020-10-21 PROCEDURE — 77030018766 HC KT ENDOSC IMAG GVIM -F: Performed by: INTERNAL MEDICINE

## 2020-10-21 PROCEDURE — 82962 GLUCOSE BLOOD TEST: CPT

## 2020-10-21 PROCEDURE — 74011250637 HC RX REV CODE- 250/637: Performed by: FAMILY MEDICINE

## 2020-10-21 PROCEDURE — 2709999900 HC NON-CHARGEABLE SUPPLY

## 2020-10-21 PROCEDURE — 74011000250 HC RX REV CODE- 250: Performed by: NURSE PRACTITIONER

## 2020-10-21 PROCEDURE — 85018 HEMOGLOBIN: CPT

## 2020-10-21 PROCEDURE — 74011250637 HC RX REV CODE- 250/637: Performed by: HOSPITALIST

## 2020-10-21 PROCEDURE — 65660000000 HC RM CCU STEPDOWN

## 2020-10-21 PROCEDURE — 74011250636 HC RX REV CODE- 250/636: Performed by: NURSE PRACTITIONER

## 2020-10-21 PROCEDURE — C9113 INJ PANTOPRAZOLE SODIUM, VIA: HCPCS | Performed by: NURSE PRACTITIONER

## 2020-10-21 PROCEDURE — 85025 COMPLETE CBC W/AUTO DIFF WBC: CPT

## 2020-10-21 PROCEDURE — 0DJ07ZZ INSPECTION OF UPPER INTESTINAL TRACT, VIA NATURAL OR ARTIFICIAL OPENING: ICD-10-PCS | Performed by: INTERNAL MEDICINE

## 2020-10-21 PROCEDURE — 76705 ECHO EXAM OF ABDOMEN: CPT

## 2020-10-21 PROCEDURE — 2709999900 HC NON-CHARGEABLE SUPPLY: Performed by: INTERNAL MEDICINE

## 2020-10-21 PROCEDURE — 94640 AIRWAY INHALATION TREATMENT: CPT

## 2020-10-21 PROCEDURE — 74011000250 HC RX REV CODE- 250: Performed by: HOSPITALIST

## 2020-10-21 RX ORDER — PHENYLEPHRINE HCL IN 0.9% NACL 0.4MG/10ML
SYRINGE (ML) INTRAVENOUS AS NEEDED
Status: DISCONTINUED | OUTPATIENT
Start: 2020-10-21 | End: 2020-10-21 | Stop reason: HOSPADM

## 2020-10-21 RX ORDER — SODIUM CHLORIDE 9 MG/ML
INJECTION, SOLUTION INTRAVENOUS
Status: DISCONTINUED | OUTPATIENT
Start: 2020-10-21 | End: 2020-10-21 | Stop reason: HOSPADM

## 2020-10-21 RX ORDER — LIDOCAINE HYDROCHLORIDE 20 MG/ML
INJECTION, SOLUTION EPIDURAL; INFILTRATION; INTRACAUDAL; PERINEURAL AS NEEDED
Status: DISCONTINUED | OUTPATIENT
Start: 2020-10-21 | End: 2020-10-21 | Stop reason: HOSPADM

## 2020-10-21 RX ORDER — PROPOFOL 10 MG/ML
INJECTION, EMULSION INTRAVENOUS AS NEEDED
Status: DISCONTINUED | OUTPATIENT
Start: 2020-10-21 | End: 2020-10-21 | Stop reason: HOSPADM

## 2020-10-21 RX ADMIN — OXYCODONE 5 MG: 5 TABLET ORAL at 12:34

## 2020-10-21 RX ADMIN — SODIUM CHLORIDE 40 MG: 9 INJECTION, SOLUTION INTRAMUSCULAR; INTRAVENOUS; SUBCUTANEOUS at 21:33

## 2020-10-21 RX ADMIN — PROPOFOL 20 MG: 10 INJECTION, EMULSION INTRAVENOUS at 07:49

## 2020-10-21 RX ADMIN — PROPOFOL 20 MG: 10 INJECTION, EMULSION INTRAVENOUS at 07:32

## 2020-10-21 RX ADMIN — SEVELAMER CARBONATE 1600 MG: 800 TABLET, FILM COATED ORAL at 12:34

## 2020-10-21 RX ADMIN — OXYCODONE 5 MG: 5 TABLET ORAL at 06:13

## 2020-10-21 RX ADMIN — ALBUTEROL SULFATE 2.5 MG: 2.5 SOLUTION RESPIRATORY (INHALATION) at 01:02

## 2020-10-21 RX ADMIN — LIDOCAINE HYDROCHLORIDE 40 MG: 20 INJECTION, SOLUTION EPIDURAL; INFILTRATION; INTRACAUDAL; PERINEURAL at 07:22

## 2020-10-21 RX ADMIN — PROPOFOL 20 MG: 10 INJECTION, EMULSION INTRAVENOUS at 07:51

## 2020-10-21 RX ADMIN — PROPOFOL 20 MG: 10 INJECTION, EMULSION INTRAVENOUS at 07:40

## 2020-10-21 RX ADMIN — OXYCODONE HYDROCHLORIDE AND ACETAMINOPHEN 500 MG: 500 TABLET ORAL at 12:34

## 2020-10-21 RX ADMIN — PROPOFOL 20 MG: 10 INJECTION, EMULSION INTRAVENOUS at 07:34

## 2020-10-21 RX ADMIN — PROPOFOL 20 MG: 10 INJECTION, EMULSION INTRAVENOUS at 07:31

## 2020-10-21 RX ADMIN — Medication 2 TABLET: at 12:34

## 2020-10-21 RX ADMIN — SODIUM CHLORIDE: 900 INJECTION, SOLUTION INTRAVENOUS at 07:20

## 2020-10-21 RX ADMIN — ALBUTEROL SULFATE 2.5 MG: 2.5 SOLUTION RESPIRATORY (INHALATION) at 16:24

## 2020-10-21 RX ADMIN — FERROUS SULFATE TAB 325 MG (65 MG ELEMENTAL FE) 325 MG: 325 (65 FE) TAB at 12:34

## 2020-10-21 RX ADMIN — PROPOFOL 20 MG: 10 INJECTION, EMULSION INTRAVENOUS at 07:37

## 2020-10-21 RX ADMIN — SEVELAMER CARBONATE 1600 MG: 800 TABLET, FILM COATED ORAL at 17:16

## 2020-10-21 RX ADMIN — OXYCODONE 5 MG: 5 TABLET ORAL at 18:47

## 2020-10-21 RX ADMIN — LACTULOSE 30 ML: 20 SOLUTION ORAL at 12:34

## 2020-10-21 RX ADMIN — SODIUM CHLORIDE 40 MG: 9 INJECTION, SOLUTION INTRAMUSCULAR; INTRAVENOUS; SUBCUTANEOUS at 12:35

## 2020-10-21 RX ADMIN — PROPOFOL 20 MG: 10 INJECTION, EMULSION INTRAVENOUS at 07:58

## 2020-10-21 RX ADMIN — PHENYLEPHRINE HYDROCHLORIDE 120 MCG: 10 INJECTION INTRAVENOUS at 07:50

## 2020-10-21 RX ADMIN — PROPOFOL 20 MG: 10 INJECTION, EMULSION INTRAVENOUS at 07:46

## 2020-10-21 RX ADMIN — LACTULOSE 30 ML: 20 SOLUTION ORAL at 17:16

## 2020-10-21 RX ADMIN — PROPOFOL 20 MG: 10 INJECTION, EMULSION INTRAVENOUS at 07:43

## 2020-10-21 RX ADMIN — PROPOFOL 20 MG: 10 INJECTION, EMULSION INTRAVENOUS at 07:26

## 2020-10-21 RX ADMIN — FERROUS SULFATE TAB 325 MG (65 MG ELEMENTAL FE) 325 MG: 325 (65 FE) TAB at 17:16

## 2020-10-21 NOTE — PROGRESS NOTES
Physician Progress Note      Mady Davies  [facility] #:                  [id number]  :                       1950  ADMIT DATE:       10/18/2020 7:13 PM  [address] [place] DATE:  RESPONDING  PROVIDER #:        Eloy Hernandez MD          QUERY TEXT:    Dear Dr. Deisi Broussard,    Pt admitted with symptomatic anemia and has GIB documented by Gastroenterologist. If possible, please document in progress notes and discharge summary further specificity regarding the acuity and type of anemia:    The medical record reflects the following:  Risk Factors: anemia, ESRD, liver disease  Clinical Indicators: HGB: 5.3 on admission  -  H&P- Symptomatic anemia  - 10/20 GI- Melena/Anemia/ GI Bleed/GERD  - 10/21 EGD- Gastritis without bleeding, unspecified chronicity, unspecified gastritis type  - 10/21 PN- Symptomatic anemia ? Blood loss from GI tract  Treatment: GI consult, PRBC transfusion, Protonix, GI lite diet, monitoring      Thank you,    Darell Wisdom RN  [facility]  [phone]  Options provided:  -- Anemia due to acute blood loss  -- Anemia due to chronic blood loss  -- Anemia due to iron deficiency  -- Other - I will add my own diagnosis  -- Disagree - Not applicable / Not valid  -- Disagree - Clinically unable to determine / Unknown  -- Refer to Clinical Documentation Reviewer    PROVIDER RESPONSE TEXT:    This patient has acute blood loss anemia.     Query created by: Nick Sloan on 10/21/2020 12:57 PM      Electronically signed by:  Eloy Hernandez MD 10/21/2020 3:11 PM

## 2020-10-21 NOTE — ANESTHESIA POSTPROCEDURE EVALUATION
Post-Anesthesia Evaluation and Assessment    Patient: Tasha Churchill MRN: [id number]  SSN: [id number]    YOB: 1950  Age: 69 y.o. Sex: female      I have evaluated the patient and they are stable and ready for discharge from the PACU. Cardiovascular Function/Vital Signs  Visit Vitals  BP (!) 105/51   Pulse 95   Temp 36.5 °C (97.7 °F)   Resp 18   Ht 5' 4\" (1.626 m)   Wt 74.1 kg (163 lb 4.8 oz)   SpO2 97%   BMI 28.03 kg/m²       Patient is status post MAC anesthesia for Procedure(s):  ESOPHAGOGASTRODUODENOSCOPY (EGD)  CAPSULE  ENDOSCOPIC ARGON PLASMA COAGULATION. Nausea/Vomiting: None    Postoperative hydration reviewed and adequate. Pain:  Pain Scale 1: Numeric (0 - 10) (10/21/20 0825)  Pain Intensity 1: 0 (10/21/20 0825)   Managed    Neurological Status: At baseline    Mental Status, Level of Consciousness: Alert and  oriented to person, place, and time    Pulmonary Status:   O2 Device: Room air (10/21/20 0825)   Adequate oxygenation and airway patent    Complications related to anesthesia: None    Post-anesthesia assessment completed. No concerns    Signed By: Jayy Lara MD     October 21, 2020              Procedure(s):  ESOPHAGOGASTRODUODENOSCOPY (EGD)  CAPSULE  ENDOSCOPIC ARGON PLASMA COAGULATION. MAC    <[email or web address]>    INITIAL Post-op Vital signs:   Vitals Value Taken Time   BP 98/48 10/21/2020  8:35 AM   Temp 36.5 °C (97.7 °F) 10/21/2020  8:25 AM   Pulse 94 10/21/2020  8:35 AM   Resp 0 10/21/2020  8:38 AM   SpO2 99 % 10/21/2020  8:35 AM   Vitals shown include unvalidated device data.

## 2020-10-21 NOTE — H&P
[address]  [address] [address] [address], [address]   [phone]                                History and Physical     NAME: Nino Hutchinson   :  1950   MRN:  [id number]     HPI:  The patient was seen and examined.     Past Surgical History:   Procedure Laterality Date    COLONOSCOPY N/A 2018    COLONOSCOPY performed by Marylen Ax, MD at [facility] ENDOSCOPY    COLONOSCOPY N/A 3/19/2018    COLONOSCOPY performed by Marylen Ax, MD at [address] [facility] GYN  c section    HX VASCULAR ACCESS      dialysis      Past Medical History:   Diagnosis Date    Arthritis     Asthma     Chronic kidney disease     Chronic pain     Cirrhosis ([facility] 2017    Diabetes ([facility] diet controlled    GERD (gastroesophageal reflux disease)     Hypertension     Paroxysmal atrial fibrillation ([facility] 10/6/2020     Social History     Tobacco Use    Smoking status: Never Smoker    Smokeless tobacco: Never Used   Substance Use Topics    Alcohol use: No    Drug use: No     Allergies   Allergen Reactions    Aleve [Naproxen Sodium] Itching, Swelling and Other (comments)    Amlodipine Rash, Hives and Itching    Aspirin Other (comments), Nausea Only and Unknown (comments)     GI bleed  GI bleeding      Heparin Unknown (comments)     bleeding      Ibuprofen Nausea and Vomiting    Metformin Other (comments)     swelling     Family History   Family history unknown: Yes     Current Facility-Administered Medications   Medication Dose Route Frequency    albumin human 25% (BUMINATE) solution 12.5 g  12.5 g IntraVENous DIALYSIS PRN    ascorbic acid (vitamin C) (VITAMIN C) tablet 500 mg  500 mg Oral DAILY    cholecalciferol (VITAMIN D3) (1000 Units /25 mcg) tablet 2 Tab  2,000 Units Oral DAILY    ferrous sulfate tablet 325 mg  325 mg Oral TID WITH MEALS    lactulose (CHRONULAC) 10 gram/15 mL solution 30 mL  20 g Oral BID    sevelamer carbonate (RENVELA) tab 1,600 mg  1,600 mg Oral TID WITH MEALS    0.9% sodium chloride infusion 250 mL  250 mL IntraVENous PRN    epoetin reshma-epbx (RETACRIT) injection 20,000 Units  20,000 Units SubCUTAneous Q TUE, THU & SAT    oxyCODONE IR (ROXICODONE) tablet 5 mg  5 mg Oral Q6H PRN    pantoprazole (PROTONIX) 40 mg in 0.9% sodium chloride 10 mL injection  40 mg IntraVENous Q12H    albuterol (PROVENTIL VENTOLIN) nebulizer solution 2.5 mg  2.5 mg Nebulization Q6H PRN    glucose chewable tablet 16 g  4 Tab Oral PRN    glucagon (GLUCAGEN) injection 1 mg  1 mg IntraMUSCular PRN    dextrose 10% infusion 0-250 mL  0-250 mL IntraVENous PRN    insulin lispro (HUMALOG) injection   SubCUTAneous AC&HS    0.9% sodium chloride infusion 250 mL  250 mL IntraVENous PRN         PHYSICAL EXAM:  General: WD, WN. Alert, cooperative, no acute distress    HEENT: NC, Atraumatic. PERRLA, EOMI. Anicteric sclerae. Lungs:  CTA Bilaterally. No Wheezing/Rhonchi/Rales. Heart:  Regular  rhythm,  No murmur, No Rubs, No Gallops  Abdomen: Soft, Non distended, Non tender. +Bowel sounds, no HSM  Extremities: No c/c/e  Neurologic:  CN 2-12 gi, Alert and oriented X 3. No acute neurological distress   Psych:   Good insight. Not anxious nor agitated. The heart, lungs and mental status were satisfactory for the administration of MAC sedation and for the procedure. Mallampati score: 2     The patient was counseled at length about the risks of tiarra Covid-19 in the ivett-operative and post-operative states including the recovery window of their procedure. The patient was made aware that tiarra Covid-19 after a surgical procedure may worsen their prognosis for recovering from the virus and lend to a higher morbidity and or mortality risk. The patient was given the options of postponing their procedure. All of the risks, benefits, and alternatives were discussed. The patient does wish to proceed with the procedure.       Assessment:   · anemia    Plan:   · Endoscopic procedure :Egd w capsule  · MAC sedation

## 2020-10-21 NOTE — ANESTHESIA PREPROCEDURE EVALUATION
Relevant Problems   No relevant active problems       Anesthetic History   No history of anesthetic complications            Review of Systems / Medical History  Patient summary reviewed, nursing notes reviewed and pertinent labs reviewed    Pulmonary            Asthma        Neuro/Psych   Within defined limits           Cardiovascular    Hypertension        Dysrhythmias : atrial fibrillation      Exercise tolerance: <4 METS     GI/Hepatic/Renal     GERD: well controlled    Renal disease: ESRD and dialysis  Liver disease     Endo/Other        Arthritis and anemia     Other Findings              Physical Exam    Airway  Mallampati: II  TM Distance: > 6 cm  Neck ROM: normal range of motion   Mouth opening: Normal     Cardiovascular  Regular rate and rhythm,  S1 and S2 normal,  no murmur, click, rub, or gallop             Dental    Dentition: Full upper dentures and Bridges     Pulmonary  Breath sounds clear to auscultation               Abdominal  GI exam deferred       Other Findings            Anesthetic Plan    ASA: 4  Anesthesia type: MAC          Induction: Intravenous  Anesthetic plan and risks discussed with: Patient

## 2020-10-21 NOTE — PROGRESS NOTES
[facility]  Hospitalist Group                                                                                          Hospitalist Progress Note  Annie Alexander MD  Answering service: [phone] from in house phone        Date of Service:  10/21/2020  NAME:  Jacklyn Phillips  :  1950  MRN:  [id number]      Admission Summary:   Jacklyn Phillips is a 69 y.o. female with past medical history of arthritis, asthma, ESRD, chronic pain, cirrhosis, type 2 DM, GERD, hypertension, and paroxysmal afib presented as a direct admission/ transfer from [facility] to [facility]) with chief complaint of low blood count (anemia). Patient was initially admitted to [facility] on 10/17/2020 with symptomatic anemia; hemoglobin = 5.5 on admission. PRBC was ordered by [facility] for transfusion. Interval history / Subjective:     SARS- Cov 2negative   Need EGD/ Colon , paracentesis after COVID test     Assessment & Plan:     1. Symptomatic anemia ? Blood loss from GI tract         - s/p BT  1 unit PRBCs       - hgb 6.3 can get 1 more with HD        - repeat CBC post transfusion will need Epo with HD        - stool occult and GI consult      2. Type 2 diabetes mellitus with hyperglycemia         - Order Humalog insulin correctional coverage, scheduled blood glucose checks and check hemoglobin A1c level.     3. ESRD (end stage renal disease)        - consult nephrologist for hemodialysis TTS      4. Suspected COVID-19 virus infection       - arriving from [facility] where it was noted that COVID testing was not available. Will place test for COVID 19 with focused hospital protocols and PPE / isolation precautions     5. Hypertension       -monitor BP and provide anti-hypertensive therapy as needed     6. Chronic pain syndrome       - was given Oxycodone at [facility]. May order prn     7.   Liver cirrhosis       - order CMP in a.m.       - continue Lactulose - tap belly after COVID neg may be tomorrow      Code status Full  DVT prophylaxis: SCD    Care Plan discussed with: Patient/Family and Nurse  Anticipated Disposition: Home w/Family  Anticipated Discharge: 24 hours to 48 hours     Hospital Problems  Date Reviewed: 10/6/2020          Codes Class Noted POA    ESRD (end stage renal disease) ([facility] ICD-10-CM: N18.6  ICD-9-CM: 585.6  10/18/2020 Unknown        Suspected COVID-19 virus infection ICD-10-CM: Z20.828  ICD-9-CM: V01.79  10/18/2020 Unknown        Type 2 diabetes mellitus with hyperglycemia ([facility] ICD-10-CM: E11.65  ICD-9-CM: 250.00  10/6/2020 Unknown        Anemia ICD-10-CM: D64.9  ICD-9-CM: 285.9  9/17/2020 Unknown                Review of Systems:   A comprehensive review of systems was negative. Vital Signs:    Last 24hrs VS reviewed since prior progress note. Most recent are:  Visit Vitals  /63   Pulse 87   Temp 98.5 °F (36.9 °C)   Resp 18   Ht 5' 4\" (1.626 m)   Wt 74.1 kg (163 lb 4.8 oz)   SpO2 98%   BMI 28.03 kg/m²         Intake/Output Summary (Last 24 hours) at 10/21/2020 1034  Last data filed at 10/20/2020 1535  Gross per 24 hour   Intake --   Output 1000 ml   Net -1000 ml        Physical Examination:             Constitutional:  No acute distress, cooperative, pleasant    ENT:  Oral mucosa moist, oropharynx benign. Resp:  CTA bilaterally. No wheezing/rhonchi/rales. No accessory muscle use   CV:  Regular rhythm, normal rate, no murmurs, gallops, rubs    GI:  Soft, non distended, non tender. normoactive bowel sounds, no hepatosplenomegaly     Musculoskeletal:  No edema, warm, 2+ pulses throughout    Neurologic:  Moves all extremities. AAOx3, CN II-XII reviewed     Psych:  Good insight, Not anxious nor agitated.        Data Review:    I personally reviewed  Image and labs      Labs:     Recent Labs     10/21/20  0508 10/20/20  0935 10/20/20  0934   WBC 4.1  --  5.0   HGB 6.9* 8.2* 8.3*   HCT 23.1*  --  27.4*     --  217     Recent Labs     10/21/20  0508 10/20/20  0934 10/19/20  0706  10/18/20  2348    137 136   < > 137   K 4.4 5.8* 5.3*   < > 5.0    100 103   < > 103   CO2 27 26 26   < > 26   BUN 32* 74* 63*   < > 60*   CREA 4.43* 7.69* 6.01*   < > 5.74*   GLU 97 73 88   < > 155*   CA 8.4* 8.8 8.5   < > 8.1*   MG  --   --   --   --  2.0   PHOS 4.5 6.1*  --   --   --     < > = values in this interval not displayed. Recent Labs     10/21/20  0508 10/20/20  0934 10/19/20  0706 10/19/20  0630 10/18/20  2348   ALT  --   --  13 16 15   AP  --   --  133* 148 142*   TBILI  --   --  0.4 0.4 0.4   TP  --   --  5.9* 6.6 6.2*   ALB 2.7* 3.0* 2.8* 3.0 2.8*   GLOB  --   --  3.1 3.6 3.4     Recent Labs     10/19/20  0630 10/18/20  2348   INR 1.1 1.1   PTP 11.3* 11.3*   APTT 22.2 26.6      Recent Labs     10/18/20  2348   FERR 35      Lab Results   Component Value Date/Time    Folate 16.4 10/19/2020 09:34 AM      No results for input(s): PH, PCO2, PO2 in the last 72 hours.   Recent Labs     10/18/20  2348   TROIQ 0.06*     No results found for: CHOL, CHOLX, CHLST, CHOLV, HDL, HDLP, LDL, LDLC, DLDLP, TGLX, TRIGL, TRIGP, CHHD, CHHDX  Lab Results   Component Value Date/Time    Glucose (POC) 97 10/21/2020 06:35 AM    Glucose (POC) 172 (H) 10/20/2020 09:41 PM    Glucose (POC) 165 (H) 10/20/2020 04:40 PM    Glucose (POC) 101 (H) 10/20/2020 06:17 AM    Glucose (POC) 129 (H) 10/19/2020 09:17 PM     No results found for: COLOR, APPRN, SPGRU, REFSG, VERENA, PROTU, GLUCU, KETU, BILU, UROU, TIBURCIO, LEUKU, GLUKE, EPSU, BACTU, WBCU, RBCU, CASTS, UCRY      Medications Reviewed:     Current Facility-Administered Medications   Medication Dose Route Frequency    albumin human 25% (BUMINATE) solution 12.5 g  12.5 g IntraVENous DIALYSIS PRN    ascorbic acid (vitamin C) (VITAMIN C) tablet 500 mg  500 mg Oral DAILY    cholecalciferol (VITAMIN D3) (1000 Units /25 mcg) tablet 2 Tab  2,000 Units Oral DAILY    ferrous sulfate tablet 325 mg  325 mg Oral TID WITH MEALS  lactulose (CHRONULAC) 10 gram/15 mL solution 30 mL  20 g Oral BID    sevelamer carbonate (RENVELA) tab 1,600 mg  1,600 mg Oral TID WITH MEALS    0.9% sodium chloride infusion 250 mL  250 mL IntraVENous PRN    epoetin reshma-epbx (RETACRIT) injection 20,000 Units  20,000 Units SubCUTAneous Q TUE, THU & SAT    oxyCODONE IR (ROXICODONE) tablet 5 mg  5 mg Oral Q6H PRN    pantoprazole (PROTONIX) 40 mg in 0.9% sodium chloride 10 mL injection  40 mg IntraVENous Q12H    albuterol (PROVENTIL VENTOLIN) nebulizer solution 2.5 mg  2.5 mg Nebulization Q6H PRN    glucose chewable tablet 16 g  4 Tab Oral PRN    glucagon (GLUCAGEN) injection 1 mg  1 mg IntraMUSCular PRN    dextrose 10% infusion 0-250 mL  0-250 mL IntraVENous PRN    insulin lispro (HUMALOG) injection   SubCUTAneous AC&HS    0.9% sodium chloride infusion 250 mL  250 mL IntraVENous PRN     ______________________________________________________________________  EXPECTED LENGTH OF STAY: 3d 14h  ACTUAL LENGTH OF STAY:          3                 Benton Basilio MD

## 2020-10-21 NOTE — PROCEDURES
[facility].  [address] [facility] [address], [address]   [phone]                            NAME:  Rashid Watt   :   1950   MRN:   [id number]     Date/Time:  10/21/2020 8:03 AM    Esophagogastroduodenoscopy (EGD) Procedure Note    :  Radha Juarez MD    Staff: Endoscopy Technician-1: Lowell Herr  Endoscopy RN-1: Azam Luu RN    Referring Provider:  None    Anethesia/Sedation:  MAC anesthesia    Procedure Details   After infomed consent was obtained for the procedure, with all risks and benefits of procedure explained the patient was taken to the endoscopy suite and placed in the left lateral decubitus position. Following sequential administration of sedation as per above, the gastroscope was inserted into the mouth and advanced under direct vision to second portion of the duodenum. A careful inspection was made as the gastroscope was withdrawn, including a retroflexed view of the proximal stomach; findings and interventions are described below. Findings:  Esophagus:normal  Stomach: very mild antral gastritis, mucosa is friable and oozes with contact. One 5 mm area of vascular erythema in mid-body, empirically treated with APC at 1.0 LF at 50. Duodenum/jejunum:normal; capsule deployed into duodenal bulb    Interventions:  APC and capsule           Specimens Removed:  * No specimens in log *    Complications: None. EBL: minimal     Impression:    See Postoperative diagnosis above    Recommendations:   - Advance diet per capsule endoscopy recommendations.   - Await capsule endoscopy results    Radha Juarez MD

## 2020-10-21 NOTE — ROUTINE PROCESS
[facility]  1950  [id number]    Situation:  Verbal report received from: Zabrina  Procedure: Procedure(s):  ESOPHAGOGASTRODUODENOSCOPY (EGD)  CAPSULE  ENDOSCOPIC ARGON PLASMA COAGULATION    Background:    Preoperative diagnosis: Anemia and melena  Postoperative diagnosis: gastritis, abnormal mucosa    :  Dr. Rebeca Knapp  Assistant(s): Endoscopy Technician-1: Hazel Klein  Endoscopy Technician-2: Grayson DIMAS  Endoscopy RN-1: Shana Emery RN    Specimens:  H. Pylori      Assessment:  Intra-procedure medications     Anesthesia gave intra-procedure sedation and medications, see anesthesia flow sheet yes    Intravenous fluids: NS@ KVO     Vital signs stable yes    Abdominal assessment: round and soft yes    Recommendation:  Discharge patient per MD order   Return to floor   Family or Friend    Permission to share finding with family or friend

## 2020-10-21 NOTE — PROGRESS NOTES
Problem: Falls - Risk of  Goal: *Absence of Falls  Description: Document Kory Edwards Fall Risk and appropriate interventions in the flowsheet. Outcome: Progressing Towards Goal  Note: Fall Risk Interventions:  Mobility Interventions: Communicate number of staff needed for ambulation/transfer         Medication Interventions: Patient to call before getting OOB    Elimination Interventions: Call light in reach, Patient to call for help with toileting needs              Problem: Patient Education: Go to Patient Education Activity  Goal: Patient/Family Education  Outcome: Progressing Towards Goal     Problem: Pressure Injury - Risk of  Goal: *Prevention of pressure injury  Description: Document Vic Scale and appropriate interventions in the flowsheet.   Outcome: Progressing Towards Goal  Note: Pressure Injury Interventions:  Sensory Interventions: Assess changes in LOC, Avoid rigorous massage over bony prominences, Keep linens dry and wrinkle-free, Minimize linen layers         Activity Interventions: PT/OT evaluation    Mobility Interventions: PT/OT evaluation    Nutrition Interventions: Document food/fluid/supplement intake                     Problem: Patient Education: Go to Patient Education Activity  Goal: Patient/Family Education  Outcome: Progressing Towards Goal     Problem: Anemia Care Plan (Adult and Pediatric)  Goal: *Labs within defined limits  Outcome: Progressing Towards Goal  Goal: *Tolerates increased activity  Outcome: Progressing Towards Goal     Problem: Patient Education: Go to Patient Education Activity  Goal: Patient/Family Education  Outcome: Progressing Towards Goal     Problem: Discharge Planning  Goal: *Discharge to safe environment  Outcome: Progressing Towards Goal

## 2020-10-21 NOTE — PROGRESS NOTES
DIANA:  RUR: 25%  -Anticipate home with [facility] vs Home with family care. -Dtr to provide transport at d/c.  -D/c pending clinical progression, Hgb stability, nephrology recommendations, EGD & paracentesis, Gi recommendations. Pt received 1 unit of PRBCs for Hgb of 6.9  CMP to follow suspected liver cirrhosis  CM to follow for transitions of care.   Haven Ha RN BSN CCM  CRM

## 2020-10-21 NOTE — PERIOP NOTES
0700: .TRANSFER - IN REPORT:    Verbal report received from Patricia(name) on Jolly Norlander  being received from 540(unit) for ordered procedure      Report consisted of patients Situation, Background, Assessment and   Recommendations(SBAR). Information from the following report(s) SBAR, Kardex and MAR was reviewed with the receiving nurse. Opportunity for questions and clarification was provided. Endo RN brought pt to ENDO via WC. Pt has purse with her. Pt asks that we call her daughter, Ti Napoles with the procedure results. [phone]      . Patient has been evaluated by anesthesia pre-procedure. Patient alert and oriented. Pt verbalizes understanding of procedure and pill study    Vital signs will not be charted by the Endoscopy nurse. All vitals, airway, and loc are monitored by anesthesia staff throughout procedure. 0800: [id number] capsule deployed  LOT: [id number]    Exp: 07/23/2021    . Endoscope was pre-cleaned at bedside immediately following procedure by Jared Rico. TRANSFER - OUT REPORT:    Verbal report given to Isabel(name) on Jolly Norlander  being transferred to 540(unit) for routine post - op       Report consisted of patients Situation, Background, Assessment and   Recommendations(SBAR). Information from the following report(s) Procedure Summary was reviewed with the receiving nurse. Lines:   Peripheral IV 10/18/20 Anterior;Right Forearm (Active)   Site Assessment Clean, dry, & intact 10/20/20 0833   Phlebitis Assessment 0 10/20/20 0833   Infiltration Assessment 0 10/20/20 0833   Dressing Status Clean, dry, & intact 10/20/20 0833   Dressing Type Transparent 10/20/20 0833   Hub Color/Line Status Capped 10/20/20 3689   Action Taken Open ports on tubing capped 10/20/20 0833   Alcohol Cap Used Yes 10/20/20 1825        Opportunity for questions and clarification was provided. Capsule blinking blue. GI anatomy visualized on data  Recorder.

## 2020-10-21 NOTE — PROGRESS NOTES
[address] [facility] Adult  Hospitalist Group                                                                                          Hospitalist Progress Note  Melody Chappell MD  Answering service: [phone] from in house phone        Date of Service:  10/21/2020  NAME:  Shanae Fitzpatrick  :  1950  MRN:  [id number]      Admission Summary:   Shanae Fitzpatrick is a 69 y.o. female with past medical history of arthritis, asthma, ESRD, chronic pain, cirrhosis, type 2 DM, GERD, hypertension, and paroxysmal afib presented as a direct admission/ transfer from [facility]) to [facility]) with chief complaint of low blood count (anemia). Patient was initially admitted to [facility] on 10/17/2020 with symptomatic anemia; hemoglobin = 5.5 on admission. PRBC was ordered by [facility] for transfusion. Interval history / Subjective:     SARS- Cov 2 negative   Need EGD/ Colon , paracentesis   hgb dropped repeat h and h   D/w rn      Assessment & Plan:     1. Symptomatic anemia ? Blood loss from GI tract         - s/p BT 1 unit PRBCs       - hgb dropped again to 6.9 get h and h now if < 7 will transfuse       - stool occult and GI consult for EGD today and capsule study      2. Type 2 diabetes mellitus with hyperglycemia         - Order Humalog insulin correctional coverage, scheduled blood glucose checks and check hemoglobin A1c level. (  pending)      3. ESRD (end stage renal disease)        - consult nephrologist for hemodialysis TTS per renal      4. Suspected COVID-19 virus infection negative test 10/19      5. Hypertension       -monitor BP and provide anti-hypertensive therapy as needed     6. Chronic pain syndrome       - was given Oxycodone at [facility]. May order prn     7.   Liver cirrhosis       - order CMP in a.m.       - continue Lactulose       - tap belly after COVID neg may be tomorrow        - USG      Code status Full  DVT prophylaxis: SCD    Care Plan discussed with: Patient/Family and Nurse  Anticipated Disposition: Home w/Family  Anticipated Discharge: 24 hours to 48 hours     Hospital Problems  Date Reviewed: 10/6/2020          Codes Class Noted POA    ESRD (end stage renal disease) ([facility] ICD-10-CM: N18.6  ICD-9-CM: 585.6  10/18/2020 Unknown        Suspected COVID-19 virus infection ICD-10-CM: Z20.828  ICD-9-CM: V01.79  10/18/2020 Unknown        Type 2 diabetes mellitus with hyperglycemia ([facility] ICD-10-CM: E11.65  ICD-9-CM: 250.00  10/6/2020 Unknown        Anemia ICD-10-CM: D64.9  ICD-9-CM: 285.9  9/17/2020 Unknown                Review of Systems:   A comprehensive review of systems was negative. Vital Signs:    Last 24hrs VS reviewed since prior progress note. Most recent are:  Visit Vitals  /63   Pulse 87   Temp 98.5 °F (36.9 °C)   Resp 18   Ht 5' 4\" (1.626 m)   Wt 74.1 kg (163 lb 4.8 oz)   SpO2 98%   BMI 28.03 kg/m²         Intake/Output Summary (Last 24 hours) at 10/21/2020 1034  Last data filed at 10/20/2020 1535  Gross per 24 hour   Intake --   Output 1000 ml   Net -1000 ml        Physical Examination:             Constitutional:  No acute distress, cooperative, pleasant    ENT:  Oral mucosa moist, oropharynx benign. Resp:  CTA bilaterally. No wheezing/rhonchi/rales. No accessory muscle use   CV:  Regular rhythm, normal rate, no murmurs, gallops, rubs    GI:  Soft, non distended, non tender. normoactive bowel sounds, no hepatosplenomegaly     Musculoskeletal:  No edema, warm, 2+ pulses throughout    Neurologic:  Moves all extremities. AAOx3, CN II-XII reviewed     Psych:  Good insight, Not anxious nor agitated. Data Review:    I personally reviewed  Image and labs    No results found.   Labs:     Recent Labs     10/21/20  0508 10/20/20  0935 10/20/20  0934   WBC 4.1  --  5.0   HGB 6.9* 8.2* 8.3*   HCT 23.1*  --  27.4*     --  217     Recent Labs     10/21/20  0508 10/20/20  0934 10/19/20  0706  10/18/20  2348    137 136   < > 137   K 4.4 5.8* 5.3*   < > 5.0    100 103   < > 103   CO2 27 26 26   < > 26   BUN 32* 74* 63*   < > 60*   CREA 4.43* 7.69* 6.01*   < > 5.74*   GLU 97 73 88   < > 155*   CA 8.4* 8.8 8.5   < > 8.1*   MG  --   --   --   --  2.0   PHOS 4.5 6.1*  --   --   --     < > = values in this interval not displayed. Recent Labs     10/21/20  0508 10/20/20  0934 10/19/20  0706 10/19/20  0630 10/18/20  2348   ALT  --   --  13 16 15   AP  --   --  133* 148 142*   TBILI  --   --  0.4 0.4 0.4   TP  --   --  5.9* 6.6 6.2*   ALB 2.7* 3.0* 2.8* 3.0 2.8*   GLOB  --   --  3.1 3.6 3.4     Recent Labs     10/19/20  0630 10/18/20  2348   INR 1.1 1.1   PTP 11.3* 11.3*   APTT 22.2 26.6      Recent Labs     10/18/20  2348   FERR 35      Lab Results   Component Value Date/Time    Folate 16.4 10/19/2020 09:34 AM      No results for input(s): PH, PCO2, PO2 in the last 72 hours.   Recent Labs     10/18/20  2348   TROIQ 0.06*     No results found for: CHOL, CHOLX, CHLST, CHOLV, HDL, HDLP, LDL, LDLC, DLDLP, TGLX, TRIGL, TRIGP, CHHD, CHHDX  Lab Results   Component Value Date/Time    Glucose (POC) 97 10/21/2020 06:35 AM    Glucose (POC) 172 (H) 10/20/2020 09:41 PM    Glucose (POC) 165 (H) 10/20/2020 04:40 PM    Glucose (POC) 101 (H) 10/20/2020 06:17 AM    Glucose (POC) 129 (H) 10/19/2020 09:17 PM     No results found for: COLOR, APPRN, SPGRU, REFSG, VERENA, PROTU, GLUCU, KETU, BILU, UROU, TIBURCIO, LEUKU, GLUKE, EPSU, BACTU, WBCU, RBCU, CASTS, UCRY      Medications Reviewed:     Current Facility-Administered Medications   Medication Dose Route Frequency    albumin human 25% (BUMINATE) solution 12.5 g  12.5 g IntraVENous DIALYSIS PRN    ascorbic acid (vitamin C) (VITAMIN C) tablet 500 mg  500 mg Oral DAILY    cholecalciferol (VITAMIN D3) (1000 Units /25 mcg) tablet 2 Tab  2,000 Units Oral DAILY    ferrous sulfate tablet 325 mg  325 mg Oral TID WITH MEALS    lactulose (CHRONULAC) 10 gram/15 mL solution 30 mL  20 g Oral BID    sevelamer carbonate (RENVELA) tab 1,600 mg  1,600 mg Oral TID WITH MEALS    0.9% sodium chloride infusion 250 mL  250 mL IntraVENous PRN    epoetin reshma-epbx (RETACRIT) injection 20,000 Units  20,000 Units SubCUTAneous Q TUE, THU & SAT    oxyCODONE IR (ROXICODONE) tablet 5 mg  5 mg Oral Q6H PRN    pantoprazole (PROTONIX) 40 mg in 0.9% sodium chloride 10 mL injection  40 mg IntraVENous Q12H    albuterol (PROVENTIL VENTOLIN) nebulizer solution 2.5 mg  2.5 mg Nebulization Q6H PRN    glucose chewable tablet 16 g  4 Tab Oral PRN    glucagon (GLUCAGEN) injection 1 mg  1 mg IntraMUSCular PRN    dextrose 10% infusion 0-250 mL  0-250 mL IntraVENous PRN    insulin lispro (HUMALOG) injection   SubCUTAneous AC&HS    0.9% sodium chloride infusion 250 mL  250 mL IntraVENous PRN     ______________________________________________________________________  EXPECTED LENGTH OF STAY: 3d 14h  ACTUAL LENGTH OF STAY:          3                 Tammy Lehman MD

## 2020-10-21 NOTE — PROGRESS NOTES
[facility]   [address] [facility], [address] [place] [address], [facility]  Phone: [phone]   [phone]       Nephrology Progress Note  Charley Martinez Fenton     8/13/1217     [id number]  Date of Admission : 10/18/2020  10/21/20    CC: Follow up for ESRD     Assessment and Plan   ESRD- HD  - Dialyzes TTS at [address] dialysis in [facility] AND [place], [place]  - followed by Dr Bertha Soulier   - has [facility] for access  - HD tomorrow per schedule   - continue Midodrine      Cirrhosis of Liver : ? Cause   - consider Echo     Anemia in CKD :   -  continue epogen     Blood loss anemia :  - EGD: antral gastritis and erythema   - Capsule study pending      SOB/ SARKAR  - COVID -19 neg      Chronic HFrEF   - consider Echo      Care Plan discussed with: pt      Interval History:  Seen and examined   EGD noted   Continues to have abdominal distension   Non tender . Denies any n/v/cp/sob    Review of Systems: A comprehensive review of systems was negative.     Current Medications:   Current Facility-Administered Medications   Medication Dose Route Frequency    albumin human 25% (BUMINATE) solution 12.5 g  12.5 g IntraVENous DIALYSIS PRN    ascorbic acid (vitamin C) (VITAMIN C) tablet 500 mg  500 mg Oral DAILY    cholecalciferol (VITAMIN D3) (1000 Units /25 mcg) tablet 2 Tab  2,000 Units Oral DAILY    ferrous sulfate tablet 325 mg  325 mg Oral TID WITH MEALS    lactulose (CHRONULAC) 10 gram/15 mL solution 30 mL  20 g Oral BID    sevelamer carbonate (RENVELA) tab 1,600 mg  1,600 mg Oral TID WITH MEALS    0.9% sodium chloride infusion 250 mL  250 mL IntraVENous PRN    epoetin reshma-epbx (RETACRIT) injection 20,000 Units  20,000 Units SubCUTAneous Q TUE, THU & SAT    oxyCODONE IR (ROXICODONE) tablet 5 mg  5 mg Oral Q6H PRN    pantoprazole (PROTONIX) 40 mg in 0.9% sodium chloride 10 mL injection  40 mg IntraVENous Q12H    albuterol (PROVENTIL VENTOLIN) nebulizer solution 2.5 mg  2.5 mg Nebulization Q6H PRN    glucose chewable tablet 16 g  4 Tab Oral PRN    glucagon (GLUCAGEN) injection 1 mg  1 mg IntraMUSCular PRN    dextrose 10% infusion 0-250 mL  0-250 mL IntraVENous PRN    insulin lispro (HUMALOG) injection   SubCUTAneous AC&HS    0.9% sodium chloride infusion 250 mL  250 mL IntraVENous PRN      Allergies   Allergen Reactions    Aleve [Naproxen Sodium] Itching, Swelling and Other (comments)    Amlodipine Rash, Hives and Itching    Aspirin Other (comments), Nausea Only and Unknown (comments)     GI bleed  GI bleeding      Heparin Unknown (comments)     bleeding      Ibuprofen Nausea and Vomiting    Metformin Other (comments)     swelling       Objective:  Vitals:    Vitals:    10/21/20 0825 10/21/20 0851 10/21/20 1353 10/21/20 1456   BP: (!) 105/51 110/63 112/65 (!) 147/75   Pulse: 95 87 83 83   Resp: 18 18 19 18   Temp: 97.7 °F (36.5 °C) 98.5 °F (36.9 °C) 98.5 °F (36.9 °C) 98.6 °F (37 °C)   TempSrc:       SpO2: 97% 98% 97% 98%   Weight:       Height:         Intake and Output:  No intake/output data recorded. 10/19 1901 - 10/21 0700  In: 374.2   Out: 1000     Physical Examination:  General:  Chronically ill looking   HEENT: PERRL, ++ Pallor , No Icterus  Neck: RIJ permacath +  Lungs : diminished at bases   CVS: RRR, S1 S2 normal, No murmur   Abdomen: distended, NT  Extremities: 1+ Edema  Skin: No rash or lesions. MS: No joint swelling, erythema, warmth  Neurologic: non focal, AAO x 3  Psych: normal affect  []    High complexity decision making was performed  []    Patient is at high-risk of decompensation with multiple organ involvement    Lab Data Personally Reviewed: I have reviewed all the pertinent labs, microbiology data and radiology studies during assessment.     Recent Labs     10/21/20  0508 10/20/20  0934 10/19/20  0706 10/19/20  0630 10/18/20  2348    137 136 134 137   K 4.4 5.8* 5.3* 5.3 5.0    100 103 105 103   CO2 27 26 26 24 26   GLU 97 73 88 88 155*   BUN 32* 74* 63* 60 60*   CREA 4.43* 7.69* 6.01* 6.02 5.74*   CA 8.4* 8.8 8.5 8.7 8.1*   MG  --   --   --   --  2.0   PHOS 4.5 6.1*  --   --   --    ALB 2.7* 3.0* 2.8* 3.0 2.8*   ALT  --   --  13 16 15   INR  --   --   --  1.1 1.1     Recent Labs     10/21/20  1115 10/21/20  0508 10/20/20  0935 10/20/20  0934 10/19/20  0706 10/18/20  2348   WBC  --  4.1  --  5.0 4.8 5.0   HGB 7.3* 6.9* 8.2* 8.3* 6.3* 5.2*   HCT 24.7* 23.1*  --  27.4* 21.2* 18.5*   PLT  --  191  --  217 206 194     No results found for: SDES  Lab Results   Component Value Date/Time    Culture result: No growth 3 days 10/18/2020 07:09 AM     Recent Results (from the past 24 hour(s))   GLUCOSE, POC    Collection Time: 10/20/20  4:40 PM   Result Value Ref Range    Glucose (POC) 165 (H) 65 - 100 mg/dL    Performed by Rach Castro    GLUCOSE, POC    Collection Time: 10/20/20  9:41 PM   Result Value Ref Range    Glucose (POC) 172 (H) 65 - 100 mg/dL    Performed by AlexOcsclucian Ritchie    CBC WITH AUTOMATED DIFF    Collection Time: 10/21/20  5:08 AM   Result Value Ref Range    WBC 4.1 3.6 - 11.0 K/uL    RBC 2.49 (L) 3.80 - 5.20 M/uL    HGB 6.9 (L) 11.5 - 16.0 g/dL    HCT 23.1 (L) 35.0 - 47.0 %    MCV 92.8 80.0 - 99.0 FL    MCH 27.7 26.0 - 34.0 PG    MCHC 29.9 (L) 30.0 - 36.5 g/dL    RDW 18.5 (H) 11.5 - 14.5 %    PLATELET 654 673 - 385 K/uL    MPV 10.5 8.9 - 12.9 FL    NRBC 0.0 0  WBC    ABSOLUTE NRBC 0.00 0.00 - 0.01 K/uL    NEUTROPHILS 75 32 - 75 %    LYMPHOCYTES 9 (L) 12 - 49 %    MONOCYTES 12 5 - 13 %    EOSINOPHILS 3 0 - 7 %    BASOPHILS 1 0 - 1 %    IMMATURE GRANULOCYTES 0 0.0 - 0.5 %    ABS. NEUTROPHILS 3.1 1.8 - 8.0 K/UL    ABS. LYMPHOCYTES 0.4 (L) 0.8 - 3.5 K/UL    ABS. MONOCYTES 0.5 0.0 - 1.0 K/UL    ABS. EOSINOPHILS 0.1 0.0 - 0.4 K/UL    ABS. BASOPHILS 0.0 0.0 - 0.1 K/UL    ABS. IMM.  GRANS. 0.0 0.00 - 0.04 K/UL    DF SMEAR SCANNED      RBC COMMENTS ANISOCYTOSIS  1+        RBC COMMENTS OVALOCYTES  1+        RBC COMMENTS POLYCHROMASIA  1+        RBC COMMENTS HYPOCHROMIA  1+       RENAL FUNCTION PANEL    Collection Time: 10/21/20  5:08 AM   Result Value Ref Range    Sodium 140 136 - 145 mmol/L    Potassium 4.4 3.5 - 5.1 mmol/L    Chloride 104 97 - 108 mmol/L    CO2 27 21 - 32 mmol/L    Anion gap 9 5 - 15 mmol/L    Glucose 97 65 - 100 mg/dL    BUN 32 (H) 6 - 20 MG/DL    Creatinine 4.43 (H) 0.55 - 1.02 MG/DL    BUN/Creatinine ratio 7 (L) 12 - 20      GFR est AA 12 (L) >60 ml/min/1.73m2    GFR est non-AA 10 (L) >60 ml/min/1.73m2    Calcium 8.4 (L) 8.5 - 10.1 MG/DL    Phosphorus 4.5 2.6 - 4.7 MG/DL    Albumin 2.7 (L) 3.5 - 5.0 g/dL   GLUCOSE, POC    Collection Time: 10/21/20  6:35 AM   Result Value Ref Range    Glucose (POC) 97 65 - 100 mg/dL    Performed by Juan Antunez    HGB & HCT    Collection Time: 10/21/20 11:15 AM   Result Value Ref Range    HGB 7.3 (L) 11.5 - 16.0 g/dL    HCT 24.7 (L) 35.0 - 47.0 %   GLUCOSE, POC    Collection Time: 10/21/20 12:00 PM   Result Value Ref Range    Glucose (POC) 82 65 - 100 mg/dL    Performed by Alexander Cobos            Total time spent with patient:  xxx   min. Care Plan discussed with:  Patient     Family      RN      Consulting Physician [facility],         I have reviewed the flowsheets. Chart and Pertinent Notes have been reviewed. No change in PMH ,family and social history from Consult note.       Ariel Wyman MD

## 2020-10-22 ENCOUNTER — APPOINTMENT (OUTPATIENT)
Dept: GENERAL RADIOLOGY | Age: 70
DRG: 377 | End: 2020-10-22
Attending: NURSE PRACTITIONER
Payer: MEDICARE

## 2020-10-22 ENCOUNTER — APPOINTMENT (OUTPATIENT)
Dept: INTERVENTIONAL RADIOLOGY/VASCULAR | Age: 70
DRG: 377 | End: 2020-10-22
Attending: HOSPITALIST
Payer: MEDICARE

## 2020-10-22 LAB
ALBUMIN SERPL-MCNC: 2.7 G/DL (ref 3.5–5)
ANION GAP SERPL CALC-SCNC: 7 MMOL/L (ref 5–15)
BASOPHILS # BLD: 0 K/UL (ref 0–0.1)
BASOPHILS NFR BLD: 2 % (ref 0–1)
BUN SERPL-MCNC: 32 MG/DL (ref 6–20)
BUN/CREAT SERPL: 6 (ref 12–20)
CALCIUM SERPL-MCNC: 8.2 MG/DL (ref 8.5–10.1)
CHLORIDE SERPL-SCNC: 102 MMOL/L (ref 97–108)
CO2 SERPL-SCNC: 30 MMOL/L (ref 21–32)
CREAT SERPL-MCNC: 5.07 MG/DL (ref 0.55–1.02)
DIFFERENTIAL METHOD BLD: ABNORMAL
EOSINOPHIL # BLD: 0.1 K/UL (ref 0–0.4)
EOSINOPHIL NFR BLD: 6 % (ref 0–7)
ERYTHROCYTE [DISTWIDTH] IN BLOOD BY AUTOMATED COUNT: 18.6 % (ref 11.5–14.5)
GLUCOSE BLD STRIP.AUTO-MCNC: 102 MG/DL (ref 65–100)
GLUCOSE BLD STRIP.AUTO-MCNC: 128 MG/DL (ref 65–100)
GLUCOSE BLD STRIP.AUTO-MCNC: 94 MG/DL (ref 65–100)
GLUCOSE SERPL-MCNC: 97 MG/DL (ref 65–100)
HCT VFR BLD AUTO: 22.6 % (ref 35–47)
HGB BLD-MCNC: 6.6 G/DL (ref 11.5–16)
HISTORY CHECKED?,CKHIST: NORMAL
IMM GRANULOCYTES # BLD AUTO: 0 K/UL (ref 0–0.04)
IMM GRANULOCYTES NFR BLD AUTO: 1 % (ref 0–0.5)
LYMPHOCYTES # BLD: 0.3 K/UL (ref 0.8–3.5)
LYMPHOCYTES NFR BLD: 20 % (ref 12–49)
MCH RBC QN AUTO: 27.5 PG (ref 26–34)
MCHC RBC AUTO-ENTMCNC: 29.2 G/DL (ref 30–36.5)
MCV RBC AUTO: 94.2 FL (ref 80–99)
MONOCYTES # BLD: 0.1 K/UL (ref 0–1)
MONOCYTES NFR BLD: 7 % (ref 5–13)
NEUTS SEG # BLD: 1.2 K/UL (ref 1.8–8)
NEUTS SEG NFR BLD: 64 % (ref 32–75)
NRBC # BLD: 0 K/UL (ref 0–0.01)
NRBC BLD-RTO: 0 PER 100 WBC
PHOSPHATE SERPL-MCNC: 4.6 MG/DL (ref 2.6–4.7)
PLATELET # BLD AUTO: 180 K/UL (ref 150–400)
PMV BLD AUTO: 10.5 FL (ref 8.9–12.9)
POTASSIUM SERPL-SCNC: 4.1 MMOL/L (ref 3.5–5.1)
RBC # BLD AUTO: 2.4 M/UL (ref 3.8–5.2)
RBC MORPH BLD: ABNORMAL
SERVICE CMNT-IMP: ABNORMAL
SERVICE CMNT-IMP: ABNORMAL
SERVICE CMNT-IMP: NORMAL
SODIUM SERPL-SCNC: 139 MMOL/L (ref 136–145)
WBC # BLD AUTO: 1.7 K/UL (ref 3.6–11)

## 2020-10-22 PROCEDURE — 86900 BLOOD TYPING SEROLOGIC ABO: CPT

## 2020-10-22 PROCEDURE — 82962 GLUCOSE BLOOD TEST: CPT

## 2020-10-22 PROCEDURE — C9113 INJ PANTOPRAZOLE SODIUM, VIA: HCPCS | Performed by: NURSE PRACTITIONER

## 2020-10-22 PROCEDURE — 36415 COLL VENOUS BLD VENIPUNCTURE: CPT

## 2020-10-22 PROCEDURE — 86922 COMPATIBILITY TEST ANTIGLOB: CPT

## 2020-10-22 PROCEDURE — 49083 ABD PARACENTESIS W/IMAGING: CPT

## 2020-10-22 PROCEDURE — 80069 RENAL FUNCTION PANEL: CPT

## 2020-10-22 PROCEDURE — 74011250637 HC RX REV CODE- 250/637: Performed by: HOSPITALIST

## 2020-10-22 PROCEDURE — 0W9G3ZZ DRAINAGE OF PERITONEAL CAVITY, PERCUTANEOUS APPROACH: ICD-10-PCS | Performed by: STUDENT IN AN ORGANIZED HEALTH CARE EDUCATION/TRAINING PROGRAM

## 2020-10-22 PROCEDURE — 86920 COMPATIBILITY TEST SPIN: CPT

## 2020-10-22 PROCEDURE — P9016 RBC LEUKOCYTES REDUCED: HCPCS

## 2020-10-22 PROCEDURE — 85025 COMPLETE CBC W/AUTO DIFF WBC: CPT

## 2020-10-22 PROCEDURE — 74011250637 HC RX REV CODE- 250/637: Performed by: FAMILY MEDICINE

## 2020-10-22 PROCEDURE — 65270000029 HC RM PRIVATE

## 2020-10-22 PROCEDURE — 74011000250 HC RX REV CODE- 250: Performed by: HOSPITALIST

## 2020-10-22 PROCEDURE — 36430 TRANSFUSION BLD/BLD COMPNT: CPT

## 2020-10-22 PROCEDURE — 74011250636 HC RX REV CODE- 250/636: Performed by: NURSE PRACTITIONER

## 2020-10-22 PROCEDURE — 90935 HEMODIALYSIS ONE EVALUATION: CPT

## 2020-10-22 PROCEDURE — 74011000250 HC RX REV CODE- 250: Performed by: NURSE PRACTITIONER

## 2020-10-22 PROCEDURE — 86921 COMPATIBILITY TEST INCUBATE: CPT

## 2020-10-22 PROCEDURE — 86902 BLOOD TYPE ANTIGEN DONOR EA: CPT

## 2020-10-22 PROCEDURE — 74018 RADEX ABDOMEN 1 VIEW: CPT

## 2020-10-22 RX ORDER — FLUTICASONE PROPIONATE 50 MCG
2 SPRAY, SUSPENSION (ML) NASAL DAILY
Status: DISCONTINUED | OUTPATIENT
Start: 2020-10-22 | End: 2020-10-25 | Stop reason: HOSPADM

## 2020-10-22 RX ORDER — SODIUM CHLORIDE 9 MG/ML
250 INJECTION, SOLUTION INTRAVENOUS AS NEEDED
Status: DISCONTINUED | OUTPATIENT
Start: 2020-10-22 | End: 2020-10-25 | Stop reason: HOSPADM

## 2020-10-22 RX ORDER — KETOTIFEN FUMARATE 0.35 MG/ML
1 SOLUTION/ DROPS OPHTHALMIC 2 TIMES DAILY
Status: DISCONTINUED | OUTPATIENT
Start: 2020-10-22 | End: 2020-10-25 | Stop reason: HOSPADM

## 2020-10-22 RX ADMIN — SODIUM CHLORIDE 40 MG: 9 INJECTION, SOLUTION INTRAMUSCULAR; INTRAVENOUS; SUBCUTANEOUS at 22:07

## 2020-10-22 RX ADMIN — EPOETIN ALFA-EPBX 20000 UNITS: 10000 INJECTION, SOLUTION INTRAVENOUS; SUBCUTANEOUS at 22:07

## 2020-10-22 RX ADMIN — OXYCODONE 5 MG: 5 TABLET ORAL at 01:36

## 2020-10-22 RX ADMIN — FLUTICASONE PROPIONATE 2 SPRAY: 50 SPRAY, METERED NASAL at 16:56

## 2020-10-22 RX ADMIN — KETOTIFEN FUMARATE 1 DROP: 0.35 SOLUTION/ DROPS OPHTHALMIC at 17:01

## 2020-10-22 RX ADMIN — ALBUTEROL SULFATE 2.5 MG: 2.5 SOLUTION RESPIRATORY (INHALATION) at 03:22

## 2020-10-22 RX ADMIN — OXYCODONE 5 MG: 5 TABLET ORAL at 19:30

## 2020-10-22 RX ADMIN — SEVELAMER CARBONATE 1600 MG: 800 TABLET, FILM COATED ORAL at 08:00

## 2020-10-22 RX ADMIN — OXYCODONE 5 MG: 5 TABLET ORAL at 13:52

## 2020-10-22 RX ADMIN — FERROUS SULFATE TAB 325 MG (65 MG ELEMENTAL FE) 325 MG: 325 (65 FE) TAB at 06:41

## 2020-10-22 RX ADMIN — SODIUM CHLORIDE 40 MG: 9 INJECTION, SOLUTION INTRAMUSCULAR; INTRAVENOUS; SUBCUTANEOUS at 08:03

## 2020-10-22 RX ADMIN — FERROUS SULFATE TAB 325 MG (65 MG ELEMENTAL FE) 325 MG: 325 (65 FE) TAB at 08:00

## 2020-10-22 RX ADMIN — FERROUS SULFATE TAB 325 MG (65 MG ELEMENTAL FE) 325 MG: 325 (65 FE) TAB at 16:56

## 2020-10-22 RX ADMIN — Medication 2 TABLET: at 08:03

## 2020-10-22 RX ADMIN — OXYCODONE HYDROCHLORIDE AND ACETAMINOPHEN 500 MG: 500 TABLET ORAL at 08:03

## 2020-10-22 RX ADMIN — SEVELAMER CARBONATE 1600 MG: 800 TABLET, FILM COATED ORAL at 16:56

## 2020-10-22 RX ADMIN — OXYCODONE 5 MG: 5 TABLET ORAL at 07:45

## 2020-10-22 RX ADMIN — SEVELAMER CARBONATE 1600 MG: 800 TABLET, FILM COATED ORAL at 06:40

## 2020-10-22 NOTE — PROGRESS NOTES
Problem: Falls - Risk of  Goal: *Absence of Falls  Description: Document Kory Edwards Fall Risk and appropriate interventions in the flowsheet. Outcome: Progressing Towards Goal  Note: Fall Risk Interventions:  Mobility Interventions: Patient to call before getting OOB         Medication Interventions: Patient to call before getting OOB    Elimination Interventions: Call light in reach              Problem: Patient Education: Go to Patient Education Activity  Goal: Patient/Family Education  Outcome: Progressing Towards Goal     Problem: Pressure Injury - Risk of  Goal: *Prevention of pressure injury  Description: Document Vic Scale and appropriate interventions in the flowsheet.   Outcome: Progressing Towards Goal  Note: Pressure Injury Interventions:  Sensory Interventions: Assess changes in LOC    Moisture Interventions: Absorbent underpads    Activity Interventions: Pressure redistribution bed/mattress(bed type)    Mobility Interventions: Pressure redistribution bed/mattress (bed type)    Nutrition Interventions: Document food/fluid/supplement intake                     Problem: Patient Education: Go to Patient Education Activity  Goal: Patient/Family Education  Outcome: Progressing Towards Goal     Problem: Anemia Care Plan (Adult and Pediatric)  Goal: *Labs within defined limits  Outcome: Progressing Towards Goal  Goal: *Tolerates increased activity  Outcome: Progressing Towards Goal     Problem: Patient Education: Go to Patient Education Activity  Goal: Patient/Family Education  Outcome: Progressing Towards Goal     Problem: Discharge Planning  Goal: *Discharge to safe environment  Outcome: Progressing Towards Goal

## 2020-10-22 NOTE — PROGRESS NOTES
Pt arrives via bed to angio department accompanied by self for US paracentesis procedure. All assessments completed and consent was reviewed. Education given was regarding procedure, no sedation, post-procedure care and  management/follow-up. Opportunity for questions was provided and all questions and concerns were addressed.

## 2020-10-22 NOTE — PROGRESS NOTES
[facility]   [address] [facility], [address] [place] [address], [facility]  Phone: [phone]   [phone]       Nephrology Progress Note  Aracelis Stacy Fenton     [birth year]     [id number]  Date of Admission : 10/18/2020  10/22/20    CC: Follow up for ESRD     Assessment and Plan   ESRD- HD  - Dialyzes TTS at [address] dialysis in [place], [place]  - followed by Dr Juan J Martinez   - has Cindy Santos for access  - HD now   - continue Midodrine      Cirrhosis of Liver : ? Cause   - consider Echo     Anemia in CKD :   -  continue epogen     Blood loss anemia :  - EGD: antral gastritis and erythema   - Capsule study ? Results   - Hb down to 6.6 : ordered 1 unit RBC post HD      SOB/ SARKAR  - COVID -19 neg      Chronic HFrEF   - consider Echo      Care Plan discussed with: pt      Interval History:  Seen and examined on HD  Hb down again   No new sx   For ? Paracentesis today   Non tender . Denies any n/v/cp/sob    Review of Systems: A comprehensive review of systems was negative.     Current Medications:   Current Facility-Administered Medications   Medication Dose Route Frequency    0.9% sodium chloride infusion 250 mL  250 mL IntraVENous PRN    albumin human 25% (BUMINATE) solution 12.5 g  12.5 g IntraVENous DIALYSIS PRN    ascorbic acid (vitamin C) (VITAMIN C) tablet 500 mg  500 mg Oral DAILY    cholecalciferol (VITAMIN D3) (1000 Units /25 mcg) tablet 2 Tab  2,000 Units Oral DAILY    ferrous sulfate tablet 325 mg  325 mg Oral TID WITH MEALS    lactulose (CHRONULAC) 10 gram/15 mL solution 30 mL  20 g Oral BID    sevelamer carbonate (RENVELA) tab 1,600 mg  1,600 mg Oral TID WITH MEALS    0.9% sodium chloride infusion 250 mL  250 mL IntraVENous PRN    epoetin reshma-epbx (RETACRIT) injection 20,000 Units  20,000 Units SubCUTAneous Q TUE, THU & SAT    oxyCODONE IR (ROXICODONE) tablet 5 mg  5 mg Oral Q6H PRN    pantoprazole (PROTONIX) 40 mg in 0.9% sodium chloride 10 mL injection  40 mg IntraVENous Q12H    albuterol (PROVENTIL VENTOLIN) nebulizer solution 2.5 mg  2.5 mg Nebulization Q6H PRN    glucose chewable tablet 16 g  4 Tab Oral PRN    glucagon (GLUCAGEN) injection 1 mg  1 mg IntraMUSCular PRN    dextrose 10% infusion 0-250 mL  0-250 mL IntraVENous PRN    insulin lispro (HUMALOG) injection   SubCUTAneous AC&HS    0.9% sodium chloride infusion 250 mL  250 mL IntraVENous PRN      Allergies   Allergen Reactions    Aleve [Naproxen Sodium] Itching, Swelling and Other (comments)    Amlodipine Rash, Hives and Itching    Aspirin Other (comments), Nausea Only and Unknown (comments)     GI bleed  GI bleeding      Heparin Unknown (comments)     bleeding      Ibuprofen Nausea and Vomiting    Metformin Other (comments)     swelling       Objective:  Vitals:    Vitals:    10/22/20 1116 10/22/20 1132 10/22/20 1146 10/22/20 1202   BP: (!) 131/57 136/65 (!) 125/56 133/66   Pulse: 77 74 75 80   Resp: 16 16 16 16   Temp:       TempSrc:       SpO2:       Weight:       Height:         Intake and Output:  No intake/output data recorded. No intake/output data recorded. Physical Examination:  General:  Chronically ill looking   HEENT: PERRL, ++ Pallor , No Icterus  Neck: RIJ permacath +  Lungs : diminished at bases   CVS: RRR, S1 S2 normal, No murmur   Abdomen: distended, NT  Extremities: no Edema  Skin: No rash or lesions. MS: No joint swelling, erythema, warmth  Neurologic: non focal, AAO x 3  Psych: normal affect  []    High complexity decision making was performed  []    Patient is at high-risk of decompensation with multiple organ involvement    Lab Data Personally Reviewed: I have reviewed all the pertinent labs, microbiology data and radiology studies during assessment.     Recent Labs     10/22/20  0856 10/21/20  0508 10/20/20  0934    140 137   K 4.1 4.4 5.8*    104 100   CO2 30 27 26   GLU 97 97 73   BUN 32* 32* 74*   CREA 5.07* 4.43* 7.69*   CA 8.2* 8.4* 8.8   PHOS 4.6 4.5 6.1*   ALB 2.7* 2.7* 3.0* Recent Labs     10/22/20  0856 10/21/20  1115 10/21/20  0508 10/20/20  0935 10/20/20  0934   WBC 1.7*  --  4.1  --  5.0   HGB 6.6* 7.3* 6.9* 8.2* 8.3*   HCT 22.6* 24.7* 23.1*  --  27.4*     --  191  --  217     No results found for: SDES  Lab Results   Component Value Date/Time    Culture result: No growth 4 days 10/18/2020 07:09 AM     Recent Results (from the past 24 hour(s))   GLUCOSE, POC    Collection Time: 10/21/20  5:10 PM   Result Value Ref Range    Glucose (POC) 114 (H) 65 - 100 mg/dL    Performed by Yaz Christensen    GLUCOSE, POC    Collection Time: 10/21/20  9:31 PM   Result Value Ref Range    Glucose (POC) 196 (H) 65 - 100 mg/dL    Performed by Alexandria pichardo RN    GLUCOSE, POC    Collection Time: 10/22/20  6:07 AM   Result Value Ref Range    Glucose (POC) 102 (H) 65 - 100 mg/dL    Performed by Tash RIVERA (KANIKA)    RENAL FUNCTION PANEL    Collection Time: 10/22/20  8:56 AM   Result Value Ref Range    Sodium 139 136 - 145 mmol/L    Potassium 4.1 3.5 - 5.1 mmol/L    Chloride 102 97 - 108 mmol/L    CO2 30 21 - 32 mmol/L    Anion gap 7 5 - 15 mmol/L    Glucose 97 65 - 100 mg/dL    BUN 32 (H) 6 - 20 MG/DL    Creatinine 5.07 (H) 0.55 - 1.02 MG/DL    BUN/Creatinine ratio 6 (L) 12 - 20      GFR est AA 10 (L) >60 ml/min/1.73m2    GFR est non-AA 8 (L) >60 ml/min/1.73m2    Calcium 8.2 (L) 8.5 - 10.1 MG/DL    Phosphorus 4.6 2.6 - 4.7 MG/DL    Albumin 2.7 (L) 3.5 - 5.0 g/dL   CBC WITH AUTOMATED DIFF    Collection Time: 10/22/20  8:56 AM   Result Value Ref Range    WBC 1.7 (L) 3.6 - 11.0 K/uL    RBC 2.40 (L) 3.80 - 5.20 M/uL    HGB 6.6 (L) 11.5 - 16.0 g/dL    HCT 22.6 (L) 35.0 - 47.0 %    MCV 94.2 80.0 - 99.0 FL    MCH 27.5 26.0 - 34.0 PG    MCHC 29.2 (L) 30.0 - 36.5 g/dL    RDW 18.6 (H) 11.5 - 14.5 %    PLATELET 958 104 - 804 K/uL    MPV 10.5 8.9 - 12.9 FL    NRBC 0.0 0  WBC    ABSOLUTE NRBC 0.00 0.00 - 0.01 K/uL    NEUTROPHILS 64 32 - 75 %    LYMPHOCYTES 20 12 - 49 %    MONOCYTES 7 5 - 13 %    EOSINOPHILS 6 0 - 7 %    BASOPHILS 2 (H) 0 - 1 %    IMMATURE GRANULOCYTES 1 (H) 0.0 - 0.5 %    ABS. NEUTROPHILS 1.2 (L) 1.8 - 8.0 K/UL    ABS. LYMPHOCYTES 0.3 (L) 0.8 - 3.5 K/UL    ABS. MONOCYTES 0.1 0.0 - 1.0 K/UL    ABS. EOSINOPHILS 0.1 0.0 - 0.4 K/UL    ABS. BASOPHILS 0.0 0.0 - 0.1 K/UL    ABS. IMM. GRANS. 0.0 0.00 - 0.04 K/UL    DF SMEAR SCANNED      RBC COMMENTS ANISOCYTOSIS  1+        RBC COMMENTS OVALOCYTES  PRESENT        RBC COMMENTS RAINE CELLS  PRESENT               Total time spent with patient:  xxx   min. Care Plan discussed with:  Patient     Family      RN      Consulting Physician [facility],         I have reviewed the flowsheets. Chart and Pertinent Notes have been reviewed. No change in PMH ,family and social history from Consult note.       Yuliana Gomez MD

## 2020-10-22 NOTE — PROCEDURES
[facility].  [address] [facility] [address] [place] [id number]  [phone]                    M2A Capsule Endoscopy Procedure      NAME:  Parish Goncalves   :   1950   MRN:   [id number]       Date/Time:  10/22/2020   Procedure Type: M2A Capsule  Indications:   Iron deficiency anemia     Pre-operative Diagnosis: see indication above    Post-operative Diagnosis:  See findings below    : Easton Sheets MD    Referring Provider: None    Anethesia/Sedation: none      Procedure Details   PLEASE REFER TO THE SCANNED REPORT FOR MORE DETAILS    Findings:   First gastric image 00:01:03  First duodenal image 00:01:19  Non-bleeding angioectasias at 04:53:11, 04:54:11, 05:50:09  Area of presumed tattoo 05:53:44-05:55:08  Did not enter cecum                              Impression:            See findings     Recommendation:  KUB tomorrow to ensure capsule has passed into colon  Non-bleeding small AVMs mid-small intestine.  Will discuss with patient role of deep bowel enteroscopy at some time in future      Easton Sheets MD  10/22/2020  12:44 PM

## 2020-10-22 NOTE — PROGRESS NOTES
[facility]  Hospitalist Group                                                                                          Hospitalist Progress Note  Queta Jacobo MD  Answering service: [phone] from in house phone        Date of Service:  10/22/2020  NAME:  Lorenzo Hernandez  :  1950  MRN:  [id number]      Admission Summary:   Lorenzo Hernandez is a 69 y.o. female with past medical history of arthritis, asthma, ESRD, chronic pain, cirrhosis, type 2 DM, GERD, hypertension, and paroxysmal afib presented as a direct admission/ [facility] to [facility]) with chief complaint of low blood count (anemia). Patient was initially admitted to [facility] on 10/17/2020 with symptomatic anemia; hemoglobin = 5.5 on admission. PRBC was ordered by [facility] for transfusion. Interval history / Subjective:     SARS- Cov 2 negative   I saw Ms. Fenton after she returned from thoracentesis. She is feeling much. She denied nausea, vomiting, fever or chills. Assessment & Plan:     Symptomatic acute anemia on the background of chronic anemia of chronic disease, suspect GI loss  -She had 2 units of blood thus far. Scheduled for another unit today. -EGD showed very mild antral gastritis, friable mucosa which oozes on contact. One 5 mm area of vascular erythema in the mid-body was empirically treated with APC. -Capsule endoscopy showed nonbleeding small AVMs  -Continue PPI  -GI suggesting deep enteroscopy.   Patient preferring to be discharged and follow-up with her [place] providers as outpatient     Type 2 diabetes mellitus, fairly controlled        - Accu-Cheks before meals and at bedtime along with high-sensitivity Humalog sliding scale.      ESRD (end stage renal disease)        - consult nephrologist for hemodialysis TTS per renal      SARS-CoV-2 negative test 10/19      Hypertension       -monitor BP and provide anti-hypertensive therapy as needed     Chronic pain syndrome       -As needed medications      Liver cirrhosis       -Status post paracentesis on 10/22     Code status Full  DVT prophylaxis: SCD    Care Plan discussed with: Patient/Family and Nurse  Anticipated Disposition: Home w/Family  Anticipated Discharge: 24 hours to 48 hours     Hospital Problems  Date Reviewed: 10/6/2020          Codes Class Noted POA    ESRD (end stage renal disease) ([facility] ICD-10-CM: N18.6  ICD-9-CM: 585.6  10/18/2020 Unknown        Suspected COVID-19 virus infection ICD-10-CM: Z20.828  ICD-9-CM: V01.79  10/18/2020 Unknown        Type 2 diabetes mellitus with hyperglycemia ([facility] ICD-10-CM: E11.65  ICD-9-CM: 250.00  10/6/2020 Unknown        Anemia ICD-10-CM: D64.9  ICD-9-CM: 285.9  9/17/2020 Unknown                Review of Systems:   A comprehensive review of systems was negative. Vital Signs:    Last 24hrs VS reviewed since prior progress note. Most recent are:  Visit Vitals  BP (!) 150/70 (BP 1 Location: Left arm, BP Patient Position: At rest)   Pulse 87   Temp 96.9 °F (36.1 °C)   Resp 18   Ht 5' 4\" (1.626 m)   Wt 70.5 kg (155 lb 8 oz)   SpO2 100%   BMI 26.69 kg/m²         Intake/Output Summary (Last 24 hours) at 10/22/2020 1809  Last data filed at 10/22/2020 1433  Gross per 24 hour   Intake --   Output 4300 ml   Net -4300 ml        Physical Examination:             Constitutional:  No acute distress, cooperative, pleasant    ENT:  Oral mucosa moist, oropharynx benign. Resp:  CTA bilaterally. No wheezing/rhonchi/rales. No accessory muscle use   CV:  Regular rhythm, normal rate, no murmurs, gallops, rubs    GI:  Soft, non distended, non tender. normoactive bowel sounds, no hepatosplenomegaly     Musculoskeletal:  Healed skin lesions on both legs. Neurologic:  Moves all extremities. AAOx3, CN II-XII reviewed     Psych:  Good insight, Not anxious nor agitated.        Data Review:    I personally reviewed  Image and labs    Xr Abd (kub)    Result Date: 10/22/2020  IMPRESSION: Nonspecific intestinal gas pattern. Electronic capsule overlies the right colon. [address]    Result Date: 10/21/2020  IMPRESSION: Positive ascites in all 4 quadrants. Ir Paracentesis Abd W Image    Result Date: 10/22/2020  IMPRESSION: Successful ultrasound guided paracentesis yielding approximately 3800 ml of fluid. Labs:     Recent Labs     10/22/20  0856 10/21/20  1115 10/21/20  0508   WBC 1.7*  --  4.1   HGB 6.6* 7.3* 6.9*   HCT 22.6* 24.7* 23.1*     --  191     Recent Labs     10/22/20  0856 10/21/20  0508 10/20/20  0934    140 137   K 4.1 4.4 5.8*    104 100   CO2 30 27 26   BUN 32* 32* 74*   CREA 5.07* 4.43* 7.69*   GLU 97 97 73   CA 8.2* 8.4* 8.8   PHOS 4.6 4.5 6.1*     Recent Labs     10/22/20  0856 10/21/20  0508 10/20/20  0934   ALB 2.7* 2.7* 3.0*     No results for input(s): INR, PTP, APTT, INREXT, INREXT in the last 72 hours. No results for input(s): FE, TIBC, PSAT, FERR in the last 72 hours. Lab Results   Component Value Date/Time    Folate 16.4 10/19/2020 09:34 AM      No results for input(s): PH, PCO2, PO2 in the last 72 hours. No results for input(s): CPK, CKNDX, TROIQ in the last 72 hours.     No lab exists for component: CPKMB  No results found for: CHOL, CHOLX, CHLST, CHOLV, HDL, HDLP, LDL, LDLC, DLDLP, TGLX, TRIGL, TRIGP, CHHD, CHHDX  Lab Results   Component Value Date/Time    Glucose (POC) 94 10/22/2020 03:35 PM    Glucose (POC) 102 (H) 10/22/2020 06:07 AM    Glucose (POC) 196 (H) 10/21/2020 09:31 PM    Glucose (POC) 114 (H) 10/21/2020 05:10 PM    Glucose (POC) 82 10/21/2020 12:00 PM     No results found for: COLOR, APPRN, SPGRU, REFSG, VERENA, PROTU, GLUCU, KETU, BILU, UROU, TIBURCIO, LEUKU, GLUKE, EPSU, BACTU, WBCU, RBCU, CASTS, UCRY      Medications Reviewed:     Current Facility-Administered Medications   Medication Dose Route Frequency    0.9% sodium chloride infusion 250 mL  250 mL IntraVENous PRN    fluticasone propionate (FLONASE) 50 mcg/actuation nasal spray 2 Spray  2 Spray Both Nostrils DAILY    ketotifen (ZADITOR) 0.025 % (0.035 %) ophthalmic solution 1 Drop  1 Drop Both Eyes BID    albumin human 25% (BUMINATE) solution 12.5 g  12.5 g IntraVENous DIALYSIS PRN    ascorbic acid (vitamin C) (VITAMIN C) tablet 500 mg  500 mg Oral DAILY    cholecalciferol (VITAMIN D3) (1000 Units /25 mcg) tablet 2 Tab  2,000 Units Oral DAILY    ferrous sulfate tablet 325 mg  325 mg Oral TID WITH MEALS    lactulose (CHRONULAC) 10 gram/15 mL solution 30 mL  20 g Oral BID    sevelamer carbonate (RENVELA) tab 1,600 mg  1,600 mg Oral TID WITH MEALS    0.9% sodium chloride infusion 250 mL  250 mL IntraVENous PRN    epoetin reshma-epbx (RETACRIT) injection 20,000 Units  20,000 Units SubCUTAneous Q TUE, THU & SAT    oxyCODONE IR (ROXICODONE) tablet 5 mg  5 mg Oral Q6H PRN    pantoprazole (PROTONIX) 40 mg in 0.9% sodium chloride 10 mL injection  40 mg IntraVENous Q12H    albuterol (PROVENTIL VENTOLIN) nebulizer solution 2.5 mg  2.5 mg Nebulization Q6H PRN    glucose chewable tablet 16 g  4 Tab Oral PRN    glucagon (GLUCAGEN) injection 1 mg  1 mg IntraMUSCular PRN    dextrose 10% infusion 0-250 mL  0-250 mL IntraVENous PRN    insulin lispro (HUMALOG) injection   SubCUTAneous AC&HS    0.9% sodium chloride infusion 250 mL  250 mL IntraVENous PRN     ______________________________________________________________________  EXPECTED LENGTH OF STAY: 3d 14h  ACTUAL LENGTH OF STAY:          4                 Vito Aldridge MD

## 2020-10-22 NOTE — PROGRESS NOTES
Dr. Quang Florence notified about pt refusing to eat diabetic consistent carb diet. Started on regular diet.

## 2020-10-22 NOTE — PROGRESS NOTES
TRANSFER - OUT REPORT:    Verbal report given to QUINTIN Christensen(name) on Berry Wagner  being transferred to [facility](unit) for routine progression of care       Report consisted of patients Situation, Background, Assessment and   Recommendations(SBAR). Information from the following report(s) Procedure Summary was reviewed with the receiving nurse. Lines:   Peripheral IV 10/18/20 Anterior;Right Forearm (Active)   Site Assessment Clean, dry, & intact 10/22/20 0801   Phlebitis Assessment 0 10/22/20 0801   Infiltration Assessment 0 10/22/20 0801   Dressing Status Clean, dry, & intact 10/22/20 0801   Dressing Type Transparent 10/22/20 0801   Hub Color/Line Status Capped 10/22/20 0801   Action Taken Open ports on tubing capped 10/22/20 0801   Alcohol Cap Used Yes 10/22/20 0801      3,800 cc's removed during US paracentesis. Monitor RLQ dressing for any bleeding or oozing. Opportunity for questions and clarification was provided.

## 2020-10-22 NOTE — DIALYSIS
[facility]  [phone]    Vitals   Pre   Post   Assessment   Pre   Post     Temp  Temp: 98.1 °F (36.7 °C) (10/22/20 0801)  98.7 LOC  A&OX4 A&OX4   HR   Pulse (Heart Rate): 76 (10/22/20 0846) 79 Lungs   diminished  diminished   B/P   BP: (!) 146/63 (10/22/20 0846) 144/59 Cardiac   HRR  HRR   Resp   Resp Rate: 16 (10/22/20 0846) 16 Skin   Warm and dry  warm and dry   Pain level  Pain Intensity 1: 10 (10/22/20 0739) 1 Edema    1+HOUSTON   1+HOUSTON   Orders:    Duration:   Start:    0845 End:    1215 Total:   3.5 hrs   Dialyzer:   Dialyzer/Set Up Inspection: Caio Bourne (10/22/20 0846)   K Bath:   Dialysate K (mEq/L): 2 (10/22/20 0846)   Ca Bath:   Dialysate CA (mEq/L): 2.5 (10/22/20 0846)   Na/Bicarb:   Dialysate NA (mEq/L): 140 (10/22/20 0846)   Target Fluid Removal:   Goal/Amount of Fluid to Remove (mL): 2500 mL (10/20/20 1200)   Access     Type & Location:   [facility]. Each catheter limb disinfected per p&p, caps removed, hubs disinfected per p&p. Each dialysis catheter limb disinfected per p&p, blood returned per p&p, each dialysis hub disinfected per p&p, post dialysis catheter dwell instilled per order, and caps applied.      Labs     Obtained/Reviewed   Critical Results Called   Date when labs were drawn-  Hgb-    HGB   Date Value Ref Range Status   10/21/2020 7.3 (L) 11.5 - 16.0 g/dL Final     K-    Potassium   Date Value Ref Range Status   10/21/2020 4.4 3.5 - 5.1 mmol/L Final     Comment:     INVESTIGATED PER DELTA CHECK PROTOCOL     Ca-   Calcium   Date Value Ref Range Status   10/21/2020 8.4 (L) 8.5 - 10.1 MG/DL Final     Bun-   BUN   Date Value Ref Range Status   10/21/2020 32 (H) 6 - 20 MG/DL Final     Comment:     INVESTIGATED PER DELTA CHECK PROTOCOL     Creat-   Creatinine   Date Value Ref Range Status   10/21/2020 4.43 (H) 0.55 - 1.02 MG/DL Final     Comment:     INVESTIGATED PER DELTA CHECK PROTOCOL        Medications/ Blood Products Given     Name   Dose   Route and Time     none                Blood Volume Processed (BVP):    68.3 liters Net Fluid   Removed:  500 ml   Comments Patient complained of feet cramping despite already low ufr. UFR reduced and 100 ml saline bolus given. No other problems. Report to A Joshua RN using SBAR. Patient to Angio from dialysis unit. Time Out Done: yes, 0841  Primary Nurse Rpt Pre: Mohamud Gutierrez RN  Primary Nurse Rpt Post: Mohamud Gutierrez RN  Pt Education: Procedural. Access Care  Care Plan: Routine HD  Tx Summary: 3.5 hours on 2 K 2.5 Ca 140/35 removed 500 ml net. Admiting Diagnosis:  Pt's previous clinic-[facility] signed - Informed Consent Verified: Yes (10/22/20 5633)  [facility] Consent -   Hepatitis Status- physician portal negative antigen 01/16/20 immune 08/11/20  Machine #- Machine Number: [id number] (10/22/20 2249)  Telemetry status-no  Pre-dialysis wt. -

## 2020-10-22 NOTE — PROGRESS NOTES
[address]  [address] [facility] [address], [address]  [phone]                GI PROGRESS NOTE      NAME:   Nino Hutchinson   :    1950   MRN:    [id number]     Assessment/Plan   Melena/Anemia/ GI Bleed/GERD  -Hemoglobin 6.6 today, patient to receive 1 unit of PRBCs. Continue to monitor H & H  -Continue to monitor H&H, transfuse if hemoglobin less then 7.0  -BID PPI with Protonix  -EGD 10/21/2020 Esophagus:normalStomach: very mild antral gastritis, mucosa is friable and oozes with contact. One 5 mm area of vascular erythema in mid-body, empirically treated with APC at 1.0 LF at 49. Duodenum/jejunum:normal; capsule deployed into duodenal bulb  -M2A capsule 10/21/2020: Non-bleeding small AVMs mid-small intestine. Area of presumed tattoo   -KUB ensure capsule has passed into colon  -Possible need for deep bowel eneteroscopy     Liver disease/Cirhosis/Ascites:  -Abdominal ultrasound 10/21/2020 Positive ascites in all 4 quadrants.  Paracentesis today.  -Patient stated follow up at [facility] consulted   -LFT's ALT 13, AST 13, Alk phos 133, total bilirubin 0.4 on 10/19/2020     ESRD, diabetes, COVID 19 negative, hypertension, chronic pain, diabetes  -management per hospitalist   -nephrology following   -patient to receive dialysis Tuesday, Thursday, Saturday     Will discuss with Dr. Chava Llanos     Patient Active Problem List   Diagnosis Code    GI bleed K92.2    Diabetes mellitus type 2, controlled ([address] E11.9    Anemia in chronic kidney disease N18.9, D63.1    Cirrhosis ([address] K74.60    Acute blood loss anemia D62    Dizziness R42    Generalized weakness R53.1    Rectal bleed K62.5    Symptomatic anemia D64.9    Severe anemia D64.9    Anemia D64.9    Dependence on renal dialysis ([address] Z99.2    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, or end stage renal disease (HCC) I13.2    Other ascites R18.8    Other chronic pain G89.29    Paroxysmal atrial fibrillation (HCC) I48.0    Type 2 diabetes mellitus with hyperglycemia (HCC) E11.65    Unspecified cirrhosis of liver (HCC) K74.60    Other hypotension I95.89    HTN (hypertension) I10    ESRD (end stage renal disease) (HCC) N18.6    Suspected COVID-19 virus infection Z20.828       Subjective:     Tasha Churchill is a 69 y.o.  female Patient reports having large black bowel movement on last night that was formed. Didn't see capsule in stool. Patient denies nausea, no vomiting,no abdominal pain   Objective:     VITALS:   Last 24hrs VS reviewed since prior hospitalist progress note. Most recent are:  Visit Vitals  BP (!) 150/70 (BP 1 Location: Left arm, BP Patient Position: At rest)   Pulse 87   Temp 96.9 °F (36.1 °C)   Resp 18   Ht 5' 4\" (1.626 m)   Wt 70.5 kg (155 lb 8 oz)   SpO2 100%   BMI 26.69 kg/m²       Intake/Output Summary (Last 24 hours) at 10/22/2020 1551  Last data filed at 10/22/2020 1433  Gross per 24 hour   Intake --   Output 4300 ml   Net -4300 ml        PHYSICAL EXAM:  General:          WD, WN. Alert, cooperative, no acute distress    HEENT:           NC, Atraumatic.  PERRLA, EOMI. Anicteric sclerae. Lungs:             CTA Bilaterally. No Wheezing/Rhonchi/Rales. Heart:              Regular  rhythm,  No murmur   Abdomen:        Soft, mild distended, generalized tenderness with deep palpation. Bowel sounds present, no palpable masses   Extremities:     No edema   Neurologic:      CN 2-12 gi, Alert and oriented X 3.  No acute neurological distress   Psych:             Good insight. Not anxious nor agitated.      Lab Data   Recent Results (from the past 12 hour(s))   GLUCOSE, POC    Collection Time: 10/22/20  6:07 AM   Result Value Ref Range    Glucose (POC) 102 (H) 65 - 100 mg/dL    Performed by Lulú RIVERA (KANIKA)    RENAL FUNCTION PANEL    Collection Time: 10/22/20  8:56 AM   Result Value Ref Range    Sodium 139 136 - 145 mmol/L Potassium 4.1 3.5 - 5.1 mmol/L    Chloride 102 97 - 108 mmol/L    CO2 30 21 - 32 mmol/L    Anion gap 7 5 - 15 mmol/L    Glucose 97 65 - 100 mg/dL    BUN 32 (H) 6 - 20 MG/DL    Creatinine 5.07 (H) 0.55 - 1.02 MG/DL    BUN/Creatinine ratio 6 (L) 12 - 20      GFR est AA 10 (L) >60 ml/min/1.73m2    GFR est non-AA 8 (L) >60 ml/min/1.73m2    Calcium 8.2 (L) 8.5 - 10.1 MG/DL    Phosphorus 4.6 2.6 - 4.7 MG/DL    Albumin 2.7 (L) 3.5 - 5.0 g/dL   CBC WITH AUTOMATED DIFF    Collection Time: 10/22/20  8:56 AM   Result Value Ref Range    WBC 1.7 (L) 3.6 - 11.0 K/uL    RBC 2.40 (L) 3.80 - 5.20 M/uL    HGB 6.6 (L) 11.5 - 16.0 g/dL    HCT 22.6 (L) 35.0 - 47.0 %    MCV 94.2 80.0 - 99.0 FL    MCH 27.5 26.0 - 34.0 PG    MCHC 29.2 (L) 30.0 - 36.5 g/dL    RDW 18.6 (H) 11.5 - 14.5 %    PLATELET 412 042 - 416 K/uL    MPV 10.5 8.9 - 12.9 FL    NRBC 0.0 0  WBC    ABSOLUTE NRBC 0.00 0.00 - 0.01 K/uL    NEUTROPHILS 64 32 - 75 %    LYMPHOCYTES 20 12 - 49 %    MONOCYTES 7 5 - 13 %    EOSINOPHILS 6 0 - 7 %    BASOPHILS 2 (H) 0 - 1 %    IMMATURE GRANULOCYTES 1 (H) 0.0 - 0.5 %    ABS. NEUTROPHILS 1.2 (L) 1.8 - 8.0 K/UL    ABS. LYMPHOCYTES 0.3 (L) 0.8 - 3.5 K/UL    ABS. MONOCYTES 0.1 0.0 - 1.0 K/UL    ABS. EOSINOPHILS 0.1 0.0 - 0.4 K/UL    ABS. BASOPHILS 0.0 0.0 - 0.1 K/UL    ABS. IMM.  GRANS. 0.0 0.00 - 0.04 K/UL    DF SMEAR SCANNED      RBC COMMENTS ANISOCYTOSIS  1+        RBC COMMENTS OVALOCYTES  PRESENT        RBC COMMENTS RAINE CELLS  PRESENT       TYPE & SCREEN    Collection Time: 10/22/20 12:45 PM   Result Value Ref Range    Crossmatch Expiration 10/25/2020     ABO/Rh(D) O POSITIVE     Antibody screen PENDING     Comment Previously identified anti E and probable anti VS    GLUCOSE, POC    Collection Time: 10/22/20  3:35 PM   Result Value Ref Range    Glucose (POC) 94 65 - 100 mg/dL    Performed by Lea Dunlap  PCT          Medications: Reviewed  My Milian NP

## 2020-10-22 NOTE — PROCEDURES
Interventional Radiology  Procedure Note        10/22/2020 1:28 PM    Patient: Tasha Churchill     Informed consent obtained    Diagnosis: Ascites    Procedure(s): ultrasound guided paracentesis    Specimens removed:  Ongoing drainage at time of documentation    Complications: None    Primary Physician: Christen Sykes MD    Recomendations: N/A    Discharge Disposition: stable; return to floor    Full dictated report to follow    Christen Sykes MD  Interventional Radiology  [facility]  1:28 PM, 10/22/2020

## 2020-10-23 ENCOUNTER — DOCUMENTATION ONLY (OUTPATIENT)
Dept: GASTROENTEROLOGY | Age: 70
End: 2020-10-23

## 2020-10-23 ENCOUNTER — APPOINTMENT (OUTPATIENT)
Dept: GENERAL RADIOLOGY | Age: 70
DRG: 377 | End: 2020-10-23
Attending: HOSPITALIST
Payer: MEDICARE

## 2020-10-23 ENCOUNTER — APPOINTMENT (OUTPATIENT)
Dept: ULTRASOUND IMAGING | Age: 70
DRG: 377 | End: 2020-10-23
Attending: HOSPITALIST
Payer: MEDICARE

## 2020-10-23 LAB
ABO + RH BLD: NORMAL
ALBUMIN SERPL-MCNC: 2.6 G/DL (ref 3.5–5)
ALBUMIN SERPL-MCNC: 2.6 G/DL (ref 3.5–5)
ALBUMIN/GLOB SERPL: 1 {RATIO} (ref 1.1–2.2)
ALP SERPL-CCNC: 120 U/L (ref 45–117)
ALT SERPL-CCNC: 11 U/L (ref 12–78)
ANION GAP SERPL CALC-SCNC: 7 MMOL/L (ref 5–15)
ANION GAP SERPL CALC-SCNC: 8 MMOL/L (ref 5–15)
ANTIGENS PRESENT RBC DONR: NORMAL
AST SERPL-CCNC: 12 U/L (ref 15–37)
BACTERIA SPEC CULT: NORMAL
BASOPHILS # BLD: 0.1 K/UL (ref 0–0.1)
BASOPHILS NFR BLD: 1 % (ref 0–1)
BILIRUB SERPL-MCNC: 0.4 MG/DL (ref 0.2–1)
BLD PROD TYP BPU: NORMAL
BLOOD BANK CMNT PATIENT-IMP: NORMAL
BLOOD GROUP ANTIBODIES SERPL: NORMAL
BPU ID: NORMAL
BUN SERPL-MCNC: 18 MG/DL (ref 6–20)
BUN SERPL-MCNC: 18 MG/DL (ref 6–20)
BUN/CREAT SERPL: 5 (ref 12–20)
BUN/CREAT SERPL: 5 (ref 12–20)
CALCIUM SERPL-MCNC: 8.5 MG/DL (ref 8.5–10.1)
CALCIUM SERPL-MCNC: 8.6 MG/DL (ref 8.5–10.1)
CHLORIDE SERPL-SCNC: 103 MMOL/L (ref 97–108)
CHLORIDE SERPL-SCNC: 104 MMOL/L (ref 97–108)
CO2 SERPL-SCNC: 28 MMOL/L (ref 21–32)
CO2 SERPL-SCNC: 28 MMOL/L (ref 21–32)
CREAT SERPL-MCNC: 3.72 MG/DL (ref 0.55–1.02)
CREAT SERPL-MCNC: 3.78 MG/DL (ref 0.55–1.02)
CROSSMATCH RESULT,%XM: NORMAL
DIFFERENTIAL METHOD BLD: ABNORMAL
EOSINOPHIL # BLD: 0.3 K/UL (ref 0–0.4)
EOSINOPHIL NFR BLD: 5 % (ref 0–7)
ERYTHROCYTE [DISTWIDTH] IN BLOOD BY AUTOMATED COUNT: 20.1 % (ref 11.5–14.5)
GLOBULIN SER CALC-MCNC: 2.6 G/DL (ref 2–4)
GLUCOSE BLD STRIP.AUTO-MCNC: 142 MG/DL (ref 65–100)
GLUCOSE BLD STRIP.AUTO-MCNC: 183 MG/DL (ref 65–100)
GLUCOSE BLD STRIP.AUTO-MCNC: 186 MG/DL (ref 65–100)
GLUCOSE BLD STRIP.AUTO-MCNC: 93 MG/DL (ref 65–100)
GLUCOSE SERPL-MCNC: 116 MG/DL (ref 65–100)
GLUCOSE SERPL-MCNC: 116 MG/DL (ref 65–100)
HCT VFR BLD AUTO: 30.3 % (ref 35–47)
HGB BLD-MCNC: 9.1 G/DL (ref 11.5–16)
IMM GRANULOCYTES # BLD AUTO: 0 K/UL (ref 0–0.04)
IMM GRANULOCYTES NFR BLD AUTO: 0 % (ref 0–0.5)
LYMPHOCYTES # BLD: 0.6 K/UL (ref 0.8–3.5)
LYMPHOCYTES NFR BLD: 11 % (ref 12–49)
MCH RBC QN AUTO: 27.6 PG (ref 26–34)
MCHC RBC AUTO-ENTMCNC: 30 G/DL (ref 30–36.5)
MCV RBC AUTO: 91.8 FL (ref 80–99)
MONOCYTES # BLD: 0.5 K/UL (ref 0–1)
MONOCYTES NFR BLD: 9 % (ref 5–13)
NEUTS SEG # BLD: 4 K/UL (ref 1.8–8)
NEUTS SEG NFR BLD: 74 % (ref 32–75)
NRBC # BLD: 0 K/UL (ref 0–0.01)
NRBC BLD-RTO: 0 PER 100 WBC
PHOSPHATE SERPL-MCNC: 4.5 MG/DL (ref 2.6–4.7)
PLATELET # BLD AUTO: 192 K/UL (ref 150–400)
PMV BLD AUTO: 10.1 FL (ref 8.9–12.9)
POTASSIUM SERPL-SCNC: 4.1 MMOL/L (ref 3.5–5.1)
POTASSIUM SERPL-SCNC: 4.1 MMOL/L (ref 3.5–5.1)
PROT SERPL-MCNC: 5.2 G/DL (ref 6.4–8.2)
RBC # BLD AUTO: 3.3 M/UL (ref 3.8–5.2)
RBC MORPH BLD: ABNORMAL
SERVICE CMNT-IMP: ABNORMAL
SERVICE CMNT-IMP: NORMAL
SODIUM SERPL-SCNC: 139 MMOL/L (ref 136–145)
SODIUM SERPL-SCNC: 139 MMOL/L (ref 136–145)
SPECIAL REQUESTS,SREQ: NORMAL
SPECIMEN EXP DATE BLD: NORMAL
STATUS OF UNIT,%ST: NORMAL
UNIT DIVISION, %UDIV: 0
WBC # BLD AUTO: 5.5 K/UL (ref 3.6–11)

## 2020-10-23 PROCEDURE — 74011250637 HC RX REV CODE- 250/637: Performed by: FAMILY MEDICINE

## 2020-10-23 PROCEDURE — 74011250637 HC RX REV CODE- 250/637: Performed by: HOSPITALIST

## 2020-10-23 PROCEDURE — 74018 RADEX ABDOMEN 1 VIEW: CPT

## 2020-10-23 PROCEDURE — 80053 COMPREHEN METABOLIC PANEL: CPT

## 2020-10-23 PROCEDURE — 85025 COMPLETE CBC W/AUTO DIFF WBC: CPT

## 2020-10-23 PROCEDURE — 74011250637 HC RX REV CODE- 250/637: Performed by: NURSE PRACTITIONER

## 2020-10-23 PROCEDURE — 76705 ECHO EXAM OF ABDOMEN: CPT

## 2020-10-23 PROCEDURE — 65270000029 HC RM PRIVATE

## 2020-10-23 PROCEDURE — 74011250636 HC RX REV CODE- 250/636: Performed by: NURSE PRACTITIONER

## 2020-10-23 PROCEDURE — 36415 COLL VENOUS BLD VENIPUNCTURE: CPT

## 2020-10-23 PROCEDURE — 74011250636 HC RX REV CODE- 250/636: Performed by: INTERNAL MEDICINE

## 2020-10-23 PROCEDURE — 82962 GLUCOSE BLOOD TEST: CPT

## 2020-10-23 PROCEDURE — C9113 INJ PANTOPRAZOLE SODIUM, VIA: HCPCS | Performed by: NURSE PRACTITIONER

## 2020-10-23 PROCEDURE — 74011000250 HC RX REV CODE- 250: Performed by: NURSE PRACTITIONER

## 2020-10-23 PROCEDURE — 80069 RENAL FUNCTION PANEL: CPT

## 2020-10-23 RX ADMIN — OXYCODONE 5 MG: 5 TABLET ORAL at 09:44

## 2020-10-23 RX ADMIN — SODIUM CHLORIDE 40 MG: 9 INJECTION, SOLUTION INTRAMUSCULAR; INTRAVENOUS; SUBCUTANEOUS at 09:44

## 2020-10-23 RX ADMIN — FERROUS SULFATE TAB 325 MG (65 MG ELEMENTAL FE) 325 MG: 325 (65 FE) TAB at 13:40

## 2020-10-23 RX ADMIN — SEVELAMER CARBONATE 1600 MG: 800 TABLET, FILM COATED ORAL at 13:36

## 2020-10-23 RX ADMIN — OXYCODONE 5 MG: 5 TABLET ORAL at 16:23

## 2020-10-23 RX ADMIN — FERROUS SULFATE TAB 325 MG (65 MG ELEMENTAL FE) 325 MG: 325 (65 FE) TAB at 16:23

## 2020-10-23 RX ADMIN — MICONAZOLE NITRATE 200 MG: 200 SUPPOSITORY VAGINAL at 23:59

## 2020-10-23 RX ADMIN — KETOTIFEN FUMARATE 1 DROP: 0.35 SOLUTION/ DROPS OPHTHALMIC at 09:45

## 2020-10-23 RX ADMIN — OXYCODONE 5 MG: 5 TABLET ORAL at 22:31

## 2020-10-23 RX ADMIN — OXYCODONE 5 MG: 5 TABLET ORAL at 03:41

## 2020-10-23 RX ADMIN — SEVELAMER CARBONATE 1600 MG: 800 TABLET, FILM COATED ORAL at 08:00

## 2020-10-23 RX ADMIN — FLUTICASONE PROPIONATE 2 SPRAY: 50 SPRAY, METERED NASAL at 09:45

## 2020-10-23 RX ADMIN — SEVELAMER CARBONATE 1600 MG: 800 TABLET, FILM COATED ORAL at 16:23

## 2020-10-23 RX ADMIN — SODIUM CHLORIDE 40 MG: 9 INJECTION, SOLUTION INTRAMUSCULAR; INTRAVENOUS; SUBCUTANEOUS at 21:29

## 2020-10-23 RX ADMIN — OXYCODONE HYDROCHLORIDE AND ACETAMINOPHEN 500 MG: 500 TABLET ORAL at 09:44

## 2020-10-23 RX ADMIN — Medication 2 TABLET: at 09:44

## 2020-10-23 NOTE — PROGRESS NOTES
[address] [facility]  Hospitalist Group                                                                                          Hospitalist Progress Note  Tong Mcgovern MD  Answering service: [phone] from in house phone        Date of Service:  10/23/2020  NAME:  Melissa Rivas  :  1950  MRN:  [id number]      Admission Summary:   Melissa Rivas is a 69 y.o. female with past medical history of arthritis, asthma, ESRD, chronic pain, cirrhosis, type 2 DM, GERD, hypertension, and paroxysmal afib presented as a direct admission/ transfer from [address] to [facility] with chief complaint of low blood count (anemia). Patient was initially admitted to [facility] on 10/17/2020 with symptomatic anemia; hemoglobin = 5.5 on admission. PRBC was ordered by [facility] for transfusion. Interval history / Subjective:   Ms. Kassi Rodriguez tells me she noted increased edema on her right side of her abdomen since she had the paracentesis procedure yesterday. Ultrasound of the abdomen ordered. She denied fever or chills, nausea or vomiting or diarrhea. Assessment & Plan:     Symptomatic acute anemia on the background of chronic anemia of chronic disease, suspect GI loss  -She had 3 units of blood thus far. Hemoglobin up to 9.     -EGD showed very mild antral gastritis, friable mucosa which oozes on contact. One 5 mm area of vascular erythema in the mid-body was empirically treated with APC. -Capsule endoscopy showed nonbleeding small AVMs  -Continue PPI  -GI suggesting deep enteroscopy. Patient preferring to be discharged and follow-up with her [place] providers as outpatient    Right lower abdominal subcutaneous edema suspect hematoma post paracentesis  -Ultrasound of the abdomen showedSubcutaneous soft tissue thickening with collection.  Finding could represent an abdominal wall hematoma  -Monitor hemoglobin, watch for 1 more day.       Type 2 diabetes mellitus, fairly controlled        - Accu-Cheks before meals and at bedtime along with high-sensitivity Humalog sliding scale.      ESRD (end stage renal disease)        - consult nephrologist for hemodialysis TTS per renal      SARS-CoV-2 negative test 10/19      Hypertension       -monitor BP and provide anti-hypertensive therapy as needed     Chronic pain syndrome       -As needed medications      Liver cirrhosis       -Status post paracentesis on 10/22     Code status Full  DVT prophylaxis: SCD    Care Plan discussed with: Patient/Family and Nurse  Anticipated Disposition: Home w/Family  Anticipated Discharge: 24 hours to 48 hours     Hospital Problems  Date Reviewed: 10/6/2020          Codes Class Noted POA    ESRD (end stage renal disease) ([facility] ICD-10-CM: N18.6  ICD-9-CM: 585.6  10/18/2020 Unknown        Suspected COVID-19 virus infection ICD-10-CM: Z20.828  ICD-9-CM: V01.79  10/18/2020 Unknown        Type 2 diabetes mellitus with hyperglycemia ([facility] ICD-10-CM: E11.65  ICD-9-CM: 250.00  10/6/2020 Unknown        Anemia ICD-10-CM: D64.9  ICD-9-CM: 285.9  9/17/2020 Unknown                Review of Systems:   A comprehensive review of systems was negative. Vital Signs:    Last 24hrs VS reviewed since prior progress note. Most recent are:  Visit Vitals  /67   Pulse 67   Temp 98.2 °F (36.8 °C)   Resp 17   Ht 5' 4\" (1.626 m)   Wt 71 kg (156 lb 8 oz)   SpO2 97%   BMI 26.86 kg/m²         Intake/Output Summary (Last 24 hours) at 10/23/2020 1844  Last data filed at 10/22/2020 2128  Gross per 24 hour   Intake 275 ml   Output --   Net 275 ml        Physical Examination:             Constitutional:  No acute distress, cooperative, pleasant    ENT:  Oral mucosa moist, oropharynx benign. Resp:  CTA bilaterally. No wheezing/rhonchi/rales. No accessory muscle use   CV:  Regular rhythm, normal rate, no murmurs, gallops, rubs    GI:  Soft, non distended, non tender.  normoactive bowel sounds, no hepatosplenomegaly Musculoskeletal:  Healed skin lesions on both legs. Neurologic:  Moves all extremities. AAOx3, CN II-XII reviewed     Psych:  Good insight, Not anxious nor agitated. Data Review:    I personally reviewed  Image and labs    Xr Abd (kub)    Result Date: 10/23/2020  IMPRESSION: Endoscopic capsule projects over the mid abdomen. Xr Abd (kub)    Result Date: 10/22/2020  IMPRESSION: Nonspecific intestinal gas pattern. Electronic capsule overlies the right colon. [address]    Result Date: 10/23/2020  IMPRESSION: Subcutaneous soft tissue thickening with collection. Finding could represent an abdominal wall hematoma. [address]    Result Date: 10/21/2020  IMPRESSION: Positive ascites in all 4 quadrants. Ir Paracentesis Abd W Image    Result Date: 10/22/2020  IMPRESSION: Successful ultrasound guided paracentesis yielding approximately 3800 ml of fluid. Labs:     Recent Labs     10/23/20  0343 10/22/20  0856   WBC 5.5 1.7*   HGB 9.1* 6.6*   HCT 30.3* 22.6*    180     Recent Labs     10/23/20  0344 10/23/20  0343 10/22/20  0856 10/21/20  0508    139 139 140   K 4.1 4.1 4.1 4.4    104 102 104   CO2 28 28 30 27   BUN 18 18 32* 32*   CREA 3.72* 3.78* 5.07* 4.43*   * 116* 97 97   CA 8.5 8.6 8.2* 8.4*   PHOS 4.5  --  4.6 4.5     Recent Labs     10/23/20  0344 10/23/20  0343 10/22/20  0856   ALT  --  11*  --    AP  --  120*  --    TBILI  --  0.4  --    TP  --  5.2*  --    ALB 2.6* 2.6* 2.7*   GLOB  --  2.6  --      No results for input(s): INR, PTP, APTT, INREXT, INREXT in the last 72 hours. No results for input(s): FE, TIBC, PSAT, FERR in the last 72 hours. Lab Results   Component Value Date/Time    Folate 16.4 10/19/2020 09:34 AM      No results for input(s): PH, PCO2, PO2 in the last 72 hours. No results for input(s): CPK, CKNDX, TROIQ in the last 72 hours.     No lab exists for component: CPKMB  No results found for: CHOL, CHOLX, CHLST, CHOLV, HDL, HDLP, LDL, LDLC, SUKUMAR, Akira Bourgeois, [facility], [facility]  Lab Results   Component Value Date/Time    Glucose (POC) 186 (H) 10/23/2020 04:40 PM    Glucose (POC) 142 (H) 10/23/2020 12:25 PM    Glucose (POC) 93 10/23/2020 07:09 AM    Glucose (POC) 128 (H) 10/22/2020 10:17 PM    Glucose (POC) 94 10/22/2020 03:35 PM     No results found for: COLOR, APPRN, SPGRU, REFSG, VERENA, PROTU, GLUCU, KETU, BILU, UROU, TIBURCIO, LEUKU, GLUKE, EPSU, BACTU, WBCU, RBCU, CASTS, UCRY      Medications Reviewed:     Current Facility-Administered Medications   Medication Dose Route Frequency    0.9% sodium chloride infusion 250 mL  250 mL IntraVENous PRN    fluticasone propionate (FLONASE) 50 mcg/actuation nasal spray 2 Spray  2 Spray Both Nostrils DAILY    ketotifen (ZADITOR) 0.025 % (0.035 %) ophthalmic solution 1 Drop  1 Drop Both Eyes BID    albumin human 25% (BUMINATE) solution 12.5 g  12.5 g IntraVENous DIALYSIS PRN    ascorbic acid (vitamin C) (VITAMIN C) tablet 500 mg  500 mg Oral DAILY    cholecalciferol (VITAMIN D3) (1000 Units /25 mcg) tablet 2 Tab  2,000 Units Oral DAILY    ferrous sulfate tablet 325 mg  325 mg Oral TID WITH MEALS    lactulose (CHRONULAC) 10 gram/15 mL solution 30 mL  20 g Oral BID    sevelamer carbonate (RENVELA) tab 1,600 mg  1,600 mg Oral TID WITH MEALS    0.9% sodium chloride infusion 250 mL  250 mL IntraVENous PRN    epoetin reshma-epbx (RETACRIT) injection 20,000 Units  20,000 Units SubCUTAneous Q TUE, THU & SAT    oxyCODONE IR (ROXICODONE) tablet 5 mg  5 mg Oral Q6H PRN    pantoprazole (PROTONIX) 40 mg in 0.9% sodium chloride 10 mL injection  40 mg IntraVENous Q12H    albuterol (PROVENTIL VENTOLIN) nebulizer solution 2.5 mg  2.5 mg Nebulization Q6H PRN    glucose chewable tablet 16 g  4 Tab Oral PRN    glucagon (GLUCAGEN) injection 1 mg  1 mg IntraMUSCular PRN    dextrose 10% infusion 0-250 mL  0-250 mL IntraVENous PRN    insulin lispro (HUMALOG) injection   SubCUTAneous AC&HS    0.9% sodium chloride infusion 250 mL  250 mL IntraVENous PRN     ______________________________________________________________________  EXPECTED LENGTH OF STAY: 3d 14h  ACTUAL LENGTH OF STAY:          5                 Grover Vargas MD

## 2020-10-23 NOTE — PROGRESS NOTES
[address]  [address] [facility] [address], [address]  [phone]                GI PROGRESS NOTE      NAME:   Shannan Anderson   :    1950   MRN:    [id number]     Assessment/Plan   Melena/Anemia/ GI Bleed/GERD  -Hemoglobin 9.1 this morning, patient received1 unit of PRBCs. Continue to monitor H & H  -Continue to monitor H&H, transfuse if hemoglobin less then 7.0  -BID PPI with Protonix  -EGD 10/21/2020 Esophagus:normalStomach: very mild antral gastritis, mucosa is friable and oozes with contact. One 5 mm area of vascular erythema in mid-body, empirically treated with APC at 1.0 LF at 49. Duodenum/jejunum:normal; capsule deployed into duodenal bulb  -M2A capsule 10/21/2020: Non-bleeding small AVMs mid-small intestine. Area of presumed tattoo   -KUB pending this AM, KUB from  capsule in right colon  -Possible need for deep bowel enteroscopy  -Patient able to be discharged home with stable hemoglobin from GI standpoint [facility] possible need [facility] deep bowel enteroscopy. Copies of EGD and M2A capsule report printed and on chart for patient to provide when [facility]       Liver disease/Cirhosis/Ascites:  -Abdominal ultrasound 10/21/2020 Positive ascites in all 4 quadrants.  Paracentesis 10/22/2020.  -Patient stated follow up at [address] consulted   -LFT's ALT 13, AST 13, Alk phos 133, total bilirubin 0.4 on 10/19/2020     ESRD, diabetes, COVID 19 negative, hypertension, chronic pain, diabetes  -management per hospitalist   -nephrology following   -patient to receive dialysis Tuesday, Thursday, Saturday     Will discuss with Dr. Roxanne Cedillo        Patient Active Problem List   Diagnosis Code    GI bleed K92.2    Diabetes mellitus type 2, controlled ([address] E11.9    Anemia in chronic kidney disease N18.9, D63.1    Cirrhosis ([facility] K74.60    Acute blood loss anemia D62    Dizziness R42    Generalized weakness R53.1  Rectal bleed K62.5    Symptomatic anemia D64.9    Severe anemia D64.9    Anemia D64.9    Dependence on renal dialysis [address] Z99.2    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, or end stage renal disease (HCC) I13.2    Other ascites R18.8    Other chronic pain G89.29    Paroxysmal atrial fibrillation (HCC) I48.0    Type 2 diabetes mellitus with hyperglycemia (HCC) E11.65    Unspecified cirrhosis of liver (HCC) K74.60    Other hypotension I95.89    HTN (hypertension) I10    ESRD (end stage renal disease) (HCC) N18.6    Suspected COVID-19 virus infection Z20.828       Subjective:     Jerome Briseno is a 69 y.o.  female Patient without complaints, wants to go home. Denies nausea, no vomiting, no abdominal pain, no bowel movements overnight. Tolerating diet. Objective:     VITALS:   Last 24hrs VS reviewed since prior hospitalist progress note. Most recent are:  Visit Vitals  BP (!) 155/71 (BP 1 Location: Left arm, BP Patient Position: At rest;Supine)   Pulse 80   Temp 97.9 °F (36.6 °C)   Resp 18   Ht 5' 4\" (1.626 m)   Wt 71 kg (156 lb 8 oz)   SpO2 99%   BMI 26.86 kg/m²       Intake/Output Summary (Last 24 hours) at 10/23/2020 1032  Last data filed at 10/22/2020 2128  Gross per 24 hour   Intake 275 ml   Output 4300 ml   Net -4025 ml        PHYSICAL EXAM:  General:          WD, WN. Alert, cooperative, no acute distress    HEENT:           NC, Atraumatic.  PERRLA, EOMI. Anicteric sclerae. Lungs:             CTA Bilaterally. No Wheezing/Rhonchi/Rales. Heart:              Regular  rhythm,  No murmur   Abdomen:        Soft, mild distended, generalized tenderness with deep palpation. Bowel sounds present, no palpable masses   Extremities:     No edema   Neurologic:      CN 2-12 gi, Alert and oriented X 3.  No acute neurological distress   Psych:             Good insight. Not anxious nor agitated.             Lab Data   Recent Results (from the past 12 hour(s))   CBC WITH AUTOMATED DIFF    Collection Time: 10/23/20  3:43 AM   Result Value Ref Range    WBC 5.5 3.6 - 11.0 K/uL    RBC 3.30 (L) 3.80 - 5.20 M/uL    HGB 9.1 (L) 11.5 - 16.0 g/dL    HCT 30.3 (L) 35.0 - 47.0 %    MCV 91.8 80.0 - 99.0 FL    MCH 27.6 26.0 - 34.0 PG    MCHC 30.0 30.0 - 36.5 g/dL    RDW 20.1 (H) 11.5 - 14.5 %    PLATELET 594 844 - 696 K/uL    MPV 10.1 8.9 - 12.9 FL    NRBC 0.0 0  WBC    ABSOLUTE NRBC 0.00 0.00 - 0.01 K/uL    NEUTROPHILS 74 32 - 75 %    LYMPHOCYTES 11 (L) 12 - 49 %    MONOCYTES 9 5 - 13 %    EOSINOPHILS 5 0 - 7 %    BASOPHILS 1 0 - 1 %    IMMATURE GRANULOCYTES 0 0.0 - 0.5 %    ABS. NEUTROPHILS 4.0 1.8 - 8.0 K/UL    ABS. LYMPHOCYTES 0.6 (L) 0.8 - 3.5 K/UL    ABS. MONOCYTES 0.5 0.0 - 1.0 K/UL    ABS. EOSINOPHILS 0.3 0.0 - 0.4 K/UL    ABS. BASOPHILS 0.1 0.0 - 0.1 K/UL    ABS. IMM. GRANS. 0.0 0.00 - 0.04 K/UL    DF SMEAR SCANNED      RBC COMMENTS ANISOCYTOSIS  1+        RBC COMMENTS RAINE CELLS  PRESENT        RBC COMMENTS OVALOCYTES  PRESENT       METABOLIC PANEL, COMPREHENSIVE    Collection Time: 10/23/20  3:43 AM   Result Value Ref Range    Sodium 139 136 - 145 mmol/L    Potassium 4.1 3.5 - 5.1 mmol/L    Chloride 104 97 - 108 mmol/L    CO2 28 21 - 32 mmol/L    Anion gap 7 5 - 15 mmol/L    Glucose 116 (H) 65 - 100 mg/dL    BUN 18 6 - 20 MG/DL    Creatinine 3.78 (H) 0.55 - 1.02 MG/DL    BUN/Creatinine ratio 5 (L) 12 - 20      GFR est AA 14 (L) >60 ml/min/1.73m2    GFR est non-AA 12 (L) >60 ml/min/1.73m2    Calcium 8.6 8.5 - 10.1 MG/DL    Bilirubin, total 0.4 0.2 - 1.0 MG/DL    ALT (SGPT) 11 (L) 12 - 78 U/L    AST (SGOT) 12 (L) 15 - 37 U/L    Alk.  phosphatase 120 (H) 45 - 117 U/L    Protein, total 5.2 (L) 6.4 - 8.2 g/dL    Albumin 2.6 (L) 3.5 - 5.0 g/dL    Globulin 2.6 2.0 - 4.0 g/dL    A-G Ratio 1.0 (L) 1.1 - 2.2     RENAL FUNCTION PANEL    Collection Time: 10/23/20  3:44 AM   Result Value Ref Range    Sodium 139 136 - 145 mmol/L    Potassium 4.1 3.5 - 5.1 mmol/L Chloride 103 97 - 108 mmol/L    CO2 28 21 - 32 mmol/L    Anion gap 8 5 - 15 mmol/L    Glucose 116 (H) 65 - 100 mg/dL    BUN 18 6 - 20 MG/DL    Creatinine 3.72 (H) 0.55 - 1.02 MG/DL    BUN/Creatinine ratio 5 (L) 12 - 20      GFR est AA 15 (L) >60 ml/min/1.73m2    GFR est non-AA 12 (L) >60 ml/min/1.73m2    Calcium 8.5 8.5 - 10.1 MG/DL    Phosphorus 4.5 2.6 - 4.7 MG/DL    Albumin 2.6 (L) 3.5 - 5.0 g/dL   GLUCOSE, POC    Collection Time: 10/23/20  7:09 AM   Result Value Ref Range    Glucose (POC) 93 65 - 100 mg/dL    Performed by Monie Lemus          Medications: Reviewed  Kevin Hickman NP

## 2020-10-23 NOTE — PROGRESS NOTES
DIANA-Patient to transition home with family assistance  RUR-21% Moderate    Patient has been identified as a possible discharge today. CM reviewed chart. Patient does not have any therapy/home health needs. According to previous CM notes, family to provide transport. CM to monitor. Medicare pt has received, reviewed, and signed 2nd IM letter informing them of their right to appeal the discharge. Signed copy has been placed on pt bedside chart. Tian Tipton MS    3:30 Patient planned for DC Saturday.      Tian Tipton MS

## 2020-10-23 NOTE — PROGRESS NOTES
[facility]   [address] [facility], [address] [place] [address] Rd [address], [facility] [place]  Phone: [phone]   [phone]       Nephrology Progress Note  Estela Snowden Fenton     [birth year]     [id number]  Date of Admission : 10/18/2020  10/23/20    CC: Follow up for ESRD     Assessment and Plan   ESRD- HD  - Dialyzes TTS at [address] dialysis in [place], [place]  - followed by Dr Jeannie Mitchell   - has Tod Folres for access  - HD tomorrow   - continue Midodrine      Cirrhosis of Liver : ? Cause   Chronic HFrEF   - consider Echo     Anemia in CKD :   -  continue epogen     Blood loss anemia :  - EGD: antral gastritis and erythema   - Capsule study : non bleeding small bowel AVMs noted   - Hb at 9.1 today      SOB/ SARKAR  - COVID -19 neg        Care Plan discussed with: pt      Interval History:  Seen and examined  No complaints   Capsule study showed AVMs  She is refusing small bowel enteroscopy   Hb at 9.1 after 1 unit of blood yesterday    Denies any n/v/cp/sob    Review of Systems: A comprehensive review of systems was negative.     Current Medications:   Current Facility-Administered Medications   Medication Dose Route Frequency    0.9% sodium chloride infusion 250 mL  250 mL IntraVENous PRN    fluticasone propionate (FLONASE) 50 mcg/actuation nasal spray 2 Spray  2 Spray Both Nostrils DAILY    ketotifen (ZADITOR) 0.025 % (0.035 %) ophthalmic solution 1 Drop  1 Drop Both Eyes BID    albumin human 25% (BUMINATE) solution 12.5 g  12.5 g IntraVENous DIALYSIS PRN    ascorbic acid (vitamin C) (VITAMIN C) tablet 500 mg  500 mg Oral DAILY    cholecalciferol (VITAMIN D3) (1000 Units /25 mcg) tablet 2 Tab  2,000 Units Oral DAILY    ferrous sulfate tablet 325 mg  325 mg Oral TID WITH MEALS    lactulose (CHRONULAC) 10 gram/15 mL solution 30 mL  20 g Oral BID    sevelamer carbonate (RENVELA) tab 1,600 mg  1,600 mg Oral TID WITH MEALS    0.9% sodium chloride infusion 250 mL  250 mL IntraVENous PRN    epoetin reshma-epbx (RETACRIT) injection 20,000 Units  20,000 Units SubCUTAneous Q TUE, THU & SAT    oxyCODONE IR (ROXICODONE) tablet 5 mg  5 mg Oral Q6H PRN    pantoprazole (PROTONIX) 40 mg in 0.9% sodium chloride 10 mL injection  40 mg IntraVENous Q12H    albuterol (PROVENTIL VENTOLIN) nebulizer solution 2.5 mg  2.5 mg Nebulization Q6H PRN    glucose chewable tablet 16 g  4 Tab Oral PRN    glucagon (GLUCAGEN) injection 1 mg  1 mg IntraMUSCular PRN    dextrose 10% infusion 0-250 mL  0-250 mL IntraVENous PRN    insulin lispro (HUMALOG) injection   SubCUTAneous AC&HS    0.9% sodium chloride infusion 250 mL  250 mL IntraVENous PRN      Allergies   Allergen Reactions    Aleve [Naproxen Sodium] Itching, Swelling and Other (comments)    Amlodipine Rash, Hives and Itching    Aspirin Other (comments), Nausea Only and Unknown (comments)     GI bleed  GI bleeding      Heparin Unknown (comments)     bleeding      Ibuprofen Nausea and Vomiting    Metformin Other (comments)     swelling       Objective:  Vitals:    Vitals:    10/22/20 2047 10/23/20 0337 10/23/20 0711 10/23/20 1222   BP: (!) 145/71 (!) 155/71  126/67   Pulse: 83 80  67   Resp: 18 18  17   Temp: 98.8 °F (37.1 °C) 97.9 °F (36.6 °C)  98.2 °F (36.8 °C)   TempSrc:       SpO2: 99% 99%  97%   Weight:   71 kg (156 lb 8 oz)    Height:         Intake and Output:  No intake/output data recorded. 10/21 1901 - 10/23 0700  In: 275   Out: 4300     Physical Examination:  General:  Chronically ill looking   HEENT: PERRL, ++ Pallor , No Icterus  Neck: RIJ permacath +  Lungs : diminished at bases   CVS: RRR, S1 S2 normal, No murmur   Abdomen: distended, NT  Extremities: no Edema  Skin: No rash or lesions.   MS: No joint swelling, erythema, warmth  Neurologic: non focal, AAO x 3  Psych: normal affect  []    High complexity decision making was performed  []    Patient is at high-risk of decompensation with multiple organ involvement    Lab Data Personally Reviewed: I have reviewed all the pertinent labs, microbiology data and radiology studies during assessment.     Recent Labs     10/23/20  0344 10/23/20  0343 10/22/20  0856 10/21/20  0508    139 139 140   K 4.1 4.1 4.1 4.4    104 102 104   CO2 28 28 30 27   * 116* 97 97   BUN 18 18 32* 32*   CREA 3.72* 3.78* 5.07* 4.43*   CA 8.5 8.6 8.2* 8.4*   PHOS 4.5  --  4.6 4.5   ALB 2.6* 2.6* 2.7* 2.7*   ALT  --  11*  --   --      Recent Labs     10/23/20  0343 10/22/20  0856 10/21/20  1115 10/21/20  0508   WBC 5.5 1.7*  --  4.1   HGB 9.1* 6.6* 7.3* 6.9*   HCT 30.3* 22.6* 24.7* 23.1*    180  --  191     No results found for: SDES  Lab Results   Component Value Date/Time    Culture result: No growth 5 days 10/18/2020 07:09 AM     Recent Results (from the past 24 hour(s))   TYPE & SCREEN    Collection Time: 10/22/20 12:45 PM   Result Value Ref Range    Crossmatch Expiration 10/25/2020     ABO/Rh(D) Joycelyn Peabody POSITIVE     Antibody screen POS     Comment Previously identified anti E and probable anti VS     Unit number [id number]     Blood component type RC LR,2     Unit division 00     Status of unit TRANSFUSED     ANTIGEN/ANTIBODY INFO E NEGATIVE,     Crossmatch result Compatible    GLUCOSE, POC    Collection Time: 10/22/20  3:35 PM   Result Value Ref Range    Glucose (POC) 94 65 - 100 mg/dL    Performed by Arleth Pinedo  PCT    GLUCOSE, POC    Collection Time: 10/22/20 10:17 PM   Result Value Ref Range    Glucose (POC) 128 (H) 65 - 100 mg/dL    Performed by Cyndee Jordan    CBC WITH AUTOMATED DIFF    Collection Time: 10/23/20  3:43 AM   Result Value Ref Range    WBC 5.5 3.6 - 11.0 K/uL    RBC 3.30 (L) 3.80 - 5.20 M/uL    HGB 9.1 (L) 11.5 - 16.0 g/dL    HCT 30.3 (L) 35.0 - 47.0 %    MCV 91.8 80.0 - 99.0 FL    MCH 27.6 26.0 - 34.0 PG    MCHC 30.0 30.0 - 36.5 g/dL    RDW 20.1 (H) 11.5 - 14.5 %    PLATELET 067 630 - 929 K/uL    MPV 10.1 8.9 - 12.9 FL    NRBC 0.0 0  WBC    ABSOLUTE NRBC 0.00 0.00 - 0.01 K/uL    NEUTROPHILS 74 32 - 75 % LYMPHOCYTES 11 (L) 12 - 49 %    MONOCYTES 9 5 - 13 %    EOSINOPHILS 5 0 - 7 %    BASOPHILS 1 0 - 1 %    IMMATURE GRANULOCYTES 0 0.0 - 0.5 %    ABS. NEUTROPHILS 4.0 1.8 - 8.0 K/UL    ABS. LYMPHOCYTES 0.6 (L) 0.8 - 3.5 K/UL    ABS. MONOCYTES 0.5 0.0 - 1.0 K/UL    ABS. EOSINOPHILS 0.3 0.0 - 0.4 K/UL    ABS. BASOPHILS 0.1 0.0 - 0.1 K/UL    ABS. IMM. GRANS. 0.0 0.00 - 0.04 K/UL    DF SMEAR SCANNED      RBC COMMENTS ANISOCYTOSIS  1+        RBC COMMENTS RAINE CELLS  PRESENT        RBC COMMENTS OVALOCYTES  PRESENT       METABOLIC PANEL, COMPREHENSIVE    Collection Time: 10/23/20  3:43 AM   Result Value Ref Range    Sodium 139 136 - 145 mmol/L    Potassium 4.1 3.5 - 5.1 mmol/L    Chloride 104 97 - 108 mmol/L    CO2 28 21 - 32 mmol/L    Anion gap 7 5 - 15 mmol/L    Glucose 116 (H) 65 - 100 mg/dL    BUN 18 6 - 20 MG/DL    Creatinine 3.78 (H) 0.55 - 1.02 MG/DL    BUN/Creatinine ratio 5 (L) 12 - 20      GFR est AA 14 (L) >60 ml/min/1.73m2    GFR est non-AA 12 (L) >60 ml/min/1.73m2    Calcium 8.6 8.5 - 10.1 MG/DL    Bilirubin, total 0.4 0.2 - 1.0 MG/DL    ALT (SGPT) 11 (L) 12 - 78 U/L    AST (SGOT) 12 (L) 15 - 37 U/L    Alk.  phosphatase 120 (H) 45 - 117 U/L    Protein, total 5.2 (L) 6.4 - 8.2 g/dL    Albumin 2.6 (L) 3.5 - 5.0 g/dL    Globulin 2.6 2.0 - 4.0 g/dL    A-G Ratio 1.0 (L) 1.1 - 2.2     RENAL FUNCTION PANEL    Collection Time: 10/23/20  3:44 AM   Result Value Ref Range    Sodium 139 136 - 145 mmol/L    Potassium 4.1 3.5 - 5.1 mmol/L    Chloride 103 97 - 108 mmol/L    CO2 28 21 - 32 mmol/L    Anion gap 8 5 - 15 mmol/L    Glucose 116 (H) 65 - 100 mg/dL    BUN 18 6 - 20 MG/DL    Creatinine 3.72 (H) 0.55 - 1.02 MG/DL    BUN/Creatinine ratio 5 (L) 12 - 20      GFR est AA 15 (L) >60 ml/min/1.73m2    GFR est non-AA 12 (L) >60 ml/min/1.73m2    Calcium 8.5 8.5 - 10.1 MG/DL    Phosphorus 4.5 2.6 - 4.7 MG/DL    Albumin 2.6 (L) 3.5 - 5.0 g/dL   GLUCOSE, POC    Collection Time: 10/23/20  7:09 AM   Result Value Ref Range    Glucose (POC) 93 65 - 100 mg/dL    Performed by ETHAN Singleton    Collection Time: 10/23/20 12:25 PM   Result Value Ref Range    Glucose (POC) 142 (H) 65 - 100 mg/dL    Performed by Addy Terry  PCT            Total time spent with patient:  xxx   min. Care Plan discussed with:  Patient     Family      RN      Consulting Physician [facility]        I have reviewed the flowsheets. Chart and Pertinent Notes have been reviewed. No change in PMH ,family and social history from Consult note.       Naty Darling MD

## 2020-10-24 LAB
ANION GAP SERPL CALC-SCNC: 9 MMOL/L (ref 5–15)
BASOPHILS # BLD: 0.1 K/UL (ref 0–0.1)
BASOPHILS NFR BLD: 1 % (ref 0–1)
BUN SERPL-MCNC: 27 MG/DL (ref 6–20)
BUN/CREAT SERPL: 5 (ref 12–20)
CALCIUM SERPL-MCNC: 8.1 MG/DL (ref 8.5–10.1)
CHLORIDE SERPL-SCNC: 104 MMOL/L (ref 97–108)
CO2 SERPL-SCNC: 27 MMOL/L (ref 21–32)
CREAT SERPL-MCNC: 5.27 MG/DL (ref 0.55–1.02)
DIFFERENTIAL METHOD BLD: ABNORMAL
EOSINOPHIL # BLD: 0.3 K/UL (ref 0–0.4)
EOSINOPHIL NFR BLD: 5 % (ref 0–7)
ERYTHROCYTE [DISTWIDTH] IN BLOOD BY AUTOMATED COUNT: 19.9 % (ref 11.5–14.5)
GLUCOSE BLD STRIP.AUTO-MCNC: 144 MG/DL (ref 65–100)
GLUCOSE BLD STRIP.AUTO-MCNC: 156 MG/DL (ref 65–100)
GLUCOSE BLD STRIP.AUTO-MCNC: 173 MG/DL (ref 65–100)
GLUCOSE BLD STRIP.AUTO-MCNC: 190 MG/DL (ref 65–100)
GLUCOSE SERPL-MCNC: 165 MG/DL (ref 65–100)
HCT VFR BLD AUTO: 27.7 % (ref 35–47)
HGB BLD-MCNC: 8.3 G/DL (ref 11.5–16)
IMM GRANULOCYTES # BLD AUTO: 0.1 K/UL (ref 0–0.04)
IMM GRANULOCYTES NFR BLD AUTO: 1 % (ref 0–0.5)
LYMPHOCYTES # BLD: 0.8 K/UL (ref 0.8–3.5)
LYMPHOCYTES NFR BLD: 13 % (ref 12–49)
MCH RBC QN AUTO: 27.7 PG (ref 26–34)
MCHC RBC AUTO-ENTMCNC: 30 G/DL (ref 30–36.5)
MCV RBC AUTO: 92.3 FL (ref 80–99)
MONOCYTES # BLD: 0.5 K/UL (ref 0–1)
MONOCYTES NFR BLD: 9 % (ref 5–13)
NEUTS SEG # BLD: 4 K/UL (ref 1.8–8)
NEUTS SEG NFR BLD: 71 % (ref 32–75)
NRBC # BLD: 0 K/UL (ref 0–0.01)
NRBC BLD-RTO: 0 PER 100 WBC
PLATELET # BLD AUTO: 191 K/UL (ref 150–400)
PLATELET COMMENTS,PCOM: ABNORMAL
PMV BLD AUTO: 10.5 FL (ref 8.9–12.9)
POTASSIUM SERPL-SCNC: 4.4 MMOL/L (ref 3.5–5.1)
RBC # BLD AUTO: 3 M/UL (ref 3.8–5.2)
RBC MORPH BLD: ABNORMAL
SERVICE CMNT-IMP: ABNORMAL
SODIUM SERPL-SCNC: 140 MMOL/L (ref 136–145)
WBC # BLD AUTO: 5.8 K/UL (ref 3.6–11)

## 2020-10-24 PROCEDURE — 74011000250 HC RX REV CODE- 250: Performed by: NURSE PRACTITIONER

## 2020-10-24 PROCEDURE — 74011250637 HC RX REV CODE- 250/637: Performed by: HOSPITALIST

## 2020-10-24 PROCEDURE — 74011250636 HC RX REV CODE- 250/636: Performed by: NURSE PRACTITIONER

## 2020-10-24 PROCEDURE — 77010033678 HC OXYGEN DAILY

## 2020-10-24 PROCEDURE — 97161 PT EVAL LOW COMPLEX 20 MIN: CPT

## 2020-10-24 PROCEDURE — 65270000029 HC RM PRIVATE

## 2020-10-24 PROCEDURE — 74011000250 HC RX REV CODE- 250: Performed by: HOSPITALIST

## 2020-10-24 PROCEDURE — 74011250636 HC RX REV CODE- 250/636: Performed by: INTERNAL MEDICINE

## 2020-10-24 PROCEDURE — 74011250637 HC RX REV CODE- 250/637: Performed by: FAMILY MEDICINE

## 2020-10-24 PROCEDURE — 94664 DEMO&/EVAL PT USE INHALER: CPT

## 2020-10-24 PROCEDURE — 90935 HEMODIALYSIS ONE EVALUATION: CPT

## 2020-10-24 PROCEDURE — 94640 AIRWAY INHALATION TREATMENT: CPT

## 2020-10-24 PROCEDURE — 82962 GLUCOSE BLOOD TEST: CPT

## 2020-10-24 PROCEDURE — 36415 COLL VENOUS BLD VENIPUNCTURE: CPT

## 2020-10-24 PROCEDURE — C9113 INJ PANTOPRAZOLE SODIUM, VIA: HCPCS | Performed by: NURSE PRACTITIONER

## 2020-10-24 PROCEDURE — 85025 COMPLETE CBC W/AUTO DIFF WBC: CPT

## 2020-10-24 PROCEDURE — 80048 BASIC METABOLIC PNL TOTAL CA: CPT

## 2020-10-24 PROCEDURE — 97530 THERAPEUTIC ACTIVITIES: CPT

## 2020-10-24 RX ADMIN — KETOTIFEN FUMARATE 1 DROP: 0.35 SOLUTION/ DROPS OPHTHALMIC at 10:27

## 2020-10-24 RX ADMIN — OXYCODONE 5 MG: 5 TABLET ORAL at 16:36

## 2020-10-24 RX ADMIN — SODIUM CHLORIDE 40 MG: 9 INJECTION, SOLUTION INTRAMUSCULAR; INTRAVENOUS; SUBCUTANEOUS at 09:57

## 2020-10-24 RX ADMIN — SODIUM CHLORIDE 40 MG: 9 INJECTION, SOLUTION INTRAMUSCULAR; INTRAVENOUS; SUBCUTANEOUS at 22:21

## 2020-10-24 RX ADMIN — FERROUS SULFATE TAB 325 MG (65 MG ELEMENTAL FE) 325 MG: 325 (65 FE) TAB at 12:19

## 2020-10-24 RX ADMIN — SEVELAMER CARBONATE 1600 MG: 800 TABLET, FILM COATED ORAL at 18:54

## 2020-10-24 RX ADMIN — KETOTIFEN FUMARATE 1 DROP: 0.35 SOLUTION/ DROPS OPHTHALMIC at 00:00

## 2020-10-24 RX ADMIN — OXYCODONE 5 MG: 5 TABLET ORAL at 10:36

## 2020-10-24 RX ADMIN — SEVELAMER CARBONATE 1600 MG: 800 TABLET, FILM COATED ORAL at 12:19

## 2020-10-24 RX ADMIN — ALBUTEROL SULFATE 2.5 MG: 2.5 SOLUTION RESPIRATORY (INHALATION) at 20:11

## 2020-10-24 RX ADMIN — OXYCODONE 5 MG: 5 TABLET ORAL at 04:31

## 2020-10-24 RX ADMIN — FERROUS SULFATE TAB 325 MG (65 MG ELEMENTAL FE) 325 MG: 325 (65 FE) TAB at 07:01

## 2020-10-24 RX ADMIN — ALBUTEROL SULFATE 2.5 MG: 2.5 SOLUTION RESPIRATORY (INHALATION) at 02:38

## 2020-10-24 RX ADMIN — Medication 2 TABLET: at 09:58

## 2020-10-24 RX ADMIN — MICONAZOLE NITRATE 200 MG: 200 SUPPOSITORY VAGINAL at 22:21

## 2020-10-24 RX ADMIN — OXYCODONE HYDROCHLORIDE AND ACETAMINOPHEN 500 MG: 500 TABLET ORAL at 09:58

## 2020-10-24 RX ADMIN — EPOETIN ALFA-EPBX 20000 UNITS: 10000 INJECTION, SOLUTION INTRAVENOUS; SUBCUTANEOUS at 22:22

## 2020-10-24 RX ADMIN — FERROUS SULFATE TAB 325 MG (65 MG ELEMENTAL FE) 325 MG: 325 (65 FE) TAB at 18:54

## 2020-10-24 RX ADMIN — FLUTICASONE PROPIONATE 2 SPRAY: 50 SPRAY, METERED NASAL at 10:27

## 2020-10-24 RX ADMIN — SEVELAMER CARBONATE 1600 MG: 800 TABLET, FILM COATED ORAL at 07:01

## 2020-10-24 RX ADMIN — KETOTIFEN FUMARATE 1 DROP: 0.35 SOLUTION/ DROPS OPHTHALMIC at 18:59

## 2020-10-24 NOTE — PROGRESS NOTES
Problem: Falls - Risk of  Goal: *Absence of Falls  Description: Document John Chapin Fall Risk and appropriate interventions in the flowsheet. Outcome: Progressing Towards Goal  Note: Fall Risk Interventions:  Mobility Interventions: Communicate number of staff needed for ambulation/transfer, OT consult for ADLs, Patient to call before getting OOB, PT Consult for mobility concerns, PT Consult for assist device competence         Medication Interventions: Patient to call before getting OOB    Elimination Interventions: Call light in reach, Patient to call for help with toileting needs              Problem: Pressure Injury - Risk of  Goal: *Prevention of pressure injury  Description: Document Vic Scale and appropriate interventions in the flowsheet. Outcome: Progressing Towards Goal  Note: Pressure Injury Interventions:  Sensory Interventions: Assess changes in LOC    Moisture Interventions: Absorbent underpads    Activity Interventions: PT/OT evaluation    Mobility Interventions: Turn and reposition approx.  every two hours(pillow and wedges)    Nutrition Interventions: Document food/fluid/supplement intake

## 2020-10-24 NOTE — PROGRESS NOTES
Problem: Falls - Risk of  Goal: *Absence of Falls  Description: Document Isaac Click Fall Risk and appropriate interventions in the flowsheet. Outcome: Progressing Towards Goal  Note: Fall Risk Interventions:  Mobility Interventions: Communicate number of staff needed for ambulation/transfer         Medication Interventions: Patient to call before getting OOB    Elimination Interventions: Call light in reach              Problem: Patient Education: Go to Patient Education Activity  Goal: Patient/Family Education  Outcome: Progressing Towards Goal     Problem: Pressure Injury - Risk of  Goal: *Prevention of pressure injury  Description: Document Vic Scale and appropriate interventions in the flowsheet.   Outcome: Progressing Towards Goal  Note: Pressure Injury Interventions:  Sensory Interventions: Assess changes in LOC    Moisture Interventions: Absorbent underpads    Activity Interventions: PT/OT evaluation    Mobility Interventions: PT/OT evaluation    Nutrition Interventions: Document food/fluid/supplement intake                     Problem: Patient Education: Go to Patient Education Activity  Goal: Patient/Family Education  Outcome: Progressing Towards Goal     Problem: Anemia Care Plan (Adult and Pediatric)  Goal: *Labs within defined limits  Outcome: Progressing Towards Goal  Goal: *Tolerates increased activity  Outcome: Progressing Towards Goal     Problem: Patient Education: Go to Patient Education Activity  Goal: Patient/Family Education  Outcome: Progressing Towards Goal     Problem: Discharge Planning  Goal: *Discharge to safe environment  Outcome: Progressing Towards Goal     Problem: Breathing Pattern - Ineffective  Goal: *Absence of hypoxia  Outcome: Progressing Towards Goal  Goal: *Use of effective breathing techniques  Outcome: Progressing Towards Goal     Problem: Patient Education: Go to Patient Education Activity  Goal: Patient/Family Education  Outcome: Progressing Towards Goal     Problem: Patient Education: Go to Patient Education Activity  Goal: Patient/Family Education  Outcome: Progressing Towards Goal

## 2020-10-24 NOTE — PROCEDURES
[facility]  [phone]    Vitals   Pre   Post   Assessment   Pre   Post     Temp  Temp: 98 °F (36.7 °C) (10/24/20 1450)  LOC  A&Ox4 A&Ox4   HR   Pulse (Heart Rate): 78 (10/24/20 1450) 80 Lungs   Diminished in bases, RA Remains on RA    B/P  BP: (!) 159/66 (10/24/20 1450) 125/54 Cardiac   S1S2, RRR RRR    Resp   Resp Rate: 20 (10/24/20 1450) 20 Skin   Warm, dry, intact No change    Pain level  Pain Intensity 1: 0 (10/24/20 1114) 5 Edema  Ascites Ascites   Orders:    Duration:   Start:   6712 End:  1810 Total:   3.5hrs   Dialyzer:   Dialyzer/Set Up Inspection: Sheryl Porteous (10/22/20 0846)   K Bath:   Dialysate K (mEq/L): 2 (10/22/20 0846)   Ca Bath:   Dialysate CA (mEq/L): 2.5 (10/22/20 0846)   Na/Bicarb:   Dialysate NA (mEq/L): 140 (10/22/20 0846)   Target Fluid Removal:   Goal/Amount of Fluid to Remove (mL): 2500 mL (10/20/20 1200)   Access     Type & Location:   RIJ tunneled CVC, dsg dated 10/22/20 CDI. +aspiration/flush. -400 to maintain safe AP. Labs     Obtained/Reviewed   Critical Results Called   Date when labs were drawn-  Hgb-    HGB   Date Value Ref Range Status   10/24/2020 8.3 (L) 11.5 - 16.0 g/dL Final     K-    Potassium   Date Value Ref Range Status   10/24/2020 4.4 3.5 - 5.1 mmol/L Final     Ca-   Calcium   Date Value Ref Range Status   10/24/2020 8.1 (L) 8.5 - 10.1 MG/DL Final     Bun-   BUN   Date Value Ref Range Status   10/24/2020 27 (H) 6 - 20 MG/DL Final     Creat-   Creatinine   Date Value Ref Range Status   10/24/2020 5.27 (H) 0.55 - 1.02 MG/DL Final     Comment:     INVESTIGATED PER DELTA CHECK PROTOCOL      Medications/ Blood Products Given     Name Dose   Route and Time     None given                Blood Volume Processed (BVP): 56L Net Fluid   Removed:  1000 mL   Comments   Time Out Done: 0595  Primary Nurse Rpt Pre: JOAN Llamas RN  Primary Nurse Rpt Post: JOAN Llamas RN  Pt Education: CVC precautions  Care Plan: HD today, cont TTS  Tx Summary:  3575: Safety checks complete, time out performed. 1450: CVC assessed, no redness, warmth or drainage noted. Transparent dressing and biopatch CDI. Each catheter limb disinfected for 60 seconds per limb with alcohol swabs. Caps removed, dialysis CVC hub scrubbed with Prevantics for 15 seconds, followed by a 5 second dry time per Hospital P&P. Aspirated and discarded 5ml from each lumen. +aspiration/flush. HD initiated. Access visible, lines secure. Medications reviewed. 1820: HD complete. All possible blood rinsed back. Each catheter limb disinfected for 60 seconds per limb with alcohol swabs. Dialysis CVC hubs scrubbed with Prevantics for 15 seconds, followed by a 5 second dry time per Hospital P&P. Saline flushed, locked and capped. SBAR called to primary RN.     Admitting Diagnosis: Anemia, ESRD  Pt's previous clinic: [facility]  Informed Consent Verified: Yes (10/22/20 0846)  Hepatitis Status: HBsAg: Neg (01/16/20) HBsAb: 16/immune (08/11/20)  Machine Number: [id number] (10/22/20 5880)  Telemetry status: not monitored

## 2020-10-24 NOTE — PROGRESS NOTES
[facility]  Hospitalist Group                                                                                          Hospitalist Progress Note  Bárbara Harrington MD  Answering service: [phone] from in house phone        Date of Service:  10/24/2020  NAME:  Gregorio Minor  :  1950  MRN:  [id number]      Admission Summary:   Gregorio Minor is a 69 y.o. female with past medical history of arthritis, asthma, ESRD, chronic pain, cirrhosis, type 2 DM, GERD, hypertension, and paroxysmal afib presented as a direct admission/ [place] [facility]) with chief complaint of low blood count (anemia). Patient was initially admitted to [facility] on 10/17/2020 with symptomatic anemia; hemoglobin = 5.5 on admission. PRBC was ordered by [facility] for transfusion. Interval history / Subjective:   Ms. Jonnie Rodriguez seen during dialysis. No complaints. The right abd swelling decreased. .     Assessment & Plan:     Symptomatic acute anemia on the background of chronic anemia of chronic disease, suspect GI loss  -She had 3 units of blood thus far. Hemoglobin up to 9.     -EGD showed very mild antral gastritis, friable mucosa which oozes on contact. One 5 mm area of vascular erythema in the mid-body was empirically treated with APC. -Capsule endoscopy showed nonbleeding small AVMs  -Continue PPI  -GI suggesting deep enteroscopy. Patient preferring to be discharged and follow-up with her [facility] providers as outpatient    Right lower abdominal subcutaneous edema suspect hematoma post paracentesis  -Ultrasound of the abdomen showedSubcutaneous soft tissue thickening with collection.  Finding could represent an abdominal wall hematoma  -Swelling improving  -Monitor hemoglobin, watch for 1 more day.       Type 2 diabetes mellitus, fairly controlled        - Accu-Cheks before meals and at bedtime along with high-sensitivity Humalog sliding scale.      ESRD (end stage renal disease)        - consult nephrologist for hemodialysis TTS per renal      SARS-CoV-2 negative test 10/19      Hypertension       -monitor BP and provide anti-hypertensive therapy as needed     Chronic pain syndrome       -As needed medications      Liver cirrhosis       -Status post paracentesis on 10/22     Code status Full  DVT prophylaxis: SCD    Care Plan discussed with: Patient/Family and Nurse  Anticipated Disposition: Home w/Family  Anticipated Discharge: 24 hours to 48 hours     Hospital Problems  Date Reviewed: 10/6/2020          Codes Class Noted POA    ESRD (end stage renal disease) ([facility] ICD-10-CM: N18.6  ICD-9-CM: 585.6  10/18/2020 Unknown        Suspected COVID-19 virus infection ICD-10-CM: Z20.828  ICD-9-CM: V01.79  10/18/2020 Unknown        Type 2 diabetes mellitus with hyperglycemia ([facility] ICD-10-CM: E11.65  ICD-9-CM: 250.00  10/6/2020 Unknown        Anemia ICD-10-CM: D64.9  ICD-9-CM: 285.9  9/17/2020 Unknown                Review of Systems:   A comprehensive review of systems was negative. Vital Signs:    Last 24hrs VS reviewed since prior progress note. Most recent are:  Visit Vitals  /66   Pulse 78   Temp 98 °F (36.7 °C)   Resp 20   Ht 5' 4\" (1.626 m)   Wt 73.3 kg (161 lb 9.6 oz)   SpO2 100%   BMI 27.74 kg/m²         Intake/Output Summary (Last 24 hours) at 10/24/2020 1514  Last data filed at 10/23/2020 2035  Gross per 24 hour   Intake 170 ml   Output --   Net 170 ml        Physical Examination:             Constitutional:  No acute distress, cooperative, pleasant    ENT:  Oral mucosa moist, oropharynx benign. Resp:  CTA bilaterally. No wheezing/rhonchi/rales. No accessory muscle use   CV:  Regular rhythm, normal rate, no murmurs, gallops, rubs    GI:  Soft, non distended, non tender. normoactive bowel sounds, no hepatosplenomegaly     Musculoskeletal:  Healed skin lesions on both legs. Neurologic:  Moves all extremities.   AAOx3, CN II-XII reviewed     Psych:  Good insight, Not anxious nor agitated. Data Review:    I personally reviewed  Image and labs    Xr Abd (kub)    Result Date: 10/23/2020  IMPRESSION: Endoscopic capsule projects over the mid abdomen. Xr Abd (kub)    Result Date: 10/22/2020  IMPRESSION: Nonspecific intestinal gas pattern. Electronic capsule overlies the right colon. [facility]    Result Date: 10/23/2020  IMPRESSION: Subcutaneous soft tissue thickening with collection. Finding could represent an abdominal wall hematoma. [facility]    Result Date: 10/21/2020  IMPRESSION: Positive ascites in all 4 quadrants. Ir Paracentesis Abd W Image    Result Date: 10/22/2020  IMPRESSION: Successful ultrasound guided paracentesis yielding approximately 3800 ml of fluid. Labs:     Recent Labs     10/24/20  0158 10/23/20  0343   WBC 5.8 5.5   HGB 8.3* 9.1*   HCT 27.7* 30.3*    192     Recent Labs     10/24/20  0158 10/23/20  0344 10/23/20  0343 10/22/20  0856    139 139 139   K 4.4 4.1 4.1 4.1    103 104 102   CO2 27 28 28 30   BUN 27* 18 18 32*   CREA 5.27* 3.72* 3.78* 5.07*   * 116* 116* 97   CA 8.1* 8.5 8.6 8.2*   PHOS  --  4.5  --  4.6     Recent Labs     10/23/20  0344 10/23/20  0343 10/22/20  0856   ALT  --  11*  --    AP  --  120*  --    TBILI  --  0.4  --    TP  --  5.2*  --    ALB 2.6* 2.6* 2.7*   GLOB  --  2.6  --      No results for input(s): INR, PTP, APTT, INREXT, INREXT in the last 72 hours. No results for input(s): FE, TIBC, PSAT, FERR in the last 72 hours. Lab Results   Component Value Date/Time    Folate 16.4 10/19/2020 09:34 AM      No results for input(s): PH, PCO2, PO2 in the last 72 hours. No results for input(s): CPK, CKNDX, TROIQ in the last 72 hours.     No lab exists for component: CPKMB  No results found for: CHOL, CHOLX, CHLST, CHOLV, HDL, HDLP, LDL, LDLC, DLDLP, TGLX, TRIGL, TRIGP, CHHD, CHHDX  Lab Results   Component Value Date/Time    Glucose (POC) 156 (H) 10/24/2020 11:51 AM    Glucose (POC) 190 (H) 10/24/2020 06:12 AM    Glucose (POC) 183 (H) 10/23/2020 09:10 PM    Glucose (POC) 186 (H) 10/23/2020 04:40 PM    Glucose (POC) 142 (H) 10/23/2020 12:25 PM     No results found for: COLOR, APPRN, SPGRU, REFSG, VERENA, PROTU, GLUCU, KETU, BILU, UROU, TIBURCIO, LEUKU, GLUKE, EPSU, BACTU, WBCU, RBCU, CASTS, UCRY      Medications Reviewed:     Current Facility-Administered Medications   Medication Dose Route Frequency    miconazole (MICOTIN) 200 mg vaginal suppository 200 mg  200 mg Vaginal QHS    0.9% sodium chloride infusion 250 mL  250 mL IntraVENous PRN    fluticasone propionate (FLONASE) 50 mcg/actuation nasal spray 2 Spray  2 Spray Both Nostrils DAILY    ketotifen (ZADITOR) 0.025 % (0.035 %) ophthalmic solution 1 Drop  1 Drop Both Eyes BID    albumin human 25% (BUMINATE) solution 12.5 g  12.5 g IntraVENous DIALYSIS PRN    ascorbic acid (vitamin C) (VITAMIN C) tablet 500 mg  500 mg Oral DAILY    cholecalciferol (VITAMIN D3) (1000 Units /25 mcg) tablet 2 Tab  2,000 Units Oral DAILY    ferrous sulfate tablet 325 mg  325 mg Oral TID WITH MEALS    lactulose (CHRONULAC) 10 gram/15 mL solution 30 mL  20 g Oral BID    sevelamer carbonate (RENVELA) tab 1,600 mg  1,600 mg Oral TID WITH MEALS    0.9% sodium chloride infusion 250 mL  250 mL IntraVENous PRN    epoetin reshma-epbx (RETACRIT) injection 20,000 Units  20,000 Units SubCUTAneous Q TUE, THU & SAT    oxyCODONE IR (ROXICODONE) tablet 5 mg  5 mg Oral Q6H PRN    pantoprazole (PROTONIX) 40 mg in 0.9% sodium chloride 10 mL injection  40 mg IntraVENous Q12H    albuterol (PROVENTIL VENTOLIN) nebulizer solution 2.5 mg  2.5 mg Nebulization Q6H PRN    glucose chewable tablet 16 g  4 Tab Oral PRN    glucagon (GLUCAGEN) injection 1 mg  1 mg IntraMUSCular PRN    dextrose 10% infusion 0-250 mL  0-250 mL IntraVENous PRN    insulin lispro (HUMALOG) injection   SubCUTAneous AC&HS    0.9% sodium chloride infusion 250 mL  250 mL IntraVENous PRN     ______________________________________________________________________  EXPECTED LENGTH OF STAY: 3d 14h  ACTUAL LENGTH OF STAY:          6                 Vito Aldridge MD

## 2020-10-24 NOTE — PROGRESS NOTES
Problem: Mobility Impaired (Adult and Pediatric)  Goal: *Acute Goals and Plan of Care (Insert Text)  Description: FUNCTIONAL STATUS PRIOR TO ADMISSION: Patient was modified independent using a rollator for functional mobility. HOME SUPPORT PRIOR TO ADMISSION: The patient lived with daughter but did not require assist.    Physical Therapy Goals  Initiated 10/24/2020  1. Patient will move from supine to sit and sit to supine , scoot up and down, and roll side to side in bed with modified independence within 7 day(s). 2.  Patient will transfer from bed to chair and chair to bed with modified independence using the least restrictive device within 7 day(s). 3.  Patient will perform sit to stand with modified independence within 7 day(s). 4.  Patient will ambulate with modified independence for 150 feet with the least restrictive device within 7 day(s). 5.  Patient will ascend/descend 2 stairs with 1 handrail(s) with modified independence within 7 day(s). Outcome: Progressing Towards Goal  PHYSICAL THERAPY EVALUATION  Patient: Louise Wood (70 y.o. female)  Date: 10/24/2020  Primary Diagnosis: Anemia [D64.9]  ESRD (end stage renal disease) ([facility] [N18.6]  Suspected COVID-19 virus infection [Z20.828]  Type 2 diabetes mellitus with hyperglycemia ([facility] [address] [E11.65]  Procedure(s) (LRB):  ESOPHAGOGASTRODUODENOSCOPY (EGD) (Left)  CAPSULE (Left)  ENDOSCOPIC ARGON PLASMA COAGULATION (N/A) 3 Days Post-Op   Precautions: Fall       ASSESSMENT  Based on the objective data described below, the patient presents with increased back pain, decreased strength, impaired sitting balance and increased fall risk. Patient reports prior to admission mod I using rollator. Patient required CGA for transfers and ambulation today using RW. Patient self limited ambulation distance to 5 ft to bedside chair due to reports of dizziness. Vitals taken and stable. Patient refusing further ambulation after.  Patient will benefit from skilled PT in order to increase above impairments, decrease fall risk and increase safety prior to discharge    Current Level of Function Impacting Discharge (mobility/balance): CGA for gait with RW    Functional Outcome Measure: The patient scored 65/100 on the Barthel outcome measure which is indicative of 35% impairments in functional mobility and ADLs. Other factors to consider for discharge: decreased motivation, high PLOF     Patient will benefit from skilled therapy intervention to address the above noted impairments. PLAN :  Recommendations and Planned Interventions: bed mobility training, transfer training, gait training, therapeutic exercises, patient and family training/education, and therapeutic activities      Frequency/Duration: Patient will be followed by physical therapy:  4 times a week to address goals. Recommendation for discharge: (in order for the patient to meet his/her long term goals)  Physical therapy at least 2 days/week in the home     This discharge recommendation:  A follow-up discussion with the attending provider and/or case management is planned    IF patient discharges home will need the following DME: none         SUBJECTIVE:   Patient stated I have a lot of back pain regularly.     OBJECTIVE DATA SUMMARY:   HISTORY:    Past Medical History:   Diagnosis Date    Arthritis     Asthma     Chronic kidney disease     Chronic pain     Cirrhosis ([facility] [address] [address] 12/17/2017    Diabetes ([facility] [address] diet controlled    GERD (gastroesophageal reflux disease)     Hypertension     Paroxysmal atrial fibrillation ([facility] [address] 10/6/2020     Past Surgical History:   Procedure Laterality Date    COLONOSCOPY N/A 1/12/2018    COLONOSCOPY performed by Leslie Argueta MD at THE [facility] ENDOSCOPY    COLONOSCOPY N/A 3/19/2018    COLONOSCOPY performed by Leslie Argueta MD at THE [facility] ENDOSCOPY    HX GYN  c section    HX VASCULAR ACCESS      dialysis     IR PARACENTESIS ABD W IMAGE  10/22/2020       Personal factors and/or comorbidities impacting plan of care:     Home Situation  Home Environment: Private residence  # Steps to Enter: 2  Rails to Enter: Yes  Hand Rails : Bilateral  One/Two Story Residence: One story  Living Alone: No  Support Systems: Child(yair)  Patient Expects to be Discharged to[de-identified] Private residence  Current DME Used/Available at Home: [address]    EXAMINATION/PRESENTATION/DECISION MAKING:     Hearing: Auditory  Auditory Impairment: None    Range Of Motion:  AROM: Generally decreased, functional           PROM: Generally decreased, functional           Strength:    Strength: Generally decreased, functional                    Tone & Sensation:   Tone: Normal              Sensation: Intact               Coordination:  Coordination: Within functional limits  Vision:      Functional Mobility:  Bed Mobility:     Supine to Sit: Supervision  Sit to Supine: Supervision  Scooting: Supervision  Transfers:  Sit to Stand: Stand-by assistance  Stand to Sit: Stand-by assistance  Stand Pivot Transfers: Stand-by assistance                    Balance:   Sitting: Intact  Standing: Impaired  Standing - Static: Good;Constant support  Standing - Dynamic : Good;Constant support  Ambulation/Gait Training:  Distance (ft): 5 Feet (ft)  Assistive Device: Walker, rolling;Gait belt  Ambulation - Level of Assistance: Contact guard assistance        Gait Abnormalities: Decreased step clearance        Base of Support: Narrowed     Speed/Cristy: Pace decreased (<100 feet/min)  Step Length: Right shortened;Left shortened                  Patient ambulated 5 ft with RW and CGA. Patient self limited ambulation distance, reporting dizziness, Vitals taken and stable.  Patient demonstrated slow, steady gait         Functional Measure:  Barthel Index:    Bathin  Bladder: 10  Bowels: 10  Groomin  Dressin  Feeding: 10  Mobility: 5  Stairs: 5  Toilet Use: 5  Transfer (Bed to Chair and Back): 10  Total: 65/100       The Barthel ADL Index: Guidelines  1. The index should be used as a record of what a patient does, not as a record of what a patient could do. 2. The main aim is to establish degree of independence from any help, physical or verbal, however minor and for whatever reason. 3. The need for supervision renders the patient not independent. 4. A patient's performance should be established using the best available evidence. Asking the patient, friends/relatives and nurses are the usual sources, but direct observation and common sense are also important. However direct testing is not needed. 5. Usually the patient's performance over the preceding 24-48 hours is important, but occasionally longer periods will be relevant. 6. Middle categories imply that the patient supplies over 50 per cent of the effort. 7. Use of aids to be independent is allowed. Soham Abreu., Barthel, D.W. (1501). Functional evaluation: the Barthel Index. [facility] (14)2. Babatunde Garzon narciso BENITEZ Cerna, Shabnam Palacios., [facility]., [place], [address] [place] [address] (1999). Measuring the change indisability after inpatient rehabilitation; comparison of the responsiveness of the Barthel Index and Functional [place] Measure. Journal of Neurology, Neurosurgery, and Psychiatry, 66(4), 408-584. Angel Luis Ibrahim, N.J.A, JUAN Stock, & Keyonna Stephens, M.A. (2004.) Assessment of post-stroke quality of life in cost-effectiveness studies: The usefulness of the Barthel Index and the EuroQoL-5D.  Quality of Life Research, 15, 386-94            Physical Therapy Evaluation Charge Determination   History Examination Presentation Decision-Making   LOW Complexity : Zero comorbidities / personal factors that will impact the outcome / POC LOW Complexity : 1-2 Standardized tests and measures addressing body structure, function, activity limitation and / or participation in recreation  MEDIUM Complexity : Evolving with changing characteristics  LOW Complexity : FOTO score of       Based on the above components, the patient evaluation is determined to be of the following complexity level: LOW     Pain Ratin/10 pain in lower back. Rn aware and patient has been medicated    Activity Tolerance:   Fair, SpO2 stable on RA, and requires rest breaks  Please refer to the flowsheet for vital signs taken during this treatment. After treatment patient left in no apparent distress:   Sitting in chair and Call bell within reach    COMMUNICATION/EDUCATION:   The patients plan of care was discussed with: Registered nurse. Fall prevention education was provided and the patient/caregiver indicated understanding., Patient/family have participated as able in goal setting and plan of care. , and Patient/family agree to work toward stated goals and plan of care.     Thank you for this referral.  Camilla Cooper, PT   Time Calculation: 17 mins

## 2020-10-25 ENCOUNTER — APPOINTMENT (OUTPATIENT)
Dept: GENERAL RADIOLOGY | Age: 70
DRG: 377 | End: 2020-10-25
Attending: HOSPITALIST
Payer: MEDICARE

## 2020-10-25 VITALS
BODY MASS INDEX: 27.63 KG/M2 | HEIGHT: 64 IN | OXYGEN SATURATION: 96 % | SYSTOLIC BLOOD PRESSURE: 158 MMHG | WEIGHT: 161.82 LBS | TEMPERATURE: 98.6 F | RESPIRATION RATE: 18 BRPM | DIASTOLIC BLOOD PRESSURE: 69 MMHG | HEART RATE: 76 BPM

## 2020-10-25 LAB
GLUCOSE BLD STRIP.AUTO-MCNC: 186 MG/DL (ref 65–100)
GLUCOSE BLD STRIP.AUTO-MCNC: 201 MG/DL (ref 65–100)
HCT VFR BLD AUTO: 30.3 % (ref 35–47)
HGB BLD-MCNC: 8.8 G/DL (ref 11.5–16)
SERVICE CMNT-IMP: ABNORMAL
SERVICE CMNT-IMP: ABNORMAL

## 2020-10-25 PROCEDURE — 2709999900 HC NON-CHARGEABLE SUPPLY

## 2020-10-25 PROCEDURE — 36415 COLL VENOUS BLD VENIPUNCTURE: CPT

## 2020-10-25 PROCEDURE — 82962 GLUCOSE BLOOD TEST: CPT

## 2020-10-25 PROCEDURE — 74011000250 HC RX REV CODE- 250: Performed by: NURSE PRACTITIONER

## 2020-10-25 PROCEDURE — 74011250636 HC RX REV CODE- 250/636: Performed by: NURSE PRACTITIONER

## 2020-10-25 PROCEDURE — 74011250637 HC RX REV CODE- 250/637: Performed by: HOSPITALIST

## 2020-10-25 PROCEDURE — 74018 RADEX ABDOMEN 1 VIEW: CPT

## 2020-10-25 PROCEDURE — 74011250637 HC RX REV CODE- 250/637: Performed by: FAMILY MEDICINE

## 2020-10-25 PROCEDURE — C9113 INJ PANTOPRAZOLE SODIUM, VIA: HCPCS | Performed by: NURSE PRACTITIONER

## 2020-10-25 PROCEDURE — 85018 HEMOGLOBIN: CPT

## 2020-10-25 RX ORDER — PANTOPRAZOLE SODIUM 40 MG/1
40 TABLET, DELAYED RELEASE ORAL 2 TIMES DAILY
Qty: 60 TAB | Refills: 0 | Status: ON HOLD | OUTPATIENT
Start: 2020-10-25 | End: 2020-12-01

## 2020-10-25 RX ADMIN — SEVELAMER CARBONATE 1600 MG: 800 TABLET, FILM COATED ORAL at 08:46

## 2020-10-25 RX ADMIN — FERROUS SULFATE TAB 325 MG (65 MG ELEMENTAL FE) 325 MG: 325 (65 FE) TAB at 12:34

## 2020-10-25 RX ADMIN — LACTULOSE 30 ML: 20 SOLUTION ORAL at 09:24

## 2020-10-25 RX ADMIN — FLUTICASONE PROPIONATE 2 SPRAY: 50 SPRAY, METERED NASAL at 08:47

## 2020-10-25 RX ADMIN — KETOTIFEN FUMARATE 1 DROP: 0.35 SOLUTION/ DROPS OPHTHALMIC at 08:47

## 2020-10-25 RX ADMIN — FERROUS SULFATE TAB 325 MG (65 MG ELEMENTAL FE) 325 MG: 325 (65 FE) TAB at 08:46

## 2020-10-25 RX ADMIN — OXYCODONE HYDROCHLORIDE AND ACETAMINOPHEN 500 MG: 500 TABLET ORAL at 08:46

## 2020-10-25 RX ADMIN — SEVELAMER CARBONATE 1600 MG: 800 TABLET, FILM COATED ORAL at 12:34

## 2020-10-25 RX ADMIN — OXYCODONE 5 MG: 5 TABLET ORAL at 06:08

## 2020-10-25 RX ADMIN — SODIUM CHLORIDE 40 MG: 9 INJECTION, SOLUTION INTRAMUSCULAR; INTRAVENOUS; SUBCUTANEOUS at 08:46

## 2020-10-25 RX ADMIN — OXYCODONE 5 MG: 5 TABLET ORAL at 12:06

## 2020-10-25 RX ADMIN — Medication 2 TABLET: at 08:46

## 2020-10-25 NOTE — DISCHARGE INSTRUCTIONS
Discharge Instructions       PATIENT ID: Louise Wood  MRN: [id number]   YOB: 1950    DATE OF ADMISSION: 10/18/2020  7:13 PM    DATE OF DISCHARGE: 10/25/2020    PRIMARY CARE PROVIDER: None     ATTENDING PHYSICIAN: Kleber Yuan MD  DISCHARGING PROVIDER: Huong Mcelroy MD    To contact this individual call [phone] and ask the  to page. If unavailable ask to be transferred the Adult Hospitalist Department. DISCHARGE DIAGNOSES and CARE RECOMMENDATIONS:   Severe acute anemia on the background of chronic anemia of chronic disease, suspect gastrointestinal blood loss  -You have received 3 units of blood thus far and your hemoglobin has remained stable between 8 and 9. As far as work up,you underwent EGD which showed very mild antral gastritis, friable mucosa which oozes on contact. One 5 mm area of vascular erythema in the mid-body was empirically treated with APC and capsule endoscopy showed nonbleeding small AVMs. The gastroenterology doctors suggested deep enteroscopy,however your preferred to have this done as out patient at the [facility]. Make sure you follow up with your providers at the [facility] and discuss this. .     Liver cirrhosis       -You had peritoneal fluid drained this admission. You have told us your doctors at the [facility] are working on this. We recommend you continue to follow up with your Liver/GI doctors at the [facility] for continued work up and management. Continue the rest of your care as established. Go to your dialysis as scheduled before.       DIET: Regular Diet, Cardiac Diet and Renal Diet      ACTIVITY: Activity as tolerated        SERVICES and EQUIPMENT needed: own    PENDING TEST RESULTS:   At the time of discharge the following test results are still pending: none    FOLLOW UP APPOINTMENTS:   Follow-up Information     Follow up With Specialties Details Why Contact Info    None    None (395) Patient stated that they have no PCP               YOU WERE SEEN BY THE FOLLOWING CONSULTATIONS:   IP CONSULT TO NEPHROLOGY  IP CONSULT TO GASTROENTEROLOGY  IP CONSULT TO HEPATOLOGY    YOU HAD THE FOLLOWING PROCEDURES/SURGERIES  :   Procedure(s):  ESOPHAGOGASTRODUODENOSCOPY (EGD)  CAPSULE  ENDOSCOPIC ARGON PLASMA COAGULATION              DISCHARGE MEDICATIONS:   See Medication Reconciliation Form    · It is important that you take the medication exactly as they are prescribed. · Keep your medication in the bottles provided by the pharmacist and keep a list of the medication names, dosages, and times to be taken in your wallet. · Do not take other medications without consulting your doctor. NOTIFY YOUR PHYSICIAN FOR ANY OF THE FOLLOWING:   Fever over 101 degrees for 24 hours. Chest pain, shortness of breath, fever, chills, nausea, vomiting, diarrhea, change in mentation, falling, weakness, bleeding. Severe pain or pain not relieved by medications. Or, any other signs or symptoms that you may have questions about. Signed:    Queta Jacobo MD  10/25/2020  11:16 AM

## 2020-10-25 NOTE — PROGRESS NOTES
Problem: Falls - Risk of  Goal: *Absence of Falls  Description: Document Joe Mike Fall Risk and appropriate interventions in the flowsheet. Outcome: Progressing Towards Goal  Note: Fall Risk Interventions:  Mobility Interventions: Communicate number of staff needed for ambulation/transfer         Medication Interventions: Patient to call before getting OOB    Elimination Interventions: Call light in reach              Problem: Pressure Injury - Risk of  Goal: *Prevention of pressure injury  Description: Document Vic Scale and appropriate interventions in the flowsheet.   Outcome: Progressing Towards Goal  Note: Pressure Injury Interventions:  Sensory Interventions: Assess changes in LOC    Moisture Interventions: Absorbent underpads    Activity Interventions: PT/OT evaluation    Mobility Interventions: PT/OT evaluation    Nutrition Interventions: Document food/fluid/supplement intake

## 2020-10-25 NOTE — DISCHARGE SUMMARY
Discharge Summary       PATIENT ID: Maricel Serrano  MRN: [id number]   YOB: 1950    DATE OF ADMISSION: 10/18/2020  7:13 PM    DATE OF DISCHARGE: 10/25/20     PRIMARY CARE PROVIDER: None     ATTENDING PHYSICIAN: Kenroy Almanzar MD    DISCHARGING PROVIDER: Kenroy Almanzar MD    To contact this individual call [phone] and ask the  to page. If unavailable ask to be transferred the Adult Hospitalist Department. CONSULTATIONS: IP CONSULT TO NEPHROLOGY  IP CONSULT TO GASTROENTEROLOGY  IP CONSULT TO HEPATOLOGY    PROCEDURES/SURGERIES: Procedure(s):  ESOPHAGOGASTRODUODENOSCOPY (EGD)  CAPSULE  ENDOSCOPIC ARGON PLASMA COAGULATION    ADMITTING DIAGNOSES & HOSPITAL COURSE:   Per H&P  \"Lizzette Fenton is a 69 y.o. female with past medical history of arthritis, asthma, ESRD, chronic pain, cirrhosis, type 2 DM, GERD, hypertension, and paroxysmal afib presented as a direct admission/ transfer from [facility]) to [facility]) with chief complaint of low blood count (anemia). Patient was initially admitted to [facility] on 10/17/2020 with symptomatic anemia; hemoglobin = 5.5 on admission. PRBC was ordered by [facility] for transfusion. \"        DISCHARGE DIAGNOSES / PLAN:    Symptomatic acute anemia on the background of chronic anemia of chronic disease, suspect GI loss  -She had 3 units of blood thus far. Hemoglobin remained stable between 8 and 9.     -EGD showed very mild antral gastritis, friable mucosa which oozes on contact. One 5 mm area of vascular erythema in the mid-body was empirically treated with APC. -Capsule endoscopy showed nonbleeding small AVMs  -Continue PPI  -GI suggesting deep enteroscopy.   Patient preferring to be discharged and follow-up with her [place] providers as outpatient     Right lower abdominal subcutaneous edema suspect hematoma post paracentesis  -Ultrasound of the abdomen showedSubcutaneous soft tissue thickening with collection. Finding could represent an abdominal wall hematoma  -Swelling almost resolved. Hemoglobin remained stable        Type 2 diabetes mellitus, fairly controlled        - Accu-Cheks before meals and at bedtime along with high-sensitivity Humalog sliding scale. ESRD (end stage renal disease)        - consult nephrologist for hemodialysis TTS per renal      SARS-CoV-2 negative test 10/19      Hypertension       -monitor BP and provide anti-hypertensive therapy as needed     Chronic pain syndrome       -As needed medications      Liver cirrhosis       -Status post paracentesis on 10/2. She is being evaluated by her care team at the [place]        PENDING TEST RESULTS:   At the time of discharge the following test results are still pending: None    FOLLOW UP APPOINTMENTS:    Follow-up Information     Follow up With Specialties Details Why Contact Info    None    None (395) Patient stated that they have no PCP               DIET: Regular Diet, Cardiac Diet and Renal Diet    ACTIVITY: Activity as tolerated        DISCHARGE MEDICATIONS:  Discharge Medication List as of 10/25/2020 12:03 PM      START taking these medications    Details   pantoprazole (Protonix) 40 mg tablet Take 1 Tab by mouth two (2) times a day for 30 days. , Print, Disp-60 Tab,R-0         CONTINUE these medications which have NOT CHANGED    Details   oxyCODONE IR (ROXICODONE) 5 mg immediate release tablet Take 5 mg by mouth every six (6) hours as needed for Pain., Historical Med      ascorbic acid, vitamin C, (Vitamin C) 500 mg tablet Take 500 mg by mouth daily. , Historical Med      lactulose (CHRONULAC) 10 gram/15 mL solution Take 30 mL by mouth two (2) times a day., No Print, Disp-1 Bottle,R-0      cholecalciferol (VITAMIN D3) 1,000 unit tablet Take 2,000 Units by mouth daily. , Historical Med      ferrous sulfate 325 mg (65 mg iron) tablet Take 325 mg by mouth three (3) times daily (with meals). , Historical Med      sevelamer (RENAGEL) 800 mg tablet Take 1,600 mg by mouth three (3) times daily (with meals). , Historical Med      albuterol (PROVENTIL HFA, VENTOLIN HFA, PROAIR HFA) 90 mcg/actuation inhaler Take 2 Puffs by inhalation every four (4) hours as needed for Wheezing., Historical Med               NOTIFY YOUR PHYSICIAN FOR ANY OF THE FOLLOWING:   Fever over 101 degrees for 24 hours. Chest pain, shortness of breath, fever, chills, nausea, vomiting, diarrhea, change in mentation, falling, weakness, bleeding. Severe pain or pain not relieved by medications. Or, any other signs or symptoms that you may have questions about. DISPOSITION:  x  Home With:   OT  PT  HH  RN       Long term SNF/Inpatient Rehab    Independent/assisted living    Hospice    Other:       PATIENT CONDITION AT DISCHARGE:     Functional status    Poor     Deconditioned    x Independent      Cognition   x  Lucid     Forgetful     Dementia      Catheters/lines (plus indication)    Cm     PICC     PEG    x None      Code status   x  Full code     DNR      PHYSICAL EXAMINATION AT DISCHARGE:  General:          Alert, cooperative, no distress, appears stated age. HEENT:           Atraumatic, anicteric sclerae, pink conjunctivae                          No oral ulcers, mucosa moist, throat clear, dentition fair  Neck:               Supple, symmetrical  Lungs:             Clear to auscultation bilaterally. No Wheezing or Rhonchi. No rales. Chest wall:      No tenderness  No Accessory muscle use. Heart:              Regular  rhythm,  No  murmur   No edema  Abdomen:        Soft, non-tender. Not distended. Bowel sounds normal  Extremities:     No cyanosis. No clubbing,                            Skin turgor normal, Capillary refill normal  Skin:                Not pale. Not Jaundiced  No rashes   Psych:             Not anxious or agitated.   Neurologic:      Alert, moves all extremities, answers questions appropriately and responds to commands       [address] DIAGNOSES:  Problem List as of 10/25/2020 Date Reviewed: 10/6/2020          Codes Class Noted - Resolved    ESRD (end stage renal disease) ([place] [address] ICD-10-CM: N18.6  ICD-9-CM: 585.6  10/18/2020 - Present        Suspected COVID-19 virus infection ICD-10-CM: Z20.828  ICD-9-CM: V01.79  10/18/2020 - Present        HTN (hypertension) ICD-10-CM: I10  ICD-9-CM: 401.9  10/17/2020 - Present        Dependence on renal dialysis ([place] [address] ICD-10-CM: Z99.2  ICD-9-CM: V45.11  10/6/2020 - Present        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, or end stage renal disease ([place] [address] ICD-10-CM: I13.2  ICD-9-CM: 404.93, 428.9  10/6/2020 - Present        Other ascites ICD-10-CM: R18.8  ICD-9-CM: 789.59  10/6/2020 - Present        Other chronic pain ICD-10-CM: G89.29  ICD-9-CM: 338.29  10/6/2020 - Present        Paroxysmal atrial fibrillation ([place] [address] ICD-10-CM: I48.0  ICD-9-CM: 427.31  10/6/2020 - Present        Type 2 diabetes mellitus with hyperglycemia ([place] [address] ICD-10-CM: E11.65  ICD-9-CM: 250.00  10/6/2020 - Present        Unspecified cirrhosis of liver ([place] [address] ICD-10-CM: K74.60  ICD-9-CM: 571.5  10/6/2020 - Present        Other hypotension ICD-10-CM: I95.89  ICD-9-CM: 458.8  10/6/2020 - Present        Anemia ICD-10-CM: D64.9  ICD-9-CM: 285.9  9/17/2020 - Present        Severe anemia ICD-10-CM: D64.9  ICD-9-CM: 285.9  5/2/2018 - Present        Dizziness ICD-10-CM: R42  ICD-9-CM: 780.4  3/16/2018 - Present        Generalized weakness ICD-10-CM: R53.1  ICD-9-CM: 780.79  3/16/2018 - Present        Rectal bleed ICD-10-CM: K62.5  ICD-9-CM: 569.3  3/16/2018 - Present        Symptomatic anemia ICD-10-CM: D64.9  ICD-9-CM: 285.9  3/16/2018 - Present        Acute blood loss anemia ICD-10-CM: D62  ICD-9-CM: 285.1  3/9/2018 - Present        Cirrhosis ([facility] [address] [address] ICD-10-CM: K74.60  ICD-9-CM: 571.5  12/17/2017 - Present        Anemia in chronic kidney disease ICD-10-CM: N18.9, D63.1  ICD-9-CM: 285.21 12/16/2017 - Present        GI bleed ICD-10-CM: K92.2  ICD-9-CM: 578.9  12/15/2017 - Present        Diabetes mellitus type 2, controlled ([facility] ICD-10-CM: E11.9  ICD-9-CM: 250.00  12/15/2017 - Present        RESOLVED: Symptomatic anemia ICD-10-CM: D64.9  ICD-9-CM: 285.9  1/10/2018 - 3/9/2018        RESOLVED: Back pain ICD-10-CM: M54.9  ICD-9-CM: 724.5  12/17/2017 - 3/9/2018        RESOLVED: Constipation ICD-10-CM: K59.00  ICD-9-CM: 564.00  12/17/2017 - 3/9/2018        RESOLVED: ESRD (end stage renal disease) ([facility] ICD-10-CM: N18.6  ICD-9-CM: 585.6  12/15/2017 - 10/10/2020        RESOLVED: HTN (hypertension) ICD-10-CM: I10  ICD-9-CM: 401.9  12/15/2017 - 10/10/2020              Greater than 30 minutes were spent with the patient on counseling and coordination of care    Signed:    Jorje Macedo MD  10/25/2020  6:50 PM

## 2020-10-25 NOTE — PROGRESS NOTES
Problem: Falls - Risk of  Goal: *Absence of Falls  Description: Document Sindi Gardner Fall Risk and appropriate interventions in the flowsheet. Outcome: Resolved/Met  Note: Fall Risk Interventions:  Mobility Interventions: Patient to call before getting OOB         Medication Interventions: Patient to call before getting OOB    Elimination Interventions: Call light in reach              Problem: Patient Education: Go to Patient Education Activity  Goal: Patient/Family Education  Outcome: Resolved/Met     Problem: Pressure Injury - Risk of  Goal: *Prevention of pressure injury  Description: Document Vic Scale and appropriate interventions in the flowsheet.   Outcome: Resolved/Met  Note: Pressure Injury Interventions:  Sensory Interventions: Assess changes in LOC    Moisture Interventions: Absorbent underpads    Activity Interventions: PT/OT evaluation    Mobility Interventions: PT/OT evaluation    Nutrition Interventions: Document food/fluid/supplement intake                     Problem: Patient Education: Go to Patient Education Activity  Goal: Patient/Family Education  Outcome: Resolved/Met     Problem: Anemia Care Plan (Adult and Pediatric)  Goal: *Labs within defined limits  Outcome: Resolved/Met  Goal: *Tolerates increased activity  Outcome: Resolved/Met     Problem: Patient Education: Go to Patient Education Activity  Goal: Patient/Family Education  Outcome: Resolved/Met     Problem: Discharge Planning  Goal: *Discharge to safe environment  Outcome: Resolved/Met     Problem: Breathing Pattern - Ineffective  Goal: *Absence of hypoxia  Outcome: Resolved/Met  Goal: *Use of effective breathing techniques  Outcome: Resolved/Met     Problem: Patient Education: Go to Patient Education Activity  Goal: Patient/Family Education  Outcome: Resolved/Met     Problem: Patient Education: Go to Patient Education Activity  Goal: Patient/Family Education  Outcome: Resolved/Met

## 2020-10-26 ENCOUNTER — PATIENT OUTREACH (OUTPATIENT)
Dept: CASE MANAGEMENT | Age: 70
End: 2020-10-26

## 2020-10-27 NOTE — PROGRESS NOTES
Unable to reach patient. Patient did not return messages. Will resolve episode. No further outreach planned.

## 2020-11-17 ENCOUNTER — HOSPITAL ENCOUNTER (EMERGENCY)
Age: 70
Discharge: ANOTHER HOSPITAL | End: 2020-11-17
Attending: EMERGENCY MEDICINE
Payer: MEDICARE

## 2020-11-17 ENCOUNTER — HOSPITAL ENCOUNTER (INPATIENT)
Age: 70
LOS: 12 days | Discharge: HOME HEALTH | DRG: 377 | End: 2020-12-01
Attending: FAMILY MEDICINE | Admitting: INTERNAL MEDICINE
Payer: MEDICARE

## 2020-11-17 VITALS
RESPIRATION RATE: 18 BRPM | WEIGHT: 153 LBS | SYSTOLIC BLOOD PRESSURE: 153 MMHG | BODY MASS INDEX: 27.11 KG/M2 | DIASTOLIC BLOOD PRESSURE: 58 MMHG | TEMPERATURE: 98.5 F | HEART RATE: 80 BPM | HEIGHT: 63 IN | OXYGEN SATURATION: 100 %

## 2020-11-17 DIAGNOSIS — I10 ESSENTIAL HYPERTENSION: ICD-10-CM

## 2020-11-17 DIAGNOSIS — Z99.2 ANEMIA DUE TO CHRONIC KIDNEY DISEASE, ON CHRONIC DIALYSIS (HCC): Primary | ICD-10-CM

## 2020-11-17 DIAGNOSIS — D63.1 ANEMIA DUE TO CHRONIC KIDNEY DISEASE, ON CHRONIC DIALYSIS (HCC): Primary | ICD-10-CM

## 2020-11-17 DIAGNOSIS — D69.6 THROMBOCYTOPENIA (HCC): ICD-10-CM

## 2020-11-17 DIAGNOSIS — K74.60 CIRRHOSIS OF LIVER WITH ASCITES, UNSPECIFIED HEPATIC CIRRHOSIS TYPE (HCC): ICD-10-CM

## 2020-11-17 DIAGNOSIS — R18.8 OTHER ASCITES: ICD-10-CM

## 2020-11-17 DIAGNOSIS — N18.6 ESRD (END STAGE RENAL DISEASE) (HCC): ICD-10-CM

## 2020-11-17 DIAGNOSIS — N18.6 ESRD (END STAGE RENAL DISEASE) ON DIALYSIS (HCC): ICD-10-CM

## 2020-11-17 DIAGNOSIS — N18.9 ANEMIA IN CHRONIC KIDNEY DISEASE, UNSPECIFIED CKD STAGE: ICD-10-CM

## 2020-11-17 DIAGNOSIS — Z99.2 ESRD (END STAGE RENAL DISEASE) ON DIALYSIS (HCC): ICD-10-CM

## 2020-11-17 DIAGNOSIS — I48.0 PAROXYSMAL ATRIAL FIBRILLATION (HCC): ICD-10-CM

## 2020-11-17 DIAGNOSIS — R18.8 CIRRHOSIS OF LIVER WITH ASCITES, UNSPECIFIED HEPATIC CIRRHOSIS TYPE (HCC): ICD-10-CM

## 2020-11-17 DIAGNOSIS — I07.1 TRICUSPID VALVE INSUFFICIENCY, UNSPECIFIED ETIOLOGY: ICD-10-CM

## 2020-11-17 DIAGNOSIS — K76.1 CHRONIC PASSIVE HEPATIC CONGESTION: ICD-10-CM

## 2020-11-17 DIAGNOSIS — I51.7 RIGHT VENTRICULAR DILATION: ICD-10-CM

## 2020-11-17 DIAGNOSIS — D63.1 ANEMIA IN CHRONIC KIDNEY DISEASE, UNSPECIFIED CKD STAGE: ICD-10-CM

## 2020-11-17 DIAGNOSIS — N18.6 ANEMIA DUE TO CHRONIC KIDNEY DISEASE, ON CHRONIC DIALYSIS (HCC): Primary | ICD-10-CM

## 2020-11-17 DIAGNOSIS — D50.0 IRON DEFICIENCY ANEMIA DUE TO CHRONIC BLOOD LOSS: ICD-10-CM

## 2020-11-17 LAB
ABO + RH BLD: NORMAL
ALBUMIN SERPL-MCNC: 2.9 G/DL (ref 3.5–5)
ALBUMIN/GLOB SERPL: 0.9 {RATIO} (ref 1.1–2.2)
ALP SERPL-CCNC: 151 U/L (ref 45–117)
ALT SERPL-CCNC: 16 U/L (ref 12–78)
ANION GAP SERPL CALC-SCNC: 9 MMOL/L (ref 5–15)
AST SERPL W P-5'-P-CCNC: 17 U/L (ref 15–37)
BASOPHILS # BLD: 0 K/UL (ref 0–0.2)
BASOPHILS NFR BLD: 0 % (ref 0–2.5)
BILIRUB SERPL-MCNC: 0.4 MG/DL (ref 0.2–1)
BLD PROD TYP BPU: NORMAL
BLOOD BANK CMNT PATIENT-IMP: NORMAL
BLOOD GROUP ANTIBODIES SERPL: NORMAL
BLOOD GROUP ANTIBODIES SERPL: POSITIVE
BPU ID: NORMAL
BUN SERPL-MCNC: 77 MG/DL (ref 6–20)
BUN/CREAT SERPL: 8 (ref 12–20)
CA-I BLD-MCNC: 8.6 MG/DL (ref 8.5–10.1)
CHLORIDE SERPL-SCNC: 105 MMOL/L (ref 97–108)
CO2 SERPL-SCNC: 26 MMOL/L (ref 21–32)
CREAT SERPL-MCNC: 9.96 MG/DL (ref 0.55–1.02)
CROSSMATCH RESULT,%XM: NORMAL
EOSINOPHIL # BLD: 0.2 K/UL (ref 0–0.7)
EOSINOPHIL NFR BLD: 4 % (ref 0.9–2.9)
ERYTHROCYTE [DISTWIDTH] IN BLOOD BY AUTOMATED COUNT: 21 % (ref 11.5–14.5)
GLOBULIN SER CALC-MCNC: 3.2 G/DL (ref 2–4)
GLUCOSE SERPL-MCNC: 129 MG/DL (ref 65–100)
HCT VFR BLD AUTO: 20.7 % (ref 36–46)
HGB BLD-MCNC: 6.4 G/DL (ref 13.5–17.5)
HISTORY CHECKED?,CKHIST: NORMAL
LYMPHOCYTES # BLD: 0.5 K/UL (ref 1–4.8)
LYMPHOCYTES NFR BLD: 12 % (ref 20.5–51.1)
MCH RBC QN AUTO: 29.2 PG (ref 31–34)
MCHC RBC AUTO-ENTMCNC: 31 G/DL (ref 31–36)
MCV RBC AUTO: 94.2 FL (ref 80–100)
MONOCYTES # BLD: 0.4 K/UL (ref 0.2–2.4)
MONOCYTES NFR BLD: 8 % (ref 1.7–9.3)
NEUTS SEG # BLD: 3.5 K/UL (ref 1.8–7.7)
NEUTS SEG NFR BLD: 76 % (ref 42–75)
NRBC # BLD: 0.01 K/UL
NRBC BLD-RTO: 10 PER 100 WBC
PLATELET # BLD AUTO: 225 K/UL
PMV BLD AUTO: 8.9 FL (ref 6.5–11.5)
POTASSIUM SERPL-SCNC: 6.1 MMOL/L (ref 3.5–5.1)
PROT SERPL-MCNC: 6.1 G/DL (ref 6.4–8.2)
RBC # BLD AUTO: 2.2 M/UL (ref 4.5–5.9)
SODIUM SERPL-SCNC: 140 MMOL/L (ref 136–145)
SPECIMEN EXP DATE BLD: NORMAL
STATUS OF UNIT,%ST: NORMAL
TRANSFUSION STATUS PATIENT QL: NORMAL
UNIT DIVISION, %UDIV: 0
WBC # BLD AUTO: 4.6 K/UL (ref 4.4–11.3)
XXAUTO CONTROL: NEGATIVE

## 2020-11-17 PROCEDURE — 36415 COLL VENOUS BLD VENIPUNCTURE: CPT

## 2020-11-17 PROCEDURE — 86706 HEP B SURFACE ANTIBODY: CPT

## 2020-11-17 PROCEDURE — P9016 RBC LEUKOCYTES REDUCED: HCPCS

## 2020-11-17 PROCEDURE — 86900 BLOOD TYPING SEROLOGIC ABO: CPT

## 2020-11-17 PROCEDURE — 93005 ELECTROCARDIOGRAM TRACING: CPT

## 2020-11-17 PROCEDURE — 86870 RBC ANTIBODY IDENTIFICATION: CPT

## 2020-11-17 PROCEDURE — 80053 COMPREHEN METABOLIC PANEL: CPT

## 2020-11-17 PROCEDURE — 30233N1 TRANSFUSION OF NONAUTOLOGOUS RED BLOOD CELLS INTO PERIPHERAL VEIN, PERCUTANEOUS APPROACH: ICD-10-PCS | Performed by: HOSPITALIST

## 2020-11-17 PROCEDURE — 99285 EMERGENCY DEPT VISIT HI MDM: CPT

## 2020-11-17 PROCEDURE — 85025 COMPLETE CBC W/AUTO DIFF WBC: CPT

## 2020-11-17 PROCEDURE — 74011250636 HC RX REV CODE- 250/636: Performed by: EMERGENCY MEDICINE

## 2020-11-17 PROCEDURE — 86921 COMPATIBILITY TEST INCUBATE: CPT

## 2020-11-17 PROCEDURE — 77010033678 HC OXYGEN DAILY

## 2020-11-17 PROCEDURE — 87340 HEPATITIS B SURFACE AG IA: CPT

## 2020-11-17 PROCEDURE — 86920 COMPATIBILITY TEST SPIN: CPT

## 2020-11-17 PROCEDURE — 74011250637 HC RX REV CODE- 250/637: Performed by: HOSPITALIST

## 2020-11-17 PROCEDURE — 86902 BLOOD TYPE ANTIGEN DONOR EA: CPT

## 2020-11-17 PROCEDURE — 36430 TRANSFUSION BLD/BLD COMPNT: CPT

## 2020-11-17 PROCEDURE — 74011250637 HC RX REV CODE- 250/637: Performed by: NURSE PRACTITIONER

## 2020-11-17 PROCEDURE — 74011250637 HC RX REV CODE- 250/637: Performed by: EMERGENCY MEDICINE

## 2020-11-17 PROCEDURE — 99218 HC RM OBSERVATION: CPT

## 2020-11-17 PROCEDURE — 90935 HEMODIALYSIS ONE EVALUATION: CPT

## 2020-11-17 PROCEDURE — 94760 N-INVAS EAR/PLS OXIMETRY 1: CPT

## 2020-11-17 PROCEDURE — 86922 COMPATIBILITY TEST ANTIGLOB: CPT

## 2020-11-17 RX ORDER — PANTOPRAZOLE SODIUM 40 MG/1
40 TABLET, DELAYED RELEASE ORAL 2 TIMES DAILY
Status: DISCONTINUED | OUTPATIENT
Start: 2020-11-17 | End: 2020-12-01 | Stop reason: HOSPADM

## 2020-11-17 RX ORDER — SODIUM CHLORIDE 9 MG/ML
250 INJECTION, SOLUTION INTRAVENOUS AS NEEDED
Status: DISCONTINUED | OUTPATIENT
Start: 2020-11-17 | End: 2020-12-01 | Stop reason: HOSPADM

## 2020-11-17 RX ORDER — KETOTIFEN FUMARATE 0.35 MG/ML
1 SOLUTION/ DROPS OPHTHALMIC 2 TIMES DAILY
COMMUNITY

## 2020-11-17 RX ORDER — HYDROCODONE BITARTRATE AND ACETAMINOPHEN 5; 325 MG/1; MG/1
1 TABLET ORAL
Status: COMPLETED | OUTPATIENT
Start: 2020-11-17 | End: 2020-11-17

## 2020-11-17 RX ORDER — OXYCODONE HYDROCHLORIDE 5 MG/1
5 TABLET ORAL
Status: DISCONTINUED | OUTPATIENT
Start: 2020-11-17 | End: 2020-12-01 | Stop reason: HOSPADM

## 2020-11-17 RX ORDER — MORPHINE SULFATE 2 MG/ML
2 INJECTION, SOLUTION INTRAMUSCULAR; INTRAVENOUS
Status: DISCONTINUED | OUTPATIENT
Start: 2020-11-17 | End: 2020-11-17 | Stop reason: HOSPADM

## 2020-11-17 RX ORDER — SEVELAMER CARBONATE 800 MG/1
1600 TABLET, FILM COATED ORAL
Status: DISCONTINUED | OUTPATIENT
Start: 2020-11-17 | End: 2020-12-01 | Stop reason: HOSPADM

## 2020-11-17 RX ORDER — ACETAMINOPHEN 325 MG/1
650 TABLET ORAL
Status: DISCONTINUED | OUTPATIENT
Start: 2020-11-17 | End: 2020-12-01 | Stop reason: HOSPADM

## 2020-11-17 RX ADMIN — ACETAMINOPHEN 650 MG: 325 TABLET ORAL at 22:39

## 2020-11-17 RX ADMIN — HYDROCODONE BITARTRATE AND ACETAMINOPHEN 1 TABLET: 5; 325 TABLET ORAL at 12:43

## 2020-11-17 RX ADMIN — PANTOPRAZOLE SODIUM 40 MG: 40 TABLET, DELAYED RELEASE ORAL at 20:00

## 2020-11-17 RX ADMIN — SEVELAMER CARBONATE 1600 MG: 800 TABLET, FILM COATED ORAL at 19:00

## 2020-11-17 NOTE — ED NOTES
Mom complains of possible oversupply of milk and strong let down, baby being very frequently fussy and gassy. She is not doing any breast pumping. Very little spit up, but last night baby had a large amount of cottage cheese-like spit up.       Baby is al Pt given sandwich bag, okay toño Simental MD.

## 2020-11-17 NOTE — ED NOTES
Transfer center called back stating no beds are available ed to ed or ed to ICU. Telemetry bed may be available, checking now. Center will call back with further directions.

## 2020-11-17 NOTE — ED NOTES
Pt transferred to [facility] RN will be notified of pts departure to facility.

## 2020-11-17 NOTE — ED NOTES
Report given to Cooper Polanco at [facility] ED. No further questions at this time. Pt will be prepared for transport now. Chaka Osman will be updated when pt is beginning route to [facility].

## 2020-11-17 NOTE — ED NOTES
Transfer Center contacted for possible transfer to [facility]. Transfer Center will call back with information shortly.

## 2020-11-18 ENCOUNTER — APPOINTMENT (OUTPATIENT)
Dept: ULTRASOUND IMAGING | Age: 70
DRG: 377 | End: 2020-11-18
Attending: NURSE PRACTITIONER
Payer: MEDICARE

## 2020-11-18 LAB
ALBUMIN SERPL-MCNC: 2.8 G/DL (ref 3.5–5)
ANION GAP SERPL CALC-SCNC: 6 MMOL/L (ref 5–15)
BUN SERPL-MCNC: 37 MG/DL (ref 6–20)
BUN/CREAT SERPL: 7 (ref 12–20)
CALCIUM SERPL-MCNC: 8.3 MG/DL (ref 8.5–10.1)
CHLORIDE SERPL-SCNC: 105 MMOL/L (ref 97–108)
CO2 SERPL-SCNC: 28 MMOL/L (ref 21–32)
CREAT SERPL-MCNC: 5.21 MG/DL (ref 0.55–1.02)
GLUCOSE BLD STRIP.AUTO-MCNC: 122 MG/DL (ref 65–100)
GLUCOSE SERPL-MCNC: 132 MG/DL (ref 65–100)
HBV SURFACE AB SER QL: REACTIVE
HBV SURFACE AB SER-ACNC: 57.4 MIU/ML
HBV SURFACE AG SER QL: <0.1 INDEX
HBV SURFACE AG SER QL: NEGATIVE
HCT VFR BLD AUTO: 24.7 % (ref 35–47)
HCT VFR BLD AUTO: 27.5 % (ref 35–47)
HGB BLD-MCNC: 7 G/DL (ref 11.5–16)
HGB BLD-MCNC: 8.2 G/DL (ref 11.5–16)
HISTORY CHECKED?,CKHIST: NORMAL
PHOSPHATE SERPL-MCNC: 4.6 MG/DL (ref 2.6–4.7)
POTASSIUM SERPL-SCNC: 4.3 MMOL/L (ref 3.5–5.1)
SERVICE CMNT-IMP: ABNORMAL
SODIUM SERPL-SCNC: 139 MMOL/L (ref 136–145)

## 2020-11-18 PROCEDURE — 74011250637 HC RX REV CODE- 250/637: Performed by: HOSPITALIST

## 2020-11-18 PROCEDURE — 80069 RENAL FUNCTION PANEL: CPT

## 2020-11-18 PROCEDURE — P9016 RBC LEUKOCYTES REDUCED: HCPCS

## 2020-11-18 PROCEDURE — 85018 HEMOGLOBIN: CPT

## 2020-11-18 PROCEDURE — 76700 US EXAM ABDOM COMPLETE: CPT

## 2020-11-18 PROCEDURE — 5A1D70Z PERFORMANCE OF URINARY FILTRATION, INTERMITTENT, LESS THAN 6 HOURS PER DAY: ICD-10-PCS | Performed by: HOSPITALIST

## 2020-11-18 PROCEDURE — 82272 OCCULT BLD FECES 1-3 TESTS: CPT

## 2020-11-18 PROCEDURE — 2709999900 HC NON-CHARGEABLE SUPPLY

## 2020-11-18 PROCEDURE — 82962 GLUCOSE BLOOD TEST: CPT

## 2020-11-18 PROCEDURE — 36430 TRANSFUSION BLD/BLD COMPNT: CPT

## 2020-11-18 PROCEDURE — 99218 HC RM OBSERVATION: CPT

## 2020-11-18 PROCEDURE — 74011250637 HC RX REV CODE- 250/637: Performed by: NURSE PRACTITIONER

## 2020-11-18 RX ORDER — SODIUM CHLORIDE 9 MG/ML
250 INJECTION, SOLUTION INTRAVENOUS AS NEEDED
Status: DISCONTINUED | OUTPATIENT
Start: 2020-11-18 | End: 2020-12-01 | Stop reason: HOSPADM

## 2020-11-18 RX ADMIN — PANTOPRAZOLE SODIUM 40 MG: 40 TABLET, DELAYED RELEASE ORAL at 09:33

## 2020-11-18 RX ADMIN — OXYCODONE HYDROCHLORIDE 5 MG: 5 TABLET ORAL at 06:59

## 2020-11-18 RX ADMIN — PANTOPRAZOLE SODIUM 40 MG: 40 TABLET, DELAYED RELEASE ORAL at 18:16

## 2020-11-18 RX ADMIN — SEVELAMER CARBONATE 1600 MG: 800 TABLET, FILM COATED ORAL at 18:16

## 2020-11-18 RX ADMIN — OXYCODONE HYDROCHLORIDE 5 MG: 5 TABLET ORAL at 13:36

## 2020-11-18 RX ADMIN — OXYCODONE HYDROCHLORIDE 5 MG: 5 TABLET ORAL at 20:45

## 2020-11-18 NOTE — PROGRESS NOTES
Bedside and Verbal shift change report given to Zabrina RN (oncoming nurse) by Ema Skiff RN (offgoing nurse). Report included the following information SBAR, Kardex, ED Summary, Intake/Output, MAR, Cardiac Rhythm NSR and Dual Neuro Assessment.

## 2020-11-18 NOTE — PROGRESS NOTES
Spoke with [facility] RN, Lacy Galdamez. She has dialysis orders and will be here at approximately 2000.

## 2020-11-18 NOTE — CONSULTS
[facility].  [address] [facility] [address] [place] [address], [address]  [phone]                     GI CONSULTATION NOTE      NAME:  Hailey Calderon   :   1950   MRN:   [id number]       Referring Physician: Dr. Almonte So Date: 2020     Chief Complaint: Anemia    History of Present Illness:  Patient is a 69 y.o. who is seen in consultation at the request of Dr. Pretty Coutrney for anemia. Mary Daniel from [facility] yesterday for transfusion and dialysis. Patient to emergency room in [place] [address] for severe fatigue and shortness of breath. She reports prior to going to emergency room, stated when had dialysis on Saturday her hemoglobin was low. Past medical history of arthritis, asthma, ESRD (dialysis Tuesday, Thursday, Saturday schedule, chronic pain, cirrhosis (follows by [facility]), type 2 DM, GERD, hypertension, and paroxysmal afib. Patient previous admitted to [facility] [place] on 2020 to 2020. During that time patient underwent EGD evaluation on 2020 with findings mild gastritis with some friable mucosa and oozing-treated with APC and also capsule study performed with presence of small non-bleeding AVM's in small intestine. Patient then wanted to have additional care performed at the [facility]. Where she previously receive care. It was recommended patient to have deep enteroscopy performed for further evaluation of any GI bleeding. Patient also received paracentesis performed on last admission. Since then, patient reports she had [facility] follow up at the end of 2020. Stated she underwent EGD, enteroscopy, and colonoscopy- all with no findings. Patient denies nausea, no vomiting, no hematemesis, no melena,no hematochezia, no rectal pain, no rectal bleeding. Denies fever, no chills, no chest pain.        PMH:  Past Medical History:   Diagnosis Date    Arthritis     Asthma     Chronic kidney disease     Chronic pain     Cirrhosis ([facility] 12/17/2017    Diabetes ([facility] diet controlled    GERD (gastroesophageal reflux disease)     Hypertension     Paroxysmal atrial fibrillation ([facility] 10/6/2020       PSH:  Past Surgical History:   Procedure Laterality Date    COLONOSCOPY N/A 1/12/2018    COLONOSCOPY performed by Kelvin Fulton MD at [address] COLONOSCOPY N/A 3/19/2018    COLONOSCOPY performed by Kelvin Fulton MD at [address] HX GYN  c section    HX VASCULAR ACCESS      dialysis     IR PARACENTESIS ABD W IMAGE  10/22/2020       Allergies: Allergies   Allergen Reactions    Aleve [Naproxen Sodium] Itching, Swelling and Other (comments)    Amlodipine Rash, Hives and Itching    Aspirin Other (comments), Nausea Only and Unknown (comments)     GI bleed  GI bleeding      Heparin Unknown (comments)     bleeding      Ibuprofen Nausea and Vomiting    Metformin Other (comments)     swelling       Home Medications:  Prior to Admission Medications   Prescriptions Last Dose Informant Patient Reported? Taking? albuterol (PROVENTIL HFA, VENTOLIN HFA, PROAIR HFA) 90 mcg/actuation inhaler 11/17/2020 at Unknown time  Yes Yes   Sig: Take 2 Puffs by inhalation every four (4) hours as needed for Wheezing. ascorbic acid, vitamin C, (Vitamin C) 500 mg tablet 11/17/2020 at Unknown time  Yes Yes   Sig: Take 500 mg by mouth daily. cholecalciferol (VITAMIN D3) 1,000 unit tablet 11/17/2020 at Unknown time  Yes Yes   Sig: Take 2,000 Units by mouth daily. ferrous sulfate 325 mg (65 mg iron) tablet 11/17/2020 at Unknown time  Yes Yes   Sig: Take 325 mg by mouth three (3) times daily (with meals). ketotifen (ZADITOR) 0.025 % (0.035 %) ophthalmic solution   Yes Yes   Sig: Administer 1 Drop to both eyes two (2) times a day. Indications: allergic conjunctivitis   lactulose (CHRONULAC) 10 gram/15 mL solution 11/10/2020 at Unknown time  No Yes   Sig: Take 30 mL by mouth two (2) times a day.    oxyCODONE IR (ROXICODONE) 5 mg immediate release tablet 11/16/2020 at 1100  Yes Yes   Sig: Take 5 mg by mouth every six (6) hours as needed for Pain.   pantoprazole (Protonix) 40 mg tablet 11/16/2020 at Unknown time  No Yes   Sig: Take 1 Tab by mouth two (2) times a day for 30 days. polyvinyl alcohol-povidon,PF, (REFRESH CLASSIC) 1.4-0.6 % ophthalmic solution   Yes Yes   Sig: Administer 1-2 Drops to both eyes three (3) times daily. Indications: dry eye   sevelamer (RENAGEL) 800 mg tablet 11/16/2020 at Unknown time  Yes Yes   Sig: Take 1,600 mg by mouth three (3) times daily (with meals).       Facility-Administered Medications: None       Hospital Medications:  Current Facility-Administered Medications   Medication Dose Route Frequency    0.9% sodium chloride infusion 250 mL  250 mL IntraVENous PRN    pantoprazole (PROTONIX) tablet 40 mg  40 mg Oral BID    sevelamer carbonate (RENVELA) tab 1,600 mg  1,600 mg Oral TID WITH MEALS    acetaminophen (TYLENOL) tablet 650 mg  650 mg Oral Q4H PRN    oxyCODONE IR (ROXICODONE) tablet 5 mg  5 mg Oral Q4H PRN       Social History:  Social History     Tobacco Use    Smoking status: Never Smoker    Smokeless tobacco: Never Used   Substance Use Topics    Alcohol use: No       Family History:  Family History   Family history unknown: Yes       Review of Systems:    Constitutional: negative fever, negative chills, negative weight loss  Eyes:   negative visual changes  ENT:   negative sore throat, tongue or lip swelling  Respiratory:  negative cough, negative dyspnea, exertional shortness of breath  Cards:  negative for chest pain, palpitations, lower extremity edema  GI:   See HPI  :  negative for frequency, dysuria  Integument:  negative for rash and pruritus  Heme:  negative for easy bruising and gum/nose bleeding  Musculoskel: negative for myalgias,  back pain and muscle weakness  Neuro: negative for headaches, dizziness, vertigo  Psych:  negative for feelings of anxiety, depression      Objective: Patient Vitals for the past 8 hrs:   BP Temp Pulse Resp SpO2 Weight   11/18/20 0554 130/61 97.6 °F (36.4 °C) 75 13 100 % --   11/18/20 0146 (!) 122/53 97.7 °F (36.5 °C) 74 14 100 % 77.6 kg (171 lb 1.2 oz)     No intake/output data recorded. 11/16 1901 - 11/18 0700  In: 270   Out: 2000         PHYSICAL EXAM:  General:          WD, WN. Alert, cooperative, no acute distress    HEENT:           NC, Atraumatic. PERRLA, EOMI. Anicteric sclerae. Lungs:             CTA Bilaterally. No Wheezing/Rhonchi/Rales. Heart:              Regular  rhythm,  No murmur   Abdomen:        Soft, some distention, generalized tenderness with deep palpation. Bowel sounds present, no palpable masses   Extremities:     No edema   Neurologic:      CN 2-12 gi, Alert and oriented X 3. No acute neurological distress   Psych:             Good insight. Not anxious nor agitated.       Data Review     Recent Labs     11/18/20 0240 11/17/20  1008   WBC  --  4.6   HGB 7.0* 6.4*   HCT 24.7* 20.7*   PLT  --  225     Recent Labs     11/18/20 0240 11/17/20  1008    140   K 4.3 6.1*    105   CO2 28 26   BUN 37* 77*   CREA 5.21* 9.96*   * 129*   PHOS 4.6  --    CA 8.3* 8.6     Recent Labs     11/18/20 0240 11/17/20  1008   AP  --  151*   TP  --  6.1*   ALB 2.8* 2.9*   GLOB  --  3.2     No results for input(s): INR, PTP, APTT, INREXT in the last 72 hours.    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, or end stage renal disease (HCC) I13.2    Other ascites R18.8    Other chronic pain G89.29    Paroxysmal atrial fibrillation (HCC) I48.0    Type 2 diabetes mellitus with hyperglycemia (HCC) E11.65    Unspecified cirrhosis of liver (HCC) K74.60    Other hypotension I95.89    HTN (hypertension) I10    ESRD (end stage renal disease) (HCC) N18.6    Suspected COVID-19 virus infection Z20.828                      Plan:   Anemia  -Hemoglobin 6.4 on admission, patient receiving unit of PRBC's- Hemoglobin 7.0 this AM  -Continue to monitor H&H, transfuse if hemoglobin less then 7.0  -BID PPI with Protonix  -Will attempt to retrieve records from [facility] of recent GI follow up, patient reports follow up with EGD, enteroscopy, and colonoscopy with no findings in October 2020.  Patient desires not to have any additional procedures at this time     Liver disease/Cirhosis/Ascites:  -Received paracentesis on last admission 10/22/2020  -Will order abdominal ultrasound for evaluation  -Patient stated follow up at [facility]        ESRD, diabetes, COVID 19 negative, hypertension, chronic pain, diabetes  -management per hospitalist   -nephrology following   -patient receives dialysis [facility] Saturday- received dialysis yesterday    Will discuss with Dr. Ankush Pruitt

## 2020-11-18 NOTE — DIALYSIS
[facility]  [phone]    Vitals   Pre   Post   Assessment   Pre   Post     Temp  97.9F 98.1F  LOC  A&O x4; appropriate with no issues No change   HR   80 83 Lungs   No SOB; RR WNL; 2L NS; dry cough No change   B/P  145/60 131/60 Cardiac   Telemetry monitoring - regular with some ectopi noted, rate WNL No change   Resp   20 17 Skin   Warm/Dry No change   Pain level  10/10  *RN notified  0/10 Edema  BLE non-pitting     No change   Orders:    Duration:   Start:   2130 End:   0030 Total:   3 hrs   Dialyzer:   Dialyzer/Set Up Inspection: Patric Butterfield (11/17/20 2115)   K Bath:   Dialysate K (mEq/L): 1 (11/17/20 2130) 1k for 1 hr // 2k for 2hrs   Ca Bath:   Dialysate CA (mEq/L): 2.5 (11/17/20 2130)   Na/Bicarb:   Dialysate NA (mEq/L): 138 (11/17/20 2130)/ Bicarb 35 mEq/L 11/17/20 2130   Target Fluid Removal:   Goal/Amount of Fluid to Remove (mL): 2000 mL (11/17/20 2115)   Access     Type & Location:   Right tunneled chest PC: dressing changed & C/D/I, dated 11/18/20. Prepped limbs & hubs per hospital P&P- +aspiration/+flush x2 ports. At end flushed x2 ports with NS & sterile caps applied.      Labs     Obtained/Reviewed   Critical Results Called   Date when labs were drawn-  Hgb-    HGB   Date Value Ref Range Status   11/17/2020 6.4 (L) 13.5 - 17.5 g/dL Final     Comment:     CALLED TO AND READ BACK BY CALLED ER SHEMAR SAMANIEGO RN @0373 BY D     K-    Potassium   Date Value Ref Range Status   11/17/2020 6.1 (H) 3.5 - 5.1 mmol/L Final     Ca-   Calcium   Date Value Ref Range Status   11/17/2020 8.6 8.5 - 10.1 mg/dL Final     Bun-   BUN   Date Value Ref Range Status   11/17/2020 77 (H) 6 - 20 mg/dL Final     Creat-   Creatinine   Date Value Ref Range Status   11/17/2020 9.96 (H) 0.55 - 1.02 mg/dL Final        Medications/ Blood Products Given     Name   Dose   Route and Time     1 unit PRBC's 300 ml  With Dialysis per MD orders - 11/17/20 @ 2148   Blood Volume Processed (BVP):   65 L  Net Fluid   Removed:  2000 ml   Comments   Time Out Done: Yes 11/17/20 @ 2115  Primary Nurse Rpt Pre: JOAN Ghosh RN  Primary Nurse Rpt Post: JOAN Ghosh RN  Pt Education: Given procedural; blood products & medications; fluid removal & tx plan  Care Plan: See Nephrology Notes  Tx Summary:  2130: HD started without issues, pt alert, appropriate, VSS and in no distress. CVC patent, #'s good. 2148: 1 units PRBC's infusion initiated on dialysis machine as per MD orders. Lines now running reversed r/t access pressures- no further issues. Pt remains alert, VSS and in no distress. 2230: Bath changed per orders to 2k from 1k. 2245: Primary RN @ bedside to administer tylenol for pain. Pt resting & no reported needs. VSS, HD running well, blood infusing. 2345: Blood finished infusing. No changes in pt status, continue to tolerate tx well & stable. 0030: Tolerated tx well, all blood returned in circuit with 300 mls NS. CVC dressing changed & C/D/I; flushed x2 ports with NS & sterile caps applied. Received 1 unit of PRBC's as ordered. 2kg removed. No changes in pt status, VSS, remains on tele monitor & in bed in room 656. Call bell in reach & bed in lowest position. Admiting Diagnosis: ESRD; Hyperkalemia, Anemia  Consent signed - Informed Consent Verified: Yes (11/17/20 2115)  Hepatitis Status- Unknown; STAT lab draws ordered  Machine #- Machine Number: Anthonette Canavan (11/17/20 2115)  Telemetry status-Bedside telemetry  Pre-dialysis wt. - Pre-Dialysis Weight: 69.4 kg (153 lb) (11/17/20 2115)

## 2020-11-18 NOTE — ED PROVIDER NOTES
HPI   Patient is a 69 y.o. female PATIENT REFUSED  [address] who presents to the ER with a chief complaint of severe anemia. Feeling weak & tired. No Hx of any active bleeding.  Hemoglobin is reported to be 6.4  Chief Complaint   Patient presents with    Anemia     pt reports \"blood count low\"; pt AOx4; no distress noted; pt sent from dialysis; pt c/o back pain 10/10    Abnormal Lab Results      Past Medical History:   Diagnosis Date    Arthritis     Asthma     Chronic kidney disease     Chronic pain     Cirrhosis ([facility] 12/17/2017    Diabetes ([facility] diet controlled    GERD (gastroesophageal reflux disease)     Hypertension     Paroxysmal atrial fibrillation ([facility] 10/6/2020       Past Surgical History:   Procedure Laterality Date    COLONOSCOPY N/A 1/12/2018    COLONOSCOPY performed by Moi Wright MD at [facility] ENDOSCOPY    COLONOSCOPY N/A 3/19/2018    COLONOSCOPY performed by Moi Wright MD at [address] HX GYN  c section    HX VASCULAR ACCESS      dialysis     IR PARACENTESIS ABD W IMAGE  10/22/2020         Family History:   Family history unknown: Yes       Social History     Socioeconomic History    Marital status: SINGLE     Spouse name: Not on file    Number of children: Not on file    Years of education: Not on file    Highest education level: Not on file   Occupational History    Not on file   Social Needs    Financial resource strain: Not on file    Food insecurity     Worry: Not on file     Inability: Not on file    Transportation needs     Medical: Not on file     Non-medical: Not on file   Tobacco Use    Smoking status: Never Smoker    Smokeless tobacco: Never Used   Substance and Sexual Activity    Alcohol use: No    Drug use: No    Sexual activity: Not on file   Lifestyle    Physical activity     Days per week: Not on file     Minutes per session: Not on file    Stress: Not on file   Relationships    Social connections     Talks on phone: Not on file     Gets together: Not on file     Attends Judaism service: Not on file     Active member of club or organization: Not on file     Attends meetings of clubs or organizations: Not on file     Relationship status: Not on file    Intimate partner violence     Fear of current or ex partner: Not on file     Emotionally abused: Not on file     Physically abused: Not on file     Forced sexual activity: Not on file   Other Topics Concern     Service Not Asked    Blood Transfusions Yes    Caffeine Concern Not Asked    Occupational Exposure Not Asked   Aileen Files Hazards Not Asked    Sleep Concern Not Asked    Stress Concern Not Asked    Weight Concern Not Asked    Special Diet Not Asked    Back Care Not Asked    Exercise Not Asked    Bike Helmet Not Asked   [address] Not Asked    Self-Exams Not Asked   Social History Narrative    Not on file         ALLERGIES: Aleve [naproxen sodium]; Amlodipine; Aspirin; Heparin; Ibuprofen; and Metformin    Review of Systems   Constitutional: Negative. HENT: Negative. Eyes: Negative. Respiratory: Positive for shortness of breath. Cardiovascular: Negative. Gastrointestinal: Negative. Endocrine: Negative. Genitourinary: Negative. Musculoskeletal: Negative. Skin: Negative. Allergic/Immunologic: Negative. Neurological: Negative. Hematological: Negative. Psychiatric/Behavioral: Negative. Vitals:    11/17/20 1230 11/17/20 1300 11/17/20 1400 11/17/20 1500   BP: 134/73 133/74 130/68 (!) 153/58   Pulse: 81 82 84 80   Resp: 18  18 18   Temp:       SpO2: 100% 100% 100% 100%   Weight:       Height:                Physical Exam  Vitals signs and nursing note reviewed. Constitutional:       Appearance: Normal appearance. She is normal weight. HENT:      Head: Normocephalic and atraumatic. Nose: Nose normal.   Eyes:      Extraocular Movements: Extraocular movements intact.       Pupils: Pupils are equal, round, and reactive to light. Neck:      Musculoskeletal: Normal range of motion and neck supple. Cardiovascular:      Rate and Rhythm: Normal rate and regular rhythm. Pulses: Normal pulses. Pulmonary:      Effort: Pulmonary effort is normal.      Breath sounds: Normal breath sounds. Abdominal:      General: Abdomen is flat. Palpations: Abdomen is soft. Genitourinary:     Rectum: Guaiac result positive. Comments: Patient takes iron pills  Musculoskeletal: Normal range of motion. Skin:     General: Skin is warm and dry. Neurological:      General: No focal deficit present. Mental Status: She is alert and oriented to person, place, and time. Psychiatric:         Mood and Affect: Mood normal.         Behavior: Behavior normal.          MDM       Procedures    ICD-10-CM ICD-9-CM    1.  Anemia due to chronic kidney disease, on chronic dialysis (HCC)  N18.6 585.6     D63.1 285.21     Z99.2 V45.11

## 2020-11-18 NOTE — PROGRESS NOTES
Problem: Falls - Risk of  Goal: *Absence of Falls  Description: Document Karely Geiger Fall Risk and appropriate interventions in the flowsheet. Outcome: Progressing Towards Goal  Note: Fall Risk Interventions:  Mobility Interventions: Communicate number of staff needed for ambulation/transfer, OT consult for ADLs, Patient to call before getting OOB, PT Consult for mobility concerns, PT Consult for assist device competence, Utilize walker, cane, or other assistive device         Medication Interventions: Evaluate medications/consider consulting pharmacy, Patient to call before getting OOB, Teach patient to arise slowly    Elimination Interventions: Call light in reach, Elevated toilet seat, Patient to call for help with toileting needs, Stay With Me (per policy), Toilet paper/wipes in reach, Toileting schedule/hourly rounds              Problem: Pain  Goal: *Control of Pain  Outcome: Progressing Towards Goal     Problem: Pressure Injury - Risk of  Goal: *Prevention of pressure injury  Description: Document Vic Scale and appropriate interventions in the flowsheet. Outcome: Progressing Towards Goal  Note: Pressure Injury Interventions:  Sensory Interventions: Assess need for specialty bed, Chair cushion, Check visual cues for pain, Discuss PT/OT consult with provider, Keep linens dry and wrinkle-free, Float heels, Maintain/enhance activity level, Pad between skin to skin, Minimize linen layers, Monitor skin under medical devices, Pressure redistribution bed/mattress (bed type), Turn and reposition approx.  every two hours (pillows and wedges if needed)    Moisture Interventions: Absorbent underpads, Apply protective barrier, creams and emollients, Assess need for specialty bed, Check for incontinence Q2 hours and as needed, Contain wound drainage, Internal/External urinary devices, Limit adult briefs, Maintain skin hydration (lotion/cream), Minimize layers, Moisture barrier    Activity Interventions: Chair cushion, Increase time out of bed, Pressure redistribution bed/mattress(bed type), PT/OT evaluation    Mobility Interventions: Chair cushion, Float heels, HOB 30 degrees or less, Pressure redistribution bed/mattress (bed type), PT/OT evaluation, Turn and reposition approx.  every two hours(pillow and wedges)    Nutrition Interventions: Document food/fluid/supplement intake    Friction and Shear Interventions: Feet elevated on foot rest, HOB 30 degrees or less, Lift sheet, Lift team/patient mobility team, Minimize layers, Transferring/repositioning devices

## 2020-11-18 NOTE — CONSULTS
[facility]   [address] [facility], [facility] Adele [facility], [facility]  Phone: [phone]   [phone]       Nephrology Progress Note  Pauline Haddad Fenton     [birth year]     [id number]  Date of Admission : 11/17/2020 11/18/20    CC: Follow up for ESRD       Assessment and Plan   ESRD- HD  - Dialyzes TTS at [address] dialysis in [place], [place]  - followed by Dr Lupe Galloway   - has Garcia Sour for access  - HD today and again tomorrow    - continue Midodrine w/ HD     Cirrhosis of Liver : ? Cause   Chronic HFrEF      Anemia in CKD :   -  continue epogen - 20 K units w/ HD     Blood loss anemia : On last admission   - EGD: antral gastritis and erythema   - Capsule study : non bleeding small bowel AVMs noted   - Hb at 7 gm today - another unit of blood      Sec HPT   - continue Phos binders       Care Plan discussed with: pt      Interval History:  Ms fenton was transferred from [facility] yesterday for transfusion and dialysis   She was in [facility] last month with GI bleed and has EGD and Capsule study. Capsule study showed non bleeding AVMs. Her Hb was 8.8 on d/c   Her last HD was Saturday   She was sent to ER from her dialysis clinic due to Low Hb for transfusion   K was 6.1 in ER and she was sent here for dialysis as the local hospital did not have dialysis nurse   She was dialyzed overnight and tolerated 2 Kg UF   She remains volume overloaded   Reports intra dialytic hypotension and cramping     Review of Systems: A comprehensive review of systems was negative except for that written in the HPI.     Current Medications:   Current Facility-Administered Medications   Medication Dose Route Frequency    0.9% sodium chloride infusion 250 mL  250 mL IntraVENous PRN    0.9% sodium chloride infusion 250 mL  250 mL IntraVENous PRN    pantoprazole (PROTONIX) tablet 40 mg  40 mg Oral BID    sevelamer carbonate (RENVELA) tab 1,600 mg  1,600 mg Oral TID WITH MEALS    acetaminophen (TYLENOL) tablet 650 mg  650 mg Oral Q4H PRN    oxyCODONE IR (ROXICODONE) tablet 5 mg  5 mg Oral Q4H PRN      Allergies   Allergen Reactions    Aleve [Naproxen Sodium] Itching, Swelling and Other (comments)    Amlodipine Rash, Hives and Itching    Aspirin Other (comments), Nausea Only and Unknown (comments)     GI bleed  GI bleeding      Heparin Unknown (comments)     bleeding      Ibuprofen Nausea and Vomiting    Metformin Other (comments)     swelling       Objective:  Vitals:    Vitals:    11/18/20 0030 11/18/20 0146 11/18/20 0554 11/18/20 0957   BP: 125/66 (!) 122/53 130/61 (!) 113/54   Pulse: 86 74 75 68   Resp:  14 13 15   Temp:  97.7 °F (36.5 °C) 97.6 °F (36.4 °C) 98 °F (36.7 °C)   SpO2:  100% 100% 100%   Weight:  77.6 kg (171 lb 1.2 oz)       Intake and Output:  No intake/output data recorded. 11/16 1901 - 11/18 0700  In: 270   Out: 2000     Physical Examination:    General: Ill looking   HEENT           Icteric/ muddy sclera   Neck:  RIJ permacath   Resp:  Diminished B/L   CV:  RRR,  no murmur or rub,1+ LE edema  GI:  Soft, NT, + distension no hepatosplenomegaly  Neurologic:  Non focal  Psych:             AAO x 3 appropriate affect  Skin:  No Rash  :  No cai    []    High complexity decision making was performed  []    Patient is at high-risk of decompensation with multiple organ involvement    Lab Data Personally Reviewed: I have reviewed all the pertinent labs, microbiology data and radiology studies during assessment.     Recent Labs     11/18/20 0240 11/17/20  1008    140   K 4.3 6.1*    105   CO2 28 26   * 129*   BUN 37* 77*   CREA 5.21* 9.96*   CA 8.3* 8.6   PHOS 4.6  --    ALB 2.8* 2.9*   ALT  --  16     Recent Labs     11/18/20  0240 11/17/20  1008   WBC  --  4.6   HGB 7.0* 6.4*   HCT 24.7* 20.7*   PLT  --  225     No results found for: SDES  Lab Results   Component Value Date/Time    Culture result: No growth 5 days 10/18/2020 07:09 AM     Recent Results (from the past 24 hour(s)) RBC, ALLOCATE    Collection Time: 11/17/20  6:30 PM   Result Value Ref Range    HISTORY CHECKED? Historical check performed    TYPE & SCREEN    Collection Time: 11/17/20  6:30 PM   Result Value Ref Range    Crossmatch Expiration 11/20/2020,2359     ABO/Rh(D) O POSITIVE     Antibody screen POS     Comment Previously identified Anti E, VS and Jesenia(a)     Antibody ID NO ADDITIONAL ANTIBODIES DETECTED     Unit number [id number]     Blood component type RC LR,1     Unit division 00     Status of unit TRANSFUSED     Crossmatch result Compatible     ANTIGEN/ANTIBODY INFO E NEGATIVE,     Unit number [id number]     Blood component type RC LR,1     Unit division 00     Status of unit ALLOCATED     Crossmatch result Compatible     ANTIGEN/ANTIBODY INFO E NEGATIVE,    HGB & HCT    Collection Time: 11/18/20  2:40 AM   Result Value Ref Range    HGB 7.0 (L) 11.5 - 16.0 g/dL    HCT 24.7 (L) 35.0 - 47.0 %   RENAL FUNCTION PANEL    Collection Time: 11/18/20  2:40 AM   Result Value Ref Range    Sodium 139 136 - 145 mmol/L    Potassium 4.3 3.5 - 5.1 mmol/L    Chloride 105 97 - 108 mmol/L    CO2 28 21 - 32 mmol/L    Anion gap 6 5 - 15 mmol/L    Glucose 132 (H) 65 - 100 mg/dL    BUN 37 (H) 6 - 20 MG/DL    Creatinine 5.21 (H) 0.55 - 1.02 MG/DL    BUN/Creatinine ratio 7 (L) 12 - 20      GFR est AA 10 (L) >60 ml/min/1.73m2    GFR est non-AA 8 (L) >60 ml/min/1.73m2    Calcium 8.3 (L) 8.5 - 10.1 MG/DL    Phosphorus 4.6 2.6 - 4.7 MG/DL    Albumin 2.8 (L) 3.5 - 5.0 g/dL   RBC, ALLOCATE    Collection Time: 11/18/20  9:30 AM   Result Value Ref Range    HISTORY CHECKED? Historical check performed            Total time spent with patient:  xxx   min. Care Plan discussed with:  Patient     Family      RN      Consulting Physician [address] [facility],         I have reviewed the flowsheets. Chart and Pertinent Notes have been reviewed. No change in PMH ,family and social history from Consult note.       Jad TORRES Peggy Coronel MD

## 2020-11-18 NOTE — PROGRESS NOTES
Bedside and Verbal shift change report given to QUINTIN Cevallos (oncoming nurse) by Cassi Romero RN (offgoing nurse). Report included the following information SBAR, Kardex, Procedure Summary, MAR, Recent Results, Cardiac Rhythm NSR and Dual Neuro Assessment.

## 2020-11-18 NOTE — WOUND CARE
Wound Care Note:     New consult placed by nurse request for PTA R calf wound- pt follows with wound care weekly for this wound    Chart shows:  Admitted for anemia   Past Medical History:   Diagnosis Date    Arthritis     Asthma     Chronic kidney disease     Chronic pain     Cirrhosis ([facility] [address] 12/17/2017    Diabetes ([facility] [address] diet controlled    GERD (gastroesophageal reflux disease)     Hypertension     Paroxysmal atrial fibrillation ([facility] [address] 10/6/2020     WBC = 4.6 on 11/17/20  Admitted from [facility] ED    Assessment:   Patient is A&O x 4, communicative, continent with no assistance needed in repositioning. Bed: Total Care  Patient wearing briefs (pull ups). Diet: Clear liquids  Patient reports no pain. Bilateral heels, buttocks, and sacral skin intact and without erythema. Palpable DP pulses bilaterally. Generalized edema to lower legs and feet. 1. POA right posterior lower leg with pink epithealiazed tissue, no open area noted but serous drainage on dressing, area measures 3.5 cm x 3.5 cm including some crusted area at distal end, no erythema. Patient goes to the [address] [place] [address] for wound care. Xeroform gauze and foam dressing applied. Spoke with Dr. Aditya Medina, wound care order obtained. Patient repositioned supine. Heels offloaded on pillows. Recommendations:    Right lower leg- Every other day cleanse with normal saline, wipe wound bed clean and dry, apply a piece of Xeroform gauze that has been folded in half, cover with Optifoam Gentle. Skin Care & Pressure Prevention:  Minimize layers of linen/pads under patient to optimize support surface. Turn/reposition approximately every 2 hours and offload heels.   Manage incontinence / promote continence   Nourishing Skin Cream to dry skin, minimize use of briefs when able    Discussed above plan with patient & Zabrina, RN    Transition of Care: Plan to follow as needed while admitted to [facility].    Agustin Guillaume, BSN, RN, [facility], [facility].  office [phone]  pager [phone] or call  to page

## 2020-11-18 NOTE — PROGRESS NOTES
TRANSITION OF CARE  RUR--N/A  Disposition--Home with family assist.  Resume hemodialysis with Finn Bell ([facility] TTS  Transport--Family via car     Patient was transferred from [facility] from [facility]. Patient would like an \"upright walker. \" CM advised patient to place DME order which will be needed for CM to order. Care Management Interventions  PCP Verified by CM: Yes  Transition of Care Consult (CM Consult): Other(None)  Physical Therapy Consult: No  Occupational Therapy Consult: No  Speech Therapy Consult: No  Current Support Network: Lives with Caregiver  Confirm Follow Up Transport: Family  The Plan for Transition of Care is Related to the Following Treatment Goals : Home with family assist.  Discharge Location  Discharge Placement: Home with family assistance    Reason for Admission:  Acute on chronic anemia (plan for at least one unit transfusion)            Hyperkalemia            HTN                      RUR Score:      N/A               Plan for utilizing home health:   None       PCP: First and Last name:  Bobbi Beach (first name, patient could not spell last name)   Name of Practice: [facility]/ [address] [place] [address]   Are you a current patient: Yes    Approximate date of last visit: 3 days ago   Can you participate in a virtual visit with your PCP:                     Current Advanced Directive/Advance Care Plan: Not on file                         Transition of Care Plan:                    CM met with patient. Patient lives with daughter Petrona Saldana. Patient has 8 supportive children with 7 living locally and one daughter in [place]. Patient reports good family /social support. Patient is ambulatory with a walker and expects return to semi-independent functioning. Patient confirmed PCP, health insurance, and prescription coverage.       CM gave patient the Patient Guide to Observation and Outpatient Care which patient signed--original to patient and copy to chart. CM gave patient the Medicare Outpatient Observation Notice which patient signed-- original to patient and copy to chart.

## 2020-11-18 NOTE — H&P
[address] [facility]  Hospitalist Group  History and Physical    Primary Care Provider: None  Date of Service:  11/17/2020    Subjective:     Isabela Wills is a 69 y.o. female with PMH of anemia,ESRD, small bowel AVMs and other comorbities came to ER because of abnormal labs. Patient states that she had dialysis on Saturday and she had labs. Her dialysis nurse called her an told that Hb was 5.9 and advised her to go ER rather than coming to dialysis. Patient denied any chest pain,SOB,fever,cough,nausea/vomiting,abdominal pain, blood in stool. She was admitted on 10/2020 for anemia- was found to have non bleeding AVMs, patient refused enteroscopy during that admission    She was transferred to [facility] for further eval    Review of Systems:    A comprehensive review of systems was negative except for that written in the History of Present Illness. Past Medical History:   Diagnosis Date    Arthritis     Asthma     Chronic kidney disease     Chronic pain     Cir[facility]osis ([facility] [address] 12/17/2017    Diabetes (HCC) diet controlled    GERD (gastroesophageal reflux disease)     Hypertension     Paroxysmal atrial fibrillation ([facility] [address] 10/6/2020      Past Surgical History:   Procedure Laterality Date    COLONOSCOPY N/A 1/12/2018    COLONOSCOPY performed by Vaishali Pascal MD at [address] COLONOSCOPY N/A 3/19/2018    COLONOSCOPY performed by Vaishali Pascal MD at [address] HX GYN  c section    HX VASCULAR ACCESS      dialysis     IR PARACENTESIS ABD W IMAGE  10/22/2020     Prior to Admission medications    Medication Sig Start Date End Date Taking? Authorizing Provider   polyvinyl alcohol-MERCEDES farooq, (REFRESH CLASSIC) 1.4-0.6 % ophthalmic solution Administer 1-2 Drops to both eyes three (3) times daily. Indications: dry eye   Yes Provider, Historical   ketotifen (ZADITOR) 0.025 % (0.035 %) ophthalmic solution Administer 1 Drop to both eyes two (2) times a day.  Indications: allergic conjunctivitis   Yes Provider, Historical   pantoprazole (Protonix) 40 mg tablet Take 1 Tab by mouth two (2) times a day for 30 days. 10/25/20 11/24/20 Yes Lorenzo Childers MD   oxyCODONE IR (ROXICODONE) 5 mg immediate release tablet Take 5 mg by mouth every six (6) hours as needed for Pain. Yes Provider, Historical   ascorbic acid, vitamin C, (Vitamin C) 500 mg tablet Take 500 mg by mouth daily. Yes Provider, Historical   lactulose (CHRONULAC) 10 gram/15 mL solution Take 30 mL by mouth two (2) times a day. 10/10/20  Yes Tiffanie Tompkins MD   cholecalciferol (VITAMIN D3) 1,000 unit tablet Take 2,000 Units by mouth daily. Yes Provider, Historical   ferrous sulfate 325 mg (65 mg iron) tablet Take 325 mg by mouth three (3) times daily (with meals). Yes Provider, Historical   sevelamer (RENAGEL) 800 mg tablet Take 1,600 mg by mouth three (3) times daily (with meals). Yes Provider, Historical   albuterol (PROVENTIL HFA, VENTOLIN HFA, PROAIR HFA) 90 mcg/actuation inhaler Take 2 Puffs by inhalation every four (4) hours as needed for Wheezing. Yes Provider, Historical     Allergies   Allergen Reactions    Aleve [Naproxen Sodium] Itching, Swelling and Other (comments)    Amlodipine Rash, Hives and Itching    Aspirin Other (comments), Nausea Only and Unknown (comments)     GI bleed  GI bleeding      Heparin Unknown (comments)     bleeding      Ibuprofen Nausea and Vomiting    Metformin Other (comments)     swelling      Family History   Family history unknown: Yes        SOCIAL HISTORY:  Patient resides at Home. Patient ambulates independently.   Smoking history: does not smoke  Alcohol history:denied        Objective:       Physical Exam:   Gen:   in no acute distress  HEENT: EOMI,anicteric, no pallor,hearing intact to voice, moist mucous membranes,sinus non tender  Neck:  Supple, without masses, thyroid non-tender  Rt sided chest dialysis cath  Resp:  No accessory muscle use, clear breath sounds without wheezes rales or rhonchi  Card:  No murmurs, normal S1, S2 , no peripheral edema  Abd:  Soft, non-tender, non-distended, normoactive bowel sounds are present  Lymph:  No palpable neck lymph nodes  Musc:  No  LE edema  Skin:  b/l LE raised  scars   Neuro:  alert and oriented X 3, grossly normal exam  Psych: not anxious or agiatted    ECG: normal sinus rythym    Data Review: All diagnostic labs and studies have been reviewed.     Assessment:     Active Problems:    Anemia (9/17/2020)      #Acute on chronic anemia:  -multifactorial - h/o AVMs,CKD  -likely slow GI bleed  -Gastroenterology consult tomorrow- may need push eneteroscopy  -1 U PRBC today, H and H q 12 hr  -PPI BID    #Hyperkalemia  #ESRD:  -EKG no acute changes  -Dialysis tonight    #HTN:  -resume antihypertensive      Signed By: Erin Dancer, MD     November 17, 2020

## 2020-11-19 ENCOUNTER — APPOINTMENT (OUTPATIENT)
Dept: ULTRASOUND IMAGING | Age: 70
DRG: 377 | End: 2020-11-19
Attending: INTERNAL MEDICINE
Payer: MEDICARE

## 2020-11-19 ENCOUNTER — APPOINTMENT (OUTPATIENT)
Dept: NON INVASIVE DIAGNOSTICS | Age: 70
DRG: 377 | End: 2020-11-19
Attending: HOSPITALIST
Payer: MEDICARE

## 2020-11-19 LAB
ABO + RH BLD: NORMAL
ALBUMIN FLD-MCNC: 1.8 G/DL
ANTIGENS PRESENT RBC DONR: NORMAL
ANTIGENS PRESENT RBC DONR: NORMAL
APPEARANCE FLD: ABNORMAL
ATRIAL RATE: 69 BPM
BILIRUB FLD-MCNC: 0.3 MG/DL
BLD PROD TYP BPU: NORMAL
BLD PROD TYP BPU: NORMAL
BLOOD BANK CMNT PATIENT-IMP: NORMAL
BLOOD GROUP ANTIBODIES SERPL: NORMAL
BLOOD GROUP ANTIBODIES SERPL: NORMAL
BPU ID: NORMAL
BPU ID: NORMAL
CALCULATED P AXIS, ECG09: 36 DEGREES
CALCULATED R AXIS, ECG10: -35 DEGREES
CALCULATED T AXIS, ECG11: 118 DEGREES
COLOR FLD: ABNORMAL
CROSSMATCH RESULT,%XM: NORMAL
CROSSMATCH RESULT,%XM: NORMAL
DIAGNOSIS, 93000: NORMAL
ERYTHROCYTE [DISTWIDTH] IN BLOOD BY AUTOMATED COUNT: 19.4 % (ref 11.5–14.5)
HCT VFR BLD AUTO: 28.2 % (ref 35–47)
HEMOCCULT STL QL: POSITIVE
HGB BLD-MCNC: 8.4 G/DL (ref 11.5–16)
LYMPHOCYTES NFR FLD: 14 %
MCH RBC QN AUTO: 28.7 PG (ref 26–34)
MCHC RBC AUTO-ENTMCNC: 29.8 G/DL (ref 30–36.5)
MCV RBC AUTO: 96.2 FL (ref 80–99)
MONOS+MACROS NFR FLD: 83 %
NEUTROPHILS NFR FLD: 3 %
NRBC # BLD: 0 K/UL (ref 0–0.01)
NRBC BLD-RTO: 0 PER 100 WBC
NUC CELL # FLD: 195 /CU MM
P-R INTERVAL, ECG05: 178 MS
PLATELET # BLD AUTO: 150 K/UL (ref 150–400)
PMV BLD AUTO: 10.7 FL (ref 8.9–12.9)
PROT FLD-MCNC: 3.4 G/DL
Q-T INTERVAL, ECG07: 460 MS
QRS DURATION, ECG06: 144 MS
QTC CALCULATION (BEZET), ECG08: 492 MS
RBC # BLD AUTO: 2.93 M/UL (ref 3.8–5.2)
RBC # FLD: 61 /CU MM
SPECIMEN EXP DATE BLD: NORMAL
SPECIMEN SOURCE FLD: ABNORMAL
SPECIMEN SOURCE FLD: NORMAL
STATUS OF UNIT,%ST: NORMAL
STATUS OF UNIT,%ST: NORMAL
UNIT DIVISION, %UDIV: 0
UNIT DIVISION, %UDIV: 0
VENTRICULAR RATE, ECG03: 69 BPM
WBC # BLD AUTO: 4.1 K/UL (ref 3.6–11)

## 2020-11-19 PROCEDURE — 97116 GAIT TRAINING THERAPY: CPT | Performed by: PHYSICAL THERAPIST

## 2020-11-19 PROCEDURE — 74011250637 HC RX REV CODE- 250/637: Performed by: NURSE PRACTITIONER

## 2020-11-19 PROCEDURE — C1729 CATH, DRAINAGE: HCPCS

## 2020-11-19 PROCEDURE — 88305 TISSUE EXAM BY PATHOLOGIST: CPT

## 2020-11-19 PROCEDURE — 88112 CYTOPATH CELL ENHANCE TECH: CPT

## 2020-11-19 PROCEDURE — 74011250636 HC RX REV CODE- 250/636: Performed by: NURSE PRACTITIONER

## 2020-11-19 PROCEDURE — 74011000250 HC RX REV CODE- 250: Performed by: NURSE PRACTITIONER

## 2020-11-19 PROCEDURE — 74011250636 HC RX REV CODE- 250/636: Performed by: INTERNAL MEDICINE

## 2020-11-19 PROCEDURE — 97161 PT EVAL LOW COMPLEX 20 MIN: CPT | Performed by: PHYSICAL THERAPIST

## 2020-11-19 PROCEDURE — 87015 SPECIMEN INFECT AGNT CONCNTJ: CPT

## 2020-11-19 PROCEDURE — 77030040831 HC BAG URINE DRNG MDII -A

## 2020-11-19 PROCEDURE — 65660000000 HC RM CCU STEPDOWN

## 2020-11-19 PROCEDURE — 2709999900 HC NON-CHARGEABLE SUPPLY

## 2020-11-19 PROCEDURE — 36415 COLL VENOUS BLD VENIPUNCTURE: CPT

## 2020-11-19 PROCEDURE — 87205 SMEAR GRAM STAIN: CPT

## 2020-11-19 PROCEDURE — 74011250637 HC RX REV CODE- 250/637: Performed by: INTERNAL MEDICINE

## 2020-11-19 PROCEDURE — 89050 BODY FLUID CELL COUNT: CPT

## 2020-11-19 PROCEDURE — 49083 ABD PARACENTESIS W/IMAGING: CPT

## 2020-11-19 PROCEDURE — 74011000250 HC RX REV CODE- 250: Performed by: INTERNAL MEDICINE

## 2020-11-19 PROCEDURE — 82042 OTHER SOURCE ALBUMIN QUAN EA: CPT

## 2020-11-19 PROCEDURE — 85027 COMPLETE CBC AUTOMATED: CPT

## 2020-11-19 PROCEDURE — 90935 HEMODIALYSIS ONE EVALUATION: CPT

## 2020-11-19 PROCEDURE — 84157 ASSAY OF PROTEIN OTHER: CPT

## 2020-11-19 PROCEDURE — 74011250637 HC RX REV CODE- 250/637: Performed by: HOSPITALIST

## 2020-11-19 PROCEDURE — 82247 BILIRUBIN TOTAL: CPT

## 2020-11-19 PROCEDURE — 0W9G3ZZ DRAINAGE OF PERITONEAL CAVITY, PERCUTANEOUS APPROACH: ICD-10-PCS | Performed by: STUDENT IN AN ORGANIZED HEALTH CARE EDUCATION/TRAINING PROGRAM

## 2020-11-19 PROCEDURE — 99218 HC RM OBSERVATION: CPT

## 2020-11-19 RX ORDER — MELATONIN
2000 DAILY
Status: DISCONTINUED | OUTPATIENT
Start: 2020-11-20 | End: 2020-12-01 | Stop reason: HOSPADM

## 2020-11-19 RX ORDER — OXYCODONE HYDROCHLORIDE 5 MG/1
5 TABLET ORAL ONCE
Status: COMPLETED | OUTPATIENT
Start: 2020-11-19 | End: 2020-11-19

## 2020-11-19 RX ORDER — MAG HYDROX/ALUMINUM HYD/SIMETH 200-200-20
30 SUSPENSION, ORAL (FINAL DOSE FORM) ORAL
Status: DISCONTINUED | OUTPATIENT
Start: 2020-11-19 | End: 2020-12-01 | Stop reason: HOSPADM

## 2020-11-19 RX ORDER — IPRATROPIUM BROMIDE AND ALBUTEROL SULFATE 2.5; .5 MG/3ML; MG/3ML
3 SOLUTION RESPIRATORY (INHALATION)
Status: DISCONTINUED | OUTPATIENT
Start: 2020-11-19 | End: 2020-12-01 | Stop reason: HOSPADM

## 2020-11-19 RX ORDER — ASCORBIC ACID 500 MG
500 TABLET ORAL DAILY
Status: DISCONTINUED | OUTPATIENT
Start: 2020-11-20 | End: 2020-12-01 | Stop reason: HOSPADM

## 2020-11-19 RX ORDER — LANOLIN ALCOHOL/MO/W.PET/CERES
325 CREAM (GRAM) TOPICAL
Status: DISCONTINUED | OUTPATIENT
Start: 2020-11-19 | End: 2020-12-01 | Stop reason: HOSPADM

## 2020-11-19 RX ORDER — KETOTIFEN FUMARATE 0.35 MG/ML
1 SOLUTION/ DROPS OPHTHALMIC 2 TIMES DAILY
Status: DISCONTINUED | OUTPATIENT
Start: 2020-11-19 | End: 2020-12-01 | Stop reason: HOSPADM

## 2020-11-19 RX ORDER — CARBOXYMETHYLCELLULOSE SODIUM 5 MG/ML
1-2 SOLUTION/ DROPS OPHTHALMIC 3 TIMES DAILY
Status: DISCONTINUED | OUTPATIENT
Start: 2020-11-19 | End: 2020-12-01 | Stop reason: HOSPADM

## 2020-11-19 RX ORDER — MORPHINE SULFATE 2 MG/ML
2 INJECTION, SOLUTION INTRAMUSCULAR; INTRAVENOUS
Status: DISCONTINUED | OUTPATIENT
Start: 2020-11-19 | End: 2020-12-01 | Stop reason: HOSPADM

## 2020-11-19 RX ADMIN — ALUMINUM HYDROXIDE, MAGNESIUM HYDROXIDE, AND SIMETHICONE 30 ML: 200; 200; 20 SUSPENSION ORAL at 03:30

## 2020-11-19 RX ADMIN — FERROUS SULFATE TAB 325 MG (65 MG ELEMENTAL FE) 325 MG: 325 (65 FE) TAB at 17:23

## 2020-11-19 RX ADMIN — OXYCODONE HYDROCHLORIDE 5 MG: 5 TABLET ORAL at 09:45

## 2020-11-19 RX ADMIN — MORPHINE SULFATE 2 MG: 2 INJECTION, SOLUTION INTRAMUSCULAR; INTRAVENOUS at 23:52

## 2020-11-19 RX ADMIN — OXYCODONE HYDROCHLORIDE 5 MG: 5 TABLET ORAL at 20:41

## 2020-11-19 RX ADMIN — SODIUM CHLORIDE 10 MG: 9 INJECTION INTRAMUSCULAR; INTRAVENOUS; SUBCUTANEOUS at 23:51

## 2020-11-19 RX ADMIN — OXYCODONE HYDROCHLORIDE 5 MG: 5 TABLET ORAL at 22:52

## 2020-11-19 RX ADMIN — SEVELAMER CARBONATE 1600 MG: 800 TABLET, FILM COATED ORAL at 15:59

## 2020-11-19 RX ADMIN — EPOETIN ALFA-EPBX 20000 UNITS: 10000 INJECTION, SOLUTION INTRAVENOUS; SUBCUTANEOUS at 20:41

## 2020-11-19 RX ADMIN — OXYCODONE HYDROCHLORIDE 5 MG: 5 TABLET ORAL at 01:22

## 2020-11-19 RX ADMIN — PANTOPRAZOLE SODIUM 40 MG: 40 TABLET, DELAYED RELEASE ORAL at 15:58

## 2020-11-19 RX ADMIN — SEVELAMER CARBONATE 1600 MG: 800 TABLET, FILM COATED ORAL at 12:36

## 2020-11-19 RX ADMIN — ACETAMINOPHEN 650 MG: 325 TABLET ORAL at 20:49

## 2020-11-19 RX ADMIN — PANTOPRAZOLE SODIUM 40 MG: 40 TABLET, DELAYED RELEASE ORAL at 09:45

## 2020-11-19 RX ADMIN — KETOTIFEN FUMARATE 1 DROP: 0.35 SOLUTION/ DROPS OPHTHALMIC at 18:25

## 2020-11-19 RX ADMIN — SEVELAMER CARBONATE 1600 MG: 800 TABLET, FILM COATED ORAL at 09:45

## 2020-11-19 RX ADMIN — CARBOXYMETHYLCELLULOSE SODIUM 2 DROP: 5 SOLUTION/ DROPS OPHTHALMIC at 20:53

## 2020-11-19 NOTE — ADVANCED PRACTICE NURSE
[facility] D[facility] Team [facility]  [phone]    Vitals   Pre   Post   Assessment   Pre   Post     Temp  Temp: 97.8 °F (36.6 °C) (11/19/20 0845)  97.8 LOC  A&O x 3 A&O x 3   HR   Pulse (Heart Rate): 80 (11/19/20 0905) 80 Lungs   clear  clear   B/P   BP: (!) 130/50 (11/19/20 0905) 130/50 Cardiac   regular  regular   Resp   Resp Rate: 18 (11/19/20 0905) 17 Skin   Warm/dry  Warm/dry   Pain level  Pain Intensity 1: 7 (11/19/20 0945) No pain Edema  generalized     generalized   Orders:    Duration:   Start:    0600 End:    0900 Total:   3 hr   Dialyzer:   Dialyzer/Set Up Inspection: Emely([id number]) (11/19/20 0555)   K Bath:   Dialysate K (mEq/L): 3 (11/19/20 0555)   Ca Bath:   Dialysate CA (mEq/L): 2.5 (11/19/20 0555)   Na/Bicarb:   Dialysate NA (mEq/L): 140 (11/19/20 0555)   Target Fluid Removal:   Goal/Amount of Fluid to Remove (mL): 2000 mL (11/19/20 0555)   Access     Type & Location:   [facility] cath in place, patent, dressing dry and intact, no S/S of infection.    Labs     Obtained/Reviewed   Critical Results Called   Date when labs were drawn-  Hgb-    HGB   Date Value Ref Range Status   11/19/2020 8.4 (L) 11.5 - 16.0 g/dL Final     K-    Potassium   Date Value Ref Range Status   11/18/2020 4.3 3.5 - 5.1 mmol/L Final     Ca-   Calcium   Date Value Ref Range Status   11/18/2020 8.3 (L) 8.5 - 10.1 MG/DL Final     Bun-   BUN   Date Value Ref Range Status   11/18/2020 37 (H) 6 - 20 MG/DL Final     Creat-   Creatinine   Date Value Ref Range Status   11/18/2020 5.21 (H) 0.55 - 1.02 MG/DL Final        Medications/ Blood Products Given     Name   Dose   Route and Time                     Blood Volume Processed (BVP):    68 L Net Fluid   Removed:  1500 ml   Comments   Time Out Done: yes, 3679  Primary Nurse Rpt Pre:Jorge Lai RN  Primary Nurse Rpt Post: Jr Baeza RN  Pt Education: yes, r/t dialysis process  Care Plan: continue HD as ordered  Tx Summary: tolerated tx well, at end, all blood in circuit returned with 300 ml NS, [address] cath in pace, patent, dressing dry and intact, o S/S of infection. Admiting Diagnosis: renal failure  Pt's previous clinic-  Consent signed - Informed Consent Verified: Yes (11/19/20 0555)  Finn Consent -   Hepatitis Status- 11/17/20 Negative/Immune  Machine #- Machine Number: [id number] (11/19/20 0555)  Telemetry status- remote  Pre-dialysis wt. - Pre-Dialysis Weight: 79.1 kg (174 lb 6.1 oz) (11/19/20 0555)

## 2020-11-19 NOTE — CONSULTS
[facility]   [address] [facility], [address] [place] [address] Rd Ne, [facility]  Phone: [phone]   [phone]       Nephrology Progress Note  Ralene Bloch Fenton     [birth year]     [id number]  Date of Admission : 11/17/2020 11/19/20    CC: Follow up for ESRD       Assessment and Plan   ESRD- HD  - Dialyzes TTS at [address] dialysis in [facility], [address] [facility]  - followed by Dr Bishnu Trevizo   - has RI permacath for access  - HD today per schedule  - continue Midodrine w/ HD     Cirrhosis of Liver : ? Cause   Chronic HFrEF   - for paracentesis today ? ?     Anemia in CKD :   -  continue epogen - 20 K units w/ HD     Blood loss anemia : On last admission   - EGD: antral gastritis and erythema   - Capsule study : non bleeding small bowel AVMs noted   - Hb at 8.4 gm today     Sec HPTH   - continue Phos binders       Care Plan discussed with: pt      Interval History:  Seen and examined on HD  Hb stable   Abdomen remains distended   BP dropping w/ UF  She probably needs paracentesis      Review of Systems: A comprehensive review of systems was negative except for that written in the HPI.     Current Medications:   Current Facility-Administered Medications   Medication Dose Route Frequency    albuterol-ipratropium (DUO-NEB) 2.5 MG-0.5 MG/3 ML  3 mL Nebulization Q4H PRN    alum-mag hydroxide-simeth (MYLANTA) oral suspension 30 mL  30 mL Oral Q4H PRN    0.9% sodium chloride infusion 250 mL  250 mL IntraVENous PRN    epoetin reshma-epbx (RETACRIT) injection 20,000 Units  20,000 Units SubCUTAneous Q TUE, THU & SAT    0.9% sodium chloride infusion 250 mL  250 mL IntraVENous PRN    pantoprazole (PROTONIX) tablet 40 mg  40 mg Oral BID    sevelamer carbonate (RENVELA) tab 1,600 mg  1,600 mg Oral TID WITH MEALS    acetaminophen (TYLENOL) tablet 650 mg  650 mg Oral Q4H PRN    oxyCODONE IR (ROXICODONE) tablet 5 mg  5 mg Oral Q4H PRN      Allergies   Allergen Reactions    Aleve [Naproxen Sodium] Itching, Swelling and Other (comments)  Amlodipine Rash, Hives and Itching    Aspirin Other (comments), Nausea Only and Unknown (comments)     GI bleed  GI bleeding      Heparin Unknown (comments)     bleeding      Ibuprofen Nausea and Vomiting    Metformin Other (comments)     swelling       Objective:  Vitals:    Vitals:    11/19/20 0800 11/19/20 0815 11/19/20 0830 11/19/20 0845   BP: (!) (P) 127/57 (P) 130/60 (!) (P) 125/53 (!) (P) 119/51   Pulse: (P) 80 (P) 80 (P) 88 (P) 80   Resp: (P) 18 (P) 16 (P) 18 (P) 18   Temp: (P) 97.6 °F (36.4 °C)   (P) 97.8 °F (36.6 °C)   TempSrc: (P) Oral   (P) Oral   SpO2: (P) 99% (P) 99% (P) 100% (P) 99%   Weight:         Intake and Output:  No intake/output data recorded. 11/17 1901 - 11/19 0700  In: 728.3   Out: 2000     Physical Examination:    General: No distress   HEENT           Icteric/ muddy sclera   Neck:  RIJ permacath   Resp:  Diminished B/L   CV:  RRR,  no murmur or rub,1+ LE edema  GI:  Soft, NT, + distension no hepatosplenomegaly  Neurologic:  Non focal  Psych:             AAO x 3 appropriate affect  Skin:  No Rash  :  No cai    []    High complexity decision making was performed  []    Patient is at high-risk of decompensation with multiple organ involvement    Lab Data Personally Reviewed: I have reviewed all the pertinent labs, microbiology data and radiology studies during assessment.     Recent Labs     11/18/20  0240 11/17/20  1008    140   K 4.3 6.1*    105   CO2 28 26   * 129*   BUN 37* 77*   CREA 5.21* 9.96*   CA 8.3* 8.6   PHOS 4.6  --    ALB 2.8* 2.9*   ALT  --  16     Recent Labs     11/19/20  0344 11/18/20  1843 11/18/20  0240 11/17/20  1008   WBC 4.1  --   --  4.6   HGB 8.4* 8.2* 7.0* 6.4*   HCT 28.2* 27.5* 24.7* 20.7*     --   --  225     No results found for: SDES  Lab Results   Component Value Date/Time    Culture result: No growth 5 days 10/18/2020 07:09 AM     Recent Results (from the past 24 hour(s))   RBC, ALLOCATE    Collection Time: 11/18/20  9:30 AM   Result Value Ref Range    HISTORY CHECKED? Historical check performed    GLUCOSE, POC    Collection Time: 11/18/20  1:10 PM   Result Value Ref Range    Glucose (POC) 122 (H) 65 - 100 mg/dL    Performed by Ovidio Yi    HGB & HCT    Collection Time: 11/18/20  6:43 PM   Result Value Ref Range    HGB 8.2 (L) 11.5 - 16.0 g/dL    HCT 27.5 (L) 35.0 - 47.0 %   CBC W/O DIFF    Collection Time: 11/19/20  3:44 AM   Result Value Ref Range    WBC 4.1 3.6 - 11.0 K/uL    RBC 2.93 (L) 3.80 - 5.20 M/uL    HGB 8.4 (L) 11.5 - 16.0 g/dL    HCT 28.2 (L) 35.0 - 47.0 %    MCV 96.2 80.0 - 99.0 FL    MCH 28.7 26.0 - 34.0 PG    MCHC 29.8 (L) 30.0 - 36.5 g/dL    RDW 19.4 (H) 11.5 - 14.5 %    PLATELET 884 512 - 008 K/uL    MPV 10.7 8.9 - 12.9 FL    NRBC 0.0 0  WBC    ABSOLUTE NRBC 0.00 0.00 - 0.01 K/uL           Total time spent with patient:  xxx   min. Care Plan discussed with:  Patient     Family      RN      Consulting Physician [facility],         I have reviewed the flowsheets. Chart and Pertinent Notes have been reviewed. No change in PMH ,family and social history from Consult note.       Brien Yang MD

## 2020-11-19 NOTE — PROGRESS NOTES
Problem: Falls - Risk of  Goal: *Absence of Falls  Description: Document Albino Messing Fall Risk and appropriate interventions in the flowsheet. 11/19/2020 0958 by Tian Sargent RN  Outcome: Progressing Towards Goal  Note: Fall Risk Interventions:  Mobility Interventions: Communicate number of staff needed for ambulation/transfer, Mechanical lift, Patient to call before getting OOB, PT Consult for mobility concerns, PT Consult for assist device competence         Medication Interventions: Evaluate medications/consider consulting pharmacy, Teach patient to arise slowly    Elimination Interventions: Call light in reach           11/18/2020 2059 by Tian Sargent RN  Outcome: Progressing Towards Goal  Note: Fall Risk Interventions:  Mobility Interventions: Patient to call before getting OOB, PT Consult for mobility concerns, PT Consult for assist device competence         Medication Interventions: Evaluate medications/consider consulting pharmacy, Patient to call before getting OOB    Elimination Interventions: Call light in reach, Elevated toilet seat, Patient to call for help with toileting needs              Problem: Pain  Goal: *Control of Pain  11/19/2020 0958 by Tian Sargent RN  Outcome: Progressing Towards Goal  11/18/2020 2059 by Tian Sargent RN  Outcome: Progressing Towards Goal     Problem: Pressure Injury - Risk of  Goal: *Prevention of pressure injury  Description: Document Vic Scale and appropriate interventions in the flowsheet.   Outcome: Progressing Towards Goal  Note: Pressure Injury Interventions:  Sensory Interventions: Assess changes in LOC    Moisture Interventions: Check for incontinence Q2 hours and as needed    Activity Interventions: PT/OT evaluation, Pressure redistribution bed/mattress(bed type), Increase time out of bed    Mobility Interventions: Pressure redistribution bed/mattress (bed type), PT/OT evaluation, HOB 30 degrees or less    Nutrition Interventions: Discuss nutritional consult with provider, Document food/fluid/supplement intake    Friction and Shear Interventions: Lift sheet, Lift team/patient mobility team

## 2020-11-19 NOTE — PROGRESS NOTES
TRANSFER - OUT REPORT:    Verbal report given to QUINTIN Gerber(name) on Melissa Rivas  being transferred to 656(unit) for routine progression of care       Report consisted of patients Situation, Background, Assessment and   Recommendations(SBAR). Information from the following report(s) Procedure Summary and MAR was reviewed with the receiving nurse. Lines:   Peripheral IV 11/18/20 Right Antecubital (Active)   Site Assessment Clean, dry, & intact 11/19/20 1200   Phlebitis Assessment 0 11/19/20 1200   Infiltration Assessment 0 11/19/20 1200   Dressing Status Clean, dry, & intact 11/19/20 1200   Dressing Type Transparent 11/19/20 1200   Hub Color/Line Status Blue;Capped 11/19/20 1200   Action Taken Open ports on tubing capped 11/19/20 1200   Alcohol Cap Used Yes 11/19/20 1200      Paracentesis catheter (5fr.)  to cai gravity drainage bag. Monitor output and document on I/O flowsheet, When drainage has stopped; call US ([phone]) or Angio ([phone]) to remove drain tomorrow. Specimens taken to lab by [facility]. Opportunity for questions and clarification was provided.

## 2020-11-19 NOTE — PROGRESS NOTES
[address]  [address] [facility] [address] Benedicto Erickson, [address]  [phone]                GI PROGRESS NOTE      NAME:   Pauline Haddad Fenton   :    1950   MRN:    [id number]     Assessment/Plan   Anemia  -Hemoglobin 6.4 on admission, patient receiving unit of PRBC's- Hemoglobin 8.4  this AM  -Continue to monitor H&H, transfuse if hemoglobin less then 7.0  -BID PPI with Protonix  -Will continue to attempt to retrieve records from [facility] of recent GI follow up, patient reports follow up with EGD, enteroscopy, and colonoscopy with no findings in 2020. Patient desires not to have any additional procedures at this time  -Check occult stool     Liver disease/Cirhosis/Ascites:  -Received paracentesis on last admission 10/22/2020  -Abdominal ultrasound 2020: Increased echogenicity of the kidneys with renal atrophy suggestive of medical renal disease Ascites.   -Plans for paracentesis today  -Patient stated follow up at [address]  ESRD, diabetes, COVID 19 negative, hypertension, chronic pain, diabetes  -management per hospitalist   -nephrology following   -patient receives dialysis [facility], Saturday- received dialysis yesterday     Will discuss with Dr. 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  female Patient denies nausea, no vomiting,no abdominal pain. Stated had large bowel movement this morning with no presence of blood- stated stool wasn't checked for blood. Tolerating diet      Objective:     VITALS:   Last 24hrs VS reviewed since prior hospitalist progress note. Most recent are:  Visit Vitals  BP (!) 160/73 (BP 1 Location: Left arm, BP Patient Position: Sitting)   Pulse 90   Temp 97.5 °F (36.4 °C)   Resp 28   Wt 77.6 kg (171 lb 1.2 oz)   SpO2 100%   BMI 30.30 kg/m²       Intake/Output Summary (Last 24 hours) at 11/19/2020 1402  Last data filed at 11/19/2020 0900  Gross per 24 hour   Intake 383.3 ml   Output 1500 ml   Net -1116.7 ml        PHYSICAL EXAM:  General:          WD, WN. Alert, cooperative, no acute distress    HEENT:           NC, Atraumatic.  PERRLA, EOMI. Anicteric sclerae. Lungs:             CTA Bilaterally. No Wheezing/Rhonchi/Rales.   Heart:              Regular  rhythm,  No murmur   Abdomen:        Soft, some distention, generalized tenderness with deep palpation. Bowel sounds present, no palpable masses   Extremities:     No edema   Neurologic:      CN 2-12 gi, Alert and oriented X 3.  No acute neurological distress   Psych:             Good insight. Not anxious nor agitated.          Lab Data   Recent Results (from the past 12 hour(s))   CBC W/O DIFF    Collection Time: 11/19/20  3:44 AM   Result Value Ref Range    WBC 4.1 3.6 - 11.0 K/uL    RBC 2.93 (L) 3.80 - 5.20 M/uL    HGB 8.4 (L) 11.5 - 16.0 g/dL    HCT 28.2 (L) 35.0 - 47.0 %    MCV 96.2 80.0 - 99.0 FL    MCH 28.7 26.0 - 34.0 PG    MCHC 29.8 (L) 30.0 - 36.5 g/dL    RDW 19.4 (H) 11.5 - 14.5 %    PLATELET 008 061 - 022 K/uL    MPV 10.7 8.9 - 12.9 FL    NRBC 0.0 0 PER 100 WBC    ABSOLUTE NRBC 0.00 0.00 - 0.01 K/uL         Medications: Reviewed  Alissa Pfeiffer NP

## 2020-11-19 NOTE — PROGRESS NOTES
Pt arrives via stretcher to angio department accompanied by self for US procedure. All assessments completed and consent was reviewed. Education given was regarding procedure, no sedation, post-procedure care and  management/follow-up. Opportunity for questions was provided and all questions and concerns were addressed.

## 2020-11-19 NOTE — PROGRESS NOTES
Physician Progress Note      Iqra Zhu  CSN #:                  [id number]  :                       1950  ADMIT DATE:       2020 5:47 PM  [address] [place] DATE:  RESPONDING  PROVIDER #:        Saul Bhat MD          QUERY TEXT:    Pt admitted with acute on chronic anemia with Hgb 6.4. If possible, please document in progress notes and discharge summary further specificity regarding the acuity and type of anemia:    The medical record reflects the following:  Risk Factors: CKD, hx anemia, iron deficiency, hx AVMs    Clinical Indicators:    Hgb 6.4-> 7.0-> 8.2-> 8.4  Hct 20.7-> 24.7-> 27.5-> 28.2    H&P-  #Acute on chronic anemia:  -multifactorial - h/o AVMs,CKD  -likely slow GI bleed  -Gastroenterology consult tomorrow- may need push eneteroscopy    [facility] -  Blood loss anemia : On last admission  - EGD: antral gastritis and erythema  - Capsule study?: non bleeding small bowel AVMs noted?  - Hb?at 7 gm today - another unit of blood    [facility] -  Hemoglobin 6.4 on admission, patient receiving unit of PRBC's- Hemoglobin 7.0 this AM  -Continue to monitor H&H, transfuse if hemoglobin less then 7.0  -BID PPI with Protonix  -Will attempt to retrieve records from [facility] of recent GI follow up, patient reports follow up with EGD, enteroscopy, and colonoscopy with no findings in 2020. Patient desires not to have any additional procedures at this time      Treatment: Ferrous Sulfate 325mg PO TID PTA;  Epogen 20,000u 3x week SC; Protonix 40mg PO BID; 1u PRBCs, monitor H/H BID; GI consult    Thank you,  Bailey Valentine, RN, BSN  Clinical   [phone]  Options provided:  -- Anemia due to chronic blood loss  -- Anemia due to acute on chronic blood loss  -- Anemia due to iron deficiency  -- Other - I will add my own diagnosis  -- Disagree - Not applicable / Not valid  -- Disagree - Clinically unable to determine / Unknown  -- Refer to Clinical Documentation Reviewer    PROVIDER RESPONSE TEXT:    This patient has acute on chronic blood loss anemia.     Query created by: Paula Park on 11/19/2020 11:57 AM      Electronically signed by:  Evelyn Garcia MD 11/19/2020 12:04 PM

## 2020-11-19 NOTE — PROGRESS NOTES
Bedside and Verbal shift change report given to Frank Silva RN (oncoming nurse) by Pat Reece RN (offgoing nurse). Report included the following information SBAR, Kardex, MAR, Cardiac Rhythm NSR and Dual Neuro Assessment.

## 2020-11-19 NOTE — PROGRESS NOTES
[facility]  Hospitalist Group                                                                                          Hospitalist Progress Note  Suellyn Libman, MD  Answering service: [phone] from in house phone        Date of Service:  2020  NAME:  Berry Wagner  :  1950  MRN:  [id number]      Admission Summary:   69 y.o PMH of AVM,ESRD,anemia admitted due to anemia and hyperkalemia    Interval history / Subjective:   Patient seen and examined . States that she feels abdomen distended. Denied chest pain,SOB,blood in stool     Assessment & Plan:     # #Acute on chronic anemia:  -multifactorial - h/o AVMs,CKD  -Gastroenterology following-trying to obtain records from [facility]-per patient she had a endoscopy recently  -s/p 1 U PRBC   -Hb 7 today, 1 U PRBC today, H and H q 12 hr  -PPI BID     #Hyperkalemia-resolved  #ESRD:  -nephrology following,had dialysis last night. #Abdominal distension :  -h/o recurrent ascites. Unclear if she has cirrhosis  -USG abd today  -echo ordered    #HTN:  -resume antihypertensive  Code status: full  DVT prophylaxis: scd    Care Plan discussed with: Patient/Family and Nurse  Anticipated Disposition: Home w/Family  Anticipated Discharge: 24 hours to 48 hours     Hospital Problems  Date Reviewed: 10/6/2020          Codes Class Noted POA    Anemia ICD-10-CM: D64.9  ICD-9-CM: 285.9  2020 Unknown                Review of Systems:   As per HPI    Vital Signs:    Last 24hrs VS reviewed since prior progress note.  Most recent are:  Visit Vitals  BP (!) 136/59 (BP 1 Location: Left arm, BP Patient Position: At rest)   Pulse 81   Temp 97.4 °F (36.3 °C)   Resp 20   Wt 77.6 kg (171 lb 1.2 oz)   SpO2 99%   BMI 30.30 kg/m²         Intake/Output Summary (Last 24 hours) at 2020 2308  Last data filed at 2020 1631  Gross per 24 hour   Intake 728.3 ml   Output 2000 ml   Net -1271.7 ml        Physical Examination:     I had a face to face encounter with this patient and independently examined them on 11/18/2020 as outlined below:          Constitutional:  No acute distress, elderly patient   ENT:  Oral mucosa moist, EOMI    Resp:  CTA bilaterally. No wheezing/rhonchi/rales. No accessory muscle use   CV:  Regular rhythm, normal rate, no murmurs, gallops, rubs    GI:  Soft, distended, non tender. normoactive bowel sounds    Musculoskeletal:  No LE edema    Neurologic:  Moves all extremities. AAOx3     Psych:  not anxious nor agitated. Data Review:    Review and/or order of clinical lab test  Review and/or order of tests in the radiology section of CPT  Review and/or order of tests in the medicine section of CPT      Labs:     Recent Labs     11/18/20  1843 11/18/20  0240 11/17/20  1008   WBC  --   --  4.6   HGB 8.2* 7.0* 6.4*   HCT 27.5* 24.7* 20.7*   PLT  --   --  225     Recent Labs     11/18/20  0240 11/17/20  1008    140   K 4.3 6.1*    105   CO2 28 26   BUN 37* 77*   CREA 5.21* 9.96*   * 129*   CA 8.3* 8.6   PHOS 4.6  --      Recent Labs     11/18/20  0240 11/17/20  1008   ALT  --  16   AP  --  151*   TBILI  --  0.4   TP  --  6.1*   ALB 2.8* 2.9*   GLOB  --  3.2     No results for input(s): INR, PTP, APTT, INREXT in the last 72 hours. No results for input(s): FE, TIBC, PSAT, FERR in the last 72 hours. Lab Results   Component Value Date/Time    Folate 16.4 10/19/2020 09:34 AM      No results for input(s): PH, PCO2, PO2 in the last 72 hours. No results for input(s): CPK, CKNDX, TROIQ in the last 72 hours.     No lab exists for component: CPKMB  No results found for: CHOL, CHOLX, CHLST, CHOLV, HDL, HDLP, LDL, LDLC, DLDLP, TGLX, TRIGL, TRIGP, CHHD, [facility]  Lab Results   Component Value Date/Time    Glucose (POC) 122 (H) 11/18/2020 01:10 PM    Glucose (POC) 201 (H) 10/25/2020 11:59 AM    Glucose (POC) 186 (H) 10/25/2020 06:07 AM    Glucose (POC) 173 (H) 10/24/2020 09:12 PM    Glucose (POC) 144 (H) 10/24/2020 06:57 PM     No results found for: COLOR, APPRN, SPGRU, REFSG, VERENA, PROTU, GLUCU, KETU, BILU, UROU, TIBURCIO, LEUKU, GLUKE, EPSU, BACTU, WBCU, RBCU, CASTS, UCRY      Medications Reviewed:     Current Facility-Administered Medications   Medication Dose Route Frequency    0.9% sodium chloride infusion 250 mL  250 mL IntraVENous PRN    [START ON 11/19/2020] epoetin reshma-epbx (RETACRIT) injection 20,000 Units  20,000 Units SubCUTAneous Q TUE, THU & SAT    0.9% sodium chloride infusion 250 mL  250 mL IntraVENous PRN    pantoprazole (PROTONIX) tablet 40 mg  40 mg Oral BID    sevelamer carbonate (RENVELA) tab 1,600 mg  1,600 mg Oral TID WITH MEALS    acetaminophen (TYLENOL) tablet 650 mg  650 mg Oral Q4H PRN    oxyCODONE IR (ROXICODONE) tablet 5 mg  5 mg Oral Q4H PRN     ______________________________________________________________________  EXPECTED LENGTH OF STAY: - - -  ACTUAL LENGTH OF STAY:          0                 Malcolm Reyes MD

## 2020-11-19 NOTE — PROGRESS NOTES
Problem: Falls - Risk of  Goal: *Absence of Falls  Description: Document Glenda Jan Fall Risk and appropriate interventions in the flowsheet.   Outcome: Progressing Towards Goal  Note: Fall Risk Interventions:  Mobility Interventions: Patient to call before getting OOB, PT Consult for mobility concerns, PT Consult for assist device competence         Medication Interventions: Evaluate medications/consider consulting pharmacy, Patient to call before getting OOB    Elimination Interventions: Call light in reach, Elevated toilet seat, Patient to call for help with toileting needs              Problem: Pain  Goal: *Control of Pain  Outcome: Progressing Towards Goal

## 2020-11-19 NOTE — PROGRESS NOTES
Problem: Mobility Impaired (Adult and Pediatric)  Goal: *Acute Goals and Plan of Care (Insert Text)  Description: FUNCTIONAL STATUS PRIOR TO ADMISSION: Patient was independent and active without use of DME. Reports some generalized weakness secondary to ascites. HOME SUPPORT PRIOR TO ADMISSION: The patient states she stays either with one of her daughters or her son stays with her. Physical Therapy Goals  Initiated 11/19/2020  1. Patient will move from supine to sit and sit to supine  in bed with modified independence within 7 day(s). 2.  Patient will transfer from bed to chair and chair to bed with supervision/set-up using the least restrictive device within 7 day(s). 3.  Patient will perform sit to stand with supervision/set-up within 7 day(s). 4.  Patient will ambulate with supervision/set-up for 150 feet with the least restrictive device within 7 day(s). 5.  Patient will ascend/descend 2 stairs with 1 handrail(s) with contact guard assist within 7 day(s). Outcome: Progressing Towards Goal   PHYSICAL THERAPY EVALUATION  Patient: Kristi Miranda (3 y.o. female)  Date: 11/19/2020  Primary Diagnosis: Anemia [D64.9]  Anemia [D64.9]        Precautions:   Fall    ASSESSMENT  Based on the objective data described below, the patient presents with decreased functional mobility from baseline level of function. Patient currently with dizziness with all positional changes, decreased strength, gait instability, impaired balance and decreased activity tolerance. Patient currently needing supervision to come to EOB and CGA for transfers. Reports dizziness in standing but BP is stable. Patient reporting increased dizziness with extended time upright and unsafe to progress gait training. Returned to supine with supervision and positioned for comfort. Anticipate patient will be safe to DC home with [facility] PT follow up. Will continue to follow for mobility progression.       Other factors to consider for discharge: at risk for falls, below functional baseline     Patient will benefit from skilled therapy intervention to address the above noted impairments. PLAN :  Recommendations and Planned Interventions: bed mobility training, transfer training, gait training, therapeutic exercises, neuromuscular re-education, patient and family training/education, and therapeutic activities      Frequency/Duration: Patient will be followed by physical therapy:  5 times a week to address goals. Recommendation for discharge: (in order for the patient to meet his/her long term goals)  Physical therapy at least 2 days/week in the home with supervision from family      IF patient discharges home will need the following DME: patient wants a RW for home. Will order if appropriate tmrw         SUBJECTIVE:   Patient stated I want one of those walkers that you stand up with (up-walker).     OBJECTIVE DATA SUMMARY:   HISTORY:    Past Medical History:   Diagnosis Date    Arthritis     Asthma     Chronic kidney disease     Chronic pain     Cirrhosis ([facility] 12/17/2017    Diabetes ([facility] diet controlled    GERD (gastroesophageal reflux disease)     Hypertension     Paroxysmal atrial fibrillation ([facility] 10/6/2020     Past Surgical History:   Procedure Laterality Date    COLONOSCOPY N/A 1/12/2018    COLONOSCOPY performed by Cristy Story MD at [facility] ENDOSCOPY    COLONOSCOPY N/A 3/19/2018    COLONOSCOPY performed by Cristy Story MD at [facility] ENDOSCOPY    HX GYN  c section    HX VASCULAR ACCESS      dialysis     IR PARACENTESIS ABD W IMAGE  10/22/2020       Personal factors and/or comorbidities impacting plan of care:     Home Situation  Home Environment: Private residence  # Steps to Enter: 2  One/Two Story Residence: One story  Living Alone: Yes  Support Systems: Family member(s)  Patient Expects to be Discharged to[de-identified] Private residence  Current DME Used/Available at Home: Walker, rolling    EXAMINATION/PRESENTATION/DECISION MAKING: Critical Behavior:  Neurologic State: Alert  Orientation Level: Oriented X4  Cognition: Appropriate decision making, Appropriate for age attention/concentration, Appropriate safety awareness, Follows commands     Hearing: Auditory  Auditory Impairment: None    Range Of Motion:  AROM: Generally decreased, functional                       Strength:    Strength: Generally decreased, functional       Functional Mobility:  Bed Mobility:  Rolling: Supervision  Supine to Sit: Supervision  Sit to Supine: Supervision  Scooting: Supervision  Transfers:  Sit to Stand: Contact guard assistance;Assist x1  Stand to Sit: Contact guard assistance;Assist x1                       Balance:   Sitting: Intact  Standing: Impaired  Standing - Static: Constant support; Fair  Standing - Dynamic : Constant support; Fair  Ambulation/Gait Training:  Distance (ft): 4 Feet (ft)(sidesteps to [facility])  Assistive Device: Gait belt;Walker, rolling  Ambulation - Level of Assistance: Contact guard assistance;Assist x1     Gait Description (WDL): Exceptions to WDL  Gait Abnormalities: Decreased step clearance;Shuffling gait        Base of Support: Narrowed     Speed/Cristy: Pace decreased (<100 feet/min); Shuffled; Slow  Step Length: Left shortened;Right shortened          Pain Rating:  No c/o pain    Activity Tolerance:   Fair and requires rest breaks    After treatment patient left in no apparent distress:   Supine in bed, Call bell within reach, and Side rails x 3    COMMUNICATION/EDUCATION:   The patients plan of care was discussed with: Physical therapist, Occupational therapist, and Registered nurse. Fall prevention education was provided and the patient/caregiver indicated understanding., Patient/family have participated as able in goal setting and plan of care. , and Patient/family agree to work toward stated goals and plan of care.     Thank you for this referral.  Wilian Dick PT, DPT   Time Calculation: 16 mins

## 2020-11-19 NOTE — PROGRESS NOTES
0100: Complaints of gas and stomach with increasing work of breathing likely due to fluid build up in abdomen. NP notified. Orders received. 0530: SUMA, RN at bedside. Bedside shift change report given to Dante Fernandez RN (oncoming nurse) by Frank Silva (offgoing nurse). Report included the following information SBAR, Kardex, Intake/Output, MAR and Cardiac Rhythm NSR.

## 2020-11-19 NOTE — PROGRESS NOTES
[facility]  [facility]ist [facility]                                                                                          Hospitalist Progress Note  Hugo Gavin MD  Answering service: [phone] from in house phone        Date of Service:  2020  NAME:  Parish Goncalves  :  1950  MRN:  [id number]      Admission Summary:   69 y.o PMH of AVM,ESRD,anemia admitted due to anemia and hyperkalemia    Interval history / Subjective:   Patient seen and examined at [email or web address]. She c/w abdominal distension - has been having paracentesis at [place] twice a month since 6 months now - unclear etiology per patient. Assessment & Plan:      #Acute on chronic anemia:  -multifactorial - hx AVMs,CKD  -Gastroenterology following  -recent EGD 10/21: mild gastritis with some friable mucosa and oozing-treated with APC and also capsule study performed with presence of small non-bleeding AVM's in small intestine. Patient reports [facility] and underwent EGD, enteroscopy, and colonoscopy- all with no findings. -s/p 1 U PRBC ,   - monitor hb, transfuse <7  -PPI BID     #Hyperkalemia-resolved  #ESRD on HD TTS  -nephrology following    #Liver cirrhosis with Ascites :  -hx recurrent ascites with paracentesis . Not on any diuretics at home  -[facility] abd showed ascites. The liver echotexture is normal  -echo pending  - plan for paracentesis today   - Hepatology consult placed     Chronic pain- c/w home oxy    Code status: full  DVT prophylaxis: [place] 38 discussed with: Patient/Family and Nurse  Anticipated Disposition: Home w/Family  Anticipated Discharge: 24 hours to 48 hours     Hospital Problems  Date Reviewed: 10/6/2020          Codes Class Noted POA    Anemia ICD-10-CM: D64.9  ICD-9-CM: 285.9  2020 Unknown                Review of Systems:   As per HPI    Vital Signs:    Last 24hrs VS reviewed since prior progress note.  Most recent are:  Visit Vitals  BP (!) 160/73 (BP 1 Location: Left arm, BP Patient Position: Sitting)   Pulse 90   Temp 97.5 °F (36.4 °C)   Resp 28   Wt 77.6 kg (171 lb 1.2 oz)   SpO2 100%   BMI 30.30 kg/m²         Intake/Output Summary (Last 24 hours) at 11/19/2020 1247  Last data filed at 11/19/2020 0900  Gross per 24 hour   Intake 458.3 ml   Output 1500 ml   Net -1041.7 ml        Physical Examination:     I had a face to face encounter with this patient and independently examined them on 11/19/2020 as outlined below:          Constitutional:  No acute distress, elderly patient   ENT:  Oral mucosa moist, EOMI    Resp:  CTA bilaterally. No wheezing/rhonchi/rales. No accessory muscle use   CV:  Regular rhythm, normal rate, no murmurs, gallops, rubs    GI:  Soft, distended, non tender. normoactive bowel sounds    Musculoskeletal:  No LE edema    Neurologic:  Moves all extremities. AAOx3     Psych:  not anxious nor agitated. Data Review:    Review and/or order of clinical lab test  Review and/or order of tests in the radiology section of CPT  Review and/or order of tests in the medicine section of CPT      Labs:     Recent Labs     11/19/20  0344 11/18/20  1843  11/17/20  1008   WBC 4.1  --   --  4.6   HGB 8.4* 8.2*   < > 6.4*   HCT 28.2* 27.5*   < > 20.7*     --   --  225    < > = values in this interval not displayed. Recent Labs     11/18/20  0240 11/17/20  1008    140   K 4.3 6.1*    105   CO2 28 26   BUN 37* 77*   CREA 5.21* 9.96*   * 129*   CA 8.3* 8.6   PHOS 4.6  --      Recent Labs     11/18/20  0240 11/17/20  1008   ALT  --  16   AP  --  151*   TBILI  --  0.4   TP  --  6.1*   ALB 2.8* 2.9*   GLOB  --  3.2     No results for input(s): INR, PTP, APTT, INREXT, INREXT in the last 72 hours. No results for input(s): FE, TIBC, PSAT, FERR in the last 72 hours. Lab Results   Component Value Date/Time    Folate 16.4 10/19/2020 09:34 AM      No results for input(s): PH, PCO2, PO2 in the last 72 hours.   No results for input(s): CPK, CKNDX, TROIQ in the last 72 hours.     No lab exists for component: CPKMB  No results found for: CHOL, CHOLX, CHLST, CHOLV, HDL, HDLP, LDL, LDLC, DLDLP, TGLX, TRIGL, TRIGP, CHHD, CHHDX  Lab Results   Component Value Date/Time    Glucose (POC) 122 (H) 11/18/2020 01:10 PM    Glucose (POC) 201 (H) 10/25/2020 11:59 AM    Glucose (POC) 186 (H) 10/25/2020 06:07 AM    Glucose (POC) 173 (H) 10/24/2020 09:12 PM    Glucose (POC) 144 (H) 10/24/2020 06:57 PM     No results found for: COLOR, APPRN, SPGRU, REFSG, VERENA, PROTU, GLUCU, KETU, BILU, UROU, TIBURCIO, LEUKU, GLUKE, EPSU, BACTU, WBCU, RBCU, CASTS, UCRY      Medications Reviewed:     Current Facility-Administered Medications   Medication Dose Route Frequency    albuterol-ipratropium (DUO-NEB) 2.5 MG-0.5 MG/3 ML  3 mL Nebulization Q4H PRN    alum-mag hydroxide-simeth (MYLANTA) oral suspension 30 mL  30 mL Oral Q4H PRN    0.9% sodium chloride infusion 250 mL  250 mL IntraVENous PRN    epoetin reshma-epbx (RETACRIT) injection 20,000 Units  20,000 Units SubCUTAneous Q TUE, THU & SAT    0.9% sodium chloride infusion 250 mL  250 mL IntraVENous PRN    pantoprazole (PROTONIX) tablet 40 mg  40 mg Oral BID    sevelamer carbonate (RENVELA) tab 1,600 mg  1,600 mg Oral TID WITH MEALS    acetaminophen (TYLENOL) tablet 650 mg  650 mg Oral Q4H PRN    oxyCODONE IR (ROXICODONE) tablet 5 mg  5 mg Oral Q4H PRN     ______________________________________________________________________  EXPECTED LENGTH OF STAY: 3d 14h  ACTUAL LENGTH OF STAY:          0                 Maria Alejandra Heaton MD

## 2020-11-20 ENCOUNTER — APPOINTMENT (OUTPATIENT)
Dept: NON INVASIVE DIAGNOSTICS | Age: 70
DRG: 377 | End: 2020-11-20
Attending: HOSPITALIST
Payer: MEDICARE

## 2020-11-20 LAB
ANION GAP SERPL CALC-SCNC: 5 MMOL/L (ref 5–15)
BASOPHILS # BLD: 0 K/UL (ref 0–0.1)
BASOPHILS NFR BLD: 1 % (ref 0–1)
BUN SERPL-MCNC: 33 MG/DL (ref 6–20)
BUN/CREAT SERPL: 7 (ref 12–20)
CALCIUM SERPL-MCNC: 8.5 MG/DL (ref 8.5–10.1)
CHLORIDE SERPL-SCNC: 106 MMOL/L (ref 97–108)
CO2 SERPL-SCNC: 28 MMOL/L (ref 21–32)
CREAT SERPL-MCNC: 4.92 MG/DL (ref 0.55–1.02)
DIFFERENTIAL METHOD BLD: ABNORMAL
EOSINOPHIL # BLD: 0.1 K/UL (ref 0–0.4)
EOSINOPHIL NFR BLD: 3 % (ref 0–7)
ERYTHROCYTE [DISTWIDTH] IN BLOOD BY AUTOMATED COUNT: 18.4 % (ref 11.5–14.5)
GLUCOSE SERPL-MCNC: 231 MG/DL (ref 65–100)
HCT VFR BLD AUTO: 26.4 % (ref 35–47)
HGB BLD-MCNC: 7.7 G/DL (ref 11.5–16)
IMM GRANULOCYTES # BLD AUTO: 0 K/UL (ref 0–0.04)
IMM GRANULOCYTES NFR BLD AUTO: 1 % (ref 0–0.5)
LYMPHOCYTES # BLD: 0.5 K/UL (ref 0.8–3.5)
LYMPHOCYTES NFR BLD: 13 % (ref 12–49)
MCH RBC QN AUTO: 28.6 PG (ref 26–34)
MCHC RBC AUTO-ENTMCNC: 29.2 G/DL (ref 30–36.5)
MCV RBC AUTO: 98.1 FL (ref 80–99)
MONOCYTES # BLD: 0.5 K/UL (ref 0–1)
MONOCYTES NFR BLD: 13 % (ref 5–13)
NEUTS SEG # BLD: 2.6 K/UL (ref 1.8–8)
NEUTS SEG NFR BLD: 69 % (ref 32–75)
NRBC # BLD: 0 K/UL (ref 0–0.01)
NRBC BLD-RTO: 0 PER 100 WBC
PLATELET # BLD AUTO: 129 K/UL (ref 150–400)
PMV BLD AUTO: 11 FL (ref 8.9–12.9)
POTASSIUM SERPL-SCNC: 5.2 MMOL/L (ref 3.5–5.1)
RBC # BLD AUTO: 2.69 M/UL (ref 3.8–5.2)
RBC MORPH BLD: ABNORMAL
SODIUM SERPL-SCNC: 139 MMOL/L (ref 136–145)
WBC # BLD AUTO: 3.7 K/UL (ref 3.6–11)

## 2020-11-20 PROCEDURE — 74011250637 HC RX REV CODE- 250/637: Performed by: INTERNAL MEDICINE

## 2020-11-20 PROCEDURE — 97535 SELF CARE MNGMENT TRAINING: CPT

## 2020-11-20 PROCEDURE — 74011250637 HC RX REV CODE- 250/637: Performed by: HOSPITALIST

## 2020-11-20 PROCEDURE — 65660000000 HC RM CCU STEPDOWN

## 2020-11-20 PROCEDURE — 97165 OT EVAL LOW COMPLEX 30 MIN: CPT

## 2020-11-20 PROCEDURE — 74011250637 HC RX REV CODE- 250/637: Performed by: NURSE PRACTITIONER

## 2020-11-20 PROCEDURE — 36415 COLL VENOUS BLD VENIPUNCTURE: CPT

## 2020-11-20 PROCEDURE — 85025 COMPLETE CBC W/AUTO DIFF WBC: CPT

## 2020-11-20 PROCEDURE — 80048 BASIC METABOLIC PNL TOTAL CA: CPT

## 2020-11-20 RX ORDER — SODIUM CHLORIDE 0.9 % (FLUSH) 0.9 %
5-40 SYRINGE (ML) INJECTION AS NEEDED
Status: CANCELLED | OUTPATIENT
Start: 2020-11-20

## 2020-11-20 RX ORDER — NALOXONE HYDROCHLORIDE 0.4 MG/ML
0.4 INJECTION, SOLUTION INTRAMUSCULAR; INTRAVENOUS; SUBCUTANEOUS
Status: CANCELLED | OUTPATIENT
Start: 2020-11-20 | End: 2020-11-20

## 2020-11-20 RX ORDER — SODIUM CHLORIDE 0.9 % (FLUSH) 0.9 %
5-40 SYRINGE (ML) INJECTION EVERY 8 HOURS
Status: CANCELLED | OUTPATIENT
Start: 2020-11-20

## 2020-11-20 RX ORDER — FLUMAZENIL 0.1 MG/ML
0.2 INJECTION INTRAVENOUS
Status: CANCELLED | OUTPATIENT
Start: 2020-11-20 | End: 2020-11-20

## 2020-11-20 RX ORDER — EPINEPHRINE 0.1 MG/ML
1 INJECTION INTRACARDIAC; INTRAVENOUS
Status: CANCELLED | OUTPATIENT
Start: 2020-11-20 | End: 2020-11-21

## 2020-11-20 RX ORDER — ATROPINE SULFATE 0.1 MG/ML
0.5 INJECTION INTRAVENOUS
Status: CANCELLED | OUTPATIENT
Start: 2020-11-20 | End: 2020-11-21

## 2020-11-20 RX ORDER — SODIUM CHLORIDE 9 MG/ML
50 INJECTION, SOLUTION INTRAVENOUS CONTINUOUS
Status: CANCELLED | OUTPATIENT
Start: 2020-11-20 | End: 2020-11-20

## 2020-11-20 RX ORDER — DEXTROMETHORPHAN/PSEUDOEPHED 2.5-7.5/.8
1.2 DROPS ORAL
Status: CANCELLED | OUTPATIENT
Start: 2020-11-20

## 2020-11-20 RX ADMIN — KETOTIFEN FUMARATE 1 DROP: 0.35 SOLUTION/ DROPS OPHTHALMIC at 17:38

## 2020-11-20 RX ADMIN — OXYCODONE HYDROCHLORIDE 5 MG: 5 TABLET ORAL at 09:15

## 2020-11-20 RX ADMIN — SEVELAMER CARBONATE 1600 MG: 800 TABLET, FILM COATED ORAL at 09:10

## 2020-11-20 RX ADMIN — OXYCODONE HYDROCHLORIDE 5 MG: 5 TABLET ORAL at 03:01

## 2020-11-20 RX ADMIN — OXYCODONE HYDROCHLORIDE 5 MG: 5 TABLET ORAL at 16:07

## 2020-11-20 RX ADMIN — SEVELAMER CARBONATE 1600 MG: 800 TABLET, FILM COATED ORAL at 17:37

## 2020-11-20 RX ADMIN — OXYCODONE HYDROCHLORIDE AND ACETAMINOPHEN 500 MG: 500 TABLET ORAL at 09:10

## 2020-11-20 RX ADMIN — FERROUS SULFATE TAB 325 MG (65 MG ELEMENTAL FE) 325 MG: 325 (65 FE) TAB at 17:37

## 2020-11-20 RX ADMIN — CARBOXYMETHYLCELLULOSE SODIUM 1 DROP: 5 SOLUTION/ DROPS OPHTHALMIC at 18:47

## 2020-11-20 RX ADMIN — KETOTIFEN FUMARATE 1 DROP: 0.35 SOLUTION/ DROPS OPHTHALMIC at 09:11

## 2020-11-20 RX ADMIN — Medication 2 TABLET: at 09:10

## 2020-11-20 RX ADMIN — PANTOPRAZOLE SODIUM 40 MG: 40 TABLET, DELAYED RELEASE ORAL at 17:37

## 2020-11-20 RX ADMIN — CARBOXYMETHYLCELLULOSE SODIUM 1 DROP: 5 SOLUTION/ DROPS OPHTHALMIC at 21:09

## 2020-11-20 RX ADMIN — PANTOPRAZOLE SODIUM 40 MG: 40 TABLET, DELAYED RELEASE ORAL at 09:10

## 2020-11-20 RX ADMIN — FERROUS SULFATE TAB 325 MG (65 MG ELEMENTAL FE) 325 MG: 325 (65 FE) TAB at 09:10

## 2020-11-20 NOTE — CONSULTS
[facility]   [address] [facility], [address] [place] [address], [facility] [place]  Phone: [phone]   [phone]       Nephrology Progress Note  Jared Harrington Fenton     [birth year]     [id number]  Date of Admission : 11/17/2020 11/20/20    CC: Follow up for ESRD       Assessment and Plan   ESRD- HD  - Dialyzes TTS at [address] dialysis in [facility]  - followed by Dr Tamia Dunlap   - has [facility] Day for access  - next HD tomorrow   - continue Midodrine w/ HD     Cirrhosis of Liver : ? Cause   Chronic HFrEF   - s/p Paracentesis drain placement 11/19     Anemia in CKD :   -  continue epogen - 20 K units w/ HD     Blood loss anemia : On last admission   - EGD: antral gastritis and erythema   - Capsule study : non bleeding small bowel AVMs noted   - Hb at 8.4 gm today     Sec HPTH   - continue Phos binders       Care Plan discussed with: pt      Interval History:  Seen and examined   3.8L ascites drained w/ paracentesis   No new sx   Hb down to 7.7   Denies bloody stools   Paracentesis - some blood    Review of Systems: A comprehensive review of systems was negative except for that written in the HPI.     Current Medications:   Current Facility-Administered Medications   Medication Dose Route Frequency    albuterol-ipratropium (DUO-NEB) 2.5 MG-0.5 MG/3 ML  3 mL Nebulization Q4H PRN    alum-mag hydroxide-simeth (MYLANTA) oral suspension 30 mL  30 mL Oral Q4H PRN    ascorbic acid (vitamin C) (VITAMIN C) tablet 500 mg  500 mg Oral DAILY    cholecalciferol (VITAMIN D3) (1000 Units /25 mcg) tablet 2 Tab  2,000 Units Oral DAILY    ferrous sulfate tablet 325 mg  325 mg Oral TID WITH MEALS    ketotifen (ZADITOR) 0.025 % (0.035 %) ophthalmic solution 1 Drop  1 Drop Both Eyes BID    carboxymethylcellulose sodium (REFRESH PLUS) 0.5 % ophthalmic solution 1-2 Drop  1-2 Drop Both Eyes TID    prochlorperazine (COMPAZINE) with saline injection 10 mg  10 mg IntraVENous Q6H PRN    morphine injection 2 mg  2 mg IntraVENous Q4H PRN    0.9% sodium chloride infusion 250 mL  250 mL IntraVENous PRN    epoetin reshma-epbx (RETACRIT) injection 20,000 Units  20,000 Units SubCUTAneous Q TUE, THU & SAT    0.9% sodium chloride infusion 250 mL  250 mL IntraVENous PRN    pantoprazole (PROTONIX) tablet 40 mg  40 mg Oral BID    sevelamer carbonate (RENVELA) tab 1,600 mg  1,600 mg Oral TID WITH MEALS    acetaminophen (TYLENOL) tablet 650 mg  650 mg Oral Q4H PRN    oxyCODONE IR (ROXICODONE) tablet 5 mg  5 mg Oral Q4H PRN      Allergies   Allergen Reactions    Aleve [Naproxen Sodium] Itching, Swelling and Other (comments)    Amlodipine Rash, Hives and Itching    Aspirin Other (comments), Nausea Only and Unknown (comments)     GI bleed  GI bleeding      Heparin Unknown (comments)     bleeding      Ibuprofen Nausea and Vomiting    Metformin Other (comments)     swelling       Objective:  Vitals:    Vitals:    11/20/20 0210 11/20/20 0558 11/20/20 0602 11/20/20 0956   BP: 119/85 (!) 125/41 (!) 134/50 (!) 138/42   Pulse: 81 76  77   Resp: 17 18 18   Temp: 98.5 °F (36.9 °C) 98 °F (36.7 °C)  97.4 °F (36.3 °C)   TempSrc:       SpO2: 100% 100%  96%   Weight:  76.4 kg (168 lb 6.9 oz)       Intake and Output:  No intake/output data recorded. 11/18 1901 - 11/20 0700  In: 1350   Out: 1515 [Drains:15]    Physical Examination:    General: No distress   HEENT           Icteric/ muddy sclera   Neck:  RIJ permacath   Resp:  Diminished B/L   CV:  RRR,  no murmur or rub,trace+ LE edema  GI:  Soft, NT, + ascites drain  Neurologic:  Non focal  Psych:             AAO x 3 appropriate affect  Skin:  No Rash  :  No cai    []    High complexity decision making was performed  []    Patient is at high-risk of decompensation with multiple organ involvement    Lab Data Personally Reviewed: I have reviewed all the pertinent labs, microbiology data and radiology studies during assessment.     Recent Labs     11/20/20  0300 11/18/20  0240 11/17/20  1008    139 140 K 5.2* 4.3 6.1*    105 105   CO2 28 28 26   * 132* 129*   BUN 33* 37* 77*   CREA 4.92* 5.21* 9.96*   CA 8.5 8.3* 8.6   PHOS  --  4.6  --    ALB  --  2.8* 2.9*   ALT  --   --  16     Recent Labs     11/20/20  0300 11/19/20  0344 11/18/20  1843 11/18/20  0240 11/17/20  1008   WBC 3.7 4.1  --   --  4.6   HGB 7.7* 8.4* 8.2* 7.0* 6.4*   HCT 26.4* 28.2* 27.5* 24.7* 20.7*   * 150  --   --  225     No results found for: SDES  Lab Results   Component Value Date/Time    Culture result: PENDING 11/19/2020 02:50 PM    Culture result: No growth 5 days 10/18/2020 07:09 AM     Recent Results (from the past 24 hour(s))   CULTURE, BODY FLUID W GRAM STAIN    Collection Time: 11/19/20  2:50 PM    Specimen: Ascitic Fluid; Body Fluid   Result Value Ref Range    Special Requests: NO SPECIAL REQUESTS      GRAM STAIN FEW WBCS SEEN      GRAM STAIN NO ORGANISMS SEEN      Culture result: PENDING    PROTEIN TOTAL, FLUID    Collection Time: 11/19/20  2:50 PM   Result Value Ref Range    Fluid Type: ASCITIC FLUID       Protein total, body fld. 3.4 g/dL   ALBUMIN, FLUID    Collection Time: 11/19/20  2:50 PM   Result Value Ref Range    Fluid Type: PERITONEAL FLUID      Albumin, body fld.  1.8 g/dL   BILIRUBIN, FLUID    Collection Time: 11/19/20  2:50 PM   Result Value Ref Range    Fluid Type: ASCITIC FLUID       Bilirubin,Body Fluid 0.3 MG/DL   CELL COUNT, BODY FLUID    Collection Time: 11/19/20  2:50 PM   Result Value Ref Range    BODY FLUID TYPE ASCITIC FLUID       FLUID COLOR STRAW      FLUID APPEARANCE HAZY      FLUID RBC CT. 61 (H) 0 /cu mm    FLUID NUCLEATED CELLS 195 /cu mm    FLD NEUTROPHILS 3 (A) NRRE %    FLD LYMPHS 14 (A) NRRE %    FLD MONO/MACROPHAGES 83 (A) NRRE %   CBC WITH AUTOMATED DIFF    Collection Time: 11/20/20  3:00 AM   Result Value Ref Range    WBC 3.7 3.6 - 11.0 K/uL    RBC 2.69 (L) 3.80 - 5.20 M/uL    HGB 7.7 (L) 11.5 - 16.0 g/dL    HCT 26.4 (L) 35.0 - 47.0 %    MCV 98.1 80.0 - 99.0 FL    MCH 28.6 26.0 - 34.0 PG    MCHC 29.2 (L) 30.0 - 36.5 g/dL    RDW 18.4 (H) 11.5 - 14.5 %    PLATELET 967 (L) 955 - 400 K/uL    MPV 11.0 8.9 - 12.9 FL    NRBC 0.0 0  WBC    ABSOLUTE NRBC 0.00 0.00 - 0.01 K/uL    NEUTROPHILS 69 32 - 75 %    LYMPHOCYTES 13 12 - 49 %    MONOCYTES 13 5 - 13 %    EOSINOPHILS 3 0 - 7 %    BASOPHILS 1 0 - 1 %    IMMATURE GRANULOCYTES 1 (H) 0.0 - 0.5 %    ABS. NEUTROPHILS 2.6 1.8 - 8.0 K/UL    ABS. LYMPHOCYTES 0.5 (L) 0.8 - 3.5 K/UL    ABS. MONOCYTES 0.5 0.0 - 1.0 K/UL    ABS. EOSINOPHILS 0.1 0.0 - 0.4 K/UL    ABS. BASOPHILS 0.0 0.0 - 0.1 K/UL    ABS. IMM. GRANS. 0.0 0.00 - 0.04 K/UL    DF SMEAR SCANNED      RBC COMMENTS ANISOCYTOSIS  1+       METABOLIC PANEL, BASIC    Collection Time: 11/20/20  3:00 AM   Result Value Ref Range    Sodium 139 136 - 145 mmol/L    Potassium 5.2 (H) 3.5 - 5.1 mmol/L    Chloride 106 97 - 108 mmol/L    CO2 28 21 - 32 mmol/L    Anion gap 5 5 - 15 mmol/L    Glucose 231 (H) 65 - 100 mg/dL    BUN 33 (H) 6 - 20 MG/DL    Creatinine 4.92 (H) 0.55 - 1.02 MG/DL    BUN/Creatinine ratio 7 (L) 12 - 20      GFR est AA 11 (L) >60 ml/min/1.73m2    GFR est non-AA 9 (L) >60 ml/min/1.73m2    Calcium 8.5 8.5 - 10.1 MG/DL           Total time spent with patient:  xxx   min. Care Plan discussed with:  Patient     Family      RN      Consulting Physician [facility],         I have reviewed the flowsheets. Chart and Pertinent Notes have been reviewed. No change in PMH ,family and social history from Consult note.       Soledad Cali MD

## 2020-11-20 NOTE — PROGRESS NOTES
Problem: Self Care Deficits Care Plan (Adult)  Goal: *Acute Goals and Plan of Care (Insert Text)  Description:   FUNCTIONAL STATUS PRIOR TO ADMISSION: Patient was modified independent using a walker for functional mobility. Patient required assistance for bathing, dressing, cooking, and cleaning, was not driving. Patient alternates living with her 5 daughters and friends take her to dialysis. HOME SUPPORT: The patient lived with daughters to provide assistance. Occupational Therapy Goals  Initiated 11/20/2020  1. Patient will perform standing ADLs with supervision/set up within 7 day(s). 2.  Patient will perform bathing with supervision/set-up within 7 day(s). 3.  Patient will perform lower body dressing with supervision/set-up within 7 day(s). 4.  Patient will perform toilet transfers with supervision/set-up within 7 day(s). 5.  Patient will perform all aspects of toileting with minimal assistance/contact guard assist within 7 day(s). 6.  Patient will participate in upper extremity therapeutic exercise/activities with minimal assistance/contact guard assist for 5 minutes within 7 day(s). 7.  Patient will utilize energy conservation techniques during functional activities with verbal cues within 7 day(s). Outcome: Not Met    OCCUPATIONAL THERAPY EVALUATION  Patient: Cheryl Ledezma (48 y.o. female)  Date: 11/20/2020  Primary Diagnosis: Anemia [D64.9]  Anemia [D64.9]  Procedure(s) (LRB):  ESOPHAGOGASTRODUODENOSCOPY (EGD) (N/A)     Precautions:  Fall    ASSESSMENT  Based on the objective data described below, the patient presents with decreased ROM/strength globally (BUE 3-/5 MMT, remains functional), poor activity  & standing tolerance, impaired functional mobility and balance, abdominal and back pain, and decreased distal lower body access noted during bathroom mobility and standing ADLs this session.  Patient is likely close to baseline for mobility and ADLs; therefore, recommending OT and increased assistance and supervision with family to ensure safe functional mobility and IND with ADLs. Current Level of Function Impacting Discharge (ADLs/self-care): up to SBA upper body ADLs when seated, up to mod A lower body ADLs, CGA for brief mobility      Functional Outcome Measure: The patient scored Total: 50/100 on the Barthel Index outcome measure which is indicative of 50% impaired ability to care for basic self needs/dependency on others; inferred 100% dependency on others for instrumental ADLs. Other factors to consider for discharge: assist from daughters, close to baseline, PMH, needs DME     Patient will benefit from skilled therapy intervention to address the above noted impairments. PLAN :  Recommendations and Planned Interventions: self care training, functional mobility training, therapeutic exercise, balance training, therapeutic activities, endurance activities, patient education, home safety training, and family training/education    Frequency/Duration: Patient will be followed by occupational therapy 5 times a week to address goals. Recommendation for discharge: (in order for the patient to meet his/her long term goals)  Occupational therapy at least 2 days/week in the home AND ensure assist and/or supervision for safety with all ADLs/IADLs, and mobility     This discharge recommendation:  Has not yet been discussed the attending provider and/or case management    IF patient discharges home will need the following DME: bedside commode, rolling walker (if not already owned), shower chair, and grab bars in shower        SUBJECTIVE:   Patient stated Alejandra Cameronves my blood cell count gets low I can't even walk around my house.      OBJECTIVE DATA SUMMARY:   HISTORY:   Past Medical History:   Diagnosis Date    Arthritis     Asthma     Chronic kidney disease     Chronic pain     Cirrhosis ([facility] [address] 12/17/2017    Diabetes ([facility] diet controlled    GERD (gastroesophageal reflux disease)     Hypertension     Paroxysmal atrial fibrillation ([facility] [address] 10/6/2020     Past Surgical History:   Procedure Laterality Date    COLONOSCOPY N/A 1/12/2018    COLONOSCOPY performed by Dav Mcintyre MD at [facility] ENDOSCOPY    COLONOSCOPY N/A 3/19/2018    COLONOSCOPY performed by Dav Mcintyre MD at [address] HX GYN  c section    HX VASCULAR ACCESS      dialysis     IR PARACENTESIS ABD W IMAGE  10/22/2020       Expanded or extensive additional review of patient history:     Home Situation  Home Environment: Private residence  # Steps to Enter: 2  One/Two Story Residence: One story  Living Alone: Yes  Support Systems: Family member(s)  Patient Expects to be Discharged to[de-identified] Patient room(656)  Current DME Used/Available at Home: Walker, rolling  Tub or Shower Type: Shower    Hand dominance: Right    EXAMINATION OF PERFORMANCE DEFICITS:  Cognitive/Behavioral Status:  Neurologic State: Alert; Appropriate for age  Orientation Level: Oriented X4  Cognition: Follows commands; Appropriate for age attention/concentration  Perception: Appears intact  Perseveration: No perseveration noted  Safety/Judgement: Awareness of environment;Decreased insight into deficits; Fall prevention;Home safety    Skin: visible skin intact    Edema: intact    Hearing: Auditory  Auditory Impairment: None    Vision/Perceptual:    Diplopia: No    Acuity: Within Defined Limits    Corrective Lenses: Reading glasses    Range of Motion:  AROM: Generally decreased, functional  PROM: Generally decreased, functional     Strength:  Strength: Generally decreased, functional(BUE 3-/5 generally)  B  strength: 2+/5     Coordination:  Coordination: Generally decreased, functional  Fine Motor Skills-Upper: Left Intact; Right Intact    Gross Motor Skills-Upper: Left Intact; Right Intact    Tone & Sensation:  Tone: Normal  Sensation: Impaired(BLE and hand tingling )     Balance:  Sitting: Intact  Standing: Impaired  Standing - Static: Fair  Standing - Dynamic : Fair;Constant support    Functional Mobility and Transfers for ADLs:  Bed Mobility:  Supine to Sit: Stand-by assistance  Scooting: Stand-by assistance    Transfers:  Sit to Stand: Contact guard assistance  Stand to Sit: Contact guard assistance  Bed to Chair: Contact guard assistance  Bathroom Mobility: Contact guard assistance    ADL Assessment:  Feeding: Setup(infer from ROM, strength, activity tolerance, balance)    Oral Facial Hygiene/Grooming: Setup(seated)    Bathing: Moderate assistance(infer from ROM, strength, activity tolerance, balance)    Upper Body Dressing: Stand-by assistance(infer from ROM, strength, activity tolerance, balance)    Lower Body Dressing: Minimum assistance(infer from ROM, strength, activity tolerance, balance)    Toileting: Minimum assistance(infer from ROM, strength, activity tolerance, balance)    ADL Intervention and task modifications:   Patient instructed and indicated understanding energy conservation techniques and fall prevention (shower chair, hand held shower head, pacing, sitting to complete tasks) to increase independence and safety during ADLs. Patient instructed and indicated understanding the benefits of maintaining activity tolerance, functional mobility, and independence with self care tasks during acute stay  to ensure safe return home and to baseline. Encouraged patient to increase frequency and duration OOB, be out of bed for all meals, perform daily ADLs (as approved by RN/MD regarding bathing etc), and performing functional mobility to/from bathroom. Grooming  Grooming Assistance: Contact guard assistance  Position Performed: Standing;Seated in chair(standing 4 min, seated for 5min)  Washing Face: Contact guard assistance  Brushing Teeth: Set-up    Cognitive Retraining  Safety/Judgement: Awareness of environment;Decreased insight into deficits; Fall prevention;Home safety    Therapeutic Exercise:   Instructed to complete the following 3x/day when up in chair:   - ankle pumps  - marches   - knee flexion/extension     Functional Measure:  Barthel Index:    Bathin  Bladder: 5  Bowels: 10  Groomin  Dressin  Feeding: 10  Mobility: 0  Stairs: 0  Toilet Use: 5  Transfer (Bed to Chair and Back): 10  Total: 50/100        The Barthel ADL Index: Guidelines  1. The index should be used as a record of what a patient does, not as a record of what a patient could do. 2. The main aim is to establish degree of independence from any help, physical or verbal, however minor and for whatever reason. 3. The need for supervision renders the patient not independent. 4. A patient's performance should be established using the best available evidence. Asking the patient, friends/relatives and nurses are the usual sources, but direct observation and common sense are also important. However direct testing is not needed. 5. Usually the patient's performance over the preceding 24-48 hours is important, but occasionally longer periods will be relevant. 6. Middle categories imply that the patient supplies over 50 per cent of the effort. 7. Use of aids to be independent is allowed. Clifton Oleary., Barthel, D.W. (5793). Functional evaluation: the Barthel Index. [address] [facility] (14)2. Gavi Bower narciso BRIDGER CernaJBRYON, Rena Alexandra, Lanette Whitfield.[facility], [address] [place] [address] (). Measuring the change indisability after inpatient rehabilitation; comparison of the responsiveness of the Barthel Index and Functional [place] Measure. Journal of Neurology, Neurosurgery, and Psychiatry, 66(4), 173-430. Deion Gillespie, [place].LESTER, JUAN Stock, & Bev Burton, MVivekA. (2004.) Assessment of post-stroke quality of life in cost-effectiveness studies: The usefulness of the Barthel Index and the EuroQoL-5D.  Quality of Life Research, 15, 744-95         Occupational Therapy Evaluation Charge Determination   History Examination Decision-Making   LOW Complexity : Brief history review LOW Complexity : 1-3 performance deficits relating to physical, cognitive , or psychosocial skils that result in activity limitations and / or participation restrictions  MEDIUM Complexity : Patient may present with comorbidities that affect occupational performnce. Miniml to moderate modification of tasks or assistance (eg, physical or verbal ) with assesment(s) is necessary to enable patient to complete evaluation       Based on the above components, the patient evaluation is determined to be of the following complexity level: LOW   Pain Rating:  Abdominal and back pain 10/10     Activity Tolerance:   Fair, requires frequent rest breaks, and observed SOB with activity, reporting dizziness when EOB    After treatment patient left in no apparent distress:    Sitting in chair, Call bell within reach, and Bed / chair alarm activated    COMMUNICATION/EDUCATION:   The patients plan of care was discussed with: Registered nurse. Home safety education was provided and the patient/caregiver indicated understanding., Patient/family have participated as able in goal setting and plan of care. , and Patient/family agree to work toward stated goals and plan of care. This patients plan of care is appropriate for delegation to ROSY. Thank you for this referral.  NOA Odell  Time Calculation: 24 mins    Regarding student involvement in patient care:  A student participated in this treatment session. Per CMS Medicare statements and AOTA guidelines I certify that the following was true:  1. I was present and directly observed the entire session. 2. I made all skilled judgments and clinical decisions regarding care. 3. I am the practitioner responsible for assessment, treatment, and documentation.

## 2020-11-20 NOTE — PROGRESS NOTES
Bedside and Verbal shift change report given to Renny Dakins, RN (oncoming nurse) by Pieter Cabrera RN (offgoing nurse). Report included the following information SBAR, Kardex, ED Summary, OR Summary, Intake/Output, MAR, Accordion, Recent Results, Med Rec Status and Cardiac Rhythm NSR. Patient Vitals for the past 12 hrs:   Temp Pulse Resp BP SpO2   11/20/20 0602 -- -- -- (!) 134/50 --   11/20/20 0558 98 °F (36.7 °C) 76 18 (!) 125/41 100 %   11/20/20 0210 98.5 °F (36.9 °C) 81 17 119/85 100 %   11/19/20 2212 98.7 °F (37.1 °C) 88 18 (!) 154/41 96 %         Last 3 Recorded Weights in this Encounter    11/18/20 0146 11/19/20 0830 11/20/20 0558   Weight: 77.6 kg (171 lb 1.2 oz) 77.5 kg (170 lb 13.7 oz) 76.4 kg (168 lb 6.9 oz)         Problem: Falls - Risk of  Goal: *Absence of Falls  Description: Document Iza Fall Risk and appropriate interventions in the flowsheet. Outcome: Progressing Towards Goal  Note: Fall Risk Interventions:  Mobility Interventions: Assess mobility with egress test, Communicate number of staff needed for ambulation/transfer, OT consult for ADLs, Patient to call before getting OOB, PT Consult for mobility concerns, PT Consult for assist device competence, Utilize walker, cane, or other assistive device    Mentation Interventions: Adequate sleep, hydration, pain control, Evaluate medications/consider consulting pharmacy, Increase mobility, Reorient patient, Room close to nurse's station, Update white board, Toileting rounds    Medication Interventions: Evaluate medications/consider consulting pharmacy, Patient to call before getting OOB, Teach patient to arise slowly    Elimination Interventions: Call light in reach, Patient to call for help with toileting needs, Toileting schedule/hourly rounds              Problem: Pain  Goal: *Control of Pain  Outcome: Progressing Towards Goal  Note: Patient c/o pain at paracentesis site. Milady given 1x overnight.       Problem: Pressure Injury - Risk of  Goal: *Prevention of pressure injury  Description: Document Vic Scale and appropriate interventions in the flowsheet. Outcome: Progressing Towards Goal  Note: Pressure Injury Interventions:  Sensory Interventions: Assess changes in LOC, Assess need for specialty bed, Avoid rigorous massage over bony prominences, Check visual cues for pain, Discuss PT/OT consult with provider, Float heels, Keep linens dry and wrinkle-free, Maintain/enhance activity level, Minimize linen layers, Monitor skin under medical devices, Pressure redistribution bed/mattress (bed type)    Moisture Interventions: Apply protective barrier, creams and emollients, Assess need for specialty bed, Limit adult briefs, Minimize layers, Maintain skin hydration (lotion/cream)    Activity Interventions: Assess need for specialty bed, Increase time out of bed, Pressure redistribution bed/mattress(bed type), PT/OT evaluation    Mobility Interventions: Assess need for specialty bed, HOB 30 degrees or less, Pressure redistribution bed/mattress (bed type), PT/OT evaluation    Nutrition Interventions: Document food/fluid/supplement intake, Offer support with meals,snacks and hydration    Friction and Shear Interventions: Apply protective barrier, creams and emollients, Lift team/patient mobility team, Minimize layers                Problem: Heart Failure: Day 4  Goal: Off Pathway (Use only if patient is Off Pathway)  Outcome: Progressing Towards Goal  Note: Pt is NSR. No signs of fluid overload. No SOB or difficulty breathing. BPs within normal range and HR maintained within normal range. Problem: Acute Renal Failure: Day 6  Goal: Off Pathway (Use only if patient is Off Pathway)  Outcome: Progressing Towards Goal  Note: Pt has end stage renal failure. Pt has dialysis done Tuesday, Thursday, and saturdays. Pt has RQuinton catheter placed.

## 2020-11-20 NOTE — PROGRESS NOTES
Brief Note to Document Page    Name: Kaylan Ngo  YOB: 1950  MRN: [id number]  Admission Date: 11/17/2020  5:47 PM      Date of service: 11/20/2020    Active Diagnoses:    Hospital Problems  Date Reviewed: 10/6/2020          Codes Class Noted POA    Anemia ICD-10-CM: D64.9  ICD-9-CM: 285.9  9/17/2020 Unknown              Chief Complaint:    Pain right flank radiating forward to site of drain, described as a constant \"knifelike\" sensation. Patient states she had similar after her last paracentesis in October. Denies chest pain, SOB, vomiting. Endorses nausea however. Clinical Presentation:    Patient is a 74-year-old female admitted 11/17/2020 for anemia (hemoglobin 5.9). 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 Significant PMH to include CKD (on dialysis), HTN, DM, GERD, cirrhosis, PAF. Physical Exam:       · Thin, elderly appearing black female. Appears uncomfortable lying in bed   · Alert, oriented x3, pleasant. PERRLA, EOMI   · heart rate regular, lungs clear  · Abdomen softly distended, paracentesis drain right side. Blood-tinged fluid noted. Bowel sounds audible  · Inspection right flank unremarkable. Tenderness with palpation  · Lower extremities thin, no edema        Data Reviewed: All diagnostic labs and studies have been reviewed. Patient Vitals for the past 8 hrs:   Temp Pulse Resp BP SpO2   11/19/20 2212 98.7 °F (37.1 °C) 88 18 (!) 154/41 96 %   11/19/20 1800 98.8 °F (37.1 °C) 94 22 (!) 146/56 97 %         No results found for this or any previous visit (from the past 8 hour(s)). Assessment and Plan:    Pain right flank, acute  > S/p paracentesis earlier today.   1 L drained  > Ultrasound done yesterday not concerning for nephrolithiasis, hydronephrosis, cholecystitis or cholelithiasis  > We will add additional as needed pain medications along with antiemetic  > Try K pad  > UA if patient not anuric              Bruna Wright, MSN, RN, SHARI-C  [phone] or via Perfect Serve    11/20/2020

## 2020-11-20 NOTE — PROGRESS NOTES
Bedside and Verbal shift change report given to Aguilar Avila RN (oncoming nurse) by Cleopatra Crigler, RN (offgoing nurse). Report included the following information SBAR, Kardex, Intake/Output, MAR, Recent Results and Cardiac Rhythm NSR.

## 2020-11-20 NOTE — PROGRESS NOTES
[address] [facility]  Hospitalist Group                                                                                          Hospitalist Progress Note  Isiah Avitia MD  Answering service: [phone] from in house phone        Date of Service:  2020  NAME:  Tasha Churchill  :  1950  MRN:  [id number]      Admission Summary:   69 y.o PMH of AVM,ESRD,anemia admitted due to anemia and hyperkalemia    Interval history / Subjective:   Patient seen and examined at [id number]. Feels ok now. Drained >1300ml last night in paracentesis drain. Denied any pain now. Plan for EGD today afternoon  Discussed with nursing      Assessment & Plan:      #Acute on chronic anemia:  -multifactorial - hx AVMs,CKD  -Gastroenterology following  -recent EGD 10/21: mild gastritis with some friable mucosa and oozing-treated with APC and also capsule study performed with presence of small non-bleeding AVM's in small intestine. Patient reports she had [facility] follow up and underwent EGD, enteroscopy, and colonoscopy- all with no findings. -s/p 1 U PRBC ,   - hb low stable. monitor hb, transfuse <7  -PPI BID  - plan for EGD today afternoon     #Hyperkalemia-resolved  #ESRD on HD TTS  -nephrology following    #Liver cirrhosis with Ascites :  -hx recurrent ascites with paracentesis . Not on any diuretics at home as pt is anuric   -USG abd showed ascites. The liver echotexture is normal  -echo pending  - Paracentesis drain placed on .  Fluid amount not charted  - fluid analysis shows transudate   - Hepatology consult pending     Chronic pain- c/w home oxy prn    Code status: full  DVT prophylaxis: scd    Care Plan discussed with: Patient/Family and Nurse  Anticipated Disposition: Home w/Family  Anticipated Discharge: 24 hours to 48 hours     Hospital Problems  Date Reviewed: 10/6/2020          Codes Class Noted POA    Anemia ICD-10-CM: D64.9  ICD-9-CM: 285.9  2020 Unknown                Review of Systems:   As per HPI    Vital Signs:    Last 24hrs VS reviewed since prior progress note. Most recent are:  Visit Vitals  BP (!) 134/46 (BP 1 Location: Left arm, BP Patient Position: At rest)   Pulse 84   Temp 97.3 °F (36.3 °C)   Resp 20   Wt 76.4 kg (168 lb 6.9 oz)   SpO2 95%   BMI 29.84 kg/m²         Intake/Output Summary (Last 24 hours) at 11/20/2020 1144  Last data filed at 11/20/2020 0730  Gross per 24 hour   Intake 1350 ml   Output 15 ml   Net 1335 ml        Physical Examination:     I had a face to face encounter with this patient and independently examined them on 11/20/2020 as outlined below:          Constitutional:  No acute distress, elderly patient   ENT:  Oral mucosa moist, EOMI    Resp:  CTA bilaterally. No wheezing/rhonchi/rales. No accessory muscle use   CV:  Regular rhythm, normal rate, no murmurs, gallops, rubs    GI:  Soft, distended, non tender. normoactive bowel sounds    Musculoskeletal:  No LE edema    Neurologic:  Moves all extremities. AAOx3     Psych:  not anxious nor agitated. Data Review:    Review and/or order of clinical lab test  Review and/or order of tests in the radiology section of CPT  Review and/or order of tests in the medicine section of CPT      Labs:     Recent Labs     11/20/20  0300 11/19/20  0344   WBC 3.7 4.1   HGB 7.7* 8.4*   HCT 26.4* 28.2*   * 150     Recent Labs     11/20/20  0300 11/18/20  0240    139   K 5.2* 4.3    105   CO2 28 28   BUN 33* 37*   CREA 4.92* 5.21*   * 132*   CA 8.5 8.3*   PHOS  --  4.6     Recent Labs     11/18/20  0240   ALB 2.8*     No results for input(s): INR, PTP, APTT, INREXT, INREXT in the last 72 hours. No results for input(s): FE, TIBC, PSAT, FERR in the last 72 hours. Lab Results   Component Value Date/Time    Folate 16.4 10/19/2020 09:34 AM      No results for input(s): PH, PCO2, PO2 in the last 72 hours. No results for input(s): CPK, CKNDX, TROIQ in the last 72 hours.     No lab exists for component: CPKMB  No results found for: CHOL, CHOLX, CHLST, CHOLV, HDL, HDLP, LDL, LDLC, DLDLP, TGLX, TRIGL, TRIGP, CHHD, CHHDX  Lab Results   Component Value Date/Time    Glucose (POC) 122 (H) 11/18/2020 01:10 PM    Glucose (POC) 201 (H) 10/25/2020 11:59 AM    Glucose (POC) 186 (H) 10/25/2020 06:07 AM    Glucose (POC) 173 (H) 10/24/2020 09:12 PM    Glucose (POC) 144 (H) 10/24/2020 06:57 PM     No results found for: COLOR, APPRN, SPGRU, REFSG, VERENA, PROTU, GLUCU, KETU, BILU, UROU, TIBURCIO, LEUKU, GLUKE, EPSU, BACTU, WBCU, RBCU, CASTS, UCRY      Medications Reviewed:     Current Facility-Administered Medications   Medication Dose Route Frequency    albuterol-ipratropium (DUO-NEB) 2.5 MG-0.5 MG/3 ML  3 mL Nebulization Q4H PRN    alum-mag hydroxide-simeth (MYLANTA) oral suspension 30 mL  30 mL Oral Q4H PRN    ascorbic acid (vitamin C) (VITAMIN C) tablet 500 mg  500 mg Oral DAILY    cholecalciferol (VITAMIN D3) (1000 Units /25 mcg) tablet 2 Tab  2,000 Units Oral DAILY    ferrous sulfate tablet 325 mg  325 mg Oral TID WITH MEALS    ketotifen (ZADITOR) 0.025 % (0.035 %) ophthalmic solution 1 Drop  1 Drop Both Eyes BID    carboxymethylcellulose sodium (REFRESH PLUS) 0.5 % ophthalmic solution 1-2 Drop  1-2 Drop Both Eyes TID    prochlorperazine (COMPAZINE) with saline injection 10 mg  10 mg IntraVENous Q6H PRN    morphine injection 2 mg  2 mg IntraVENous Q4H PRN    0.9% sodium chloride infusion 250 mL  250 mL IntraVENous PRN    epoetin reshma-epbx (RETACRIT) injection 20,000 Units  20,000 Units SubCUTAneous Q TUE, THU & SAT    0.9% sodium chloride infusion 250 mL  250 mL IntraVENous PRN    pantoprazole (PROTONIX) tablet 40 mg  40 mg Oral BID    sevelamer carbonate (RENVELA) tab 1,600 mg  1,600 mg Oral TID WITH MEALS    acetaminophen (TYLENOL) tablet 650 mg  650 mg Oral Q4H PRN    oxyCODONE IR (ROXICODONE) tablet 5 mg  5 mg Oral Q4H PRN ______________________________________________________________________  EXPECTED LENGTH OF STAY: 3d 17h  ACTUAL LENGTH OF STAY:          Danelle Kraft MD

## 2020-11-20 NOTE — PROGRESS NOTES
Physical Therapy Note  11/20/2020    Chart reviewed in prep for PT tx session - attempted to see however pt sleeping and declining participation at this time, citing fatigue and pain. Refused to open eyes to acknowledge therapist. Will defer and follow back as able/appropriate.     Valentino Overton, PT, DPT

## 2020-11-20 NOTE — PROGRESS NOTES
Bedside and Verbal shift change report given to Juan Plaomino RN (oncoming nurse) by Nuha Echevarria RN (offgoing nurse). Report included the following information SBAR, Kardex, MAR, Cardiac Rhythm NSR and Dual Neuro Assessment.

## 2020-11-20 NOTE — PROGRESS NOTES
[address]  [address] [address] [address] Alicia Giraldo, [address]  [phone]                GI PROGRESS NOTE      NAME:   Sanaz Canseco   :    1950   MRN:    [id number]     Assessment/Plan   Anemia  -Hemoglobin 6.4 on admission, patient receiving unit of PRBC's- Hemoglobin 7.7  this AM  -Continue to monitor H&H, transfuse if hemoglobin less then 7.0  -BID PPI with Protonix  -Will continue to attempt to retrieve records from [facility] of recent GI follow up, patient reports follow up with EGD, enteroscopy, and colonoscopy with no findings in 2020. Patient desires not to have any additional procedures at this time  -Heme + stool  -Plans for EGD this afternoon 2020 for evaluation for bleeding AVM's, ulcer, etc  -Keep patient NPO      Liver disease/Cirhosis/Ascites:  -Received paracentesis on last admission 10/22/2020  -Abdominal ultrasound 2020: Increased echogenicity of the kidneys with renal atrophy suggestive of medical renal disease Ascites.   -Plans for paracentesis 2020 :Successful ultrasound guided pigtail paracentesis catheter, ongoing drainage to gravity bag  -Patient stated follow up at [address]  ESRD, diabetes, COVID 19 negative, hypertension, chronic pain, diabetes  -management per hospitalist   -nephrology following   -patient receives dialysis [facility] Perales, Saturday- received dialysis yesterday     Will discuss with Dr. 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  female Patient reports having black stool this early morning. Denies nausea, no vomiting. Complaint of intermittent abdominal pain after paracentesis on yesterday. Objective:     VITALS:   Last 24hrs VS reviewed since prior hospitalist progress note. Most recent are:  Visit Vitals  BP (!) 134/50 (BP 1 Location: Left arm, BP Patient Position: At rest)   Pulse 76   Temp 98 °F (36.7 °C)   Resp 18   Wt 76.4 kg (168 lb 6.9 oz)   SpO2 100%   BMI 29.84 kg/m²       Intake/Output Summary (Last 24 hours) at 11/20/2020 0917  Last data filed at 11/20/2020 0730  Gross per 24 hour   Intake 1350 ml   Output 15 ml   Net 1335 ml        PHYSICAL EXAM:  General:          WD, WN. Alert, cooperative, no acute distress    HEENT:           NC, Atraumatic.  PERRLA, EOMI. Anicteric sclerae. Lungs:             CTA Bilaterally. No Wheezing/Rhonchi/Rales. Heart:              Regular  rhythm,  No murmur   Abdomen:        Soft, some distention, generalized tenderness with deep palpation. Bowel sounds present, no palpable masses, gravity drain to abdomen   Extremities:     No edema   Neurologic:      CN 2-12 gi, Alert and oriented X 3.  No acute neurological distress   Psych:             Good insight. Not anxious nor agitated.        Lab Data   Recent Results (from the past 12 hour(s))   CBC WITH AUTOMATED DIFF    Collection Time: 11/20/20  3:00 AM   Result Value Ref Range    WBC 3.7 3.6 - 11.0 K/uL    RBC 2.69 (L) 3.80 - 5.20 M/uL    HGB 7.7 (L) 11.5 - 16.0 g/dL    HCT 26.4 (L) 35.0 - 47.0 %    MCV 98.1 80.0 - 99.0 FL    MCH 28.6 26.0 - 34.0 PG    MCHC 29.2 (L) 30.0 - 36.5 g/dL    RDW 18.4 (H) 11.5 - 14.5 %    PLATELET 705 (L) 286 - 400 K/uL    MPV 11.0 8.9 - 12.9 FL    NRBC 0.0 0  WBC    ABSOLUTE NRBC 0.00 0.00 - 0.01 K/uL    NEUTROPHILS 69 32 - 75 %    LYMPHOCYTES 13 12 - 49 %    MONOCYTES 13 5 - 13 %    EOSINOPHILS 3 0 - 7 %    BASOPHILS 1 0 - 1 %    IMMATURE GRANULOCYTES 1 (H) 0.0 - 0.5 %    ABS. NEUTROPHILS 2.6 1.8 - 8.0 K/UL    ABS. LYMPHOCYTES 0.5 (L) 0.8 - 3.5 K/UL    ABS. MONOCYTES 0.5 0.0 - 1.0 K/UL    ABS. EOSINOPHILS 0.1 0.0 - 0.4 K/UL    ABS. BASOPHILS 0.0 0.0 - 0.1 K/UL    ABS. IMM. GRANS. 0.0 0.00 - 0.04 K/UL    DF SMEAR SCANNED      RBC COMMENTS ANISOCYTOSIS  1+       METABOLIC PANEL, BASIC    Collection Time: 11/20/20  3:00 AM   Result Value Ref Range    Sodium 139 136 - 145 mmol/L    Potassium 5.2 (H) 3.5 - 5.1 mmol/L    Chloride 106 97 - 108 mmol/L    CO2 28 21 - 32 mmol/L    Anion gap 5 5 - 15 mmol/L    Glucose 231 (H) 65 - 100 mg/dL    BUN 33 (H) 6 - 20 MG/DL    Creatinine 4.92 (H) 0.55 - 1.02 MG/DL    BUN/Creatinine ratio 7 (L) 12 - 20      GFR est AA 11 (L) >60 ml/min/1.73m2    GFR est non-AA 9 (L) >60 ml/min/1.73m2    Calcium 8.5 8.5 - 10.1 MG/DL         Medications: Reviewed  Nila Marino NP    I have personally reviewed the history and independently examined the patient. I have reviewed the chart and agree with the documentation recorded by the Mid Level Provider, including the assessment, treatment plan, and disposition. ASSESSMENT AND PLAN:  Obscure GI bleeding. Patient had symptomatic bleeding and drop in hemoglobin. EGD was planned but she initially ate some foods and despite advising not to eat anything more she again ate rice for lunch. Endoscopy was deferred. Would monitor clinical course and labs. Transfuse as necessary and endoscopy if there is recurrent bleeding.     Zach Aguilera MD

## 2020-11-20 NOTE — PROGRESS NOTES
At 2040, patient was c/o right abd pain where drain is. Prn Roxicodone and Tylenol given. At 2215, patient said she started to have severe right flank pain and right abd pain. Vital signs stable  Rachel Fernandez NP notified, he said to give her one time dose of 5mg Roxicodone, and he would be up to assess patient.

## 2020-11-21 ENCOUNTER — APPOINTMENT (OUTPATIENT)
Dept: NON INVASIVE DIAGNOSTICS | Age: 70
DRG: 377 | End: 2020-11-21
Attending: HOSPITALIST
Payer: MEDICARE

## 2020-11-21 LAB
ALBUMIN SERPL-MCNC: 2.6 G/DL (ref 3.5–5)
ALBUMIN/GLOB SERPL: 0.8 {RATIO} (ref 1.1–2.2)
ALP SERPL-CCNC: 157 U/L (ref 45–117)
ALT SERPL-CCNC: 14 U/L (ref 12–78)
ANION GAP SERPL CALC-SCNC: 8 MMOL/L (ref 5–15)
AST SERPL-CCNC: 11 U/L (ref 15–37)
BASOPHILS # BLD: 0 K/UL (ref 0–0.1)
BASOPHILS NFR BLD: 1 % (ref 0–1)
BILIRUB SERPL-MCNC: 0.3 MG/DL (ref 0.2–1)
BUN SERPL-MCNC: 47 MG/DL (ref 6–20)
BUN/CREAT SERPL: 8 (ref 12–20)
CALCIUM SERPL-MCNC: 9.1 MG/DL (ref 8.5–10.1)
CHLORIDE SERPL-SCNC: 105 MMOL/L (ref 97–108)
CO2 SERPL-SCNC: 27 MMOL/L (ref 21–32)
CREAT SERPL-MCNC: 6.05 MG/DL (ref 0.55–1.02)
DIFFERENTIAL METHOD BLD: ABNORMAL
ECHO AO ROOT DIAM: 2.55 CM
ECHO AV AREA PEAK VELOCITY: 1.86 CM2
ECHO AV AREA/BSA PEAK VELOCITY: 1.1 CM2/M2
ECHO AV PEAK GRADIENT: 8.42 MMHG
ECHO AV PEAK VELOCITY: 145.1 CM/S
ECHO EST RA PRESSURE: 15 MMHG
ECHO LA AREA 4C: 24.83 CM2
ECHO LA MAJOR AXIS: 4.67 CM
ECHO LA MINOR AXIS: 2.64 CM
ECHO LA VOL 2C: 109.77 ML (ref 22–52)
ECHO LA VOL 4C: 75.4 ML (ref 22–52)
ECHO LA VOL BP: 95.39 ML (ref 22–52)
ECHO LA VOL/BSA BIPLANE: 53.95 ML/M2 (ref 16–28)
ECHO LA VOLUME INDEX A2C: 62.08 ML/M2 (ref 16–28)
ECHO LA VOLUME INDEX A4C: 42.64 ML/M2 (ref 16–28)
ECHO LV EDV A2C: 200.66 ML
ECHO LV EDV A4C: 165.94 ML
ECHO LV EDV BP: 184.57 ML (ref 56–104)
ECHO LV EDV INDEX A4C: 93.8 ML/M2
ECHO LV EDV INDEX BP: 104.4 ML/M2
ECHO LV EDV NDEX A2C: 113.5 ML/M2
ECHO LV EJECTION FRACTION A2C: 53 PERCENT
ECHO LV EJECTION FRACTION A4C: 47 PERCENT
ECHO LV EJECTION FRACTION BIPLANE: 48 PERCENT (ref 55–100)
ECHO LV ESV A2C: 94.25 ML
ECHO LV ESV A4C: 88.46 ML
ECHO LV ESV BP: 96 ML (ref 19–49)
ECHO LV ESV INDEX A2C: 53.3 ML/M2
ECHO LV ESV INDEX A4C: 50 ML/M2
ECHO LV ESV INDEX BP: 54.3 ML/M2
ECHO LV INTERNAL DIMENSION DIASTOLIC: 6.16 CM (ref 3.9–5.3)
ECHO LV INTERNAL DIMENSION SYSTOLIC: 4.67 CM
ECHO LV IVSD: 1.29 CM (ref 0.6–0.9)
ECHO LV MASS 2D: 373.1 G (ref 67–162)
ECHO LV MASS INDEX 2D: 211 G/M2 (ref 43–95)
ECHO LV POSTERIOR WALL DIASTOLIC: 1.35 CM (ref 0.6–0.9)
ECHO LVOT DIAM: 2 CM
ECHO LVOT PEAK GRADIENT: 2.98 MMHG
ECHO LVOT PEAK VELOCITY: 86.32 CM/S
ECHO MV A VELOCITY: 111.95 CM/S
ECHO MV AREA PHT: 3.51 CM2
ECHO MV AREA PHT: 3.59 CM2
ECHO MV AREA PLAN: 1.94 CM2
ECHO MV E DECELERATION TIME (DT): 211.19 MS
ECHO MV E VELOCITY: 135.68 CM/S
ECHO MV E/A RATIO: 1.21
ECHO MV MAX VELOCITY: 142.34 CM/S
ECHO MV MEAN GRADIENT: 4.07 MMHG
ECHO MV PEAK GRADIENT: 8.1 MMHG
ECHO MV PRESSURE HALF TIME (PHT): 61.24 MS
ECHO MV PRESSURE HALF TIME (PHT): 62.69 MS
ECHO MV VTI: 34.17 CM
ECHO PV PEAK INSTANTANEOUS GRADIENT SYSTOLIC: 5.94 MMHG
ECHO PV REGURGITANT MAX VELOCITY: 156.44 CM/S
ECHO RIGHT VENTRICULAR SYSTOLIC PRESSURE (RVSP): 78.93 MMHG
ECHO RV INTERNAL DIMENSION: 4.28 CM
ECHO RV TAPSE: 1.56 CM (ref 1.5–2)
ECHO TV REGURGITANT MAX VELOCITY: 399.79 CM/S
ECHO TV REGURGITANT PEAK GRADIENT: 63.93 MMHG
EOSINOPHIL # BLD: 0.2 K/UL (ref 0–0.4)
EOSINOPHIL NFR BLD: 5 % (ref 0–7)
ERYTHROCYTE [DISTWIDTH] IN BLOOD BY AUTOMATED COUNT: 18.6 % (ref 11.5–14.5)
GLOBULIN SER CALC-MCNC: 3.2 G/DL (ref 2–4)
GLUCOSE SERPL-MCNC: 150 MG/DL (ref 65–100)
HCT VFR BLD AUTO: 26.9 % (ref 35–47)
HGB BLD-MCNC: 7.8 G/DL (ref 11.5–16)
IMM GRANULOCYTES # BLD AUTO: 0 K/UL (ref 0–0.04)
IMM GRANULOCYTES NFR BLD AUTO: 1 % (ref 0–0.5)
LYMPHOCYTES # BLD: 0.6 K/UL (ref 0.8–3.5)
LYMPHOCYTES NFR BLD: 15 % (ref 12–49)
MCH RBC QN AUTO: 28.3 PG (ref 26–34)
MCHC RBC AUTO-ENTMCNC: 29 G/DL (ref 30–36.5)
MCV RBC AUTO: 97.5 FL (ref 80–99)
MONOCYTES # BLD: 0.5 K/UL (ref 0–1)
MONOCYTES NFR BLD: 12 % (ref 5–13)
NEUTS SEG # BLD: 3 K/UL (ref 1.8–8)
NEUTS SEG NFR BLD: 66 % (ref 32–75)
NRBC # BLD: 0 K/UL (ref 0–0.01)
NRBC BLD-RTO: 0 PER 100 WBC
PLATELET # BLD AUTO: 123 K/UL (ref 150–400)
PMV BLD AUTO: 10.5 FL (ref 8.9–12.9)
POTASSIUM SERPL-SCNC: 5.6 MMOL/L (ref 3.5–5.1)
PROT SERPL-MCNC: 5.8 G/DL (ref 6.4–8.2)
RBC # BLD AUTO: 2.76 M/UL (ref 3.8–5.2)
RBC MORPH BLD: ABNORMAL
RBC MORPH BLD: ABNORMAL
SODIUM SERPL-SCNC: 140 MMOL/L (ref 136–145)
WBC # BLD AUTO: 4.3 K/UL (ref 3.6–11)

## 2020-11-21 PROCEDURE — 74011250637 HC RX REV CODE- 250/637: Performed by: HOSPITALIST

## 2020-11-21 PROCEDURE — 74011250636 HC RX REV CODE- 250/636: Performed by: INTERNAL MEDICINE

## 2020-11-21 PROCEDURE — 93306 TTE W/DOPPLER COMPLETE: CPT

## 2020-11-21 PROCEDURE — 74011250637 HC RX REV CODE- 250/637: Performed by: NURSE PRACTITIONER

## 2020-11-21 PROCEDURE — 65660000000 HC RM CCU STEPDOWN

## 2020-11-21 PROCEDURE — 90935 HEMODIALYSIS ONE EVALUATION: CPT

## 2020-11-21 PROCEDURE — 85025 COMPLETE CBC W/AUTO DIFF WBC: CPT

## 2020-11-21 PROCEDURE — 36415 COLL VENOUS BLD VENIPUNCTURE: CPT

## 2020-11-21 PROCEDURE — 99223 1ST HOSP IP/OBS HIGH 75: CPT | Performed by: INTERNAL MEDICINE

## 2020-11-21 PROCEDURE — 74011250637 HC RX REV CODE- 250/637: Performed by: INTERNAL MEDICINE

## 2020-11-21 PROCEDURE — 80053 COMPREHEN METABOLIC PANEL: CPT

## 2020-11-21 RX ADMIN — EPOETIN ALFA-EPBX 20000 UNITS: 10000 INJECTION, SOLUTION INTRAVENOUS; SUBCUTANEOUS at 21:06

## 2020-11-21 RX ADMIN — FERROUS SULFATE TAB 325 MG (65 MG ELEMENTAL FE) 325 MG: 325 (65 FE) TAB at 12:38

## 2020-11-21 RX ADMIN — SEVELAMER CARBONATE 1600 MG: 800 TABLET, FILM COATED ORAL at 12:38

## 2020-11-21 RX ADMIN — SEVELAMER CARBONATE 1600 MG: 800 TABLET, FILM COATED ORAL at 19:03

## 2020-11-21 RX ADMIN — KETOTIFEN FUMARATE 1 DROP: 0.35 SOLUTION/ DROPS OPHTHALMIC at 08:55

## 2020-11-21 RX ADMIN — KETOTIFEN FUMARATE 1 DROP: 0.35 SOLUTION/ DROPS OPHTHALMIC at 18:00

## 2020-11-21 RX ADMIN — CARBOXYMETHYLCELLULOSE SODIUM 1 DROP: 5 SOLUTION/ DROPS OPHTHALMIC at 01:00

## 2020-11-21 RX ADMIN — OXYCODONE HYDROCHLORIDE 5 MG: 5 TABLET ORAL at 09:26

## 2020-11-21 RX ADMIN — OXYCODONE HYDROCHLORIDE AND ACETAMINOPHEN 500 MG: 500 TABLET ORAL at 08:54

## 2020-11-21 RX ADMIN — FERROUS SULFATE TAB 325 MG (65 MG ELEMENTAL FE) 325 MG: 325 (65 FE) TAB at 08:54

## 2020-11-21 RX ADMIN — CARBOXYMETHYLCELLULOSE SODIUM 1 DROP: 5 SOLUTION/ DROPS OPHTHALMIC at 21:51

## 2020-11-21 RX ADMIN — OXYCODONE HYDROCHLORIDE 5 MG: 5 TABLET ORAL at 21:06

## 2020-11-21 RX ADMIN — OXYCODONE HYDROCHLORIDE 5 MG: 5 TABLET ORAL at 13:56

## 2020-11-21 RX ADMIN — SEVELAMER CARBONATE 1600 MG: 800 TABLET, FILM COATED ORAL at 08:54

## 2020-11-21 RX ADMIN — Medication 2 TABLET: at 08:54

## 2020-11-21 RX ADMIN — OXYCODONE HYDROCHLORIDE 5 MG: 5 TABLET ORAL at 05:33

## 2020-11-21 RX ADMIN — PANTOPRAZOLE SODIUM 40 MG: 40 TABLET, DELAYED RELEASE ORAL at 19:03

## 2020-11-21 RX ADMIN — PANTOPRAZOLE SODIUM 40 MG: 40 TABLET, DELAYED RELEASE ORAL at 08:54

## 2020-11-21 RX ADMIN — FERROUS SULFATE TAB 325 MG (65 MG ELEMENTAL FE) 325 MG: 325 (65 FE) TAB at 19:03

## 2020-11-21 NOTE — PROGRESS NOTES
Problem: Falls - Risk of  Goal: *Absence of Falls  Description: Document Naeem Montes De Oca Fall Risk and appropriate interventions in the flowsheet. Outcome: Progressing Towards Goal  Note: Fall Risk Interventions:  Mobility Interventions: Communicate number of staff needed for ambulation/transfer, OT consult for ADLs, Patient to call before getting OOB, PT Consult for mobility concerns, Utilize walker, cane, or other assistive device, PT Consult for assist device competence    Mentation Interventions: Door open when patient unattended, Adequate sleep, hydration, pain control, Evaluate medications/consider consulting pharmacy, Increase mobility, More frequent rounding, Toileting rounds    Medication Interventions: Evaluate medications/consider consulting pharmacy, Patient to call before getting OOB, Teach patient to arise slowly    Elimination Interventions: Call light in reach, Elevated toilet seat, Patient to call for help with toileting needs, Stay With Me (per policy), Toilet paper/wipes in reach, Toileting schedule/hourly rounds              Problem: Pain  Goal: *Control of Pain  Outcome: Progressing Towards Goal     Problem: Pressure Injury - Risk of  Goal: *Prevention of pressure injury  Description: Document Vic Scale and appropriate interventions in the flowsheet. Outcome: Progressing Towards Goal  Note: Pressure Injury Interventions:  Sensory Interventions: Assess need for specialty bed, Chair cushion, Check visual cues for pain, Float heels, Discuss PT/OT consult with provider, Keep linens dry and wrinkle-free, Maintain/enhance activity level, Minimize linen layers, Monitor skin under medical devices, Pad between skin to skin, Turn and reposition approx.  every two hours (pillows and wedges if needed)    Moisture Interventions: Apply protective barrier, creams and emollients, Check for incontinence Q2 hours and as needed, Internal/External urinary devices, Limit adult briefs, Maintain skin hydration (lotion/cream), Minimize layers    Activity Interventions: Chair cushion, Increase time out of bed, Pressure redistribution bed/mattress(bed type), PT/OT evaluation    Mobility Interventions: Chair cushion, Float heels, HOB 30 degrees or less, Pressure redistribution bed/mattress (bed type), PT/OT evaluation, Turn and reposition approx.  every two hours(pillow and wedges)    Nutrition Interventions: Document food/fluid/supplement intake    Friction and Shear Interventions: Feet elevated on foot rest, HOB 30 degrees or less, Lift sheet, Minimize layers                Problem: Heart Failure: Discharge Outcomes  Goal: *Demonstrates ability to perform prescribed activity without shortness of breath or discomfort  Outcome: Progressing Towards Goal  Goal: *Left ventricular function assessment completed prior to or during stay, or planned for post-discharge  Outcome: Progressing Towards Goal  Goal: *ACEI prescribed if LVEF less than 40% and no contraindications or ARB prescribed  Outcome: Progressing Towards Goal  Goal: *Verbalizes understanding and describes prescribed diet  Outcome: Progressing Towards Goal     Problem: Acute Renal Failure: Day 6  Goal: Activity/Safety  Outcome: Progressing Towards Goal  Goal: Diagnostic Test/Procedures  Outcome: Progressing Towards Goal

## 2020-11-21 NOTE — PROGRESS NOTES
Bedside and Verbal shift change report given to [place] (oncoming nurse) by Anna Vasquez RN (offgoing nurse). Report included the following information SBAR, Kardex, ED Summary, Intake/Output, MAR, Cardiac Rhythm NSR and Dual Neuro Assessment.

## 2020-11-21 NOTE — PROGRESS NOTES
[address] Adult  Hospitalist Group                                                                                          Hospitalist Progress Note  Shania Lynn MD  Answering service: [phone] from in house phone        Date of Service:  2020  NAME:  Mari Jarvis  :  1950  MRN:  [id number]      Admission Summary:   69 y.o PMH of AVM,ESRD,anemia admitted due to anemia and hyperkalemia    Interval history / Subjective:   Patient seen and examined at [id number]/19. Feels ok now. Minimal outpt in the drain - will remove. EGD cancelled yesterday as pt ate her lunch and also she is not interested in getting another EGD. Explained in detail regarding stable hb and need for paracentesis prn. She understood. Ok to discharge after HD today but HD at 3pm, possible dc tomorrow   Discussed with nursing      Assessment & Plan:      #Acute on chronic anemia:  -multifactorial - hx AVMs,CKD  -Gastroenterology following  -recent EGD 10/21: mild gastritis with some friable mucosa and oozing-treated with APC and also capsule study performed with presence of small non-bleeding AVM's in small intestine. Patient reports [facility] and underwent EGD, enteroscopy, and colonoscopy- all with no findings. -s/p 1 U PRBC ,   - hb low stable. monitor hb, transfuse <7  - PPI BID     #Hyperkalemia-resolved  #ESRD on HD TTS  -nephrology following  - HD today     #Liver cirrhosis with Ascites :  -hx recurrent ascites with paracentesis . Not on any diuretics at home as pt is anuric   -USG abd showed ascites. The liver echotexture is normal  -echo pending  - Paracentesis drain placed on .  Fluid amount not charted  - fluid analysis shows transudate   - Hepatology consult pending     Chronic pain- c/w home oxy prn    Code status: full  DVT prophylaxis: scd    Care Plan discussed with: Patient/Family and Nurse  Anticipated Disposition: Home w/Family  Anticipated Discharge: Less than 24 hours     Hospital Problems  Date Reviewed: 10/6/2020          Codes Class Noted POA    Anemia ICD-10-CM: D64.9  ICD-9-CM: 285.9  9/17/2020 Unknown                Review of Systems:   As per HPI    Vital Signs:    Last 24hrs VS reviewed since prior progress note. Most recent are:  Visit Vitals  BP (!) 140/46 (BP 1 Location: Left arm, BP Patient Position: At rest)   Pulse 85   Temp 97.3 °F (36.3 °C)   Resp 20   Ht 5' 3\" (1.6 m)   Wt 73.5 kg (162 lb)   SpO2 97%   BMI 28.70 kg/m²         Intake/Output Summary (Last 24 hours) at 11/21/2020 1431  Last data filed at 11/21/2020 1200  Gross per 24 hour   Intake --   Output 20 ml   Net -20 ml        Physical Examination:     I had a face to face encounter with this patient and independently examined them on 11/21/2020 as outlined below:          Constitutional:  No acute distress, elderly patient   ENT:  Oral mucosa moist, EOMI    Resp:  CTA bilaterally. No wheezing/rhonchi/rales. No accessory muscle use   CV:  Regular rhythm, normal rate, no murmurs, gallops, rubs    GI:  Soft, distended, non tender. normoactive bowel sounds    Musculoskeletal:  No LE edema    Neurologic:  Moves all extremities. AAOx3     Psych:  not anxious nor agitated. Data Review:    Review and/or order of clinical lab test  Review and/or order of tests in the radiology section of CPT  Review and/or order of tests in the medicine section of CPT      Labs:     Recent Labs     11/21/20 0200 11/20/20  0300   WBC 4.3 3.7   HGB 7.8* 7.7*   HCT 26.9* 26.4*   * 129*     Recent Labs     11/21/20 0200 11/20/20  0300    139   K 5.6* 5.2*    106   CO2 27 28   BUN 47* 33*   CREA 6.05* 4.92*   * 231*   CA 9.1 8.5     Recent Labs     11/21/20  0200   ALT 14   *   TBILI 0.3   TP 5.8*   ALB 2.6*   GLOB 3.2     No results for input(s): INR, PTP, APTT, INREXT, INREXT in the last 72 hours. No results for input(s): FE, TIBC, PSAT, FERR in the last 72 hours.    Lab Results   Component Value Date/Time    Folate 16.4 10/19/2020 09:34 AM      No results for input(s): PH, PCO2, PO2 in the last 72 hours. No results for input(s): CPK, CKNDX, TROIQ in the last 72 hours.     No lab exists for component: CPKMB  No results found for: CHOL, CHOLX, CHLST, CHOLV, HDL, HDLP, LDL, LDLC, DLDLP, TGLX, TRIGL, TRIGP, CHHD, CHHDX  Lab Results   Component Value Date/Time    Glucose (POC) 122 (H) 11/18/2020 01:10 PM    Glucose (POC) 201 (H) 10/25/2020 11:59 AM    Glucose (POC) 186 (H) 10/25/2020 06:07 AM    Glucose (POC) 173 (H) 10/24/2020 09:12 PM    Glucose (POC) 144 (H) 10/24/2020 06:57 PM     No results found for: COLOR, APPRN, SPGRU, REFSG, VERENA, PROTU, GLUCU, KETU, BILU, UROU, TIBURCIO, LEUKU, GLUKE, EPSU, BACTU, WBCU, RBCU, CASTS, UCRY      Medications Reviewed:     Current Facility-Administered Medications   Medication Dose Route Frequency    albuterol-ipratropium (DUO-NEB) 2.5 MG-0.5 MG/3 ML  3 mL Nebulization Q4H PRN    alum-mag hydroxide-simeth (MYLANTA) oral suspension 30 mL  30 mL Oral Q4H PRN    ascorbic acid (vitamin C) (VITAMIN C) tablet 500 mg  500 mg Oral DAILY    cholecalciferol (VITAMIN D3) (1000 Units /25 mcg) tablet 2 Tab  2,000 Units Oral DAILY    ferrous sulfate tablet 325 mg  325 mg Oral TID WITH MEALS    ketotifen (ZADITOR) 0.025 % (0.035 %) ophthalmic solution 1 Drop  1 Drop Both Eyes BID    carboxymethylcellulose sodium (REFRESH PLUS) 0.5 % ophthalmic solution 1-2 Drop  1-2 Drop Both Eyes TID    prochlorperazine (COMPAZINE) with saline injection 10 mg  10 mg IntraVENous Q6H PRN    morphine injection 2 mg  2 mg IntraVENous Q4H PRN    0.9% sodium chloride infusion 250 mL  250 mL IntraVENous PRN    epoetin reshma-epbx (RETACRIT) injection 20,000 Units  20,000 Units SubCUTAneous Q TUE, THU & SAT    0.9% sodium chloride infusion 250 mL  250 mL IntraVENous PRN    pantoprazole (PROTONIX) tablet 40 mg  40 mg Oral BID    sevelamer carbonate (RENVELA) tab 1,600 mg  1,600 mg Oral TID WITH MEALS    acetaminophen (TYLENOL) tablet 650 mg  650 mg Oral Q4H PRN    oxyCODONE IR (ROXICODONE) tablet 5 mg  5 mg Oral Q4H PRN     ______________________________________________________________________  EXPECTED LENGTH OF STAY: 3d 14h  ACTUAL LENGTH OF STAY:          2                 Umu Ricks MD

## 2020-11-21 NOTE — CONSULTS
Dann Prader, MD, Harley Child MD, MPH      CAROLYN Green, [facility]     Maria R Will, [facility]   Joy Richardson, GLADIS-JUDIE La, [facility]       Ozzy Deputado [address]    at [address], [address] Sophy Mendes  22.    [phone]    FAX: [address] [place] [address], [address]    [phone]    FAX: [phone]       HEPATOLOGY CONSULT NOTE  I was asked to see this patient in consultation by Dr Mortimer Leander for management of cirrhosis. I have reviewed the Emergency room note, Hospital admission note, Notes by all other physicians who have seen the patient during this hospitalization to date. I have reviewed the problem list and the reason for this hospitalization. I have reviewed the allergies and the medications the patient was taking at home prior to this hospitalization. HISTORY:  The patient is a 69year old black female who developed with ESRD on dialysis. She developed ascites for the first time in 1/2020 and has required paracentesis every 1 month. She gets her care regularly at [facility]. Liver evaluation is not known. US in the ED suggests the liver is normal.  LAbs suggest she has cirrhosis with , and low albumin    She was hospitalized for weakness and anemia. Anemia has apparently been chronic and wasevaluated with EGD, colonoscopy and enteroscopy at the [facility]. She has refused EGD here. She was transfused and is getting dialysis. ASSESSMENT AND PLAN:  Ascites   Ascites developed for the first time in 1/2020. Ascites is refractory because of ESRD.   She requires paracentesis every month   US suggests the liver appears normal.  Labs suggest she may have cirrhosis    ECHO was performed earlier today. Results are pending. Ascites is either due to cirrhosis and/or passive hepatic congestion due to ESRD and TR. Will plan for hepatic veneous pressure meaursements and tranasjugular liver biopsy on Monday. Await results of ECHO. However, would proceed with this regardless of ECHO findings. Paracentesis was performed on admission. Cell count was negative for SBP. SAAG was 2.8-1.8 = 0.9    Cannot treat with diuretics because of ESRD  Cannot use IV albumin because of ESRD    Screening for Esophageal varices   It is unlikely the patient has esophageal varices. She has been evaluated for GI bleeding by [facility] with multiple procedures. Anemia   This is due to multifactorial causes including ESRD on dialysis  FE studies from 10/2020 demonstrated Ferritin 30 and FE saturation of 33%. Thrombocytopenia   This is likely secondary to cirrhosis. There is no evidence of overt bleeding. No treatment is required. SYSTEM REVIEW:  Constitution systems: Negative for fever, chills, weight gain, weight loss. Eyes: Negative for visual changes. ENT: Negative for sore throat, painful swallowing. Respiratory: Negative for cough, hemoptysis, SOB. Cardiology: Negative for chest pain, palpitations. GI:  Negative for constipation or diarrhea. : Negative for urinary frequency, dysuria, hematuria, nocturia. Skin: Negative for rash. Hematology: Negative for easy bruising, blood clots. Musculo-skelatal: Negative for back pain, muscle pain, weakness. Neurologic: Negative for headaches, dizziness, vertigo, memory problems not related to HE. Psychology: Negative for anxiety, depression. FAMILY HISTORY:  The father  of assault. The mother  of unknown cause. There is no family history of liver disease. SOCIAL HISTORY:  The patient is . The patient has 8 children, and 23 grandchildren.     The patient has never used tobacco products. The patient has never consumed significant amounts of alcohol. The patient was in the [employer] for 23 year and exited as a [address]. She was stationed at [employer] in [address]. PHYSICAL EXAMINATION:  VS: per nursing note  General:  No acute distress. Eyes:  Sclera anicteric. ENT:  No oral lesions. Thyroid normal.  Nodes:  No adenopathy. Skin:  No spider angiomata. Respiratory:  Lungs clear to auscultation. Cardiovascular:  Regular heart rate. Abdomen:  Distended with some ascites. Extremities:  No lower extremity edema. Neurologic:  Alert and oriented. Cranial nerves grossly intact. No asterixis. LABORATORY:  Results for Cookie Arreaga (MRN [id number]) as of 11/21/2020 16:44   Ref. Range 11/17/2020 10:08 11/18/2020 02:40 11/20/2020 03:00 11/21/2020 02:00   WBC Latest Ref Range: 3.6 - 11.0 K/uL 4.6  3.7 4.3   HGB Latest Ref Range: 11.5 - 16.0 g/dL 6.4 (L) 7.0 (L) 7.7 (L) 7.8 (L)   PLATELET Latest Ref Range: 150 - 400 K/uL 225  129 (L) 123 (L)   Sodium Latest Ref Range: 136 - 145 mmol/L 140 139 139 140   Potassium Latest Ref Range: 3.5 - 5.1 mmol/L 6.1 (H) 4.3 5.2 (H) 5.6 (H)   Chloride Latest Ref Range: 97 - 108 mmol/L 105 105 106 105   CO2 Latest Ref Range: 21 - 32 mmol/L 26 28 28 27   Glucose Latest Ref Range: 65 - 100 mg/dL 129 (H) 132 (H) 231 (H) 150 (H)   BUN Latest Ref Range: 6 - 20 MG/DL 77 (H) 37 (H) 33 (H) 47 (H)   Creatinine Latest Ref Range: 0.55 - 1.02 MG/DL 9.96 (H) 5.21 (H) 4.92 (H) 6.05 (H)   Bilirubin, total Latest Ref Range: 0.2 - 1.0 MG/DL 0.4   0.3   Albumin Latest Ref Range: 3.5 - 5.0 g/dL 2.9 (L) 2.8 (L)  2.6 (L)   ALT Latest Ref Range: 12 - 78 U/L 16   14   AST Latest Ref Range: 15 - 37 U/L 17   11 (L)   Alk. phosphatase Latest Ref Range: 45 - 117 U/L 151 (H)   157 (H)       RADIOLOGY:  11/2020. Ultrasound of liver. Normal appearing liver. No liver mass lesions.   Large amount of ascites      MD Katalina GuidoUPMC Western [facility] [address] [facility].  [facility]

## 2020-11-21 NOTE — PROGRESS NOTES
Problem: Falls - Risk of  Goal: *Absence of Falls  Description: Document Kory Edwards Fall Risk and appropriate interventions in the flowsheet.   Outcome: Progressing Towards Goal  Note: Fall Risk Interventions:  Mobility Interventions: Communicate number of staff needed for ambulation/transfer, Patient to call before getting OOB    Mentation Interventions: Adequate sleep, hydration, pain control    Medication Interventions: Evaluate medications/consider consulting pharmacy, Patient to call before getting OOB    Elimination Interventions: Call light in reach              Problem: Heart Failure: Discharge Outcomes  Goal: *Verbalizes understanding and describes prescribed diet  Outcome: Progressing Towards Goal     Problem: Acute Renal Failure: Discharge Outcomes  Goal: *Hemodynamically stable  Outcome: Progressing Towards Goal

## 2020-11-21 NOTE — PROGRESS NOTES
Bedside shift change report given to Ban RN (oncoming nurse) by Ingrid Marcelino (offgoing nurse). Report included the following information SBAR, Kardex, Intake/Output, MAR, Recent Results and Quality Measures.

## 2020-11-21 NOTE — PROGRESS NOTES
[address], [address] [place] [address]    GI PROGRESS NOTE    NAME: Maricel Serrano   :  1950   MRN:  [id number]       Subjective:     No new complaints, s/p paracentesis, does not want EGD, she had it done few weeks ago at the [place]  Review of Systems    Constitutional: negative fever, negative chills, negative weight loss  Eyes:   negative visual changes  ENT:   negative sore throat, tongue or lip swelling  Respiratory:  negative cough, negative dyspnea  Cards:  negative for chest pain, palpitations, lower extremity edema  GI:   See HPI  :  negative for frequency, dysuria  Integument:  negative for rash and pruritus  Heme:  negative for easy bruising and gum/nose bleeding  Musculoskel: negative for myalgias,  back pain and muscle weakness  Neuro: negative for headaches, dizziness, vertigo  Psych:  negative for feelings of anxiety, depression         Objective:     VITALS:   Last 24hrs VS reviewed since prior progress note. Most recent are:  Visit Vitals  BP (!) 135/50   Pulse 90   Temp 98.7 °F (37.1 °C)   Resp 19   Ht 5' 3\" (1.6 m)   Wt 73.5 kg (162 lb)   SpO2 97%   BMI 28.70 kg/m²       Intake/Output Summary (Last 24 hours) at 2020 1346  Last data filed at 2020 1200  Gross per 24 hour   Intake --   Output 20 ml   Net -20 ml     PHYSICAL EXAM:  General: WD, WN. Alert, cooperative, no acute distress    HEENT: NC, Atraumatic. PERRLA, EOMI. Anicteric sclerae. Lungs:  CTA Bilaterally. No Wheezing/Rhonchi/Rales. Heart:  Regular  rhythm,  No murmur (), No Rubs, No Gallops  Abdomen: Soft, Non distended, Non tender.  +Bowel sounds, no HSM  Extremities: No c/c/e  Neurologic:  CN 2-12 gi, Alert and oriented X 3. No acute neurological distress   Psych:   Good insight. Not anxious nor agitated.     Lab Data Reviewed:   Recent Labs     20  0200 20  0300   WBC 4.3 3.7   HGB 7.8* 7.7*   HCT 26.9* 26.4*   * 129*     Recent Labs     20  0200 11/20/20  0300    139   K 5.6* 5.2*    106   CO2 27 28   BUN 47* 33*   CREA 6.05* 4.92*   * 231*   CA 9.1 8.5     Recent Labs     11/21/20  0200   *   TP 5.8*   ALB 2.6*   GLOB 3.2       ________________________________________________________________________       Assessment:   · Anemia  · ascites     Patient Active Problem List   Diagnosis Code    GI bleed K92.2    Diabetes mellitus type 2, controlled ([facility] E11.9    Anemia in chronic kidney disease N18.9, D63.1    Cirrhosis (HCC) K74.60    Acute blood loss anemia D62    Dizziness R42    Generalized weakness R53.1    Rectal bleed K62.5    Symptomatic anemia D64.9    Severe anemia D64.9    Anemia D64.9    Dependence on renal dialysis (HCC) Z99.2    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, or end stage renal disease (HCC) I13.2    Other ascites R18.8    Other chronic pain G89.29    Paroxysmal atrial fibrillation (HCC) I48.0    Type 2 diabetes mellitus with hyperglycemia (HCC) E11.65    Unspecified cirrhosis of liver (HCC) K74.60    Other hypotension I95.89    HTN (hypertension) I10    ESRD (end stage renal disease) (HCC) N18.6    Suspected COVID-19 virus infection Z20.828     Plan:   · Will hold off on EGD since she fry not want it, and no evidence of active bleedig, also negative EGD done few weeks ago at the [place]  · Awaiting hepatology consult  · Dr Roselyn Meraz will follow on monday     Signed By: Liam Orona MD     11/21/2020  1:46 PM

## 2020-11-21 NOTE — PROCEDURES
[facility]  [phone]    Vitals   Pre   Post   Assessment   Pre   Post     Temp  Temp: 98.5 °F (36.9 °C) (11/21/20 1515)  98.4 LOC  Alert and oriented x 4 same   HR   Pulse (Heart Rate): 88 (11/21/20 1800) 81 Lungs   Clear , on 2 liters nasal cannula  same   B/P   BP: (!) 110/39 (11/21/20 1800) 115/42 Cardiac   NSR  same   Resp   Resp Rate: 20 (11/21/20 1800) 20 Skin   Wounds BLE  same   Pain level  Pain Intensity 1: 10 (11/21/20 1345) 0 Edema  Trace, ascites     same   Orders:    Duration:   Start:   2932 End:   1845 Total:   3.5 hours   Dialyzer:   Dialyzer/Set Up Inspection: [place] (11/21/20 1515)   K Bath:   Dialysate K (mEq/L): 3 (11/21/20 1515)   Ca Bath:   Dialysate CA (mEq/L): 2.5 (11/21/20 1515)   Na/Bicarb:   Dialysate NA (mEq/L): 140 (11/21/20 1515)   Target Fluid Removal:   Goal/Amount of Fluid to Remove (mL): 2000 mL (11/21/20 1515)   Access     Type & Location:   RIJ tunneled cvc, no s/s of infection, flushes and aspirated well, , Each catheter limb disinfected per p&p, caps removed, hubs disinfected per p&p.        Labs     Obtained/Reviewed   Critical Results Called   Date when labs were drawn-  Hgb-    HGB   Date Value Ref Range Status   11/21/2020 7.8 (L) 11.5 - 16.0 g/dL Final     K-    Potassium   Date Value Ref Range Status   11/21/2020 5.6 (H) 3.5 - 5.1 mmol/L Final     Ca-   Calcium   Date Value Ref Range Status   11/21/2020 9.1 8.5 - 10.1 MG/DL Final     Bun-   BUN   Date Value Ref Range Status   11/21/2020 47 (H) 6 - 20 MG/DL Final     Creat-   Creatinine   Date Value Ref Range Status   11/21/2020 6.05 (H) 0.55 - 1.02 MG/DL Final        Medications/ Blood Products Given     Name   Dose   Route and Time           none          Blood Volume Processed (BVP):    79.2 Net Fluid   Removed:  5329 ml   Comments   Time Out Done: 4131  Primary Nurse Rpt PreGirsharon Ramos RN  Primary Nurse Rpt Post:QUINTIN Vital  Pt Education: Procedural  Care Plan: Continue Nephrology plan of care  Tx Summary:  0499 52 06 34- Labs, orders and medications were reviewed. K bath switched from 3k to 2k as one time due to k of 5.6. HD was started using RIJ cvc without any issues. 1745- Pt requesting to stop Uf due to abdominal cramping    1845- Hd was completed and all possible blood was returned. Pt removed 1.2kg and Uf was turned off due to abdominal cramping. Primary nurse was given a report. Call bell in reach and bed is in lowest position. Each dialysis catheter limb disinfected per p&p, blood returned per p&p, each dialysis hub disinfected per p&p, post dialysis catheter dwell instilled per order, and caps applied. Admiting Diagnosis:ESRD  Pt's previous clinic- [facility]  Consent signed - Informed Consent Verified: Yes (11/21/20 7795)  [facility]inn Consent - Verified  Hepatitis Status- Hep B AG Negative 11/17/2020, Hep B AB immune 11/17/2020  Machine #- Machine Number: [id number] (11/21/20 9465)  Telemetry status- monitored at bedside  Pre-dialysis wt. - Pre-Dialysis Weight: 79.1 kg (174 lb 6.1 oz) (11/19/20 5085)

## 2020-11-22 LAB
ANION GAP SERPL CALC-SCNC: 5 MMOL/L (ref 5–15)
BASOPHILS # BLD: 0 K/UL (ref 0–0.1)
BASOPHILS NFR BLD: 1 % (ref 0–1)
BUN SERPL-MCNC: 27 MG/DL (ref 6–20)
BUN/CREAT SERPL: 7 (ref 12–20)
CALCIUM SERPL-MCNC: 9.2 MG/DL (ref 8.5–10.1)
CHLORIDE SERPL-SCNC: 106 MMOL/L (ref 97–108)
CO2 SERPL-SCNC: 28 MMOL/L (ref 21–32)
CREAT SERPL-MCNC: 3.72 MG/DL (ref 0.55–1.02)
DIFFERENTIAL METHOD BLD: ABNORMAL
EOSINOPHIL # BLD: 0.2 K/UL (ref 0–0.4)
EOSINOPHIL NFR BLD: 4 % (ref 0–7)
ERYTHROCYTE [DISTWIDTH] IN BLOOD BY AUTOMATED COUNT: 18.2 % (ref 11.5–14.5)
GLUCOSE SERPL-MCNC: 213 MG/DL (ref 65–100)
HCT VFR BLD AUTO: 25.3 % (ref 35–47)
HGB BLD-MCNC: 7.3 G/DL (ref 11.5–16)
IMM GRANULOCYTES # BLD AUTO: 0 K/UL (ref 0–0.04)
IMM GRANULOCYTES NFR BLD AUTO: 1 % (ref 0–0.5)
LYMPHOCYTES # BLD: 0.6 K/UL (ref 0.8–3.5)
LYMPHOCYTES NFR BLD: 12 % (ref 12–49)
MCH RBC QN AUTO: 28 PG (ref 26–34)
MCHC RBC AUTO-ENTMCNC: 28.9 G/DL (ref 30–36.5)
MCV RBC AUTO: 96.9 FL (ref 80–99)
MONOCYTES # BLD: 0.4 K/UL (ref 0–1)
MONOCYTES NFR BLD: 9 % (ref 5–13)
NEUTS SEG # BLD: 3.6 K/UL (ref 1.8–8)
NEUTS SEG NFR BLD: 73 % (ref 32–75)
NRBC # BLD: 0 K/UL (ref 0–0.01)
NRBC BLD-RTO: 0 PER 100 WBC
PLATELET # BLD AUTO: 127 K/UL (ref 150–400)
PMV BLD AUTO: 11 FL (ref 8.9–12.9)
POTASSIUM SERPL-SCNC: 4.5 MMOL/L (ref 3.5–5.1)
RBC # BLD AUTO: 2.61 M/UL (ref 3.8–5.2)
RBC MORPH BLD: ABNORMAL
SODIUM SERPL-SCNC: 139 MMOL/L (ref 136–145)
WBC # BLD AUTO: 4.8 K/UL (ref 3.6–11)

## 2020-11-22 PROCEDURE — 74011250637 HC RX REV CODE- 250/637: Performed by: INTERNAL MEDICINE

## 2020-11-22 PROCEDURE — 80048 BASIC METABOLIC PNL TOTAL CA: CPT

## 2020-11-22 PROCEDURE — 74011250636 HC RX REV CODE- 250/636: Performed by: NURSE PRACTITIONER

## 2020-11-22 PROCEDURE — 74011250637 HC RX REV CODE- 250/637: Performed by: HOSPITALIST

## 2020-11-22 PROCEDURE — 85025 COMPLETE CBC W/AUTO DIFF WBC: CPT

## 2020-11-22 PROCEDURE — 65660000000 HC RM CCU STEPDOWN

## 2020-11-22 PROCEDURE — 99232 SBSQ HOSP IP/OBS MODERATE 35: CPT | Performed by: INTERNAL MEDICINE

## 2020-11-22 PROCEDURE — 74011250637 HC RX REV CODE- 250/637: Performed by: NURSE PRACTITIONER

## 2020-11-22 PROCEDURE — 99223 1ST HOSP IP/OBS HIGH 75: CPT | Performed by: SPECIALIST

## 2020-11-22 PROCEDURE — 36415 COLL VENOUS BLD VENIPUNCTURE: CPT

## 2020-11-22 RX ORDER — DIPHENHYDRAMINE HCL 25 MG
25 CAPSULE ORAL
Status: DISCONTINUED | OUTPATIENT
Start: 2020-11-22 | End: 2020-12-01 | Stop reason: HOSPADM

## 2020-11-22 RX ADMIN — OXYCODONE HYDROCHLORIDE 5 MG: 5 TABLET ORAL at 13:56

## 2020-11-22 RX ADMIN — DIPHENHYDRAMINE HYDROCHLORIDE 25 MG: 25 CAPSULE ORAL at 05:24

## 2020-11-22 RX ADMIN — SEVELAMER CARBONATE 1600 MG: 800 TABLET, FILM COATED ORAL at 12:17

## 2020-11-22 RX ADMIN — SEVELAMER CARBONATE 1600 MG: 800 TABLET, FILM COATED ORAL at 16:28

## 2020-11-22 RX ADMIN — KETOTIFEN FUMARATE 1 DROP: 0.35 SOLUTION/ DROPS OPHTHALMIC at 08:09

## 2020-11-22 RX ADMIN — SEVELAMER CARBONATE 1600 MG: 800 TABLET, FILM COATED ORAL at 08:09

## 2020-11-22 RX ADMIN — OXYCODONE HYDROCHLORIDE 5 MG: 5 TABLET ORAL at 18:10

## 2020-11-22 RX ADMIN — MORPHINE SULFATE 2 MG: 2 INJECTION, SOLUTION INTRAMUSCULAR; INTRAVENOUS at 10:10

## 2020-11-22 RX ADMIN — FERROUS SULFATE TAB 325 MG (65 MG ELEMENTAL FE) 325 MG: 325 (65 FE) TAB at 08:09

## 2020-11-22 RX ADMIN — PANTOPRAZOLE SODIUM 40 MG: 40 TABLET, DELAYED RELEASE ORAL at 17:07

## 2020-11-22 RX ADMIN — OXYCODONE HYDROCHLORIDE 5 MG: 5 TABLET ORAL at 22:10

## 2020-11-22 RX ADMIN — OXYCODONE HYDROCHLORIDE AND ACETAMINOPHEN 500 MG: 500 TABLET ORAL at 08:09

## 2020-11-22 RX ADMIN — CARBOXYMETHYLCELLULOSE SODIUM 1 DROP: 5 SOLUTION/ DROPS OPHTHALMIC at 17:07

## 2020-11-22 RX ADMIN — FERROUS SULFATE TAB 325 MG (65 MG ELEMENTAL FE) 325 MG: 325 (65 FE) TAB at 16:28

## 2020-11-22 RX ADMIN — OXYCODONE HYDROCHLORIDE 5 MG: 5 TABLET ORAL at 08:10

## 2020-11-22 RX ADMIN — FERROUS SULFATE TAB 325 MG (65 MG ELEMENTAL FE) 325 MG: 325 (65 FE) TAB at 12:17

## 2020-11-22 RX ADMIN — KETOTIFEN FUMARATE 1 DROP: 0.35 SOLUTION/ DROPS OPHTHALMIC at 17:08

## 2020-11-22 RX ADMIN — PANTOPRAZOLE SODIUM 40 MG: 40 TABLET, DELAYED RELEASE ORAL at 08:09

## 2020-11-22 RX ADMIN — Medication 2 TABLET: at 08:09

## 2020-11-22 RX ADMIN — OXYCODONE HYDROCHLORIDE 5 MG: 5 TABLET ORAL at 01:12

## 2020-11-22 RX ADMIN — CARBOXYMETHYLCELLULOSE SODIUM 2 DROP: 5 SOLUTION/ DROPS OPHTHALMIC at 21:33

## 2020-11-22 NOTE — PROGRESS NOTES
Problem: Falls - Risk of  Goal: *Absence of Falls  Description: Document [place] Fall Risk and appropriate interventions in the flowsheet. Outcome: Progressing Towards Goal  Note: Fall Risk Interventions:  Mobility Interventions: Patient to call before getting OOB    Mentation Interventions: Adequate sleep, hydration, pain control    Medication Interventions: Evaluate medications/consider consulting pharmacy, Patient to call before getting OOB    Elimination Interventions: Call light in reach, Patient to call for help with toileting needs              Problem: Pain  Goal: *Control of Pain  Outcome: Progressing Towards Goal     Problem: Pressure Injury - Risk of  Goal: *Prevention of pressure injury  Description: Document Vic Scale and appropriate interventions in the flowsheet.   Outcome: Progressing Towards Goal  Note: Pressure Injury Interventions:  Sensory Interventions: Assess changes in LOC, Assess need for specialty bed, Discuss PT/OT consult with provider    Moisture Interventions: Assess need for specialty bed    Activity Interventions: Increase time out of bed, Pressure redistribution bed/mattress(bed type), PT/OT evaluation    Mobility Interventions: Chair cushion, HOB 30 degrees or less, Pressure redistribution bed/mattress (bed type), PT/OT evaluation    Nutrition Interventions: Document food/fluid/supplement intake, Discuss nutritional consult with provider    Friction and Shear Interventions: Lift sheet                Problem: Heart Failure: Day 5  Goal: Activity/Safety  Outcome: Progressing Towards Goal  Goal: Diagnostic Test/Procedures  Outcome: Progressing Towards Goal

## 2020-11-22 NOTE — CONSULTS
11/17/20   ECHO ADULT COMPLETE 11/21/2020 11/21/2020    Narrative · LV: Calculated LVEF is 48%. Normal cavity size. Moderate concentric   hypertrophy. Moderately reduced systolic function. Severe (grade 3) left   ventricular diastolic dysfunction. · LA: Moderately dilated left atrium. · RV: Mildly dilated right ventricle. · RA: Moderately dilated right atrium. · IAS: No atrial septal defect present. · MV: Mild mitral valve regurgitation is present. · TV: Moderate to severe tricuspid valve regurgitation is present. · PV: Mild pulmonic valve regurgitation is present. · IVC: Severely elevated central venous pressure (15+ mmHg); IVC diameter   is larger than 21 mm and collapses less than 50% with respiration. · Pericardium: Small pericardial effusion. Signed by: Ivan Dwyer MD     Date of  Admission: 11/17/2020  5:47 PM     Tasha Churchill is a 69 y.o. female admitted for Anemia [D64.9]; Anemia [D64.9]  Subjective: We are asked to see for cardiac evaluation based on recent echo findings. Patient has history of hypertension and diabetes which apparently went away when she started dialysis about 9 years ago. During her recent admission she had some paroxysmal A. fib but because of chronic anemia requiring transfusion she was not started on any oral anticoagulants. She says she has great difficulty with dialysis. They never can pull a significant amount of fluid offer because of cramping and low blood pressure and following dialysis she is completely of fatigue. The only shortness of breath she really gets is when her hemoglobin gets down below about 7. She says she has had a murmur off and on ever since she was a child though she does not recall ever having had any cardiac work-up. denies chest pain, palpitations, syncope, orthopnea, paroxysmal nocturnal dyspnea, exertional chest pressure/discomfort, claudication.   Cardiac risk factors: diabetes mellitus, hypertension, post-menopausal.    Assessment/Plan: She has fairly well-preserved the ejection fraction and really no symptoms of heart failure. Her inability to tolerate dialysis may well be due to her severe pulmonary hypertension, and the consequences of attempting to remove volume when she is preload dependent. The cause of her pulmonary hypertension is not obvious though it could be from simple chronic volume overload. Nocturnal hypoxemia and/or sleep apnea should be considered considered. However her liver disease may also be a factor. At this point I do not think she needs any further cardiac testing.     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, or end stage renal disease ([address] 10/06/2020    Other ascites 10/06/2020    Other chronic pain 10/06/2020    Paroxysmal atrial fibrillation ([address] 10/06/2020    Type 2 diabetes mellitus with hyperglycemia ([address] 10/06/2020    Unspecified cirrhosis of liver ([address] 10/06/2020    Other hypotension 10/06/2020    Anemia 09/17/2020    Severe anemia 05/02/2018    Dizziness 03/16/2018    Generalized weakness 03/16/2018    Rectal bleed 03/16/2018    Symptomatic anemia 03/16/2018    Acute blood loss anemia 03/09/2018    Cirrhosis ([address] 12/17/2017    Anemia in chronic kidney disease 12/16/2017    GI bleed 12/15/2017    Diabetes mellitus type 2, controlled ([address] 12/15/2017      None  Past Medical History:   Diagnosis Date    Arthritis     Asthma     Chronic kidney disease     Chronic pain     Cirrhosis ([address] 12/17/2017    Diabetes ([address] diet controlled    GERD (gastroesophageal reflux disease)     Hypertension     Paroxysmal atrial fibrillation ([address] 10/6/2020 Past Surgical History:   Procedure Laterality Date    COLONOSCOPY N/A 1/12/2018    COLONOSCOPY performed by Dave Travis MD at [facility] ENDOSCOPY    COLONOSCOPY N/A 3/19/2018    COLONOSCOPY performed by Dave Travis MD at [address] HX GYN  c section    HX VASCULAR ACCESS      dialysis     IR PARACENTESIS ABD W IMAGE  10/22/2020     Allergies   Allergen Reactions    Aleve [Naproxen Sodium] Itching, Swelling and Other (comments)    Amlodipine Rash, Hives and Itching    Aspirin Other (comments), Nausea Only and Unknown (comments)     GI bleed  GI bleeding      Heparin Unknown (comments)     bleeding      Ibuprofen Nausea and Vomiting    Metformin Other (comments)     swelling      Family History   Family history unknown: Yes      Current Facility-Administered Medications   Medication Dose Route Frequency    diphenhydrAMINE (BENADRYL) capsule 25 mg  25 mg Oral Q6H PRN    albuterol-ipratropium (DUO-NEB) 2.5 MG-0.5 MG/3 ML  3 mL Nebulization Q4H PRN    alum-mag hydroxide-simeth (MYLANTA) oral suspension 30 mL  30 mL Oral Q4H PRN    ascorbic acid (vitamin C) (VITAMIN C) tablet 500 mg  500 mg Oral DAILY    cholecalciferol (VITAMIN D3) (1000 Units /25 mcg) tablet 2 Tab  2,000 Units Oral DAILY    ferrous sulfate tablet 325 mg  325 mg Oral TID WITH MEALS    ketotifen (ZADITOR) 0.025 % (0.035 %) ophthalmic solution 1 Drop  1 Drop Both Eyes BID    carboxymethylcellulose sodium (REFRESH PLUS) 0.5 % ophthalmic solution 1-2 Drop  1-2 Drop Both Eyes TID    prochlorperazine (COMPAZINE) with saline injection 10 mg  10 mg IntraVENous Q6H PRN    morphine injection 2 mg  2 mg IntraVENous Q4H PRN    0.9% sodium chloride infusion 250 mL  250 mL IntraVENous PRN    epoetin reshma-epbx (RETACRIT) injection 20,000 Units  20,000 Units SubCUTAneous Q TUE, THU & SAT    0.9% sodium chloride infusion 250 mL  250 mL IntraVENous PRN    pantoprazole (PROTONIX) tablet 40 mg  40 mg Oral BID    sevelamer carbonate (RENVELA) tab 1,600 mg  1,600 mg Oral TID WITH MEALS    acetaminophen (TYLENOL) tablet 650 mg  650 mg Oral Q4H PRN    oxyCODONE IR (ROXICODONE) tablet 5 mg  5 mg Oral Q4H PRN         Review of Symptoms:  A comprehensive review of systems was negative except for that written in the HPI. Physical Exam    Visit Vitals  BP (!) 131/46 (BP 1 Location: Left arm, BP Patient Position: At rest)   Pulse 84   Temp 98.2 °F (36.8 °C)   Resp 20   Ht 5' 3\" (1.6 m)   Wt 166 lb 14.2 oz (75.7 kg)   SpO2 99%   BMI 29.56 kg/m²     Neck: supple, symmetrical, trachea midline, no adenopathy, no carotid bruit and no JVD  Heart: regular rate and rhythm, S1, S2 normal, no S3 or S4, systolic murmur: early systolic 3/6, crescendo at 2nd left intercostal space  Lungs: clear to auscultation bilaterally  Abdomen: soft, non-tender. Bowel sounds normal. No masses,  no organomegaly  Extremities: edema tr  Pulses: cannot palpate pedal pulses  Neurologic: Grossly normal    Cardiographics    Telemetry: normal sinus rhythm  ECG: normal sinus rhythm, LBBB, left axis deviation  Echocardiogram: see chart    Recent radiology, intake/output and wt reviewed    Labs:   Recent Results (from the past 24 hour(s))   CBC WITH AUTOMATED DIFF    Collection Time: 11/22/20  1:21 AM   Result Value Ref Range    WBC 4.8 3.6 - 11.0 K/uL    RBC 2.61 (L) 3.80 - 5.20 M/uL    HGB 7.3 (L) 11.5 - 16.0 g/dL    HCT 25.3 (L) 35.0 - 47.0 %    MCV 96.9 80.0 - 99.0 FL    MCH 28.0 26.0 - 34.0 PG    MCHC 28.9 (L) 30.0 - 36.5 g/dL    RDW 18.2 (H) 11.5 - 14.5 %    PLATELET 150 (L) 959 - 400 K/uL    MPV 11.0 8.9 - 12.9 FL    NRBC 0.0 0  WBC    ABSOLUTE NRBC 0.00 0.00 - 0.01 K/uL    NEUTROPHILS 73 32 - 75 %    LYMPHOCYTES 12 12 - 49 %    MONOCYTES 9 5 - 13 %    EOSINOPHILS 4 0 - 7 %    BASOPHILS 1 0 - 1 %    IMMATURE GRANULOCYTES 1 (H) 0.0 - 0.5 %    ABS. NEUTROPHILS 3.6 1.8 - 8.0 K/UL    ABS. LYMPHOCYTES 0.6 (L) 0.8 - 3.5 K/UL    ABS. MONOCYTES 0.4 0.0 - 1.0 K/UL    ABS. EOSINOPHILS 0.2 0.0 - 0.4 K/UL    ABS. BASOPHILS 0.0 0.0 - 0.1 K/UL    ABS. IMM.  GRANS. 0.0 0.00 - 0.04 K/UL    DF SMEAR SCANNED      RBC COMMENTS OVALOCYTES  PRESENT        RBC COMMENTS POLYCHROMASIA  1+        RBC COMMENTS ANISOCYTOSIS  1+       METABOLIC PANEL, BASIC    Collection Time: 11/22/20  1:21 AM   Result Value Ref Range    Sodium 139 136 - 145 mmol/L    Potassium 4.5 3.5 - 5.1 mmol/L    Chloride 106 97 - 108 mmol/L    CO2 28 21 - 32 mmol/L    Anion gap 5 5 - 15 mmol/L    Glucose 213 (H) 65 - 100 mg/dL    BUN 27 (H) 6 - 20 MG/DL    Creatinine 3.72 (H) 0.55 - 1.02 MG/DL    BUN/Creatinine ratio 7 (L) 12 - 20      GFR est AA 15 (L) >60 ml/min/1.73m2    GFR est non-AA 12 (L) >60 ml/min/1.73m2    Calcium 9.2 8.5 - 10.1 MG/DL

## 2020-11-22 NOTE — PROGRESS NOTES
[address] [facility] Adult  Hospitalist Group                                                                                          Hospitalist Progress Note  Satinder Black MD  Answering service: [phone] from in house phone        Date of Service:  2020  NAME:  Wendelin Schirmer  :  1950  MRN:  [id number]      Admission Summary:   69 y.o PMH of AVM,ESRD,anemia admitted due to anemia and hyperkalemia    Interval history / Subjective:   Patient seen and examined at [id number]. Feels ok now. Her hb still low. No signs of active bleeding. Hepatology Dr. Prateek Us recommended liver bx tomorrow. Discussed with nursing      Assessment & Plan:      #Acute on chronic anemia:  -multifactorial - hx AVMs,CKD  -Gastroenterology following  -recent EGD 10/21: mild gastritis with some friable mucosa and oozing-treated with APC and also capsule study performed with presence of small non-bleeding AVM's in small intestine. Patient reports she [facility] and underwent EGD, enteroscopy, and colonoscopy- all with no findings. -s/p 1 U PRBC ,   - PPI BID  - hb low 7.3 monitor hb, transfuse <7     #Hyperkalemia-resolved  #ESRD on HD TTS  -nephrology following    #Liver cirrhosis with Ascites :  -hx recurrent ascites with paracentesis . Not on any diuretics at home as pt is anuric   -USG abd showed ascites. The liver echotexture is normal  - Paracentesis drain placed on  and removed on   - fluid analysis shows transudate   - appreciate Hepatology input  - plan for hepatic veneous pressure meaursements and tranasjugular liver biopsy on Monday    Diastolic heart failure  Severe Pulm artery HTN  - Echo: LVEF is 48%. Severe (grade 3) left ventricular diastolic dysfunction. Moderate to severe tricuspid valve regurgitation is present.   - appreciate cardiology input  - no further cardiac testing    Chronic pain- c/w home oxy prn    Code status: full  DVT prophylaxis: [address] discussed with: Patient/Family and Nurse  Anticipated Disposition: Home w/Family  Anticipated Discharge: 24 hours to 48 hours     Hospital Problems  Date Reviewed: 10/6/2020          Codes Class Noted POA    Anemia ICD-10-CM: D64.9  ICD-9-CM: 285.9  9/17/2020 Unknown                Review of Systems:   As per HPI    Vital Signs:    Last 24hrs VS reviewed since prior progress note. Most recent are:  Visit Vitals  BP (!) 131/46 (BP 1 Location: Left arm, BP Patient Position: At rest)   Pulse 84   Temp 98.2 °F (36.8 °C)   Resp 20   Ht 5' 3\" (1.6 m)   Wt 75.7 kg (166 lb 14.2 oz)   SpO2 99%   BMI 29.56 kg/m²         Intake/Output Summary (Last 24 hours) at 11/22/2020 1337  Last data filed at 11/21/2020 1845  Gross per 24 hour   Intake --   Output 1203 ml   Net -1203 ml        Physical Examination:     I had a face to face encounter with this patient and independently examined them on 11/22/2020 as outlined below:          Constitutional:  No acute distress, elderly patient   ENT:  Oral mucosa moist, EOMI    Resp:  CTA bilaterally. No wheezing/rhonchi/rales. No accessory muscle use   CV:  Regular rhythm, normal rate, no murmurs, gallops, rubs    GI:  Soft, distended, non tender. normoactive bowel sounds    Musculoskeletal:  No LE edema    Neurologic:  Moves all extremities. AAOx3     Psych:  not anxious nor agitated.        Data Review:    Review and/or order of clinical lab test  Review and/or order of tests in the radiology section of CPT  Review and/or order of tests in the medicine section of CPT      Labs:     Recent Labs     11/22/20  0121 11/21/20  0200   WBC 4.8 4.3   HGB 7.3* 7.8*   HCT 25.3* 26.9*   * 123*     Recent Labs     11/22/20  0121 11/21/20  0200 11/20/20  0300    140 139   K 4.5 5.6* 5.2*    105 106   CO2 28 27 28   BUN 27* 47* 33*   CREA 3.72* 6.05* 4.92*   * 150* 231*   CA 9.2 9.1 8.5     Recent Labs     11/21/20  0200   ALT 14   *   TBILI 0.3   TP 5.8*   ALB 2.6* GLOB 3.2     No results for input(s): INR, PTP, APTT, INREXT, INREXT in the last 72 hours. No results for input(s): FE, TIBC, PSAT, FERR in the last 72 hours. Lab Results   Component Value Date/Time    Folate 16.4 10/19/2020 09:34 AM      No results for input(s): PH, PCO2, PO2 in the last 72 hours. No results for input(s): CPK, CKNDX, TROIQ in the last 72 hours.     No lab exists for component: CPKMB  No results found for: CHOL, CHOLX, CHLST, CHOLV, HDL, HDLP, LDL, LDLC, DLDLP, TGLX, TRIGL, TRIGP, CHHD, CHHDX  Lab Results   Component Value Date/Time    Glucose (POC) 122 (H) 11/18/2020 01:10 PM    Glucose (POC) 201 (H) 10/25/2020 11:59 AM    Glucose (POC) 186 (H) 10/25/2020 06:07 AM    Glucose (POC) 173 (H) 10/24/2020 09:12 PM    Glucose (POC) 144 (H) 10/24/2020 06:57 PM     No results found for: COLOR, APPRN, SPGRU, REFSG, VERENA, PROTU, GLUCU, KETU, BILU, UROU, TIBURCIO, LEUKU, GLUKE, EPSU, BACTU, WBCU, RBCU, CASTS, UCRY      Medications Reviewed:     Current Facility-Administered Medications   Medication Dose Route Frequency    diphenhydrAMINE (BENADRYL) capsule 25 mg  25 mg Oral Q6H PRN    albuterol-ipratropium (DUO-NEB) 2.5 MG-0.5 MG/3 ML  3 mL Nebulization Q4H PRN    alum-mag hydroxide-simeth (MYLANTA) oral suspension 30 mL  30 mL Oral Q4H PRN    ascorbic acid (vitamin C) (VITAMIN C) tablet 500 mg  500 mg Oral DAILY    cholecalciferol (VITAMIN D3) (1000 Units /25 mcg) tablet 2 Tab  2,000 Units Oral DAILY    ferrous sulfate tablet 325 mg  325 mg Oral TID WITH MEALS    ketotifen (ZADITOR) 0.025 % (0.035 %) ophthalmic solution 1 Drop  1 Drop Both Eyes BID    carboxymethylcellulose sodium (REFRESH PLUS) 0.5 % ophthalmic solution 1-2 Drop  1-2 Drop Both Eyes TID    prochlorperazine (COMPAZINE) with saline injection 10 mg  10 mg IntraVENous Q6H PRN    morphine injection 2 mg  2 mg IntraVENous Q4H PRN    0.9% sodium chloride infusion 250 mL  250 mL IntraVENous PRN    epoetin reshma-epbx (RETACRIT) injection 20,000 Units  20,000 Units SubCUTAneous Q TUE, THU & SAT    0.9% sodium chloride infusion 250 mL  250 mL IntraVENous PRN    pantoprazole (PROTONIX) tablet 40 mg  40 mg Oral BID    sevelamer carbonate (RENVELA) tab 1,600 mg  1,600 mg Oral TID WITH MEALS    acetaminophen (TYLENOL) tablet 650 mg  650 mg Oral Q4H PRN    oxyCODONE IR (ROXICODONE) tablet 5 mg  5 mg Oral Q4H PRN     ______________________________________________________________________  EXPECTED LENGTH OF STAY: 3d 14h  ACTUAL LENGTH OF STAY:          3                 Satinder Black MD

## 2020-11-22 NOTE — PROGRESS NOTES
Bedside shift change report given to Ban RIBEIRO (oncoming nurse) by Sera Staton (offgoing nurse). Report included the following information SBAR, Kardex, MAR and Quality Measures.

## 2020-11-22 NOTE — PROGRESS NOTES
[address] [facility] MD Marilynn, Cherelle Lei MD, MPH      Dimitri Bob, CAROLYN Hanson, [facility]     Maria R Will, [facility]   LUIS Ibar[facility]       Ozzy Hunt 136    at [address], [address] [place] [address], Sophy  22.    [phone]    FAX: [address] [place] [address], [address]    [phone]    FAX: [phone]       HEPATOLOGY CONSULT NOTE  I was asked to see this patient in consultation by Dr Gloria Altamirano for management of cirrhosis. I have reviewed the Emergency room note, Hospital admission note, Notes by all other physicians who have seen the patient during this hospitalization to date. I have reviewed the problem list and the reason for this hospitalization. I have reviewed the allergies and the medications the patient was taking at home prior to this hospitalization. HISTORY:  The patient is a 69year old black female who developed with ESRD on dialysis. She developed ascites for the first time in 1/2020 and has required paracentesis every 1 month. She gets her care regularly at [facility]. Liver evaluation is not known. US in the ED suggests the liver is normal.  LAbs suggest she has cirrhosis with , and low albumin    She was hospitalized for weakness and anemia. Anemia has apparently been chronic and was evaluated with EGD, colonoscopy and enteroscopy at the [facility]. She has refused EGD here. She was transfused and is getting dialysis. She feels well today. Will proceed with hepatic venous pressure measurements and transjugualr liver biopsy in AM      ASSESSMENT AND PLAN:  Ascites   Ascites developed for the first time in 1/2020.   Ascites is refractory because of ESRD. She requires paracentesis every month   US suggests the liver appears normal.  Labs suggest she may have cirrhosis    ECHO was performed earlier today. Results are pending. Ascites is either due to cirrhosis and/or passive hepatic congestion due to ESRD and TR. Will plan for hepatic veneous pressure meaursements and tranasjugular liver biopsy on Monday. Await results of ECHO. However, would proceed with this regardless of ECHO findings. Paracentesis was performed on admission. Cell count was negative for SBP. SAAG was 2.8-1.8 = 0.9    Cannot treat with diuretics because of ESRD  Cannot use IV albumin because of ESRD    Screening for Esophageal varices   It is unlikely the patient has esophageal varices. She has been evaluated for GI bleeding by [facility] with multiple procedures. Anemia   This is due to multifactorial causes including ESRD on dialysis  FE studies from 10/2020 demonstrated Ferritin 30 and FE saturation of 33%. Thrombocytopenia   This is likely secondary to cirrhosis. There is no evidence of overt bleeding. No treatment is required. PHYSICAL EXAMINATION:  VS: per nursing note  General:  No acute distress. Eyes:  Sclera anicteric. ENT:  No oral lesions. Thyroid normal.  Nodes:  No adenopathy. Skin:  No spider angiomata. Respiratory:  Lungs clear to auscultation. Cardiovascular:  Regular heart rate. Abdomen:  Distended with some ascites. Extremities:  No lower extremity edema. Neurologic:  Alert and oriented. Cranial nerves grossly intact. No asterixis. LABORATORY:  Results for Yogi Rome (MRN [id number]) as of 11/22/2020 16:52   Ref.  Range 11/20/2020 03:00 11/21/2020 02:00 11/22/2020 01:21   WBC Latest Ref Range: 3.6 - 11.0 K/uL 3.7 4.3 4.8   HGB Latest Ref Range: 11.5 - 16.0 g/dL 7.7 (L) 7.8 (L) 7.3 (L)   PLATELET Latest Ref Range: 150 - 400 K/uL 129 (L) 123 (L) 127 (L)   Sodium Latest Ref Range: 136 - 145 mmol/L 139 140 139 Potassium Latest Ref Range: 3.5 - 5.1 mmol/L 5.2 (H) 5.6 (H) 4.5   Chloride Latest Ref Range: 97 - 108 mmol/L 106 105 106   CO2 Latest Ref Range: 21 - 32 mmol/L 28 27 28   Glucose Latest Ref Range: 65 - 100 mg/dL 231 (H) 150 (H) 213 (H)   BUN Latest Ref Range: 6 - 20 MG/DL 33 (H) 47 (H) 27 (H)   Creatinine Latest Ref Range: 0.55 - 1.02 MG/DL 4.92 (H) 6.05 (H) 3.72 (H)   Bilirubin, total Latest Ref Range: 0.2 - 1.0 MG/DL  0.3    Albumin Latest Ref Range: 3.5 - 5.0 g/dL  2.6 (L)    ALT Latest Ref Range: 12 - 78 U/L  14    AST Latest Ref Range: 15 - 37 U/L  11 (L)    Alk. phosphatase Latest Ref Range: 45 - 117 U/L  157 (H)        RADIOLOGY:  11/2020. Ultrasound of liver. Normal appearing liver. No liver mass lesions.   Large amount of ascites      MD Ozzy Topete 1, 2000 [facility] 22.  201 [facility]

## 2020-11-22 NOTE — PROGRESS NOTES
Problem: Falls - Risk of  Goal: *Absence of Falls  Description: Document Mary Ba Fall Risk and appropriate interventions in the flowsheet. Outcome: Progressing Towards Goal  Note: Fall Risk Interventions:  Mobility Interventions: Communicate number of staff needed for ambulation/transfer, OT consult for ADLs, Patient to call before getting OOB, PT Consult for mobility concerns, PT Consult for assist device competence, Utilize walker, cane, or other assistive device    Mentation Interventions: Adequate sleep, hydration, pain control, Evaluate medications/consider consulting pharmacy, Door open when patient unattended, Increase mobility, More frequent rounding, Room close to nurse's station, Toileting rounds    Medication Interventions: Evaluate medications/consider consulting pharmacy, Patient to call before getting OOB, Teach patient to arise slowly    Elimination Interventions: Call light in reach, Elevated toilet seat, Patient to call for help with toileting needs, Stay With Me (per policy), Toilet paper/wipes in reach, Toileting schedule/hourly rounds              Problem: Pain  Goal: *Control of Pain  Outcome: Progressing Towards Goal     Problem: Pressure Injury - Risk of  Goal: *Prevention of pressure injury  Description: Document Vic Scale and appropriate interventions in the flowsheet. Outcome: Progressing Towards Goal  Note: Pressure Injury Interventions:  Sensory Interventions: Assess need for specialty bed, Chair cushion, Check visual cues for pain, Discuss PT/OT consult with provider, Float heels, Keep linens dry and wrinkle-free, Maintain/enhance activity level, Minimize linen layers, Monitor skin under medical devices, Pad between skin to skin, Pressure redistribution bed/mattress (bed type), Turn and reposition approx.  every two hours (pillows and wedges if needed)    Moisture Interventions: Assess need for specialty bed, Check for incontinence Q2 hours and as needed, Absorbent underpads, Apply protective barrier, creams and emollients, Limit adult briefs, Maintain skin hydration (lotion/cream), Minimize layers    Activity Interventions: Chair cushion, Increase time out of bed, Pressure redistribution bed/mattress(bed type), PT/OT evaluation    Mobility Interventions: Chair cushion, Float heels, HOB 30 degrees or less, Pressure redistribution bed/mattress (bed type), PT/OT evaluation, Turn and reposition approx.  every two hours(pillow and wedges)    Nutrition Interventions: Document food/fluid/supplement intake    Friction and Shear Interventions: Lift sheet, Minimize layers, Feet elevated on foot rest, Foam dressings/transparent film/skin sealants, HOB 30 degrees or less                Problem: Heart Failure: Day 1  Goal: Discharge Planning  Outcome: Progressing Towards Goal     Problem: Heart Failure: Discharge Outcomes  Goal: *Demonstrates ability to perform prescribed activity without shortness of breath or discomfort  Outcome: Progressing Towards Goal  Goal: *Left ventricular function assessment completed prior to or during stay, or planned for post-discharge  Outcome: Progressing Towards Goal  Goal: *ACEI prescribed if LVEF less than 40% and no contraindications or ARB prescribed  Outcome: Progressing Towards Goal  Goal: *Verbalizes understanding and describes prescribed diet  Outcome: Progressing Towards Goal  Goal: *Verbalizes understanding/describes prescribed medications  Outcome: Progressing Towards Goal  Goal: *Describes available resources and support systems  Description: (eg: Home Health, Palliative Care, Advanced Medical Directive)  Outcome: Progressing Towards Goal     Problem: Acute Renal Failure: Day 6  Goal: Activity/Safety  Outcome: Progressing Towards Goal  Goal: Diagnostic Test/Procedures  Outcome: Progressing Towards Goal  Goal: Nutrition/Diet  Outcome: Progressing Towards Goal  Goal: Discharge Planning  Outcome: Progressing Towards Goal  Goal: Medications  Outcome: Progressing Towards Goal     Problem: Acute Renal Failure: Discharge Outcomes  Goal: *Optimal pain control at patient's stated goal  Outcome: Progressing Towards Goal  Goal: *Urinary output within identified parameters  Outcome: Progressing Towards Goal  Goal: *Hemodynamically stable  Outcome: Progressing Towards Goal     Problem: Patient Education: Go to Patient Education Activity  Goal: Patient/Family Education  Outcome: Progressing Towards Goal

## 2020-11-22 NOTE — PROGRESS NOTES
Bedside and Verbal shift change report given to Wear Inns (oncoming nurse) by Sultana Ley RN (offgoing nurse). Report included the following information SBAR, Kardex, ED Summary, Intake/Output, MAR, Cardiac Rhythm NSR and Dual Neuro Assessment.

## 2020-11-23 LAB
ANION GAP SERPL CALC-SCNC: 6 MMOL/L (ref 5–15)
BACTERIA SPEC CULT: NORMAL
BASOPHILS # BLD: 0.1 K/UL (ref 0–0.1)
BASOPHILS NFR BLD: 1 % (ref 0–1)
BUN SERPL-MCNC: 49 MG/DL (ref 6–20)
BUN/CREAT SERPL: 9 (ref 12–20)
CALCIUM SERPL-MCNC: 9.6 MG/DL (ref 8.5–10.1)
CHLORIDE SERPL-SCNC: 108 MMOL/L (ref 97–108)
CO2 SERPL-SCNC: 27 MMOL/L (ref 21–32)
CREAT SERPL-MCNC: 5.22 MG/DL (ref 0.55–1.02)
DIFFERENTIAL METHOD BLD: ABNORMAL
EOSINOPHIL # BLD: 0.2 K/UL (ref 0–0.4)
EOSINOPHIL NFR BLD: 4 % (ref 0–7)
ERYTHROCYTE [DISTWIDTH] IN BLOOD BY AUTOMATED COUNT: 18.2 % (ref 11.5–14.5)
GLUCOSE SERPL-MCNC: 169 MG/DL (ref 65–100)
GRAM STN SPEC: NORMAL
GRAM STN SPEC: NORMAL
HCT VFR BLD AUTO: 25.4 % (ref 35–47)
HGB BLD-MCNC: 7.3 G/DL (ref 11.5–16)
IMM GRANULOCYTES # BLD AUTO: 0.1 K/UL (ref 0–0.04)
IMM GRANULOCYTES NFR BLD AUTO: 1 % (ref 0–0.5)
LYMPHOCYTES # BLD: 0.8 K/UL (ref 0.8–3.5)
LYMPHOCYTES NFR BLD: 15 % (ref 12–49)
MCH RBC QN AUTO: 28.1 PG (ref 26–34)
MCHC RBC AUTO-ENTMCNC: 28.7 G/DL (ref 30–36.5)
MCV RBC AUTO: 97.7 FL (ref 80–99)
MONOCYTES # BLD: 0.5 K/UL (ref 0–1)
MONOCYTES NFR BLD: 10 % (ref 5–13)
NEUTS SEG # BLD: 3.5 K/UL (ref 1.8–8)
NEUTS SEG NFR BLD: 69 % (ref 32–75)
NRBC # BLD: 0 K/UL (ref 0–0.01)
NRBC BLD-RTO: 0 PER 100 WBC
PLATELET # BLD AUTO: 138 K/UL (ref 150–400)
PLATELET COMMENTS,PCOM: ABNORMAL
PMV BLD AUTO: 11.3 FL (ref 8.9–12.9)
POTASSIUM SERPL-SCNC: 5.3 MMOL/L (ref 3.5–5.1)
RBC # BLD AUTO: 2.6 M/UL (ref 3.8–5.2)
RBC MORPH BLD: ABNORMAL
SERVICE CMNT-IMP: NORMAL
SODIUM SERPL-SCNC: 141 MMOL/L (ref 136–145)
WBC # BLD AUTO: 5.2 K/UL (ref 3.6–11)

## 2020-11-23 PROCEDURE — 86900 BLOOD TYPING SEROLOGIC ABO: CPT

## 2020-11-23 PROCEDURE — 86902 BLOOD TYPE ANTIGEN DONOR EA: CPT

## 2020-11-23 PROCEDURE — 99233 SBSQ HOSP IP/OBS HIGH 50: CPT | Performed by: SPECIALIST

## 2020-11-23 PROCEDURE — 65660000000 HC RM CCU STEPDOWN

## 2020-11-23 PROCEDURE — 74011250637 HC RX REV CODE- 250/637: Performed by: INTERNAL MEDICINE

## 2020-11-23 PROCEDURE — 86920 COMPATIBILITY TEST SPIN: CPT

## 2020-11-23 PROCEDURE — 36415 COLL VENOUS BLD VENIPUNCTURE: CPT

## 2020-11-23 PROCEDURE — 74011250637 HC RX REV CODE- 250/637: Performed by: NURSE PRACTITIONER

## 2020-11-23 PROCEDURE — 74011250637 HC RX REV CODE- 250/637: Performed by: HOSPITALIST

## 2020-11-23 PROCEDURE — 85025 COMPLETE CBC W/AUTO DIFF WBC: CPT

## 2020-11-23 PROCEDURE — 86870 RBC ANTIBODY IDENTIFICATION: CPT

## 2020-11-23 PROCEDURE — 80048 BASIC METABOLIC PNL TOTAL CA: CPT

## 2020-11-23 RX ORDER — POLYETHYLENE GLYCOL 3350 17 G/17G
17 POWDER, FOR SOLUTION ORAL DAILY PRN
Status: DISCONTINUED | OUTPATIENT
Start: 2020-11-23 | End: 2020-12-01 | Stop reason: HOSPADM

## 2020-11-23 RX ADMIN — CARBOXYMETHYLCELLULOSE SODIUM 2 DROP: 5 SOLUTION/ DROPS OPHTHALMIC at 21:57

## 2020-11-23 RX ADMIN — CARBOXYMETHYLCELLULOSE SODIUM 1 DROP: 5 SOLUTION/ DROPS OPHTHALMIC at 15:10

## 2020-11-23 RX ADMIN — FERROUS SULFATE TAB 325 MG (65 MG ELEMENTAL FE) 325 MG: 325 (65 FE) TAB at 11:06

## 2020-11-23 RX ADMIN — OXYCODONE HYDROCHLORIDE 5 MG: 5 TABLET ORAL at 19:10

## 2020-11-23 RX ADMIN — KETOTIFEN FUMARATE 1 DROP: 0.35 SOLUTION/ DROPS OPHTHALMIC at 08:25

## 2020-11-23 RX ADMIN — OXYCODONE HYDROCHLORIDE 5 MG: 5 TABLET ORAL at 11:06

## 2020-11-23 RX ADMIN — PANTOPRAZOLE SODIUM 40 MG: 40 TABLET, DELAYED RELEASE ORAL at 17:00

## 2020-11-23 RX ADMIN — OXYCODONE HYDROCHLORIDE 5 MG: 5 TABLET ORAL at 15:09

## 2020-11-23 RX ADMIN — SEVELAMER CARBONATE 1600 MG: 800 TABLET, FILM COATED ORAL at 11:06

## 2020-11-23 RX ADMIN — KETOTIFEN FUMARATE 1 DROP: 0.35 SOLUTION/ DROPS OPHTHALMIC at 17:01

## 2020-11-23 RX ADMIN — FERROUS SULFATE TAB 325 MG (65 MG ELEMENTAL FE) 325 MG: 325 (65 FE) TAB at 17:00

## 2020-11-23 RX ADMIN — OXYCODONE HYDROCHLORIDE 5 MG: 5 TABLET ORAL at 05:58

## 2020-11-23 RX ADMIN — SEVELAMER CARBONATE 1600 MG: 800 TABLET, FILM COATED ORAL at 17:00

## 2020-11-23 RX ADMIN — CARBOXYMETHYLCELLULOSE SODIUM 1 DROP: 5 SOLUTION/ DROPS OPHTHALMIC at 09:39

## 2020-11-23 RX ADMIN — POLYETHYLENE GLYCOL 3350 17 G: 17 POWDER, FOR SOLUTION ORAL at 19:15

## 2020-11-23 NOTE — PROGRESS NOTES
[address] [facility] Adult  Hospitalist Group                                                                                          Hospitalist Progress Note  Debby Tomlinson MD  Answering service: [phone] from in house phone        Date of Service:  2020  NAME:  Storm Murphy  :  1950  MRN:  [id number]      Admission Summary:   69 y.o PMH of AVM,ESRD,anemia admitted due to anemia and hyperkalemia    Interval history / Subjective:   Patient seen and examined at [id number]. She feels ok now. IR guided liver biopsy for tomorrow   Discussed with nursing      Assessment & Plan:      #Acute on chronic anemia:  -multifactorial - hx AVMs,CKD  -Gastroenterology signed off   -recent EGD 10/21: mild gastritis with some friable mucosa and oozing-treated with APC and also capsule study performed with presence of small non-bleeding AVM's in small intestine. Patient reports she [facility] and underwent EGD, enteroscopy, and colonoscopy- all with no findings. - pt refused EGD this admission    -s/p 1 U PRBC ,   - PPI BID  - hb low 7.3-  monitor hb, transfuse <7     #Hyperkalemia- mild   #ESRD on HD TTS  -nephrology following    #Liver cirrhosis with Ascites :  -hx recurrent ascites with paracentesis . Not on any diuretics at home as pt is anuric   -USG abd showed ascites. The liver echotexture is normal  - Paracentesis drain placed on  and removed on   - fluid analysis shows transudate   - appreciate Hepatology input  - plan for hepatic veneous pressure meaursements and tranasjugular liver biopsy tomorrow     #Diastolic heart failure  #Severe Pulm artery HTN  - Echo: LVEF is 48%. Severe (grade 3) left ventricular diastolic dysfunction. Moderate to severe tricuspid valve regurgitation is present.   - appreciate cardiology input  - no further cardiac testing    Chronic pain- c/w home oxy prn    Code status: full  DVT prophylaxis: scd    Care Plan discussed with: Patient/Family and Nurse  Anticipated Disposition: Home w/Family  Anticipated Discharge: 24 hours to 48 hours     Hospital Problems  Date Reviewed: 10/6/2020          Codes Class Noted POA    Anemia ICD-10-CM: D64.9  ICD-9-CM: 285.9  9/17/2020 Unknown                Review of Systems:   As per HPI    Vital Signs:    Last 24hrs VS reviewed since prior progress note. Most recent are:  Visit Vitals  BP (!) 140/58 (BP 1 Location: Left arm, BP Patient Position: At rest)   Pulse 83   Temp 98.5 °F (36.9 °C)   Resp 16   Ht 5' 3\" (1.6 m)   Wt 74.6 kg (164 lb 7.4 oz)   SpO2 96%   BMI 29.13 kg/m²       No intake or output data in the 24 hours ending 11/23/20 1347     Physical Examination:     I had a face to face encounter with this patient and independently examined them on 11/23/2020 as outlined below:          Constitutional:  No acute distress, elderly patient   ENT:  Oral mucosa moist, EOMI    Resp:  CTA bilaterally. No wheezing/rhonchi/rales. No accessory muscle use   CV:  Regular rhythm, normal rate, no murmurs, gallops, rubs    GI:  Soft, distended, non tender. normoactive bowel sounds    Musculoskeletal:  No LE edema    Neurologic:  Moves all extremities. AAOx3     Psych:  not anxious nor agitated. Data Review:    Review and/or order of clinical lab test  Review and/or order of tests in the radiology section of CPT  Review and/or order of tests in the medicine section of CPT      Labs:     Recent Labs     11/23/20  0340 11/22/20  0121   WBC 5.2 4.8   HGB 7.3* 7.3*   HCT 25.4* 25.3*   * 127*     Recent Labs     11/23/20  0340 11/22/20  0121 11/21/20  0200    139 140   K 5.3* 4.5 5.6*    106 105   CO2 27 28 27   BUN 49* 27* 47*   CREA 5.22* 3.72* 6.05*   * 213* 150*   CA 9.6 9.2 9.1     Recent Labs     11/21/20  0200   ALT 14   *   TBILI 0.3   TP 5.8*   ALB 2.6*   GLOB 3.2     No results for input(s): INR, PTP, APTT, INREXT, INREXT in the last 72 hours.    No results for input(s): FE, TIBC, PSAT, FERR in the last 72 hours. Lab Results   Component Value Date/Time    Folate 16.4 10/19/2020 09:34 AM      No results for input(s): PH, PCO2, PO2 in the last 72 hours. No results for input(s): CPK, CKNDX, TROIQ in the last 72 hours.     No lab exists for component: CPKMB  No results found for: CHOL, CHOLX, CHLST, CHOLV, HDL, HDLP, LDL, LDLC, DLDLP, TGLX, TRIGL, TRIGP, CHHD, CHHDX  Lab Results   Component Value Date/Time    Glucose (POC) 122 (H) 11/18/2020 01:10 PM    Glucose (POC) 201 (H) 10/25/2020 11:59 AM    Glucose (POC) 186 (H) 10/25/2020 06:07 AM    Glucose (POC) 173 (H) 10/24/2020 09:12 PM    Glucose (POC) 144 (H) 10/24/2020 06:57 PM     No results found for: COLOR, APPRN, SPGRU, REFSG, VERENA, PROTU, GLUCU, KETU, BILU, UROU, TIBURCIO, LEUKU, GLUKE, EPSU, BACTU, WBCU, RBCU, CASTS, UCRY      Medications Reviewed:     Current Facility-Administered Medications   Medication Dose Route Frequency    diphenhydrAMINE (BENADRYL) capsule 25 mg  25 mg Oral Q6H PRN    albuterol-ipratropium (DUO-NEB) 2.5 MG-0.5 MG/3 ML  3 mL Nebulization Q4H PRN    alum-mag hydroxide-simeth (MYLANTA) oral suspension 30 mL  30 mL Oral Q4H PRN    ascorbic acid (vitamin C) (VITAMIN C) tablet 500 mg  500 mg Oral DAILY    cholecalciferol (VITAMIN D3) (1000 Units /25 mcg) tablet 2 Tab  2,000 Units Oral DAILY    ferrous sulfate tablet 325 mg  325 mg Oral TID WITH MEALS    ketotifen (ZADITOR) 0.025 % (0.035 %) ophthalmic solution 1 Drop  1 Drop Both Eyes BID    carboxymethylcellulose sodium (REFRESH PLUS) 0.5 % ophthalmic solution 1-2 Drop  1-2 Drop Both Eyes TID    prochlorperazine (COMPAZINE) with saline injection 10 mg  10 mg IntraVENous Q6H PRN    morphine injection 2 mg  2 mg IntraVENous Q4H PRN    0.9% sodium chloride infusion 250 mL  250 mL IntraVENous PRN    epoetin reshma-epbx (RETACRIT) injection 20,000 Units  20,000 Units SubCUTAneous Q TUE, THU & SAT    0.9% sodium chloride infusion 250 mL  250 mL IntraVENous PRN  pantoprazole (PROTONIX) tablet 40 mg  40 mg Oral BID    sevelamer carbonate (RENVELA) tab 1,600 mg  1,600 mg Oral TID WITH MEALS    acetaminophen (TYLENOL) tablet 650 mg  650 mg Oral Q4H PRN    oxyCODONE IR (ROXICODONE) tablet 5 mg  5 mg Oral Q4H PRN     ______________________________________________________________________  EXPECTED LENGTH OF STAY: 3d 14h  ACTUAL LENGTH OF STAY:          4                 Abdifatah Hunt MD

## 2020-11-23 NOTE — PROGRESS NOTES
Bedside and Verbal shift change report given to [address] [place] [address] (oncoming nurse) by Bennie Snyder RN  (offgoing nurse). Report included the following information SBAR, Kardex, ED Summary, Intake/Output, MAR, Cardiac Rhythm NSR and Dual Neuro Assessment.

## 2020-11-23 NOTE — PROGRESS NOTES
Cards attending  Ms. Tommy Marcus is a very pleasant lady she is a . She is seen today with nurse practitioner Ronnie Russell. Full note to follow. Case is discussed with Dr. Sharif Aguiar. She is 69-year-old -American female from [place] who is admitted with shortness of breath predominantly due to worsening anemia. She has ESRD and now is being worked up for liver cirrhosis. She reports she had a cardiac cath of the [place] many years ago that was normal.  An echocardiogram here at [facility] shows significant pulmonary hypertension with a pressure of around 78 mmHg with severe TR. The LVEF is slightly reduced and 48% seems about right. The right ventricle moves well in the four-chamber view and I do not think is particularly enlarged. This would indicate compensation of her volume overload and more of a pressure phenomenon. Despite all this she looks reasonably well and has no shortness of breath at rest is able to lie flat and is talkative on the phone only, in the room. She is very cooperative put her mask on and we listen to her lungs which are clear she does have a loud systolic murmur which is not of course from the TR but likely from either MR or a flow murmur across a sclerotic aortic valve. Her edema in the legs is none she does have a dressing on the right posterior shin which she says is a small wound. Overall I would say she has a cardiomyopathy but it is hard to call her congestive heart failure. I do not think that echo is reliable indicator of diastolic dysfunction in general.  Clinically this is mainly volume overload due to her renal and liver disease and thus a hypertensive nonischemic cardiomyopathy is our diagnosis. At this particular ejection fraction I would not necessarily start a beta-blocker or ACE for multiple reasons including that she does drop her blood pressure during dialysis and requires midodrine.   We will check on her in the a.m. but at this time no further cardiac interventions or changes in therapy are needed.   Carolann Child MD    [phone]  [facility] [address] [place] [address]   [address] on [address] Floor

## 2020-11-23 NOTE — PROGRESS NOTES
Occupational Therapy     Chart reviewed in prep for OT tx session. Attempted to see patient this morning but received on the phone & lunch had just arrived. Attempted to see patient this afternoon but patient pleasantly declining all therapy services d/t wanting to rest. Will f/u tomorrow as able and appropriate.       Thank you,  Kaiden Holley

## 2020-11-23 NOTE — PROGRESS NOTES
Cardiology Progress Note            Admit Date: 11/17/2020  Admit Diagnosis: Anemia [D64.9]  Anemia [D64.9]  Date: 11/23/2020     Time: 10:05 AM    HPI: 69-year-old -American female from [place] who is admitted with shortness of breath predominantly due to worsening anemia. She has ESRD, ascites, and now is being worked up for liver cirrhosis. She reports she had a cardiac cath of the [place] many years ago that she reports  Was normal..  Reports history of ANITHA and RLS but was intolerant of CPAP. HD limited by c/o cramps during treatments. Echo with slightly reduced EF 48%, severe TR and pulmonary HTN. Subjective:  Denies SOB, chest pain, dizziness. C/o abdominal distension. Awaiting liver biopsy today. Assessment and Plan     1. Cardiomyopathy, hypertensive nonischemic:  EF 48%. RV not enlarged.     -Do not suspect CHF.    -Suspect volume overload due to her renal and liver disease.   -No Beta blocker or ACE-I at this time given drop in BP during dialyiss which had required midodrine. 2. Severe pulmonary HTN, severe TR   -PAS approximately 78mmHg   -RV function normal.   -Has hx of ANITHA but does not wear CPAP    3. Hx of PAF: during a recent admission reported   -Currently, NSR, no evidence of PAF on telemetry   -no rate control medications. -[id number]= 4. No OAC given chronic, significant anemia, recent hx of AVMs    4. Anemia, acute on chronic: Hgb 7.3   -d/t CKD, hx of AVMs    -(EGD 10/21 with friable mucosa and oozing- treated with APC)   -Management per primary team. On epoetin and iron. 5. Ascites:    -s/p paracentesis. 11/2019 (trasudative fluid)   -Hepatology on board. Biopsy planned today, eval for cirrhosis     6. ESRD on HD:     -HD T TH F    7.  Hx of ANITHA   -intolerant to CPAP in past   -Recommend outpatient follow up with sleep physician to readdress/refit CPAP.     69 y.o. female admitted with SOB. SOB predominately due to worsening anemia. Also has ESRD. EF is only slightly reduced (48%) but R function NL. Main issue is anemia and volume overload due to renal and perhaps liver disease. ould not start ace-I/ARB or BB for now- hx of BP drip during HD in past requiring midodrine during HD. Will follow. No further cardiac testing. Cardiac testin20   ECHO ADULT COMPLETE 2020    Narrative · LV: Calculated LVEF is 48%. Normal cavity size. Moderate concentric   hypertrophy. Moderately reduced systolic function. Severe (grade 3) left   ventricular diastolic dysfunction. · LA: Moderately dilated left atrium. · RV: Mildly dilated right ventricle. · RA: Moderately dilated right atrium. · IAS: No atrial septal defect present. · MV: Mild mitral valve regurgitation is present. · TV: Moderate to severe tricuspid valve regurgitation is present. · PV: Mild pulmonic valve regurgitation is present. · IVC: Severely elevated central venous pressure (15+ mmHg); IVC diameter   is larger than 21 mm and collapses less than 50% with respiration. · Pericardium: Small pericardial effusion.         Signed by: Joycelyn Salmeron MD       [facility]  Past Medical History:   Diagnosis Date    Arthritis     Asthma     Chronic kidney disease     Chronic pain     Cirrhosis ([facility] 2017    Diabetes ([facility] diet controlled    GERD (gastroesophageal reflux disease)     Hypertension     Paroxysmal atrial fibrillation ([facility] 10/6/2020      Social Hx  Social History     Socioeconomic History    Marital status: SINGLE     Spouse name: Not on file    Number of children: Not on file    Years of education: Not on file    Highest education level: Not on file   Occupational History    Not on file   Social Needs    Financial resource strain: Not on file    Food insecurity     Worry: Not on file     Inability: Not on file    Transportation needs     Medical: Not on file     Non-medical: Not on file Tobacco Use    Smoking status: Never Smoker    Smokeless tobacco: Never Used   Substance and Sexual Activity    Alcohol use: No    Drug use: No    Sexual activity: Not on file   Lifestyle    Physical activity     Days per week: Not on file     Minutes per session: Not on file    Stress: Not on file   Relationships    Social connections     Talks on phone: Not on file     Gets together: Not on file     Attends Cheondo[employer] service: Not on file     Active member of club or organization: Not on file     Attends meetings of clubs or organizations: Not on file     Relationship status: Not on file    Intimate partner violence     Fear of current or ex partner: Not on file     Emotionally abused: Not on file     Physically abused: Not on file     Forced sexual activity: Not on file   Other Topics Concern     Service Not Asked    Blood Transfusions Yes    Caffeine Concern Not Asked    Occupational Exposure Not Asked    Hobby Hazards Not Asked    Sleep Concern Not Asked    Stress Concern Not Asked    Weight Concern Not Asked    Special Diet Not Asked    Back Care Not Asked    Exercise Not Asked    Bike Helmet Not Asked    Seat Belt Not Asked    Self-Exams Not Asked   Social History Narrative    Not on file     Objective:      Physical Exam:                Visit Vitals  BP (!) 154/59 (BP 1 Location: Left arm, BP Patient Position: At rest)   Pulse 87   Temp 98.4 °F (36.9 °C)   Resp 13   Ht 5' 3\" (1.6 m)   Wt 164 lb 7.4 oz (74.6 kg)   SpO2 95%   BMI 29.13 kg/m²          General Appearance:   Well developed, well nourished,alert and oriented x 3, and   individual in no acute distress. Ears/Nose/Mouth/Throat:    Hearing grossly normal.         Neck:  Supple. Chest:    Lungs clear  to auscultation bilaterally. Cardiovascular:   Regular rate and rhythm, S1, S2 normal, grade III/VI systolic murmur heard best at LLSB   Abdomen:    Soft, distended, non-tender, bowel sounds are present. Gulshan Miller Extremities:  tr ankle edema    Skin:  Warm and dry. Telemetry: normal sinus rhythm          Data Review:    Labs:    Recent Results (from the past 24 hour(s))   CBC WITH AUTOMATED DIFF    Collection Time: 11/23/20  3:40 AM   Result Value Ref Range    WBC 5.2 3.6 - 11.0 K/uL    RBC 2.60 (L) 3.80 - 5.20 M/uL    HGB 7.3 (L) 11.5 - 16.0 g/dL    HCT 25.4 (L) 35.0 - 47.0 %    MCV 97.7 80.0 - 99.0 FL    MCH 28.1 26.0 - 34.0 PG    MCHC 28.7 (L) 30.0 - 36.5 g/dL    RDW 18.2 (H) 11.5 - 14.5 %    PLATELET 916 (L) 893 - 400 K/uL    MPV 11.3 8.9 - 12.9 FL    NRBC 0.0 0  WBC    ABSOLUTE NRBC 0.00 0.00 - 0.01 K/uL    NEUTROPHILS 69 32 - 75 %    LYMPHOCYTES 15 12 - 49 %    MONOCYTES 10 5 - 13 %    EOSINOPHILS 4 0 - 7 %    BASOPHILS 1 0 - 1 %    IMMATURE GRANULOCYTES 1 (H) 0.0 - 0.5 %    ABS. NEUTROPHILS 3.5 1.8 - 8.0 K/UL    ABS. LYMPHOCYTES 0.8 0.8 - 3.5 K/UL    ABS. MONOCYTES 0.5 0.0 - 1.0 K/UL    ABS. EOSINOPHILS 0.2 0.0 - 0.4 K/UL    ABS. BASOPHILS 0.1 0.0 - 0.1 K/UL    ABS. IMM.  GRANS. 0.1 (H) 0.00 - 0.04 K/UL    DF SMEAR SCANNED      PLATELET COMMENTS Large Platelets      RBC COMMENTS ANISOCYTOSIS  1+        RBC COMMENTS MACROCYTOSIS  1+        RBC COMMENTS OVALOCYTES  PRESENT       METABOLIC PANEL, BASIC    Collection Time: 11/23/20  3:40 AM   Result Value Ref Range    Sodium 141 136 - 145 mmol/L    Potassium 5.3 (H) 3.5 - 5.1 mmol/L    Chloride 108 97 - 108 mmol/L    CO2 27 21 - 32 mmol/L    Anion gap 6 5 - 15 mmol/L    Glucose 169 (H) 65 - 100 mg/dL    BUN 49 (H) 6 - 20 MG/DL    Creatinine 5.22 (H) 0.55 - 1.02 MG/DL    BUN/Creatinine ratio 9 (L) 12 - 20      GFR est AA 10 (L) >60 ml/min/1.73m2    GFR est non-AA 8 (L) >60 ml/min/1.73m2    Calcium 9.6 8.5 - 10.1 MG/DL          Radiology:        Current Facility-Administered Medications   Medication Dose Route Frequency    diphenhydrAMINE (BENADRYL) capsule 25 mg  25 mg Oral Q6H PRN    albuterol-ipratropium (DUO-NEB) 2.5 MG-0.5 MG/3 ML  3 mL Nebulization Q4H PRN    alum-mag hydroxide-simeth (MYLANTA) oral suspension 30 mL  30 mL Oral Q4H PRN    ascorbic acid (vitamin C) (VITAMIN C) tablet 500 mg  500 mg Oral DAILY    cholecalciferol (VITAMIN D3) (1000 Units /25 mcg) tablet 2 Tab  2,000 Units Oral DAILY    ferrous sulfate tablet 325 mg  325 mg Oral TID WITH MEALS    ketotifen (ZADITOR) 0.025 % (0.035 %) ophthalmic solution 1 Drop  1 Drop Both Eyes BID    carboxymethylcellulose sodium (REFRESH PLUS) 0.5 % ophthalmic solution 1-2 Drop  1-2 Drop Both Eyes TID    prochlorperazine (COMPAZINE) with saline injection 10 mg  10 mg IntraVENous Q6H PRN    morphine injection 2 mg  2 mg IntraVENous Q4H PRN    0.9% sodium chloride infusion 250 mL  250 mL IntraVENous PRN    epoetin reshma-epbx (RETACRIT) injection 20,000 Units  20,000 Units SubCUTAneous Q TUE, THU & SAT    0.9% sodium chloride infusion 250 mL  250 mL IntraVENous PRN    pantoprazole (PROTONIX) tablet 40 mg  40 mg Oral BID    sevelamer carbonate (RENVELA) tab 1,600 mg  1,600 mg Oral TID WITH MEALS    acetaminophen (TYLENOL) tablet 650 mg  650 mg Oral Q4H PRN    oxyCODONE IR (ROXICODONE) tablet 5 mg  5 mg Oral Q4H PRN          Patrick Gibbs.  Jose Lius, SHARI     [facility] [address] [place] [address], [address] [place] [address] [address]   [facility], [facility]   [phone]

## 2020-11-23 NOTE — PROGRESS NOTES
Bedside shift change report given to Ban RIBEIRO (oncoming nurse) by Manfred Mckeon (offgoing nurse). Report included the following information SBAR, Kardex, MAR, Recent Results and Quality Measures.

## 2020-11-23 NOTE — PROGRESS NOTES
Physical Therapy: Defer    Chart reviewed, RN cleared, attempted to see pt for PT treatment. Pt pleasantly declined at this time, stating she wants to eat her snack and then lie down and rest.  Pt declined even transferring to a chair to finish eating. Will defer and continue to follow.     Cesar Baca, PT, DPT

## 2020-11-23 NOTE — PROGRESS NOTES
Physician Progress Note      Rajiv Mccracken  CSN #:                  [id number]  :                       1950  ADMIT DATE:       2020 5:47 PM  [address] [place] DATE:  RESPONDING  PROVIDER #:        Atif Olivo MD          QUERY TEXT:    Patient admitted with anemia. Noted conflicting documentation regarding CHF. Dr Ja Velasquez indicated diastolic heart failure in PN on  and Dr Ramón Roth says well-preserved EF and really no symptoms of heart failure in CN on  and Dr Nazanin Street states she has a cardiomyopathy but it is hard to call her CHF in PN on . If possible, please document in progress notes and discharge summary if you are evaluating and /or treating any of the following: The medical record reflects the following:  Risk Factors: cardiomyopathy, volume overload    Clinical Indicators:    - [facility]  Overall I would say she has a cardiomyopathy but it is hard to call her congestive heart failure. I do not think that echo is reliable indicator of diastolic dysfunction in general.  Clinically this is mainly volume overload due to her renal and liver disease and thus a hypertensive nonischemic cardiomyopathy is our diagnosis    Echo -  ? LV: Calculated LVEF is 48%. Normal cavity size. Moderate concentric hypertrophy. Moderately reduced systolic function. Severe (grade 3) left ventricular diastolic dysfunction. ? LA: Moderately dilated left atrium. ? RV: Mildly dilated right ventricle. ? RA: Moderately dilated right atrium. ? IAS: No atrial septal defect present. ? MV: Mild mitral valve regurgitation is present. ? TV: Moderate to severe tricuspid valve regurgitation is present. ? PV: Mild pulmonic valve regurgitation is present. ? IVC: Severely elevated central venous pressure (15+ mmHg); IVC diameter is larger than 21 mm and collapses less than 50% with respiration. ? Pericardium: Small pericardial effusion.     Treatment: Cards consult; Echo    Thank you,  Marveen Brunner, RN, BSN  Clinical   [phone]  Options provided:  -- Diastolic CHF confirmed  -- Diastolic CHF ruled out  -- Other - I will add my own diagnosis  -- Disagree - Not applicable / Not valid  -- Disagree - Clinically unable to determine / Unknown  -- Refer to Clinical Documentation Reviewer    PROVIDER RESPONSE TEXT:    After study, diastolic CHF confirmed.     Query created by: Constantin Winston on 11/23/2020 2:42 PM      Electronically signed by:  Bartolome Haskins MD 11/23/2020 2:47 PM

## 2020-11-23 NOTE — PROGRESS NOTES
Transition of Care Plan  RUR 24%    Liver Biopsy tomorrow-- liver cirrhosis   Cardiology and Hepatology following     Disposition  Home with family--daughter [facility]  Pierron Daughter [phone]  Kylie Santos  Daughter [phone]  Kevin Mike  Daughter [phone]     Medical follow up    1. ESRD--Resume  Hemodialysis --[facility]uf[facility] Pranay ([facility])  TTS  Family transports   2. PCP  [facility]     Note:  Patient lives with her daughter, Kevin Mike. She has 8 adult children with 7 living close by and one in [address] [place] [address]. Patient told CM that one of her children will transport her home.        CM will provide 2nd Medicare letter to patient when discharged

## 2020-11-23 NOTE — PROGRESS NOTES
1100: Spoke with IR, and plan for liver biopsy 11/24. Patient resumed on renal diet. Problem: Falls - Risk of  Goal: *Absence of Falls  Description: Document Bryant Rodriguez Fall Risk and appropriate interventions in the flowsheet.   11/23/2020 1000 by Zina Hence  Outcome: Progressing Towards Goal  Note: Fall Risk Interventions:  Mobility Interventions: Communicate number of staff needed for ambulation/transfer, OT consult for ADLs, Patient to call before getting OOB, PT Consult for mobility concerns, PT Consult for assist device competence, Utilize walker, cane, or other assistive device    Mentation Interventions: Adequate sleep, hydration, pain control, Door open when patient unattended, Evaluate medications/consider consulting pharmacy, Family/sitter at bedside, Increase mobility, More frequent rounding, Room close to nurse's station, Toileting rounds    Medication Interventions: Evaluate medications/consider consulting pharmacy, Patient to call before getting OOB, Teach patient to arise slowly    Elimination Interventions: Call light in reach, Elevated toilet seat, Patient to call for help with toileting needs, Toileting schedule/hourly rounds, Toilet paper/wipes in reach, Stay With Me (per policy)           23/25/2384 0847 by Tranquillity Hence  Outcome: Progressing Towards Goal  Note: Fall Risk Interventions:  Mobility Interventions: Communicate number of staff needed for ambulation/transfer, OT consult for ADLs, Patient to call before getting OOB, PT Consult for mobility concerns, PT Consult for assist device competence, Utilize walker, cane, or other assistive device    Mentation Interventions: Adequate sleep, hydration, pain control, Door open when patient unattended, Evaluate medications/consider consulting pharmacy, Family/sitter at bedside, Increase mobility, More frequent rounding, Room close to nurse's station, Toileting rounds    Medication Interventions: Evaluate medications/consider consulting pharmacy, Patient to call before getting OOB, Teach patient to arise slowly    Elimination Interventions: Call light in reach, Elevated toilet seat, Patient to call for help with toileting needs, Toileting schedule/hourly rounds, Toilet paper/wipes in reach, Stay With Me (per policy)              Problem: Acute Renal Failure: Discharge Outcomes  Goal: *Optimal pain control at patient's stated goal  Outcome: Progressing Towards Goal  Goal: *Urinary output within identified parameters  Outcome: Progressing Towards Goal  Goal: *Hemodynamically stable  Outcome: Progressing Towards Goal  Goal: *Tolerating diet  Outcome: Progressing Towards Goal

## 2020-11-23 NOTE — PROGRESS NOTES
Problem: Falls - Risk of  Goal: *Absence of Falls  Description: Document Cely Mandelann Fall Risk and appropriate interventions in the flowsheet. Outcome: Progressing Towards Goal  Note: Fall Risk Interventions:  Mobility Interventions: Communicate number of staff needed for ambulation/transfer, OT consult for ADLs, Patient to call before getting OOB, PT Consult for mobility concerns, PT Consult for assist device competence, Utilize walker, cane, or other assistive device    Mentation Interventions: Adequate sleep, hydration, pain control, Door open when patient unattended, Evaluate medications/consider consulting pharmacy, Family/sitter at bedside, Increase mobility, More frequent rounding, Room close to nurse's station, Toileting rounds    Medication Interventions: Evaluate medications/consider consulting pharmacy, Patient to call before getting OOB, Teach patient to arise slowly    Elimination Interventions: Call light in reach, Elevated toilet seat, Patient to call for help with toileting needs, Toileting schedule/hourly rounds, Toilet paper/wipes in reach, Stay With Me (per policy)              Problem: Pain  Goal: *Control of Pain  Outcome: Progressing Towards Goal     Problem: Pressure Injury - Risk of  Goal: *Prevention of pressure injury  Description: Document Vic Scale and appropriate interventions in the flowsheet. Outcome: Progressing Towards Goal  Note: Pressure Injury Interventions:  Sensory Interventions: Assess need for specialty bed, Chair cushion, Check visual cues for pain, Discuss PT/OT consult with provider, Float heels, Keep linens dry and wrinkle-free, Maintain/enhance activity level, Minimize linen layers, Monitor skin under medical devices, Pad between skin to skin, Pressure redistribution bed/mattress (bed type), Turn and reposition approx.  every two hours (pillows and wedges if needed)    Moisture Interventions: Apply protective barrier, creams and emollients, Absorbent underpads, Assess need for specialty bed, Check for incontinence Q2 hours and as needed, Limit adult briefs, Maintain skin hydration (lotion/cream), Minimize layers    Activity Interventions: Chair cushion, Increase time out of bed, Pressure redistribution bed/mattress(bed type), PT/OT evaluation    Mobility Interventions: Chair cushion, Float heels, HOB 30 degrees or less, Pressure redistribution bed/mattress (bed type), PT/OT evaluation, Turn and reposition approx.  every two hours(pillow and wedges)    Nutrition Interventions: Document food/fluid/supplement intake    Friction and Shear Interventions: Feet elevated on foot rest, HOB 30 degrees or less, Lift sheet, Lift team/patient mobility team, Minimize layers, Transferring/repositioning devices                Problem: Heart Failure: Day 5  Goal: Activity/Safety  Outcome: Progressing Towards Goal  Goal: Diagnostic Test/Procedures  Outcome: Progressing Towards Goal  Goal: Nutrition/Diet  Outcome: Progressing Towards Goal  Goal: Discharge Planning  Outcome: Progressing Towards Goal     Problem: Acute Renal Failure: Discharge Outcomes  Goal: *Optimal pain control at patient's stated goal  Outcome: Progressing Towards Goal

## 2020-11-23 NOTE — PROGRESS NOTES
[address]  [address], [address]  [phone]                GI PROGRESS NOTE      NAME:   Solitario Fenton   :    1950   MRN:    [id number]     Assessment/Plan   Anemia - multifactorial - GIB, ESRD, Cirrhosis  -Hemoglobin 6.4 on admission, transfused - 7.3 this morning  -Continue to monitor H&H, transfuse prn for Hgb < 7.0  -BID PPI with Protonix  -EGD 10/21/20 Dr. Rebeca Knapp - oozing in stomach, treated with APC  -M2A 10/22/20 - multiple non-bleeding SB AVMs  -Patient reports subsequent GI evaluation with EGD, enteroscopy and colonoscopy at the [facility] post-discharge from here in October  Patient declines repeat EGD during this admission. GI signing off. Please call if needed again during this admission. Liver disease/Cirhosis/Ascites:  -Abdominal ultrasound 20: Increased echogenicity of the kidneys with renal atrophy suggestive of medical renal disease Ascites.   -Paracentesis 20 :Successful ultrasound guided pigtail paracentesis catheter, ongoing drainage to gravity bag  -Dr. Remy Echeverria, hepatology now following        ESRD, diabetes, COVID 19 negative, hypertension, chronic pain, diabetes  -management per hospitalist   -nephrology following   -patient receives dialysis [facility] , Saturday- received dialysis yesterday        Patient Active Problem List   Diagnosis Code    GI bleed K92.2    Diabetes mellitus type 2, controlled ([address] E11.9    Anemia in chronic kidney disease N18.9, D63.1    Cirrhosis ([address] K74.60    Acute blood loss anemia D62    Dizziness R42    Generalized weakness R53.1    Rectal bleed K62.5    Symptomatic anemia D64.9    Severe anemia D64.9    Anemia D64.9    Dependence on renal dialysis ([address] Z99.2    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, or end stage renal disease (HCC) I13.2    Other ascites R18.8    Other chronic pain G89.29    Paroxysmal atrial fibrillation ([address] I48.0    Type 2 diabetes mellitus with hyperglycemia (HCC) E11.65    Unspecified cirrhosis of liver (HCC) K74.60    Other hypotension I95.89    HTN (hypertension) I10    ESRD (end stage renal disease) (HCC) N18.6    Suspected COVID-19 virus infection Z20.828       Subjective:     Michelle Daniels is a 69 y.o.  female   No new complaints. Going for liver biopsy today. On the phone when attempted to see twice, so was a brief conversation. Objective:     VITALS:   Last 24hrs VS reviewed since prior hospitalist progress note. Most recent are:  Visit Vitals  BP (!) 154/59 (BP 1 Location: Left arm, BP Patient Position: At rest)   Pulse 87   Temp 98.4 °F (36.9 °C)   Resp 13   Ht 5' 3\" (1.6 m)   Wt 74.6 kg (164 lb 7.4 oz)   SpO2 95%   BMI 29.13 kg/m²     No intake or output data in the 24 hours ending 11/23/20 1205     PHYSICAL EXAM:  General:          Alert, cooperative, no acute distress    HEENT:           NC, Atraumatic.  Anicteric sclerae. Abdomen:        Non-distended  Neurologic:      Alert and oriented       Lab Data   Recent Results (from the past 12 hour(s))   CBC WITH AUTOMATED DIFF    Collection Time: 11/23/20  3:40 AM   Result Value Ref Range    WBC 5.2 3.6 - 11.0 K/uL    RBC 2.60 (L) 3.80 - 5.20 M/uL    HGB 7.3 (L) 11.5 - 16.0 g/dL    HCT 25.4 (L) 35.0 - 47.0 %    MCV 97.7 80.0 - 99.0 FL    MCH 28.1 26.0 - 34.0 PG    MCHC 28.7 (L) 30.0 - 36.5 g/dL    RDW 18.2 (H) 11.5 - 14.5 %    PLATELET 626 (L) 329 - 400 K/uL    MPV 11.3 8.9 - 12.9 FL    NRBC 0.0 0  WBC    ABSOLUTE NRBC 0.00 0.00 - 0.01 K/uL    NEUTROPHILS 69 32 - 75 %    LYMPHOCYTES 15 12 - 49 %    MONOCYTES 10 5 - 13 %    EOSINOPHILS 4 0 - 7 %    BASOPHILS 1 0 - 1 %    IMMATURE GRANULOCYTES 1 (H) 0.0 - 0.5 %    ABS. NEUTROPHILS 3.5 1.8 - 8.0 K/UL    ABS. LYMPHOCYTES 0.8 0.8 - 3.5 K/UL    ABS. MONOCYTES 0.5 0.0 - 1.0 K/UL    ABS. EOSINOPHILS 0.2 0.0 - 0.4 K/UL    ABS. BASOPHILS 0.1 0.0 - 0.1 K/UL    ABS. IMM.  GRANS. 0.1 (H) 0.00 - 0.04 K/UL    DF SMEAR SCANNED      PLATELET COMMENTS Large Platelets      RBC COMMENTS ANISOCYTOSIS  1+        RBC COMMENTS MACROCYTOSIS  1+        RBC COMMENTS OVALOCYTES  PRESENT       METABOLIC PANEL, BASIC    Collection Time: 11/23/20  3:40 AM   Result Value Ref Range    Sodium 141 136 - 145 mmol/L    Potassium 5.3 (H) 3.5 - 5.1 mmol/L    Chloride 108 97 - 108 mmol/L    CO2 27 21 - 32 mmol/L    Anion gap 6 5 - 15 mmol/L    Glucose 169 (H) 65 - 100 mg/dL    BUN 49 (H) 6 - 20 MG/DL    Creatinine 5.22 (H) 0.55 - 1.02 MG/DL    BUN/Creatinine ratio 9 (L) 12 - 20      GFR est AA 10 (L) >60 ml/min/1.73m2    GFR est non-AA 8 (L) >60 ml/min/1.73m2    Calcium 9.6 8.5 - 10.1 MG/DL       I have personally reviewed the history and independently examined the patient. I have reviewed the chart and agree with the documentation recorded by the Mid Level Provider, including the assessment, treatment plan, and disposition.     Nellie Kaplan MD

## 2020-11-24 ENCOUNTER — HOSPITAL ENCOUNTER (OUTPATIENT)
Dept: INTERVENTIONAL RADIOLOGY/VASCULAR | Age: 70
Discharge: HOME | DRG: 377 | End: 2020-11-24
Attending: INTERNAL MEDICINE
Payer: MEDICARE

## 2020-11-24 VITALS
HEART RATE: 89 BPM | SYSTOLIC BLOOD PRESSURE: 153 MMHG | RESPIRATION RATE: 14 BRPM | DIASTOLIC BLOOD PRESSURE: 65 MMHG | OXYGEN SATURATION: 100 %

## 2020-11-24 VITALS
RESPIRATION RATE: 15 BRPM | SYSTOLIC BLOOD PRESSURE: 136 MMHG | HEART RATE: 78 BPM | DIASTOLIC BLOOD PRESSURE: 61 MMHG | OXYGEN SATURATION: 98 %

## 2020-11-24 LAB
ANION GAP SERPL CALC-SCNC: 7 MMOL/L (ref 5–15)
BASOPHILS # BLD: 0.1 K/UL (ref 0–0.1)
BASOPHILS NFR BLD: 1 % (ref 0–1)
BUN SERPL-MCNC: 68 MG/DL (ref 6–20)
BUN/CREAT SERPL: 10 (ref 12–20)
CALCIUM SERPL-MCNC: 9.3 MG/DL (ref 8.5–10.1)
CHLORIDE SERPL-SCNC: 107 MMOL/L (ref 97–108)
CO2 SERPL-SCNC: 26 MMOL/L (ref 21–32)
CREAT SERPL-MCNC: 6.61 MG/DL (ref 0.55–1.02)
DIFFERENTIAL METHOD BLD: ABNORMAL
EOSINOPHIL # BLD: 0.2 K/UL (ref 0–0.4)
EOSINOPHIL NFR BLD: 4 % (ref 0–7)
ERYTHROCYTE [DISTWIDTH] IN BLOOD BY AUTOMATED COUNT: 18.2 % (ref 11.5–14.5)
GLUCOSE SERPL-MCNC: 154 MG/DL (ref 65–100)
HCT VFR BLD AUTO: 23.6 % (ref 35–47)
HGB BLD-MCNC: 6.7 G/DL (ref 11.5–16)
HISTORY CHECKED?,CKHIST: NORMAL
IMM GRANULOCYTES # BLD AUTO: 0.1 K/UL (ref 0–0.04)
IMM GRANULOCYTES NFR BLD AUTO: 1 % (ref 0–0.5)
LYMPHOCYTES # BLD: 0.8 K/UL (ref 0.8–3.5)
LYMPHOCYTES NFR BLD: 14 % (ref 12–49)
MCH RBC QN AUTO: 28.2 PG (ref 26–34)
MCHC RBC AUTO-ENTMCNC: 28.4 G/DL (ref 30–36.5)
MCV RBC AUTO: 99.2 FL (ref 80–99)
MONOCYTES # BLD: 0.6 K/UL (ref 0–1)
MONOCYTES NFR BLD: 10 % (ref 5–13)
NEUTS SEG # BLD: 3.9 K/UL (ref 1.8–8)
NEUTS SEG NFR BLD: 70 % (ref 32–75)
NRBC # BLD: 0 K/UL (ref 0–0.01)
NRBC BLD-RTO: 0 PER 100 WBC
PLATELET # BLD AUTO: 145 K/UL (ref 150–400)
PMV BLD AUTO: 11.2 FL (ref 8.9–12.9)
POTASSIUM SERPL-SCNC: 5.9 MMOL/L (ref 3.5–5.1)
RBC # BLD AUTO: 2.38 M/UL (ref 3.8–5.2)
RBC MORPH BLD: ABNORMAL
SODIUM SERPL-SCNC: 140 MMOL/L (ref 136–145)
WBC # BLD AUTO: 5.7 K/UL (ref 3.6–11)

## 2020-11-24 PROCEDURE — 4A040B1 MEASUREMENT OF VENOUS PRESSURE, PERIPHERAL, OPEN APPROACH: ICD-10-PCS | Performed by: RADIOLOGY

## 2020-11-24 PROCEDURE — 74011000258 HC RX REV CODE- 258: Performed by: INTERNAL MEDICINE

## 2020-11-24 PROCEDURE — 88307 TISSUE EXAM BY PATHOLOGIST: CPT

## 2020-11-24 PROCEDURE — 2709999900 HC NON-CHARGEABLE SUPPLY

## 2020-11-24 PROCEDURE — 88313 SPECIAL STAINS GROUP 2: CPT

## 2020-11-24 PROCEDURE — C1894 INTRO/SHEATH, NON-LASER: HCPCS

## 2020-11-24 PROCEDURE — 99232 SBSQ HOSP IP/OBS MODERATE 35: CPT | Performed by: SPECIALIST

## 2020-11-24 PROCEDURE — 74011250636 HC RX REV CODE- 250/636: Performed by: RADIOLOGY

## 2020-11-24 PROCEDURE — 86922 COMPATIBILITY TEST ANTIGLOB: CPT

## 2020-11-24 PROCEDURE — 75889 VEIN X-RAY LIVER W/HEMODYNAM: CPT

## 2020-11-24 PROCEDURE — 94760 N-INVAS EAR/PLS OXIMETRY 1: CPT

## 2020-11-24 PROCEDURE — 74011250637 HC RX REV CODE- 250/637: Performed by: INTERNAL MEDICINE

## 2020-11-24 PROCEDURE — P9016 RBC LEUKOCYTES REDUCED: HCPCS

## 2020-11-24 PROCEDURE — 85025 COMPLETE CBC W/AUTO DIFF WBC: CPT

## 2020-11-24 PROCEDURE — 36415 COLL VENOUS BLD VENIPUNCTURE: CPT

## 2020-11-24 PROCEDURE — 77030004514

## 2020-11-24 PROCEDURE — 75970 VASCULAR BIOPSY: CPT

## 2020-11-24 PROCEDURE — 74011000636 HC RX REV CODE- 636: Performed by: RADIOLOGY

## 2020-11-24 PROCEDURE — 74011250637 HC RX REV CODE- 250/637: Performed by: NURSE PRACTITIONER

## 2020-11-24 PROCEDURE — 65660000000 HC RM CCU STEPDOWN

## 2020-11-24 PROCEDURE — 86921 COMPATIBILITY TEST INCUBATE: CPT

## 2020-11-24 PROCEDURE — C1769 GUIDE WIRE: HCPCS

## 2020-11-24 PROCEDURE — 74011250636 HC RX REV CODE- 250/636: Performed by: INTERNAL MEDICINE

## 2020-11-24 PROCEDURE — 99233 SBSQ HOSP IP/OBS HIGH 50: CPT | Performed by: HOSPITALIST

## 2020-11-24 PROCEDURE — 74011250637 HC RX REV CODE- 250/637: Performed by: HOSPITALIST

## 2020-11-24 PROCEDURE — 74011000250 HC RX REV CODE- 250: Performed by: RADIOLOGY

## 2020-11-24 PROCEDURE — 0FB13ZX EXCISION OF RIGHT LOBE LIVER, PERCUTANEOUS APPROACH, DIAGNOSTIC: ICD-10-PCS | Performed by: RADIOLOGY

## 2020-11-24 PROCEDURE — 77030037218

## 2020-11-24 PROCEDURE — B51T1ZZ FLUOROSCOPY OF PORTAL AND SPLANCHNIC VEINS USING LOW OSMOLAR CONTRAST: ICD-10-PCS | Performed by: RADIOLOGY

## 2020-11-24 PROCEDURE — 80048 BASIC METABOLIC PNL TOTAL CA: CPT

## 2020-11-24 RX ORDER — SODIUM CHLORIDE 9 MG/ML
250 INJECTION, SOLUTION INTRAVENOUS AS NEEDED
Status: DISCONTINUED | OUTPATIENT
Start: 2020-11-24 | End: 2020-12-01 | Stop reason: HOSPADM

## 2020-11-24 RX ORDER — SODIUM CHLORIDE 0.9 % (FLUSH) 0.9 %
10 SYRINGE (ML) INJECTION AS NEEDED
Status: DISCONTINUED | OUTPATIENT
Start: 2020-11-24 | End: 2020-11-24

## 2020-11-24 RX ORDER — FENTANYL CITRATE 50 UG/ML
200 INJECTION, SOLUTION INTRAMUSCULAR; INTRAVENOUS
Status: DISCONTINUED | OUTPATIENT
Start: 2020-11-24 | End: 2020-11-24

## 2020-11-24 RX ORDER — LIDOCAINE HYDROCHLORIDE 20 MG/ML
20 INJECTION, SOLUTION INFILTRATION; PERINEURAL ONCE
Status: COMPLETED | OUTPATIENT
Start: 2020-11-24 | End: 2020-11-24

## 2020-11-24 RX ORDER — MIDAZOLAM HYDROCHLORIDE 1 MG/ML
5 INJECTION, SOLUTION INTRAMUSCULAR; INTRAVENOUS
Status: DISCONTINUED | OUTPATIENT
Start: 2020-11-24 | End: 2020-11-24

## 2020-11-24 RX ORDER — SODIUM CHLORIDE 9 MG/ML
500 INJECTION, SOLUTION INTRAVENOUS CONTINUOUS
Status: DISCONTINUED | OUTPATIENT
Start: 2020-11-24 | End: 2020-11-24

## 2020-11-24 RX ADMIN — FERROUS SULFATE TAB 325 MG (65 MG ELEMENTAL FE) 325 MG: 325 (65 FE) TAB at 18:14

## 2020-11-24 RX ADMIN — OXYCODONE HYDROCHLORIDE 5 MG: 5 TABLET ORAL at 01:04

## 2020-11-24 RX ADMIN — SODIUM CHLORIDE 500 ML: 900 INJECTION, SOLUTION INTRAVENOUS at 14:45

## 2020-11-24 RX ADMIN — LIDOCAINE HYDROCHLORIDE 10 ML: 20 INJECTION, SOLUTION INFILTRATION; PERINEURAL at 15:52

## 2020-11-24 RX ADMIN — EPOETIN ALFA-EPBX 20000 UNITS: 10000 INJECTION, SOLUTION INTRAVENOUS; SUBCUTANEOUS at 21:20

## 2020-11-24 RX ADMIN — KETOTIFEN FUMARATE 1 DROP: 0.35 SOLUTION/ DROPS OPHTHALMIC at 08:14

## 2020-11-24 RX ADMIN — FENTANYL CITRATE 50 MCG: 50 INJECTION, SOLUTION INTRAMUSCULAR; INTRAVENOUS at 15:50

## 2020-11-24 RX ADMIN — CARBOXYMETHYLCELLULOSE SODIUM 1 DROP: 5 SOLUTION/ DROPS OPHTHALMIC at 08:13

## 2020-11-24 RX ADMIN — OXYCODONE HYDROCHLORIDE 5 MG: 5 TABLET ORAL at 18:20

## 2020-11-24 RX ADMIN — SEVELAMER CARBONATE 1600 MG: 800 TABLET, FILM COATED ORAL at 18:14

## 2020-11-24 RX ADMIN — IOPAMIDOL 30 ML: 612 INJECTION, SOLUTION INTRAVENOUS at 15:51

## 2020-11-24 RX ADMIN — KETOTIFEN FUMARATE 1 DROP: 0.35 SOLUTION/ DROPS OPHTHALMIC at 19:17

## 2020-11-24 RX ADMIN — MIDAZOLAM HYDROCHLORIDE 0.5 MG: 1 INJECTION, SOLUTION INTRAMUSCULAR; INTRAVENOUS at 16:21

## 2020-11-24 RX ADMIN — PANTOPRAZOLE SODIUM 40 MG: 40 TABLET, DELAYED RELEASE ORAL at 18:14

## 2020-11-24 RX ADMIN — FENTANYL CITRATE 25 MCG: 50 INJECTION, SOLUTION INTRAMUSCULAR; INTRAVENOUS at 16:21

## 2020-11-24 RX ADMIN — CARBOXYMETHYLCELLULOSE SODIUM 1 DROP: 5 SOLUTION/ DROPS OPHTHALMIC at 18:14

## 2020-11-24 RX ADMIN — CARBOXYMETHYLCELLULOSE SODIUM 2 DROP: 5 SOLUTION/ DROPS OPHTHALMIC at 21:20

## 2020-11-24 RX ADMIN — OXYCODONE HYDROCHLORIDE 5 MG: 5 TABLET ORAL at 12:47

## 2020-11-24 RX ADMIN — OXYCODONE HYDROCHLORIDE 5 MG: 5 TABLET ORAL at 08:13

## 2020-11-24 RX ADMIN — MIDAZOLAM HYDROCHLORIDE 1 MG: 1 INJECTION, SOLUTION INTRAMUSCULAR; INTRAVENOUS at 16:04

## 2020-11-24 NOTE — PROGRESS NOTES
Bedside and Verbal shift change report given to Cachorro Boss RN (oncoming nurse) by Ludmila Joyce RN (offgoing nurse). Report included the following information SBAR, Kardex, Intake/Output, MAR, Recent Results, Med Rec Status and Cardiac Rhythm SR w/ BBB.

## 2020-11-24 NOTE — PROGRESS NOTES
[facility]   [address] [facility], [address] [place] [address], [facility]  Phone: [phone]   [phone]       Nephrology Progress Note  Osmin Lucio Fenton     [birth year]     [id number]  Date of Admission : 11/17/2020 11/24/20    CC: Follow up for ESRD       Assessment and Plan   ESRD- HD  - Dialyzes TTS at [address] dialysis in [place], [place]  - HD today  - midodrine w/ HD     Cirrhosis of Liver : ? Cause   Chronic HFrEF   - s/p Paracentesis this admission     Anemia in CKD :   -  continue epogen - 20 K units w/ HD     Blood loss anemia : On last admission   - EGD: antral gastritis and erythema   - Capsule study : non bleeding small bowel AVMs noted   - to have PRBCs today w/ HD     [facility]   - continue Phos binders       Interval History:  Seen and examined. Will be getting dialysis shortly.  hgb 6.4. To get PRBCs w/ HD. No cp, sob, n/v/d.  C/o fatigue. Review of Systems: A comprehensive review of systems was negative except for that written in the HPI.     Current Medications:   Current Facility-Administered Medications   Medication Dose Route Frequency    0.9% sodium chloride infusion 250 mL  250 mL IntraVENous PRN    calcium gluconate 1 g in 0.9% sodium chloride 100 mL IVPB  1 g IntraVENous ONCE    polyethylene glycol (MIRALAX) packet 17 g  17 g Oral DAILY PRN    diphenhydrAMINE (BENADRYL) capsule 25 mg  25 mg Oral Q6H PRN    albuterol-ipratropium (DUO-NEB) 2.5 MG-0.5 MG/3 ML  3 mL Nebulization Q4H PRN    alum-mag hydroxide-simeth (MYLANTA) oral suspension 30 mL  30 mL Oral Q4H PRN    ascorbic acid (vitamin C) (VITAMIN C) tablet 500 mg  500 mg Oral DAILY    cholecalciferol (VITAMIN D3) (1000 Units /25 mcg) tablet 2 Tab  2,000 Units Oral DAILY    ferrous sulfate tablet 325 mg  325 mg Oral TID WITH MEALS    ketotifen (ZADITOR) 0.025 % (0.035 %) ophthalmic solution 1 Drop  1 Drop Both Eyes BID    carboxymethylcellulose sodium (REFRESH PLUS) 0.5 % ophthalmic solution 1-2 Drop  1-2 Drop Both Eyes TID    prochlorperazine (COMPAZINE) with saline injection 10 mg  10 mg IntraVENous Q6H PRN    morphine injection 2 mg  2 mg IntraVENous Q4H PRN    0.9% sodium chloride infusion 250 mL  250 mL IntraVENous PRN    epoetin reshma-epbx (RETACRIT) injection 20,000 Units  20,000 Units SubCUTAneous Q TUE, THU & SAT    0.9% sodium chloride infusion 250 mL  250 mL IntraVENous PRN    pantoprazole (PROTONIX) tablet 40 mg  40 mg Oral BID    sevelamer carbonate (RENVELA) tab 1,600 mg  1,600 mg Oral TID WITH MEALS    acetaminophen (TYLENOL) tablet 650 mg  650 mg Oral Q4H PRN    oxyCODONE IR (ROXICODONE) tablet 5 mg  5 mg Oral Q4H PRN      Allergies   Allergen Reactions    Aleve [Naproxen Sodium] Itching, Swelling and Other (comments)    Amlodipine Rash, Hives and Itching    Aspirin Other (comments), Nausea Only and Unknown (comments)     GI bleed  GI bleeding      Heparin Unknown (comments)     bleeding      Ibuprofen Nausea and Vomiting    Metformin Other (comments)     swelling       Objective:  Vitals:    Vitals:    11/24/20 0830 11/24/20 0845 11/24/20 0901 11/24/20 0915   BP: (!) 131/46 (!) 131/53 (!) 110/37 (!) 115/46   Pulse: 75 80 84 83   Resp: 16 16 16 16   Temp: 98.3 °F (36.8 °C)      TempSrc: Oral      SpO2:       Weight:       Height:         Intake and Output:  No intake/output data recorded. No intake/output data recorded. Physical Examination:    General: No distress   Neck:  RIJ permacath   Resp:  Clear bilaterally  CV:  RRR,  no murmur or rub, trace+ LE edema  GI:  Soft, NT, + ascites drain  Neurologic:  Non focal  Psych:             AAO x 3 appropriate affect  Skin:  No Rash    []    High complexity decision making was performed  []    Patient is at high-risk of decompensation with multiple organ involvement    Lab Data Personally Reviewed: I have reviewed all the pertinent labs, microbiology data and radiology studies during assessment.     Recent Labs     11/24/20  0103 11/23/20  0340 11/22/20  0121    141 139   K 5.9* 5.3* 4.5    108 106   CO2 26 27 28   * 169* 213*   BUN 68* 49* 27*   CREA 6.61* 5.22* 3.72*   CA 9.3 9.6 9.2     Recent Labs     11/24/20  0103 11/23/20  0340 11/22/20  0121   WBC 5.7 5.2 4.8   HGB 6.7* 7.3* 7.3*   HCT 23.6* 25.4* 25.3*   * 138* 127*     No results found for: SDES  Lab Results   Component Value Date/Time    Culture result: NO GROWTH 4 DAYS 11/19/2020 02:50 PM    Culture result: No growth 5 days 10/18/2020 07:09 AM     Recent Results (from the past 24 hour(s))   CBC WITH AUTOMATED DIFF    Collection Time: 11/24/20  1:03 AM   Result Value Ref Range    WBC 5.7 3.6 - 11.0 K/uL    RBC 2.38 (L) 3.80 - 5.20 M/uL    HGB 6.7 (L) 11.5 - 16.0 g/dL    HCT 23.6 (L) 35.0 - 47.0 %    MCV 99.2 (H) 80.0 - 99.0 FL    MCH 28.2 26.0 - 34.0 PG    MCHC 28.4 (L) 30.0 - 36.5 g/dL    RDW 18.2 (H) 11.5 - 14.5 %    PLATELET 071 (L) 288 - 400 K/uL    MPV 11.2 8.9 - 12.9 FL    NRBC 0.0 0  WBC    ABSOLUTE NRBC 0.00 0.00 - 0.01 K/uL    NEUTROPHILS 70 32 - 75 %    LYMPHOCYTES 14 12 - 49 %    MONOCYTES 10 5 - 13 %    EOSINOPHILS 4 0 - 7 %    BASOPHILS 1 0 - 1 %    IMMATURE GRANULOCYTES 1 (H) 0.0 - 0.5 %    ABS. NEUTROPHILS 3.9 1.8 - 8.0 K/UL    ABS. LYMPHOCYTES 0.8 0.8 - 3.5 K/UL    ABS. MONOCYTES 0.6 0.0 - 1.0 K/UL    ABS. EOSINOPHILS 0.2 0.0 - 0.4 K/UL    ABS. BASOPHILS 0.1 0.0 - 0.1 K/UL    ABS. IMM.  GRANS. 0.1 (H) 0.00 - 0.04 K/UL    DF SMEAR SCANNED      RBC COMMENTS ANISOCYTOSIS  1+        RBC COMMENTS MACROCYTOSIS  1+        RBC COMMENTS OVALOCYTES  1+        RBC COMMENTS SCHISTOCYTES  1+        RBC COMMENTS POLYCHROMASIA  PRESENT        RBC COMMENTS MICROCYTOSIS  1+       METABOLIC PANEL, BASIC    Collection Time: 11/24/20  1:03 AM   Result Value Ref Range    Sodium 140 136 - 145 mmol/L    Potassium 5.9 (H) 3.5 - 5.1 mmol/L    Chloride 107 97 - 108 mmol/L    CO2 26 21 - 32 mmol/L    Anion gap 7 5 - 15 mmol/L    Glucose 154 (H) 65 - 100 mg/dL BUN 68 (H) 6 - 20 MG/DL    Creatinine 6.61 (H) 0.55 - 1.02 MG/DL    BUN/Creatinine ratio 10 (L) 12 - 20      GFR est AA 7 (L) >60 ml/min/1.73m2    GFR est non-AA 6 (L) >60 ml/min/1.73m2    Calcium 9.3 8.5 - 10.1 MG/DL   RBC, ALLOCATE    Collection Time: 11/24/20  7:45 AM   Result Value Ref Range    HISTORY CHECKED? Historical check performed            Total time spent with patient:  xxx   min. Care Plan discussed with:  Patient     Family      RN      Consulting Physician [address],         I have reviewed the flowsheets. Chart and Pertinent Notes have been reviewed. No change in PMH ,family and social history from Consult note.       Hu Philippe MD

## 2020-11-24 NOTE — H&P
Interventional and Vascular Radiology History and Physical    Patient: Aparna Spray 69 y.o. female       Chief Complaint: No chief complaint on file.       History of Present Illness: ascites     History:    Past Medical History:   Diagnosis Date    Arthritis     Asthma     Chronic kidney disease     Chronic pain     Cirrhosis ([facility] 12/17/2017    Diabetes (HCC) diet controlled    GERD (gastroesophageal reflux disease)     Hypertension     Paroxysmal atrial fibrillation ([facility] 10/6/2020     Family History   Family history unknown: Yes     Social History     Socioeconomic History    Marital status: SINGLE     Spouse name: Not on file    Number of children: Not on file    Years of education: Not on file    Highest education level: Not on file   Occupational History    Not on file   Social Needs    Financial resource strain: Not on file    Food insecurity     Worry: Not on file     Inability: Not on file    Transportation needs     Medical: Not on file     Non-medical: Not on file   Tobacco Use    Smoking status: Never Smoker    Smokeless tobacco: Never Used   Substance and Sexual Activity    Alcohol use: No    Drug use: No    Sexual activity: Not on file   Lifestyle    Physical activity     Days per week: Not on file     Minutes per session: Not on file    Stress: Not on file   Relationships    Social connections     Talks on phone: Not on file     Gets together: Not on file     Attends Rastafari service: Not on file     Active member of club or organization: Not on file     Attends meetings of clubs or organizations: Not on file     Relationship status: Not on file    Intimate partner violence     Fear of current or ex partner: Not on file     Emotionally abused: Not on file     Physically abused: Not on file     Forced sexual activity: Not on file   Other Topics Concern   [address] Service Not Asked    Blood Transfusions Yes    Caffeine Concern Not Asked    Occupational Exposure Not Asked   Charolet Speak Hazards Not Asked    Sleep Concern Not Asked    Stress Concern Not Asked    Weight Concern Not Asked    Special Diet Not Asked    Back Care Not Asked    Exercise Not Asked    Bike Helmet Not Asked   [address] Not Asked    Self-Exams Not Asked   Social History Narrative    Not on file       Allergies: Allergies   Allergen Reactions    Aleve [Naproxen Sodium] Itching, Swelling and Other (comments)    Amlodipine Rash, Hives and Itching    Aspirin Other (comments), Nausea Only and Unknown (comments)     GI bleed  GI bleeding      Heparin Unknown (comments)     bleeding      Ibuprofen Nausea and Vomiting    Metformin Other (comments)     swelling       Current Medications:  Current Facility-Administered Medications   Medication Dose Route Frequency    lidocaine (XYLOCAINE) 20 mg/mL (2 %) injection 400 mg  20 mL IntraDERMal ONCE    iopamidoL (ISOVUE 300) 61 % contrast injection 100 mL  100 mL IntraCATHeter RAD ONCE     No current outpatient medications on file.      Facility-Administered Medications Ordered in Other Encounters   Medication Dose Route Frequency    0.9% sodium chloride infusion 250 mL  250 mL IntraVENous PRN    calcium gluconate 1 g in 0.9% sodium chloride 100 mL IVPB  1 g IntraVENous ONCE    midazolam (PF) (VERSED) injection 5 mg  5 mg IntraVENous Rad Multiple    0.9% sodium chloride infusion 500 mL  500 mL IntraVENous CONTINUOUS    saline peripheral flush soln 10 mL  10 mL InterCATHeter PRN    fentaNYL citrate (PF) injection 200 mcg  200 mcg IntraVENous Multiple    polyethylene glycol (MIRALAX) packet 17 g  17 g Oral DAILY PRN    diphenhydrAMINE (BENADRYL) capsule 25 mg  25 mg Oral Q6H PRN    albuterol-ipratropium (DUO-NEB) 2.5 MG-0.5 MG/3 ML  3 mL Nebulization Q4H PRN    alum-mag hydroxide-simeth (MYLANTA) oral suspension 30 mL  30 mL Oral Q4H PRN    ascorbic acid (vitamin C) (VITAMIN C) tablet 500 mg  500 mg Oral DAILY    cholecalciferol (VITAMIN D3) (1000 Units /25 mcg) tablet 2 Tab  2,000 Units Oral DAILY    ferrous sulfate tablet 325 mg  325 mg Oral TID WITH MEALS    ketotifen (ZADITOR) 0.025 % (0.035 %) ophthalmic solution 1 Drop  1 Drop Both Eyes BID    carboxymethylcellulose sodium (REFRESH PLUS) 0.5 % ophthalmic solution 1-2 Drop  1-2 Drop Both Eyes TID    prochlorperazine (COMPAZINE) with saline injection 10 mg  10 mg IntraVENous Q6H PRN    morphine injection 2 mg  2 mg IntraVENous Q4H PRN    0.9% sodium chloride infusion 250 mL  250 mL IntraVENous PRN    epoetin reshma-epbx (RETACRIT) injection 20,000 Units  20,000 Units SubCUTAneous Q TUE, THU & SAT    0.9% sodium chloride infusion 250 mL  250 mL IntraVENous PRN    pantoprazole (PROTONIX) tablet 40 mg  40 mg Oral BID    sevelamer carbonate (RENVELA) tab 1,600 mg  1,600 mg Oral TID WITH MEALS    acetaminophen (TYLENOL) tablet 650 mg  650 mg Oral Q4H PRN    oxyCODONE IR (ROXICODONE) tablet 5 mg  5 mg Oral Q4H PRN        Physical Exam:  There were no vitals taken for this visit. LUNG: clear to auscultation bilaterally, HEART: regular rate and rhythm, S1, S2 normal, no murmur, click, rub or gallop      Alerts:    Hospital Problems  Date Reviewed: 10/6/2020    None          Laboratory:      Recent Labs     11/24/20  0103   HGB 6.7*   HCT 23.6*   WBC 5.7   *   BUN 68*   CREA 6.61*   K 5.9*         Plan of Care/Planned Procedure:  Risks, benefits, and alternatives reviewed with patient and she agrees to proceed with the procedure. Conscious sedation will be performed with IV fentanyl and versed.  Plan is for [facility] liver biopsy and pressure measurements       Elizabeth Wylie MD

## 2020-11-24 NOTE — PROGRESS NOTES
Occupational Therapy: defer     Patient currently on dialysis with blood transfusion and plans for liver biopsy later today. Will follow up as able and appropriate.     Thank you,   Trena Gonzalez OTS

## 2020-11-24 NOTE — PROGRESS NOTES
[address]  [address], [address]  [phone]                GI PROGRESS NOTE      NAME:   Cesar Fenton   :    1950   MRN:    [id number]     Assessment/Plan   Anemia - multifactorial - GIB, ESRD, Cirrhosis  -Hemoglobin 6.4 on admission, transfused - 7.3 this morning  -Continue to monitor H&H, transfuse prn for Hgb < 7.0  -BID PPI with Protonix  -EGD 10/21/20 Dr. Zabrina Cedeño - oozing in stomach, treated with APC  -M2A 10/22/20 - multiple non-bleeding SB AVMs  -Patient reports subsequent GI evaluation with EGD, enteroscopy and colonoscopy at the [facility] post-discharge from here in October    She had declined repeat EGD so we signed off yesterday. Today we were notified that she is now agreeable. She was ARNOLD when attempted to see - will see tomorrow. Will try to facilitate EGD tomorrow - NPO at midnight. Liver disease/Cirhosis/Ascites:  -Abdominal ultrasound 20: Increased echogenicity of the kidneys with renal atrophy suggestive of medical renal disease Ascites.   -Paracentesis 20 :Successful ultrasound guided pigtail paracentesis catheter, ongoing drainage to gravity bag  -Dr. Nicole Richmond, hepatology now following        ESRD, diabetes, COVID 19 negative, hypertension, chronic pain, diabetes  -management per hospitalist   -nephrology following   -patient receives dialysis [facility], Saturday- received dialysis yesterday        Patient Active Problem List   Diagnosis Code    GI bleed K92.2    Diabetes mellitus type 2, controlled ([address] E11.9    Anemia in chronic kidney disease N18.9, D63.1    Cirrhosis ([address] K74.60    Acute blood loss anemia D62    Dizziness R42    Generalized weakness R53.1    Rectal bleed K62.5    Symptomatic anemia D64.9    Severe anemia D64.9    Anemia D64.9    Dependence on renal dialysis ([address] Z99.2    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, or end stage renal disease ([facility] I13.2    Other ascites R18.8    Other chronic pain G89.29    Paroxysmal atrial fibrillation (HCC) I48.0    Type 2 diabetes mellitus with hyperglycemia (HCC) E11.65    Unspecified cirrhosis of liver (HCC) K74.60    Other hypotension I95.89    HTN (hypertension) I10    ESRD (end stage renal disease) (HCC) N18.6    Suspected COVID-19 virus infection Z20.828       Subjective:     Melissa Rivas is a 69 y.o.  female       Objective:     VITALS:   Last 24hrs VS reviewed since prior hospitalist progress note. Most recent are:  Visit Vitals  /66 (BP 1 Location: Right arm, BP Patient Position: At rest)   Pulse 75   Temp 98.6 °F (37 °C)   Resp 16   Ht 5' 3\" (1.6 m)   Wt 77.3 kg (170 lb 6.7 oz)   SpO2 100%   BMI 30.19 kg/m²       Intake/Output Summary (Last 24 hours) at 11/24/2020 1546  Last data filed at 11/24/2020 1202  Gross per 24 hour   Intake --   Output 1500 ml   Net -1500 ml        PHYSICAL EXAM:       Lab Data   Recent Results (from the past 12 hour(s))   RBC, ALLOCATE    Collection Time: 11/24/20  7:45 AM   Result Value Ref Range    HISTORY CHECKED? Historical check performed        I have reviewed the chart and agree with the documentation recorded by the NP, including the assessment, treatment plan, and disposition. Patient was in the bathroom when I came  To see her today.         Bonny Edwards MD

## 2020-11-24 NOTE — PROGRESS NOTES
Physical Therapy  Patient currently with plans for dialysis as well as blood transfusion today. Will defer and follow up at later time.   Clayton Rodriguez, PT, DPT

## 2020-11-24 NOTE — PROGRESS NOTES
[address] [facility]  Hospitalist Group                                                                                          Hospitalist Progress Note  Kevin Ponce MD  Answering service: [phone] from in house phone        Date of Service:  2020  NAME:  Kaylan Ngo  :  1950  MRN:  [id number]      Admission Summary:   69 y.o PMH of AVM,ESRD,anemia admitted due to anemia and hyperkalemia    Interval history / Subjective:   Patient seen and examined at [id number]. She feels ok now. Undergoing HD. Blood transfusion with HD. IR guided liver biopsy today   Discussed with nursing      Assessment & Plan:      #Acute on chronic anemia:  -multifactorial - hx AVMs,CKD  -Gastroenterology signed off   -recent EGD 10/21: mild gastritis with some friable mucosa and oozing-treated with APC and also capsule study performed with presence of small non-bleeding AVM's in small intestine. Patient reports she [facility] and underwent EGD, enteroscopy, and colonoscopy- all with no findings. -s/p 1 U PRBC ,   - PPI BID  - hb 6.7 today, 1U PRBC given, will monitor Hb  - pt agreed to EGD now. GI made aware of this      #Hyperkalemia- worsened - HD today  #ESRD on HD TTS  -nephrology following    #Liver cirrhosis ?  with Ascites :  -hx recurrent ascites with paracentesis . Not on any diuretics at home as pt is anuric   -USG abd showed ascites. The liver echotexture is normal  - Paracentesis drain placed on  and removed on   - fluid analysis shows transudate. Ascites fluid total protein > 2.5 which is highly suggestive of heart failure  - appreciate Hepatology input  - plan for hepatic veneous pressure meaursements and tranasjugular liver biopsy today    #Diastolic heart failure  #Severe Pulm artery HTN  - Echo: LVEF is 48%. Severe (grade 3) left ventricular diastolic dysfunction. Moderate to severe tricuspid valve regurgitation is present.   - appreciate cardiology input  - no further cardiac testing    Chronic pain- c/w home oxy prn    Code status: full  DVT prophylaxis: scd    Care Plan discussed with: Patient/Family and Nurse  Anticipated Disposition: Home w/Family  Anticipated Discharge: 24 hours to 48 hours     Hospital Problems  Date Reviewed: 10/6/2020          Codes Class Noted POA    Anemia ICD-10-CM: D64.9  ICD-9-CM: 285.9  9/17/2020 Unknown                Review of Systems:   As per HPI    Vital Signs:    Last 24hrs VS reviewed since prior progress note. Most recent are:  Visit Vitals  BP (!) 114/40 (BP 1 Location: Left arm, BP Patient Position: At rest)   Pulse 79   Temp 98.6 °F (37 °C)   Resp 13   Ht 5' 3\" (1.6 m)   Wt 77.3 kg (170 lb 6.7 oz)   SpO2 100%   BMI 30.19 kg/m²         Intake/Output Summary (Last 24 hours) at 11/24/2020 1433  Last data filed at 11/24/2020 1202  Gross per 24 hour   Intake --   Output 1500 ml   Net -1500 ml        Physical Examination:     I had a face to face encounter with this patient and independently examined them on 11/24/2020 as outlined below:          Constitutional:  No acute distress, elderly patient   ENT:  Oral mucosa moist, EOMI    Resp:  CTA bilaterally. No wheezing/rhonchi/rales. No accessory muscle use   CV:  Regular rhythm, normal rate, no murmurs, gallops, rubs    GI:  Soft, distended, non tender. normoactive bowel sounds    Musculoskeletal:  No LE edema    Neurologic:  Moves all extremities. AAOx3     Psych:  not anxious nor agitated.        Data Review:    Review and/or order of clinical lab test  Review and/or order of tests in the radiology section of CPT  Review and/or order of tests in the medicine section of CPT      Labs:     Recent Labs     11/24/20 0103 11/23/20  0340   WBC 5.7 5.2   HGB 6.7* 7.3*   HCT 23.6* 25.4*   * 138*     Recent Labs     11/24/20 0103 11/23/20  0340 11/22/20  0121    141 139   K 5.9* 5.3* 4.5    108 106   CO2 26 27 28   BUN 68* 49* 27*   CREA 6.61* 5.22* 3.72*   GLU 154* 169* 213*   CA 9.3 9.6 9.2     No results for input(s): ALT, AP, TBIL, TBILI, TP, ALB, GLOB, GGT, AML, LPSE in the last 72 hours. No lab exists for component: SGOT, GPT, AMYP, HLPSE  No results for input(s): INR, PTP, APTT, INREXT, INREXT in the last 72 hours. No results for input(s): FE, TIBC, PSAT, FERR in the last 72 hours. Lab Results   Component Value Date/Time    Folate 16.4 10/19/2020 09:34 AM      No results for input(s): PH, PCO2, PO2 in the last 72 hours. No results for input(s): CPK, CKNDX, TROIQ in the last 72 hours.     No lab exists for component: CPKMB  No results found for: CHOL, CHOLX, CHLST, CHOLV, HDL, HDLP, LDL, LDLC, DLDLP, TGLX, TRIGL, TRIGP, CHHD, CHHDX  Lab Results   Component Value Date/Time    Glucose (POC) 122 (H) 11/18/2020 01:10 PM    Glucose (POC) 201 (H) 10/25/2020 11:59 AM    Glucose (POC) 186 (H) 10/25/2020 06:07 AM    Glucose (POC) 173 (H) 10/24/2020 09:12 PM    Glucose (POC) 144 (H) 10/24/2020 06:57 PM     No results found for: COLOR, APPRN, SPGRU, REFSG, VERENA, PROTU, GLUCU, KETU, BILU, UROU, TIBURCIO, LEUKU, GLUKE, EPSU, BACTU, WBCU, RBCU, CASTS, UCRY      Medications Reviewed:     Current Facility-Administered Medications   Medication Dose Route Frequency    0.9% sodium chloride infusion 250 mL  250 mL IntraVENous PRN    calcium gluconate 1 g in 0.9% sodium chloride 100 mL IVPB  1 g IntraVENous ONCE    polyethylene glycol (MIRALAX) packet 17 g  17 g Oral DAILY PRN    diphenhydrAMINE (BENADRYL) capsule 25 mg  25 mg Oral Q6H PRN    albuterol-ipratropium (DUO-NEB) 2.5 MG-0.5 MG/3 ML  3 mL Nebulization Q4H PRN    alum-mag hydroxide-simeth (MYLANTA) oral suspension 30 mL  30 mL Oral Q4H PRN    ascorbic acid (vitamin C) (VITAMIN C) tablet 500 mg  500 mg Oral DAILY    cholecalciferol (VITAMIN D3) (1000 Units /25 mcg) tablet 2 Tab  2,000 Units Oral DAILY    ferrous sulfate tablet 325 mg  325 mg Oral TID WITH MEALS    ketotifen (ZADITOR) 0.025 % (0.035 %) ophthalmic solution 1 Drop  1 Drop Both Eyes BID    carboxymethylcellulose sodium (REFRESH PLUS) 0.5 % ophthalmic solution 1-2 Drop  1-2 Drop Both Eyes TID    prochlorperazine (COMPAZINE) with saline injection 10 mg  10 mg IntraVENous Q6H PRN    morphine injection 2 mg  2 mg IntraVENous Q4H PRN    0.9% sodium chloride infusion 250 mL  250 mL IntraVENous PRN    epoetin reshma-epbx (RETACRIT) injection 20,000 Units  20,000 Units SubCUTAneous Q TUE, THU & SAT    0.9% sodium chloride infusion 250 mL  250 mL IntraVENous PRN    pantoprazole (PROTONIX) tablet 40 mg  40 mg Oral BID    sevelamer carbonate (RENVELA) tab 1,600 mg  1,600 mg Oral TID WITH MEALS    acetaminophen (TYLENOL) tablet 650 mg  650 mg Oral Q4H PRN    oxyCODONE IR (ROXICODONE) tablet 5 mg  5 mg Oral Q4H PRN     Facility-Administered Medications Ordered in Other Encounters   Medication Dose Route Frequency    lidocaine (XYLOCAINE) 20 mg/mL (2 %) injection 400 mg  20 mL IntraDERMal ONCE    iopamidoL (ISOVUE 300) 61 % contrast injection 100 mL  100 mL IntraCATHeter RAD ONCE     ______________________________________________________________________  EXPECTED LENGTH OF STAY: 3d 14h  ACTUAL LENGTH OF STAY:          5                 Berenice Bae MD

## 2020-11-24 NOTE — PROGRESS NOTES
Problem: Heart Failure: Day 5  Goal: Activity/Safety  Outcome: Progressing Towards Goal  Goal: Diagnostic Test/Procedures  Outcome: Progressing Towards Goal  Goal: Nutrition/Diet  Outcome: Progressing Towards Goal  Goal: Discharge Planning  Outcome: Progressing Towards Goal  Goal: Medications  Outcome: Progressing Towards Goal

## 2020-11-24 NOTE — PROGRESS NOTES
[address] [facility], MD, Keerthi Watson MD, MPH      CAROLYN Perez , [facility]     Maria R Will, [facility]   Katia Lees JUSTUS David, [facility]       Ozzy Hunt 136    at [place] [address], [address] [facility], [address]    [phone]    FAX: [address] [place] [address]    at [address], [address]    [phone]    FAX: [phone]       HEPATOLOGY CONSULT NOTE  I was asked to see this patient in consultation by Dr Nallely Hutchison for management of cirrhosis. I have reviewed the Emergency room note, Hospital admission note, Notes by all other physicians who have seen the patient during this hospitalization to date. I have reviewed the problem list and the reason for this hospitalization. I have reviewed the allergies and the medications the patient was taking at home prior to this hospitalization. HISTORY:  The patient is a 69year old black female with past medical history of ESRD on dialysis. She developed ascites for the first time in 1/2020 and has required paracentesis every 1 month. She gets her care regularly at [facility]. Liver evaluation is not known. US in the ED suggests the liver is normal.  Labs suggest she has cirrhosis with , and low albumin    She was hospitalized for weakness and anemia. Anemia has apparently been chronic. Patient underwent EGD and M2A study at the [facility]. She has declined to do an EGD here    Echo performed 11/21 showed LVEF 48%. Mildly dilated right ventricle, moderate dilated right atrium, mod to severe TR    She is scheduled for hepatic venous pressure measurement with liver biopsy today  She is undergoing hemodialysis.  No new complaints    ASSESSMENT AND PLAN:  Passive hepatic congestion  The patient has passive hepatic congestion due to pulmonary hypertension, RV dysfunction, and TR. It is not clear if the patient also has cirrhosis. Imaging of the liver in the setting of passive hepatic congestion can make the liver look like cirrhosis is present when it is not. Patient is scheduled for hepatic venous pressure measurements and a transjugular liver biopsy to determine if this is passive hepatic congestion with or without cirrhosis. The treatment would be the same, improvement in forward cardiac function/flow. Ascites   Ascites developed for the first time in 1/2020. Ascites is refractory and most likely due to heart failure and ESRD  Ascites fluid total protein > 2.5 which is highly suggestive of heart failure  Echo showed EF of 48% with severe TR. Patient is scheduled for  hepatic veneous pressure meaursements and tranasjugular liver biopsy   Cannot treat with diuretics because of ESRD  Cannot use IV albumin because of ESRD    Screening for Esophageal varices   It is unlikely the patient has esophageal varices. She has been evaluated for GI bleeding by [facility] with multiple procedures. Anemia   This is due to multifactorial causes including ESRD on dialysis and possible occult GI bleeding  FE studies consistent with iron deficiency anemia  GI following. She has history of gastric bleeding which was treated with APC. Recommend repeating EGD  Transfuse prn Hb < 7    Thrombocytopenia   This is likely secondary to cirrhosis. There is no evidence of overt bleeding. No treatment is required. PHYSICAL EXAMINATION:  VS: per nursing note  General:  No acute distress. Eyes:  Sclera anicteric. ENT:  No oral lesions. Thyroid normal.  Nodes:  No adenopathy. Skin:  No spider angiomata. Respiratory:  Lungs clear to auscultation. Cardiovascular:  Regular heart rate. Abdomen:  Distended with some ascites. Extremities:  No lower extremity edema. Neurologic:  Alert and oriented. Cranial nerves grossly intact. No asterixis. LABORATORY:  Results for [facility] (MRN [id number]) as of 11/24/2020 11:59   Ref. Range 11/23/2020 03:40 11/24/2020 01:03   WBC Latest Ref Range: 3.6 - 11.0 K/uL 5.2 5.7   HGB Latest Ref Range: 11.5 - 16.0 g/dL 7.3 (L) 6.7 (L)   PLATELET Latest Ref Range: 150 - 400 K/uL 138 (L) 145 (L)   Sodium Latest Ref Range: 136 - 145 mmol/L 141 140   Potassium Latest Ref Range: 3.5 - 5.1 mmol/L 5.3 (H) 5.9 (H)   Chloride Latest Ref Range: 97 - 108 mmol/L 108 107   CO2 Latest Ref Range: 21 - 32 mmol/L 27 26   BUN Latest Ref Range: 6 - 20 MG/DL 49 (H) 68 (H)   Creatinine Latest Ref Range: 0.55 - 1.02 MG/DL 5.22 (H) 6.61 (H)     RADIOLOGY:  11/2020. Ultrasound of liver. Normal appearing liver. No liver mass lesions.   Large amount of ascites    Dearl Cranker, MD, MPH  Advanced Hepatology  [facility] of [address] [address] Cirilo Noland, [address] [place] [address] 22.  [address] [facility]

## 2020-11-24 NOTE — PROGRESS NOTES
Problem: Falls - Risk of  Goal: *Absence of Falls  Description: Document Karmen Santacruz Fall Risk and appropriate interventions in the flowsheet.   Outcome: Progressing Towards Goal  Note: Fall Risk Interventions:  Mobility Interventions: Patient to call before getting OOB, Communicate number of staff needed for ambulation/transfer, Bed/chair exit alarm    Mentation Interventions: Adequate sleep, hydration, pain control, Bed/chair exit alarm    Medication Interventions: Patient to call before getting OOB, Bed/chair exit alarm    Elimination Interventions: Call light in reach, Bed/chair exit alarm, Patient to call for help with toileting needs    History of Falls Interventions: Consult care management for discharge planning, Bed/chair exit alarm         Problem: Patient Education: Go to Patient Education Activity  Goal: Patient/Family Education  Outcome: Progressing Towards Goal     Problem: Pain  Goal: *Control of Pain  Outcome: Progressing Towards Goal     Problem: Patient Education: Go to Patient Education Activity  Goal: Patient/Family Education  Outcome: Progressing Towards Goal     Problem: Patient Education: Go to Patient Education Activity  Goal: Patient/Family Education  Outcome: Progressing Towards Goal     Problem: Patient Education: Go to Patient Education Activity  Goal: Patient/Family Education  Outcome: Progressing Towards Goal     Problem: Heart Failure: Day 1  Goal: Off Pathway (Use only if patient is Off Pathway)  Outcome: Progressing Towards Goal  Goal: Activity/Safety  Outcome: Progressing Towards Goal  Goal: Consults, if ordered  Outcome: Progressing Towards Goal  Goal: Diagnostic Test/Procedures  Outcome: Progressing Towards Goal  Goal: Nutrition/Diet  Outcome: Progressing Towards Goal  Goal: Discharge Planning  Outcome: Progressing Towards Goal  Goal: Medications  Outcome: Progressing Towards Goal  Goal: Respiratory  Outcome: Progressing Towards Goal  Goal: Treatments/Interventions/Procedures  Outcome: Progressing Towards Goal  Goal: Psychosocial  Outcome: Progressing Towards Goal  Goal: *Oxygen saturation within defined limits  Outcome: Progressing Towards Goal  Goal: *Hemodynamically stable  Outcome: Progressing Towards Goal  Goal: *Optimal pain control at patient's stated goal  Outcome: Progressing Towards Goal  Goal: *Anxiety reduced or absent  Outcome: Progressing Towards Goal     Problem: Heart Failure: Day 3  Goal: Off Pathway (Use only if patient is Off Pathway)  Outcome: Progressing Towards Goal  Goal: Activity/Safety  Outcome: Progressing Towards Goal  Goal: Diagnostic Test/Procedures  Outcome: Progressing Towards Goal  Goal: Nutrition/Diet  Outcome: Progressing Towards Goal  Goal: Discharge Planning  Outcome: Progressing Towards Goal  Goal: Medications  Outcome: Progressing Towards Goal  Goal: Respiratory  Outcome: Progressing Towards Goal  Goal: Treatments/Interventions/Procedures  Outcome: Progressing Towards Goal  Goal: Psychosocial  Outcome: Progressing Towards Goal  Goal: *Oxygen saturation within defined limits  Outcome: Progressing Towards Goal  Goal: *Hemodynamically stable  Outcome: Progressing Towards Goal  Goal: *Optimal pain control at patient's stated goal  Outcome: Progressing Towards Goal  Goal: *Anxiety reduced or absent  Outcome: Progressing Towards Goal  Goal: *Demonstrates progressive activity  Outcome: Progressing Towards Goal

## 2020-11-24 NOTE — PROGRESS NOTES
TRANSFER - OUT REPORT:    Verbal report given to Lucy Mccollum RN(name) on Shanae Fitzpatrick  being transferred to [facility](unit) for routine progression of care       Report consisted of patients Situation, Background, Assessment and   Recommendations(SBAR). Information from the following report(s) SBAR, Procedure Summary, Intake/Output, MAR and Cardiac Rhythm NSR was reviewed with the receiving nurse. Lines:   Peripheral IV 11/19/20 Anterior; Left Forearm (Active)   Site Assessment Clean, dry, & intact 11/24/20 1200   Phlebitis Assessment 0 11/24/20 1200   Infiltration Assessment 0 11/24/20 1200   Dressing Status Clean, dry, & intact 11/24/20 1200   Dressing Type Transparent;Tape 11/24/20 1200   Hub Color/Line Status Blue;Capped 11/24/20 1200   Action Taken Open ports on tubing capped 11/24/20 1200   Alcohol Cap Used Yes 11/24/20 1200        Opportunity for questions and clarification was provided.

## 2020-11-24 NOTE — PROGRESS NOTES
Cardiology Progress Note       Assessment/Plan/Discussion:Cardiology Attending:     Patient seen on the day of progress note and examined  and agree with Advance Practice Provider (IRVIN, NP,PA)  assessment and plans. Lorenzo Hernandez is a 69 y.o. female   When seen this morning she had no edema she has a persistent systolic murmur and good air movement. Tolerating dialysis this morning has mild cardiomyopathy with no definite CHF. Suspect is volume overload due to renal and liver disease. She has no chest pain. Unable to tolerate beta-blocker or ACE requiring midodrine during dialysis. No further cardiac recommendations and we will sign off. Pedro Dunlap MD             Admit Date: 11/17/2020  Admit Diagnosis: Anemia [D64.9]  Anemia [D64.9]  Date: 11/24/2020     Time: 10:05 AM    HPI: 78-year-old -American female from [place] who is admitted with shortness of breath predominantly due to worsening anemia. She has ESRD, ascites, and now is being worked up for liver cirrhosis. She reports she had a cardiac cath of the [place] many years ago that she reports  Was normal..  Reports history of ANITHA and RLS but was intolerant of CPAP. HD limited by c/o cramps during treatments. Echo with slightly reduced EF 48%, severe TR and pulmonary HTN. Subjective:   Received sitting at the bedside. Prepping for dialysis this am.  She says she feels better. Funny this morning     Assessment and Plan     1. Cardiomyopathy, hypertensive nonischemic:  EF 48%. RV not enlarged.     -Do not suspect CHF.    -Suspect volume overload due to her renal and liver disease.   -No Beta blocker or ACE-I at this time, given drop in BP during dialyiss which had required midodrine. 2. Severe pulmonary HTN, severe TR   -PAS approximately 78mmHg   -RV function normal.   -Has hx of ANITHA but does not wear CPAP    3.  Hx of PAF: during a recent admission reported   -Currently, NSR, no evidence of PAF on telemetry   -no rate control medications. -[id number]= 4. No OAC given chronic, significant anemia, recent hx of AVMs    4. Anemia, acute on chronic: Hgb 6.7   -d/t CKD, hx of AVMs    -(EGD 10/21 with friable mucosa and oozing- treated with APC)   -Management per primary team. On epoetin and iron. 5. Ascites:    -s/p paracentesis. 2019 (trasudative fluid)   -Hepatology on board. Biopsy?, eval for cirrhosis     6. ESRD on HD:     -HD     7. Hx of ANITHA   -intolerant to CPAP in past   -Recommend outpatient follow up with sleep physician to readdress/refit CPAP. Feels better today. Hgb dropped to 6.7. Should get blood with dialysis. She said she didn't refuse EGD, just she has had so many of them. All at the [place]. She would be willing to repeat here, perhaps we will see something they didn't. Stable from cardiac standpoint. Cardiac testin20   ECHO ADULT COMPLETE 2020    Narrative · LV: Calculated LVEF is 48%. Normal cavity size. Moderate concentric   hypertrophy. Moderately reduced systolic function. Severe (grade 3) left   ventricular diastolic dysfunction. · LA: Moderately dilated left atrium. · RV: Mildly dilated right ventricle. · RA: Moderately dilated right atrium. · IAS: No atrial septal defect present. · MV: Mild mitral valve regurgitation is present. · TV: Moderate to severe tricuspid valve regurgitation is present. · PV: Mild pulmonic valve regurgitation is present. · IVC: Severely elevated central venous pressure (15+ mmHg); IVC diameter   is larger than 21 mm and collapses less than 50% with respiration. · Pericardium: Small pericardial effusion.         Signed by: Jesus Garcia MD       [facility]  Past Medical History:   Diagnosis Date    Arthritis     Asthma     Chronic kidney disease     Chronic pain     Cir[facility]osis ([facility] 2017    Diabetes ([facility] diet controlled    GERD (gastroesophageal reflux disease)     Hypertension     [facility]/2020      Social Hx  Social History     Socioeconomic History    Marital status: SINGLE     Spouse name: Not on file    Number of children: Not on file    Years of education: Not on file    Highest education level: Not on file   Occupational History    Not on file   Social Needs    Financial resource strain: Not on file    Food insecurity     Worry: Not on file     Inability: Not on file    Transportation needs     Medical: Not on file     Non-medical: Not on file   Tobacco Use    Smoking status: Never Smoker    Smokeless tobacco: Never Used   Substance and Sexual Activity    Alcohol use: No    Drug use: No    Sexual activity: Not on file   Lifestyle    Physical activity     Days per week: Not on file     Minutes per session: Not on file    Stress: Not on file   Relationships    Social connections     Talks on phone: Not on file     Gets together: Not on file     Attends Yazdanism service: Not on file     Active member of club or organization: Not on file     Attends meetings of clubs or organizations: Not on file     Relationship status: Not on file    Intimate partner violence     Fear of current or ex partner: Not on file     Emotionally abused: Not on file     Physically abused: Not on file     Forced sexual activity: Not on file   Other Topics Concern     Service Not Asked    Blood Transfusions Yes    Caffeine Concern Not Asked    Occupational Exposure Not Asked   Eletha Mate Hazards Not Asked    Sleep Concern Not Asked    Stress Concern Not Asked    Weight Concern Not Asked    Special Diet Not Asked    Back Care Not Asked    Exercise Not Asked    Bike Helmet Not Asked    Seat Belt Not Asked    Self-Exams Not Asked   Social History Narrative    Not on file     Objective:      Physical Exam:                Visit Vitals  BP (!) 154/65   Pulse 82   Temp 98.3 °F (36.8 °C)   Resp 16 Ht 5' 3\" (1.6 m)   Wt 170 lb 6.7 oz (77.3 kg)   SpO2 98%   BMI 30.19 kg/m²          General Appearance:   Well developed, well nourished,alert and oriented x 3, and   individual in no acute distress. Ears/Nose/Mouth/Throat:    Hearing grossly normal.         Neck:  Supple. Chest:    Lungs clear  to auscultation bilaterally. Cardiovascular:   Regular rate and rhythm, S1, S2 normal, grade III/VI systolic murmur heard best at LLSB   Abdomen:    Soft, distended, non-tender, bowel sounds are present. .   Extremities:  tr ankle edema    Skin:  Warm and dry. Telemetry: normal sinus rhythm          Data Review:    Labs:    Recent Results (from the past 24 hour(s))   CBC WITH AUTOMATED DIFF    Collection Time: 11/24/20  1:03 AM   Result Value Ref Range    WBC 5.7 3.6 - 11.0 K/uL    RBC 2.38 (L) 3.80 - 5.20 M/uL    HGB 6.7 (L) 11.5 - 16.0 g/dL    HCT 23.6 (L) 35.0 - 47.0 %    MCV 99.2 (H) 80.0 - 99.0 FL    MCH 28.2 26.0 - 34.0 PG    MCHC 28.4 (L) 30.0 - 36.5 g/dL    RDW 18.2 (H) 11.5 - 14.5 %    PLATELET 602 (L) 166 - 400 K/uL    MPV 11.2 8.9 - 12.9 FL    NRBC 0.0 0  WBC    ABSOLUTE NRBC 0.00 0.00 - 0.01 K/uL    NEUTROPHILS 70 32 - 75 %    LYMPHOCYTES 14 12 - 49 %    MONOCYTES 10 5 - 13 %    EOSINOPHILS 4 0 - 7 %    BASOPHILS 1 0 - 1 %    IMMATURE GRANULOCYTES 1 (H) 0.0 - 0.5 %    ABS. NEUTROPHILS 3.9 1.8 - 8.0 K/UL    ABS. LYMPHOCYTES 0.8 0.8 - 3.5 K/UL    ABS. MONOCYTES 0.6 0.0 - 1.0 K/UL    ABS. EOSINOPHILS 0.2 0.0 - 0.4 K/UL    ABS. BASOPHILS 0.1 0.0 - 0.1 K/UL    ABS. IMM.  GRANS. 0.1 (H) 0.00 - 0.04 K/UL    DF SMEAR SCANNED      RBC COMMENTS ANISOCYTOSIS  1+        RBC COMMENTS MACROCYTOSIS  1+        RBC COMMENTS OVALOCYTES  1+        RBC COMMENTS SCHISTOCYTES  1+        RBC COMMENTS POLYCHROMASIA  PRESENT        RBC COMMENTS MICROCYTOSIS  1+       METABOLIC PANEL, BASIC    Collection Time: 11/24/20  1:03 AM   Result Value Ref Range    Sodium 140 136 - 145 mmol/L    Potassium 5.9 (H) 3.5 - 5.1 mmol/L Chloride 107 97 - 108 mmol/L    CO2 26 21 - 32 mmol/L    Anion gap 7 5 - 15 mmol/L    Glucose 154 (H) 65 - 100 mg/dL    BUN 68 (H) 6 - 20 MG/DL    Creatinine 6.61 (H) 0.55 - 1.02 MG/DL    BUN/Creatinine ratio 10 (L) 12 - 20      GFR est AA 7 (L) >60 ml/min/1.73m2    GFR est non-AA 6 (L) >60 ml/min/1.73m2    Calcium 9.3 8.5 - 10.1 MG/DL   RBC, ALLOCATE    Collection Time: 11/24/20  7:45 AM   Result Value Ref Range    HISTORY CHECKED?  Historical check performed           Radiology:        Current Facility-Administered Medications   Medication Dose Route Frequency    0.9% sodium chloride infusion 250 mL  250 mL IntraVENous PRN    calcium gluconate 1 g in 0.9% sodium chloride 100 mL IVPB  1 g IntraVENous ONCE    polyethylene glycol (MIRALAX) packet 17 g  17 g Oral DAILY PRN    diphenhydrAMINE (BENADRYL) capsule 25 mg  25 mg Oral Q6H PRN    albuterol-ipratropium (DUO-NEB) 2.5 MG-0.5 MG/3 ML  3 mL Nebulization Q4H PRN    alum-mag hydroxide-simeth (MYLANTA) oral suspension 30 mL  30 mL Oral Q4H PRN    ascorbic acid (vitamin C) (VITAMIN C) tablet 500 mg  500 mg Oral DAILY    cholecalciferol (VITAMIN D3) (1000 Units /25 mcg) tablet 2 Tab  2,000 Units Oral DAILY    ferrous sulfate tablet 325 mg  325 mg Oral TID WITH MEALS    ketotifen (ZADITOR) 0.025 % (0.035 %) ophthalmic solution 1 Drop  1 Drop Both Eyes BID    carboxymethylcellulose sodium (REFRESH PLUS) 0.5 % ophthalmic solution 1-2 Drop  1-2 Drop Both Eyes TID    prochlorperazine (COMPAZINE) with saline injection 10 mg  10 mg IntraVENous Q6H PRN    morphine injection 2 mg  2 mg IntraVENous Q4H PRN    0.9% sodium chloride infusion 250 mL  250 mL IntraVENous PRN    epoetin reshma-epbx (RETACRIT) injection 20,000 Units  20,000 Units SubCUTAneous Q TUE, THU & SAT    0.9% sodium chloride infusion 250 mL  250 mL IntraVENous PRN    pantoprazole (PROTONIX) tablet 40 mg  40 mg Oral BID    sevelamer carbonate (RENVELA) tab 1,600 mg  1,600 mg Oral TID WITH MEALS  acetaminophen (TYLENOL) tablet 650 mg  650 mg Oral Q4H PRN    oxyCODONE IR (ROXICODONE) tablet 5 mg  5 mg Oral Q4H PRN          Estela Buerger, PA-C [place], MD     [facility] [address], [address] [place] [address]   Cristina Erickson   [phone]

## 2020-11-25 ENCOUNTER — ANESTHESIA EVENT (OUTPATIENT)
Dept: ENDOSCOPY | Age: 70
DRG: 377 | End: 2020-11-25
Payer: MEDICARE

## 2020-11-25 ENCOUNTER — ANESTHESIA (OUTPATIENT)
Dept: ENDOSCOPY | Age: 70
DRG: 377 | End: 2020-11-25
Payer: MEDICARE

## 2020-11-25 ENCOUNTER — APPOINTMENT (OUTPATIENT)
Dept: CT IMAGING | Age: 70
DRG: 377 | End: 2020-11-25
Attending: FAMILY MEDICINE
Payer: MEDICARE

## 2020-11-25 LAB
ANION GAP SERPL CALC-SCNC: 4 MMOL/L (ref 5–15)
APTT PPP: 24.9 SEC (ref 22.1–31)
BASOPHILS # BLD: 0.1 K/UL (ref 0–0.1)
BASOPHILS NFR BLD: 1 % (ref 0–1)
BUN SERPL-MCNC: 40 MG/DL (ref 6–20)
BUN/CREAT SERPL: 9 (ref 12–20)
CALCIUM SERPL-MCNC: 9.3 MG/DL (ref 8.5–10.1)
CHLORIDE SERPL-SCNC: 103 MMOL/L (ref 97–108)
CHOLEST SERPL-MCNC: 114 MG/DL
CO2 SERPL-SCNC: 29 MMOL/L (ref 21–32)
COMMENT, HOLDF: NORMAL
CREAT SERPL-MCNC: 4.46 MG/DL (ref 0.55–1.02)
DIFFERENTIAL METHOD BLD: ABNORMAL
EOSINOPHIL # BLD: 0.2 K/UL (ref 0–0.4)
EOSINOPHIL NFR BLD: 4 % (ref 0–7)
ERYTHROCYTE [DISTWIDTH] IN BLOOD BY AUTOMATED COUNT: 18.3 % (ref 11.5–14.5)
GLUCOSE BLD STRIP.AUTO-MCNC: 122 MG/DL (ref 65–100)
GLUCOSE BLD STRIP.AUTO-MCNC: 123 MG/DL (ref 65–100)
GLUCOSE BLD STRIP.AUTO-MCNC: 224 MG/DL (ref 65–100)
GLUCOSE BLD STRIP.AUTO-MCNC: 238 MG/DL (ref 65–100)
GLUCOSE SERPL-MCNC: 249 MG/DL (ref 65–100)
HCT VFR BLD AUTO: 26.2 % (ref 35–47)
HDLC SERPL-MCNC: 59 MG/DL
HDLC SERPL: 1.9 {RATIO} (ref 0–5)
HGB BLD-MCNC: 7.6 G/DL (ref 11.5–16)
IMM GRANULOCYTES # BLD AUTO: 0.1 K/UL (ref 0–0.04)
IMM GRANULOCYTES NFR BLD AUTO: 1 % (ref 0–0.5)
INR PPP: 1 (ref 0.9–1.1)
LDLC SERPL CALC-MCNC: 38 MG/DL (ref 0–100)
LIPID PROFILE,FLP: NORMAL
LYMPHOCYTES # BLD: 0.7 K/UL (ref 0.8–3.5)
LYMPHOCYTES NFR BLD: 13 % (ref 12–49)
MAGNESIUM SERPL-MCNC: 2.2 MG/DL (ref 1.6–2.4)
MCH RBC QN AUTO: 28.1 PG (ref 26–34)
MCHC RBC AUTO-ENTMCNC: 29 G/DL (ref 30–36.5)
MCV RBC AUTO: 97 FL (ref 80–99)
MONOCYTES # BLD: 0.5 K/UL (ref 0–1)
MONOCYTES NFR BLD: 9 % (ref 5–13)
NEUTS SEG # BLD: 3.5 K/UL (ref 1.8–8)
NEUTS SEG NFR BLD: 72 % (ref 32–75)
NRBC # BLD: 0 K/UL (ref 0–0.01)
NRBC BLD-RTO: 0 PER 100 WBC
PHOSPHATE SERPL-MCNC: 4.2 MG/DL (ref 2.6–4.7)
PLATELET # BLD AUTO: 149 K/UL (ref 150–400)
PLATELET COMMENTS,PCOM: ABNORMAL
PMV BLD AUTO: 11.3 FL (ref 8.9–12.9)
POTASSIUM SERPL-SCNC: 5.4 MMOL/L (ref 3.5–5.1)
PROTHROMBIN TIME: 10.7 SEC (ref 9–11.1)
RBC # BLD AUTO: 2.7 M/UL (ref 3.8–5.2)
RBC MORPH BLD: ABNORMAL
RBC MORPH BLD: ABNORMAL
SAMPLES BEING HELD,HOLD: NORMAL
SERVICE CMNT-IMP: ABNORMAL
SODIUM SERPL-SCNC: 136 MMOL/L (ref 136–145)
THERAPEUTIC RANGE,PTTT: NORMAL SECS (ref 58–77)
TRIGL SERPL-MCNC: 85 MG/DL (ref ?–150)
VLDLC SERPL CALC-MCNC: 17 MG/DL
WBC # BLD AUTO: 5.1 K/UL (ref 3.6–11)

## 2020-11-25 PROCEDURE — 74011250637 HC RX REV CODE- 250/637: Performed by: NURSE PRACTITIONER

## 2020-11-25 PROCEDURE — 80048 BASIC METABOLIC PNL TOTAL CA: CPT

## 2020-11-25 PROCEDURE — 82962 GLUCOSE BLOOD TEST: CPT

## 2020-11-25 PROCEDURE — 84100 ASSAY OF PHOSPHORUS: CPT

## 2020-11-25 PROCEDURE — 74011000250 HC RX REV CODE- 250: Performed by: INTERNAL MEDICINE

## 2020-11-25 PROCEDURE — 70450 CT HEAD/BRAIN W/O DYE: CPT

## 2020-11-25 PROCEDURE — 85025 COMPLETE CBC W/AUTO DIFF WBC: CPT

## 2020-11-25 PROCEDURE — 65660000000 HC RM CCU STEPDOWN

## 2020-11-25 PROCEDURE — 85610 PROTHROMBIN TIME: CPT

## 2020-11-25 PROCEDURE — 87635 SARS-COV-2 COVID-19 AMP PRB: CPT

## 2020-11-25 PROCEDURE — 36415 COLL VENOUS BLD VENIPUNCTURE: CPT

## 2020-11-25 PROCEDURE — 80061 LIPID PANEL: CPT

## 2020-11-25 PROCEDURE — 83735 ASSAY OF MAGNESIUM: CPT

## 2020-11-25 PROCEDURE — 97535 SELF CARE MNGMENT TRAINING: CPT

## 2020-11-25 PROCEDURE — 99223 1ST HOSP IP/OBS HIGH 75: CPT | Performed by: PSYCHIATRY & NEUROLOGY

## 2020-11-25 PROCEDURE — 74011250637 HC RX REV CODE- 250/637: Performed by: INTERNAL MEDICINE

## 2020-11-25 PROCEDURE — 2709999900 HC NON-CHARGEABLE SUPPLY

## 2020-11-25 PROCEDURE — 85730 THROMBOPLASTIN TIME PARTIAL: CPT

## 2020-11-25 PROCEDURE — 74011250637 HC RX REV CODE- 250/637: Performed by: HOSPITALIST

## 2020-11-25 RX ORDER — EPINEPHRINE 0.1 MG/ML
1 INJECTION INTRACARDIAC; INTRAVENOUS
Status: CANCELLED | OUTPATIENT
Start: 2020-11-25 | End: 2020-11-26

## 2020-11-25 RX ORDER — FLUTICASONE PROPIONATE 50 MCG
2 SPRAY, SUSPENSION (ML) NASAL DAILY
Status: DISCONTINUED | OUTPATIENT
Start: 2020-11-25 | End: 2020-12-01 | Stop reason: HOSPADM

## 2020-11-25 RX ORDER — SODIUM CHLORIDE 0.9 % (FLUSH) 0.9 %
5-40 SYRINGE (ML) INJECTION AS NEEDED
Status: CANCELLED | OUTPATIENT
Start: 2020-11-25

## 2020-11-25 RX ORDER — NALOXONE HYDROCHLORIDE 0.4 MG/ML
0.4 INJECTION, SOLUTION INTRAMUSCULAR; INTRAVENOUS; SUBCUTANEOUS
Status: CANCELLED | OUTPATIENT
Start: 2020-11-25 | End: 2020-11-25

## 2020-11-25 RX ORDER — SODIUM CHLORIDE 0.9 % (FLUSH) 0.9 %
5-40 SYRINGE (ML) INJECTION EVERY 8 HOURS
Status: CANCELLED | OUTPATIENT
Start: 2020-11-25

## 2020-11-25 RX ORDER — FLUMAZENIL 0.1 MG/ML
0.2 INJECTION INTRAVENOUS
Status: CANCELLED | OUTPATIENT
Start: 2020-11-25 | End: 2020-11-25

## 2020-11-25 RX ORDER — DEXTROMETHORPHAN/PSEUDOEPHED 2.5-7.5/.8
1.2 DROPS ORAL
Status: CANCELLED | OUTPATIENT
Start: 2020-11-25

## 2020-11-25 RX ORDER — ATROPINE SULFATE 0.1 MG/ML
0.5 INJECTION INTRAVENOUS
Status: CANCELLED | OUTPATIENT
Start: 2020-11-25 | End: 2020-11-26

## 2020-11-25 RX ORDER — SODIUM CHLORIDE 9 MG/ML
50 INJECTION, SOLUTION INTRAVENOUS CONTINUOUS
Status: CANCELLED | OUTPATIENT
Start: 2020-11-25 | End: 2020-11-25

## 2020-11-25 RX ADMIN — OXYCODONE HYDROCHLORIDE AND ACETAMINOPHEN 500 MG: 500 TABLET ORAL at 17:39

## 2020-11-25 RX ADMIN — SEVELAMER CARBONATE 1600 MG: 800 TABLET, FILM COATED ORAL at 17:39

## 2020-11-25 RX ADMIN — FERROUS SULFATE TAB 325 MG (65 MG ELEMENTAL FE) 325 MG: 325 (65 FE) TAB at 17:39

## 2020-11-25 RX ADMIN — OXYCODONE HYDROCHLORIDE 5 MG: 5 TABLET ORAL at 11:24

## 2020-11-25 RX ADMIN — OXYCODONE HYDROCHLORIDE 5 MG: 5 TABLET ORAL at 17:39

## 2020-11-25 RX ADMIN — CARBOXYMETHYLCELLULOSE SODIUM 1 DROP: 5 SOLUTION/ DROPS OPHTHALMIC at 22:00

## 2020-11-25 RX ADMIN — KETOTIFEN FUMARATE 1 DROP: 0.35 SOLUTION/ DROPS OPHTHALMIC at 10:13

## 2020-11-25 RX ADMIN — CARBOXYMETHYLCELLULOSE SODIUM 1 DROP: 5 SOLUTION/ DROPS OPHTHALMIC at 09:00

## 2020-11-25 RX ADMIN — Medication 2 TABLET: at 17:40

## 2020-11-25 RX ADMIN — PANTOPRAZOLE SODIUM 40 MG: 40 TABLET, DELAYED RELEASE ORAL at 17:40

## 2020-11-25 RX ADMIN — KETOTIFEN FUMARATE 1 DROP: 0.35 SOLUTION/ DROPS OPHTHALMIC at 17:40

## 2020-11-25 RX ADMIN — OXYCODONE HYDROCHLORIDE 5 MG: 5 TABLET ORAL at 22:27

## 2020-11-25 RX ADMIN — DEXTROSE MONOHYDRATE 500 ML: 10 INJECTION, SOLUTION INTRAVENOUS at 16:56

## 2020-11-25 RX ADMIN — FLUTICASONE PROPIONATE 2 SPRAY: 50 SPRAY, METERED NASAL at 05:30

## 2020-11-25 RX ADMIN — CARBOXYMETHYLCELLULOSE SODIUM 1 DROP: 5 SOLUTION/ DROPS OPHTHALMIC at 16:00

## 2020-11-25 RX ADMIN — OXYCODONE HYDROCHLORIDE 5 MG: 5 TABLET ORAL at 05:30

## 2020-11-25 NOTE — PROGRESS NOTES
Problem: Self Care Deficits Care Plan (Adult)  Goal: *Acute Goals and Plan of Care (Insert Text)  Description:   FUNCTIONAL STATUS PRIOR TO ADMISSION: Patient was modified independent using a walker for functional mobility. Patient required assistance for bathing, dressing, cooking, and cleaning, was not driving. Patient alternates living with her 5 daughters and friends take her to dialysis. HOME SUPPORT: The patient lived with daughters to provide assistance. Occupational Therapy Goals  Initiated 11/20/2020  1. Patient will perform standing ADLs with supervision/set up within 7 day(s). 2.  Patient will perform bathing with supervision/set-up within 7 day(s). 3.  Patient will perform lower body dressing with supervision/set-up within 7 day(s). 4.  Patient will perform toilet transfers with supervision/set-up within 7 day(s). 5.  Patient will perform all aspects of toileting with minimal assistance/contact guard assist within 7 day(s). 6.  Patient will participate in upper extremity therapeutic exercise/activities with minimal assistance/contact guard assist for 5 minutes within 7 day(s). 7.  Patient will utilize energy conservation techniques during functional activities with verbal cues within 7 day(s). Outcome: Progressing Towards Goal     OCCUPATIONAL THERAPY TREATMENT  Patient: Kelin Birmingham (54 y.o. female)  Date: 11/25/2020  Diagnosis: Anemia [D64.9]  Anemia [D64.9]   <principal problem not specified>  Procedure(s) (LRB):  ESOPHAGOGASTRODUODENOSCOPY (EGD) (N/A) Day of Surgery  Precautions: Fall  Chart, occupational therapy assessment, plan of care, and goals were reviewed. ASSESSMENT  Patient continues with skilled OT services and is progressing towards goals. Patient with increased activity/standing tolerance and balance, decreased weakness, and increased motivation this session during bathroom mobility, UE exercise, and toileting tasks.  Patient continues to be limited by generalized weakness, decreased activity tolerance and cardiopulmonary endurance, and pain. Recommending HHOT and increased assistance from family for all ADLs and functional mobility for safety. Current Level of Function Impacting Discharge (ADLs): up to min A upper body ADLs, up to mod A lower body ADLs    Other factors to consider for discharge: family support, improving weakness, needs DME for safety, medical management needs, PMH          PLAN :  Patient continues to benefit from skilled intervention to address the above impairments. Continue treatment per established plan of care. to address goals. Recommend with staff: Recommend with nursing patient to complete as able in order to maintain strength, endurance and independence: ADLs with supervision/setup, OOB to chair 3x/day and mobilizing to the bathroom for toileting with 1 assist and RW. Thank you for your assistance. Recommend next OT session: theraband exercises UE, standing ADLs with functional reach     Recommendation for discharge: (in order for the patient to meet his/her long term goals)  Occupational therapy at least 2 days/week in the home AND ensure assist and/or supervision for safety with all mobility and ADLs for safety    This discharge recommendation:  Has been made in collaboration with the attending provider and/or case management    IF patient discharges home will need the following DME: bedside commode, shower chair, walker: rolling, raised toilet seat, grab bars       SUBJECTIVE:   Patient stated I really need to get these arms stronger.     OBJECTIVE DATA SUMMARY:   Cognitive/Behavioral Status:  Neurologic State: Alert  Orientation Level: Oriented X4  Cognition: Appropriate for age attention/concentration; Appropriate safety awareness; Follows commands  Perception: Appears intact  Perseveration: No perseveration noted  Safety/Judgement: Awareness of environment;Decreased awareness of need for assistance;Decreased insight into deficits; Fall prevention;Home safety    Functional Mobility and Transfers for ADLs:  Bed Mobility:  Supine to Sit: Supervision  Sit to Supine: Stand-by assistance  Scooting: Stand-by assistance    Transfers:  Sit to Stand: Contact guard assistance  Functional Transfers  Bathroom Mobility: Contact guard assistance  Toilet Transfer : Contact guard assistance; Adaptive equipment; Additional time  Cues: Physical assistance;Verbal cues provided  Adaptive Equipment: Walker (comment);Grab bars       Balance:  Sitting: Intact  Standing: Impaired; Without support  Standing - Static: Good  Standing - Dynamic : Fair    ADL Intervention:   Educated patient on use of raised toilet seat, purchasing options, and locations to purchase. Wrote name on board, patient agreeable. Grooming  Grooming Assistance: Stand-by assistance  Position Performed: Standing  Washing Hands: Stand-by assistance    Lower Body Dressing Assistance  Socks: Stand-by assistance  Leg Crossed Method Used: No  Position Performed: Seated edge of bed  Cues: Verbal cues provided    Toileting  Bladder Hygiene: Stand-by assistance  Bowel Hygiene: Contact guard assistance  Clothing Management: Contact guard assistance  Cues: Verbal cues provided; Tactile cues provided  Adaptive Equipment: Walker;Grab bars    Cognitive Retraining  Safety/Judgement: Awareness of environment;Decreased awareness of need for assistance;Decreased insight into deficits; Fall prevention;Home safety    Therapeutic Exercises:   Provided UE exercise handout with all exercises demonstrated and practiced 1x, instructed to complete 3x/day with 10 reps per exercise   Instructed to increase weight and reps with progression. Patient agreeable.      Pain:  Back pain, not limiting     Activity Tolerance:   Fair, SpO2 stable on RA, requires frequent rest breaks, and observed SOB with activity    After treatment patient left in no apparent distress:   Supine in bed, Call bell within reach, Bed / chair alarm activated, and Side rails x 3    COMMUNICATION/COLLABORATION:   The patients plan of care was discussed with: Registered nurse. Patient was educated regarding Her deficit(s) of weakness, impaired balance & functional mobility  as this relates to Her diagnosis of anemia, CHF. She demonstrated Good understanding as evidenced by verbalization. NOA Brito  Time Calculation: 35 mins    Regarding student involvement in patient care:  A student participated in this treatment session. Per CMS Medicare statements and AOTA guidelines I certify that the following was true:  1. I was present and directly observed the entire session. 2. I made all skilled judgments and clinical decisions regarding care. 3. I am the practitioner responsible for assessment, treatment, and documentation.

## 2020-11-25 NOTE — CONSULTS
INPATIENT NEUROLOGY CONSULTATION  11/25/2020     Consulted by: Carter Joseph MD        Patient ID:  Emiliana Henriquez  [id number]  69 y.o.  1950    CC: Numbness    HPI    Versa Kelin is a 58-year-old woman with a history of heart failure, end-stage renal disease, liver disease, who is admitted yesterday for a liver biopsy via IR. Not sure of the procedure was done as there is no procedure note at this time. Apparently after the procedure she was lying on her right side in her room and began to feel painful numbness and tingling beginning in the face arm and leg. She does have baseline distal paresthesias in her hands and feet but this was distinctly new and lasted about 2 or 3 hours. Code stroke was called. Head CT negative. She is not on any aspirin products due to history of GI bleeding and gastritis. She feels her baseline now. Review of Systems   Eyes: Negative for double vision. Neurological: Positive for sensory change. Negative for focal weakness. All other systems reviewed and are negative.       Past Medical History:   Diagnosis Date    Arthritis     Asthma     Chronic kidney disease     Chronic pain     Cirrhosis ([facility] [address] 12/17/2017    Diabetes (HCC) diet controlled    GERD (gastroesophageal reflux disease)     Hypertension     Paroxysmal atrial fibrillation ([facility] [address] 10/6/2020     Family History   Family history unknown: Yes     Social History     Socioeconomic History    Marital status: SINGLE     Spouse name: Not on file    Number of children: Not on file    Years of education: Not on file    Highest education level: Not on file   Occupational History    Not on file   Social Needs    Financial resource strain: Not on file    Food insecurity     Worry: Not on file     Inability: Not on file    Transportation needs     Medical: Not on file     Non-medical: Not on file   Tobacco Use    Smoking status: Never Smoker    Smokeless tobacco: Never Used   Substance and Sexual Activity    Alcohol use: No    Drug use: No    Sexual activity: Not on file   Lifestyle    Physical activity     Days per week: Not on file     Minutes per session: Not on file    Stress: Not on file   Relationships    Social connections     Talks on phone: Not on file     Gets together: Not on file     Attends Jewish service: Not on file     Active member of club or organization: Not on file     Attends meetings of clubs or organizations: Not on file     Relationship status: Not on file    Intimate partner violence     Fear of current or ex partner: Not on file     Emotionally abused: Not on file     Physically abused: Not on file     Forced sexual activity: Not on file   Other Topics Concern     Service Not Asked    Blood Transfusions Yes    Caffeine Concern Not Asked    Occupational Exposure Not Asked    Hobby Hazards Not Asked    Sleep Concern Not Asked    Stress Concern Not Asked    Weight Concern Not Asked    Special Diet Not Asked    Back Care Not Asked    Exercise Not Asked    Bike Helmet Not Asked    Seat Belt Not Asked    Self-Exams Not Asked   Social History Narrative    Not on file     Current Facility-Administered Medications   Medication Dose Route Frequency    fluticasone propionate (FLONASE) 50 mcg/actuation nasal spray 2 Spray  2 Spray Both Nostrils DAILY    0.9% sodium chloride infusion 250 mL  250 mL IntraVENous PRN    polyethylene glycol (MIRALAX) packet 17 g  17 g Oral DAILY PRN    diphenhydrAMINE (BENADRYL) capsule 25 mg  25 mg Oral Q6H PRN    albuterol-ipratropium (DUO-NEB) 2.5 MG-0.5 MG/3 ML  3 mL Nebulization Q4H PRN    alum-mag hydroxide-simeth (MYLANTA) oral suspension 30 mL  30 mL Oral Q4H PRN    ascorbic acid (vitamin C) (VITAMIN C) tablet 500 mg  500 mg Oral DAILY    cholecalciferol (VITAMIN D3) (1000 Units /25 mcg) tablet 2 Tab  2,000 Units Oral DAILY    ferrous sulfate tablet 325 mg  325 mg Oral TID WITH MEALS    ketotifen (ZADITOR) 0.025 % (0.035 %) ophthalmic solution 1 Drop  1 Drop Both Eyes BID    carboxymethylcellulose sodium (REFRESH PLUS) 0.5 % ophthalmic solution 1-2 Drop  1-2 Drop Both Eyes TID    prochlorperazine (COMPAZINE) with saline injection 10 mg  10 mg IntraVENous Q6H PRN    morphine injection 2 mg  2 mg IntraVENous Q4H PRN    0.9% sodium chloride infusion 250 mL  250 mL IntraVENous PRN    epoetin reshma-epbx (RETACRIT) injection 20,000 Units  20,000 Units SubCUTAneous Q TUE, THU & SAT    0.9% sodium chloride infusion 250 mL  250 mL IntraVENous PRN    pantoprazole (PROTONIX) tablet 40 mg  40 mg Oral BID    sevelamer carbonate (RENVELA) tab 1,600 mg  1,600 mg Oral TID WITH MEALS    acetaminophen (TYLENOL) tablet 650 mg  650 mg Oral Q4H PRN    oxyCODONE IR (ROXICODONE) tablet 5 mg  5 mg Oral Q4H PRN     Allergies   Allergen Reactions    Aleve [Naproxen Sodium] Itching, Swelling and Other (comments)    Amlodipine Rash, Hives and Itching    Aspirin Other (comments), Nausea Only and Unknown (comments)     GI bleed  GI bleeding      Heparin Unknown (comments)     bleeding      Ibuprofen Nausea and Vomiting    Metformin Other (comments)     swelling       Visit Vitals  BP (!) 153/71 (BP 1 Location: Left arm, BP Patient Position: Sitting)   Pulse 79   Temp 98 °F (36.7 °C)   Resp 20   Ht 5' 3\" (1.6 m)   Wt 169 lb 5 oz (76.8 kg)   SpO2 99%   BMI 29.99 kg/m²     Physical Exam   Constitutional: She appears well-developed and well-nourished. Cardiovascular: Normal rate. Pulmonary/Chest: Effort normal.   Skin: Skin is warm and dry. Vitals reviewed. Neurologic Exam     Mental Status   Elderly woman in bed awake and alert pleasant talkative follows commands well.   Pupils are equal, symmetric reactive, EOMI  Face is grossly symmetric on smile tongue is midline speech is clear  Left arm and leg strength 5/5 right leg 5/5 right arm 4+ with some giveaway secondary to pain at the procedure site in the right neck  Sensation grossly intact  No abnormal movements  Gait steady  Very thin limbs throughout            Lab Results   Component Value Date/Time    WBC 5.1 11/25/2020 03:21 AM    HGB 7.6 (L) 11/25/2020 03:21 AM    HCT 26.2 (L) 11/25/2020 03:21 AM    PLATELET 252 (L) 64/45/3075 03:21 AM    MCV 97.0 11/25/2020 03:21 AM     Lab Results   Component Value Date/Time    Hemoglobin A1c <3.8 (L) 10/18/2020 11:48 PM    Hemoglobin A1c <3.8 (L) 09/18/2020 08:00 AM    Hemoglobin A1c 6.1 (H) 03/16/2018 01:18 PM    Glucose 249 (H) 11/25/2020 03:21 AM    Glucose (POC) 238 (H) 11/25/2020 02:40 AM    Creatinine 4.46 (H) 11/25/2020 03:21 AM      No results found for: CHOL, CHOLPOCT, HDL, LDL, LDLC, LDLCPOC, LDLCEXT, TRIGL, TGLPOCT, CHHD, CHHDX  Lab Results   Component Value Date/Time    ALT (SGPT) 14 11/21/2020 02:00 AM    Alk. phosphatase 157 (H) 11/21/2020 02:00 AM    Bilirubin, total 0.3 11/21/2020 02:00 AM    Albumin 2.6 (L) 11/21/2020 02:00 AM    Protein, total 5.8 (L) 11/21/2020 02:00 AM    INR 1.0 11/25/2020 03:21 AM    Prothrombin time 10.7 11/25/2020 03:21 AM    PLATELET 570 (L) 74/55/4051 03:21 AM    Hepatitis B surface Ag <0.10 11/17/2020 02:40 AM    AFP, Serum, Tumor Marker 3.5 01/12/2018 03:12 AM        CT Results (maximum last 3): Results from [place] encounter on 11/17/20   CT CODE NEURO HEAD WO CONTRAST    Narrative EXAM: CT CODE NEURO HEAD WO CONTRAST    INDICATION: numbness NIH 3    COMPARISON: None. CONTRAST: None. TECHNIQUE: Unenhanced CT of the head was performed using 5 mm images. Brain and  bone windows were generated. Coronal and sagittal reformats. CT dose reduction  was achieved through use of a standardized protocol tailored for this  examination and automatic exposure control for dose modulation. FINDINGS:  The ventricles and sulci are normal in size, shape and configuration. . Minimal  periventricular hypodensities. . There is no intracranial hemorrhage, extra-axial  collection, or mass effect.  The basilar cisterns are open. No CT evidence of  acute infarct. The bone windows demonstrate no abnormalities. The visualized portions of the  paranasal sinuses and mastoid air cells are clear. Impression IMPRESSION:   No acute intracranial abnormality. Results from [place] encounter on 10/06/20   CT ABD PELV WO CONT    Narrative Study: Abdominopelvic CT without contrast.    Clinical indication: Abdominal pain. Technique: Contiguous axial images of the abdomen and pelvis were obtained  without contrast. Coronal and sagittal reconstructions were obtained. Dose  reduction: All CT scans at this facility are performed using dose reduction  optimization techniques as appropriate to a performed exam including the  following: Automated exposure control, adjustments of the mA and/or kV according  to patient size, or use of iterative reconstruction technique. Comparison: None available. Findings:    Heart is enlarged. No pericardial effusion. Low density of the cardiac chambers  relative to the myocardium, suggesting anemia. Multiple small nodules in the  right middle lobe, measuring up to 6 mm. Unenhanced liver, spleen, pancreas and adrenal glands are unremarkable. Gallbladder is grossly unremarkable. No biliary ductal dilatation. Kidneys are  atrophic. No hydronephrosis. No discrete renal mass. Urinary bladder is  decompressed. Vascular opacifications in the uterus. No discrete adnexal mass. Mild abdominopelvic ascites. No evidence of bowel obstruction or ileus. No significant bowel wall thickening. Appendix is not clearly visualized. No pneumoperitoneum. Complex moderate sized  umbilical hernia containing fluid. Moderate aortoiliac atherosclerosis. Vessel patency not assessed without  contrast. No lymphadenopathy by CT size criteria. Patchy sclerosis throughout the visualized vertebral bodies, likely due to renal  osteodystrophy. No acute osseous abnormality identified.     Nonspecific suprapubic subcutaneous stranding and skin thickening. Impression Impression:  1. Nonspecific suprapubic subcutaneous stranding and skin thickening. Correlate  for cellulitis. 2.  Mild to moderate abdominopelvic ascites. Fluid-containing paraumbilical  hernia. 3.  No evidence of bowel obstruction. 4.  Cardiomegaly with imaging features of anemia. 5.  Bilateral renal cortical atrophy. 6.  Multiple small right middle lobe nodules, measuring up to 6 mm. Suggest  follow-up chest CT in 3-6 months. 7.  See full report for detailed findings. MRI Results (maximum last 3): No results found for this or any previous visit. VAS/US/Carotid Doppler Results (maximum last 3): No results found for this or any previous visit. PET Results (maximum last 3): No results found for this or any previous visit. Assessment and Plan        69-year-old woman who has risk factors for stroke who had sudden hemisensory changes following a procedure. It appears it was some type of catheterization done in the right neck. MRI and MRA should be done. C-spine is a consideration however she had exquisite right facial involvement suggesting more central.  I understand she cannot have any aspirin products so I think we should wait to see the imaging results before we consider escalating her stroke prevention management. Any acute neuro changes activate code stroke. Following. During this evaluation, we also discussed stroke education to include signs and symptoms of stroke and TIA. This clinical note was dictated with an electronic dictation software that can make unintentional errors. If there are any questions, please contact me directly for clarification.       [address] [facility],   NEUROLOGIST  Diplomate MARGARETTE  11/25/2020

## 2020-11-25 NOTE — PROGRESS NOTES
Bedside and Verbal shift change report given to Sanchez Hernandez RN (oncoming nurse) by Claudell Junes, RN (offgoing nurse). Report included the following information SBAR, Kardex, Intake/Output, MAR, Recent Results, Alarm Parameters  and Quality MeasuresNSR.

## 2020-11-25 NOTE — PROGRESS NOTES
Problem: Falls - Risk of  Goal: *Absence of Falls  Description: Document Karmen Santacruz Fall Risk and appropriate interventions in the flowsheet. Outcome: Progressing Towards Goal  Note: Fall Risk Interventions:  Mobility Interventions: Communicate number of staff needed for ambulation/transfer, Patient to call before getting OOB, Strengthening exercises (ROM-active/passive), Utilize walker, cane, or other assistive device    Mentation Interventions: Adequate sleep, hydration, pain control, Bed/chair exit alarm, Door open when patient unattended, Increase mobility, More frequent rounding, Reorient patient, Toileting rounds, Update white board    Medication Interventions: Patient to call before getting OOB, Teach patient to arise slowly    Elimination Interventions: Call light in reach, Bed/chair exit alarm, Stay With Me (per policy)    History of Falls Interventions: Bed/chair exit alarm, Consult care management for discharge planning, Door open when patient unattended, Investigate reason for fall, Evaluate medications/consider consulting pharmacy         Problem: Pain  Goal: *Control of Pain  Outcome: Progressing Towards Goal     Problem: Pressure Injury - Risk of  Goal: *Prevention of pressure injury  Description: Document Vic Scale and appropriate interventions in the flowsheet.   Outcome: Progressing Towards Goal  Note: Pressure Injury Interventions:  Sensory Interventions: Assess changes in LOC, Check visual cues for pain, Maintain/enhance activity level, Minimize linen layers, Keep linens dry and wrinkle-free    Moisture Interventions: Absorbent underpads, Apply protective barrier, creams and emollients, Maintain skin hydration (lotion/cream), Minimize layers    Activity Interventions: Increase time out of bed, Pressure redistribution bed/mattress(bed type)    Mobility Interventions: Pressure redistribution bed/mattress (bed type), HOB 30 degrees or less, Float heels    Nutrition Interventions: Document food/fluid/supplement intake    Friction and Shear Interventions: Lift sheet, Lift team/patient mobility team, Minimize layers       Problem: Heart Failure: Discharge Outcomes  Goal: *Demonstrates ability to perform prescribed activity without shortness of breath or discomfort  Outcome: Progressing Towards Goal  Goal: *Verbalizes understanding/describes prescribed medications  Outcome: Progressing Towards Goal  Goal: *Understands and describes signs and symptoms to report to providers(Stroke Metric)  Outcome: Progressing Towards Goal  Goal: *Describes importance of continuing daily weights and changes to report to physician  Outcome: Progressing Towards Goal     Problem: Acute Renal Failure: Discharge Outcomes  Goal: *Optimal pain control at patient's stated goal  Outcome: Progressing Towards Goal  Goal: *Hemodynamically stable  Outcome: Progressing Towards Goal  Goal: *Tolerating diet  Outcome: Progressing Towards Goal  Goal: *Lab values stabilized  Outcome: Progressing Towards Goal

## 2020-11-25 NOTE — PROGRESS NOTES
[address] [facility] Adult  Hospitalist Group                                                                                          Hospitalist Progress Note  Rossana Vigil MD  Answering service: [phone] from in house phone        Date of Service:  2020  NAME:  Carlos López  :  1950  MRN:  [id number]      Admission Summary:   69 y.o PMH of AVM,ESRD,anemia admitted due to anemia and hyperkalemia    Interval history / Subjective:   Patient seen and examined at [id number]/19. Code stroke last night- woke up middle night due to right side numbness and tingling. She feels better now but still has some numbness on her right hand. Stroke work up ordered. Plan for EGD today   Discussed with nursing      Assessment & Plan:      #Acute on chronic anemia:  -multifactorial - hx AVMs,CKD  -Gastroenterology signed off   -recent EGD 10/21: mild gastritis with some friable mucosa and oozing-treated with APC and also capsule study performed with presence of small non-bleeding AVM's in small intestine. Patient reports [facility] and [facility] EGD, enteroscopy, and colonoscopy- all with no findings. -s/p 1 U PRBC , ,   - PPI BID  - monitor Hb, transfuse <7  - plan for EGD today      #Hyperkalemia- mild   #ESRD on HD TTS  -nephrology following    #Liver cirrhosis ?  with Ascites :  -hx recurrent ascites with paracentesis . Not on any diuretics at home as pt is anuric   -USG abd showed ascites. The liver echotexture is normal  - Paracentesis drain placed on  and removed on   - fluid analysis shows transudate. Ascites fluid total protein > 2.5 which is highly suggestive of heart failure  - appreciate Hepatology input  - tranasjugular liver biopsy done     #Diastolic heart failure  #Severe Pulm artery HTN  - Echo: LVEF is 48%. Severe (grade 3) left ventricular diastolic dysfunction. Moderate to severe tricuspid valve regurgitation is present.   - appreciate cardiology input  - no further cardiac testing    Code Stroke 11/25 right side numbness/ tingling  - CT head: No acute intracranial abnormality  - MRI brain , MRI c spine, MRA ordered  - appreciate neurology input  - no aspirin due to GI bleed   - lipid panel ordered     Chronic pain- c/w home oxy prn    Code status: full  DVT prophylaxis: scd    Care Plan discussed with: Patient/Family and Nurse  Anticipated Disposition: Home w/Family  Anticipated Discharge: 24 hours to 48 hours     Hospital Problems  Date Reviewed: 10/6/2020          Codes Class Noted POA    Anemia ICD-10-CM: D64.9  ICD-9-CM: 285.9  9/17/2020 Unknown                Review of Systems:   Negative except as above     Vital Signs:    Last 24hrs VS reviewed since prior progress note. Most recent are:  Visit Vitals  BP (!) 126/49 (BP 1 Location: Left arm, BP Patient Position: Sitting)   Pulse 73   Temp 98 °F (36.7 °C)   Resp 21   Ht 5' 2.99\" (1.6 m)   Wt 76.8 kg (169 lb 5 oz)   SpO2 99%   BMI 30.00 kg/m²         Intake/Output Summary (Last 24 hours) at 11/25/2020 1429  Last data filed at 11/24/2020 2000  Gross per 24 hour   Intake 240 ml   Output --   Net 240 ml        Physical Examination:     I had a face to face encounter with this patient and independently examined them on 11/25/2020 as outlined below:          Constitutional:  No acute distress, elderly patient   ENT:  Oral mucosa moist, EOMI    Resp:  CTA bilaterally. No wheezing/rhonchi/rales. No accessory muscle use   CV:  Regular rhythm, normal rate, no murmurs, gallops, rubs    GI:  Soft, distended, non tender. normoactive bowel sounds    Musculoskeletal:  No LE edema    Neurologic:  Moves all extremities. AAOx3     Psych:  not anxious nor agitated.        Data Review:    Review and/or order of clinical lab test  Review and/or order of tests in the radiology section of CPT  Review and/or order of tests in the medicine section of CPT      Labs:     Recent Labs     11/25/20  0321 11/24/20  0103 WBC 5.1 5.7   HGB 7.6* 6.7*   HCT 26.2* 23.6*   * 145*     Recent Labs     11/25/20  0321 11/24/20  0103 11/23/20  0340    140 141   K 5.4* 5.9* 5.3*    107 108   CO2 29 26 27   BUN 40* 68* 49*   CREA 4.46* 6.61* 5.22*   * 154* 169*   CA 9.3 9.3 9.6   MG 2.2  --   --    PHOS 4.2  --   --      No results for input(s): ALT, AP, TBIL, TBILI, TP, ALB, GLOB, GGT, AML, LPSE in the last 72 hours. No lab exists for component: SGOT, GPT, AMYP, HLPSE  Recent Labs     11/25/20 0321   INR 1.0   PTP 10.7   APTT 24.9      No results for input(s): FE, TIBC, PSAT, FERR in the last 72 hours. Lab Results   Component Value Date/Time    Folate 16.4 10/19/2020 09:34 AM      No results for input(s): PH, PCO2, PO2 in the last 72 hours. No results for input(s): CPK, CKNDX, TROIQ in the last 72 hours.     No lab exists for component: CPKMB  No results found for: CHOL, CHOLX, CHLST, CHOLV, HDL, HDLP, LDL, LDLC, DLDLP, TGLX, TRIGL, TRIGP, CHHD, CHHDX  Lab Results   Component Value Date/Time    Glucose (POC) 122 (H) 11/25/2020 11:58 AM    Glucose (POC) 238 (H) 11/25/2020 02:40 AM    Glucose (POC) 122 (H) 11/18/2020 01:10 PM    Glucose (POC) 201 (H) 10/25/2020 11:59 AM    Glucose (POC) 186 (H) 10/25/2020 06:07 AM     No results found for: COLOR, APPRN, SPGRU, REFSG, VERENA, PROTU, GLUCU, KETU, BILU, UROU, TIBURCIO, LEUKU, GLUKE, EPSU, BACTU, WBCU, RBCU, CASTS, UCRY      Medications Reviewed:     Current Facility-Administered Medications   Medication Dose Route Frequency    fluticasone propionate (FLONASE) 50 mcg/actuation nasal spray 2 Spray  2 Spray Both Nostrils DAILY    0.9% sodium chloride infusion 250 mL  250 mL IntraVENous PRN    polyethylene glycol (MIRALAX) packet 17 g  17 g Oral DAILY PRN    diphenhydrAMINE (BENADRYL) capsule 25 mg  25 mg Oral Q6H PRN    albuterol-ipratropium (DUO-NEB) 2.5 MG-0.5 MG/3 ML  3 mL Nebulization Q4H PRN    alum-mag hydroxide-simeth (MYLANTA) oral suspension 30 mL  30 mL Oral Q4H PRN    ascorbic acid (vitamin C) (VITAMIN C) tablet 500 mg  500 mg Oral DAILY    cholecalciferol (VITAMIN D3) (1000 Units /25 mcg) tablet 2 Tab  2,000 Units Oral DAILY    ferrous sulfate tablet 325 mg  325 mg Oral TID WITH MEALS    ketotifen (ZADITOR) 0.025 % (0.035 %) ophthalmic solution 1 Drop  1 Drop Both Eyes BID    carboxymethylcellulose sodium (REFRESH PLUS) 0.5 % ophthalmic solution 1-2 Drop  1-2 Drop Both Eyes TID    prochlorperazine (COMPAZINE) with saline injection 10 mg  10 mg IntraVENous Q6H PRN    morphine injection 2 mg  2 mg IntraVENous Q4H PRN    0.9% sodium chloride infusion 250 mL  250 mL IntraVENous PRN    epoetin reshma-epbx (RETACRIT) injection 20,000 Units  20,000 Units SubCUTAneous Q TUE, THU & SAT    0.9% sodium chloride infusion 250 mL  250 mL IntraVENous PRN    pantoprazole (PROTONIX) tablet 40 mg  40 mg Oral BID    sevelamer carbonate (RENVELA) tab 1,600 mg  1,600 mg Oral TID WITH MEALS    acetaminophen (TYLENOL) tablet 650 mg  650 mg Oral Q4H PRN    oxyCODONE IR (ROXICODONE) tablet 5 mg  5 mg Oral Q4H PRN     ______________________________________________________________________  EXPECTED LENGTH OF STAY: 3d 14h  ACTUAL LENGTH OF STAY:          6                 Pravin Ivory MD

## 2020-11-25 NOTE — PROGRESS NOTES
[address]  [address], [address]  [phone]                GI PROGRESS NOTE      NAME:   Dwight Fenton   :    1950   MRN:    [id number]     Assessment/Plan   Anemia - multifactorial - GIB, ESRD, Cirrhosis  -Hemoglobin 6.4 on admission, transfused - dropped to 6.7 on , 7.6 today post transfusion  -Continue to monitor H&H, transfuse prn for Hgb < 7.0  -BID PPI with Protonix  -EGD 10/21/20 Dr. Cherelle Thompson - oozing in stomach, treated with APC  -M2A 10/22/20 - multiple non-bleeding SB AVMs  -Patient reports subsequent GI evaluation with EGD, enteroscopy and colonoscopy at the [facility] post-discharge from here in October  - Plan for EGD today for persistent anemia, decreasing Hgb but patient declined the procedure  Liver disease/Cirhosis/Ascites:  -Abdominal ultrasound 20: Increased echogenicity of the kidneys with renal atrophy suggestive of medical renal disease Ascites.   -Paracentesis 20 :Successful ultrasound guided pigtail paracentesis catheter, ongoing drainage to gravity bag  -Hepatology following - had transjugular liver biopsy , path pending      ESRD, diabetes, COVID 19 negative, hypertension, chronic pain, diabetes  -management per hospitalist   -nephrology following   -patient receives dialysis - received dialysis yesterday        Patient Active Problem List   Diagnosis Code    GI bleed K92.2    Diabetes mellitus type 2, controlled ([address] E11.9    Anemia in chronic kidney disease N18.9, D63.1    Cirrhosis ([address] K74.60    Acute blood loss anemia D62    Dizziness R42    Generalized weakness R53.1    Rectal bleed K62.5    Symptomatic anemia D64.9    Severe anemia D64.9    Anemia D64.9    Dependence on renal dialysis ([address] Z99.2    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, or end stage renal disease (HCC) I13.2    Other ascites R18.8    Other chronic pain G89.29    Paroxysmal atrial fibrillation (HCC) I48.0    Type 2 diabetes mellitus with hyperglycemia (HCC) E11.65    Unspecified cirrhosis of liver (HCC) K74.60    Other hypotension I95.89    HTN (hypertension) I10    ESRD (end stage renal disease) (HCC) N18.6    Suspected COVID-19 virus infection Z20.828       Subjective:     Lorenzo Hernandez is a 69 y.o.  female   She c/o being hungry, not happy at being NPO and now delay of EGD due to the \"stat\" covid test not being done when I had ordered it this morning. I apologized for the delay, but she is [place]" and states the procedure may need to be delayed. I advised that not happen, as her Hgb had dropped below 7 again on Monday, but she states \"I am tired of dealing with all this. \"      Objective:     VITALS:   Last 24hrs VS reviewed since prior hospitalist progress note. Most recent are:  Visit Vitals  BP (!) 153/71 (BP 1 Location: Left arm, BP Patient Position: Sitting)   Pulse 79   Temp 98 °F (36.7 °C)   Resp 20   Ht 5' 3\" (1.6 m)   Wt 76.8 kg (169 lb 5 oz)   SpO2 99%   BMI 29.99 kg/m²       Intake/Output Summary (Last 24 hours) at 11/25/2020 1237  Last data filed at 11/24/2020 2000  Gross per 24 hour   Intake 240 ml   Output --   Net 240 ml        PHYSICAL EXAM:  General:          Alert, cooperative, no acute distress    HEENT:           NC, Atraumatic.  Anicteric sclerae.   Abdomen:        Non-distended  Neurologic:      Alert and oriented  Psych:             A bit agitated     Lab Data   Recent Results (from the past 12 hour(s))   GLUCOSE, POC    Collection Time: 11/25/20  2:40 AM   Result Value Ref Range    Glucose (POC) 238 (H) 65 - 100 mg/dL    Performed by Shilpa Alex    CBC WITH AUTOMATED DIFF    Collection Time: 11/25/20  3:21 AM   Result Value Ref Range    WBC 5.1 3.6 - 11.0 K/uL    RBC 2.70 (L) 3.80 - 5.20 M/uL    HGB 7.6 (L) 11.5 - 16.0 g/dL    HCT 26.2 (L) 35.0 - 47.0 %    MCV 97.0 80.0 - 99.0 FL    MCH 28.1 26.0 - 34.0 PG    MCHC 29.0 (L) 30.0 - 36.5 g/dL    RDW 18.3 (H) 11.5 - 14.5 %    PLATELET 602 (L) 306 - 400 K/uL    MPV 11.3 8.9 - 12.9 FL    NRBC 0.0 0  WBC    ABSOLUTE NRBC 0.00 0.00 - 0.01 K/uL    NEUTROPHILS 72 32 - 75 %    LYMPHOCYTES 13 12 - 49 %    MONOCYTES 9 5 - 13 %    EOSINOPHILS 4 0 - 7 %    BASOPHILS 1 0 - 1 %    IMMATURE GRANULOCYTES 1 (H) 0.0 - 0.5 %    ABS. NEUTROPHILS 3.5 1.8 - 8.0 K/UL    ABS. LYMPHOCYTES 0.7 (L) 0.8 - 3.5 K/UL    ABS. MONOCYTES 0.5 0.0 - 1.0 K/UL    ABS. EOSINOPHILS 0.2 0.0 - 0.4 K/UL    ABS. BASOPHILS 0.1 0.0 - 0.1 K/UL    ABS. IMM.  GRANS. 0.1 (H) 0.00 - 0.04 K/UL    DF SMEAR SCANNED      PLATELET COMMENTS Large Platelets      RBC COMMENTS POLYCHROMASIA  1+        RBC COMMENTS ANISOCYTOSIS  1+       METABOLIC PANEL, BASIC    Collection Time: 11/25/20  3:21 AM   Result Value Ref Range    Sodium 136 136 - 145 mmol/L    Potassium 5.4 (H) 3.5 - 5.1 mmol/L    Chloride 103 97 - 108 mmol/L    CO2 29 21 - 32 mmol/L    Anion gap 4 (L) 5 - 15 mmol/L    Glucose 249 (H) 65 - 100 mg/dL    BUN 40 (H) 6 - 20 MG/DL    Creatinine 4.46 (H) 0.55 - 1.02 MG/DL    BUN/Creatinine ratio 9 (L) 12 - 20      GFR est AA 12 (L) >60 ml/min/1.73m2    GFR est non-AA 10 (L) >60 ml/min/1.73m2    Calcium 9.3 8.5 - 10.1 MG/DL   MAGNESIUM    Collection Time: 11/25/20  3:21 AM   Result Value Ref Range    Magnesium 2.2 1.6 - 2.4 mg/dL   PHOSPHORUS    Collection Time: 11/25/20  3:21 AM   Result Value Ref Range    Phosphorus 4.2 2.6 - 4.7 MG/DL   PTT    Collection Time: 11/25/20  3:21 AM   Result Value Ref Range    aPTT 24.9 22.1 - 31.0 sec    aPTT, therapeutic range     58.0 - 77.0 SECS   PROTHROMBIN TIME + INR    Collection Time: 11/25/20  3:21 AM   Result Value Ref Range    INR 1.0 0.9 - 1.1      Prothrombin time 10.7 9.0 - 11.1 sec   SAMPLES BEING HELD    Collection Time: 11/25/20  3:21 AM   Result Value Ref Range    SAMPLES BEING HELD  1 TALL SST     COMMENT        Add-on orders for these samples will be processed based on acceptable specimen integrity and analyte stability, which may vary by analyte.    GLUCOSE, POC    Collection Time: 11/25/20 11:58 AM   Result Value Ref Range    Glucose (POC) 122 (H) 65 - 100 mg/dL    Performed by Cayden Padron    SARS-COV-2    Collection Time: 11/25/20 12:19 PM   Result Value Ref Range    Specimen source Nasopharyngeal      SARS-CoV-2 PENDING     SARS-CoV-2 PENDING     Specimen source Nasopharyngeal      COVID-19 rapid test PENDING     Specimen type NP Swab      Health status PENDING     COVID-19 PENDING

## 2020-11-25 NOTE — PROGRESS NOTES
0241 Code stroke called, blood sugar 238, pt complaint of new right sided decreased sensation. Pt reports symptoms started at 2100 11/24. Pt complains of numbness in fingers and toes at baseline, but states that symptoms have been \"spreading\" since her procedure 11/24 (TJ liver biopsy and pressure measurements). ICU RN, primary RN, this RN, Mario Multani, NP, and Dr. Debbie Brewer at bedside. Teleneurology paged.

## 2020-11-25 NOTE — ACP (ADVANCE CARE PLANNING)
[address] [facility] Adult  Hospitalist Group                                      Advance Care Planning Note    Name: Aparna Allen  YOB: 1950  MRN: [id number]  Admission Date: 11/17/2020  5:47 PM    Date of discussion: 11/25/2020    Active Diagnoses:    Hospital Problems  Date Reviewed: 10/6/2020          Codes Class Noted POA    Anemia ICD-10-CM: D64.9  ICD-9-CM: 285.9  9/17/2020 Unknown              These active diagnoses are of sufficient risk that focused discussion on advance care planning is indicated in order to allow the patient to thoughtfully consider personal goals of care, and if situations arise that prevent the ability to personally give input, to ensure appropriate representation of their personal desires for different levels and aggressiveness of care. Discussion:     Persons present and participating in discussion: Yeny Valdivia MD    Topics Discussed:  Patient's medical condition and diagnosis: Jodi..Pimple  ] yes [  ] no   Surrogate decision maker: Jodi..Pimple  ] yes [  ] no   Patient's current physical function/cognitive function/frailty: [  ] yes [  ] no   Code Status: [ X ] yes [  ] no   Artificial Nutrition / Dialysis / Non-Invasive Ventilation / Blood Transfusion: [  ] yes [  ] no  Potential Resources for home (durable medical equipment, home nursing, home O2): [  ] yes [  ] no    Overview of Discussion:   Discussed goals of care incase of cardiac arrest, pt wants to be resuscitated. Full code for now. She names both her children - son and daughter as Fidelia Gandara. Time Spent:     Total time spent face-to-face in education and discussion: 10 minutes.      Yeny Meneses MD  Date of Service:  11/25/2020  4:22 PM

## 2020-11-25 NOTE — PROGRESS NOTES
Comprehensive Nutrition Assessment      Comprehensive Nutrition Assessment    Type and Reason for Visit: Initial, RD nutrition re-screen/LOS    Nutrition Recommendations/Plan:   1. Restart Cardiac diet/Low Potassium - add No concentrated Sweets when able  2. Add Nepro BID for added kcal/protein for HD  3. Monitor K+, BG, weight, fluid status. 4. Document PO intake    Nutrition Assessment:       Pt admitted for Anemia [D64.9]. Pt  has a past medical history of Arthritis, Asthma, Chronic kidney disease ESRD on HD TTS, Chronic pain, Cirrhosis, Diabetes, GERD, Hypertension, and Paroxysmal atrial fibrillation. Pt screened for LOS. Pt NPO today for EGD. When diet restarts, recommend Cardiac/Low K+, no concentrated sweets. A1c of <3.8 inaccurate due to HD and anemia. BG have been 125-200 mg/dL. No reports of n/v, c/d, chew/swallow difficulties. Pt needs increased protein/kcal for HD. KNFA. Pt had liver bx done 11/24, path pending. Paracentesis done 11/20- pigtail drain to gravity bag. Pt with Code- S called last night. Neuro following. Unsure PO intake- none documented in flow sheet. Pt off floor for EGD. Unable to interview pt at this time. Appetite appears to be fine- noted pt complaining of hungry when NPO earlier today. Pt stated to GI she was [place]. \"     K+ elevated. No BM in 5 days. Malnutrition Assessment:  Malnutrition Status:  Insufficient data    Context:            Estimated Daily Nutrient Needs:  Energy (kcal):  3987 (BMRx1. 3)  Protein (g):  (1.2-1.4 for HD)       Fluid (ml/day):  1500    Nutrition Related Findings:       Last BM 11/20, no edema in flowsheet    Wounds:    None       Current Nutrition Therapies:  DIET ONE TIME MESSAGE  DIET NPO    Anthropometric Measures:  · Height:  5' 2.99\" (160 cm)  · Current Body Wt:  76.8 kg (169 lb 5 oz)   · Admission Body Wt:  169 lb 5 oz    · Usual Body Wt:  72.6 kg (160 lb)     · Ideal Body Wt:  115 lbs:  147.2 %   · BMI Category:  Obese class 1 (BMI 30.0-34. 9)       Nutrition Diagnosis:   · Inadequate protein intake related to increased demand for energy/nutrients as evidenced by (increased need for protein from HD)      Nutrition Interventions:   Food and/or Nutrient Delivery: Continue current diet, Start oral nutrition supplement  Nutrition Education and Counseling: Education needed  Coordination of Nutrition Care: Continue to monitor while inpatient, Interdisciplinary rounds    Goals:  Pt will consume >50% of meals and 1-2 ONS per day within 5-7 days       Nutrition Monitoring and Evaluation:   Behavioral-Environmental Outcomes: None identified  Food/Nutrient Intake Outcomes: Food and nutrient intake, Supplement intake  Physical Signs/Symptoms Outcomes: Biochemical data, Fluid status or edema, Weight    Discharge Planning:    Continue oral nutrition supplement, Continue current diet     Electronically signed by Yolanda Navarro RD on 11/25/2020    Contact: [phone]

## 2020-11-25 NOTE — PROGRESS NOTES
Problem: Falls - Risk of  Goal: *Absence of Falls  Description: Document [address]ett Fall Risk and appropriate interventions in the flowsheet. Outcome: Progressing Towards Goal  Note: Fall Risk Interventions:  Mobility Interventions: Bed/chair exit alarm, Communicate number of staff needed for ambulation/transfer, Patient to call before getting OOB    Mentation Interventions: Adequate sleep, hydration, pain control, Bed/chair exit alarm, Reorient patient, Room close to nurse's station    Medication Interventions: Bed/chair exit alarm, Evaluate medications/consider consulting pharmacy, Patient to call before getting OOB    Elimination Interventions: Bed/chair exit alarm, Call light in reach, Patient to call for help with toileting needs    History of Falls Interventions: Bed/chair exit alarm, Consult care management for discharge planning, Evaluate medications/consider consulting pharmacy         Problem: Pain  Goal: *Control of Pain  Outcome: Progressing Towards Goal     Problem: Pressure Injury - Risk of  Goal: *Prevention of pressure injury  Description: Document Vic Scale and appropriate interventions in the flowsheet.   Outcome: Progressing Towards Goal  Note: Pressure Injury Interventions:  Sensory Interventions: Assess changes in LOC, Float heels, Keep linens dry and wrinkle-free, Maintain/enhance activity level, Minimize linen layers, Monitor skin under medical devices, Pad between skin to skin, Pressure redistribution bed/mattress (bed type)    Moisture Interventions: Absorbent underpads, Minimize layers    Activity Interventions: Pressure redistribution bed/mattress(bed type), PT/OT evaluation    Mobility Interventions: Pressure redistribution bed/mattress (bed type), PT/OT evaluation    Nutrition Interventions: Document food/fluid/supplement intake    Friction and Shear Interventions: Apply protective barrier, creams and emollients, Lift sheet, Minimize layers                Problem: Heart Failure: Day 2  Goal: Activity/Safety  Outcome: Progressing Towards Goal  Goal: Diagnostic Test/Procedures  Outcome: Progressing Towards Goal  Goal: Nutrition/Diet  Outcome: Progressing Towards Goal  Goal: Medications  Outcome: Progressing Towards Goal  Goal: Treatments/Interventions/Procedures  Outcome: Progressing Towards Goal     Problem: Acute Renal Failure: Day 2  Goal: Activity/Safety  Outcome: Progressing Towards Goal  Goal: Diagnostic Test/Procedures  Outcome: Progressing Towards Goal  Goal: Nutrition/Diet  Outcome: Progressing Towards Goal  Goal: Discharge Planning  Outcome: Progressing Towards Goal  Goal: Medications  Outcome: Progressing Towards Goal  Goal: Treatments/Interventions/Procedures  Outcome: Progressing Towards Goal

## 2020-11-25 NOTE — PROGRESS NOTES
Neurocritical Care Code Stroke Documentation    Symptoms:   numbness from base of neck down right side (including arm and leg) since 9 pm   Last Known Well:    Medical hx: Active Problems:    Anemia (2020)       Anticoagulation:  none   VAN:   Negative   NIHSS:   1a-LOC:0    1b-Month/Age:0    1c-Open/Close Hand:0    2-Best Gaze:0    3-Visual Fields:0    4-Facial Palsy:0    5a-Left Arm:0    5b-Right Arm:0    6a-Left Le    6b-Right Le    7-Limb Ataxia:0    8-Sensory:1    9-Best Language:0    10-Dysarthria:0    11-Extinction/Inattention:0  TOTAL SCORE:3   Imaging: Ct Code Neuro Head Wo Contrast  Result Date: 2020  IMPRESSION: No acute intracranial abnormality. Plan:   TPA Candidate: NO    Mechanical thrombectomy Candidate: NO     Discussed with: Dr Donna Leyva    Time spent: 20 minutes.      SUSAN Blue  Neurocritical Care Physician Assistant  [phone]

## 2020-11-25 NOTE — WOUND CARE
Wound Care Note:     Follow-up visit for right leg    Chart shows:  Admitted for anemia   Past Medical History:   Diagnosis Date    Arthritis     Asthma     Chronic kidney disease     Chronic pain     [address] [address] 12/17/2017    Diabetes ([facility] [address] diet controlled    GERD (gastroesophageal reflux disease)     Hypertension     Paroxysmal atrial fibrillation ([facility] [address] 10/6/2020     WBC = 5.1 on 11/25/20  Admitted from [facility] ED    Assessment:   Patient is A&O x 4, communicative, continent with no assistance needed in repositioning. Bed: Total Care  Diet: NPO  Patient reports back pain; RN is aware. Bilateral heels, buttocks, and sacral skin intact and without erythema. 1. POA right posterior lower leg wound has resolved, no more drainage, wound bed is epithealized tissue, small crusted area at distal end came loose with cleaning. Optifoam Gentle applied per patient request.    Patient repositioned supine. Heels offloaded on pillow. Recommendations:    Right posterior lower leg- apply Optifoam Gentle, change as needed. Skin Care & Pressure Prevention:  Minimize layers of linen/pads under patient to optimize support surface. Turn/reposition approximately every 2 hours and offload heels. Manage incontinence / promote continence   Nourishing Skin Cream to dry skin, minimize use of briefs when able    Discussed above plan with patient & Tamica Perez RN    Transition of Care: Wound care will sign off.     ABBY Powell, RN, [facility], [facility].  office [phone]  pager [phone] or call  to page

## 2020-11-25 NOTE — ROUTINE PROCESS
Bedside shift change report given to [address] (oncoming nurse) by Eagle Logan RN (offgoing nurse). Report included the following information SBAR, Kardex, ED Summary, Intake/Output, MAR, Cardiac Rhythm NSR w/ BBB and Dual Neuro Assessment.

## 2020-11-25 NOTE — PROGRESS NOTES
Critical Care Documentation    Name: Naeem Pérez  YOB: 1950  MRN: [id number]  Admission Date: 11/17/2020  5:47 PM    Date of service: 11/25/2020    Active Diagnoses:    Hospital Problems  Date Reviewed: 10/6/2020          Codes Class Noted POA    Anemia ICD-10-CM: D64.9  ICD-9-CM: 285.9  9/17/2020 Unknown              Critical Illness Complaint:  Code Stroke    Clinical Presentation:  Code stroke called 0236. Last known well 2100. . Code stroke called for worsening right sided paresthesia. Patient reports numbness to her fingers at baseline, last night noted numbness to right lower extremity. No weakness, saddle anesthesias, bowel/bladder incontinence, or pain. Ambulatory to commode as usual. At time of code stroke being called patient now reports numbness to right base of neck to her upper extremity as well. No other focal neuro deficits, strength equal bilaterally, no facial asymmetry, pupils equally reactive, EOM WNL. Physical Exam  Constitutional:       Appearance: She is obese. She is not ill-appearing. HENT:      Head: Normocephalic. Mouth/Throat:      Mouth: Mucous membranes are dry. Cardiovascular:      Rate and Rhythm: Normal rate and regular rhythm. Pulses: Normal pulses. Pulmonary:      Effort: Pulmonary effort is normal.   Abdominal:      General: There is distension. Palpations: Abdomen is soft. Skin:     General: Skin is warm and dry. Capillary Refill: Capillary refill takes less than 2 seconds. Neurological:      Mental Status: She is alert and oriented to person, place, and time. Mental status is at baseline.       Comments: Right sided numbness   Psychiatric:         Mood and Affect: Mood normal.         BP (!) 159/57 (BP 1 Location: Left arm, BP Patient Position: At rest)   Pulse 90   Temp 98.3 °F (36.8 °C)   Resp 19   Ht 5' 3\" (1.6 m)   Wt 77.3 kg (170 lb 6.7 oz)   SpO2 100%   BMI 30.19 kg/m²     Data Reviewed: All diagnostic labs and studies have been reviewed. Medications/Therapies Administered:   Anxiolytics: [  ] yes [ x ] no   Antiarrhythmics: [  ] yes [ x ] no   Antihypertensives: [  ] yes [x  ] no   IVFs: [  ] yes [ x ] no   Blood Transfusion: [  ] yes [ x ] no    Imaging Ordered and Reviewed:   [  ] CXR    [ x ] CT Scan  - Head Ct non con  [  ] MRI    [  ] Ultrasound    Prognosis / Plan of Care Discussed With:  [ x ] Patient  [  ] Family Members / Surrogate Decision-makers (patient unable / incompetent to give history and/or make treatment decisions, and such discussion is medically necessary)  [ x ] Nursing Staff  [ x ] Specialist Physicians - Dr Jennyfer Forte tele neurology        Assessment and Plan:  Code Stroke  - Due to right sided numbness - LKW 0900  - No other focal neuro deficits noted  - , LKW 2100  - CT Head non contrast  - Base labs - CBC, CMP, Mag, Phos, Coags  - Discussed with tele neurology Dr Jennyfer Forte, no interventions warranted at this time, recommends consulting local neurology if further evaluation needed    Critical Care Attestation: This patient is unstable and critically ill. Due to a high probability of clinically significant, life threatening deterioration, the patient required my highest level of preparedness to intervene emergently and I personally spent this critical care time directly and personally managing the patient, and was immediately available to the patient. This critical care time included obtaining a history; examining the patient; pulse oximetry; ordering and review of labs/studies; arranging urgent treatment with development of a management plan; evaluation of patient's response to treatment; frequent reassessment; and, discussions with other providers and/or family. This critical care time was performed to assess and manage the high probability of imminent, life-threatening deterioration that could result in multi-organ failure and death. Time Spent:     I personally spent 31 minutes in providing critical care time. It was exclusive of separately billable procedures, treating other patients, and teaching time.     Alexandria Leiva NP  11/25/2020  3:07 AM

## 2020-11-25 NOTE — PROGRESS NOTES
Patient complaining of onset of RLE numbness, along with bilateral foot numbness. She states that sometimes she gets numbness in her feet, but this numbness radiates up to her leg. Old angio site assessed, no bleeding/no hematoma, pedal pulses palpable bilaterally. Neuro exam otherwise unchanged. VSS. Patient complains of some dizziness upon standing, but otherwise ambulates fine with walker, numbness unchanged with ambulation. NP paged, no new orders received.

## 2020-11-25 NOTE — PROGRESS NOTES
[facility]   [address] [facility], [address] [place] [address], [facility]  Phone: [phone]   [phone]       Nephrology Progress Note  Danielle Fenton     [birth year]     [id number]  Date of Admission : 11/17/2020 11/25/20    CC: Follow up for ESRD       Assessment and Plan   ESRD- HD  - Dialyzes TTS at [address] dialysis in [place], [place]  - HD tomorrow per routine     Cirrhosis of Liver : ? Cause   Chronic HFrEF   - s/p Paracentesis this admission     Anemia in CKD :   -  continue epogen - 20 K units w/ HD     Blood loss anemia : On last admission   - EGD: antral gastritis and erythema   - Capsule study : non bleeding small bowel AVMs noted   - PRBCs w/ HD 11/24, hgb 7.6 now  - for EGD today     Sec HPTH   - continue Phos binders       Interval History:  Seen and examined. Feeling better.  hgb 7.6. For EGD later today. No cp, sob, n/v/d. Feeling better after transfusion yesterday    Review of Systems: A comprehensive review of systems was negative except for that written in the HPI.     Current Medications:   Current Facility-Administered Medications   Medication Dose Route Frequency    fluticasone propionate (FLONASE) 50 mcg/actuation nasal spray 2 Spray  2 Spray Both Nostrils DAILY    0.9% sodium chloride infusion 250 mL  250 mL IntraVENous PRN    polyethylene glycol (MIRALAX) packet 17 g  17 g Oral DAILY PRN    diphenhydrAMINE (BENADRYL) capsule 25 mg  25 mg Oral Q6H PRN    albuterol-ipratropium (DUO-NEB) 2.5 MG-0.5 MG/3 ML  3 mL Nebulization Q4H PRN    alum-mag hydroxide-simeth (MYLANTA) oral suspension 30 mL  30 mL Oral Q4H PRN    ascorbic acid (vitamin C) (VITAMIN C) tablet 500 mg  500 mg Oral DAILY    cholecalciferol (VITAMIN D3) (1000 Units /25 mcg) tablet 2 Tab  2,000 Units Oral DAILY    ferrous sulfate tablet 325 mg  325 mg Oral TID WITH MEALS    ketotifen (ZADITOR) 0.025 % (0.035 %) ophthalmic solution 1 Drop  1 Drop Both Eyes BID    carboxymethylcellulose sodium (REFRESH PLUS) 0.5 % ophthalmic solution 1-2 Drop  1-2 Drop Both Eyes TID    prochlorperazine (COMPAZINE) with saline injection 10 mg  10 mg IntraVENous Q6H PRN    morphine injection 2 mg  2 mg IntraVENous Q4H PRN    0.9% sodium chloride infusion 250 mL  250 mL IntraVENous PRN    epoetin reshma-epbx (RETACRIT) injection 20,000 Units  20,000 Units SubCUTAneous Q TUE, THU & SAT    0.9% sodium chloride infusion 250 mL  250 mL IntraVENous PRN    pantoprazole (PROTONIX) tablet 40 mg  40 mg Oral BID    sevelamer carbonate (RENVELA) tab 1,600 mg  1,600 mg Oral TID WITH MEALS    acetaminophen (TYLENOL) tablet 650 mg  650 mg Oral Q4H PRN    oxyCODONE IR (ROXICODONE) tablet 5 mg  5 mg Oral Q4H PRN      Allergies   Allergen Reactions    Aleve [Naproxen Sodium] Itching, Swelling and Other (comments)    Amlodipine Rash, Hives and Itching    Aspirin Other (comments), Nausea Only and Unknown (comments)     GI bleed  GI bleeding      Heparin Unknown (comments)     bleeding      Ibuprofen Nausea and Vomiting    Metformin Other (comments)     swelling       Objective:  Vitals:    Vitals:    11/24/20 1809 11/24/20 2215 11/25/20 0200 11/25/20 0600   BP: 132/62 132/62 (!) 159/57 (!) 151/58   Pulse: 85 88 90 83   Resp: 15 18 19 22   Temp: 97.9 °F (36.6 °C) 98 °F (36.7 °C) 98.3 °F (36.8 °C) 97.9 °F (36.6 °C)   TempSrc:       SpO2: 100% 99% 100% 99%   Weight:   76.8 kg (169 lb 5 oz)    Height:         Intake and Output:  No intake/output data recorded.   11/23 1901 - 11/25 0700  In: 240 [P.O.:240]  Out: 1500     Physical Examination:    General: No distress   Neck:  RIJ permacath   Resp:  Clear bilaterally  CV:  RRR,  no murmur or rub, trace+ LE edema  GI:  Soft, NT, + ascites drain  Neurologic:  Non focal  Psych:             AAO x 3 appropriate affect  Skin:  No Rash    []    High complexity decision making was performed  []    Patient is at high-risk of decompensation with multiple organ involvement    Lab Data Personally Reviewed: I have reviewed all the pertinent labs, microbiology data and radiology studies during assessment. Recent Labs     11/25/20 0321 11/24/20 0103 11/23/20 0340    140 141   K 5.4* 5.9* 5.3*    107 108   CO2 29 26 27   * 154* 169*   BUN 40* 68* 49*   CREA 4.46* 6.61* 5.22*   CA 9.3 9.3 9.6   MG 2.2  --   --    PHOS 4.2  --   --    INR 1.0  --   --      Recent Labs     11/25/20 0321 11/24/20 0103 11/23/20 0340   WBC 5.1 5.7 5.2   HGB 7.6* 6.7* 7.3*   HCT 26.2* 23.6* 25.4*   * 145* 138*     No results found for: [facility] [place]  Lab Results   Component Value Date/Time    Culture result: NO GROWTH 4 DAYS 11/19/2020 02:50 PM    Culture result: No growth 5 days 10/18/2020 07:09 AM     Recent Results (from the past 24 hour(s))   GLUCOSE, POC    Collection Time: 11/25/20  2:40 AM   Result Value Ref Range    Glucose (POC) 238 (H) 65 - 100 mg/dL    Performed by Alyssa Quevedo    CBC WITH AUTOMATED DIFF    Collection Time: 11/25/20  3:21 AM   Result Value Ref Range    WBC 5.1 3.6 - 11.0 K/uL    RBC 2.70 (L) 3.80 - 5.20 M/uL    HGB 7.6 (L) 11.5 - 16.0 g/dL    HCT 26.2 (L) 35.0 - 47.0 %    MCV 97.0 80.0 - 99.0 FL    MCH 28.1 26.0 - 34.0 PG    MCHC 29.0 (L) 30.0 - 36.5 g/dL    RDW 18.3 (H) 11.5 - 14.5 %    PLATELET 162 (L) 245 - 400 K/uL    MPV 11.3 8.9 - 12.9 FL    NRBC 0.0 0  WBC    ABSOLUTE NRBC 0.00 0.00 - 0.01 K/uL    NEUTROPHILS 72 32 - 75 %    LYMPHOCYTES 13 12 - 49 %    MONOCYTES 9 5 - 13 %    EOSINOPHILS 4 0 - 7 %    BASOPHILS 1 0 - 1 %    IMMATURE GRANULOCYTES 1 (H) 0.0 - 0.5 %    ABS. NEUTROPHILS 3.5 1.8 - 8.0 K/UL    ABS. LYMPHOCYTES 0.7 (L) 0.8 - 3.5 K/UL    ABS. MONOCYTES 0.5 0.0 - 1.0 K/UL    ABS. EOSINOPHILS 0.2 0.0 - 0.4 K/UL    ABS. BASOPHILS 0.1 0.0 - 0.1 K/UL    ABS. IMM.  GRANS. 0.1 (H) 0.00 - 0.04 K/UL    DF SMEAR SCANNED      PLATELET COMMENTS Large Platelets      RBC COMMENTS POLYCHROMASIA  1+        RBC COMMENTS ANISOCYTOSIS  1+       METABOLIC PANEL, BASIC Collection Time: 11/25/20  3:21 AM   Result Value Ref Range    Sodium 136 136 - 145 mmol/L    Potassium 5.4 (H) 3.5 - 5.1 mmol/L    Chloride 103 97 - 108 mmol/L    CO2 29 21 - 32 mmol/L    Anion gap 4 (L) 5 - 15 mmol/L    Glucose 249 (H) 65 - 100 mg/dL    BUN 40 (H) 6 - 20 MG/DL    Creatinine 4.46 (H) 0.55 - 1.02 MG/DL    BUN/Creatinine ratio 9 (L) 12 - 20      GFR est AA 12 (L) >60 ml/min/1.73m2    GFR est non-AA 10 (L) >60 ml/min/1.73m2    Calcium 9.3 8.5 - 10.1 MG/DL   MAGNESIUM    Collection Time: 11/25/20  3:21 AM   Result Value Ref Range    Magnesium 2.2 1.6 - 2.4 mg/dL   PHOSPHORUS    Collection Time: 11/25/20  3:21 AM   Result Value Ref Range    Phosphorus 4.2 2.6 - 4.7 MG/DL   PTT    Collection Time: 11/25/20  3:21 AM   Result Value Ref Range    aPTT 24.9 22.1 - 31.0 sec    aPTT, therapeutic range     58.0 - 77.0 SECS   PROTHROMBIN TIME + INR    Collection Time: 11/25/20  3:21 AM   Result Value Ref Range    INR 1.0 0.9 - 1.1      Prothrombin time 10.7 9.0 - 11.1 sec   SAMPLES BEING HELD    Collection Time: 11/25/20  3:21 AM   Result Value Ref Range    SAMPLES BEING HELD  1 TALL SST     COMMENT        Add-on orders for these samples will be processed based on acceptable specimen integrity and analyte stability, which may vary by analyte. Total time spent with patient:  xxx   min. Care Plan discussed with:  Patient     Family      RN      Consulting Physician [address] [facility],         I have reviewed the flowsheets. Chart and Pertinent Notes have been reviewed. No change in PMH ,family and social history from Consult note.       Barrett Nj MD

## 2020-11-26 LAB
ANION GAP SERPL CALC-SCNC: 7 MMOL/L (ref 5–15)
BASOPHILS # BLD: 0.1 K/UL (ref 0–0.1)
BASOPHILS NFR BLD: 1 % (ref 0–1)
BUN SERPL-MCNC: 55 MG/DL (ref 6–20)
BUN/CREAT SERPL: 10 (ref 12–20)
CALCIUM SERPL-MCNC: 9.1 MG/DL (ref 8.5–10.1)
CHLORIDE SERPL-SCNC: 102 MMOL/L (ref 97–108)
CO2 SERPL-SCNC: 27 MMOL/L (ref 21–32)
COMMENT, HOLDF: NORMAL
CREAT SERPL-MCNC: 5.6 MG/DL (ref 0.55–1.02)
DIFFERENTIAL METHOD BLD: ABNORMAL
EOSINOPHIL # BLD: 0.1 K/UL (ref 0–0.4)
EOSINOPHIL NFR BLD: 2 % (ref 0–7)
ERYTHROCYTE [DISTWIDTH] IN BLOOD BY AUTOMATED COUNT: 17.5 % (ref 11.5–14.5)
GLUCOSE BLD STRIP.AUTO-MCNC: 126 MG/DL (ref 65–100)
GLUCOSE BLD STRIP.AUTO-MCNC: 146 MG/DL (ref 65–100)
GLUCOSE BLD STRIP.AUTO-MCNC: 173 MG/DL (ref 65–100)
GLUCOSE BLD STRIP.AUTO-MCNC: 317 MG/DL (ref 65–100)
GLUCOSE BLD STRIP.AUTO-MCNC: 340 MG/DL (ref 65–100)
GLUCOSE SERPL-MCNC: 228 MG/DL (ref 65–100)
HCT VFR BLD AUTO: 25 % (ref 35–47)
HEALTH STATUS, XMCV2T: NORMAL
HGB BLD-MCNC: 7.2 G/DL (ref 11.5–16)
HISTORY CHECKED?,CKHIST: NORMAL
IMM GRANULOCYTES # BLD AUTO: 0.1 K/UL (ref 0–0.04)
IMM GRANULOCYTES NFR BLD AUTO: 1 % (ref 0–0.5)
IRON SATN MFR SERPL: 10 % (ref 20–50)
IRON SERPL-MCNC: 30 UG/DL (ref 35–150)
LYMPHOCYTES # BLD: 0.5 K/UL (ref 0.8–3.5)
LYMPHOCYTES NFR BLD: 9 % (ref 12–49)
MCH RBC QN AUTO: 27.6 PG (ref 26–34)
MCHC RBC AUTO-ENTMCNC: 28.8 G/DL (ref 30–36.5)
MCV RBC AUTO: 95.8 FL (ref 80–99)
MONOCYTES # BLD: 0.5 K/UL (ref 0–1)
MONOCYTES NFR BLD: 9 % (ref 5–13)
NEUTS SEG # BLD: 4.1 K/UL (ref 1.8–8)
NEUTS SEG NFR BLD: 78 % (ref 32–75)
NRBC # BLD: 0 K/UL (ref 0–0.01)
NRBC BLD-RTO: 0 PER 100 WBC
PLATELET # BLD AUTO: 159 K/UL (ref 150–400)
PMV BLD AUTO: 11 FL (ref 8.9–12.9)
POTASSIUM SERPL-SCNC: 5.4 MMOL/L (ref 3.5–5.1)
RBC # BLD AUTO: 2.61 M/UL (ref 3.8–5.2)
RBC MORPH BLD: ABNORMAL
SAMPLES BEING HELD,HOLD: NORMAL
SARS-COV-2, COV2: NOT DETECTED
SERVICE CMNT-IMP: ABNORMAL
SODIUM SERPL-SCNC: 136 MMOL/L (ref 136–145)
SOURCE, COVRS: NORMAL
SPECIMEN SOURCE, FCOV2M: NORMAL
SPECIMEN TYPE, XMCV1T: NORMAL
TIBC SERPL-MCNC: 288 UG/DL (ref 250–450)
WBC # BLD AUTO: 5.4 K/UL (ref 3.6–11)

## 2020-11-26 PROCEDURE — 74011250637 HC RX REV CODE- 250/637: Performed by: INTERNAL MEDICINE

## 2020-11-26 PROCEDURE — 80048 BASIC METABOLIC PNL TOTAL CA: CPT

## 2020-11-26 PROCEDURE — 65660000000 HC RM CCU STEPDOWN

## 2020-11-26 PROCEDURE — 74011636637 HC RX REV CODE- 636/637: Performed by: INTERNAL MEDICINE

## 2020-11-26 PROCEDURE — P9016 RBC LEUKOCYTES REDUCED: HCPCS

## 2020-11-26 PROCEDURE — 74011250636 HC RX REV CODE- 250/636: Performed by: NURSE PRACTITIONER

## 2020-11-26 PROCEDURE — 74011250637 HC RX REV CODE- 250/637: Performed by: NURSE PRACTITIONER

## 2020-11-26 PROCEDURE — 82962 GLUCOSE BLOOD TEST: CPT

## 2020-11-26 PROCEDURE — 74011250637 HC RX REV CODE- 250/637: Performed by: HOSPITALIST

## 2020-11-26 PROCEDURE — 36415 COLL VENOUS BLD VENIPUNCTURE: CPT

## 2020-11-26 PROCEDURE — 74011250636 HC RX REV CODE- 250/636: Performed by: INTERNAL MEDICINE

## 2020-11-26 PROCEDURE — 74011000250 HC RX REV CODE- 250: Performed by: NURSE PRACTITIONER

## 2020-11-26 PROCEDURE — 85025 COMPLETE CBC W/AUTO DIFF WBC: CPT

## 2020-11-26 PROCEDURE — 99233 SBSQ HOSP IP/OBS HIGH 50: CPT | Performed by: INTERNAL MEDICINE

## 2020-11-26 PROCEDURE — 90935 HEMODIALYSIS ONE EVALUATION: CPT

## 2020-11-26 PROCEDURE — 83540 ASSAY OF IRON: CPT

## 2020-11-26 RX ORDER — INSULIN LISPRO 100 [IU]/ML
INJECTION, SOLUTION INTRAVENOUS; SUBCUTANEOUS
Status: DISCONTINUED | OUTPATIENT
Start: 2020-11-26 | End: 2020-12-01 | Stop reason: HOSPADM

## 2020-11-26 RX ORDER — FLUCONAZOLE 100 MG/1
200 TABLET ORAL DAILY
Status: COMPLETED | OUTPATIENT
Start: 2020-11-26 | End: 2020-11-27

## 2020-11-26 RX ORDER — MAGNESIUM SULFATE 100 %
4 CRYSTALS MISCELLANEOUS AS NEEDED
Status: DISCONTINUED | OUTPATIENT
Start: 2020-11-26 | End: 2020-12-01 | Stop reason: HOSPADM

## 2020-11-26 RX ORDER — DEXTROSE MONOHYDRATE 100 MG/ML
0-250 INJECTION, SOLUTION INTRAVENOUS AS NEEDED
Status: DISCONTINUED | OUTPATIENT
Start: 2020-11-26 | End: 2020-12-01 | Stop reason: HOSPADM

## 2020-11-26 RX ORDER — SODIUM CHLORIDE 9 MG/ML
250 INJECTION, SOLUTION INTRAVENOUS AS NEEDED
Status: DISCONTINUED | OUTPATIENT
Start: 2020-11-26 | End: 2020-12-01 | Stop reason: HOSPADM

## 2020-11-26 RX ADMIN — INSULIN LISPRO 7 UNITS: 100 INJECTION, SOLUTION INTRAVENOUS; SUBCUTANEOUS at 12:29

## 2020-11-26 RX ADMIN — CARBOXYMETHYLCELLULOSE SODIUM 1 DROP: 5 SOLUTION/ DROPS OPHTHALMIC at 22:00

## 2020-11-26 RX ADMIN — KETOTIFEN FUMARATE 1 DROP: 0.35 SOLUTION/ DROPS OPHTHALMIC at 08:45

## 2020-11-26 RX ADMIN — FERROUS SULFATE TAB 325 MG (65 MG ELEMENTAL FE) 325 MG: 325 (65 FE) TAB at 08:43

## 2020-11-26 RX ADMIN — FERROUS SULFATE TAB 325 MG (65 MG ELEMENTAL FE) 325 MG: 325 (65 FE) TAB at 12:29

## 2020-11-26 RX ADMIN — OXYCODONE HYDROCHLORIDE 5 MG: 5 TABLET ORAL at 21:03

## 2020-11-26 RX ADMIN — EPOETIN ALFA-EPBX 20000 UNITS: 10000 INJECTION, SOLUTION INTRAVENOUS; SUBCUTANEOUS at 21:17

## 2020-11-26 RX ADMIN — FLUTICASONE PROPIONATE 2 SPRAY: 50 SPRAY, METERED NASAL at 08:45

## 2020-11-26 RX ADMIN — Medication 2 TABLET: at 09:51

## 2020-11-26 RX ADMIN — CARBOXYMETHYLCELLULOSE SODIUM 1 DROP: 5 SOLUTION/ DROPS OPHTHALMIC at 17:35

## 2020-11-26 RX ADMIN — PANTOPRAZOLE SODIUM 40 MG: 40 TABLET, DELAYED RELEASE ORAL at 17:35

## 2020-11-26 RX ADMIN — SEVELAMER CARBONATE 1600 MG: 800 TABLET, FILM COATED ORAL at 12:29

## 2020-11-26 RX ADMIN — OXYCODONE HYDROCHLORIDE 5 MG: 5 TABLET ORAL at 09:55

## 2020-11-26 RX ADMIN — MORPHINE SULFATE 2 MG: 2 INJECTION, SOLUTION INTRAMUSCULAR; INTRAVENOUS at 12:47

## 2020-11-26 RX ADMIN — SODIUM CHLORIDE 10 MG: 9 INJECTION INTRAMUSCULAR; INTRAVENOUS; SUBCUTANEOUS at 21:03

## 2020-11-26 RX ADMIN — OXYCODONE HYDROCHLORIDE AND ACETAMINOPHEN 500 MG: 500 TABLET ORAL at 08:44

## 2020-11-26 RX ADMIN — DIPHENHYDRAMINE HYDROCHLORIDE 25 MG: 25 CAPSULE ORAL at 05:23

## 2020-11-26 RX ADMIN — ACETAMINOPHEN 650 MG: 325 TABLET ORAL at 21:16

## 2020-11-26 RX ADMIN — FERROUS SULFATE TAB 325 MG (65 MG ELEMENTAL FE) 325 MG: 325 (65 FE) TAB at 17:35

## 2020-11-26 RX ADMIN — PANTOPRAZOLE SODIUM 40 MG: 40 TABLET, DELAYED RELEASE ORAL at 08:43

## 2020-11-26 RX ADMIN — CARBOXYMETHYLCELLULOSE SODIUM 1 DROP: 5 SOLUTION/ DROPS OPHTHALMIC at 08:44

## 2020-11-26 RX ADMIN — KETOTIFEN FUMARATE 1 DROP: 0.35 SOLUTION/ DROPS OPHTHALMIC at 17:35

## 2020-11-26 RX ADMIN — SEVELAMER CARBONATE 1600 MG: 800 TABLET, FILM COATED ORAL at 08:43

## 2020-11-26 RX ADMIN — SEVELAMER CARBONATE 1600 MG: 800 TABLET, FILM COATED ORAL at 17:35

## 2020-11-26 RX ADMIN — OXYCODONE HYDROCHLORIDE 5 MG: 5 TABLET ORAL at 04:07

## 2020-11-26 RX ADMIN — FLUCONAZOLE 200 MG: 100 TABLET ORAL at 12:47

## 2020-11-26 NOTE — PROGRESS NOTES
Patient has been NPO all day in preparation for EGD. Endoscopy called to have patient brought down for procedure and patient declined. MD spoke with patient and patient agreed to go but when nurse arrived with wheelchair patient refused to go and stated \"I'm hungry I need to eat, I don't feel well, I'm not going. I can get the same test done at the [place], I need some food now! \". Hospitalist and Endoscopy notified of patient refusal. Patient's blood sugar is 122.

## 2020-11-26 NOTE — PROGRESS NOTES
[facility]   [address] [facility], [address] [place] [address], [facility]  Phone: [phone]   [phone]       Nephrology Progress Note  Eduardo Valadez Fenton     [birth year]     [id number]  Date of Admission : 11/17/2020 11/26/20    CC: Follow up for ESRD       Assessment and Plan   ESRD- HD  - Dialyzes TTS at [address] dialysis in [place], [place]  - HD today and 1 unit of blood w/ HD     Cardiac Cirrhosis   RV failure, severe TR  Chronic HFrEF   - s/p Paracentesis this admission     Anemia in CKD :   -  continue epogen - 20 K units w/ HD     Blood loss anemia : On last admission   - EGD: antral gastritis and erythema   - Capsule study : non bleeding small bowel AVMs noted   - PRBCs w/ HD 11/24,  - another unit of blood today for Hb 7.2   - ordered iron profile      Sec HPTH   - continue Phos binders       Interval History:  Seen and examined. Hb down. .  No cp, sob, n/v/d. EGD cancelled   Hepatology notes read     Review of Systems: A comprehensive review of systems was negative except for that written in the HPI.     Current Medications:   Current Facility-Administered Medications   Medication Dose Route Frequency    0.9% sodium chloride infusion 250 mL  250 mL IntraVENous PRN    fluticasone propionate (FLONASE) 50 mcg/actuation nasal spray 2 Spray  2 Spray Both Nostrils DAILY    0.9% sodium chloride infusion 250 mL  250 mL IntraVENous PRN    polyethylene glycol (MIRALAX) packet 17 g  17 g Oral DAILY PRN    diphenhydrAMINE (BENADRYL) capsule 25 mg  25 mg Oral Q6H PRN    albuterol-ipratropium (DUO-NEB) 2.5 MG-0.5 MG/3 ML  3 mL Nebulization Q4H PRN    alum-mag hydroxide-simeth (MYLANTA) oral suspension 30 mL  30 mL Oral Q4H PRN    ascorbic acid (vitamin C) (VITAMIN C) tablet 500 mg  500 mg Oral DAILY    cholecalciferol (VITAMIN D3) (1000 Units /25 mcg) tablet 2 Tab  2,000 Units Oral DAILY    ferrous sulfate tablet 325 mg  325 mg Oral TID WITH MEALS    ketotifen (ZADITOR) 0.025 % (0.035 %) ophthalmic solution 1 Drop  1 Drop Both Eyes BID    carboxymethylcellulose sodium (REFRESH PLUS) 0.5 % ophthalmic solution 1-2 Drop  1-2 Drop Both Eyes TID    prochlorperazine (COMPAZINE) with saline injection 10 mg  10 mg IntraVENous Q6H PRN    morphine injection 2 mg  2 mg IntraVENous Q4H PRN    0.9% sodium chloride infusion 250 mL  250 mL IntraVENous PRN    epoetin reshma-epbx (RETACRIT) injection 20,000 Units  20,000 Units SubCUTAneous Q TUE, THU & SAT    0.9% sodium chloride infusion 250 mL  250 mL IntraVENous PRN    pantoprazole (PROTONIX) tablet 40 mg  40 mg Oral BID    sevelamer carbonate (RENVELA) tab 1,600 mg  1,600 mg Oral TID WITH MEALS    acetaminophen (TYLENOL) tablet 650 mg  650 mg Oral Q4H PRN    oxyCODONE IR (ROXICODONE) tablet 5 mg  5 mg Oral Q4H PRN      Allergies   Allergen Reactions    Aleve [Naproxen Sodium] Itching, Swelling and Other (comments)    Amlodipine Rash, Hives and Itching    Aspirin Other (comments), Nausea Only and Unknown (comments)     GI bleed  GI bleeding      Heparin Unknown (comments)     bleeding      Ibuprofen Nausea and Vomiting    Metformin Other (comments)     swelling       Objective:  Vitals:    Vitals:    11/25/20 1312 11/25/20 1415 11/25/20 2200 11/26/20 0159   BP:  (!) 126/49 (!) 174/64 (!) 163/64   Pulse:  73 95 93   Resp:  21 21 18   Temp:  98 °F (36.7 °C) 97.8 °F (36.6 °C) 97.8 °F (36.6 °C)   TempSrc:       SpO2:  99% 98% 98%   Weight:    78.2 kg (172 lb 6.4 oz)   Height: 5' 2.99\" (1.6 m)        Intake and Output:  No intake/output data recorded.   11/24 1901 - 11/26 0700  In: 240 [P.O.:240]  Out: -     Physical Examination:    General: No distress   Neck:  RIJ permacath   Resp:  Clear bilaterally  CV:  RRR,  no murmur or rub, trace+ LE edema  GI:  Soft, NT, + ascites drain  Neurologic:  Non focal  Psych:             AAO x 3 appropriate affect  Skin:  No Rash    []    High complexity decision making was performed  []    Patient is at high-risk of decompensation with multiple organ involvement    Lab Data Personally Reviewed: I have reviewed all the pertinent labs, microbiology data and radiology studies during assessment.     Recent Labs     11/26/20 0223 11/25/20 0321 11/24/20 0103    136 140   K 5.4* 5.4* 5.9*    103 107   CO2 27 29 26   * 249* 154*   BUN 55* 40* 68*   CREA 5.60* 4.46* 6.61*   CA 9.1 9.3 9.3   MG  --  2.2  --    PHOS  --  4.2  --    INR  --  1.0  --      Recent Labs     11/26/20 0223 11/25/20 0321 11/24/20 0103   WBC 5.4 5.1 5.7   HGB 7.2* 7.6* 6.7*   HCT 25.0* 26.2* 23.6*    149* 145*     No results found for: SDES  Lab Results   Component Value Date/Time    Culture result: NO GROWTH 4 DAYS 11/19/2020 02:50 PM    Culture result: No growth 5 days 10/18/2020 07:09 AM     Recent Results (from the past 24 hour(s))   GLUCOSE, POC    Collection Time: 11/25/20 11:58 AM   Result Value Ref Range    Glucose (POC) 122 (H) 65 - 100 mg/dL    Performed by Joce Goodman    SARS-COV-2    Collection Time: 11/25/20 12:19 PM   Result Value Ref Range    Specimen source Nasopharyngeal      SARS-CoV-2 Not detected NOTD      SARS-CoV-2 PENDING     Specimen source Nasopharyngeal      COVID-19 rapid test PENDING     Specimen type NP Swab      Health status Symptomatic Testing      COVID-19 PENDING    GLUCOSE, POC    Collection Time: 11/25/20  4:43 PM   Result Value Ref Range    Glucose (POC) 123 (H) 65 - 100 mg/dL    Performed by Anitha Marie    GLUCOSE, POC    Collection Time: 11/25/20  9:26 PM   Result Value Ref Range    Glucose (POC) 224 (H) 65 - 100 mg/dL    Performed by Julieta Peck    CBC WITH AUTOMATED DIFF    Collection Time: 11/26/20  2:23 AM   Result Value Ref Range    WBC 5.4 3.6 - 11.0 K/uL    RBC 2.61 (L) 3.80 - 5.20 M/uL    HGB 7.2 (L) 11.5 - 16.0 g/dL    HCT 25.0 (L) 35.0 - 47.0 %    MCV 95.8 80.0 - 99.0 FL    MCH 27.6 26.0 - 34.0 PG    MCHC 28.8 (L) 30.0 - 36.5 g/dL    RDW 17.5 (H) 11.5 - 14.5 %    PLATELET 763 343 - 400 K/uL    MPV 11.0 8.9 - 12.9 FL    NRBC 0.0 0  WBC    ABSOLUTE NRBC 0.00 0.00 - 0.01 K/uL    NEUTROPHILS 78 (H) 32 - 75 %    LYMPHOCYTES 9 (L) 12 - 49 %    MONOCYTES 9 5 - 13 %    EOSINOPHILS 2 0 - 7 %    BASOPHILS 1 0 - 1 %    IMMATURE GRANULOCYTES 1 (H) 0.0 - 0.5 %    ABS. NEUTROPHILS 4.1 1.8 - 8.0 K/UL    ABS. LYMPHOCYTES 0.5 (L) 0.8 - 3.5 K/UL    ABS. MONOCYTES 0.5 0.0 - 1.0 K/UL    ABS. EOSINOPHILS 0.1 0.0 - 0.4 K/UL    ABS. BASOPHILS 0.1 0.0 - 0.1 K/UL    ABS. IMM. GRANS. 0.1 (H) 0.00 - 0.04 K/UL    DF SMEAR SCANNED      RBC COMMENTS OVALOCYTES  PRESENT        RBC COMMENTS POLYCHROMASIA  1+        RBC COMMENTS ANISOCYTOSIS  1+       METABOLIC PANEL, BASIC    Collection Time: 11/26/20  2:23 AM   Result Value Ref Range    Sodium 136 136 - 145 mmol/L    Potassium 5.4 (H) 3.5 - 5.1 mmol/L    Chloride 102 97 - 108 mmol/L    CO2 27 21 - 32 mmol/L    Anion gap 7 5 - 15 mmol/L    Glucose 228 (H) 65 - 100 mg/dL    BUN 55 (H) 6 - 20 MG/DL    Creatinine 5.60 (H) 0.55 - 1.02 MG/DL    BUN/Creatinine ratio 10 (L) 12 - 20      GFR est AA 9 (L) >60 ml/min/1.73m2    GFR est non-AA 7 (L) >60 ml/min/1.73m2    Calcium 9.1 8.5 - 10.1 MG/DL   SAMPLES BEING HELD    Collection Time: 11/26/20  2:23 AM   Result Value Ref Range    SAMPLES BEING HELD 1SST     COMMENT        Add-on orders for these samples will be processed based on acceptable specimen integrity and analyte stability, which may vary by analyte. GLUCOSE, POC    Collection Time: 11/26/20  7:57 AM   Result Value Ref Range    Glucose (POC) 317 (H) 65 - 100 mg/dL    Performed by Jaun Kenny            Total time spent with patient:  xxx   min. Care Plan discussed with:  Patient     Family      RN      Consulting Physician [facility]        I have reviewed the flowsheets. Chart and Pertinent Notes have been reviewed. No change in PMH ,family and social history from Consult note.       Lea Telles MD

## 2020-11-26 NOTE — PROGRESS NOTES
[address]  [address] [facility] [address], [address]  [phone]        GI PROGRESS NOTE      NAME:   Earnestine Fenton   :    1950   MRN:    [id number]     Assessment/Plan   Anemia - multifactorial - GIB, ESRD, pt doesn't have cirrhosis  -Hemoglobin 6.4 on admission, transfused - dropped to 6.7 on , 7.6 today post transfusion  -Continue to monitor H&H, transfuse prn for Hgb < 7.0  -BID PPI with Protonix  -EGD 10/21/20 Dr. Trenton Velasco - oozing in stomach, treated with APC  -M2A 10/22/20 - multiple non-bleeding SB AVMs  -Patient reports subsequent GI evaluation with EGD, enteroscopy and colonoscopy at the [facility] post-discharge from here in October  - Case discussed with Dr. Kathrin Anand. He will check iron panel and consider iron infusion. Hold off on endoscopy at this time. Will sign off. Please call back if signs of active GIB which may warrant endoscopy by GI. Ascites-not cirrhosis related:  -Abdominal ultrasound 20: Increased echogenicity of the kidneys with renal atrophy suggestive of medical renal disease Ascites.   -Paracentesis 20 :Successful ultrasound guided pigtail paracentesis catheter, ongoing drainage to gravity bag  -Hepatology following - had transjugular liver biopsy , path pending      ESRD, diabetes, COVID 19 negative, hypertension, chronic pain, diabetes  -management per hospitalist   -nephrology following   -patient receives dialysis Terri Omer, Saturday- received dialysis yesterday        Patient Active Problem List   Diagnosis Code    GI bleed K92.2    Diabetes mellitus type 2, controlled ([address] E11.9    Anemia in chronic kidney disease N18.9, D63.1    Cirrhosis ([address] K74.60    Acute blood loss anemia D62    Dizziness R42    Generalized weakness R53.1    Rectal bleed K62.5    Symptomatic anemia D64.9    Severe anemia D64.9    Anemia D64.9    Dependence on renal dialysis ([address] Z99.2    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, or end stage renal disease (HCC) I13.2    Other ascites R18.8    Other chronic pain G89.29    Paroxysmal atrial fibrillation (HCC) I48.0    Type 2 diabetes mellitus with hyperglycemia (HCC) E11.65    Unspecified cirrhosis of liver (HCC) K74.60    Other hypotension I95.89    HTN (hypertension) I10    ESRD (end stage renal disease) (HCC) N18.6    Suspected COVID-19 virus infection Z20.828       Subjective:     Collette Rogue is a 69 y.o.  female   No bloody bowel movements. Discussed with RN. Objective:     VITALS:   Last 24hrs VS reviewed since prior hospitalist progress note. Most recent are:  Visit Vitals  BP (!) 163/64 (BP 1 Location: Left arm, BP Patient Position: Sitting)   Pulse 93   Temp 97.8 °F (36.6 °C)   Resp 18   Ht 5' 2.99\" (1.6 m)   Wt 78.2 kg (172 lb 6.4 oz)   SpO2 98%   BMI 30.54 kg/m²     No intake or output data in the 24 hours ending 11/26/20 0811     PHYSICAL EXAM:  General:          Alert, cooperative, no acute distress    HEENT:           NC, Atraumatic.  Anicteric sclerae.   Abdomen:        distended  Neurologic:      Alert and oriented  Psych:             A bit agitated     Lab Data   Recent Results (from the past 12 hour(s))   GLUCOSE, POC    Collection Time: 11/25/20  9:26 PM   Result Value Ref Range    Glucose (POC) 224 (H) 65 - 100 mg/dL    Performed by Apurva Carson    CBC WITH AUTOMATED DIFF    Collection Time: 11/26/20  2:23 AM   Result Value Ref Range    WBC 5.4 3.6 - 11.0 K/uL    RBC 2.61 (L) 3.80 - 5.20 M/uL    HGB 7.2 (L) 11.5 - 16.0 g/dL    HCT 25.0 (L) 35.0 - 47.0 %    MCV 95.8 80.0 - 99.0 FL    MCH 27.6 26.0 - 34.0 PG    MCHC 28.8 (L) 30.0 - 36.5 g/dL    RDW 17.5 (H) 11.5 - 14.5 %    PLATELET 864 401 - 759 K/uL    MPV 11.0 8.9 - 12.9 FL    NRBC 0.0 0  WBC    ABSOLUTE NRBC 0.00 0.00 - 0.01 K/uL    NEUTROPHILS 78 (H) 32 - 75 %    LYMPHOCYTES 9 (L) 12 - 49 %    MONOCYTES 9 5 - 13 %    EOSINOPHILS 2 0 - 7 % BASOPHILS 1 0 - 1 %    IMMATURE GRANULOCYTES 1 (H) 0.0 - 0.5 %    ABS. NEUTROPHILS 4.1 1.8 - 8.0 K/UL    ABS. LYMPHOCYTES 0.5 (L) 0.8 - 3.5 K/UL    ABS. MONOCYTES 0.5 0.0 - 1.0 K/UL    ABS. EOSINOPHILS 0.1 0.0 - 0.4 K/UL    ABS. BASOPHILS 0.1 0.0 - 0.1 K/UL    ABS. IMM. GRANS. 0.1 (H) 0.00 - 0.04 K/UL    DF SMEAR SCANNED      RBC COMMENTS OVALOCYTES  PRESENT        RBC COMMENTS POLYCHROMASIA  1+        RBC COMMENTS ANISOCYTOSIS  1+       METABOLIC PANEL, BASIC    Collection Time: 11/26/20  2:23 AM   Result Value Ref Range    Sodium 136 136 - 145 mmol/L    Potassium 5.4 (H) 3.5 - 5.1 mmol/L    Chloride 102 97 - 108 mmol/L    CO2 27 21 - 32 mmol/L    Anion gap 7 5 - 15 mmol/L    Glucose 228 (H) 65 - 100 mg/dL    BUN 55 (H) 6 - 20 MG/DL    Creatinine 5.60 (H) 0.55 - 1.02 MG/DL    BUN/Creatinine ratio 10 (L) 12 - 20      GFR est AA 9 (L) >60 ml/min/1.73m2    GFR est non-AA 7 (L) >60 ml/min/1.73m2    Calcium 9.1 8.5 - 10.1 MG/DL   SAMPLES BEING HELD    Collection Time: 11/26/20  2:23 AM   Result Value Ref Range    SAMPLES BEING HELD 1SST     COMMENT        Add-on orders for these samples will be processed based on acceptable specimen integrity and analyte stability, which may vary by analyte.    GLUCOSE, POC    Collection Time: 11/26/20  7:57 AM   Result Value Ref Range    Glucose (POC) 317 (H) 65 - 100 mg/dL    Performed by Kyle Irizarry

## 2020-11-26 NOTE — PROGRESS NOTES
Problem: Falls - Risk of  Goal: *Absence of Falls  Description: Document Edmonia Morning Fall Risk and appropriate interventions in the flowsheet. Outcome: Progressing Towards Goal  Note: Fall Risk Interventions:  Mobility Interventions: Bed/chair exit alarm, Patient to call before getting OOB    Mentation Interventions: Adequate sleep, hydration, pain control, Bed/chair exit alarm, Reorient patient, Room close to nurse's station    Medication Interventions: Patient to call before getting OOB, Teach patient to arise slowly    Elimination Interventions: Bed/chair exit alarm, Call light in reach, Stay With Me (per policy)    History of Falls Interventions: Door open when patient unattended         Problem: Pain  Goal: *Control of Pain  Outcome: Progressing Towards Goal     Problem: Pressure Injury - Risk of  Goal: *Prevention of pressure injury  Description: Document Vic Scale and appropriate interventions in the flowsheet.   Outcome: Progressing Towards Goal  Note: Pressure Injury Interventions:  Sensory Interventions: Float heels, Keep linens dry and wrinkle-free, Minimize linen layers    Moisture Interventions: Absorbent underpads, Minimize layers    Activity Interventions: Pressure redistribution bed/mattress(bed type), Increase time out of bed    Mobility Interventions: Pressure redistribution bed/mattress (bed type), HOB 30 degrees or less    Nutrition Interventions: Document food/fluid/supplement intake    Friction and Shear Interventions: Apply protective barrier, creams and emollients, Lift sheet, Minimize layers                Problem: Heart Failure: Discharge Outcomes  Goal: *Demonstrates ability to perform prescribed activity without shortness of breath or discomfort  Outcome: Progressing Towards Goal  Goal: *Left ventricular function assessment completed prior to or during stay, or planned for post-discharge  Outcome: Progressing Towards Goal  Goal: *ACEI prescribed if LVEF less than 40% and no contraindications or ARB prescribed  Outcome: Progressing Towards Goal  Goal: *Verbalizes understanding and describes prescribed diet  Outcome: Progressing Towards Goal  Goal: *Verbalizes understanding/describes prescribed medications  Outcome: Progressing Towards Goal  Goal: *Describes available resources and support systems  Description: (eg: Home Health, Palliative Care, Advanced Medical Directive)  Outcome: Progressing Towards Goal  Goal: *Describes smoking cessation resources  Outcome: Progressing Towards Goal  Goal: *Understands and describes signs and symptoms to report to providers(Stroke Metric)  Outcome: Progressing Towards Goal  Goal: *Describes/verbalizes understanding of follow-up/return appt  Description: (eg: to physicians, diabetes treatment coordinator, and other resources  Outcome: Progressing Towards Goal  Goal: *Describes importance of continuing daily weights and changes to report to physician  Outcome: Progressing Towards Goal     Problem: Acute Renal Failure: Day 3  Goal: Activity/Safety  Outcome: Progressing Towards Goal  Goal: Consults, if ordered  Outcome: Progressing Towards Goal  Goal: Nutrition/Diet  Outcome: Progressing Towards Goal  Goal: Discharge Planning  Outcome: Progressing Towards Goal  Goal: *Hemodynamically stable  Outcome: Progressing Towards Goal

## 2020-11-26 NOTE — PROGRESS NOTES
Bedside and Verbal shift change report given to [address] [place] [address] Rayshawn (oncoming nurse) by Malachi Hernandez (offgoing nurse). Report included the following information SBAR, Kardex, Intake/Output, MAR, Recent Results, Cardiac Rhythm NSR and Dual Neuro Assessment.

## 2020-11-26 NOTE — PROGRESS NOTES
[facility]  Hospitalist Group                                                                                          Hospitalist Progress Note  Berta Gutierrez MD  Answering service: [phone] OR [phone] from in house phone        Date of Service:  2020  NAME:  Jerome Briseno  :  1950  MRN:  [id number]      Admission Summary:   69 y.o PMH of AVM,ESRD,anemia admitted due to anemia and hyperkalemia    Interval history / Subjective:     2020 : seen at bedside   Pt voices no new issues,   For HD later today   Lab for am planned. Assessment & Plan:      #Acute on chronic anemia:  -multifactorial - hx AVMs,CKD  -Gastroenterology signed off   -recent EGD 10/21: mild gastritis with some friable mucosa and oozing-treated with APC and also capsule study performed with presence of small non-bleeding AVM's in small intestine. Patient reports she [facility] and underwent EGD, enteroscopy, and colonoscopy- all with no findings. -s/p 1 U PRBC , ,   - PPI BID  - monitor Hb, transfuse <7  Per GI/hepatology:   - EGD 10/21/20 Dr. Nuria Alvarado - oozing in stomach, treated with APC  -M2A 10/22/20 - multiple non-bleeding SB AVMs  -Patient reports subsequent GI evaluation with EGD, enteroscopy and colonoscopy at the [facility] post-discharge from here in October  - Case discussed with Dr. Sandy Almazan. He will check iron panel and consider iron infusion. Hold off on endoscopy at this time. Will sign off. Please call back if signs of active GIB which may warrant endoscopy by GI.        #Hyperkalemia- mild   #ESRD on HD TTS  -nephrology following    #Multiple Pul Nodules up to 6 mm size, for CT f/u in 3-6 months as out pt . #Liver cirrhosis ?  with Ascites :  -hx recurrent ascites with paracentesis . Not on any diuretics at home as pt is anuric   -USG abd showed ascites.  The liver echotexture is normal  - Paracentesis drain placed on  and removed on   - fluid analysis shows transudate. Ascites fluid total protein > 2.5 which is highly suggestive of heart failure  - appreciate Hepatology input  - tranasjugular liver biopsy done 40/83    #Diastolic heart failure  #Severe Pulm artery HTN  - Echo: LVEF is 48%. Severe (grade 3) left ventricular diastolic dysfunction. Moderate to severe tricuspid valve regurgitation is present. - appreciate cardiology input  - no further cardiac testing    Code Stroke 11/25 right side numbness/ tingling  - CT head: No acute intracranial abnormality  - MRI brain , MRI c spine, MRA ordered  - appreciate neurology input  - no aspirin due to GI bleed   - lipid panel ordered   Results for Ted Schulz (MRN [id number]) as of 11/26/2020 10:40   Ref. Range 11/25/2020 03:21   Triglyceride Latest Ref Range: <150 MG/DL 85   Cholesterol, total Latest Ref Range: <200 MG/   HDL Cholesterol Latest Units: MG/DL 59   CHOL/HDL Ratio Latest Ref Range: 0.0 - 5.0   1.9   VLDL, calculated Latest Units: MG/DL 17   LDL, calculated Latest Ref Range: 0 - 100 MG/DL 38       Chronic pain- c/w home oxy prn    Code status: full  DVT prophylaxis: scd    Care Plan discussed with: Patient/Family and Nurse  Anticipated Disposition: Home w/Family  Anticipated Discharge: 24 hours to 48 hours     Hospital Problems  Date Reviewed: 10/6/2020          Codes Class Noted POA    Anemia ICD-10-CM: D64.9  ICD-9-CM: 285.9  9/17/2020 Unknown                Review of Systems:   Negative except as above     Vital Signs:    Last 24hrs VS reviewed since prior progress note.  Most recent are:  Visit Vitals  BP (!) 174/72 (BP 1 Location: Left arm, BP Patient Position: Sitting)   Pulse 97   Temp 97.6 °F (36.4 °C)   Resp 19   Ht 5' 2.99\" (1.6 m)   Wt 78.2 kg (172 lb 6.4 oz)   SpO2 98%   BMI 30.54 kg/m²       No intake or output data in the 24 hours ending 11/26/20 1043     Physical Examination:     I had a face to face encounter with this patient and independently examined them on 11/26/2020 as outlined below:          Constitutional:  No acute distress, elderly patient   ENT:  Oral mucosa moist, EOMI    Resp:  CTA bilaterally. No wheezing/rhonchi/rales. No accessory muscle use   CV:  Regular rhythm, normal rate, no murmurs, gallops, rubs    GI:  Soft, distended, non tender. normoactive bowel sounds    Musculoskeletal:  No LE edema    Neurologic:  Moves all extremities. AAOx3     Psych:  not anxious nor agitated. Data Review:    Review and/or order of clinical lab test  Review and/or order of tests in the radiology section of CPT  Review and/or order of tests in the medicine section of CPT      Labs:     Recent Labs     11/26/20 0223 11/25/20 0321   WBC 5.4 5.1   HGB 7.2* 7.6*   HCT 25.0* 26.2*    149*     Recent Labs     11/26/20 0223 11/25/20 0321 11/24/20  0103    136 140   K 5.4* 5.4* 5.9*    103 107   CO2 27 29 26   BUN 55* 40* 68*   CREA 5.60* 4.46* 6.61*   * 249* 154*   CA 9.1 9.3 9.3   MG  --  2.2  --    PHOS  --  4.2  --      No results for input(s): ALT, AP, TBIL, TBILI, TP, ALB, GLOB, GGT, AML, LPSE in the last 72 hours. No lab exists for component: SGOT, GPT, AMYP, HLPSE  Recent Labs     11/25/20 0321   INR 1.0   PTP 10.7   APTT 24.9      No results for input(s): FE, TIBC, PSAT, FERR in the last 72 hours. Lab Results   Component Value Date/Time    Folate 16.4 10/19/2020 09:34 AM      No results for input(s): PH, PCO2, PO2 in the last 72 hours. No results for input(s): CPK, CKNDX, TROIQ in the last 72 hours.     No lab exists for component: CPKMB  Lab Results   Component Value Date/Time    Cholesterol, total 114 11/25/2020 03:21 AM    HDL Cholesterol 59 11/25/2020 03:21 AM    LDL, calculated 38 11/25/2020 03:21 AM    Triglyceride 85 11/25/2020 03:21 AM    CHOL/HDL Ratio 1.9 11/25/2020 03:21 AM     Lab Results   Component Value Date/Time    Glucose (POC) 317 (H) 11/26/2020 07:57 AM    Glucose (POC) 224 (H) 11/25/2020 09:26 PM    Glucose (POC) 123 (H) 11/25/2020 04:43 PM    Glucose (POC) 122 (H) 11/25/2020 11:58 AM    Glucose (POC) 238 (H) 11/25/2020 02:40 AM     No results found for: COLOR, APPRN, SPGRU, REFSG, VERENA, PROTU, GLUCU, KETU, BILU, UROU, TIBURCIO, LEUKU, GLUKE, EPSU, BACTU, WBCU, RBCU, CASTS, UCRY      Medications Reviewed:     Current Facility-Administered Medications   Medication Dose Route Frequency    0.9% sodium chloride infusion 250 mL  250 mL IntraVENous PRN    insulin lispro (HUMALOG) injection   SubCUTAneous AC&HS    glucose chewable tablet 16 g  4 Tab Oral PRN    glucagon (GLUCAGEN) injection 1 mg  1 mg IntraMUSCular PRN    dextrose 10% infusion 0-250 mL  0-250 mL IntraVENous PRN    fluticasone propionate (FLONASE) 50 mcg/actuation nasal spray 2 Spray  2 Spray Both Nostrils DAILY    0.9% sodium chloride infusion 250 mL  250 mL IntraVENous PRN    polyethylene glycol (MIRALAX) packet 17 g  17 g Oral DAILY PRN    diphenhydrAMINE (BENADRYL) capsule 25 mg  25 mg Oral Q6H PRN    albuterol-ipratropium (DUO-NEB) 2.5 MG-0.5 MG/3 ML  3 mL Nebulization Q4H PRN    alum-mag hydroxide-simeth (MYLANTA) oral suspension 30 mL  30 mL Oral Q4H PRN    ascorbic acid (vitamin C) (VITAMIN C) tablet 500 mg  500 mg Oral DAILY    cholecalciferol (VITAMIN D3) (1000 Units /25 mcg) tablet 2 Tab  2,000 Units Oral DAILY    ferrous sulfate tablet 325 mg  325 mg Oral TID WITH MEALS    ketotifen (ZADITOR) 0.025 % (0.035 %) ophthalmic solution 1 Drop  1 Drop Both Eyes BID    carboxymethylcellulose sodium (REFRESH PLUS) 0.5 % ophthalmic solution 1-2 Drop  1-2 Drop Both Eyes TID    prochlorperazine (COMPAZINE) with saline injection 10 mg  10 mg IntraVENous Q6H PRN    morphine injection 2 mg  2 mg IntraVENous Q4H PRN    0.9% sodium chloride infusion 250 mL  250 mL IntraVENous PRN    epoetin reshma-epbx (RETACRIT) injection 20,000 Units  20,000 Units SubCUTAneous Q TUE, THU & SAT    0.9% sodium chloride infusion 250 mL  250 mL IntraVENous PRN    pantoprazole (PROTONIX) tablet 40 mg  40 mg Oral BID    sevelamer carbonate (RENVELA) tab 1,600 mg  1,600 mg Oral TID WITH MEALS    acetaminophen (TYLENOL) tablet 650 mg  650 mg Oral Q4H PRN    oxyCODONE IR (ROXICODONE) tablet 5 mg  5 mg Oral Q4H PRN     ______________________________________________________________________  EXPECTED LENGTH OF STAY: 3d 14h  ACTUAL LENGTH OF STAY:          Pastor Bosworth, MD

## 2020-11-26 NOTE — PROGRESS NOTES
[facility] MD Marilynn, Chad Yi MD, MPH      Benjamin Barba, CAROLYN Au, [facility]     April S Nicolette [facility]-BC   LUIS Varela [facility]       Ozzy Sykes [facility] 136    at [address], [address] Sophy Mendes  22.    [phone]    FAX: [phone] hospitals    at [address], [address] [phone]    FAX: [phone]       HEPATOLOGY PROGRESS NOTE  The patient is a 69year old black female with past medical history of ESRD on dialysis. She developed ascites for the first time in 1/2020 and has required paracentesis every 1 month. She gets her care regularly at [facility]. Liver evaluation at that institution is not known. US in the ED suggests the liver is normal.  Labs suggest she has cirrhosis with , and low albumin    She was hospitalized for weakness and anemia. Anemia has apparently been chronic. Patient underwent EGD and M2A study at the [place]. She has declined to do an EGD here    Echo performed 11/21 showed LVEF 48%. Mildly dilated right ventricle, moderate dilated right atrium, mod to severe TR    Hepatic venous pressure measurements demonstrated RA 16, Free HV 16, Wedge HV 18 mm Hg. Transjugular liver biopsy is pending. ASSESSMENT AND PLAN:  Passive hepatic congestion  The patient has passive hepatic congestion due to pulmonary hypertension, RV dysfunction, and TR. This is confirmed by the hepatic venous pressures showing RA 16, and HVPG of only 2 mm Hg. It is not possible to develop ascites from cirrhosis with a HVPG of 2 mm Hg. All ascites is therefore due to RA dysfunction and TR.     I suspect the transjugular liver biopsy will show that she does not have cirrhosis. Ascites   Ascites developed for the first time in 1/2020. Ascites is due to right sided heart failure, TR and ESRD  Ascites fluid total protein > 2.5 is consistent with this  Echo showed reduced RV function and severe TR whci I also consistent with this    Only possible treatments for her ascites is to improve forward cardiac blood flow which is likely not possible given ESRD, to place Aspira catheter so she can drain ascites daily or repeated paracentesis. Screening for Esophageal varices   The patient does not have esophageal varices. The patient does nto have portal HTN    Anemia   This is due to multifactorial causes including ESRD on dialysis and possible occult GI bleeding  She has had extensive GI work-up and no further GI work-up is indicated or will find anything of clinical significance. She has Fe deficiency   She has been on PO FE and it is obviously not working. Will repeat Ferritin and FE saturation. Suspect this will still be low because Fe absorption has been shut down by chronic disease. She should recieve IV Fe to replace lost FE stores. Thrombocytopenia   This is not due to cirrhosis since she does not have portal HTN and probably does not even have cirrhosis. This is likely due to poor nutrition from refractory ascites and unable to take in adequate calories. Aspira catheter would help this. PHYSICAL EXAMINATION:  VS: per nursing note  General:  No acute distress. Eyes:  Sclera anicteric. ENT:  No oral lesions. Thyroid normal.  Nodes:  No adenopathy. Skin:  No spider angiomata. Respiratory:  Lungs clear to auscultation. Cardiovascular:  Regular heart rate. Abdomen:  Distended with some ascites. Extremities:  No lower extremity edema. Neurologic:  Alert and oriented. Cranial nerves grossly intact. No asterixis. LABORATORY:  Results for Karen Seals (MRN [id number]) as of 11/26/2020 07:06   Ref.  Range 11/24/2020 01:03 11/25/2020 03:21 11/26/2020 02:23   WBC Latest Ref Range: 3.6 - 11.0 K/uL 5.7 5.1 5.4   HGB Latest Ref Range: 11.5 - 16.0 g/dL 6.7 (L) 7.6 (L) 7.2 (L)   PLATELET Latest Ref Range: 150 - 400 K/uL 145 (L) 149 (L) 159   INR Latest Ref Range: 0.9 - 1.1    1.0    Sodium Latest Ref Range: 136 - 145 mmol/L 140 136 136   Potassium Latest Ref Range: 3.5 - 5.1 mmol/L 5.9 (H) 5.4 (H) 5.4 (H)   Chloride Latest Ref Range: 97 - 108 mmol/L 107 103 102   CO2 Latest Ref Range: 21 - 32 mmol/L 26 29 27   Glucose Latest Ref Range: 65 - 100 mg/dL 154 (H) 249 (H) 228 (H)   BUN Latest Ref Range: 6 - 20 MG/DL 68 (H) 40 (H) 55 (H)   Creatinine Latest Ref Range: 0.55 - 1.02 MG/DL 6.61 (H) 4.46 (H) 5.60 (H)     RADIOLOGY:  11/2020. Ultrasound of liver. Normal appearing liver. No liver mass lesions. Large amount of ascites  11/2020. Hepatic venous pressure. RA 16, Free HV 16, Wedge HV 18. HVPG 2 mm Hg.       Omer Mariscal MD  [facility] 13  [address] [facility] 22.  [phone]  [address] [facility]

## 2020-11-26 NOTE — PROGRESS NOTES
[facility]  [phone]    Vitals   Pre   Post   Assessment   Pre   Post     Temp  Temp: 97.6 °F (36.4 °C) (11/26/20 1319)  98 LOC  A&Ox4, cooperative, follows commands A&Ox4, cooperative, follows commands   HR   Pulse (Heart Rate): 90 (11/26/20 1319) 82 Lungs   CTA CTA    B/P   BP: (!) 167/61 (11/26/20 1319) 141/61 Cardiac   NSR, S1, S2 NSR, S1, S2   Resp   Resp Rate: 18 (11/26/20 1319) 18 Skin   R IJ tunneled catheter R IJ tunneled catheter   Pain level  3 0 Edema  None     None   Orders:    Duration:   Start:   6283 End:   1651 Total:   3.5 hours   Dialyzer:   Dialyzer/Set Up Inspection: Revaclear (11/26/20 1319)   K Bath:   Dialysate K (mEq/L): 3 (11/26/20 1319)   Ca Bath:   Dialysate CA (mEq/L): 2.5 (11/26/20 1319)   Na/Bicarb:   Dialysate NA (mEq/L): 138 (11/26/20 1319)   Target Fluid Removal:   Goal/Amount of Fluid to Remove (mL): 2000 mL (11/26/20 1319)   Access     Type & Location:   RIJ CVC: Dressing clean, dry, loose. No s/s of infection. New sterile dressing applied today 11/26/20. Both lumens aspirate & flush well. Running well at . Lines reversed after 10 mins of treatment time due to inability to resolve elevated arterial pressure >260.     Labs     Obtained/Reviewed   Critical Results Called   Date when labs were drawn-  Hgb-    HGB   Date Value Ref Range Status   11/26/2020 7.2 (L) 11.5 - 16.0 g/dL Final     K-    Potassium   Date Value Ref Range Status   11/26/2020 5.4 (H) 3.5 - 5.1 mmol/L Final     Ca-   Calcium   Date Value Ref Range Status   11/26/2020 9.1 8.5 - 10.1 MG/DL Final     Bun-   BUN   Date Value Ref Range Status   11/26/2020 55 (H) 6 - 20 MG/DL Final     Creat-   Creatinine   Date Value Ref Range Status   11/26/2020 5.60 (H) 0.55 - 1.02 MG/DL Final        Medications/ Blood Products Given     Name   Dose   Route and Time     Packed Red Blood Cellls  1 Unit R IJ CVC @1319 via dialysis circuit             Blood Volume Processed (BVP): 77.7 L Net Fluid   Removed:  3000 mL   Comments   Time Out Done: Yes  Primary Nurse Rpt Pre: Kelvin Flowers RN  Primary Nurse Rpt Post: Kelvin Flowers RN  Pt Education: Ordered Dialysis Treatment  Care Plan: CKD  Tx Summary:   SBAR received from Primary RN. Dialysis RN arrived to patient room, pt A&Ox4, reviewed ordered dialysis treatment, pt in agreement, physical assessment, machine preparation, and safety testing performed. Consent signed & on file. 1319: Each catheter limb disinfected per p&p, caps removed, hubs disinfected per p&p. Each lumen aspirated for blood return and flushed with Normal Saline per policy. VSS. Dialysis Tx initiated. One unit packed red blood cells administered. 1400: Packed red blood cells completed. VSS. Lines patent and intact. 1500: Pt resting quietly, VSS, lines patent and intact. 1600: Pt resting quietly, VSS, lines patent and intact. 1651: Tx ended. VSS. Each dialysis catheter limb disinfected per p&p, all possible blood returned per p&p, and each dialysis hub disinfected per p&p. Each lumen flushed, post dialysis catheter normal saline dwell instilled per order, and caps applied. Bed locked and in the lowest position, call bell and belongings in reach. SBAR given to Primary, RN. Patient is stable at time of my departure. All Dialysis related medications have been reviewed. Admiting Diagnosis: Acute on Chronic Anemia  Pt's previous clinic- [facility] dialysis in [place], [place]  Consent signed - Informed Consent Verified: Yes (11/26/20 2158)  [facility]inn Consent - Signed and on file  Hepatitis Status- 11/17/20 HBsAG Negative, HBsAB 57.40 IMMUNE  Machine #- Machine Number: [id number] (11/26/20 1319)  Telemetry status- NSR  Pre-dialysis wt. - Pre-Dialysis Weight: 79.1 kg (174 lb 6.1 oz) (11/19/20 5016)

## 2020-11-27 ENCOUNTER — APPOINTMENT (OUTPATIENT)
Dept: MRI IMAGING | Age: 70
DRG: 377 | End: 2020-11-27
Attending: INTERNAL MEDICINE
Payer: MEDICARE

## 2020-11-27 ENCOUNTER — APPOINTMENT (OUTPATIENT)
Dept: MRI IMAGING | Age: 70
DRG: 377 | End: 2020-11-27
Attending: PSYCHIATRY & NEUROLOGY
Payer: MEDICARE

## 2020-11-27 LAB
ABO + RH BLD: NORMAL
ALBUMIN SERPL-MCNC: 2.8 G/DL (ref 3.5–5)
ALBUMIN/GLOB SERPL: 0.9 {RATIO} (ref 1.1–2.2)
ALP SERPL-CCNC: 155 U/L (ref 45–117)
ALT SERPL-CCNC: 17 U/L (ref 12–78)
ANION GAP SERPL CALC-SCNC: 3 MMOL/L (ref 5–15)
ANTIGENS PRESENT RBC DONR: NORMAL
ANTIGENS PRESENT RBC DONR: NORMAL
AST SERPL-CCNC: 15 U/L (ref 15–37)
BASOPHILS # BLD: 0.1 K/UL (ref 0–0.1)
BASOPHILS NFR BLD: 1 % (ref 0–1)
BILIRUB SERPL-MCNC: 0.4 MG/DL (ref 0.2–1)
BLD PROD TYP BPU: NORMAL
BLD PROD TYP BPU: NORMAL
BLOOD BANK CMNT PATIENT-IMP: NORMAL
BLOOD GROUP ANTIBODIES SERPL: NORMAL
BLOOD GROUP ANTIBODIES SERPL: NORMAL
BPU ID: NORMAL
BPU ID: NORMAL
BUN SERPL-MCNC: 33 MG/DL (ref 6–20)
BUN/CREAT SERPL: 9 (ref 12–20)
CALCIUM SERPL-MCNC: 9.3 MG/DL (ref 8.5–10.1)
CHLORIDE SERPL-SCNC: 106 MMOL/L (ref 97–108)
CO2 SERPL-SCNC: 30 MMOL/L (ref 21–32)
COMMENT, HOLDF: NORMAL
CREAT SERPL-MCNC: 3.78 MG/DL (ref 0.55–1.02)
CROSSMATCH RESULT,%XM: NORMAL
CROSSMATCH RESULT,%XM: NORMAL
DIFFERENTIAL METHOD BLD: ABNORMAL
EOSINOPHIL # BLD: 0.2 K/UL (ref 0–0.4)
EOSINOPHIL NFR BLD: 3 % (ref 0–7)
ERYTHROCYTE [DISTWIDTH] IN BLOOD BY AUTOMATED COUNT: 16.8 % (ref 11.5–14.5)
FERRITIN SERPL-MCNC: 37 NG/ML (ref 8–252)
GLOBULIN SER CALC-MCNC: 3.1 G/DL (ref 2–4)
GLUCOSE BLD STRIP.AUTO-MCNC: 146 MG/DL (ref 65–100)
GLUCOSE BLD STRIP.AUTO-MCNC: 181 MG/DL (ref 65–100)
GLUCOSE BLD STRIP.AUTO-MCNC: 196 MG/DL (ref 65–100)
GLUCOSE BLD STRIP.AUTO-MCNC: 208 MG/DL (ref 65–100)
GLUCOSE SERPL-MCNC: 207 MG/DL (ref 65–100)
HCT VFR BLD AUTO: 28.2 % (ref 35–47)
HGB BLD-MCNC: 8.3 G/DL (ref 11.5–16)
IMM GRANULOCYTES # BLD AUTO: 0.1 K/UL (ref 0–0.04)
IMM GRANULOCYTES NFR BLD AUTO: 1 % (ref 0–0.5)
IRON SATN MFR SERPL: 14 % (ref 20–50)
IRON SERPL-MCNC: 43 UG/DL (ref 35–150)
LYMPHOCYTES # BLD: 0.6 K/UL (ref 0.8–3.5)
LYMPHOCYTES NFR BLD: 12 % (ref 12–49)
MCH RBC QN AUTO: 28.1 PG (ref 26–34)
MCHC RBC AUTO-ENTMCNC: 29.4 G/DL (ref 30–36.5)
MCV RBC AUTO: 95.6 FL (ref 80–99)
MONOCYTES # BLD: 0.5 K/UL (ref 0–1)
MONOCYTES NFR BLD: 9 % (ref 5–13)
NEUTS SEG # BLD: 3.9 K/UL (ref 1.8–8)
NEUTS SEG NFR BLD: 74 % (ref 32–75)
NRBC # BLD: 0 K/UL (ref 0–0.01)
NRBC BLD-RTO: 0 PER 100 WBC
PLATELET # BLD AUTO: 155 K/UL (ref 150–400)
PMV BLD AUTO: 10.9 FL (ref 8.9–12.9)
POTASSIUM SERPL-SCNC: 5 MMOL/L (ref 3.5–5.1)
PROT SERPL-MCNC: 5.9 G/DL (ref 6.4–8.2)
RBC # BLD AUTO: 2.95 M/UL (ref 3.8–5.2)
RBC MORPH BLD: ABNORMAL
RBC MORPH BLD: ABNORMAL
SAMPLES BEING HELD,HOLD: NORMAL
SERVICE CMNT-IMP: ABNORMAL
SODIUM SERPL-SCNC: 139 MMOL/L (ref 136–145)
SPECIMEN EXP DATE BLD: NORMAL
STATUS OF UNIT,%ST: NORMAL
STATUS OF UNIT,%ST: NORMAL
TIBC SERPL-MCNC: 310 UG/DL (ref 250–450)
UNIT DIVISION, %UDIV: 0
UNIT DIVISION, %UDIV: 0
WBC # BLD AUTO: 5.4 K/UL (ref 3.6–11)

## 2020-11-27 PROCEDURE — 74011250637 HC RX REV CODE- 250/637: Performed by: HOSPITALIST

## 2020-11-27 PROCEDURE — 82962 GLUCOSE BLOOD TEST: CPT

## 2020-11-27 PROCEDURE — 36415 COLL VENOUS BLD VENIPUNCTURE: CPT

## 2020-11-27 PROCEDURE — 97530 THERAPEUTIC ACTIVITIES: CPT

## 2020-11-27 PROCEDURE — 2709999900 HC NON-CHARGEABLE SUPPLY

## 2020-11-27 PROCEDURE — 74011250637 HC RX REV CODE- 250/637: Performed by: INTERNAL MEDICINE

## 2020-11-27 PROCEDURE — 80053 COMPREHEN METABOLIC PANEL: CPT

## 2020-11-27 PROCEDURE — 85025 COMPLETE CBC W/AUTO DIFF WBC: CPT

## 2020-11-27 PROCEDURE — 99233 SBSQ HOSP IP/OBS HIGH 50: CPT | Performed by: NURSE PRACTITIONER

## 2020-11-27 PROCEDURE — 82728 ASSAY OF FERRITIN: CPT

## 2020-11-27 PROCEDURE — 65660000000 HC RM CCU STEPDOWN

## 2020-11-27 PROCEDURE — 83540 ASSAY OF IRON: CPT

## 2020-11-27 PROCEDURE — 74011636637 HC RX REV CODE- 636/637: Performed by: INTERNAL MEDICINE

## 2020-11-27 PROCEDURE — 74011000250 HC RX REV CODE- 250: Performed by: NURSE PRACTITIONER

## 2020-11-27 PROCEDURE — 97116 GAIT TRAINING THERAPY: CPT

## 2020-11-27 PROCEDURE — 74011250637 HC RX REV CODE- 250/637: Performed by: NURSE PRACTITIONER

## 2020-11-27 RX ADMIN — PANTOPRAZOLE SODIUM 40 MG: 40 TABLET, DELAYED RELEASE ORAL at 17:00

## 2020-11-27 RX ADMIN — OXYCODONE HYDROCHLORIDE 5 MG: 5 TABLET ORAL at 02:18

## 2020-11-27 RX ADMIN — SEVELAMER CARBONATE 1600 MG: 800 TABLET, FILM COATED ORAL at 16:59

## 2020-11-27 RX ADMIN — FLUCONAZOLE 200 MG: 100 TABLET ORAL at 08:41

## 2020-11-27 RX ADMIN — INSULIN LISPRO 2 UNITS: 100 INJECTION, SOLUTION INTRAVENOUS; SUBCUTANEOUS at 16:30

## 2020-11-27 RX ADMIN — KETOTIFEN FUMARATE 1 DROP: 0.35 SOLUTION/ DROPS OPHTHALMIC at 17:00

## 2020-11-27 RX ADMIN — OXYCODONE HYDROCHLORIDE 5 MG: 5 TABLET ORAL at 18:09

## 2020-11-27 RX ADMIN — OXYCODONE HYDROCHLORIDE 5 MG: 5 TABLET ORAL at 09:01

## 2020-11-27 RX ADMIN — CARBOXYMETHYLCELLULOSE SODIUM 2 DROP: 5 SOLUTION/ DROPS OPHTHALMIC at 17:25

## 2020-11-27 RX ADMIN — OXYCODONE HYDROCHLORIDE AND ACETAMINOPHEN 500 MG: 500 TABLET ORAL at 08:40

## 2020-11-27 RX ADMIN — CARBOXYMETHYLCELLULOSE SODIUM 2 DROP: 5 SOLUTION/ DROPS OPHTHALMIC at 21:40

## 2020-11-27 RX ADMIN — FERROUS SULFATE TAB 325 MG (65 MG ELEMENTAL FE) 325 MG: 325 (65 FE) TAB at 08:41

## 2020-11-27 RX ADMIN — FLUTICASONE PROPIONATE 2 SPRAY: 50 SPRAY, METERED NASAL at 08:42

## 2020-11-27 RX ADMIN — FERROUS SULFATE TAB 325 MG (65 MG ELEMENTAL FE) 325 MG: 325 (65 FE) TAB at 11:43

## 2020-11-27 RX ADMIN — Medication 2 TABLET: at 11:43

## 2020-11-27 RX ADMIN — SEVELAMER CARBONATE 1600 MG: 800 TABLET, FILM COATED ORAL at 08:41

## 2020-11-27 RX ADMIN — IPRATROPIUM BROMIDE AND ALBUTEROL SULFATE 3 ML: .5; 3 SOLUTION RESPIRATORY (INHALATION) at 01:03

## 2020-11-27 RX ADMIN — ACETAMINOPHEN 650 MG: 325 TABLET ORAL at 21:26

## 2020-11-27 RX ADMIN — FERROUS SULFATE TAB 325 MG (65 MG ELEMENTAL FE) 325 MG: 325 (65 FE) TAB at 16:59

## 2020-11-27 RX ADMIN — INSULIN LISPRO 3 UNITS: 100 INJECTION, SOLUTION INTRAVENOUS; SUBCUTANEOUS at 12:05

## 2020-11-27 RX ADMIN — POLYETHYLENE GLYCOL 3350 17 G: 17 POWDER, FOR SOLUTION ORAL at 08:42

## 2020-11-27 RX ADMIN — KETOTIFEN FUMARATE 1 DROP: 0.35 SOLUTION/ DROPS OPHTHALMIC at 08:42

## 2020-11-27 RX ADMIN — OXYCODONE HYDROCHLORIDE 5 MG: 5 TABLET ORAL at 22:33

## 2020-11-27 RX ADMIN — OXYCODONE HYDROCHLORIDE 5 MG: 5 TABLET ORAL at 14:14

## 2020-11-27 RX ADMIN — INSULIN LISPRO 2 UNITS: 100 INJECTION, SOLUTION INTRAVENOUS; SUBCUTANEOUS at 08:40

## 2020-11-27 RX ADMIN — PANTOPRAZOLE SODIUM 40 MG: 40 TABLET, DELAYED RELEASE ORAL at 08:40

## 2020-11-27 RX ADMIN — CARBOXYMETHYLCELLULOSE SODIUM 2 DROP: 5 SOLUTION/ DROPS OPHTHALMIC at 08:41

## 2020-11-27 RX ADMIN — SEVELAMER CARBONATE 1600 MG: 800 TABLET, FILM COATED ORAL at 11:43

## 2020-11-27 NOTE — PROGRESS NOTES
Bedside and Verbal shift change report given to [address] (oncoming nurse) by Sheela Osborne (offgoing nurse). Report included the following information SBAR, Kardex, Intake/Output, MAR, Recent Results, Cardiac Rhythm NSR and Dual Neuro Assessment.

## 2020-11-27 NOTE — PROGRESS NOTES
Problem: Falls - Risk of  Goal: *Absence of Falls  Description: Document Ángel Going Fall Risk and appropriate interventions in the flowsheet.   11/27/2020 0501 by Bri Amos RN  Outcome: Progressing Towards Goal  Note: Fall Risk Interventions:  Mobility Interventions: Bed/chair exit alarm, Patient to call before getting OOB, Utilize walker, cane, or other assistive device    Mentation Interventions: Adequate sleep, hydration, pain control, Bed/chair exit alarm, Door open when patient unattended    Medication Interventions: Bed/chair exit alarm, Patient to call before getting OOB, Teach patient to arise slowly    Elimination Interventions: Call light in reach, Bed/chair exit alarm, Stay With Me (per policy)    History of Falls Interventions: Bed/chair exit alarm, Consult care management for discharge planning, Door open when patient unattended      11/26/2020 1955 by Bri Amos RN  Outcome: Progressing Towards Goal  Note: Fall Risk Interventions:  Mobility Interventions: Bed/chair exit alarm, Communicate number of staff needed for ambulation/transfer, OT consult for ADLs, PT Consult for mobility concerns, Patient to call before getting OOB    Mentation Interventions: Adequate sleep, hydration, pain control, Bed/chair exit alarm, Door open when patient unattended    Medication Interventions: Bed/chair exit alarm, Patient to call before getting OOB, Teach patient to arise slowly    Elimination Interventions: Bed/chair exit alarm, Call light in reach, Toileting schedule/hourly rounds    History of Falls Interventions: Bed/chair exit alarm, Consult care management for discharge planning, Door open when patient unattended    Problem: Pain  Goal: *Control of Pain  11/27/2020 0501 by Bri Amos RN  Outcome: Progressing Towards Goal  11/26/2020 1955 by Bri Amos RN  Outcome: Progressing Towards Goal     Problem: Pressure Injury - Risk of  Goal: *Prevention of pressure injury  Description: Document Vic Scale and appropriate interventions in the flowsheet.   11/27/2020 0501 by Jarret Coleman RN  Outcome: Progressing Towards Goal  Note: Pressure Injury Interventions:  Sensory Interventions: Assess changes in LOC, Keep linens dry and wrinkle-free, Minimize linen layers    Moisture Interventions: Absorbent underpads, Minimize layers    Activity Interventions: Pressure redistribution bed/mattress(bed type), Increase time out of bed    Mobility Interventions: HOB 30 degrees or less, Pressure redistribution bed/mattress (bed type)    Nutrition Interventions: Document food/fluid/supplement intake    Friction and Shear Interventions: Apply protective barrier, creams and emollients, Lift sheet, Minimize layers    11/26/2020 1955 by Jarret Coleman RN  Outcome: Progressing Towards Goal  Note: Pressure Injury Interventions:  Sensory Interventions: Assess changes in LOC, Assess need for specialty bed, Check visual cues for pain, Keep linens dry and wrinkle-free, Float heels    Moisture Interventions: Absorbent underpads, Minimize layers    Activity Interventions: Assess need for specialty bed, Increase time out of bed, PT/OT evaluation    Mobility Interventions: Float heels, PT/OT evaluation    Nutrition Interventions: Document food/fluid/supplement intake    Friction and Shear Interventions: Apply protective barrier, creams and emollients, Lift sheet, Minimize layers    Problem: Heart Failure: Discharge Outcomes  Goal: *Demonstrates ability to perform prescribed activity without shortness of breath or discomfort  11/27/2020 0501 by Jarret Coleman RN  Outcome: Progressing Towards Goal  11/26/2020 1955 by Jarret Coleman RN  Outcome: Progressing Towards Goal  Goal: *Verbalizes understanding and describes prescribed diet  11/27/2020 0501 by Jarret Coleman RN  Outcome: Progressing Towards Goal  11/26/2020 1955 by Jarret Coleman RN  Outcome: Progressing Towards Goal  Goal: Vandana Steiner understanding/describes prescribed medications  Outcome: Progressing Towards Goal  Goal: *Describes available resources and support systems  Description: (eg: Home Health, Palliative Care, Advanced Medical Directive)  Outcome: Progressing Towards Goal  Goal: *Understands and describes signs and symptoms to report to providers(Stroke Metric)  Outcome: Progressing Towards Goal  Goal: *Describes importance of continuing daily weights and changes to report to physician  Outcome: Progressing Towards Goal     Problem: Acute Renal Failure: Discharge Outcomes  Goal: *Optimal pain control at patient's stated goal  Outcome: Progressing Towards Goal  Goal: *Hemodynamically stable  11/27/2020 0501 by Kath Ly RN  Outcome: Progressing Towards Goal  11/26/2020 1955 by Kath Ly RN  Outcome: Progressing Towards Goal  Goal: *Tolerating diet  Outcome: Progressing Towards Goal  Goal: *Lab values stabilized  11/27/2020 0501 by Kath Ly RN  Outcome: Progressing Towards Goal  11/26/2020 1955 by Kath Ly RN  Outcome: Progressing Towards Goal

## 2020-11-27 NOTE — PROGRESS NOTES
No HD needs today   Hb > 8 after transfusion yesterday   Ne xt HD tomorrow       [facility]  Office :[phone]  Fax: [phone]

## 2020-11-27 NOTE — PROGRESS NOTES
Problem: Self Care Deficits Care Plan (Adult)  Goal: *Acute Goals and Plan of Care (Insert Text)  Description:   FUNCTIONAL STATUS PRIOR TO ADMISSION: Patient was modified independent using a walker for functional mobility. Patient required assistance for bathing, dressing, cooking, and cleaning, was not driving. Patient alternates living with her 5 daughters and friends take her to dialysis. HOME SUPPORT: The patient lived with daughters to provide assistance. Occupational Therapy Goals  Weekly Re-Assessment 11/27/2020, goals modified below  Initiated 11/20/2020  1. Patient will perform standing ADLs with supervision/set up within 7 day(s). MODIFIED to x10 minutes 11/27  2. Patient will perform bathing with supervision/set-up within 7 day(s). CONTINUE  3. Patient will perform lower body dressing with supervision/set-up within 7 day(s). CONTINUE  4.  Patient will perform toilet transfers with supervision/set-up within 7 day(s). CONTINUE  5. Patient will perform all aspects of toileting with minimal assistance/contact guard assist within 7 day(s). MET, UPGRADED to SPV 11/27  6. Patient will participate in upper extremity therapeutic exercise/activities with minimal assistance/contact guard assist for 5 minutes within 7 day(s). UPGRADED to SPV x10 minutes 11/27  7. Patient will utilize energy conservation techniques during functional activities with verbal cues within 7 day(s). CONTINUE            Outcome: Progressing Towards Goal     OCCUPATIONAL THERAPY TREATMENT/WEEKLY RE-ASSESSMENT  Patient: Kayaln Ngo (25 y.o. female)  Date: 11/27/2020  Diagnosis: Anemia [D64.9]  Anemia [D64.9]   <principal problem not specified>  Procedure(s) (LRB):  PROCEDURE CANCELLED - NOT PERFORMED (N/A) 2 Days Post-Op  Precautions: Fall  Chart, occupational therapy assessment, plan of care, and goals were reviewed.     ASSESSMENT  Patient continues with skilled OT services and is progressing towards goals, progressing well with POC and largely SBA-CGA for ADLs and mobility therefore goals upgraded above. She continues to be limited by decreased activity tolerance, volition, and safety awareness with attempted functional reaching tasks this session, reporting increased neck pain and fatigue, as well as increased abdominal discomfort and swelling, RN notified. Continue to recommend d/c home with HHOT/PT and family support as she required min-mod cues for safety. Current Level of Function Impacting Discharge (ADLs): setup-CGA for ADLs and mobility with RW    Other factors to consider for discharge: decreased safety awareness, good family support, increased abdominal discomfort/distension         PLAN :  Goals have been updated based on progression since last assessment. Patient continues to benefit from skilled intervention to address the above impairments. Continue to follow patient 5 times a week to address goals. Recommend with staff: Recommend with nursing patient to complete as able in order to maintain strength, endurance and independence: ADLs with assist PRN, OOB to chair 3x/day and mobilizing to the bathroom for toileting with 1 assist & RW. Thank you for your assistance. Recommend next OT session: Standing tolerance/exercises, higher level ADLs/IADLs    Recommendation for discharge: (in order for the patient to meet his/her long term goals)  Occupational therapy at least 2 days/week in the home     This discharge recommendation:  Has been made in collaboration with the attending provider and/or case management    IF patient discharges home will need the following DME: Raised toilet seat, reacher       SUBJECTIVE:   Patient stated I'm not doing this anymore I need to get in the bed.     OBJECTIVE DATA SUMMARY:   Cognitive/Behavioral Status:  Neurologic State: Alert  Orientation Level: Oriented X4  Cognition: Appropriate for age attention/concentration; Follows commands;Poor safety awareness  Perception: Appears intact  Perseveration: No perseveration noted  Safety/Judgement: Awareness of environment;Decreased awareness of need for assistance;Decreased awareness of need for safety;Decreased insight into deficits    Functional Mobility and Transfers for ADLs:  Bed Mobility:  Supine to Sit: Stand-by assistance  Sit to Supine: Stand-by assistance  Scooting: Supervision    Transfers:  Sit to Stand: Stand-by assistance          Balance:  Sitting: Intact  Standing: Impaired; With support  Standing - Static: Good  Standing - Dynamic : Good    ADL Intervention:   Attempted functional reaching in cabinets, patient able to ambulate to counters with RW and SBA, however when encouraged to reach overhead for dynamic balance training in prep for ADLs/IADLs, patient putting forth minimal effort. Encouraged to reach above head on third shelf and on bottom cabinet below waist, declined and educated on obtaining reacher for [facility] and safety. Able to reach just above overhead to grab lotion bottle, then closing cabinets and returning to EOB. Lower Body Dressing Assistance  Socks: Supervision  Leg Crossed Method Used: Yes  Position Performed: Seated in chair;Seated edge of bed         Cognitive Retraining  Safety/Judgement: Awareness of environment;Decreased awareness of need for assistance;Decreased awareness of need for safety;Decreased insight into deficits      Pain:  Neck pain reported, not rated numerically, patient reporting RN provided pain medication 1 hour prior to session    Activity Tolerance:   Fair    After treatment patient left in no apparent distress:   Supine in bed, Call bell within reach, Bed / chair alarm activated, and Side rails x 3    COMMUNICATION/COLLABORATION:   The patients plan of care was discussed with: Physical therapist and Registered nurse.      GIOVANI Urbina, OTR/L  Time Calculation: 16 mins

## 2020-11-27 NOTE — PROGRESS NOTES
Problem: Falls - Risk of  Goal: *Absence of Falls  Description: Document Angie Villar Fall Risk and appropriate interventions in the flowsheet. Outcome: Progressing Towards Goal  Note: Fall Risk Interventions:  Mobility Interventions: Bed/chair exit alarm, Communicate number of staff needed for ambulation/transfer, OT consult for ADLs, PT Consult for mobility concerns, Patient to call before getting OOB    Mentation Interventions: Adequate sleep, hydration, pain control, Bed/chair exit alarm, Door open when patient unattended    Medication Interventions: Bed/chair exit alarm, Patient to call before getting OOB, Teach patient to arise slowly    Elimination Interventions: Bed/chair exit alarm, Call light in reach, Toileting schedule/hourly rounds    History of Falls Interventions: Bed/chair exit alarm, Consult care management for discharge planning, Door open when patient unattended       Problem: Pain  Goal: *Control of Pain  Outcome: Progressing Towards Goal     Problem: Pressure Injury - Risk of  Goal: *Prevention of pressure injury  Description: Document Vic Scale and appropriate interventions in the flowsheet.   Outcome: Progressing Towards Goal  Note: Pressure Injury Interventions:  Sensory Interventions: Assess changes in LOC, Assess need for specialty bed, Check visual cues for pain, Keep linens dry and wrinkle-free, Float heels    Moisture Interventions: Absorbent underpads, Minimize layers    Activity Interventions: Assess need for specialty bed, Increase time out of bed, PT/OT evaluation    Mobility Interventions: Float heels, PT/OT evaluation    Nutrition Interventions: Document food/fluid/supplement intake    Friction and Shear Interventions: Apply protective barrier, creams and emollients, Lift sheet, Minimize layers       Problem: Heart Failure: Discharge Outcomes  Goal: *Demonstrates ability to perform prescribed activity without shortness of breath or discomfort  Outcome: Progressing Towards Goal  Goal: *Verbalizes understanding and describes prescribed diet  Outcome: Progressing Towards Goal  Goal: *Verbalizes understanding/describes prescribed medications  Outcome: Progressing Towards Goal  Goal: *Describes available resources and support systems  Description: (eg: Home Health, Palliative Care, Advanced Medical Directive)  Outcome: Progressing Towards Goal  Goal: *Understands and describes signs and symptoms to report to providers(Stroke Metric)  Outcome: Progressing Towards Goal  Goal: *Describes importance of continuing daily weights and changes to report to physician  Outcome: Progressing Towards Goal     Problem: Acute Renal Failure: Discharge Outcomes  Goal: *Optimal pain control at patient's stated goal  Outcome: Progressing Towards Goal  Goal: *Hemodynamically stable  Outcome: Progressing Towards Goal  Goal: *Lab values stabilized  Outcome: Progressing Towards Goal

## 2020-11-27 NOTE — PROGRESS NOTES
[facility]  Hospitalist Group                                                                                          Hospitalist Progress Note  Saran Smith MD  Answering service: [phone] OR [phone] from in house phone        Date of Service:  2020  NAME:  Rashid Watt  :  1950  MRN:  [id number]      Admission Summary:   69 y.o PMH of AVM,ESRD,anemia admitted due to anemia and hyperkalemia    Interval history / Subjective:     2020 : seen at bedside   Feels better, slept poorly last pm however, no cp no sob no n/v, up in room prior day, ;pt encouraged to be up, for PT/OT eval ,      Assessment & Plan:      #Acute on chronic anemia:  -multifactorial - hx AVMs,CKD  -Gastroenterology signed off   -recent EGD 10/21: mild gastritis with some friable mucosa and oozing-treated with APC and also capsule study performed with presence of small non-bleeding AVM's in small intestine. Patient reports [facility] and underwent EGD, enteroscopy, and colonoscopy- all with no findings. -s/p 1 U PRBC , ,   - PPI BID  - monitor Hb, transfuse <7  Per GI/hepatology:   - EGD 10/21/20 Dr. Arlet Gomez - oozing in stomach, treated with APC  -M2A 10/22/20 - multiple non-bleeding SB AVMs  -Patient reports subsequent GI evaluation with EGD, enteroscopy and colonoscopy at the [facility] post-discharge from here in October  - Case discussed with Dr. Jacklyn Roldan. He will check iron panel and consider iron infusion. Hold off on endoscopy at this time. Will sign off. Please call back if signs of active GIB which may warrant endoscopy by GI.      Lab Results   Component Value Date/Time    HGB 8.3 (L) 2020 02:28 AM      Lab Results   Component Value Date/Time    Iron 43 2020 02:28 AM    TIBC 310 2020 02:28 AM    Iron % saturation 14 (L) 2020 02:28 AM    Ferritin 37 2020 02:28 AM         #Hyperkalemia- mild   #ESRD on HD TTS  -nephrology following    #Multiple Pul Nodules up to 6 mm size, for CT f/u in 3-6 months as out pt . #Liver cirrhosis ?  with Ascites :  -hx recurrent ascites with paracentesis . Not on any diuretics at home as pt is anuric   -[facility] abd showed ascites. The liver echotexture is normal  - Paracentesis drain placed on 11/19 and removed on 11/21  - fluid analysis shows transudate. Ascites fluid total protein > 2.5 which is highly suggestive of heart failure  - appreciate Hepatology input  - tranasjugular liver biopsy done 52/99    #Diastolic heart failure compensated. 11/27/2020   #Severe Pulm artery HTN  - Echo: LVEF is 48%. Severe (grade 3) left ventricular diastolic dysfunction. Moderate to severe tricuspid valve regurgitation is present. - appreciate cardiology input  - no further cardiac testing    Code Stroke 11/25 right side numbness/ tingling  - CT head: No acute intracranial abnormality  - MRI brain , MRI c spine, MRA ordered  - appreciate neurology input  - no aspirin due to GI bleed   - lipid panel ordered   Results for Dimitri Gonzales (MRN [id number]) as of 11/26/2020 10:40   Ref. Range 11/25/2020 03:21   Triglyceride Latest Ref Range: <150 MG/DL 85   Cholesterol, total Latest Ref Range: <200 MG/   HDL Cholesterol Latest Units: MG/DL 59   CHOL/HDL Ratio Latest Ref Range: 0.0 - 5.0   1.9   VLDL, calculated Latest Units: MG/DL 17   LDL, calculated Latest Ref Range: 0 - 100 MG/DL 38       Chronic pain- c/w home oxy prn    Code status: full  DVT prophylaxis: scd    Care Plan discussed with: Patient/Family and Nurse  Anticipated Disposition: Home w/Family  Anticipated Discharge: 24 hours to 48 hours     Hospital Problems  Date Reviewed: 10/6/2020          Codes Class Noted POA    Anemia ICD-10-CM: D64.9  ICD-9-CM: 285.9  9/17/2020 Unknown                Review of Systems:   Negative except as above     Vital Signs:    Last 24hrs VS reviewed since prior progress note.  Most recent are:  Visit Vitals  BP (!) 118/44 (BP 1 Location: Right arm, BP Patient Position: At rest)   Pulse 73   Temp 97.5 °F (36.4 °C)   Resp 15   Ht 5' 2.99\" (1.6 m)   Wt 73.8 kg (162 lb 11.2 oz)   SpO2 96%   BMI 28.82 kg/m²         Intake/Output Summary (Last 24 hours) at 11/27/2020 1023  Last data filed at 11/26/2020 1651  Gross per 24 hour   Intake --   Output 2000 ml   Net -2000 ml        Physical Examination:     I had a face to face encounter with this patient and independently examined them on 11/27/2020 as outlined below:          Constitutional:  No acute distress, elderly patient   ENT:  Oral mucosa moist, EOMI    Resp:  CTA bilaterally. No wheezing/rhonchi/rales. No accessory muscle use   CV:  Regular rhythm, normal rate, no murmurs, gallops, rubs    GI:  Soft, distended, non tender. normoactive bowel sounds    Musculoskeletal:  No LE edema    Neurologic:  Moves all extremities. AAOx3     Psych:  not anxious nor agitated. Data Review:    Review and/or order of clinical lab test  Review and/or order of tests in the radiology section of CPT  Review and/or order of tests in the medicine section of CPT      Labs:     Recent Labs     11/27/20 0228 11/26/20 0223   WBC 5.4 5.4   HGB 8.3* 7.2*   HCT 28.2* 25.0*    159     Recent Labs     11/27/20 0228 11/26/20 0223 11/25/20 0321    136 136   K 5.0 5.4* 5.4*    102 103   CO2 30 27 29   BUN 33* 55* 40*   CREA 3.78* 5.60* 4.46*   * 228* 249*   CA 9.3 9.1 9.3   MG  --   --  2.2   PHOS  --   --  4.2     Recent Labs     11/27/20 0228   ALT 17   *   TBILI 0.4   TP 5.9*   ALB 2.8*   GLOB 3.1     Recent Labs     11/25/20  0321   INR 1.0   PTP 10.7   APTT 24.9      Recent Labs     11/27/20 0228 11/26/20  0400   TIBC 310 288   PSAT 14* 10*   FERR 37  --       Lab Results   Component Value Date/Time    Folate 16.4 10/19/2020 09:34 AM      No results for input(s): PH, PCO2, PO2 in the last 72 hours. No results for input(s): CPK, CKNDX, TROIQ in the last 72 hours.     No lab exists for component: CPKMB  Lab Results   Component Value Date/Time    Cholesterol, total 114 11/25/2020 03:21 AM    HDL Cholesterol 59 11/25/2020 03:21 AM    LDL, calculated 38 11/25/2020 03:21 AM    Triglyceride 85 11/25/2020 03:21 AM    CHOL/HDL Ratio 1.9 11/25/2020 03:21 AM     Lab Results   Component Value Date/Time    Glucose (POC) 181 (H) 11/27/2020 06:48 AM    Glucose (POC) 173 (H) 11/26/2020 08:51 PM    Glucose (POC) 126 (H) 11/26/2020 04:32 PM    Glucose (POC) 146 (H) 11/26/2020 03:02 PM    Glucose (POC) 340 (H) 11/26/2020 11:45 AM     No results found for: COLOR, APPRN, SPGRU, REFSG, VERENA, PROTU, GLUCU, KETU, BILU, UROU, TIBURCIO, LEUKU, GLUKE, EPSU, BACTU, WBCU, RBCU, CASTS, UCRY      Medications Reviewed:     Current Facility-Administered Medications   Medication Dose Route Frequency    0.9% sodium chloride infusion 250 mL  250 mL IntraVENous PRN    insulin lispro (HUMALOG) injection   SubCUTAneous AC&HS    glucose chewable tablet 16 g  4 Tab Oral PRN    glucagon (GLUCAGEN) injection 1 mg  1 mg IntraMUSCular PRN    dextrose 10% infusion 0-250 mL  0-250 mL IntraVENous PRN    fluticasone propionate (FLONASE) 50 mcg/actuation nasal spray 2 Spray  2 Spray Both Nostrils DAILY    0.9% sodium chloride infusion 250 mL  250 mL IntraVENous PRN    polyethylene glycol (MIRALAX) packet 17 g  17 g Oral DAILY PRN    diphenhydrAMINE (BENADRYL) capsule 25 mg  25 mg Oral Q6H PRN    albuterol-ipratropium (DUO-NEB) 2.5 MG-0.5 MG/3 ML  3 mL Nebulization Q4H PRN    alum-mag hydroxide-simeth (MYLANTA) oral suspension 30 mL  30 mL Oral Q4H PRN    ascorbic acid (vitamin C) (VITAMIN C) tablet 500 mg  500 mg Oral DAILY    cholecalciferol (VITAMIN D3) (1000 Units /25 mcg) tablet 2 Tab  2,000 Units Oral DAILY    ferrous sulfate tablet 325 mg  325 mg Oral TID WITH MEALS    ketotifen (ZADITOR) 0.025 % (0.035 %) ophthalmic solution 1 Drop  1 Drop Both Eyes BID    carboxymethylcellulose sodium (REFRESH PLUS) 0.5 % ophthalmic solution 1-2 Drop 1-2 Drop Both Eyes TID    prochlorperazine (COMPAZINE) with saline injection 10 mg  10 mg IntraVENous Q6H PRN    morphine injection 2 mg  2 mg IntraVENous Q4H PRN    0.9% sodium chloride infusion 250 mL  250 mL IntraVENous PRN    epoetin reshma-epbx (RETACRIT) injection 20,000 Units  20,000 Units SubCUTAneous Q TUE, THU & SAT    0.9% sodium chloride infusion 250 mL  250 mL IntraVENous PRN    pantoprazole (PROTONIX) tablet 40 mg  40 mg Oral BID    sevelamer carbonate (RENVELA) tab 1,600 mg  1,600 mg Oral TID WITH MEALS    acetaminophen (TYLENOL) tablet 650 mg  650 mg Oral Q4H PRN    oxyCODONE IR (ROXICODONE) tablet 5 mg  5 mg Oral Q4H PRN     ______________________________________________________________________  EXPECTED LENGTH OF STAY: 3d 14h  ACTUAL LENGTH OF STAY:          8                 Saran Smith MD

## 2020-11-27 NOTE — PROGRESS NOTES
Bedside and Verbal shift change report given to Tania Ochoa RN (oncoming nurse) by Shahriar Tomas RN (offgoing nurse). Report included the following information SBAR, Kardex, Intake/Output, MAR, Cardiac Rhythm NSR and Quality Measures.

## 2020-11-27 NOTE — PROGRESS NOTES
Neurology Progress  Solitario Yun, SHARI      Date of admission: 11/17/2020    Patient: Tracy Hardin MRN: [id number]  SSN: [id number]    YOB: 1950  Age: 69 y.o. Sex: female        Subjective:     HPI: Tracy Hardin is a 69 y.o. female with a history of heart failure, end-stage renal disease, liver disease, presented on 11/17 with weakness and fatigue and found to have Hgb of 6.4 and K 6.1. On 11/24 a transjugular liver biopsy was performed by IR. Apparently after the procedure she was lying on her right side in her room and began to feel painful numbness and tingling in the right face, arm and leg numbness, along with bilateral foot numbness. She states that sometimes she gets numbness in her feet, but this numbness radiates up her leg. She does have baseline distal paresthesias in her hands and feet but this was distinctly new and lasted about 2 or 3 hours. Code stroke was called. Head CT negative. She is not on any aspirin products due to history of GI bleeding and gastritis. Interval 11/27/20:     Pt still has numbness, in R neck, R arm and R leg, worst around RIJ insertion site, also on back of shoulder. Overall improved. Pt denies she had facial involvement to me. This makes the numbness most likely cervicogenic. MRI c-spine already ordered. MRI/MRA brain pending.  Other than numbness, no symptoms, however sensory testing equal.      Past Medical History:   Diagnosis Date    Arthritis     Asthma     Chronic kidney disease     Chronic pain     Cirrhosis ([facility] 12/17/2017    Diabetes ([facility] diet controlled    GERD (gastroesophageal reflux disease)     Hypertension     Paroxysmal atrial fibrillation ([facility] 10/6/2020     Past Surgical History:   Procedure Laterality Date    COLONOSCOPY N/A 1/12/2018    COLONOSCOPY performed by Brook Crowley MD at [address] N/A 3/19/2018    COLONOSCOPY performed by Brook Crowley MD at [address] HX GYN  c section    HX VASCULAR ACCESS      dialysis     IR BX TRANSCATHETER  11/24/2020    IR PARACENTESIS ABD W IMAGE  10/22/2020      Family History   Family history unknown: Yes     Social History     Tobacco Use    Smoking status: Never Smoker    Smokeless tobacco: Never Used   Substance Use Topics    Alcohol use: No      Prior to Admission medications    Medication Sig Start Date End Date Taking? Authorizing Provider   polyvinyl alcohol-povidon,PF, (REFRESH CLASSIC) 1.4-0.6 % ophthalmic solution Administer 1-2 Drops to both eyes three (3) times daily. Indications: dry eye   Yes Provider, Historical   ketotifen (ZADITOR) 0.025 % (0.035 %) ophthalmic solution Administer 1 Drop to both eyes two (2) times a day. Indications: allergic conjunctivitis   Yes Provider, Historical   oxyCODONE IR (ROXICODONE) 5 mg immediate release tablet Take 5 mg by mouth every six (6) hours as needed for Pain. Yes Provider, Historical   ascorbic acid, vitamin C, (Vitamin C) 500 mg tablet Take 500 mg by mouth daily. Yes Provider, Historical   lactulose (CHRONULAC) 10 gram/15 mL solution Take 30 mL by mouth two (2) times a day. 10/10/20  Yes Parminder Fuller MD   cholecalciferol (VITAMIN D3) 1,000 unit tablet Take 2,000 Units by mouth daily. Yes Provider, Historical   ferrous sulfate 325 mg (65 mg iron) tablet Take 325 mg by mouth three (3) times daily (with meals). Yes Provider, Historical   sevelamer (RENAGEL) 800 mg tablet Take 1,600 mg by mouth three (3) times daily (with meals). Yes Provider, Historical   albuterol (PROVENTIL HFA, VENTOLIN HFA, PROAIR HFA) 90 mcg/actuation inhaler Take 2 Puffs by inhalation every four (4) hours as needed for Wheezing.    Yes Provider, Historical     Current Facility-Administered Medications   Medication Dose Route Frequency Provider Last Rate Last Dose    0.9% sodium chloride infusion 250 mL  250 mL IntraVENous PRN Shelby Antoine MD        insulin lispro (HUMALOG) injection   SubCUTAneous AC&HS Henreitta Cabot, MD   2 Units at 11/27/20 0840    glucose chewable tablet 16 g  4 Tab Oral PRN Henreitta Cabot, MD        glucagon [facility]) injection 1 mg  1 mg IntraMUSCular PRN Henreitta Cabot, MD        dextrose 10% infusion 0-250 mL  0-250 mL IntraVENous PRN Henreitta Cabot, MD        fluticasone propionate (FLONASE) 50 mcg/actuation nasal spray 2 Spray  2 Spray Both Nostrils DAILY Anabell Zuniga NP   2 Spray at 11/27/20 0842    0.9% sodium chloride infusion 250 mL  250 mL IntraVENous PRN Zeferino Gould MD        polyethylene glycol (MIRALAX) packet 17 g  17 g Oral DAILY PRN Margaret Hernández MD   17 g at 11/27/20 0842    diphenhydrAMINE (BENADRYL) capsule 25 mg  25 mg Oral Q6H PRN Maxim Vani, NP   25 mg at 11/26/20 0523    albuterol-ipratropium (DUO-NEB) 2.5 MG-0.5 MG/3 ML  3 mL Nebulization Q4H PRN Maxim Vani, NP   3 mL at 11/27/20 0103    alum-mag hydroxide-simeth (MYLANTA) oral suspension 30 mL  30 mL Oral Q4H PRN Maxim Vani, NP   30 mL at 11/19/20 0330    ascorbic acid (vitamin C) (VITAMIN C) tablet 500 mg  500 mg Oral DAILY Madala, Sushma, MD   500 mg at 11/27/20 0840    cholecalciferol (VITAMIN D3) (1000 Units /25 mcg) tablet 2 Tab  2,000 Units Oral DAILY Margaret Hernández MD   2 Tab at 11/26/20 0951    ferrous sulfate tablet 325 mg  325 mg Oral TID WITH MEALS Zeferino Gould MD   325 mg at 11/27/20 0841    ketotifen (ZADITOR) 0.025 % (0.035 %) ophthalmic solution 1 Drop  1 Drop Both Eyes BID Margaret Hernández MD   1 Drop at 11/27/20 0842    carboxymethylcellulose sodium (REFRESH PLUS) 0.5 % ophthalmic solution 1-2 Drop  1-2 Drop Both Eyes TID Margaret Hernández MD   2 Drop at 11/27/20 0841    prochlorperazine (COMPAZINE) with saline injection 10 mg  10 mg IntraVENous Q6H PRN Maxim Freed, NP   10 mg at 11/26/20 2105    morphine injection 2 mg  2 mg IntraVENous Q4H PRN Maxim Freed, NP   2 mg at 11/26/20 1247    0.9% sodium chloride infusion 250 mL  250 mL IntraVENous PRN Cici Payne MD        epoetin reshma-epbx (RETACRIT) injection 20,000 Units  20,000 Units SubCUTAneous Q TUE, THU & SAT Diana Ramos MD   20,000 Units at 20 211    0.9% sodium chloride infusion 250 mL  250 mL IntraVENous PRN Cici Payne MD        pantoprazole (PROTONIX) tablet 40 mg  40 mg Oral BID Cici Payne MD   40 mg at 20 0840    sevelamer carbonate (RENVELA) tab 1,600 mg  1,600 mg Oral TID WITH MEALS Cici Payne MD   1,600 mg at 20 0841    acetaminophen (TYLENOL) tablet 650 mg  650 mg Oral Q4H PRN Rosemary Cleaves, NP   650 mg at 20 2116    oxyCODONE IR (ROXICODONE) tablet 5 mg  5 mg Oral Q4H PRN Rosemary Cleaves, NP   5 mg at 20 0901        Allergies   Allergen Reactions    Aleve [Naproxen Sodium] Itching, Swelling and Other (comments)    Amlodipine Rash, Hives and Itching    Aspirin Other (comments), Nausea Only and Unknown (comments)     GI bleed  GI bleeding      Heparin Unknown (comments)     bleeding      Ibuprofen Nausea and Vomiting    Metformin Other (comments)     swelling       Review of systems  Comprehensive review of systems performed and negative except for as documented above. Objective:     Vitals:    20 2200 20 0208 20 0600 20 0956   BP: (!) 154/63 (!) 149/60 (!) 143/59 (!) 118/44   Pulse: 76 81 80 73   Resp: 18 17 18 15   Temp: 98.5 °F (36.9 °C) 98.1 °F (36.7 °C) 98.2 °F (36.8 °C) 97.5 °F (36.4 °C)   TempSrc:       SpO2: 98% 96% 95% 96%        Temp (24hrs), Av.1 °F (36.7 °C), Min:97.5 °F (36.4 °C), Max:98.5 °F (36.9 °C)        O2 Device: Room air  O2 Flow Rate (L/min): 2 l/min      Intake/Output Summary (Last 24 hours) at 2020 1029  Last data filed at 2020 1651  Gross per 24 hour   Intake --   Output 2000 ml   Net -2000 ml       General: In NAD. Ill appearing   Cardiac: RRR  Lungs: Mildly labored breathing.    Abdomen: Distended. Extremities. No edema. Neurologic Exam:  Mental Status:  Alert and oriented x 4. Pleasant lady, bright affect     Language:    Grossly intact fluency and comprehension    Cranial Nerves:   Pupils equal, round and reactive to light. Visual fields intact. Extraocular movements intact w/o nystagmus      Facial sensation intact to LT     Facial activation symmetric. Hearing intact bilaterally. No dysarthria. Shoulder shrug 5/5 bilaterally. Motor:    Some muscle mass loss, generalized, tone normal.      Good equal strength in all extremities. No pronator drift. No involuntary movements. Sensation:    Sensation intact throughout to light touch. Coordination & Gait: No ataxia with finger to nose. Gait deferred    LABS:  Recent Labs     11/27/20 0228 11/26/20 0223 11/25/20 0321   WBC 5.4 5.4 5.1   HGB 8.3* 7.2* 7.6*   HCT 28.2* 25.0* 26.2*    159 149*     Recent Labs     11/27/20 0228 11/26/20 0223 11/25/20 0321    136 136   K 5.0 5.4* 5.4*    102 103   CO2 30 27 29   BUN 33* 55* 40*   CREA 3.78* 5.60* 4.46*   * 228* 249*   CA 9.3 9.1 9.3   MG  --   --  2.2   PHOS  --   --  4.2     Recent Labs     11/27/20 0228   ALT 17   *   TBILI 0.4   TP 5.9*   ALB 2.8*   GLOB 3.1     Recent Labs     11/25/20 0321   INR 1.0   PTP 10.7   APTT 24.9        No results for input(s): PHI, PCO2I, PO2I, HCO3I in the last 72 hours. No results for input(s): CPK, CKNDX, TROIQ in the last 72 hours.     No lab exists for component: CPKMB  Lab Results   Component Value Date/Time    Cholesterol, total 114 11/25/2020 03:21 AM    HDL Cholesterol 59 11/25/2020 03:21 AM    LDL, calculated 38 11/25/2020 03:21 AM    Triglyceride 85 11/25/2020 03:21 AM    CHOL/HDL Ratio 1.9 11/25/2020 03:21 AM     Lab Results   Component Value Date/Time    Glucose (POC) 181 (H) 11/27/2020 06:48 AM    Glucose (POC) 173 (H) 11/26/2020 08:51 PM    Glucose (POC) 126 (H) 11/26/2020 04:32 PM    Glucose (POC) 146 (H) 11/26/2020 03:02 PM    Glucose (POC) 340 (H) 11/26/2020 11:45 AM     No results found for: COLOR, APPRN, SPGRU, REFSG, VERENA, PROTU, GLUCU, KETU, BILU, UROU, TIBURCIO, LEUKU, GLUKE, EPSU, BACTU, WBCU, RBCU, CASTS, UCRY    Recent Labs     11/27/20  0228 11/26/20  0400   TIBC 310 288   PSAT 14* 10*   FERR 37  --       Lab Results   Component Value Date/Time    Folate 16.4 10/19/2020 09:34 AM      No results for input(s): PH, PCO2, PO2 in the last 72 hours. No results for input(s): CPK, CKNDX, TROIQ in the last 72 hours. No lab exists for component: CPKMB    Imaging:  No results found. CT Results:  Results from Hospital Encounter encounter on 11/17/20   CT CODE NEURO HEAD WO CONTRAST    Narrative EXAM: CT CODE NEURO HEAD WO CONTRAST    INDICATION: numbness NIH 3    COMPARISON: None. CONTRAST: None. TECHNIQUE: Unenhanced CT of the head was performed using 5 mm images. Brain and  bone windows were generated. Coronal and sagittal reformats. CT dose reduction  was achieved through use of a standardized protocol tailored for this  examination and automatic exposure control for dose modulation. FINDINGS:  The ventricles and sulci are normal in size, shape and configuration. . Minimal  periventricular hypodensities. . There is no intracranial hemorrhage, extra-axial  collection, or mass effect. The basilar cisterns are open. No CT evidence of  acute infarct. The bone windows demonstrate no abnormalities. The visualized portions of the  paranasal sinuses and mastoid air cells are clear. Impression IMPRESSION:   No acute intracranial abnormality. Results from [place] encounter on 10/06/20   CT ABD PELV WO CONT    Narrative Study: Abdominopelvic CT without contrast.    Clinical indication: Abdominal pain. Technique: Contiguous axial images of the abdomen and pelvis were obtained  without contrast. Coronal and sagittal reconstructions were obtained. Dose  reduction: All CT scans at this facility are performed using dose reduction  optimization techniques as appropriate to a performed exam including the  following: Automated exposure control, adjustments of the mA and/or kV according  to patient size, or use of iterative reconstruction technique. Comparison: None available. Findings:    Heart is enlarged. No pericardial effusion. Low density of the cardiac chambers  relative to the myocardium, suggesting anemia. Multiple small nodules in the  right middle lobe, measuring up to 6 mm. Unenhanced liver, spleen, pancreas and adrenal glands are unremarkable. Gallbladder is grossly unremarkable. No biliary ductal dilatation. Kidneys are  atrophic. No hydronephrosis. No discrete renal mass. Urinary bladder is  decompressed. Vascular opacifications in the uterus. No discrete adnexal mass. Mild abdominopelvic ascites. No evidence of bowel obstruction or ileus. No significant bowel wall thickening. Appendix is not clearly visualized. No pneumoperitoneum. Complex moderate sized  umbilical hernia containing fluid. Moderate aortoiliac atherosclerosis. Vessel patency not assessed without  contrast. No lymphadenopathy by CT size criteria. Patchy sclerosis throughout the visualized vertebral bodies, likely due to renal  osteodystrophy. No acute osseous abnormality identified. Nonspecific suprapubic subcutaneous stranding and skin thickening. Impression Impression:  1. Nonspecific suprapubic subcutaneous stranding and skin thickening. Correlate  for cellulitis. 2.  Mild to moderate abdominopelvic ascites. Fluid-containing paraumbilical  hernia. 3.  No evidence of bowel obstruction. 4.  Cardiomegaly with imaging features of anemia. 5.  Bilateral renal cortical atrophy. 6.  Multiple small right middle lobe nodules, measuring up to 6 mm. Suggest  follow-up chest CT in 3-6 months. 7.  See full report for detailed findings.    Results from Hospital Encounter encounter on 12/15/17   CT ABD PELV WO CONT    Narrative EXAM: CT of the abdomen and pelvis    INDICATION: Abdominal distention and anemia. Dialysis patient. COMPARISON: None. TECHNIQUE: Helical volumetric scanning through the abdomen and pelvis was  performed without oral or intravenous contrast. Axial, sagittal and coronal  reconstructions were generated. One or more dose reduction techniques were used  on this CT: automated exposure control, adjustment of the mAs and/or kVp  according to patient's size, and iterative reconstruction techniques. The  specific techniques utilized on this CT exam have been documented in the  patient's electronic medical record.    _______________    FINDINGS:    LOWER CHEST: Several small bilateral noncalcified pulmonary nodules are present,  largest measures 4 mm in the right middle lobe. Cardiomegaly. Dense mitral  annulus calcification is present. LIVER, BILIARY: A couple punctate calcifications are present in the central  liver, probably calcified granulomata. No focal liver lesion appreciated. No  biliary dilation. Gallbladder lumen is slightly more dense than expected,  suggesting sludge or cholestasis. PANCREAS: Normal.    SPLEEN: Normal.    ADRENALS: Slight fullness right adrenal apex with a density reading of 3  Hounsfield units, very likely benign adenoma. KIDNEYS: Atrophic. Scattered renal artery vascular calcifications and possible  tiny nonobstructing bilateral renal calculi. LYMPH NODES: No enlarged lymph nodes. GASTROINTESTINAL TRACT: Mild to moderate volume ascites is present in the  abdomen and pelvis, water attenuation. No bowel dilatation or wall thickening. Normal appendix. PELVIC ORGANS: Prominent calcifications are present along the uterine arteries. No pelvic masses. VASCULATURE: Calcific atherosclerosis, no AAA. BONES: No acute or aggressive osseous abnormalities identified.     OTHER: No retroperitoneal, rectus sheath or other hematoma. Surgical clips are  present adjacent to the distal right colon. _______________      Impression IMPRESSION:      1. No hematoma. No signs of obstruction. 2. Mild to moderate volume ascites in the abdomen and pelvis. 3. Scattered bibasilar 4 mm and smaller noncalcified pulmonary nodules. If the  patient is low risk, follow-up is not necessary. If the patient is high risk,  complete CT of the chest is suggested. MRI Results:  No results found for this or any previous visit. XR Results   Results from [place] encounter on 10/18/20   XR ABD (KUB)    Impression IMPRESSION: Capsule overlies superior left pelvis. XR ABD (KUB)    Impression IMPRESSION: Endoscopic capsule projects over the mid abdomen. XR ABD (KUB)    Impression IMPRESSION: Nonspecific intestinal gas pattern. Electronic capsule overlies the  right colon. VAS/US Results (maximum last 3): No results found for this or any previous visit. TTE  11/17/20   ECHO ADULT COMPLETE 11/21/2020 11/21/2020    Narrative · LV: Calculated LVEF is 48%. Normal cavity size. Moderate concentric   hypertrophy. Moderately reduced systolic function. Severe (grade 3) left   ventricular diastolic dysfunction. · LA: Moderately dilated left atrium. · RV: Mildly dilated right ventricle. · RA: Moderately dilated right atrium. · IAS: No atrial septal defect present. · MV: Mild mitral valve regurgitation is present. · TV: Moderate to severe tricuspid valve regurgitation is present. · PV: Mild pulmonic valve regurgitation is present. · IVC: Severely elevated central venous pressure (15+ mmHg); IVC diameter   is larger than 21 mm and collapses less than 50% with respiration. · Pericardium: Small pericardial effusion.         Signed by: Geeta Calzada MD         EKG  Results for orders placed or performed during the hospital encounter of 11/17/20   EKG, 12 LEAD, INITIAL   Result Value Ref Range Ventricular Rate 69 BPM    Atrial Rate 69 BPM    P-R Interval 178 ms    QRS Duration 144 ms    Q-T Interval 460 ms    QTC Calculation (Bezet) 492 ms    Calculated P Axis 36 degrees    Calculated R Axis -35 degrees    Calculated T Axis 118 degrees    Diagnosis       Normal sinus rhythm  Left axis deviation  Left bundle branch block  No previous ECGs available  Confirmed by Bárbara Rios M.D., Raj Varner ([id number]) on 11/19/2020 10:38:54 AM         Hospital Problems  Date Reviewed: 10/6/2020          Codes Class Noted POA    Anemia ICD-10-CM: D64.9  ICD-9-CM: 285.9  9/17/2020 Unknown              Assessment/Plan:     Hailey Calderon is a 69 y.o. female who has risk factors for stroke who had sudden hemisensory changes following a procedure. She was post RIJ catheterization done for liver biopsy. CT head was benign. Pt still has numbness, in R neck, R arm and R leg, worst around RIJ insertion site, also on back of shoulder. Overall improved. Pt denies she had facial involvement to me. This makes the numbness most likely cervicogenic. MRI c-spine already ordered. MRI/MRA brain pending. Other than numbness, asymptomatic. Recommend MRI and MRA brain  Recommend MRI C-spin     We will wait to see the imaging results before we consider escalating her stroke prevention management. Any acute neuro changes activate code stroke. Thank you for this consult.     Signed By: Fernando Hutchison NP     November 27, 2020 10:29 AM

## 2020-11-27 NOTE — PROGRESS NOTES
Occupational Therapy  11/27/20    New order acknowledged, chart reviewed. Patient is currently on OT caseload, recommending [address] [place] [address]'[facility] upon d/c. Will continue to follow POC and f/u for OT treatment as able & appropriate.      Thank you,   Mar Ahn, OTJOSELUIS, OTR/L

## 2020-11-27 NOTE — PROGRESS NOTES
Problem: Falls - Risk of  Goal: *Absence of Falls  Description: Document Glenda Montoya Fall Risk and appropriate interventions in the flowsheet. Outcome: Progressing Towards Goal  Note: Fall Risk Interventions:  Mobility Interventions: Bed/chair exit alarm, Communicate number of staff needed for ambulation/transfer, OT consult for ADLs, PT Consult for mobility concerns, Patient to call before getting OOB    Mentation Interventions: Adequate sleep, hydration, pain control, Door open when patient unattended, Bed/chair exit alarm    Medication Interventions: Bed/chair exit alarm, Patient to call before getting OOB, Teach patient to arise slowly    Elimination Interventions: Bed/chair exit alarm, Call light in reach, Toileting schedule/hourly rounds    History of Falls Interventions: Bed/chair exit alarm, Door open when patient unattended         Problem: Pain  Goal: *Control of Pain  Outcome: Progressing Towards Goal     Problem: Pressure Injury - Risk of  Goal: *Prevention of pressure injury  Description: Document Vic Scale and appropriate interventions in the flowsheet.   Outcome: Progressing Towards Goal  Note: Pressure Injury Interventions:  Sensory Interventions: Assess changes in LOC, Chair cushion, Discuss PT/OT consult with provider, Float heels, Keep linens dry and wrinkle-free    Moisture Interventions: Absorbent underpads, Apply protective barrier, creams and emollients, Minimize layers    Activity Interventions: Assess need for specialty bed, Increase time out of bed, PT/OT evaluation    Mobility Interventions: Assess need for specialty bed, PT/OT evaluation    Nutrition Interventions: Document food/fluid/supplement intake    Friction and Shear Interventions: Apply protective barrier, creams and emollients, Minimize layers                Problem: Heart Failure: Day 5  Goal: Activity/Safety  Outcome: Progressing Towards Goal  Goal: Diagnostic Test/Procedures  Outcome: Progressing Towards Goal  Goal: Nutrition/Diet  Outcome: Progressing Towards Goal  Goal: Discharge Planning  Outcome: Progressing Towards Goal  Goal: Psychosocial  Outcome: Progressing Towards Goal     Problem: Heart Failure: Discharge Outcomes  Goal: *Describes smoking cessation resources  Outcome: Progressing Towards Goal  Goal: *Understands and describes signs and symptoms to report to providers(Stroke Metric)  Outcome: Progressing Towards Goal  Goal: *Describes importance of continuing daily weights and changes to report to physician  Outcome: Progressing Towards Goal     Problem: Acute Renal Failure: Discharge Outcomes  Goal: *Optimal pain control at patient's stated goal  Outcome: Progressing Towards Goal  Goal: *Urinary output within identified parameters  Outcome: Progressing Towards Goal  Goal: *Hemodynamically stable  Outcome: Progressing Towards Goal

## 2020-11-27 NOTE — PROGRESS NOTES
Problem: Mobility Impaired (Adult and Pediatric)  Goal: *Acute Goals and Plan of Care (Insert Text)  Description: FUNCTIONAL STATUS PRIOR TO ADMISSION: Patient was independent and active without use of DME. Reports some generalized weakness secondary to ascites. HOME SUPPORT PRIOR TO ADMISSION: The patient states she stays either with one of her daughters or her son stays with her. Physical Therapy Goals  Initiated 11/19/2020. Goals re-assessed 11/27/2020 and appropriate for carryover. 1.  Patient will move from supine to sit and sit to supine  in bed with modified independence within 7 day(s). 2.  Patient will transfer from bed to chair and chair to bed with supervision/set-up using the least restrictive device within 7 day(s). 3.  Patient will perform sit to stand with supervision/set-up within 7 day(s). 4.  Patient will ambulate with supervision/set-up for 150 feet with the least restrictive device within 7 day(s). 5.  Patient will ascend/descend 2 stairs with 1 handrail(s) with contact guard assist within 7 day(s). Outcome: Progressing Towards Goal  PHYSICAL THERAPY TREATMENT: WEEKLY REASSESSMENT  Patient: Melissa Rivas (89 y.o. female)  Date: 11/27/2020  Primary Diagnosis: Anemia [D64.9]  Anemia [D64.9]  Procedure(s) (LRB):  PROCEDURE CANCELLED - NOT PERFORMED (N/A) 2 Days Post-Op   Precautions: Fall      ASSESSMENT  Patient continues with skilled PT services and is progressing towards goals. Pt agreeable to work with therapy. She transferred supine>sit with SBA, sit<>stand with SBA, and ambulated with SBA using RW. She demonstrated overall unsteadiness but no overt LOB noted. Pt reports she has been ambulatory to/from bathroom with nursing and has no mobility concerns prior to d/c. She ended session seated in a chair with all needs met. [facility]. Current Level of Function Impacting Discharge (mobility/balance):  SBA for all mobility     Other factors to consider for discharge: Unruly baseline          PLAN :  Goals have been updated based on progression since last assessment. Patient continues to benefit from skilled intervention to address the above impairments. Recommendations and Planned Interventions: bed mobility training, transfer training, gait training, therapeutic exercises, neuromuscular re-education, patient and family training/education, and therapeutic activities      Frequency/Duration: Patient will be followed by physical therapy:  3 times a week to address goals. Recommendation for discharge: (in order for the patient to meet his/her long term goals)  Physical therapy at least 2 days/week in the home     This discharge recommendation:  Has not yet been discussed the attending provider and/or case management    IF patient discharges home will need the following DME: rolling walker - pt owns rollator but reports she prefers RW          SUBJECTIVE:   Patient stated I just want to be able to walk by myself.     OBJECTIVE DATA SUMMARY:   HISTORY:    Past Medical History:   Diagnosis Date    Arthritis     Asthma     Chronic kidney disease     Chronic pain     Cirrhosis ([facility] [address] 12/17/2017    Diabetes ([facility] [address] diet controlled    GERD (gastroesophageal reflux disease)     Hypertension     Paroxysmal atrial fibrillation ([facility] [address] 10/6/2020     Past Surgical History:   Procedure Laterality Date    COLONOSCOPY N/A 1/12/2018    COLONOSCOPY performed by Kelvin Fulton MD at THE [facility] ENDOSCOPY    COLONOSCOPY N/A 3/19/2018    COLONOSCOPY performed by Kelvin Fulton MD at THE [facility] ENDOSCOPY    HX GYN  c section    HX VASCULAR ACCESS      dialysis     IR BX TRANSCATHETER  11/24/2020    IR PARACENTESIS ABD W IMAGE  10/22/2020     Home Situation  Home Environment: Private residence  # Steps to Enter: 2  One/Two Story Residence: One story  Living Alone: Yes  Support Systems: Family member(s)  Patient Expects to be Discharged to[de-identified] Patient room(656)  Current DME Used/Available at Home: [employer], rolling  Tub or Shower Type: Shower    EXAMINATION/PRESENTATION/DECISION MAKING:   Critical Behavior:  Neurologic State: Alert, Eyes open spontaneously  Orientation Level: Oriented X4  Cognition: Follows commands, Appropriate safety awareness, Appropriate for age attention/concentration, Appropriate decision making  Safety/Judgement: Awareness of environment, Decreased awareness of need for assistance, Decreased insight into deficits, Fall prevention, Home safety  Hearing: Auditory  Auditory Impairment: None    Functional Mobility:  Bed Mobility:  Supine to Sit: Stand-by assistance  Sit to Supine: Stand-by assistance  Transfers:  Sit to Stand: Stand-by assistance  Stand to Sit: Stand-by assistance    Balance:   Sitting: Intact  Standing: Impaired; With support  Standing - Static: Good  Standing - Dynamic : Good    Ambulation/Gait Training:  Distance (ft): 25 Feet (ft)  Assistive Device: Gait belt;Walker, rolling  Ambulation - Level of Assistance: Stand-by assistance  Gait Abnormalities: Decreased step clearance;Shuffling gait  Speed/Cristy: Slow;Shuffled  Step Length: Right shortened;Left shortened    Activity Tolerance:   Good    After treatment patient left in no apparent distress:   Sitting in chair, Call bell within reach, and Bed / chair alarm activated    COMMUNICATION/EDUCATION:   The patients plan of care was discussed with: Occupational therapist and Registered nurse. Fall prevention education was provided and the patient/caregiver indicated understanding., Patient/family have participated as able in goal setting and plan of care. , and Patient/family agree to work toward stated goals and plan of care.     Thank you for this referral.  Ignacia Calderon, PT, DPT   Time Calculation: 17 mins

## 2020-11-28 LAB
GLUCOSE BLD STRIP.AUTO-MCNC: 141 MG/DL (ref 65–100)
GLUCOSE BLD STRIP.AUTO-MCNC: 149 MG/DL (ref 65–100)
GLUCOSE BLD STRIP.AUTO-MCNC: 166 MG/DL (ref 65–100)
GLUCOSE BLD STRIP.AUTO-MCNC: 198 MG/DL (ref 65–100)
SERVICE CMNT-IMP: ABNORMAL

## 2020-11-28 PROCEDURE — 74011250637 HC RX REV CODE- 250/637: Performed by: NURSE PRACTITIONER

## 2020-11-28 PROCEDURE — 65660000000 HC RM CCU STEPDOWN

## 2020-11-28 PROCEDURE — 74011250637 HC RX REV CODE- 250/637: Performed by: INTERNAL MEDICINE

## 2020-11-28 PROCEDURE — 90935 HEMODIALYSIS ONE EVALUATION: CPT

## 2020-11-28 PROCEDURE — 74011250636 HC RX REV CODE- 250/636: Performed by: INTERNAL MEDICINE

## 2020-11-28 PROCEDURE — 74011000250 HC RX REV CODE- 250: Performed by: NURSE PRACTITIONER

## 2020-11-28 PROCEDURE — 99233 SBSQ HOSP IP/OBS HIGH 50: CPT | Performed by: INTERNAL MEDICINE

## 2020-11-28 PROCEDURE — 82962 GLUCOSE BLOOD TEST: CPT

## 2020-11-28 PROCEDURE — 74011636637 HC RX REV CODE- 636/637: Performed by: INTERNAL MEDICINE

## 2020-11-28 PROCEDURE — 74011250637 HC RX REV CODE- 250/637: Performed by: HOSPITALIST

## 2020-11-28 RX ADMIN — FERROUS SULFATE TAB 325 MG (65 MG ELEMENTAL FE) 325 MG: 325 (65 FE) TAB at 16:24

## 2020-11-28 RX ADMIN — CARBOXYMETHYLCELLULOSE SODIUM 2 DROP: 5 SOLUTION/ DROPS OPHTHALMIC at 22:11

## 2020-11-28 RX ADMIN — BENZOCAINE AND MENTHOL 1 LOZENGE: 15; 3.6 LOZENGE ORAL at 20:27

## 2020-11-28 RX ADMIN — SEVELAMER CARBONATE 1600 MG: 800 TABLET, FILM COATED ORAL at 10:38

## 2020-11-28 RX ADMIN — ACETAMINOPHEN 650 MG: 325 TABLET ORAL at 20:27

## 2020-11-28 RX ADMIN — FERROUS SULFATE TAB 325 MG (65 MG ELEMENTAL FE) 325 MG: 325 (65 FE) TAB at 10:38

## 2020-11-28 RX ADMIN — KETOTIFEN FUMARATE 1 DROP: 0.35 SOLUTION/ DROPS OPHTHALMIC at 17:19

## 2020-11-28 RX ADMIN — OXYCODONE HYDROCHLORIDE 5 MG: 5 TABLET ORAL at 20:27

## 2020-11-28 RX ADMIN — EPOETIN ALFA-EPBX 20000 UNITS: 10000 INJECTION, SOLUTION INTRAVENOUS; SUBCUTANEOUS at 22:20

## 2020-11-28 RX ADMIN — OXYCODONE HYDROCHLORIDE 5 MG: 5 TABLET ORAL at 16:24

## 2020-11-28 RX ADMIN — PANTOPRAZOLE SODIUM 40 MG: 40 TABLET, DELAYED RELEASE ORAL at 10:38

## 2020-11-28 RX ADMIN — OXYCODONE HYDROCHLORIDE AND ACETAMINOPHEN 500 MG: 500 TABLET ORAL at 10:38

## 2020-11-28 RX ADMIN — FLUTICASONE PROPIONATE 2 SPRAY: 50 SPRAY, METERED NASAL at 10:44

## 2020-11-28 RX ADMIN — CARBOXYMETHYLCELLULOSE SODIUM 2 DROP: 5 SOLUTION/ DROPS OPHTHALMIC at 16:54

## 2020-11-28 RX ADMIN — PANTOPRAZOLE SODIUM 40 MG: 40 TABLET, DELAYED RELEASE ORAL at 17:19

## 2020-11-28 RX ADMIN — ALUMINUM HYDROXIDE, MAGNESIUM HYDROXIDE, AND SIMETHICONE 30 ML: 200; 200; 20 SUSPENSION ORAL at 16:53

## 2020-11-28 RX ADMIN — INSULIN LISPRO 2 UNITS: 100 INJECTION, SOLUTION INTRAVENOUS; SUBCUTANEOUS at 13:28

## 2020-11-28 RX ADMIN — OXYCODONE HYDROCHLORIDE 5 MG: 5 TABLET ORAL at 06:30

## 2020-11-28 RX ADMIN — Medication 2 TABLET: at 10:38

## 2020-11-28 RX ADMIN — SEVELAMER CARBONATE 1600 MG: 800 TABLET, FILM COATED ORAL at 12:43

## 2020-11-28 RX ADMIN — ACETAMINOPHEN 650 MG: 325 TABLET ORAL at 16:24

## 2020-11-28 RX ADMIN — INSULIN LISPRO 2 UNITS: 100 INJECTION, SOLUTION INTRAVENOUS; SUBCUTANEOUS at 16:53

## 2020-11-28 RX ADMIN — IPRATROPIUM BROMIDE AND ALBUTEROL SULFATE 3 ML: .5; 3 SOLUTION RESPIRATORY (INHALATION) at 03:36

## 2020-11-28 RX ADMIN — ALUMINUM HYDROXIDE, MAGNESIUM HYDROXIDE, AND SIMETHICONE 30 ML: 200; 200; 20 SUSPENSION ORAL at 03:36

## 2020-11-28 RX ADMIN — KETOTIFEN FUMARATE 1 DROP: 0.35 SOLUTION/ DROPS OPHTHALMIC at 10:44

## 2020-11-28 RX ADMIN — OXYCODONE HYDROCHLORIDE 5 MG: 5 TABLET ORAL at 10:38

## 2020-11-28 RX ADMIN — OXYCODONE HYDROCHLORIDE 5 MG: 5 TABLET ORAL at 02:30

## 2020-11-28 RX ADMIN — DIPHENHYDRAMINE HYDROCHLORIDE 25 MG: 25 CAPSULE ORAL at 16:36

## 2020-11-28 RX ADMIN — IPRATROPIUM BROMIDE AND ALBUTEROL SULFATE 3 ML: .5; 3 SOLUTION RESPIRATORY (INHALATION) at 22:11

## 2020-11-28 RX ADMIN — CARBOXYMETHYLCELLULOSE SODIUM 2 DROP: 5 SOLUTION/ DROPS OPHTHALMIC at 10:44

## 2020-11-28 RX ADMIN — SEVELAMER CARBONATE 1600 MG: 800 TABLET, FILM COATED ORAL at 16:24

## 2020-11-28 NOTE — PROGRESS NOTES
[facility]                                                                                          Hospitalist Progress Note  Khloe Granados MD  Answering service: [phone] OR [phone] from in house phone        Date of Service:  2020  NAME:  Kristi Miranda  :  1950  MRN:  [id number]      Admission Summary:   69 y.o PMH of AVM,ESRD,anemia admitted due to anemia and hyperkalemia    Interval history / Subjective:     2020   SEEN At bedside, case discussed with hepatology on castaneda  Neck sympotms improved, no MRI 's 2n2 swelling of abd (pt's intolerance)   Agree with hepatology as to poor candidate for Aspira cath as protein wasting will follow. Assessment & Plan:      #Acute on chronic anemia:  -multifactorial - hx AVMs,CKD  -Gastroenterology signed off   -recent EGD 10/21: mild gastritis with some friable mucosa and oozing-treated with APC and also capsule study performed with presence of small non-bleeding AVM's in small intestine. Patient reports [facility] and [facility] EGD, enteroscopy, and colonoscopy- all with no findings. -s/p 1 U PRBC , ,   - PPI BID  - monitor Hb, transfuse <7  Per GI/hepatology:   - EGD 10/21/20 Dr. Trenton Velasco - oozing in stomach, treated with APC  -M2A 10/22/20 - multiple non-bleeding SB AVMs  -Patient reports subsequent GI evaluation with EGD, enteroscopy and colonoscopy at the [facility] post-discharge from here in October  - Case discussed with Dr. Kathrin Anand. He will check iron panel and consider iron infusion. Hold off on endoscopy at this time. Will sign off. Please call back if signs of active GIB which may warrant endoscopy by GI.      Lab Results   Component Value Date/Time    HGB 8.3 (L) 2020 02:28 AM      Lab Results   Component Value Date/Time    Iron 43 2020 02:28 AM    TIBC 310 2020 02:28 AM    Iron % saturation 14 (L) 2020 02:28 AM    Ferritin 37 2020 02:28 AM    #Hyperkalemia- mild     #ESRD on HD TTS  -nephrology following    #Multiple Pul Nodules up to 6 mm size, for CT f/u in 3-6 months as out pt . #Liver cirrhosis ?  with Ascites :  -hx recurrent ascites with paracentesis . Not on any diuretics at home as pt is anuric   -USG abd showed ascites. The liver echotexture is normal  - Paracentesis drain placed on 11/19 and removed on 11/21  - fluid analysis shows transudate. Ascites fluid total protein > 2.5 which is highly suggestive of heart failure  - appreciate Hepatology input  - tranasjugular liver biopsy done 31/68    #Diastolic heart failure compensated. 11/28/2020   #Severe Pulm artery HTN  - Echo: LVEF is 48%. Severe (grade 3) left ventricular diastolic dysfunction. Moderate to severe tricuspid valve regurgitation is present. - appreciate cardiology input  - no further cardiac testing    Code Stroke 11/25 right side numbness/ tingling  - CT head: No acute intracranial abnormality  - MRI brain , MRI c spine, MRA ordered  - appreciate neurology input  - no aspirin due to GI bleed   - lipid panel ordered   Results for Thaddeus Martinez (MRN [id number]) as of 11/26/2020 10:40   Ref. Range 11/25/2020 03:21   Triglyceride Latest Ref Range: <150 MG/DL 85   Cholesterol, total Latest Ref Range: <200 MG/   HDL Cholesterol Latest Units: MG/DL 59   CHOL/HDL Ratio Latest Ref Range: 0.0 - 5.0   1.9   VLDL, calculated Latest Units: MG/DL 17   LDL, calculated Latest Ref Range: 0 - 100 MG/DL 38       Chronic pain- c/w home oxy prn    Neck pain, improved over days. No further w/u planned by this writer, neurology considered MRI cervical spine but pt can't lay down for same.      Code status: full  DVT prophylaxis: scd    Care Plan discussed with: Patient/Family and Nurse  Anticipated Disposition: Home w/Family  Anticipated Discharge: 24 hours to 48 hours     Hospital Problems  Date Reviewed: 10/6/2020          Codes Class Noted POA    Anemia ICD-10-CM: D64.9  ICD-9-CM: 285.9  9/17/2020 Unknown                Review of Systems:   Negative except as above     Vital Signs:    Last 24hrs VS reviewed since prior progress note. Most recent are:  Visit Vitals  BP (!) 114/51   Pulse 90   Temp 98.5 °F (36.9 °C)   Resp 16   Ht 5' 2.99\" (1.6 m)   Wt 74.3 kg (163 lb 12.8 oz)   SpO2 98%   BMI 29.02 kg/m²         Intake/Output Summary (Last 24 hours) at 11/28/2020 1405  Last data filed at 11/28/2020 1245  Gross per 24 hour   Intake --   Output 2000 ml   Net -2000 ml        Physical Examination:     I had a face to face encounter with this patient and independently examined them on 11/28/2020 as outlined below:          Constitutional:  No acute distress, elderly patient   ENT:  Oral mucosa moist, EOMI    Resp:  CTA bilaterally. No wheezing/rhonchi/rales. No accessory muscle use   CV:  Regular rhythm, normal rate, no murmurs, gallops, rubs    GI:  Soft, mod distended, non tender. normoactive bowel sounds    Musculoskeletal:  No LE edema    Neurologic:  Moves all extremities. AAOx3     Psych:  not anxious nor agitated. Data Review:    Review and/or order of clinical lab test  Review and/or order of tests in the radiology section of CPT  Review and/or order of tests in the medicine section of CPT      Labs:     Recent Labs     11/27/20 0228 11/26/20 0223   WBC 5.4 5.4   HGB 8.3* 7.2*   HCT 28.2* 25.0*    159     Recent Labs     11/27/20 0228 11/26/20 0223    136   K 5.0 5.4*    102   CO2 30 27   BUN 33* 55*   CREA 3.78* 5.60*   * 228*   CA 9.3 9.1     Recent Labs     11/27/20 0228   ALT 17   *   TBILI 0.4   TP 5.9*   ALB 2.8*   GLOB 3.1     No results for input(s): INR, PTP, APTT, INREXT, INREXT in the last 72 hours.    Recent Labs     11/27/20 0228 11/26/20  0400   TIBC 310 288   PSAT 14* 10*   FERR 37  --       Lab Results   Component Value Date/Time    Folate 16.4 10/19/2020 09:34 AM      No results for input(s): PH, PCO2, PO2 in the last 72 hours. No results for input(s): CPK, CKNDX, TROIQ in the last 72 hours.     No lab exists for component: CPKMB  Lab Results   Component Value Date/Time    Cholesterol, total 114 11/25/2020 03:21 AM    HDL Cholesterol 59 11/25/2020 03:21 AM    LDL, calculated 38 11/25/2020 03:21 AM    Triglyceride 85 11/25/2020 03:21 AM    CHOL/HDL Ratio 1.9 11/25/2020 03:21 AM     Lab Results   Component Value Date/Time    Glucose (POC) 141 (H) 11/28/2020 11:40 AM    Glucose (POC) 198 (H) 11/28/2020 08:01 AM    Glucose (POC) 146 (H) 11/27/2020 08:21 PM    Glucose (POC) 196 (H) 11/27/2020 04:08 PM    Glucose (POC) 208 (H) 11/27/2020 11:57 AM     No results found for: COLOR, APPRN, SPGRU, REFSG, VERENA, PROTU, GLUCU, KETU, BILU, UROU, TIBURCIO, LEUKU, GLUKE, EPSU, BACTU, WBCU, RBCU, CASTS, UCRY      Medications Reviewed:     Current Facility-Administered Medications   Medication Dose Route Frequency    0.9% sodium chloride infusion 250 mL  250 mL IntraVENous PRN    insulin lispro (HUMALOG) injection   SubCUTAneous AC&HS    glucose chewable tablet 16 g  4 Tab Oral PRN    glucagon (GLUCAGEN) injection 1 mg  1 mg IntraMUSCular PRN    dextrose 10% infusion 0-250 mL  0-250 mL IntraVENous PRN    fluticasone propionate (FLONASE) 50 mcg/actuation nasal spray 2 Spray  2 Spray Both Nostrils DAILY    0.9% sodium chloride infusion 250 mL  250 mL IntraVENous PRN    polyethylene glycol (MIRALAX) packet 17 g  17 g Oral DAILY PRN    diphenhydrAMINE (BENADRYL) capsule 25 mg  25 mg Oral Q6H PRN    albuterol-ipratropium (DUO-NEB) 2.5 MG-0.5 MG/3 ML  3 mL Nebulization Q4H PRN    alum-mag hydroxide-simeth (MYLANTA) oral suspension 30 mL  30 mL Oral Q4H PRN    ascorbic acid (vitamin C) (VITAMIN C) tablet 500 mg  500 mg Oral DAILY    cholecalciferol (VITAMIN D3) (1000 Units /25 mcg) tablet 2 Tab  2,000 Units Oral DAILY    ferrous sulfate tablet 325 mg  325 mg Oral TID WITH MEALS    ketotifen (ZADITOR) 0.025 % (0.035 %) ophthalmic solution 1 Drop  1 Drop Both Eyes BID    carboxymethylcellulose sodium (REFRESH PLUS) 0.5 % ophthalmic solution 1-2 Drop  1-2 Drop Both Eyes TID    prochlorperazine (COMPAZINE) with saline injection 10 mg  10 mg IntraVENous Q6H PRN    morphine injection 2 mg  2 mg IntraVENous Q4H PRN    0.9% sodium chloride infusion 250 mL  250 mL IntraVENous PRN    epoetin reshma-epbx (RETACRIT) injection 20,000 Units  20,000 Units SubCUTAneous Q TUE, THU & SAT    0.9% sodium chloride infusion 250 mL  250 mL IntraVENous PRN    pantoprazole (PROTONIX) tablet 40 mg  40 mg Oral BID    sevelamer carbonate (RENVELA) tab 1,600 mg  1,600 mg Oral TID WITH MEALS    acetaminophen (TYLENOL) tablet 650 mg  650 mg Oral Q4H PRN    oxyCODONE IR (ROXICODONE) tablet 5 mg  5 mg Oral Q4H PRN     ______________________________________________________________________  EXPECTED LENGTH OF STAY: 4d 9h  ACTUAL LENGTH OF STAY:          9                 Rosa Merrill MD

## 2020-11-28 NOTE — PROGRESS NOTES
Bedside and Verbal shift change report given to Erika Aldana (oncoming nurse) by Nick Rust (offgoing nurse). Report included the following information SBAR, Kardex, Intake/Output, MAR, Recent Results, Cardiac Rhythm NSR and Dual Neuro Assessment.

## 2020-11-28 NOTE — PROCEDURES
[facility]  [phone]    Vitals   Pre   Post   Assessment   Pre   Post     Temp  Temp: 98.5 °F (36.9 °C) (11/28/20 0915)  98.5 LOC  Alert and oriented x 4 same   HR   Pulse (Heart Rate): 84 (11/28/20 0915) 90 Lungs   Clear , on 2 liters nasal cannula  same   B/P   BP: (!) 161/63 (11/28/20 0915) 114/51 Cardiac   NSR  same   Resp   Resp Rate: 16 (11/28/20 0915) 16 Skin   Wounds BLE  same   Pain level  Pain Intensity 1: 8 (11/28/20 0615) 3 Edema  Trace, ascites     same   Orders:    Duration:   Start:   0915 End:   3698 Total:   3.5 hours   Dialyzer:   Dialyzer/Set Up Inspection: Kirti Hayes (11/28/20 0915)   K Bath:   Dialysate K (mEq/L): 2 (11/28/20 0915)   Ca Bath:   Dialysate CA (mEq/L): 2.5 (11/28/20 0915)   Na/Bicarb:   Dialysate NA (mEq/L): 140 (11/28/20 0915)   Target Fluid Removal:   Goal/Amount of Fluid to Remove (mL): 2000 mL (11/28/20 0915)   Access     Type & Location:   RIJ tunneled cvc, no s/s of infection, flushes and aspirated well, , Each catheter limb disinfected per p&p, caps removed, hubs disinfected per p&p.        Labs     Obtained/Reviewed   Critical Results Called   Date when labs were drawn-  Hgb-    HGB   Date Value Ref Range Status   11/27/2020 8.3 (L) 11.5 - 16.0 g/dL Final     K-    Potassium   Date Value Ref Range Status   11/27/2020 5.0 3.5 - 5.1 mmol/L Final     Ca-   Calcium   Date Value Ref Range Status   11/27/2020 9.3 8.5 - 10.1 MG/DL Final     Bun-   BUN   Date Value Ref Range Status   11/27/2020 33 (H) 6 - 20 MG/DL Final     Comment:     INVESTIGATED PER DELTA CHECK PROTOCOL     Creat-   Creatinine   Date Value Ref Range Status   11/27/2020 3.78 (H) 0.55 - 1.02 MG/DL Final     Comment:     INVESTIGATED PER DELTA CHECK PROTOCOL        Medications/ Blood Products Given     Name   Dose   Route and Time           none          Blood Volume Processed (BVP):    79.2 Net Fluid   Removed:  78.9   Comments   Time Out Done: 3437  Primary Nurse Rpt Pre: Silvana Flores RN  Primary Nurse Rpt Post:QUINTIN Vargas  Pt Education: Procedural  Care Plan: Continue Nephrology plan of care  Tx Summary:  7157- Labs, orders and medications were reviewed. On 2k bath due to k of 5 HD was started using RIJ cvc without any issues. [address] was completed and all possible blood was returned. Primary nurse was given a report. Call bell in reach and bed is in lowest position. Each dialysis catheter limb disinfected per p&p, blood returned per p&p, each dialysis hub disinfected per p&p, post dialysis catheter dwell instilled per order, and caps applied. Admiting Diagnosis:ESRD  Pt's previous [facility]- [facility]  Consent signed - Informed Consent Verified: Yes (11/28/20 0915)  Cruzita Consent - Verified  Hepatitis Status- Hep B AG Negative 11/17/2020, Hep B AB immune 11/17/2020  Machine #- Machine Number: [id number] (11/28/20 0915)  Telemetry status- monitored at bedside  Pre-dialysis wt. - Pre-Dialysis Weight: 79.1 kg (174 lb 6.1 oz) (11/19/20 1008)

## 2020-11-28 NOTE — PROGRESS NOTES
[facility] [facility] HospitalMD Karmen Shorter, MD, MPH      Annita Valverde, CAROLYN Silva, [facility]     Maria R APONTE Nicolette, [facility]   Malik LUIS Aleman [facility]       Ozzy Sykes [facility] 136    at [place] [address], [address] [facility]    [address] [facility].    [phone]    FAX: [address] [place] [address], [address]    [phone]    FAX: [phone]       HEPATOLOGY PROGRESS NOTE  The patient is a 69year old black female with past medical history of ESRD on dialysis. She developed ascites for the first time in 1/2020 and has required paracentesis every 1 month. She gets her care regularly at [facility]. Liver evaluation at that institution is not known. US in the ED suggests the liver is normal.  Labs suggest she has cirrhosis with , and low albumin    She was hospitalized for weakness and anemia. Anemia has apparently been chronic. Patient underwent EGD and M2A study at the [place]. She has declined to do an EGD here    Echo performed 11/21 showed LVEF 48%. Mildly dilated right ventricle, moderate dilated right atrium, mod to severe TR    Hepatic venous pressure measurements demonstrated RA 16, Free HV 16, Wedge HV 18 mm Hg. Transjugular liver biopsy demonstrates that there is NOT cirrhosis    Since no liver disease I will sign off. Reconsult if needed. ASSESSMENT AND PLAN:  Passive hepatic congestion  The patient has passive hepatic congestion due to pulmonary hypertension, RV dysfunction, and TR. This is confirmed by the hepatic venous pressures showing RA 16, and HVPG of only 2 mm Hg. It is not possible to develop ascites from cirrhosis with a HVPG of 2 mm Hg.   The liver biopsy shows she does not have cirrhosis  All ascites is therefore due to RA dysfunction and TR. I do not think we can place Aspira catheter for her to drain ascites every few days. This would cause significant albumin loss, dehydration, hypotension and is likely to impair her ability for her to undergo hemodialysis     Only solution to ascites is to continue paracentesis as needed every 2-8 weeks. Ascites   Ascites developed for the first time in 1/2020. Ascites is due to right sided heart failure, TR and ESRD  Ascites fluid total protein > 2.5 is consistent with this  Echo showed reduced RV function and severe TR whci I also consistent with this    Only possible treatments for her ascites is to improve forward cardiac blood flow which is likely not possible given ESRD, or to continue with repeated paracentesis. She cannot have Aspira catheter to drain ascites every 1-3 days for reasons noted above. Since she does not have cirrhosis she could potentionaly be converted to peritoneal dialysis. I have discussed this with Dr Brielle Vicente. Her nephrologist supervising dialysis is in [facility] and he will discuss this with him. Screening for Esophageal varices   The patient does not have esophageal varices. The patient does nto have portal HTN    Anemia   This is due to multifactorial causes including ESRD on dialysis and possible occult GI bleeding  She has had extensive GI work-up and no further GI work-up is indicated or will find anything of clinical significance. She has Fe deficiency   She has been on PO FE and it is obviously not working. Will repeat Ferritin and FE saturation. Suspect this will still be low because Fe absorption has been shut down by chronic disease. She should recieve IV Fe to replace lost FE stores. Thrombocytopenia   This is not due to cirrhosis since she does not have portal HTN and probably does not even have cirrhosis.   This is likely due to poor nutrition from refractory ascites and unable to take in adequate calories. Aspira catheter would help this. PHYSICAL EXAMINATION:  VS: per nursing note  General:  No acute distress. Eyes:  Sclera anicteric. ENT:  No oral lesions. Thyroid normal.  Nodes:  No adenopathy. Skin:  No spider angiomata. Respiratory:  Lungs clear to auscultation. Cardiovascular:  Regular heart rate. Abdomen:  Distended with some ascites. Extremities:  No lower extremity edema. Neurologic:  Alert and oriented. Cranial nerves grossly intact. No asterixis. LABORATORY:  Results for Tennille Perez (MRN [id number]) as of 11/26/2020 07:06   Ref. Range 11/24/2020 01:03 11/25/2020 03:21 11/26/2020 02:23   WBC Latest Ref Range: 3.6 - 11.0 K/uL 5.7 5.1 5.4   HGB Latest Ref Range: 11.5 - 16.0 g/dL 6.7 (L) 7.6 (L) 7.2 (L)   PLATELET Latest Ref Range: 150 - 400 K/uL 145 (L) 149 (L) 159   INR Latest Ref Range: 0.9 - 1.1    1.0    Sodium Latest Ref Range: 136 - 145 mmol/L 140 136 136   Potassium Latest Ref Range: 3.5 - 5.1 mmol/L 5.9 (H) 5.4 (H) 5.4 (H)   Chloride Latest Ref Range: 97 - 108 mmol/L 107 103 102   CO2 Latest Ref Range: 21 - 32 mmol/L 26 29 27   Glucose Latest Ref Range: 65 - 100 mg/dL 154 (H) 249 (H) 228 (H)   BUN Latest Ref Range: 6 - 20 MG/DL 68 (H) 40 (H) 55 (H)   Creatinine Latest Ref Range: 0.55 - 1.02 MG/DL 6.61 (H) 4.46 (H) 5.60 (H)     RADIOLOGY:  11/2020. Ultrasound of liver. Normal appearing liver. No liver mass lesions. Large amount of ascites  11/2020. Hepatic venous pressure. RA 16, Free HV 16, Wedge HV 18. HVPG 2 mm Hg.       MD [address], [address] [place] [address], [facility] 22.  [phone]  [address] [place] [address]

## 2020-11-28 NOTE — PROGRESS NOTES
Bedside shift change report given to Mar Vega (oncoming nurse) by Rica Crowell (offgoing nurse). Report included the following information SBAR, Kardex, Recent Results, Cardiac Rhythm NSR BBB, Quality Measures and Dual Neuro Assessment.

## 2020-11-28 NOTE — PROGRESS NOTES
HD today per schedule   Consider checking Hb today   Will check back Monday       [facility]eleste Marshall[facility]  Office :[phone]  Fax: [phone]

## 2020-11-28 NOTE — PROGRESS NOTES
Problem: Falls - Risk of  Goal: *Absence of Falls  Description: Document Latanya Vides Fall Risk and appropriate interventions in the flowsheet. Outcome: Progressing Towards Goal  Note: Fall Risk Interventions:  Mobility Interventions: Bed/chair exit alarm, PT Consult for mobility concerns, Utilize walker, cane, or other assistive device    Mentation Interventions: Adequate sleep, hydration, pain control, Bed/chair exit alarm, Toileting rounds    Medication Interventions: Patient to call before getting OOB, Bed/chair exit alarm, Teach patient to arise slowly    Elimination Interventions: Bed/chair exit alarm, Call light in reach, Toileting schedule/hourly rounds    History of Falls Interventions: Bed/chair exit alarm         Problem: Patient Education: Go to Patient Education Activity  Goal: Patient/Family Education  Outcome: Progressing Towards Goal     Problem: Pain  Goal: *Control of Pain  Outcome: Progressing Towards Goal     Problem: Pressure Injury - Risk of  Goal: *Prevention of pressure injury  Description: Document Vic Scale and appropriate interventions in the flowsheet.   Outcome: Progressing Towards Goal  Note: Pressure Injury Interventions:  Sensory Interventions: Assess changes in LOC, Discuss PT/OT consult with provider, Float heels, Keep linens dry and wrinkle-free    Moisture Interventions: Offer toileting Q_hr    Activity Interventions: Increase time out of bed, PT/OT evaluation    Mobility Interventions: PT/OT evaluation, HOB 30 degrees or less    Nutrition Interventions: Document food/fluid/supplement intake    Friction and Shear Interventions: Apply protective barrier, creams and emollients, Minimize layers                Problem: Patient Education: Go to Patient Education Activity  Goal: Patient/Family Education  Outcome: Progressing Towards Goal     Problem: Patient Education: Go to Patient Education Activity  Goal: Patient/Family Education  Outcome: Progressing Towards Goal     Problem: Heart Failure: Discharge Outcomes  Goal: *Demonstrates ability to perform prescribed activity without shortness of breath or discomfort  Outcome: Progressing Towards Goal  Goal: *Left ventricular function assessment completed prior to or during stay, or planned for post-discharge  Outcome: Progressing Towards Goal  Goal: *ACEI prescribed if LVEF less than 40% and no contraindications or ARB prescribed  Outcome: Progressing Towards Goal  Goal: *Verbalizes understanding and describes prescribed diet  Outcome: Progressing Towards Goal  Goal: *Understands and describes signs and symptoms to report to providers(Stroke Metric)  Outcome: Progressing Towards Goal

## 2020-11-29 LAB
GLUCOSE BLD STRIP.AUTO-MCNC: 155 MG/DL (ref 65–100)
GLUCOSE BLD STRIP.AUTO-MCNC: 211 MG/DL (ref 65–100)
GLUCOSE BLD STRIP.AUTO-MCNC: 221 MG/DL (ref 65–100)
GLUCOSE BLD STRIP.AUTO-MCNC: 222 MG/DL (ref 65–100)
HCT VFR BLD AUTO: 25.6 % (ref 35–47)
HGB BLD-MCNC: 7.4 G/DL (ref 11.5–16)
SERVICE CMNT-IMP: ABNORMAL

## 2020-11-29 PROCEDURE — 74011250637 HC RX REV CODE- 250/637: Performed by: INTERNAL MEDICINE

## 2020-11-29 PROCEDURE — 36415 COLL VENOUS BLD VENIPUNCTURE: CPT

## 2020-11-29 PROCEDURE — 85018 HEMOGLOBIN: CPT

## 2020-11-29 PROCEDURE — 82962 GLUCOSE BLOOD TEST: CPT

## 2020-11-29 PROCEDURE — 2709999900 HC NON-CHARGEABLE SUPPLY

## 2020-11-29 PROCEDURE — 74011636637 HC RX REV CODE- 636/637: Performed by: INTERNAL MEDICINE

## 2020-11-29 PROCEDURE — 94760 N-INVAS EAR/PLS OXIMETRY 1: CPT

## 2020-11-29 PROCEDURE — 74011000250 HC RX REV CODE- 250: Performed by: NURSE PRACTITIONER

## 2020-11-29 PROCEDURE — 74011250637 HC RX REV CODE- 250/637: Performed by: NURSE PRACTITIONER

## 2020-11-29 PROCEDURE — 65660000000 HC RM CCU STEPDOWN

## 2020-11-29 PROCEDURE — 74011250637 HC RX REV CODE- 250/637: Performed by: HOSPITALIST

## 2020-11-29 RX ORDER — NYSTATIN 100000 [USP'U]/ML
500000 SUSPENSION ORAL 4 TIMES DAILY
Status: DISCONTINUED | OUTPATIENT
Start: 2020-11-29 | End: 2020-12-01 | Stop reason: HOSPADM

## 2020-11-29 RX ADMIN — BENZOCAINE AND MENTHOL 1 LOZENGE: 15; 3.6 LOZENGE ORAL at 19:20

## 2020-11-29 RX ADMIN — NYSTATIN 500000 UNITS: 100000 SUSPENSION ORAL at 08:45

## 2020-11-29 RX ADMIN — INSULIN LISPRO 2 UNITS: 100 INJECTION, SOLUTION INTRAVENOUS; SUBCUTANEOUS at 17:11

## 2020-11-29 RX ADMIN — FERROUS SULFATE TAB 325 MG (65 MG ELEMENTAL FE) 325 MG: 325 (65 FE) TAB at 17:00

## 2020-11-29 RX ADMIN — DIPHENHYDRAMINE HYDROCHLORIDE 25 MG: 25 CAPSULE ORAL at 07:05

## 2020-11-29 RX ADMIN — FERROUS SULFATE TAB 325 MG (65 MG ELEMENTAL FE) 325 MG: 325 (65 FE) TAB at 12:02

## 2020-11-29 RX ADMIN — Medication 2 TABLET: at 08:35

## 2020-11-29 RX ADMIN — KETOTIFEN FUMARATE 1 DROP: 0.35 SOLUTION/ DROPS OPHTHALMIC at 17:00

## 2020-11-29 RX ADMIN — INSULIN LISPRO 2 UNITS: 100 INJECTION, SOLUTION INTRAVENOUS; SUBCUTANEOUS at 21:51

## 2020-11-29 RX ADMIN — INSULIN LISPRO 3 UNITS: 100 INJECTION, SOLUTION INTRAVENOUS; SUBCUTANEOUS at 12:09

## 2020-11-29 RX ADMIN — OXYCODONE HYDROCHLORIDE 5 MG: 5 TABLET ORAL at 12:02

## 2020-11-29 RX ADMIN — OXYCODONE HYDROCHLORIDE AND ACETAMINOPHEN 500 MG: 500 TABLET ORAL at 08:35

## 2020-11-29 RX ADMIN — PANTOPRAZOLE SODIUM 40 MG: 40 TABLET, DELAYED RELEASE ORAL at 08:35

## 2020-11-29 RX ADMIN — CARBOXYMETHYLCELLULOSE SODIUM 2 DROP: 5 SOLUTION/ DROPS OPHTHALMIC at 08:46

## 2020-11-29 RX ADMIN — CARBOXYMETHYLCELLULOSE SODIUM 2 DROP: 5 SOLUTION/ DROPS OPHTHALMIC at 17:00

## 2020-11-29 RX ADMIN — BENZOCAINE AND MENTHOL 1 LOZENGE: 15; 3.6 LOZENGE ORAL at 02:34

## 2020-11-29 RX ADMIN — SEVELAMER CARBONATE 1600 MG: 800 TABLET, FILM COATED ORAL at 17:00

## 2020-11-29 RX ADMIN — SEVELAMER CARBONATE 1600 MG: 800 TABLET, FILM COATED ORAL at 08:35

## 2020-11-29 RX ADMIN — OXYCODONE HYDROCHLORIDE 5 MG: 5 TABLET ORAL at 17:11

## 2020-11-29 RX ADMIN — OXYCODONE HYDROCHLORIDE 5 MG: 5 TABLET ORAL at 02:00

## 2020-11-29 RX ADMIN — KETOTIFEN FUMARATE 1 DROP: 0.35 SOLUTION/ DROPS OPHTHALMIC at 08:46

## 2020-11-29 RX ADMIN — BENZOCAINE AND MENTHOL 1 LOZENGE: 15; 3.6 LOZENGE ORAL at 08:35

## 2020-11-29 RX ADMIN — CARBOXYMETHYLCELLULOSE SODIUM 2 DROP: 5 SOLUTION/ DROPS OPHTHALMIC at 22:00

## 2020-11-29 RX ADMIN — FLUTICASONE PROPIONATE 2 SPRAY: 50 SPRAY, METERED NASAL at 08:46

## 2020-11-29 RX ADMIN — OXYCODONE HYDROCHLORIDE 5 MG: 5 TABLET ORAL at 06:20

## 2020-11-29 RX ADMIN — FERROUS SULFATE TAB 325 MG (65 MG ELEMENTAL FE) 325 MG: 325 (65 FE) TAB at 08:35

## 2020-11-29 RX ADMIN — IPRATROPIUM BROMIDE AND ALBUTEROL SULFATE 3 ML: .5; 3 SOLUTION RESPIRATORY (INHALATION) at 07:05

## 2020-11-29 RX ADMIN — SALINE NASAL SPRAY 2 SPRAY: 1.5 SOLUTION NASAL at 17:01

## 2020-11-29 RX ADMIN — SEVELAMER CARBONATE 1600 MG: 800 TABLET, FILM COATED ORAL at 12:01

## 2020-11-29 RX ADMIN — PANTOPRAZOLE SODIUM 40 MG: 40 TABLET, DELAYED RELEASE ORAL at 17:00

## 2020-11-29 RX ADMIN — INSULIN LISPRO 2 UNITS: 100 INJECTION, SOLUTION INTRAVENOUS; SUBCUTANEOUS at 08:35

## 2020-11-29 RX ADMIN — ACETAMINOPHEN 650 MG: 325 TABLET ORAL at 21:50

## 2020-11-29 RX ADMIN — IPRATROPIUM BROMIDE AND ALBUTEROL SULFATE 3 ML: .5; 3 SOLUTION RESPIRATORY (INHALATION) at 15:10

## 2020-11-29 RX ADMIN — DIPHENHYDRAMINE HYDROCHLORIDE 25 MG: 25 CAPSULE ORAL at 19:11

## 2020-11-29 RX ADMIN — OXYCODONE HYDROCHLORIDE 5 MG: 5 TABLET ORAL at 21:42

## 2020-11-29 NOTE — PROGRESS NOTES
[facility]  Hospitalist Group                                                                                          Hospitalist Progress Note  Marcela Barton MD  Answering service: [phone] OR [phone] from in house phone        Date of Service:  2020  NAME:  Isabela Wills  :  1950  MRN:  [id number]      Admission Summary:   69 y.o PMH of AVM,ESRD,anemia admitted due to anemia and hyperkalemia    Interval history / Subjective:     2020   Seen in room at bedside  Pt's neck pain better, some tongue soreness, will try nystation,  Pt's neck w good rom w/o inducing pain on rom passively/actively  moved. Assessment & Plan:      #Acute on chronic anemia:  -multifactorial - hx AVMs,CKD  -Gastroenterology signed off   -recent EGD 10/21: mild gastritis with some friable mucosa and oozing-treated with APC and also capsule study performed with presence of small non-bleeding AVM's in small intestine. Patient reports [facility] and underwent EGD, enteroscopy, and colonoscopy- all with no findings. -s/p 1 U PRBC , ,   - PPI BID  - monitor Hb, transfuse <7  Per GI/hepatology:   - EGD 10/21/20 Dr. Rosaline Torres - oozing in stomach, treated with APC  -M2A 10/22/20 - multiple non-bleeding SB AVMs  -Patient reports subsequent GI evaluation with EGD, enteroscopy and colonoscopy at the [facility] post-discharge from here in October  - Case discussed with Dr. Marcia Mcguire. He will check iron panel and consider iron infusion. Hold off on endoscopy at this time. Will sign off. Please call back if signs of active GIB which may warrant endoscopy by GI.      Lab Results   Component Value Date/Time    HGB 7.4 (L) 2020 02:12 AM      Lab Results   Component Value Date/Time    Iron 43 2020 02:28 AM    TIBC 310 2020 02:28 AM    Iron % saturation 14 (L) 2020 02:28 AM    Ferritin 37 2020 02:28 AM         #Hyperkalemia- mild     # coated tongue for nystatin 11/29/2020     #ESRD on HD TTS  -nephrology following    #Multiple Pul Nodules up to 6 mm size, for CT f/u in 3-6 months as out pt . #Liver cirrhosis ?  with Ascites :  -hx recurrent ascites with paracentesis . Not on any diuretics at home as pt is anuric   -USG abd showed ascites. The liver echotexture is normal  - Paracentesis drain placed on 11/19 and removed on 11/21  - fluid analysis shows transudate. Ascites fluid total protein > 2.5 which is highly suggestive of heart failure  - appreciate Hepatology input  - tranasjugular liver biopsy done 02/67    #Diastolic heart failure compensated. 11/29/2020   #Severe Pulm artery HTN  - Echo: LVEF is 48%. Severe (grade 3) left ventricular diastolic dysfunction. Moderate to severe tricuspid valve regurgitation is present. - appreciate cardiology input  - no further cardiac testing    Code Stroke 11/25 right side numbness/ tingling  - CT head: No acute intracranial abnormality  - MRI brain , MRI c spine, MRA ordered  - appreciate neurology input  - no aspirin due to GI bleed   - lipid panel ordered   Results for Zo Johnson (MRN [id number]) as of 11/26/2020 10:40   Ref. Range 11/25/2020 03:21   Triglyceride Latest Ref Range: <150 MG/DL 85   Cholesterol, total Latest Ref Range: <200 MG/   HDL Cholesterol Latest Units: MG/DL 59   CHOL/HDL Ratio Latest Ref Range: 0.0 - 5.0   1.9   VLDL, calculated Latest Units: MG/DL 17   LDL, calculated Latest Ref Range: 0 - 100 MG/DL 38       Chronic pain- c/w home oxy prn    Neck pain, improved over days. No further w/u planned by this writer, neurology considered MRI cervical spine but pt can't lay down for same. 11/29/2020 : good rom and no pain elicited.      Code status: full  DVT prophylaxis: scd    Care Plan discussed with: Patient/Family and Nurse  Anticipated Disposition: Home w/Family  Anticipated Discharge: 24 hours to 48 hours     Hospital Problems  Date Reviewed: 10/6/2020          Codes Class Noted POA    Anemia ICD-10-CM: D64.9  ICD-9-CM: 285.9  9/17/2020 Unknown                Review of Systems:   Negative except as above     Vital Signs:    Last 24hrs VS reviewed since prior progress note. Most recent are:  Visit Vitals  BP (!) 127/45 (BP 1 Location: Left arm, BP Patient Position: At rest)   Pulse 85   Temp 98.3 °F (36.8 °C)   Resp 21   Ht 5' 2.99\" (1.6 m)   Wt 75.6 kg (166 lb 10.7 oz)   SpO2 98%   BMI 29.52 kg/m²         Intake/Output Summary (Last 24 hours) at 11/29/2020 0858  Last data filed at 11/28/2020 1245  Gross per 24 hour   Intake --   Output 2000 ml   Net -2000 ml        Physical Examination:     I had a face to face encounter with this patient and independently examined them on 11/29/2020 as outlined below:          Constitutional:  No acute distress, elderly patient   ENT:  Oral mucosa moist, EOMI    Resp:  CTA bilaterally. No wheezing/rhonchi/rales. No accessory muscle use   CV:  Regular rhythm, normal rate, no murmurs, gallops, rubs    GI:  Soft, mod distended, non tender. normoactive bowel sounds    Musculoskeletal:  No LE edema; no neck pain on rom active/passive. Neurologic:  Moves all extremities. AAOx3     Psych:  not anxious nor agitated. Data Review:    Review and/or order of clinical lab test  Review and/or order of tests in the radiology section of CPT  Review and/or order of tests in the medicine section of CPT      Labs:     Recent Labs     11/29/20 0212 11/27/20 0228   WBC  --  5.4   HGB 7.4* 8.3*   HCT 25.6* 28.2*   PLT  --  155     Recent Labs     11/27/20 0228      K 5.0      CO2 30   BUN 33*   CREA 3.78*   *   CA 9.3     Recent Labs     11/27/20 0228   ALT 17   *   TBILI 0.4   TP 5.9*   ALB 2.8*   GLOB 3.1     No results for input(s): INR, PTP, APTT, INREXT, INREXT in the last 72 hours.    Recent Labs     11/27/20 0228   TIBC 310   PSAT 14*   FERR 37      Lab Results   Component Value Date/Time    Folate 16.4 10/19/2020 09:34 AM      No results for input(s): PH, PCO2, PO2 in the last 72 hours. No results for input(s): CPK, CKNDX, TROIQ in the last 72 hours.     No lab exists for component: CPKMB  Lab Results   Component Value Date/Time    Cholesterol, total 114 11/25/2020 03:21 AM    HDL Cholesterol 59 11/25/2020 03:21 AM    LDL, calculated 38 11/25/2020 03:21 AM    Triglyceride 85 11/25/2020 03:21 AM    CHOL/HDL Ratio 1.9 11/25/2020 03:21 AM     Lab Results   Component Value Date/Time    Glucose (POC) 155 (H) 11/29/2020 08:02 AM    Glucose (POC) 149 (H) 11/28/2020 10:10 PM    Glucose (POC) 166 (H) 11/28/2020 04:48 PM    Glucose (POC) 141 (H) 11/28/2020 11:40 AM    Glucose (POC) 198 (H) 11/28/2020 08:01 AM     No results found for: COLOR, APPRN, SPGRU, REFSG, VERENA, PROTU, GLUCU, KETU, BILU, UROU, TIBURCIO, LEUKU, GLUKE, EPSU, BACTU, WBCU, RBCU, CASTS, UCRY      Medications Reviewed:     Current Facility-Administered Medications   Medication Dose Route Frequency    nystatin (MYCOSTATIN) 100,000 unit/mL oral suspension 500,000 Units  500,000 Units Oral QID    benzocaine-menthoL (CEPACOL) lozenge 1 Lozenge  1 Lozenge Mucous Membrane PRN    0.9% sodium chloride infusion 250 mL  250 mL IntraVENous PRN    insulin lispro (HUMALOG) injection   SubCUTAneous AC&HS    glucose chewable tablet 16 g  4 Tab Oral PRN    glucagon (GLUCAGEN) injection 1 mg  1 mg IntraMUSCular PRN    dextrose 10% infusion 0-250 mL  0-250 mL IntraVENous PRN    fluticasone propionate (FLONASE) 50 mcg/actuation nasal spray 2 Spray  2 Spray Both Nostrils DAILY    0.9% sodium chloride infusion 250 mL  250 mL IntraVENous PRN    polyethylene glycol (MIRALAX) packet 17 g  17 g Oral DAILY PRN    diphenhydrAMINE (BENADRYL) capsule 25 mg  25 mg Oral Q6H PRN    albuterol-ipratropium (DUO-NEB) 2.5 MG-0.5 MG/3 ML  3 mL Nebulization Q4H PRN    alum-mag hydroxide-simeth (MYLANTA) oral suspension 30 mL  30 mL Oral Q4H PRN    ascorbic acid (vitamin C) (VITAMIN C) tablet 500 mg  500 mg Oral DAILY    cholecalciferol (VITAMIN D3) (1000 Units /25 mcg) tablet 2 Tab  2,000 Units Oral DAILY    ferrous sulfate tablet 325 mg  325 mg Oral TID WITH MEALS    ketotifen (ZADITOR) 0.025 % (0.035 %) ophthalmic solution 1 Drop  1 Drop Both Eyes BID    carboxymethylcellulose sodium (REFRESH PLUS) 0.5 % ophthalmic solution 1-2 Drop  1-2 Drop Both Eyes TID    prochlorperazine (COMPAZINE) with saline injection 10 mg  10 mg IntraVENous Q6H PRN    morphine injection 2 mg  2 mg IntraVENous Q4H PRN    0.9% sodium chloride infusion 250 mL  250 mL IntraVENous PRN    epoetin reshma-epbx (RETACRIT) injection 20,000 Units  20,000 Units SubCUTAneous Q TUE, THU & SAT    0.9% sodium chloride infusion 250 mL  250 mL IntraVENous PRN    pantoprazole (PROTONIX) tablet 40 mg  40 mg Oral BID    sevelamer carbonate (RENVELA) tab 1,600 mg  1,600 mg Oral TID WITH MEALS    acetaminophen (TYLENOL) tablet 650 mg  650 mg Oral Q4H PRN    oxyCODONE IR (ROXICODONE) tablet 5 mg  5 mg Oral Q4H PRN     ______________________________________________________________________  EXPECTED LENGTH OF STAY: 4d 9h  ACTUAL LENGTH OF STAY:          10                 Matt Gaytan MD

## 2020-11-29 NOTE — ROUTINE PROCESS
Bedside shift change report given to Rob newton RN (oncoming nurse) by Alexa Valle RN (offgoing nurse). Report included the following information SBAR, Kardex, MAR, Recent Results, Cardiac Rhythm SR and Dual Neuro Assessment.

## 2020-11-29 NOTE — PROGRESS NOTES
Problem: Falls - Risk of  Goal: *Absence of Falls  Description: Document Marco Singleton Fall Risk and appropriate interventions in the flowsheet. Outcome: Progressing Towards Goal  Note: Fall Risk Interventions:  Mobility Interventions: Bed/chair exit alarm, Communicate number of staff needed for ambulation/transfer, PT Consult for mobility concerns, Patient to call before getting OOB    Mentation Interventions: Bed/chair exit alarm, Door open when patient unattended, Evaluate medications/consider consulting pharmacy    Medication Interventions: Bed/chair exit alarm, Patient to call before getting OOB    Elimination Interventions: Bed/chair exit alarm, Call light in reach    History of Falls Interventions: Bed/chair exit alarm         Problem: Patient Education: Go to Patient Education Activity  Goal: Patient/Family Education  Outcome: Progressing Towards Goal     Problem: Pain  Goal: *Control of Pain  Outcome: Progressing Towards Goal     Problem: Patient Education: Go to Patient Education Activity  Goal: Patient/Family Education  Outcome: Progressing Towards Goal     Problem: Pressure Injury - Risk of  Goal: *Prevention of pressure injury  Description: Document Vic Scale and appropriate interventions in the flowsheet.   Outcome: Progressing Towards Goal  Note: Pressure Injury Interventions:  Sensory Interventions: Discuss PT/OT consult with provider, Maintain/enhance activity level, Minimize linen layers, Monitor skin under medical devices    Moisture Interventions: Absorbent underpads, Minimize layers, Limit adult briefs    Activity Interventions: Increase time out of bed, Pressure redistribution bed/mattress(bed type)    Mobility Interventions: PT/OT evaluation, HOB 30 degrees or less, Float heels    Nutrition Interventions: Document food/fluid/supplement intake    Friction and Shear Interventions: Minimize layers, HOB 30 degrees or less, Foam dressings/transparent film/skin sealants                Problem: Heart Failure: Day 3  Goal: Off Pathway (Use only if patient is Off Pathway)  Outcome: Progressing Towards Goal  Goal: Activity/Safety  Outcome: Progressing Towards Goal  Goal: Diagnostic Test/Procedures  Outcome: Progressing Towards Goal  Goal: Discharge Planning  Outcome: Progressing Towards Goal  Goal: Treatments/Interventions/Procedures  Outcome: Progressing Towards Goal

## 2020-11-29 NOTE — PROGRESS NOTES
HD yesterday   Next HD Tuesday   Will check back tomorrow       [facility]  Office :[phone]  Fax: [phone]

## 2020-11-29 NOTE — PROGRESS NOTES
Bedside shift change report given to Silvana Flores (oncoming nurse) by Maci Carbajal (offgoing nurse). Report included the following information SBAR, Kardex, MAR, Cardiac Rhythm NSR and Quality Measures.

## 2020-11-30 LAB
GLUCOSE BLD STRIP.AUTO-MCNC: 130 MG/DL (ref 65–100)
GLUCOSE BLD STRIP.AUTO-MCNC: 135 MG/DL (ref 65–100)
GLUCOSE BLD STRIP.AUTO-MCNC: 139 MG/DL (ref 65–100)
GLUCOSE BLD STRIP.AUTO-MCNC: 182 MG/DL (ref 65–100)
HCT VFR BLD AUTO: 25.4 % (ref 35–47)
HGB BLD-MCNC: 7.3 G/DL (ref 11.5–16)
SERVICE CMNT-IMP: ABNORMAL

## 2020-11-30 PROCEDURE — 74011250637 HC RX REV CODE- 250/637: Performed by: HOSPITALIST

## 2020-11-30 PROCEDURE — 74011250637 HC RX REV CODE- 250/637: Performed by: INTERNAL MEDICINE

## 2020-11-30 PROCEDURE — 65660000000 HC RM CCU STEPDOWN

## 2020-11-30 PROCEDURE — 85018 HEMOGLOBIN: CPT

## 2020-11-30 PROCEDURE — 97535 SELF CARE MNGMENT TRAINING: CPT

## 2020-11-30 PROCEDURE — 74011000250 HC RX REV CODE- 250: Performed by: INTERNAL MEDICINE

## 2020-11-30 PROCEDURE — 97530 THERAPEUTIC ACTIVITIES: CPT

## 2020-11-30 PROCEDURE — 94640 AIRWAY INHALATION TREATMENT: CPT

## 2020-11-30 PROCEDURE — 74011000250 HC RX REV CODE- 250: Performed by: NURSE PRACTITIONER

## 2020-11-30 PROCEDURE — 36415 COLL VENOUS BLD VENIPUNCTURE: CPT

## 2020-11-30 PROCEDURE — 74011250637 HC RX REV CODE- 250/637: Performed by: NURSE PRACTITIONER

## 2020-11-30 PROCEDURE — 82962 GLUCOSE BLOOD TEST: CPT

## 2020-11-30 RX ORDER — TRIAMCINOLONE ACETONIDE 1 MG/G
CREAM TOPICAL 2 TIMES DAILY
Status: DISCONTINUED | OUTPATIENT
Start: 2020-12-01 | End: 2020-12-01 | Stop reason: HOSPADM

## 2020-11-30 RX ORDER — FLUCONAZOLE 100 MG/1
150 TABLET ORAL
Status: COMPLETED | OUTPATIENT
Start: 2020-11-30 | End: 2020-11-30

## 2020-11-30 RX ORDER — TRIAMCINOLONE ACETONIDE 1 MG/G
CREAM TOPICAL 2 TIMES DAILY
COMMUNITY
End: 2022-10-22

## 2020-11-30 RX ADMIN — NYSTATIN 500000 UNITS: 100000 SUSPENSION ORAL at 17:02

## 2020-11-30 RX ADMIN — PANTOPRAZOLE SODIUM 40 MG: 40 TABLET, DELAYED RELEASE ORAL at 09:34

## 2020-11-30 RX ADMIN — Medication 2 TABLET: at 10:45

## 2020-11-30 RX ADMIN — IPRATROPIUM BROMIDE AND ALBUTEROL SULFATE 3 ML: .5; 3 SOLUTION RESPIRATORY (INHALATION) at 18:05

## 2020-11-30 RX ADMIN — SEVELAMER CARBONATE 1600 MG: 800 TABLET, FILM COATED ORAL at 12:27

## 2020-11-30 RX ADMIN — FLUCONAZOLE 150 MG: 100 TABLET ORAL at 03:38

## 2020-11-30 RX ADMIN — DIPHENHYDRAMINE HYDROCHLORIDE 25 MG: 25 CAPSULE ORAL at 23:06

## 2020-11-30 RX ADMIN — OXYCODONE HYDROCHLORIDE AND ACETAMINOPHEN 500 MG: 500 TABLET ORAL at 09:34

## 2020-11-30 RX ADMIN — DIPHENHYDRAMINE HYDROCHLORIDE 25 MG: 25 CAPSULE ORAL at 06:33

## 2020-11-30 RX ADMIN — CARBOXYMETHYLCELLULOSE SODIUM 2 DROP: 5 SOLUTION/ DROPS OPHTHALMIC at 22:00

## 2020-11-30 RX ADMIN — OXYCODONE HYDROCHLORIDE 5 MG: 5 TABLET ORAL at 16:49

## 2020-11-30 RX ADMIN — NYSTATIN 500000 UNITS: 100000 SUSPENSION ORAL at 12:27

## 2020-11-30 RX ADMIN — SEVELAMER CARBONATE 1600 MG: 800 TABLET, FILM COATED ORAL at 16:49

## 2020-11-30 RX ADMIN — NYSTATIN 500000 UNITS: 100000 SUSPENSION ORAL at 09:35

## 2020-11-30 RX ADMIN — FERROUS SULFATE TAB 325 MG (65 MG ELEMENTAL FE) 325 MG: 325 (65 FE) TAB at 09:48

## 2020-11-30 RX ADMIN — OXYCODONE HYDROCHLORIDE 5 MG: 5 TABLET ORAL at 01:40

## 2020-11-30 RX ADMIN — PANTOPRAZOLE SODIUM 40 MG: 40 TABLET, DELAYED RELEASE ORAL at 17:02

## 2020-11-30 RX ADMIN — SALINE NASAL SPRAY 2 SPRAY: 1.5 SOLUTION NASAL at 09:38

## 2020-11-30 RX ADMIN — DIPHENHYDRAMINE HYDROCHLORIDE 25 MG: 25 CAPSULE ORAL at 00:39

## 2020-11-30 RX ADMIN — IPRATROPIUM BROMIDE AND ALBUTEROL SULFATE 3 ML: .5; 3 SOLUTION RESPIRATORY (INHALATION) at 00:39

## 2020-11-30 RX ADMIN — SEVELAMER CARBONATE 1600 MG: 800 TABLET, FILM COATED ORAL at 09:47

## 2020-11-30 RX ADMIN — OXYCODONE HYDROCHLORIDE 5 MG: 5 TABLET ORAL at 10:58

## 2020-11-30 RX ADMIN — CARBOXYMETHYLCELLULOSE SODIUM 1 DROP: 5 SOLUTION/ DROPS OPHTHALMIC at 09:37

## 2020-11-30 RX ADMIN — FERROUS SULFATE TAB 325 MG (65 MG ELEMENTAL FE) 325 MG: 325 (65 FE) TAB at 16:49

## 2020-11-30 RX ADMIN — ACETAMINOPHEN 650 MG: 325 TABLET ORAL at 17:58

## 2020-11-30 RX ADMIN — OXYCODONE HYDROCHLORIDE 5 MG: 5 TABLET ORAL at 06:33

## 2020-11-30 RX ADMIN — CARBOXYMETHYLCELLULOSE SODIUM 2 DROP: 5 SOLUTION/ DROPS OPHTHALMIC at 16:50

## 2020-11-30 RX ADMIN — FERROUS SULFATE TAB 325 MG (65 MG ELEMENTAL FE) 325 MG: 325 (65 FE) TAB at 12:27

## 2020-11-30 RX ADMIN — KETOTIFEN FUMARATE 1 DROP: 0.35 SOLUTION/ DROPS OPHTHALMIC at 10:58

## 2020-11-30 RX ADMIN — OXYCODONE HYDROCHLORIDE 5 MG: 5 TABLET ORAL at 21:39

## 2020-11-30 RX ADMIN — KETOTIFEN FUMARATE 1 DROP: 0.35 SOLUTION/ DROPS OPHTHALMIC at 17:03

## 2020-11-30 RX ADMIN — FLUTICASONE PROPIONATE 2 SPRAY: 50 SPRAY, METERED NASAL at 09:36

## 2020-11-30 RX ADMIN — ACETAMINOPHEN 650 MG: 325 TABLET ORAL at 21:43

## 2020-11-30 NOTE — PROGRESS NOTES
Hospitalist [employer] NP     Name: Collette Rogue  YOB: 1950  MRN: [id number]  Admission Date: 11/17/2020  5:47 PM    Date of service: 11/30/2020 3:08 AM                                Overnight update:        Paged by RN  - c/o persistent vaginal pruritus, no noted discharge per RN.  - Noted to have been given Diflucan 11/27/2020, unclear symptoms at that time but suspect similar  - Will give one further dose to meet q72h x 2 dosing, may need further evaluation by GYN if symptoms not resolving     Marshall WILDEP-C, PA-C  [phone] or TigerText

## 2020-11-30 NOTE — PROGRESS NOTES
Chart reviewed in prep for treatment and pt cleared by nursing - pt declined secondary to \"terrible night last night - could not sleep\" - now I've taken pain meds and Benadryl. Per nursing pt was up in chair for breakfast and perform am ADL's - encourage pt to get OOB to chair later today and continue amb to bathroom/RW with nursing.   Will attempt to see this pm as able -  Alphonsus Samples, PT

## 2020-11-30 NOTE — PROGRESS NOTES
Spiritual Care Assessment/Progress Note  [facility]      NAME: Jacklyn Phillips      MRN: [id number]  AGE: 69 y.o.  SEX: female  Gnosticist Affiliation: Carolina Padilla   Language: English     11/30/2020     Total Time (in minutes): 12     Spiritual Assessment begun in [address] through conversation with:         [x]Patient        [] Family    [] Friend(s)        Reason for Consult: Interdisciplinary rounds, patient floor     Spiritual beliefs: (Please include comment if needed)     [x] Identifies with a nadine tradition:         [] Supported by a nadine community:            [] Claims no spiritual orientation:           [] Seeking spiritual identity:                [] Adheres to an individual form of spirituality:           [] Not able to assess:                           Identified resources for coping:      [x] Prayer                               [] Music                  [] Guided Imagery     [x] Family/friends                 [] Pet visits     [] Devotional reading                         [] Unknown     [] Other:                                               Interventions offered during this visit: (See comments for more details)    Patient Interventions: Affirmation of emotions/emotional suffering, Affirmation of nadine, Catharsis/review of pertinent events in supportive environment, Coping skills reviewed/reinforced, Iconic (affirming the presence of God/Higher Power), Initial/Spiritual assessment, patient floor, Normalization of emotional/spiritual concerns, Prayer (actual), Prayer (assurance of)           Plan of Care:     [x] Support spiritual and/or cultural needs    [] Support AMD and/or advance care planning process      [] Support grieving process   [] Coordinate Rites and/or Rituals    [] Coordination with community clergy   [] No spiritual needs identified at this time   [] Detailed Plan of Care below (See Comments)  [] Make referral to Music Therapy  [] Make referral to Pet Therapy     [] Make referral to Addiction services  [] Make referral to [facility]  [] Make referral to Spiritual Care Partner  [] No future visits requested        [x] Follow up upon further referrals     Comments:  for initial visit. Pt was sitting up on the edge of her bed and joyfully welcomed  with excitement. She was on the phone though requested prayer.  provided pastoral listening, support and prayer. Let her know of  availability. Please contact [phone] [facility] for further support.     [address] Isidro Aguilar, MACE   [phone])

## 2020-11-30 NOTE — PROGRESS NOTES
Transition of Care Plan  RUR 25%    Hepatology following    Disposition  Home with family--daughter [address] [facility]  Family     Hemodialysis -  Resume hemod[address] treatments [address]     Home health-- patient goes to [address] [facility] will assist with arranging home health if ordered. (order has to be faxed to [facility])     Note   Patient lives with her daughter, Oleg Parker. Patient has 8 adult children with 7 living close by and one in [place]. One of her children will transport home    Cm will provide 2nd Medicare letter to patient when discharged.

## 2020-11-30 NOTE — PROGRESS NOTES
Bedside shift change report given to Piter Swift RN (oncoming nurse) by Celia Orr RN (offgoing nurse). Report included the following information SBAR, Kardex, Recent Results, Cardiac Rhythm SR and Dual Neuro Assessment.

## 2020-11-30 NOTE — PROGRESS NOTES
Problem: Self Care Deficits Care Plan (Adult)  Goal: *Acute Goals and Plan of Care (Insert Text)  Description:   FUNCTIONAL STATUS PRIOR TO ADMISSION: Patient was modified independent using a walker for functional mobility. Patient required assistance for bathing, dressing, cooking, and cleaning, was not driving. Patient alternates living with her 5 daughters and friends take her to dialysis. HOME SUPPORT: The patient lived with daughters to provide assistance. Occupational Therapy Goals  Weekly Re-Assessment 11/27/2020, goals modified below  Initiated 11/20/2020  1. Patient will perform standing ADLs with supervision/set up within 7 day(s). MODIFIED to x10 minutes 11/27  2. Patient will perform bathing with supervision/set-up within 7 day(s). CONTINUE  3. Patient will perform lower body dressing with supervision/set-up within 7 day(s). CONTINUE  4.  Patient will perform toilet transfers with supervision/set-up within 7 day(s). CONTINUE  5. Patient will perform all aspects of toileting with minimal assistance/contact guard assist within 7 day(s). MET, UPGRADED to SPV 11/27  6. Patient will participate in upper extremity therapeutic exercise/activities with minimal assistance/contact guard assist for 5 minutes within 7 day(s). UPGRADED to SPV x10 minutes 11/27  7. Patient will utilize energy conservation techniques during functional activities with verbal cues within 7 day(s). CONTINUE            Outcome: Progressing Towards Goal     OCCUPATIONAL THERAPY TREATMENT  Patient: Silvia Sotelo (78 y.o. female)  Date: 11/30/2020  Diagnosis: Anemia [D64.9]  Anemia [D64.9]   <principal problem not specified>  Procedure(s) (LRB):  PROCEDURE CANCELLED - NOT PERFORMED (N/A) 5 Days Post-Op  Precautions: Fall  Chart, occupational therapy assessment, plan of care, and goals were reviewed. ASSESSMENT  Patient continues with skilled OT services and is slowly progressing towards goals.  Patient with decreased activity tolerance/endurance & BLE weakness noted during brief functional mobility and standing functional reach task. Patient continues to be limited by generalized weakness, poor activity/standing tolerance,  decreased volition, and pain limiting participation in ADLs. Continue to recommend HHOT and increased assist/DME for all mobility and ADLs. Current Level of Function Impacting Discharge (ADLs): CGA upper body ADLs seated, up to min A lower body ADLs seated, mod A sit <> stand, CGA brief mobility      Other factors to consider for discharge: medical complexity/PMH, PLOF IND, supportive family able to assist at home         PLAN :  Patient continues to benefit from skilled intervention to address the above impairments. Continue treatment per established plan of care. to address goals. Recommend with staff: Recommend with nursing patient to complete as able in order to maintain strength, endurance and independence: ADLs with supervision/setup, OOB to chair 3x/day and mobilizing to the bathroom for toileting with 1 assist and RW. Thank you for your assistance. Recommend next OT session: reinforce pursed lip breathing techniques, dynamic standing ADLs, lower body dressing     Recommendation for discharge: (in order for the patient to meet his/her long term goals)  Occupational therapy at least 2 days/week in the home AND ensure assist and/or supervision for safety with all mobility and ADLs/IADLs    This discharge recommendation:  Has been made in collaboration with the attending provider and/or case management    IF patient discharges home will need the following DME: bedside commode, raised toilet seat, walker: rolling, and transport wheelchair       SUBJECTIVE:   Patient stated my legs feel so weak today, I just don't feel good.     OBJECTIVE DATA SUMMARY:   Cognitive/Behavioral Status:  Neurologic State: Alert  Orientation Level: Oriented X4  Cognition: Follows commands;Decreased attention/concentration  Perception: Appears intact  Perseveration: No perseveration noted  Safety/Judgement: Decreased awareness of environment;Decreased awareness of need for assistance;Decreased awareness of need for safety;Decreased insight into deficits; Fall prevention;Home safety;Driving appropriateness    Functional Mobility and Transfers for ADLs:  Bed Mobility:  Rolling: Supervision  Supine to Sit: Minimum assistance; Additional time;Bed Modified  Scooting: Contact guard assistance    Transfers:  Sit to Stand: Moderate assistance;Assist x1;Additional time  Bed to Chair: Contact guard assistance    Balance:  Sitting: Intact  Standing: Impaired; Without support  Standing - Static: Good  Standing - Dynamic : Fair;Occasional    ADL Intervention:   Patient completed functional reach in to upper cabinets 2x with CGA and no LOB observed, limited by SARKAR and LE weakness. Patient requesting to abort activity d/t LE weakness, SOB, and fatigue after standing for approximately 2 minutes. Patient instructed and indicated understanding the benefits of maintaining activity tolerance, functional mobility, and independence with self care tasks during acute stay  to ensure safe return home and to baseline. Encouraged patient to increase frequency and duration OOB, be out of bed for all meals, perform daily ADLs (as approved by RN/MD regarding bathing etc), and performing functional mobility to/from bathroom. Educated patient on pursed lip breathing techniques (implement during activity, ambulation, and when seated d/t SOB), patient demonstrating and verbalizing understanding. Cognitive Retraining  Problem Solving: General alternative solution; Identifying the problem; Identifying the task  Executive Functions: Executing cognitive plans  Organizing/Sequencing: Breaking task down;Prioritizing; Two step sequence  Attention to Task: Distractibility; Single task  Maintains Attention For (Time): 1 minute  Following Commands: Follows two step commands/directions  Safety/Judgement: Decreased awareness of environment;Decreased awareness of need for assistance;Decreased awareness of need for safety;Decreased insight into deficits; Fall prevention;Home safety;Driving appropriateness  Cues: Tactile cues provided;Verbal cues provided;Visual cues provided    Therapeutic Exercises:   Encouraged patient to complete UE exercises taught and demonstrated last week while sitting up in chair, patient agreeable. Pain:  Abdominal and LE pain, did not localize     Activity Tolerance:   Fair, requires frequent rest breaks, and observed SOB with activity, dizziness with bed mobility (VSS throughout)    After treatment patient left in no apparent distress:   Sitting in chair, Call bell within reach, and Bed / chair alarm activated    COMMUNICATION/COLLABORATION:   The patients plan of care was discussed with: Registered nurse. NOA Quinn  Time Calculation: 24 mins    Regarding student involvement in patient care:  A student participated in this treatment session. Per CMS Medicare statements and AOTA guidelines I certify that the following was true:  1. I was present and directly observed the entire session. 2. I made all skilled judgments and clinical decisions regarding care. 3. I am the practitioner responsible for assessment, treatment, and documentation.

## 2020-11-30 NOTE — PROGRESS NOTES
Problem: Falls - Risk of  Goal: *Absence of Falls  Description: Document Cely Mandelann Fall Risk and appropriate interventions in the flowsheet. Outcome: Progressing Towards Goal  Note: Fall Risk Interventions:  Mobility Interventions: Bed/chair exit alarm    Mentation Interventions: Adequate sleep, hydration, pain control    Medication Interventions: Bed/chair exit alarm, Teach patient to arise slowly    Elimination Interventions: Bed/chair exit alarm    History of Falls Interventions: Bed/chair exit alarm         Problem: Pain  Goal: *Control of Pain  Outcome: Progressing Towards Goal     Problem: Pressure Injury - Risk of  Goal: *Prevention of pressure injury  Description: Document Vic Scale and appropriate interventions in the flowsheet.   Outcome: Progressing Towards Goal  Note: Pressure Injury Interventions:  Sensory Interventions: Float heels, Keep linens dry and wrinkle-free    Moisture Interventions: Absorbent underpads, Minimize layers    Activity Interventions: Increase time out of bed    Mobility Interventions: Float heels, HOB 30 degrees or less    Nutrition Interventions: Document food/fluid/supplement intake, Offer support with meals,snacks and hydration    Friction and Shear Interventions: HOB 30 degrees or less, Lift sheet                Problem: Heart Failure: Day 3  Goal: Activity/Safety  Outcome: Progressing Towards Goal  Goal: Treatments/Interventions/Procedures  Outcome: Progressing Towards Goal  Goal: *Oxygen saturation within defined limits  Outcome: Progressing Towards Goal

## 2020-11-30 NOTE — PROGRESS NOTES
Problem: Falls - Risk of  Goal: *Absence of Falls  Description: Document Tessa Roth Fall Risk and appropriate interventions in the flowsheet.   Outcome: Progressing Towards Goal  Note: Fall Risk Interventions:  Mobility Interventions: Assess mobility with egress test, Communicate number of staff needed for ambulation/transfer, Patient to call before getting OOB, PT Consult for mobility concerns, PT Consult for assist device competence, Utilize walker, cane, or other assistive device    Mentation Interventions: Adequate sleep, hydration, pain control, Door open when patient unattended, Evaluate medications/consider consulting pharmacy, Increase mobility, More frequent rounding, Toileting rounds, Update white board    Medication Interventions: Evaluate medications/consider consulting pharmacy, Patient to call before getting OOB, Teach patient to arise slowly    Elimination Interventions: Call light in reach, Patient to call for help with toileting needs, Stay With Me (per policy), Toilet paper/wipes in reach, Toileting schedule/hourly rounds    History of Falls Interventions: Consult care management for discharge planning, Evaluate medications/consider consulting pharmacy, Room close to nurse's station

## 2020-11-30 NOTE — PROGRESS NOTES
Problem: Falls - Risk of  Goal: *Absence of Falls  Description: Document Joe Mohitcamille Fall Risk and appropriate interventions in the flowsheet. Outcome: Progressing Towards Goal  Note: Fall Risk Interventions:  Mobility Interventions: Bed/chair exit alarm, Assess mobility with egress test, OT consult for ADLs, Patient to call before getting OOB, PT Consult for mobility concerns, Strengthening exercises (ROM-active/passive)    Mentation Interventions: Bed/chair exit alarm, Door open when patient unattended, Evaluate medications/consider consulting pharmacy    Medication Interventions: Assess postural VS orthostatic hypotension, Evaluate medications/consider consulting pharmacy, Patient to call before getting OOB    Elimination Interventions: Bed/chair exit alarm, Call light in reach    History of Falls Interventions: Bed/chair exit alarm         Problem: Pressure Injury - Risk of  Goal: *Prevention of pressure injury  Description: Document Vic Scale and appropriate interventions in the flowsheet.   Outcome: Progressing Towards Goal  Note: Pressure Injury Interventions:  Sensory Interventions: Discuss PT/OT consult with provider, Maintain/enhance activity level, Minimize linen layers, Monitor skin under medical devices    Moisture Interventions: Absorbent underpads, Minimize layers, Limit adult briefs    Activity Interventions: Increase time out of bed, Pressure redistribution bed/mattress(bed type), PT/OT evaluation    Mobility Interventions: PT/OT evaluation, HOB 30 degrees or less, Float heels    Nutrition Interventions: Document food/fluid/supplement intake    Friction and Shear Interventions: Minimize layers, HOB 30 degrees or less, Foam dressings/transparent film/skin sealants

## 2020-11-30 NOTE — PROGRESS NOTES
[facility]   [address] [facility], [address] [place] [address], [facility]  Phone: [phone]   [phone]       Nephrology Progress Note  [facility]     [birth year]     [id number]  Date of Admission : 11/17/2020 11/30/20    CC: Follow up for ESRD       Assessment and Plan   ESRD- HD  - D[facility]yzes TTS at [address] dialysis in [place], [place]  -plan next HD tomorrow     Cardiac Cirrhosis   RV failure, severe TR  Chronic HFrEF   - s/p Paracentesis this admission     Anemia in CKD :   -  continue epogen - 20 K units w/ HD     Blood loss anemia : On last admission   - EGD: antral gastritis and erythema   - Capsule study : non bleeding small bowel AVMs noted   - PRBCs w/ HD 11/24,  - another unit of blood today for Hb 7.2   - ordered iron profile      Sec HPTH   - continue Phos binders       Interval History:  Seen and examined. A+O, not SOB    Review of Systems: A comprehensive review of systems was negative except for that written in the HPI.     Current Medications:   Current Facility-Administered Medications   Medication Dose Route Frequency    nystatin (MYCOSTATIN) 100,000 unit/mL oral suspension 500,000 Units  500,000 Units Oral QID    sodium chloride (OCEAN) 0.65 % nasal squeeze bottle 2 Spray  2 Spray Both Nostrils Q2H PRN    benzocaine-menthoL (CEPACOL) lozenge 1 Lozenge  1 Lozenge Mucous Membrane PRN    0.9% sodium chloride infusion 250 mL  250 mL IntraVENous PRN    insulin lispro (HUMALOG) injection   SubCUTAneous AC&HS    glucose chewable tablet 16 g  4 Tab Oral PRN    glucagon (GLUCAGEN) injection 1 mg  1 mg IntraMUSCular PRN    dextrose 10% infusion 0-250 mL  0-250 mL IntraVENous PRN    fluticasone propionate (FLONASE) 50 mcg/actuation nasal spray 2 Spray  2 Spray Both Nostrils DAILY    0.9% sodium chloride infusion 250 mL  250 mL IntraVENous PRN    polyethylene glycol (MIRALAX) packet 17 g  17 g Oral DAILY PRN    diphenhydrAMINE (BENADRYL) capsule 25 mg  25 mg Oral Q6H PRN    albuterol-ipratropium (DUO-NEB) 2.5 MG-0.5 MG/3 ML  3 mL Nebulization Q4H PRN    alum-mag hydroxide-simeth (MYLANTA) oral suspension 30 mL  30 mL Oral Q4H PRN    ascorbic acid (vitamin C) (VITAMIN C) tablet 500 mg  500 mg Oral DAILY    cholecalciferol (VITAMIN D3) (1000 Units /25 mcg) tablet 2 Tab  2,000 Units Oral DAILY    ferrous sulfate tablet 325 mg  325 mg Oral TID WITH MEALS    ketotifen (ZADITOR) 0.025 % (0.035 %) ophthalmic solution 1 Drop  1 Drop Both Eyes BID    carboxymethylcellulose sodium (REFRESH PLUS) 0.5 % ophthalmic solution 1-2 Drop  1-2 Drop Both Eyes TID    prochlorperazine (COMPAZINE) with saline injection 10 mg  10 mg IntraVENous Q6H PRN    morphine injection 2 mg  2 mg IntraVENous Q4H PRN    0.9% sodium chloride infusion 250 mL  250 mL IntraVENous PRN    epoetin reshma-epbx (RETACRIT) injection 20,000 Units  20,000 Units SubCUTAneous Q TUE, THU & SAT    0.9% sodium chloride infusion 250 mL  250 mL IntraVENous PRN    pantoprazole (PROTONIX) tablet 40 mg  40 mg Oral BID    sevelamer carbonate (RENVELA) tab 1,600 mg  1,600 mg Oral TID WITH MEALS    acetaminophen (TYLENOL) tablet 650 mg  650 mg Oral Q4H PRN    oxyCODONE IR (ROXICODONE) tablet 5 mg  5 mg Oral Q4H PRN      Allergies   Allergen Reactions    Aleve [Naproxen Sodium] Itching, Swelling and Other (comments)    Amlodipine Rash, Hives and Itching    Aspirin Other (comments), Nausea Only and Unknown (comments)     GI bleed  GI bleeding      Heparin Unknown (comments)     bleeding      Ibuprofen Nausea and Vomiting    Metformin Other (comments)     swelling       Objective:  Vitals:    Vitals:    11/29/20 2208 11/30/20 0153 11/30/20 0649 11/30/20 1015   BP: (!) 171/63 (!) 150/42 135/79 (!) 141/59   Pulse: 88 90 97 96   Resp: 16 25 20 19   Temp: 97.8 °F (36.6 °C) 97.8 °F (36.6 °C) 97.9 °F (36.6 °C) 98 °F (36.7 °C)   TempSrc:       SpO2: 100% 99%     Weight:  75.8 kg (167 lb 1.7 oz)     Height:         Intake and Output:  No intake/output data recorded. No intake/output data recorded. Physical Examination:    General: No distress   Neck:  RIJ permacath   Resp:  Clear bilaterally  CV:  RRR,  no murmur or rub, trace+ LE edema  GI:  Soft, NT, + ascites drain  Neurologic:  Non focal  Psych:             AAO x 3 appropriate affect  Skin:  No Rash    []    High complexity decision making was performed  []    Patient is at high-risk of decompensation with multiple organ involvement    Lab Data Personally Reviewed: I have reviewed all the pertinent labs, microbiology data and radiology studies during assessment. No results for input(s): NA, K, CL, CO2, GLU, BUN, CREA, CA, MG, PHOS, ALB, TBIL, ALT, INR, INREXT, INREXT in the last 72 hours. No lab exists for component: SGOT  Recent Labs     11/29/20  0212   HGB 7.4*   HCT 25.6*     No results found for: SDES  Lab Results   Component Value Date/Time    Culture result: NO GROWTH 4 DAYS 11/19/2020 02:50 PM    Culture result: No growth 5 days 10/18/2020 07:09 AM     Recent Results (from the past 24 hour(s))   GLUCOSE, POC    Collection Time: 11/29/20 12:00 PM   Result Value Ref Range    Glucose (POC) 221 (H) 65 - 100 mg/dL    Performed by Stef Camilo, POC    Collection Time: 11/29/20  4:59 PM   Result Value Ref Range    Glucose (POC) 211 (H) 65 - 100 mg/dL    Performed by Stef Camilo, POC    Collection Time: 11/29/20  9:46 PM   Result Value Ref Range    Glucose (POC) 222 (H) 65 - 100 mg/dL    Performed by Letty Win    GLUCOSE, POC    Collection Time: 11/30/20  8:47 AM   Result Value Ref Range    Glucose (POC) 135 (H) 65 - 100 mg/dL    Performed by Bernardo Lovelace            Total time spent with patient:  xxx   min. Care Plan discussed with:  Patient     Family      RN      Consulting Physician [facility],         I have reviewed the flowsheets. Chart and Pertinent Notes have been reviewed.    No change in PMH ,family and social history from Consult note.       Mohan Ambrosio MD

## 2020-11-30 NOTE — PROGRESS NOTES
[address] [facility] Adult  Hospitalist Group                                                                                          Hospitalist Progress Note  Rosanne Osorio MD  Answering service: [phone] from in house phone        Date of Service:  2020  NAME:  Emiliana Henriquez  :  1950  MRN:  [id number]      Admission Summary:   69 y.o PMH of AVM,ESRD,anemia admitted due to anemia and hyperkalemia    Interval history / Subjective:     2020   Pt seen in room at chair side as up in megan chair  Pt notes blood in stool, ; pt is post extensive work up. If HGB cont to drop as did over past 24 hour will ask GI to see again. Pt advised that she may be a person that requires intermittent blood transfusions w little else to offer, but if brisk bleed , repeat studies would be a reasonable consideration. Pt represented she understood. Pt concerned that she need more iron, iv, ; would be reasonable to give 100 -  200 mg IV iron sucrose soon and as asking will allow today . Overall out look is one of constant re-eval at home and at HD center and transfusion as needed. For now f/u on hCT, iron sucrose x 1 dose today as above. And consider GI eval if hgb cont current down trend. Assessment & Plan:      #Acute on chronic anemia:  -multifactorial - hx AVMs,CKD  -Gastroenterology signed off   -recent EGD 10/21: mild gastritis with some friable mucosa and oozing-treated with APC and also capsule study performed with presence of small non-bleeding AVM's in small intestine. Patient reports [facility] and underwent EGD, enteroscopy, and colonoscopy- all with no findings.   -s/p 1 U PRBC , ,   - PPI BID  - monitor Hb, transfuse <7  Per GI/hepatology:   - EGD 10/21/20 Dr. Nettles [place] - oozing in stomach, treated with APC  -M2A 10/22/20 - multiple non-bleeding SB AVMs  -Patient reports subsequent GI evaluation with EGD, enteroscopy and colonoscopy at the [facility] post-discharge from here in October  - Case discussed with Dr. Carolyn Ferrell. He will check iron panel and consider iron infusion. Hold off on endoscopy at this time. Will sign off. Please call back if signs of active GIB which may warrant endoscopy by GI. Lab Results   Component Value Date/Time    HGB 7.4 (L) 11/29/2020 02:12 AM      Lab Results   Component Value Date/Time    Iron 43 11/27/2020 02:28 AM    TIBC 310 11/27/2020 02:28 AM    Iron % saturation 14 (L) 11/27/2020 02:28 AM    Ferritin 37 11/27/2020 02:28 AM    now for Hgb q 12 and for consideration of GI re-eval      #Hyperkalemia- mild   Lab Results   Component Value Date/Time    Potassium 5.0 11/27/2020 02:28 AM        # coated tongue for nystatin 11/29/2020     #ESRD on HD TTS  -nephrology following    # Vaginitis for diflucan     #Multiple Pul Nodules up to 6 mm size, for CT f/u in 3-6 months as out pt . #Liver cirrhosis ?  with Ascites :  -hx recurrent ascites with paracentesis . Not on any diuretics at home as pt is anuric   -USG abd showed ascites. The liver echotexture is normal  - Paracentesis drain placed on 11/19 and removed on 11/21  - fluid analysis shows transudate. Ascites fluid total protein > 2.5 which is highly suggestive of heart failure  - appreciate Hepatology input  - tranasjugular liver biopsy done 84/62    #Diastolic heart failure compensated. 11/30/2020   #Severe Pulm artery HTN  - Echo: LVEF is 48%. Severe (grade 3) left ventricular diastolic dysfunction. Moderate to severe tricuspid valve regurgitation is present. - appreciate cardiology input  - no further cardiac testing    Code Stroke 11/25 right side numbness/ tingling  - CT head: No acute intracranial abnormality  - MRI brain , MRI c spine, MRA ordered  - appreciate neurology input  - no aspirin due to GI bleed   - lipid panel ordered   Results for Jace Pinzon (MRN [id number]) as of 11/26/2020 10:40   Ref.  Range 11/25/2020 03:21   Triglyceride Latest Ref Range: <150 MG/DL 85   Cholesterol, total Latest Ref Range: <200 MG/   HDL Cholesterol Latest Units: MG/DL 59   CHOL/HDL Ratio Latest Ref Range: 0.0 - 5.0   1.9   VLDL, calculated Latest Units: MG/DL 17   LDL, calculated Latest Ref Range: 0 - 100 MG/DL 38       Chronic pain- c/w home oxy prn    Neck pain, improved over days. No further w/u planned by this writer, neurology considered MRI cervical spine but pt can't lay down for same. 11/30/2020 : good rom and no pain elicited. Code status: full  DVT prophylaxis: scd    Care Plan discussed with: Patient/Family and Nurse  Anticipated Disposition: Home w/Family  Anticipated Discharge: 24 hours to 48 hours     Hospital Problems  Date Reviewed: 10/6/2020          Codes Class Noted POA    Anemia ICD-10-CM: D64.9  ICD-9-CM: 285.9  9/17/2020 Unknown                Review of Systems:   Negative except as above     Vital Signs:    Last 24hrs VS reviewed since prior progress note. Most recent are:  Visit Vitals  /79 (BP 1 Location: Left arm, BP Patient Position: At rest)   Pulse 97   Temp 97.9 °F (36.6 °C)   Resp 20   Ht 5' 2.99\" (1.6 m)   Wt 75.8 kg (167 lb 1.7 oz)   SpO2 99%   BMI 29.60 kg/m²       No intake or output data in the 24 hours ending 11/30/20 0845     Physical Examination:     I had a face to face encounter with this patient and independently examined them on 11/30/2020 as outlined below:          Constitutional:  No acute distress, elderly patient; cooperative. Pleasant,    ENT:  Oral mucosa moist,   Tongue minimally coated,    Resp:  CTA bilaterally. No wheezing/rhonchi/rales. No accessory muscle use   CV:  Regular rhythm, normal rate, +  murmurs, - gallops, rubs    GI:  Soft, mod distended, non tender. normoactive bowel sounds; ascitic presentation     Musculoskeletal:  No LE edema; no neck pain on rom active/passive. ; non tender ext. No cords     Neurologic:  Moves all extremities. AAOx3     Psych:  not anxious nor agitated.        Data Review: Review and/or order of clinical lab test  Review and/or order of tests in the radiology section of CPT  Review and/or order of tests in the medicine section of CPT      Labs:     Recent Labs     11/29/20 0212   HGB 7.4*   HCT 25.6*     No results for input(s): NA, K, CL, CO2, BUN, CREA, GLU, CA, MG, PHOS, URICA in the last 72 hours. No results for input(s): ALT, AP, TBIL, TBILI, TP, ALB, GLOB, GGT, AML, LPSE in the last 72 hours. No lab exists for component: SGOT, GPT, AMYP, HLPSE  No results for input(s): INR, PTP, APTT, INREXT, INREXT in the last 72 hours. No results for input(s): FE, TIBC, PSAT, FERR in the last 72 hours. Lab Results   Component Value Date/Time    Folate 16.4 10/19/2020 09:34 AM      No results for input(s): PH, PCO2, PO2 in the last 72 hours. No results for input(s): CPK, CKNDX, TROIQ in the last 72 hours.     No lab exists for component: CPKMB  Lab Results   Component Value Date/Time    Cholesterol, total 114 11/25/2020 03:21 AM    HDL Cholesterol 59 11/25/2020 03:21 AM    LDL, calculated 38 11/25/2020 03:21 AM    Triglyceride 85 11/25/2020 03:21 AM    CHOL/HDL Ratio 1.9 11/25/2020 03:21 AM     Lab Results   Component Value Date/Time    Glucose (POC) 222 (H) 11/29/2020 09:46 PM    Glucose (POC) 211 (H) 11/29/2020 04:59 PM    Glucose (POC) 221 (H) 11/29/2020 12:00 PM    Glucose (POC) 155 (H) 11/29/2020 08:02 AM    Glucose (POC) 149 (H) 11/28/2020 10:10 PM     No results found for: COLOR, APPRN, SPGRU, REFSG, VERENA, PROTU, GLUCU, KETU, BILU, UROU, TIBURCIO, LEUKU, GLUKE, EPSU, BACTU, WBCU, RBCU, CASTS, UCRY      Medications Reviewed:     Current Facility-Administered Medications   Medication Dose Route Frequency    nystatin (MYCOSTATIN) 100,000 unit/mL oral suspension 500,000 Units  500,000 Units Oral QID    sodium chloride (OCEAN) 0.65 % nasal squeeze bottle 2 Spray  2 Spray Both Nostrils Q2H PRN    benzocaine-menthoL (CEPACOL) lozenge 1 Lozenge  1 Lozenge Mucous Membrane PRN    0.9% sodium chloride infusion 250 mL  250 mL IntraVENous PRN    insulin lispro (HUMALOG) injection   SubCUTAneous AC&HS    glucose chewable tablet 16 g  4 Tab Oral PRN    glucagon (GLUCAGEN) injection 1 mg  1 mg IntraMUSCular PRN    dextrose 10% infusion 0-250 mL  0-250 mL IntraVENous PRN    fluticasone propionate (FLONASE) 50 mcg/actuation nasal spray 2 Spray  2 Spray Both Nostrils DAILY    0.9% sodium chloride infusion 250 mL  250 mL IntraVENous PRN    polyethylene glycol (MIRALAX) packet 17 g  17 g Oral DAILY PRN    diphenhydrAMINE (BENADRYL) capsule 25 mg  25 mg Oral Q6H PRN    albuterol-ipratropium (DUO-NEB) 2.5 MG-0.5 MG/3 ML  3 mL Nebulization Q4H PRN    alum-mag hydroxide-simeth (MYLANTA) oral suspension 30 mL  30 mL Oral Q4H PRN    ascorbic acid (vitamin C) (VITAMIN C) tablet 500 mg  500 mg Oral DAILY    cholecalciferol (VITAMIN D3) (1000 Units /25 mcg) tablet 2 Tab  2,000 Units Oral DAILY    ferrous sulfate tablet 325 mg  325 mg Oral TID WITH MEALS    ketotifen (ZADITOR) 0.025 % (0.035 %) ophthalmic solution 1 Drop  1 Drop Both Eyes BID    carboxymethylcellulose sodium (REFRESH PLUS) 0.5 % ophthalmic solution 1-2 Drop  1-2 Drop Both Eyes TID    prochlorperazine (COMPAZINE) with saline injection 10 mg  10 mg IntraVENous Q6H PRN    morphine injection 2 mg  2 mg IntraVENous Q4H PRN    0.9% sodium chloride infusion 250 mL  250 mL IntraVENous PRN    epoetin reshma-epbx (RETACRIT) injection 20,000 Units  20,000 Units SubCUTAneous Q TUE, THU & SAT    0.9% sodium chloride infusion 250 mL  250 mL IntraVENous PRN    pantoprazole (PROTONIX) tablet 40 mg  40 mg Oral BID    sevelamer carbonate (RENVELA) tab 1,600 mg  1,600 mg Oral TID WITH MEALS    acetaminophen (TYLENOL) tablet 650 mg  650 mg Oral Q4H PRN    oxyCODONE IR (ROXICODONE) tablet 5 mg  5 mg Oral Q4H PRN     ______________________________________________________________________  EXPECTED LENGTH OF STAY: 4d 9h  ACTUAL LENGTH OF STAY: Sohail Mott MD

## 2020-12-01 VITALS
RESPIRATION RATE: 100 BRPM | WEIGHT: 162.26 LBS | OXYGEN SATURATION: 18 % | SYSTOLIC BLOOD PRESSURE: 121 MMHG | BODY MASS INDEX: 28.75 KG/M2 | DIASTOLIC BLOOD PRESSURE: 53 MMHG | HEART RATE: 87 BPM | TEMPERATURE: 97 F | HEIGHT: 63 IN

## 2020-12-01 LAB
ANION GAP SERPL CALC-SCNC: 6 MMOL/L (ref 5–15)
ATRIAL RATE: 81 BPM
BASOPHILS # BLD: 0.1 K/UL (ref 0–0.1)
BASOPHILS NFR BLD: 1 % (ref 0–1)
BUN SERPL-MCNC: 67 MG/DL (ref 6–20)
BUN/CREAT SERPL: 10 (ref 12–20)
CALCIUM SERPL-MCNC: 9.3 MG/DL (ref 8.5–10.1)
CALCULATED P AXIS, ECG09: 52 DEGREES
CALCULATED R AXIS, ECG10: 70 DEGREES
CALCULATED T AXIS, ECG11: 92 DEGREES
CHLORIDE SERPL-SCNC: 104 MMOL/L (ref 97–108)
CO2 SERPL-SCNC: 26 MMOL/L (ref 21–32)
CREAT SERPL-MCNC: 6.58 MG/DL (ref 0.55–1.02)
DIAGNOSIS, 93000: NORMAL
DIFFERENTIAL METHOD BLD: ABNORMAL
EOSINOPHIL # BLD: 0.2 K/UL (ref 0–0.4)
EOSINOPHIL NFR BLD: 3 % (ref 0–7)
ERYTHROCYTE [DISTWIDTH] IN BLOOD BY AUTOMATED COUNT: 17.6 % (ref 11.5–14.5)
GLUCOSE BLD STRIP.AUTO-MCNC: 118 MG/DL (ref 65–100)
GLUCOSE BLD STRIP.AUTO-MCNC: 156 MG/DL (ref 65–100)
GLUCOSE SERPL-MCNC: 185 MG/DL (ref 65–100)
HCT VFR BLD AUTO: 27.1 % (ref 35–47)
HGB BLD-MCNC: 7.7 G/DL (ref 11.5–16)
IMM GRANULOCYTES # BLD AUTO: 0.1 K/UL (ref 0–0.04)
IMM GRANULOCYTES NFR BLD AUTO: 1 % (ref 0–0.5)
LYMPHOCYTES # BLD: 0.8 K/UL (ref 0.8–3.5)
LYMPHOCYTES NFR BLD: 12 % (ref 12–49)
MCH RBC QN AUTO: 27.6 PG (ref 26–34)
MCHC RBC AUTO-ENTMCNC: 28.4 G/DL (ref 30–36.5)
MCV RBC AUTO: 97.1 FL (ref 80–99)
MONOCYTES # BLD: 0.6 K/UL (ref 0–1)
MONOCYTES NFR BLD: 9 % (ref 5–13)
NEUTS SEG # BLD: 4.8 K/UL (ref 1.8–8)
NEUTS SEG NFR BLD: 74 % (ref 32–75)
NRBC # BLD: 0.02 K/UL (ref 0–0.01)
NRBC BLD-RTO: 0.3 PER 100 WBC
P-R INTERVAL, ECG05: 158 MS
PLATELET # BLD AUTO: 177 K/UL (ref 150–400)
PMV BLD AUTO: 10.4 FL (ref 8.9–12.9)
POTASSIUM SERPL-SCNC: 6.6 MMOL/L (ref 3.5–5.1)
Q-T INTERVAL, ECG07: 450 MS
QRS DURATION, ECG06: 154 MS
QTC CALCULATION (BEZET), ECG08: 522 MS
RBC # BLD AUTO: 2.79 M/UL (ref 3.8–5.2)
RBC MORPH BLD: ABNORMAL
SERVICE CMNT-IMP: ABNORMAL
SERVICE CMNT-IMP: ABNORMAL
SODIUM SERPL-SCNC: 136 MMOL/L (ref 136–145)
VENTRICULAR RATE, ECG03: 81 BPM
WBC # BLD AUTO: 6.6 K/UL (ref 3.6–11)

## 2020-12-01 PROCEDURE — 85025 COMPLETE CBC W/AUTO DIFF WBC: CPT

## 2020-12-01 PROCEDURE — 74011250637 HC RX REV CODE- 250/637: Performed by: NURSE PRACTITIONER

## 2020-12-01 PROCEDURE — 36415 COLL VENOUS BLD VENIPUNCTURE: CPT

## 2020-12-01 PROCEDURE — 74011250637 HC RX REV CODE- 250/637: Performed by: HOSPITALIST

## 2020-12-01 PROCEDURE — 93005 ELECTROCARDIOGRAM TRACING: CPT

## 2020-12-01 PROCEDURE — 82962 GLUCOSE BLOOD TEST: CPT

## 2020-12-01 PROCEDURE — 74011250637 HC RX REV CODE- 250/637: Performed by: INTERNAL MEDICINE

## 2020-12-01 PROCEDURE — 80048 BASIC METABOLIC PNL TOTAL CA: CPT

## 2020-12-01 PROCEDURE — 90935 HEMODIALYSIS ONE EVALUATION: CPT

## 2020-12-01 PROCEDURE — 74011000250 HC RX REV CODE- 250: Performed by: NURSE PRACTITIONER

## 2020-12-01 RX ORDER — PANTOPRAZOLE SODIUM 40 MG/1
40 TABLET, DELAYED RELEASE ORAL 2 TIMES DAILY
Qty: 60 TAB | Refills: 0 | Status: SHIPPED | OUTPATIENT
Start: 2020-12-01 | End: 2020-12-31

## 2020-12-01 RX ADMIN — KETOTIFEN FUMARATE 1 DROP: 0.35 SOLUTION/ DROPS OPHTHALMIC at 09:08

## 2020-12-01 RX ADMIN — FLUTICASONE PROPIONATE 2 SPRAY: 50 SPRAY, METERED NASAL at 09:08

## 2020-12-01 RX ADMIN — SEVELAMER CARBONATE 1600 MG: 800 TABLET, FILM COATED ORAL at 13:50

## 2020-12-01 RX ADMIN — OXYCODONE HYDROCHLORIDE 5 MG: 5 TABLET ORAL at 05:55

## 2020-12-01 RX ADMIN — NYSTATIN 500000 UNITS: 100000 SUSPENSION ORAL at 13:50

## 2020-12-01 RX ADMIN — CARBOXYMETHYLCELLULOSE SODIUM 2 DROP: 5 SOLUTION/ DROPS OPHTHALMIC at 09:08

## 2020-12-01 RX ADMIN — OXYCODONE HYDROCHLORIDE 5 MG: 5 TABLET ORAL at 01:49

## 2020-12-01 RX ADMIN — ACETAMINOPHEN 650 MG: 325 TABLET ORAL at 04:47

## 2020-12-01 RX ADMIN — FERROUS SULFATE TAB 325 MG (65 MG ELEMENTAL FE) 325 MG: 325 (65 FE) TAB at 13:50

## 2020-12-01 RX ADMIN — NYSTATIN 500000 UNITS: 100000 SUSPENSION ORAL at 09:05

## 2020-12-01 RX ADMIN — BENZOCAINE AND MENTHOL 1 LOZENGE: 15; 3.6 LOZENGE ORAL at 09:05

## 2020-12-01 RX ADMIN — IPRATROPIUM BROMIDE AND ALBUTEROL SULFATE 3 ML: .5; 3 SOLUTION RESPIRATORY (INHALATION) at 05:55

## 2020-12-01 RX ADMIN — PANTOPRAZOLE SODIUM 40 MG: 40 TABLET, DELAYED RELEASE ORAL at 09:05

## 2020-12-01 RX ADMIN — OXYCODONE HYDROCHLORIDE 5 MG: 5 TABLET ORAL at 11:20

## 2020-12-01 RX ADMIN — FERROUS SULFATE TAB 325 MG (65 MG ELEMENTAL FE) 325 MG: 325 (65 FE) TAB at 09:05

## 2020-12-01 RX ADMIN — TRIAMCINOLONE ACETONIDE: 1 CREAM TOPICAL at 00:16

## 2020-12-01 RX ADMIN — DIPHENHYDRAMINE HYDROCHLORIDE 25 MG: 25 CAPSULE ORAL at 09:05

## 2020-12-01 RX ADMIN — SEVELAMER CARBONATE 1600 MG: 800 TABLET, FILM COATED ORAL at 09:05

## 2020-12-01 NOTE — PROGRESS NOTES
Problem: Falls - Risk of  Goal: *Absence of Falls  Description: Document Karmen Santacruz Fall Risk and appropriate interventions in the flowsheet.   Outcome: Progressing Towards Goal  Note: Fall Risk Interventions:  Mobility Interventions: Assess mobility with egress test, Communicate number of staff needed for ambulation/transfer, Patient to call before getting OOB, PT Consult for mobility concerns, Utilize walker, cane, or other assistive device    Mentation Interventions: Adequate sleep, hydration, pain control, Door open when patient unattended, Evaluate medications/consider consulting pharmacy, Increase mobility, More frequent rounding, Reorient patient, Toileting rounds, Update white board    Medication Interventions: Evaluate medications/consider consulting pharmacy, Patient to call before getting OOB, Teach patient to arise slowly    Elimination Interventions: Call light in reach, Patient to call for help with toileting needs, Stay With Me (per policy), Toileting schedule/hourly rounds    History of Falls Interventions: Consult care management for discharge planning, Door open when patient unattended, Room close to nurse's station

## 2020-12-01 NOTE — PROGRESS NOTES
[facility]   [address] [facility], [facility], [facility]  Phone: [phone]   [phone]       Nephrology Progress Note  Dwight Mathew Fenton     [birth year]     [id number]  Date of Admission : 11/17/2020 12/01/20    CC: Follow up for ESRD       Assessment and Plan   ESRD- HD  - Dialyzes TTS at [address] dialysis in [place], [place]  - HD now     Hyperkalemia :  - Low K diet discussed  - dialysis adjusted      Cardiac Cirrhosis   RV failure, severe TR  Chronic HFrEF   - consider another para prior to d/c   - she needs to go to [place] fro regular paracentesis      Anemia in CKD :   -  continue epogen - 20 K units w/ HD     Blood loss anemia : On last admission   - EGD: antral gastritis and erythema   - Capsule study : non bleeding small bowel AVMs noted   - s/p multiple transfusions this admit      Sec HPTH   - continue Phos binders       Interval History:  Seen and examined on dialysis   K high . Eating bananas, had orange juice and other high K foods     Review of Systems: A comprehensive review of systems was negative except for that written in the HPI.     Current Medications:   Current Facility-Administered Medications   Medication Dose Route Frequency    triamcinolone acetonide (KENALOG) 0.1 % cream   Topical BID    nystatin (MYCOSTATIN) 100,000 unit/mL oral suspension 500,000 Units  500,000 Units Oral QID    sodium chloride (OCEAN) 0.65 % nasal squeeze bottle 2 Spray  2 Spray Both Nostrils Q2H PRN    benzocaine-menthoL (CEPACOL) lozenge 1 Lozenge  1 Lozenge Mucous Membrane PRN    0.9% sodium chloride infusion 250 mL  250 mL IntraVENous PRN    insulin lispro (HUMALOG) injection   SubCUTAneous AC&HS    glucose chewable tablet 16 g  4 Tab Oral PRN    glucagon (GLUCAGEN) injection 1 mg  1 mg IntraMUSCular PRN    dextrose 10% infusion 0-250 mL  0-250 mL IntraVENous PRN    fluticasone propionate (FLONASE) 50 mcg/actuation nasal spray 2 Spray  2 Spray Both Nostrils DAILY    0.9% sodium chloride infusion 250 mL  250 mL IntraVENous PRN    polyethylene glycol (MIRALAX) packet 17 g  17 g Oral DAILY PRN    diphenhydrAMINE (BENADRYL) capsule 25 mg  25 mg Oral Q6H PRN    albuterol-ipratropium (DUO-NEB) 2.5 MG-0.5 MG/3 ML  3 mL Nebulization Q4H PRN    alum-mag hydroxide-simeth (MYLANTA) oral suspension 30 mL  30 mL Oral Q4H PRN    ascorbic acid (vitamin C) (VITAMIN C) tablet 500 mg  500 mg Oral DAILY    cholecalciferol (VITAMIN D3) (1000 Units /25 mcg) tablet 2 Tab  2,000 Units Oral DAILY    ferrous sulfate tablet 325 mg  325 mg Oral TID WITH MEALS    ketotifen (ZADITOR) 0.025 % (0.035 %) ophthalmic solution 1 Drop  1 Drop Both Eyes BID    carboxymethylcellulose sodium (REFRESH PLUS) 0.5 % ophthalmic solution 1-2 Drop  1-2 Drop Both Eyes TID    prochlorperazine (COMPAZINE) with saline injection 10 mg  10 mg IntraVENous Q6H PRN    morphine injection 2 mg  2 mg IntraVENous Q4H PRN    0.9% sodium chloride infusion 250 mL  250 mL IntraVENous PRN    epoetin reshma-epbx (RETACRIT) injection 20,000 Units  20,000 Units SubCUTAneous Q TUE, THU & SAT    0.9% sodium chloride infusion 250 mL  250 mL IntraVENous PRN    pantoprazole (PROTONIX) tablet 40 mg  40 mg Oral BID    sevelamer carbonate (RENVELA) tab 1,600 mg  1,600 mg Oral TID WITH MEALS    acetaminophen (TYLENOL) tablet 650 mg  650 mg Oral Q4H PRN    oxyCODONE IR (ROXICODONE) tablet 5 mg  5 mg Oral Q4H PRN      Allergies   Allergen Reactions    Aleve [Naproxen Sodium] Itching, Swelling and Other (comments)    Amlodipine Rash, Hives and Itching    Aspirin Other (comments), Nausea Only and Unknown (comments)     GI bleed  GI bleeding      Heparin Unknown (comments)     bleeding      Ibuprofen Nausea and Vomiting    Metformin Other (comments)     swelling       Objective:  Vitals:    Vitals:    12/01/20 0945 12/01/20 1000 12/01/20 1015 12/01/20 1030   BP: (!) 123/50 (!) 121/52 (!) 107/48 (!) 110/46   Pulse: 87 91 85 89   Resp: 21 14 15 18 Temp:       TempSrc:       SpO2:       Weight:       Height:         Intake and Output:  No intake/output data recorded. No intake/output data recorded. Physical Examination:    General: No distress   Neck:  RIJ permacath   Resp:  Clear bilaterally  CV:  RRR,  no murmur or rub, trace+ LE edema  GI:  Soft, NT, + ascites drain  Neurologic:  Non focal  Psych:             AAO x 3 appropriate affect  Skin:  No Rash    []    High complexity decision making was performed  []    Patient is at high-risk of decompensation with multiple organ involvement    Lab Data Personally Reviewed: I have reviewed all the pertinent labs, microbiology data and radiology studies during assessment.     Recent Labs     12/01/20  0142      K 6.6*      CO2 26   *   BUN 67*   CREA 6.58*   CA 9.3     Recent Labs     12/01/20  0142 11/30/20  1321 11/29/20  0212   WBC 6.6  --   --    HGB 7.7* 7.3* 7.4*   HCT 27.1* 25.4* 25.6*     --   --      No results found for: [facility]  Lab Results   Component Value Date/Time    Culture result: NO GROWTH 4 DAYS 11/19/2020 02:50 PM    Culture result: No growth 5 days 10/18/2020 07:09 AM     Recent Results (from the past 24 hour(s))   GLUCOSE, POC    Collection Time: 11/30/20 11:49 AM   Result Value Ref Range    Glucose (POC) 139 (H) 65 - 100 mg/dL    Performed by Devan Reeves    HGB & HCT    Collection Time: 11/30/20  1:21 PM   Result Value Ref Range    HGB 7.3 (L) 11.5 - 16.0 g/dL    HCT 25.4 (L) 35.0 - 47.0 %   GLUCOSE, POC    Collection Time: 11/30/20  4:53 PM   Result Value Ref Range    Glucose (POC) 130 (H) 65 - 100 mg/dL    Performed by Devan Reeves    GLUCOSE, POC    Collection Time: 11/30/20  9:00 PM   Result Value Ref Range    Glucose (POC) 182 (H) 65 - 100 mg/dL    Performed by Evan Carey    CBC WITH AUTOMATED DIFF    Collection Time: 12/01/20  1:42 AM   Result Value Ref Range    WBC 6.6 3.6 - 11.0 K/uL    RBC 2.79 (L) 3.80 - 5.20 M/uL    HGB 7.7 (L) 11.5 - 16.0 g/dL HCT 27.1 (L) 35.0 - 47.0 %    MCV 97.1 80.0 - 99.0 FL    MCH 27.6 26.0 - 34.0 PG    MCHC 28.4 (L) 30.0 - 36.5 g/dL    RDW 17.6 (H) 11.5 - 14.5 %    PLATELET 017 644 - 432 K/uL    MPV 10.4 8.9 - 12.9 FL    NRBC 0.3 (H) 0  WBC    ABSOLUTE NRBC 0.02 (H) 0.00 - 0.01 K/uL    NEUTROPHILS 74 32 - 75 %    LYMPHOCYTES 12 12 - 49 %    MONOCYTES 9 5 - 13 %    EOSINOPHILS 3 0 - 7 %    BASOPHILS 1 0 - 1 %    IMMATURE GRANULOCYTES 1 (H) 0.0 - 0.5 %    ABS. NEUTROPHILS 4.8 1.8 - 8.0 K/UL    ABS. LYMPHOCYTES 0.8 0.8 - 3.5 K/UL    ABS. MONOCYTES 0.6 0.0 - 1.0 K/UL    ABS. EOSINOPHILS 0.2 0.0 - 0.4 K/UL    ABS. BASOPHILS 0.1 0.0 - 0.1 K/UL    ABS. IMM.  GRANS. 0.1 (H) 0.00 - 0.04 K/UL    DF SMEAR SCANNED      RBC COMMENTS ANISOCYTOSIS  1+        RBC COMMENTS MACROCYTOSIS  1+        RBC COMMENTS OVALOCYTES  1+        RBC COMMENTS POLYCHROMASIA  1+        RBC COMMENTS SCHISTOCYTES  PRESENT       METABOLIC PANEL, BASIC    Collection Time: 12/01/20  1:42 AM   Result Value Ref Range    Sodium 136 136 - 145 mmol/L    Potassium 6.6 (HH) 3.5 - 5.1 mmol/L    Chloride 104 97 - 108 mmol/L    CO2 26 21 - 32 mmol/L    Anion gap 6 5 - 15 mmol/L    Glucose 185 (H) 65 - 100 mg/dL    BUN 67 (H) 6 - 20 MG/DL    Creatinine 6.58 (H) 0.55 - 1.02 MG/DL    BUN/Creatinine ratio 10 (L) 12 - 20      GFR est AA 8 (L) >60 ml/min/1.73m2    GFR est non-AA 6 (L) >60 ml/min/1.73m2    Calcium 9.3 8.5 - 10.1 MG/DL   EKG, 12 LEAD, INITIAL    Collection Time: 12/01/20  3:14 AM   Result Value Ref Range    Ventricular Rate 81 BPM    Atrial Rate 81 BPM    P-R Interval 158 ms    QRS Duration 154 ms    Q-T Interval 450 ms    QTC Calculation (Bezet) 522 ms    Calculated P Axis 52 degrees    Calculated R Axis 70 degrees    Calculated T Axis 92 degrees    Diagnosis       Normal sinus rhythm  Nonspecific intraventricular block  When compared with ECG of 17-NOV-2020 18:23,  QRS axis shifted right  T wave inversion less evident in Lateral leads  Confirmed by Red Simpson, MD, Osmin Salinas ([id number]) on 12/1/2020 8:30:56 AM     GLUCOSE, POC    Collection Time: 12/01/20  7:26 AM   Result Value Ref Range    Glucose (POC) 156 (H) 65 - 100 mg/dL    Performed by Kim Parra            Total time spent with patient:  xxx   min. Care Plan discussed with:  Patient     Family      RN      Consulting Physician [facility],         I have reviewed the flowsheets. Chart and Pertinent Notes have been reviewed. No change in PMH ,family and social history from Consult note.       Can Zamorano MD

## 2020-12-01 NOTE — PROCEDURES
[facility]  [phone]    Vitals   Pre   Post   Assessment   Pre   Post     Temp  Temp: 97.2 °F (36.2 °C) (12/01/20 0830)  97 LOC  Alert oriented Alert and oriented   HR   Pulse (Heart Rate): 75 (12/01/20 0830) 87 Lungs   Clear but diminished bases   unlabored, even   B/P   BP: (!) 136/98 (12/01/20 0830) 121/56 Cardiac   RRR bedside telemetry  RRR bedside telemetry    Resp   Resp Rate: 18 (12/01/20 0830) 18 Skin   Warm and dry  Warm and dry    Pain level  Pain Intensity 1: 5 (11/30/20 1806) 0 Edema    Abdominal, 1+ BLE   Abdominal and BLE   Orders:    Duration:   Start:   0830 End:   1200 Total:   3.5   Dialyzer:   Dialyzer/Set Up Inspection: Revaclear (12/01/20 0830)   K Bath:   Dialysate K (mEq/L): 1 (12/01/20 0830)   Ca Bath:   Dialysate CA (mEq/L): 2.5 (12/01/20 0830)   Na/Bicarb:   Dialysate NA (mEq/L): 140 (12/01/20 0830)   Target Fluid Removal:   Goal/Amount of Fluid to Remove (mL): 2000 mL (12/01/20 0830)   Access     Type & Location:   Right tunneled cvc, dressing clean, dry and intact, dressing change 12/1/20 Each catheter limb disinfected per p&p, caps removed, hubs disinfected per p&p.        Labs     Obtained/Reviewed   Critical Results Called   Date when labs were drawn-  Hgb-    HGB   Date Value Ref Range Status   12/01/2020 7.7 (L) 11.5 - 16.0 g/dL Final     K-    Potassium   Date Value Ref Range Status   12/01/2020 6.6 (HH) 3.5 - 5.1 mmol/L Final     Comment:     RESULTS VERIFIED, PHONED TO AND READ BACK BY  QUINTIN DELGADILLO @ 0228/[facility]       Ca-   Calcium   Date Value Ref Range Status   12/01/2020 9.3 8.5 - 10.1 MG/DL Final     Bun-   BUN   Date Value Ref Range Status   12/01/2020 67 (H) 6 - 20 MG/DL Final     Creat-   Creatinine   Date Value Ref Range Status   12/01/2020 6.58 (H) 0.55 - 1.02 MG/DL Final     Comment:     INVESTIGATED PER DELTA CHECK PROTOCOL        Medications/ Blood Products Given     Name   Dose   Route and Time                     Blood Volume Processed (BVP):   72 Net Fluid   Removed:  1100   Comments   Time Out Done: 0840  Primary Nurse Rpt Pre: Roaxna Aguirre RN   Primary Nurse Rpt Post: CAROLINE Ruiz RN   Pt Education: diet education potassium 6.6  Care Plan: continue current HD plan of care   Tx Summary:  Spoke with Dr. Areli Sin before treatment started due to elevated potassium, K 6.6, order updated 1k2.5 calcium for one hour then complete treatment. 0830 HD initiated as ordered. 0930 bath changed to 2k2.5 calcium after one hour as ordered. 1030 dr. Dayo Mcgregor in to see patient on dialysis   1050 min uf for cramping in lower extremties  1200 treatment completed all possible blood returned, Each dialysis catheter limb disinfected per p&p, blood returned per p&p, each dialysis hub disinfected per p&p, post dialysis catheter dwell instilled per order, and caps applied. All dialysis related medications have been reviewed. SBAR to primary nurse. Admiting Diagnosis:  Pt's previous clinic- [facility]   Consent signed - Informed Consent Verified: Yes (12/01/20 0830)  Hepatitis Status- 11/17/20 negative antigen immune connect care  Machine #- Machine Number: [id number] (12/01/20 0830)  Telemetry status-bedside  Pre-dialysis wt. - Pre-Dialysis Weight: 79.1 kg (174 lb 6.1 oz) (11/19/20 0555)

## 2020-12-01 NOTE — PROGRESS NOTES
2000 At [facility], patient complaining of coughing up yellowish/green sputum. Patient asked day shift nurse if she contacted doctor in regards of being prescribed ABX for sputum color. Day shift nurse Laine discussed with patient and this RN that she spoke with doctor but he did not want to prescribe any ABX at this time. 2145 Patient is complaining of itching in vaginal region. Cared for this patient previous night and had similar complaint. No interventions done during day shift. Patient is asking for vaginal cream.     2300 Administered Benedryl to patient for itching. Patient provided listing of medications from the [place]. Medication list had triamcinolone acetonide 0.1% that patient takes at home. Perfect served NP Bhargavi Paredes. Added med to PTA med list and NP Angel ordered for patient. Bedside shift change report given to Vinod Argueta RN (oncoming nurse) by Louann Lux (offgoing nurse). Report included the following information SBAR and Cardiac Rhythm NSR.

## 2020-12-01 NOTE — ROUTINE PROCESS
Unable to schedule dispatch health as they do not service the patients home address (listed as a po box)    Attempted to call the [facility] ([phone]),  was unable to schedule appt. A note has been sent to the nurse who will call the patient directly to schedule.

## 2020-12-01 NOTE — PROGRESS NOTES
Occupational Therapy  12/01/20    Chart reviewed, patient currently on bedside HD at this time, noted d/c order in place. Will follow up for OT treatment as able & appropriate.      Thank you,   Sandy Patel, OTD, OTR/L

## 2020-12-01 NOTE — DISCHARGE INSTRUCTIONS
Discharge Instructions       PATIENT ID: Storm Murphy  MRN: [id number]   YOB: 1950    DATE OF ADMISSION: 11/17/2020  5:47 PM    DATE OF DISCHARGE: 12/1/2020    PRIMARY CARE PROVIDER: None     ATTENDING PHYSICIAN: Arthur iLra MD  DISCHARGING PROVIDER: Adam Sheppard MD    To contact this individual call [phone] and ask the  to page. If unavailable ask to be transferred the Adult Hospitalist Department. DISCHARGE DIAGNOSES anemia, stable     CONSULTATIONS: IP CONSULT TO NEPHROLOGY  IP CONSULT TO GASTROENTEROLOGY  IP CONSULT TO HEPATOLOGY  IP CONSULT TO INTERVENTIONAL RADIOLOGY  IP CONSULT TO NEUROLOGY  IP CONSULT TO CARDIOLOGY    PROCEDURES/SURGERIES: Procedure(s):  PROCEDURE CANCELLED - NOT PERFORMED    PENDING TEST RESULTS:   At the time of discharge the following test results are still pending: na    FOLLOW UP APPOINTMENTS:   Follow-up Information     Follow up With Specialties Details Why Contact Info    None    None (395) Patient stated that they have no PCP      Layne Servin MD Hepatology  call to set up appointment  [address] [place] [address]  [phone]             ADDITIONAL CARE RECOMMENDATIONS: na    DIET: Resume previous diet     ACTIVITY: Activity as tolerated    WOUND CARE: na    EQUIPMENT needed: na             DISCHARGE MEDICATIONS:   See Medication Reconciliation Form    · It is important that you take the medication exactly as they are prescribed. · Keep your medication in the bottles provided by the pharmacist and keep a list of the medication names, dosages, and times to be taken in your wallet. · Do not take other medications without consulting your doctor. NOTIFY YOUR PHYSICIAN FOR ANY OF THE FOLLOWING:   Fever over 101 degrees for 24 hours. Chest pain, shortness of breath, fever, chills, nausea, vomiting, diarrhea, change in mentation, falling, weakness, bleeding.  Severe pain or pain not relieved by medications. Or, any other signs or symptoms that you may have questions about.       DISPOSITION:    Home With:   OT  PT  HH  RN       SNF/Inpatient Rehab/LTAC   x Independent/assisted living    Hospice    Other:          Signed:   Salvador Perales MD  12/1/2020  11:22 AM

## 2020-12-01 NOTE — PROGRESS NOTES
Problem: Falls - Risk of  Goal: *Absence of Falls  Description: Document Isi Muñoz Fall Risk and appropriate interventions in the flowsheet. Outcome: Progressing Towards Goal  Note: Fall Risk Interventions:  Mobility Interventions: Assess mobility with egress test, Communicate number of staff needed for ambulation/transfer, Patient to call before getting OOB, PT Consult for mobility concerns, Utilize walker, cane, or other assistive device    Mentation Interventions: Adequate sleep, hydration, pain control, Door open when patient unattended, Evaluate medications/consider consulting pharmacy, Increase mobility, More frequent rounding, Reorient patient, Toileting rounds, Update white board    Medication Interventions: Evaluate medications/consider consulting pharmacy, Patient to call before getting OOB, Teach patient to arise slowly    Elimination Interventions: Call light in reach, Patient to call for help with toileting needs, Stay With Me (per policy), Toileting schedule/hourly rounds    History of Falls Interventions: Consult care management for discharge planning, Door open when patient unattended, Room close to nurse's station         Problem: Pain  Goal: *Control of Pain  Outcome: Progressing Towards Goal     Problem: Pressure Injury - Risk of  Goal: *Prevention of pressure injury  Description: Document Vic Scale and appropriate interventions in the flowsheet. Outcome: Progressing Towards Goal  Note: Pressure Injury Interventions:  Sensory Interventions: Assess changes in LOC, Check visual cues for pain, Float heels, Keep linens dry and wrinkle-free, Minimize linen layers, Pressure redistribution bed/mattress (bed type), Turn and reposition approx.  every two hours (pillows and wedges if needed)    Moisture Interventions: Absorbent underpads, Minimize layers    Activity Interventions: Increase time out of bed, Pressure redistribution bed/mattress(bed type), PT/OT evaluation    Mobility Interventions: Pressure redistribution bed/mattress (bed type), PT/OT evaluation    Nutrition Interventions: Document food/fluid/supplement intake    Friction and Shear Interventions: HOB 30 degrees or less, Lift sheet, Minimize layers                Problem: Heart Failure: Day 4  Goal: Diagnostic Test/Procedures  Outcome: Progressing Towards Goal  Goal: Nutrition/Diet  Outcome: Progressing Towards Goal

## 2020-12-01 NOTE — PROGRESS NOTES
Bedside shift change report given to Tisha Barrios RN (oncoming nurse) by [place] RN (offgoing nurse). Report included the following information SBAR, Kardex, MAR, Accordion, Recent Results, Cardiac Rhythm NSR BBB and Dual Neuro Assessment.

## 2020-12-01 NOTE — PROGRESS NOTES
DIANA: d/c home with home health; [facility] referral accepted in [employer]; dtr to provide transport; pt to resume hemodialysis services with [facility] [place] on Thursday 12/3; [facility]-contact information placed in AVS; 2nd IMM letter provided today 12/1    RUR: 32%    1230- CM reviewed pt chart & discussed in rounds. D/c order in place. CM met with pt at bedside to discuss transitional planning. Therapy recommended home pt & ot. Pt stated she has no preference of [facility] provider & home address confirmed as: [address] [place], [address] Referral placed to [facility]. 1300- CM attempted to contact [place] regarding follow up PCP appt with [facility] but CM was unable to reach them. CM placed VA contact information in AVS & advised pt to make follow up appointment. 1320-CM contacted pt's HD center at [address] in [facility] to inform of d/c plan for today and resumption of HD services for Thursday. CM to fax [facility]S to [facility]. 1330-CM faxed AVS to [place]. CM to follow for transitions of care. Rolly MORA CCM  CRM  Transition of Care Plan:     The Plan for Transition of Care is related to the following treatment goals: home health  The Patient and/or patient representative  was provided with a choice of provider and agrees  with the discharge plan. Yes [x] No []    A Freedom of choice list was provided with basic dialogue that supports the patient's individualized plan of care/goals and shares the quality data associated with the providers. Yes [x] No []  Medicare pt has received, reviewed, and signed 2nd IM letter informing them of their right to appeal the discharge. Signed copied has been placed on pt bedside chart.   Rolly MORA CCM  CRM

## 2020-12-30 NOTE — DISCHARGE SUMMARY
Discharge Summary       PATIENT ID: Aparna Allen  MRN: [id number]   YOB: 1950    DATE OF ADMISSION: 11/17/2020  5:47 PM    DATE OF DISCHARGE: 12/1/2020    PRIMARY CARE PROVIDER: None     ATTENDING PHYSICIAN: Rxoann Francisco MD   DISCHARGING PROVIDER: Roxann Francisco MD    To contact this individual call [phone] and ask the  to page. If unavailable ask to be transferred the Adult Hospitalist Department. CONSULTATIONS: IP CONSULT TO NEPHROLOGY  IP CONSULT TO GASTROENTEROLOGY  IP CONSULT TO HEPATOLOGY  IP CONSULT TO NEUROLOGY  IP CONSULT TO CARDIOLOGY    PROCEDURES/SURGERIES: Procedure(s):  PROCEDURE CANCELLED - NOT PERFORMED    ADMITTING [address] [place] [address] COURSE:     Per notes:     Admission Summary:   69 y.o PMH of AVM,ESRD,anemia admitted due to anemia and hyperkalemia     Interval history / Subjective:      11/30/2020   Pt seen in room at chair side as up in megan chair  Pt notes blood in stool, ; pt is post extensive work up. If HGB cont to drop as did over past 24 hour will ask GI to see again. Pt advised that she may be a person that requires intermittent blood transfusions w little else to offer, but if brisk bleed , repeat studies would be a reasonable consideration. Pt represented she understood. Pt concerned that she need more iron, iv, ; would be reasonable to give 100 -  200 mg IV iron sucrose soon and as asking will allow today .      Overall out look is one of constant re-eval at home and at HD center and transfusion as needed. For now f/u on hCT, iron sucrose x 1 dose today as above. And consider GI eval if hgb cont current down trend.       Assessment & Plan:       #Acute on chronic anemia:  -multifactorial - hx AVMs,CKD  -Gastroenterology signed off   -recent EGD 10/21: mild gastritis with some friable mucosa and oozing-treated with APC and also capsule study performed with presence of small non-bleeding AVM's in small intestine.  Patient reports she had [facility] follow up and underwent EGD, enteroscopy, and colonoscopy- all with no findings. -s/p 1 U PRBC 11/17, 11/18, 11/24  - PPI BID  - monitor Hb, transfuse <7  Per GI/hepatology:   - EGD 10/21/20 Dr. Cherelle Thompson - oozing in stomach, treated with APC  -M2A 10/22/20 - multiple non-bleeding SB AVMs  -Patient reports subsequent GI evaluation with EGD, enteroscopy and colonoscopy at the [facility] post-discharge from here in October  - Case discussed with Dr. Abby Rowe. He will check iron panel and consider iron infusion. Hold off on endoscopy at this time. Will sign off. Please call back if signs of active GIB which may warrant endoscopy by GI. Lab Results   Component Value Date/Time     HGB 7.4 (L) 11/29/2020 02:12 AM            Lab Results   Component Value Date/Time     Iron 43 11/27/2020 02:28 AM     TIBC 310 11/27/2020 02:28 AM     Iron % saturation 14 (L) 11/27/2020 02:28 AM     Ferritin 37 11/27/2020 02:28 AM    now for Hgb q 12 and for consideration of GI re-eval      #Hyperkalemia- mild         Lab Results   Component Value Date/Time     Potassium 5.0 11/27/2020 02:28 AM         # coated tongue for nystatin 11/29/2020      #ESRD on HD TTS  -nephrology following     # Vaginitis for diflucan      #Multiple Pul Nodules up to 6 mm size, for CT f/u in 3-6 months as out pt .     #Liver cirrhosis ?  with Ascites :  -hx recurrent ascites with paracentesis . Not on any diuretics at home as pt is anuric   -USG abd showed ascites. The liver echotexture is normal  - Paracentesis drain placed on 11/19 and removed on 11/21  - fluid analysis shows transudate. Ascites fluid total protein > 2.5 which is highly suggestive of heart failure  - appreciate Hepatology input  - tranasjugular liver biopsy done 40/65     #Diastolic heart failure compensated. 11/30/2020   #Severe Pulm artery HTN  - Echo: LVEF is 48%. Severe (grade 3) left ventricular diastolic dysfunction. Moderate to severe tricuspid valve regurgitation is present.   - appreciate cardiology input  - no further cardiac testing     Code Stroke 11/25 right side numbness/ tingling  - CT head: No acute intracranial abnormality  - MRI brain , MRI c spine, MRA ordered  - appreciate neurology input  - no aspirin due to GI bleed   - lipid panel ordered   Results for Cline Gottron (MRN [id number]) as of 11/26/2020 10:40    Ref. Range 11/25/2020 03:21   Triglyceride Latest Ref Range: <150 MG/DL 85   Cholesterol, total Latest Ref Range: <200 MG/   HDL Cholesterol Latest Units: MG/DL 59   CHOL/HDL Ratio Latest Ref Range: 0.0 - 5.0   1.9   VLDL, calculated Latest Units: MG/DL 17   LDL, calculated Latest Ref Range: 0 - 100 MG/DL 38         Chronic pain- c/w home oxy prn     Neck pain, improved over days. No further w/u planned by this writer, neurology considered MRI cervical spine but pt can't lay down for same.   11/30/2020 : good rom and no pain elicited.      Code status: full  DVT prophylaxis: scd     Care Plan discussed with: Patient/Family and Nurse  Anticipated Disposition: Home w/Family                 DISCHARGE DIAGNOSES / PLAN:      1.  as above     ADDITIONAL CARE RECOMMENDATIONS:   na    PENDING TEST RESULTS:   At the time of discharge the following test results are still pending: na    FOLLOW UP APPOINTMENTS:    Follow-up Information     Follow up With Specialties Details Why Contact Info    None    None (395) Patient stated that they have no PCP      Jaguar [place] MD Hepatology  call to set up appointment  [address] [place] [address] PCP Follow up   [address] [facility] PCP; DR. Moore [facility] [phone]    [address] [place] [address]  [phone]             DIET: as violette  ACTIVITY: Activity as tolerated    WOUND CARE: na    EQUIPMENT needed: na      DISCHARGE MEDICATIONS:  Discharge Medication List as of 12/1/2020  1:18 PM      CONTINUE these medications which have CHANGED    Details   pantoprazole (Protonix) 40 mg tablet Take 1 Tab by mouth two (2) times a day for 30 days. , Print, Disp-60 Tab,R-0         CONTINUE these medications which have NOT CHANGED    Details   triamcinolone acetonide (KENALOG) 0.1 % topical cream Apply  to affected area two (2) times a day. use thin layer   Indications: itching of the genital area, itching, Historical Med      polyvinyl alcohol-povidon,PF, (REFRESH CLASSIC) 1.4-0.6 % ophthalmic solution Administer 1-2 Drops to both eyes three (3) times daily. Indications: dry eye, Historical Med      ketotifen (ZADITOR) 0.025 % (0.035 %) ophthalmic solution Administer 1 Drop to both eyes two (2) times a day. Indications: allergic conjunctivitis, Historical Med      oxyCODONE IR (ROXICODONE) 5 mg immediate release tablet Take 5 mg by mouth every six (6) hours as needed for Pain., Historical Med      ascorbic acid, vitamin C, (Vitamin C) 500 mg tablet Take 500 mg by mouth daily. , Historical Med      lactulose (CHRONULAC) 10 gram/15 mL solution Take 30 mL by mouth two (2) times a day., No Print, Disp-1 Bottle,R-0      cholecalciferol (VITAMIN D3) 1,000 unit tablet Take 2,000 Units by mouth daily. , Historical Med      ferrous sulfate 325 mg (65 mg iron) tablet Take 325 mg by mouth three (3) times daily (with meals). , Historical Med      sevelamer (RENAGEL) 800 mg tablet Take 1,600 mg by mouth three (3) times daily (with meals). , Historical Med      albuterol (PROVENTIL HFA, VENTOLIN HFA, PROAIR HFA) 90 mcg/actuation inhaler Take 2 Puffs by inhalation every four (4) hours as needed for Wheezing., Historical Med               NOTIFY YOUR PHYSICIAN FOR ANY OF THE FOLLOWING:   Fever over 101 degrees for 24 hours. Chest pain, shortness of breath, fever, chills, nausea, vomiting, diarrhea, change in mentation, falling, weakness, bleeding. Severe pain or pain not relieved by medications.   Or, any other signs or symptoms that you may have questions about.     DISPOSITION:    Home With:   OT  PT  HH  RN       Long term SNF/Inpatient Rehab   x Independent/assisted living    Hospice    Other:       PATIENT CONDITION AT DISCHARGE:     Functional status    Poor    x Deconditioned     Independent      Cognition     Lucid    x Forgetful     Dementia      Catheters/lines (plus indication)    Cm     PICC     PEG    x None      Code status    x Full code     DNR      PHYSICAL EXAMINATION AT DISCHARGE:  Visit Vitals  BP (!) 121/53   Pulse 87   Temp 97 °F (36.1 °C)   Resp (!) 100   Ht 5' 2.99\" (1.6 m)   Wt 73.6 kg (162 lb 4.1 oz)   SpO2 (!) 18%   BMI 28.74 kg/m²        CHRONIC MEDICAL DIAGNOSES:  Problem List as of 12/1/2020 Date Reviewed: 12/1/2020          Codes Class Noted - Resolved    ESRD (end stage renal disease) ([facility] ICD-10-CM: N18.6  ICD-9-CM: 585.6  10/18/2020 - Present        Suspected COVID-19 virus infection ICD-10-CM: Z20.828  ICD-9-CM: V01.79  10/18/2020 - Present        HTN (hypertension) ICD-10-CM: I10  ICD-9-CM: 401.9  10/17/2020 - Present        Dependence on renal dialysis ([facility] ICD-10-CM: Z99.2  ICD-9-CM: V45.11  10/6/2020 - Present        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, or end stage renal disease ([facility] ICD-10-CM: I13.2  ICD-9-CM: 404.93, 428.9  10/6/2020 - Present        Other ascites ICD-10-CM: R18.8  ICD-9-CM: 789.59  10/6/2020 - Present        Other chronic pain ICD-10-CM: G89.29  ICD-9-CM: 338.29  10/6/2020 - Present        Paroxysmal atrial fibrillation ([facility] ICD-10-CM: I48.0  ICD-9-CM: 427.31  10/6/2020 - Present        Type 2 diabetes mellitus with hyperglycemia ([facility] ICD-10-CM: E11.65  ICD-9-CM: 250.00  10/6/2020 - Present        Unspecified cirrhosis of liver ([facility] ICD-10-CM: K74.60  ICD-9-CM: 571.5  10/6/2020 - Present        Other hypotension ICD-10-CM: I95.89  ICD-9-CM: 458.8  10/6/2020 - Present        Severe anemia ICD-10-CM: D64.9  ICD-9-CM: 285.9  5/2/2018 - Present        Dizziness ICD-10-CM: R42  ICD-9-CM: 780.4  3/16/2018 - Present        Generalized weakness ICD-10-CM: R53.1  ICD-9-CM: 780.79  3/16/2018 - Present        Rectal bleed ICD-10-CM: K62.5  ICD-9-CM: 569.3  3/16/2018 - Present        Symptomatic anemia ICD-10-CM: D64.9  ICD-9-CM: 285.9  3/16/2018 - Present        Acute blood loss anemia ICD-10-CM: D62  ICD-9-CM: 285.1  3/9/2018 - Present        Cirrhosis ([facility] ICD-10-CM: K74.60  ICD-9-CM: 571.5  12/17/2017 - Present        Anemia in chronic kidney disease ICD-10-CM: N18.9, D63.1  ICD-9-CM: 285.21  12/16/2017 - Present        GI bleed ICD-10-CM: K92.2  ICD-9-CM: 578.9  12/15/2017 - Present        Diabetes mellitus type 2, controlled ([facility] ICD-10-CM: E11.9  ICD-9-CM: 250.00  12/15/2017 - Present        RESOLVED: Symptomatic anemia ICD-10-CM: D64.9  ICD-9-CM: 285.9  1/10/2018 - 3/9/2018        RESOLVED: Back pain ICD-10-CM: M54.9  ICD-9-CM: 724.5  12/17/2017 - 3/9/2018        RESOLVED: Constipation ICD-10-CM: K59.00  ICD-9-CM: 564.00  12/17/2017 - 3/9/2018        RESOLVED: ESRD (end stage renal disease) ([facility] ICD-10-CM: N18.6  ICD-9-CM: 585.6  12/15/2017 - 10/10/2020        RESOLVED: HTN (hypertension) ICD-10-CM: I10  ICD-9-CM: 401.9  12/15/2017 - 10/10/2020        Anemia ICD-10-CM: D64.9  ICD-9-CM: 285.9  9/17/2020 - Present              Greater than 20  minutes were spent with the patient on counseling and coordination of care    Signed:   Gisele Watkins MD  12/30/2020  5:00 PM

## 2021-02-19 LAB
ABO + RH BLD: NORMAL
BLD PROD TYP BPU: NORMAL
BLOOD BAG HEMOLYSIS,BLBAG: NORMAL
CLERICAL ERRORS,CLERR: DETECTED
DAT P TRANSF RBC QL: NEGATIVE
DAT RBC QL: NORMAL
HEMOLYSIS, POST TXN,PSTHE: NORMAL
HEMOLYSIS,PRE TXN,PREHE: NORMAL
MD INTERPRETATION: NORMAL
UNIT NUMBER,UN: NORMAL

## 2021-05-12 NOTE — PROGRESS NOTES
0946- Code stroke called, pt complaining of new right sided numbness/decreased sensation radiating from right neck down to toes. AxO4, PERRLA intact. NIH=3. At 1930 pt complained of numbness radiating from right toes to leg, NP notified. Numbness in fingers and toes is baseline. Feels numbness and decreased sensation has worsen since liver biopsy.      CT: negative Increase fluids No change/Increase fluids

## 2021-08-03 PROBLEM — D64.9 ANEMIA: Status: RESOLVED | Noted: 2020-09-17 | Resolved: 2021-08-03

## 2022-01-01 ENCOUNTER — APPOINTMENT (OUTPATIENT)
Dept: GENERAL RADIOLOGY | Age: 72
DRG: 871 | End: 2022-01-01
Attending: EMERGENCY MEDICINE
Payer: MEDICARE

## 2022-01-01 ENCOUNTER — APPOINTMENT (OUTPATIENT)
Dept: GENERAL RADIOLOGY | Age: 72
DRG: 871 | End: 2022-01-01
Attending: STUDENT IN AN ORGANIZED HEALTH CARE EDUCATION/TRAINING PROGRAM
Payer: MEDICARE

## 2022-01-01 ENCOUNTER — HOSPITAL ENCOUNTER (INPATIENT)
Age: 72
LOS: 10 days | DRG: 871 | End: 2022-11-01
Attending: STUDENT IN AN ORGANIZED HEALTH CARE EDUCATION/TRAINING PROGRAM | Admitting: INTERNAL MEDICINE
Payer: MEDICARE

## 2022-01-01 ENCOUNTER — APPOINTMENT (OUTPATIENT)
Dept: GENERAL RADIOLOGY | Age: 72
DRG: 871 | End: 2022-01-01
Attending: HOSPITALIST
Payer: MEDICARE

## 2022-01-01 ENCOUNTER — APPOINTMENT (OUTPATIENT)
Dept: CARDIAC REHAB | Age: 72
End: 2022-01-01

## 2022-01-01 ENCOUNTER — HOSPITAL ENCOUNTER (INPATIENT)
Dept: ULTRASOUND IMAGING | Age: 72
Discharge: HOME | DRG: 871 | End: 2022-10-24
Attending: HOSPITALIST
Payer: MEDICARE

## 2022-01-01 ENCOUNTER — APPOINTMENT (OUTPATIENT)
Dept: GENERAL RADIOLOGY | Age: 72
DRG: 871 | End: 2022-01-01
Attending: NURSE PRACTITIONER
Payer: MEDICARE

## 2022-01-01 ENCOUNTER — APPOINTMENT (OUTPATIENT)
Dept: NON INVASIVE DIAGNOSTICS | Age: 72
DRG: 871 | End: 2022-01-01
Attending: EMERGENCY MEDICINE
Payer: MEDICARE

## 2022-01-01 ENCOUNTER — APPOINTMENT (OUTPATIENT)
Dept: CT IMAGING | Age: 72
DRG: 871 | End: 2022-01-01
Attending: NURSE PRACTITIONER
Payer: MEDICARE

## 2022-01-01 ENCOUNTER — APPOINTMENT (OUTPATIENT)
Dept: CT IMAGING | Age: 72
DRG: 871 | End: 2022-01-01
Attending: EMERGENCY MEDICINE
Payer: MEDICARE

## 2022-01-01 VITALS
BODY MASS INDEX: 27.96 KG/M2 | DIASTOLIC BLOOD PRESSURE: 46 MMHG | OXYGEN SATURATION: 91 % | SYSTOLIC BLOOD PRESSURE: 68 MMHG | HEIGHT: 60 IN | TEMPERATURE: 98.6 F | WEIGHT: 142.42 LBS

## 2022-01-01 DIAGNOSIS — N18.6 ESRD (END STAGE RENAL DISEASE) ON DIALYSIS (HCC): ICD-10-CM

## 2022-01-01 DIAGNOSIS — I49.9 CARDIAC ARRHYTHMIA, UNSPECIFIED CARDIAC ARRHYTHMIA TYPE: ICD-10-CM

## 2022-01-01 DIAGNOSIS — E87.5 ACUTE HYPERKALEMIA: ICD-10-CM

## 2022-01-01 DIAGNOSIS — Z99.2 ESRD (END STAGE RENAL DISEASE) ON DIALYSIS (HCC): ICD-10-CM

## 2022-01-01 DIAGNOSIS — Z51.5 PALLIATIVE CARE ENCOUNTER: ICD-10-CM

## 2022-01-01 DIAGNOSIS — I50.22 CHRONIC SYSTOLIC HEART FAILURE (HCC): ICD-10-CM

## 2022-01-01 DIAGNOSIS — J96.21 ACUTE ON CHRONIC RESPIRATORY FAILURE WITH HYPOXIA AND HYPERCAPNIA (HCC): ICD-10-CM

## 2022-01-01 DIAGNOSIS — J96.22 ACUTE ON CHRONIC RESPIRATORY FAILURE WITH HYPOXIA AND HYPERCAPNIA (HCC): ICD-10-CM

## 2022-01-01 DIAGNOSIS — A41.9 SEPSIS, DUE TO UNSPECIFIED ORGANISM, UNSPECIFIED WHETHER ACUTE ORGAN DYSFUNCTION PRESENT (HCC): Primary | ICD-10-CM

## 2022-01-01 DIAGNOSIS — J10.1 INFLUENZA A: ICD-10-CM

## 2022-01-01 DIAGNOSIS — R53.81 DEBILITY: ICD-10-CM

## 2022-01-01 LAB
ABO + RH BLD: NORMAL
ALBUMIN SERPL-MCNC: 2.2 G/DL (ref 3.5–5)
ALBUMIN SERPL-MCNC: 2.3 G/DL (ref 3.5–5)
ALBUMIN SERPL-MCNC: 2.5 G/DL (ref 3.5–5)
ALBUMIN SERPL-MCNC: 2.8 G/DL (ref 3.5–5)
ALBUMIN SERPL-MCNC: 3.3 G/DL (ref 3.5–5)
ALBUMIN SERPL-MCNC: 3.4 G/DL (ref 3.5–5)
ALBUMIN/GLOB SERPL: 0.8 {RATIO} (ref 1.1–2.2)
ALBUMIN/GLOB SERPL: 0.8 {RATIO} (ref 1.1–2.2)
ALBUMIN/GLOB SERPL: 0.9 {RATIO} (ref 1.1–2.2)
ALBUMIN/GLOB SERPL: 1 {RATIO} (ref 1.1–2.2)
ALBUMIN/GLOB SERPL: 1.2 {RATIO} (ref 1.1–2.2)
ALBUMIN/GLOB SERPL: 2 {RATIO} (ref 1.1–2.2)
ALP SERPL-CCNC: 142 U/L (ref 45–117)
ALP SERPL-CCNC: 142 U/L (ref 45–117)
ALP SERPL-CCNC: 158 U/L (ref 45–117)
ALP SERPL-CCNC: 175 U/L (ref 45–117)
ALP SERPL-CCNC: 181 U/L (ref 45–117)
ALP SERPL-CCNC: 203 U/L (ref 45–117)
ALT SERPL-CCNC: 20 U/L (ref 12–78)
ALT SERPL-CCNC: 22 U/L (ref 12–78)
ALT SERPL-CCNC: 28 U/L (ref 12–78)
ALT SERPL-CCNC: 37 U/L (ref 12–78)
ALT SERPL-CCNC: 51 U/L (ref 12–78)
ALT SERPL-CCNC: 83 U/L (ref 12–78)
AMMONIA PLAS-SCNC: 17 UMOL/L
AMMONIA PLAS-SCNC: 53 UMOL/L
ANION GAP SERPL CALC-SCNC: 10 MMOL/L (ref 5–15)
ANION GAP SERPL CALC-SCNC: 11 MMOL/L (ref 5–15)
ANION GAP SERPL CALC-SCNC: 11 MMOL/L (ref 5–15)
ANION GAP SERPL CALC-SCNC: 6 MMOL/L (ref 5–15)
ANION GAP SERPL CALC-SCNC: 7 MMOL/L (ref 5–15)
ANION GAP SERPL CALC-SCNC: 7 MMOL/L (ref 5–15)
ANION GAP SERPL CALC-SCNC: 8 MMOL/L (ref 5–15)
ANION GAP SERPL CALC-SCNC: 9 MMOL/L (ref 5–15)
ANION GAP SERPL CALC-SCNC: 9 MMOL/L (ref 5–15)
ARTERIAL PATENCY WRIST A: ABNORMAL
ARTERIAL PATENCY WRIST A: NORMAL
ARTERIAL PATENCY WRIST A: POSITIVE
AST SERPL-CCNC: 105 U/L (ref 15–37)
AST SERPL-CCNC: 139 U/L (ref 15–37)
AST SERPL-CCNC: 227 U/L (ref 15–37)
AST SERPL-CCNC: 35 U/L (ref 15–37)
AST SERPL-CCNC: 42 U/L (ref 15–37)
AST SERPL-CCNC: 51 U/L (ref 15–37)
ATRIAL RATE: 100 BPM
ATRIAL RATE: 140 BPM
ATRIAL RATE: 165 BPM
ATRIAL RATE: 59 BPM
BACTERIA SPEC CULT: NORMAL
BASE DEFICIT BLD-SCNC: 1.8 MMOL/L
BASE DEFICIT BLD-SCNC: 2.7 MMOL/L
BASE DEFICIT BLD-SCNC: 3.1 MMOL/L
BASE DEFICIT BLD-SCNC: 3.4 MMOL/L
BASE DEFICIT BLD-SCNC: 3.7 MMOL/L
BASE DEFICIT BLD-SCNC: 3.9 MMOL/L
BASE DEFICIT BLD-SCNC: 4.3 MMOL/L
BASE DEFICIT BLD-SCNC: 5.2 MMOL/L
BASE DEFICIT BLD-SCNC: 6.8 MMOL/L
BASE DEFICIT BLD-SCNC: 8 MMOL/L
BASE DEFICIT BLD-SCNC: 8.5 MMOL/L
BASE DEFICIT BLDA-SCNC: 2.8 MMOL/L
BASE DEFICIT BLDA-SCNC: 3.2 MMOL/L
BASE EXCESS BLD CALC-SCNC: 0.8 MMOL/L
BASE EXCESS BLD CALC-SCNC: 3.7 MMOL/L
BASE EXCESS BLD CALC-SCNC: 4.2 MMOL/L
BASOPHILS # BLD: 0 K/UL (ref 0–0.1)
BASOPHILS # BLD: 0.2 K/UL (ref 0–0.1)
BASOPHILS NFR BLD: 0 % (ref 0–1)
BASOPHILS NFR BLD: 1 % (ref 0–1)
BDY SITE: ABNORMAL
BDY SITE: NORMAL
BILIRUB SERPL-MCNC: 0.7 MG/DL (ref 0.2–1)
BILIRUB SERPL-MCNC: 1 MG/DL (ref 0.2–1)
BILIRUB SERPL-MCNC: 1.5 MG/DL (ref 0.2–1)
BILIRUB SERPL-MCNC: 2.7 MG/DL (ref 0.2–1)
BILIRUB SERPL-MCNC: 3.8 MG/DL (ref 0.2–1)
BILIRUB SERPL-MCNC: 4.3 MG/DL (ref 0.2–1)
BLOOD BANK CMNT PATIENT-IMP: NORMAL
BLOOD GROUP ANTIBODIES SERPL: NORMAL
BLOOD GROUP ANTIBODIES SERPL: NORMAL
BUN SERPL-MCNC: 10 MG/DL (ref 6–20)
BUN SERPL-MCNC: 12 MG/DL (ref 6–20)
BUN SERPL-MCNC: 15 MG/DL (ref 6–20)
BUN SERPL-MCNC: 15 MG/DL (ref 6–20)
BUN SERPL-MCNC: 16 MG/DL (ref 6–20)
BUN SERPL-MCNC: 20 MG/DL (ref 6–20)
BUN SERPL-MCNC: 21 MG/DL (ref 6–20)
BUN SERPL-MCNC: 23 MG/DL (ref 6–20)
BUN SERPL-MCNC: 25 MG/DL (ref 6–20)
BUN SERPL-MCNC: 30 MG/DL (ref 6–20)
BUN SERPL-MCNC: 30 MG/DL (ref 6–20)
BUN SERPL-MCNC: 6 MG/DL (ref 6–20)
BUN SERPL-MCNC: 9 MG/DL (ref 6–20)
BUN/CREAT SERPL: 3 (ref 12–20)
BUN/CREAT SERPL: 3 (ref 12–20)
BUN/CREAT SERPL: 4 (ref 12–20)
BUN/CREAT SERPL: 5 (ref 12–20)
BUN/CREAT SERPL: 6 (ref 12–20)
BUN/CREAT SERPL: 6 (ref 12–20)
CA-I BLD-MCNC: 1.01 MMOL/L (ref 1.12–1.32)
CA-I BLD-MCNC: 1.09 MMOL/L (ref 1.12–1.32)
CA-I BLD-SCNC: 1.07 MMOL/L (ref 1.12–1.32)
CA-I BLD-SCNC: 1.11 MMOL/L (ref 1.12–1.32)
CA-I BLD-SCNC: 1.12 MMOL/L (ref 1.12–1.32)
CA-I BLD-SCNC: 1.19 MMOL/L (ref 1.13–1.32)
CA-I BLD-SCNC: 1.23 MMOL/L (ref 1.12–1.32)
CA-I BLD-SCNC: 1.27 MMOL/L (ref 1.12–1.32)
CA-I BLD-SCNC: 1.28 MMOL/L (ref 1.12–1.32)
CA-I BLD-SCNC: 1.28 MMOL/L (ref 1.12–1.32)
CALCIUM SERPL-MCNC: 7.7 MG/DL (ref 8.5–10.1)
CALCIUM SERPL-MCNC: 7.9 MG/DL (ref 8.5–10.1)
CALCIUM SERPL-MCNC: 8 MG/DL (ref 8.5–10.1)
CALCIUM SERPL-MCNC: 8.1 MG/DL (ref 8.5–10.1)
CALCIUM SERPL-MCNC: 8.3 MG/DL (ref 8.5–10.1)
CALCIUM SERPL-MCNC: 8.5 MG/DL (ref 8.5–10.1)
CALCIUM SERPL-MCNC: 8.5 MG/DL (ref 8.5–10.1)
CALCIUM SERPL-MCNC: 8.8 MG/DL (ref 8.5–10.1)
CALCIUM SERPL-MCNC: 8.9 MG/DL (ref 8.5–10.1)
CALCIUM SERPL-MCNC: 8.9 MG/DL (ref 8.5–10.1)
CALCIUM SERPL-MCNC: 9 MG/DL (ref 8.5–10.1)
CALCIUM SERPL-MCNC: 9 MG/DL (ref 8.5–10.1)
CALCIUM SERPL-MCNC: 9.1 MG/DL (ref 8.5–10.1)
CALCULATED P AXIS, ECG09: -47 DEGREES
CALCULATED P AXIS, ECG09: 57 DEGREES
CALCULATED P AXIS, ECG09: 79 DEGREES
CALCULATED R AXIS, ECG10: -60 DEGREES
CALCULATED R AXIS, ECG10: -65 DEGREES
CALCULATED R AXIS, ECG10: -69 DEGREES
CALCULATED R AXIS, ECG10: -74 DEGREES
CALCULATED T AXIS, ECG11: 112 DEGREES
CALCULATED T AXIS, ECG11: 117 DEGREES
CALCULATED T AXIS, ECG11: 131 DEGREES
CALCULATED T AXIS, ECG11: 136 DEGREES
CHLORIDE BLD-SCNC: 106 MMOL/L (ref 100–108)
CHLORIDE BLD-SCNC: 98 MMOL/L (ref 100–108)
CHLORIDE SERPL-SCNC: 100 MMOL/L (ref 97–108)
CHLORIDE SERPL-SCNC: 101 MMOL/L (ref 97–108)
CHLORIDE SERPL-SCNC: 102 MMOL/L (ref 97–108)
CHLORIDE SERPL-SCNC: 103 MMOL/L (ref 97–108)
CHLORIDE SERPL-SCNC: 104 MMOL/L (ref 97–108)
CHLORIDE SERPL-SCNC: 105 MMOL/L (ref 97–108)
CHLORIDE SERPL-SCNC: 105 MMOL/L (ref 97–108)
CHLORIDE SERPL-SCNC: 106 MMOL/L (ref 97–108)
CHLORIDE SERPL-SCNC: 107 MMOL/L (ref 97–108)
CHLORIDE SERPL-SCNC: 108 MMOL/L (ref 97–108)
CO2 BLD-SCNC: 27 MMOL/L (ref 19–24)
CO2 SERPL-SCNC: 22 MMOL/L (ref 21–32)
CO2 SERPL-SCNC: 23 MMOL/L (ref 21–32)
CO2 SERPL-SCNC: 23 MMOL/L (ref 21–32)
CO2 SERPL-SCNC: 24 MMOL/L (ref 21–32)
CO2 SERPL-SCNC: 25 MMOL/L (ref 21–32)
CO2 SERPL-SCNC: 26 MMOL/L (ref 21–32)
CO2 SERPL-SCNC: 27 MMOL/L (ref 21–32)
CO2 SERPL-SCNC: 27 MMOL/L (ref 21–32)
CO2 SERPL-SCNC: 30 MMOL/L (ref 21–32)
COMMENT, HOLDF: NORMAL
COVID-19 RAPID TEST, COVR: NOT DETECTED
CREAT SERPL-MCNC: 1.09 MG/DL (ref 0.55–1.02)
CREAT SERPL-MCNC: 1.49 MG/DL (ref 0.55–1.02)
CREAT SERPL-MCNC: 2.31 MG/DL (ref 0.55–1.02)
CREAT SERPL-MCNC: 3.34 MG/DL (ref 0.55–1.02)
CREAT SERPL-MCNC: 4.27 MG/DL (ref 0.55–1.02)
CREAT SERPL-MCNC: 4.31 MG/DL (ref 0.55–1.02)
CREAT SERPL-MCNC: 4.33 MG/DL (ref 0.55–1.02)
CREAT SERPL-MCNC: 5.17 MG/DL (ref 0.55–1.02)
CREAT SERPL-MCNC: 5.3 MG/DL (ref 0.55–1.02)
CREAT SERPL-MCNC: 5.34 MG/DL (ref 0.55–1.02)
CREAT SERPL-MCNC: 6.01 MG/DL (ref 0.55–1.02)
CREAT SERPL-MCNC: 6.8 MG/DL (ref 0.55–1.02)
CREAT SERPL-MCNC: 6.85 MG/DL (ref 0.55–1.02)
CREAT UR-MCNC: 3.4 MG/DL (ref 0.6–1.3)
CREAT UR-MCNC: 6.9 MG/DL (ref 0.6–1.3)
DIAGNOSIS, 93000: NORMAL
DIFFERENTIAL METHOD BLD: ABNORMAL
ECHO AO ROOT DIAM: 3.2 CM
ECHO AO ROOT INDEX: 1.99 CM/M2
ECHO AV MEAN GRADIENT: 6 MMHG
ECHO AV MEAN VELOCITY: 1.2 M/S
ECHO AV PEAK GRADIENT: 12 MMHG
ECHO AV PEAK VELOCITY: 1.8 M/S
ECHO AV VELOCITY RATIO: 0.67
ECHO AV VTI: 30.8 CM
ECHO LA DIAMETER INDEX: 2.61 CM/M2
ECHO LA DIAMETER: 4.2 CM
ECHO LA TO AORTIC ROOT RATIO: 1.31
ECHO LV E' LATERAL VELOCITY: 4 CM/S
ECHO LV E' SEPTAL VELOCITY: 3 CM/S
ECHO LV EDV A2C: 103 ML
ECHO LV EDV A4C: 77 ML
ECHO LV EDV BP: 90 ML (ref 56–104)
ECHO LV EDV INDEX A4C: 48 ML/M2
ECHO LV EDV INDEX BP: 56 ML/M2
ECHO LV EDV NDEX A2C: 64 ML/M2
ECHO LV EJECTION FRACTION A2C: 37 %
ECHO LV EJECTION FRACTION A4C: 17 %
ECHO LV EJECTION FRACTION BIPLANE: 29 % (ref 55–100)
ECHO LV ESV A2C: 65 ML
ECHO LV ESV A4C: 64 ML
ECHO LV ESV BP: 65 ML (ref 19–49)
ECHO LV ESV INDEX A2C: 40 ML/M2
ECHO LV ESV INDEX A4C: 40 ML/M2
ECHO LV ESV INDEX BP: 40 ML/M2
ECHO LV FRACTIONAL SHORTENING: 15 % (ref 28–44)
ECHO LV INTERNAL DIMENSION DIASTOLE INDEX: 3.29 CM/M2
ECHO LV INTERNAL DIMENSION DIASTOLIC: 5.3 CM (ref 3.9–5.3)
ECHO LV INTERNAL DIMENSION SYSTOLIC INDEX: 2.8 CM/M2
ECHO LV INTERNAL DIMENSION SYSTOLIC: 4.5 CM
ECHO LV IVSD: 1.1 CM (ref 0.6–0.9)
ECHO LV MASS 2D: 213.9 G (ref 67–162)
ECHO LV MASS INDEX 2D: 132.8 G/M2 (ref 43–95)
ECHO LV POSTERIOR WALL DIASTOLIC: 1 CM (ref 0.6–0.9)
ECHO LV RELATIVE WALL THICKNESS RATIO: 0.38
ECHO LVOT AV VTI INDEX: 0.57
ECHO LVOT MEAN GRADIENT: 2 MMHG
ECHO LVOT PEAK GRADIENT: 5 MMHG
ECHO LVOT PEAK VELOCITY: 1.2 M/S
ECHO LVOT VTI: 17.5 CM
ECHO MV A VELOCITY: 1.08 M/S
ECHO MV AREA PHT: 3.1 CM2
ECHO MV E DECELERATION TIME (DT): 242.7 MS
ECHO MV E VELOCITY: 1.25 M/S
ECHO MV E/A RATIO: 1.16
ECHO MV E/E' LATERAL: 31.25
ECHO MV E/E' RATIO (AVERAGED): 36.46
ECHO MV E/E' SEPTAL: 41.67
ECHO MV LVOT VTI INDEX: 2.05
ECHO MV MAX VELOCITY: 1.4 M/S
ECHO MV MEAN GRADIENT: 4 MMHG
ECHO MV MEAN VELOCITY: 0.9 M/S
ECHO MV PEAK GRADIENT: 8 MMHG
ECHO MV PRESSURE HALF TIME (PHT): 70.4 MS
ECHO MV VTI: 35.9 CM
ECHO PULMONARY ARTERY SYSTOLIC PRESSURE (PASP): 75 MMHG
ECHO PV MAX VELOCITY: 1.6 M/S
ECHO PV PEAK GRADIENT: 11 MMHG
ECHO RV TAPSE: 1.5 CM (ref 1.7–?)
ECHO TV REGURGITANT MAX VELOCITY: 4.25 M/S
ECHO TV REGURGITANT PEAK GRADIENT: 72 MMHG
EOSINOPHIL # BLD: 0 K/UL (ref 0–0.4)
EOSINOPHIL # BLD: 0.1 K/UL (ref 0–0.4)
EOSINOPHIL # BLD: 0.2 K/UL (ref 0–0.4)
EOSINOPHIL # BLD: 0.7 K/UL (ref 0–0.4)
EOSINOPHIL # BLD: 0.8 K/UL (ref 0–0.4)
EOSINOPHIL NFR BLD: 0 % (ref 0–7)
EOSINOPHIL NFR BLD: 1 % (ref 0–7)
EOSINOPHIL NFR BLD: 3 % (ref 0–7)
EOSINOPHIL NFR BLD: 4 % (ref 0–7)
EOSINOPHIL NFR BLD: 6 % (ref 0–7)
ERYTHROCYTE [DISTWIDTH] IN BLOOD BY AUTOMATED COUNT: 18.6 % (ref 11.5–14.5)
ERYTHROCYTE [DISTWIDTH] IN BLOOD BY AUTOMATED COUNT: 18.7 % (ref 11.5–14.5)
ERYTHROCYTE [DISTWIDTH] IN BLOOD BY AUTOMATED COUNT: 19.2 % (ref 11.5–14.5)
ERYTHROCYTE [DISTWIDTH] IN BLOOD BY AUTOMATED COUNT: 19.5 % (ref 11.5–14.5)
ERYTHROCYTE [DISTWIDTH] IN BLOOD BY AUTOMATED COUNT: 19.7 % (ref 11.5–14.5)
ERYTHROCYTE [DISTWIDTH] IN BLOOD BY AUTOMATED COUNT: 19.8 % (ref 11.5–14.5)
ERYTHROCYTE [DISTWIDTH] IN BLOOD BY AUTOMATED COUNT: 19.9 % (ref 11.5–14.5)
ERYTHROCYTE [DISTWIDTH] IN BLOOD BY AUTOMATED COUNT: 20.3 % (ref 11.5–14.5)
FIO2 ON VENT: 100 %
FIO2 ON VENT: 40 %
FLUAV AG NPH QL IA: POSITIVE
FLUBV AG NOSE QL IA: NEGATIVE
FOLATE SERPL-MCNC: 5.6 NG/ML (ref 5–21)
GAS FLOW.O2 O2 DELIVERY SYS: ABNORMAL L/MIN
GAS FLOW.O2 O2 DELIVERY SYS: NORMAL L/MIN
GAS FLOW.O2 SETTING OXYMISER: 1 L/MIN
GAS FLOW.O2 SETTING OXYMISER: 12 BPM
GAS FLOW.O2 SETTING OXYMISER: 18 BPM
GAS FLOW.O2 SETTING OXYMISER: 18 L/MIN
GAS FLOW.O2 SETTING OXYMISER: 20 BPM
GAS FLOW.O2 SETTING OXYMISER: 20 BPM
GAS FLOW.O2 SETTING OXYMISER: 22 BPM
GAS FLOW.O2 SETTING OXYMISER: 24 BPM
GLOBULIN SER CALC-MCNC: 1.7 G/DL (ref 2–4)
GLOBULIN SER CALC-MCNC: 2.4 G/DL (ref 2–4)
GLOBULIN SER CALC-MCNC: 2.6 G/DL (ref 2–4)
GLOBULIN SER CALC-MCNC: 2.8 G/DL (ref 2–4)
GLOBULIN SER CALC-MCNC: 2.9 G/DL (ref 2–4)
GLOBULIN SER CALC-MCNC: 3.7 G/DL (ref 2–4)
GLUCOSE BLD STRIP.AUTO-MCNC: 107 MG/DL (ref 65–117)
GLUCOSE BLD STRIP.AUTO-MCNC: 107 MG/DL (ref 65–117)
GLUCOSE BLD STRIP.AUTO-MCNC: 140 MG/DL (ref 65–117)
GLUCOSE BLD STRIP.AUTO-MCNC: 149 MG/DL (ref 65–117)
GLUCOSE BLD STRIP.AUTO-MCNC: 169 MG/DL (ref 65–117)
GLUCOSE BLD STRIP.AUTO-MCNC: 39 MG/DL (ref 65–117)
GLUCOSE BLD STRIP.AUTO-MCNC: 50 MG/DL (ref 65–117)
GLUCOSE BLD STRIP.AUTO-MCNC: 67 MG/DL (ref 65–117)
GLUCOSE BLD STRIP.AUTO-MCNC: 67 MG/DL (ref 65–117)
GLUCOSE BLD STRIP.AUTO-MCNC: 68 MG/DL (ref 65–117)
GLUCOSE BLD STRIP.AUTO-MCNC: 69 MG/DL (ref 65–117)
GLUCOSE BLD STRIP.AUTO-MCNC: 70 MG/DL (ref 65–117)
GLUCOSE BLD STRIP.AUTO-MCNC: 70 MG/DL (ref 65–117)
GLUCOSE BLD STRIP.AUTO-MCNC: 71 MG/DL (ref 65–117)
GLUCOSE BLD STRIP.AUTO-MCNC: 71 MG/DL (ref 65–117)
GLUCOSE BLD STRIP.AUTO-MCNC: 72 MG/DL (ref 74–106)
GLUCOSE BLD STRIP.AUTO-MCNC: 74 MG/DL (ref 65–117)
GLUCOSE BLD STRIP.AUTO-MCNC: 78 MG/DL (ref 65–117)
GLUCOSE BLD STRIP.AUTO-MCNC: 80 MG/DL (ref 65–117)
GLUCOSE BLD STRIP.AUTO-MCNC: 82 MG/DL (ref 65–117)
GLUCOSE BLD STRIP.AUTO-MCNC: 82 MG/DL (ref 65–117)
GLUCOSE BLD STRIP.AUTO-MCNC: 86 MG/DL (ref 74–106)
GLUCOSE BLD STRIP.AUTO-MCNC: 87 MG/DL (ref 65–117)
GLUCOSE BLD STRIP.AUTO-MCNC: 94 MG/DL (ref 65–117)
GLUCOSE SERPL-MCNC: 163 MG/DL (ref 65–100)
GLUCOSE SERPL-MCNC: 168 MG/DL (ref 65–100)
GLUCOSE SERPL-MCNC: 178 MG/DL (ref 65–100)
GLUCOSE SERPL-MCNC: 181 MG/DL (ref 65–100)
GLUCOSE SERPL-MCNC: 206 MG/DL (ref 65–100)
GLUCOSE SERPL-MCNC: 44 MG/DL (ref 65–100)
GLUCOSE SERPL-MCNC: 54 MG/DL (ref 65–100)
GLUCOSE SERPL-MCNC: 58 MG/DL (ref 65–100)
GLUCOSE SERPL-MCNC: 62 MG/DL (ref 65–100)
GLUCOSE SERPL-MCNC: 76 MG/DL (ref 65–100)
GLUCOSE SERPL-MCNC: 85 MG/DL (ref 65–100)
GLUCOSE SERPL-MCNC: 86 MG/DL (ref 65–100)
GLUCOSE SERPL-MCNC: 99 MG/DL (ref 65–100)
HBV SURFACE AB SER QL: NONREACTIVE
HBV SURFACE AB SER-ACNC: 9.48 MIU/ML
HBV SURFACE AG SER QL: <0.1 INDEX
HBV SURFACE AG SER QL: NEGATIVE
HCO3 BLD-SCNC: 19.6 MMOL/L (ref 22–26)
HCO3 BLD-SCNC: 19.7 MMOL/L (ref 22–26)
HCO3 BLD-SCNC: 20.6 MMOL/L (ref 22–26)
HCO3 BLD-SCNC: 20.7 MMOL/L (ref 22–26)
HCO3 BLD-SCNC: 21.7 MMOL/L (ref 22–26)
HCO3 BLD-SCNC: 22.1 MMOL/L (ref 22–26)
HCO3 BLD-SCNC: 22.2 MMOL/L (ref 22–26)
HCO3 BLD-SCNC: 22.7 MMOL/L (ref 22–26)
HCO3 BLD-SCNC: 23.2 MMOL/L (ref 22–26)
HCO3 BLD-SCNC: 23.5 MMOL/L (ref 22–26)
HCO3 BLD-SCNC: 23.5 MMOL/L (ref 22–26)
HCO3 BLD-SCNC: 24.1 MMOL/L (ref 22–26)
HCO3 BLD-SCNC: 26.4 MMOL/L (ref 22–26)
HCO3 BLD-SCNC: 26.4 MMOL/L (ref 22–26)
HCO3 BLD-SCNC: 29.2 MMOL/L (ref 22–26)
HCO3 BLD-SCNC: 30.1 MMOL/L (ref 22–26)
HCO3 BLDA-SCNC: 23 MMOL/L (ref 22–26)
HCO3 BLDA-SCNC: 24 MMOL/L (ref 22–26)
HCO3 BLDA-SCNC: 26 MMOL/L
HCT VFR BLD AUTO: 35.6 % (ref 35–47)
HCT VFR BLD AUTO: 38.2 % (ref 35–47)
HCT VFR BLD AUTO: 39 % (ref 35–47)
HCT VFR BLD AUTO: 45.9 % (ref 35–47)
HCT VFR BLD AUTO: 47.2 % (ref 35–47)
HCT VFR BLD AUTO: 47.2 % (ref 35–47)
HCT VFR BLD AUTO: 48.5 % (ref 35–47)
HCT VFR BLD AUTO: 49.3 % (ref 35–47)
HCT VFR BLD AUTO: 49.3 % (ref 35–47)
HCT VFR BLD AUTO: 54.5 % (ref 35–47)
HGB BLD-MCNC: 10.9 G/DL (ref 11.5–16)
HGB BLD-MCNC: 11.5 G/DL (ref 11.5–16)
HGB BLD-MCNC: 12 G/DL (ref 11.5–16)
HGB BLD-MCNC: 13.5 G/DL (ref 11.5–16)
HGB BLD-MCNC: 13.9 G/DL (ref 11.5–16)
HGB BLD-MCNC: 13.9 G/DL (ref 11.5–16)
HGB BLD-MCNC: 14.4 G/DL (ref 11.5–16)
HGB BLD-MCNC: 14.7 G/DL (ref 11.5–16)
HGB BLD-MCNC: 15.1 G/DL (ref 11.5–16)
HGB BLD-MCNC: 16.1 G/DL (ref 11.5–16)
IMM GRANULOCYTES # BLD AUTO: 0 K/UL
IMM GRANULOCYTES # BLD AUTO: 0.1 K/UL (ref 0–0.04)
IMM GRANULOCYTES # BLD AUTO: 0.2 K/UL (ref 0–0.04)
IMM GRANULOCYTES NFR BLD AUTO: 0 %
IMM GRANULOCYTES NFR BLD AUTO: 1 % (ref 0–0.5)
INR PPP: 1.3 (ref 0.9–1.1)
INR PPP: 1.5 (ref 0.9–1.1)
LACTATE BLD-SCNC: 1.6 MMOL/L (ref 0.4–2)
LACTATE BLD-SCNC: 2.54 MMOL/L (ref 0.4–2)
LACTATE BLD-SCNC: 2.95 MMOL/L (ref 0.4–2)
LACTATE SERPL-SCNC: 2 MMOL/L (ref 0.4–2)
LACTATE SERPL-SCNC: 2.6 MMOL/L (ref 0.4–2)
LYMPHOCYTES # BLD: 0.2 K/UL (ref 0.8–3.5)
LYMPHOCYTES # BLD: 0.3 K/UL (ref 0.8–3.5)
LYMPHOCYTES # BLD: 0.4 K/UL (ref 0.8–3.5)
LYMPHOCYTES # BLD: 0.4 K/UL (ref 0.8–3.5)
LYMPHOCYTES # BLD: 0.5 K/UL (ref 0.8–3.5)
LYMPHOCYTES # BLD: 0.6 K/UL (ref 0.8–3.5)
LYMPHOCYTES # BLD: 0.8 K/UL (ref 0.8–3.5)
LYMPHOCYTES NFR BLD: 1 % (ref 12–49)
LYMPHOCYTES NFR BLD: 2 % (ref 12–49)
LYMPHOCYTES NFR BLD: 2 % (ref 12–49)
LYMPHOCYTES NFR BLD: 3 % (ref 12–49)
LYMPHOCYTES NFR BLD: 3 % (ref 12–49)
LYMPHOCYTES NFR BLD: 4 % (ref 12–49)
LYMPHOCYTES NFR BLD: 4 % (ref 12–49)
LYMPHOCYTES NFR BLD: 7 % (ref 12–49)
LYMPHOCYTES NFR BLD: 9 % (ref 12–49)
MAGNESIUM SERPL-MCNC: 1.9 MG/DL (ref 1.6–2.4)
MAGNESIUM SERPL-MCNC: 2 MG/DL (ref 1.6–2.4)
MAGNESIUM SERPL-MCNC: 2.2 MG/DL (ref 1.6–2.4)
MAGNESIUM SERPL-MCNC: 2.3 MG/DL (ref 1.6–2.4)
MAGNESIUM SERPL-MCNC: 2.4 MG/DL (ref 1.6–2.4)
MAGNESIUM SERPL-MCNC: NORMAL MG/DL (ref 1.6–2.4)
MCH RBC QN AUTO: 26.7 PG (ref 26–34)
MCH RBC QN AUTO: 26.9 PG (ref 26–34)
MCH RBC QN AUTO: 27.1 PG (ref 26–34)
MCH RBC QN AUTO: 27.2 PG (ref 26–34)
MCH RBC QN AUTO: 27.3 PG (ref 26–34)
MCH RBC QN AUTO: 27.3 PG (ref 26–34)
MCH RBC QN AUTO: 27.4 PG (ref 26–34)
MCH RBC QN AUTO: 27.6 PG (ref 26–34)
MCH RBC QN AUTO: 27.7 PG (ref 26–34)
MCH RBC QN AUTO: 28 PG (ref 26–34)
MCHC RBC AUTO-ENTMCNC: 28.7 G/DL (ref 30–36.5)
MCHC RBC AUTO-ENTMCNC: 29.2 G/DL (ref 30–36.5)
MCHC RBC AUTO-ENTMCNC: 29.4 G/DL (ref 30–36.5)
MCHC RBC AUTO-ENTMCNC: 29.4 G/DL (ref 30–36.5)
MCHC RBC AUTO-ENTMCNC: 29.5 G/DL (ref 30–36.5)
MCHC RBC AUTO-ENTMCNC: 30.1 G/DL (ref 30–36.5)
MCHC RBC AUTO-ENTMCNC: 30.6 G/DL (ref 30–36.5)
MCHC RBC AUTO-ENTMCNC: 30.6 G/DL (ref 30–36.5)
MCHC RBC AUTO-ENTMCNC: 30.8 G/DL (ref 30–36.5)
MCHC RBC AUTO-ENTMCNC: 31.1 G/DL (ref 30–36.5)
MCV RBC AUTO: 87.9 FL (ref 80–99)
MCV RBC AUTO: 88.6 FL (ref 80–99)
MCV RBC AUTO: 88.6 FL (ref 80–99)
MCV RBC AUTO: 88.8 FL (ref 80–99)
MCV RBC AUTO: 89 FL (ref 80–99)
MCV RBC AUTO: 92 FL (ref 80–99)
MCV RBC AUTO: 92.5 FL (ref 80–99)
MCV RBC AUTO: 94.6 FL (ref 80–99)
MCV RBC AUTO: 95 FL (ref 80–99)
MCV RBC AUTO: 95.7 FL (ref 80–99)
METAMYELOCYTES NFR BLD MANUAL: 2 %
MONOCYTES # BLD: 0.5 K/UL (ref 0–1)
MONOCYTES # BLD: 0.7 K/UL (ref 0–1)
MONOCYTES # BLD: 0.8 K/UL (ref 0–1)
MONOCYTES # BLD: 0.8 K/UL (ref 0–1)
MONOCYTES # BLD: 1.1 K/UL (ref 0–1)
MONOCYTES # BLD: 1.1 K/UL (ref 0–1)
MONOCYTES # BLD: 1.2 K/UL (ref 0–1)
MONOCYTES NFR BLD: 10 % (ref 5–13)
MONOCYTES NFR BLD: 5 % (ref 5–13)
MONOCYTES NFR BLD: 6 % (ref 5–13)
MONOCYTES NFR BLD: 7 % (ref 5–13)
MONOCYTES NFR BLD: 8 % (ref 5–13)
NEUTS SEG # BLD: 11.3 K/UL (ref 1.8–8)
NEUTS SEG # BLD: 13.4 K/UL (ref 1.8–8)
NEUTS SEG # BLD: 15.1 K/UL (ref 1.8–8)
NEUTS SEG # BLD: 20.2 K/UL (ref 1.8–8)
NEUTS SEG # BLD: 6.6 K/UL (ref 1.8–8)
NEUTS SEG # BLD: 6.7 K/UL (ref 1.8–8)
NEUTS SEG # BLD: 7.6 K/UL (ref 1.8–8)
NEUTS SEG # BLD: 8.3 K/UL (ref 1.8–8)
NEUTS SEG # BLD: 9.8 K/UL (ref 1.8–8)
NEUTS SEG NFR BLD: 80 % (ref 32–75)
NEUTS SEG NFR BLD: 81 % (ref 32–75)
NEUTS SEG NFR BLD: 86 % (ref 32–75)
NEUTS SEG NFR BLD: 86 % (ref 32–75)
NEUTS SEG NFR BLD: 88 % (ref 32–75)
NEUTS SEG NFR BLD: 88 % (ref 32–75)
NEUTS SEG NFR BLD: 89 % (ref 32–75)
NEUTS SEG NFR BLD: 90 % (ref 32–75)
NEUTS SEG NFR BLD: 93 % (ref 32–75)
NRBC # BLD: 0 K/UL (ref 0–0.01)
NRBC # BLD: 0.02 K/UL (ref 0–0.01)
NRBC # BLD: 0.05 K/UL (ref 0–0.01)
NRBC # BLD: 0.06 K/UL (ref 0–0.01)
NRBC # BLD: 0.07 K/UL (ref 0–0.01)
NRBC # BLD: 0.08 K/UL (ref 0–0.01)
NRBC # BLD: 0.08 K/UL (ref 0–0.01)
NRBC # BLD: 0.09 K/UL (ref 0–0.01)
NRBC # BLD: 0.12 K/UL (ref 0–0.01)
NRBC # BLD: 0.19 K/UL (ref 0–0.01)
NRBC BLD-RTO: 0 PER 100 WBC
NRBC BLD-RTO: 0.2 PER 100 WBC
NRBC BLD-RTO: 0.5 PER 100 WBC
NRBC BLD-RTO: 0.5 PER 100 WBC
NRBC BLD-RTO: 0.6 PER 100 WBC
NRBC BLD-RTO: 0.6 PER 100 WBC
NRBC BLD-RTO: 0.7 PER 100 WBC
NRBC BLD-RTO: 0.7 PER 100 WBC
NRBC BLD-RTO: 0.9 PER 100 WBC
NRBC BLD-RTO: 1.2 PER 100 WBC
O2/TOTAL GAS SETTING VFR VENT: 100 %
O2/TOTAL GAS SETTING VFR VENT: 30 %
O2/TOTAL GAS SETTING VFR VENT: 39 %
O2/TOTAL GAS SETTING VFR VENT: 39 %
O2/TOTAL GAS SETTING VFR VENT: 40 %
O2/TOTAL GAS SETTING VFR VENT: 74 %
P-R INTERVAL, ECG05: 142 MS
P-R INTERVAL, ECG05: 144 MS
PAW @ MEAN EXP FLOW ON VENT: 15 CMH2O
PAW @ MEAN EXP FLOW ON VENT: 18 CMH2O
PAW @ MEAN EXP FLOW ON VENT: 18 CMH2O
PCO2 BLD: 30 MMHG (ref 35–45)
PCO2 BLD: 37 MMHG (ref 35–45)
PCO2 BLD: 40.5 MMHG (ref 35–45)
PCO2 BLD: 41 MMHG (ref 35–45)
PCO2 BLD: 41.9 MMHG (ref 35–45)
PCO2 BLD: 42.2 MMHG (ref 35–45)
PCO2 BLD: 42.5 MMHG (ref 35–45)
PCO2 BLD: 43.8 MMHG (ref 35–45)
PCO2 BLD: 44.7 MMHG (ref 35–45)
PCO2 BLD: 49.5 MMHG (ref 35–45)
PCO2 BLD: 49.8 MMHG (ref 35–45)
PCO2 BLD: 50.2 MMHG (ref 35–45)
PCO2 BLD: 52.4 MMHG (ref 35–45)
PCO2 BLD: 55 MMHG (ref 35–45)
PCO2 BLD: 62 MMHG (ref 35–45)
PCO2 BLD: 87.9 MMHG (ref 35–45)
PCO2 BLDA: 44 MMHG (ref 35–45)
PCO2 BLDA: 52 MMHG (ref 35–45)
PCO2 BLDV: 57.7 MMHG (ref 41–51)
PEEP RESPIRATORY: 5 CMH2O
PEEP RESPIRATORY: 5 CM[H2O]
PEEP RESPIRATORY: 5 CM[H2O]
PEEP RESPIRATORY: 8 CMH2O
PF4 HEPARIN CMPLX AB SER-ACNC: 0.07 OD (ref 0–0.4)
PH BLD: 7.09 [PH] (ref 7.35–7.45)
PH BLD: 7.16 [PH] (ref 7.35–7.45)
PH BLD: 7.21 [PH] (ref 7.35–7.45)
PH BLD: 7.25 [PH] (ref 7.35–7.45)
PH BLD: 7.27 [PH] (ref 7.35–7.45)
PH BLD: 7.28 [PH] (ref 7.35–7.45)
PH BLD: 7.32 [PH] (ref 7.35–7.45)
PH BLD: 7.32 [PH] (ref 7.35–7.45)
PH BLD: 7.33 [PH] (ref 7.35–7.45)
PH BLD: 7.34 [PH] (ref 7.35–7.45)
PH BLD: 7.36 [PH] (ref 7.35–7.45)
PH BLD: 7.37 [PH] (ref 7.35–7.45)
PH BLD: 7.37 [PH] (ref 7.35–7.45)
PH BLD: 7.38 [PH] (ref 7.35–7.45)
PH BLD: 7.42 [PH] (ref 7.35–7.45)
PH BLD: 7.43 [PH] (ref 7.35–7.45)
PH BLDA: 7.28 [PH] (ref 7.35–7.45)
PH BLDA: 7.34 [PH] (ref 7.35–7.45)
PH BLDV: 7.26 [PH] (ref 7.32–7.42)
PHOSPHATE SERPL-MCNC: 1.3 MG/DL (ref 2.6–4.7)
PHOSPHATE SERPL-MCNC: 1.7 MG/DL (ref 2.6–4.7)
PHOSPHATE SERPL-MCNC: 2.6 MG/DL (ref 2.6–4.7)
PHOSPHATE SERPL-MCNC: 3.9 MG/DL (ref 2.6–4.7)
PHOSPHATE SERPL-MCNC: 4.1 MG/DL (ref 2.6–4.7)
PHOSPHATE SERPL-MCNC: NORMAL MG/DL (ref 2.6–4.7)
PIP ISTAT,IPIP: 10
PIP ISTAT,IPIP: 14
PIP ISTAT,IPIP: 14
PIP ISTAT,IPIP: 15
PLATELET # BLD AUTO: 111 K/UL (ref 150–400)
PLATELET # BLD AUTO: 115 K/UL (ref 150–400)
PLATELET # BLD AUTO: 117 K/UL (ref 150–400)
PLATELET # BLD AUTO: 118 K/UL (ref 150–400)
PLATELET # BLD AUTO: 120 K/UL (ref 150–400)
PLATELET # BLD AUTO: 125 K/UL (ref 150–400)
PLATELET # BLD AUTO: 143 K/UL (ref 150–400)
PLATELET # BLD AUTO: 59 K/UL (ref 150–400)
PLATELET # BLD AUTO: 59 K/UL (ref 150–400)
PLATELET # BLD AUTO: 61 K/UL (ref 150–400)
PLATELET COMMENTS,PCOM: ABNORMAL
PMV BLD AUTO: 10.2 FL (ref 8.9–12.9)
PMV BLD AUTO: 10.8 FL (ref 8.9–12.9)
PMV BLD AUTO: 10.8 FL (ref 8.9–12.9)
PMV BLD AUTO: ABNORMAL FL (ref 8.9–12.9)
PO2 BLD: 104 MMHG (ref 80–100)
PO2 BLD: 110 MMHG (ref 80–100)
PO2 BLD: 124 MMHG (ref 80–100)
PO2 BLD: 130 MMHG (ref 80–100)
PO2 BLD: 135 MMHG (ref 80–100)
PO2 BLD: 311 MMHG (ref 80–100)
PO2 BLD: 400 MMHG (ref 80–100)
PO2 BLD: 43 MMHG (ref 80–100)
PO2 BLD: 451 MMHG (ref 80–100)
PO2 BLD: 473 MMHG (ref 80–100)
PO2 BLD: 78 MMHG (ref 80–100)
PO2 BLD: 79 MMHG (ref 80–100)
PO2 BLD: 82 MMHG (ref 80–100)
PO2 BLD: 84 MMHG (ref 80–100)
PO2 BLD: 85 MMHG (ref 80–100)
PO2 BLD: 86 MMHG (ref 80–100)
PO2 BLDA: 107 MMHG (ref 80–100)
PO2 BLDA: 537 MMHG (ref 80–100)
PO2 BLDV: 22 MMHG (ref 25–40)
POTASSIUM BLD-SCNC: 3.4 MMOL/L (ref 3.5–5.5)
POTASSIUM BLD-SCNC: 8.1 MMOL/L (ref 3.5–5.5)
POTASSIUM SERPL-SCNC: 3.6 MMOL/L (ref 3.5–5.1)
POTASSIUM SERPL-SCNC: 3.8 MMOL/L (ref 3.5–5.1)
POTASSIUM SERPL-SCNC: 3.9 MMOL/L (ref 3.5–5.1)
POTASSIUM SERPL-SCNC: 3.9 MMOL/L (ref 3.5–5.1)
POTASSIUM SERPL-SCNC: 4 MMOL/L (ref 3.5–5.1)
POTASSIUM SERPL-SCNC: 4.1 MMOL/L (ref 3.5–5.1)
POTASSIUM SERPL-SCNC: 4.3 MMOL/L (ref 3.5–5.1)
POTASSIUM SERPL-SCNC: 4.4 MMOL/L (ref 3.5–5.1)
POTASSIUM SERPL-SCNC: 4.6 MMOL/L (ref 3.5–5.1)
POTASSIUM SERPL-SCNC: 4.9 MMOL/L (ref 3.5–5.1)
POTASSIUM SERPL-SCNC: 5.1 MMOL/L (ref 3.5–5.1)
POTASSIUM SERPL-SCNC: 6.1 MMOL/L (ref 3.5–5.1)
POTASSIUM SERPL-SCNC: ABNORMAL MMOL/L (ref 3.5–5.1)
PRESSURE SUPPORT SETTING VENT: 10 CMH2O
PRESSURE SUPPORT SETTING VENT: 14 CMH2O
PRESSURE SUPPORT SETTING VENT: 5 CMH2O
PROCALCITONIN SERPL-MCNC: 6.11 NG/ML
PROCALCITONIN SERPL-MCNC: 6.21 NG/ML
PROT SERPL-MCNC: 5 G/DL (ref 6.4–8.2)
PROT SERPL-MCNC: 5.1 G/DL (ref 6.4–8.2)
PROT SERPL-MCNC: 5.1 G/DL (ref 6.4–8.2)
PROT SERPL-MCNC: 5.2 G/DL (ref 6.4–8.2)
PROT SERPL-MCNC: 5.2 G/DL (ref 6.4–8.2)
PROT SERPL-MCNC: 7 G/DL (ref 6.4–8.2)
PROTHROMBIN TIME: 13 SEC (ref 9–11.1)
PROTHROMBIN TIME: 15.2 SEC (ref 9–11.1)
Q-T INTERVAL, ECG07: 292 MS
Q-T INTERVAL, ECG07: 344 MS
Q-T INTERVAL, ECG07: 428 MS
Q-T INTERVAL, ECG07: 560 MS
QRS DURATION, ECG06: 162 MS
QRS DURATION, ECG06: 162 MS
QRS DURATION, ECG06: 170 MS
QRS DURATION, ECG06: 180 MS
QTC CALCULATION (BEZET), ECG08: 483 MS
QTC CALCULATION (BEZET), ECG08: 525 MS
QTC CALCULATION (BEZET), ECG08: 552 MS
QTC CALCULATION (BEZET), ECG08: 563 MS
RBC # BLD AUTO: 4.05 M/UL (ref 3.8–5.2)
RBC # BLD AUTO: 4.3 M/UL (ref 3.8–5.2)
RBC # BLD AUTO: 4.4 M/UL (ref 3.8–5.2)
RBC # BLD AUTO: 4.96 M/UL (ref 3.8–5.2)
RBC # BLD AUTO: 5.07 M/UL (ref 3.8–5.2)
RBC # BLD AUTO: 5.13 M/UL (ref 3.8–5.2)
RBC # BLD AUTO: 5.19 M/UL (ref 3.8–5.2)
RBC # BLD AUTO: 5.33 M/UL (ref 3.8–5.2)
RBC # BLD AUTO: 5.54 M/UL (ref 3.8–5.2)
RBC # BLD AUTO: 5.76 M/UL (ref 3.8–5.2)
RBC MORPH BLD: ABNORMAL
RPR SER QL: NONREACTIVE
SAMPLES BEING HELD,HOLD: NORMAL
SAO2 % BLD: 100 % (ref 92–97)
SAO2 % BLD: 58 % (ref 92–97)
SAO2 % BLD: 94.4 % (ref 92–97)
SAO2 % BLD: 94.5 % (ref 92–97)
SAO2 % BLD: 95.5 % (ref 92–97)
SAO2 % BLD: 95.7 % (ref 92–97)
SAO2 % BLD: 95.9 % (ref 92–97)
SAO2 % BLD: 96.1 % (ref 92–97)
SAO2 % BLD: 97.5 % (ref 92–97)
SAO2 % BLD: 97.6 % (ref 92–97)
SAO2 % BLD: 98 % (ref 92–97)
SAO2 % BLD: 98 % (ref 92–97)
SAO2 % BLD: 98.5 % (ref 92–97)
SAO2 % BLD: 98.6 % (ref 92–97)
SAO2 % BLD: 99.9 % (ref 92–97)
SAO2 % BLD: 99.9 % (ref 92–97)
SAO2% DEVICE SAO2% SENSOR NAME: ABNORMAL
SAO2% DEVICE SAO2% SENSOR NAME: ABNORMAL
SERVICE CMNT-IMP: ABNORMAL
SERVICE CMNT-IMP: NORMAL
SODIUM BLD-SCNC: 137 MMOL/L (ref 136–145)
SODIUM BLD-SCNC: 138 MMOL/L (ref 136–145)
SODIUM SERPL-SCNC: 135 MMOL/L (ref 136–145)
SODIUM SERPL-SCNC: 137 MMOL/L (ref 136–145)
SODIUM SERPL-SCNC: 138 MMOL/L (ref 136–145)
SODIUM SERPL-SCNC: 139 MMOL/L (ref 136–145)
SODIUM SERPL-SCNC: 139 MMOL/L (ref 136–145)
SODIUM SERPL-SCNC: 142 MMOL/L (ref 136–145)
SOURCE, COVRS: NORMAL
SPECIMEN EXP DATE BLD: NORMAL
SPECIMEN SITE: ABNORMAL
SPECIMEN TYPE: ABNORMAL
SPECIMEN TYPE: NORMAL
TROPONIN-HIGH SENSITIVITY: 235 NG/L (ref 0–51)
TSH SERPL DL<=0.05 MIU/L-ACNC: 0.14 UIU/ML (ref 0.36–3.74)
VENTILATION MODE VENT: ABNORMAL
VENTILATION MODE VENT: NORMAL
VENTRICULAR RATE, ECG03: 100 BPM
VENTRICULAR RATE, ECG03: 140 BPM
VENTRICULAR RATE, ECG03: 165 BPM
VENTRICULAR RATE, ECG03: 61 BPM
VIT B12 SERPL-MCNC: >2000 PG/ML (ref 193–986)
VT SETTING VENT: 400 ML
WBC # BLD AUTO: 10.9 K/UL (ref 3.6–11)
WBC # BLD AUTO: 13.2 K/UL (ref 3.6–11)
WBC # BLD AUTO: 15.4 K/UL (ref 3.6–11)
WBC # BLD AUTO: 17.6 K/UL (ref 3.6–11)
WBC # BLD AUTO: 21.7 K/UL (ref 3.6–11)
WBC # BLD AUTO: 7.3 K/UL (ref 3.6–11)
WBC # BLD AUTO: 8.2 K/UL (ref 3.6–11)
WBC # BLD AUTO: 8.4 K/UL (ref 3.6–11)
WBC # BLD AUTO: 8.6 K/UL (ref 3.6–11)
WBC # BLD AUTO: 9.4 K/UL (ref 3.6–11)

## 2022-01-01 PROCEDURE — 65620000000 HC RM CCU GENERAL

## 2022-01-01 PROCEDURE — 84443 ASSAY THYROID STIM HORMONE: CPT

## 2022-01-01 PROCEDURE — 83735 ASSAY OF MAGNESIUM: CPT

## 2022-01-01 PROCEDURE — 93005 ELECTROCARDIOGRAM TRACING: CPT

## 2022-01-01 PROCEDURE — 74011250636 HC RX REV CODE- 250/636: Performed by: HOSPITALIST

## 2022-01-01 PROCEDURE — 85025 COMPLETE CBC W/AUTO DIFF WBC: CPT

## 2022-01-01 PROCEDURE — 93306 TTE W/DOPPLER COMPLETE: CPT | Performed by: SPECIALIST

## 2022-01-01 PROCEDURE — 74011250637 HC RX REV CODE- 250/637: Performed by: HOSPITALIST

## 2022-01-01 PROCEDURE — 85027 COMPLETE CBC AUTOMATED: CPT

## 2022-01-01 PROCEDURE — 74011000250 HC RX REV CODE- 250: Performed by: HOSPITALIST

## 2022-01-01 PROCEDURE — 80053 COMPREHEN METABOLIC PANEL: CPT

## 2022-01-01 PROCEDURE — 74011000250 HC RX REV CODE- 250

## 2022-01-01 PROCEDURE — 83605 ASSAY OF LACTIC ACID: CPT

## 2022-01-01 PROCEDURE — 74011000258 HC RX REV CODE- 258: Performed by: STUDENT IN AN ORGANIZED HEALTH CARE EDUCATION/TRAINING PROGRAM

## 2022-01-01 PROCEDURE — 96366 THER/PROPH/DIAG IV INF ADDON: CPT

## 2022-01-01 PROCEDURE — 82803 BLOOD GASES ANY COMBINATION: CPT

## 2022-01-01 PROCEDURE — 74011000258 HC RX REV CODE- 258: Performed by: EMERGENCY MEDICINE

## 2022-01-01 PROCEDURE — 86706 HEP B SURFACE ANTIBODY: CPT

## 2022-01-01 PROCEDURE — 74011250636 HC RX REV CODE- 250/636: Performed by: STUDENT IN AN ORGANIZED HEALTH CARE EDUCATION/TRAINING PROGRAM

## 2022-01-01 PROCEDURE — 74011250636 HC RX REV CODE- 250/636: Performed by: INTERNAL MEDICINE

## 2022-01-01 PROCEDURE — 77030013797 HC KT TRNSDUC PRSSR EDWD -A

## 2022-01-01 PROCEDURE — 87040 BLOOD CULTURE FOR BACTERIA: CPT

## 2022-01-01 PROCEDURE — 65660000001 HC RM ICU INTERMED STEPDOWN

## 2022-01-01 PROCEDURE — 74011250636 HC RX REV CODE- 250/636: Performed by: EMERGENCY MEDICINE

## 2022-01-01 PROCEDURE — 94640 AIRWAY INHALATION TREATMENT: CPT

## 2022-01-01 PROCEDURE — 71045 X-RAY EXAM CHEST 1 VIEW: CPT

## 2022-01-01 PROCEDURE — 94664 DEMO&/EVAL PT USE INHALER: CPT

## 2022-01-01 PROCEDURE — 90935 HEMODIALYSIS ONE EVALUATION: CPT

## 2022-01-01 PROCEDURE — 74011000250 HC RX REV CODE- 250: Performed by: STUDENT IN AN ORGANIZED HEALTH CARE EDUCATION/TRAINING PROGRAM

## 2022-01-01 PROCEDURE — 80048 BASIC METABOLIC PNL TOTAL CA: CPT

## 2022-01-01 PROCEDURE — 94003 VENT MGMT INPAT SUBQ DAY: CPT

## 2022-01-01 PROCEDURE — P9047 ALBUMIN (HUMAN), 25%, 50ML: HCPCS | Performed by: INTERNAL MEDICINE

## 2022-01-01 PROCEDURE — 77030008771 HC TU NG SALEM SUMP -A

## 2022-01-01 PROCEDURE — 84484 ASSAY OF TROPONIN QUANT: CPT

## 2022-01-01 PROCEDURE — 74018 RADEX ABDOMEN 1 VIEW: CPT

## 2022-01-01 PROCEDURE — 87340 HEPATITIS B SURFACE AG IA: CPT

## 2022-01-01 PROCEDURE — 74011250637 HC RX REV CODE- 250/637: Performed by: EMERGENCY MEDICINE

## 2022-01-01 PROCEDURE — 96375 TX/PRO/DX INJ NEW DRUG ADDON: CPT

## 2022-01-01 PROCEDURE — 90945 DIALYSIS ONE EVALUATION: CPT

## 2022-01-01 PROCEDURE — 80047 BASIC METABLC PNL IONIZED CA: CPT

## 2022-01-01 PROCEDURE — 84100 ASSAY OF PHOSPHORUS: CPT

## 2022-01-01 PROCEDURE — 94002 VENT MGMT INPAT INIT DAY: CPT

## 2022-01-01 PROCEDURE — 36415 COLL VENOUS BLD VENIPUNCTURE: CPT

## 2022-01-01 PROCEDURE — 82947 ASSAY GLUCOSE BLOOD QUANT: CPT

## 2022-01-01 PROCEDURE — 77030008477 HC STYL SATN SLP COVD -A

## 2022-01-01 PROCEDURE — 93306 TTE W/DOPPLER COMPLETE: CPT

## 2022-01-01 PROCEDURE — 74011250637 HC RX REV CODE- 250/637: Performed by: STUDENT IN AN ORGANIZED HEALTH CARE EDUCATION/TRAINING PROGRAM

## 2022-01-01 PROCEDURE — 87635 SARS-COV-2 COVID-19 AMP PRB: CPT

## 2022-01-01 PROCEDURE — 74011250636 HC RX REV CODE- 250/636: Performed by: FAMILY MEDICINE

## 2022-01-01 PROCEDURE — 74011000250 HC RX REV CODE- 250: Performed by: INTERNAL MEDICINE

## 2022-01-01 PROCEDURE — 5A1945Z RESPIRATORY VENTILATION, 24-96 CONSECUTIVE HOURS: ICD-10-PCS | Performed by: EMERGENCY MEDICINE

## 2022-01-01 PROCEDURE — 82962 GLUCOSE BLOOD TEST: CPT

## 2022-01-01 PROCEDURE — 0W9G30Z DRAINAGE OF PERITONEAL CAVITY WITH DRAINAGE DEVICE, PERCUTANEOUS APPROACH: ICD-10-PCS | Performed by: RADIOLOGY

## 2022-01-01 PROCEDURE — 82140 ASSAY OF AMMONIA: CPT

## 2022-01-01 PROCEDURE — 77030005402 HC CATH RAD ART LN KT TELE -B

## 2022-01-01 PROCEDURE — 94660 CPAP INITIATION&MGMT: CPT

## 2022-01-01 PROCEDURE — 99285 EMERGENCY DEPT VISIT HI MDM: CPT

## 2022-01-01 PROCEDURE — 73502 X-RAY EXAM HIP UNI 2-3 VIEWS: CPT

## 2022-01-01 PROCEDURE — 71250 CT THORAX DX C-: CPT

## 2022-01-01 PROCEDURE — 77030008683 HC TU ET CUF COVD -A

## 2022-01-01 PROCEDURE — 74011000250 HC RX REV CODE- 250: Performed by: EMERGENCY MEDICINE

## 2022-01-01 PROCEDURE — 96367 TX/PROPH/DG ADDL SEQ IV INF: CPT

## 2022-01-01 PROCEDURE — 0D9670Z DRAINAGE OF STOMACH WITH DRAINAGE DEVICE, VIA NATURAL OR ARTIFICIAL OPENING: ICD-10-PCS | Performed by: EMERGENCY MEDICINE

## 2022-01-01 PROCEDURE — 77030040831 HC BAG URINE DRNG MDII -A

## 2022-01-01 PROCEDURE — 82607 VITAMIN B-12: CPT

## 2022-01-01 PROCEDURE — 74011250637 HC RX REV CODE- 250/637: Performed by: NURSE PRACTITIONER

## 2022-01-01 PROCEDURE — 84145 PROCALCITONIN (PCT): CPT

## 2022-01-01 PROCEDURE — 86870 RBC ANTIBODY IDENTIFICATION: CPT

## 2022-01-01 PROCEDURE — 96365 THER/PROPH/DIAG IV INF INIT: CPT

## 2022-01-01 PROCEDURE — 02HV33Z INSERTION OF INFUSION DEVICE INTO SUPERIOR VENA CAVA, PERCUTANEOUS APPROACH: ICD-10-PCS | Performed by: STUDENT IN AN ORGANIZED HEALTH CARE EDUCATION/TRAINING PROGRAM

## 2022-01-01 PROCEDURE — 70450 CT HEAD/BRAIN W/O DYE: CPT

## 2022-01-01 PROCEDURE — 77010033678 HC OXYGEN DAILY

## 2022-01-01 PROCEDURE — 36600 WITHDRAWAL OF ARTERIAL BLOOD: CPT

## 2022-01-01 PROCEDURE — C9113 INJ PANTOPRAZOLE SODIUM, VIA: HCPCS | Performed by: EMERGENCY MEDICINE

## 2022-01-01 PROCEDURE — 74011000250 HC RX REV CODE- 250: Performed by: NURSE PRACTITIONER

## 2022-01-01 PROCEDURE — 5A1D90Z PERFORMANCE OF URINARY FILTRATION, CONTINUOUS, GREATER THAN 18 HOURS PER DAY: ICD-10-PCS | Performed by: INTERNAL MEDICINE

## 2022-01-01 PROCEDURE — 2709999900 HC NON-CHARGEABLE SUPPLY

## 2022-01-01 PROCEDURE — 77030018870 HC TY PARCNT BD -B

## 2022-01-01 PROCEDURE — 74011250636 HC RX REV CODE- 250/636

## 2022-01-01 PROCEDURE — 74011000258 HC RX REV CODE- 258: Performed by: HOSPITALIST

## 2022-01-01 PROCEDURE — 77030029065 HC DRSG HEMO QCLOT ZMED -B

## 2022-01-01 PROCEDURE — 94644 CONT INHLJ TX 1ST HOUR: CPT

## 2022-01-01 PROCEDURE — P9047 ALBUMIN (HUMAN), 25%, 50ML: HCPCS | Performed by: EMERGENCY MEDICINE

## 2022-01-01 PROCEDURE — 31500 INSERT EMERGENCY AIRWAY: CPT

## 2022-01-01 PROCEDURE — 74011636637 HC RX REV CODE- 636/637: Performed by: STUDENT IN AN ORGANIZED HEALTH CARE EDUCATION/TRAINING PROGRAM

## 2022-01-01 PROCEDURE — 77030018798 HC PMP KT ENTRL FED COVD -A

## 2022-01-01 PROCEDURE — 74011250637 HC RX REV CODE- 250/637: Performed by: INTERNAL MEDICINE

## 2022-01-01 PROCEDURE — 86900 BLOOD TYPING SEROLOGIC ABO: CPT

## 2022-01-01 PROCEDURE — 82330 ASSAY OF CALCIUM: CPT

## 2022-01-01 PROCEDURE — 5A09357 ASSISTANCE WITH RESPIRATORY VENTILATION, LESS THAN 24 CONSECUTIVE HOURS, CONTINUOUS POSITIVE AIRWAY PRESSURE: ICD-10-PCS | Performed by: STUDENT IN AN ORGANIZED HEALTH CARE EDUCATION/TRAINING PROGRAM

## 2022-01-01 PROCEDURE — 03HY32Z INSERTION OF MONITORING DEVICE INTO UPPER ARTERY, PERCUTANEOUS APPROACH: ICD-10-PCS | Performed by: EMERGENCY MEDICINE

## 2022-01-01 PROCEDURE — 82746 ASSAY OF FOLIC ACID SERUM: CPT

## 2022-01-01 PROCEDURE — 5A1D70Z PERFORMANCE OF URINARY FILTRATION, INTERMITTENT, LESS THAN 6 HOURS PER DAY: ICD-10-PCS | Performed by: INTERNAL MEDICINE

## 2022-01-01 PROCEDURE — 86592 SYPHILIS TEST NON-TREP QUAL: CPT

## 2022-01-01 PROCEDURE — 77030005401 HC CATH RAD ARRO -A

## 2022-01-01 PROCEDURE — P9045 ALBUMIN (HUMAN), 5%, 250 ML: HCPCS | Performed by: STUDENT IN AN ORGANIZED HEALTH CARE EDUCATION/TRAINING PROGRAM

## 2022-01-01 PROCEDURE — 85610 PROTHROMBIN TIME: CPT

## 2022-01-01 PROCEDURE — 86022 PLATELET ANTIBODIES: CPT

## 2022-01-01 PROCEDURE — 49083 ABD PARACENTESIS W/IMAGING: CPT

## 2022-01-01 PROCEDURE — 77030034540

## 2022-01-01 PROCEDURE — 87804 INFLUENZA ASSAY W/OPTIC: CPT

## 2022-01-01 PROCEDURE — 0BH17EZ INSERTION OF ENDOTRACHEAL AIRWAY INTO TRACHEA, VIA NATURAL OR ARTIFICIAL OPENING: ICD-10-PCS | Performed by: EMERGENCY MEDICINE

## 2022-01-01 RX ORDER — BUDESONIDE 0.5 MG/2ML
500 INHALANT ORAL
Status: DISCONTINUED | OUTPATIENT
Start: 2022-01-01 | End: 2022-01-01 | Stop reason: HOSPADM

## 2022-01-01 RX ORDER — SODIUM BICARBONATE 1 MEQ/ML
SYRINGE (ML) INTRAVENOUS
Status: DISPENSED
Start: 2022-01-01 | End: 2022-01-01

## 2022-01-01 RX ORDER — PANTOPRAZOLE SODIUM 40 MG/1
40 TABLET, DELAYED RELEASE ORAL 2 TIMES DAILY
COMMUNITY

## 2022-01-01 RX ORDER — FLUTICASONE PROPIONATE 50 MCG
1 SPRAY, SUSPENSION (ML) NASAL DAILY
COMMUNITY

## 2022-01-01 RX ORDER — SODIUM BICARBONATE 1 MEQ/ML
100 SYRINGE (ML) INTRAVENOUS ONCE
Status: ACTIVE | OUTPATIENT
Start: 2022-01-01 | End: 2022-01-01

## 2022-01-01 RX ORDER — ACETAMINOPHEN 325 MG/1
650 TABLET ORAL ONCE
Status: COMPLETED | OUTPATIENT
Start: 2022-01-01 | End: 2022-01-01

## 2022-01-01 RX ORDER — ROCURONIUM BROMIDE 10 MG/ML
50 INJECTION, SOLUTION INTRAVENOUS
Status: COMPLETED | OUTPATIENT
Start: 2022-01-01 | End: 2022-01-01

## 2022-01-01 RX ORDER — CAPSAICIN 0.03 G/100G
CREAM TOPICAL 3 TIMES DAILY
COMMUNITY

## 2022-01-01 RX ORDER — EPINEPHRINE 0.1 MG/ML
2 INJECTION INTRACARDIAC; INTRAVENOUS ONCE
Status: ACTIVE | OUTPATIENT
Start: 2022-01-01 | End: 2022-01-01

## 2022-01-01 RX ORDER — MIDAZOLAM HYDROCHLORIDE 1 MG/ML
INJECTION, SOLUTION INTRAMUSCULAR; INTRAVENOUS
Status: COMPLETED
Start: 2022-01-01 | End: 2022-01-01

## 2022-01-01 RX ORDER — EPINEPHRINE 0.1 MG/ML
INJECTION INTRACARDIAC; INTRAVENOUS
Status: DISPENSED
Start: 2022-01-01 | End: 2022-01-01

## 2022-01-01 RX ORDER — METOPROLOL TARTRATE 25 MG/1
12.5 TABLET, FILM COATED ORAL 2 TIMES DAILY
COMMUNITY

## 2022-01-01 RX ORDER — MIDODRINE HYDROCHLORIDE 5 MG/1
5 TABLET ORAL
COMMUNITY

## 2022-01-01 RX ORDER — IPRATROPIUM BROMIDE AND ALBUTEROL SULFATE 2.5; .5 MG/3ML; MG/3ML
3 SOLUTION RESPIRATORY (INHALATION)
Status: DISCONTINUED | OUTPATIENT
Start: 2022-01-01 | End: 2022-01-01 | Stop reason: HOSPADM

## 2022-01-01 RX ORDER — LEVOFLOXACIN 5 MG/ML
750 INJECTION, SOLUTION INTRAVENOUS ONCE
Status: COMPLETED | OUTPATIENT
Start: 2022-01-01 | End: 2022-01-01

## 2022-01-01 RX ORDER — AMIODARONE HYDROCHLORIDE 200 MG/1
200 TABLET ORAL 2 TIMES DAILY
COMMUNITY
Start: 2022-01-01 | End: 2022-01-01

## 2022-01-01 RX ORDER — OSELTAMIVIR PHOSPHATE 6 MG/ML
30 FOR SUSPENSION ORAL
Status: DISPENSED | OUTPATIENT
Start: 2022-01-01 | End: 2022-01-01

## 2022-01-01 RX ORDER — ETOMIDATE 2 MG/ML
20 INJECTION INTRAVENOUS ONCE
Status: COMPLETED | OUTPATIENT
Start: 2022-01-01 | End: 2022-01-01

## 2022-01-01 RX ORDER — SODIUM BICARBONATE 1 MEQ/ML
50 SYRINGE (ML) INTRAVENOUS ONCE
Status: COMPLETED | OUTPATIENT
Start: 2022-01-01 | End: 2022-01-01

## 2022-01-01 RX ORDER — ONDANSETRON 2 MG/ML
4 INJECTION INTRAMUSCULAR; INTRAVENOUS
Status: DISCONTINUED | OUTPATIENT
Start: 2022-01-01 | End: 2022-01-01 | Stop reason: HOSPADM

## 2022-01-01 RX ORDER — AMIODARONE HYDROCHLORIDE 150 MG/3ML
150 INJECTION, SOLUTION INTRAVENOUS ONCE
Status: COMPLETED | OUTPATIENT
Start: 2022-01-01 | End: 2022-01-01

## 2022-01-01 RX ORDER — ADENOSINE 3 MG/ML
6 INJECTION, SOLUTION INTRAVENOUS ONCE
Status: COMPLETED | OUTPATIENT
Start: 2022-01-01 | End: 2022-01-01

## 2022-01-01 RX ORDER — DEXMEDETOMIDINE HYDROCHLORIDE 4 UG/ML
.1-1.5 INJECTION, SOLUTION INTRAVENOUS
Status: DISCONTINUED | OUTPATIENT
Start: 2022-01-01 | End: 2022-01-01 | Stop reason: HOSPADM

## 2022-01-01 RX ORDER — CHLORHEXIDINE GLUCONATE 1.2 MG/ML
15 RINSE ORAL EVERY 12 HOURS
Status: DISCONTINUED | OUTPATIENT
Start: 2022-01-01 | End: 2022-01-01 | Stop reason: HOSPADM

## 2022-01-01 RX ORDER — FLUTICASONE PROPIONATE AND SALMETEROL 100; 50 UG/1; UG/1
1 POWDER RESPIRATORY (INHALATION) 2 TIMES DAILY
COMMUNITY

## 2022-01-01 RX ORDER — METHOCARBAMOL 500 MG/1
500 TABLET, FILM COATED ORAL
COMMUNITY

## 2022-01-01 RX ORDER — SERTRALINE HYDROCHLORIDE 50 MG/1
25 TABLET, FILM COATED ORAL DAILY
COMMUNITY

## 2022-01-01 RX ORDER — METHOCARBAMOL 500 MG/1
500 TABLET, FILM COATED ORAL
Status: DISCONTINUED | OUTPATIENT
Start: 2022-01-01 | End: 2022-01-01 | Stop reason: HOSPADM

## 2022-01-01 RX ORDER — LIDOCAINE HYDROCHLORIDE 10 MG/ML
INJECTION INFILTRATION; PERINEURAL
Status: DISCONTINUED
Start: 2022-01-01 | End: 2022-01-01 | Stop reason: WASHOUT

## 2022-01-01 RX ORDER — LIDOCAINE HYDROCHLORIDE 10 MG/ML
4 INJECTION INFILTRATION; PERINEURAL
Status: DISCONTINUED | OUTPATIENT
Start: 2022-01-01 | End: 2022-01-01 | Stop reason: HOSPADM

## 2022-01-01 RX ORDER — CHLORHEXIDINE GLUCONATE 1.2 MG/ML
15 RINSE ORAL EVERY 12 HOURS
COMMUNITY

## 2022-01-01 RX ORDER — DOCUSATE SODIUM 100 MG/1
100 CAPSULE, LIQUID FILLED ORAL 2 TIMES DAILY
COMMUNITY

## 2022-01-01 RX ORDER — DIPHENHYDRAMINE HCL 25 MG
25 CAPSULE ORAL
COMMUNITY

## 2022-01-01 RX ORDER — NOREPINEPHRINE BITARTRATE/D5W 8 MG/250ML
.5-3 PLASTIC BAG, INJECTION (ML) INTRAVENOUS
Status: DISCONTINUED | OUTPATIENT
Start: 2022-01-01 | End: 2022-01-01

## 2022-01-01 RX ORDER — ALBUMIN HUMAN 250 G/1000ML
25 SOLUTION INTRAVENOUS EVERY 6 HOURS
Status: COMPLETED | OUTPATIENT
Start: 2022-01-01 | End: 2022-01-01

## 2022-01-01 RX ORDER — ACETAMINOPHEN 325 MG/1
650 TABLET ORAL
Status: DISCONTINUED | OUTPATIENT
Start: 2022-01-01 | End: 2022-01-01 | Stop reason: HOSPADM

## 2022-01-01 RX ORDER — ALBUMIN HUMAN 50 G/1000ML
25 SOLUTION INTRAVENOUS ONCE
Status: COMPLETED | OUTPATIENT
Start: 2022-01-01 | End: 2022-01-01

## 2022-01-01 RX ORDER — POTASSIUM CHLORIDE 29.8 MG/ML
20 INJECTION INTRAVENOUS ONCE
Status: DISCONTINUED | OUTPATIENT
Start: 2022-01-01 | End: 2022-01-01

## 2022-01-01 RX ORDER — OLANZAPINE 5 MG/1
5 TABLET ORAL
Status: DISCONTINUED | OUTPATIENT
Start: 2022-01-01 | End: 2022-01-01 | Stop reason: HOSPADM

## 2022-01-01 RX ORDER — SENNOSIDES 8.6 MG/1
1 TABLET ORAL
COMMUNITY

## 2022-01-01 RX ORDER — SODIUM CHLORIDE 0.9 % (FLUSH) 0.9 %
5-40 SYRINGE (ML) INJECTION EVERY 8 HOURS
Status: DISCONTINUED | OUTPATIENT
Start: 2022-01-01 | End: 2022-01-01 | Stop reason: HOSPADM

## 2022-01-01 RX ORDER — IPRATROPIUM BROMIDE 0.5 MG/2.5ML
0.5 SOLUTION RESPIRATORY (INHALATION)
Status: DISCONTINUED | OUTPATIENT
Start: 2022-01-01 | End: 2022-01-01 | Stop reason: HOSPADM

## 2022-01-01 RX ORDER — ETOMIDATE 2 MG/ML
INJECTION INTRAVENOUS
Status: COMPLETED
Start: 2022-01-01 | End: 2022-01-01

## 2022-01-01 RX ORDER — POLYETHYLENE GLYCOL 3350 17 G/17G
17 POWDER, FOR SOLUTION ORAL DAILY PRN
Status: DISCONTINUED | OUTPATIENT
Start: 2022-01-01 | End: 2022-01-01 | Stop reason: HOSPADM

## 2022-01-01 RX ORDER — ATORVASTATIN CALCIUM 40 MG/1
40 TABLET, FILM COATED ORAL
Status: DISCONTINUED | OUTPATIENT
Start: 2022-01-01 | End: 2022-01-01 | Stop reason: HOSPADM

## 2022-01-01 RX ORDER — HYDROCORTISONE SODIUM SUCCINATE 100 MG/2ML
100 INJECTION, POWDER, FOR SOLUTION INTRAMUSCULAR; INTRAVENOUS EVERY 8 HOURS
Status: DISCONTINUED | OUTPATIENT
Start: 2022-01-01 | End: 2022-01-01 | Stop reason: HOSPADM

## 2022-01-01 RX ORDER — VANCOMYCIN/0.9 % SOD CHLORIDE 1.5G/250ML
1500 PLASTIC BAG, INJECTION (ML) INTRAVENOUS ONCE
Status: COMPLETED | OUTPATIENT
Start: 2022-01-01 | End: 2022-01-01

## 2022-01-01 RX ORDER — ALBUTEROL SULFATE 0.83 MG/ML
10 SOLUTION RESPIRATORY (INHALATION)
Status: DISCONTINUED | OUTPATIENT
Start: 2022-01-01 | End: 2022-01-01

## 2022-01-01 RX ORDER — CALCIUM GLUCONATE 20 MG/ML
1 INJECTION, SOLUTION INTRAVENOUS ONCE
Status: COMPLETED | OUTPATIENT
Start: 2022-01-01 | End: 2022-01-01

## 2022-01-01 RX ORDER — ROCURONIUM BROMIDE 10 MG/ML
100 INJECTION, SOLUTION INTRAVENOUS
Status: COMPLETED | OUTPATIENT
Start: 2022-01-01 | End: 2022-01-01

## 2022-01-01 RX ORDER — MIDAZOLAM HYDROCHLORIDE 1 MG/ML
2 INJECTION, SOLUTION INTRAMUSCULAR; INTRAVENOUS ONCE
Status: COMPLETED | OUTPATIENT
Start: 2022-01-01 | End: 2022-01-01

## 2022-01-01 RX ORDER — HYDROCORTISONE ACETATE 25 MG/1
25 SUPPOSITORY RECTAL
COMMUNITY

## 2022-01-01 RX ORDER — PHENYLEPHRINE HCL IN 0.9% NACL 0.4MG/10ML
0.8 SYRINGE (ML) INTRAVENOUS ONCE
Status: ACTIVE | OUTPATIENT
Start: 2022-01-01 | End: 2022-01-01

## 2022-01-01 RX ORDER — ROCURONIUM BROMIDE 10 MG/ML
INJECTION, SOLUTION INTRAVENOUS
Status: COMPLETED
Start: 2022-01-01 | End: 2022-01-01

## 2022-01-01 RX ORDER — LORATADINE 10 MG/1
10 TABLET ORAL EVERY OTHER DAY
COMMUNITY

## 2022-01-01 RX ORDER — OSELTAMIVIR PHOSPHATE 6 MG/ML
30 FOR SUSPENSION ORAL
Status: COMPLETED | OUTPATIENT
Start: 2022-01-01 | End: 2022-01-01

## 2022-01-01 RX ORDER — HEPARIN SODIUM 5000 [USP'U]/ML
5000 INJECTION, SOLUTION INTRAVENOUS; SUBCUTANEOUS EVERY 12 HOURS
Status: DISCONTINUED | OUTPATIENT
Start: 2022-01-01 | End: 2022-01-01

## 2022-01-01 RX ORDER — SODIUM BICARBONATE 42 MG/ML
INJECTION, SOLUTION INTRAVENOUS
Status: DISCONTINUED
Start: 2022-01-01 | End: 2022-01-01 | Stop reason: WASHOUT

## 2022-01-01 RX ORDER — CALCIUM GLUCONATE 20 MG/ML
1 INJECTION, SOLUTION INTRAVENOUS ONCE
Status: ACTIVE | OUTPATIENT
Start: 2022-01-01 | End: 2022-01-01

## 2022-01-01 RX ORDER — SODIUM BICARBONATE 1 MEQ/ML
100 SYRINGE (ML) INTRAVENOUS ONCE
Status: COMPLETED | OUTPATIENT
Start: 2022-01-01 | End: 2022-01-01

## 2022-01-01 RX ORDER — ACETAMINOPHEN 650 MG/1
650 SUPPOSITORY RECTAL
Status: DISCONTINUED | OUTPATIENT
Start: 2022-01-01 | End: 2022-01-01 | Stop reason: HOSPADM

## 2022-01-01 RX ORDER — ARFORMOTEROL TARTRATE 15 UG/2ML
15 SOLUTION RESPIRATORY (INHALATION)
Status: DISCONTINUED | OUTPATIENT
Start: 2022-01-01 | End: 2022-01-01 | Stop reason: HOSPADM

## 2022-01-01 RX ORDER — DOBUTAMINE HYDROCHLORIDE 200 MG/100ML
2 INJECTION INTRAVENOUS CONTINUOUS
Status: DISCONTINUED | OUTPATIENT
Start: 2022-01-01 | End: 2022-01-01 | Stop reason: HOSPADM

## 2022-01-01 RX ORDER — LANOLIN ALCOHOL/MO/W.PET/CERES
1000 CREAM (GRAM) TOPICAL DAILY
COMMUNITY

## 2022-01-01 RX ORDER — SODIUM BICARBONATE 42 MG/ML
1 INJECTION, SOLUTION INTRAVENOUS
Status: DISCONTINUED | OUTPATIENT
Start: 2022-01-01 | End: 2022-01-01 | Stop reason: HOSPADM

## 2022-01-01 RX ORDER — SODIUM,POTASSIUM PHOSPHATES 280-250MG
2 POWDER IN PACKET (EA) ORAL 4 TIMES DAILY
Status: DISCONTINUED | OUTPATIENT
Start: 2022-01-01 | End: 2022-01-01 | Stop reason: HOSPADM

## 2022-01-01 RX ORDER — MIDODRINE HYDROCHLORIDE 5 MG/1
20 TABLET ORAL EVERY 8 HOURS
Status: DISCONTINUED | OUTPATIENT
Start: 2022-01-01 | End: 2022-01-01 | Stop reason: HOSPADM

## 2022-01-01 RX ORDER — NOREPINEPHRINE BITARTRATE/D5W 8 MG/250ML
.5-3 PLASTIC BAG, INJECTION (ML) INTRAVENOUS
Status: DISCONTINUED | OUTPATIENT
Start: 2022-01-01 | End: 2022-01-01 | Stop reason: HOSPADM

## 2022-01-01 RX ORDER — PHENYLEPHRINE HCL IN 0.9% NACL 0.4MG/10ML
SYRINGE (ML) INTRAVENOUS
Status: DISPENSED
Start: 2022-01-01 | End: 2022-01-01

## 2022-01-01 RX ORDER — AMIODARONE HYDROCHLORIDE 200 MG/1
200 TABLET ORAL 2 TIMES DAILY
Status: DISCONTINUED | OUTPATIENT
Start: 2022-01-01 | End: 2022-01-01 | Stop reason: HOSPADM

## 2022-01-01 RX ORDER — DIGOXIN 0.25 MG/ML
125 INJECTION INTRAMUSCULAR; INTRAVENOUS
Status: COMPLETED | OUTPATIENT
Start: 2022-01-01 | End: 2022-01-01

## 2022-01-01 RX ORDER — ATORVASTATIN CALCIUM 80 MG/1
40 TABLET, FILM COATED ORAL
COMMUNITY

## 2022-01-01 RX ORDER — ACETAMINOPHEN 500 MG
500 TABLET ORAL
COMMUNITY

## 2022-01-01 RX ORDER — BENZONATATE 100 MG/1
100 CAPSULE ORAL
COMMUNITY

## 2022-01-01 RX ORDER — METOPROLOL TARTRATE 25 MG/1
12.5 TABLET, FILM COATED ORAL 2 TIMES DAILY
Status: DISCONTINUED | OUTPATIENT
Start: 2022-01-01 | End: 2022-01-01 | Stop reason: HOSPADM

## 2022-01-01 RX ORDER — CALCIUM GLUCONATE 94 MG/ML
INJECTION, SOLUTION INTRAVENOUS
Status: COMPLETED
Start: 2022-01-01 | End: 2022-01-01

## 2022-01-01 RX ORDER — MIDODRINE HYDROCHLORIDE 5 MG/1
5 TABLET ORAL
Status: COMPLETED | OUTPATIENT
Start: 2022-01-01 | End: 2022-01-01

## 2022-01-01 RX ORDER — LIDOCAINE 4 G/100G
1 PATCH TOPICAL EVERY 24 HOURS
COMMUNITY

## 2022-01-01 RX ORDER — ALBUMIN HUMAN 250 G/1000ML
12.5 SOLUTION INTRAVENOUS
Status: DISCONTINUED | OUTPATIENT
Start: 2022-01-01 | End: 2022-01-01 | Stop reason: HOSPADM

## 2022-01-01 RX ORDER — SODIUM BICARBONATE 1 MEQ/ML
SYRINGE (ML) INTRAVENOUS
Status: COMPLETED
Start: 2022-01-01 | End: 2022-01-01

## 2022-01-01 RX ORDER — SALSALATE 750 MG
750 TABLET ORAL
COMMUNITY
End: 2022-01-01

## 2022-01-01 RX ORDER — SODIUM CHLORIDE 0.9 % (FLUSH) 0.9 %
5-40 SYRINGE (ML) INJECTION AS NEEDED
Status: DISCONTINUED | OUTPATIENT
Start: 2022-01-01 | End: 2022-01-01 | Stop reason: HOSPADM

## 2022-01-01 RX ORDER — MONTELUKAST SODIUM 10 MG/1
10 TABLET ORAL
COMMUNITY

## 2022-01-01 RX ORDER — ALPRAZOLAM 0.5 MG/1
0.25 TABLET ORAL ONCE
Status: COMPLETED | OUTPATIENT
Start: 2022-01-01 | End: 2022-01-01

## 2022-01-01 RX ORDER — ONDANSETRON 4 MG/1
4 TABLET, ORALLY DISINTEGRATING ORAL
Status: DISCONTINUED | OUTPATIENT
Start: 2022-01-01 | End: 2022-01-01 | Stop reason: HOSPADM

## 2022-01-01 RX ADMIN — ALBUMIN (HUMAN) 25 G: 12.5 SOLUTION INTRAVENOUS at 20:45

## 2022-01-01 RX ADMIN — IPRATROPIUM BROMIDE AND ALBUTEROL SULFATE 3 ML: .5; 3 SOLUTION RESPIRATORY (INHALATION) at 15:06

## 2022-01-01 RX ADMIN — ATORVASTATIN CALCIUM 40 MG: 40 TABLET, FILM COATED ORAL at 21:06

## 2022-01-01 RX ADMIN — CALCIUM CHLORIDE, MAGNESIUM CHLORIDE, DEXTROSE MONOHYDRATE, LACTIC ACID, SODIUM CHLORIDE, SODIUM BICARBONATE AND POTASSIUM CHLORIDE: 3.68; 3.05; 22; 5.4; 6.46; 3.09; .314 INJECTION INTRAVENOUS at 10:38

## 2022-01-01 RX ADMIN — MIDODRINE HYDROCHLORIDE 20 MG: 5 TABLET ORAL at 15:18

## 2022-01-01 RX ADMIN — EPINEPHRINE 6 MCG/MIN: 1 INJECTION INTRAMUSCULAR; INTRAVENOUS; SUBCUTANEOUS at 16:43

## 2022-01-01 RX ADMIN — ARFORMOTEROL TARTRATE 15 MCG: 15 SOLUTION RESPIRATORY (INHALATION) at 07:56

## 2022-01-01 RX ADMIN — DEXMEDETOMIDINE HYDROCHLORIDE 0.4 MCG/KG/HR: 4 INJECTION, SOLUTION INTRAVENOUS at 09:16

## 2022-01-01 RX ADMIN — ADENOSINE 6 MG: 3 INJECTION INTRAVENOUS at 18:58

## 2022-01-01 RX ADMIN — HEPARIN SODIUM 5000 UNITS: 5000 INJECTION INTRAVENOUS; SUBCUTANEOUS at 21:06

## 2022-01-01 RX ADMIN — RIFAXIMIN 550 MG: 550 TABLET ORAL at 08:47

## 2022-01-01 RX ADMIN — SODIUM BICARBONATE 50 MEQ: 84 INJECTION INTRAVENOUS at 22:49

## 2022-01-01 RX ADMIN — SODIUM CHLORIDE 40 MG: 9 INJECTION INTRAMUSCULAR; INTRAVENOUS; SUBCUTANEOUS at 08:40

## 2022-01-01 RX ADMIN — CEFEPIME 2 G: 2 INJECTION, POWDER, FOR SOLUTION INTRAVENOUS at 02:35

## 2022-01-01 RX ADMIN — BUDESONIDE 500 MCG: 0.5 INHALANT RESPIRATORY (INHALATION) at 20:46

## 2022-01-01 RX ADMIN — MIDODRINE HYDROCHLORIDE 20 MG: 5 TABLET ORAL at 21:28

## 2022-01-01 RX ADMIN — MIDODRINE HYDROCHLORIDE 20 MG: 5 TABLET ORAL at 16:56

## 2022-01-01 RX ADMIN — ROCURONIUM BROMIDE 100 MG: 10 INJECTION INTRAVENOUS at 22:55

## 2022-01-01 RX ADMIN — IPRATROPIUM BROMIDE 0.5 MG: 0.5 SOLUTION RESPIRATORY (INHALATION) at 14:21

## 2022-01-01 RX ADMIN — BUDESONIDE 500 MCG: 0.5 INHALANT RESPIRATORY (INHALATION) at 21:05

## 2022-01-01 RX ADMIN — MIDODRINE HYDROCHLORIDE 5 MG: 5 TABLET ORAL at 03:32

## 2022-01-01 RX ADMIN — BUDESONIDE 500 MCG: 0.5 INHALANT RESPIRATORY (INHALATION) at 07:55

## 2022-01-01 RX ADMIN — MIDODRINE HYDROCHLORIDE 20 MG: 5 TABLET ORAL at 21:00

## 2022-01-01 RX ADMIN — NOREPINEPHRINE BITARTRATE 20 MCG/MIN: 1 INJECTION, SOLUTION, CONCENTRATE INTRAVENOUS at 18:15

## 2022-01-01 RX ADMIN — ARFORMOTEROL TARTRATE 15 MCG: 15 SOLUTION RESPIRATORY (INHALATION) at 20:46

## 2022-01-01 RX ADMIN — Medication 10 ML: at 15:37

## 2022-01-01 RX ADMIN — DEXMEDETOMIDINE HYDROCHLORIDE 0.7 MCG/KG/HR: 4 INJECTION, SOLUTION INTRAVENOUS at 09:54

## 2022-01-01 RX ADMIN — ACETAMINOPHEN 650 MG: 325 TABLET ORAL at 18:04

## 2022-01-01 RX ADMIN — VASOPRESSIN 0.04 UNITS/MIN: 20 INJECTION PARENTERAL at 14:31

## 2022-01-01 RX ADMIN — FENTANYL CITRATE 50 MCG/HR: 50 INJECTION INTRAVENOUS at 01:51

## 2022-01-01 RX ADMIN — LACTULOSE 30 ML: 20 SOLUTION ORAL at 12:51

## 2022-01-01 RX ADMIN — PIPERACILLIN AND TAZOBACTAM 3.38 G: 3; .375 INJECTION, POWDER, FOR SOLUTION INTRAVENOUS at 17:54

## 2022-01-01 RX ADMIN — DEXMEDETOMIDINE HYDROCHLORIDE 1 MCG/KG/HR: 4 INJECTION, SOLUTION INTRAVENOUS at 21:51

## 2022-01-01 RX ADMIN — EPINEPHRINE 9 MCG/MIN: 1 INJECTION INTRAMUSCULAR; INTRAVENOUS; SUBCUTANEOUS at 08:36

## 2022-01-01 RX ADMIN — Medication 10 ML: at 13:42

## 2022-01-01 RX ADMIN — DEXMEDETOMIDINE HYDROCHLORIDE 0.8 MCG/KG/HR: 4 INJECTION, SOLUTION INTRAVENOUS at 18:30

## 2022-01-01 RX ADMIN — ARFORMOTEROL TARTRATE 15 MCG: 15 SOLUTION RESPIRATORY (INHALATION) at 19:36

## 2022-01-01 RX ADMIN — NOREPINEPHRINE BITARTRATE 30 MCG/MIN: 1 INJECTION, SOLUTION, CONCENTRATE INTRAVENOUS at 10:59

## 2022-01-01 RX ADMIN — ETOMIDATE 20 MG: 2 INJECTION INTRAVENOUS at 22:55

## 2022-01-01 RX ADMIN — RIFAXIMIN 550 MG: 550 TABLET ORAL at 17:54

## 2022-01-01 RX ADMIN — AMIODARONE HYDROCHLORIDE 200 MG: 200 TABLET ORAL at 08:45

## 2022-01-01 RX ADMIN — ARFORMOTEROL TARTRATE 15 MCG: 15 SOLUTION RESPIRATORY (INHALATION) at 12:28

## 2022-01-01 RX ADMIN — NOREPINEPHRINE BITARTRATE 5 MCG/MIN: 1 INJECTION, SOLUTION, CONCENTRATE INTRAVENOUS at 00:08

## 2022-01-01 RX ADMIN — ONDANSETRON 4 MG: 2 INJECTION INTRAMUSCULAR; INTRAVENOUS at 22:13

## 2022-01-01 RX ADMIN — Medication 10 ML: at 21:29

## 2022-01-01 RX ADMIN — NOREPINEPHRINE BITARTRATE 24 MCG/MIN: 1 INJECTION, SOLUTION, CONCENTRATE INTRAVENOUS at 01:35

## 2022-01-01 RX ADMIN — AMIODARONE HYDROCHLORIDE 0.5 MG/MIN: 50 INJECTION, SOLUTION INTRAVENOUS at 02:41

## 2022-01-01 RX ADMIN — AMIODARONE HYDROCHLORIDE 200 MG: 200 TABLET ORAL at 20:51

## 2022-01-01 RX ADMIN — IPRATROPIUM BROMIDE 0.5 MG: 0.5 SOLUTION RESPIRATORY (INHALATION) at 07:37

## 2022-01-01 RX ADMIN — ALBUMIN (HUMAN) 12.5 G: 0.25 INJECTION, SOLUTION INTRAVENOUS at 11:21

## 2022-01-01 RX ADMIN — ALBUMIN (HUMAN) 25 G: 0.25 INJECTION, SOLUTION INTRAVENOUS at 05:04

## 2022-01-01 RX ADMIN — HEPARIN SODIUM 5000 UNITS: 5000 INJECTION INTRAVENOUS; SUBCUTANEOUS at 08:18

## 2022-01-01 RX ADMIN — CALCIUM CHLORIDE, MAGNESIUM CHLORIDE, DEXTROSE MONOHYDRATE, LACTIC ACID, SODIUM CHLORIDE, SODIUM BICARBONATE AND POTASSIUM CHLORIDE: 3.68; 3.05; 22; 5.4; 6.46; 3.09; .314 INJECTION INTRAVENOUS at 13:02

## 2022-01-01 RX ADMIN — ATORVASTATIN CALCIUM 40 MG: 40 TABLET, FILM COATED ORAL at 22:01

## 2022-01-01 RX ADMIN — CALCIUM CHLORIDE, MAGNESIUM CHLORIDE, DEXTROSE MONOHYDRATE, LACTIC ACID, SODIUM CHLORIDE, SODIUM BICARBONATE AND POTASSIUM CHLORIDE: 3.68; 3.05; 22; 5.4; 6.46; 3.09; .314 INJECTION INTRAVENOUS at 21:36

## 2022-01-01 RX ADMIN — ONDANSETRON 4 MG: 2 INJECTION INTRAMUSCULAR; INTRAVENOUS at 16:50

## 2022-01-01 RX ADMIN — ARFORMOTEROL TARTRATE 15 MCG: 15 SOLUTION RESPIRATORY (INHALATION) at 07:51

## 2022-01-01 RX ADMIN — BUDESONIDE 500 MCG: 0.5 INHALANT RESPIRATORY (INHALATION) at 19:48

## 2022-01-01 RX ADMIN — CALCIUM CHLORIDE, MAGNESIUM CHLORIDE, DEXTROSE MONOHYDRATE, LACTIC ACID, SODIUM CHLORIDE, SODIUM BICARBONATE AND POTASSIUM CHLORIDE: 3.68; 3.05; 22; 5.4; 6.46; 3.09; .314 INJECTION INTRAVENOUS at 22:29

## 2022-01-01 RX ADMIN — HYDROCORTISONE SODIUM SUCCINATE 100 MG: 100 INJECTION, POWDER, FOR SOLUTION INTRAMUSCULAR; INTRAVENOUS at 05:22

## 2022-01-01 RX ADMIN — LACTULOSE 30 ML: 20 SOLUTION ORAL at 22:02

## 2022-01-01 RX ADMIN — MIDODRINE HYDROCHLORIDE 20 MG: 5 TABLET ORAL at 22:30

## 2022-01-01 RX ADMIN — MIDODRINE HYDROCHLORIDE 20 MG: 5 TABLET ORAL at 06:02

## 2022-01-01 RX ADMIN — AMIODARONE HYDROCHLORIDE 1 MG/MIN: 50 INJECTION, SOLUTION INTRAVENOUS at 12:32

## 2022-01-01 RX ADMIN — DEXMEDETOMIDINE HYDROCHLORIDE 0.4 MCG/KG/HR: 4 INJECTION, SOLUTION INTRAVENOUS at 14:42

## 2022-01-01 RX ADMIN — HEPARIN SODIUM 2400 UNITS: 1000 INJECTION INTRAVENOUS; SUBCUTANEOUS at 07:45

## 2022-01-01 RX ADMIN — SODIUM ZIRCONIUM CYCLOSILICATE 15 G: 10 POWDER, FOR SUSPENSION ORAL at 03:54

## 2022-01-01 RX ADMIN — AMIODARONE HYDROCHLORIDE 1 MG/MIN: 50 INJECTION, SOLUTION INTRAVENOUS at 19:44

## 2022-01-01 RX ADMIN — BUDESONIDE 500 MCG: 0.5 INHALANT RESPIRATORY (INHALATION) at 07:56

## 2022-01-01 RX ADMIN — ACETAMINOPHEN 650 MG: 325 TABLET ORAL at 19:12

## 2022-01-01 RX ADMIN — MIDODRINE HYDROCHLORIDE 20 MG: 5 TABLET ORAL at 06:15

## 2022-01-01 RX ADMIN — AMIODARONE HYDROCHLORIDE 200 MG: 200 TABLET ORAL at 19:25

## 2022-01-01 RX ADMIN — HYDROCORTISONE SODIUM SUCCINATE 100 MG: 100 INJECTION, POWDER, FOR SOLUTION INTRAMUSCULAR; INTRAVENOUS at 05:05

## 2022-01-01 RX ADMIN — MIDAZOLAM HYDROCHLORIDE 2 MG: 1 INJECTION, SOLUTION INTRAMUSCULAR; INTRAVENOUS at 17:33

## 2022-01-01 RX ADMIN — ALTEPLASE 1 MG: 2.2 INJECTION, POWDER, LYOPHILIZED, FOR SOLUTION INTRAVENOUS at 13:42

## 2022-01-01 RX ADMIN — HYDROCORTISONE SODIUM SUCCINATE 100 MG: 100 INJECTION, POWDER, FOR SOLUTION INTRAMUSCULAR; INTRAVENOUS at 08:40

## 2022-01-01 RX ADMIN — AMIODARONE HYDROCHLORIDE 200 MG: 200 TABLET ORAL at 21:05

## 2022-01-01 RX ADMIN — VANCOMYCIN HYDROCHLORIDE 1500 MG: 10 INJECTION, POWDER, LYOPHILIZED, FOR SOLUTION INTRAVENOUS at 04:21

## 2022-01-01 RX ADMIN — CALCIUM CHLORIDE, MAGNESIUM CHLORIDE, DEXTROSE MONOHYDRATE, LACTIC ACID, SODIUM CHLORIDE, SODIUM BICARBONATE AND POTASSIUM CHLORIDE: 3.68; 3.05; 22; 5.4; 6.46; 3.09; .314 INJECTION INTRAVENOUS at 18:59

## 2022-01-01 RX ADMIN — ROCURONIUM BROMIDE 50 MG: 10 INJECTION, SOLUTION INTRAVENOUS at 17:34

## 2022-01-01 RX ADMIN — NOREPINEPHRINE BITARTRATE 8 MCG/MIN: 1 INJECTION, SOLUTION, CONCENTRATE INTRAVENOUS at 17:26

## 2022-01-01 RX ADMIN — CEFEPIME 1 G: 1 INJECTION, POWDER, FOR SOLUTION INTRAMUSCULAR; INTRAVENOUS at 02:00

## 2022-01-01 RX ADMIN — CALCIUM CHLORIDE, MAGNESIUM CHLORIDE, DEXTROSE MONOHYDRATE, LACTIC ACID, SODIUM CHLORIDE, SODIUM BICARBONATE AND POTASSIUM CHLORIDE: 3.68; 3.05; 22; 5.4; 6.46; 3.09; .314 INJECTION INTRAVENOUS at 03:00

## 2022-01-01 RX ADMIN — MIDODRINE HYDROCHLORIDE 20 MG: 5 TABLET ORAL at 05:26

## 2022-01-01 RX ADMIN — BUDESONIDE 500 MCG: 0.5 INHALANT RESPIRATORY (INHALATION) at 08:23

## 2022-01-01 RX ADMIN — ATORVASTATIN CALCIUM 40 MG: 40 TABLET, FILM COATED ORAL at 21:00

## 2022-01-01 RX ADMIN — AMIODARONE HYDROCHLORIDE 200 MG: 200 TABLET ORAL at 22:02

## 2022-01-01 RX ADMIN — MIDODRINE HYDROCHLORIDE 20 MG: 5 TABLET ORAL at 13:03

## 2022-01-01 RX ADMIN — CALCIUM CHLORIDE, MAGNESIUM CHLORIDE, DEXTROSE MONOHYDRATE, LACTIC ACID, SODIUM CHLORIDE, SODIUM BICARBONATE AND POTASSIUM CHLORIDE: 3.68; 3.05; 22; 5.4; 6.46; 3.09; .314 INJECTION INTRAVENOUS at 07:02

## 2022-01-01 RX ADMIN — AMIODARONE HYDROCHLORIDE 200 MG: 200 TABLET ORAL at 18:17

## 2022-01-01 RX ADMIN — LACTULOSE 30 ML: 20 SOLUTION ORAL at 09:03

## 2022-01-01 RX ADMIN — BUDESONIDE 500 MCG: 0.5 INHALANT RESPIRATORY (INHALATION) at 12:28

## 2022-01-01 RX ADMIN — NOREPINEPHRINE BITARTRATE 10 MCG/MIN: 1 INJECTION, SOLUTION, CONCENTRATE INTRAVENOUS at 16:59

## 2022-01-01 RX ADMIN — CALCIUM CHLORIDE, MAGNESIUM CHLORIDE, DEXTROSE MONOHYDRATE, LACTIC ACID, SODIUM CHLORIDE, SODIUM BICARBONATE AND POTASSIUM CHLORIDE: 3.68; 3.05; 22; 5.4; 6.46; 3.09; .314 INJECTION INTRAVENOUS at 16:37

## 2022-01-01 RX ADMIN — DEXMEDETOMIDINE HYDROCHLORIDE 0.4 MCG/KG/HR: 4 INJECTION, SOLUTION INTRAVENOUS at 19:51

## 2022-01-01 RX ADMIN — AMIODARONE HYDROCHLORIDE 200 MG: 200 TABLET ORAL at 09:33

## 2022-01-01 RX ADMIN — CALCIUM CHLORIDE, MAGNESIUM CHLORIDE, DEXTROSE MONOHYDRATE, LACTIC ACID, SODIUM CHLORIDE, SODIUM BICARBONATE AND POTASSIUM CHLORIDE: 3.68; 3.05; 22; 5.4; 6.46; 3.09; .314 INJECTION INTRAVENOUS at 19:18

## 2022-01-01 RX ADMIN — FENTANYL CITRATE 100 MCG/HR: 0.05 INJECTION, SOLUTION INTRAMUSCULAR; INTRAVENOUS at 18:03

## 2022-01-01 RX ADMIN — MIDODRINE HYDROCHLORIDE 20 MG: 5 TABLET ORAL at 14:16

## 2022-01-01 RX ADMIN — CALCIUM CHLORIDE, MAGNESIUM CHLORIDE, DEXTROSE MONOHYDRATE, LACTIC ACID, SODIUM CHLORIDE, SODIUM BICARBONATE AND POTASSIUM CHLORIDE: 3.68; 3.05; 22; 5.4; 6.46; 3.09; .314 INJECTION INTRAVENOUS at 12:56

## 2022-01-01 RX ADMIN — ATORVASTATIN CALCIUM 40 MG: 40 TABLET, FILM COATED ORAL at 22:59

## 2022-01-01 RX ADMIN — RIFAXIMIN 550 MG: 550 TABLET ORAL at 09:01

## 2022-01-01 RX ADMIN — ACETAMINOPHEN 650 MG: 325 TABLET ORAL at 15:30

## 2022-01-01 RX ADMIN — MIDODRINE HYDROCHLORIDE 20 MG: 5 TABLET ORAL at 21:05

## 2022-01-01 RX ADMIN — CALCIUM CHLORIDE, MAGNESIUM CHLORIDE, DEXTROSE MONOHYDRATE, LACTIC ACID, SODIUM CHLORIDE, SODIUM BICARBONATE AND POTASSIUM CHLORIDE: 3.68; 3.05; 22; 5.4; 6.46; 3.09; .314 INJECTION INTRAVENOUS at 15:55

## 2022-01-01 RX ADMIN — AMIODARONE HYDROCHLORIDE 200 MG: 200 TABLET ORAL at 09:04

## 2022-01-01 RX ADMIN — NOREPINEPHRINE BITARTRATE 27 MCG/MIN: 1 INJECTION, SOLUTION, CONCENTRATE INTRAVENOUS at 18:36

## 2022-01-01 RX ADMIN — POTASSIUM & SODIUM PHOSPHATES POWDER PACK 280-160-250 MG 2 PACKET: 280-160-250 PACK at 18:26

## 2022-01-01 RX ADMIN — NOREPINEPHRINE BITARTRATE 12 MCG/MIN: 1 INJECTION, SOLUTION, CONCENTRATE INTRAVENOUS at 22:07

## 2022-01-01 RX ADMIN — FENTANYL CITRATE 50 MCG/HR: 50 INJECTION INTRAVENOUS at 23:24

## 2022-01-01 RX ADMIN — NOREPINEPHRINE BITARTRATE 28 MCG/MIN: 1 INJECTION, SOLUTION, CONCENTRATE INTRAVENOUS at 17:56

## 2022-01-01 RX ADMIN — OLANZAPINE 5 MG: 5 TABLET, FILM COATED ORAL at 21:28

## 2022-01-01 RX ADMIN — ARFORMOTEROL TARTRATE 15 MCG: 15 SOLUTION RESPIRATORY (INHALATION) at 07:55

## 2022-01-01 RX ADMIN — BUDESONIDE 500 MCG: 0.5 INHALANT RESPIRATORY (INHALATION) at 07:37

## 2022-01-01 RX ADMIN — HYDROCORTISONE SODIUM SUCCINATE 100 MG: 100 INJECTION, POWDER, FOR SOLUTION INTRAMUSCULAR; INTRAVENOUS at 22:02

## 2022-01-01 RX ADMIN — Medication 10 ML: at 15:16

## 2022-01-01 RX ADMIN — ALBUMIN (HUMAN) 12.5 G: 0.25 INJECTION, SOLUTION INTRAVENOUS at 11:19

## 2022-01-01 RX ADMIN — ALPRAZOLAM 0.25 MG: 0.5 TABLET ORAL at 15:10

## 2022-01-01 RX ADMIN — ACETAMINOPHEN 650 MG: 325 TABLET ORAL at 12:15

## 2022-01-01 RX ADMIN — MIDODRINE HYDROCHLORIDE 20 MG: 5 TABLET ORAL at 21:06

## 2022-01-01 RX ADMIN — PIPERACILLIN AND TAZOBACTAM 4.5 G: 4; .5 INJECTION, POWDER, LYOPHILIZED, FOR SOLUTION INTRAVENOUS at 12:20

## 2022-01-01 RX ADMIN — Medication 10 ML: at 21:46

## 2022-01-01 RX ADMIN — CALCIUM CHLORIDE, MAGNESIUM CHLORIDE, DEXTROSE MONOHYDRATE, LACTIC ACID, SODIUM CHLORIDE, SODIUM BICARBONATE AND POTASSIUM CHLORIDE: 3.68; 3.05; 22; 5.4; 6.46; 3.09; .314 INJECTION INTRAVENOUS at 22:07

## 2022-01-01 RX ADMIN — VASOPRESSIN 0.04 UNITS/MIN: 20 INJECTION PARENTERAL at 18:30

## 2022-01-01 RX ADMIN — METHOCARBAMOL 500 MG: 500 TABLET ORAL at 16:42

## 2022-01-01 RX ADMIN — ALBUMIN (HUMAN) 12.5 G: 0.25 INJECTION, SOLUTION INTRAVENOUS at 07:42

## 2022-01-01 RX ADMIN — HEPARIN SODIUM 5000 UNITS: 5000 INJECTION INTRAVENOUS; SUBCUTANEOUS at 08:10

## 2022-01-01 RX ADMIN — IPRATROPIUM BROMIDE 0.5 MG: 0.5 SOLUTION RESPIRATORY (INHALATION) at 19:48

## 2022-01-01 RX ADMIN — CALCIUM CHLORIDE, MAGNESIUM CHLORIDE, DEXTROSE MONOHYDRATE, LACTIC ACID, SODIUM CHLORIDE, SODIUM BICARBONATE AND POTASSIUM CHLORIDE: 3.68; 3.05; 22; 5.4; 6.46; 3.09; .314 INJECTION INTRAVENOUS at 01:35

## 2022-01-01 RX ADMIN — NOREPINEPHRINE BITARTRATE 30 MCG/MIN: 1 INJECTION, SOLUTION, CONCENTRATE INTRAVENOUS at 01:00

## 2022-01-01 RX ADMIN — EPINEPHRINE 8 MCG/MIN: 1 INJECTION INTRAMUSCULAR; INTRAVENOUS; SUBCUTANEOUS at 16:33

## 2022-01-01 RX ADMIN — HEPARIN SODIUM 5000 UNITS: 5000 INJECTION INTRAVENOUS; SUBCUTANEOUS at 07:02

## 2022-01-01 RX ADMIN — MIDODRINE HYDROCHLORIDE 20 MG: 5 TABLET ORAL at 12:31

## 2022-01-01 RX ADMIN — HYDROCORTISONE SODIUM SUCCINATE 100 MG: 100 INJECTION, POWDER, FOR SOLUTION INTRAMUSCULAR; INTRAVENOUS at 21:05

## 2022-01-01 RX ADMIN — BUDESONIDE 500 MCG: 0.5 INHALANT RESPIRATORY (INHALATION) at 19:50

## 2022-01-01 RX ADMIN — Medication 10 UNITS: at 03:39

## 2022-01-01 RX ADMIN — MIDODRINE HYDROCHLORIDE 20 MG: 5 TABLET ORAL at 21:46

## 2022-01-01 RX ADMIN — HEPARIN SODIUM 2400 UNITS: 1000 INJECTION INTRAVENOUS; SUBCUTANEOUS at 07:44

## 2022-01-01 RX ADMIN — AMIODARONE HYDROCHLORIDE 200 MG: 200 TABLET ORAL at 18:05

## 2022-01-01 RX ADMIN — NOREPINEPHRINE BITARTRATE 17 MCG/MIN: 1 INJECTION, SOLUTION, CONCENTRATE INTRAVENOUS at 06:25

## 2022-01-01 RX ADMIN — HYDROCORTISONE SODIUM SUCCINATE 100 MG: 100 INJECTION, POWDER, FOR SOLUTION INTRAMUSCULAR; INTRAVENOUS at 21:06

## 2022-01-01 RX ADMIN — CALCIUM CHLORIDE, MAGNESIUM CHLORIDE, DEXTROSE MONOHYDRATE, LACTIC ACID, SODIUM CHLORIDE, SODIUM BICARBONATE AND POTASSIUM CHLORIDE: 3.68; 3.05; 22; 5.4; 6.46; 3.09; .314 INJECTION INTRAVENOUS at 16:17

## 2022-01-01 RX ADMIN — AMIODARONE HYDROCHLORIDE 200 MG: 200 TABLET ORAL at 13:25

## 2022-01-01 RX ADMIN — CEFEPIME 1 G: 1 INJECTION, POWDER, FOR SOLUTION INTRAMUSCULAR; INTRAVENOUS at 02:18

## 2022-01-01 RX ADMIN — MIDODRINE HYDROCHLORIDE 20 MG: 5 TABLET ORAL at 05:05

## 2022-01-01 RX ADMIN — POTASSIUM & SODIUM PHOSPHATES POWDER PACK 280-160-250 MG 2 PACKET: 280-160-250 PACK at 08:47

## 2022-01-01 RX ADMIN — CALCIUM CHLORIDE, MAGNESIUM CHLORIDE, DEXTROSE MONOHYDRATE, LACTIC ACID, SODIUM CHLORIDE, SODIUM BICARBONATE AND POTASSIUM CHLORIDE: 3.68; 3.05; 22; 5.4; 6.46; 3.09; .314 INJECTION INTRAVENOUS at 06:29

## 2022-01-01 RX ADMIN — MIDODRINE HYDROCHLORIDE 20 MG: 5 TABLET ORAL at 22:01

## 2022-01-01 RX ADMIN — NOREPINEPHRINE BITARTRATE 15 MCG/MIN: 1 INJECTION, SOLUTION, CONCENTRATE INTRAVENOUS at 10:54

## 2022-01-01 RX ADMIN — SODIUM CHLORIDE, POTASSIUM CHLORIDE, SODIUM LACTATE AND CALCIUM CHLORIDE 500 ML: 600; 310; 30; 20 INJECTION, SOLUTION INTRAVENOUS at 01:20

## 2022-01-01 RX ADMIN — DEXTROSE MONOHYDRATE 250 ML: 100 INJECTION, SOLUTION INTRAVENOUS at 03:42

## 2022-01-01 RX ADMIN — ARFORMOTEROL TARTRATE 15 MCG: 15 SOLUTION RESPIRATORY (INHALATION) at 21:05

## 2022-01-01 RX ADMIN — METHOCARBAMOL 500 MG: 500 TABLET ORAL at 00:31

## 2022-01-01 RX ADMIN — BUDESONIDE 500 MCG: 0.5 INHALANT RESPIRATORY (INHALATION) at 00:07

## 2022-01-01 RX ADMIN — AMIODARONE HYDROCHLORIDE 200 MG: 200 TABLET ORAL at 21:06

## 2022-01-01 RX ADMIN — DIGOXIN 125 MCG: 0.25 INJECTION INTRAMUSCULAR; INTRAVENOUS at 20:33

## 2022-01-01 RX ADMIN — LACTULOSE 30 ML: 20 SOLUTION ORAL at 20:51

## 2022-01-01 RX ADMIN — CALCIUM CHLORIDE, MAGNESIUM CHLORIDE, DEXTROSE MONOHYDRATE, LACTIC ACID, SODIUM CHLORIDE, SODIUM BICARBONATE AND POTASSIUM CHLORIDE: 3.68; 3.05; 22; 5.4; 6.46; 3.09; .314 INJECTION INTRAVENOUS at 03:36

## 2022-01-01 RX ADMIN — CEFEPIME 1 G: 1 INJECTION, POWDER, FOR SOLUTION INTRAMUSCULAR; INTRAVENOUS at 01:58

## 2022-01-01 RX ADMIN — VASOPRESSIN 0.04 UNITS/MIN: 20 INJECTION PARENTERAL at 23:27

## 2022-01-01 RX ADMIN — BUDESONIDE 500 MCG: 0.5 INHALANT RESPIRATORY (INHALATION) at 21:00

## 2022-01-01 RX ADMIN — ARFORMOTEROL TARTRATE 15 MCG: 15 SOLUTION RESPIRATORY (INHALATION) at 07:47

## 2022-01-01 RX ADMIN — VASOPRESSIN 0.04 UNITS/MIN: 20 INJECTION PARENTERAL at 03:36

## 2022-01-01 RX ADMIN — MIDODRINE HYDROCHLORIDE 20 MG: 5 TABLET ORAL at 16:37

## 2022-01-01 RX ADMIN — DEXMEDETOMIDINE HYDROCHLORIDE 1 MCG/KG/HR: 4 INJECTION, SOLUTION INTRAVENOUS at 03:13

## 2022-01-01 RX ADMIN — PIPERACILLIN AND TAZOBACTAM 3.38 G: 3; .375 INJECTION, POWDER, FOR SOLUTION INTRAVENOUS at 18:08

## 2022-01-01 RX ADMIN — HYDROCORTISONE SODIUM SUCCINATE 100 MG: 100 INJECTION, POWDER, FOR SOLUTION INTRAMUSCULAR; INTRAVENOUS at 16:52

## 2022-01-01 RX ADMIN — ACETAMINOPHEN 650 MG: 325 TABLET ORAL at 02:27

## 2022-01-01 RX ADMIN — VANCOMYCIN HYDROCHLORIDE 500 MG: 500 INJECTION, POWDER, LYOPHILIZED, FOR SOLUTION INTRAVENOUS at 01:46

## 2022-01-01 RX ADMIN — ALBUMIN (HUMAN) 25 G: 0.25 INJECTION, SOLUTION INTRAVENOUS at 13:33

## 2022-01-01 RX ADMIN — Medication 100 MEQ: at 21:32

## 2022-01-01 RX ADMIN — IPRATROPIUM BROMIDE 0.5 MG: 0.5 SOLUTION RESPIRATORY (INHALATION) at 14:47

## 2022-01-01 RX ADMIN — Medication 1000 ML: at 23:00

## 2022-01-01 RX ADMIN — NOREPINEPHRINE BITARTRATE 15 MCG/MIN: 1 INJECTION, SOLUTION, CONCENTRATE INTRAVENOUS at 09:19

## 2022-01-01 RX ADMIN — Medication 30 MG: at 20:50

## 2022-01-01 RX ADMIN — PIPERACILLIN AND TAZOBACTAM 3.38 G: 3; .375 INJECTION, POWDER, FOR SOLUTION INTRAVENOUS at 17:47

## 2022-01-01 RX ADMIN — DEXMEDETOMIDINE HYDROCHLORIDE 0.8 MCG/KG/HR: 4 INJECTION, SOLUTION INTRAVENOUS at 01:52

## 2022-01-01 RX ADMIN — METHOCARBAMOL 500 MG: 500 TABLET ORAL at 09:04

## 2022-01-01 RX ADMIN — CALCIUM GLUCONATE 1000 MG: 20 INJECTION, SOLUTION INTRAVENOUS at 02:15

## 2022-01-01 RX ADMIN — POTASSIUM & SODIUM PHOSPHATES POWDER PACK 280-160-250 MG 2 PACKET: 280-160-250 PACK at 22:02

## 2022-01-01 RX ADMIN — IPRATROPIUM BROMIDE 0.5 MG: 0.5 SOLUTION RESPIRATORY (INHALATION) at 21:00

## 2022-01-01 RX ADMIN — CALCIUM CHLORIDE, MAGNESIUM CHLORIDE, DEXTROSE MONOHYDRATE, LACTIC ACID, SODIUM CHLORIDE, SODIUM BICARBONATE AND POTASSIUM CHLORIDE: 3.68; 3.05; 22; 5.4; 6.46; 3.09; .314 INJECTION INTRAVENOUS at 10:04

## 2022-01-01 RX ADMIN — EPINEPHRINE 9 MCG/MIN: 1 INJECTION INTRAMUSCULAR; INTRAVENOUS; SUBCUTANEOUS at 16:28

## 2022-01-01 RX ADMIN — NOREPINEPHRINE BITARTRATE 15 MCG/MIN: 1 INJECTION, SOLUTION, CONCENTRATE INTRAVENOUS at 10:41

## 2022-01-01 RX ADMIN — SODIUM CHLORIDE 40 MG: 9 INJECTION INTRAMUSCULAR; INTRAVENOUS; SUBCUTANEOUS at 09:01

## 2022-01-01 RX ADMIN — LACTULOSE 30 ML: 20 SOLUTION ORAL at 21:06

## 2022-01-01 RX ADMIN — ATORVASTATIN CALCIUM 40 MG: 40 TABLET, FILM COATED ORAL at 21:28

## 2022-01-01 RX ADMIN — ARFORMOTEROL TARTRATE 15 MCG: 15 SOLUTION RESPIRATORY (INHALATION) at 08:23

## 2022-01-01 RX ADMIN — AMIODARONE HYDROCHLORIDE 150 MG: 50 INJECTION, SOLUTION INTRAVENOUS at 19:44

## 2022-01-01 RX ADMIN — EPINEPHRINE 5 MCG/MIN: 1 INJECTION INTRAMUSCULAR; INTRAVENOUS; SUBCUTANEOUS at 16:48

## 2022-01-01 RX ADMIN — ATORVASTATIN CALCIUM 40 MG: 40 TABLET, FILM COATED ORAL at 22:30

## 2022-01-01 RX ADMIN — ACETAMINOPHEN 650 MG: 325 TABLET ORAL at 02:31

## 2022-01-01 RX ADMIN — RIFAXIMIN 550 MG: 550 TABLET ORAL at 12:26

## 2022-01-01 RX ADMIN — DOBUTAMINE HYDROCHLORIDE 2.5 MCG/KG/MIN: 200 INJECTION INTRAVENOUS at 15:19

## 2022-01-01 RX ADMIN — ACETAMINOPHEN 650 MG: 325 TABLET ORAL at 04:25

## 2022-01-01 RX ADMIN — HEPARIN SODIUM 5000 UNITS: 5000 INJECTION INTRAVENOUS; SUBCUTANEOUS at 20:51

## 2022-01-01 RX ADMIN — NOREPINEPHRINE BITARTRATE 14 MCG/MIN: 1 INJECTION, SOLUTION, CONCENTRATE INTRAVENOUS at 23:45

## 2022-01-01 RX ADMIN — BUDESONIDE 500 MCG: 0.5 INHALANT RESPIRATORY (INHALATION) at 08:34

## 2022-01-01 RX ADMIN — MIDODRINE HYDROCHLORIDE 20 MG: 5 TABLET ORAL at 13:24

## 2022-01-01 RX ADMIN — EPINEPHRINE 7 MCG/MIN: 1 INJECTION INTRAMUSCULAR; INTRAVENOUS; SUBCUTANEOUS at 16:38

## 2022-01-01 RX ADMIN — DEXMEDETOMIDINE HYDROCHLORIDE 1.5 MCG/KG/HR: 4 INJECTION, SOLUTION INTRAVENOUS at 13:24

## 2022-01-01 RX ADMIN — Medication 10 ML: at 16:21

## 2022-01-01 RX ADMIN — MIDODRINE HYDROCHLORIDE 20 MG: 5 TABLET ORAL at 06:26

## 2022-01-01 RX ADMIN — Medication 10 ML: at 22:00

## 2022-01-01 RX ADMIN — CEFEPIME 1 G: 1 INJECTION, POWDER, FOR SOLUTION INTRAMUSCULAR; INTRAVENOUS at 03:14

## 2022-01-01 RX ADMIN — ARFORMOTEROL TARTRATE 15 MCG: 15 SOLUTION RESPIRATORY (INHALATION) at 21:00

## 2022-01-01 RX ADMIN — ARFORMOTEROL TARTRATE 15 MCG: 15 SOLUTION RESPIRATORY (INHALATION) at 07:37

## 2022-01-01 RX ADMIN — PIPERACILLIN AND TAZOBACTAM 3.38 G: 3; .375 INJECTION, POWDER, FOR SOLUTION INTRAVENOUS at 05:22

## 2022-01-01 RX ADMIN — ATORVASTATIN CALCIUM 40 MG: 40 TABLET, FILM COATED ORAL at 21:05

## 2022-01-01 RX ADMIN — DEXTROSE MONOHYDRATE 125 ML: 100 INJECTION, SOLUTION INTRAVENOUS at 20:20

## 2022-01-01 RX ADMIN — LACTULOSE 30 ML: 20 SOLUTION ORAL at 18:04

## 2022-01-01 RX ADMIN — LIDOCAINE HYDROCHLORIDE: 10 INJECTION, SOLUTION INFILTRATION; PERINEURAL at 15:00

## 2022-01-01 RX ADMIN — SODIUM BICARBONATE 50 MEQ: 84 INJECTION INTRAVENOUS at 06:14

## 2022-01-01 RX ADMIN — ARFORMOTEROL TARTRATE 15 MCG: 15 SOLUTION RESPIRATORY (INHALATION) at 20:30

## 2022-01-01 RX ADMIN — EPINEPHRINE 11 MCG/MIN: 1 INJECTION INTRAMUSCULAR; INTRAVENOUS; SUBCUTANEOUS at 00:16

## 2022-01-01 RX ADMIN — Medication 10 ML: at 22:31

## 2022-01-01 RX ADMIN — ARFORMOTEROL TARTRATE 15 MCG: 15 SOLUTION RESPIRATORY (INHALATION) at 00:07

## 2022-01-01 RX ADMIN — VASOPRESSIN 0.04 UNITS/MIN: 20 INJECTION PARENTERAL at 08:40

## 2022-01-01 RX ADMIN — LACTULOSE 30 ML: 20 SOLUTION ORAL at 12:21

## 2022-01-01 RX ADMIN — POTASSIUM & SODIUM PHOSPHATES POWDER PACK 280-160-250 MG 2 PACKET: 280-160-250 PACK at 13:05

## 2022-01-01 RX ADMIN — ALBUMIN (HUMAN) 25 G: 0.25 INJECTION, SOLUTION INTRAVENOUS at 18:46

## 2022-01-01 RX ADMIN — NOREPINEPHRINE BITARTRATE 30 MCG/MIN: 1 INJECTION, SOLUTION, CONCENTRATE INTRAVENOUS at 21:10

## 2022-01-01 RX ADMIN — IPRATROPIUM BROMIDE 0.5 MG: 0.5 SOLUTION RESPIRATORY (INHALATION) at 02:56

## 2022-01-01 RX ADMIN — ACETAMINOPHEN 650 MG: 325 TABLET ORAL at 22:15

## 2022-01-01 RX ADMIN — MIDODRINE HYDROCHLORIDE 20 MG: 5 TABLET ORAL at 07:47

## 2022-01-01 RX ADMIN — ONDANSETRON 4 MG: 2 INJECTION INTRAMUSCULAR; INTRAVENOUS at 05:01

## 2022-01-01 RX ADMIN — CALCIUM CHLORIDE, MAGNESIUM CHLORIDE, DEXTROSE MONOHYDRATE, LACTIC ACID, SODIUM CHLORIDE, SODIUM BICARBONATE AND POTASSIUM CHLORIDE: 3.68; 3.05; 22; 5.4; 6.46; 3.09; .314 INJECTION INTRAVENOUS at 00:19

## 2022-01-01 RX ADMIN — MIDODRINE HYDROCHLORIDE 20 MG: 5 TABLET ORAL at 05:00

## 2022-01-01 RX ADMIN — LACTULOSE 30 ML: 20 SOLUTION ORAL at 13:33

## 2022-01-01 RX ADMIN — SODIUM BICARBONATE 100 MEQ: 84 INJECTION INTRAVENOUS at 21:32

## 2022-01-01 RX ADMIN — BUDESONIDE 500 MCG: 0.5 INHALANT RESPIRATORY (INHALATION) at 20:17

## 2022-01-01 RX ADMIN — Medication 10 ML: at 14:17

## 2022-01-01 RX ADMIN — CALCIUM CHLORIDE, MAGNESIUM CHLORIDE, DEXTROSE MONOHYDRATE, LACTIC ACID, SODIUM CHLORIDE, SODIUM BICARBONATE AND POTASSIUM CHLORIDE: 3.68; 3.05; 22; 5.4; 6.46; 3.09; .314 INJECTION INTRAVENOUS at 21:06

## 2022-01-01 RX ADMIN — BUDESONIDE 500 MCG: 0.5 INHALANT RESPIRATORY (INHALATION) at 19:37

## 2022-01-01 RX ADMIN — RIFAXIMIN 550 MG: 550 TABLET ORAL at 18:26

## 2022-01-01 RX ADMIN — PIPERACILLIN AND TAZOBACTAM 3.38 G: 3; .375 INJECTION, POWDER, FOR SOLUTION INTRAVENOUS at 05:05

## 2022-01-01 RX ADMIN — EPOPROSTENOL 30 NG/KG/MIN: 1.5 INJECTION, POWDER, LYOPHILIZED, FOR SOLUTION INTRAVENOUS at 15:07

## 2022-01-01 RX ADMIN — IPRATROPIUM BROMIDE 0.5 MG: 0.5 SOLUTION RESPIRATORY (INHALATION) at 07:47

## 2022-01-01 RX ADMIN — Medication 10 ML: at 22:02

## 2022-01-01 RX ADMIN — AMIODARONE HYDROCHLORIDE 200 MG: 200 TABLET ORAL at 08:47

## 2022-01-01 RX ADMIN — LACTULOSE 30 ML: 20 SOLUTION ORAL at 16:21

## 2022-01-01 RX ADMIN — MIDODRINE HYDROCHLORIDE 20 MG: 5 TABLET ORAL at 07:08

## 2022-01-01 RX ADMIN — CALCIUM CHLORIDE, MAGNESIUM CHLORIDE, DEXTROSE MONOHYDRATE, LACTIC ACID, SODIUM CHLORIDE, SODIUM BICARBONATE AND POTASSIUM CHLORIDE: 3.68; 3.05; 22; 5.4; 6.46; 3.09; .314 INJECTION INTRAVENOUS at 19:49

## 2022-01-01 RX ADMIN — METOPROLOL TARTRATE 12.5 MG: 25 TABLET, FILM COATED ORAL at 18:05

## 2022-01-01 RX ADMIN — Medication 10 ML: at 06:17

## 2022-01-01 RX ADMIN — SODIUM PHOSPHATE, MONOBASIC, MONOHYDRATE: 276; 142 INJECTION, SOLUTION INTRAVENOUS at 09:43

## 2022-01-01 RX ADMIN — HYDROCORTISONE SODIUM SUCCINATE 100 MG: 100 INJECTION, POWDER, FOR SOLUTION INTRAMUSCULAR; INTRAVENOUS at 13:33

## 2022-01-01 RX ADMIN — MIDODRINE HYDROCHLORIDE 20 MG: 5 TABLET ORAL at 22:02

## 2022-01-01 RX ADMIN — CHLORHEXIDINE GLUCONATE 15 ML: 1.2 RINSE ORAL at 22:02

## 2022-01-01 RX ADMIN — ATORVASTATIN CALCIUM 40 MG: 40 TABLET, FILM COATED ORAL at 21:45

## 2022-01-01 RX ADMIN — METOPROLOL TARTRATE 12.5 MG: 25 TABLET, FILM COATED ORAL at 08:59

## 2022-01-01 RX ADMIN — ATORVASTATIN CALCIUM 40 MG: 40 TABLET, FILM COATED ORAL at 22:02

## 2022-01-01 RX ADMIN — Medication 10 ML: at 15:19

## 2022-01-01 RX ADMIN — AMIODARONE HYDROCHLORIDE 150 MG: 50 INJECTION, SOLUTION INTRAVENOUS at 20:35

## 2022-01-01 RX ADMIN — METHOCARBAMOL 500 MG: 500 TABLET ORAL at 22:15

## 2022-01-01 RX ADMIN — CALCIUM GLUCONATE 1 G: 98 INJECTION, SOLUTION INTRAVENOUS at 02:23

## 2022-01-01 RX ADMIN — SODIUM CHLORIDE 500 ML: 9 INJECTION, SOLUTION INTRAVENOUS at 03:45

## 2022-01-01 RX ADMIN — AMIODARONE HYDROCHLORIDE 200 MG: 200 TABLET ORAL at 21:28

## 2022-01-01 RX ADMIN — Medication 10 ML: at 15:55

## 2022-01-01 RX ADMIN — MIDODRINE HYDROCHLORIDE 20 MG: 5 TABLET ORAL at 13:33

## 2022-01-01 RX ADMIN — ARFORMOTEROL TARTRATE 15 MCG: 15 SOLUTION RESPIRATORY (INHALATION) at 20:17

## 2022-01-01 RX ADMIN — BUDESONIDE 500 MCG: 0.5 INHALANT RESPIRATORY (INHALATION) at 07:51

## 2022-01-01 RX ADMIN — LEVOFLOXACIN 750 MG: 5 INJECTION, SOLUTION INTRAVENOUS at 02:40

## 2022-01-01 RX ADMIN — DEXTROSE MONOHYDRATE 250 ML: 100 INJECTION, SOLUTION INTRAVENOUS at 02:31

## 2022-01-01 RX ADMIN — VASOPRESSIN 0.04 UNITS/MIN: 20 INJECTION PARENTERAL at 16:34

## 2022-01-01 RX ADMIN — VASOPRESSIN 0.04 UNITS/MIN: 20 INJECTION PARENTERAL at 21:09

## 2022-01-01 RX ADMIN — CALCIUM CHLORIDE, MAGNESIUM CHLORIDE, DEXTROSE MONOHYDRATE, LACTIC ACID, SODIUM CHLORIDE, SODIUM BICARBONATE AND POTASSIUM CHLORIDE: 3.68; 3.05; 22; 5.4; 6.46; 3.09; .314 INJECTION INTRAVENOUS at 15:00

## 2022-01-01 RX ADMIN — HYDROCORTISONE SODIUM SUCCINATE 100 MG: 100 INJECTION, POWDER, FOR SOLUTION INTRAMUSCULAR; INTRAVENOUS at 13:10

## 2022-01-01 RX ADMIN — MIDODRINE HYDROCHLORIDE 20 MG: 5 TABLET ORAL at 15:10

## 2022-01-01 RX ADMIN — ARFORMOTEROL TARTRATE 15 MCG: 15 SOLUTION RESPIRATORY (INHALATION) at 08:34

## 2022-01-01 RX ADMIN — FENTANYL CITRATE 100 MCG/HR: 0.05 INJECTION, SOLUTION INTRAMUSCULAR; INTRAVENOUS at 07:19

## 2022-01-01 RX ADMIN — CALCIUM CHLORIDE, MAGNESIUM CHLORIDE, DEXTROSE MONOHYDRATE, LACTIC ACID, SODIUM CHLORIDE, SODIUM BICARBONATE AND POTASSIUM CHLORIDE: 3.68; 3.05; 22; 5.4; 6.46; 3.09; .314 INJECTION INTRAVENOUS at 23:26

## 2022-01-01 RX ADMIN — RIFAXIMIN 550 MG: 550 TABLET ORAL at 18:09

## 2022-01-01 RX ADMIN — ALBUMIN (HUMAN) 25 G: 0.25 INJECTION, SOLUTION INTRAVENOUS at 23:25

## 2022-01-01 RX ADMIN — ARFORMOTEROL TARTRATE 15 MCG: 15 SOLUTION RESPIRATORY (INHALATION) at 19:48

## 2022-01-01 RX ADMIN — LACTULOSE 30 ML: 20 SOLUTION ORAL at 03:38

## 2022-01-01 RX ADMIN — EPINEPHRINE 11 MCG/MIN: 1 INJECTION INTRAMUSCULAR; INTRAVENOUS; SUBCUTANEOUS at 23:16

## 2022-01-01 RX ADMIN — RIFAXIMIN 550 MG: 550 TABLET ORAL at 17:47

## 2022-01-01 RX ADMIN — EPINEPHRINE 5 MCG/MIN: 1 INJECTION INTRAMUSCULAR; INTRAVENOUS; SUBCUTANEOUS at 23:29

## 2022-01-01 RX ADMIN — CEFEPIME 1 G: 1 INJECTION, POWDER, FOR SOLUTION INTRAMUSCULAR; INTRAVENOUS at 02:24

## 2022-01-01 RX ADMIN — CALCIUM CHLORIDE, MAGNESIUM CHLORIDE, DEXTROSE MONOHYDRATE, LACTIC ACID, SODIUM CHLORIDE, SODIUM BICARBONATE AND POTASSIUM CHLORIDE: 3.68; 3.05; 22; 5.4; 6.46; 3.09; .314 INJECTION INTRAVENOUS at 06:50

## 2022-01-01 RX ADMIN — LACTULOSE 30 ML: 20 SOLUTION ORAL at 13:05

## 2022-01-01 RX ADMIN — RIFAXIMIN 550 MG: 550 TABLET ORAL at 08:40

## 2022-01-01 RX ADMIN — IPRATROPIUM BROMIDE AND ALBUTEROL SULFATE 3 ML: .5; 3 SOLUTION RESPIRATORY (INHALATION) at 16:29

## 2022-01-01 RX ADMIN — LACTULOSE 30 ML: 20 SOLUTION ORAL at 21:05

## 2022-01-01 RX ADMIN — AMIODARONE HYDROCHLORIDE 200 MG: 200 TABLET ORAL at 08:40

## 2022-01-01 RX ADMIN — MIDODRINE HYDROCHLORIDE 20 MG: 5 TABLET ORAL at 13:48

## 2022-01-01 RX ADMIN — PIPERACILLIN AND TAZOBACTAM 3.38 G: 3; .375 INJECTION, POWDER, FOR SOLUTION INTRAVENOUS at 05:26

## 2022-01-01 RX ADMIN — DEXMEDETOMIDINE HYDROCHLORIDE 0.6 MCG/KG/HR: 4 INJECTION, SOLUTION INTRAVENOUS at 08:26

## 2022-01-01 RX ADMIN — Medication 10 ML: at 23:00

## 2022-01-01 RX ADMIN — HEPARIN SODIUM 5000 UNITS: 5000 INJECTION INTRAVENOUS; SUBCUTANEOUS at 21:28

## 2022-01-01 RX ADMIN — METOPROLOL TARTRATE 12.5 MG: 25 TABLET, FILM COATED ORAL at 08:45

## 2022-01-01 RX ADMIN — AMIODARONE HYDROCHLORIDE 200 MG: 200 TABLET ORAL at 09:01

## 2022-01-01 RX ADMIN — VASOPRESSIN 0.04 UNITS/MIN: 20 INJECTION PARENTERAL at 05:22

## 2022-01-01 RX ADMIN — NOREPINEPHRINE BITARTRATE 30 MCG/MIN: 1 INJECTION, SOLUTION, CONCENTRATE INTRAVENOUS at 16:01

## 2022-01-01 RX ADMIN — EPINEPHRINE 12 MCG/MIN: 1 INJECTION INTRAMUSCULAR; INTRAVENOUS; SUBCUTANEOUS at 15:33

## 2022-01-01 RX ADMIN — LACTULOSE 30 ML: 20 SOLUTION ORAL at 13:24

## 2022-01-01 RX ADMIN — ACETAMINOPHEN 650 MG: 325 TABLET ORAL at 02:28

## 2022-01-01 RX ADMIN — MIDAZOLAM 2 MG: 1 INJECTION INTRAMUSCULAR; INTRAVENOUS at 11:07

## 2022-01-01 RX ADMIN — ADENOSINE 6 MG: 3 INJECTION INTRAVENOUS at 19:01

## 2022-01-01 RX ADMIN — POTASSIUM PHOSPHATE, MONOBASIC AND POTASSIUM PHOSPHATE, DIBASIC: 224; 236 INJECTION, SOLUTION, CONCENTRATE INTRAVENOUS at 05:25

## 2022-01-01 RX ADMIN — IPRATROPIUM BROMIDE 0.5 MG: 0.5 SOLUTION RESPIRATORY (INHALATION) at 01:07

## 2022-01-01 RX ADMIN — ARFORMOTEROL TARTRATE 15 MCG: 15 SOLUTION RESPIRATORY (INHALATION) at 19:50

## 2022-01-01 RX ADMIN — AMIODARONE HYDROCHLORIDE 200 MG: 200 TABLET ORAL at 18:42

## 2022-01-01 RX ADMIN — VASOPRESSIN 0.04 UNITS/MIN: 20 INJECTION PARENTERAL at 13:06

## 2022-01-01 RX ADMIN — PIPERACILLIN AND TAZOBACTAM 3.38 G: 3; .375 INJECTION, POWDER, FOR SOLUTION INTRAVENOUS at 18:21

## 2022-01-01 RX ADMIN — SODIUM CHLORIDE 200 MG: 9 INJECTION, SOLUTION INTRAVENOUS at 14:38

## 2022-01-01 RX ADMIN — BUDESONIDE 500 MCG: 0.5 INHALANT RESPIRATORY (INHALATION) at 07:47

## 2022-01-01 RX ADMIN — BUDESONIDE 500 MCG: 0.5 INHALANT RESPIRATORY (INHALATION) at 20:30

## 2022-01-01 RX ADMIN — RIFAXIMIN 550 MG: 550 TABLET ORAL at 13:24

## 2022-01-01 RX ADMIN — MIDODRINE HYDROCHLORIDE 20 MG: 5 TABLET ORAL at 22:59

## 2022-01-01 RX ADMIN — IPRATROPIUM BROMIDE AND ALBUTEROL SULFATE 3 ML: .5; 3 SOLUTION RESPIRATORY (INHALATION) at 18:28

## 2022-01-01 RX ADMIN — Medication 30 MG: at 22:02

## 2022-01-01 RX ADMIN — ROCURONIUM BROMIDE 50 MG: 50 INJECTION INTRAVENOUS at 17:34

## 2022-01-01 RX ADMIN — IPRATROPIUM BROMIDE 0.5 MG: 0.5 SOLUTION RESPIRATORY (INHALATION) at 20:46

## 2022-01-01 RX ADMIN — AMIODARONE HYDROCHLORIDE 200 MG: 200 TABLET ORAL at 12:25

## 2022-01-01 RX ADMIN — Medication 10 ML: at 15:00

## 2022-01-01 RX ADMIN — CALCIUM CHLORIDE, MAGNESIUM CHLORIDE, DEXTROSE MONOHYDRATE, LACTIC ACID, SODIUM CHLORIDE, SODIUM BICARBONATE AND POTASSIUM CHLORIDE: 3.68; 3.05; 22; 5.4; 6.46; 3.09; .314 INJECTION INTRAVENOUS at 04:33

## 2022-08-22 ENCOUNTER — HOSPITAL ENCOUNTER (INPATIENT)
Age: 72
LOS: 9 days | Discharge: HOME HEALTH | DRG: 308 | End: 2022-08-31
Attending: STUDENT IN AN ORGANIZED HEALTH CARE EDUCATION/TRAINING PROGRAM | Admitting: FAMILY MEDICINE
Payer: MEDICARE

## 2022-08-22 ENCOUNTER — APPOINTMENT (OUTPATIENT)
Dept: CT IMAGING | Age: 72
DRG: 308 | End: 2022-08-22
Attending: STUDENT IN AN ORGANIZED HEALTH CARE EDUCATION/TRAINING PROGRAM
Payer: MEDICARE

## 2022-08-22 ENCOUNTER — APPOINTMENT (OUTPATIENT)
Dept: GENERAL RADIOLOGY | Age: 72
DRG: 308 | End: 2022-08-22
Attending: STUDENT IN AN ORGANIZED HEALTH CARE EDUCATION/TRAINING PROGRAM
Payer: MEDICARE

## 2022-08-22 DIAGNOSIS — I13.2 HYPERTENSIVE HEART AND CHRONIC KIDNEY DISEASE WITH HEART FAILURE AND WITH STAGE 5 CHRONIC KIDNEY DISEASE, OR END STAGE RENAL DISEASE (HCC): ICD-10-CM

## 2022-08-22 DIAGNOSIS — I10 PRIMARY HYPERTENSION: ICD-10-CM

## 2022-08-22 DIAGNOSIS — R53.83 FATIGUE, UNSPECIFIED TYPE: ICD-10-CM

## 2022-08-22 DIAGNOSIS — I48.92 ATRIAL FLUTTER WITH RAPID VENTRICULAR RESPONSE (HCC): ICD-10-CM

## 2022-08-22 DIAGNOSIS — A41.9 SEPSIS WITHOUT ACUTE ORGAN DYSFUNCTION, DUE TO UNSPECIFIED ORGANISM (HCC): Primary | ICD-10-CM

## 2022-08-22 DIAGNOSIS — I50.23 SYSTOLIC CHF, ACUTE ON CHRONIC (HCC): ICD-10-CM

## 2022-08-22 DIAGNOSIS — I48.4 ATYPICAL ATRIAL FLUTTER (HCC): ICD-10-CM

## 2022-08-22 DIAGNOSIS — R42 LIGHTHEADEDNESS: ICD-10-CM

## 2022-08-22 DIAGNOSIS — N18.6 ESRD (END STAGE RENAL DISEASE) (HCC): ICD-10-CM

## 2022-08-22 DIAGNOSIS — I48.0 PAROXYSMAL A-FIB (HCC): ICD-10-CM

## 2022-08-22 DIAGNOSIS — R00.0 TACHYCARDIA: ICD-10-CM

## 2022-08-22 DIAGNOSIS — I42.0 DILATED CARDIOMYOPATHY (HCC): ICD-10-CM

## 2022-08-22 DIAGNOSIS — Z51.5 PALLIATIVE CARE ENCOUNTER: ICD-10-CM

## 2022-08-22 DIAGNOSIS — I48.0 PAROXYSMAL ATRIAL FIBRILLATION (HCC): ICD-10-CM

## 2022-08-22 LAB
ALBUMIN SERPL-MCNC: 2.9 G/DL (ref 3.5–5)
ALBUMIN/GLOB SERPL: 1 {RATIO} (ref 1.1–2.2)
ALP SERPL-CCNC: 106 U/L (ref 45–117)
ALT SERPL-CCNC: 13 U/L (ref 12–78)
ANION GAP SERPL CALC-SCNC: 5 MMOL/L (ref 5–15)
AST SERPL-CCNC: 8 U/L (ref 15–37)
BASOPHILS # BLD: 0.1 K/UL (ref 0–0.1)
BASOPHILS NFR BLD: 1 % (ref 0–1)
BILIRUB SERPL-MCNC: 0.5 MG/DL (ref 0.2–1)
BUN SERPL-MCNC: 36 MG/DL (ref 6–20)
BUN/CREAT SERPL: 5 (ref 12–20)
CALCIUM SERPL-MCNC: 8.8 MG/DL (ref 8.5–10.1)
CHLORIDE SERPL-SCNC: 112 MMOL/L (ref 97–108)
CO2 SERPL-SCNC: 26 MMOL/L (ref 21–32)
COMMENT, HOLDF: NORMAL
CREAT SERPL-MCNC: 7.82 MG/DL (ref 0.55–1.02)
DIFFERENTIAL METHOD BLD: ABNORMAL
EOSINOPHIL # BLD: 0.1 K/UL (ref 0–0.4)
EOSINOPHIL NFR BLD: 2 % (ref 0–7)
ERYTHROCYTE [DISTWIDTH] IN BLOOD BY AUTOMATED COUNT: 17.1 % (ref 11.5–14.5)
GLOBULIN SER CALC-MCNC: 2.8 G/DL (ref 2–4)
GLUCOSE BLD STRIP.AUTO-MCNC: 82 MG/DL (ref 65–117)
GLUCOSE BLD STRIP.AUTO-MCNC: 92 MG/DL (ref 65–117)
GLUCOSE SERPL-MCNC: 123 MG/DL (ref 65–100)
HCT VFR BLD AUTO: 38 % (ref 35–47)
HGB BLD-MCNC: 11.3 G/DL (ref 11.5–16)
IMM GRANULOCYTES # BLD AUTO: 0 K/UL (ref 0–0.04)
IMM GRANULOCYTES NFR BLD AUTO: 0 % (ref 0–0.5)
LACTATE SERPL-SCNC: 1.8 MMOL/L (ref 0.4–2)
LYMPHOCYTES # BLD: 0.7 K/UL (ref 0.8–3.5)
LYMPHOCYTES NFR BLD: 14 % (ref 12–49)
MAGNESIUM SERPL-MCNC: 2.5 MG/DL (ref 1.6–2.4)
MCH RBC QN AUTO: 29.1 PG (ref 26–34)
MCHC RBC AUTO-ENTMCNC: 29.7 G/DL (ref 30–36.5)
MCV RBC AUTO: 97.9 FL (ref 80–99)
MONOCYTES # BLD: 0.5 K/UL (ref 0–1)
MONOCYTES NFR BLD: 10 % (ref 5–13)
NEUTS SEG # BLD: 3.5 K/UL (ref 1.8–8)
NEUTS SEG NFR BLD: 73 % (ref 32–75)
NRBC # BLD: 0 K/UL (ref 0–0.01)
NRBC BLD-RTO: 0 PER 100 WBC
PLATELET # BLD AUTO: 134 K/UL (ref 150–400)
PMV BLD AUTO: 11.1 FL (ref 8.9–12.9)
POTASSIUM SERPL-SCNC: 5 MMOL/L (ref 3.5–5.1)
PROT SERPL-MCNC: 5.7 G/DL (ref 6.4–8.2)
RBC # BLD AUTO: 3.88 M/UL (ref 3.8–5.2)
RBC MORPH BLD: ABNORMAL
SAMPLES BEING HELD,HOLD: NORMAL
SERVICE CMNT-IMP: NORMAL
SERVICE CMNT-IMP: NORMAL
SODIUM SERPL-SCNC: 143 MMOL/L (ref 136–145)
TROPONIN-HIGH SENSITIVITY: 74 NG/L (ref 0–51)
WBC # BLD AUTO: 4.9 K/UL (ref 3.6–11)

## 2022-08-22 PROCEDURE — 96374 THER/PROPH/DIAG INJ IV PUSH: CPT

## 2022-08-22 PROCEDURE — 74011000258 HC RX REV CODE- 258: Performed by: STUDENT IN AN ORGANIZED HEALTH CARE EDUCATION/TRAINING PROGRAM

## 2022-08-22 PROCEDURE — 96375 TX/PRO/DX INJ NEW DRUG ADDON: CPT

## 2022-08-22 PROCEDURE — 93005 ELECTROCARDIOGRAM TRACING: CPT

## 2022-08-22 PROCEDURE — 99285 EMERGENCY DEPT VISIT HI MDM: CPT

## 2022-08-22 PROCEDURE — 74011250637 HC RX REV CODE- 250/637: Performed by: FAMILY MEDICINE

## 2022-08-22 PROCEDURE — 36415 COLL VENOUS BLD VENIPUNCTURE: CPT

## 2022-08-22 PROCEDURE — 96366 THER/PROPH/DIAG IV INF ADDON: CPT

## 2022-08-22 PROCEDURE — 87340 HEPATITIS B SURFACE AG IA: CPT

## 2022-08-22 PROCEDURE — 86706 HEP B SURFACE ANTIBODY: CPT

## 2022-08-22 PROCEDURE — 96365 THER/PROPH/DIAG IV INF INIT: CPT

## 2022-08-22 PROCEDURE — 94664 DEMO&/EVAL PT USE INHALER: CPT

## 2022-08-22 PROCEDURE — 96368 THER/DIAG CONCURRENT INF: CPT

## 2022-08-22 PROCEDURE — 74011250637 HC RX REV CODE- 250/637: Performed by: STUDENT IN AN ORGANIZED HEALTH CARE EDUCATION/TRAINING PROGRAM

## 2022-08-22 PROCEDURE — 84484 ASSAY OF TROPONIN QUANT: CPT

## 2022-08-22 PROCEDURE — 94640 AIRWAY INHALATION TREATMENT: CPT

## 2022-08-22 PROCEDURE — 74177 CT ABD & PELVIS W/CONTRAST: CPT

## 2022-08-22 PROCEDURE — 82962 GLUCOSE BLOOD TEST: CPT

## 2022-08-22 PROCEDURE — 83605 ASSAY OF LACTIC ACID: CPT

## 2022-08-22 PROCEDURE — 85025 COMPLETE CBC W/AUTO DIFF WBC: CPT

## 2022-08-22 PROCEDURE — 74011250636 HC RX REV CODE- 250/636: Performed by: STUDENT IN AN ORGANIZED HEALTH CARE EDUCATION/TRAINING PROGRAM

## 2022-08-22 PROCEDURE — 71045 X-RAY EXAM CHEST 1 VIEW: CPT

## 2022-08-22 PROCEDURE — 74011000258 HC RX REV CODE- 258: Performed by: FAMILY MEDICINE

## 2022-08-22 PROCEDURE — 74011000250 HC RX REV CODE- 250: Performed by: FAMILY MEDICINE

## 2022-08-22 PROCEDURE — 65270000046 HC RM TELEMETRY

## 2022-08-22 PROCEDURE — 74011000636 HC RX REV CODE- 636: Performed by: RADIOLOGY

## 2022-08-22 PROCEDURE — 74011250636 HC RX REV CODE- 250/636: Performed by: FAMILY MEDICINE

## 2022-08-22 PROCEDURE — 87040 BLOOD CULTURE FOR BACTERIA: CPT

## 2022-08-22 PROCEDURE — 83735 ASSAY OF MAGNESIUM: CPT

## 2022-08-22 PROCEDURE — 80053 COMPREHEN METABOLIC PANEL: CPT

## 2022-08-22 RX ORDER — DIPHENHYDRAMINE HCL 25 MG
25 CAPSULE ORAL
Status: DISCONTINUED | OUTPATIENT
Start: 2022-08-22 | End: 2022-08-31 | Stop reason: HOSPADM

## 2022-08-22 RX ORDER — MORPHINE SULFATE 4 MG/ML
4 INJECTION INTRAVENOUS ONCE
Status: COMPLETED | OUTPATIENT
Start: 2022-08-22 | End: 2022-08-22

## 2022-08-22 RX ORDER — VANCOMYCIN 1.75 GRAM/500 ML IN 0.9 % SODIUM CHLORIDE INTRAVENOUS
1750 ONCE
Status: COMPLETED | OUTPATIENT
Start: 2022-08-22 | End: 2022-08-22

## 2022-08-22 RX ORDER — POLYETHYLENE GLYCOL 3350 17 G/17G
17 POWDER, FOR SOLUTION ORAL DAILY PRN
Status: DISCONTINUED | OUTPATIENT
Start: 2022-08-22 | End: 2022-08-31 | Stop reason: HOSPADM

## 2022-08-22 RX ORDER — INSULIN LISPRO 100 [IU]/ML
INJECTION, SOLUTION INTRAVENOUS; SUBCUTANEOUS
Status: DISCONTINUED | OUTPATIENT
Start: 2022-08-22 | End: 2022-08-31 | Stop reason: HOSPADM

## 2022-08-22 RX ORDER — VANCOMYCIN/0.9 % SOD CHLORIDE 1.5G/250ML
1500 PLASTIC BAG, INJECTION (ML) INTRAVENOUS
Status: DISCONTINUED | OUTPATIENT
Start: 2022-08-22 | End: 2022-08-22

## 2022-08-22 RX ORDER — ONDANSETRON 2 MG/ML
4 INJECTION INTRAMUSCULAR; INTRAVENOUS
Status: DISCONTINUED | OUTPATIENT
Start: 2022-08-22 | End: 2022-08-31 | Stop reason: HOSPADM

## 2022-08-22 RX ORDER — ONDANSETRON 4 MG/1
4 TABLET, ORALLY DISINTEGRATING ORAL
Status: DISCONTINUED | OUTPATIENT
Start: 2022-08-22 | End: 2022-08-31 | Stop reason: HOSPADM

## 2022-08-22 RX ORDER — ASCORBIC ACID 500 MG
500 TABLET ORAL DAILY
Status: DISCONTINUED | OUTPATIENT
Start: 2022-08-23 | End: 2022-08-31 | Stop reason: HOSPADM

## 2022-08-22 RX ORDER — SEVELAMER CARBONATE 800 MG/1
1600 TABLET, FILM COATED ORAL
Status: DISCONTINUED | OUTPATIENT
Start: 2022-08-22 | End: 2022-08-31 | Stop reason: HOSPADM

## 2022-08-22 RX ORDER — MELATONIN
2000 DAILY
Status: DISCONTINUED | OUTPATIENT
Start: 2022-08-23 | End: 2022-08-31 | Stop reason: HOSPADM

## 2022-08-22 RX ORDER — IPRATROPIUM BROMIDE AND ALBUTEROL SULFATE 2.5; .5 MG/3ML; MG/3ML
3 SOLUTION RESPIRATORY (INHALATION)
Status: DISCONTINUED | OUTPATIENT
Start: 2022-08-22 | End: 2022-08-24

## 2022-08-22 RX ORDER — LANOLIN ALCOHOL/MO/W.PET/CERES
325 CREAM (GRAM) TOPICAL
Status: DISCONTINUED | OUTPATIENT
Start: 2022-08-23 | End: 2022-08-31 | Stop reason: HOSPADM

## 2022-08-22 RX ORDER — ACETAMINOPHEN 650 MG/1
650 SUPPOSITORY RECTAL
Status: DISCONTINUED | OUTPATIENT
Start: 2022-08-22 | End: 2022-08-31 | Stop reason: HOSPADM

## 2022-08-22 RX ORDER — SODIUM CHLORIDE 0.9 % (FLUSH) 0.9 %
5-40 SYRINGE (ML) INJECTION AS NEEDED
Status: DISCONTINUED | OUTPATIENT
Start: 2022-08-22 | End: 2022-08-31 | Stop reason: HOSPADM

## 2022-08-22 RX ORDER — ACETAMINOPHEN 325 MG/1
650 TABLET ORAL
Status: DISCONTINUED | OUTPATIENT
Start: 2022-08-22 | End: 2022-08-31 | Stop reason: HOSPADM

## 2022-08-22 RX ORDER — ACETAMINOPHEN 500 MG
1000 TABLET ORAL ONCE
Status: COMPLETED | OUTPATIENT
Start: 2022-08-22 | End: 2022-08-22

## 2022-08-22 RX ORDER — MAGNESIUM SULFATE 100 %
4 CRYSTALS MISCELLANEOUS AS NEEDED
Status: DISCONTINUED | OUTPATIENT
Start: 2022-08-22 | End: 2022-08-31 | Stop reason: HOSPADM

## 2022-08-22 RX ORDER — SODIUM CHLORIDE 0.9 % (FLUSH) 0.9 %
5-40 SYRINGE (ML) INJECTION EVERY 8 HOURS
Status: DISCONTINUED | OUTPATIENT
Start: 2022-08-22 | End: 2022-08-31 | Stop reason: HOSPADM

## 2022-08-22 RX ADMIN — SODIUM CHLORIDE, PRESERVATIVE FREE 10 ML: 5 INJECTION INTRAVENOUS at 19:55

## 2022-08-22 RX ADMIN — PIPERACILLIN AND TAZOBACTAM 4.5 G: 4; .5 INJECTION, POWDER, FOR SOLUTION INTRAVENOUS at 11:37

## 2022-08-22 RX ADMIN — IOPAMIDOL 100 ML: 755 INJECTION, SOLUTION INTRAVENOUS at 13:42

## 2022-08-22 RX ADMIN — VANCOMYCIN HYDROCHLORIDE 1750 MG: 10 INJECTION, POWDER, LYOPHILIZED, FOR SOLUTION INTRAVENOUS at 11:57

## 2022-08-22 RX ADMIN — SEVELAMER CARBONATE 1600 MG: 800 TABLET, FILM COATED ORAL at 17:54

## 2022-08-22 RX ADMIN — IPRATROPIUM BROMIDE AND ALBUTEROL SULFATE 3 ML: .5; 3 SOLUTION RESPIRATORY (INHALATION) at 20:17

## 2022-08-22 RX ADMIN — PIPERACILLIN AND TAZOBACTAM 4.5 G: 4; .5 INJECTION, POWDER, FOR SOLUTION INTRAVENOUS at 17:44

## 2022-08-22 RX ADMIN — ONDANSETRON HYDROCHLORIDE 4 MG: 2 INJECTION, SOLUTION INTRAMUSCULAR; INTRAVENOUS at 22:23

## 2022-08-22 RX ADMIN — MORPHINE SULFATE 4 MG: 4 INJECTION INTRAVENOUS at 17:36

## 2022-08-22 RX ADMIN — ONDANSETRON HYDROCHLORIDE 4 MG: 2 INJECTION, SOLUTION INTRAMUSCULAR; INTRAVENOUS at 17:44

## 2022-08-22 RX ADMIN — SODIUM CHLORIDE 500 ML: 9 INJECTION, SOLUTION INTRAVENOUS at 11:57

## 2022-08-22 RX ADMIN — ACETAMINOPHEN 1000 MG: 500 TABLET ORAL at 12:46

## 2022-08-22 RX ADMIN — VANCOMYCIN HYDROCHLORIDE 1750 MG: 10 INJECTION, POWDER, LYOPHILIZED, FOR SOLUTION INTRAVENOUS at 19:41

## 2022-08-22 NOTE — H&P
Breonna Butler MD  Please call  and page for questions. Call physician on-call through the  7pm-7am      History & Physical    Primary Care Provider: Selena Holland MD  Source of Information: Patient, and her medical record. History of Presenting Illness:   Janet Garcia is a 66 y.o. female with past medical history of end-stage renal disease on hemodialysis Monday/Wednesday/Friday, type 2 diabetes mellitus, paroxysmal atrial fibrillation, chronic anemia, hypertension, congestive heart failure, cirrhosis who presented to the emergency department with lightheadedness and fall. Patient was taking a shower and suddenly she felt lightheadedness and went to the ground. She did not hit her head or have any trauma. Patient called  squad and they brought her here. Patient states she was supposed to go to hemodialysis today at [facility]. She also said she gets paracentesis every 2 weeks and her next round of paracentesis is tomorrow as per patient, patient thinks it might be because of her liver problem. Patient is feeling her abdomen is more distended than usual, she denies nausea, vomiting, abdominal pain. Patient said she is hungry and she would like to eat and drink. The patient denies any fever, chills, chest pain, cough, congestion, recent illness, palpitations, or dysuria. Review of Systems:  A comprehensive review of systems was negative except for that written in the History of Present Illness.      Past Medical History:   Diagnosis Date    Arthritis     Asthma     Chronic kidney disease     Chronic pain     Cirrhosis ([facility] 12/17/2017    Diabetes ([facility] [address] diet controlled    GERD (gastroesophageal reflux disease)     Hypertension     Paroxysmal atrial fibrillation ([facility] [address] 10/6/2020      Past Surgical History:   Procedure Laterality Date    COLONOSCOPY N/A 1/12/2018    COLONOSCOPY performed by Sonia Aldana MD at THE [facility] ENDOSCOPY    COLONOSCOPY N/A 3/19/2018    COLONOSCOPY performed by Jessica Duque MD at THE [facility] ENDOSCOPY    HX GYN  c section    HX VASCULAR ACCESS      dialysis     IR BX TRANSCATHETER  11/24/2020    IR PARACENTESIS ABD W IMAGE  10/22/2020     Prior to Admission medications    Medication Sig Start Date End Date Taking? Authorizing Provider   triamcinolone acetonide (KENALOG) 0.1 % topical cream Apply  to affected area two (2) times a day. use thin layer   Indications: itching of the genital area, itching    Provider, Historical   polyvinyl alcohol-povidon,PF, (REFRESH CLASSIC) 1.4-0.6 % ophthalmic solution Administer 1-2 Drops to both eyes three (3) times daily. Indications: dry eye    Provider, Historical   ketotifen (ZADITOR) 0.025 % (0.035 %) ophthalmic solution Administer 1 Drop to both eyes two (2) times a day. Indications: allergic conjunctivitis    Provider, Historical   oxyCODONE IR (ROXICODONE) 5 mg immediate release tablet Take 5 mg by mouth every six (6) hours as needed for Pain. Provider, Historical   ascorbic acid, vitamin C, (Vitamin C) 500 mg tablet Take 500 mg by mouth daily. Provider, Historical   lactulose (CHRONULAC) 10 gram/15 mL solution Take 30 mL by mouth two (2) times a day. 10/10/20   James Morris MD   cholecalciferol (VITAMIN D3) 1,000 unit tablet Take 2,000 Units by mouth daily. Provider, Historical   ferrous sulfate 325 mg (65 mg iron) tablet Take 325 mg by mouth three (3) times daily (with meals). Provider, Historical   sevelamer (RENAGEL) 800 mg tablet Take 1,600 mg by mouth three (3) times daily (with meals). Provider, Historical   albuterol (PROVENTIL HFA, VENTOLIN HFA, PROAIR HFA) 90 mcg/actuation inhaler Take 2 Puffs by inhalation every four (4) hours as needed for Wheezing.     Provider, Historical     Allergies   Allergen Reactions    Aleve [Naproxen Sodium] Itching, Swelling and Other (comments)    Amlodipine Rash, Hives and Itching    Aspirin Other (comments), Nausea Only and Unknown (comments)     GI bleed  GI bleeding      Heparin Unknown (comments)     bleeding      Ibuprofen Nausea and Vomiting    Metformin Other (comments)     swelling      Family History   Family history unknown: Yes        SOCIAL HISTORY:  Patient resides:  Independently x   Assisted Living    SNF    With family care       Smoking history:   None x   Former    Chronic      Alcohol history:   None x   Social    Chronic      Ambulates:   Independently x   w/cane    w/walker    w/wc    CODE STATUS:  DNR    Full x   Other      Objective:     Physical Exam:     Visit Vitals  /79   Pulse (!) 106   Temp 97.8 °F (36.6 °C)   Resp 16   Ht 5' 3\" (1.6 m)   Wt 69.4 kg (153 lb)   SpO2 100%   BMI 27.10 kg/m²      O2 Device: None (Room air)    General:  Alert, cooperative, no distress, appears stated age. Head:  Normocephalic, without obvious abnormality, atraumatic. Eyes:  Conjunctivae/corneas clear. PERRL, EOMs intact. Nose: Nares normal. Septum midline. Throat: Lips, mucosa, and tongue normal.    Neck: Supple, symmetrical, trachea midline, no adenopathy. Back:   Symmetric, no curvature. ROM normal. No CVA tenderness. Lungs:   Clear to auscultation bilaterally. Chest wall:  No tenderness or deformity. Heart:  Regular rate and rhythm, S1, S2 normal, systolic murmur present. Abdomen:   Distended with ascites, soft, non-tender. Bowel sounds normal.    Extremities: Extremities normal, atraumatic, no cyanosis or edema. Neurologic: Patient has clear voice, she follows commands. She moves her extremities equally. EKG: Tachycardia normal EKG, normal sinus rhythm. Data Review:     Recent Days:  Recent Labs     08/22/22  1017   WBC 4.9   HGB 11.3*   HCT 38.0   *     Recent Labs     08/22/22  1017      K 5.0   *   CO2 26   *   BUN 36*   CREA 7.82*   CA 8.8   MG 2.5*   ALB 2.9*   ALT 13     No results for input(s): PH, PCO2, PO2, HCO3, FIO2 in the last 72 hours.     24 Hour Results:        Imaging:     Assessment and Plan:     Lightheadedness with fall and tachycardia on presentation  Exact etiology unknown. Patient improved after the IV fluid resuscitation. Chest x-ray showed cardiomegaly. Patient is afebrile, no leukocytosis. Continue Zosyn and vancomycin for unknown infection, de-escalate in 24 to 48 hours if remains afebrile and no leukocytosis. Multiple pulmonary nodules on CT  Concern for metastatic disease. Need investigation if she has not been worked up at the [place]. Follow-up with the record. Type 2 diabetes mellitus: Will put her on sliding scale and hypoglycemic protocol. Will monitor. Check A1c. Paroxysmal atrial fibrillation  Heart rate improved after IV fluid resuscitation. Monitor on telemetry. Patient is not on either blocker or anticoagulant. Consider record from [place] if deteriorates. EKG at a.m. Hypertension:   Hypotension and presentation. Monitor on telemetry. History of chronic congestive heart failure  Last LVEF was 48% with normal cavity. Severe grade 3 left ventricular diastolic dysfunction. Caution with IV fluid. No sign of exacerbation for now. Cirrhosis on CT scan with ascites. No sign of encephalopathy now. Consider lactulose if necessary. No abdominal tenderness. Patient sleeps paracentesis every 2 weeks. Next scheduled for tomorrow. Will consult IR. End-stage renal disease  Hemodialysis at [place] on Monday/Wednesday/Friday. Patient missed hemodialysis today. I talked to the nephrologist.    Anemia, chronic  Due to ESRD, monitor. See orders for other plans:   VTE prophylaxis: Sequential compression device. Patient is allergic to heparin. Code status: Full code. Discussed plan of care with Patient/Family and Nurse   Pre-admission lived at home:   Disposition: To telemetry. Discharge planning: pending. Needs to recover from the ER patient can be transferred to [place] once stable.           Signed By: Avril Lora MD     August 22, 2022

## 2022-08-22 NOTE — CONSULTS
[facility]   [address] [facility], [id number] [place] [address]  Phone: [phone]   [phone]    [email or web address]     Nephrology Progress Note    Patient Name : Briana Jaimes      : 1950     MRN : [id number]  Date of Admission : 2022  Date of Servive : 22    CC:  Follow up for ESRD       Assessment and Plan   ESRD- HD :  - Dialyzes at [facility] on MWF schedule   - HD today per schedule   - has Tonia Nurse for access     Cardiac Cirrhosis   RV failure, severe TR  Chronic HFrEF   - Paracentesis planned     Anemia in CKD   - resume CAROL ANN when Hb < 11 g  - hx of small bowel AVMs and needed RBC transfusions     Sec HPTH  - continue home meds     Hx of Hep C : ? Status     DM-II  HTN   PAF       Interval History:  Ms castaneda has ESRD and has been dialysis dependent for 9 years   Previously dialyzed at [address] in [address] [facility] but due to need for recurrent paracentesis and transfusions she moved to [place] and has been getting HD at [facility] for the past year   She has a permacath since she started dialysis and never had AVF   She has Line infection and RIJ moved to [facility] a year ago   Scheduled for Paracentesis tomorrow but due to worsenign SOB , presented to ER after missing dialysis today     Review of Systems: A comprehensive review of systems was negative except for that written in the HPI.     Current Medications:   Current Facility-Administered Medications   Medication Dose Route Frequency    morphine injection 4 mg  4 mg IntraVENous ONCE    albuterol-ipratropium (DUO-NEB) 2.5 MG-0.5 MG/3 ML  3 mL Nebulization Q6H RT    [START ON 2022] ascorbic acid (vitamin C) (VITAMIN C) tablet 500 mg  500 mg Oral DAILY    [START ON 2022] cholecalciferol (VITAMIN D3) (1000 Units /25 mcg) tablet 2,000 Units  2,000 Units Oral DAILY    [START ON 2022] ferrous sulfate tablet 325 mg  325 mg Oral DAILY WITH BREAKFAST    lactulose (CHRONULAC) 10 gram/15 mL solution 30 mL  20 g Oral BID    sevelamer (RENAGEL) tablet 1,600 mg  1,600 mg Oral TID WITH MEALS     Current Outpatient Medications   Medication Sig    triamcinolone acetonide (KENALOG) 0.1 % topical cream Apply  to affected area two (2) times a day. use thin layer   Indications: itching of the genital area, itching    polyvinyl alcohol-povidon,PF, (REFRESH CLASSIC) 1.4-0.6 % ophthalmic solution Administer 1-2 Drops to both eyes three (3) times daily. Indications: dry eye    ketotifen (ZADITOR) 0.025 % (0.035 %) ophthalmic solution Administer 1 Drop to both eyes two (2) times a day. Indications: allergic conjunctivitis    oxyCODONE IR (ROXICODONE) 5 mg immediate release tablet Take 5 mg by mouth every six (6) hours as needed for Pain. ascorbic acid, vitamin C, (Vitamin C) 500 mg tablet Take 500 mg by mouth daily. lactulose (CHRONULAC) 10 gram/15 mL solution Take 30 mL by mouth two (2) times a day. cholecalciferol (VITAMIN D3) 1,000 unit tablet Take 2,000 Units by mouth daily. ferrous sulfate 325 mg (65 mg iron) tablet Take 325 mg by mouth three (3) times daily (with meals). sevelamer (RENAGEL) 800 mg tablet Take 1,600 mg by mouth three (3) times daily (with meals). albuterol (PROVENTIL HFA, VENTOLIN HFA, PROAIR HFA) 90 mcg/actuation inhaler Take 2 Puffs by inhalation every four (4) hours as needed for Wheezing.       Allergies   Allergen Reactions    Aleve [Naproxen Sodium] Itching, Swelling and Other (comments)    Amlodipine Rash, Hives and Itching    Aspirin Other (comments), Nausea Only and Unknown (comments)     GI bleed  GI bleeding      Heparin Unknown (comments)     bleeding      Ibuprofen Nausea and Vomiting    Metformin Other (comments)     swelling       Objective:  Vitals:    Vitals:    08/22/22 1000 08/22/22 1130 08/22/22 1359 08/22/22 1400   BP: (!) 117/59 120/67  122/79   Pulse: (!) 141 (!) 137  (!) 106   Resp: 16 14  16   Temp:    97.8 °F (36.6 °C)   SpO2: 97% 97%  100%   Weight:   69.4 kg (153 lb)    Height:   5' 3\" (1.6 m)      Intake and Output:  08/22 0701 - 08/22 1900  In: 600 [I.V.:600]  Out: -   No intake/output data recorded. Physical Examination:        General: NAD,Conversant   Neck:  Supple, no mass  Resp:  Lungs CTA B/L, no wheezing , normal respiratory effort  CV:  RRR,  no murmur or rub, LE edema  GI:  Soft, DISTENDED, NT  Neurologic:  Non focal  Dialysis Access : [facility]- C/D/I    []    High complexity decision making was performed  []    Patient is at high-risk of decompensation with multiple organ involvement    Lab Data Personally Reviewed: I have reviewed all the pertinent labs, microbiology data and radiology studies during assessment. Recent Labs     08/22/22  1017      K 5.0   *   CO2 26   *   BUN 36*   CREA 7.82*   CA 8.8   MG 2.5*   ALB 2.9*   ALT 13     Recent Labs     08/22/22  1017   WBC 4.9   HGB 11.3*   HCT 38.0   *     No results found for: SDES  Lab Results   Component Value Date/Time    Culture result: NO GROWTH 4 DAYS 11/19/2020 02:50 PM    Culture result: No growth 5 days 10/18/2020 07:09 AM             I have reviewed the flowsheets. Chart and Pertinent Notes have been reviewed. No change in PMH ,family and social history from Consult note.       [facility]

## 2022-08-22 NOTE — PROGRESS NOTES
Pharmacist Note - Vancomycin Dosing (Hemodialysis Patient)    Consult provided for this 66 y.o. female for indication of CAP. This patient is also on the following antibiotic regimen(s): vancomycin, Zosyn  Patient on vancomycin PTA? NO     Lab Results   Component Value Date/Time    Creatinine 7.82 (H) 2022 10:17 AM    WBC 4.9 2022 10:17 AM    BUN 36 (H) 2022 10:17 AM   Temp (24hrs), Av.9 °F (36.6 °C), Min:97.8 °F (36.6 °C), Max:97.9 °F (36.6 °C)      Estimated CrCl:  ESRD on HD (Monday/Wednesday/Friday)  Cultures:   blood - pending  MRSA Swab ordered (if applicable)? YES    For this HD patient, will initiate therapy with a loading dose of Vancomycin 1750 mg, to be followed with a dose of 500 mg after each HD session. Dose will be adjusted to maintain a pre-HD trough of approximately 15 - 20 mcg/mL for therapeutic goal of 10 - 15 mcg/mL as approximately 35% of drug will be removed by HD filtration. Pharmacy to follow patient daily and order levels / make dose adjustments as appropriate.

## 2022-08-22 NOTE — ED PROVIDER NOTES
50-year-old female with history of ESRD on M/W/F dialysis, DM, paroxysmal A. fib, anemia, HTN, CHF presents to the ED with chief complaint of dizziness starting this morning. Describes dizziness as a lightheadedness, constant, worse with movement. Patient had a similar episode on Friday. Patient has not received dialysis today but has not missed any recent sessions. Does report abdominal distention worsening for the past 2 weeks, this is typical for her and she had a paracentesis 2 weeks ago. Reports mild generalized abdominal discomfort that she says is normal when her abdomen is distended. No fevers, chills, chest pain, difficulty breathing, bowel symptoms. No blood in her stool or melena. The history is provided by the patient. Dizziness  Pertinent negatives include no agitation. Pertinent negatives include no shortness of breath, no chest pain, no vomiting, no confusion, no headaches and no nausea.       Past Medical History:   Diagnosis Date    Arthritis     Asthma     Chronic kidney disease     Chronic pain     Cirrhosis ([facility] [address] 12/17/2017    Diabetes (HCC) diet controlled    GERD (gastroesophageal reflux disease)     Hypertension     Paroxysmal atrial fibrillation ([facility] [address] 10/6/2020       Past Surgical History:   Procedure Laterality Date    COLONOSCOPY N/A 1/12/2018    COL[facility]    COLONOSCOPY N/A 3/19/2018    COLONOSCOPY performed by Molly [facility]h MD at [facility] HX GYN  c section    HX VASCULAR ACCESS      dialysis     IR BX TRANSCATHETER  11/24/2020    IR PARACENTESIS ABD W IMAGE  10/22/2020         Family History:   Family history unknown: Yes       Social History     Socioeconomic History    Marital status: SINGLE     Spouse name: Not on file    Number of children: Not on file    Years of education: Not on file    Highest education level: Not on file   Occupational History    Not on file   Tobacco Use    Smoking How Did The Scalp Condition Occur?: sudden in onset (over a period of weeks to a few months) How Severe Is The Scalp Condition?: mild status: Never    Smokeless tobacco: Never   Substance and Sexual Activity    Alcohol use: No    Drug use: No    Sexual activity: Not on file   Other Topics Concern     Service Not Asked    Blood Transfusions Yes    Caffeine Concern Not Asked    Occupational Exposure Not Asked    Hobby Hazards Not Asked    Sleep Concern Not Asked    Stress Concern Not Asked    Weight Concern Not Asked    Special Diet Not Asked    Back Care Not Asked    Exercise Not Asked    Bike Helmet Not Asked   [address] Not Asked    Self-Exams Not Asked   Social History Narrative    Not on file     Social Determinants of Health     Financial Resource Strain: Not on file   Food Insecurity: Not on file   Transportation Needs: Not on file   Physical Activity: Not on file   Stress: Not on file   Social Connections: Not on file   Intimate Partner Violence: Not on file   Housing Stability: Not on file         ALLERGIES: Aleve [naproxen sodium], Amlodipine, Aspirin, Heparin, Ibuprofen, and Metformin    Review of Systems   Constitutional:  Negative for chills and fever. HENT:  Negative for congestion and rhinorrhea. Respiratory:  Negative for cough and shortness of breath. Cardiovascular:  Negative for chest pain and leg swelling. Gastrointestinal:  Negative for abdominal pain, constipation, diarrhea, nausea and vomiting. Genitourinary:  Negative for flank pain. Musculoskeletal:  Negative for back pain and neck pain. Skin:  Negative for color change and rash. Neurological:  Positive for dizziness. Negative for weakness, light-headedness, numbness and headaches. Psychiatric/Behavioral:  Negative for agitation and confusion. Vitals:    08/22/22 0953 08/22/22 1000   BP: 117/64 (!) 117/59   Pulse: (!) 140 (!) 141   Resp: 17 16   Temp: 97.9 °F (36.6 °C)    SpO2: 98% 97%   Height: 5' 3\" (1.6 m)             Physical Exam  Constitutional:       General: She is not in acute distress.      Appearance: She is well-developed. HENT:      Head: Normocephalic and atraumatic. Eyes:      General: No scleral icterus. Pupils: Pupils are equal, round, and reactive to light. Neck:      Trachea: No tracheal deviation. Cardiovascular:      Rate and Rhythm: Tachycardia present. Rhythm irregular. Heart sounds: No murmur heard. No friction rub. No gallop. Pulmonary:      Effort: Pulmonary effort is normal. No respiratory distress. Breath sounds: Normal breath sounds. No wheezing or rales. Abdominal:      General: Bowel sounds are normal. There is no distension. Palpations: Abdomen is soft. Tenderness: There is no abdominal tenderness. Musculoskeletal:         General: No deformity. Cervical back: Neck supple. Skin:     General: Skin is warm and dry. Neurological:      Mental Status: She is alert and oriented to person, place, and time. Psychiatric:         Behavior: Behavior normal.        MDM  Number of Diagnoses or Management Options  Sepsis without acute organ dysfunction, due to unspecified organism [facility])  Diagnosis management comments: 76-year-old female presenting with lightheadedness. EKG with bigeminy, wide-complex, rate of 140. Other than abdominal distention, no other significant symptoms. Will treat with broad-spectrum antibiotics, check basic labs, blood cultures, abdominal CT and chest x-ray, plan on admission. Differential includes sepsis, pneumonia, electrolyte abnormality. Bedside ultrasound performed that did not show any pericardial effusion. Amount and/or Complexity of Data Reviewed  Clinical lab tests: ordered and reviewed  Tests in the radiology section of CPT®: ordered and reviewed      ED Course as of 08/22/22 1818   Mon Aug 22, 2022   1035 ED EKG interpretation:10:36 AM  Rhythm: Wide complex bigemy; Rate (approx.): 140; Axis: left axis deviation; QRS interval: prolonged; ST/T wave: non-specific changes; Other findings: LVH.       [JW]      ED Course User Index  [JW] Maxine Castleman, MD       Procedures    A standard fluid resuscitation of 30 mL/kg would harm the patient because the patient has Renal failure. Therefore, ordering a specific volume of 500 ml.    3:59 PM    Admission Note:  Patient is being admitted to the hospital, Service: Hospitalist.  The results of their tests and reasons for their admission have been discussed with them and available family. They convey agreement and understanding for the need to be admitted and for their admission diagnosis. LABORATORY TESTS:  Labs Reviewed   CBC WITH AUTOMATED DIFF - Abnormal; Notable for the following components:       Result Value    HGB 11.3 (*)     MCHC 29.7 (*)     RDW 17.1 (*)     PLATELET 905 (*)     ABS. LYMPHOCYTES 0.7 (*)     All other components within normal limits   METABOLIC PANEL, COMPREHENSIVE - Abnormal; Notable for the following components:    Chloride 112 (*)     Glucose 123 (*)     BUN 36 (*)     Creatinine 7.82 (*)     BUN/Creatinine ratio 5 (*)     GFR est AA 6 (*)     GFR est non-AA 5 (*)     AST (SGOT) 8 (*)     Protein, total 5.7 (*)     Albumin 2.9 (*)     A-G Ratio 1.0 (*)     All other components within normal limits   TROPONIN-HIGH SENSITIVITY - Abnormal; Notable for the following components:    Troponin-High Sensitivity 74 (*)     All other components within normal limits   MAGNESIUM - Abnormal; Notable for the following components:    Magnesium 2.5 (*)     All other components within normal limits   CULTURE, BLOOD, PAIRED   SAMPLES BEING HELD   LACTIC ACID   SAMPLE TO BLOOD BANK       IMAGING RESULTS:  CT ABD PELV W CONT    Result Date: 8/22/2022  EXAM: CT ABD PELV W CONT INDICATION: diffuse abdominal pain COMPARISON: 2020 CONTRAST: 100 mL of Isovue-370. ORAL CONTRAST: None TECHNIQUE: Following the uneventful intravenous administration of contrast, thin axial images were obtained through the abdomen and pelvis. Coronal and sagittal reconstructions were generated.  CT dose reduction was achieved through use of a standardized protocol tailored for this examination and automatic exposure control for dose modulation. FINDINGS: LOWER THORAX: Multiple pulmonary nodules in the lower lobes, randomly distributed, and measuring up to 8 mm, increased in number as compared to the 2020 study. Mild cardiomegaly. LIVER: No mass. Diffuse hypodensity of the liver. BILIARY TREE: Gallbladder is within normal limits. CBD is not dilated. SPLEEN: within normal limits. PANCREAS: No mass or ductal dilatation. ADRENALS: Unremarkable. KIDNEYS: No mass, calculus, or hydronephrosis. Small native kidneys bilaterally in patient with known chronic kidney disease. STOMACH: Unremarkable. SMALL BOWEL: No dilatation or wall thickening. COLON: No dilatation or wall thickening. Macrolobulated APPENDIX: Nonvisualization PERITONEUM: Moderate amount of ascites with density measurement of 11 Hounsfield units. RETROPERITONEUM: No lymphadenopathy or aortic aneurysm. Atherosclerosis of the aortoiliac system without aneurysm. REPRODUCTIVE ORGANS: Anteverted uterus with cortical vascular calcification. URINARY BLADDER: No mass or calculus. BONES: No destructive bone lesion. Increased density diffusely compatible with renal osteodystrophy ABDOMINAL WALL: Fluid containing multiseptated lumbosacral hernia that has a neck measurement of 1.5 cm in outer diameter of about 5 cm (image 72). ADDITIONAL COMMENTS: N/A     Probable cirrhosis with moderate simple ascites Multiple pulmonary nodules for which standard thoracic CT is recommended to fully evaluate. Concern for metastatic disease Incidental Small kidneys, umbilical hernia, cardiomegaly, hepatic steatosis, atherosclerosis    XR CHEST PORT    Result Date: 8/22/2022  EXAM: XR CHEST PORT HISTORY: eval for PNA. COMPARISON: 9/17/2020 FINDINGS: Portable AP. A left subclavian dialysis catheter terminates in the right atrium. The heart is mildly enlarged, but unchanged.  No significant edema. No focal consolidation, pleural effusion, or pneumothorax. Unchanged cardiomegaly     MEDICATIONS GIVEN:  Medications   morphine injection 4 mg (has no administration in time range)   vancomycin (VANCOCIN) 1750 mg in  ml infusion (1,750 mg IntraVENous New Bag 8/22/22 1157)   piperacillin-tazobactam (ZOSYN) 4.5 g in 0.9% sodium chloride (MBP/ADV) 100 mL MBP (0 g IntraVENous IV Completed 8/22/22 1203)   iopamidoL (ISOVUE-370) 76 % injection 100 mL (100 mL IntraVENous Given 8/22/22 1342)   sodium chloride 0.9 % bolus infusion 500 mL (0 mL IntraVENous IV Completed 8/22/22 1543)   acetaminophen (TYLENOL) tablet 1,000 mg (1,000 mg Oral Given 8/22/22 1246)       IMPRESSION:  1. Sepsis without acute organ dysfunction, due to unspecified organism [facility])        PLAN:  - Admit to hospitalist    CONDITION:  stable    Total critical care time (not including time spent performing separately reportable procedures): [address] [place] [address] MD Jarrod      [facility] for Admission  3:59 PM    ED Room Number: ER29/29  Patient Name and age:  Eileen Madden 66 y.o.  female  Working Diagnosis:   1. Sepsis without acute organ dysfunction, due to unspecified organism ([facility] [address]        COVID-19 Suspicion:  no    Code Status:  Full Code  Readmission: no  Isolation Requirements:  no  Recommended Level of Care:  med/surg  Department:[facility] Adult ED - 21   Other:  weakness, lightheadedness, tachy to 140. Tx'd with broad spectrum abx, 500 cc bolus, HR improved to 100's. BP stable. ESRD, m/w/f, no missed sessions.     Signed By: Elza Farris MD     August 22, 2022

## 2022-08-22 NOTE — DIALYSIS
As per Dr. Mortimer Eagles if unable to complete HD treatment over night due to high patient census, patient must be done first thing in  morning.

## 2022-08-22 NOTE — Clinical Note
Status[de-identified] INPATIENT [101]   Type of Bed: Telemetry [19]   Cardiac Monitoring Required?: Yes   Inpatient Hospitalization Certified Necessary for the Following Reasons: 3.  Patient receiving treatment that can only be provided in an inpatient setting (further clarification in H&P documentation)   Admitting Diagnosis: Tachycardia [727602]   Admitting Physician: Stanford Deluna [[id number]   Attending Physician: Dannielle Brush   Estimated Length of Stay: 2 Midnights   Discharge Plan[de-identified] Home with Office Follow-up

## 2022-08-22 NOTE — ED TRIAGE NOTES
Pt arrives via EMS from home with c/o dizziness. She tried to get into the shower this morning but was too dizzy. She was seen elsewhere on Thursday for the same issue and was released on Saturday. She is a MWF dialysis patient, did not go today but god a full dialysis on Friday. Pt also has an appointment to get fluid drained from her abdomen tomorrow.    EMS reports afib RVR in the 160s

## 2022-08-22 NOTE — ED NOTES
Verbal shift change report given to [address] [place] [address] Avenue (oncoming nurse) by Rebecca Russell (offgoing nurse). Report included the following information SBAR, Kardex, ED Summary, [facility] and Recent Results. Given by MD/DC instructions

## 2022-08-23 ENCOUNTER — APPOINTMENT (OUTPATIENT)
Dept: NON INVASIVE DIAGNOSTICS | Age: 72
DRG: 308 | End: 2022-08-23
Attending: STUDENT IN AN ORGANIZED HEALTH CARE EDUCATION/TRAINING PROGRAM
Payer: MEDICARE

## 2022-08-23 ENCOUNTER — OFFICE VISIT (OUTPATIENT)
Dept: EMERGENCY DEPT | Age: 72
DRG: 308 | End: 2022-08-23
Attending: STUDENT IN AN ORGANIZED HEALTH CARE EDUCATION/TRAINING PROGRAM
Payer: MEDICARE

## 2022-08-23 ENCOUNTER — APPOINTMENT (OUTPATIENT)
Dept: CT IMAGING | Age: 72
DRG: 308 | End: 2022-08-23
Attending: STUDENT IN AN ORGANIZED HEALTH CARE EDUCATION/TRAINING PROGRAM
Payer: MEDICARE

## 2022-08-23 LAB
ALBUMIN SERPL-MCNC: 2.8 G/DL (ref 3.5–5)
ALBUMIN/GLOB SERPL: 0.9 {RATIO} (ref 1.1–2.2)
ALP SERPL-CCNC: 90 U/L (ref 45–117)
ALT SERPL-CCNC: 19 U/L (ref 12–78)
AMMONIA PLAS-SCNC: 54 UMOL/L
ANION GAP SERPL CALC-SCNC: 7 MMOL/L (ref 5–15)
AST SERPL-CCNC: ABNORMAL U/L (ref 15–37)
BASOPHILS # BLD: 0.1 K/UL (ref 0–0.1)
BASOPHILS NFR BLD: 1 % (ref 0–1)
BILIRUB SERPL-MCNC: 0.5 MG/DL (ref 0.2–1)
BUN SERPL-MCNC: 39 MG/DL (ref 6–20)
BUN/CREAT SERPL: 5 (ref 12–20)
CA-I BLD-MCNC: 1.12 MMOL/L (ref 1.12–1.32)
CALCIUM SERPL-MCNC: 8.7 MG/DL (ref 8.5–10.1)
CHLORIDE SERPL-SCNC: 112 MMOL/L (ref 97–108)
CO2 SERPL-SCNC: 23 MMOL/L (ref 21–32)
COMMENT, HOLDF: NORMAL
CREAT SERPL-MCNC: 7.95 MG/DL (ref 0.55–1.02)
CREAT UR-MCNC: 6.5 MG/DL (ref 0.6–1.3)
DIFFERENTIAL METHOD BLD: ABNORMAL
EOSINOPHIL # BLD: 0.1 K/UL (ref 0–0.4)
EOSINOPHIL NFR BLD: 2 % (ref 0–7)
ERYTHROCYTE [DISTWIDTH] IN BLOOD BY AUTOMATED COUNT: 17.3 % (ref 11.5–14.5)
GLOBULIN SER CALC-MCNC: 3.1 G/DL (ref 2–4)
GLUCOSE BLD STRIP.AUTO-MCNC: 55 MG/DL (ref 65–117)
GLUCOSE BLD STRIP.AUTO-MCNC: 57 MG/DL (ref 65–117)
GLUCOSE BLD STRIP.AUTO-MCNC: 65 MG/DL (ref 65–117)
GLUCOSE BLD STRIP.AUTO-MCNC: 72 MG/DL (ref 65–117)
GLUCOSE BLD STRIP.AUTO-MCNC: 80 MG/DL (ref 65–117)
GLUCOSE BLD STRIP.AUTO-MCNC: 84 MG/DL (ref 74–106)
GLUCOSE BLD STRIP.AUTO-MCNC: 97 MG/DL (ref 65–117)
GLUCOSE SERPL-MCNC: 100 MG/DL (ref 65–100)
HBV SURFACE AB SER QL: NONREACTIVE
HBV SURFACE AB SER-ACNC: 9.48 MIU/ML
HBV SURFACE AG SER QL: <0.1 INDEX
HBV SURFACE AG SER QL: NEGATIVE
HCT VFR BLD AUTO: 40.3 % (ref 35–47)
HGB BLD-MCNC: 12 G/DL (ref 11.5–16)
IMM GRANULOCYTES # BLD AUTO: 0 K/UL (ref 0–0.04)
IMM GRANULOCYTES NFR BLD AUTO: 0 % (ref 0–0.5)
LACTATE BLD-SCNC: 1.64 MMOL/L (ref 0.4–2)
LACTATE SERPL-SCNC: 1.1 MMOL/L (ref 0.4–2)
LACTATE SERPL-SCNC: 2.5 MMOL/L (ref 0.4–2)
LYMPHOCYTES # BLD: 1.1 K/UL (ref 0.8–3.5)
LYMPHOCYTES NFR BLD: 20 % (ref 12–49)
MAGNESIUM SERPL-MCNC: 2.4 MG/DL (ref 1.6–2.4)
MCH RBC QN AUTO: 29 PG (ref 26–34)
MCHC RBC AUTO-ENTMCNC: 29.8 G/DL (ref 30–36.5)
MCV RBC AUTO: 97.3 FL (ref 80–99)
MONOCYTES # BLD: 0.4 K/UL (ref 0–1)
MONOCYTES NFR BLD: 8 % (ref 5–13)
NEUTS SEG # BLD: 3.8 K/UL (ref 1.8–8)
NEUTS SEG NFR BLD: 68 % (ref 32–75)
NRBC # BLD: 0 K/UL (ref 0–0.01)
NRBC BLD-RTO: 0 PER 100 WBC
PHOSPHATE SERPL-MCNC: 5 MG/DL (ref 2.6–4.7)
PLATELET # BLD AUTO: 128 K/UL (ref 150–400)
POTASSIUM BLD-SCNC: 6.1 MMOL/L (ref 3.5–5.5)
POTASSIUM SERPL-SCNC: ABNORMAL MMOL/L (ref 3.5–5.1)
PROT SERPL-MCNC: 5.9 G/DL (ref 6.4–8.2)
RBC # BLD AUTO: 4.14 M/UL (ref 3.8–5.2)
SAMPLES BEING HELD,HOLD: NORMAL
SERVICE CMNT-IMP: ABNORMAL
SERVICE CMNT-IMP: ABNORMAL
SERVICE CMNT-IMP: NORMAL
SODIUM BLD-SCNC: 140 MMOL/L (ref 136–145)
SODIUM SERPL-SCNC: 142 MMOL/L (ref 136–145)
TROPONIN-HIGH SENSITIVITY: 70 NG/L (ref 0–51)
WBC # BLD AUTO: 5.5 K/UL (ref 3.6–11)

## 2022-08-23 PROCEDURE — 74011250637 HC RX REV CODE- 250/637: Performed by: STUDENT IN AN ORGANIZED HEALTH CARE EDUCATION/TRAINING PROGRAM

## 2022-08-23 PROCEDURE — P9047 ALBUMIN (HUMAN), 25%, 50ML: HCPCS | Performed by: INTERNAL MEDICINE

## 2022-08-23 PROCEDURE — 83735 ASSAY OF MAGNESIUM: CPT

## 2022-08-23 PROCEDURE — 85025 COMPLETE CBC W/AUTO DIFF WBC: CPT

## 2022-08-23 PROCEDURE — 83605 ASSAY OF LACTIC ACID: CPT

## 2022-08-23 PROCEDURE — 74011636637 HC RX REV CODE- 636/637: Performed by: STUDENT IN AN ORGANIZED HEALTH CARE EDUCATION/TRAINING PROGRAM

## 2022-08-23 PROCEDURE — 94760 N-INVAS EAR/PLS OXIMETRY 1: CPT

## 2022-08-23 PROCEDURE — 82962 GLUCOSE BLOOD TEST: CPT

## 2022-08-23 PROCEDURE — 36415 COLL VENOUS BLD VENIPUNCTURE: CPT

## 2022-08-23 PROCEDURE — 93005 ELECTROCARDIOGRAM TRACING: CPT

## 2022-08-23 PROCEDURE — P9047 ALBUMIN (HUMAN), 25%, 50ML: HCPCS | Performed by: STUDENT IN AN ORGANIZED HEALTH CARE EDUCATION/TRAINING PROGRAM

## 2022-08-23 PROCEDURE — 74011250636 HC RX REV CODE- 250/636: Performed by: INTERNAL MEDICINE

## 2022-08-23 PROCEDURE — 74011250637 HC RX REV CODE- 250/637: Performed by: FAMILY MEDICINE

## 2022-08-23 PROCEDURE — 90935 HEMODIALYSIS ONE EVALUATION: CPT

## 2022-08-23 PROCEDURE — 65660000001 HC RM ICU INTERMED STEPDOWN

## 2022-08-23 PROCEDURE — 93308 TTE F-UP OR LMTD: CPT

## 2022-08-23 PROCEDURE — 74011000258 HC RX REV CODE- 258: Performed by: FAMILY MEDICINE

## 2022-08-23 PROCEDURE — 74011250637 HC RX REV CODE- 250/637: Performed by: NURSE PRACTITIONER

## 2022-08-23 PROCEDURE — 80053 COMPREHEN METABOLIC PANEL: CPT

## 2022-08-23 PROCEDURE — 84100 ASSAY OF PHOSPHORUS: CPT

## 2022-08-23 PROCEDURE — 99223 1ST HOSP IP/OBS HIGH 75: CPT | Performed by: SPECIALIST

## 2022-08-23 PROCEDURE — 74011000250 HC RX REV CODE- 250: Performed by: STUDENT IN AN ORGANIZED HEALTH CARE EDUCATION/TRAINING PROGRAM

## 2022-08-23 PROCEDURE — 94640 AIRWAY INHALATION TREATMENT: CPT

## 2022-08-23 PROCEDURE — 80047 BASIC METABLC PNL IONIZED CA: CPT

## 2022-08-23 PROCEDURE — 74011250636 HC RX REV CODE- 250/636

## 2022-08-23 PROCEDURE — 84484 ASSAY OF TROPONIN QUANT: CPT

## 2022-08-23 PROCEDURE — 74011000250 HC RX REV CODE- 250: Performed by: FAMILY MEDICINE

## 2022-08-23 PROCEDURE — 74011250636 HC RX REV CODE- 250/636: Performed by: STUDENT IN AN ORGANIZED HEALTH CARE EDUCATION/TRAINING PROGRAM

## 2022-08-23 PROCEDURE — 74011000250 HC RX REV CODE- 250

## 2022-08-23 PROCEDURE — 71250 CT THORAX DX C-: CPT

## 2022-08-23 PROCEDURE — 74011250636 HC RX REV CODE- 250/636: Performed by: FAMILY MEDICINE

## 2022-08-23 PROCEDURE — 5A1D70Z PERFORMANCE OF URINARY FILTRATION, INTERMITTENT, LESS THAN 6 HOURS PER DAY: ICD-10-PCS | Performed by: INTERNAL MEDICINE

## 2022-08-23 PROCEDURE — 82140 ASSAY OF AMMONIA: CPT

## 2022-08-23 RX ORDER — IPRATROPIUM BROMIDE AND ALBUTEROL SULFATE 2.5; .5 MG/3ML; MG/3ML
SOLUTION RESPIRATORY (INHALATION)
Status: COMPLETED
Start: 2022-08-23 | End: 2022-08-23

## 2022-08-23 RX ORDER — ADENOSINE 3 MG/ML
INJECTION, SOLUTION INTRAVENOUS
Status: DISCONTINUED
Start: 2022-08-23 | End: 2022-08-23 | Stop reason: WASHOUT

## 2022-08-23 RX ORDER — DILTIAZEM HYDROCHLORIDE 5 MG/ML
5 INJECTION INTRAVENOUS ONCE
Status: DISCONTINUED | OUTPATIENT
Start: 2022-08-23 | End: 2022-08-23

## 2022-08-23 RX ORDER — DILTIAZEM HYDROCHLORIDE 5 MG/ML
5 INJECTION INTRAVENOUS
Status: COMPLETED | OUTPATIENT
Start: 2022-08-23 | End: 2022-08-23

## 2022-08-23 RX ORDER — ALBUMIN HUMAN 250 G/1000ML
25 SOLUTION INTRAVENOUS ONCE
Status: COMPLETED | OUTPATIENT
Start: 2022-08-23 | End: 2022-08-23

## 2022-08-23 RX ORDER — TRAMADOL HYDROCHLORIDE 50 MG/1
50 TABLET ORAL
Status: DISCONTINUED | OUTPATIENT
Start: 2022-08-23 | End: 2022-08-29

## 2022-08-23 RX ORDER — CALCIUM GLUCONATE 20 MG/ML
2 INJECTION, SOLUTION INTRAVENOUS ONCE
Status: COMPLETED | OUTPATIENT
Start: 2022-08-23 | End: 2022-08-23

## 2022-08-23 RX ORDER — METOPROLOL TARTRATE 25 MG/1
25 TABLET, FILM COATED ORAL EVERY 12 HOURS
Status: DISCONTINUED | OUTPATIENT
Start: 2022-08-23 | End: 2022-08-25

## 2022-08-23 RX ORDER — ALBUMIN HUMAN 250 G/1000ML
12.5 SOLUTION INTRAVENOUS
Status: DISCONTINUED | OUTPATIENT
Start: 2022-08-23 | End: 2022-08-31 | Stop reason: HOSPADM

## 2022-08-23 RX ORDER — AMIODARONE HYDROCHLORIDE 150 MG/3ML
INJECTION, SOLUTION INTRAVENOUS
Status: COMPLETED
Start: 2022-08-23 | End: 2022-08-23

## 2022-08-23 RX ADMIN — IPRATROPIUM BROMIDE AND ALBUTEROL SULFATE 3 ML: .5; 3 SOLUTION RESPIRATORY (INHALATION) at 01:07

## 2022-08-23 RX ADMIN — IPRATROPIUM BROMIDE AND ALBUTEROL SULFATE 3 ML: .5; 3 SOLUTION RESPIRATORY (INHALATION) at 07:05

## 2022-08-23 RX ADMIN — IPRATROPIUM BROMIDE AND ALBUTEROL SULFATE 3 ML: .5; 3 SOLUTION RESPIRATORY (INHALATION) at 19:10

## 2022-08-23 RX ADMIN — SODIUM ZIRCONIUM CYCLOSILICATE 10 G: 10 POWDER, FOR SUSPENSION ORAL at 17:51

## 2022-08-23 RX ADMIN — DEXTROSE MONOHYDRATE 250 ML: 100 INJECTION, SOLUTION INTRAVENOUS at 01:05

## 2022-08-23 RX ADMIN — SODIUM CHLORIDE, PRESERVATIVE FREE 10 ML: 5 INJECTION INTRAVENOUS at 22:29

## 2022-08-23 RX ADMIN — SEVELAMER CARBONATE 1600 MG: 800 TABLET, FILM COATED ORAL at 17:42

## 2022-08-23 RX ADMIN — SEVELAMER CARBONATE 1600 MG: 800 TABLET, FILM COATED ORAL at 11:03

## 2022-08-23 RX ADMIN — SODIUM CHLORIDE, PRESERVATIVE FREE 10 ML: 5 INJECTION INTRAVENOUS at 00:32

## 2022-08-23 RX ADMIN — TRAMADOL HYDROCHLORIDE 50 MG: 50 TABLET ORAL at 16:31

## 2022-08-23 RX ADMIN — PIPERACILLIN AND TAZOBACTAM 3.38 G: 3; .375 INJECTION, POWDER, FOR SOLUTION INTRAVENOUS at 04:35

## 2022-08-23 RX ADMIN — METOPROLOL TARTRATE 25 MG: 25 TABLET, FILM COATED ORAL at 11:04

## 2022-08-23 RX ADMIN — ALBUMIN (HUMAN) 12.5 G: 0.25 INJECTION, SOLUTION INTRAVENOUS at 09:40

## 2022-08-23 RX ADMIN — SODIUM CHLORIDE, PRESERVATIVE FREE 10 ML: 5 INJECTION INTRAVENOUS at 15:56

## 2022-08-23 RX ADMIN — SODIUM ZIRCONIUM CYCLOSILICATE 10 G: 10 POWDER, FOR SUSPENSION ORAL at 11:12

## 2022-08-23 RX ADMIN — DIPHENHYDRAMINE HYDROCHLORIDE 25 MG: 25 CAPSULE ORAL at 00:31

## 2022-08-23 RX ADMIN — FERROUS SULFATE TAB 325 MG (65 MG ELEMENTAL FE) 325 MG: 325 (65 FE) TAB at 11:04

## 2022-08-23 RX ADMIN — ACETAMINOPHEN 650 MG: 325 TABLET, FILM COATED ORAL at 08:37

## 2022-08-23 RX ADMIN — Medication 10 UNITS: at 01:06

## 2022-08-23 RX ADMIN — CALCIUM GLUCONATE 2 G: 20 INJECTION, SOLUTION INTRAVENOUS at 02:55

## 2022-08-23 RX ADMIN — DILTIAZEM HYDROCHLORIDE 5 MG: 5 INJECTION, SOLUTION INTRAVENOUS at 00:59

## 2022-08-23 RX ADMIN — Medication 2000 UNITS: at 11:04

## 2022-08-23 RX ADMIN — ONDANSETRON HYDROCHLORIDE 4 MG: 2 INJECTION, SOLUTION INTRAMUSCULAR; INTRAVENOUS at 16:25

## 2022-08-23 RX ADMIN — LACTULOSE 30 ML: 20 SOLUTION ORAL at 11:04

## 2022-08-23 RX ADMIN — OXYCODONE HYDROCHLORIDE AND ACETAMINOPHEN 500 MG: 500 TABLET ORAL at 11:04

## 2022-08-23 RX ADMIN — IPRATROPIUM BROMIDE AND ALBUTEROL SULFATE 3 ML: .5; 3 SOLUTION RESPIRATORY (INHALATION) at 13:23

## 2022-08-23 RX ADMIN — SODIUM CHLORIDE, PRESERVATIVE FREE 10 ML: 5 INJECTION INTRAVENOUS at 11:02

## 2022-08-23 RX ADMIN — AMIODARONE HYDROCHLORIDE 150 MG: 50 INJECTION, SOLUTION INTRAVENOUS at 00:51

## 2022-08-23 RX ADMIN — LACTULOSE 30 ML: 20 SOLUTION ORAL at 17:42

## 2022-08-23 RX ADMIN — ALBUMIN (HUMAN) 25 G: 0.25 INJECTION, SOLUTION INTRAVENOUS at 02:51

## 2022-08-23 NOTE — ROUTINE PROCESS
TRANSFER - OUT REPORT:    Verbal report given to Jamie(name) on Yazmin Medley  being transferred to CVSU(unit) for routine progression of care       Report consisted of patients Situation, Background, Assessment and   Recommendations(SBAR). Information from the following report(s) SBAR, ED Summary, and Procedure Summary was reviewed with the receiving nurse. Lines:   Peripheral IV 08/22/22 Left Hand (Active)   Site Assessment Clean, dry, & intact 08/22/22 0958   Phlebitis Assessment 0 08/22/22 0958   Infiltration Assessment 0 08/22/22 0958   Dressing Status Clean, dry, & intact 08/22/22 0958   Hub Color/Line Status Blue 08/22/22 0958       Peripheral IV 08/22/22 Right External jugular (Active)   Site Assessment Clean, dry, & intact 08/22/22 1047   Phlebitis Assessment 0 08/22/22 1047   Infiltration Assessment 0 08/22/22 1047   Dressing Status Clean, dry, & intact 08/22/22 1047   Dressing Type Transparent 08/22/22 1047   Hub Color/Line Status Green 08/22/22 1047   Action Taken Blood drawn 08/22/22 1047   Alcohol Cap Used No 08/22/22 1047       Peripheral IV 08/22/22 Left Antecubital (Active)   Site Assessment Clean, dry, & intact 08/22/22 1048   Phlebitis Assessment 0 08/22/22 1048   Infiltration Assessment 0 08/22/22 1048   Dressing Status Clean, dry, & intact 08/22/22 1048   Dressing Type Transparent 08/22/22 1048   Hub Color/Line Status Pink 08/22/22 1048   Action Taken Blood drawn 08/22/22 1048   Alcohol Cap Used No 08/22/22 1048        Opportunity for questions and clarification was provided.       Patient transported with:   Monitor  Registered Nurse

## 2022-08-23 NOTE — PROGRESS NOTES
Notified by overnight NP that patient had rapid over night for wide complex tachycardia     - Repeat EKG   - Transfer to [facility]   - BB , TTE and cardiology consult   - HD for correction of electrolyte imbalances       Jr Ortiz MD

## 2022-08-23 NOTE — PROGRESS NOTES
Reviewed chart for transitions of care, and discussed in rounds. CM met with patient at bedside to explain role and offer support. Patient is alert and oriented x4, and confirmed demographics. Baseline:   ADLs/IDALS: Independent  Previous Home Health:Per patient had [facility] 3 years ago, did not recall the name of the agency  Previous SNF/IPR: [facility]  ER Contact: Son Eleazar Knight [phone], Daughter Sofie Torres [phone], Tamanna Montilla [phone]    Patient lives in in an independent living apartment on the 5 th floor at [facility]. Patient is independent   Patient has a WC and a cane. Patient's preferred pharmacy is the [place] [facility]   Transportation TBD  Reason for Admission:   Afib, hypotension                    RUR Score:   17%             PCP: First and Last name:   Lindsay Rodriguez MD     Name of Practice:    Are you a current patient: Yes/No: Yes   Approximate date of last visit:    Can you participate in a virtual visit if needed: No    Do you (patient/family) have any concerns for transition/discharge? No                Plan for utilizing home health:   TBD    Current Advanced Directive/Advance Care Plan:  Full Code      Healthcare Decision Maker:   Click here to complete [address] including selection of the Healthcare Decision Maker Relationship (ie \"Primary\")              Transition of Care Plan:       Patient presented to the ED from home with dizziness. EKG showed afib with 's. Care manager met with patient to introduce self and explain role. Per patient she lives in an independent apartment on the 5 th floor at [facility], there is an elevator. She attends outpatient PT at the [facility] in Thursdays and has Dialysis at the [facility] on a M-W-F schedule. She uses the transportation benefit through [employer] [phone] . CM confirmed demographic information. Will follow for transitions of care.    Maureen Cohen RN,Care Management  Care Management Interventions  PCP Verified by CM: Yes (Dr Phoebe Walton)  [address] AbdiVivek Marroquin: No  Discharge Durable Medical Equipment: No  Physical Therapy Consult: No  Occupational Therapy Consult: No  Speech Therapy Consult: No  Support Systems: Child(yair) (Son Nannette Moreland [phone], Daughter Van Schafer [phone], DTR Arabella Foster [phone])  Discharge Location  Patient Expects to be Discharged to[de-identified] Home with home health     Medicare pt has received, reviewed, and signed 1 st  IM letter informing them of their right to appeal the discharge. Signed copy has been placed on pt bedside chart.

## 2022-08-23 NOTE — CONSULTS
[facility]  Cardiology Care Note                  [x]Initial visit     []Established visit     Patient Name: [facility], [place], operated by [facility]:[id number]  Primary Cardiologist: none  Consulting Cardiologist: Shasta Perry MD   Reason for consult:wide complex tachycardia    HPI:   Eduin is a 66 y.o. female   with ESRD on HD, admitted yesterday am via ER with dizziness, too weak to get in the shower. EMS saw atrial fibrillation at 150  This was second episode of dizziness in 5 days. Pt reports some worsening of her usual mild SARKAR and edema, denies chest pain, Labs notable for initial troponin of 74, Mag 2.5 Hgb 11.3 and admit for sepsis.   K was 5.0 on admit and 6.6 on POC next morning (today) at 1255 am when pt had wide complex tachy  CXR cardiomegaly  CT chest   Prev EF 48% by echo 11/2020 with 3+ TR,HTN, DM,PAF,anemia , liver cirrhosis  ESRD on HD for 11 years, RV failure  EKG 8/22/22 958 AM, atrial fibrillation at 140 with aberrancy, left axis, IVCD  EKG 8/22/22 2323h- atrial fibrillation at 129 , variable aberrancy vs PVCs  EKG 8/23/22 0045h - regular wide complex tachy at 185 , NSVT vs SVT with aberrancy  EKG 8/23/22 1255 h atrial fibrillation at 94 with LBBB      SUBJECTIVE:    As above for initial note     Assessment and Plan     1) Wide complex tachy  Had AFib with RVR then very regular tachy with change in QRS morphology, either SVT or VT with K at 6.6 noted  Stable rhythm now, appeared to be elyte related but suspect CMY  Monitor, if recurs or if EF low then consult EP  2) Chronic systolic, diastolic CHF with cardiorenal syndrome  Decompensated continue HD  3) RV failure and 3+ TR recheck echo  Due to high venous volume and pressure with cardiorenal syndrome  4) Chronic atrial fibrillation -not clear if she sees a Cardiologist   5) HTN and CKD per renal team ____________________________________________________________    Cardiac testing  11/17/20    ECHO ADULT COMPLETE 11/21/2020 11/21/2020    Interpretation Summary  · LV: Calculated LVEF is 48%. Normal cavity size. Moderate concentric hypertrophy. Moderately reduced systolic function. Severe (grade 3) left ventricular diastolic dysfunction. · LA: Moderately dilated left atrium. · RV: Mildly dilated right ventricle. · RA: Moderately dilated right atrium. · IAS: No atrial septal defect present. · MV: Mild mitral valve regurgitation is present. · TV: Moderate to severe tricuspid valve regurgitation is present. · PV: Mild pulmonic valve regurgitation is present. · IVC: Severely elevated central venous pressure (15+ mmHg); IVC diameter is larger than 21 mm and collapses less than 50% with respiration. · Pericardium: Small pericardial effusion. Signed by: Yue Daniel MD on 11/21/2020  5:13 PM                Most recent HS troponins:  Recent Labs     08/23/22  0055 08/22/22  1017   TROPHS 70* 74*       Review of Systems    [x]All other systems reviewed and all negative except as written in HPI    [] Patient unable to provide secondary to condition         Past Medical History:   Diagnosis Date    Arthritis     Asthma     Chronic kidney disease     Chronic pain     Cirrhosis ([address] 12/17/2017    Diabetes ([facility] [address] diet controlled    GERD (gastroesophageal reflux disease)     Hypertension     Paroxysmal atrial fibrillation ([facility] [address] 10/6/2020     Past Surgical History:   Procedure Laterality Date    COLONOSCOPY N/A 1/12/2018    COLONOSCOPY performed by Arthur Mcwilliams MD at [facility] ENDOSCOPY    COLONOSCOPY N/A 3/19/2018    COLONOSCOPY performed by Arthur Mcwilliams MD at [facility] ENDOSCOPY    HX GYN  c section    HX VASCULAR ACCESS      dialysis     IR BX TRANSCATHETER  11/24/2020    IR PARACENTESIS ABD W IMAGE  10/22/2020     Social Hx:  reports that she has never smoked.  She has never used smokeless tobacco. She reports that she does not drink alcohol and does not use drugs. Family Hx: Family history is unknown by patient. Allergies   Allergen Reactions    Aleve [Naproxen Sodium] Itching, Swelling and Other (comments)    Amlodipine Rash, Hives and Itching    Aspirin Other (comments), Nausea Only and Unknown (comments)     GI bleed  GI bleeding      Heparin Unknown (comments)     bleeding      Ibuprofen Nausea and Vomiting    Metformin Other (comments)     swelling          OBJECTIVE:  Wt Readings from Last 3 Encounters:   08/23/22 169 lb 9.6 oz (76.9 kg)   12/01/20 162 lb 4.1 oz (73.6 kg)   11/25/20 169 lb 5 oz (76.8 kg)       Intake/Output Summary (Last 24 hours) at 8/23/2022 1838  Last data filed at 8/23/2022 1130  Gross per 24 hour   Intake 480 ml   Output 763 ml   Net -283 ml         Physical Exam    Vitals:   Vitals:    08/23/22 1400 08/23/22 1557 08/23/22 1600 08/23/22 1800   BP:  98/61     Pulse: 96 92 92 92   Resp:  20     Temp:  98 °F (36.7 °C)     SpO2:  97%     Weight:  169 lb 9.6 oz (76.9 kg)     Height:         Telemetry: AFIB        General:    Alert, cooperative, no distress, appears stated age. Neck:   Supple, no carotid bruit and no JVD. Back:     Symmetric,    Lungs:     Clear  to auscultation bilaterally. Heart[de-identified]    Irregularly irregular rhythm,  S1, S2 normal, no murmur, click, rub or gallop. A 1/6 soft systolic murmur is heard throughout the central precordium. Abdomen:     Soft, non-tender. Bowel sounds normal.    Extremities:   Extremities normal, atraumatic, no cyanosis or edema. Vascular:   Pulses - defer   Skin:   Skin color normal. No rashes or lesions on visible areas   Neurologic:   Alert, Moves all extremities. Data Review:     Radiology:   XR Results (most recent):  Results from Hospital Encounter encounter on 08/22/22    XR CHEST PORT    Narrative  EXAM: XR CHEST PORT    HISTORY: eval for PNA. COMPARISON: 9/17/2020    FINDINGS: Portable AP.  A left subclavian dialysis catheter terminates in the  right atrium. The heart is mildly enlarged, but unchanged. No significant edema. No focal consolidation, pleural effusion, or pneumothorax. Impression  Unchanged cardiomegaly    CT Results (most recent):  Results from Hospital Encounter encounter on 08/22/22    CT CHEST WO CONT    Narrative  INDICATION: Pulmonary nodule    COMPARISON: CT dated 8/22/2022    CONTRAST: None. TECHNIQUE:  5 mm axial images were obtained through the chest. Coronal and  sagittal reformats were generated. CT dose reduction was achieved through use  of a standardized protocol tailored for this examination and automatic exposure  control for dose modulation. The absence of intravenous contrast reduces the sensitivity for evaluation of  the mediastinum, marlene, vasculature, and upper abdominal organs. FINDINGS:    CHEST WALL: Left IJ catheter. No axillary or supraclavicular adenopathy. THYROID: Calcified left thyroid nodule  MEDIASTINUM: No mass or lymphadenopathy. MARLENE: No mass or lymphadenopathy. THORACIC AORTA: No aneurysm. MAIN PULMONARY ARTERY: Normal in caliber. TRACHEA/BRONCHI: Patent. ESOPHAGUS: No wall thickening or dilatation. HEART: Normal in size. PLEURA: Small right pleural effusion  LUNGS: Too numerous to count pulmonary nodules predominantly in the right middle  lobe and right lower lobe. The largest measures 7 mm in the right lower lobe  with a larger focus of nodularity at the junction of the major minor fissures in  the right lung. INCIDENTALLY IMAGED UPPER ABDOMEN: Abdominal ascites  BONES: No destructive bone lesion. Impression  Multiple small nonspecific pulmonary nodules. These are too small for  percutaneous sampling. Recommend short-term follow-up chest CT in 6-12 weeks. MRI Results (most recent):  No results found for this or any previous visit. No results for input(s): CPK, TROIQ in the last 72 hours.     No lab exists for component: CKQMB, CPKMB, BMPP  Recent Labs     08/23/22  0055 08/22/22  1017    143   K Sample is hemolyzed (hemoglobin is 5.0   * 112*   CO2 23 26   BUN 39* 36*   CREA 7.95* 7.82*    123*   PHOS 5.0*  --    CA 8.7 8.8     Recent Labs     08/23/22  0055 08/22/22  1017   WBC 5.5 4.9   HGB 12.0 11.3*   HCT 40.3 38.0   * 134*     Recent Labs     08/23/22  0055 08/22/22  1017   AP 90 106     No results for input(s): CHOL, LDLC in the last 72 hours. No lab exists for component: TGL, HDLC,  HBA1C  No results for input(s): CRP, TSH, TSHEXT in the last 72 hours.     No lab exists for component: ESR        Current meds:    Current Facility-Administered Medications:     [Held by provider] metoprolol tartrate (LOPRESSOR) tablet 25 mg, 25 mg, Oral, Q12H, Leandro Best MD, 25 mg at 08/23/22 1104    sodium zirconium cyclosilicate (LOKELMA) powder packet 10 g, 10 g, Oral, TID WITH MEALS, Leandro Stark MD, 10 g at 08/23/22 1751    albumin human 25% (BUMINATE) solution 12.5 g, 12.5 g, IntraVENous, DIALYSIS PRN, Shantal Bazzi MD, 12.5 g at 08/23/22 0940    traMADoL (ULTRAM) tablet 50 mg, 50 mg, Oral, Q6H PRN, Leandro Stark MD, 50 mg at 08/23/22 1631    albuterol-ipratropium (DUO-NEB) 2.5 MG-0.5 MG/3 ML, 3 mL, Nebulization, Q6H RT, Patrice Bennett MD, 3 mL at 08/23/22 1323    ascorbic acid (vitamin C) (VITAMIN C) tablet 500 mg, 500 mg, Oral, DAILY, Patrice Bennett MD, 500 mg at 08/23/22 1104    cholecalciferol (VITAMIN D3) (1000 Units /25 mcg) tablet 2,000 Units, 2,000 Units, Oral, DAILY, Patrice Bennett MD, 2,000 Units at 08/23/22 1104    ferrous sulfate tablet 325 mg, 325 mg, Oral, DAILY WITH BREAKFAST, Patrice Bennett MD, 325 mg at 08/23/22 1104    lactulose (CHRONULAC) 10 gram/15 mL solution 30 mL, 20 g, Oral, BID, Patrice Bennett MD, 30 mL at 08/23/22 1742    sevelamer carbonate (RENVELA) tab 1,600 mg, 1,600 mg, Oral, TID WITH MEALS, Patrice Bennett MD, 1,600 mg at 08/23/22 1742 sodium chloride (NS) flush 5-40 mL, 5-40 mL, IntraVENous, Q8H, Jono Bennett MD, 10 mL at 08/23/22 1556    sodium chloride (NS) flush 5-40 mL, 5-40 mL, IntraVENous, PRN, Brisa Bennett MD    acetaminophen (TYLENOL) tablet 650 mg, 650 mg, Oral, Q6H PRN, 650 mg at 08/23/22 0837 **OR** acetaminophen (TYLENOL) suppository 650 mg, 650 mg, Rectal, Q6H PRN, Brisa Bennett MD    polyethylene glycol (MIRALAX) packet 17 g, 17 g, Oral, DAILY PRN, Brisa Bennett MD    ondansetron (ZOFRAN ODT) tablet 4 mg, 4 mg, Oral, Q8H PRN **OR** ondansetron (ZOFRAN) injection 4 mg, 4 mg, IntraVENous, Q6H PRN, Brisa Bennett MD, 4 mg at 08/23/22 1625    insulin lispro (HUMALOG) injection, , SubCUTAneous, TIDAC, Jono Bennett MD    glucose chewable tablet 16 g, 4 Tablet, Oral, PRN, Brisa Bennett MD    glucagon (GLUCAGEN) injection 1 mg, 1 mg, IntraMUSCular, PRN, Jono Bennett MD    dextrose 10 % infusion 0-250 mL, 0-250 mL, IntraVENous, PRN, Brisa Bennett MD    diphenhydrAMINE (BENADRYL) capsule 25 mg, 25 mg, Oral, Q6H PRN, Mohinder Aloe, NP, 25 mg at 08/23/22 0031    Joaquina Montes MD  [facility] [address] [place] [address] 13, [address] [address] [place] [address]  [phone]      CC: Pam Logan MD

## 2022-08-23 NOTE — PROGRESS NOTES
Hospitalist Progress Note    NAME: Christina Cruz   :  1950   MRN:  [id number]   Room Number:  450/01  @ [facility]     Please note that this dictation was completed with [employer], the computer voice recognition software. Quite often unanticipated grammatical, syntax, homophones, and other interpretive errors are inadvertently transcribed by the computer software. Please disregard these errors. Please excuse any errors that have escaped final proofreading. Interim Hospital Summary: 66 y.o. female whom presented on 2022 with      Assessment / Plan:  Anticipated discharge date : > 48 H   Anticipated disposition : TBD  Barriers to discharge : arrhthymias     #Near syncope  w/ tachycardia POA resolved  #Wide-complex tachycardia POA resolved  -EKG review independently with wide QRS, tachycardia on admission  -On admission potassium 6.1 lactic acid 2.5. High sensitive troponin 70  -Patient received hyperkalemia cocktail including IV insulin D50 calcium gluconate  -Given ESRD patient hemodialysis initiated. Rapid response called after admission for wide-complex tachycardia.   Patient received amiodarone and Dilts bolus 2022  -Suspect multifactorial cause for near syncope more probable cause being wide-complex tachycardia at this point      -Metoprolol 25 mg twice daily  -HD for electrolyte imbalances  -TTE  -Cardiology consulted  -Transfer to [facility]  -Telemetry  -Keep K above 4 mag above 2    #ESRD hemodialysis   #Hyperkalemia POA  #Anemia of chronic kidney disease  -Patient received hyperkalemia cocktail including IV insulin D50 calcium gluconate  -Nephrology consulted  -Continue HD per nephrology  -Recheck K level      #Paroxysmal atrial fibrillation  -Not on any beta-blocker or anticoagulant per PTA review  -Follow cardiology recommendation    #Type 2 diabetes mellitus  -A1c pending   - Lispro correctional scale, FSG AC HS  - Consistent carb diet, hypoglycemia protocol. #History of congestive heart failure with reduced systolic function not in acute exacerbation  -Last TTE with EF 44% grade 3 diastolic dysfunction  -Ins and outs Daily weights fluid restriction    #Pulmonary nodules per imaging  -Chest CT no specific small pulmonary nodules  -Outpatient follow-up with pulmonology for management    #Cirrhosis on CT scan  -Ultrasound liver pending  -Paracentesis per IR  -Outpatient follow-up with hepatology for further management            Code status: Full  Prophylaxis: SCD's  Recommended Disposition: SNF/LTC     Subjective:     Chief Complaint / Reason for Physician Visit  No swelling discussed with RN events overnight. Review of Systems:  No fevers, chills, appetite change, cough, sputum production, shortness of breath, dyspnea on exertion, nausea, vomitting, diarrhea, constipation, chest pain, leg edema, abdominal pain, joint pain, rash, itching. Tolerating PT/OT. Tolerating diet. Objective:     VITALS:   Last 24hrs VS reviewed since prior progress note.  Most recent are:  Patient Vitals for the past 24 hrs:   Temp Pulse Resp BP SpO2   08/23/22 0915 -- (!) 132 16 (!) 114/53 --   08/23/22 0904 -- (!) 133 20 (!) 106/44 --   08/23/22 0845 -- (!) 133 14 (!) 101/45 --   08/23/22 0830 -- (!) 133 17 (!) 111/38 --   08/23/22 0815 -- (!) 133 17 (!) 118/52 --   08/23/22 0800 -- (!) 132 16 (!) 112/58 --   08/23/22 0745 -- (!) 130 20 (!) 100/38 --   08/23/22 0730 -- (!) 127 16 127/62 --   08/23/22 0715 97.8 °F (36.6 °C) (!) 124 25 128/61 --   08/23/22 0553 -- (!) 124 -- -- --   08/23/22 0400 98.7 °F (37.1 °C) 94 23 115/69 97 %   08/23/22 0330 -- 95 18 121/74 98 %   08/23/22 0300 97.7 °F (36.5 °C) 96 19 103/63 100 %   08/23/22 0230 -- 97 16 97/60 97 %   08/23/22 0200 -- 97 17 109/66 97 %   08/23/22 0130 -- 92 16 99/64 98 %   08/23/22 0059 -- (!) 115 -- 95/71 --   08/23/22 0051 -- (!) 180 -- 119/66 --   08/23/22 0045 -- (!) 185 16 95/71 99 %   08/22/22 2300 -- Joey Main 130 16 (!) 113/59 98 %   08/22/22 2200 97.6 °F (36.4 °C) (!) 130 15 (!) 99/51 97 %   08/22/22 2130 -- (!) 130 24 (!) 104/50 --   08/22/22 2100 -- 99 17 111/66 --   08/22/22 2030 -- 99 16 118/72 --   08/22/22 2018 -- -- -- -- 97 %   08/22/22 1930 97.9 °F (36.6 °C) 98 16 106/62 97 %   08/22/22 1800 -- (!) 105 19 122/83 98 %   08/22/22 1600 -- (!) 101 21 -- 100 %   08/22/22 1400 97.8 °F (36.6 °C) (!) 106 16 122/79 100 %   08/22/22 1130 -- (!) 137 14 120/67 97 %   08/22/22 1000 -- (!) 141 16 (!) 117/59 97 %   08/22/22 0953 97.9 °F (36.6 °C) (!) 140 17 117/64 98 %       Intake/Output Summary (Last 24 hours) at 8/23/2022 0924  Last data filed at 8/22/2022 1543  Gross per 24 hour   Intake 600 ml   Output --   Net 600 ml        PHYSICAL EXAM:  General: WD, WN. Alert, cooperative, no acute distress    EENT:  EOMI. Anicteric sclerae. MMM  Resp:  CTA bilaterally, no wheezing or rales. No accessory muscle use  CV:  Regular  rhythm,  normal S1/S2, no murmurs rubs gallops, No edema  GI:  Distended fluid thrill positive +Bowel sounds  Neurologic:  Alert and oriented X 3, normal speech,   Psych:   Good insight. Not anxious nor agitated  Skin:  No rashes. No jaundice    Reviewed most current lab test results and cultures  YES  Reviewed most current radiology test results   YES  Review and summation of old records today    NO  Reviewed patient's current orders and MAR    YES  PMH/SH reviewed - no change compared to H&P  ________________________________________________________________________  Care Plan discussed with:    Comments   Patient x    Family      RN x    Care Manager     Consultant  x                      Multidiciplinary team rounds were held today with , nursing, pharmacist and clinical coordinator. Patient's plan of care was discussed; medications were reviewed and discharge planning was addressed.      ________________________________________________________________________  Total NON critical care TIME: 28   Minutes    Total CRITICAL CARE TIME Spent:   Minutes non procedure based      Comments   >50% of visit spent in counseling and coordination of care x    ________________________________________________________________________  Nga Pederson MD     Procedures: see electronic medical records for all procedures/Xrays and details which were not copied into this note but were reviewed prior to creation of Plan. LABS:  I reviewed today's most current labs and imaging studies.   Pertinent labs include:  Recent Labs     08/23/22  0055 08/22/22  1017   WBC 5.5 4.9   HGB 12.0 11.3*   HCT 40.3 38.0   * 134*     Recent Labs     08/23/22  0055 08/22/22  1017    143   K Sample is hemolyzed (hemoglobin is 5.0   * 112*   CO2 23 26    123*   BUN 39* 36*   CREA 7.95* 7.82*   CA 8.7 8.8   MG 2.4 2.5*   PHOS 5.0*  --    ALB 2.8* 2.9*   TBILI 0.5 0.5   ALT 19 13       Signed: Nga Pederson MD

## 2022-08-23 NOTE — PROCEDURES
Hemodialysis / [phone]    Vitals Pre Post Assessment Pre Post   BP BP: 127/62 (08/23/22 0730) 102/70 LOC A&Ox4 A&Ox4   HR Pulse (Heart Rate): (!) 127 (08/23/22 0730)   130 Lungs Diminsihed Diminsihed   Resp Resp Rate: 16 (08/23/22 0730) 16 Cardiac Tachycardic Tachycardic   Temp Temp: 97.8 °F (36.6 °C) (08/23/22 0715) 97.8 Skin Warm and dry Warm and dry   Weight  N/a N/a Edema Generalized Generalized   Tele status monitor monitor Pain Pain Intensity 1: 0 (08/23/22 0400) 0     Orders   Duration: Start: 0730 End: 1040 Total: 3 hrs   Dialyzer: Dialyzer/Set Up Inspection: Revaclear (08/23/22 0730)   K Bath: Dialysate K (mEq/L): 2 (08/23/22 0730)   Ca Bath: Dialysate CA (mEq/L): 2.5 (08/23/22 0730)   Na: Dialysate NA (mEq/L): 138 (08/23/22 0730)   Bicarb: Dialysate HCO3 (mEq/L): 35 (08/23/22 0730)   Target Fluid Removal: Goal/Amount of Fluid to Remove (mL): 1000 mL (08/23/22 0730)     Access   Type & Location:    Comments:                           Ra Ramirez / PC : Dressing CDI. No s/s of infection. Both lumens aspirate & flush well. Running well at . Labs   HBsAg (Antigen) / date:                      Neg 8/22/22                         HBsAb (Antibody) / date: Phuong 8/22/22   Source: Epic    Obtained/Reviewed  Critical Results Called HGB   Date Value Ref Range Status   08/23/2022 12.0 11.5 - 16.0 g/dL Final     Potassium   Date Value Ref Range Status   08/23/2022 Sample is hemolyzed (hemoglobin is 3.5 - 5.1 mmol/L Final     Comment:     likely >50 mg/dL) and thus the  potassium, AST, ammonia, and  phosphorus results may be adversely  affected. If the clinical situation  warrants, recommend recollection of  a new specimen with attention to  preventing hemolysis.        Calcium   Date Value Ref Range Status   08/23/2022 8.7 8.5 - 10.1 MG/DL Final     BUN   Date Value Ref Range Status   08/23/2022 39 (H) 6 - 20 MG/DL Final     Creatinine   Date Value Ref Range Status   08/23/2022 7.95 (H) 0.55 - 1.02 MG/DL Final        Meds Given   Name Dose Route   Albumin 25% 12.5g HD               Adequacy / Fluid    Total Liters Process: 63.7L   Net Fluid Removed: 763 ml      Comments   Time Out Done:   (Time) 0725   Admitting Diagnosis: ESRD, Dizziness   Consent obtained/signed: Informed Consent Verified: Yes (08/23/22 0730)   Machine / RO # Machine Number: [id number] (08/23/22 0730)   Primary Nurse Rpt Pre: Aime Fishman RN   Primary Nurse Rpt Post: Lisha Christian RN   Pt Education: Access care   Care Plan: To continue HD   Pts outpatient clinic: [address] [place] [address] Summary   Comments:                           SBAR received from Primary RN. Pt at bedside A&Ox4. Consent signed & on file. 0730: Each catheter limb disinfected per p&p, caps removed, hubs disinfected per p&p. Each lumen aspirated for blood return and flushed with Normal Saline per policy. Labs drawn per request/ order. VSS. Dialysis Tx initiated. 1010: pt requested to turn uf off and jsut do cleaning instead due to cramping of stomach. 1040: Tx ended. VSS. Each dialysis catheter limb disinfected per p&p, all possible blood returned per p&p, and each dialysis hub disinfected per p&p. Each lumen flushed, post dialysis catheter Heparin dwell instilled per order, and caps applied. Bed locked and in the lowest position, call bell and belongings in reach. SBAR given to Primary, RN. Patient is stable at time of their/ my departure. All Dialysis related medications have been reviewed.

## 2022-08-23 NOTE — PROGRESS NOTES
[facility]   [address] [facility], [id number] [place] [address]  Phone: [phone]   [phone]    [email or web address]     Nephrology Progress Note    Patient Name : Maria D Anaya      : 1950     MRN : [id number]  Date of Admission : 2022  Date of Servive : 22    CC:  Follow up for ESRD       Assessment and Plan   ESRD- HD :  - Dialyzes at [facility] on MWF schedule   - HD now and again tomorrow per schedule   - has [place] [facility] for access     Cardiac Cirrhosis   RV failure, severe TR  Chronic HFrEF   - Paracentesis pending     Anemia in CKD   - resume CAROL ANN when Hb < 11 g  - hx of small bowel AVMs and needed RBC transfusions     Sec HPTH  - continue home meds     Hx of Hep C : ? Status     DM-II  HTN   PAF       Interval History:  Seen and examined on HD  Has tolerated 0.7 kg UF   Awaiting paracentesis     Review of Systems: A comprehensive review of systems was negative except for that written in the HPI.     Current Medications:   Current Facility-Administered Medications   Medication Dose Route Frequency    dilTIAZem (CARDIZEM) injection 5 mg  5 mg IntraVENous ONCE    dilTIAZem (CARDIZEM) injection 5 mg  5 mg IntraVENous ONCE    insulin regular (NOVOLIN R, HUMULIN R) 100 unit/mL injection        metoprolol tartrate (LOPRESSOR) tablet 25 mg  25 mg Oral Q12H    sodium zirconium cyclosilicate (LOKELMA) powder packet 10 g  10 g Oral TID WITH MEALS    albumin human 25% (BUMINATE) solution 12.5 g  12.5 g IntraVENous DIALYSIS PRN    albuterol-ipratropium (DUO-NEB) 2.5 MG-0.5 MG/3 ML  3 mL Nebulization Q6H RT    ascorbic acid (vitamin C) (VITAMIN C) tablet 500 mg  500 mg Oral DAILY    cholecalciferol (VITAMIN D3) (1000 Units /25 mcg) tablet 2,000 Units  2,000 Units Oral DAILY    ferrous sulfate tablet 325 mg  325 mg Oral DAILY WITH BREAKFAST    lactulose (CHRONULAC) 10 gram/15 mL solution 30 mL  20 g Oral BID    sevelamer carbonate (RENVELA) tab 1,600 mg  1,600 mg Oral TID WITH MEALS    sodium chloride (NS) flush 5-40 mL  5-40 mL IntraVENous Q8H    sodium chloride (NS) flush 5-40 mL  5-40 mL IntraVENous PRN    acetaminophen (TYLENOL) tablet 650 mg  650 mg Oral Q6H PRN    Or    acetaminophen (TYLENOL) suppository 650 mg  650 mg Rectal Q6H PRN    polyethylene glycol (MIRALAX) packet 17 g  17 g Oral DAILY PRN    ondansetron (ZOFRAN ODT) tablet 4 mg  4 mg Oral Q8H PRN    Or    ondansetron (ZOFRAN) injection 4 mg  4 mg IntraVENous Q6H PRN    insulin lispro (HUMALOG) injection   SubCUTAneous TIDAC    glucose chewable tablet 16 g  4 Tablet Oral PRN    glucagon (GLUCAGEN) injection 1 mg  1 mg IntraMUSCular PRN    dextrose 10 % infusion 0-250 mL  0-250 mL IntraVENous PRN    diphenhydrAMINE (BENADRYL) capsule 25 mg  25 mg Oral Q6H PRN      Allergies   Allergen Reactions    Aleve [Naproxen Sodium] Itching, Swelling and Other (comments)    Amlodipine Rash, Hives and Itching    Aspirin Other (comments), Nausea Only and Unknown (comments)     GI bleed  GI bleeding      Heparin Unknown (comments)     bleeding      Ibuprofen Nausea and Vomiting    Metformin Other (comments)     swelling       Objective:  Vitals:    Vitals:    08/23/22 1016 08/23/22 1031 08/23/22 1040 08/23/22 1130   BP: (!) 107/56 (!) 100/59 102/70 119/75   Pulse: (!) 132 (!) 118 (!) 130 98   Resp: 15 20 16 14   Temp:   97.8 °F (36.6 °C) 97.5 °F (36.4 °C)   SpO2:    100%   Weight:       Height:         Intake and Output:  08/23 0701 - 08/23 1900  In: 240 [P.O.:240]  Out: 763   08/21 1901 - 08/23 0700  In: 600 [I.V.:600]  Out: -     Physical Examination:        General: NAD,Conversant   Neck:  Supple, no mass  Resp:  Lungs CTA B/L, no wheezing , normal respiratory effort  CV:  RRR,  no murmur or rub, LE edema  GI:  Soft, DISTENDED, NT  Neurologic:  Non focal  Dialysis Access : [facility]- C/D/I    []    High complexity decision making was performed  []    Patient is at high-risk of decompensation with multiple organ involvement    Lab Data Personally Reviewed: I have reviewed all the pertinent labs, microbiology data and radiology studies during assessment. Recent Labs     08/23/22  0055 08/22/22  1017    143   K Sample is hemolyzed (hemoglobin is 5.0   * 112*   CO2 23 26    123*   BUN 39* 36*   CREA 7.95* 7.82*   CA 8.7 8.8   MG 2.4 2.5*   PHOS 5.0*  --    ALB 2.8* 2.9*   ALT 19 13       Recent Labs     08/23/22  0055 08/22/22  1017   WBC 5.5 4.9   HGB 12.0 11.3*   HCT 40.3 38.0   * 134*       No results found for: SDES  Lab Results   Component Value Date/Time    Culture result: NO GROWTH AFTER 17 HOURS 08/22/2022 10:42 AM    Culture result: NO GROWTH 4 DAYS 11/19/2020 02:50 PM    Culture result: No growth 5 days 10/18/2020 07:09 AM             I have reviewed the flowsheets. Chart and Pertinent Notes have been reviewed. No change in PMH ,family and social history from Consult note.       Tricia Galindo [facility]

## 2022-08-23 NOTE — PROGRESS NOTES
Problem: Pressure Injury - Risk of  Goal: *Prevention of pressure injury  Description: Document Vic Scale and appropriate interventions in the flowsheet. Outcome: Progressing Towards Goal  Note: Pressure Injury Interventions:  Sensory Interventions: Assess changes in LOC, Minimize linen layers, Maintain/enhance activity level, Keep linens dry and wrinkle-free, Monitor skin under medical devices    Moisture Interventions: Absorbent underpads, Limit adult briefs, Minimize layers    Activity Interventions: Increase time out of bed, PT/OT evaluation    Mobility Interventions: HOB 30 degrees or less, Pressure redistribution bed/mattress (bed type)    Nutrition Interventions: Document food/fluid/supplement intake, Offer support with meals,snacks and hydration    Problem: Falls - Risk of  Goal: *Absence of Falls  Description: Document Iza Fall Risk and appropriate interventions in the flowsheet.   Outcome: Progressing Towards Goal  Note: Fall Risk Interventions:    Medication Interventions: Bed/chair exit alarm, Patient to call before getting OOB    Elimination Interventions: Bed/chair exit alarm, Call light in reach, Patient to call for help with toileting needs, Stay With Me (per policy)    History of Falls Interventions: Bed/chair exit alarm, Door open when patient unattended, Investigate reason for fall, Consult care management for discharge planning

## 2022-08-23 NOTE — PROGRESS NOTES
0730: Bedside shift change report given to Elie Carrizales and Jonathan Reyes (oncoming nurse) by Eagle Gaviria RN (offgoing nurse). Report included the following information SBAR, MAR, and Recent Results. 1230: [address]    Patient with hypoglycemic episode(s) at 1150 (time) on 8/23/22 (date). BG value(s) pre-treatment 54    Was patient symptomatic? [] yes, [x] no  Patient was treated with the following rescue medications/treatments: [] D50                [] Glucose tablets                [] Glucagon                [x] 4oz juice                [] 6oz reg soda                [] 8oz low fat milk  BG value post-treatment: 72  Once BG treated and value greater than 80mg/dl, pt was provided with the following:  [] snack  [x] meal  Name of MD notified:N/A treated per protocol  The following orders were received: N/A     1930: Bedside shift change report given to Patricia [place] [id number] (oncoming nurse) by Jonathan Bishop (offgoing nurse). Report included the following information SBAR, MAR, and Recent Results. [facility] and [facility] by Cristian Dorantes RN from 0730 to 9692 was supervised and reviewed by this RN.

## 2022-08-23 NOTE — PROGRESS NOTES
0730 Bedside shift change report given to Maxine Ortiz (oncoming nurse) by Marquis Joseph (offgoing nurse). Report included the following information SBAR, Kardex, ED Summary, Procedure Summary, Intake/Output, MAR, and Recent Results. 2000 Bedside shift change report given to [facility] (oncoming nurse) by Maxine Ortiz (offgoing nurse). Report included the following information SBAR, Kardex, ED Summary, OR Summary, Procedure Summary, Intake/Output, MAR, and Recent Results.

## 2022-08-24 ENCOUNTER — APPOINTMENT (OUTPATIENT)
Dept: ULTRASOUND IMAGING | Age: 72
DRG: 308 | End: 2022-08-24
Attending: STUDENT IN AN ORGANIZED HEALTH CARE EDUCATION/TRAINING PROGRAM
Payer: MEDICARE

## 2022-08-24 ENCOUNTER — APPOINTMENT (OUTPATIENT)
Dept: NON INVASIVE DIAGNOSTICS | Age: 72
DRG: 308 | End: 2022-08-24
Attending: STUDENT IN AN ORGANIZED HEALTH CARE EDUCATION/TRAINING PROGRAM
Payer: MEDICARE

## 2022-08-24 LAB
ALBUMIN FLD-MCNC: 2 G/DL
ANION GAP SERPL CALC-SCNC: 8 MMOL/L (ref 5–15)
APPEARANCE FLD: CLEAR
BACTERIA SPEC CULT: NORMAL
BACTERIA SPEC CULT: NORMAL
BASOPHILS # BLD: 0.1 K/UL (ref 0–0.1)
BASOPHILS # BLD: 0.1 K/UL (ref 0–0.1)
BASOPHILS NFR BLD: 2 % (ref 0–1)
BASOPHILS NFR BLD: 2 % (ref 0–1)
BUN SERPL-MCNC: 27 MG/DL (ref 6–20)
BUN/CREAT SERPL: 4 (ref 12–20)
CALCIUM SERPL-MCNC: 9.1 MG/DL (ref 8.5–10.1)
CHLORIDE SERPL-SCNC: 108 MMOL/L (ref 97–108)
CO2 SERPL-SCNC: 25 MMOL/L (ref 21–32)
COLOR FLD: YELLOW
CREAT SERPL-MCNC: 6.25 MG/DL (ref 0.55–1.02)
DIFFERENTIAL METHOD BLD: ABNORMAL
DIFFERENTIAL METHOD BLD: ABNORMAL
ECHO AO ROOT DIAM: 3.1 CM
ECHO AO ROOT INDEX: 1.73 CM/M2
ECHO AV AREA PEAK VELOCITY: 2.5 CM2
ECHO AV AREA VTI: 2.6 CM2
ECHO AV AREA/BSA PEAK VELOCITY: 1.4 CM2/M2
ECHO AV AREA/BSA VTI: 1.5 CM2/M2
ECHO AV MEAN GRADIENT: 2 MMHG
ECHO AV MEAN VELOCITY: 0.7 M/S
ECHO AV PEAK GRADIENT: 5 MMHG
ECHO AV PEAK VELOCITY: 1.1 M/S
ECHO AV VELOCITY RATIO: 0.91
ECHO AV VTI: 15.7 CM
ECHO EST RA PRESSURE: 15 MMHG
ECHO LA DIAMETER INDEX: 2.07 CM/M2
ECHO LA DIAMETER: 3.7 CM
ECHO LA TO AORTIC ROOT RATIO: 1.19
ECHO LA VOL 2C: 79 ML (ref 22–52)
ECHO LA VOL 4C: 68 ML (ref 22–52)
ECHO LA VOLUME AREA LENGTH: 79 ML
ECHO LA VOLUME INDEX A2C: 44 ML/M2 (ref 16–34)
ECHO LA VOLUME INDEX A4C: 38 ML/M2 (ref 16–34)
ECHO LA VOLUME INDEX AREA LENGTH: 44 ML/M2 (ref 16–34)
ECHO LV E' LATERAL VELOCITY: 4 CM/S
ECHO LV E' SEPTAL VELOCITY: 4 CM/S
ECHO LV EDV A4C: 52 ML
ECHO LV EDV INDEX A4C: 29 ML/M2
ECHO LV EJECTION FRACTION BIPLANE: 21 % (ref 55–100)
ECHO LV ESV A2C: 49 ML
ECHO LV ESV A4C: 32 ML
ECHO LV ESV BP: 42 ML (ref 19–49)
ECHO LV ESV INDEX A2C: 27 ML/M2
ECHO LV ESV INDEX A4C: 18 ML/M2
ECHO LV ESV INDEX BP: 23 ML/M2
ECHO LV FRACTIONAL SHORTENING: 10 % (ref 28–44)
ECHO LV INTERNAL DIMENSION DIASTOLE INDEX: 2.85 CM/M2
ECHO LV INTERNAL DIMENSION DIASTOLIC: 5.1 CM (ref 3.9–5.3)
ECHO LV INTERNAL DIMENSION SYSTOLIC INDEX: 2.57 CM/M2
ECHO LV INTERNAL DIMENSION SYSTOLIC: 4.6 CM
ECHO LV IVSD: 1.4 CM (ref 0.6–0.9)
ECHO LV MASS 2D: 419.4 G (ref 67–162)
ECHO LV MASS INDEX 2D: 234.3 G/M2 (ref 43–95)
ECHO LV POSTERIOR WALL DIASTOLIC: 2.1 CM (ref 0.6–0.9)
ECHO LV RELATIVE WALL THICKNESS RATIO: 0.82
ECHO LVOT AREA: 2.8 CM2
ECHO LVOT AV VTI INDEX: 0.93
ECHO LVOT DIAM: 1.9 CM
ECHO LVOT MEAN GRADIENT: 2 MMHG
ECHO LVOT PEAK GRADIENT: 4 MMHG
ECHO LVOT PEAK VELOCITY: 1 M/S
ECHO LVOT STROKE VOLUME INDEX: 23.1 ML/M2
ECHO LVOT SV: 41.4 ML
ECHO LVOT VTI: 14.6 CM
ECHO MV AREA VTI: 1.6 CM2
ECHO MV E VELOCITY: 1.05 M/S
ECHO MV E/E' LATERAL: 26.25
ECHO MV E/E' RATIO (AVERAGED): 26.25
ECHO MV E/E' SEPTAL: 26.25
ECHO MV LVOT VTI INDEX: 1.78
ECHO MV MAX VELOCITY: 1.4 M/S
ECHO MV MEAN GRADIENT: 4 MMHG
ECHO MV MEAN VELOCITY: 0.9 M/S
ECHO MV PEAK GRADIENT: 8 MMHG
ECHO MV VTI: 26 CM
ECHO PULMONARY ARTERY END DIASTOLIC PRESSURE: 18 MMHG
ECHO PULMONARY ARTERY SYSTOLIC PRESSURE (PASP): 45 MMHG
ECHO PV MAX VELOCITY: 0.8 M/S
ECHO PV PEAK GRADIENT: 3 MMHG
ECHO RIGHT VENTRICULAR SYSTOLIC PRESSURE (RVSP): 45 MMHG
ECHO RV INTERNAL DIMENSION: 3.3 CM
ECHO RV TAPSE: 1 CM (ref 1.7–?)
ECHO TV REGURGITANT MAX VELOCITY: 2.76 M/S
ECHO TV REGURGITANT PEAK GRADIENT: 30 MMHG
EOSINOPHIL # BLD: 0.2 K/UL (ref 0–0.4)
EOSINOPHIL # BLD: 0.2 K/UL (ref 0–0.4)
EOSINOPHIL NFR BLD: 3 % (ref 0–7)
EOSINOPHIL NFR BLD: 4 % (ref 0–7)
ERYTHROCYTE [DISTWIDTH] IN BLOOD BY AUTOMATED COUNT: 17.2 % (ref 11.5–14.5)
ERYTHROCYTE [DISTWIDTH] IN BLOOD BY AUTOMATED COUNT: 17.2 % (ref 11.5–14.5)
EST. AVERAGE GLUCOSE BLD GHB EST-MCNC: 94 MG/DL
GLUCOSE BLD STRIP.AUTO-MCNC: 126 MG/DL (ref 65–117)
GLUCOSE BLD STRIP.AUTO-MCNC: 132 MG/DL (ref 65–117)
GLUCOSE BLD STRIP.AUTO-MCNC: 57 MG/DL (ref 65–117)
GLUCOSE BLD STRIP.AUTO-MCNC: 65 MG/DL (ref 65–117)
GLUCOSE BLD STRIP.AUTO-MCNC: 74 MG/DL (ref 65–117)
GLUCOSE BLD STRIP.AUTO-MCNC: 83 MG/DL (ref 65–117)
GLUCOSE BLD STRIP.AUTO-MCNC: 92 MG/DL (ref 65–117)
GLUCOSE FLD-MCNC: 87 MG/DL
GLUCOSE SERPL-MCNC: 89 MG/DL (ref 65–100)
HBA1C MFR BLD: 4.9 % (ref 4–5.6)
HCT VFR BLD AUTO: 38.8 % (ref 35–47)
HCT VFR BLD AUTO: 40.1 % (ref 35–47)
HGB BLD-MCNC: 11.6 G/DL (ref 11.5–16)
HGB BLD-MCNC: 12 G/DL (ref 11.5–16)
IMM GRANULOCYTES # BLD AUTO: 0 K/UL (ref 0–0.04)
IMM GRANULOCYTES # BLD AUTO: 0 K/UL (ref 0–0.04)
IMM GRANULOCYTES NFR BLD AUTO: 0 % (ref 0–0.5)
IMM GRANULOCYTES NFR BLD AUTO: 1 % (ref 0–0.5)
LDH FLD L TO P-CCNC: 83 U/L
LYMPHOCYTES # BLD: 0.9 K/UL (ref 0.8–3.5)
LYMPHOCYTES # BLD: 1.2 K/UL (ref 0.8–3.5)
LYMPHOCYTES NFR BLD: 15 % (ref 12–49)
LYMPHOCYTES NFR BLD: 17 % (ref 12–49)
LYMPHOCYTES NFR FLD: 26 %
MCH RBC QN AUTO: 29.1 PG (ref 26–34)
MCH RBC QN AUTO: 29.4 PG (ref 26–34)
MCHC RBC AUTO-ENTMCNC: 29.9 G/DL (ref 30–36.5)
MCHC RBC AUTO-ENTMCNC: 29.9 G/DL (ref 30–36.5)
MCV RBC AUTO: 97.1 FL (ref 80–99)
MCV RBC AUTO: 98.2 FL (ref 80–99)
MESOTHL CELL NFR FLD: 7 %
MONOCYTES # BLD: 0.7 K/UL (ref 0–1)
MONOCYTES # BLD: 0.7 K/UL (ref 0–1)
MONOCYTES NFR BLD: 10 % (ref 5–13)
MONOCYTES NFR BLD: 10 % (ref 5–13)
MONOS+MACROS NFR FLD: 55 %
NEUTROPHILS NFR FLD: 12 %
NEUTS SEG # BLD: 4.5 K/UL (ref 1.8–8)
NEUTS SEG # BLD: 4.8 K/UL (ref 1.8–8)
NEUTS SEG NFR BLD: 68 % (ref 32–75)
NEUTS SEG NFR BLD: 68 % (ref 32–75)
NRBC # BLD: 0 K/UL (ref 0–0.01)
NRBC # BLD: 0.02 K/UL (ref 0–0.01)
NRBC BLD-RTO: 0 PER 100 WBC
NRBC BLD-RTO: 0.3 PER 100 WBC
NUC CELL # FLD: 33 /CU MM
PLATELET # BLD AUTO: 168 K/UL (ref 150–400)
PLATELET # BLD AUTO: 170 K/UL (ref 150–400)
PMV BLD AUTO: 11.5 FL (ref 8.9–12.9)
PMV BLD AUTO: 11.9 FL (ref 8.9–12.9)
POTASSIUM SERPL-SCNC: 5.5 MMOL/L (ref 3.5–5.1)
PROT FLD-MCNC: 3.6 G/DL
RBC # BLD AUTO: 3.95 M/UL (ref 3.8–5.2)
RBC # BLD AUTO: 4.13 M/UL (ref 3.8–5.2)
RBC # FLD: >100 /CU MM
SERVICE CMNT-IMP: ABNORMAL
SERVICE CMNT-IMP: NORMAL
SODIUM SERPL-SCNC: 141 MMOL/L (ref 136–145)
SPECIMEN SOURCE FLD: ABNORMAL
SPECIMEN SOURCE FLD: NORMAL
TSH SERPL DL<=0.05 MIU/L-ACNC: 2.35 UIU/ML (ref 0.36–3.74)
WBC # BLD AUTO: 6.5 K/UL (ref 3.6–11)
WBC # BLD AUTO: 7 K/UL (ref 3.6–11)

## 2022-08-24 PROCEDURE — 94640 AIRWAY INHALATION TREATMENT: CPT

## 2022-08-24 PROCEDURE — 80048 BASIC METABOLIC PNL TOTAL CA: CPT

## 2022-08-24 PROCEDURE — P9047 ALBUMIN (HUMAN), 25%, 50ML: HCPCS | Performed by: STUDENT IN AN ORGANIZED HEALTH CARE EDUCATION/TRAINING PROGRAM

## 2022-08-24 PROCEDURE — 82962 GLUCOSE BLOOD TEST: CPT

## 2022-08-24 PROCEDURE — 99223 1ST HOSP IP/OBS HIGH 75: CPT | Performed by: INTERNAL MEDICINE

## 2022-08-24 PROCEDURE — 74011250637 HC RX REV CODE- 250/637: Performed by: STUDENT IN AN ORGANIZED HEALTH CARE EDUCATION/TRAINING PROGRAM

## 2022-08-24 PROCEDURE — 84443 ASSAY THYROID STIM HORMONE: CPT

## 2022-08-24 PROCEDURE — 74011000258 HC RX REV CODE- 258: Performed by: NURSE PRACTITIONER

## 2022-08-24 PROCEDURE — 36416 COLLJ CAPILLARY BLOOD SPEC: CPT

## 2022-08-24 PROCEDURE — 0W9G3ZZ DRAINAGE OF PERITONEAL CAVITY, PERCUTANEOUS APPROACH: ICD-10-PCS | Performed by: RADIOLOGY

## 2022-08-24 PROCEDURE — 99233 SBSQ HOSP IP/OBS HIGH 50: CPT | Performed by: SPECIALIST

## 2022-08-24 PROCEDURE — 65660000001 HC RM ICU INTERMED STEPDOWN

## 2022-08-24 PROCEDURE — 93005 ELECTROCARDIOGRAM TRACING: CPT

## 2022-08-24 PROCEDURE — 74011250636 HC RX REV CODE- 250/636: Performed by: NURSE PRACTITIONER

## 2022-08-24 PROCEDURE — 82945 GLUCOSE OTHER FLUID: CPT

## 2022-08-24 PROCEDURE — 74011250636 HC RX REV CODE- 250/636: Performed by: STUDENT IN AN ORGANIZED HEALTH CARE EDUCATION/TRAINING PROGRAM

## 2022-08-24 PROCEDURE — 93306 TTE W/DOPPLER COMPLETE: CPT | Performed by: SPECIALIST

## 2022-08-24 PROCEDURE — 84157 ASSAY OF PROTEIN OTHER: CPT

## 2022-08-24 PROCEDURE — 76705 ECHO EXAM OF ABDOMEN: CPT

## 2022-08-24 PROCEDURE — 89050 BODY FLUID CELL COUNT: CPT

## 2022-08-24 PROCEDURE — 87015 SPECIMEN INFECT AGNT CONCNTJ: CPT

## 2022-08-24 PROCEDURE — P9047 ALBUMIN (HUMAN), 25%, 50ML: HCPCS | Performed by: NURSE PRACTITIONER

## 2022-08-24 PROCEDURE — 74011000250 HC RX REV CODE- 250: Performed by: FAMILY MEDICINE

## 2022-08-24 PROCEDURE — 82042 OTHER SOURCE ALBUMIN QUAN EA: CPT

## 2022-08-24 PROCEDURE — 74011250637 HC RX REV CODE- 250/637: Performed by: FAMILY MEDICINE

## 2022-08-24 PROCEDURE — 49083 ABD PARACENTESIS W/IMAGING: CPT

## 2022-08-24 PROCEDURE — 83036 HEMOGLOBIN GLYCOSYLATED A1C: CPT

## 2022-08-24 PROCEDURE — 87205 SMEAR GRAM STAIN: CPT

## 2022-08-24 PROCEDURE — 90935 HEMODIALYSIS ONE EVALUATION: CPT

## 2022-08-24 PROCEDURE — 83615 LACTATE (LD) (LDH) ENZYME: CPT

## 2022-08-24 PROCEDURE — 85025 COMPLETE CBC W/AUTO DIFF WBC: CPT

## 2022-08-24 PROCEDURE — 93306 TTE W/DOPPLER COMPLETE: CPT

## 2022-08-24 RX ORDER — SODIUM BICARBONATE 42 MG/ML
1 INJECTION, SOLUTION INTRAVENOUS
Status: DISPENSED | OUTPATIENT
Start: 2022-08-24 | End: 2022-08-24

## 2022-08-24 RX ORDER — HEPARIN SODIUM 1000 [USP'U]/ML
4000 INJECTION, SOLUTION INTRAVENOUS; SUBCUTANEOUS ONCE
Status: ACTIVE | OUTPATIENT
Start: 2022-08-24 | End: 2022-08-24

## 2022-08-24 RX ORDER — ALBUMIN HUMAN 250 G/1000ML
25 SOLUTION INTRAVENOUS
Status: DISCONTINUED | OUTPATIENT
Start: 2022-08-24 | End: 2022-08-31 | Stop reason: HOSPADM

## 2022-08-24 RX ORDER — HEPARIN SODIUM 10000 [USP'U]/100ML
12-25 INJECTION, SOLUTION INTRAVENOUS
Status: DISCONTINUED | OUTPATIENT
Start: 2022-08-24 | End: 2022-08-28

## 2022-08-24 RX ORDER — METOPROLOL TARTRATE 5 MG/5ML
5 INJECTION INTRAVENOUS
Status: DISCONTINUED | OUTPATIENT
Start: 2022-08-24 | End: 2022-08-31 | Stop reason: HOSPADM

## 2022-08-24 RX ORDER — FAMOTIDINE 20 MG/1
20 TABLET, FILM COATED ORAL
Status: DISCONTINUED | OUTPATIENT
Start: 2022-08-24 | End: 2022-08-31 | Stop reason: HOSPADM

## 2022-08-24 RX ORDER — ALBUMIN HUMAN 250 G/1000ML
25 SOLUTION INTRAVENOUS ONCE
Status: DISCONTINUED | OUTPATIENT
Start: 2022-08-25 | End: 2022-08-24

## 2022-08-24 RX ORDER — IPRATROPIUM BROMIDE 0.5 MG/2.5ML
0.5 SOLUTION RESPIRATORY (INHALATION)
Status: DISCONTINUED | OUTPATIENT
Start: 2022-08-24 | End: 2022-08-31 | Stop reason: HOSPADM

## 2022-08-24 RX ORDER — ALBUMIN HUMAN 250 G/1000ML
25 SOLUTION INTRAVENOUS ONCE
Status: COMPLETED | OUTPATIENT
Start: 2022-08-24 | End: 2022-08-24

## 2022-08-24 RX ORDER — LIDOCAINE HYDROCHLORIDE 10 MG/ML
10 INJECTION, SOLUTION EPIDURAL; INFILTRATION; INTRACAUDAL; PERINEURAL
Status: ACTIVE | OUTPATIENT
Start: 2022-08-24 | End: 2022-08-24

## 2022-08-24 RX ADMIN — SODIUM CHLORIDE, PRESERVATIVE FREE 10 ML: 5 INJECTION INTRAVENOUS at 15:20

## 2022-08-24 RX ADMIN — FERROUS SULFATE TAB 325 MG (65 MG ELEMENTAL FE) 325 MG: 325 (65 FE) TAB at 09:22

## 2022-08-24 RX ADMIN — SODIUM CHLORIDE, PRESERVATIVE FREE 10 ML: 5 INJECTION INTRAVENOUS at 05:37

## 2022-08-24 RX ADMIN — TRAMADOL HYDROCHLORIDE 50 MG: 50 TABLET ORAL at 13:17

## 2022-08-24 RX ADMIN — ALBUMIN (HUMAN) 25 G: 0.25 INJECTION, SOLUTION INTRAVENOUS at 12:42

## 2022-08-24 RX ADMIN — SODIUM ZIRCONIUM CYCLOSILICATE 10 G: 10 POWDER, FOR SUSPENSION ORAL at 17:42

## 2022-08-24 RX ADMIN — SODIUM ZIRCONIUM CYCLOSILICATE 10 G: 10 POWDER, FOR SUSPENSION ORAL at 13:19

## 2022-08-24 RX ADMIN — ACETAMINOPHEN 650 MG: 325 TABLET, FILM COATED ORAL at 01:44

## 2022-08-24 RX ADMIN — DEXTROSE MONOHYDRATE 150 MG: 50 INJECTION, SOLUTION INTRAVENOUS at 05:37

## 2022-08-24 RX ADMIN — SODIUM CHLORIDE, PRESERVATIVE FREE 10 ML: 5 INJECTION INTRAVENOUS at 21:45

## 2022-08-24 RX ADMIN — LACTULOSE 30 ML: 20 SOLUTION ORAL at 17:33

## 2022-08-24 RX ADMIN — OXYCODONE HYDROCHLORIDE AND ACETAMINOPHEN 500 MG: 500 TABLET ORAL at 09:22

## 2022-08-24 RX ADMIN — SEVELAMER CARBONATE 1600 MG: 800 TABLET, FILM COATED ORAL at 17:33

## 2022-08-24 RX ADMIN — FAMOTIDINE 20 MG: 20 TABLET ORAL at 09:21

## 2022-08-24 RX ADMIN — SEVELAMER CARBONATE 1600 MG: 800 TABLET, FILM COATED ORAL at 12:42

## 2022-08-24 RX ADMIN — ALBUMIN (HUMAN) 25 G: 0.25 INJECTION, SOLUTION INTRAVENOUS at 07:15

## 2022-08-24 RX ADMIN — ALBUMIN (HUMAN) 25 G: 0.25 INJECTION, SOLUTION INTRAVENOUS at 15:22

## 2022-08-24 RX ADMIN — TRAMADOL HYDROCHLORIDE 50 MG: 50 TABLET ORAL at 00:03

## 2022-08-24 RX ADMIN — ALBUMIN (HUMAN) 25 G: 0.25 INJECTION, SOLUTION INTRAVENOUS at 19:39

## 2022-08-24 RX ADMIN — Medication 2000 UNITS: at 09:21

## 2022-08-24 RX ADMIN — IPRATROPIUM BROMIDE AND ALBUTEROL SULFATE 3 ML: .5; 3 SOLUTION RESPIRATORY (INHALATION) at 02:02

## 2022-08-24 RX ADMIN — IPRATROPIUM BROMIDE AND ALBUTEROL SULFATE 3 ML: .5; 3 SOLUTION RESPIRATORY (INHALATION) at 16:29

## 2022-08-24 NOTE — PROGRESS NOTES
6680 Reached out to on call regarding patient's elevated heart being elevated in 120's. Patient asymptotic at this time without any complaints. Stated will come to see patient. 15130: patient resting at this time without any complaints. Patient hear leung has increase now to 140's to 150 while patient resting in bed at this time. Receive orders to do amio bolus followed by amnion continue drip. After receiving amnio bolus patient's heart rate now  starting to go down to 90's and now low blood pressure low at this time. Re paged on-call regarding persistent low blood pressure. Orders received at this time for  albumin)  Patient refuse hepain drip at this time starts I have an allergy that will make me bleed. Bedside and Verbal shift change report given to Prasad Aguirre  (oncoming nurse) by Jane  (offgoing nurse). Report included the following information SBAR, Kardex, Intake/Output, MAR, Accordion, Recent Results, and Med Rec Status.

## 2022-08-24 NOTE — PROCEDURES
Hemodialysis / [phone]    Vitals Pre Post Assessment Pre Post   BP BP: 106/63 (08/24/22 0755) 92/49 LOC Alert, awake, conscious and coherent Alert, awake, conscious and coherent   HR Pulse (Heart Rate): 92 (08/24/22 0755) 95 Lungs Diminished, on nasal cannula at 2lpm Diminished, on nasal cannula at 2lpm   Resp Resp Rate: 16 (08/24/22 0755) 16 Cardiac Sinus rhythm Sinus rhythm   Temp Temp: 98.5 °F (36.9 °C) (08/24/22 0755) 98.5 Skin Intact, dry and warm Intact, dry and warm   Weight NA  NA Edema ascitis ascitis   Tele status Monitor Monitor Pain Pain Intensity 1: 8 (08/24/22 0300) No pain     Orders   Duration: Start: 4605 End: 1055 Total: 2.40 hours   Dialyzer: Dialyzer/Set Up Inspection: Revaclear (08/24/22 0755)   Dondra Mu Bath: Dialysate K (mEq/L): 2 (08/24/22 0755)   Ca Bath: Dialysate CA (mEq/L): 2.5 (08/24/22 0755)   Na: Dialysate NA (mEq/L): 138 (08/24/22 0755)   Bicarb: Dialysate HCO3 (mEq/L): 35 (08/24/22 0755)   Target Fluid Removal: Goal/Amount of Fluid to Remove (mL): 1000 mL (08/24/22 0755)     Access   Type & Location: Left CVC   Comments:                                No signs and symptoms of infection noted, each catheter limb are patent, caps removed and hubs disinfected as per p&p.             Labs   HBsAg (Antigen) / date:  Negative 08/22/22                                             HBsAb (Antibody) / date:  Susceptible 08/22/22   Source:  Epic   Obtained/Reviewed  Critical Results Called HGB   Date Value Ref Range Status   08/24/2022 11.6 11.5 - 16.0 g/dL Final     Potassium   Date Value Ref Range Status   08/24/2022 5.5 (H) 3.5 - 5.1 mmol/L Final     Comment:     SPECIMEN HEMOLYZED, RESULTS MAY BE AFFECTED     Calcium   Date Value Ref Range Status   08/24/2022 9.1 8.5 - 10.1 MG/DL Final     BUN   Date Value Ref Range Status   08/24/2022 27 (H) 6 - 20 MG/DL Final     Creatinine   Date Value Ref Range Status   08/24/2022 6.25 (H) 0.55 - 1.02 MG/DL Final        Meds Given   Name Dose Route   None Adequacy / Fluid    Total Liters Process: 52.6 L   Net Fluid Removed: 800 mL      Comments   Time Out Done:   (Time) 1594   Admitting Diagnosis: Tachycardia, ESRD   Consent obtained/signed: Informed Consent Verified: Yes (08/24/22 3814)   Machine / [employer] # Machine Number: [id number] (08/24/22 2624)   Primary Nurse Rpt Pre: Gena Hu RN   Primary Nurse Rpt Post: Chun Padilla RN   Pt Education: Procedure   Care Plan: HD POC   Pts outpatient clinic: [facility]     Tx Summary   Comments:              Dialysis order, medication, labs and consent verified pre dialysis. Time Out done pre dialysis. 0730: SBAR received from Primary Nurse, patient is on stable condition upon endorsement. CVC clean aseptically as per policy, no signs of infection, dressing not due for change, both lumen are patent. With ongoing albumin infusion. 8836: Treatment started, safety checks done, vitals signs are stable upon initiation of treatment. 0930: Seen by Dr. Michael Chawla. He verbalized to remove 500 ml only once BP dropped. 1025: Treatment discontinued to give way for Paracentesis procedure, Dr. Michael Chawla agreed. 1025: Treatment ended. Blood returned as per policy. Vital  signs stable the whole treatment. 2 hours and 40 minutes dialysis done. SBAR given to Primary nurse.

## 2022-08-24 NOTE — CONSULTS
Cardiac Electrophysiology [facility] Initial Visit Note     Subjective:       Phyllis Mcmullen is a 66 y.o. patient who is seen for management of typical atrial flutter RVR and LBBB with intermittent PAF RVR LBBB. Consult was kindly requested by Dr. Francisco Javier Fleming. I discussed with Dr Agustin Santos as well    She was admitted on 08/22/2022 with dizziness. Denied missing dialysis sessions. Reported worsening abdominal distention x 2 weeks, is s/p paracentesis approx 2 weeks ago. Today she had 7 L fluid removed by paracentesis and bp bottomed out so amiodarone is stopped and she is getting albumin IV infusion now  Atrial flutter persisted with V rate 95 bpm    Initial high sensitivity troponin 74, Mg 2.5, Hgb 11. 3.  admitted for sepsis. K 5 on admit, 6.6 via POC on 08/23/2022, 5.5 this morning. She had ECG with typical AFL and AF LBBB and one time V rate 185 bpm   Later ECGs showed slower V rate 140 bpm    Was on amiodarone IV gtt, metoprolol po & metoprolol IV prn. BP cannot tolerate    Known RV failure. Echo in 11/2020 showed LVEF 48% with mildly dilated RV. Both atria are dilated and she has TR     No OAC, is on hemodialysis & has had AVM GI bleed previously. Previous:   ESRD on HD x 11 years.          Problem List  Date Reviewed: 12/1/2020            Codes Class Noted    Tachycardia ICD-10-CM: R00.0  ICD-9-CM: 785.0  8/22/2022        ESRD (end stage renal disease) ([facility] ICD-10-CM: N18.6  ICD-9-CM: 585.6  10/18/2020        Suspected COVID-19 virus infection ICD-10-CM: W88.953  ICD-9-CM: V01.79  10/18/2020        HTN (hypertension) ICD-10-CM: I10  ICD-9-CM: 401.9  10/17/2020        Dependence on renal dialysis [facility]) ICD-10-CM: Z99.2  ICD-9-CM: V45.11  10/6/2020        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, or end stage renal disease ([facility] ICD-10-CM: I13.2  ICD-9-CM: 404.93, 428.9  10/6/2020        Other ascites ICD-10-CM: R18.8  ICD-9-CM: 789.59  10/6/2020        Other chronic pain ICD-10-CM: G89.29  ICD-9-CM: 338.29  10/6/2020        Paroxysmal atrial fibrillation (HCC) ICD-10-CM: I48.0  ICD-9-CM: 427.31  10/6/2020        Type 2 diabetes mellitus with hyperglycemia (HCC) ICD-10-CM: E11.65  ICD-9-CM: 250.00  10/6/2020        Unspecified cirrhosis of liver (HCC) ICD-10-CM: K74.60  ICD-9-CM: 571.5  10/6/2020        Other hypotension ICD-10-CM: I95.89  ICD-9-CM: 458.8  10/6/2020        Severe anemia ICD-10-CM: D64.9  ICD-9-CM: 285.9  5/2/2018        Dizziness ICD-10-CM: P45  ICD-9-CM: 780.4  3/16/2018        Generalized weakness ICD-10-CM: R53.1  ICD-9-CM: 780.79  3/16/2018        Rectal bleed ICD-10-CM: K62.5  ICD-9-CM: 569.3  3/16/2018        Symptomatic anemia ICD-10-CM: D64.9  ICD-9-CM: 285.9  3/16/2018        Acute blood loss anemia ICD-10-CM: D62  ICD-9-CM: 285.1  3/9/2018        Cirrhosis ([facility] ICD-10-CM: K74.60  ICD-9-CM: 571.5  12/17/2017        Anemia in chronic kidney disease ICD-10-CM: N18.9, D63.1  ICD-9-CM: 285.21  12/16/2017        GI bleed ICD-10-CM: K92.2  ICD-9-CM: 578.9  12/15/2017        Diabetes mellitus type 2, controlled ([facility] ICD-10-CM: E11.9  ICD-9-CM: 250.00  12/15/2017         Current Facility-Administered Medications   Medication Dose Route Frequency    [Held by provider] amiodarone (CORDARONE) 375 mg/250 mL D5W infusion  0.5-1 mg/min IntraVENous TITRATE    heparin (porcine) 1,000 unit/mL injection 4,000 Units  4,000 Units IntraVENous ONCE    [Held by provider] heparin 25,000 units in D5W 250 ml infusion  12-25 Units/kg/hr IntraVENous TITRATE    famotidine (PEPCID) tablet 20 mg  20 mg Oral DAILY PRN    metoprolol (LOPRESSOR) injection 5 mg  5 mg IntraVENous Q3H PRN    sodium bicarbonate (4.2%) injection 42 mg  1 mL SubCUTAneous RAD ONCE    lidocaine (PF) (XYLOCAINE) 10 mg/mL (1 %) injection 10 mL  10 mL SubCUTAneous RAD ONCE    albumin human 25% (BUMINATE) solution 25 g  25 g IntraVENous Q6H PRN    ipratropium (ATROVENT) 0.02 % nebulizer solution 0.5 mg  0.5 mg Nebulization Q4H PRN    metoprolol tartrate (LOPRESSOR) tablet 25 mg  25 mg Oral Q12H    sodium zirconium cyclosilicate (LOKELMA) powder packet 10 g  10 g Oral TID WITH MEALS    albumin human 25% (BUMINATE) solution 12.5 g  12.5 g IntraVENous DIALYSIS PRN    traMADoL (ULTRAM) tablet 50 mg  50 mg Oral Q6H PRN    ascorbic acid (vitamin C) (VITAMIN C) tablet 500 mg  500 mg Oral DAILY    cholecalciferol (VITAMIN D3) (1000 Units /25 mcg) tablet 2,000 Units  2,000 Units Oral DAILY    ferrous sulfate tablet 325 mg  325 mg Oral DAILY WITH BREAKFAST    lactulose (CHRONULAC) 10 gram/15 mL solution 30 mL  20 g Oral BID    sevelamer carbonate (RENVELA) tab 1,600 mg  1,600 mg Oral TID WITH MEALS    sodium chloride (NS) flush 5-40 mL  5-40 mL IntraVENous Q8H    sodium chloride (NS) flush 5-40 mL  5-40 mL IntraVENous PRN    acetaminophen (TYLENOL) tablet 650 mg  650 mg Oral Q6H PRN    Or    acetaminophen (TYLENOL) suppository 650 mg  650 mg Rectal Q6H PRN    polyethylene glycol (MIRALAX) packet 17 g  17 g Oral DAILY PRN    ondansetron (ZOFRAN ODT) tablet 4 mg  4 mg Oral Q8H PRN    Or    ondansetron (ZOFRAN) injection 4 mg  4 mg IntraVENous Q6H PRN    insulin lispro (HUMALOG) injection   SubCUTAneous TIDAC    glucose chewable tablet 16 g  4 Tablet Oral PRN    glucagon (GLUCAGEN) injection 1 mg  1 mg IntraMUSCular PRN    dextrose 10 % infusion 0-250 mL  0-250 mL IntraVENous PRN    diphenhydrAMINE (BENADRYL) capsule 25 mg  25 mg Oral Q6H PRN          Allergies   Allergen Reactions   · Aleve [Naproxen Sodium] Itching, Swelling and Other (comments)   · Amlodipine Rash, Hives and Itching   · Aspirin Other (comments), Nausea Only and Unknown (comments)   GI bleed   GI bleeding     · Heparin Unknown (comments)   bleeding     · Ibuprofen Nausea and Vomiting   · Metformin Other (comments)   swelling     Past Medical History:   Diagnosis Date   · Arthritis   · Asthma   · Chronic kidney disease   · Chronic pain   · Cirrhosis ([facility] 12/17/2017   · Diabetes ([facility] diet controlled   · GERD (gastroesophageal reflux disease)   · Hypertension   · Paroxysmal atrial fibrillation ([facility] 10/6/2020     Past Surgical History:   Procedure Laterality Date   · COLONOSCOPY N/A 1/12/2018   COLONOSCOPY performed by Archie Montes De Oca MD at THE [facility] ENDOSCOPY   · COLONOSCOPY N/A 3/19/2018   COLONOSCOPY performed by Archie Montes De Oca MD at THE [facility] ENDOSCOPY   · HX GYN c section   · HX VASCULAR ACCESS   dialysis   · IR BX TRANSCATHETER 11/24/2020   · IR PARACENTESIS ABD W IMAGE 10/22/2020     Family History   Family history unknown: Yes     Social History     Tobacco Use   · Smoking status: Never   · Smokeless tobacco: Never   Substance Use Topics   · Alcohol use: No         Review of Systems: Review of all other systems otherwise negative. Constitutional: Negative for fever, chills, weight loss, + malaise/fatigue. HEENT: Negative for nosebleeds, vision changes. Respiratory: Negative for cough, hemoptysis   Cardiovascular: Negative for chest pain, palpitations, orthopnea, claudication, + leg swelling, no syncope, and PND. + lightheadedness, SARKAR   Gastrointestinal: Negative for nausea, vomiting, diarrhea, blood in stool and melena. + abdominal discomfort, abdominal distention   Genitourinary: Negative for dysuria, and hematuria. Musculoskeletal: Negative for myalgias, arthralgia. Skin: Negative for rash. Heme: Does not bleed or bruise easily. Neurological: Negative for speech change and focal weakness       Objective:   Visit Vitals  BP (!) 87/44   Pulse 94   Temp 97.8 °F (36.6 °C)   Resp 20   Ht 5' 3\" (1.6 m)   Wt 167 lb 8.8 oz (76 kg)   SpO2 100%   BMI 29.68 kg/m²         Physical Exam:   Constitutional: No respiratory distress. Head: Normocephalic and atraumatic. Eyes: Pupils are equal, round   ENT: Hearing normal   Neck: Supple. No JVD present. Cardiovascular: Normal rate, irregular rhythm.  Exam reveals no gallop and no friction rub. 2/6 systolic LSB murmur heard. Pulmonary/Chest: Effort normal and breath sounds normal. No wheezes. Abdominal: distended  Musculoskeletal: Moves extremities independently   Vasc/lymphatic: no edema  Neurological: Alert, oriented. Skin: Skin is warm and dry. Psychiatric: Normal mood and affect. Behavior is normal. Judgment and thought content normal.         Assessment/Plan:     Imaging/Studies:   Echo (11/21/2020): LVEF 48%, mod concentric LVH, grade 3 LV dysfunction. Mildly dilated RV. Mod DANIEL. Mild MR. Mod to severe TR. Mild UT. Severely elevated CVP (15+ mmHg). Small pericardial effusion. Typical atrial flutter and atrial fibrillation LBBB with RVR: stopped amiodarone for now due to low BP, but not a good long term option due to history of cirrhosis also. She has ESRD on hemodialysis as well as cannot tolerate tikosyn or sotalol either  With hyperkalemia, would avoid antiarrhythmic drugs long term   She has biatrial enlargement and significant TR  Repeated echo pending  If LVEF is normal, I discussed and recommended leadless pacemaker and av node ablation  I do not think she will be able to maintain long term NSR with electrical cardioversion or just atrial flutter ablation  She may not tolerate AF ablation under general anesthesia given low bp and liver cirrhosis, ESRD  Anticoagulation: No long term OAC due to dialysis & prior AVM GI bleeding. For stroke prevention I recommend MELCHOR and Watchman device implant    She said she will discuss with her family about these before she can make decision    Addendum  08/22/22    ECHO ADULT COMPLETE 08/24/2022 8/24/2022    Interpretation Summary  Formatting of this result is different from the original.      Left Ventricle: Severely reduced left ventricular systolic function with a visually estimated EF of 10 -15%. Left ventricle size is normal. Mass index 2D is 234.3 g/m2. Findings consistent with severe asymmetric hypertrophy.  Severe global hypokinesis present. Right Ventricle: Severely reduced systolic function. TAPSE is abnormal. TAPSE is 1.0 cm. Aortic Valve: Severe sclerosis of the aortic valve cusp. No stenosis. Mitral Valve: Findings consistent with myxomatous degeneration and decreased excusrion. Moderately calcified leaflet, at the anterior and posterior leaflets. Mild annular calcification of the mitral valve. Moderate regurgitation with an eccentrically directed jet and may underestimate severity. Tricuspid Valve: Mal-coaptation of tricuspid valve. Severe regurgitation with an eccentrically directed jet and may underestimate severity. Right Atrium: Right atrium is severely dilated. Pulmonary Arteries: Mild pulmonary hypertension present. The estimated PASP is 45 mmHg. IVC/SVC: IVC diameter is greater than 21 mm and decreases less than 50% during inspiration; therefore the estimated right atrial pressure is elevated (~15 mmHg). IVC is dilated. Signed by: London Simental MD on 8/24/2022  5:14 PM    Result of 2 D echo is not good for her  She has biventricular failure with severe MR TR and DANIEL and mild pulmonary hypertension   LVEF 10-15%    Leadless pacemaker and Av node ablation will not be option for her  She will not be able to tolerate atrial fibrillation ablation  For atrial flutter ablation alone, she may tolerate MAC and it will help her short term until AFIB recurs  For AV node ablation, she will need biventricular pacemaker +/- ICD  She has left chest dialysis catheter so CRT pacemaker +/- ICD would be on right chest  There will be risk of infection over time    She will discuss with her family      Thank you for involving me in this patient's care and please call with further concerns or questions. Geovany Houston M.D.    Electrophysiology/Cardiology   [facility] [place]   [address] [facility], Sarkis Dodd [address] [place] [address] [facility] [address]   [phone]

## 2022-08-24 NOTE — PROGRESS NOTES
Notified by RN that patient's heart rate 120-140's. EKG done showing a-fib. Discussed with Dr Lucy Grullon. Will place patient on amiodarone gtts and heparin gtts. Cardiology following.

## 2022-08-24 NOTE — PROGRESS NOTES
Hospitalist Progress Note    NAME: Christina Cruz   :  1950   MRN:  [id number]   Room Number:  450/01  @ [facility]     Please note that this dictation was completed with [employer]omo, the computer voice recognition software. Quite often unanticipated grammatical, syntax, homophones, and other interpretive errors are inadvertently transcribed by the computer software. Please disregard these errors. Please excuse any errors that have escaped final proofreading.     Interim Hospital Summary: 66 y.o. female whom presented on 2022 with      Assessment / Plan:  Anticipated discharge date : > 48 H   Anticipated disposition : TBD  Barriers to discharge : arrhthymias     #Near syncope  w/ tachycardia POA resolved  #Wide-complex tachycardia POA resolved  #Atrial fibrillation with RVR not POA resolved  #History of paroxysmal atrial fibrillation  -EKG review independently with wide QRS, tachycardia on admission  -Wide-complex tachycardia resolved after hemodialysis session  -Patient developed A. fib with RVR 2022 resolved with amnio bolus  -TTE pending  -Assessed by cardiology      -Started on  metoprolol 25 mg twice daily, unable to tolerate d/t hypotension   -Metoprolol 5 mg IV every 3 as needed for A. fib with RVR for heart rates greater than 120  - received amio bolus, amio gtt on hold due to soft pressures     -EP consult placed, recommending MELCHOR and cardioversion along with short term AC  until watchman, will start [facility] for short duration once paracentesis  is done   -Keep K above 4 mag above 2      # Severe combine Biventricular heart failure    - TTE w/ EF 10-15 % , severe hypokinesis, TAPSE 1.0 cm severe TR and moderate MR     - assessed by cardiology, appreciate help   - Cardiology to discuss ICD placement and need for ablation   - Not able to tolerate BB d/t episodic hypotension   - guarded prognosis   - May consider palliative care     #ESRD hemodialysis  Friday  #Hyperkalemia POA resolving  #Anemia of chronic kidney disease  -Patient received hyperkalemia cocktail including IV insulin D50 calcium gluconate  -Nephrology consulted  -Continue HD per nephrology  -Luis Daniel Buitrago        #Type 2 diabetes mellitus  -A1c  4.9    - Lispro correctional scale, FSG AC HS  - Consistent carb diet, hypoglycemia protocol. #Pulmonary nodules per imaging  -Chest CT no specific small pulmonary nodules  -Outpatient follow-up with pulmonology for management    #Cirrhosis on CT scan  -Ultrasound liver ascites   -Paracentesis per IR ( 7.5 liters taken out)   - Albumin 25 gram for each 5 liters of fluid taken   -Outpatient follow-up with hepatology for further management  -Hold anticoagulation in the anticipation of paracentesis            Code status: Full  Prophylaxis: SCD's  Recommended Disposition: SNF/LTC     Subjective:     Chief Complaint / Reason for Physician Visital  RR overnight for atrial flutter discussed with RN events overnight. Review of Systems:  No fevers, chills, appetite change, cough, sputum production, shortness of breath, dyspnea on exertion, nausea, vomitting, diarrhea, constipation, chest pain, leg edema, abdominal pain, joint pain, rash, itching. Tolerating PT/OT. Tolerating diet. Objective:     VITALS:   Last 24hrs VS reviewed since prior progress note.  Most recent are:  Patient Vitals for the past 24 hrs:   Temp Pulse Resp BP SpO2   08/24/22 0836 -- 98 16 96/60 100 %   08/24/22 0816 -- 97 16 99/62 100 %   08/24/22 0755 98.5 °F (36.9 °C) 92 16 106/63 99 %   08/24/22 0745 -- 91 -- 102/68 100 %   08/24/22 0730 -- 91 -- 102/69 100 %   08/24/22 0715 -- 91 -- 92/64 100 %   08/24/22 0712 -- 91 -- 91/64 100 %   08/24/22 0645 -- 90 -- (!) 83/46 100 %   08/24/22 0615 -- 90 -- (!) 87/63 99 %   08/24/22 0603 -- 89 -- (!) 89/54 98 %   08/24/22 0601 -- 90 -- -- --   08/24/22 0600 -- 90 -- (!) 86/44 98 %   08/24/22 0552 -- 91 -- -- 99 %   08/24/22 0545 -- (!) 123 -- (!) 89/52 100 %   08/24/22 0537 -- (!) 126 -- (!) 103/52 100 %   08/24/22 0445 -- (!) 123 -- -- --   08/24/22 0440 -- (!) 136 -- (!) 94/51 98 %   08/24/22 0400 -- (!) 125 -- -- --   08/24/22 0300 97.6 °F (36.4 °C) (!) 125 16 (!) 110/56 99 %   08/24/22 0240 -- (!) 125 -- -- --   08/24/22 0200 -- 95 -- -- --   08/23/22 2300 97.9 °F (36.6 °C) 94 20 99/71 100 %   08/23/22 2200 -- 94 -- -- --   08/23/22 2000 -- 93 -- -- --   08/23/22 1911 -- -- -- -- 100 %   08/23/22 1901 97.6 °F (36.4 °C) 92 18 107/66 100 %   08/23/22 1800 -- 92 -- -- --   08/23/22 1600 -- 92 -- -- --   08/23/22 1557 98 °F (36.7 °C) 92 20 98/61 97 %   08/23/22 1400 -- 96 -- -- --   08/23/22 1324 -- -- -- -- 100 %   08/23/22 1200 -- 96 -- -- --   08/23/22 1130 97.5 °F (36.4 °C) 98 14 119/75 100 %   08/23/22 1040 97.8 °F (36.6 °C) (!) 130 16 102/70 --   08/23/22 1031 -- (!) 118 20 (!) 100/59 --   08/23/22 1016 -- (!) 132 15 (!) 107/56 --   08/23/22 1001 -- (!) 131 23 105/78 --   08/23/22 1000 -- (!) 132 -- -- --   08/23/22 0946 -- (!) 118 16 (!) 103/53 --   08/23/22 0930 -- (!) 131 15 (!) 106/46 --   08/23/22 0915 -- (!) 132 16 (!) 114/53 --   08/23/22 0904 -- (!) 133 20 (!) 106/44 --   08/23/22 0845 -- (!) 133 14 (!) 101/45 --       Intake/Output Summary (Last 24 hours) at 8/24/2022 0843  Last data filed at 8/23/2022 1130  Gross per 24 hour   Intake 240 ml   Output 763 ml   Net -523 ml        PHYSICAL EXAM:  General: WD, WN. Alert, cooperative, no acute distress    EENT:  EOMI. Anicteric sclerae. MMM  Resp:  CTA bilaterally, no wheezing or rales. No accessory muscle use  CV:  Irregular rhythm ,  normal S1/S2, no murmurs rubs gallops, No edema  GI:  Paracentesis tube in +Bowel sounds  Neurologic:  Alert and oriented X 3, normal speech,   Psych:   Good insight. Not anxious nor agitated  Skin:  No rashes.   No jaundice    Reviewed most current lab test results and cultures  YES  Reviewed most current radiology test results   YES  Review and summation of old records today    NO  Reviewed patient's current orders and MAR    YES  PMH/SH reviewed - no change compared to H&P  ________________________________________________________________________  Care Plan discussed with:    Comments   Patient x    Family      RN x    Care Manager     Consultant  x EP                     Multidiciplinary team rounds were held today with , nursing, pharmacist and clinical coordinator. Patient's plan of care was discussed; medications were reviewed and discharge planning was addressed. ________________________________________________________________________  Total NON critical care TIME:  35   Minutes    Total CRITICAL CARE TIME Spent:   Minutes non procedure based      Comments   >50% of visit spent in counseling and coordination of care x    ________________________________________________________________________  Mariposa Anne MD     Procedures: see electronic medical records for all procedures/Xrays and details which were not copied into this note but were reviewed prior to creation of Plan. LABS:  I reviewed today's most current labs and imaging studies.   Pertinent labs include:  Recent Labs     08/24/22  0730 08/24/22 0451 08/23/22  0055   WBC 6.5 7.0 5.5   HGB 11.6 12.0 12.0   HCT 38.8 40.1 40.3    168 128*     Recent Labs     08/24/22  0451 08/23/22  0055 08/22/22  1017    142 143   K 5.5* Sample is hemolyzed (hemoglobin is 5.0    112* 112*   CO2 25 23 26   GLU 89 100 123*   BUN 27* 39* 36*   CREA 6.25* 7.95* 7.82*   CA 9.1 8.7 8.8   MG  --  2.4 2.5*   PHOS  --  5.0*  --    ALB  --  2.8* 2.9*   TBILI  --  0.5 0.5   ALT  --  19 13       Signed: Mariposa Anne MD

## 2022-08-24 NOTE — PROGRESS NOTES
[facility]   [address] [facility], [address] [place] [address]  Phone: [phone]   [phone]    [email or web address]     Nephrology Progress Note    Patient Name : Zarina Lowry      : 1950     MRN : [id number]  Date of Admission : 2022  Date of Servive : 22    CC:  Follow up for ESRD       Assessment and Plan   ESRD- HD :  - Dialyzes at [facility] on MWF schedule   - HD today per schedule   - has Mardelle Hatchet for access     Cardiac Cirrhosis   RV failure, severe TR  Chronic HFrEF   - Paracentesis pending     Anemia in CKD   - resume CAROL ANN when Hb < 11 g  - hx of small bowel AVMs and needed RBC transfusions     Sec HPTH  - continue home meds     Hx of Hep C  DM-II  HTN   PAF       Interval History:  Seen and examined on HD  Doing well   Awaiting para since admission     Review of Systems: A comprehensive review of systems was negative except for that written in the HPI.     Current Medications:   Current Facility-Administered Medications   Medication Dose Route Frequency    [Held by provider] amiodarone (CORDARONE) 375 mg/250 mL D5W infusion  0.5-1 mg/min IntraVENous TITRATE    heparin (porcine) 1,000 unit/mL injection 4,000 Units  4,000 Units IntraVENous ONCE    [Held by provider] heparin 25,000 units in D5W 250 ml infusion  12-25 Units/kg/hr IntraVENous TITRATE    famotidine (PEPCID) tablet 20 mg  20 mg Oral DAILY PRN    metoprolol (LOPRESSOR) injection 5 mg  5 mg IntraVENous Q3H PRN    metoprolol tartrate (LOPRESSOR) tablet 25 mg  25 mg Oral Q12H    sodium zirconium cyclosilicate (LOKELMA) powder packet 10 g  10 g Oral TID WITH MEALS    albumin human 25% (BUMINATE) solution 12.5 g  12.5 g IntraVENous DIALYSIS PRN    traMADoL (ULTRAM) tablet 50 mg  50 mg Oral Q6H PRN    albuterol-ipratropium (DUO-NEB) 2.5 MG-0.5 MG/3 ML  3 mL Nebulization Q6H RT    ascorbic acid (vitamin C) (VITAMIN C) tablet 500 mg  500 mg Oral DAILY    cholecalciferol (VITAMIN D3) (1000 Units /25 mcg) tablet 2,000 Units  2,000 Units Oral DAILY    ferrous sulfate tablet 325 mg  325 mg Oral DAILY WITH BREAKFAST    lactulose (CHRONULAC) 10 gram/15 mL solution 30 mL  20 g Oral BID    sevelamer carbonate (RENVELA) tab 1,600 mg  1,600 mg Oral TID WITH MEALS    sodium chloride (NS) flush 5-40 mL  5-40 mL IntraVENous Q8H    sodium chloride (NS) flush 5-40 mL  5-40 mL IntraVENous PRN    acetaminophen (TYLENOL) tablet 650 mg  650 mg Oral Q6H PRN    Or    acetaminophen (TYLENOL) suppository 650 mg  650 mg Rectal Q6H PRN    polyethylene glycol (MIRALAX) packet 17 g  17 g Oral DAILY PRN    ondansetron (ZOFRAN ODT) tablet 4 mg  4 mg Oral Q8H PRN    Or    ondansetron (ZOFRAN) injection 4 mg  4 mg IntraVENous Q6H PRN    insulin lispro (HUMALOG) injection   SubCUTAneous TIDAC    glucose chewable tablet 16 g  4 Tablet Oral PRN    glucagon (GLUCAGEN) injection 1 mg  1 mg IntraMUSCular PRN    dextrose 10 % infusion 0-250 mL  0-250 mL IntraVENous PRN    diphenhydrAMINE (BENADRYL) capsule 25 mg  25 mg Oral Q6H PRN      Allergies   Allergen Reactions    Aleve [Naproxen Sodium] Itching, Swelling and Other (comments)    Amlodipine Rash, Hives and Itching    Aspirin Other (comments), Nausea Only and Unknown (comments)     GI bleed  GI bleeding      Heparin Unknown (comments)     bleeding      Ibuprofen Nausea and Vomiting    Metformin Other (comments)     swelling       Objective:  Vitals:    Vitals:    08/24/22 0836 08/24/22 0845 08/24/22 0900 08/24/22 0916   BP: 96/60 (!) 94/59 105/64 104/65   Pulse: 98 94 94 94   Resp: 16 16 16 16   Temp:       SpO2: 100% 100% 100% 100%   Weight:       Height:         Intake and Output:  No intake/output data recorded.   08/22 1901 - 08/24 0700  In: 480 [P.O.:480]  Out: 763     Physical Examination:        General: NAD,Conversant   Neck:  Supple, no mass  Resp:  Lungs CTA B/L, no wheezing , normal respiratory effort  CV:  RRR,  no murmur or rub, LE edema  GI:  Soft, DISTENDED, NT  Neurologic:  Non focal  Dialysis Access : [facility]- C/D/I    []    High complexity decision making was performed  []    Patient is at high-risk of decompensation with multiple organ involvement    Lab Data Personally Reviewed: I have reviewed all the pertinent labs, microbiology data and radiology studies during assessment. Recent Labs     08/24/22  0451 08/23/22  0055 08/22/22  1017    142 143   K 5.5* Sample is hemolyzed (hemoglobin is 5.0    112* 112*   CO2 25 23 26   GLU 89 100 123*   BUN 27* 39* 36*   CREA 6.25* 7.95* 7.82*   CA 9.1 8.7 8.8   MG  --  2.4 2.5*   PHOS  --  5.0*  --    ALB  --  2.8* 2.9*   ALT  --  19 13       Recent Labs     08/24/22  0730 08/24/22  0451 08/23/22  0055 08/22/22  1017   WBC 6.5 7.0 5.5 4.9   HGB 11.6 12.0 12.0 11.3*   HCT 38.8 40.1 40.3 38.0    168 128* 134*       No results found for: SDES  Lab Results   Component Value Date/Time    Culture result: MRSA NOT PRESENT 08/22/2022 10:58 PM    Culture result:  08/22/2022 10:58 PM     Screening of patient nares for MRSA is for surveillance purposes and, if positive, to facilitate isolation considerations in high risk settings. It is not intended for automatic decolonization interventions per se as regimens are not sufficiently effective to warrant routine use. Culture result: NO GROWTH 2 DAYS 08/22/2022 10:42 AM    Culture result: NO GROWTH 4 DAYS 11/19/2020 02:50 PM    Culture result: No growth 5 days 10/18/2020 07:09 AM             I have reviewed the flowsheets. Chart and Pertinent Notes have been reviewed. No change in PMH ,family and social history from Consult note.       Tricia Sifuentes [facility]

## 2022-08-24 NOTE — PROGRESS NOTES
0730: Bedside shift change report given to Liam Winkler and Jeanmarie Choudhary RNs (oncoming nurse) by QUINTIN Galindo (offgoing nurse). Report included the following information SBAR, MAR, and Recent Results. 1930: Bedside shift change report given to Chandler RN (oncoming nurse) by Jonathan Vance (offgoing nurse). Report included the following information SBAR, MAR, and Recent Results. Charting and patient care of [facility] by Annel Gagnon RN from 0730 to 5706 was supervised and reviewed by this RN.

## 2022-08-24 NOTE — PROGRESS NOTES
Result of 2 D echo is not good for her  She has biventricular failure with severe MR TR and DANIEL and mild pulmonary hypertension   LVEF 10-15%    Leadless pacemaker and Av node ablation will not be option for her  She will not be able to tolerate atrial fibrillation ablation  For atrial flutter ablation alone, she may tolerate MAC and it will help her short term until AFIB recurs  For AV node ablation, she will need biventricular pacemaker +/- ICD  She has left chest dialysis catheter so CRT pacemaker +/- ICD would be on right chest  There will be risk of infection over time    She will discuss with her family

## 2022-08-25 ENCOUNTER — TRANSCRIBE ORDER (OUTPATIENT)
Dept: CARDIAC REHAB | Age: 72
End: 2022-08-25

## 2022-08-25 DIAGNOSIS — I50.22 CHRONIC SYSTOLIC HEART FAILURE (HCC): Primary | ICD-10-CM

## 2022-08-25 LAB
ALBUMIN SERPL-MCNC: 2.8 G/DL (ref 3.5–5)
ALBUMIN/GLOB SERPL: 1.6 {RATIO} (ref 1.1–2.2)
ALP SERPL-CCNC: 59 U/L (ref 45–117)
ALT SERPL-CCNC: 12 U/L (ref 12–78)
ANION GAP SERPL CALC-SCNC: 6 MMOL/L (ref 5–15)
AST SERPL-CCNC: 26 U/L (ref 15–37)
ATRIAL RATE: 129 BPM
ATRIAL RATE: 140 BPM
ATRIAL RATE: 147 BPM
ATRIAL RATE: 176 BPM
ATRIAL RATE: 91 BPM
ATRIAL RATE: 94 BPM
BASOPHILS # BLD: 0.1 K/UL (ref 0–0.1)
BASOPHILS NFR BLD: 1 % (ref 0–1)
BILIRUB SERPL-MCNC: 0.5 MG/DL (ref 0.2–1)
BUN SERPL-MCNC: 19 MG/DL (ref 6–20)
BUN/CREAT SERPL: 4 (ref 12–20)
CALCIUM SERPL-MCNC: 7.9 MG/DL (ref 8.5–10.1)
CALCULATED P AXIS, ECG09: -71 DEGREES
CALCULATED P AXIS, ECG09: -84 DEGREES
CALCULATED R AXIS, ECG10: -46 DEGREES
CALCULATED R AXIS, ECG10: -46 DEGREES
CALCULATED R AXIS, ECG10: -51 DEGREES
CALCULATED R AXIS, ECG10: -59 DEGREES
CALCULATED R AXIS, ECG10: -73 DEGREES
CALCULATED R AXIS, ECG10: -73 DEGREES
CALCULATED R AXIS, ECG10: 141 DEGREES
CALCULATED T AXIS, ECG11: -38 DEGREES
CALCULATED T AXIS, ECG11: 112 DEGREES
CALCULATED T AXIS, ECG11: 118 DEGREES
CALCULATED T AXIS, ECG11: 131 DEGREES
CALCULATED T AXIS, ECG11: 133 DEGREES
CALCULATED T AXIS, ECG11: 137 DEGREES
CALCULATED T AXIS, ECG11: 147 DEGREES
CHLORIDE SERPL-SCNC: 108 MMOL/L (ref 97–108)
CO2 SERPL-SCNC: 26 MMOL/L (ref 21–32)
CREAT SERPL-MCNC: 5.24 MG/DL (ref 0.55–1.02)
DIAGNOSIS, 93000: NORMAL
DIFFERENTIAL METHOD BLD: ABNORMAL
EOSINOPHIL # BLD: 0.2 K/UL (ref 0–0.4)
EOSINOPHIL NFR BLD: 3 % (ref 0–7)
ERYTHROCYTE [DISTWIDTH] IN BLOOD BY AUTOMATED COUNT: 16.6 % (ref 11.5–14.5)
GLOBULIN SER CALC-MCNC: 1.7 G/DL (ref 2–4)
GLUCOSE BLD STRIP.AUTO-MCNC: 141 MG/DL (ref 65–117)
GLUCOSE BLD STRIP.AUTO-MCNC: 88 MG/DL (ref 65–117)
GLUCOSE BLD STRIP.AUTO-MCNC: 89 MG/DL (ref 65–117)
GLUCOSE BLD STRIP.AUTO-MCNC: 97 MG/DL (ref 65–117)
GLUCOSE SERPL-MCNC: 82 MG/DL (ref 65–100)
HCT VFR BLD AUTO: 33.7 % (ref 35–47)
HGB BLD-MCNC: 10.1 G/DL (ref 11.5–16)
IMM GRANULOCYTES # BLD AUTO: 0 K/UL (ref 0–0.04)
IMM GRANULOCYTES NFR BLD AUTO: 0 % (ref 0–0.5)
LYMPHOCYTES # BLD: 0.8 K/UL (ref 0.8–3.5)
LYMPHOCYTES NFR BLD: 14 % (ref 12–49)
MCH RBC QN AUTO: 28.4 PG (ref 26–34)
MCHC RBC AUTO-ENTMCNC: 30 G/DL (ref 30–36.5)
MCV RBC AUTO: 94.7 FL (ref 80–99)
MONOCYTES # BLD: 0.6 K/UL (ref 0–1)
MONOCYTES NFR BLD: 10 % (ref 5–13)
NEUTS SEG # BLD: 4.3 K/UL (ref 1.8–8)
NEUTS SEG NFR BLD: 72 % (ref 32–75)
NRBC # BLD: 0 K/UL (ref 0–0.01)
NRBC BLD-RTO: 0 PER 100 WBC
P-R INTERVAL, ECG05: 150 MS
P-R INTERVAL, ECG05: 154 MS
PLATELET # BLD AUTO: 132 K/UL (ref 150–400)
PMV BLD AUTO: 11.6 FL (ref 8.9–12.9)
POTASSIUM SERPL-SCNC: 4.6 MMOL/L (ref 3.5–5.1)
PROT SERPL-MCNC: 4.5 G/DL (ref 6.4–8.2)
Q-T INTERVAL, ECG07: 274 MS
Q-T INTERVAL, ECG07: 282 MS
Q-T INTERVAL, ECG07: 298 MS
Q-T INTERVAL, ECG07: 306 MS
Q-T INTERVAL, ECG07: 312 MS
Q-T INTERVAL, ECG07: 420 MS
Q-T INTERVAL, ECG07: 434 MS
QRS DURATION, ECG06: 136 MS
QRS DURATION, ECG06: 140 MS
QRS DURATION, ECG06: 142 MS
QRS DURATION, ECG06: 146 MS
QRS DURATION, ECG06: 146 MS
QRS DURATION, ECG06: 148 MS
QRS DURATION, ECG06: 148 MS
QTC CALCULATION (BEZET), ECG08: 407 MS
QTC CALCULATION (BEZET), ECG08: 454 MS
QTC CALCULATION (BEZET), ECG08: 457 MS
QTC CALCULATION (BEZET), ECG08: 480 MS
QTC CALCULATION (BEZET), ECG08: 485 MS
QTC CALCULATION (BEZET), ECG08: 516 MS
QTC CALCULATION (BEZET), ECG08: 542 MS
RBC # BLD AUTO: 3.56 M/UL (ref 3.8–5.2)
SERVICE CMNT-IMP: ABNORMAL
SERVICE CMNT-IMP: NORMAL
SODIUM SERPL-SCNC: 140 MMOL/L (ref 136–145)
VENTRICULAR RATE, ECG03: 125 BPM
VENTRICULAR RATE, ECG03: 129 BPM
VENTRICULAR RATE, ECG03: 140 BPM
VENTRICULAR RATE, ECG03: 151 BPM
VENTRICULAR RATE, ECG03: 185 BPM
VENTRICULAR RATE, ECG03: 91 BPM
VENTRICULAR RATE, ECG03: 94 BPM
WBC # BLD AUTO: 5.9 K/UL (ref 3.6–11)

## 2022-08-25 PROCEDURE — 74011250636 HC RX REV CODE- 250/636: Performed by: STUDENT IN AN ORGANIZED HEALTH CARE EDUCATION/TRAINING PROGRAM

## 2022-08-25 PROCEDURE — 99233 SBSQ HOSP IP/OBS HIGH 50: CPT | Performed by: SPECIALIST

## 2022-08-25 PROCEDURE — 82962 GLUCOSE BLOOD TEST: CPT

## 2022-08-25 PROCEDURE — 74011000250 HC RX REV CODE- 250: Performed by: FAMILY MEDICINE

## 2022-08-25 PROCEDURE — 74011636637 HC RX REV CODE- 636/637: Performed by: FAMILY MEDICINE

## 2022-08-25 PROCEDURE — 80053 COMPREHEN METABOLIC PANEL: CPT

## 2022-08-25 PROCEDURE — 97161 PT EVAL LOW COMPLEX 20 MIN: CPT

## 2022-08-25 PROCEDURE — 74011250637 HC RX REV CODE- 250/637: Performed by: SPECIALIST

## 2022-08-25 PROCEDURE — P9047 ALBUMIN (HUMAN), 25%, 50ML: HCPCS | Performed by: STUDENT IN AN ORGANIZED HEALTH CARE EDUCATION/TRAINING PROGRAM

## 2022-08-25 PROCEDURE — 74011250637 HC RX REV CODE- 250/637: Performed by: FAMILY MEDICINE

## 2022-08-25 PROCEDURE — 74011000250 HC RX REV CODE- 250: Performed by: STUDENT IN AN ORGANIZED HEALTH CARE EDUCATION/TRAINING PROGRAM

## 2022-08-25 PROCEDURE — 99223 1ST HOSP IP/OBS HIGH 75: CPT | Performed by: PHYSICAL MEDICINE & REHABILITATION

## 2022-08-25 PROCEDURE — 85025 COMPLETE CBC W/AUTO DIFF WBC: CPT

## 2022-08-25 PROCEDURE — 74011250637 HC RX REV CODE- 250/637: Performed by: STUDENT IN AN ORGANIZED HEALTH CARE EDUCATION/TRAINING PROGRAM

## 2022-08-25 PROCEDURE — 97530 THERAPEUTIC ACTIVITIES: CPT

## 2022-08-25 PROCEDURE — 36415 COLL VENOUS BLD VENIPUNCTURE: CPT

## 2022-08-25 PROCEDURE — 65660000001 HC RM ICU INTERMED STEPDOWN

## 2022-08-25 RX ORDER — AMIODARONE HYDROCHLORIDE 200 MG/1
200 TABLET ORAL 2 TIMES DAILY
Status: DISCONTINUED | OUTPATIENT
Start: 2022-08-25 | End: 2022-08-30

## 2022-08-25 RX ADMIN — LACTULOSE 30 ML: 20 SOLUTION ORAL at 17:05

## 2022-08-25 RX ADMIN — SODIUM CHLORIDE, PRESERVATIVE FREE 10 ML: 5 INJECTION INTRAVENOUS at 17:05

## 2022-08-25 RX ADMIN — SEVELAMER CARBONATE 1600 MG: 800 TABLET, FILM COATED ORAL at 12:03

## 2022-08-25 RX ADMIN — Medication 2000 UNITS: at 08:57

## 2022-08-25 RX ADMIN — AMIODARONE HYDROCHLORIDE 200 MG: 200 TABLET ORAL at 23:42

## 2022-08-25 RX ADMIN — TRAMADOL HYDROCHLORIDE 50 MG: 50 TABLET ORAL at 12:19

## 2022-08-25 RX ADMIN — Medication 2 UNITS: at 12:03

## 2022-08-25 RX ADMIN — SODIUM ZIRCONIUM CYCLOSILICATE 10 G: 10 POWDER, FOR SUSPENSION ORAL at 12:03

## 2022-08-25 RX ADMIN — TRAMADOL HYDROCHLORIDE 50 MG: 50 TABLET ORAL at 19:31

## 2022-08-25 RX ADMIN — LACTULOSE 30 ML: 20 SOLUTION ORAL at 08:57

## 2022-08-25 RX ADMIN — OXYCODONE HYDROCHLORIDE AND ACETAMINOPHEN 500 MG: 500 TABLET ORAL at 08:57

## 2022-08-25 RX ADMIN — METOPROLOL TARTRATE 5 MG: 5 INJECTION, SOLUTION INTRAVENOUS at 17:48

## 2022-08-25 RX ADMIN — SEVELAMER CARBONATE 1600 MG: 800 TABLET, FILM COATED ORAL at 08:57

## 2022-08-25 RX ADMIN — SEVELAMER CARBONATE 1600 MG: 800 TABLET, FILM COATED ORAL at 17:04

## 2022-08-25 RX ADMIN — SODIUM CHLORIDE, PRESERVATIVE FREE 10 ML: 5 INJECTION INTRAVENOUS at 05:25

## 2022-08-25 RX ADMIN — ALBUMIN (HUMAN) 25 G: 0.25 INJECTION, SOLUTION INTRAVENOUS at 12:10

## 2022-08-25 RX ADMIN — FERROUS SULFATE TAB 325 MG (65 MG ELEMENTAL FE) 325 MG: 325 (65 FE) TAB at 08:57

## 2022-08-25 RX ADMIN — SODIUM CHLORIDE, PRESERVATIVE FREE 10 ML: 5 INJECTION INTRAVENOUS at 21:05

## 2022-08-25 RX ADMIN — SODIUM ZIRCONIUM CYCLOSILICATE 10 G: 10 POWDER, FOR SUSPENSION ORAL at 08:57

## 2022-08-25 NOTE — PROGRESS NOTES
[facility] [facility]  Hospitalist Group                                                                                          Hospitalist Progress Note  Jairon Grimm MD  Answering service: [phone] OR [phone] from in house phone        Date of Service:  2022  NAME:  Daniel Amin  :  1950  MRN:  [id number]      Admission Summary:   Daniel Amin is a 66 y.o. female with past medical history of end-stage renal disease on hemodialysis Monday/Wednesday/Friday, type 2 diabetes mellitus, paroxysmal atrial fibrillation, chronic anemia, hypertension, congestive heart failure, cirrhosis who presented to the emergency department with lightheadedness and fall. Patient was taking a shower and suddenly she felt lightheadedness and went to the ground. Interval history / Subjective:   Pt feels good today, its her birthday. No shortness of breath or chest pain      Assessment & Plan:     Near syncope  w/ tachycardia POA resolved  Wide-complex tachycardia POA resolved  Atrial fibrillation with RVR not POA resolved  History of paroxysmal atrial fibrillation  -s/p amio bolus   -Assessed by cardiology  -Unable to tolerate beta-blocker due to hypotension  -Unable to tolerate amiodarone gtt. due to hypotension  -Cardiology following  -Possible AV ablation with pacer. Need to discuss with Dr. Equilla Cranker. If this is not planned likely will [facility]        Severe combine Biventricular heart failure    - TTE w/ EF 10-15 % , severe hypokinesis, TAPSE 1.0 cm severe TR and moderate MR   -Cardiology following  - Cardiology to discuss ICD placement and need for ablation   - Not able to tolerate BB d/t episodic hypotension   - guarded prognosis   - Consult palliative care, prognosis very guarded < 6 months.       ESRD hemodialysis   Hyperkalemia POA resolving  Anemia of chronic kidney disease  -Nephrology following  -Continue HD per nephrology  -Elnita Beat as needed      Type 2 diabetes mellitus -A1c 4.9  - Lispro correctional scale, FSG AC HS  - Consistent carb diet, hypoglycemia protocol. Pulmonary nodules per imaging  -Chest CT no specific small pulmonary nodules  -Outpatient follow-up with pulmonology for management     Cirrhosis on CT scan  - Ultrasound liver ascites   - s/p paracentesis   - Albumin 25 gram for each 5 liters of fluid taken   - Outpatient follow-up with hepatology for further management     Code status: FULL  Prophylaxis: SCDs  Care Plan discussed with: pt, RN and CM   Anticipated Disposition: Likely home tomorrow if not proceeding with ICD. Hospital Problems  Date Reviewed: 12/1/2020            Codes Class Noted POA    Tachycardia ICD-10-CM: R00.0  ICD-9-CM: 785.0  8/22/2022 Unknown             Review of Systems:   A comprehensive review of systems was negative except for that written in the HPI. Vital Signs:    Last 24hrs VS reviewed since prior progress note. Most recent are:  Visit Vitals  BP (!) 99/46 (BP 1 Location: Right upper arm, BP Patient Position: At rest;Reclining;Supine)   Pulse 99   Temp 98 °F (36.7 °C)   Resp 16   Ht 5' 3\" (1.6 m)   Wt 76 kg (167 lb 8.8 oz)   SpO2 96%   BMI 29.68 kg/m²         Intake/Output Summary (Last 24 hours) at 8/25/2022 1510  Last data filed at 8/25/2022 1200  Gross per 24 hour   Intake 800 ml   Output 2641 ml   Net -1841 ml        Physical Examination:     I had a face to face encounter with this patient and independently examined them on 8/25/2022 as outlined below:          Constitutional:  No acute distress, cooperative, pleasant    ENT:  Oral mucosa moist, oropharynx benign. Resp:  CTA bilaterally. No wheezing/rhonchi/rales. No accessory muscle use. CV:  Regular rhythm, normal rate, no murmurs, gallops, rubs. Tunneled dialysis line in place     GI:  Soft, non distended, non tender.  normoactive bowel sounds, no hepatosplenomegaly     Musculoskeletal:  No edema, warm, 2+ pulses throughout    Neurologic:  Moves all extremities. AAOx3, CN II-XII reviewed            Data Review:    Review and/or order of clinical lab test  Review and/or order of tests in the radiology section of CPT  Review and/or order of tests in the medicine section of [facility]      Labs:     Recent Labs     08/25/22  0541 08/24/22  0730   WBC 5.9 6.5   HGB 10.1* 11.6   HCT 33.7* 38.8   * 170     Recent Labs     08/25/22  0537 08/24/22  0451 08/23/22  0055    141 142   K 4.6 5.5* Sample is hemolyzed (hemoglobin is    108 112*   CO2 26 25 23   BUN 19 27* 39*   CREA 5.24* 6.25* 7.95*   GLU 82 89 100   CA 7.9* 9.1 8.7   MG  --   --  2.4   PHOS  --   --  5.0*     Recent Labs     08/25/22  0537 08/23/22  0055   ALT 12 19   AP 59 90   TBILI 0.5 0.5   TP 4.5* 5.9*   ALB 2.8* 2.8*   GLOB 1.7* 3.1     No results for input(s): INR, PTP, APTT, INREXT in the last 72 hours. No results for input(s): FE, TIBC, PSAT, FERR in the last 72 hours. Lab Results   Component Value Date/Time    Folate 16.4 10/19/2020 09:34 AM      No results for input(s): PH, PCO2, PO2 in the last 72 hours. No results for input(s): CPK, CKNDX, TROIQ in the last 72 hours.     No lab exists for component: CPKMB  Lab Results   Component Value Date/Time    Cholesterol, total 114 11/25/2020 03:21 AM    HDL Cholesterol 59 11/25/2020 03:21 AM    LDL, calculated 38 11/25/2020 03:21 AM    Triglyceride 85 11/25/2020 03:21 AM    CHOL/HDL Ratio 1.9 11/25/2020 03:21 AM     Lab Results   Component Value Date/Time    Glucose (POC) 141 (H) 08/25/2022 10:58 AM    Glucose (POC) 89 08/25/2022 06:48 AM    Glucose (POC) 126 (H) 08/24/2022 09:48 PM    Glucose (POC) 132 (H) 08/24/2022 04:42 PM    Glucose (POC) 74 08/24/2022 01:16 PM     No results found for: COLOR, APPRN, SPGRU, REFSG, VERENA, PROTU, GLUCU, KETU, BILU, UROU, TIBURCIO, LEUKU, GLUKE, EPSU, BACTU, WBCU, RBCU, CASTS, UCRY      Medications Reviewed:     Current Facility-Administered Medications Medication Dose Route Frequency    sodium chloride (OCEAN) 0.65 % nasal squeeze bottle 2 Spray  2 Spray Both Nostrils PRN    [Held by provider] amiodarone (CORDARONE) 375 mg/250 mL D5W infusion  0.5-1 mg/min IntraVENous TITRATE    [Held by provider] heparin 25,000 units in D5W 250 ml infusion  12-25 Units/kg/hr IntraVENous TITRATE    famotidine (PEPCID) tablet 20 mg  20 mg Oral DAILY PRN    metoprolol (LOPRESSOR) injection 5 mg  5 mg IntraVENous Q3H PRN    albumin human 25% (BUMINATE) solution 25 g  25 g IntraVENous Q6H PRN    ipratropium (ATROVENT) 0.02 % nebulizer solution 0.5 mg  0.5 mg Nebulization Q4H PRN    [Held by provider] metoprolol tartrate (LOPRESSOR) tablet 25 mg  25 mg Oral Q12H    sodium zirconium cyclosilicate (LOKELMA) powder packet 10 g  10 g Oral TID WITH MEALS    albumin human 25% (BUMINATE) solution 12.5 g  12.5 g IntraVENous DIALYSIS PRN    traMADoL (ULTRAM) tablet 50 mg  50 mg Oral Q6H PRN    ascorbic acid (vitamin C) (VITAMIN C) tablet 500 mg  500 mg Oral DAILY    cholecalciferol (VITAMIN D3) (1000 Units /25 mcg) tablet 2,000 Units  2,000 Units Oral DAILY    ferrous sulfate tablet 325 mg  325 mg Oral DAILY WITH BREAKFAST    lactulose (CHRONULAC) 10 gram/15 mL solution 30 mL  20 g Oral BID    sevelamer carbonate (RENVELA) tab 1,600 mg  1,600 mg Oral TID WITH MEALS    sodium chloride (NS) flush 5-40 mL  5-40 mL IntraVENous Q8H    sodium chloride (NS) flush 5-40 mL  5-40 mL IntraVENous PRN    acetaminophen (TYLENOL) tablet 650 mg  650 mg Oral Q6H PRN    Or    acetaminophen (TYLENOL) suppository 650 mg  650 mg Rectal Q6H PRN    polyethylene glycol (MIRALAX) packet 17 g  17 g Oral DAILY PRN    ondansetron (ZOFRAN ODT) tablet 4 mg  4 mg Oral Q8H PRN    Or    ondansetron (ZOFRAN) injection 4 mg  4 mg IntraVENous Q6H PRN    insulin lispro (HUMALOG) injection   SubCUTAneous TIDAC    glucose chewable tablet 16 g  4 Tablet Oral PRN    glucagon (GLUCAGEN) injection 1 mg  1 mg IntraMUSCular PRN dextrose 10 % infusion 0-250 mL  0-250 mL IntraVENous PRN    diphenhydrAMINE (BENADRYL) capsule 25 mg  25 mg Oral Q6H PRN     ______________________________________________________________________  EXPECTED LENGTH OF STAY: 3d 19h  ACTUAL LENGTH OF STAY:          3                 Titus Singer MD

## 2022-08-25 NOTE — PROGRESS NOTES
[facility]   [address] [facility], [address] [place] [address]  Phone: [phone]   [phone]    [email or web address]     Nephrology Progress Note    Patient Name : Alexander Branham      : 1950     MRN : [id number]  Date of Admission : 2022  Date of Servive : 22    CC:  Follow up for ESRD       Assessment and Plan   ESRD- HD :  - Dialyzes at [address] [place] [address] on MWF schedule   - has Garfield Fletcher for access   -Next HD tomorrow  -Dialysis has been complicated by chronic hypotension limiting UF    Cardiac Cirrhosis   RV failure, severe TR  PAF  Chronic HFrEF   Chronic hypotension  -S/p paracentesis   -EP following    Anemia in CKD   - resume CAROL ANN when Hb < 11 g  - hx of small bowel AVMs and needed RBC transfusions     Sec HPTH  - continue home meds     Hx of Hep C  DM-II  HTN          Interval History:  And examined. She tolerated dialysis yesterday  She underwent paracentesis and had 8 L drained  EP consult and plans noted  Review worse after paracentesis    Review of Systems: A comprehensive review of systems was negative except for that written in the HPI.     Current Medications:   Current Facility-Administered Medications   Medication Dose Route Frequency    [Held by provider] amiodarone (CORDARONE) 375 mg/250 mL D5W infusion  0.5-1 mg/min IntraVENous TITRATE    [Held by provider] heparin 25,000 units in D5W 250 ml infusion  12-25 Units/kg/hr IntraVENous TITRATE    famotidine (PEPCID) tablet 20 mg  20 mg Oral DAILY PRN    metoprolol (LOPRESSOR) injection 5 mg  5 mg IntraVENous Q3H PRN    albumin human 25% (BUMINATE) solution 25 g  25 g IntraVENous Q6H PRN    ipratropium (ATROVENT) 0.02 % nebulizer solution 0.5 mg  0.5 mg Nebulization Q4H PRN    [Held by provider] metoprolol tartrate (LOPRESSOR) tablet 25 mg  25 mg Oral Q12H    sodium zirconium cyclosilicate (LOKELMA) powder packet 10 g  10 g Oral TID WITH MEALS    albumin human 25% (BUMINATE) solution 12.5 g  12.5 g IntraVENous DIALYSIS PRN    traMADoL (ULTRAM) tablet 50 mg  50 mg Oral Q6H PRN    ascorbic acid (vitamin C) (VITAMIN C) tablet 500 mg  500 mg Oral DAILY    cholecalciferol (VITAMIN D3) (1000 Units /25 mcg) tablet 2,000 Units  2,000 Units Oral DAILY    ferrous sulfate tablet 325 mg  325 mg Oral DAILY WITH BREAKFAST    lactulose (CHRONULAC) 10 gram/15 mL solution 30 mL  20 g Oral BID    sevelamer carbonate (RENVELA) tab 1,600 mg  1,600 mg Oral TID WITH MEALS    sodium chloride (NS) flush 5-40 mL  5-40 mL IntraVENous Q8H    sodium chloride (NS) flush 5-40 mL  5-40 mL IntraVENous PRN    acetaminophen (TYLENOL) tablet 650 mg  650 mg Oral Q6H PRN    Or    acetaminophen (TYLENOL) suppository 650 mg  650 mg Rectal Q6H PRN    polyethylene glycol (MIRALAX) packet 17 g  17 g Oral DAILY PRN    ondansetron (ZOFRAN ODT) tablet 4 mg  4 mg Oral Q8H PRN    Or    ondansetron (ZOFRAN) injection 4 mg  4 mg IntraVENous Q6H PRN    insulin lispro (HUMALOG) injection   SubCUTAneous TIDAC    glucose chewable tablet 16 g  4 Tablet Oral PRN    glucagon (GLUCAGEN) injection 1 mg  1 mg IntraMUSCular PRN    dextrose 10 % infusion 0-250 mL  0-250 mL IntraVENous PRN    diphenhydrAMINE (BENADRYL) capsule 25 mg  25 mg Oral Q6H PRN      Allergies   Allergen Reactions    Aleve [Naproxen Sodium] Itching, Swelling and Other (comments)    Amlodipine Rash, Hives and Itching    Aspirin Other (comments), Nausea Only and Unknown (comments)     GI bleed  GI bleeding      Heparin Unknown (comments)     bleeding      Ibuprofen Nausea and Vomiting    Metformin Other (comments)     swelling       Objective:  Vitals:    Vitals:    08/25/22 0600 08/25/22 0648 08/25/22 0903 08/25/22 1000   BP:  (!) 100/52     Pulse: 96 96 97 97   Resp:  17     Temp:  97.3 °F (36.3 °C)     SpO2:  96% 96%    Weight:       Height:         Intake and Output:  08/25 0701 - 08/25 1900  In: 250 [P.O.:250]  Out: 200 [Drains:200]  08/23 1901 - 08/25 0700  In: 540 [P.O.:340; I.V.:200]  Out: 8590 [Drains:6590]    Physical Examination:        General: NAD,Conversant   Neck:  Supple, no mass  Resp:  Lungs CTA B/L, no wheezing , normal respiratory effort  CV:  RRR,  no murmur or rub, LE edema  GI:  Soft, nontender  Neurologic:  Non focal  Dialysis Access : [facility]- C/D/I    []    High complexity decision making was performed  []    Patient is at high-risk of decompensation with multiple organ involvement    Lab Data Personally Reviewed: I have reviewed all the pertinent labs, microbiology data and radiology studies during assessment. Recent Labs     08/25/22  0537 08/24/22  0451 08/23/22  0055    141 142   K 4.6 5.5* Sample is hemolyzed (hemoglobin is    108 112*   CO2 26 25 23   GLU 82 89 100   BUN 19 27* 39*   CREA 5.24* 6.25* 7.95*   CA 7.9* 9.1 8.7   MG  --   --  2.4   PHOS  --   --  5.0*   ALB 2.8*  --  2.8*   ALT 12  --  19       Recent Labs     08/25/22  0541 08/24/22  0730 08/24/22  0451 08/23/22  0055   WBC 5.9 6.5 7.0 5.5   HGB 10.1* 11.6 12.0 12.0   HCT 33.7* 38.8 40.1 40.3   * 170 168 128*       No results found for: SDES  Lab Results   Component Value Date/Time    Culture result: PENDING 08/24/2022 10:42 AM    Culture result: MRSA NOT PRESENT 08/22/2022 10:58 PM    Culture result:  08/22/2022 10:58 PM     Screening of patient nares for MRSA is for surveillance purposes and, if positive, to facilitate isolation considerations in high risk settings. It is not intended for automatic decolonization interventions per se as regimens are not sufficiently effective to warrant routine use. Culture result: NO GROWTH 3 DAYS 08/22/2022 10:42 AM    Culture result: NO GROWTH 4 DAYS 11/19/2020 02:50 PM             I have reviewed the flowsheets. Chart and Pertinent Notes have been reviewed. No change in PMH ,family and social history from Consult note.       Tricia Pittman [facility]

## 2022-08-25 NOTE — PROGRESS NOTES
2000  Verbal bedside report given to [facility], RN oncoming nurse by [place]. Tami Bell RN off-going nurse. Report included current pt status and condition, recent results, hx of present illness, heart rate and rhythm (Aflutter w/BBB), and respiratory status. 1915  Breakthrough drainage from site, changed dressing. 1745  Pt heart rate sustained 130s, gave PRN metoprolol, came down to 98 BPM.    1530  Abdominal drain came out, site still draining, informed MD and placed dressing. 0915  Pt awake sitting side of bed eating breakfast.    730  Verbal bedside report received from [facility], RN off-going nurse by [place]. QUINTIN Maier oncoming nurse. Report included current pt status and condition, recent results, hx of present illness, heart rate and rhythm (Aflutter w/BBB), and respiratory status. Greeted pt and enquired about present needs and goals for the day.

## 2022-08-25 NOTE — CONSULTS
Palliative Medicine Consult  [place]: [phone] ([phone])    Patient Name: Laura Julian  YOB: 1950    Date of Initial Consult: 8/25/22  Reason for Consult: Care decisions/end stage disease   Requesting Provider: Jose Miguel Mcelroy   Primary Care Physician: Zion Babcock MD     SUMMARY:   Laura Julian is a 67 y.o. with a past history of ESRD on HD MWF, a fib, anemia, DM, CHF, hx AVM GIB who was admitted on 8/22/2022 from home via EMS with lightheadedness and abdominal distension- had a paracentesis for cardiac cirrhosis 2 weeks before admission. CT showing mult small pulmonary nodules. Normally follows at the [facility]. Echo 8/24/22 w/ EF 10-15%, biventricular failure, severe MR and TR. Cardiology following and recommends pacemaker and AV node ablation. Had 8L paracentesis 8/24. Current medical issues leading to Palliative Medicine involvement include: care decisions/ end stage disease. Social: Pt has 8 children, most live in [place] [address]. She gets medical care at the [facility] including her dialysis. She has an AMD on file there- states her son Emilee Cruz and dtrs Nathaniel and Carlena Micah are her mPOAs. She lives alone at [facility] and can do all ADLs. She is also able to cook for herself. PALLIATIVE DIAGNOSES:   Lightheadedness   Fatigue   Hypotension -has limited ultrafiltration with dialysis   Goals of care       PLAN:   Meet w/ pt who is alert/oriented and engaging. She is in good spirits today- it is her 72rd birthday and she is getting a lot of calls from her family. She is open to having palliative medicine involved in her care as we know more about next steps- she is aware that cardiology/EP recommending pacemaker and AV ablation and she is open to having these procedures done to help her feel better and improve function. She will put one of her children on the phone when they cardiac team rounds on her today, in order to learn more specifics of the procedures.    Goals of care: Goals are clear for full restorative measures- she is able to live independently, makes it to dialysis 3x a week at the [place], and has friends and family who are supportive. However she has noticed that over the 9 years that she has been getting dialysis, she has slowed down. She used to be active the days following dialysis, going out shopping and more. Now she needs to rest more during those days. We talk about the multiple organ systems that are involved and concern about how she might do in the future. Full code status upon admission, will revisit this. Advance care planning: Pt w/ AMD on file at the [facility]. If needed, we can get a copy. Following w/ you.    Initial consult note routed to primary continuity provider and/or primary health care team members  Communicated plan of care with: Palliative IDT, [address] Team incl Dr oTmás Seals and student RN     GOALS OF CARE / TREATMENT PREFERENCES:     GOALS OF CARE:  Patient/Health Care Proxy Stated Goals: Prolong life    TREATMENT PREFERENCES:   Code Status: Full Code    Patient and family's personal goals include: not getting light headed     Important upcoming milestones or family events: her birthday is today     The patient identifies the following as important for living well: being at home      Advance Care Planning:  [] The [place] [address] Team has updated the ACP Navigator with [address] and Patient Capacity      Advance Care Planning 11/17/2020   Patient's Healthcare Decision Maker is: -   Primary Decision Maker Name -   Primary Decision Maker Phone Number -   Primary Decision Maker Relationship to Patient -   Secondary Decision [facility] Name -   Secondary Decision [facility] Phone Number -   Secondary Decision Maker Relationship to Patient -   Confirm Advance Directive None   Patient Would Like to Complete Advance Directive -   Does the patient have other document types -       Medical Interventions: Full interventions       Other:    As far as possible, the palliative care team has discussed with patient / health care proxy about goals of care / treatment preferences for patient. HISTORY:     History obtained from: pt, chart, staff    CHIEF COMPLAINT: \"It is my birthday today\"    HPI/SUBJECTIVE:    The patient is:   [x] Verbal and participatory  [] Non-participatory due to: While lying down, pt feels good and has no complaints but still getting lightheaded when sits up. No pain. Clinical Pain Assessment (nonverbal scale for severity on nonverbal patients):   Clinical Pain Assessment  Severity: 0          Duration: for how long has pt been experiencing pain (e.g., 2 days, 1 month, years)  Frequency: how often pain is an issue (e.g., several times per day, once every few days, constant)     FUNCTIONAL ASSESSMENT:     Palliative Performance Scale (PPS):  PPS: 60       PSYCHOSOCIAL/SPIRITUAL SCREENING:     Palliative IDT has assessed this patient for cultural preferences / practices and a referral made as appropriate to needs (Cultural Services, Patient Advocacy, Ethics, etc.)    Any spiritual / Mandaeism concerns:  [] Yes /  [x] No   If \"Yes\" to discuss with pastoral care during IDT     Does caregiver feel burdened by caring for their loved one:   [] Yes /  [x] No /  [] No Caregiver Present/Available [] No Caregiver [] Pt Lives at Facility  If \"Yes\" to discuss with social work during IDT    Anticipatory grief assessment:   [x] Normal  / [] Maladaptive     If \"Maladaptive\" to discuss with social work during IDT    ESAS Anxiety: Anxiety: 0    ESAS Depression: Depression: 0        REVIEW OF SYSTEMS:     Positive and pertinent negative findings in ROS are noted above in HPI. The following systems were [x] reviewed / [] unable to be reviewed as noted in HPI  Other findings are noted below.   Systems: constitutional, ears/nose/mouth/throat, respiratory, gastrointestinal, genitourinary, musculoskeletal, integumentary, neurologic, psychiatric, endocrine. Positive findings noted below. Modified ESAS Completed by: provider   Fatigue: 4 Drowsiness: 0   Depression: 0 Pain: 0   Anxiety: 0 Nausea: 0   Anorexia: 0 Dyspnea: 0           Stool Occurrence(s): 0        PHYSICAL EXAM:     From RN flowsheet:  Wt Readings from Last 3 Encounters:   08/24/22 167 lb 8.8 oz (76 kg)   12/01/20 162 lb 4.1 oz (73.6 kg)   11/25/20 169 lb 5 oz (76.8 kg)     Blood pressure (!) 100/52, pulse 97, temperature 97.3 °F (36.3 °C), resp. rate 17, height 5' 3\" (1.6 m), weight 167 lb 8.8 oz (76 kg), SpO2 96 %.     Pain Scale 1: Numeric (0 - 10)  Pain Intensity 1: 0  Pain Onset 1: chronic  Pain Location 1: Back  Pain Orientation 1: Posterior  Pain Description 1: Aching  Pain Intervention(s) 1: Declines, Distraction  Last bowel movement, if known:     Constitutional: awake, alert, oriented, pleasant and engaging   Eyes: pupils equal, anicteric  ENMT: no nasal discharge, moist mucous membranes  Respiratory: breathing not labored  Musculoskeletal: no deformity, no tenderness to palpation  Skin: warm, dry  Neurologic: following commands, moving all extremities         HISTORY:     Active Problems:    Tachycardia (8/22/2022)    Past Medical History:   Diagnosis Date    Arthritis     Asthma     Chronic kidney disease     Chronic pain     Cirrhosis ([facility] 12/17/2017    Diabetes ([facility] diet controlled    GERD (gastroesophageal reflux disease)     Hypertension     Paroxysmal atrial fibrillation ([facility] 10/6/2020      Past Surgical History:   Procedure Laterality Date    COLONOSCOPY N/A 1/12/2018    COLONOSCOPY performed by Joe Atwood MD at [facility] ENDOSCOPY    COLONOSCOPY N/A 3/19/2018    COLONOSCOPY performed by Joe Atwood MD at [facility] ENDOSCOPY    HX GYN  c section    HX VASCULAR ACCESS      dialysis     IR BX TRANSCATHETER  11/24/2020    IR PARACENTESIS ABD W IMAGE  10/22/2020      Family History   Family history unknown: Yes      History reviewed, no pertinent family history.   Social History     Tobacco Use    Smoking status: Never    Smokeless tobacco: Never   Substance Use Topics    Alcohol use: No     Allergies   Allergen Reactions    Aleve [Naproxen Sodium] Itching, Swelling and Other (comments)    Amlodipine Rash, Hives and Itching    Aspirin Other (comments), Nausea Only and Unknown (comments)     GI bleed  GI bleeding      Heparin Unknown (comments)     bleeding      Ibuprofen Nausea and Vomiting    Metformin Other (comments)     swelling      Current Facility-Administered Medications   Medication Dose Route Frequency    [Held by provider] amiodarone (CORDARONE) 375 mg/250 mL D5W infusion  0.5-1 mg/min IntraVENous TITRATE    [Held by provider] heparin 25,000 units in D5W 250 ml infusion  12-25 Units/kg/hr IntraVENous TITRATE    famotidine (PEPCID) tablet 20 mg  20 mg Oral DAILY PRN    metoprolol (LOPRESSOR) injection 5 mg  5 mg IntraVENous Q3H PRN    albumin human 25% (BUMINATE) solution 25 g  25 g IntraVENous Q6H PRN    ipratropium (ATROVENT) 0.02 % nebulizer solution 0.5 mg  0.5 mg Nebulization Q4H PRN    [Held by provider] metoprolol tartrate (LOPRESSOR) tablet 25 mg  25 mg Oral Q12H    sodium zirconium cyclosilicate (LOKELMA) powder packet 10 g  10 g Oral TID WITH MEALS    albumin human 25% (BUMINATE) solution 12.5 g  12.5 g IntraVENous DIALYSIS PRN    traMADoL (ULTRAM) tablet 50 mg  50 mg Oral Q6H PRN    ascorbic acid (vitamin C) (VITAMIN C) tablet 500 mg  500 mg Oral DAILY    cholecalciferol (VITAMIN D3) (1000 Units /25 mcg) tablet 2,000 Units  2,000 Units Oral DAILY    ferrous sulfate tablet 325 mg  325 mg Oral DAILY WITH BREAKFAST    lactulose (CHRONULAC) 10 gram/15 mL solution 30 mL  20 g Oral BID    sevelamer carbonate (RENVELA) tab 1,600 mg  1,600 mg Oral TID WITH MEALS    sodium chloride (NS) flush 5-40 mL  5-40 mL IntraVENous Q8H    sodium chloride (NS) flush 5-40 mL  5-40 mL IntraVENous PRN    acetaminophen (TYLENOL) tablet 650 mg  650 mg Oral Q6H PRN    Or    acetaminophen (TYLENOL) suppository 650 mg  650 mg Rectal Q6H PRN    polyethylene glycol (MIRALAX) packet 17 g  17 g Oral DAILY PRN    ondansetron (ZOFRAN ODT) tablet 4 mg  4 mg Oral Q8H PRN    Or    ondansetron (ZOFRAN) injection 4 mg  4 mg IntraVENous Q6H PRN    insulin lispro (HUMALOG) injection   SubCUTAneous TIDAC    glucose chewable tablet 16 g  4 Tablet Oral PRN    glucagon (GLUCAGEN) injection 1 mg  1 mg IntraMUSCular PRN    dextrose 10 % infusion 0-250 mL  0-250 mL IntraVENous PRN    diphenhydrAMINE (BENADRYL) capsule 25 mg  25 mg Oral Q6H PRN          LAB AND IMAGING FINDINGS:     Lab Results   Component Value Date/Time    WBC 5.9 08/25/2022 05:41 AM    HGB 10.1 (L) 08/25/2022 05:41 AM    PLATELET 735 (L) 97/54/9385 05:41 AM     Lab Results   Component Value Date/Time    Sodium 140 08/25/2022 05:37 AM    Potassium 4.6 08/25/2022 05:37 AM    Chloride 108 08/25/2022 05:37 AM    CO2 26 08/25/2022 05:37 AM    BUN 19 08/25/2022 05:37 AM    Creatinine 5.24 (H) 08/25/2022 05:37 AM    Calcium 7.9 (L) 08/25/2022 05:37 AM    Magnesium 2.4 08/23/2022 12:55 AM    Phosphorus 5.0 (H) 08/23/2022 12:55 AM      Lab Results   Component Value Date/Time    Alk.  phosphatase 59 08/25/2022 05:37 AM    Protein, total 4.5 (L) 08/25/2022 05:37 AM    Albumin 2.8 (L) 08/25/2022 05:37 AM    Globulin 1.7 (L) 08/25/2022 05:37 AM     Lab Results   Component Value Date/Time    INR 1.0 11/25/2020 03:21 AM    Prothrombin time 10.7 11/25/2020 03:21 AM    aPTT 24.9 11/25/2020 03:21 AM    aPTT 28.6 09/25/2019 07:30 PM      Lab Results   Component Value Date/Time    Iron 43 11/27/2020 02:28 AM    TIBC 310 11/27/2020 02:28 AM    Iron % saturation 14 (L) 11/27/2020 02:28 AM    Ferritin 37 11/27/2020 02:28 AM      No results found for: PH, PCO2, PO2  No components found for: [place]   Lab Results   Component Value Date/Time    CK 78 09/25/2019 07:30 PM    CK - MB 3.5 09/25/2019 07:30 PM                Total time:   Counseling / coordination time, spent as noted above:   > 50% counseling / coordination?:     Prolonged service was provided for  []30 min   []75 min in face to face time in the presence of the patient, spent as noted above. Time Start:   Time End:   Note: this can only be billed with 74982 (initial) or 51101 (follow up). If multiple start / stop times, list each separately.

## 2022-08-25 NOTE — PROGRESS NOTES
Transitions of Care Plan  RUR: 15% - moderate  Clinical Update: EP Cardiology - possible ablation  Consults: EP Cardiology; Nephrology  Baseline: independent w/ cane; resides alone  Barrier(s) to Discharge: medical  Disposition: anticipate SNF - will follow therapy orders  Estimated Discharge Date: 2+ days    Clinical update per chart review and/or patient discussed during Interdisciplinary Rounds:    Patient is not medically stable for discharge due to ongoing medical needs. CM continues to follow treatment plan for medical progress and disposition needs.     Baseline -   OP HD at [facility] MWF  Transports w/ [facility] p: [phone]  Resides at [address] w/ cane; owns wheelchair  Family resides in [facility]    Disposition:  Anticipate SNF - will follow therapy recommendations and treatment team plans    Rosalio Wang, [address]  Available via [facility] or

## 2022-08-25 NOTE — PROGRESS NOTES
[facility]  Cardiology Care Note                  []Initial visit     [x]Established visit     Patient Name: Angela MONTIEL:[id number]  Primary Cardiologist: none  Consulting Cardiologist: Ian Russell MD   Reason for consult:wide complex tachycardia    HPI: previously   Loren Hodge is a 66 y.o. female   with ESRD on HD, admitted yesterday am via ER with dizziness, too weak to get in the shower. EMS saw atrial fibrillation at 150  This was second episode of dizziness in 5 days. Pt reports some worsening of her usual mild SARKAR and edema, denies chest pain, Labs notable for initial troponin of 74, Mag 2.5 Hgb 11.3 and admit for sepsis. K was 5.0 on admit and 6.6 on POC next morning (today) at 1255 am when pt had wide complex tachy  CXR cardiomegaly  CT chest   Prev EF 48% by echo 11/2020 with 3+ TR,HTN, DM,PAF,anemia , liver cirrhosis  ESRD on HD for 11 years, RV failure  EKG 8/22/22 958 AM, atrial fibrillation at 140 with aberrancy, left axis, IVCD  EKG 8/22/22 2323h- atrial fibrillation at 129 , variable aberrancy vs PVCs  EKG 8/23/22 0045h - regular wide complex tachy at 185 , NSVT vs SVT with aberrancy  EKG 8/23/22 1255 h atrial fibrillation at 94 with LBBB      SUBJECTIVE:    Tonight little low bp at 80s , had paracentesis, getting albumin  Less sob, no cp, this am had afib at 120-140, I consulted EP  and pt had been placed on amio drip  Got hemodialysis but BP drop so had 500 cc then paracentesis  Echo shows severe RV failure and drop in EF  08/22/22    ECHO ADULT COMPLETE 08/24/2022 8/24/2022    Interpretation Summary  Formatting of this result is different from the original.      Left Ventricle: Severely reduced left ventricular systolic function with a visually estimated EF of 10 -15%. Left ventricle size is normal. Mass index 2D is 234.3 g/m2.  Findings consistent with severe asymmetric hypertrophy. Severe global hypokinesis present. Right Ventricle: Severely reduced systolic function. TAPSE is abnormal. TAPSE is 1.0 cm. Aortic Valve: Severe sclerosis of the aortic valve cusp. No stenosis. Mitral Valve: Findings consistent with myxomatous degeneration and decreased excusrion. Moderately calcified leaflet, at the anterior and posterior leaflets. Mild annular calcification of the mitral valve. Moderate regurgitation with an eccentrically directed jet and may underestimate severity. Tricuspid Valve: Mal-coaptation of tricuspid valve. Severe regurgitation with an eccentrically directed jet and may underestimate severity. Right Atrium: Right atrium is severely dilated. Pulmonary Arteries: Mild pulmonary hypertension present. The estimated PASP is 45 mmHg. IVC/SVC: IVC diameter is greater than 21 mm and decreases less than 50% during inspiration; therefore the estimated right atrial pressure is elevated (~15 mmHg). IVC is dilated.     Signed by: Ian Russell MD on 8/24/2022  5:14 PM       Assessment and Plan     1) Wide complex tachy  Had AFib with RVR then very regular tachy with change in QRS morphology, either SVT or VT with K at 6.6 noted  Stable rhythm now, appeared to be elyte related but suspect CMY  Monitor, if recurs or if EF low then consult EP-done  Off amio as bp drop post paracentesis  2) Chronic systolic, diastolic CHF with cardiorenal syndrome  Decompensated continue HD  Now EF is very low , at 10-15%  with severe LVH-assymetric    3) RV failure and 3+ TR recheck echo-done and not good , low TAPSE, will not tolerate changes in volume very well  Due to high venous volume and pressure with cardiorenal syndrome  The TV is not closing due to the severity of the TR    4) Atrial flutter -not clear if she sees a Cardiologist   Dr Alden Ruiz had seen and in light of low EF has noted not able to tolerate ablation for fib  Other options nclude AVN ablation and BiV pacer Pt and family to consider options    5) HTN and CKD per renal team               ____________________________________________________________    Cardiac testing  11/17/20    ECHO ADULT COMPLETE 11/21/2020 11/21/2020    Interpretation Summary  · LV: Calculated LVEF is 48%. Normal cavity size. Moderate concentric hypertrophy. Moderately reduced systolic function. Severe (grade 3) left ventricular diastolic dysfunction. · LA: Moderately dilated left atrium. · RV: Mildly dilated right ventricle. · RA: Moderately dilated right atrium. · IAS: No atrial septal defect present. · MV: Mild mitral valve regurgitation is present. · TV: Moderate to severe tricuspid valve regurgitation is present. · PV: Mild pulmonic valve regurgitation is present. · IVC: Severely elevated central venous pressure (15+ mmHg); IVC diameter is larger than 21 mm and collapses less than 50% with respiration. · Pericardium: Small pericardial effusion.     Signed by: Jerry Stuart MD on 11/21/2020  5:13 PM                Most recent HS troponins:  Recent Labs     08/23/22  0055 08/22/22  1017   TROPHS 70* 74*         Review of Systems    [x]All other systems reviewed and all negative except as written in HPI    [] Patient unable to provide secondary to condition         Past Medical History:   Diagnosis Date    Arthritis     Asthma     Chronic kidney disease     Chronic pain     Cirrhosis ([facility] 12/17/2017    Diabetes ([facility] diet controlled    GERD (gastroesophageal reflux disease)     Hypertension     Paroxysmal atrial fibrillation ([facility] 10/6/2020     Past Surgical History:   Procedure Laterality Date    COLONOSCOPY N/A 1/12/2018    COLONOSCOPY performed by Rosaline Monteiro MD at [facility] ENDOSCOPY    COLONOSCOPY N/A 3/19/2018    COLONOSCOPY performed by Rosaline Monteiro MD at [facility] ENDOSCOPY    HX GYN  c section    HX VASCULAR ACCESS      dialysis     IR BX TRANSCATHETER  11/24/2020    IR PARACENTESIS ABD W IMAGE  10/22/2020     Social Hx: reports that she has never smoked. She has never used smokeless tobacco. She reports that she does not drink alcohol and does not use drugs. Family Hx: Family history is unknown by patient. Allergies   Allergen Reactions    Aleve [Naproxen Sodium] Itching, Swelling and Other (comments)    Amlodipine Rash, Hives and Itching    Aspirin Other (comments), Nausea Only and Unknown (comments)     GI bleed  GI bleeding      Heparin Unknown (comments)     bleeding      Ibuprofen Nausea and Vomiting    Metformin Other (comments)     swelling          OBJECTIVE:  Wt Readings from Last 3 Encounters:   08/24/22 167 lb 8.8 oz (76 kg)   12/01/20 162 lb 4.1 oz (73.6 kg)   11/25/20 169 lb 5 oz (76.8 kg)       Intake/Output Summary (Last 24 hours) at 8/24/2022 2113  Last data filed at 8/24/2022 1526  Gross per 24 hour   Intake 340 ml   Output 8150 ml   Net -7810 ml           Physical Exam    Vitals:   Vitals:    08/24/22 1931 08/24/22 1944 08/24/22 1959 08/24/22 2000   BP:  (!) 76/48 (!) 97/53    Pulse: 95 95 95 95   Resp:       Temp:       SpO2: 94% 91% 94%    Weight:       Height:         Telemetry: AFIB        General:    Alert, cooperative, no distress, appears stated age. Neck:   Supple, no carotid bruit and no JVD. Back:     Symmetric,    Lungs:     Clear  to auscultation bilaterally. Heart[de-identified]    Irregularly irregular rhythm,  S1, S2 normal, no murmur, click, rub or gallop. A 1/6 soft systolic murmur is heard throughout the central precordium. Abdomen:     Soft, non-tender. Bowel sounds normal.    Extremities:   Extremities normal, atraumatic, no cyanosis or edema. Vascular:   Pulses - defer   Skin:   Skin color normal. No rashes or lesions on visible areas   Neurologic:   Alert, Moves all extremities. Data Review:     Radiology:   XR Results (most recent):  Results from Hospital Encounter encounter on 08/22/22    XR CHEST PORT    Narrative  EXAM: XR CHEST PORT    HISTORY: eval for PNA.     COMPARISON: 9/17/2020    FINDINGS: Portable AP. A left subclavian dialysis catheter terminates in the  right atrium. The heart is mildly enlarged, but unchanged. No significant edema. No focal consolidation, pleural effusion, or pneumothorax. Impression  Unchanged cardiomegaly    CT Results (most recent):  Results from Hospital Encounter encounter on 08/22/22    CT CHEST WO CONT    Narrative  INDICATION: Pulmonary nodule    COMPARISON: CT dated 8/22/2022    CONTRAST: None. TECHNIQUE:  5 mm axial images were obtained through the chest. Coronal and  sagittal reformats were generated. CT dose reduction was achieved through use  of a standardized protocol tailored for this examination and automatic exposure  control for dose modulation. The absence of intravenous contrast reduces the sensitivity for evaluation of  the mediastinum, marlene, vasculature, and upper abdominal organs. FINDINGS:    CHEST WALL: Left IJ catheter. No axillary or supraclavicular adenopathy. THYROID: Calcified left thyroid nodule  MEDIASTINUM: No mass or lymphadenopathy. MARLENE: No mass or lymphadenopathy. THORACIC AORTA: No aneurysm. MAIN PULMONARY ARTERY: Normal in caliber. TRACHEA/BRONCHI: Patent. ESOPHAGUS: No wall thickening or dilatation. HEART: Normal in size. PLEURA: Small right pleural effusion  LUNGS: Too numerous to count pulmonary nodules predominantly in the right middle  lobe and right lower lobe. The largest measures 7 mm in the right lower lobe  with a larger focus of nodularity at the junction of the major minor fissures in  the right lung. INCIDENTALLY IMAGED UPPER ABDOMEN: Abdominal ascites  BONES: No destructive bone lesion. Impression  Multiple small nonspecific pulmonary nodules. These are too small for  percutaneous sampling. Recommend short-term follow-up chest CT in 6-12 weeks. MRI Results (most recent):  No results found for this or any previous visit.         No results for input(s): CPK, TROIQ in the last 72 hours.    No lab exists for component: CKQMB, CPKMB, BMPP  Recent Labs     08/24/22  0451 08/23/22  0055    142   K 5.5* Sample is hemolyzed (hemoglobin is    112*   CO2 25 23   BUN 27* 39*   CREA 6.25* 7.95*   GLU 89 100   PHOS  --  5.0*   CA 9.1 8.7       Recent Labs     08/24/22  0730 08/24/22  0451   WBC 6.5 7.0   HGB 11.6 12.0   HCT 38.8 40.1    168       Recent Labs     08/23/22  0055 08/22/22  1017   AP 90 106       No results for input(s): CHOL, LDLC in the last 72 hours.     No lab exists for component: TGL, HDLC,  HBA1C  Recent Labs     08/24/22  0447   TSH 2.35           Current meds:    Current Facility-Administered Medications:     [Held by provider] amiodarone (CORDARONE) 375 mg/250 mL D5W infusion, 0.5-1 mg/min, IntraVENous, TITRATE, Valerie Rene NP, Held at 08/24/22 0600    [Held by provider] heparin 25,000 units in D5W 250 ml infusion, 12-25 Units/kg/hr, IntraVENous, TITRATE, Esme Rene NP, Held at 08/24/22 0716    famotidine (PEPCID) tablet 20 mg, 20 mg, Oral, DAILY PRN, Malachi Davis MD, 20 mg at 08/24/22 7382    metoprolol (LOPRESSOR) injection 5 mg, 5 mg, IntraVENous, Q3H PRN, Malachi Davis MD    sodium bicarbonate (4.2%) injection 42 mg, 1 mL, SubCUTAneous, RAD ONCE, Parish Castillo NP    lidocaine (PF) (XYLOCAINE) 10 mg/mL (1 %) injection 10 mL, 10 mL, SubCUTAneous, RAD ONCE, Parish Castillo, NP    albumin human 25% (BUMINATE) solution 25 g, 25 g, IntraVENous, Q6H PRN, Leandro Youngblood MD, 25 g at 08/24/22 1939    ipratropium (ATROVENT) 0.02 % nebulizer solution 0.5 mg, 0.5 mg, Nebulization, Q4H PRN, Unknown MD Ryan    [Held by provider] metoprolol tartrate (LOPRESSOR) tablet 25 mg, 25 mg, Oral, Q12H, Leandro Best MD, 25 mg at 08/23/22 1104    sodium zirconium cyclosilicate (LOKELMA) powder packet 10 g, 10 g, Oral, TID WITH MEALS, Ana HUERTA MD, 10 g at 08/24/22 2865    albumin human 25% (BUMINATE) solution 12.5 g, 12.5 g, IntraVENous, DIALYSIS PRN, Onel Nascimento MD, 12.5 g at 08/23/22 0940    traMADoL (ULTRAM) tablet 50 mg, 50 mg, Oral, Q6H PRN, Aman Otero MD, 50 mg at 08/24/22 1317    ascorbic acid (vitamin C) (VITAMIN C) tablet 500 mg, 500 mg, Oral, DAILY, Darrel Benntet MD, 500 mg at 08/24/22 9734    cholecalciferol (VITAMIN D3) (1000 Units /25 mcg) tablet 2,000 Units, 2,000 Units, Oral, DAILY, Darrel Bennett MD, 2,000 Units at 08/24/22 1348    ferrous sulfate tablet 325 mg, 325 mg, Oral, DAILY WITH BREAKFAST, Darrel Bennett MD, 325 mg at 08/24/22 1105    lactulose (CHRONULAC) 10 gram/15 mL solution 30 mL, 20 g, Oral, BID, Darrel Bennett MD, 30 mL at 08/24/22 1733    sevelamer carbonate (RENVELA) tab 1,600 mg, 1,600 mg, Oral, TID WITH MEALS, Darrel Bennett MD, 1,600 mg at 08/24/22 1733    sodium chloride (NS) flush 5-40 mL, 5-40 mL, IntraVENous, Q8H, Jono Bennett MD, 10 mL at 08/24/22 1520    sodium chloride (NS) flush 5-40 mL, 5-40 mL, IntraVENous, PRN, Darrel Bennett MD    acetaminophen (TYLENOL) tablet 650 mg, 650 mg, Oral, Q6H PRN, 650 mg at 08/24/22 0144 **OR** acetaminophen (TYLENOL) suppository 650 mg, 650 mg, Rectal, Q6H PRN, Darrel Bennett MD    polyethylene glycol (MIRALAX) packet 17 g, 17 g, Oral, DAILY PRN, Jono Bennett MD    ondansetron (ZOFRAN ODT) tablet 4 mg, 4 mg, Oral, Q8H PRN **OR** ondansetron (ZOFRAN) injection 4 mg, 4 mg, IntraVENous, Q6H PRN, Darrel Bennett MD, 4 mg at 08/23/22 1625    insulin lispro (HUMALOG) injection, , SubCUTAneous, TIDAC, Jono Bennett MD    glucose chewable tablet 16 g, 4 Tablet, Oral, PRN, Darrel Bennett MD    glucagon (GLUCAGEN) injection 1 mg, 1 mg, IntraMUSCular, PRN, Jono Bennett MD    dextrose 10 % infusion 0-250 mL, 0-250 mL, IntraVENous, PRN, Darrel Bennett MD    diphenhydrAMINE (BENADRYL) capsule 25 mg, 25 mg, Oral, Q6H PRN, Valerie Frost NP, 25 mg at 08/23/22 0031    Vipal Zak Gamble MD  [facility] [address] [place] [address] [facility] [address] [address]  Pablo Erickson  [phone]      CC: Rikki Huang MD

## 2022-08-25 NOTE — PROGRESS NOTES
Problem: Mobility Impaired (Adult and Pediatric)  Goal: *Acute Goals and Plan of Care (Insert Text)  Description: FUNCTIONAL STATUS PRIOR TO ADMISSION: Patient was modified independent using a single point cane for functional mobility. Attends outpatient HD 3x/week. HOME SUPPORT PRIOR TO ADMISSION: The patient lived alone and has children local to provide assistance PRN. Physical Therapy Goals  Initiated 8/25/2022  1. Patient will move from supine to sit and sit to supine , scoot up and down, and roll side to side in bed with modified independence within 7 day(s). 2.  Patient will transfer from bed to chair and chair to bed with contact guard assist using the least restrictive device within 7 day(s). 3.  Patient will perform sit to stand with contact guard assist within 7 day(s). 4.  Patient will ambulate with minimal assistance for 30 feet with the least restrictive device within 7 day(s). Outcome: Progressing Towards Goal   PHYSICAL THERAPY EVALUATION  Patient: Maryam Stallings (58 y.o. female)  Date: 8/25/2022  Primary Diagnosis: Tachycardia [R00.0]       Precautions:   Fall, Bed Alarm      ASSESSMENT  Based on the objective data described below, the patient presents with impaired balance, dizziness with mobility, generalized weakness with apparent muscle atrophy, and decreased endurance with EF 10-15%. Received supine in bed, agreeable to therapy and wishing to get OOB to clean up. Linen and chucks soaked with fluid coming from paracentesis drain-- drain dislodged and came out during bed mobility, RN aware. Abdomen continuously draining while seated on BSC for bathing. Unable to ambulate into bathroom due to dizziness with standing only but BP stable with prolonged time spent sitting up to bathe and control drainage. Paracentesis drain wound covered with ABD pads and chucks--RN to call MD/IR. Anticipate possible SNF rehab at d/c if unable to progress to mod I functionally.      Current Level of Function Impacting Discharge (mobility/balance): min A     Functional Outcome Measure: The patient scored Total Score: 1/28 on the Tinetti outcome measure which is indicative of high fall risk. Other factors to consider for discharge: lives alone, typically independent-mod I     Patient will benefit from skilled therapy intervention to address the above noted impairments. PLAN :  Recommendations and Planned Interventions: bed mobility training, transfer training, gait training, therapeutic exercises, neuromuscular re-education, edema management/control, patient and family training/education, and therapeutic activities      Frequency/Duration: Patient will be followed by physical therapy:  5 times a week to address goals.     Recommendation for discharge: (in order for the patient to meet his/her long term goals)  Therapy up to 5 days/week in SNF setting vs TBD pending progress    This discharge recommendation:  Has been made in collaboration with the attending provider and/or case management    IF patient discharges home will need the following DME: to be determined (TBD)         SUBJECTIVE:   Patient stated I'm glad they decided to put that drain in.    OBJECTIVE DATA SUMMARY:   HISTORY:    Past Medical History:   Diagnosis Date    Arthritis     Asthma     Chronic kidney disease     Chronic pain     Cirrhosis ([facility] 12/17/2017    Diabetes ([facility] diet controlled    GERD (gastroesophageal reflux disease)     Hypertension     Paroxysmal atrial fibrillation ([facility] 10/6/2020     Past Surgical History:   Procedure Laterality Date    COLONOSCOPY N/A 1/12/2018    COLONOSCOPY performed by Yudi Tyson MD at [facility] ENDOSCOPY    COLONOSCOPY N/A 3/19/2018    COLONOSCOPY performed by Yudi Tyson MD at [facility] ENDOSCOPY    HX GYN  c section    HX VASCULAR ACCESS      dialysis     IR BX TRANSCATHETER  11/24/2020    IR PARACENTESIS ABD W IMAGE  10/22/2020       Personal factors and/or comorbidities impacting plan of care: [facility]    Home Situation  Home Environment: Independent living  # Steps to Enter: 0  One/Two Story Residence: One story  Living Alone: Yes  Support Systems: Child(yair)  Patient Expects to be Discharged to[de-identified] Skilled nursing facility  Current DME Used/Available at Home: [address], straight, Wheelchair    EXAMINATION/PRESENTATION/DECISION MAKING:   Critical Behavior:  Neurologic State: Alert  Orientation Level: Oriented X4  Cognition: Appropriate decision making, Appropriate for age attention/concentration, Appropriate safety awareness, Follows commands  Range Of Motion:  AROM: Generally decreased, functional  Strength:    Strength: Generally decreased, functional  Tone & Sensation:   Tone: Normal  Coordination:  Coordination: Generally decreased, functional  Functional Mobility:  Bed Mobility:  Supine to Sit: Minimum assistance  Sit to Supine: Contact guard assistance  Scooting: Minimum assistance  Transfers:  Sit to Stand: Minimum assistance  Stand to Sit: Minimum assistance  Stand Pivot Transfers: Minimum assistance     Balance:   Sitting: Intact; Without support  Standing: Impaired; With support  Standing - Static: Fair  Standing - Dynamic : Fair;Poor    Functional Measure:  Tinetti test:    Sitting Balance: 1  Arises: 0  Attempts to Rise: 0  Immediate Standing Balance: 0  Standing Balance: 0  Nudged: 0  Eyes Closed: 0  Turn 360 Degrees - Continuous/Discontinuous: 0  Turn 360 Degrees - Steady/Unsteady: 0  Sitting Down: 0  Balance Score: 1 Balance total score  Indication of Gait: 0  R Step Length/Height: 0  L Step Length/Height: 0  R Foot Clearance: 0  L Foot Clearance: 0  Step Symmetry: 0  Step Continuity: 0  Path: 0  Trunk: 0  Walking Time: 0  Gait Score: 0 Gait total score  Total Score: 1/28 Overall total score         Tinetti Tool Score Risk of Falls  <19 = High Fall Risk  19-24 = Moderate Fall Risk  25-28 = Low Fall Risk  Stanetti ME. Performance-Oriented Assessment of Mobility Problems in Elderly Patients. [facility] 66; [id number]. (Scoring Description: PT Bulletin Feb. 10, 1993)    Older adults: Cheryl Hampton et al, 2009; n = 1000 [facility] elderly evaluated with ABC, SHITAL, ADL, and IADL)  · Mean SHITAL score for males aged 69-68 years = 26.21(3.40)  · Mean SHITAL score for females age 69-68 years = 25.16(4.30)  · Mean SHITAL score for males over 80 years = 23.29(6.02)  · Mean SHITAL score for females over 80 years = 17.20(8.32)        Physical Therapy Evaluation Charge Determination   History Examination Presentation Decision-Making   HIGH Complexity :3+ comorbidities / personal factors will impact the outcome/ POC  HIGH Complexity : 4+ Standardized tests and measures addressing body structure, function, activity limitation and / or participation in recreation  LOW Complexity : Stable, uncomplicated  Other outcome measures Tinetti 1/28  HIGH       Based on the above components, the patient evaluation is determined to be of the following complexity level: LOW     Pain Rating:  Denied pain    Activity Tolerance:   Good and SpO2 stable on RA      After treatment patient left in no apparent distress:   Supine in bed, Call bell within reach, Bed / chair alarm activated, and Side rails x 3    COMMUNICATION/EDUCATION:   The patients plan of care was discussed with: Occupational therapist and Registered nurse. Fall prevention education was provided and the patient/caregiver indicated understanding., Patient/family have participated as able in goal setting and plan of care. , and Patient/family agree to work toward stated goals and plan of care.     Thank you for this referral.  Pat Rivera, PT, DPT   Time Calculation: 28 mins

## 2022-08-25 NOTE — PROGRESS NOTES
[facility]  Cardiology Care Note                  []Initial visit     [x]Established visit     Patient Name: Marshall WISEMAN:[id number]  Primary Cardiologist: none  Consulting Cardiologist: Benja Avendaño MD   Reason for consult:wide complex tachycardia    HPI: previously   Javier Dickson is a 67 y.o. female with ESRD on HD, admitted 8/22/22 am via ER with dizziness, too weak to get in the shower. EMS saw atrial fibrillation at 150This was second episode of dizziness in 5 days. Pt reports some worsening of her usual mild SARKAR and edema, denies chest pain, Labs notable for initial troponin of 74, Mag 2.5 Hgb 11.3 and admit for sepsis. K was 5.0 on admit and 6.6 on POC next morning (today) at 1255 am when pt had wide complex tachy  CXR cardiomegaly Prev EF 48% by echo 11/2020 with 3+ TR,HTN, DM,PAF,anemia , liver cirrhosis. ESRD on HD for 11 years, RV failure,  EKGS with afib with aberrancy and wide complex tachy as detailed below. EF now shows 10%-15%, with severe RV dysfunction. Underwent parcentesis 8/23/22      SUBJECTIVE:  Denies chest pain, SOB or palpitations. Denies BLE edema. She does report lightheadedness whenever she gets up. SBP runs 90's currently. Net -8 Liters (of which 800 mls/24 hours net fluid removed via HD). Assessment and Plan     Patient seen on the day of progress note and examined  and agree with Advance Practice Provider (IRVIN, NP,PA)  assessment and plans as amended or commented below. Labs and imaging reviewed     Javier Dickson  67 y.o.    Overnight and today--less SOB, denies CP, bp up from last night  Exam notable for--mild edema    In additional comment to the A/P--  Serious BiV failure , if she goes back to uncontrolled Atrial fibrillation and SVT , will need to use slow load of po amio long term but device would help   However not clear she would have much long term survival with both ventricles weak  Spoke to Dr Steph Way this evening, if the rhythm is stable will probably defer device  Pt not sure anyway, she wants to talk to her family  The bp may have dropped separate from the [facility]  Will restart at low dose po  No OAC due to prior GI bleed  This note was created using voice recognition software. Despite editing, there may be syntax errors. 1). Biventricular failure:  chronic systolic heart failure and severe RV dysfunction with severe TR. With cardiorenal syndrome. EF 10-15%. Likely severe RV dysfunction contributing to low EF as well. Needs adequate preload. No diuretics. No evidence of fluid overload on exam.  Appreciate Dr. France Seen assist. Possible Bi V ppm with + or - ICD. (with AVN ablation). Patient wants to discuss procedure with her family before deciding to proceed. GDMT limited by low bp. Discussed inotrope with Dr. Rhett Jara but not a role for this with RV dysfunction. 2)Aflutter/afib with abberancy: typical aflutter per Dr. Steph Way. No further tachycardia. ?Currently aflutter with aberancy. Would not tolerate aflutter/afib ablation. Off amio as bp dropped post paracentesis. Lopressor held due to low bp    3) Chronic systolic, diastolic CHF with cardiorenal syndrome  HD per renal.Now EF is very low , at 10-15%  with severe LVH-assymetric hypertrophy. 5) HTN and  CKD per renal team:  BP now low- low NL. 5) Advanced directives/goals of care: Pt seen by Palliative: goal is restorative. Full code status. Further plan per Dr. Rhett Jara           ____________________________________________________________    Cardiac testing  08/22/22    ECHO ADULT COMPLETE 08/24/2022 8/24/2022    Interpretation Summary  Formatting of this result is different from the original.      Left Ventricle: Severely reduced left ventricular systolic function with a visually estimated EF of 10 -15%. Left ventricle size is normal. Mass index 2D is 234.3 g/m2. Findings consistent with severe asymmetric hypertrophy. Severe global hypokinesis present. Right Ventricle: Severely reduced systolic function. TAPSE is abnormal. TAPSE is 1.0 cm. Aortic Valve: Severe sclerosis of the aortic valve cusp. No stenosis. Mitral Valve: Findings consistent with myxomatous degeneration and decreased excusrion. Moderately calcified leaflet, at the anterior and posterior leaflets. Mild annular calcification of the mitral valve. Moderate regurgitation with an eccentrically directed jet and may underestimate severity. Tricuspid Valve: Mal-coaptation of tricuspid valve. Severe regurgitation with an eccentrically directed jet and may underestimate severity. Right Atrium: Right atrium is severely dilated. Pulmonary Arteries: Mild pulmonary hypertension present. The estimated PASP is 45 mmHg. IVC/SVC: IVC diameter is greater than 21 mm and decreases less than 50% during inspiration; therefore the estimated right atrial pressure is elevated (~15 mmHg). IVC is dilated. Signed by: Fabi Rider MD on 8/24/2022  5:14 PM          11/17/20    ECHO ADULT COMPLETE 11/21/2020 11/21/2020    Interpretation Summary  · LV: Calculated LVEF is 48%. Normal cavity size. Moderate concentric hypertrophy. Moderately reduced systolic function. Severe (grade 3) left ventricular diastolic dysfunction. · LA: Moderately dilated left atrium. · RV: Mildly dilated right ventricle. · RA: Moderately dilated right atrium. · IAS: No atrial septal defect present. · MV: Mild mitral valve regurgitation is present. · TV: Moderate to severe tricuspid valve regurgitation is present. · PV: Mild pulmonic valve regurgitation is present. · IVC: Severely elevated central venous pressure (15+ mmHg); IVC diameter is larger than 21 mm and collapses less than 50% with respiration. · Pericardium: Small pericardial effusion.     Signed by: Smith Brown MD on 11/21/2020  5:13 PM        EKGS:  EKG 8/22/22 951 AM, atrial fibrillation at 140 with aberrancy, left axis, IVCD  EKG 8/22/22 2323h- atrial fibrillation at 129 , variable aberrancy vs PVCs  EKG 8/23/22 0045h - regular wide complex tachy at 185 , NSVT vs SVT with aberrancy  EKG 8/23/22 1255 h atrial fibrillation at 80 with LBBB      Most recent HS troponins:  Recent Labs     08/23/22  0055   TROPHS 70*       Review of Systems    [x]All other systems reviewed and all negative except as written in HPI    [] Patient unable to provide secondary to condition         Past Medical History:   Diagnosis Date    Arthritis     Asthma     Chronic kidney disease     Chronic pain     Cirrhosis ([facility] [address] 12/17/2017    Diabetes ([facility] [address] diet controlled    GERD (gastroesophageal reflux disease)     Hypertension     Paroxysmal atrial fibrillation ([facility] [address] 10/6/2020     Past Surgical History:   Procedure Laterality Date    COLONOSCOPY N/A 1/12/2018    COLONOSCOPY performed by Ashlie Palencia MD at [facility] ENDOSCOPY    COLONOSCOPY N/A 3/19/2018    COLONOSCOPY performed by Ashlie Palencia MD at [facility] ENDOSCOPY    HX GYN  c section    HX VASCULAR ACCESS      dialysis     IR BX TRANSCATHETER  11/24/2020    IR PARACENTESIS ABD W IMAGE  10/22/2020     Social Hx:  reports that she has never smoked. She has never used smokeless tobacco. She reports that she does not drink alcohol and does not use drugs. Family Hx: Family history is unknown by patient.   Allergies   Allergen Reactions    Aleve [Naproxen Sodium] Itching, Swelling and Other (comments)    Amlodipine Rash, Hives and Itching    Aspirin Other (comments), Nausea Only and Unknown (comments)     GI bleed  GI bleeding      Heparin Unknown (comments)     bleeding      Ibuprofen Nausea and Vomiting    Metformin Other (comments)     swelling          OBJECTIVE:  Wt Readings from Last 3 Encounters:   08/24/22 76 kg (167 lb 8.8 oz)   12/01/20 73.6 kg (162 lb 4.1 oz)   11/25/20 76.8 kg (169 lb 5 oz)       Intake/Output Summary (Last 24 hours) at 8/25/2022 1409  Last data filed at 8/25/2022 1200  Gross per 24 hour   Intake 800 ml   Output 2641 ml   Net -1841 ml         Physical Exam    Vitals:   Vitals:    08/25/22 1000 08/25/22 1043 08/25/22 1200 08/25/22 1205   BP:    (!) 99/46   Pulse: 97 (!) 106 98 98   Resp:  18  16   Temp:  98 °F (36.7 °C)  98 °F (36.7 °C)   SpO2:  97% 96% 96%   Weight:       Height:         Telemetry: AFIB        General:    Alert, cooperative, no distress, appears stated age. Neck:   Supple,  no JVD. Back:     Symmetric,    Lungs:     Clear  to auscultation bilaterally. Heart[de-identified]    Regular rate and rhythm,  S1, S2 normal, no murmur, click, rub or gallop. A 1/6 soft systolic murmur is heard throughout the central precordium. Abdomen:     Soft, non-tender. Bowel sounds normal.    Extremities:   Extremities normal, atraumatic, no cyanosis or edema. Vascular:   Pulses - defer   Skin:   Skin color normal. No rashes or lesions on visible areas   Neurologic:   Alert, Moves all extremities. Data Review:     Radiology:   XR Results (most recent):  Results from Hospital Encounter encounter on 08/22/22    XR CHEST PORT    Narrative  EXAM: XR CHEST PORT    HISTORY: eval for PNA. COMPARISON: 9/17/2020    FINDINGS: Portable AP. A left subclavian dialysis catheter terminates in the  right atrium. The heart is mildly enlarged, but unchanged. No significant edema. No focal consolidation, pleural effusion, or pneumothorax. Impression  Unchanged cardiomegaly    CT Results (most recent):  Results from Hospital Encounter encounter on 08/22/22    CT CHEST WO CONT    Narrative  INDICATION: Pulmonary nodule    COMPARISON: CT dated 8/22/2022    CONTRAST: None. TECHNIQUE:  5 mm axial images were obtained through the chest. Coronal and  sagittal reformats were generated. CT dose reduction was achieved through use  of a standardized protocol tailored for this examination and automatic exposure  control for dose modulation.     The absence of intravenous contrast reduces the sensitivity for evaluation of  the mediastinum, marlene, vasculature, and upper abdominal organs. FINDINGS:    CHEST WALL: Left IJ catheter. No axillary or supraclavicular adenopathy. THYROID: Calcified left thyroid nodule  MEDIASTINUM: No mass or lymphadenopathy. MRALENE: No mass or lymphadenopathy. THORACIC AORTA: No aneurysm. MAIN PULMONARY ARTERY: Normal in caliber. TRACHEA/BRONCHI: Patent. ESOPHAGUS: No wall thickening or dilatation. HEART: Normal in size. PLEURA: Small right pleural effusion  LUNGS: Too numerous to count pulmonary nodules predominantly in the right middle  lobe and right lower lobe. The largest measures 7 mm in the right lower lobe  with a larger focus of nodularity at the junction of the major minor fissures in  the right lung. INCIDENTALLY IMAGED UPPER ABDOMEN: Abdominal ascites  BONES: No destructive bone lesion. Impression  Multiple small nonspecific pulmonary nodules. These are too small for  percutaneous sampling. Recommend short-term follow-up chest CT in 6-12 weeks. MRI Results (most recent):  No results found for this or any previous visit. No results for input(s): CPK, TROIQ in the last 72 hours. No lab exists for component: CKQMB, CPKMB, BMPP  Recent Labs     08/25/22  0537 08/24/22  0451 08/23/22  0055    141 142   K 4.6 5.5* Sample is hemolyzed (hemoglobin is    108 112*   CO2 26 25 23   BUN 19 27* 39*   CREA 5.24* 6.25* 7.95*   GLU 82 89 100   PHOS  --   --  5.0*   CA 7.9* 9.1 8.7     Recent Labs     08/25/22  0541 08/24/22  0730   WBC 5.9 6.5   HGB 10.1* 11.6   HCT 33.7* 38.8   * 170     Recent Labs     08/25/22  0537 08/23/22  0055   AP 59 90     No results for input(s): CHOL, LDLC in the last 72 hours.     No lab exists for component: TGL, HDLC,  HBA1C  Recent Labs     08/24/22  0447   TSH 2.35           Current meds:    Current Facility-Administered Medications:     sodium chloride (OCEAN) 0.65 % nasal squeeze bottle 2 Spray, 2 Spray, Both Nostrils, PRN, Kristen Marks MD    [Held by provider] amiodarone (CORDARONE) 375 mg/250 mL D5W infusion, 0.5-1 mg/min, IntraVENous, TITRATE, Valerie Rene NP, Held at 08/24/22 0600    [Held by provider] heparin 25,000 units in D5W 250 ml infusion, 12-25 Units/kg/hr, IntraVENous, TITRATE, Jamee Rene NP, Held at 08/24/22 9867    famotidine (PEPCID) tablet 20 mg, 20 mg, Oral, DAILY PRN, Alvin Smith MD, 20 mg at 08/24/22 0921    metoprolol (LOPRESSOR) injection 5 mg, 5 mg, IntraVENous, Q3H PRN, Alvin Smith MD    albumin human 25% (BUMINATE) solution 25 g, 25 g, IntraVENous, Q6H PRN, Leandro Motta MD, 25 g at 08/25/22 1210    ipratropium (ATROVENT) 0.02 % nebulizer solution 0.5 mg, 0.5 mg, Nebulization, Q4H PRN, Alvin Smith MD    [Held by provider] metoprolol tartrate (LOPRESSOR) tablet 25 mg, 25 mg, Oral, Q12H, Leandro Best MD, 25 mg at 08/23/22 1104    sodium zirconium cyclosilicate (LOKELMA) powder packet 10 g, 10 g, Oral, TID WITH MEALS, Leandro Motta MD, 10 g at 08/25/22 1203    albumin human 25% (BUMINATE) solution 12.5 g, 12.5 g, IntraVENous, DIALYSIS PRN, Darwin Fagan MD, 12.5 g at 08/23/22 0940    traMADoL (ULTRAM) tablet 50 mg, 50 mg, Oral, Q6H PRN, Leandro Motta MD, 50 mg at 08/25/22 1219    ascorbic acid (vitamin C) (VITAMIN C) tablet 500 mg, 500 mg, Oral, DAILY, Fiordaliza Bennett MD, 500 mg at 08/25/22 0857    cholecalciferol (VITAMIN D3) (1000 Units /25 mcg) tablet 2,000 Units, 2,000 Units, Oral, DAILY, Fiordaliza Bennett MD, 2,000 Units at 08/25/22 0857    ferrous sulfate tablet 325 mg, 325 mg, Oral, DAILY WITH BREAKFAST, Fiordaliza Bennett MD, 325 mg at 08/25/22 0857    lactulose (CHRONULAC) 10 gram/15 mL solution 30 mL, 20 g, Oral, BID, Fiordaliza Bennett MD, 30 mL at 08/25/22 0857    sevelamer carbonate (RENVELA) tab 1,600 mg, 1,600 mg, Oral, TID WITH MEALS, Fiordaliza Bennett MD, 1,600 mg at 08/25/22 1203    sodium chloride (NS) flush 5-40 mL, 5-40 mL, IntraVENous, Q8H, Huseyin Bennett MD, 10 mL at 08/25/22 0525    sodium chloride (NS) flush 5-40 mL, 5-40 mL, IntraVENous, PRN, Huseyin Bennett MD    acetaminophen (TYLENOL) tablet 650 mg, 650 mg, Oral, Q6H PRN, 650 mg at 08/24/22 0144 **OR** acetaminophen (TYLENOL) suppository 650 mg, 650 mg, Rectal, Q6H PRN, Huseyin Bennett MD    polyethylene glycol (MIRALAX) packet 17 g, 17 g, Oral, DAILY PRN, Jono Bennett MD    ondansetron (ZOFRAN ODT) tablet 4 mg, 4 mg, Oral, Q8H PRN **OR** ondansetron (ZOFRAN) injection 4 mg, 4 mg, IntraVENous, Q6H PRN, Huseyin Bennett MD, 4 mg at 08/23/22 1625    insulin lispro (HUMALOG) injection, , SubCUTAneous, TIDAC, Huseyin Bennett MD, 2 Units at 08/25/22 1203    glucose chewable tablet 16 g, 4 Tablet, Oral, PRN, Huseyin Bennett MD    glucagon (GLUCAGEN) injection 1 mg, 1 mg, IntraMUSCular, PRN, Joon Bennett MD    dextrose 10 % infusion 0-250 mL, 0-250 mL, IntraVENous, PRN, Huseyin Bennett MD    diphenhydrAMINE (BENADRYL) capsule 25 mg, 25 mg, Oral, Q6H PRN, Valerie Yun NP, 25 mg at 08/23/22 [address] M. SHARI Amaya  [facility] [address] [facility] [address]  [facility]  [phone]      CC: Abel Trujillo MD

## 2022-08-25 NOTE — CARDIO/PULMONARY
Cardiac Rehab: Eduin with chronic Systolic HF and current echo on 8/24/22 notes EF has decreased to 10-15%. She is eligible for cardiac rehab, however, patient has not been evaluated to determine if she will be able to participate. SVR is on the AVS and referral initiated.

## 2022-08-25 NOTE — PROGRESS NOTES
0730 Bedside shift change report given to Darius Lynn (oncoming nurse) by [facility] (offgoing nurse). Report included the following information SBAR, Kardex, ED Summary, Procedure Summary, Intake/Output, MAR, and Recent Results    2000  Bedside shift change report given to Светлана Gilmore (oncoming nurse) by Darius Lynn (offgoing nurse). Report included the following information SBAR, Kardex, ED Summary, OR Summary, Procedure Summary, Intake/Output, MAR, and Recent Results.

## 2022-08-26 PROBLEM — I50.42 CHRONIC COMBINED SYSTOLIC AND DIASTOLIC CHF (CONGESTIVE HEART FAILURE) (HCC): Chronic | Status: ACTIVE | Noted: 2022-08-26

## 2022-08-26 LAB
ANION GAP SERPL CALC-SCNC: 6 MMOL/L (ref 5–15)
BUN SERPL-MCNC: 27 MG/DL (ref 6–20)
BUN/CREAT SERPL: 4 (ref 12–20)
CALCIUM SERPL-MCNC: 8.1 MG/DL (ref 8.5–10.1)
CHLORIDE SERPL-SCNC: 106 MMOL/L (ref 97–108)
CO2 SERPL-SCNC: 30 MMOL/L (ref 21–32)
CREAT SERPL-MCNC: 7.64 MG/DL (ref 0.55–1.02)
GLUCOSE BLD STRIP.AUTO-MCNC: 117 MG/DL (ref 65–117)
GLUCOSE BLD STRIP.AUTO-MCNC: 120 MG/DL (ref 65–117)
GLUCOSE BLD STRIP.AUTO-MCNC: 141 MG/DL (ref 65–117)
GLUCOSE SERPL-MCNC: 120 MG/DL (ref 65–100)
MAGNESIUM SERPL-MCNC: 2.3 MG/DL (ref 1.6–2.4)
PHOSPHATE SERPL-MCNC: 3.4 MG/DL (ref 2.6–4.7)
POTASSIUM SERPL-SCNC: 4.2 MMOL/L (ref 3.5–5.1)
SERVICE CMNT-IMP: ABNORMAL
SERVICE CMNT-IMP: ABNORMAL
SERVICE CMNT-IMP: NORMAL
SODIUM SERPL-SCNC: 142 MMOL/L (ref 136–145)

## 2022-08-26 PROCEDURE — 90935 HEMODIALYSIS ONE EVALUATION: CPT

## 2022-08-26 PROCEDURE — 74011250637 HC RX REV CODE- 250/637: Performed by: FAMILY MEDICINE

## 2022-08-26 PROCEDURE — 83735 ASSAY OF MAGNESIUM: CPT

## 2022-08-26 PROCEDURE — 74011250636 HC RX REV CODE- 250/636: Performed by: INTERNAL MEDICINE

## 2022-08-26 PROCEDURE — P9047 ALBUMIN (HUMAN), 25%, 50ML: HCPCS | Performed by: INTERNAL MEDICINE

## 2022-08-26 PROCEDURE — 74011636637 HC RX REV CODE- 636/637: Performed by: FAMILY MEDICINE

## 2022-08-26 PROCEDURE — 74011000250 HC RX REV CODE- 250: Performed by: FAMILY MEDICINE

## 2022-08-26 PROCEDURE — 84100 ASSAY OF PHOSPHORUS: CPT

## 2022-08-26 PROCEDURE — 99232 SBSQ HOSP IP/OBS MODERATE 35: CPT | Performed by: PHYSICAL MEDICINE & REHABILITATION

## 2022-08-26 PROCEDURE — 97530 THERAPEUTIC ACTIVITIES: CPT

## 2022-08-26 PROCEDURE — 74011250637 HC RX REV CODE- 250/637: Performed by: SPECIALIST

## 2022-08-26 PROCEDURE — 80048 BASIC METABOLIC PNL TOTAL CA: CPT

## 2022-08-26 PROCEDURE — 36415 COLL VENOUS BLD VENIPUNCTURE: CPT

## 2022-08-26 PROCEDURE — 74011250637 HC RX REV CODE- 250/637: Performed by: STUDENT IN AN ORGANIZED HEALTH CARE EDUCATION/TRAINING PROGRAM

## 2022-08-26 PROCEDURE — 99233 SBSQ HOSP IP/OBS HIGH 50: CPT | Performed by: SPECIALIST

## 2022-08-26 PROCEDURE — 93005 ELECTROCARDIOGRAM TRACING: CPT

## 2022-08-26 PROCEDURE — 82962 GLUCOSE BLOOD TEST: CPT

## 2022-08-26 PROCEDURE — 65660000001 HC RM ICU INTERMED STEPDOWN

## 2022-08-26 PROCEDURE — 74011000250 HC RX REV CODE- 250: Performed by: STUDENT IN AN ORGANIZED HEALTH CARE EDUCATION/TRAINING PROGRAM

## 2022-08-26 RX ADMIN — OXYCODONE HYDROCHLORIDE AND ACETAMINOPHEN 500 MG: 500 TABLET ORAL at 08:28

## 2022-08-26 RX ADMIN — SEVELAMER CARBONATE 1600 MG: 800 TABLET, FILM COATED ORAL at 18:00

## 2022-08-26 RX ADMIN — ALBUMIN (HUMAN) 12.5 G: 0.25 INJECTION, SOLUTION INTRAVENOUS at 20:50

## 2022-08-26 RX ADMIN — Medication 2 UNITS: at 11:30

## 2022-08-26 RX ADMIN — SEVELAMER CARBONATE 1600 MG: 800 TABLET, FILM COATED ORAL at 08:27

## 2022-08-26 RX ADMIN — SODIUM CHLORIDE, PRESERVATIVE FREE 10 ML: 5 INJECTION INTRAVENOUS at 18:01

## 2022-08-26 RX ADMIN — LACTULOSE 30 ML: 20 SOLUTION ORAL at 18:01

## 2022-08-26 RX ADMIN — AMIODARONE HYDROCHLORIDE 200 MG: 200 TABLET ORAL at 18:00

## 2022-08-26 RX ADMIN — FERROUS SULFATE TAB 325 MG (65 MG ELEMENTAL FE) 325 MG: 325 (65 FE) TAB at 08:27

## 2022-08-26 RX ADMIN — SODIUM CHLORIDE, PRESERVATIVE FREE 10 ML: 5 INJECTION INTRAVENOUS at 21:37

## 2022-08-26 RX ADMIN — TRAMADOL HYDROCHLORIDE 50 MG: 50 TABLET ORAL at 14:14

## 2022-08-26 RX ADMIN — ALBUMIN (HUMAN) 12.5 G: 0.25 INJECTION, SOLUTION INTRAVENOUS at 19:45

## 2022-08-26 RX ADMIN — AMIODARONE HYDROCHLORIDE 200 MG: 200 TABLET ORAL at 08:27

## 2022-08-26 RX ADMIN — SODIUM CHLORIDE, PRESERVATIVE FREE 10 ML: 5 INJECTION INTRAVENOUS at 06:03

## 2022-08-26 RX ADMIN — SEVELAMER CARBONATE 1600 MG: 800 TABLET, FILM COATED ORAL at 12:10

## 2022-08-26 RX ADMIN — Medication 2000 UNITS: at 08:28

## 2022-08-26 RX ADMIN — LACTULOSE 30 ML: 20 SOLUTION ORAL at 08:28

## 2022-08-26 RX ADMIN — TRAMADOL HYDROCHLORIDE 50 MG: 50 TABLET ORAL at 22:12

## 2022-08-26 RX ADMIN — METOPROLOL TARTRATE 5 MG: 5 INJECTION, SOLUTION INTRAVENOUS at 18:39

## 2022-08-26 NOTE — ACP (ADVANCE CARE PLANNING)
Advance Care Planning    Goals are clear for full restorative measures- she wants to live \"as long as I can\" but also tells me that she wants her time to be quality time. Code status discussed, she is familiar with this- and full code status confirmed, however states that if she is on life support w/out chance of any good recovery that her children know she would not want ongoing measures and would remove her from artificial support. Primary Decision MakerHaztina Shutter - Daughter - [phone]    Primary Decision Maker: Mejia Mora - Son - [phone]'      AMD on file at the [facility] where she receives most of her care.

## 2022-08-26 NOTE — PROGRESS NOTES
[facility]  Cardiology Care Note                  []Initial visit     [x]Established visit     Patient Name: Althea Click - AER:[id number]  Primary Cardiologist: none  Consulting Cardiologist: Mich Gonzalez MD   Reason for consult:wide complex tachycardia    Patient seen on the day of progress note and examined  and agree with Advance Practice Provider (IRVIN, NP,PA)  assessment and plans as amended or commented below. Labs and imaging reviewed     Janine Montague  67 y.o. Overnight and today--less fatigued , less sob, still weak and now dizzy in afternoon  Bp is variabl  Exam notable for--mild edema, mod murmurs and low bp    In additional comment to the A/P--  Had conversation with her about her RV and LV failure and atrial fibrillation   Options would included palliative care or BiV AICD and AVN ablation which may be of limited benefit, we also talked about use of CHF meds difficult due to low BP  Have decided with shared decision making to first use amiodarone 200 mg daily with no load due to cirrhosis and then dc on PRN diuretics and fu in office  She wants to switch from [place]  No dc today due to dizziness, consider Rehab stay as she is getting stronger  Future Appointments   Date Time Provider Yamilex Bartlett   9/6/2022  9:20 AM MD RHETT Fitzgerald BS AMB      This note was created using voice recognition software. Despite editing, there may be syntax errors. HPI: previously   Janine Montague is a 67 y.o. female with ESRD on HD, admitted 8/22/22 am via ER with dizziness, too weak to get in the shower. EMS saw atrial fibrillation at 150This was second episode of dizziness in 5 days. Pt reports some worsening of her usual mild SARKAR and edema, denies chest pain, Labs notable for initial troponin of 74, Mag 2.5 Hgb 11.3 and admit for sepsis. K was 5.0 on admit and 6.6 on POC next morning (today) at 1255 am when pt had wide complex tachy  CXR cardiomegaly Prev EF 48% by echo 11/2020 with 3+ TR,HTN, DM,PAF,anemia , liver cirrhosis. ESRD on HD for 11 years, RV failure,  EKGS with afib with aberrancy and wide complex tachy as detailed below. EF now shows 10%-15%, with severe RV dysfunction. Underwent parcentesis 8/23/22      SUBJECTIVE:  Denies chest pain, SOB or palpitations. Denies BLE edema. Her main complaint at this time is dizziness/lightheadedness with sitting up or standing up. SBP runs low 100's currently. Net -6.8 liters since admission. Assessment and Plan       1). Biventricular failure:  Chronic systolic heart failure and severe RV dysfunction with severe TR. With cardiorenal syndrome. EF 10-15%. Needs adequate preload. Hold diuretics for now. Few faint crackles right base but no other indication of fluid overload. Per Dr. Jesenia Gould- after further review, will not proceed with Biv and AV node ablation at this time as long as HR is controlled. If her EF in future does improve with rate or temporary rhythm control then biv ppm, AVN  ablation may be worth the risk of infection. GDMT is unfortunately limited by low bp. Volume management per renal with HD. 2)  Aflutter/afib with abberancy: typical aflutter per Dr. Jesenia Gould. -She had brief aflutter/afib with aberancy at 5PM last nigh (rate briefly to 130's) but No further tachycardia. HR in 90's overall. Would not tolerate aflutter/afib ablation per Dr. Jesenia Gould. Restarted amiodarone 200 mg BID yesterday. Although amiodarone not ideal in setting of cirrhosis, her BP limits other rate control medicatons. Short term amiodarone,  Considered MELCHOR/DCCV but with cirrhosis would need varices ruled out and she has history of GI bleed so avoiding OAC. 3)  Chronic systolic, diastolic CHF with cardiorenal syndrome  HD per renal.Now EF is very low , at 10-15%  with severe LVH-assymetric hypertrophy.     5)  HTN and  ESRD per renal team:  BP now low NL. HD per renal  6)  Advanced directives/goals of care: Pt seen by Palliative: goal is restorative. Full code status. Dr. Indy Wyman to discuss also with pt today. Further plan per Dr. Eunice Ngo           ____________________________________________________________    Cardiac testing  08/22/22    ECHO ADULT COMPLETE 08/24/2022 8/24/2022    Interpretation Summary  Formatting of this result is different from the original.      Left Ventricle: Severely reduced left ventricular systolic function with a visually estimated EF of 10 -15%. Left ventricle size is normal. Mass index 2D is 234.3 g/m2. Findings consistent with severe asymmetric hypertrophy. Severe global hypokinesis present. Right Ventricle: Severely reduced systolic function. TAPSE is abnormal. TAPSE is 1.0 cm. Aortic Valve: Severe sclerosis of the aortic valve cusp. No stenosis. Mitral Valve: Findings consistent with myxomatous degeneration and decreased excusrion. Moderately calcified leaflet, at the anterior and posterior leaflets. Mild annular calcification of the mitral valve. Moderate regurgitation with an eccentrically directed jet and may underestimate severity. Tricuspid Valve: Mal-coaptation of tricuspid valve. Severe regurgitation with an eccentrically directed jet and may underestimate severity. Right Atrium: Right atrium is severely dilated. Pulmonary Arteries: Mild pulmonary hypertension present. The estimated PASP is 45 mmHg. IVC/SVC: IVC diameter is greater than 21 mm and decreases less than 50% during inspiration; therefore the estimated right atrial pressure is elevated (~15 mmHg). IVC is dilated. Signed by: Ori Rose MD on 8/24/2022  5:14 PM          11/17/20    ECHO ADULT COMPLETE 11/21/2020 11/21/2020    Interpretation Summary  · LV: Calculated LVEF is 48%. Normal cavity size. Moderate concentric hypertrophy. Moderately reduced systolic function.  Severe (grade 3) left ventricular diastolic dysfunction. · LA: Moderately dilated left atrium. · RV: Mildly dilated right ventricle. · RA: Moderately dilated right atrium. · IAS: No atrial septal defect present. · MV: Mild mitral valve regurgitation is present. · TV: Moderate to severe tricuspid valve regurgitation is present. · PV: Mild pulmonic valve regurgitation is present. · IVC: Severely elevated central venous pressure (15+ mmHg); IVC diameter is larger than 21 mm and collapses less than 50% with respiration. · Pericardium: Small pericardial effusion. Signed by: Nereida Montemayor MD on 11/21/2020  5:13 PM        EKGS:  EKG 8/22/22 958 AM, atrial fibrillation at 140 with aberrancy, left axis, IVCD  EKG 8/22/22 2323h- atrial fibrillation at 129 , variable aberrancy vs PVCs  EKG 8/23/22 0045h - regular wide complex tachy at 185 , NSVT vs SVT with aberrancy  EKG 8/23/22 1255 h atrial fibrillation at 94 with LBBB      Most recent HS troponins:  No results for input(s): TROPHS in the last 72 hours.     No lab exists for component:  CKMB      Review of Systems    [x]All other systems reviewed and all negative except as written in HPI    [] Patient unable to provide secondary to condition      Past Medical History:   Diagnosis Date    Arthritis     Asthma     Chronic combined systolic and diastolic CHF (congestive heart failure) ([facility] [address] 8/26/2022    Chronic kidney disease     Chronic pain     Cirrhosis ([facility] [address] 12/17/2017    Diabetes ([facility] [address] diet controlled    GERD (gastroesophageal reflux disease)     Hypertension     Paroxysmal atrial fibrillation ([facility] [address] 10/6/2020     Past Surgical History:   Procedure Laterality Date    COLONOSCOPY N/A 1/12/2018    COLONOSCOPY performed by Reymundo Villareal MD at [facility] ENDOSCOPY    COLONOSCOPY N/A 3/19/2018    COLONOSCOPY performed by Reymundo Villareal MD at [facility] ENDOSCOPY    HX GYN  c section    HX VASCULAR ACCESS      dialysis     IR BX TRANSCATHETER  11/24/2020    IR PARACENTESIS ABD W IMAGE  10/22/2020 Social Hx:  reports that she has never smoked. She has never used smokeless tobacco. She reports that she does not drink alcohol and does not use drugs. Family Hx: Family history is unknown by patient. Allergies   Allergen Reactions    Aleve [Naproxen Sodium] Itching, Swelling and Other (comments)    Amlodipine Rash, Hives and Itching    Aspirin Other (comments), Nausea Only and Unknown (comments)     GI bleed  GI bleeding      Heparin Unknown (comments)     bleeding      Ibuprofen Nausea and Vomiting    Metformin Other (comments)     swelling          OBJECTIVE:  Wt Readings from Last 3 Encounters:   08/25/22 68.4 kg (150 lb 14.4 oz)   12/01/20 73.6 kg (162 lb 4.1 oz)   11/25/20 76.8 kg (169 lb 5 oz)       Intake/Output Summary (Last 24 hours) at 8/26/2022 1258  Last data filed at 8/26/2022 0816  Gross per 24 hour   Intake 440 ml   Output 0 ml   Net 440 ml         Physical Exam    Vitals:   Vitals:    08/26/22 0816 08/26/22 1000 08/26/22 1102 08/26/22 1200   BP: (!) 100/52  114/65    Pulse: 95 94 96 95   Resp: 16  16    Temp: 97.8 °F (36.6 °C)  97.8 °F (36.6 °C)    SpO2: 99%  99%    Weight:       Height:         Telemetry: AFIB        General:    Alert, cooperative, no distress, appears stated age. Neck:   Supple,  no JVD. Back:     Symmetric,    Lungs: Few right basilar crackles     Heart[de-identified]    Regular rate and rhythm,  S1, S2 normal, click, rub or gallop. A 1/6 soft systolic murmur is heard throughout the central precordium. Abdomen:     Soft, non-tender. Bowel sounds normal.    Extremities:   Extremities normal, atraumatic, no cyanosis or edema. Vascular:   Pulses - defer   Skin:   Skin color normal. No rashes or lesions on visible areas   Neurologic:   Alert, Moves all extremities. Data Review:     Radiology:   XR Results (most recent):  Results from Hospital Encounter encounter on 08/22/22    XR CHEST PORT    Narrative  EXAM: XR CHEST PORT    HISTORY: eval for PNA.     COMPARISON: 9/17/2020    FINDINGS: Portable AP. A left subclavian dialysis catheter terminates in the  right atrium. The heart is mildly enlarged, but unchanged. No significant edema. No focal consolidation, pleural effusion, or pneumothorax. Impression  Unchanged cardiomegaly    CT Results (most recent):  Results from Hospital Encounter encounter on 08/22/22    CT CHEST WO CONT    Narrative  INDICATION: Pulmonary nodule    COMPARISON: CT dated 8/22/2022    CONTRAST: None. TECHNIQUE:  5 mm axial images were obtained through the chest. Coronal and  sagittal reformats were generated. CT dose reduction was achieved through use  of a standardized protocol tailored for this examination and automatic exposure  control for dose modulation. The absence of intravenous contrast reduces the sensitivity for evaluation of  the mediastinum, marlene, vasculature, and upper abdominal organs. FINDINGS:    CHEST WALL: Left IJ catheter. No axillary or supraclavicular adenopathy. THYROID: Calcified left thyroid nodule  MEDIASTINUM: No mass or lymphadenopathy. MARLENE: No mass or lymphadenopathy. THORACIC AORTA: No aneurysm. MAIN PULMONARY ARTERY: Normal in caliber. TRACHEA/BRONCHI: Patent. ESOPHAGUS: No wall thickening or dilatation. HEART: Normal in size. PLEURA: Small right pleural effusion  LUNGS: Too numerous to count pulmonary nodules predominantly in the right middle  lobe and right lower lobe. The largest measures 7 mm in the right lower lobe  with a larger focus of nodularity at the junction of the major minor fissures in  the right lung. INCIDENTALLY IMAGED UPPER ABDOMEN: Abdominal ascites  BONES: No destructive bone lesion. Impression  Multiple small nonspecific pulmonary nodules. These are too small for  percutaneous sampling. Recommend short-term follow-up chest CT in 6-12 weeks. MRI Results (most recent):  No results found for this or any previous visit.         No results for input(s): CPK, TROIQ in the last 72 hours.    No lab exists for component: CKQMB, CPKMB, BMPP  Recent Labs     08/25/22  0537 08/24/22  0451    141   K 4.6 5.5*    108   CO2 26 25   BUN 19 27*   CREA 5.24* 6.25*   GLU 82 89   CA 7.9* 9.1     Recent Labs     08/25/22  0541 08/24/22  0730   WBC 5.9 6.5   HGB 10.1* 11.6   HCT 33.7* 38.8   * 170     Recent Labs     08/25/22  0537   AP 59     No results for input(s): CHOL, LDLC in the last 72 hours.     No lab exists for component: TGL, HDLC,  HBA1C  Recent Labs     08/24/22  0447   TSH 2.35           Current meds:    Current Facility-Administered Medications:     sodium chloride (OCEAN) 0.65 % nasal squeeze bottle 2 Spray, 2 Spray, Both Nostrils, PRN, John Rai MD    amiodarone (CORDARONE) tablet 200 mg, 200 mg, Oral, BID, Celine Henao MD, 200 mg at 08/26/22 0827    [Held by provider] heparin 25,000 units in D5W 250 ml infusion, 12-25 Units/kg/hr, IntraVENous, TITRATE, Lexie Rene NP, Held at 08/24/22 0716    famotidine (PEPCID) tablet 20 mg, 20 mg, Oral, DAILY PRN, Blair Torrez MD, 20 mg at 08/24/22 0921    metoprolol (LOPRESSOR) injection 5 mg, 5 mg, IntraVENous, Q3H PRN, Blair Torrez MD, 5 mg at 08/25/22 1748    albumin human 25% (BUMINATE) solution 25 g, 25 g, IntraVENous, Q6H PRN, Mitzie Leventhal, Omer F, MD, 25 g at 08/25/22 1210    ipratropium (ATROVENT) 0.02 % nebulizer solution 0.5 mg, 0.5 mg, Nebulization, Q4H PRN, Mitzie Leventhal, Omer F, MD    albumin human 25% (BUMINATE) solution 12.5 g, 12.5 g, IntraVENous, DIALYSIS PRN, Jovita Carranza MD, 12.5 g at 08/23/22 0940    traMADoL (ULTRAM) tablet 50 mg, 50 mg, Oral, Q6H PRN, Mitzie Leventhal, Omer F, MD, 50 mg at 08/25/22 1931    ascorbic acid (vitamin C) (VITAMIN C) tablet 500 mg, 500 mg, Oral, DAILY, Benjamin Bennett MD, 500 mg at 08/26/22 6602    cholecalciferol (VITAMIN D3) (1000 Units /25 mcg) tablet 2,000 Units, 2,000 Units, Oral, DAILY, Benjamin Bennett MD, 2,000 Units at 08/26/22 0828    ferrous sulfate tablet 325 mg, 325 mg, Oral, DAILY WITH BREAKFAST, Paco Bennett MD, 325 mg at 08/26/22 0827    lactulose (CHRONULAC) 10 gram/15 mL solution 30 mL, 20 g, Oral, BID, Paco Bennett MD, 30 mL at 08/26/22 4254    sevelamer carbonate (RENVELA) tab 1,600 mg, 1,600 mg, Oral, TID WITH MEALS, Paco Bennett MD, 1,600 mg at 08/26/22 1210    sodium chloride (NS) flush 5-40 mL, 5-40 mL, IntraVENous, Q8H, Paco Bennett MD, 10 mL at 08/26/22 0603    sodium chloride (NS) flush 5-40 mL, 5-40 mL, IntraVENous, PRN, Paco Bennett MD    acetaminophen (TYLENOL) tablet 650 mg, 650 mg, Oral, Q6H PRN, 650 mg at 08/24/22 0144 **OR** acetaminophen (TYLENOL) suppository 650 mg, 650 mg, Rectal, Q6H PRN, Paco Bennett MD    polyethylene glycol (MIRALAX) packet 17 g, 17 g, Oral, DAILY PRN, Jono Bennett MD    ondansetron (ZOFRAN ODT) tablet 4 mg, 4 mg, Oral, Q8H PRN **OR** ondansetron (ZOFRAN) injection 4 mg, 4 mg, IntraVENous, Q6H PRN, Paco Bennett MD, 4 mg at 08/23/22 1625    insulin lispro (HUMALOG) injection, , SubCUTAneous, TIDAC, Paco Bennett MD, 2 Units at 08/26/22 1130    glucose chewable tablet 16 g, 4 Tablet, Oral, PRN, Paco Bennett MD    glucagon (GLUCAGEN) injection 1 mg, 1 mg, IntraMUSCular, PRN, Jono Bennett MD    dextrose 10 % infusion 0-250 mL, 0-250 mL, IntraVENous, PRN, Paco Bennett MD    diphenhydrAMINE (BENADRYL) capsule 25 mg, 25 mg, Oral, Q6H PRN, Brinda Baumgarten, Sarah A, NP, 25 mg at 08/23/22 [address] Am[address] NP  [facility] [address] [address]  [facility]  [phone]      CC: Franki Fenton MD

## 2022-08-26 NOTE — PROGRESS NOTES
IR called for leaking coming from para drain site. Floor RN reports constant drainage from site and is soaking dressings. Per RN attending MD requested IR assess site. Site cleansed with chlorhexidine and dermabond placed over site. No further drainage noted. Floor RN given updates. Call IR for any additional questions/ concerns.

## 2022-08-26 NOTE — PROGRESS NOTES
0730 Bedside shift change report given to Sharmaine Wray (oncoming nurse) by Chandler (offgoing nurse). Report included the following information SBAR, Kardex, ED Summary, OR Summary, Procedure Summary, Intake/Output, MAR, and Recent Results. 1600: Pt post abdominal drain site was close with Surgical glue by IR due to drainage not stopping. MD aware no other orders received    1830 During dialysis patient went into A fib  and patient feeling bad. Per neph only pull 500 ml during treatment. Hospitalist notified, EKG done, metoprolol prn given. Patient converted to SR HR 92 and reports feeling better, no other orders received from Manuel MEZA.  Card not notified as directed by hospitalist.    2000 Bedside shift change report given to Chandler (oncoming nurse) by Sharmaine Wray (offgoing nurse). Report included the following information SBAR, Kardex, Intake/Output, MAR, and Recent Results.

## 2022-08-26 NOTE — PROGRESS NOTES
Problem: Mobility Impaired (Adult and Pediatric)  Goal: *Acute Goals and Plan of Care (Insert Text)  Description: FUNCTIONAL STATUS PRIOR TO ADMISSION: Patient was modified independent using a wheelchair as primary form of mobility. Cooks in her w/c, transfers to shower bench for seated showers. Attends outpatient HD 3x/week via w/c van. Children take her grocery shopping and she uses in store scooters for mobility. Occasionally stands in kitchen just to retrieve something from her cabinets. HOME SUPPORT PRIOR TO ADMISSION: The patient lived alone and has children local to provide assistance PRN. Physical Therapy Goals  Initiated 8/25/2022  1. Patient will move from supine to sit and sit to supine , scoot up and down, and roll side to side in bed with modified independence within 7 day(s). 2.  Patient will transfer from bed to chair and chair to bed with contact guard assist using the least restrictive device within 7 day(s). 3.  Patient will perform sit to stand with contact guard assist within 7 day(s).      8/26/2022 1523 by Woodson Saint  Outcome: Progressing Towards Goal      Outcome: Progressing Towards Goal   PHYSICAL THERAPY TREATMENT  Patient: Laura Ramos (35 y.o. female)  Date: 8/26/2022  Diagnosis: Tachycardia [R00.0] <principal problem not specified>      Precautions: Fall, Bed Alarm  Chart, physical therapy assessment, plan of care and goals were reviewed. ASSESSMENT  Patient continues with skilled PT services and is progressing towards goals. Patient received supine in bed, agreeable to therapy. She was polite and agreeable to therapy. BP stable with transfers. Able to complete stand pivot transfers with min A x1. Unable to stand from chair without assist despite multiple attempts. Reported she felt below her baseline and was nervous she would not be able to get out of her wheelchair if she was alone.  Recommend HHPT and family assist to assist with all transfers and household management. If family assist is not feasible, then recommend SNF. Current Level of Function Impacting Discharge (mobility/balance): min A    Other factors to consider for discharge: lives alone but has 8 children and numerous grandchildren         PLAN :  Patient continues to benefit from skilled intervention to address the above impairments. Continue treatment per established plan of care. to address goals. Recommendation for discharge: (in order for the patient to meet his/her long term goals)  Physical therapy at least 2 days/week in the home AND ensure assist and/or supervision for safety with all transfers and household management    This discharge recommendation:  Has been made in collaboration with the attending provider and/or case management    IF patient discharges home will need the following DME: patient owns DME required for discharge       SUBJECTIVE:   Patient stated I don't want to be lightheaded like this.     OBJECTIVE DATA SUMMARY:   Critical Behavior:  Neurologic State: Alert  Orientation Level: Oriented X4  Cognition: Follows commands  Functional Mobility Training:  Bed Mobility:  Supine to Sit: Supervision  Sit to Supine: Supervision  Scooting: Supervision  Transfers:  Sit to Stand: Contact guard assistance;Minimum assistance  Stand to Sit: Minimum assistance  Balance:  Sitting: Intact; Without support  Standing: Impaired; With support  Standing - Static: Fair  Standing - Dynamic : Fair;Poor  Pain Rating:  headache    Activity Tolerance:   Fair    After treatment patient left in no apparent distress:   Supine in bed and Call bell within reach    COMMUNICATION/COLLABORATION:   The patients plan of care was discussed with: Occupational therapist and Registered nurse.      Sanjay Morales, PT, DPT   Time Calculation: 33 mins

## 2022-08-26 NOTE — PROCEDURES
Hemodialysis / [phone]    Vitals Pre Post Assessment Pre Post   BP BP: 131/69 (08/26/22 1747) 102/86 LOC Awake, oriented x 4. Awake, oriented x 4. HR Pulse (Heart Rate): 92 (08/26/22 1747) 93 Lungs CTA CTA   Resp Resp Rate: 16 (08/26/22 1524) 20 Cardiac RRR  HR 92 RRR HR 93   Temp Temp: 98.2 °F (36.8 °C) (08/26/22 1524) Not obtained Skin CDI CDI   Weight  Not obtained Not obtained Edema Trace edema BLE Trace edema BLE   Tele status Bedside Bedside Pain Pain Intensity 1: 4 (08/26/22 1524) No c/o pain     Orders   Duration: Start: 3459 End: 2105 Total: 2   Dialyzer: Dialyzer/Set Up Inspection: Xavier Bonner (08/26/22 1743)   K Bath: Dialysate K (mEq/L): 2 (08/26/22 1743)   Ca Bath: Dialysate CA (mEq/L): 2.5 (08/26/22 1743)   Na: Dialysate NA (mEq/L): 138 (08/26/22 1743)   Bicarb: Dialysate HCO3 (mEq/L): 35 (08/26/22 1743)   Target Fluid Removal: Goal/Amount of Fluid to Remove (mL): 500 mL (per Dr. Suly Tucker) (08/26/22 1743)     Access   Type & Location: L CVC. Comments:             Dressing dated 8/23/22, CDI. Skin surrounding CVC appropriate for patient's ethnicity without redness, swelling, or drainage noted. CVC limbs cleaned per P&P.  5cc blood aspirated from each limb, labs drawn, limbs flushed with 10cc NS, no resistance noted.                            Labs   HBsAg (Antigen) / date:                  Negative (8/22/22)                             HBsAb (Antibody) / date: Susceptible (8/22/22)   Source: Epic   Obtained/Reviewed  Critical Results Called HGB   Date Value Ref Range Status   08/25/2022 10.1 (L) 11.5 - 16.0 g/dL Final     Potassium   Date Value Ref Range Status   08/25/2022 4.6 3.5 - 5.1 mmol/L Final     Comment:     SPECIMEN HEMOLYZED, RESULTS MAY BE AFFECTED     Calcium   Date Value Ref Range Status   08/25/2022 7.9 (L) 8.5 - 10.1 MG/DL Final     BUN   Date Value Ref Range Status   08/25/2022 19 6 - 20 MG/DL Final     Creatinine   Date Value Ref Range Status   08/25/2022 5.24 (H) 0.55 - 1.02 MG/DL Final        Meds Given   Name Dose Route   Albumin 25g IVPB               Adequacy / Fluid    Total Liters Process: 37.2   Net Fluid Removed: 0      Comments   Time Out Done:   (Time) 1730   Admitting Diagnosis: ESRD   Consent obtained/signed: Informed Consent Verified:  (HD w/ [facility]) (08/26/22 1961)   Machine / Ronald Lager # Machine Number: [id number] (08/26/22 9428)   Primary Nurse Rpt Pre: GEORGE Quarles RN   Primary Nurse Rpt Post: Dave Magana RN. Pt Education: Infection prevention   Care Plan: Continue HD plan of care   Pts outpatient clinic: HD at [facility]     Tx Summary   Comments:                      2506 - HD initiated without incident. 1830 - . Patient c/o feeling choked around her neck. Placed patient on 2L NC. Pump speed slowed to 300. UF off.    1845 - Spoke with Emmanuel Sotomayor NP regarding patient's change in condition. Per Emmanuel Sotomayor, will finish treatment with minimum UF goal.    1847 - Patient states that the discomfort in her neck has improved. VSS.    1957 - Air alarm causing pump to stop. Unable to clear alarm. CVC limbs cleaned and flushed, lines clamped. Replaced HD circuit. 2050 - HD initiated. 2105 - Patient states that she is beginning to feel some abdominal discomfort. Patient requests to terminate treatment at this time. All possible blood returned to patient. CVC limbs cleaned, flushed with 10cc NS, and clamped. New q-sites and clean caps applied. Report to Dave Magana RN.

## 2022-08-26 NOTE — PROGRESS NOTES
Palliative Medicine Consult  [place]: [phone] ([phone])    Patient Name: Briana Jaimes  YOB: 1950    Date of Initial Consult: 8/25/22  Reason for Consult: Care decisions/end stage disease   Requesting Provider: Jayna Rincon   Primary Care Physician: Monica Baldwin MD     SUMMARY:   Briana Jaimes is a 67 y.o. with a past history of ESRD on HD MWF, a fib, anemia, DM, CHF, hx AVM GIB who was admitted on 8/22/2022 from home via EMS with lightheadedness and abdominal distension- had a paracentesis for cardiac cirrhosis 2 weeks before admission. CT showing mult small pulmonary nodules. Normally follows at the [facility]. Echo 8/24/22 w/ EF 10-15%, biventricular failure, severe MR and TR. Cardiology following and recommends pacemaker and AV node ablation. Had 8L paracentesis 8/24.     8/26- Cardiology plans on medically managing for now, to further discuss pacemaker. Current medical issues leading to Palliative Medicine involvement include: care decisions/ end stage disease. Social: Pt has 8 children, most live in [place] [address]. She gets medical care at the [facility] including her dialysis. She has an AMD on file there- states her son Jimy Moon and dtrs Nathaniel and Domenic Dakins are her mPOAs. She lives alone at [facility] and can do all ADLs. She is also able to cook for herself. PALLIATIVE DIAGNOSES:   Lightheadedness   Fatigue   Hypotension -has limited ultrafiltration with dialysis   Goals of care       PLAN:   Pt in good spirits, knows plan is for medical management and to follow up with Dr Rabia Gallo in clinic. She is open to talking about more invasive procedures in the future if recommended again (eg pacemaker) although she prefers to avoid if possible. She is aware that her hypotension limits certain medications she can use. Goals are clear for full restorative measures- she wants to live \"as long as I can\" but also tells me that she wants her time to be quality time.  She understands her progressive diseases and has end stage heart and kidney disease now- and that while she may have some good living left to do, life is also limited. Code status discussed, she is familiar with this- and full code status confirmed, however states that if she is on life support w/out chance of any good recovery that her children know she would not want ongoing measures and would remove her from artificial support. Plan: Will have her granddtr stay with her, so will not go to SNF and can go home. Communicated plan of care with: Palliative IDT, [facility] Team incl Dr Meli Velez and Carri TRISTAN     GOALS OF CARE / TREATMENT PREFERENCES:     GOALS OF CARE:  Patient/Health Care Proxy Stated Goals: Prolong life    TREATMENT PREFERENCES:   Code Status: Full Code    Patient and family's personal goals include: not getting light headed     Important upcoming milestones or family events: her birthday is today     The patient identifies the following as important for living well: being at home      Advance Care Planning:  [] The [place] [id number] Team has updated the ACP Navigator with [address] and Patient Capacity      Advance Care Planning 11/17/2020   Patient's Healthcare Decision Maker is: -   Primary Decision Maker Name -   Primary Decision Maker Phone Number -   Primary Decision Maker Relationship to Patient -   Secondary Decision [facility] Name -   Secondary Decision [facility] Phone Number -   Secondary Decision Maker Relationship to Patient -   Confirm Advance Directive None   Patient Would Like to Complete Advance Directive -   Does the patient have other document types -       Medical Interventions: Full interventions       Other:    As far as possible, the palliative care team has discussed with patient / health care proxy about goals of care / treatment preferences for patient.      HISTORY:     History obtained from: pt, chart, staff    CHIEF COMPLAINT: \"So nice to see you again\"  HPI/SUBJECTIVE:    The patient is:   [x] Verbal and participatory  [] Non-participatory due to:     Needed help w/ transfers but was able to work w/ therapies. Clinical Pain Assessment (nonverbal scale for severity on nonverbal patients):   Clinical Pain Assessment  Severity: 0          Duration: for how long has pt been experiencing pain (e.g., 2 days, 1 month, years)  Frequency: how often pain is an issue (e.g., several times per day, once every few days, constant)     FUNCTIONAL ASSESSMENT:     Palliative Performance Scale (PPS):  PPS: 60       PSYCHOSOCIAL/SPIRITUAL SCREENING:     Palliative IDT has assessed this patient for cultural preferences / practices and a referral made as appropriate to needs (Cultural Services, Patient Advocacy, Ethics, etc.)    Any spiritual / Moravian concerns:  [] Yes /  [x] No   If \"Yes\" to discuss with pastoral care during IDT     Does caregiver feel burdened by caring for their loved one:   [] Yes /  [x] No /  [] No Caregiver Present/Available [] No Caregiver [] Pt Lives at Facility  If \"Yes\" to discuss with social work during IDT    Anticipatory grief assessment:   [x] Normal  / [] Maladaptive     If \"Maladaptive\" to discuss with social work during IDT    ESAS Anxiety: Anxiety: 0    ESAS Depression: Depression: 0        REVIEW OF SYSTEMS:     Positive and pertinent negative findings in ROS are noted above in HPI. The following systems were [x] reviewed / [] unable to be reviewed as noted in HPI  Other findings are noted below. Systems: constitutional, ears/nose/mouth/throat, respiratory, gastrointestinal, genitourinary, musculoskeletal, integumentary, neurologic, psychiatric, endocrine. Positive findings noted below.   Modified ESAS Completed by: provider   Fatigue: 4 Drowsiness: 0   Depression: 0 Pain: 0   Anxiety: 0 Nausea: 0   Anorexia: 0 Dyspnea: 0           Stool Occurrence(s): 0        PHYSICAL EXAM:     From RN flowsheet:  Wt Readings from Last 3 Encounters:   08/25/22 150 lb 14.4 oz (68.4 kg)   12/01/20 162 lb 4.1 oz (73.6 kg)   11/25/20 169 lb 5 oz (76.8 kg)     Blood pressure (!) 97/49, pulse 94, temperature 98.2 °F (36.8 °C), resp. rate 16, height 5' 3\" (1.6 m), weight 150 lb 14.4 oz (68.4 kg), SpO2 96 %. Pain Scale 1: Numeric (0 - 10)  Pain Intensity 1: 6  Pain Onset 1: chronic  Pain Location 1: Back, Neck  Pain Orientation 1: Mid  Pain Description 1: Aching  Pain Intervention(s) 1: Medication (see MAR)  Last bowel movement, if known:     Constitutional: awake, alert, oriented, pleasant and engaging   Eyes: pupils equal, anicteric  ENMT: no nasal discharge, moist mucous membranes  Respiratory: breathing not labored  Musculoskeletal: no deformity, no tenderness to palpation, some generalized atrophy  Skin: warm, dry  Neurologic: following commands, moving all extremities         HISTORY:     Active Problems:    Tachycardia (8/22/2022)    Past Medical History:   Diagnosis Date    Arthritis     Asthma     Chronic combined systolic and diastolic CHF (congestive heart failure) (HCC) 8/26/2022    Chronic kidney disease     Chronic pain     Cirrhosis ([facility] [address] 12/17/2017    Diabetes ([facility] [address] diet controlled    GERD (gastroesophageal reflux disease)     Hypertension     Paroxysmal atrial fibrillation ([facility] [address] 10/6/2020      Past Surgical History:   Procedure Laterality Date    COLONOSCOPY N/A 1/12/2018    COLONOSCOPY performed by Katharina Andre MD at THE [facility] ENDOSCOPY    COLONOSCOPY N/A 3/19/2018    COLONOSCOPY performed by Katharina Andre MD at THE [facility] ENDOSCOPY    HX GYN  c section    HX VASCULAR ACCESS      dialysis     IR BX TRANSCATHETER  11/24/2020    IR PARACENTESIS ABD W IMAGE  10/22/2020      Family History   Family history unknown: Yes      History reviewed, no pertinent family history.   Social History     Tobacco Use    Smoking status: Never    Smokeless tobacco: Never   Substance Use Topics    Alcohol use: No     Allergies   Allergen Reactions    Aleve [Naproxen Sodium] Itching, Swelling and Other (comments)    Amlodipine Rash, Hives and Itching    Aspirin Other (comments), Nausea Only and Unknown (comments)     GI bleed  GI bleeding      Heparin Unknown (comments)     bleeding      Ibuprofen Nausea and Vomiting    Metformin Other (comments)     swelling      Current Facility-Administered Medications   Medication Dose Route Frequency    sodium chloride (OCEAN) 0.65 % nasal squeeze bottle 2 Spray  2 Spray Both Nostrils PRN    amiodarone (CORDARONE) tablet 200 mg  200 mg Oral BID    [Held by provider] heparin 25,000 units in D5W 250 ml infusion  12-25 Units/kg/hr IntraVENous TITRATE    famotidine (PEPCID) tablet 20 mg  20 mg Oral DAILY PRN    metoprolol (LOPRESSOR) injection 5 mg  5 mg IntraVENous Q3H PRN    albumin human 25% (BUMINATE) solution 25 g  25 g IntraVENous Q6H PRN    ipratropium (ATROVENT) 0.02 % nebulizer solution 0.5 mg  0.5 mg Nebulization Q4H PRN    albumin human 25% (BUMINATE) solution 12.5 g  12.5 g IntraVENous DIALYSIS PRN    traMADoL (ULTRAM) tablet 50 mg  50 mg Oral Q6H PRN    ascorbic acid (vitamin C) (VITAMIN C) tablet 500 mg  500 mg Oral DAILY    cholecalciferol (VITAMIN D3) (1000 Units /25 mcg) tablet 2,000 Units  2,000 Units Oral DAILY    ferrous sulfate tablet 325 mg  325 mg Oral DAILY WITH BREAKFAST    lactulose (CHRONULAC) 10 gram/15 mL solution 30 mL  20 g Oral BID    sevelamer carbonate (RENVELA) tab 1,600 mg  1,600 mg Oral TID WITH MEALS    sodium chloride (NS) flush 5-40 mL  5-40 mL IntraVENous Q8H    sodium chloride (NS) flush 5-40 mL  5-40 mL IntraVENous PRN    acetaminophen (TYLENOL) tablet 650 mg  650 mg Oral Q6H PRN    Or    acetaminophen (TYLENOL) suppository 650 mg  650 mg Rectal Q6H PRN    polyethylene glycol (MIRALAX) packet 17 g  17 g Oral DAILY PRN    ondansetron (ZOFRAN ODT) tablet 4 mg  4 mg Oral Q8H PRN    Or    ondansetron (ZOFRAN) injection 4 mg  4 mg IntraVENous Q6H PRN    insulin lispro (HUMALOG) injection SubCUTAneous TIDAC    glucose chewable tablet 16 g  4 Tablet Oral PRN    glucagon (GLUCAGEN) injection 1 mg  1 mg IntraMUSCular PRN    dextrose 10 % infusion 0-250 mL  0-250 mL IntraVENous PRN    diphenhydrAMINE (BENADRYL) capsule 25 mg  25 mg Oral Q6H PRN          LAB AND IMAGING FINDINGS:     Lab Results   Component Value Date/Time    WBC 5.9 08/25/2022 05:41 AM    HGB 10.1 (L) 08/25/2022 05:41 AM    PLATELET 519 (L) 72/23/9167 05:41 AM     Lab Results   Component Value Date/Time    Sodium 140 08/25/2022 05:37 AM    Potassium 4.6 08/25/2022 05:37 AM    Chloride 108 08/25/2022 05:37 AM    CO2 26 08/25/2022 05:37 AM    BUN 19 08/25/2022 05:37 AM    Creatinine 5.24 (H) 08/25/2022 05:37 AM    Calcium 7.9 (L) 08/25/2022 05:37 AM    Magnesium 2.4 08/23/2022 12:55 AM    Phosphorus 5.0 (H) 08/23/2022 12:55 AM      Lab Results   Component Value Date/Time    Alk. phosphatase 59 08/25/2022 05:37 AM    Protein, total 4.5 (L) 08/25/2022 05:37 AM    Albumin 2.8 (L) 08/25/2022 05:37 AM    Globulin 1.7 (L) 08/25/2022 05:37 AM     Lab Results   Component Value Date/Time    INR 1.0 11/25/2020 03:21 AM    Prothrombin time 10.7 11/25/2020 03:21 AM    aPTT 24.9 11/25/2020 03:21 AM    aPTT 28.6 09/25/2019 07:30 PM      Lab Results   Component Value Date/Time    Iron 43 11/27/2020 02:28 AM    TIBC 310 11/27/2020 02:28 AM    Iron % saturation 14 (L) 11/27/2020 02:28 AM    Ferritin 37 11/27/2020 02:28 AM      No results found for: PH, PCO2, PO2  No components found for: [place]   Lab Results   Component Value Date/Time    CK 78 09/25/2019 07:30 PM    CK - MB 3.5 09/25/2019 07:30 PM                Total time: 25 min   Counseling / coordination time, spent as noted above: 20 min   > 50% counseling / coordination?: yes    Prolonged service was provided for  []30 min   []75 min in face to face time in the presence of the patient, spent as noted above.   Time Start:   Time End:   Note: this can only be billed with 19558 (initial) or 77561 (follow up).  If multiple start / stop times, list each separately.

## 2022-08-26 NOTE — PROGRESS NOTES
[facility] Adult  Hospitalist Group                                                                                          Hospitalist Progress Note  Oswaldo Gonzalez MD  Answering service: [phone] OR [phone] from in house phone        Date of Service:  2022  NAME:  Jeffrey Murray  :  1950  MRN:  [id number]      Admission Summary:   Jeffrey Murray is a 66 y.o. female with past medical history of end-stage renal disease on hemodialysis Monday/Wednesday/Friday, type 2 diabetes mellitus, paroxysmal atrial fibrillation, chronic anemia, hypertension, congestive heart failure, cirrhosis who presented to the emergency department with lightheadedness and fall. Patient was taking a shower and suddenly she felt lightheadedness and went to the ground. Interval history / Subjective:   Pt feels dizzy today, no procedures planned per cardiology. PT worked with patient and is recommending possible SNF. Palliative care to see   Leaking from around the paracentesis site   Assessment & Plan:     Near syncope  w/ tachycardia POA resolved  Wide-complex tachycardia POA resolved  Atrial fibrillation with RVR not POA resolved  History of paroxysmal atrial fibrillation  -s/p amio bolus   -Assessed by cardiology  -Unable to tolerate beta-blocker due to hypotension  -Unable to tolerate amiodarone gtt.  due to hypotension  -Cardiology following  -No plan for AV tony ablation, ICD placement or PPM  - [facility]   - Oral amiodarone resumed   - No OAC due to h/o GIB and varices       Severe combined Biventricular heart failure    - TTE w/ EF 10-15 % , severe hypokinesis, TAPSE 1.0 cm severe TR and moderate MR   - Cardiology following  - Discussed possible lifevest with cardiology, and do not recommend at this time.   - Not able to tolerate BB d/t episodic hypotension   - Consult palliative care, prognosis very guarded < 6 months, appreciate assistance in her care ESRD hemodialysis Monday Wednesday Friday  Hyperkalemia POA resolving  Anemia of chronic kidney disease  -Nephrology following  -Continue HD per nephrology  -Brea Chung as needed      Type 2 diabetes mellitus -A1c 4.9  - Lispro correctional scale, FSG AC HS  - Consistent carb diet, hypoglycemia protocol. Pulmonary nodules per imaging  -Chest CT no specific small pulmonary nodules  -Outpatient follow-up with pulmonology for management     Cirrhosis on CT scan  Ascites  - Ultrasound liver ascites   - s/p paracentesis, drain removed yesterday 8/25  - Call IR to assess drain site for leaking.   - Albumin 25 gram for each 5 liters of fluid taken   - Outpatient follow-up with hepatology for further management     Code status: FULL  Prophylaxis: SCDs  Care Plan discussed with: pt, RN and CM   Anticipated Disposition: Likely home tomorrow with [address] Problems  Date Reviewed: 12/1/2020            Codes Class Noted POA    Tachycardia ICD-10-CM: R00.0  ICD-9-CM: 785.0  8/22/2022 Unknown           Review of Systems:   A comprehensive review of systems was negative except for that written in the HPI. Vital Signs:    Last 24hrs VS reviewed since prior progress note. Most recent are:  Visit Vitals  BP (!) 101/55   Pulse 94   Temp 98.2 °F (36.8 °C)   Resp 16   Ht 5' 3\" (1.6 m)   Wt 68.4 kg (150 lb 14.4 oz)   SpO2 96%   BMI 26.73 kg/m²         Intake/Output Summary (Last 24 hours) at 8/26/2022 1616  Last data filed at 8/26/2022 3591  Gross per 24 hour   Intake 240 ml   Output --   Net 240 ml          Physical Examination:     I had a face to face encounter with this patient and independently examined them on 8/26/2022 as outlined below:          Constitutional:  No acute distress, cooperative, pleasant    ENT:  Oral mucosa moist, oropharynx benign. Resp:  CTA bilaterally. No wheezing/rhonchi/rales. No accessory muscle use. CV:  Regular rhythm, normal rate, no murmurs, gallops, rubs.  Tunneled dialysis line in place     GI:  Soft, non distended, non tender. normoactive bowel sounds, no hepatosplenomegaly     Musculoskeletal:  No edema, warm, 2+ pulses throughout    Neurologic:  Moves all extremities. AAOx3, CN II-XII reviewed            Data Review:    Review and/or order of clinical lab test  Review and/or order of tests in the radiology section of [facility]  Review and/or order of tests in the medicine section of [facility]      Labs:     Recent Labs     08/25/22  0541 08/24/22  0730   WBC 5.9 6.5   HGB 10.1* 11.6   HCT 33.7* 38.8   * 170       Recent Labs     08/25/22  0537 08/24/22  0451    141   K 4.6 5.5*    108   CO2 26 25   BUN 19 27*   CREA 5.24* 6.25*   GLU 82 89   CA 7.9* 9.1       Recent Labs     08/25/22  0537   ALT 12   AP 59   TBILI 0.5   TP 4.5*   ALB 2.8*   GLOB 1.7*       No results for input(s): INR, PTP, APTT, INREXT, INREXT in the last 72 hours. No results for input(s): FE, TIBC, PSAT, FERR in the last 72 hours. Lab Results   Component Value Date/Time    Folate 16.4 10/19/2020 09:34 AM        No results for input(s): PH, PCO2, PO2 in the last 72 hours. No results for input(s): CPK, CKNDX, TROIQ in the last 72 hours.     No lab exists for component: CPKMB  Lab Results   Component Value Date/Time    Cholesterol, total 114 11/25/2020 03:21 AM    HDL Cholesterol 59 11/25/2020 03:21 AM    LDL, calculated 38 11/25/2020 03:21 AM    Triglyceride 85 11/25/2020 03:21 AM    CHOL/HDL Ratio 1.9 11/25/2020 03:21 AM     Lab Results   Component Value Date/Time    Glucose (POC) 141 (H) 08/26/2022 11:48 AM    Glucose (POC) 97 08/25/2022 09:14 PM    Glucose (POC) 88 08/25/2022 04:05 PM    Glucose (POC) 141 (H) 08/25/2022 10:58 AM    Glucose (POC) 89 08/25/2022 06:48 AM     No results found for: COLOR, APPRN, SPGRU, REFSG, VERENA, PROTU, GLUCU, KETU, BILU, UROU, TIBURCIO, LEUKU, GLUKE, EPSU, BACTU, WBCU, RBCU, CASTS, UCRY      Medications Reviewed:     Current Facility-Administered Medications   Medication Dose Route Frequency    sodium chloride (OCEAN) 0.65 % nasal squeeze bottle 2 Spray  2 Spray Both Nostrils PRN    amiodarone (CORDARONE) tablet 200 mg  200 mg Oral BID    [Held by provider] heparin 25,000 units in D5W 250 ml infusion  12-25 Units/kg/hr IntraVENous TITRATE    famotidine (PEPCID) tablet 20 mg  20 mg Oral DAILY PRN    metoprolol (LOPRESSOR) injection 5 mg  5 mg IntraVENous Q3H PRN    albumin human 25% (BUMINATE) solution 25 g  25 g IntraVENous Q6H PRN    ipratropium (ATROVENT) 0.02 % nebulizer solution 0.5 mg  0.5 mg Nebulization Q4H PRN    albumin human 25% (BUMINATE) solution 12.5 g  12.5 g IntraVENous DIALYSIS PRN    traMADoL (ULTRAM) tablet 50 mg  50 mg Oral Q6H PRN    ascorbic acid (vitamin C) (VITAMIN C) tablet 500 mg  500 mg Oral DAILY    cholecalciferol (VITAMIN D3) (1000 Units /25 mcg) tablet 2,000 Units  2,000 Units Oral DAILY    ferrous sulfate tablet 325 mg  325 mg Oral DAILY WITH BREAKFAST    lactulose (CHRONULAC) 10 gram/15 mL solution 30 mL  20 g Oral BID    sevelamer carbonate (RENVELA) tab 1,600 mg  1,600 mg Oral TID WITH MEALS    sodium chloride (NS) flush 5-40 mL  5-40 mL IntraVENous Q8H    sodium chloride (NS) flush 5-40 mL  5-40 mL IntraVENous PRN    acetaminophen (TYLENOL) tablet 650 mg  650 mg Oral Q6H PRN    Or    acetaminophen (TYLENOL) suppository 650 mg  650 mg Rectal Q6H PRN    polyethylene glycol (MIRALAX) packet 17 g  17 g Oral DAILY PRN    ondansetron (ZOFRAN ODT) tablet 4 mg  4 mg Oral Q8H PRN    Or    ondansetron (ZOFRAN) injection 4 mg  4 mg IntraVENous Q6H PRN    insulin lispro (HUMALOG) injection   SubCUTAneous TIDAC    glucose chewable tablet 16 g  4 Tablet Oral PRN    glucagon (GLUCAGEN) injection 1 mg  1 mg IntraMUSCular PRN    dextrose 10 % infusion 0-250 mL  0-250 mL IntraVENous PRN    diphenhydrAMINE (BENADRYL) capsule 25 mg  25 mg Oral Q6H PRN     ______________________________________________________________________  EXPECTED LENGTH OF STAY: 3d 19h  ACTUAL LENGTH OF STAY:          4                 Shima Fenton MD

## 2022-08-26 NOTE — NURSE NAVIGATOR
HEART FAILURE NURSE NAVIGATOR NOTE  [address]    Patient chart was reviewed by Heart Failure (HF) Nurse Navigators for compliance of prescribed treatment with guidelines directed medical therapy (GDMT) and HF database completed. Please, review beneath recommendations for symptomatic patients with HF with Reduced Ejection Fraction ? 40% (HFrEF) for your consideration when taking care of this patient. Current Medical Therapy:    Name Cris Stacy    1950   LVEF 10/15   NYHA Functional Class Not documented   ARNi/ACEi/ARB Contraindicated low blood pressure   Beta-blocker Contraindicated low blood pressure   Aldosterone Antagonist Contraindicated low blood pressure   SGLT2 inhibitor Contraindicated dialysis   Hydralazine/Isosorbide Dinitrate    Consulting Cardiologist: BRUCE     Recommendations:    Please, add the following GDMT for HFrEF ? 40% [Class 1] or document in discharge summary/progress note why patient cannot take the medication:  ARNi/ACEi or ARB  Beta-blockers (carvedilol, sustained-release metoprolol succinate or bisoprolol)  Aldosterone antagonists GFR > 30 and K< 5  SGLT2 inhibitor  Hydralazine/Isosorbide dinitrate for  Americans with Class III/IV symptoms  Adjust diuretic dose at discharge if hospitalized for volume overload    Consider adding the following GDMT for HFrEF ? 40%, if appropriate [Class 2b]:   Ivabradine for patients on maximally tolerated beta-blocker dose in order to achieve HR 70-80bpm  Digoxin, goal level 0.5-0.9  Polyunsaturated fatty acids  Vericuguat  For patient with hyperkalemia while on RAASi > 5.5, consider adding potassium binders (patiromer, sodium zirconium cycosilicate)    Note: the following medications may be potentially harmful in heart failure [Class 3]:  Calcium channel blockers (doxazosin, diltiazem, verapamil, nifedipine)  Antiarrhythmics (flecanide, disopyrimide, sotalol, dronedarone)  Diabetes medications (thiasolidinediones, saxagliptin, alogliptin)  NSAIDs and HECK 2 inhibitors    Consider vaccinations for respiratory illnesses (flu, pneumonia, covid) [Class 2b]    For eligible patients with LVEF < 35% consider discharge with wearable defibrillation [Class 2b] and/or referral for ICD implantation [Class 1] for prevention of sudden cardiac death. Due to severely reduced LVEF < 25% and/or recurrent hospitalizations this patient may benefit from referral to Advanced Heart Failure Program to assess suitability for advanced therapies, such as left-ventricular assist device, heart transplantation, palliative inotropes or palliation [Class 1]. To obtain in-patient consultation or refer to [address] [place] [address], call [phone]    Patient Education:     Teach back in heart failure education provided, including information about medical therapy, lifestyle modifications, diet and fluid restrictions, physical activity. Educational resources provided: Living with Heart Failure booklet; Signs/Symptoms magnet; Weight Calendar; [facility] flyer; Preparation for Successful Discharge Checklist.  Information provided about HF support group. Heart failure avoiding triggers on discharge instructions. Plan for Transitional Care:    Post discharge follow up phone call to be made within 48-72 hours of discharge. Patient will follow-up with PCP. Patient will follow-up with Primary Cardiologist.  Obstructive sleep apnea screening done and patient was referred to Sleep Medicine. Referral/follow-up with Cardiac Rehabilitation. Referral/follow-up with Advanced Heart Failure Specialist.  Referral/follow-up with Palliative Care Specialist.      Heart Failure Nurse Navigator  Phone: [phone]  /  [phone]    *Recommendations listed above are based on 2022 AHA/ACC/HFSA Guideline for the Management of Heart Failure:  A Report of the [address] [place] [address] Committee on Clinical Practice Guidelines. Circulation 2022; [id number]. and 2017 AHA/ACC/HRS guideline for management of patients with ventricular arrhythmias and the prevention of sudden cardiac death: A Report of the [employer] of Cardiology/American Heart Association Task Force on Clinical Practice Guidelines and the Heart Rhythm Society.  Heart Rhythm, Vol 15, No 10, October 2018 *Class of Recommendation: Class 1 (strong), Class 2a (moderate), Class 2b (weak), Class 3 (not recommended, potentially harmful)

## 2022-08-26 NOTE — PROGRESS NOTES
[facility]   [address] [facility], [address] [facility]  Phone: [phone]   [phone]    [email or web address]     Nephrology Progress Note    Patient Name : Yazmin Medley      : 1950     MRN : [id number]  Date of Admission : 2022  Date of Servive : 22    CC:  Follow up for ESRD       Assessment and Plan   ESRD- HD :  - Dialyzes at [facility] on MWF schedule   - has Naren Horan for access   -HD today per schedule  -Dialysis complicated by hypotension limiting UF    Cardiac Cirrhosis   RV failure, severe TR  PAF  Chronic HFrEF   Chronic hypotension  -S/p paracentesis   -EP following    Anemia in CKD   - resume CAROL ANN when Hb < 11 g  - hx of small bowel AVMs and needed RBC transfusions     Sec HPTH  - continue home meds     Hx of Hep C  DM-II  HTN          Interval History:  Seen and examined. She reports feeling well. She celebrated her birthday yesterday  For HD later today. BP is low but stable and is asymptomatic  Denies any abdominal pain, nausea, vomiting. Review of Systems: A comprehensive review of systems was negative except for that written in the HPI.     Current Medications:   Current Facility-Administered Medications   Medication Dose Route Frequency    sodium chloride (OCEAN) 0.65 % nasal squeeze bottle 2 Spray  2 Spray Both Nostrils PRN    amiodarone (CORDARONE) tablet 200 mg  200 mg Oral BID    [Held by provider] heparin 25,000 units in D5W 250 ml infusion  12-25 Units/kg/hr IntraVENous TITRATE    famotidine (PEPCID) tablet 20 mg  20 mg Oral DAILY PRN    metoprolol (LOPRESSOR) injection 5 mg  5 mg IntraVENous Q3H PRN    albumin human 25% (BUMINATE) solution 25 g  25 g IntraVENous Q6H PRN    ipratropium (ATROVENT) 0.02 % nebulizer solution 0.5 mg  0.5 mg Nebulization Q4H PRN    albumin human 25% (BUMINATE) solution 12.5 g  12.5 g IntraVENous DIALYSIS PRN    traMADoL (ULTRAM) tablet 50 mg  50 mg Oral Q6H PRN    ascorbic acid (vitamin C) (VITAMIN C) tablet 500 mg  500 mg Oral DAILY    cholecalciferol (VITAMIN D3) (1000 Units /25 mcg) tablet 2,000 Units  2,000 Units Oral DAILY    ferrous sulfate tablet 325 mg  325 mg Oral DAILY WITH BREAKFAST    lactulose (CHRONULAC) 10 gram/15 mL solution 30 mL  20 g Oral BID    sevelamer carbonate (RENVELA) tab 1,600 mg  1,600 mg Oral TID WITH MEALS    sodium chloride (NS) flush 5-40 mL  5-40 mL IntraVENous Q8H    sodium chloride (NS) flush 5-40 mL  5-40 mL IntraVENous PRN    acetaminophen (TYLENOL) tablet 650 mg  650 mg Oral Q6H PRN    Or    acetaminophen (TYLENOL) suppository 650 mg  650 mg Rectal Q6H PRN    polyethylene glycol (MIRALAX) packet 17 g  17 g Oral DAILY PRN    ondansetron (ZOFRAN ODT) tablet 4 mg  4 mg Oral Q8H PRN    Or    ondansetron (ZOFRAN) injection 4 mg  4 mg IntraVENous Q6H PRN    insulin lispro (HUMALOG) injection   SubCUTAneous TIDAC    glucose chewable tablet 16 g  4 Tablet Oral PRN    glucagon (GLUCAGEN) injection 1 mg  1 mg IntraMUSCular PRN    dextrose 10 % infusion 0-250 mL  0-250 mL IntraVENous PRN    diphenhydrAMINE (BENADRYL) capsule 25 mg  25 mg Oral Q6H PRN      Allergies   Allergen Reactions    Aleve [Naproxen Sodium] Itching, Swelling and Other (comments)    Amlodipine Rash, Hives and Itching    Aspirin Other (comments), Nausea Only and Unknown (comments)     GI bleed  GI bleeding      Heparin Unknown (comments)     bleeding      Ibuprofen Nausea and Vomiting    Metformin Other (comments)     swelling       Objective:  Vitals:    Vitals:    08/26/22 0550 08/26/22 0600 08/26/22 0801 08/26/22 0816   BP:  (!) 110/57 (!) 100/52 (!) 100/52   Pulse: 99 95 95 95   Resp:  16  16   Temp:  98.6 °F (37 °C)  97.8 °F (36.6 °C)   SpO2:  99%  99%   Weight:       Height:         Intake and Output:  No intake/output data recorded. 08/24 1901 - 08/26 0700  In: 1000 [P.O.:900;  I.V.:100]  Out: 641 [Drains:640]    Physical Examination:        General: NAD,Conversant   Neck:  Supple, no mass  Resp:  Lungs CTA B/L, no wheezing , normal respiratory effort  CV:  RRR,  no murmur or rub, LE edema  GI:  Soft, nontender  Neurologic:  Non focal  Dialysis Access : [facility]- C/D/I    []    High complexity decision making was performed  []    Patient is at high-risk of decompensation with multiple organ involvement    Lab Data Personally Reviewed: I have reviewed all the pertinent labs, microbiology data and radiology studies during assessment. Recent Labs     08/25/22  0537 08/24/22  0451    141   K 4.6 5.5*    108   CO2 26 25   GLU 82 89   BUN 19 27*   CREA 5.24* 6.25*   CA 7.9* 9.1   ALB 2.8*  --    ALT 12  --        Recent Labs     08/25/22  0541 08/24/22  0730 08/24/22  0451   WBC 5.9 6.5 7.0   HGB 10.1* 11.6 12.0   HCT 33.7* 38.8 40.1   * 170 168       No results found for: SDES  Lab Results   Component Value Date/Time    Culture result: PENDING 08/24/2022 10:42 AM    Culture result: MRSA NOT PRESENT 08/22/2022 10:58 PM    Culture result:  08/22/2022 10:58 PM     Screening of patient nares for MRSA is for surveillance purposes and, if positive, to facilitate isolation considerations in high risk settings. It is not intended for automatic decolonization interventions per se as regimens are not sufficiently effective to warrant routine use. Culture result: NO GROWTH 4 DAYS 08/22/2022 10:42 AM    Culture result: NO GROWTH 4 DAYS 11/19/2020 02:50 PM             I have reviewed the flowsheets. Chart and Pertinent Notes have been reviewed. No change in PMH ,family and social history from Consult note.       Mortimer Boer, [facility] [facility]

## 2022-08-26 NOTE — PROGRESS NOTES
Palliative Care Consult:    I visited with Kenneth Rosenthal at [facility] in [facility] CV SERVICES UNIT and introduced [address] [facility]. I reviewed patient's chart prior to this encounter; initiated a relationship of care and concern; explored spiritual beliefs; emotional concerns; and coping strategies. Spiritual Assessment: Ms. Mejía uses her nadine to navigate life's challenges including this medical crises. I invited Kenneth Rosenthal to share her feelings/concerns. We discussed medical journey leading to this admission. She shared family dynamics that are stressful. I provided reflective listening, spiritual guidance and encouragement. Kenneth Rosenthal was joyful during this encounter. She expressed appreciation for my visit and care. I informed Kenneth Rosenthal about availability and encouraged them to request additional support as needed. Rev.  Che Camejo MDiv, MS, [facility]  Staff

## 2022-08-26 NOTE — DISCHARGE INSTRUCTIONS
Dear Naren Rodriguez,    Thank you for choosing [address] [place] [address] for your healthcare needs. We strive to provide EXCELLENT care to you and your family. In an effort to explain clearly why you were here in the hospital, I've also written a very brief summary below. Other details including formal diagnosis, medication changes, and follow up appointment recommendations can also be found in this packet. You were admitted for almost passing out due to a fast heart rate for which you were started on amiodarone. Your blood pressure was also found to be low and you were started on midodrine. You also received care from specialist physicians in the following specialties:  - Cardiology   - Nephrology     Here are the updates to your medication list:  **Start midodrine to help keep your blood pressure up  **Start amiodarone 400mg x 1 week, and then decrease to 200mg daily     Remember that it is important for you to take your medications exactly as they are prescribed. It is helpful to keep a list of your medication with the names, dosages, and times to be taken in your wallet. Additionally,   - Please make sure to follow up with your primary care physician within 1-2 weeks of discharge for hospital follow up. You should also follow up with cardiology and nephrology at the [place]  - Please make sure to continue to monitor for: Chest pain, shortness of breath, high fevers,  and return to the Emergency Department with any of these symptoms.   - Please get up slowly from a seated or laying position, avoid falls. - Avoid tobacco, alcohol and other illicit drug use. - Diet restrictions: resume prior, cardiac and renal   - Activity restrictions: As tolerated    Make sure to also see your primary care doctor for follow-up. Bring these papers with you and be sure to review your medication list with your doctor. I cannot stress the importance of follow up enough.  I've included the information for your follow-up appointments below:      FOLLOW UP APPOINTMENTS:    Follow-up Information       Follow up With Specialties Details Why [address] Call Call to discuss participation in the outpatient cardiac rehab program for heart [facility]. [address] [facility]  [phone]    Enid Cabrera MD Family Medicine   [facility] [place] [address]  [phone]                 At this time, the following test results are still pending: None  Again, please follow-up these results with your primary care provider. Should you have any fever over 101 degrees for 24 hours, chest pain, shortness of breath, fever, chills, nausea, vomiting, diarrhea, change in mentation, falling, weakness, bleeding, or worsening pain, please seek medical attention immediately. Finally, as your discharging physician, you may be receiving a survey regarding my care. I would greatly value and appreciate your input in the survey as we strive for excellence. If you have any questions, I can be reached at [phone].   Thank you so much again for allowing me to care for you at [address] [place] [address].    Respectfully yours,  Zoltan Morales MD

## 2022-08-27 LAB
ANION GAP SERPL CALC-SCNC: 4 MMOL/L (ref 5–15)
APTT PPP: 32.4 SEC (ref 22.1–31)
ATRIAL RATE: 124 BPM
BACTERIA SPEC CULT: NORMAL
BASOPHILS # BLD: 0.1 K/UL (ref 0–0.1)
BASOPHILS NFR BLD: 1 % (ref 0–1)
BUN SERPL-MCNC: 16 MG/DL (ref 6–20)
BUN/CREAT SERPL: 3 (ref 12–20)
CALCIUM SERPL-MCNC: 8 MG/DL (ref 8.5–10.1)
CALCULATED P AXIS, ECG09: 88 DEGREES
CALCULATED R AXIS, ECG10: -52 DEGREES
CALCULATED T AXIS, ECG11: 127 DEGREES
CHLORIDE SERPL-SCNC: 107 MMOL/L (ref 97–108)
CO2 SERPL-SCNC: 30 MMOL/L (ref 21–32)
CREAT SERPL-MCNC: 5.21 MG/DL (ref 0.55–1.02)
DIAGNOSIS, 93000: NORMAL
DIFFERENTIAL METHOD BLD: ABNORMAL
EOSINOPHIL # BLD: 0.2 K/UL (ref 0–0.4)
EOSINOPHIL NFR BLD: 4 % (ref 0–7)
ERYTHROCYTE [DISTWIDTH] IN BLOOD BY AUTOMATED COUNT: 16.6 % (ref 11.5–14.5)
GLUCOSE BLD STRIP.AUTO-MCNC: 130 MG/DL (ref 65–117)
GLUCOSE BLD STRIP.AUTO-MCNC: 133 MG/DL (ref 65–117)
GLUCOSE BLD STRIP.AUTO-MCNC: 165 MG/DL (ref 65–117)
GLUCOSE BLD STRIP.AUTO-MCNC: 188 MG/DL (ref 65–117)
GLUCOSE SERPL-MCNC: 107 MG/DL (ref 65–100)
HCT VFR BLD AUTO: 35.6 % (ref 35–47)
HGB BLD-MCNC: 10.8 G/DL (ref 11.5–16)
IMM GRANULOCYTES # BLD AUTO: 0 K/UL (ref 0–0.04)
IMM GRANULOCYTES NFR BLD AUTO: 1 % (ref 0–0.5)
LYMPHOCYTES # BLD: 0.9 K/UL (ref 0.8–3.5)
LYMPHOCYTES NFR BLD: 15 % (ref 12–49)
MAGNESIUM SERPL-MCNC: 2.2 MG/DL (ref 1.6–2.4)
MCH RBC QN AUTO: 28.9 PG (ref 26–34)
MCHC RBC AUTO-ENTMCNC: 30.3 G/DL (ref 30–36.5)
MCV RBC AUTO: 95.2 FL (ref 80–99)
MONOCYTES # BLD: 0.5 K/UL (ref 0–1)
MONOCYTES NFR BLD: 9 % (ref 5–13)
NEUTS SEG # BLD: 3.9 K/UL (ref 1.8–8)
NEUTS SEG NFR BLD: 70 % (ref 32–75)
NRBC # BLD: 0 K/UL (ref 0–0.01)
NRBC BLD-RTO: 0 PER 100 WBC
P-R INTERVAL, ECG05: 114 MS
PHOSPHATE SERPL-MCNC: 3 MG/DL (ref 2.6–4.7)
PLATELET # BLD AUTO: 154 K/UL (ref 150–400)
PMV BLD AUTO: 11.7 FL (ref 8.9–12.9)
POTASSIUM SERPL-SCNC: 3.9 MMOL/L (ref 3.5–5.1)
Q-T INTERVAL, ECG07: 340 MS
QRS DURATION, ECG06: 160 MS
QTC CALCULATION (BEZET), ECG08: 488 MS
RBC # BLD AUTO: 3.74 M/UL (ref 3.8–5.2)
SERVICE CMNT-IMP: ABNORMAL
SERVICE CMNT-IMP: NORMAL
SODIUM SERPL-SCNC: 141 MMOL/L (ref 136–145)
THERAPEUTIC RANGE,PTTT: ABNORMAL SECS (ref 58–77)
VENTRICULAR RATE, ECG03: 124 BPM
WBC # BLD AUTO: 5.6 K/UL (ref 3.6–11)

## 2022-08-27 PROCEDURE — 74011000250 HC RX REV CODE- 250: Performed by: FAMILY MEDICINE

## 2022-08-27 PROCEDURE — 83735 ASSAY OF MAGNESIUM: CPT

## 2022-08-27 PROCEDURE — 74011636637 HC RX REV CODE- 636/637: Performed by: FAMILY MEDICINE

## 2022-08-27 PROCEDURE — 74011250637 HC RX REV CODE- 250/637: Performed by: FAMILY MEDICINE

## 2022-08-27 PROCEDURE — 84100 ASSAY OF PHOSPHORUS: CPT

## 2022-08-27 PROCEDURE — 80048 BASIC METABOLIC PNL TOTAL CA: CPT

## 2022-08-27 PROCEDURE — 36415 COLL VENOUS BLD VENIPUNCTURE: CPT

## 2022-08-27 PROCEDURE — 74011250637 HC RX REV CODE- 250/637: Performed by: STUDENT IN AN ORGANIZED HEALTH CARE EDUCATION/TRAINING PROGRAM

## 2022-08-27 PROCEDURE — 82962 GLUCOSE BLOOD TEST: CPT

## 2022-08-27 PROCEDURE — 74011250636 HC RX REV CODE- 250/636: Performed by: FAMILY MEDICINE

## 2022-08-27 PROCEDURE — 99232 SBSQ HOSP IP/OBS MODERATE 35: CPT | Performed by: INTERNAL MEDICINE

## 2022-08-27 PROCEDURE — 85025 COMPLETE CBC W/AUTO DIFF WBC: CPT

## 2022-08-27 PROCEDURE — 85730 THROMBOPLASTIN TIME PARTIAL: CPT

## 2022-08-27 PROCEDURE — 74011250637 HC RX REV CODE- 250/637: Performed by: INTERNAL MEDICINE

## 2022-08-27 PROCEDURE — 74011250637 HC RX REV CODE- 250/637: Performed by: SPECIALIST

## 2022-08-27 PROCEDURE — 65660000001 HC RM ICU INTERMED STEPDOWN

## 2022-08-27 RX ADMIN — ACETAMINOPHEN 650 MG: 325 TABLET, FILM COATED ORAL at 10:25

## 2022-08-27 RX ADMIN — LACTULOSE 30 ML: 20 SOLUTION ORAL at 17:36

## 2022-08-27 RX ADMIN — SEVELAMER CARBONATE 1600 MG: 800 TABLET, FILM COATED ORAL at 11:54

## 2022-08-27 RX ADMIN — AMIODARONE HYDROCHLORIDE 200 MG: 200 TABLET ORAL at 08:50

## 2022-08-27 RX ADMIN — SODIUM CHLORIDE, PRESERVATIVE FREE 10 ML: 5 INJECTION INTRAVENOUS at 21:27

## 2022-08-27 RX ADMIN — ONDANSETRON HYDROCHLORIDE 4 MG: 2 INJECTION, SOLUTION INTRAMUSCULAR; INTRAVENOUS at 09:50

## 2022-08-27 RX ADMIN — Medication 2 UNITS: at 06:57

## 2022-08-27 RX ADMIN — SEVELAMER CARBONATE 1600 MG: 800 TABLET, FILM COATED ORAL at 08:50

## 2022-08-27 RX ADMIN — FERROUS SULFATE TAB 325 MG (65 MG ELEMENTAL FE) 325 MG: 325 (65 FE) TAB at 08:51

## 2022-08-27 RX ADMIN — SODIUM CHLORIDE, PRESERVATIVE FREE 10 ML: 5 INJECTION INTRAVENOUS at 05:04

## 2022-08-27 RX ADMIN — SODIUM CHLORIDE, PRESERVATIVE FREE 10 ML: 5 INJECTION INTRAVENOUS at 17:37

## 2022-08-27 RX ADMIN — ACETAMINOPHEN 650 MG: 325 TABLET, FILM COATED ORAL at 21:26

## 2022-08-27 RX ADMIN — SALINE NASAL SPRAY 2 SPRAY: 1.5 SOLUTION NASAL at 19:06

## 2022-08-27 RX ADMIN — AMIODARONE HYDROCHLORIDE 200 MG: 200 TABLET ORAL at 17:36

## 2022-08-27 RX ADMIN — TRAMADOL HYDROCHLORIDE 50 MG: 50 TABLET ORAL at 05:04

## 2022-08-27 RX ADMIN — OXYCODONE HYDROCHLORIDE AND ACETAMINOPHEN 500 MG: 500 TABLET ORAL at 08:50

## 2022-08-27 RX ADMIN — Medication 2000 UNITS: at 08:50

## 2022-08-27 RX ADMIN — ONDANSETRON HYDROCHLORIDE 4 MG: 2 INJECTION, SOLUTION INTRAMUSCULAR; INTRAVENOUS at 18:28

## 2022-08-27 RX ADMIN — TRAMADOL HYDROCHLORIDE 50 MG: 50 TABLET ORAL at 19:06

## 2022-08-27 RX ADMIN — FAMOTIDINE 20 MG: 20 TABLET ORAL at 18:35

## 2022-08-27 RX ADMIN — SEVELAMER CARBONATE 1600 MG: 800 TABLET, FILM COATED ORAL at 17:36

## 2022-08-27 RX ADMIN — LACTULOSE 30 ML: 20 SOLUTION ORAL at 08:51

## 2022-08-27 NOTE — PROGRESS NOTES
[facility]  Cardiology Care Note                  []Initial visit     [x]Established visit     Patient Name: Anahi Almodovar - V:[id number]    Consulting Cardiologist: Cece Watson MD   Reason for consult:wide complex tachycardia      HPI: previously   Zarina Lowry is a 67 y.o. female with ESRD on HD, admitted 8/22/22 am via ER with dizziness, too weak to get in the shower. EMS saw atrial fibrillation at 150This was second episode of dizziness in 5 days. Pt reports some worsening of her usual mild SARKAR and edema, denies chest pain, Labs notable for initial troponin of 74, Mag 2.5 Hgb 11.3 and admit for sepsis. K was 5.0 on admit and 6.6 on POC next morning (today) at 1255 am when pt had wide complex tachy  CXR cardiomegaly Prev EF 48% by echo 11/2020 with 3+ TR,HTN, DM,PAF,anemia , liver cirrhosis. ESRD on HD for 11 years, RV failure,  EKGS with afib with aberrancy and wide complex tachy as detailed below. EF now shows 10%-15%, with severe RV dysfunction. Underwent parcentesis 8/23/22      SUBJECTIVE:  Feeling better. Less short of breath. No chest pain. Assessment and Plan       1). Biventricular failure:  Chronic systolic heart failure and severe RV dysfunction with severe TR. With cardiorenal syndrome. EF 10-15%. Per Dr. Vinny Pope- after further review, will not proceed with Biv and AV node ablation at this time as long as HR is controlled. If her EF in future does improve with rate or temporary rhythm control then biv ppm, AVN  ablation may be worth the risk of infection. GDMT is unfortunately limited by low bp. Volume management per renal with HD. No additional cardiac evaluation indicated at this time and will be available prn. Outpt follow up with Dr. Xavier Fulton already scheduled. 2)  Aflutter/afib with abberancy: typical aflutter per Dr. Vinny Pope.     Would not tolerate aflutter/afib ablation per Dr. Steph Way. Amiodarone 200 mg BID. Although amiodarone not ideal in setting of cirrhosis, her BP limits other rate control medicatons. Short term amiodarone; no MELCHOR/DCCV due to cirrhosis would need varices ruled out and she has history of GI bleed so avoiding OAC. 3)  Chronic systolic, diastolic CHF with cardiorenal syndrome  HD per renal. Now EF is very low , at 10-15%  with severe LVH-assymetric hypertrophy. 5)  HTN and  ESRD per renal team:  BP now low NL. HD per renal  6)  Advanced directives/goals of care: Pt seen by Palliative: goal is restorative. Full code status. ____________________________________________________________    Cardiac testing  08/22/22    ECHO ADULT COMPLETE 08/24/2022 8/24/2022    Interpretation Summary  Formatting of this result is different from the original.      Left Ventricle: Severely reduced left ventricular systolic function with a visually estimated EF of 10 -15%. Left ventricle size is normal. Mass index 2D is 234.3 g/m2. Findings consistent with severe asymmetric hypertrophy. Severe global hypokinesis present. Right Ventricle: Severely reduced systolic function. TAPSE is abnormal. TAPSE is 1.0 cm. Aortic Valve: Severe sclerosis of the aortic valve cusp. No stenosis. Mitral Valve: Findings consistent with myxomatous degeneration and decreased excusrion. Moderately calcified leaflet, at the anterior and posterior leaflets. Mild annular calcification of the mitral valve. Moderate regurgitation with an eccentrically directed jet and may underestimate severity. Tricuspid Valve: Mal-coaptation of tricuspid valve. Severe regurgitation with an eccentrically directed jet and may underestimate severity. Right Atrium: Right atrium is severely dilated. Pulmonary Arteries: Mild pulmonary hypertension present. The estimated PASP is 45 mmHg.     IVC/SVC: IVC diameter is greater than 21 mm and decreases less than 50% during inspiration; therefore the estimated right atrial pressure is elevated (~15 mmHg). IVC is dilated. Signed by: Fabrizio Parekh MD on 8/24/2022  5:14 PM          11/17/20    ECHO ADULT COMPLETE 11/21/2020 11/21/2020    Interpretation Summary  · LV: Calculated LVEF is 48%. Normal cavity size. Moderate concentric hypertrophy. Moderately reduced systolic function. Severe (grade 3) left ventricular diastolic dysfunction. · LA: Moderately dilated left atrium. · RV: Mildly dilated right ventricle. · RA: Moderately dilated right atrium. · IAS: No atrial septal defect present. · MV: Mild mitral valve regurgitation is present. · TV: Moderate to severe tricuspid valve regurgitation is present. · PV: Mild pulmonic valve regurgitation is present. · IVC: Severely elevated central venous pressure (15+ mmHg); IVC diameter is larger than 21 mm and collapses less than 50% with respiration. · Pericardium: Small pericardial effusion. Signed by: Jerry Stuart MD on 11/21/2020  5:13 PM        EKGS:  EKG 8/22/22 958 AM, atrial fibrillation at 140 with aberrancy, left axis, IVCD  EKG 8/22/22 2323h- atrial fibrillation at 129 , variable aberrancy vs PVCs  EKG 8/23/22 0045h - regular wide complex tachy at 185 , NSVT vs SVT with aberrancy  EKG 8/23/22 1255 h atrial fibrillation at 94 with LBBB      Most recent HS troponins:  No results for input(s): TROPHS in the last 72 hours.     No lab exists for component:  CKMB      Review of Systems    [x]All other systems reviewed and all negative except as written in HPI    [] Patient unable to provide secondary to condition      Past Medical History:   Diagnosis Date    Arthritis     Asthma     Chronic combined systolic and diastolic CHF (congestive heart failure) ([address] 8/26/2022    Chronic kidney disease     Chronic pain     Cirrhosis ([address] 12/17/2017    Diabetes ([address] diet controlled    GERD (gastroesophageal reflux disease)     Hypertension     Paroxysmal atrial fibrillation ([address] 10/6/2020     Past Surgical History:   Procedure Laterality Date    COLONOSCOPY N/A 1/12/2018    COLONOSCOPY performed by Stevo Miller MD at [facility] ENDOSCOPY    COLONOSCOPY N/A 3/19/2018    COLONOSCOPY performed by Stevo Miller MD at [facility] ENDOSCOPY    HX GYN  c section    HX VASCULAR ACCESS      dialysis     IR BX TRANSCATHETER  11/24/2020    IR PARACENTESIS ABD W IMAGE  10/22/2020     Social Hx:  reports that she has never smoked. She has never used smokeless tobacco. She reports that she does not drink alcohol and does not use drugs. Family Hx: Family history is unknown by patient. Allergies   Allergen Reactions    Aleve [Naproxen Sodium] Itching, Swelling and Other (comments)    Amlodipine Rash, Hives and Itching    Aspirin Other (comments), Nausea Only and Unknown (comments)     GI bleed  GI bleeding      Heparin Unknown (comments)     bleeding      Ibuprofen Nausea and Vomiting    Metformin Other (comments)     swelling          OBJECTIVE:  Wt Readings from Last 3 Encounters:   08/26/22 144 lb 2.9 oz (65.4 kg)   12/01/20 162 lb 4.1 oz (73.6 kg)   11/25/20 169 lb 5 oz (76.8 kg)       Intake/Output Summary (Last 24 hours) at 8/27/2022 1403  Last data filed at 8/27/2022 9072  Gross per 24 hour   Intake 640 ml   Output 0 ml   Net 640 ml           Physical Exam    Vitals:   Vitals:    08/27/22 1000 08/27/22 1116 08/27/22 1200 08/27/22 1400   BP:  117/71     Pulse: 87 87 86 84   Resp:  16     Temp:  98 °F (36.7 °C)     SpO2:  100%     Weight:       Height:         Telemetry: AFIB        General:    Alert, cooperative, no distress, appears stated age. Neck:   Supple,  no JVD. Back:     Symmetric,    Lungs: Few right basilar crackles     Heart[de-identified]    Regular rate and rhythm,  S1, S2 normal, click, rub or gallop. A 1/6 soft systolic murmur is heard throughout the central precordium. Abdomen:     Soft, non-tender. Bowel sounds normal.    Extremities:   Extremities normal, atraumatic, no cyanosis or edema.    Vascular:   Pulses - defer   Skin:   Skin color normal. No rashes or lesions on visible areas   Neurologic:   Alert, Moves all extremities. Data Review:     Radiology:   XR Results (most recent):  Results from Hospital Encounter encounter on 08/22/22    XR CHEST PORT    Narrative  EXAM: XR CHEST PORT    HISTORY: eval for PNA. COMPARISON: 9/17/2020    FINDINGS: Portable AP. A left subclavian dialysis catheter terminates in the  right atrium. The heart is mildly enlarged, but unchanged. No significant edema. No focal consolidation, pleural effusion, or pneumothorax. Impression  Unchanged cardiomegaly    CT Results (most recent):  Results from Hospital Encounter encounter on 08/22/22    CT CHEST WO CONT    Narrative  INDICATION: Pulmonary nodule    COMPARISON: CT dated 8/22/2022    CONTRAST: None. TECHNIQUE:  5 mm axial images were obtained through the chest. Coronal and  sagittal reformats were generated. CT dose reduction was achieved through use  of a standardized protocol tailored for this examination and automatic exposure  control for dose modulation. The absence of intravenous contrast reduces the sensitivity for evaluation of  the mediastinum, marlene, vasculature, and upper abdominal organs. FINDINGS:    CHEST WALL: Left IJ catheter. No axillary or supraclavicular adenopathy. THYROID: Calcified left thyroid nodule  MEDIASTINUM: No mass or lymphadenopathy. MARLENE: No mass or lymphadenopathy. THORACIC AORTA: No aneurysm. MAIN PULMONARY ARTERY: Normal in caliber. TRACHEA/BRONCHI: Patent. ESOPHAGUS: No wall thickening or dilatation. HEART: Normal in size. PLEURA: Small right pleural effusion  LUNGS: Too numerous to count pulmonary nodules predominantly in the right middle  lobe and right lower lobe. The largest measures 7 mm in the right lower lobe  with a larger focus of nodularity at the junction of the major minor fissures in  the right lung.   INCIDENTALLY IMAGED UPPER ABDOMEN: Abdominal ascites  BONES: No destructive bone lesion. Impression  Multiple small nonspecific pulmonary nodules. These are too small for  percutaneous sampling. Recommend short-term follow-up chest CT in 6-12 weeks. MRI Results (most recent):  No results found for this or any previous visit. No results for input(s): CPK, TROIQ in the last 72 hours. No lab exists for component: CKQMB, CPKMB, BMPP  Recent Labs     08/27/22  0336 08/26/22  1924    142   K 3.9 4.2    106   CO2 30 30   BUN 16 27*   CREA 5.21* 7.64*   * 120*   PHOS 3.0 3.4   CA 8.0* 8.1*       Recent Labs     08/27/22  0336 08/25/22  0541   WBC 5.6 5.9   HGB 10.8* 10.1*   HCT 35.6 33.7*    132*       Recent Labs     08/25/22  0537   AP 59       No results for input(s): CHOL, LDLC in the last 72 hours. No lab exists for component: TGL, HDLC,  HBA1C  No results for input(s): CRP, TSH, TSHEXT, TSHEXT in the last 72 hours.     No lab exists for component: ESR          Current meds:    Current Facility-Administered Medications:     sodium chloride (OCEAN) 0.65 % nasal squeeze bottle 2 Spray, 2 Spray, Both Nostrils, PRN, Mary Velasquez MD    amiodarone (CORDARONE) tablet 200 mg, 200 mg, Oral, BID, Celine Henao MD, 200 mg at 08/27/22 0850    [Held by provider] heparin 25,000 units in D5W 250 ml infusion, 12-25 Units/kg/hr, IntraVENous, TITRATE, Minh Rene NP, Held at 08/24/22 0716    famotidine (PEPCID) tablet 20 mg, 20 mg, Oral, DAILY PRN, Hair Squires MD, 20 mg at 08/24/22 0921    metoprolol (LOPRESSOR) injection 5 mg, 5 mg, IntraVENous, Q3H PRN, Hair Squires MD, 5 mg at 08/26/22 1839    albumin human 25% (BUMINATE) solution 25 g, 25 g, IntraVENous, Q6H PRN, Leandro Funk MD, 25 g at 08/25/22 1210    ipratropium (ATROVENT) 0.02 % nebulizer solution 0.5 mg, 0.5 mg, Nebulization, Q4H PRN, Leandro Funk MD    albumin human 25% (BUMINATE) solution 12.5 g, 12.5 g, IntraVENous, DIALYSIS PRN, Orest Necessary MD BRIAN, 12.5 g at 08/26/22 2050    traMADoL (ULTRAM) tablet 50 mg, 50 mg, Oral, Q6H PRN, Shahzad Torres MD, 50 mg at 08/27/22 5545    ascorbic acid (vitamin C) (VITAMIN C) tablet 500 mg, 500 mg, Oral, DAILY, Katarina Bennett MD, 500 mg at 08/27/22 4378    cholecalciferol (VITAMIN D3) (1000 Units /25 mcg) tablet 2,000 Units, 2,000 Units, Oral, DAILY, Katarina Bennett MD, 2,000 Units at 08/27/22 0850    ferrous sulfate tablet 325 mg, 325 mg, Oral, DAILY WITH BREAKFAST, Katarina Bennett MD, 325 mg at 08/27/22 0851    lactulose (CHRONULAC) 10 gram/15 mL solution 30 mL, 20 g, Oral, BID, Katarina Bennett MD, 30 mL at 08/27/22 0851    sevelamer carbonate (RENVELA) tab 1,600 mg, 1,600 mg, Oral, TID WITH MEALS, Katarina Bennett MD, 1,600 mg at 08/27/22 1154    sodium chloride (NS) flush 5-40 mL, 5-40 mL, IntraVENous, Q8H, Katarina Bennett MD, 10 mL at 08/27/22 0504    sodium chloride (NS) flush 5-40 mL, 5-40 mL, IntraVENous, PRN, Katarina Bennett MD    acetaminophen (TYLENOL) tablet 650 mg, 650 mg, Oral, Q6H PRN, 650 mg at 08/27/22 1025 **OR** acetaminophen (TYLENOL) suppository 650 mg, 650 mg, Rectal, Q6H PRN, Katarina Bennett MD    polyethylene glycol (MIRALAX) packet 17 g, 17 g, Oral, DAILY PRN, Jono Bennett MD    ondansetron (ZOFRAN ODT) tablet 4 mg, 4 mg, Oral, Q8H PRN **OR** ondansetron (ZOFRAN) injection 4 mg, 4 mg, IntraVENous, Q6H PRN, Katarina Bennett MD, 4 mg at 08/27/22 0950    insulin lispro (HUMALOG) injection, , SubCUTAneous, TIDAC, Katarina Bennett MD, 2 Units at 08/27/22 0657    glucose chewable tablet 16 g, 4 Tablet, Oral, PRN, Katarina Bennett MD    glucagon (GLUCAGEN) injection 1 mg, 1 mg, IntraMUSCular, PRN, Jono Bennett MD    dextrose 10 % infusion 0-250 mL, 0-250 mL, IntraVENous, PRN, Katarina Bennett MD    diphenhydrAMINE (BENADRYL) capsule 25 mg, 25 mg, Oral, Q6H PRN, Sharyn Kwon NP, 25 mg at 08/23/22 Bigg Conte MD  [facility] [facility] [address]  [place] [address] [address]  [phone]      CC: Tonny Ely MD

## 2022-08-27 NOTE — PROGRESS NOTES
DIANA:  RUR: 14%    Disposition:  Home with [facility]  Patient lives alone in a 5th floor apartment at [facility]. CM received handoff from unit-based CRM to assist patient with arraging HH. Attempt made to visit patient to discuss [facility] recommendation and present HF letter. Patient asleep at the time of CM visit. CM called patient's daughter Dyana Mcguire and left message to return call. CM called daughter ([facility] -[phone]) and introduced self and role. CM discussed recommendation for [facility] and daughter was in agreement. She states patient has had [facility] therapy in the past but does not remember agency name. Per extended chart review, Patient was hospitalized here in 2020 and was d/c home with [facility] via [employer]. CM sending referral to [address] via [employer]. HF letter left at bedside. [facility]-A letter regarding Heart Failure has been delivered to the patient/caregiver. CM continuing to follow.     Yun Wilson-Lens, [address]

## 2022-08-27 NOTE — PROGRESS NOTES
[facility] Adult  Hospitalist Group                                                                                          Hospitalist Progress Note  Zoltan Morales MD  Answering service: [phone] or [phone] from in house phone        Date of Service:  2022  NAME:  Jerel Daniel  :  1950  MRN:  [id number]      Admission Summary:   Jerel Daniel is a 66 y.o. female with past medical history of end-stage renal disease on hemodialysis Monday/Wednesday/Friday, type 2 diabetes mellitus, paroxysmal atrial fibrillation, chronic anemia, hypertension, congestive heart failure, cirrhosis who presented to the emergency department with lightheadedness and fall. Patient was taking a shower and suddenly she felt lightheadedness and went to the ground. Interval history / Subjective:   Complaining of headache and overall not feeling well  Did not tolerate dialysis as well initially yesterday, but was able to complete. Will think about being able to go home later today. Discussed that we are ok with her going home if she feels well and has help. Assessment & Plan:     Near syncope  w/ tachycardia POA resolved  Wide-complex tachycardia POA resolved  Atrial fibrillation with RVR not POA resolved  History of paroxysmal atrial fibrillation  -s/p amio bolus   -Assessed by cardiology  -Unable to tolerate beta-blocker due to hypotension  -Unable to tolerate amiodarone gtt.  due to hypotension  -Cardiology following, now signed off   -No plan for AV tony ablation, ICD placement or PPM  - [facility]   - Oral amiodarone resumed   - No OAC due to h/o GIB and varices       Severe combined Biventricular heart failure    - TTE w/ EF 10-15 % , severe hypokinesis, TAPSE 1.0 cm severe TR and moderate MR   - Cardiology following  - Discussed possible lifevest with cardiology, and do not recommend at this time.   - Not able to tolerate BB d/t episodic hypotension - Consult palliative care, prognosis very guarded < 6 months, appreciate assistance in her care      ESRD hemodialysis Monday Wednesday Friday  Hyperkalemia POA resolving  Anemia of chronic kidney disease  -Nephrology following  -Continue HD per nephrology  -Abimael Lopez as needed      Type 2 diabetes mellitus -A1c 4.9  - Lispro correctional scale, FSG AC HS  - Consistent carb diet, hypoglycemia protocol. Pulmonary nodules per imaging  -Chest CT no specific small pulmonary nodules  -Outpatient follow-up with pulmonology for management     Cirrhosis on CT scan  Ascites  - Ultrasound liver ascites   - s/p paracentesis, drain removed yesterday 8/25  - Call IR to assess drain site for leaking.   - Albumin 25 gram for each 5 liters of fluid taken   - Outpatient follow-up with hepatology for further management     Code status: FULL  Prophylaxis: SCDs  Care Plan discussed with: pt, RN and CM   Anticipated Disposition: Today or tomorrow      Hospital Problems  Date Reviewed: 12/1/2020            Codes Class Noted POA    Tachycardia ICD-10-CM: R00.0  ICD-9-CM: 785.0  8/22/2022 Unknown         Review of Systems:   A comprehensive review of systems was negative except for that written in the HPI. Vital Signs:    Last 24hrs VS reviewed since prior progress note. Most recent are:  Visit Vitals  /71 (BP 1 Location: Right upper arm)   Pulse 84   Temp 98 °F (36.7 °C)   Resp 16   Ht 5' 3\" (1.6 m)   Wt 65.4 kg (144 lb 2.9 oz)   SpO2 100%   BMI 25.54 kg/m²         Intake/Output Summary (Last 24 hours) at 8/27/2022 1404  Last data filed at 8/27/2022 0907  Gross per 24 hour   Intake 640 ml   Output 0 ml   Net 640 ml          Physical Examination:     I had a face to face encounter with this patient and independently examined them on 8/27/2022 as outlined below:          Constitutional:  No acute distress, cooperative, pleasant, chronically ill appearing    ENT:  Oral mucosa moist, oropharynx benign.     Resp:  CTA bilaterally. No wheezing/rhonchi/rales. No accessory muscle use. CV:  Regular rhythm, normal rate, no murmurs, gallops, rubs. Tunneled dialysis line in place. GI:  Soft, non distended, non tender. normoactive bowel sounds, no hepatosplenomegaly     Musculoskeletal:  No edema, warm, 2+ pulses throughout    Neurologic:  Moves all extremities. AAOx3, CN II-XII reviewed            Data Review:    Review and/or order of clinical lab test  Review and/or order of tests in the radiology section of CPT  Review and/or order of tests in the medicine section of CPT      Labs:     Recent Labs     08/27/22 0336 08/25/22  0541   WBC 5.6 5.9   HGB 10.8* 10.1*   HCT 35.6 33.7*    132*       Recent Labs     08/27/22 0336 08/26/22  1924 08/25/22  0537    142 140   K 3.9 4.2 4.6    106 108   CO2 30 30 26   BUN 16 27* 19   CREA 5.21* 7.64* 5.24*   * 120* 82   CA 8.0* 8.1* 7.9*   MG 2.2 2.3  --    PHOS 3.0 3.4  --        Recent Labs     08/25/22  0537   ALT 12   AP 59   TBILI 0.5   TP 4.5*   ALB 2.8*   GLOB 1.7*       Recent Labs     08/27/22 0336   APTT 32.4*        No results for input(s): FE, TIBC, PSAT, FERR in the last 72 hours. Lab Results   Component Value Date/Time    Folate 16.4 10/19/2020 09:34 AM        No results for input(s): PH, PCO2, PO2 in the last 72 hours. No results for input(s): CPK, CKNDX, TROIQ in the last 72 hours.     No lab exists for component: CPKMB  Lab Results   Component Value Date/Time    Cholesterol, total 114 11/25/2020 03:21 AM    HDL Cholesterol 59 11/25/2020 03:21 AM    LDL, calculated 38 11/25/2020 03:21 AM    Triglyceride 85 11/25/2020 03:21 AM    CHOL/HDL Ratio 1.9 11/25/2020 03:21 AM     Lab Results   Component Value Date/Time    Glucose (POC) 130 (H) 08/27/2022 11:18 AM    Glucose (POC) 188 (H) 08/27/2022 06:23 AM    Glucose (POC) 120 (H) 08/26/2022 09:08 PM    Glucose (POC) 117 08/26/2022 04:58 PM    Glucose (POC) 141 (H) 08/26/2022 11:48 AM     No results found for: COLOR, APPRN, SPGRU, REFSG, VERENA, PROTU, GLUCU, KETU, BILU, UROU, TIBURCIO, LEUKU, GLUKE, EPSU, BACTU, WBCU, RBCU, CASTS, UCRY      Medications Reviewed:     Current Facility-Administered Medications   Medication Dose Route Frequency    sodium chloride (OCEAN) 0.65 % nasal squeeze bottle 2 Spray  2 Spray Both Nostrils PRN    amiodarone (CORDARONE) tablet 200 mg  200 mg Oral BID    [Held by provider] heparin 25,000 units in D5W 250 ml infusion  12-25 Units/kg/hr IntraVENous TITRATE    famotidine (PEPCID) tablet 20 mg  20 mg Oral DAILY PRN    metoprolol (LOPRESSOR) injection 5 mg  5 mg IntraVENous Q3H PRN    albumin human 25% (BUMINATE) solution 25 g  25 g IntraVENous Q6H PRN    ipratropium (ATROVENT) 0.02 % nebulizer solution 0.5 mg  0.5 mg Nebulization Q4H PRN    albumin human 25% (BUMINATE) solution 12.5 g  12.5 g IntraVENous DIALYSIS PRN    traMADoL (ULTRAM) tablet 50 mg  50 mg Oral Q6H PRN    ascorbic acid (vitamin C) (VITAMIN C) tablet 500 mg  500 mg Oral DAILY    cholecalciferol (VITAMIN D3) (1000 Units /25 mcg) tablet 2,000 Units  2,000 Units Oral DAILY    ferrous sulfate tablet 325 mg  325 mg Oral DAILY WITH BREAKFAST    lactulose (CHRONULAC) 10 gram/15 mL solution 30 mL  20 g Oral BID    sevelamer carbonate (RENVELA) tab 1,600 mg  1,600 mg Oral TID WITH MEALS    sodium chloride (NS) flush 5-40 mL  5-40 mL IntraVENous Q8H    sodium chloride (NS) flush 5-40 mL  5-40 mL IntraVENous PRN    acetaminophen (TYLENOL) tablet 650 mg  650 mg Oral Q6H PRN    Or    acetaminophen (TYLENOL) suppository 650 mg  650 mg Rectal Q6H PRN    polyethylene glycol (MIRALAX) packet 17 g  17 g Oral DAILY PRN    ondansetron (ZOFRAN ODT) tablet 4 mg  4 mg Oral Q8H PRN    Or    ondansetron (ZOFRAN) injection 4 mg  4 mg IntraVENous Q6H PRN    insulin lispro (HUMALOG) injection   SubCUTAneous TIDAC    glucose chewable tablet 16 g  4 Tablet Oral PRN    glucagon (GLUCAGEN) injection 1 mg  1 mg IntraMUSCular PRN    dextrose 10 % infusion 0-250 mL  0-250 mL IntraVENous PRN    diphenhydrAMINE (BENADRYL) capsule 25 mg  25 mg Oral Q6H PRN     ______________________________________________________________________  EXPECTED LENGTH OF STAY: 3d 19h  ACTUAL LENGTH OF STAY:          5                 Delmy Hope MD

## 2022-08-28 ENCOUNTER — APPOINTMENT (OUTPATIENT)
Dept: GENERAL RADIOLOGY | Age: 72
DRG: 308 | End: 2022-08-28
Attending: INTERNAL MEDICINE
Payer: MEDICARE

## 2022-08-28 LAB
ANION GAP SERPL CALC-SCNC: 2 MMOL/L (ref 5–15)
APTT PPP: 30.6 SEC (ref 22.1–31)
BACTERIA SPEC CULT: NORMAL
BUN SERPL-MCNC: 22 MG/DL (ref 6–20)
BUN/CREAT SERPL: 3 (ref 12–20)
CALCIUM SERPL-MCNC: 8.1 MG/DL (ref 8.5–10.1)
CHLORIDE SERPL-SCNC: 109 MMOL/L (ref 97–108)
CO2 SERPL-SCNC: 31 MMOL/L (ref 21–32)
CREAT SERPL-MCNC: 6.65 MG/DL (ref 0.55–1.02)
GLUCOSE BLD STRIP.AUTO-MCNC: 145 MG/DL (ref 65–117)
GLUCOSE BLD STRIP.AUTO-MCNC: 85 MG/DL (ref 65–117)
GLUCOSE SERPL-MCNC: 95 MG/DL (ref 65–100)
GRAM STN SPEC: NORMAL
GRAM STN SPEC: NORMAL
MAGNESIUM SERPL-MCNC: 2.2 MG/DL (ref 1.6–2.4)
POTASSIUM SERPL-SCNC: 4.8 MMOL/L (ref 3.5–5.1)
SERVICE CMNT-IMP: ABNORMAL
SERVICE CMNT-IMP: NORMAL
SERVICE CMNT-IMP: NORMAL
SODIUM SERPL-SCNC: 142 MMOL/L (ref 136–145)
THERAPEUTIC RANGE,PTTT: NORMAL SECS (ref 58–77)

## 2022-08-28 PROCEDURE — 82962 GLUCOSE BLOOD TEST: CPT

## 2022-08-28 PROCEDURE — 74018 RADEX ABDOMEN 1 VIEW: CPT

## 2022-08-28 PROCEDURE — 80048 BASIC METABOLIC PNL TOTAL CA: CPT

## 2022-08-28 PROCEDURE — 74011250637 HC RX REV CODE- 250/637: Performed by: FAMILY MEDICINE

## 2022-08-28 PROCEDURE — 74011000250 HC RX REV CODE- 250: Performed by: STUDENT IN AN ORGANIZED HEALTH CARE EDUCATION/TRAINING PROGRAM

## 2022-08-28 PROCEDURE — 36415 COLL VENOUS BLD VENIPUNCTURE: CPT

## 2022-08-28 PROCEDURE — 74011000250 HC RX REV CODE- 250: Performed by: FAMILY MEDICINE

## 2022-08-28 PROCEDURE — 74011250637 HC RX REV CODE- 250/637: Performed by: STUDENT IN AN ORGANIZED HEALTH CARE EDUCATION/TRAINING PROGRAM

## 2022-08-28 PROCEDURE — 74011250637 HC RX REV CODE- 250/637: Performed by: SPECIALIST

## 2022-08-28 PROCEDURE — 83735 ASSAY OF MAGNESIUM: CPT

## 2022-08-28 PROCEDURE — 85730 THROMBOPLASTIN TIME PARTIAL: CPT

## 2022-08-28 PROCEDURE — 65660000001 HC RM ICU INTERMED STEPDOWN

## 2022-08-28 PROCEDURE — 74011636637 HC RX REV CODE- 636/637: Performed by: FAMILY MEDICINE

## 2022-08-28 RX ADMIN — ONDANSETRON 4 MG: 4 TABLET, ORALLY DISINTEGRATING ORAL at 08:48

## 2022-08-28 RX ADMIN — SEVELAMER CARBONATE 1600 MG: 800 TABLET, FILM COATED ORAL at 11:53

## 2022-08-28 RX ADMIN — OXYCODONE HYDROCHLORIDE AND ACETAMINOPHEN 500 MG: 500 TABLET ORAL at 08:39

## 2022-08-28 RX ADMIN — SEVELAMER CARBONATE 1600 MG: 800 TABLET, FILM COATED ORAL at 17:54

## 2022-08-28 RX ADMIN — LACTULOSE 30 ML: 20 SOLUTION ORAL at 17:54

## 2022-08-28 RX ADMIN — POLYETHYLENE GLYCOL 3350 17 G: 17 POWDER, FOR SOLUTION ORAL at 17:54

## 2022-08-28 RX ADMIN — SODIUM CHLORIDE, PRESERVATIVE FREE 10 ML: 5 INJECTION INTRAVENOUS at 21:14

## 2022-08-28 RX ADMIN — SEVELAMER CARBONATE 1600 MG: 800 TABLET, FILM COATED ORAL at 08:39

## 2022-08-28 RX ADMIN — ACETAMINOPHEN 650 MG: 325 TABLET, FILM COATED ORAL at 07:33

## 2022-08-28 RX ADMIN — SODIUM CHLORIDE, PRESERVATIVE FREE 10 ML: 5 INJECTION INTRAVENOUS at 06:17

## 2022-08-28 RX ADMIN — AMIODARONE HYDROCHLORIDE 200 MG: 200 TABLET ORAL at 17:54

## 2022-08-28 RX ADMIN — FAMOTIDINE 20 MG: 20 TABLET ORAL at 13:50

## 2022-08-28 RX ADMIN — Medication 2 UNITS: at 11:53

## 2022-08-28 RX ADMIN — AMIODARONE HYDROCHLORIDE 200 MG: 200 TABLET ORAL at 08:39

## 2022-08-28 RX ADMIN — LACTULOSE 30 ML: 20 SOLUTION ORAL at 08:39

## 2022-08-28 RX ADMIN — Medication 2000 UNITS: at 08:39

## 2022-08-28 RX ADMIN — FERROUS SULFATE TAB 325 MG (65 MG ELEMENTAL FE) 325 MG: 325 (65 FE) TAB at 08:39

## 2022-08-28 RX ADMIN — ACETAMINOPHEN 650 MG: 325 TABLET, FILM COATED ORAL at 18:08

## 2022-08-28 RX ADMIN — SODIUM CHLORIDE, PRESERVATIVE FREE 10 ML: 5 INJECTION INTRAVENOUS at 16:06

## 2022-08-28 RX ADMIN — METOPROLOL TARTRATE 5 MG: 5 INJECTION, SOLUTION INTRAVENOUS at 16:05

## 2022-08-28 NOTE — PROGRESS NOTES
1930: Bedside and Verbal shift change report given to QUINTIN Cheema (oncoming nurse) by Mary Berrios RN (offgoing nurse). Report included the following information SBAR, Kardex, Intake/Output, MAR, Recent Results, and Cardiac Rhythm SR .      0430: Attempted to get patient up for weight this AM, but patient was too dizzy at this time. VSS.

## 2022-08-28 NOTE — PROGRESS NOTES
[address] [facility]  Hospitalist Group                                                                                          Hospitalist Progress Note  Charity Vaz MD  Answering service: [phone] or [phone] from in house phone        Date of Service:  2022  NAME:  Zarina Lowry  :  1950  MRN:  [id number]      Admission Summary:   Zarina Lowry is a 66 y.o. female with past medical history of end-stage renal disease on hemodialysis Monday/Wednesday/Friday, type 2 diabetes mellitus, paroxysmal atrial fibrillation, chronic anemia, hypertension, congestive heart failure, cirrhosis who presented to the emergency department with lightheadedness and fall. Patient was taking a shower and suddenly she felt lightheadedness and went to the ground. Interval history / Subjective:   Pt feeling better today, but unable to get a hold of family members to come help her at home today   Had an episode of nausea with vomiting yesterday but none today      Assessment & Plan:     Near syncope  w/ tachycardia POA resolved  Wide-complex tachycardia POA resolved  Atrial fibrillation with RVR not POA resolved  History of paroxysmal atrial fibrillation  -s/p amio bolus   -Assessed by cardiology  -Unable to tolerate beta-blocker due to hypotension  -Unable to tolerate amiodarone gtt.  due to hypotension  -Cardiology following, now signed off   -No plan for AV tony ablation, ICD placement or PPM  - [facility] Fulton   - Oral amiodarone resumed   - No OAC due to h/o GIB and varices       Severe combined Biventricular heart failure    - TTE w/ EF 10-15 % , severe hypokinesis, TAPSE 1.0 cm severe TR and moderate MR   - Cardiology following  - Discussed possible lifevest with cardiology, and do not recommend at this time.   - Not able to tolerate BB d/t episodic hypotension   - Consult palliative care, prognosis very guarded < 6 months, appreciate assistance in her care ESRD hemodialysis Monday Wednesday Friday  Hyperkalemia POA resolving  Anemia of chronic kidney disease  -Nephrology following  -Continue HD per nephrology  -Etheleen Sensor as needed      Type 2 diabetes mellitus -A1c 4.9  - Lispro correctional scale, FSG AC HS  - Consistent carb diet, hypoglycemia protocol. Pulmonary nodules per imaging  -Chest CT no specific small pulmonary nodules  -Outpatient follow-up with pulmonology for management     Cirrhosis on CT scan  Ascites  - Ultrasound liver ascites   - s/p paracentesis, drain removed yesterday 8/25  - Call IR to assess drain site for leaking.   - Albumin 25 gram for each 5 liters of fluid taken   - Outpatient follow-up with hepatology for further management     N/V - check KUB to r/o obstruction   - PRN antiemetics     Code status: FULL  Prophylaxis: SCDs  Care Plan discussed with: pt, RN and CM   Anticipated Disposition: Medically ready, no family to care for her today      Hospital Problems  Date Reviewed: 12/1/2020            Codes Class Noted POA    Tachycardia ICD-10-CM: R00.0  ICD-9-CM: 785.0  8/22/2022 Unknown         Review of Systems:   A comprehensive review of systems was negative except for that written in the HPI. Vital Signs:    Last 24hrs VS reviewed since prior progress note.  Most recent are:  Visit Vitals  BP (!) 116/59 (BP 1 Location: Right upper arm, BP Patient Position: At rest)   Pulse (!) 107   Temp 98.7 °F (37.1 °C)   Resp 18   Ht 5' 3\" (1.6 m)   Wt 69.2 kg (152 lb 8.9 oz)   SpO2 98%   BMI 27.02 kg/m²         Intake/Output Summary (Last 24 hours) at 8/28/2022 1455  Last data filed at 8/28/2022 1154  Gross per 24 hour   Intake 440 ml   Output 0 ml   Net 440 ml          Physical Examination:     I had a face to face encounter with this patient and independently examined them on 8/28/2022 as outlined below:          Constitutional:  No acute distress, cooperative, pleasant, chronically ill appearing    ENT:  Oral mucosa moist, oropharynx benign. Resp:  CTA bilaterally. No wheezing/rhonchi/rales. No accessory muscle use. CV:  Regular rhythm, normal rate, no murmurs, gallops, rubs. Tunneled dialysis line in place. GI:  Soft, non distended, non tender. normoactive bowel sounds, no hepatosplenomegaly     Musculoskeletal:  No edema, warm, 2+ pulses throughout    Neurologic:  Moves all extremities. AAOx3, CN II-XII reviewed            Data Review:    Review and/or order of clinical lab test  Review and/or order of tests in the radiology section of CPT  Review and/or order of tests in the medicine section of CPT      Labs:     Recent Labs     08/27/22 0336   WBC 5.6   HGB 10.8*   HCT 35.6          Recent Labs     08/28/22 0349 08/27/22 0336 08/26/22  1924    141 142   K 4.8 3.9 4.2   * 107 106   CO2 31 30 30   BUN 22* 16 27*   CREA 6.65* 5.21* 7.64*   GLU 95 107* 120*   CA 8.1* 8.0* 8.1*   MG 2.2 2.2 2.3   PHOS  --  3.0 3.4       No results for input(s): ALT, AP, TBIL, TBILI, TP, ALB, GLOB, GGT, AML, LPSE in the last 72 hours. No lab exists for component: SGOT, GPT, AMYP, HLPSE    Recent Labs     08/28/22 0349 08/27/22 0336   APTT 30.6 32.4*        No results for input(s): FE, TIBC, PSAT, FERR in the last 72 hours. Lab Results   Component Value Date/Time    Folate 16.4 10/19/2020 09:34 AM        No results for input(s): PH, PCO2, PO2 in the last 72 hours. No results for input(s): CPK, CKNDX, TROIQ in the last 72 hours.     No lab exists for component: CPKMB  Lab Results   Component Value Date/Time    Cholesterol, total 114 11/25/2020 03:21 AM    HDL Cholesterol 59 11/25/2020 03:21 AM    LDL, calculated 38 11/25/2020 03:21 AM    Triglyceride 85 11/25/2020 03:21 AM    CHOL/HDL Ratio 1.9 11/25/2020 03:21 AM     Lab Results   Component Value Date/Time    Glucose (POC) 145 (H) 08/28/2022 11:36 AM    Glucose (POC) 85 08/28/2022 06:51 AM    Glucose (POC) 165 (H) 08/27/2022 09:07 PM    Glucose (POC) 133 (H) 08/27/2022 04:52 PM    Glucose (POC) 130 (H) 08/27/2022 11:18 AM     No results found for: COLOR, APPRN, SPGRU, REFSG, VERENA, PROTU, GLUCU, KETU, BILU, UROU, TIBURCIO, LEUKU, GLUKE, EPSU, BACTU, WBCU, RBCU, CASTS, UCRY      Medications Reviewed:     Current Facility-Administered Medications   Medication Dose Route Frequency    sodium chloride (OCEAN) 0.65 % nasal squeeze bottle 2 Spray  2 Spray Both Nostrils PRN    amiodarone (CORDARONE) tablet 200 mg  200 mg Oral BID    famotidine (PEPCID) tablet 20 mg  20 mg Oral DAILY PRN    metoprolol (LOPRESSOR) injection 5 mg  5 mg IntraVENous Q3H PRN    albumin human 25% (BUMINATE) solution 25 g  25 g IntraVENous Q6H PRN    ipratropium (ATROVENT) 0.02 % nebulizer solution 0.5 mg  0.5 mg Nebulization Q4H PRN    albumin human 25% (BUMINATE) solution 12.5 g  12.5 g IntraVENous DIALYSIS PRN    traMADoL (ULTRAM) tablet 50 mg  50 mg Oral Q6H PRN    ascorbic acid (vitamin C) (VITAMIN C) tablet 500 mg  500 mg Oral DAILY    cholecalciferol (VITAMIN D3) (1000 Units /25 mcg) tablet 2,000 Units  2,000 Units Oral DAILY    ferrous sulfate tablet 325 mg  325 mg Oral DAILY WITH BREAKFAST    lactulose (CHRONULAC) 10 gram/15 mL solution 30 mL  20 g Oral BID    sevelamer carbonate (RENVELA) tab 1,600 mg  1,600 mg Oral TID WITH MEALS    sodium chloride (NS) flush 5-40 mL  5-40 mL IntraVENous Q8H    sodium chloride (NS) flush 5-40 mL  5-40 mL IntraVENous PRN    acetaminophen (TYLENOL) tablet 650 mg  650 mg Oral Q6H PRN    Or    acetaminophen (TYLENOL) suppository 650 mg  650 mg Rectal Q6H PRN    polyethylene glycol (MIRALAX) packet 17 g  17 g Oral DAILY PRN    ondansetron (ZOFRAN ODT) tablet 4 mg  4 mg Oral Q8H PRN    Or    ondansetron (ZOFRAN) injection 4 mg  4 mg IntraVENous Q6H PRN    insulin lispro (HUMALOG) injection   SubCUTAneous TIDAC    glucose chewable tablet 16 g  4 Tablet Oral PRN    glucagon (GLUCAGEN) injection 1 mg  1 mg IntraMUSCular PRN    dextrose 10 % infusion 0-250 mL  0-250 mL IntraVENous PRN diphenhydrAMINE (BENADRYL) capsule 25 mg  25 mg Oral Q6H PRN     ______________________________________________________________________  EXPECTED LENGTH OF STAY: 3d 19h  ACTUAL LENGTH OF STAY:          6                 Junior Bautista MD

## 2022-08-29 LAB
ANION GAP SERPL CALC-SCNC: 7 MMOL/L (ref 5–15)
BASOPHILS # BLD: 0.1 K/UL (ref 0–0.1)
BASOPHILS NFR BLD: 1 % (ref 0–1)
BUN SERPL-MCNC: 27 MG/DL (ref 6–20)
BUN/CREAT SERPL: 4 (ref 12–20)
CALCIUM SERPL-MCNC: 8.7 MG/DL (ref 8.5–10.1)
CHLORIDE SERPL-SCNC: 107 MMOL/L (ref 97–108)
CO2 SERPL-SCNC: 27 MMOL/L (ref 21–32)
CREAT SERPL-MCNC: 7.63 MG/DL (ref 0.55–1.02)
DIFFERENTIAL METHOD BLD: ABNORMAL
EOSINOPHIL # BLD: 0.1 K/UL (ref 0–0.4)
EOSINOPHIL NFR BLD: 1 % (ref 0–7)
ERYTHROCYTE [DISTWIDTH] IN BLOOD BY AUTOMATED COUNT: 16.8 % (ref 11.5–14.5)
GLUCOSE BLD STRIP.AUTO-MCNC: 113 MG/DL (ref 65–117)
GLUCOSE BLD STRIP.AUTO-MCNC: 134 MG/DL (ref 65–117)
GLUCOSE BLD STRIP.AUTO-MCNC: 84 MG/DL (ref 65–117)
GLUCOSE BLD STRIP.AUTO-MCNC: 94 MG/DL (ref 65–117)
GLUCOSE SERPL-MCNC: 70 MG/DL (ref 65–100)
HCT VFR BLD AUTO: 39.3 % (ref 35–47)
HGB BLD-MCNC: 12 G/DL (ref 11.5–16)
IMM GRANULOCYTES # BLD AUTO: 0 K/UL (ref 0–0.04)
IMM GRANULOCYTES NFR BLD AUTO: 1 % (ref 0–0.5)
LYMPHOCYTES # BLD: 0.9 K/UL (ref 0.8–3.5)
LYMPHOCYTES NFR BLD: 11 % (ref 12–49)
MAGNESIUM SERPL-MCNC: 2.4 MG/DL (ref 1.6–2.4)
MCH RBC QN AUTO: 29 PG (ref 26–34)
MCHC RBC AUTO-ENTMCNC: 30.5 G/DL (ref 30–36.5)
MCV RBC AUTO: 94.9 FL (ref 80–99)
MONOCYTES # BLD: 0.7 K/UL (ref 0–1)
MONOCYTES NFR BLD: 8 % (ref 5–13)
NEUTS SEG # BLD: 6.5 K/UL (ref 1.8–8)
NEUTS SEG NFR BLD: 78 % (ref 32–75)
NRBC # BLD: 0 K/UL (ref 0–0.01)
NRBC BLD-RTO: 0 PER 100 WBC
PHOSPHATE SERPL-MCNC: 4.3 MG/DL (ref 2.6–4.7)
PLATELET # BLD AUTO: 184 K/UL (ref 150–400)
PMV BLD AUTO: 10.4 FL (ref 8.9–12.9)
POTASSIUM SERPL-SCNC: 5.2 MMOL/L (ref 3.5–5.1)
RBC # BLD AUTO: 4.14 M/UL (ref 3.8–5.2)
SERVICE CMNT-IMP: ABNORMAL
SERVICE CMNT-IMP: NORMAL
SODIUM SERPL-SCNC: 141 MMOL/L (ref 136–145)
WBC # BLD AUTO: 8.3 K/UL (ref 3.6–11)

## 2022-08-29 PROCEDURE — 83735 ASSAY OF MAGNESIUM: CPT

## 2022-08-29 PROCEDURE — 74011000258 HC RX REV CODE- 258: Performed by: INTERNAL MEDICINE

## 2022-08-29 PROCEDURE — 74011250636 HC RX REV CODE- 250/636: Performed by: INTERNAL MEDICINE

## 2022-08-29 PROCEDURE — 99231 SBSQ HOSP IP/OBS SF/LOW 25: CPT | Performed by: PHYSICAL MEDICINE & REHABILITATION

## 2022-08-29 PROCEDURE — 82962 GLUCOSE BLOOD TEST: CPT

## 2022-08-29 PROCEDURE — 65660000001 HC RM ICU INTERMED STEPDOWN

## 2022-08-29 PROCEDURE — 74011250637 HC RX REV CODE- 250/637: Performed by: SPECIALIST

## 2022-08-29 PROCEDURE — 74011250637 HC RX REV CODE- 250/637: Performed by: INTERNAL MEDICINE

## 2022-08-29 PROCEDURE — 74011000250 HC RX REV CODE- 250: Performed by: FAMILY MEDICINE

## 2022-08-29 PROCEDURE — 99232 SBSQ HOSP IP/OBS MODERATE 35: CPT | Performed by: INTERNAL MEDICINE

## 2022-08-29 PROCEDURE — 90935 HEMODIALYSIS ONE EVALUATION: CPT

## 2022-08-29 PROCEDURE — 74011250637 HC RX REV CODE- 250/637: Performed by: FAMILY MEDICINE

## 2022-08-29 PROCEDURE — 36415 COLL VENOUS BLD VENIPUNCTURE: CPT

## 2022-08-29 PROCEDURE — 93005 ELECTROCARDIOGRAM TRACING: CPT

## 2022-08-29 PROCEDURE — 80048 BASIC METABOLIC PNL TOTAL CA: CPT

## 2022-08-29 PROCEDURE — 74011250636 HC RX REV CODE- 250/636: Performed by: FAMILY MEDICINE

## 2022-08-29 PROCEDURE — 84100 ASSAY OF PHOSPHORUS: CPT

## 2022-08-29 PROCEDURE — P9047 ALBUMIN (HUMAN), 25%, 50ML: HCPCS | Performed by: INTERNAL MEDICINE

## 2022-08-29 PROCEDURE — 99233 SBSQ HOSP IP/OBS HIGH 50: CPT | Performed by: INTERNAL MEDICINE

## 2022-08-29 PROCEDURE — 85025 COMPLETE CBC W/AUTO DIFF WBC: CPT

## 2022-08-29 RX ORDER — MIDODRINE HYDROCHLORIDE 5 MG/1
5 TABLET ORAL
Status: DISCONTINUED | OUTPATIENT
Start: 2022-08-29 | End: 2022-08-31 | Stop reason: HOSPADM

## 2022-08-29 RX ORDER — OXYCODONE HYDROCHLORIDE 5 MG/1
2.5 TABLET ORAL
Status: DISCONTINUED | OUTPATIENT
Start: 2022-08-29 | End: 2022-08-31 | Stop reason: HOSPADM

## 2022-08-29 RX ORDER — ADENOSINE 3 MG/ML
6 INJECTION, SOLUTION INTRAVENOUS ONCE
Status: DISCONTINUED | OUTPATIENT
Start: 2022-08-29 | End: 2022-08-29

## 2022-08-29 RX ORDER — PROCHLORPERAZINE EDISYLATE 5 MG/ML
5 INJECTION INTRAMUSCULAR; INTRAVENOUS
Status: DISCONTINUED | OUTPATIENT
Start: 2022-08-29 | End: 2022-08-31 | Stop reason: HOSPADM

## 2022-08-29 RX ADMIN — ONDANSETRON HYDROCHLORIDE 4 MG: 2 INJECTION, SOLUTION INTRAMUSCULAR; INTRAVENOUS at 08:35

## 2022-08-29 RX ADMIN — MIDODRINE HYDROCHLORIDE 5 MG: 5 TABLET ORAL at 11:18

## 2022-08-29 RX ADMIN — MIDODRINE HYDROCHLORIDE 5 MG: 5 TABLET ORAL at 17:34

## 2022-08-29 RX ADMIN — SEVELAMER CARBONATE 1600 MG: 800 TABLET, FILM COATED ORAL at 11:18

## 2022-08-29 RX ADMIN — ALBUMIN (HUMAN) 12.5 G: 0.25 INJECTION, SOLUTION INTRAVENOUS at 23:15

## 2022-08-29 RX ADMIN — SEVELAMER CARBONATE 1600 MG: 800 TABLET, FILM COATED ORAL at 08:25

## 2022-08-29 RX ADMIN — ONDANSETRON HYDROCHLORIDE 4 MG: 2 INJECTION, SOLUTION INTRAMUSCULAR; INTRAVENOUS at 16:32

## 2022-08-29 RX ADMIN — SODIUM CHLORIDE, PRESERVATIVE FREE 10 ML: 5 INJECTION INTRAVENOUS at 13:22

## 2022-08-29 RX ADMIN — PROCHLORPERAZINE EDISYLATE 5 MG: 5 INJECTION, SOLUTION INTRAMUSCULAR; INTRAVENOUS at 18:04

## 2022-08-29 RX ADMIN — SODIUM CHLORIDE, PRESERVATIVE FREE 10 ML: 5 INJECTION INTRAVENOUS at 21:28

## 2022-08-29 RX ADMIN — AMIODARONE HYDROCHLORIDE 150 MG: 50 INJECTION, SOLUTION INTRAVENOUS at 11:55

## 2022-08-29 RX ADMIN — FERROUS SULFATE TAB 325 MG (65 MG ELEMENTAL FE) 325 MG: 325 (65 FE) TAB at 08:26

## 2022-08-29 RX ADMIN — OXYCODONE 2.5 MG: 5 TABLET ORAL at 13:22

## 2022-08-29 RX ADMIN — AMIODARONE HYDROCHLORIDE 1 MG/MIN: 50 INJECTION, SOLUTION INTRAVENOUS at 12:08

## 2022-08-29 RX ADMIN — LACTULOSE 30 ML: 20 SOLUTION ORAL at 08:25

## 2022-08-29 RX ADMIN — LACTULOSE 30 ML: 20 SOLUTION ORAL at 17:34

## 2022-08-29 RX ADMIN — ALBUMIN (HUMAN) 12.5 G: 0.25 INJECTION, SOLUTION INTRAVENOUS at 22:26

## 2022-08-29 RX ADMIN — Medication 2000 UNITS: at 08:25

## 2022-08-29 RX ADMIN — AMIODARONE HYDROCHLORIDE 0.5 MG/MIN: 50 INJECTION, SOLUTION INTRAVENOUS at 18:02

## 2022-08-29 RX ADMIN — SEVELAMER CARBONATE 1600 MG: 800 TABLET, FILM COATED ORAL at 17:34

## 2022-08-29 RX ADMIN — AMIODARONE HYDROCHLORIDE 200 MG: 200 TABLET ORAL at 08:26

## 2022-08-29 RX ADMIN — OXYCODONE HYDROCHLORIDE AND ACETAMINOPHEN 500 MG: 500 TABLET ORAL at 08:26

## 2022-08-29 RX ADMIN — SODIUM CHLORIDE, PRESERVATIVE FREE 10 ML: 5 INJECTION INTRAVENOUS at 06:20

## 2022-08-29 NOTE — PROGRESS NOTES
Physical Therapy    Reviewed chart. Noted rapid response this AM due to [facility]. Will defer at this time and continue to follow.

## 2022-08-29 NOTE — PROGRESS NOTES
[address] [place], [address]   Tel.  [phone]   Fax. [address] [facility] [address]   [place], [address] [facility]   Tel.  [phone]   Fax. [phone] [address]   Tel.  [phone]   Fax. [phone]         Subjective:     Zarina Lowry is a 67y.o. year old female seen in-patient for sleep evaluation in collaboration with the Heart Failure Nurse Navigator. She reports she has experienced excessive daytime sleepiness for several years and it has worsened. The sleepiness is described as being moderate, she has been taking naps during the day. The patient notes that she will experience frequent awakening from sleep. In general she is able to return to sleep after awakening, she tends to awaken spontaneously. The patient has not undergone diagnostic testing for the current problems. Active Problems List   CHF with an EF of 10-15%  ESRD on HD  Afib  HTN  DM2  BMI 26    Assessment:           No flowsheet data found. Plan:     The patient currently has significant risk for having sleep apnea. She will be scheduled for a New Patient consult with Dr. Marvin Hodge on 9/6/22. A home sleep test may be preferable for diagnostic purposes. She was provided information on the correlation between sleep apnea and heart failure. Corresponding risk factors for sleep apnea and the importance of proper treatment were reviewed. Reviewed how treating sleep apnea can help improve heart function     The patient was counseled regarding proper sleep hygiene, with emphasis on ensuring sufficient total sleep time; safe driving and the benefits of exercise and weight loss. All of her questions were addressed. JANEEN Gomes  Electronically signed.  08/29/22

## 2022-08-29 NOTE — PROGRESS NOTES
[facility]   [address] [facility], [address] [place] [address]  Phone: [phone]   [phone]    [email or web address]     Nephrology Progress Note    Patient Name : Hilaria Mixon      : 1950     MRN : [id number]  Date of Admission : 2022  Date of Servive : 22    CC:  Follow up for ESRD       Assessment and Plan   ESRD- HD :  - [facility] at [facility] on MWF schedule   - has Mo Ocasio for access   - HD today per schedule    Cardiac Cirrhosis   RV failure, severe TR  PAF  Chronic HFrEF   Chronic hypotension  -S/p paracentesis   -EP following    Anemia in CKD   - resume CAROL ANN when Hb < 11 g  - hx of small bowel AVMs and needed RBC transfusions     Sec HPTH  - continue home meds     Hx of Hep C  DM-II  HTN          Interval History:  Seen and examined. C/o some nausea this AM.  For HD later today. No cp or sob reported. Review of Systems: A comprehensive review of systems was negative except for that written in the HPI.     Current Medications:   Current Facility-Administered Medications   Medication Dose Route Frequency    sodium chloride (OCEAN) 0.65 % nasal squeeze bottle 2 Spray  2 Spray Both Nostrils PRN    amiodarone (CORDARONE) tablet 200 mg  200 mg Oral BID    famotidine (PEPCID) tablet 20 mg  20 mg Oral DAILY PRN    metoprolol (LOPRESSOR) injection 5 mg  5 mg IntraVENous Q3H PRN    albumin human 25% (BUMINATE) solution 25 g  25 g IntraVENous Q6H PRN    ipratropium (ATROVENT) 0.02 % nebulizer solution 0.5 mg  0.5 mg Nebulization Q4H PRN    albumin human 25% (BUMINATE) solution 12.5 g  12.5 g IntraVENous DIALYSIS PRN    traMADoL (ULTRAM) tablet 50 mg  50 mg Oral Q6H PRN    ascorbic acid (vitamin C) (VITAMIN C) tablet 500 mg  500 mg Oral DAILY    cholecalciferol (VITAMIN D3) (1000 Units /25 mcg) tablet 2,000 Units  2,000 Units Oral DAILY    ferrous sulfate tablet 325 mg  325 mg Oral DAILY WITH BREAKFAST    lactulose (CHRONULAC) 10 gram/15 mL solution 30 mL  20 g Oral BID    sevelamer carbonate (RENVELA) tab 1,600 mg  1,600 mg Oral TID WITH MEALS    sodium chloride (NS) flush 5-40 mL  5-40 mL IntraVENous Q8H    sodium chloride (NS) flush 5-40 mL  5-40 mL IntraVENous PRN    acetaminophen (TYLENOL) tablet 650 mg  650 mg Oral Q6H PRN    Or    acetaminophen (TYLENOL) suppository 650 mg  650 mg Rectal Q6H PRN    polyethylene glycol (MIRALAX) packet 17 g  17 g Oral DAILY PRN    ondansetron (ZOFRAN ODT) tablet 4 mg  4 mg Oral Q8H PRN    Or    ondansetron (ZOFRAN) injection 4 mg  4 mg IntraVENous Q6H PRN    insulin lispro (HUMALOG) injection   SubCUTAneous TIDAC    glucose chewable tablet 16 g  4 Tablet Oral PRN    glucagon (GLUCAGEN) injection 1 mg  1 mg IntraMUSCular PRN    dextrose 10 % infusion 0-250 mL  0-250 mL IntraVENous PRN    diphenhydrAMINE (BENADRYL) capsule 25 mg  25 mg Oral Q6H PRN      Allergies   Allergen Reactions    Aleve [Naproxen Sodium] Itching, Swelling and Other (comments)    Amlodipine Rash, Hives and Itching    Aspirin Other (comments), Nausea Only and Unknown (comments)     GI bleed  GI bleeding      Heparin Unknown (comments)     bleeding      Ibuprofen Nausea and Vomiting    Metformin Other (comments)     swelling       Objective:  Vitals:    Vitals:    08/29/22 0359 08/29/22 0555 08/29/22 0700 08/29/22 0834   BP:   (!) 112/47    Pulse: 83 (!) 110 (!) 109    Resp:   18    Temp:   98.2 °F (36.8 °C)    SpO2:   98%    Weight:    66.4 kg (146 lb 4.8 oz)   Height:         Intake and Output:  No intake/output data recorded.   08/27 1901 - 08/29 0700  In: 1400 [P.O.:1400]  Out: 0     Physical Examination:        General: NAD,Conversant   Neck:  Supple, no mass  Resp:  Lungs CTA B/L, no wheezing , normal respiratory effort  CV:  RRR,  no murmur or rub, LE edema  GI:  Soft, nontender  Neurologic:  Non focal  Dialysis Access : [facility]- C/D/I    []    High complexity decision making was performed  []    Patient is at high-risk of decompensation with multiple organ involvement    Lab Data Personally Reviewed: I have reviewed all the pertinent labs, microbiology data and radiology studies during assessment. Recent Labs     08/29/22  0352 08/28/22  0349 08/27/22 0336 08/26/22  1924    142 141 142   K 5.2* 4.8 3.9 4.2    109* 107 106   CO2 27 31 30 30   GLU 70 95 107* 120*   BUN 27* 22* 16 27*   CREA 7.63* 6.65* 5.21* 7.64*   CA 8.7 8.1* 8.0* 8.1*   MG 2.4 2.2 2.2 2.3   PHOS 4.3  --  3.0 3.4       Recent Labs     08/29/22 0352 08/27/22 0336   WBC 8.3 5.6   HGB 12.0 10.8*   HCT 39.3 35.6    154       No results found for: SDES  Lab Results   Component Value Date/Time    Culture result: NO GROWTH 4 DAYS 08/24/2022 10:42 AM    Culture result: MRSA NOT PRESENT 08/22/2022 10:58 PM    Culture result:  08/22/2022 10:58 PM     Screening of patient nares for MRSA is for surveillance purposes and, if positive, to facilitate isolation considerations in high risk settings. It is not intended for automatic decolonization interventions per se as regimens are not sufficiently effective to warrant routine use. Culture result: NO GROWTH 5 DAYS 08/22/2022 10:42 AM    Culture result: NO GROWTH 4 DAYS 11/19/2020 02:50 PM             I have reviewed the flowsheets. Chart and Pertinent Notes have been reviewed. No change in PMH ,family and social history from Consult note.       Tree Tomlinson MD  [facility]

## 2022-08-29 NOTE — PROGRESS NOTES
[facility]  Cardiology Care Note                  []Initial visit     [x]Established visit     Patient [place] [facility] [place]ulting Cardiologist: Joan Osborne MD   Reason for consult:wide complex tachycardia      HPI: previously   Khalida Mcgrath is a 67 y.o. female with ESRD on HD, admitted 22 am via ER with dizziness, too weak to get in the shower. EMS saw atrial fibrillation at 150This was second episode of dizziness in 5 days. Pt reports some worsening of her usual mild SARKAR and edema, denies chest pain, Labs notable for initial troponin of 74, Mag 2.5 Hgb 11.3 and admit for sepsis. K was 5.0 on admit and 6.6 on POC next morning (today) at 1255 am when pt had wide complex tachy  CXR cardiomegaly Prev EF 48% by echo 2020 with 3+ TR,HTN, DM,PAF,anemia , liver cirrhosis. ESRD on HD for 11 years, RV failure,  EKGS with afib with aberrancy and wide complex tachy as detailed below. EF now shows 10%-15%, with severe RV dysfunction. Underwent parcentesis 22      Interval HPI/SUBJECTIVE:  Asked back to see pt as Rapid response called- pt noted to have wide complex tachycardia- aflutter. Now in afib with LBBB. She had SOB, palpitations and soft-low BP with event. Assessment and Plan       1)  Aflutter/afib with abberancy:   Reviewed today's strips/events with Dr. Ana M Miller and Dr. Young Barboza.  Changed PO to IV amiodarone (bolus and infusion). She is not a candidate for aflutter/ afib ablation as would not tolerate. Amiodarone is not ideal in setting of liver disease and could be contributing to nausea? D/w Dr. Ana M Miller- would recommend BiV ppm insertion and AVN to control rate as palliative measure    MELCHOR/DCCV also not options as she has history of GI bleed so avoiding OAC and would need varices ruled out before MELCHOR.    Discussed recommended biV ppm/AVN ablation with pt- she wants to discuss with her daughter as well before deciding on procedure. She is aware that there are no other options for rate control and per Dr. Tamara Graves, he suspects procedure will unlikely  improve BiV dysfunction but this is only option left unless pt would like to pursue palliative care. 2). Biventricular failure:  Chronic systolic heart failure and severe RV dysfunction with severe TR. With cardiorenal syndrome. EF 10-15%. Brandon Allen GDMT is unfortunately limited by low bp requiring midodrine. Volume management per renal with HD. 3)  Chronic systolic, diastolic CHF with cardiorenal syndrome  HD per renal. Now EF is very low , at 10-15%  with severe LVH-assymetric hypertrophy. 4)  HTN and  ESRD per renal team:    BP now low NL. HD per renal.  Ok with starting midodrine per primary team for BP support. 5)  Advanced directives/goals of care:   She again indicates she wants full code status. Palliative care was also asked to see again. Saw and evaluated pt and agree with above assessment and plan. Rapid aflutter/afib RVR this AM. Amio bolus and gtt. As previously noted with Dr. Tamara Graves, pacemaker, AVN ablation recommended. Other option would be palliative care. Otherwise pt will be high risk for readmit as her HR is not responsive to medication. Palliative and Dr. Tamara Graves to re-evaluate tp today. Mandy Chauhan MD           ____________________________________________________________    Cardiac testing  08/22/22    ECHO ADULT COMPLETE 08/24/2022 8/24/2022    Interpretation Summary  Formatting of this result is different from the original.      Left Ventricle: Severely reduced left ventricular systolic function with a visually estimated EF of 10 -15%. Left ventricle size is normal. Mass index 2D is 234.3 g/m2. Findings consistent with severe asymmetric hypertrophy. Severe global hypokinesis present. Right Ventricle: Severely reduced systolic function. TAPSE is abnormal. TAPSE is 1.0 cm.     Aortic Valve: Severe sclerosis of the aortic valve cusp. No stenosis. Mitral Valve: Findings consistent with myxomatous degeneration and decreased excusrion. Moderately calcified leaflet, at the anterior and posterior leaflets. Mild annular calcification of the mitral valve. Moderate regurgitation with an eccentrically directed jet and may underestimate severity. Tricuspid Valve: Mal-coaptation of tricuspid valve. Severe regurgitation with an eccentrically directed jet and may underestimate severity. Right Atrium: Right atrium is severely dilated. Pulmonary Arteries: Mild pulmonary hypertension present. The estimated PASP is 45 mmHg. IVC/SVC: IVC diameter is greater than 21 mm and decreases less than 50% during inspiration; therefore the estimated right atrial pressure is elevated (~15 mmHg). IVC is dilated. Signed by: Travis Dunn MD on 8/24/2022  5:14 PM          11/17/20    ECHO ADULT COMPLETE 11/21/2020 11/21/2020    Interpretation Summary  · LV: Calculated LVEF is 48%. Normal cavity size. Moderate concentric hypertrophy. Moderately reduced systolic function. Severe (grade 3) left ventricular diastolic dysfunction. · LA: Moderately dilated left atrium. · RV: Mildly dilated right ventricle. · RA: Moderately dilated right atrium. · IAS: No atrial septal defect present. · MV: Mild mitral valve regurgitation is present. · TV: Moderate to severe tricuspid valve regurgitation is present. · PV: Mild pulmonic valve regurgitation is present. · IVC: Severely elevated central venous pressure (15+ mmHg); IVC diameter is larger than 21 mm and collapses less than 50% with respiration. · Pericardium: Small pericardial effusion.     Signed by: Aimee Mccollum MD on 11/21/2020  5:13 PM        EKGS:  EKG 8/22/22 958 AM, atrial fibrillation at 140 with aberrancy, left axis, IVCD  EKG 8/22/22 2323h- atrial fibrillation at 129 , variable aberrancy vs PVCs  EKG 8/23/22 0045h - regular wide complex tachy at 185 , NSVT vs SVT with aberrancy  EKG 8/23/22 1255 h atrial fibrillation at 94 with LBBB      Most recent HS troponins:  No results for input(s): TROPHS in the last 72 hours. No lab exists for component:  CKMB      Review of Systems    [x]All other systems reviewed and all negative except as written in HPI    [] Patient unable to provide secondary to condition      Past Medical History:   Diagnosis Date    Arthritis     Asthma     Chronic combined systolic and diastolic CHF (congestive heart failure) ([facility] [address] 8/26/2022    Chronic kidney disease     Chronic pain     Cirrhosis ([facility] [address] 12/17/2017    Diabetes ([facility] [address] diet controlled    GERD (gastroesophageal reflux disease)     Hypertension     Paroxysmal atrial fibrillation ([facility] [address] 10/6/2020     Past Surgical History:   Procedure Laterality Date    COLONOSCOPY N/A 1/12/2018    COLONOSCOPY performed by Hector San MD at [facility] ENDOSCOPY    COLONOSCOPY N/A 3/19/2018    COLONOSCOPY performed by Hector San MD at [facility] ENDOSCOPY    HX GYN  c section    HX VASCULAR ACCESS      dialysis     IR BX TRANSCATHETER  11/24/2020    IR PARACENTESIS ABD W IMAGE  10/22/2020     Social Hx:  reports that she has never smoked. She has never used smokeless tobacco. She reports that she does not drink alcohol and does not use drugs. Family Hx: Family history is unknown by patient.   Allergies   Allergen Reactions    Aleve [Naproxen Sodium] Itching, Swelling and Other (comments)    Amlodipine Rash, Hives and Itching    Aspirin Other (comments), Nausea Only and Unknown (comments)     GI bleed  GI bleeding      Heparin Unknown (comments)     bleeding      Ibuprofen Nausea and Vomiting    Metformin Other (comments)     swelling          OBJECTIVE:  Wt Readings from Last 3 Encounters:   08/29/22 66.4 kg (146 lb 4.8 oz)   12/01/20 73.6 kg (162 lb 4.1 oz)   11/25/20 76.8 kg (169 lb 5 oz)       Intake/Output Summary (Last 24 hours) at 8/29/2022 1329  Last data filed at 8/29/2022 0826  Gross per 24 hour Intake 960 ml   Output 0 ml   Net 960 ml         Physical Exam    Vitals:   Vitals:    08/29/22 1140 08/29/22 1141 08/29/22 1142 08/29/22 1200   BP: (!) 113/53  (!) 101/38 (!) 109/56   Pulse: (!) 106 (!) 106 (!) 110 (!) 107   Resp:    18   Temp:    98 °F (36.7 °C)   SpO2: 100% 100% 100% 100%   Weight:       Height:         Telemetry: AFIB        General:    Alert, cooperative, appears stated age. Neck:   Supple,  no JVD. Back:     Symmetric,    Lungs:     Diminished bases     Heart[de-identified]    Regular rate and rhythm,  S1, S2 normal, click, rub or gallop. A 1/6 soft systolic murmur      Abdomen:     Soft, non-tender. Bowel sounds normal.    Extremities:   Extremities normal, atraumatic, no cyanosis or edema. Vascular:   Pulses - defer   Skin:   Skin color normal. No rashes or lesions on visible areas   Neurologic:   Alert, Moves all extremities. Data Review:     Radiology:   XR Results (most recent):  Results from Hospital Encounter encounter on 08/22/22    XR ABD (KUB)    Narrative  EXAM: XR ABD (KUB)    INDICATION: rule out obstruction    COMPARISON: 8/22/2022. FINDINGS: A supine radiograph of the abdomen shows a nodular to bowel gas  pattern. Retained oral contrast is seen in the left colon. Surgical coils are  seen in the right pelvis. Atherosclerotic calcifications are seen in the upper  abdomen. . The kidneys are atrophied. Ascites is better seen on the prior CT. The  bones and soft tissues are within normal limits. Impression  No evidence of obstruction. CT Results (most recent):  Results from Hospital Encounter encounter on 08/22/22    CT CHEST WO CONT    Narrative  INDICATION: Pulmonary nodule    COMPARISON: CT dated 8/22/2022    CONTRAST: None. TECHNIQUE:  5 mm axial images were obtained through the chest. Coronal and  sagittal reformats were generated.   CT dose reduction was achieved through use  of a standardized protocol tailored for this examination and automatic exposure  control for dose modulation. The absence of intravenous contrast reduces the sensitivity for evaluation of  the mediastinum, marlene, vasculature, and upper abdominal organs. FINDINGS:    CHEST WALL: Left IJ catheter. No axillary or supraclavicular adenopathy. THYROID: Calcified left thyroid nodule  MEDIASTINUM: No mass or lymphadenopathy. MARLENE: No mass or lymphadenopathy. THORACIC AORTA: No aneurysm. MAIN PULMONARY ARTERY: Normal in caliber. TRACHEA/BRONCHI: Patent. ESOPHAGUS: No wall thickening or dilatation. HEART: Normal in size. PLEURA: Small right pleural effusion  LUNGS: Too numerous to count pulmonary nodules predominantly in the right middle  lobe and right lower lobe. The largest measures 7 mm in the right lower lobe  with a larger focus of nodularity at the junction of the major minor fissures in  the right lung. INCIDENTALLY IMAGED UPPER ABDOMEN: Abdominal ascites  BONES: No destructive bone lesion. Impression  Multiple small nonspecific pulmonary nodules. These are too small for  percutaneous sampling. Recommend short-term follow-up chest CT in 6-12 weeks. MRI Results (most recent):  No results found for this or any previous visit. No results for input(s): CPK, TROIQ in the last 72 hours. No lab exists for component: CKQMB, CPKMB, BMPP  Recent Labs     08/29/22  0352 08/28/22  0349 08/27/22  0336    142 141   K 5.2* 4.8 3.9    109* 107   CO2 27 31 30   BUN 27* 22* 16   CREA 7.63* 6.65* 5.21*   GLU 70 95 107*   PHOS 4.3  --  3.0   CA 8.7 8.1* 8.0*     Recent Labs     08/29/22  0352 08/27/22  0336   WBC 8.3 5.6   HGB 12.0 10.8*   HCT 39.3 35.6    154     No results for input(s): PTP, INR, AP, INREXT, INREXT in the last 72 hours. No lab exists for component: PTTP, GPT, SGOT  No results for input(s): CHOL, LDLC in the last 72 hours. No lab exists for component: TGL, HDLC,  HBA1C  No results for input(s): CRP, TSH, TSHEXT, TSHEXT in the last 72 hours.     No lab exists for component: ESR          Current meds:    Current Facility-Administered Medications:     midodrine (PROAMATINE) tablet 5 mg, 5 mg, Oral, TID WITH MEALS, Mor Clemens MD, 5 mg at 08/29/22 1118    oxyCODONE IR (ROXICODONE) tablet 2.5 mg, 2.5 mg, Oral, Q4H PRN, Francy Clemens MD, 2.5 mg at 08/29/22 1322    amiodarone (CORDARONE) 375 mg/250 mL D5W infusion, 0.5-1 mg/min, IntraVENous, TITRATE, Francy Clemens MD, Last Rate: 40 mL/hr at 08/29/22 1208, 1 mg/min at 08/29/22 1208    sodium chloride (OCEAN) 0.65 % nasal squeeze bottle 2 Spray, 2 Spray, Both Nostrils, PRN, Francy Clemens MD, 2 Spray at 08/27/22 1906    amiodarone (CORDARONE) tablet 200 mg, 200 mg, Oral, BID, Africa Jackson MD, 200 mg at 08/29/22 0826    famotidine (PEPCID) tablet 20 mg, 20 mg, Oral, DAILY PRN, Gillian Castillo MD, 20 mg at 08/28/22 1350    metoprolol (LOPRESSOR) injection 5 mg, 5 mg, IntraVENous, Q3H PRN, Gillian Castillo MD, 5 mg at 08/28/22 1605    albumin human 25% (BUMINATE) solution 25 g, 25 g, IntraVENous, Q6H PRN, Leandro Matos MD, 25 g at 08/25/22 1210    ipratropium (ATROVENT) 0.02 % nebulizer solution 0.5 mg, 0.5 mg, Nebulization, Q4H PRN, Leandro Matos MD    albumin human 25% (BUMINATE) solution 12.5 g, 12.5 g, IntraVENous, DIALYSIS PRN, Sula Rinne, MD, 12.5 g at 08/26/22 2050    ascorbic acid (vitamin C) (VITAMIN C) tablet 500 mg, 500 mg, Oral, DAILY, Jw Bennett MD, 500 mg at 08/29/22 2113    cholecalciferol (VITAMIN D3) (1000 Units /25 mcg) tablet 2,000 Units, 2,000 Units, Oral, DAILY, Jw Bennett MD, 2,000 Units at 08/29/22 0825    ferrous sulfate tablet 325 mg, 325 mg, Oral, DAILY WITH BREAKFAST, Jw Bennett MD, 325 mg at 08/29/22 0826    lactulose (CHRONULAC) 10 gram/15 mL solution 30 mL, 20 g, Oral, BID, Jw Bennett MD, 30 mL at 08/29/22 0825    sevelamer carbonate (RENVELA) tab 1,600 mg, 1,600 mg, Oral, TID WITH MEALS, Inge Conrad MD, 1,600 mg at 08/29/22 1118    sodium chloride (NS) flush 5-40 mL, 5-40 mL, IntraVENous, Q8H, Efra Bennett MD, 10 mL at 08/29/22 1322    sodium chloride (NS) flush 5-40 mL, 5-40 mL, IntraVENous, PRN, Efra Bennett MD    acetaminophen (TYLENOL) tablet 650 mg, 650 mg, Oral, Q6H PRN, 650 mg at 08/28/22 1808 **OR** acetaminophen (TYLENOL) suppository 650 mg, 650 mg, Rectal, Q6H PRN, Efar Bennett MD    polyethylene glycol (MIRALAX) packet 17 g, 17 g, Oral, DAILY PRN, Efra Bennett MD, 17 g at 08/28/22 1754    ondansetron (ZOFRAN ODT) tablet 4 mg, 4 mg, Oral, Q8H PRN, 4 mg at 08/28/22 0848 **OR** ondansetron (ZOFRAN) injection 4 mg, 4 mg, IntraVENous, Q6H PRN, Efra Bennett MD, 4 mg at 08/29/22 0835    insulin lispro (HUMALOG) injection, , SubCUTAneous, TIDAC, Efra Bennett MD, 2 Units at 08/28/22 1153    glucose chewable tablet 16 g, 4 Tablet, Oral, PRN, Efra Bennett MD    glucagon (GLUCAGEN) injection 1 mg, 1 mg, IntraMUSCular, PRN, Jono Bennett MD    dextrose 10 % infusion 0-250 mL, 0-250 mL, IntraVENous, PRN, Efra Bennett MD    diphenhydrAMINE (BENADRYL) capsule 25 mg, 25 mg, Oral, Q6H PRN, Valerie Ascencio NP, 25 mg at 08/23/22 [address] SHARI Amaya  [facility], [address] [address]  [place], [address]  [phone]      CC: Lahoma Hatchet, MD

## 2022-08-29 NOTE — DIALYSIS
Came to start pts dialysis treatment. While setting up in her room, pt went into Vtach with heart rate in the 150s. Primary RN called rapid response team. I spoke with Dr. Fabio Ramirez and we will hold off dialysis for now and check back this evening.

## 2022-08-29 NOTE — PROGRESS NOTES
Palliative Medicine Consult  [place]: [phone] ([phone])    Patient Name: Naren Rodriguez  YOB: 1950    Date of Initial Consult: 8/25/22  Reason for Consult: Care decisions/end stage disease   Requesting Provider: Holly Sun   Primary Care Physician: Addi Joseph MD     SUMMARY:   Naren Rodriguez is a 67 y.o. with a past history of ESRD on HD MWF, a fib, anemia, DM, CHF, hx AVM GIB who was admitted on 8/22/2022 from home via EMS with lightheadedness and abdominal distension- had a paracentesis for cardiac cirrhosis 2 weeks before admission. CT showing mult small pulmonary nodules. Normally follows at the [facility]. Echo 8/24/22 w/ EF 10-15%, biventricular failure, severe MR and TR. Cardiology following and recommends pacemaker and AV node ablation. Had 8L paracentesis 8/24.     8/26- Cardiology plans on medically managing for now, to further discuss pacemaker. 8/29- a flutter vs VT today w/ HR in 150s- Dr Tatianna Graves to re-eval     Current medical issues leading to Palliative Medicine involvement include: care decisions/ end stage disease. Social: Pt has 8 children, most live in [facility]. She gets medical care at the [facility] including her dialysis. She has an AMD on file there- states her son Meet Pedraza and dtrs Nathaniel and Pasha Gaston are her mPOAs. She lives alone at [facility] and can do all ADLs. She is also able to cook for herself. PALLIATIVE DIAGNOSES:   Lightheadedness   Fatigue   Hypotension -has limited ultrafiltration with dialysis   Goals of care       PLAN:   Pt w/ a flutter/afib vs VT today, cardiology interventions limited. Dr Tatianna Graves to re-eval.   Pt not ready to consider hospice and clear for full code status, although she understands that her body may not tolerate ongoing aggressive medical care. Will need to see how she does w/ dialysis today. At times in the [facility], patients may be able to get hospice services w/ ongoing dialysis which may be worth looking into. Communicated plan of care with: Palliative [place] [facility] Team incl Dr Joan Little / TREATMENT PREFERENCES:     GOALS OF CARE:  Patient/Health Care Proxy Stated Goals: Prolong life    TREATMENT PREFERENCES:   Code Status: Full Code    Patient and family's personal goals include: not getting light headed     Important upcoming milestones or family events: her birthday is today     The patient identifies the following as important for living well: being at home      Advance Care Planning:  [] The [place] [id number] Team has updated the ACP Navigator with [address] and Patient Capacity      Advance Care Planning 11/17/2020   Patient's Healthcare Decision Maker is: -   Primary Decision Maker Name -   Primary Decision Maker Phone Number -   Primary Decision Maker Relationship to Patient -   Secondary Decision [address] Name -   Secondary Decision [address] Phone Number -   Secondary Decision Maker Relationship to Patient -   Confirm Advance Directive None   Patient Would Like to Complete Advance Directive -   Does the patient have other document types -       Medical Interventions: Full interventions       Other:    As far as possible, the palliative care team has discussed with patient / health care proxy about goals of care / treatment preferences for patient. HISTORY:     History obtained from: pt, chart, staff    CHIEF COMPLAINT: \"I had an episode\"  HPI/SUBJECTIVE:    The patient is:   [x] Verbal and participatory  [] Non-participatory due to:       Pt states that when her HR jumped up earlier today it felt like \"the world was ending. \" Now feels okay, resting and denies SOB or chest pain.      Clinical Pain Assessment (nonverbal scale for severity on nonverbal patients):   Clinical Pain Assessment  Severity: 0          Duration: for how long has pt been experiencing pain (e.g., 2 days, 1 month, years)  Frequency: how often pain is an issue (e.g., several times per day, once every few days, constant)     FUNCTIONAL ASSESSMENT:     Palliative Performance Scale (PPS):  PPS: 40       PSYCHOSOCIAL/SPIRITUAL SCREENING:     Palliative IDT has assessed this patient for cultural preferences / practices and a referral made as appropriate to needs (Cultural Services, Patient Advocacy, Ethics, etc.)    Any spiritual / Yazidism concerns:  [] Yes /  [x] No   If \"Yes\" to discuss with pastoral care during IDT     Does caregiver feel burdened by caring for their loved one:   [] Yes /  [x] No /  [] No Caregiver Present/Available [] No Caregiver [] Pt Lives at [facility]  If \"Yes\" to discuss with social work during IDT    Anticipatory grief assessment:   [x] Normal  / [] Maladaptive     If \"Maladaptive\" to discuss with social work during IDT    ESAS Anxiety: Anxiety: 0    ESAS Depression: Depression: 0        REVIEW OF SYSTEMS:     Positive and pertinent negative findings in ROS are noted above in HPI. The following systems were [x] reviewed / [] unable to be reviewed as noted in HPI  Other findings are noted below. Systems: constitutional, ears/nose/mouth/throat, respiratory, gastrointestinal, genitourinary, musculoskeletal, integumentary, neurologic, psychiatric, endocrine. Positive findings noted below. Modified ESAS Completed by: provider   Fatigue: 4 Drowsiness: 0   Depression: 0 Pain: 0   Anxiety: 0 Nausea: 0   Anorexia: 0 Dyspnea: 0           Stool Occurrence(s): 1        PHYSICAL EXAM:     From RN flowsheet:  Wt Readings from Last 3 Encounters:   08/29/22 146 lb 4.8 oz (66.4 kg)   12/01/20 162 lb 4.1 oz (73.6 kg)   11/25/20 169 lb 5 oz (76.8 kg)     Blood pressure (!) 109/56, pulse 96, temperature 98 °F (36.7 °C), resp. rate 18, height 5' 3\" (1.6 m), weight 146 lb 4.8 oz (66.4 kg), SpO2 100 %.     Pain Scale 1: Numeric (0 - 10)  Pain Intensity 1: 8  Pain Onset 1: chronic  Pain Location 1: Back, Neck  Pain Orientation 1: Mid  Pain Description 1: Aching  Pain Intervention(s) 1: Medication (see MAR)  Last bowel movement, if known:     Constitutional: awake, alert, oriented, pleasant and engaging   Eyes: pupils equal, anicteric  ENMT: no nasal discharge, moist mucous membranes  Respiratory: breathing not labored  Musculoskeletal: no deformity, no tenderness to palpation, some generalized atrophy  Skin: warm, dry  Neurologic: following commands, moving all extremities         HISTORY:     Active Problems:    Tachycardia (8/22/2022)    Past Medical History:   Diagnosis Date    Arthritis     Asthma     Chronic combined systolic and diastolic CHF (congestive heart failure) ([facility] 8/26/2022    Chronic kidney disease     Chronic pain     Cirrhosis ([facility] 12/17/2017    Diabetes ([facility] diet controlled    GERD (gastroesophageal reflux disease)     Hypertension     Paroxysmal atrial fibrillation ([facility] 10/6/2020      Past Surgical History:   Procedure Laterality Date    COLONOSCOPY N/A 1/12/2018    COLONOSCOPY performed by Molly Saleh MD at THE [facility] ENDOSCOPY    COLONOSCOPY N/A 3/19/2018    COLONOSCOPY performed by Molly Saleh MD at THE [facility] ENDOSCOPY    HX GYN  c section    HX VASCULAR ACCESS      dialysis     IR BX TRANSCATHETER  11/24/2020    IR PARACENTESIS ABD W IMAGE  10/22/2020      Family History   Family history unknown: Yes      History reviewed, no pertinent family history.   Social History     Tobacco Use    Smoking status: Never    Smokeless tobacco: Never   Substance Use Topics    Alcohol use: No     Allergies   Allergen Reactions    Aleve [Naproxen Sodium] Itching, Swelling and Other (comments)    Amlodipine Rash, Hives and Itching    Aspirin Other (comments), Nausea Only and Unknown (comments)     GI bleed  GI bleeding      Heparin Unknown (comments)     bleeding      Ibuprofen Nausea and Vomiting    Metformin Other (comments)     swelling      Current Facility-Administered Medications   Medication Dose Route Frequency    midodrine (PROAMATINE) tablet 5 mg  5 mg Oral TID WITH MEALS oxyCODONE IR (ROXICODONE) tablet 2.5 mg  2.5 mg Oral Q4H PRN    amiodarone (CORDARONE) 375 mg/250 mL D5W infusion  0.5-1 mg/min IntraVENous TITRATE    sodium chloride (OCEAN) 0.65 % nasal squeeze bottle 2 Spray  2 Spray Both Nostrils PRN    amiodarone (CORDARONE) tablet 200 mg  200 mg Oral BID    famotidine (PEPCID) tablet 20 mg  20 mg Oral DAILY PRN    metoprolol (LOPRESSOR) injection 5 mg  5 mg IntraVENous Q3H PRN    albumin human 25% (BUMINATE) solution 25 g  25 g IntraVENous Q6H PRN    ipratropium (ATROVENT) 0.02 % nebulizer solution 0.5 mg  0.5 mg Nebulization Q4H PRN    albumin human 25% (BUMINATE) solution 12.5 g  12.5 g IntraVENous DIALYSIS PRN    ascorbic acid (vitamin C) (VITAMIN C) tablet 500 mg  500 mg Oral DAILY    cholecalciferol (VITAMIN D3) (1000 Units /25 mcg) tablet 2,000 Units  2,000 Units Oral DAILY    ferrous sulfate tablet 325 mg  325 mg Oral DAILY WITH BREAKFAST    lactulose (CHRONULAC) 10 gram/15 mL solution 30 mL  20 g Oral BID    sevelamer carbonate (RENVELA) tab 1,600 mg  1,600 mg Oral TID WITH MEALS    sodium chloride (NS) flush 5-40 mL  5-40 mL IntraVENous Q8H    sodium chloride (NS) flush 5-40 mL  5-40 mL IntraVENous PRN    acetaminophen (TYLENOL) tablet 650 mg  650 mg Oral Q6H PRN    Or    acetaminophen (TYLENOL) suppository 650 mg  650 mg Rectal Q6H PRN    polyethylene glycol (MIRALAX) packet 17 g  17 g Oral DAILY PRN    ondansetron (ZOFRAN ODT) tablet 4 mg  4 mg Oral Q8H PRN    Or    ondansetron (ZOFRAN) injection 4 mg  4 mg IntraVENous Q6H PRN    insulin lispro (HUMALOG) injection   SubCUTAneous TIDAC    glucose chewable tablet 16 g  4 Tablet Oral PRN    glucagon (GLUCAGEN) injection 1 mg  1 mg IntraMUSCular PRN    dextrose 10 % infusion 0-250 mL  0-250 mL IntraVENous PRN    diphenhydrAMINE (BENADRYL) capsule 25 mg  25 mg Oral Q6H PRN          LAB AND IMAGING FINDINGS:     Lab Results   Component Value Date/Time    WBC 8.3 08/29/2022 03:52 AM    HGB 12.0 08/29/2022 03:52 AM PLATELET 189 05/70/1593 03:52 AM     Lab Results   Component Value Date/Time    Sodium 141 08/29/2022 03:52 AM    Potassium 5.2 (H) 08/29/2022 03:52 AM    Chloride 107 08/29/2022 03:52 AM    CO2 27 08/29/2022 03:52 AM    BUN 27 (H) 08/29/2022 03:52 AM    Creatinine 7.63 (H) 08/29/2022 03:52 AM    Calcium 8.7 08/29/2022 03:52 AM    Magnesium 2.4 08/29/2022 03:52 AM    Phosphorus 4.3 08/29/2022 03:52 AM      Lab Results   Component Value Date/Time    Alk. phosphatase 59 08/25/2022 05:37 AM    Protein, total 4.5 (L) 08/25/2022 05:37 AM    Albumin 2.8 (L) 08/25/2022 05:37 AM    Globulin 1.7 (L) 08/25/2022 05:37 AM     Lab Results   Component Value Date/Time    INR 1.0 11/25/2020 03:21 AM    Prothrombin time 10.7 11/25/2020 03:21 AM    aPTT 30.6 08/28/2022 03:49 AM    aPTT 28.6 09/25/2019 07:30 PM      Lab Results   Component Value Date/Time    Iron 43 11/27/2020 02:28 AM    TIBC 310 11/27/2020 02:28 AM    Iron % saturation 14 (L) 11/27/2020 02:28 AM    Ferritin 37 11/27/2020 02:28 AM      No results found for: PH, PCO2, PO2  No components found for: [place]   Lab Results   Component Value Date/Time    CK 78 09/25/2019 07:30 PM    CK - MB 3.5 09/25/2019 07:30 PM                Total time: 15 min   Counseling / coordination time, spent as noted above: 10 min   > 50% counseling / coordination?: yes    Prolonged service was provided for  []30 min   []75 min in face to face time in the presence of the patient, spent as noted above. Time Start:   Time End:   Note: this can only be billed with 14210 (initial) or 24038 (follow up). If multiple start / stop times, list each separately.

## 2022-08-29 NOTE — PROGRESS NOTES
Transitions of Care Plan  RUR: 14% - low  Clinical Update: symptomatic; orthostatic hypotension  Consults: Cardiology; Nephrology  Baseline: wheelchair at baseline; resides alone at [facility]) to Discharge: medical  Disposition: home health - [facility]; daughter to assist? - CM called to confirmed  Estimated Discharge Date: 8/30/22    Clinical update per attending MD. Patient symptomatically not improved for discharge today. CM advised patient set up for home health when ready. CM attempting to confirm family support after discharge. Patient is not medically stable for discharge due to ongoing medical needs. CM continues to follow treatment plan for medical progress and disposition needs.     Disposition:  [address] [facility]   Transportation - Medicaid v family    Josseline Chow, [address] [place] [address],[address]  Available via [employer] or

## 2022-08-29 NOTE — PROGRESS NOTES
Cardiac Electrophysiology Hospital Visit Note     Subjective:       Candelario Tovar is a 66 y.o. patient who is seen for management of typical atrial flutter RVR and LBBB with intermittent PAF RVR LBBB. Consult was kindly requested by Dr. Glo Blakely      She is back on amiodarone IV as PO amiodarone failed    She was admitted on 08/22/2022 with dizziness. Denied missing dialysis sessions. Reported worsening abdominal distention x 2 weeks, is s/p paracentesis approx 2 weeks ago. Today she had 7 L fluid removed by paracentesis and bp bottomed out so amiodarone is stopped and she is getting albumin IV infusion now  Atrial flutter persisted with V rate 95 bpm    Initial high sensitivity troponin 74, Mg 2.5, Hgb 11. 3.  admitted for sepsis. K 5 on admit, 6.6 via POC on 08/23/2022, 5.5 this morning. She had ECG with typical AFL and AF LBBB and one time V rate 185 bpm   Later ECGs showed slower V rate 140 bpm    Was on amiodarone IV gtt, metoprolol po & metoprolol IV prn. BP cannot tolerate    Known RV failure. Echo in 11/2020 showed LVEF 48% with mildly dilated RV. Both atria are dilated and she has TR     No OAC, is on hemodialysis & has had AVM GI bleed previously. Previous:   ESRD on HD x 11 years.          Problem List  Date Reviewed: 12/1/2020              Codes Class Noted     Tachycardia ICD-10-CM: R00.0  ICD-9-CM: 785.0   8/22/2022           ESRD (end stage renal disease) ([facility] ICD-10-CM: N18.6  ICD-9-CM: 585.6   10/18/2020           Suspected COVID-19 virus infection ICD-10-CM: S13.486  ICD-9-CM: V01.79   10/18/2020           HTN (hypertension) ICD-10-CM: I10  ICD-9-CM: 401.9   10/17/2020           Dependence on renal dialysis [facility]) ICD-10-CM: Z99.2  ICD-9-CM: V45.11   10/6/2020           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, or end stage renal disease ([facility] ICD-10-CM: I13.2  ICD-9-CM: 404.93, 428.9   10/6/2020           Other ascites ICD-10-CM: R18.8  ICD-9-CM: 789.59   10/6/2020           Other chronic pain ICD-10-CM: G89.29  ICD-9-CM: 338.29   10/6/2020           Paroxysmal atrial fibrillation ([place] [address].) ICD-10-CM: I48.0  ICD-9-CM: 427.31   10/6/2020           Type 2 diabetes mellitus with hyperglycemia ([place] [address].) ICD-10-CM: E11.65  ICD-9-CM: 250.00   10/6/2020           Unspecified cirrhosis of liver ([place] [address].) ICD-10-CM: K74.60  ICD-9-CM: 571.5   10/6/2020           Other hypotension ICD-10-CM: I95.89  ICD-9-CM: 458.8   10/6/2020           Severe anemia ICD-10-CM: D64.9  ICD-9-CM: 285.9   5/2/2018           Dizziness ICD-10-CM: R87  ICD-9-CM: 780.4   3/16/2018           Generalized weakness ICD-10-CM: R53.1  ICD-9-CM: 780.79   3/16/2018           Rectal bleed ICD-10-CM: K62.5  ICD-9-CM: 569.3   3/16/2018           Symptomatic anemia ICD-10-CM: D64.9  ICD-9-CM: 285.9   3/16/2018           Acute blood loss anemia ICD-10-CM: D62  ICD-9-CM: 285.1   3/9/2018           Cirrhosis ([place] [address] ICD-10-CM: K74.60  ICD-9-CM: 571.5   12/17/2017           Anemia in chronic kidney disease ICD-10-CM: N18.9, D63.1  ICD-9-CM: 285.21   12/16/2017           GI bleed ICD-10-CM: K92.2  ICD-9-CM: 578.9   12/15/2017           Diabetes mellitus type 2, controlled ([place] [address] ICD-10-CM: E11.9  ICD-9-CM: 250.00   12/15/2017                   Current Facility-Administered Medications   Medication Dose Route Frequency    [Held by provider] amiodarone (CORDARONE) 375 mg/250 mL D5W infusion  0.5-1 mg/min IntraVENous TITRATE    heparin (porcine) 1,000 unit/mL injection 4,000 Units  4,000 Units IntraVENous ONCE    [Held by provider] heparin 25,000 units in D5W 250 ml infusion  12-25 Units/kg/hr IntraVENous TITRATE    famotidine (PEPCID) tablet 20 mg  20 mg Oral DAILY PRN    metoprolol (LOPRESSOR) injection 5 mg  5 mg IntraVENous Q3H PRN    sodium bicarbonate (4.2%) injection 42 mg  1 mL SubCUTAneous RAD ONCE    lidocaine (PF) (XYLOCAINE) 10 mg/mL (1 %) injection 10 mL  10 mL SubCUTAneous RAD ONCE albumin human 25% (BUMINATE) solution 25 g  25 g IntraVENous Q6H PRN    ipratropium (ATROVENT) 0.02 % nebulizer solution 0.5 mg  0.5 mg Nebulization Q4H PRN    metoprolol tartrate (LOPRESSOR) tablet 25 mg  25 mg Oral Q12H    sodium zirconium cyclosilicate (LOKELMA) powder packet 10 g  10 g Oral TID WITH MEALS    albumin human 25% (BUMINATE) solution 12.5 g  12.5 g IntraVENous DIALYSIS PRN    traMADoL (ULTRAM) tablet 50 mg  50 mg Oral Q6H PRN    ascorbic acid (vitamin C) (VITAMIN C) tablet 500 mg  500 mg Oral DAILY    cholecalciferol (VITAMIN D3) (1000 Units /25 mcg) tablet 2,000 Units  2,000 Units Oral DAILY    ferrous sulfate tablet 325 mg  325 mg Oral DAILY WITH BREAKFAST    lactulose (CHRONULAC) 10 gram/15 mL solution 30 mL  20 g Oral BID    sevelamer carbonate (RENVELA) tab 1,600 mg  1,600 mg Oral TID WITH MEALS    sodium chloride (NS) flush 5-40 mL  5-40 mL IntraVENous Q8H    sodium chloride (NS) flush 5-40 mL  5-40 mL IntraVENous PRN    acetaminophen (TYLENOL) tablet 650 mg  650 mg Oral Q6H PRN     Or    acetaminophen (TYLENOL) suppository 650 mg  650 mg Rectal Q6H PRN    polyethylene glycol (MIRALAX) packet 17 g  17 g Oral DAILY PRN    ondansetron (ZOFRAN ODT) tablet 4 mg  4 mg Oral Q8H PRN     Or    ondansetron (ZOFRAN) injection 4 mg  4 mg IntraVENous Q6H PRN    insulin lispro (HUMALOG) injection   SubCUTAneous TIDAC    glucose chewable tablet 16 g  4 Tablet Oral PRN    glucagon (GLUCAGEN) injection 1 mg  1 mg IntraMUSCular PRN    dextrose 10 % infusion 0-250 mL  0-250 mL IntraVENous PRN    diphenhydrAMINE (BENADRYL) capsule 25 mg  25 mg Oral Q6H PRN           Allergies   Allergen Reactions   · Aleve [Naproxen Sodium] Itching, Swelling and Other (comments)   · Amlodipine Rash, Hives and Itching   · Aspirin Other (comments), Nausea Only and Unknown (comments)   GI bleed   GI bleeding     · Heparin Unknown (comments)   bleeding     · Ibuprofen Nausea and Vomiting   · Metformin Other (comments)   swelling Past Medical History:   Diagnosis Date   · Arthritis   · Asthma   · Chronic kidney disease   · Chronic pain   · Cirrhosis ([facility] 12/17/2017   · Diabetes ([facility] diet controlled   · GERD (gastroesophageal reflux disease)   · Hypertension   · Paroxysmal atrial fibrillation ([facility] 10/6/2020     Past Surgical History:   Procedure Laterality Date   · COLONOSCOPY N/A 1/12/2018   COLONOSCOPY performed by Tata Palmer MD at [id number]   · COLONOSCOPY N/A 3/19/2018   COLONOSCOPY performed by Tata Palmer MD at [facility] ENDOSCOPY   · HX GYN c section   · HX VASCULAR ACCESS   dialysis   · IR BX TRANSCATHETER 11/24/2020   · IR PARACENTESIS ABD W IMAGE 10/22/2020     Family History   Family history unknown: Yes     Social History     Tobacco Use   · Smoking status: Never   · Smokeless tobacco: Never   Substance Use Topics   · Alcohol use: No         Review of Systems: Review of all other systems otherwise negative. Constitutional: Negative for fever, chills, weight loss, + malaise/fatigue. HEENT: Negative for nosebleeds, vision changes. Respiratory: Negative for cough, hemoptysis   Cardiovascular: Negative for chest pain, palpitations, orthopnea, claudication, + leg swelling, no syncope, and PND. + lightheadedness, SARKAR   Gastrointestinal: Negative for nausea, vomiting, diarrhea, blood in stool and melena. + abdominal discomfort, abdominal distention   Genitourinary: Negative for dysuria, and hematuria. Musculoskeletal: Negative for myalgias, arthralgia. Skin: Negative for rash. Heme: Does not bleed or bruise easily. Neurological: Negative for speech change and focal weakness       Objective:   Visit Vitals      Visit Vitals  BP (!) 109/56 (BP 1 Location: Right upper arm, BP Patient Position: At rest;Lying)   Pulse 96   Temp 98 °F (36.7 °C)   Resp 18   Ht 5' 3\" (1.6 m)   Wt 146 lb 4.8 oz (66.4 kg)   SpO2 100%   BMI 25.92 kg/m²           Physical Exam:   Constitutional: No respiratory distress.    Head: Normocephalic and atraumatic. Eyes: Pupils are equal, round   ENT: Hearing normal   Neck: Supple. No JVD present. Cardiovascular: Normal rate, irregular rhythm. Exam reveals no gallop and no friction rub. 2/6 systolic LSB murmur heard. Pulmonary/Chest: Effort normal and breath sounds normal. No wheezes. Abdominal: distended  Musculoskeletal: Moves extremities independently   Vasc/lymphatic: no edema  Neurological: Alert, oriented. Skin: Skin is warm and dry. Psychiatric: Normal mood and affect. Behavior is normal. Judgment and thought content normal.         Assessment/Plan:     Imaging/Studies:   Echo (11/21/2020): LVEF 48%, mod concentric LVH, grade 3 LV dysfunction. Mildly dilated RV. Mod DANIEL. Mild MR. Mod to severe TR. Mild MO. Severely elevated CVP (15+ mmHg). Small pericardial effusion. Typical atrial flutter and hx of atrial fibrillation LBBB with RVR:   low BP and so amiodarone is used but it is not a good long term option due to history of cirrhosis     She has ESRD on hemodialysis as well as cannot tolerate tikosyn or sotalol either     She has biatrial enlargement and significant TR        ECHO ADULT COMPLETE 08/24/2022 8/24/2022     Interpretation Summary  Formatting of this result is different from the original.       Left Ventricle: Severely reduced left ventricular systolic function with a visually estimated EF of 10 -15%. Left ventricle size is normal. Mass index 2D is 234.3 g/m2. Findings consistent with severe asymmetric hypertrophy. Severe global hypokinesis present. Right Ventricle: Severely reduced systolic function. TAPSE is abnormal. TAPSE is 1.0 cm. Aortic Valve: Severe sclerosis of the aortic valve cusp. No stenosis. Mitral Valve: Findings consistent with myxomatous degeneration and decreased excusrion. Moderately calcified leaflet, at the anterior and posterior leaflets. Mild annular calcification of the mitral valve.  Moderate regurgitation with an eccentrically directed jet and may underestimate severity. Tricuspid Valve: Mal-coaptation of tricuspid valve. Severe regurgitation with an eccentrically directed jet and may underestimate severity. Right Atrium: Right atrium is severely dilated. Pulmonary Arteries: Mild pulmonary hypertension present. The estimated PASP is 45 mmHg. IVC/SVC: IVC diameter is greater than 21 mm and decreases less than 50% during inspiration; therefore the estimated right atrial pressure is elevated (~15 mmHg). IVC is dilated. Signed by: London Simental MD on 8/24/2022  5:14 PM     Result of 2 D echo is not good for her  She has biventricular failure with severe MR TR and DANIEL and mild pulmonary hypertension   LVEF 10-15%     Leadless pacemaker and Av node ablation will not be option for her  She will not be able to tolerate atrial fibrillation ablation  For atrial flutter ablation alone, she may tolerate MAC and it will help her short term until AFIB recurs  For AV node ablation, she will need biventricular pacemaker +/- ICD  She has left chest dialysis catheter so CRT pacemaker +/- ICD would be on right chest  There will be risk of infection over time     She will discuss with her family again and let me know how she wants to proceed      Thank you for involving me in this patient's care and please call with further concerns or questions. Geovany Houston M.D.    Electrophysiology/Cardiology   [facility] [place]   [address] [place] [address] [phone]

## 2022-08-29 NOTE — PROGRESS NOTES
0730: Bedside shift change report given to Radha Hernandez RN (oncoming nurse) by Quentin Camacho RN (offgoing nurse). Report included the following information SBAR, MAR, and Recent Results. 1930: Bedside shift change report given to Tianna Liriano RN (oncoming nurse) by Radha Hernandez RN (offgoing nurse). Report included the following information SBAR, MAR, and Recent Results. Charting and patient care of [facility] by Rodolfo Murphy RN from 0730 to 1070 was supervised and reviewed by this RN.

## 2022-08-29 NOTE — PROGRESS NOTES
0730 Bedside shift change report given to Jersey Del Rio (oncoming nurse) by Quentin Camacho (offgoing nurse). Report included the following information SBAR, Kardex, Procedure Summary, Intake/Output, MAR, and Recent Results. 1118 Patient advised she was not feeling well at all with nausea and vomiting. She was diaphoretic and on monitor had a brief run of V-tach followed by A-fib A Flutter. Rapid response called. Heart rate in the 150s, EKG done, and D-fib pads applied. Dr Odalis Shields, Cardiology MD and NP were at bedside. Patient's HR dropped to 108 and chest pain resolved. 2000 Bedside shift change report given to Chandler (oncoming nurse) by Jersey Del Rio (offgoing nurse). Report included the following information SBAR, Kardex, ED Summary, OR Summary, Intake/Output, MAR, and Recent Results.

## 2022-08-29 NOTE — PROGRESS NOTES
[facility]                                                                                          Hospitalist Progress Note  Charity Vaz MD  Answering service: [phone] OR [phone] from in house phone        Date of Service:  2022  NAME:  Zarina Lowry  :  1950  MRN:  [id number]      Admission Summary:   Zarina Lowry is a 66 y.o. female with past medical history of end-stage renal disease on hemodialysis Monday/Wednesday/Friday, type 2 diabetes mellitus, paroxysmal atrial fibrillation, chronic anemia, hypertension, congestive heart failure, cirrhosis who presented to the emergency department with lightheadedness and fall. Patient was taking a shower and suddenly she felt lightheadedness and went to the ground. Interval history / Subjective: This am complained of headache. Still very dizzy even when laying still. Early am around 1145 had a RRT for HR in 150 range. VT vs. Aflutter with abberancy. Cardiology at bedside as well. Started amiodarone bolus with infusion       Assessment & Plan:     Near syncope  w/ tachycardia POA resolved  Wide-complex tachycardia - Afib vs. Aflutter with abberancy vs. VT   Atrial fibrillation with RVR - RRT   History of paroxysmal atrial fibrillation  -s/p amio bolus   -Assessed by cardiology  -Unable to tolerate beta-blocker due to hypotension  -Previously unable to tolerate amiodarone gtt.  due to hypotension  -Cardiology following,   - No plan for AV tony ablation, ICD placement or PPM, but Dr. Vinny Pope to re-eval   - [facility] Xavier Fulton   - Oral amiodarone resumed   - No OAC due to h/o GIB and varices   - Bolus IV amiodarone, with infusion to follow      Severe combined Biventricular heart failure    - TTE w/ EF 10-15 % , severe hypokinesis, TAPSE 1.0 cm severe TR and moderate MR   - Cardiology following  - Discussed possible lifevest with cardiology, and do not recommend at this time.   - Not able to tolerate BB d/t episodic hypotension   - Consult palliative care, prognosis very guarded < 6 months, appreciate assistance in her care      ESRD hemodialysis Monday Wednesday Friday  Hyperkalemia POA resolving  Anemia of chronic kidney disease  -Nephrology following  -Continue HD per nephrology  -Aileen Rivera as needed      Type 2 diabetes mellitus -A1c 4.9  - Lispro correctional scale, FSG AC HS  - Consistent carb diet, hypoglycemia protocol. Pulmonary nodules per imaging  -Chest CT no specific small pulmonary nodules  -Outpatient follow-up with pulmonology for management     Cirrhosis on CT scan  Ascites  - Ultrasound liver ascites   - s/p paracentesis, drain removed yesterday 8/25  - Call IR to assess drain site for leaking.   - Albumin 25 gram for each 5 liters of fluid taken   - Outpatient follow-up with hepatology for further management     N/V - check KUB to r/o obstruction   - PRN antiemetics     Code status: FULL  Prophylaxis: SCDs  Care Plan discussed with: pt, RN and CM   Anticipated Disposition: Home with  vs. SNF     CRITICAL CARE ATTESTATION:  I had a face to face encounter with the patient, reviewed and interpreted patient data including clinical events, labs, images, vital signs, I/O's, and examined patient. I have discussed the case and the plan and management of the patient's care with the consulting services, the bedside nurses and necessary ancillary providers. NOTE OF PERSONAL INVOLVEMENT IN CARE   This patient has a high probability of imminent, clinically significant deterioration, which requires the highest level of preparedness to intervene urgently. I participated in the decision-making and personally managed or directed the management of the following life and organ supporting interventions that required my frequent assessment to treat or prevent imminent deterioration. I personally spent 45 minutes of critical care time.   This is time spent at this critically ill patient's bedside actively involved in patient care as well as the coordination of care and discussions with the patient's family. This does not include any procedural time which has been billed separately. Hospital Problems  Date Reviewed: 12/1/2020            Codes Class Noted POA    Tachycardia ICD-10-CM: R00.0  ICD-9-CM: 785.0  8/22/2022 Unknown       Review of Systems:   A comprehensive review of systems was negative except for that written in the HPI. Vital Signs:    Last 24hrs VS reviewed since prior progress note. Most recent are:  Visit Vitals  BP (!) 109/56 (BP 1 Location: Right upper arm, BP Patient Position: At rest;Lying)   Pulse (!) 107   Temp 98 °F (36.7 °C)   Resp 18   Ht 5' 3\" (1.6 m)   Wt 66.4 kg (146 lb 4.8 oz)   SpO2 100%   BMI 25.92 kg/m²         Intake/Output Summary (Last 24 hours) at 8/29/2022 1355  Last data filed at 8/29/2022 9703  Gross per 24 hour   Intake 960 ml   Output 0 ml   Net 960 ml          Physical Examination:     I had a face to face encounter with this patient and independently examined them on 8/29/2022 as outlined below:          Constitutional:  Moderate distress, cooperative, pleasant, chronically ill appearing    ENT:  Oral mucosa moist, oropharynx benign. Resp:  CTA bilaterally. No wheezing/rhonchi/rales. No accessory muscle use. CV:  Switching from irregular to regular, and tachycardic with HR in 150-110 range  no murmurs, gallops, rubs. Tunneled dialysis line in place. GI:  Soft, non distended, non tender. normoactive bowel sounds, no hepatosplenomegaly     Musculoskeletal:  No edema, warm, 2+ pulses throughout    Neurologic:  Moves all extremities.   AAOx3, CN II-XII reviewed             Data Review:    Review and/or order of clinical lab test  Review and/or order of tests in the radiology section of CPT  Review and/or order of tests in the medicine section of CPT      Labs:     Recent Labs     08/29/22  0352 08/27/22  0336   WBC 8.3 5.6   HGB 12.0 10.8*   HCT 39.3 35.6    154       Recent Labs     08/29/22  0352 08/28/22  0349 08/27/22  0336 08/26/22  1924    142 141 142   K 5.2* 4.8 3.9 4.2    109* 107 106   CO2 27 31 30 30   BUN 27* 22* 16 27*   CREA 7.63* 6.65* 5.21* 7.64*   GLU 70 95 107* 120*   CA 8.7 8.1* 8.0* 8.1*   MG 2.4 2.2 2.2 2.3   PHOS 4.3  --  3.0 3.4       No results for input(s): ALT, AP, TBIL, TBILI, TP, ALB, GLOB, GGT, AML, LPSE in the last 72 hours. No lab exists for component: SGOT, GPT, AMYP, HLPSE    Recent Labs     08/28/22 0349 08/27/22 0336   APTT 30.6 32.4*        No results for input(s): FE, TIBC, PSAT, FERR in the last 72 hours. Lab Results   Component Value Date/Time    Folate 16.4 10/19/2020 09:34 AM        No results for input(s): PH, PCO2, PO2 in the last 72 hours. No results for input(s): CPK, CKNDX, TROIQ in the last 72 hours.     No lab exists for component: CPKMB  Lab Results   Component Value Date/Time    Cholesterol, total 114 11/25/2020 03:21 AM    HDL Cholesterol 59 11/25/2020 03:21 AM    LDL, calculated 38 11/25/2020 03:21 AM    Triglyceride 85 11/25/2020 03:21 AM    CHOL/HDL Ratio 1.9 11/25/2020 03:21 AM     Lab Results   Component Value Date/Time    Glucose (POC) 84 08/29/2022 11:18 AM    Glucose (POC) 134 (H) 08/29/2022 06:17 AM    Glucose (POC) 145 (H) 08/28/2022 11:36 AM    Glucose (POC) 85 08/28/2022 06:51 AM    Glucose (POC) 165 (H) 08/27/2022 09:07 PM     No results found for: COLOR, APPRN, SPGRU, REFSG, VERENA, PROTU, GLUCU, KETU, BILU, UROU, TIBURCIO, LEUKU, GLUKE, EPSU, BACTU, WBCU, RBCU, CASTS, UCRY      Medications Reviewed:     Current Facility-Administered Medications   Medication Dose Route Frequency    midodrine (PROAMATINE) tablet 5 mg  5 mg Oral TID WITH MEALS    oxyCODONE IR (ROXICODONE) tablet 2.5 mg  2.5 mg Oral Q4H PRN    amiodarone (CORDARONE) 375 mg/250 mL D5W infusion  0.5-1 mg/min IntraVENous TITRATE    sodium chloride (OCEAN) 0.65 % nasal squeeze bottle 2 Spray  2 Spray Both Nostrils PRN    amiodarone (CORDARONE) tablet 200 mg  200 mg Oral BID    famotidine (PEPCID) tablet 20 mg  20 mg Oral DAILY PRN    metoprolol (LOPRESSOR) injection 5 mg  5 mg IntraVENous Q3H PRN    albumin human 25% (BUMINATE) solution 25 g  25 g IntraVENous Q6H PRN    ipratropium (ATROVENT) 0.02 % nebulizer solution 0.5 mg  0.5 mg Nebulization Q4H PRN    albumin human 25% (BUMINATE) solution 12.5 g  12.5 g IntraVENous DIALYSIS PRN    ascorbic acid (vitamin C) (VITAMIN C) tablet 500 mg  500 mg Oral DAILY    cholecalciferol (VITAMIN D3) (1000 Units /25 mcg) tablet 2,000 Units  2,000 Units Oral DAILY    ferrous sulfate tablet 325 mg  325 mg Oral DAILY WITH BREAKFAST    lactulose (CHRONULAC) 10 gram/15 mL solution 30 mL  20 g Oral BID    sevelamer carbonate (RENVELA) tab 1,600 mg  1,600 mg Oral TID WITH MEALS    sodium chloride (NS) flush 5-40 mL  5-40 mL IntraVENous Q8H    sodium chloride (NS) flush 5-40 mL  5-40 mL IntraVENous PRN    acetaminophen (TYLENOL) tablet 650 mg  650 mg Oral Q6H PRN    Or    acetaminophen (TYLENOL) suppository 650 mg  650 mg Rectal Q6H PRN    polyethylene glycol (MIRALAX) packet 17 g  17 g Oral DAILY PRN    ondansetron (ZOFRAN ODT) tablet 4 mg  4 mg Oral Q8H PRN    Or    ondansetron (ZOFRAN) injection 4 mg  4 mg IntraVENous Q6H PRN    insulin lispro (HUMALOG) injection   SubCUTAneous TIDAC    glucose chewable tablet 16 g  4 Tablet Oral PRN    glucagon (GLUCAGEN) injection 1 mg  1 mg IntraMUSCular PRN    dextrose 10 % infusion 0-250 mL  0-250 mL IntraVENous PRN    diphenhydrAMINE (BENADRYL) capsule 25 mg  25 mg Oral Q6H PRN     ______________________________________________________________________  EXPECTED LENGTH OF STAY: 3d 19h  ACTUAL LENGTH OF STAY:          7                 Keisha Gamboa MD

## 2022-08-29 NOTE — PROGRESS NOTES
Rapid Response called overhead at this time, RRT responding. Rapid called overhead for increased heart rate into the 150s. D-fib pads on patient at this time and she is placed on the monitor. EKG ordered. Dr. Jeremy Finch at bedside as well and cardiology MD and NP, orders received for medications. Patient's heart rate has dropped down at this time to 108, reports pain in her chest is better at this time.      RRT will continue to follow

## 2022-08-30 LAB
ANION GAP SERPL CALC-SCNC: 4 MMOL/L (ref 5–15)
APTT PPP: 32.8 SEC (ref 22.1–31)
ATRIAL RATE: 87 BPM
BASOPHILS # BLD: 0.1 K/UL (ref 0–0.1)
BASOPHILS NFR BLD: 1 % (ref 0–1)
BUN SERPL-MCNC: 11 MG/DL (ref 6–20)
BUN/CREAT SERPL: 3 (ref 12–20)
CALCIUM SERPL-MCNC: 8 MG/DL (ref 8.5–10.1)
CALCULATED R AXIS, ECG10: -114 DEGREES
CALCULATED T AXIS, ECG11: 65 DEGREES
CHLORIDE SERPL-SCNC: 105 MMOL/L (ref 97–108)
CO2 SERPL-SCNC: 31 MMOL/L (ref 21–32)
CREAT SERPL-MCNC: 3.47 MG/DL (ref 0.55–1.02)
DIAGNOSIS, 93000: NORMAL
DIFFERENTIAL METHOD BLD: ABNORMAL
EOSINOPHIL # BLD: 0.1 K/UL (ref 0–0.4)
EOSINOPHIL NFR BLD: 2 % (ref 0–7)
ERYTHROCYTE [DISTWIDTH] IN BLOOD BY AUTOMATED COUNT: 16.8 % (ref 11.5–14.5)
GLUCOSE BLD STRIP.AUTO-MCNC: 113 MG/DL (ref 65–117)
GLUCOSE BLD STRIP.AUTO-MCNC: 122 MG/DL (ref 65–117)
GLUCOSE BLD STRIP.AUTO-MCNC: 130 MG/DL (ref 65–117)
GLUCOSE BLD STRIP.AUTO-MCNC: 80 MG/DL (ref 65–117)
GLUCOSE SERPL-MCNC: 75 MG/DL (ref 65–100)
HCT VFR BLD AUTO: 36.7 % (ref 35–47)
HGB BLD-MCNC: 10.7 G/DL (ref 11.5–16)
IMM GRANULOCYTES # BLD AUTO: 0.1 K/UL (ref 0–0.04)
IMM GRANULOCYTES NFR BLD AUTO: 1 % (ref 0–0.5)
LYMPHOCYTES # BLD: 0.8 K/UL (ref 0.8–3.5)
LYMPHOCYTES NFR BLD: 16 % (ref 12–49)
MCH RBC QN AUTO: 28.2 PG (ref 26–34)
MCHC RBC AUTO-ENTMCNC: 29.2 G/DL (ref 30–36.5)
MCV RBC AUTO: 96.6 FL (ref 80–99)
MONOCYTES # BLD: 0.4 K/UL (ref 0–1)
MONOCYTES NFR BLD: 8 % (ref 5–13)
NEUTS SEG # BLD: 3.7 K/UL (ref 1.8–8)
NEUTS SEG NFR BLD: 72 % (ref 32–75)
NRBC # BLD: 0.02 K/UL (ref 0–0.01)
NRBC BLD-RTO: 0.4 PER 100 WBC
PHOSPHATE SERPL-MCNC: 2.2 MG/DL (ref 2.6–4.7)
PLATELET # BLD AUTO: 163 K/UL (ref 150–400)
PMV BLD AUTO: 9.9 FL (ref 8.9–12.9)
POTASSIUM SERPL-SCNC: 3.3 MMOL/L (ref 3.5–5.1)
Q-T INTERVAL, ECG07: 382 MS
QRS DURATION, ECG06: 160 MS
QTC CALCULATION (BEZET), ECG08: 537 MS
RBC # BLD AUTO: 3.8 M/UL (ref 3.8–5.2)
SERVICE CMNT-IMP: ABNORMAL
SERVICE CMNT-IMP: ABNORMAL
SERVICE CMNT-IMP: NORMAL
SERVICE CMNT-IMP: NORMAL
SODIUM SERPL-SCNC: 140 MMOL/L (ref 136–145)
THERAPEUTIC RANGE,PTTT: ABNORMAL SECS (ref 58–77)
VENTRICULAR RATE, ECG03: 119 BPM
WBC # BLD AUTO: 5.2 K/UL (ref 3.6–11)

## 2022-08-30 PROCEDURE — 74011250636 HC RX REV CODE- 250/636: Performed by: INTERNAL MEDICINE

## 2022-08-30 PROCEDURE — 74011000250 HC RX REV CODE- 250: Performed by: FAMILY MEDICINE

## 2022-08-30 PROCEDURE — 36415 COLL VENOUS BLD VENIPUNCTURE: CPT

## 2022-08-30 PROCEDURE — 74011250636 HC RX REV CODE- 250/636: Performed by: FAMILY MEDICINE

## 2022-08-30 PROCEDURE — 74011250637 HC RX REV CODE- 250/637: Performed by: INTERNAL MEDICINE

## 2022-08-30 PROCEDURE — 85025 COMPLETE CBC W/AUTO DIFF WBC: CPT

## 2022-08-30 PROCEDURE — 74011250637 HC RX REV CODE- 250/637: Performed by: STUDENT IN AN ORGANIZED HEALTH CARE EDUCATION/TRAINING PROGRAM

## 2022-08-30 PROCEDURE — 85730 THROMBOPLASTIN TIME PARTIAL: CPT

## 2022-08-30 PROCEDURE — 97530 THERAPEUTIC ACTIVITIES: CPT

## 2022-08-30 PROCEDURE — 74011000258 HC RX REV CODE- 258: Performed by: INTERNAL MEDICINE

## 2022-08-30 PROCEDURE — 74011250636 HC RX REV CODE- 250/636: Performed by: STUDENT IN AN ORGANIZED HEALTH CARE EDUCATION/TRAINING PROGRAM

## 2022-08-30 PROCEDURE — 84100 ASSAY OF PHOSPHORUS: CPT

## 2022-08-30 PROCEDURE — 99233 SBSQ HOSP IP/OBS HIGH 50: CPT | Performed by: INTERNAL MEDICINE

## 2022-08-30 PROCEDURE — 80048 BASIC METABOLIC PNL TOTAL CA: CPT

## 2022-08-30 PROCEDURE — 74011250637 HC RX REV CODE- 250/637: Performed by: FAMILY MEDICINE

## 2022-08-30 PROCEDURE — 82962 GLUCOSE BLOOD TEST: CPT

## 2022-08-30 PROCEDURE — 65660000001 HC RM ICU INTERMED STEPDOWN

## 2022-08-30 PROCEDURE — P9047 ALBUMIN (HUMAN), 25%, 50ML: HCPCS | Performed by: STUDENT IN AN ORGANIZED HEALTH CARE EDUCATION/TRAINING PROGRAM

## 2022-08-30 RX ORDER — AMIODARONE HYDROCHLORIDE 200 MG/1
400 TABLET ORAL 2 TIMES DAILY
Status: DISCONTINUED | OUTPATIENT
Start: 2022-08-30 | End: 2022-08-31 | Stop reason: HOSPADM

## 2022-08-30 RX ADMIN — AMIODARONE HYDROCHLORIDE 0.5 MG/MIN: 50 INJECTION, SOLUTION INTRAVENOUS at 06:18

## 2022-08-30 RX ADMIN — OXYCODONE HYDROCHLORIDE AND ACETAMINOPHEN 500 MG: 500 TABLET ORAL at 08:05

## 2022-08-30 RX ADMIN — SEVELAMER CARBONATE 1600 MG: 800 TABLET, FILM COATED ORAL at 08:05

## 2022-08-30 RX ADMIN — ALBUMIN (HUMAN) 25 G: 0.25 INJECTION, SOLUTION INTRAVENOUS at 04:27

## 2022-08-30 RX ADMIN — LACTULOSE 30 ML: 20 SOLUTION ORAL at 08:05

## 2022-08-30 RX ADMIN — ONDANSETRON HYDROCHLORIDE 4 MG: 2 INJECTION, SOLUTION INTRAMUSCULAR; INTRAVENOUS at 21:59

## 2022-08-30 RX ADMIN — FAMOTIDINE 20 MG: 20 TABLET ORAL at 21:59

## 2022-08-30 RX ADMIN — OXYCODONE 2.5 MG: 5 TABLET ORAL at 10:08

## 2022-08-30 RX ADMIN — SEVELAMER CARBONATE 1600 MG: 800 TABLET, FILM COATED ORAL at 12:16

## 2022-08-30 RX ADMIN — AMIODARONE HYDROCHLORIDE 400 MG: 200 TABLET ORAL at 17:10

## 2022-08-30 RX ADMIN — ACETAMINOPHEN 650 MG: 325 TABLET, FILM COATED ORAL at 12:22

## 2022-08-30 RX ADMIN — OXYCODONE 2.5 MG: 5 TABLET ORAL at 21:59

## 2022-08-30 RX ADMIN — SODIUM CHLORIDE, PRESERVATIVE FREE 10 ML: 5 INJECTION INTRAVENOUS at 22:01

## 2022-08-30 RX ADMIN — OXYCODONE 2.5 MG: 5 TABLET ORAL at 18:42

## 2022-08-30 RX ADMIN — Medication 2000 UNITS: at 08:05

## 2022-08-30 RX ADMIN — AMIODARONE HYDROCHLORIDE 400 MG: 200 TABLET ORAL at 12:16

## 2022-08-30 RX ADMIN — MIDODRINE HYDROCHLORIDE 5 MG: 5 TABLET ORAL at 17:10

## 2022-08-30 RX ADMIN — FERROUS SULFATE TAB 325 MG (65 MG ELEMENTAL FE) 325 MG: 325 (65 FE) TAB at 08:05

## 2022-08-30 RX ADMIN — PROCHLORPERAZINE EDISYLATE 5 MG: 5 INJECTION, SOLUTION INTRAMUSCULAR; INTRAVENOUS at 17:10

## 2022-08-30 RX ADMIN — SODIUM CHLORIDE, PRESERVATIVE FREE 10 ML: 5 INJECTION INTRAVENOUS at 14:30

## 2022-08-30 RX ADMIN — LACTULOSE 30 ML: 20 SOLUTION ORAL at 17:18

## 2022-08-30 RX ADMIN — MIDODRINE HYDROCHLORIDE 5 MG: 5 TABLET ORAL at 08:05

## 2022-08-30 RX ADMIN — MIDODRINE HYDROCHLORIDE 5 MG: 5 TABLET ORAL at 12:16

## 2022-08-30 RX ADMIN — SODIUM CHLORIDE, PRESERVATIVE FREE 10 ML: 5 INJECTION INTRAVENOUS at 06:17

## 2022-08-30 NOTE — PROGRESS NOTES
[facility] Adult  Hospitalist Group                                                                                          Hospitalist Progress Note  Zoltan Morales MD  Answering service: [phone] OR [phone] from in house phone        Date of Service:  2022  NAME:  Jerel Daniel  :  1950  MRN:  [id number]      Admission Summary:   Jerel Daniel is a 66 y.o. female with past medical history of end-stage renal disease on hemodialysis Monday/Wednesday/Friday, type 2 diabetes mellitus, paroxysmal atrial fibrillation, chronic anemia, hypertension, congestive heart failure, cirrhosis who presented to the emergency department with lightheadedness and fall. Patient was taking a shower and suddenly she felt lightheadedness and went to the ground. Interval history / Subjective:   Feeling much better today, HR improved. Got up with PT and was able to ambulate without as much dizziness today     Pt not yet ready to do any procedures, but also not ready to hospice and palliative care. She realizes that her heart is very sick, and despite her best medical efforts she may continue to have these arrhythmias. We will try to get her off IV amiodarone today, and potentially even go home tomorrow. We reviewed again that even if she is discharged tomorrow there is no guarantee that she does not go back into an abnormal rhythm immediately after getting home. Assessment & Plan:     Near syncope  w/ tachycardia POA resolved  Wide-complex tachycardia - Afib vs. Aflutter with abberancy vs. VT   Atrial fibrillation with RVR - RRT   History of paroxysmal atrial fibrillation  -Assessed by cardiology  -Unable to tolerate beta-blocker due to hypotension  -Previously unable to tolerate amiodarone gtt.  due to hypotension  -Cardiology following,   -Dr. Ra Field continues to offer possible ablation with PPM placement, however patient continues to refuse and would like to instead focus on medical therapy. [facility]ul [facility]   - No OAC due to h/o GIB and varices   -IV amiodarone transition to p.o. today. If remains without recurrent events we will consider discharge in the morning. Severe combined Biventricular heart failure    - TTE w/ EF 10-15 % , severe hypokinesis, TAPSE 1.0 cm severe TR and moderate MR   - Cardiology following  - Discussed possible lifevest with cardiology, and do not recommend at this time.   - Not able to tolerate BB d/t episodic hypotension   - Consult palliative care, prognosis very guarded < 6 months, appreciate assistance in her care, not yet ready for hospice     ESRD hemodialysis Monday Wednesday Friday  Hyperkalemia POA resolving  Anemia of chronic kidney disease  -Nephrology following  -Continue HD per nephrology  -[place] Perfect as needed      Type 2 diabetes mellitus -A1c 4.9  - Lispro correctional scale, FSG AC HS  - Consistent carb diet, hypoglycemia protocol. Pulmonary nodules per imaging  -Chest CT no specific small pulmonary nodules  -Outpatient follow-up with pulmonology for management     Cirrhosis on CT scan  Ascites  - Ultrasound liver ascites   - s/p paracentesis, drain removed yesterday 8/25  - Call IR to assess drain site for leaking.   - Albumin 25 gram for each 5 liters of fluid taken   - Outpatient follow-up with hepatology for further management     N/V - check KUB to r/o obstruction   - PRN antiemetics     Code status: FULL  Prophylaxis: SCDs  Care Plan discussed with: pt, RN and CM   Anticipated Disposition: Plan to discharge home with home health, patient has family who are willing to be supportive. Hospital Problems  Date Reviewed: 12/1/2020            Codes Class Noted POA    Tachycardia ICD-10-CM: R00.0  ICD-9-CM: 785.0  8/22/2022 Unknown       Review of Systems:   A comprehensive review of systems was negative except for that written in the HPI. Vital Signs:    Last 24hrs VS reviewed since prior progress note. Most recent are:  Visit Vitals  /64 (BP 1 Location: Right upper arm, BP Patient Position: Lying)   Pulse 75   Temp 98.2 °F (36.8 °C)   Resp 16   Ht 5' 3\" (1.6 m)   Wt 66.4 kg (146 lb 4.8 oz)   SpO2 98%   BMI 25.92 kg/m²         Intake/Output Summary (Last 24 hours) at 8/30/2022 1507  Last data filed at 8/30/2022 0810  Gross per 24 hour   Intake 240 ml   Output -135 ml   Net 375 ml          Physical Examination:     I had a face to face encounter with this patient and independently examined them on 8/30/2022 as outlined below:          Constitutional: No acute distress, cooperative, pleasant, chronically ill appearing, appears older than stated age   ENT:  Oral mucosa moist, oropharynx benign. Resp:  CTA bilaterally. No wheezing/rhonchi/rales. No accessory muscle use. CV: Regular, normal rate in the 70s no murmurs, gallops, rubs. Tunneled dialysis line in place. GI:  Soft, non distended, non tender. normoactive bowel sounds, no hepatosplenomegaly     Musculoskeletal:  No edema, warm, 2+ pulses throughout    Neurologic:  Moves all extremities. AAOx3, CN II-XII reviewed             Data Review:    Review and/or order of clinical lab test  Review and/or order of tests in the radiology section of CPT  Review and/or order of tests in the medicine section of CPT      Labs:     Recent Labs     08/30/22  0047 08/29/22  0352   WBC 5.2 8.3   HGB 10.7* 12.0   HCT 36.7 39.3    184       Recent Labs     08/30/22  0047 08/29/22  0352 08/28/22  0349    141 142   K 3.3* 5.2* 4.8    107 109*   CO2 31 27 31   BUN 11 27* 22*   CREA 3.47* 7.63* 6.65*   GLU 75 70 95   CA 8.0* 8.7 8.1*   MG  --  2.4 2.2   PHOS 2.2* 4.3  --        No results for input(s): ALT, AP, TBIL, TBILI, TP, ALB, GLOB, GGT, AML, LPSE in the last 72 hours.     No lab exists for component: SGOT, GPT, AMYP, HLPSE    Recent Labs     08/30/22  0047 08/28/22  0349   APTT 32.8* 30.6        No results for input(s): FE, TIBC, PSAT, FERR in the last 72 hours. Lab Results   Component Value Date/Time    Folate 16.4 10/19/2020 09:34 AM        No results for input(s): PH, PCO2, PO2 in the last 72 hours. No results for input(s): CPK, CKNDX, TROIQ in the last 72 hours.     No lab exists for component: CPKMB  Lab Results   Component Value Date/Time    Cholesterol, total 114 11/25/2020 03:21 AM    HDL Cholesterol 59 11/25/2020 03:21 AM    LDL, calculated 38 11/25/2020 03:21 AM    Triglyceride 85 11/25/2020 03:21 AM    CHOL/HDL Ratio 1.9 11/25/2020 03:21 AM     Lab Results   Component Value Date/Time    Glucose (POC) 122 (H) 08/30/2022 11:00 AM    Glucose (POC) 80 08/30/2022 06:15 AM    Glucose (POC) 94 08/29/2022 09:24 PM    Glucose (POC) 113 08/29/2022 05:05 PM    Glucose (POC) 84 08/29/2022 11:18 AM     No results found for: COLOR, APPRN, SPGRU, REFSG, VERENA, PROTU, GLUCU, KETU, BILU, UROU, TIBURCIO, LEUKU, GLUKE, EPSU, BACTU, WBCU, RBCU, CASTS, UCRY      Medications Reviewed:     Current Facility-Administered Medications   Medication Dose Route Frequency    amiodarone (CORDARONE) tablet 400 mg  400 mg Oral BID    midodrine (PROAMATINE) tablet 5 mg  5 mg Oral TID WITH MEALS    oxyCODONE IR (ROXICODONE) tablet 2.5 mg  2.5 mg Oral Q4H PRN    prochlorperazine (COMPAZINE) injection 5 mg  5 mg IntraVENous Q6H PRN    sodium chloride (OCEAN) 0.65 % nasal squeeze bottle 2 Spray  2 Spray Both Nostrils PRN    famotidine (PEPCID) tablet 20 mg  20 mg Oral DAILY PRN    metoprolol (LOPRESSOR) injection 5 mg  5 mg IntraVENous Q3H PRN    albumin human 25% (BUMINATE) solution 25 g  25 g IntraVENous Q6H PRN    ipratropium (ATROVENT) 0.02 % nebulizer solution 0.5 mg  0.5 mg Nebulization Q4H PRN    albumin human 25% (BUMINATE) solution 12.5 g  12.5 g IntraVENous DIALYSIS PRN    ascorbic acid (vitamin C) (VITAMIN C) tablet 500 mg  500 mg Oral DAILY    cholecalciferol (VITAMIN D3) (1000 Units /25 mcg) tablet 2,000 Units  2,000 Units Oral DAILY    ferrous sulfate tablet 325 mg  325 mg Oral DAILY WITH BREAKFAST    lactulose (CHRONULAC) 10 gram/15 mL solution 30 mL  20 g Oral BID    sevelamer carbonate (RENVELA) tab 1,600 mg  1,600 mg Oral TID WITH MEALS    sodium chloride (NS) flush 5-40 mL  5-40 mL IntraVENous Q8H    sodium chloride (NS) flush 5-40 mL  5-40 mL IntraVENous PRN    acetaminophen (TYLENOL) tablet 650 mg  650 mg Oral Q6H PRN    Or    acetaminophen (TYLENOL) suppository 650 mg  650 mg Rectal Q6H PRN    polyethylene glycol (MIRALAX) packet 17 g  17 g Oral DAILY PRN    ondansetron (ZOFRAN ODT) tablet 4 mg  4 mg Oral Q8H PRN    Or    ondansetron (ZOFRAN) injection 4 mg  4 mg IntraVENous Q6H PRN    insulin lispro (HUMALOG) injection   SubCUTAneous TIDAC    glucose chewable tablet 16 g  4 Tablet Oral PRN    glucagon (GLUCAGEN) injection 1 mg  1 mg IntraMUSCular PRN    dextrose 10 % infusion 0-250 mL  0-250 mL IntraVENous PRN    diphenhydrAMINE (BENADRYL) capsule 25 mg  25 mg Oral Q6H PRN     ______________________________________________________________________  EXPECTED LENGTH OF STAY: 3d 19h  ACTUAL LENGTH OF STAY:          8                 Iggy Jaimes MD

## 2022-08-30 NOTE — PROGRESS NOTES
[facility]  Cardiology Care Note                  []Initial visit     [x]Established visit     Patient Name: Felisa Montes De Oca - KNO:[id number]    Consulting Cardiologist: Lionel Mosqueda MD   Reason for consult:wide complex tachycardia      HPI: previously   Eros Meraz is a 67 y.o. female with ESRD on HD, admitted 8/22/22 am via ER with dizziness, too weak to get in the shower. EMS saw atrial fibrillation at 150This was second episode of dizziness in 5 days. Pt reports some worsening of her usual mild SARKAR and edema, denies chest pain, Labs notable for initial troponin of 74, Mag 2.5 Hgb 11.3 and admit for sepsis. K was 5.0 on admit and 6.6 on POC next morning (today) at 1255 am when pt had wide complex tachy  CXR cardiomegaly Prev EF 48% by echo 11/2020 with 3+ TR,HTN, DM,PAF,anemia , liver cirrhosis. ESRD on HD for 11 years, RV failure,  EKGS with afib with aberrancy and wide complex tachy as detailed below. EF now shows 10%-15%, with severe RV dysfunction. Underwent parcentesis 8/23/22      Interval HPI/SUBJECTIVE:  Had recurrent afib RVR yesterday, back in sinus rhythm now. Breathing better. No chest pain. Assessment and Plan       1)  Paroxysmal aflutter/afib:   Although she is back in sinus rhythm, her cardiomyopathy, ESRD, etc make the likelihood of recurrent rapid afib/flutter very high. The options for medical therapy are suboptimal and she has had recurrent breakthrough arrhythmia with RVR. Will switch amio back to po 400 mg bid for 1 week then 200 mg once daily. Appreciate Dr. Emmanuelle vargas and a pacemaker and AVN ablation is recommended. Pt did talk to her family who said the decision was up to her. She still wants to hold off but will reconsider this if her afib continues to recur which I suspect it will. The other option is to forego any procedures, etc and focus on comfort care. Palliative involvement much appreciated. She is not interested in hospice yet either so for now will continue to optimize medical management. Discussed plan with nephrology as well. 2). Biventricular failure:  Chronic systolic heart failure and severe RV dysfunction with severe TR. With cardiorenal syndrome. EF 10-15%. Quentin Penn GDMT is unfortunately limited by low bp requiring midodrine. Volume management per renal with HD. 3)  HTN and  ESRD per renal team:    BP now low NL. HD per renal.  On midodrine for BP support. 5)  Advanced directives/goals of care: Full code at this time. Palliative care recs appreciated.            ____________________________________________________________    Cardiac testing  08/22/22    ECHO ADULT COMPLETE 08/24/2022 8/24/2022    Interpretation Summary  Formatting of this result is different from the original.      Left Ventricle: Severely reduced left ventricular systolic function with a visually estimated EF of 10 -15%. Left ventricle size is normal. Mass index 2D is 234.3 g/m2. Findings consistent with severe asymmetric hypertrophy. Severe global hypokinesis present. Right Ventricle: Severely reduced systolic function. TAPSE is abnormal. TAPSE is 1.0 cm. Aortic Valve: Severe sclerosis of the aortic valve cusp. No stenosis. Mitral Valve: Findings consistent with myxomatous degeneration and decreased excusrion. Moderately calcified leaflet, at the anterior and posterior leaflets. Mild annular calcification of the mitral valve. Moderate regurgitation with an eccentrically directed jet and may underestimate severity. Tricuspid Valve: Mal-coaptation of tricuspid valve. Severe regurgitation with an eccentrically directed jet and may underestimate severity. Right Atrium: Right atrium is severely dilated. Pulmonary Arteries: Mild pulmonary hypertension present. The estimated PASP is 45 mmHg.     IVC/SVC: IVC diameter is greater than 21 mm and decreases less than 50% during inspiration; therefore the estimated right atrial pressure is elevated (~15 mmHg). IVC is dilated. Signed by: Oneal Cam MD on 8/24/2022  5:14 PM          11/17/20    ECHO ADULT COMPLETE 11/21/2020 11/21/2020    Interpretation Summary  · LV: Calculated LVEF is 48%. Normal cavity size. Moderate concentric hypertrophy. Moderately reduced systolic function. Severe (grade 3) left ventricular diastolic dysfunction. · LA: Moderately dilated left atrium. · RV: Mildly dilated right ventricle. · RA: Moderately dilated right atrium. · IAS: No atrial septal defect present. · MV: Mild mitral valve regurgitation is present. · TV: Moderate to severe tricuspid valve regurgitation is present. · PV: Mild pulmonic valve regurgitation is present. · IVC: Severely elevated central venous pressure (15+ mmHg); IVC diameter is larger than 21 mm and collapses less than 50% with respiration. · Pericardium: Small pericardial effusion. Signed by: Anitha Grande MD on 11/21/2020  5:13 PM        EKGS:  EKG 8/22/22 958 AM, atrial fibrillation at 140 with aberrancy, left axis, IVCD  EKG 8/22/22 2323h- atrial fibrillation at 129 , variable aberrancy vs PVCs  EKG 8/23/22 0045h - regular wide complex tachy at 185 , NSVT vs SVT with aberrancy  EKG 8/23/22 1255 h atrial fibrillation at 94 with LBBB      Most recent HS troponins:  No results for input(s): TROPHS in the last 72 hours.     No lab exists for component:  CKMB      Review of Systems    [x]All other systems reviewed and all negative except as written in HPI    [] Patient unable to provide secondary to condition      Past Medical History:   Diagnosis Date    Arthritis     Asthma     Chronic combined systolic and diastolic CHF (congestive heart failure) ([address] 8/26/2022    Chronic kidney disease     Chronic pain     Cirrhosis [address] 12/17/2017    Diabetes ([facility] [address] diet controlled    GERD (gastroesophageal reflux disease)     Hypertension     Paroxysmal atrial fibrillation ([address] 10/6/2020     Past Surgical History:   Procedure Laterality Date    COLONOSCOPY N/A 1/12/2018    COLONOSCOPY performed by Garret Jeong MD at THE [facility] ENDOSCOPY    COLONOSCOPY N/A 3/19/2018    COLONOSCOPY performed by Garret Jeong MD at THE [facility] ENDOSCOPY    HX GYN  c section    HX VASCULAR ACCESS      dialysis     IR BX TRANSCATHETER  11/24/2020    IR PARACENTESIS ABD W IMAGE  10/22/2020     Social Hx:  reports that she has never smoked. She has never used smokeless tobacco. She reports that she does not drink alcohol and does not use drugs. Family Hx: Family history is unknown by patient. Allergies   Allergen Reactions    Aleve [Naproxen Sodium] Itching, Swelling and Other (comments)    Amlodipine Rash, Hives and Itching    Aspirin Other (comments), Nausea Only and Unknown (comments)     GI bleed  GI bleeding      Heparin Unknown (comments)     bleeding      Ibuprofen Nausea and Vomiting    Metformin Other (comments)     swelling          OBJECTIVE:  Wt Readings from Last 3 Encounters:   08/29/22 146 lb 4.8 oz (66.4 kg)   12/01/20 162 lb 4.1 oz (73.6 kg)   11/25/20 169 lb 5 oz (76.8 kg)       Intake/Output Summary (Last 24 hours) at 8/30/2022 1105  Last data filed at 8/30/2022 0810  Gross per 24 hour   Intake 360 ml   Output -135 ml   Net 495 ml           Physical Exam    Vitals:   Vitals:    08/30/22 0500 08/30/22 0600 08/30/22 0810 08/30/22 1000   BP: (!) 120/59 107/60 126/72    Pulse: 76 76 77 75   Resp:  17 16    Temp:  97.4 °F (36.3 °C) 98 °F (36.7 °C)    SpO2: 98% 100% 97%    Weight:       Height:         Telemetry: AFIB        General:    Alert, cooperative, appears stated age. Neck:   Supple,  no JVD. Back:     Symmetric,    Lungs:     Diminished bases     Heart[de-identified]    Regular rate and rhythm,  S1, S2 normal, click, rub or gallop. A 1/6 soft systolic murmur      Abdomen:     Soft, non-tender.  Bowel sounds normal.    Extremities:   Extremities normal, atraumatic, no cyanosis or edema. Vascular:   Pulses - defer   Skin:   Skin color normal. No rashes or lesions on visible areas   Neurologic:   Alert, Moves all extremities. Data Review:     Radiology:   XR Results (most recent):  Results from Hospital Encounter encounter on 08/22/22    XR ABD (KUB)    Narrative  EXAM: XR ABD (KUB)    INDICATION: rule out obstruction    COMPARISON: 8/22/2022. FINDINGS: A supine radiograph of the abdomen shows a nodular to bowel gas  pattern. Retained oral contrast is seen in the left colon. Surgical coils are  seen in the right pelvis. Atherosclerotic calcifications are seen in the upper  abdomen. . The kidneys are atrophied. Ascites is better seen on the prior CT. The  bones and soft tissues are within normal limits. Impression  No evidence of obstruction. CT Results (most recent):  Results from Hospital Encounter encounter on 08/22/22    CT CHEST WO CONT    Narrative  INDICATION: Pulmonary nodule    COMPARISON: CT dated 8/22/2022    CONTRAST: None. TECHNIQUE:  5 mm axial images were obtained through the chest. Coronal and  sagittal reformats were generated. CT dose reduction was achieved through use  of a standardized protocol tailored for this examination and automatic exposure  control for dose modulation. The absence of intravenous contrast reduces the sensitivity for evaluation of  the mediastinum, marlene, vasculature, and upper abdominal organs. FINDINGS:    CHEST WALL: Left IJ catheter. No axillary or supraclavicular adenopathy. THYROID: Calcified left thyroid nodule  MEDIASTINUM: No mass or lymphadenopathy. MARLENE: No mass or lymphadenopathy. THORACIC AORTA: No aneurysm. MAIN PULMONARY ARTERY: Normal in caliber. TRACHEA/BRONCHI: Patent. ESOPHAGUS: No wall thickening or dilatation. HEART: Normal in size. PLEURA: Small right pleural effusion  LUNGS: Too numerous to count pulmonary nodules predominantly in the right middle  lobe and right lower lobe.  The largest measures 7 mm in the right lower lobe  with a larger focus of nodularity at the junction of the major minor fissures in  the right lung. INCIDENTALLY IMAGED UPPER ABDOMEN: Abdominal ascites  BONES: No destructive bone lesion. Impression  Multiple small nonspecific pulmonary nodules. These are too small for  percutaneous sampling. Recommend short-term follow-up chest CT in 6-12 weeks. MRI Results (most recent):  No results found for this or any previous visit. No results for input(s): CPK, TROIQ in the last 72 hours. No lab exists for component: CKQMB, CPKMB, BMPP  Recent Labs     08/30/22  0047 08/29/22  0352    141   K 3.3* 5.2*    107   CO2 31 27   BUN 11 27*   CREA 3.47* 7.63*   GLU 75 70   PHOS 2.2* 4.3   CA 8.0* 8.7       Recent Labs     08/30/22  0047 08/29/22  0352   WBC 5.2 8.3   HGB 10.7* 12.0   HCT 36.7 39.3    184       No results for input(s): PTP, INR, AP, INREXT, INREXT in the last 72 hours. No lab exists for component: PTTP, GPT, SGOT  No results for input(s): CHOL, LDLC in the last 72 hours. No lab exists for component: TGL, HDLC,  HBA1C  No results for input(s): CRP, TSH, TSHEXT, TSHEXT in the last 72 hours.     No lab exists for component: ESR          Current meds:    Current Facility-Administered Medications:     amiodarone (CORDARONE) tablet 400 mg, 400 mg, Oral, BID, Qi Smith MD    midodrine (PROAMATINE) tablet 5 mg, 5 mg, Oral, TID WITH MEALS, Fabricio Renee Asp, MD, 5 mg at 08/30/22 0805    oxyCODONE IR (ROXICODONE) tablet 2.5 mg, 2.5 mg, Oral, Q4H PRN, Fabricio Renee Asp, MD, 2.5 mg at 08/30/22 1008    prochlorperazine (COMPAZINE) injection 5 mg, 5 mg, IntraVENous, Q6H PRN, Fabricio Renee Asp, MD, 5 mg at 08/29/22 1804    sodium chloride (OCEAN) 0.65 % nasal squeeze bottle 2 Spray, 2 Spray, Both Nostrils, PRN, Av [place] MD, [address] [place] at 08/27/22 1906    famotidine (PEPCID) tablet 20 mg, 20 mg, Oral, DAILY PRN, Amarilis Edwards MD BR[place], 20 mg at 08/28/22 1350    metoprolol (LOPRESSOR) injection 5 mg, 5 mg, IntraVENous, Q3H PRN, Padmini Goss MD, 5 mg at 08/28/22 1605    albumin human 25% (BUMINATE) solution 25 g, 25 g, IntraVENous, Q6H PRN, Padmini Goss MD, 25 g at 08/30/22 0427    ipratropium (ATROVENT) 0.02 % nebulizer solution 0.5 mg, 0.5 mg, Nebulization, Q4H PRN, Padmini Goss MD    albumin human 25% (BUMINATE) solution 12.5 g, 12.5 g, IntraVENous, DIALYSIS PRN, Benito Mayen MD, 12.5 g at 08/29/22 2315    ascorbic acid (vitamin C) (VITAMIN C) tablet 500 mg, 500 mg, Oral, DAILY, Mervat Bennett MD, 500 mg at 08/30/22 2410    cholecalciferol (VITAMIN D3) (1000 Units /25 mcg) tablet 2,000 Units, 2,000 Units, Oral, DAILY, Mervat Bennett MD, 2,000 Units at 08/30/22 0805    ferrous sulfate tablet 325 mg, 325 mg, Oral, DAILY WITH BREAKFAST, Mervat Bennett MD, 325 mg at 08/30/22 0805    lactulose (CHRONULAC) 10 gram/15 mL solution 30 mL, 20 g, Oral, BID, Mervat Bennett MD, 30 mL at 08/30/22 0805    sevelamer carbonate (RENVELA) tab 1,600 mg, 1,600 mg, Oral, TID WITH MEALS, Mervat Bennett MD, 1,600 mg at 08/30/22 0805    sodium chloride (NS) flush 5-40 mL, 5-40 mL, IntraVENous, Q8H, Mervat Bennett MD, 10 mL at 08/30/22 0617    sodium chloride (NS) flush 5-40 mL, 5-40 mL, IntraVENous, PRN, Mervat Bennett MD    acetaminophen (TYLENOL) tablet 650 mg, 650 mg, Oral, Q6H PRN, 650 mg at 08/28/22 1808 **OR** acetaminophen (TYLENOL) suppository 650 mg, 650 mg, Rectal, Q6H PRN, Mervat Bennett MD    polyethylene glycol (MIRALAX) packet 17 g, 17 g, Oral, DAILY PRN, Mervat Bennett MD, 17 g at 08/28/22 1754    ondansetron (ZOFRAN ODT) tablet 4 mg, 4 mg, Oral, Q8H PRN, 4 mg at 08/28/22 0848 **OR** ondansetron (ZOFRAN) injection 4 mg, 4 mg, IntraVENous, Q6H PRN, Mervat Bennett MD, 4 mg at 08/29/22 1632    insulin lispro (HUMALOG) injection, , SubCUTAneous, TIDAC, Mervat Bennett MD, 2 Units at 08/28/22 1153    glucose chewable tablet 16 g, 4 Tablet, Oral, PRN, Lyndsey Bennett MD    glucagon (GLUCAGEN) injection 1 mg, 1 mg, IntraMUSCular, PRN, Jono Bennett MD    dextrose 10 % infusion 0-250 mL, 0-250 mL, IntraVENous, PRN, Lyndsey Bennett MD    diphenhydrAMINE (BENADRYL) capsule 25 mg, 25 mg, Oral, Q6H PRN, Fany Lara NP, 25 mg at 08/23/22 0031    Vale Chisholm MD  [facility] [address]  [place], [facility]  [phone]      CC: Abram Rangel MD

## 2022-08-30 NOTE — PROGRESS NOTES
0730 Bedside shift change report given to Janee(oncoming nurse) by Lazara Ogden (offgoing nurse). Report included the following information SBAR, Kardex, ED Summary, Intake/Output, MAR, and Recent Results. 1930 Bedside shift change report given to Lazara Ogden (oncoming nurse) by Shadi (offgoing nurse). Report included the following information SBAR, Kardex, Intake/Output, MAR, and Recent Results.

## 2022-08-30 NOTE — PROCEDURES
[facility] [place] / [phone]    Vitals Pre Post Assessment Pre Post   BP BP: (!) 88/45 (08/29/22 2223)   86/47 LOC A/Ox4, drowsy No change   HR Pulse (Heart Rate): 70 (08/29/22 2223) 75 Lungs Diminished No change   Resp Resp Rate: 18 (08/29/22 2223) 16 Cardiac Steady RR No change   Temp Temp: 97.6 °F (36.4 °C) (08/29/22 2223) 97.4 Skin Warm/Dry No change   Weight    Edema None No change   Tele status NSR SV Rhythm Pain Pain Intensity 1: 0 (08/29/22 2037) 0     Orders   Duration: Start: 1747 End: 0125 Total: 3 hrs   Dialyzer: Dialyzer/Set Up Inspection: Woodrow Su (08/29/22 2223)   K Bath: Dialysate K (mEq/L): 2 (08/29/22 2223)   Ca Bath: Dialysate CA (mEq/L): 2.5 (08/29/22 2223)   Na: Dialysate NA (mEq/L): 138 (08/29/22 2223)   Bicarb: Dialysate HCO3 (mEq/L): 35 (08/29/22 2223)   Target Fluid Removal: Goal/Amount of Fluid to Remove (mL): 0 mL (08/29/22 2223)     Access   Type & Location: L Chest PC : Dressing CDI, dated 8/29/2022. No s/s of infection. Both lumens aspirate & flush well. Running well at .     Comments:                                        Labs   HBsAg (Antigen) / date: Negative 8/22/22                                              HBsAb (Antibody) / date: Susceptible 8/22/22   Source: Epic   Obtained/Reviewed  Critical Results Called HGB   Date Value Ref Range Status   08/29/2022 12.0 11.5 - 16.0 g/dL Final     Potassium   Date Value Ref Range Status   08/29/2022 5.2 (H) 3.5 - 5.1 mmol/L Final     Calcium   Date Value Ref Range Status   08/29/2022 8.7 8.5 - 10.1 MG/DL Final     BUN   Date Value Ref Range Status   08/29/2022 27 (H) 6 - 20 MG/DL Final     Creatinine   Date Value Ref Range Status   08/29/2022 7.63 (H) 0.55 - 1.02 MG/DL Final        Meds Given   Name Dose Route   Albumin 25% 12.5g x 2 IV               Adequacy / Fluid    Total Liters Process: 59 L    Net Fluid Removed: +135 ml      Comments   Time Out Done:   (Time) 2220   Admitting Diagnosis: Tachycardia   Consent obtained/signed: Informed Consent Verified: Yes (08/29/22 0081)   Machine / RO # Machine Number: [id number] (08/29/22 7578)   Primary Nurse Rpt Pre: Una Alaniz RN   Primary Nurse Rpt Post: Una Alaniz RN   Pt Education: Procedural   Care Plan: Ongoing   Pts outpatient clinic: [facility]     Tx Summary   SBAR received from Primary RN. Pt arrived to HD suite A&Ox4. Consent signed & on file. 2223: Each catheter limb disinfected per p&p, caps removed, hubs disinfected per p&p. Each lumen aspirated for blood return and flushed with Normal Saline per policy. Labs drawn per request/ order. VSS. Dialysis Tx initiated. 2224: Pt's initial blood pressure is low. PRN Albumin initiated. 2315: Second bottle of Albumin initiated. 0030: Pt BP low. UF off, 50ml saline flush given. Pt in NAD.   0125: Tx ended. VSS. Each dialysis catheter limb disinfected per p&p, all possible blood returned per p&p, and each dialysis hub disinfected per p&p. Each lumen flushed, and caps applied. Bed locked and in the lowest position, call bell and belongings in reach. SBAR given to Primary, RN. Patient is stable at time of my departure. All Dialysis related medications have been reviewed.

## 2022-08-30 NOTE — PROGRESS NOTES
[facility]   [address] [facility], [address] [place] [address]  Phone: [phone]   [phone]    [email or web address]     Nephrology Progress Note    Patient Name : Briana Jaimes      : 1950     MRN : [id number]  Date of Admission : 2022  Date of Servive : 22    CC:  Follow up for ESRD       Assessment and Plan   ESRD- HD :  - Dialyzes at [facility] on MWF schedule   - has Tonia Nurse for access   - HD tomorrow per routine    Cardiac Cirrhosis   RV failure, severe TR  PAF  Chronic HFrEF   Chronic hypotension    Anemia in CKD   - resume CAROL ANN when Hb < 11 g  - hx of small bowel AVMs and needed RBC transfusions     Sec HPTH  - continue home meds     Hx of Hep C  DM-II  HTN      Interval History:  Seen and examined. Had afib with RVR yesterday. HD later in the evening. No issues reported. No UF. Feeling better this aM. No cp, sob, n/v/d reproted    Review of Systems: A comprehensive review of systems was negative except for that written in the HPI.     Current Medications:   Current Facility-Administered Medications   Medication Dose Route Frequency    midodrine (PROAMATINE) tablet 5 mg  5 mg Oral TID WITH MEALS    oxyCODONE IR (ROXICODONE) tablet 2.5 mg  2.5 mg Oral Q4H PRN    amiodarone (CORDARONE) 375 mg/250 mL D5W infusion  0.5-1 mg/min IntraVENous TITRATE    prochlorperazine (COMPAZINE) injection 5 mg  5 mg IntraVENous Q6H PRN    sodium chloride (OCEAN) 0.65 % nasal squeeze bottle 2 Spray  2 Spray Both Nostrils PRN    [Held by provider] amiodarone (CORDARONE) tablet 200 mg  200 mg Oral BID    famotidine (PEPCID) tablet 20 mg  20 mg Oral DAILY PRN    metoprolol (LOPRESSOR) injection 5 mg  5 mg IntraVENous Q3H PRN    albumin human 25% (BUMINATE) solution 25 g  25 g IntraVENous Q6H PRN    ipratropium (ATROVENT) 0.02 % nebulizer solution 0.5 mg  0.5 mg Nebulization Q4H PRN    albumin human 25% (BUMINATE) solution 12.5 g  12.5 g IntraVENous DIALYSIS PRN ascorbic acid (vitamin C) (VITAMIN C) tablet 500 mg  500 mg Oral DAILY    cholecalciferol (VITAMIN D3) (1000 Units /25 mcg) tablet 2,000 Units  2,000 Units Oral DAILY    ferrous sulfate tablet 325 mg  325 mg Oral DAILY WITH BREAKFAST    lactulose (CHRONULAC) 10 gram/15 mL solution 30 mL  20 g Oral BID    sevelamer carbonate (RENVELA) tab 1,600 mg  1,600 mg Oral TID WITH MEALS    sodium chloride (NS) flush 5-40 mL  5-40 mL IntraVENous Q8H    sodium chloride (NS) flush 5-40 mL  5-40 mL IntraVENous PRN    acetaminophen (TYLENOL) tablet 650 mg  650 mg Oral Q6H PRN    Or    acetaminophen (TYLENOL) suppository 650 mg  650 mg Rectal Q6H PRN    polyethylene glycol (MIRALAX) packet 17 g  17 g Oral DAILY PRN    ondansetron (ZOFRAN ODT) tablet 4 mg  4 mg Oral Q8H PRN    Or    ondansetron (ZOFRAN) injection 4 mg  4 mg IntraVENous Q6H PRN    insulin lispro (HUMALOG) injection   SubCUTAneous TIDAC    glucose chewable tablet 16 g  4 Tablet Oral PRN    glucagon (GLUCAGEN) injection 1 mg  1 mg IntraMUSCular PRN    dextrose 10 % infusion 0-250 mL  0-250 mL IntraVENous PRN    diphenhydrAMINE (BENADRYL) capsule 25 mg  25 mg Oral Q6H PRN      Allergies   Allergen Reactions    Aleve [Naproxen Sodium] Itching, Swelling and Other (comments)    Amlodipine Rash, Hives and Itching    Aspirin Other (comments), Nausea Only and Unknown (comments)     GI bleed  GI bleeding      Heparin Unknown (comments)     bleeding      Ibuprofen Nausea and Vomiting    Metformin Other (comments)     swelling       Objective:  Vitals:    Vitals:    08/30/22 0450 08/30/22 0500 08/30/22 0600 08/30/22 0810   BP: (!) 136/53 (!) 120/59 107/60 126/72   Pulse: 77 76 76 77   Resp:   17 16   Temp:   97.4 °F (36.3 °C) 98 °F (36.7 °C)   SpO2: 99% 98% 100% 97%   Weight:       Height:         Intake and Output:  No intake/output data recorded.   08/28 1901 - 08/30 0700  In: 1080 [P.O.:1080]  Out: -135     Physical Examination:        General: NAD,Conversant   Neck:  Supple, no mass  Resp:  Lungs CTA B/L, no wheezing , normal respiratory effort  CV:  RRR,  no murmur or rub, LE edema  GI:  Soft, nontender  Neurologic:  Non focal  Dialysis Access : [facility]- C/D/I    []    High complexity decision making was performed  []    Patient is at high-risk of decompensation with multiple organ involvement    Lab Data Personally Reviewed: I have reviewed all the pertinent labs, microbiology data and radiology studies during assessment. Recent Labs     08/30/22 0047 08/29/22  0352 08/28/22  0349    141 142   K 3.3* 5.2* 4.8    107 109*   CO2 31 27 31   GLU 75 70 95   BUN 11 27* 22*   CREA 3.47* 7.63* 6.65*   CA 8.0* 8.7 8.1*   MG  --  2.4 2.2   PHOS 2.2* 4.3  --        Recent Labs     08/30/22 0047 08/29/22  0352   WBC 5.2 8.3   HGB 10.7* 12.0   HCT 36.7 39.3    184       No results found for: SDES  Lab Results   Component Value Date/Time    Culture result: NO GROWTH 4 DAYS 08/24/2022 10:42 AM    Culture result: MRSA NOT PRESENT 08/22/2022 10:58 PM    Culture result:  08/22/2022 10:58 PM     Screening of patient nares for MRSA is for surveillance purposes and, if positive, to facilitate isolation considerations in high risk settings. It is not intended for automatic decolonization interventions per se as regimens are not sufficiently effective to warrant routine use. Culture result: NO GROWTH 5 DAYS 08/22/2022 10:42 AM    Culture result: NO GROWTH 4 DAYS 11/19/2020 02:50 PM             I have reviewed the flowsheets. Chart and Pertinent Notes have been reviewed. No change in PMH ,family and social history from Consult note.       Brendan Resendiz MD  [facility]

## 2022-08-30 NOTE — PROGRESS NOTES
0730: Bedside shift change report given to Darius Lynn and Jonathan Fallon (oncoming nurse) by Светлана Gilmore RN (offgoing nurse). Report included the following information SBAR, MAR, and Recent Results. 1930: Bedside shift change report given to Светлана Gilmore RN (oncoming nurse) by Jonathan Daniel (offgoing nurse). Report included the following information SBAR, MAR, and Recent Results. Charting and patient care of [facility] by Amarilis Jordan RN from 0730 to 5528 was supervised and reviewed by this RN.

## 2022-08-30 NOTE — PROGRESS NOTES
Problem: Mobility Impaired (Adult and Pediatric)  Goal: *Acute Goals and Plan of Care (Insert Text)  Description: FUNCTIONAL STATUS PRIOR TO ADMISSION: Patient was modified independent using a wheelchair as primary form of mobility. Cooks in her w/c, transfers to shower bench for seated showers. Attends outpatient HD 3x/week via w/c van. Children take her grocery shopping and she uses in store scooters for mobility. Occasionally stands in kitchen just to retrieve something from her cabinets. HOME SUPPORT PRIOR TO ADMISSION: The patient lived alone and has children local to provide assistance PRN. Physical Therapy Goals  Initiated 8/25/2022  1. Patient will move from supine to sit and sit to supine , scoot up and down, and roll side to side in bed with modified independence within 7 day(s). 2.  Patient will transfer from bed to chair and chair to bed with contact guard assist using the least restrictive device within 7 day(s). 3.  Patient will perform sit to stand with contact guard assist within 7 day(s). Outcome: Progressing Towards Goal   PHYSICAL THERAPY TREATMENT  Patient: Maria D Anaya (89 y.o. female)  Date: 8/30/2022  Diagnosis: Tachycardia [R00.0] <principal problem not specified>      Precautions: Fall, Bed Alarm  Chart, physical therapy assessment, plan of care and goals were reviewed. ASSESSMENT  Patient continues with skilled PT services and is progressing towards goals. Patient received supine in bed, agreeable to therapy. Able to participate in sitting EOB and transfer to chair then chair>recliner. Initially CGA for squat pivot to chair but then min A for squat pivot to recliner chair. BP more stable this session and less complaints of light headedness. Made comfortable in recliner chair and alarm on. If family is able to be around at all times to assist with transfers then she is safe for d/c home with HHPT. If family unable to assist then SNF.      Current Level of Function Impacting Discharge (mobility/balance): min A    Other factors to consider for discharge: lives alone, w/c user at baseline         PLAN :  Patient continues to benefit from skilled intervention to address the above impairments. Continue treatment per established plan of care. to address goals. Recommendation for discharge: (in order for the patient to meet his/her long term goals)  Physical therapy at least 2 days/week in the home AND ensure assist and/or supervision for safety with all transfers  , SNF if family unable to assist    This discharge recommendation:  Has been made in collaboration with the attending provider and/or case management    IF patient discharges home will need the following DME: to be determined (TBD)       SUBJECTIVE:   Patient stated My back is itching.     OBJECTIVE DATA SUMMARY:   Critical Behavior:  Neurologic State: Alert  Orientation Level: Oriented X4  Cognition: Follows commands  Functional Mobility Training:  Bed Mobility:  Supine to Sit: Supervision  Scooting: Supervision  Transfers:  Sit to Stand: Minimum assistance  Stand to Sit: Minimum assistance  Balance:  Sitting: Intact; Without support  Standing: Impaired; With support  Standing - Static: Fair  Standing - Dynamic : Poor      Activity Tolerance:   Fair, SpO2 stable on RA, and requires rest breaks    After treatment patient left in no apparent distress:   Sitting in chair, Heels elevated for pressure relief, Call bell within reach, and Bed / chair alarm activated    COMMUNICATION/COLLABORATION:   The patients plan of care was discussed with: Registered nurse, Physician, and Case management.      Abel Acuna PT, DPT   Time Calculation: 30 mins

## 2022-08-31 VITALS
BODY MASS INDEX: 24.3 KG/M2 | DIASTOLIC BLOOD PRESSURE: 69 MMHG | HEART RATE: 76 BPM | SYSTOLIC BLOOD PRESSURE: 135 MMHG | HEIGHT: 63 IN | WEIGHT: 137.13 LBS | RESPIRATION RATE: 16 BRPM | TEMPERATURE: 97.6 F | OXYGEN SATURATION: 99 %

## 2022-08-31 PROBLEM — I48.91 ATRIAL FIBRILLATION WITH RAPID VENTRICULAR RESPONSE (HCC): Status: ACTIVE | Noted: 2022-01-01

## 2022-08-31 LAB
ANION GAP SERPL CALC-SCNC: 9 MMOL/L (ref 5–15)
BASOPHILS # BLD: 0.1 K/UL (ref 0–0.1)
BASOPHILS NFR BLD: 1 % (ref 0–1)
BUN SERPL-MCNC: 20 MG/DL (ref 6–20)
BUN/CREAT SERPL: 3 (ref 12–20)
CALCIUM SERPL-MCNC: 8 MG/DL (ref 8.5–10.1)
CHLORIDE SERPL-SCNC: 106 MMOL/L (ref 97–108)
CO2 SERPL-SCNC: 25 MMOL/L (ref 21–32)
CREAT SERPL-MCNC: 6.31 MG/DL (ref 0.55–1.02)
DIFFERENTIAL METHOD BLD: ABNORMAL
EOSINOPHIL # BLD: 0.2 K/UL (ref 0–0.4)
EOSINOPHIL NFR BLD: 3 % (ref 0–7)
ERYTHROCYTE [DISTWIDTH] IN BLOOD BY AUTOMATED COUNT: 16.7 % (ref 11.5–14.5)
GLUCOSE BLD STRIP.AUTO-MCNC: 138 MG/DL (ref 65–117)
GLUCOSE BLD STRIP.AUTO-MCNC: 143 MG/DL (ref 65–117)
GLUCOSE BLD STRIP.AUTO-MCNC: 88 MG/DL (ref 65–117)
GLUCOSE SERPL-MCNC: 102 MG/DL (ref 65–100)
HCT VFR BLD AUTO: 36.5 % (ref 35–47)
HGB BLD-MCNC: 10.9 G/DL (ref 11.5–16)
IMM GRANULOCYTES # BLD AUTO: 0.1 K/UL (ref 0–0.04)
IMM GRANULOCYTES NFR BLD AUTO: 1 % (ref 0–0.5)
LYMPHOCYTES # BLD: 0.9 K/UL (ref 0.8–3.5)
LYMPHOCYTES NFR BLD: 14 % (ref 12–49)
MAGNESIUM SERPL-MCNC: 2.4 MG/DL (ref 1.6–2.4)
MCH RBC QN AUTO: 28.1 PG (ref 26–34)
MCHC RBC AUTO-ENTMCNC: 29.9 G/DL (ref 30–36.5)
MCV RBC AUTO: 94.1 FL (ref 80–99)
MONOCYTES # BLD: 0.6 K/UL (ref 0–1)
MONOCYTES NFR BLD: 10 % (ref 5–13)
NEUTS SEG # BLD: 4.7 K/UL (ref 1.8–8)
NEUTS SEG NFR BLD: 71 % (ref 32–75)
NRBC # BLD: 0 K/UL (ref 0–0.01)
NRBC BLD-RTO: 0 PER 100 WBC
PHOSPHATE SERPL-MCNC: 4 MG/DL (ref 2.6–4.7)
PLATELET # BLD AUTO: 159 K/UL (ref 150–400)
PMV BLD AUTO: 10.5 FL (ref 8.9–12.9)
POTASSIUM SERPL-SCNC: 4.7 MMOL/L (ref 3.5–5.1)
RBC # BLD AUTO: 3.88 M/UL (ref 3.8–5.2)
SERVICE CMNT-IMP: ABNORMAL
SERVICE CMNT-IMP: ABNORMAL
SERVICE CMNT-IMP: NORMAL
SODIUM SERPL-SCNC: 140 MMOL/L (ref 136–145)
WBC # BLD AUTO: 6.5 K/UL (ref 3.6–11)

## 2022-08-31 PROCEDURE — 83735 ASSAY OF MAGNESIUM: CPT

## 2022-08-31 PROCEDURE — 84100 ASSAY OF PHOSPHORUS: CPT

## 2022-08-31 PROCEDURE — 77010033678 HC OXYGEN DAILY

## 2022-08-31 PROCEDURE — 80048 BASIC METABOLIC PNL TOTAL CA: CPT

## 2022-08-31 PROCEDURE — 74011000250 HC RX REV CODE- 250: Performed by: FAMILY MEDICINE

## 2022-08-31 PROCEDURE — 99232 SBSQ HOSP IP/OBS MODERATE 35: CPT | Performed by: INTERNAL MEDICINE

## 2022-08-31 PROCEDURE — 74011636637 HC RX REV CODE- 636/637: Performed by: FAMILY MEDICINE

## 2022-08-31 PROCEDURE — 90935 HEMODIALYSIS ONE EVALUATION: CPT

## 2022-08-31 PROCEDURE — 74011250637 HC RX REV CODE- 250/637: Performed by: FAMILY MEDICINE

## 2022-08-31 PROCEDURE — 82962 GLUCOSE BLOOD TEST: CPT

## 2022-08-31 PROCEDURE — 74011000250 HC RX REV CODE- 250: Performed by: INTERNAL MEDICINE

## 2022-08-31 PROCEDURE — 74011250636 HC RX REV CODE- 250/636: Performed by: INTERNAL MEDICINE

## 2022-08-31 PROCEDURE — 74011250637 HC RX REV CODE- 250/637: Performed by: INTERNAL MEDICINE

## 2022-08-31 PROCEDURE — 94760 N-INVAS EAR/PLS OXIMETRY 1: CPT

## 2022-08-31 PROCEDURE — 85025 COMPLETE CBC W/AUTO DIFF WBC: CPT

## 2022-08-31 PROCEDURE — P9047 ALBUMIN (HUMAN), 25%, 50ML: HCPCS | Performed by: INTERNAL MEDICINE

## 2022-08-31 PROCEDURE — 36415 COLL VENOUS BLD VENIPUNCTURE: CPT

## 2022-08-31 RX ORDER — MIDODRINE HYDROCHLORIDE 5 MG/1
5 TABLET ORAL
Qty: 90 TABLET | Refills: 0 | Status: SHIPPED | OUTPATIENT
Start: 2022-08-31 | End: 2022-09-30

## 2022-08-31 RX ORDER — AMIODARONE HYDROCHLORIDE 100 MG/1
200 TABLET ORAL DAILY
Qty: 60 TABLET | Refills: 0 | Status: SHIPPED | OUTPATIENT
Start: 2022-09-07 | End: 2022-10-07

## 2022-08-31 RX ORDER — AMIODARONE HYDROCHLORIDE 400 MG/1
400 TABLET ORAL 2 TIMES DAILY
Qty: 14 TABLET | Refills: 0 | Status: SHIPPED | OUTPATIENT
Start: 2022-08-31 | End: 2022-09-07

## 2022-08-31 RX ADMIN — OXYCODONE HYDROCHLORIDE AND ACETAMINOPHEN 500 MG: 500 TABLET ORAL at 08:13

## 2022-08-31 RX ADMIN — ACETAMINOPHEN 650 MG: 325 TABLET, FILM COATED ORAL at 17:07

## 2022-08-31 RX ADMIN — ALTEPLASE 2 MG: 2.2 INJECTION, POWDER, LYOPHILIZED, FOR SOLUTION INTRAVENOUS at 11:01

## 2022-08-31 RX ADMIN — SEVELAMER CARBONATE 1600 MG: 800 TABLET, FILM COATED ORAL at 12:00

## 2022-08-31 RX ADMIN — SODIUM CHLORIDE, PRESERVATIVE FREE 10 ML: 5 INJECTION INTRAVENOUS at 05:22

## 2022-08-31 RX ADMIN — AMIODARONE HYDROCHLORIDE 400 MG: 200 TABLET ORAL at 17:52

## 2022-08-31 RX ADMIN — MIDODRINE HYDROCHLORIDE 5 MG: 5 TABLET ORAL at 17:07

## 2022-08-31 RX ADMIN — SEVELAMER CARBONATE 1600 MG: 800 TABLET, FILM COATED ORAL at 17:06

## 2022-08-31 RX ADMIN — AMIODARONE HYDROCHLORIDE 400 MG: 200 TABLET ORAL at 08:13

## 2022-08-31 RX ADMIN — SODIUM CHLORIDE, PRESERVATIVE FREE 10 ML: 5 INJECTION INTRAVENOUS at 14:41

## 2022-08-31 RX ADMIN — LACTULOSE 30 ML: 20 SOLUTION ORAL at 08:13

## 2022-08-31 RX ADMIN — ACETAMINOPHEN 650 MG: 325 TABLET, FILM COATED ORAL at 12:00

## 2022-08-31 RX ADMIN — SEVELAMER CARBONATE 1600 MG: 800 TABLET, FILM COATED ORAL at 08:13

## 2022-08-31 RX ADMIN — PROCHLORPERAZINE EDISYLATE 5 MG: 5 INJECTION, SOLUTION INTRAMUSCULAR; INTRAVENOUS at 01:16

## 2022-08-31 RX ADMIN — Medication 2 UNITS: at 17:06

## 2022-08-31 RX ADMIN — FERROUS SULFATE TAB 325 MG (65 MG ELEMENTAL FE) 325 MG: 325 (65 FE) TAB at 08:16

## 2022-08-31 RX ADMIN — ALBUMIN (HUMAN) 12.5 G: 0.25 INJECTION, SOLUTION INTRAVENOUS at 13:33

## 2022-08-31 RX ADMIN — MIDODRINE HYDROCHLORIDE 5 MG: 5 TABLET ORAL at 08:14

## 2022-08-31 RX ADMIN — Medication 2000 UNITS: at 08:13

## 2022-08-31 RX ADMIN — MIDODRINE HYDROCHLORIDE 5 MG: 5 TABLET ORAL at 12:00

## 2022-08-31 RX ADMIN — OXYCODONE 2.5 MG: 5 TABLET ORAL at 14:36

## 2022-08-31 NOTE — PROGRESS NOTES
0730: Bedside shift change report given to Jersey Del Rio and Jonathan Jarvis (oncoming nurse) by Tianna Liriano RN (offgoing nurse). Report included the following information SBAR, MAR, and Recent Results. Charting and patient care of Eduin by Rodolfo Murphy RN from 0730 to [id number] was supervised and reviewed by this RN.

## 2022-08-31 NOTE — DIALYSIS
Hemodialysis / [phone]    Vitals Pre Post Assessment Pre Post   BP BP: 122/63 (08/31/22 0601) 121/56 LOC Alert and oriented x 3 Alert and oriented x 3   HR Pulse (Heart Rate): 74 (08/31/22 0601) 78 Lungs Patient requested 2L, states she gets SOB when supine 2L NC no SOB   Resp Resp Rate: 16 (08/31/22 0601) 16 Cardiac Regular, bedisde monitor Regular, bedside monitor   Temp Temp: 97.5 °F (36.4 °C) (08/31/22 0601) 97.8 Skin Warm, dry, intact Warm, dry, intact   Weight  62.2kg 62.2 kg Edema none None    Tele status Bedside monitor  Pain Pain Intensity 1: 0 (08/31/22 0353) \"Uncomfortable\" in bed     Orders   Duration: Start: 11:51 End: 14:00 Total: 2 hours 9 minutes   Dialyzer: revaclear   K Bath: 2   Ca Bath: 2.5   Na: 138   Bicarb: 35   Target Fluid Removal: 0mL (net)     Access   Type & Location: Left subclavian permcath   Comments: Left subclavian permcath, dressing and biopatch dated 08/25/22, changed per P&P due to approaching 7th day, no redness or drainage at insertion site. Blue limb +aspiration +flush, Red Limb sluggish aspiration/flush. Attempted to intiate HD at 09:45 but access pressures were too elevated to continue treatment. Able to return approximately 150mL of blood at 10:00. MD notified, Michael Oconnor, notified of events and activase dwell ordered to red limb. Instilled at 11:05 when available from pharmacy. Aspirated and discarded at 11:45 with much improved flows. At end of HD all possible blood returned. Limbs and Hubs disinfected per P&P when treatment initiated and discontinued. Saline flush to limbs (heparin allergy), limbs clamped, and qsyte/purehub caps to hubs.      Labs   HBsAg (Antigen) / date: Negative 08/22/22                                              HBsAb (Antibody) / date: Susceptible 08/22/22   Source: Epic   Obtained/Reviewed  Critical Results Called HGB   Date Value Ref Range Status   08/31/2022 10.9 (L) 11.5 - 16.0 g/dL Final     Potassium   Date Value Ref Range Status 08/31/2022 4.7 3.5 - 5.1 mmol/L Final     Comment:     INVESTIGATED PER DELTA CHECK PROTOCOL     Calcium   Date Value Ref Range Status   08/31/2022 8.0 (L) 8.5 - 10.1 MG/DL Final     BUN   Date Value Ref Range Status   08/31/2022 20 6 - 20 MG/DL Final     Creatinine   Date Value Ref Range Status   08/31/2022 6.31 (H) 0.55 - 1.02 MG/DL Final     Comment:     INVESTIGATED PER DELTA CHECK PROTOCOL        Meds Given   Name Dose Route   Albumin  12.5gm PRN hypotension with dialysis. Given 13:38 for BP 89/50. Patient states \"my blood pressure feels low\"               Adequacy / Fluid    Total Liters Process: 45.8 liters   Net Fluid Removed: 0mL      Comments   Time Out Done:   (Time) 0940 and again 1145   Admitting Diagnosis: Lightheadedness with fall and tachycardia   Consent obtained/signed: Informed Consent Verified: Yes (08/29/22 0254)   Machine / RO # Machine Number: [id number] (08/29/22 7535)   Primary Nurse Rpt Pre: Herson Hernandez RN   Primary Nurse Rpt Post: Herson Hernandez RN   Pt Education: Procedural, access care,    Care Plan: Continue HD on MWF while inpatient and continue same schedule as outpatient. Probable discharge after dialysis today   Pts outpatient clinic: [facility]     Tx Summary   Comments:         09:30- at bedside prior to initiation of treatment. Ordered UF changed from 1kg to 0kg after assessment and patient request          0945-attempted to initiate treatment, but arterial pressures elevated, lines reversed and blood flow rate decreased. 10:00 Unable to run treatment and returned approximately 150mL of blood. MD notified and order obtained for cath criselda     11:05 cath criselda arrived and instilled for approximate 30 minute dwell  11:51 treatment initiated without issue. 13:38 albumin given for BP 89/50, patient states her blood pressure feels low. 14:00 patient requesting to come off machine early.  Educated on the risks of incomplete dialysis treatments (volume overload, electrolye imbalances), patient insistent that 2 hours is enough and she would like her treatment terminated.  notified. Treatment discontinued per P&P. All possible blood returned.

## 2022-08-31 NOTE — PROGRESS NOTES
[facility]  Cardiology Care Note                  []Initial visit     [x]Established visit     Patient Name: Diana Fischer - L:[id number]    Consulting Cardiologist: Juanita Fernandes MD   Reason for consult:wide complex tachycardia      HPI: previously   Hilaria Mixon is a 67 y.o. female with ESRD on HD, admitted 8/22/22 am via ER with dizziness, too weak to get in the shower. EMS saw atrial fibrillation at 150This was second episode of dizziness in 5 days. Pt reports some worsening of her usual mild SARKAR and edema, denies chest pain, Labs notable for initial troponin of 74, Mag 2.5 Hgb 11.3 and admit for sepsis. K was 5.0 on admit and 6.6 on POC next morning (today) at 1255 am when pt had wide complex tachy  CXR cardiomegaly Prev EF 48% by echo 11/2020 with 3+ TR,HTN, DM,PAF,anemia , liver cirrhosis. ESRD on HD for 11 years, RV failure,  EKGS with afib with aberrancy and wide complex tachy as detailed below. EF now shows 10%-15%, with severe RV dysfunction. Underwent parcentesis 8/23/22      Interval HPI/SUBJECTIVE:  Has maintained sinus rhythm. Breathing improved. No chest pain     Assessment and Plan       1)  Paroxysmal aflutter/afib:   Although she is back in sinus rhythm, her cardiomyopathy, ESRD, etc make the likelihood of recurrent rapid afib/flutter very high. The options for medical therapy are suboptimal and she has had recurrent breakthrough arrhythmia with RVR. Plan for po 400 mg bid for 1 week then 200 mg once daily. Appreciate Dr. Claudean Koh recs and a pacemaker and AVN ablation is recommended. Pt did talk to her family who said the decision was up to her. She still wants to hold off but will reconsider this if her afib continues to recur which I suspect it will. The other option is to forego any procedures, etc and focus on comfort care. Palliative involvement much appreciated.  She is not interested in hospice yet either so for now will continue to optimize medical management. Discussed plan with nephrology as well. Ok for [employer] today, close outpt follow up with Dr. Agustin Santos.    2). Biventricular failure:  Chronic systolic heart failure and severe RV dysfunction with severe TR. With cardiorenal syndrome. EF 10-15%. [place] GDMT is unfortunately limited by low bp requiring midodrine. Volume management per renal with HD. 3)  HTN and  ESRD per renal team:    BP now low NL. HD per renal.  On midodrine for BP support. 5)  Advanced directives/goals of care: Full code at this time. Palliative care recs appreciated.            ____________________________________________________________    Cardiac testing  08/22/22    ECHO ADULT COMPLETE 08/24/2022 8/24/2022    Interpretation Summary  Formatting of this result is different from the original.      Left Ventricle: Severely reduced left ventricular systolic function with a visually estimated EF of 10 -15%. Left ventricle size is normal. Mass index 2D is 234.3 g/m2. Findings consistent with severe asymmetric hypertrophy. Severe global hypokinesis present. Right Ventricle: Severely reduced systolic function. TAPSE is abnormal. TAPSE is 1.0 cm. Aortic Valve: Severe sclerosis of the aortic valve cusp. No stenosis. Mitral Valve: Findings consistent with myxomatous degeneration and decreased excusrion. Moderately calcified leaflet, at the anterior and posterior leaflets. Mild annular calcification of the mitral valve. Moderate regurgitation with an eccentrically directed jet and may underestimate severity. Tricuspid Valve: Mal-coaptation of tricuspid valve. Severe regurgitation with an eccentrically directed jet and may underestimate severity. Right Atrium: Right atrium is severely dilated. Pulmonary Arteries: Mild pulmonary hypertension present. The estimated PASP is 45 mmHg.     IVC/SVC: IVC diameter is greater than 21 mm and decreases less than 50% during inspiration; therefore the estimated right atrial pressure is elevated (~15 mmHg). IVC is dilated. Signed by: Atif Samuels MD on 8/24/2022  5:14 PM          11/17/20    ECHO ADULT COMPLETE 11/21/2020 11/21/2020    Interpretation Summary  · LV: Calculated LVEF is 48%. Normal cavity size. Moderate concentric hypertrophy. Moderately reduced systolic function. Severe (grade 3) left ventricular diastolic dysfunction. · LA: Moderately dilated left atrium. · RV: Mildly dilated right ventricle. · RA: Moderately dilated right atrium. · IAS: No atrial septal defect present. · MV: Mild mitral valve regurgitation is present. · TV: Moderate to severe tricuspid valve regurgitation is present. · PV: Mild pulmonic valve regurgitation is present. · IVC: Severely elevated central venous pressure (15+ mmHg); IVC diameter is larger than 21 mm and collapses less than 50% with respiration. · Pericardium: Small pericardial effusion. Signed by: Kush Zapata MD on 11/21/2020  5:13 PM        EKGS:  EKG 8/22/22 958 AM, atrial fibrillation at 140 with aberrancy, left axis, IVCD  EKG 8/22/22 2323h- atrial fibrillation at 129 , variable aberrancy vs PVCs  EKG 8/23/22 0045h - regular wide complex tachy at 185 , NSVT vs SVT with aberrancy  EKG 8/23/22 1255 h atrial fibrillation at 94 with LBBB      Most recent HS troponins:  No results for input(s): TROPHS in the last 72 hours.     No lab exists for component:  CKMB      Review of Systems    [x]All other systems reviewed and all negative except as written in HPI    [] Patient unable to provide secondary to condition      Past Medical History:   Diagnosis Date    Arthritis     Asthma     Chronic combined systolic and diastolic CHF (congestive heart failure) ([address] 8/26/2022    Chronic kidney disease     Chronic pain     Cirrhosis ([address] 12/17/2017    Diabetes ([facility] diet controlled    GERD (gastroesophageal reflux disease)     Hypertension     Paroxysmal atrial fibrillation ([address] 10/6/2020     Past Surgical History:   Procedure Laterality Date    COLONOSCOPY N/A 1/12/2018    COLONOSCOPY performed by Moiz Diana MD at THE [facility] ENDOSCOPY    COLONOSCOPY N/A 3/19/2018    COLONOSCOPY performed by Moiz Diana MD at [facility] ENDOSCOPY    HX GYN  c section    HX VASCULAR ACCESS      dialysis     IR BX TRANSCATHETER  11/24/2020    IR PARACENTESIS ABD W IMAGE  10/22/2020     Social Hx:  reports that she has never smoked. She has never used smokeless tobacco. She reports that she does not drink alcohol and does not use drugs. Family Hx: Family history is unknown by patient. Allergies   Allergen Reactions    Aleve [Naproxen Sodium] Itching, Swelling and Other (comments)    Amlodipine Rash, Hives and Itching    Aspirin Other (comments), Nausea Only and Unknown (comments)     GI bleed  GI bleeding      Heparin Unknown (comments)     bleeding      Ibuprofen Nausea and Vomiting    Metformin Other (comments)     swelling          OBJECTIVE:  Wt Readings from Last 3 Encounters:   08/31/22 137 lb 2 oz (62.2 kg)   12/01/20 162 lb 4.1 oz (73.6 kg)   11/25/20 169 lb 5 oz (76.8 kg)       Intake/Output Summary (Last 24 hours) at 8/31/2022 1422  Last data filed at 8/31/2022 1400  Gross per 24 hour   Intake 140.18 ml   Output 0 ml   Net 140.18 ml           Physical Exam    Vitals:   Vitals:    08/31/22 1327 08/31/22 1336 08/31/22 1351 08/31/22 1400   BP: (!) 89/50 (!) 113/53 (!) 118/45 (!) 121/56   Pulse: 77 78 78 77   Resp:       Temp:    97.8 °F (36.6 °C)   TempSrc:    Oral   SpO2: 100% 99% 100% 99%   Weight:       Height:         Telemetry: AFIB        General:    Alert, cooperative, appears stated age. Neck:   Supple,  no JVD. Back:     Symmetric,    Lungs:     Diminished bases     Heart[de-identified]    Regular rate and rhythm,  S1, S2 normal, click, rub or gallop. A 1/6 soft systolic murmur      Abdomen:     Soft, non-tender.  Bowel sounds normal.    Extremities:   Extremities normal, atraumatic, no cyanosis or edema. Vascular:   Pulses - defer   Skin:   Skin color normal. No rashes or lesions on visible areas   Neurologic:   Alert, Moves all extremities. Data Review:     Radiology:   XR Results (most recent):  Results from Hospital Encounter encounter on 08/22/22    XR ABD (KUB)    Narrative  EXAM: XR ABD (KUB)    INDICATION: rule out obstruction    COMPARISON: 8/22/2022. FINDINGS: A supine radiograph of the abdomen shows a nodular to bowel gas  pattern. Retained oral contrast is seen in the left colon. Surgical coils are  seen in the right pelvis. Atherosclerotic calcifications are seen in the upper  abdomen. . The kidneys are atrophied. Ascites is better seen on the prior CT. The  bones and soft tissues are within normal limits. Impression  No evidence of obstruction. CT Results (most recent):  Results from Hospital Encounter encounter on 08/22/22    CT CHEST WO CONT    Narrative  INDICATION: Pulmonary nodule    COMPARISON: CT dated 8/22/2022    CONTRAST: None. TECHNIQUE:  5 mm axial images were obtained through the chest. Coronal and  sagittal reformats were generated. CT dose reduction was achieved through use  of a standardized protocol tailored for this examination and automatic exposure  control for dose modulation. The absence of intravenous contrast reduces the sensitivity for evaluation of  the mediastinum, marlene, vasculature, and upper abdominal organs. FINDINGS:    CHEST WALL: Left IJ catheter. No axillary or supraclavicular adenopathy. THYROID: Calcified left thyroid nodule  MEDIASTINUM: No mass or lymphadenopathy. MARLENE: No mass or lymphadenopathy. THORACIC AORTA: No aneurysm. MAIN PULMONARY ARTERY: Normal in caliber. TRACHEA/BRONCHI: Patent. ESOPHAGUS: No wall thickening or dilatation. HEART: Normal in size.   PLEURA: Small right pleural effusion  LUNGS: Too numerous to count pulmonary nodules predominantly in the right middle  lobe and right lower lobe. The largest measures 7 mm in the right lower lobe  with a larger focus of nodularity at the junction of the major minor fissures in  the right lung. INCIDENTALLY IMAGED UPPER ABDOMEN: Abdominal ascites  BONES: No destructive bone lesion. Impression  Multiple small nonspecific pulmonary nodules. These are too small for  percutaneous sampling. Recommend short-term follow-up chest CT in 6-12 weeks. MRI Results (most recent):  No results found for this or any previous visit. No results for input(s): CPK, TROIQ in the last 72 hours. No lab exists for component: CKQMB, CPKMB, BMPP  Recent Labs     08/31/22  0411 08/30/22  0047    140   K 4.7 3.3*    105   CO2 25 31   BUN 20 11   CREA 6.31* 3.47*   * 75   PHOS 4.0 2.2*   CA 8.0* 8.0*       Recent Labs     08/31/22 0411 08/30/22  0047   WBC 6.5 5.2   HGB 10.9* 10.7*   HCT 36.5 36.7    163       No results for input(s): PTP, INR, AP, INREXT, INREXT in the last 72 hours. No lab exists for component: PTTP, GPT, SGOT  No results for input(s): CHOL, LDLC in the last 72 hours. No lab exists for component: TGL, HDLC,  HBA1C  No results for input(s): CRP, TSH, TSHEXT, TSHEXT in the last 72 hours.     No lab exists for component: ESR          Current meds:    Current Facility-Administered Medications:     amiodarone (CORDARONE) tablet 400 mg, 400 mg, Oral, BID, Berenice Hernandez MD, 400 mg at 08/31/22 0813    midodrine (PROAMATINE) tablet 5 mg, 5 mg, Oral, TID WITH MEALS, Sukhwinder Merino MD, 5 mg at 08/31/22 1200    oxyCODONE IR (ROXICODONE) tablet 2.5 mg, 2.5 mg, Oral, Q4H PRN, Sukhwinder Merino MD, 2.5 mg at 08/30/22 2159    prochlorperazine (COMPAZINE) injection 5 mg, 5 mg, IntraVENous, Q6H PRN, Diamond YATES MD, 5 mg at 08/31/22 0116    sodium chloride (OCEAN) 0.65 % nasal squeeze bottle 2 Spray, 2 Spray, Both Nostrils, PRN, Diamond Mir MD, 2 Spray at 08/27/22 1906    famotidine (PEPCID) tablet 20 mg, 20 mg, Oral, DAILY PRN, Gillian Castillo MD, 20 mg at 08/30/22 2159    metoprolol (LOPRESSOR) injection 5 mg, 5 mg, IntraVENous, Q3H PRN, Gillian Castillo MD, 5 mg at 08/28/22 1605    albumin human 25% (BUMINATE) solution 25 g, 25 g, IntraVENous, Q6H PRN, Gillian Castillo MD, 25 g at 08/30/22 0427    ipratropium (ATROVENT) 0.02 % nebulizer solution 0.5 mg, 0.5 mg, Nebulization, Q4H PRN, Gillian Castillo MD    albumin human 25% (BUMINATE) solution 12.5 g, 12.5 g, IntraVENous, DIALYSIS PRN, Sula Rinne, MD, 12.5 g at 08/31/22 1333    ascorbic acid (vitamin C) (VITAMIN C) tablet 500 mg, 500 mg, Oral, DAILY, Jw Bennett MD, 500 mg at 08/31/22 5665    cholecalciferol (VITAMIN D3) (1000 Units /25 mcg) tablet 2,000 Units, 2,000 Units, Oral, DAILY, Jw Bennett MD, 2,000 Units at 08/31/22 0813    ferrous sulfate tablet 325 mg, 325 mg, Oral, DAILY WITH BREAKFAST, Jw Bennett MD, 325 mg at 08/31/22 0816    lactulose (CHRONULAC) 10 gram/15 mL solution 30 mL, 20 g, Oral, BID, Jw Bennett MD, 30 mL at 08/31/22 0813    sevelamer carbonate (RENVELA) tab 1,600 mg, 1,600 mg, Oral, TID WITH MEALS, Jw Bennett MD, 1,600 mg at 08/31/22 1200    sodium chloride (NS) flush 5-40 mL, 5-40 mL, IntraVENous, Q8H, Jw Bennett MD, 10 mL at 08/31/22 0522    sodium chloride (NS) flush 5-40 mL, 5-40 mL, IntraVENous, PRN, Jw Bennett MD    acetaminophen (TYLENOL) tablet 650 mg, 650 mg, Oral, Q6H PRN, 650 mg at 08/31/22 1200 **OR** acetaminophen (TYLENOL) suppository 650 mg, 650 mg, Rectal, Q6H PRN, Jw Bennett MD    polyethylene glycol (MIRALAX) packet 17 g, 17 g, Oral, DAILY PRN, Jw Bennett MD, 17 g at 08/28/22 1754    ondansetron (ZOFRAN ODT) tablet 4 mg, 4 mg, Oral, Q8H PRN, 4 mg at 08/28/22 0848 **OR** ondansetron (ZOFRAN) injection 4 mg, 4 mg, IntraVENous, Q6H PRN, Jw Bennett MD, 4 mg at 08/30/22 5653    insulin lispro (HUMALOG) injection, , SubCUTAneous, TIDAC, Fiordaliza Bennett MD, 2 Units at 08/28/22 1153    glucose chewable tablet 16 g, 4 Tablet, Oral, PRN, Fiordaliza Bennett MD    glucagon (GLUCAGEN) injection 1 mg, 1 mg, IntraMUSCular, PRN, Jono Bennett MD    dextrose 10 % infusion 0-250 mL, 0-250 mL, IntraVENous, PRN, Fiordaliza Bennett MD    diphenhydrAMINE (BENADRYL) capsule 25 mg, 25 mg, Oral, Q6H PRN, Ailyn Hilario, NP, 25 mg at 08/23/22 0031    Azra Mims MD  [facility] [address]  [place] [address] [address] [facility]  [phone]      CC: Sriram Portillo MD

## 2022-08-31 NOTE — PROGRESS NOTES
Transitions of Care Plan  RUR: 15% - moderate  Clinical Update: inpatient HD today; discharge this afternoon  Consults: Cardiology; Nephrology; Therapy  Baseline: wheelchair; able to transfer herself; resides at [facility] alone  Barriers to Discharge: medical  Disposition: home health - [facility]  Estimated Discharge Date: 8/31/22    CM spoke with attending MD. Patient medically stable for hospital discharge today. CM spoke with [facility] RN about discharge plan. Patient advised RN she will need a ride home. CM expressed concerns about patient caring for herself due to decline in function and needing to be able to transfer herself from chair to bed. CM to reach out to family for confirmation that patient's granddaughter will be able to transport her home and assist her to ensure a safe discharge. CM called patient's daughter, Natalie Lion, and left HIPPA compliant  requesting a callback. CM called and spoke with patient's son, Nella Parent - discussed safety concerns for patient to discharge home alone and confirmation of family who would provide support for patient at home. Ellis Valerio advised that patient's granddaughter is local to [place] and should be able to take her home today. CM requested granddaughter's name and number - Ellis Valerio advised he will contact his sister who can provide requested information. CM provided update to [facility] of patient's discharge this afternoon. CM attempted to reach patient's granddaughter per request from Jackie Jenkins 95 RN - p: [phone] - number went to  for daughter listed on ED contact sheet. Patient is ready for discharge. CM will arrange w/c transportation. 4035 - CM spoke with patient re discharge plan. Encouraged patient to have family member present this evening. Patient requested CM and RN to \"stop worrying\" and that she can take care of herself and transfer herself from wheelchair to bed.  Patient will arrange Medicaid transport for next HD session. CM will arrange w/c transport today. Patient does not have her personal wheelchair for transport. Unable to secure Medicaid wheelchair transport for patient. 1550 - CM called [facility] - w/c transport confirmed for patient. Earliest available  time is 6PM.  CM provided nurses unit number to contact for any updates. Disposition:  Home Health: [facility]   Transportation: [address] to Home [phone]    CM participated in [facility] for HF dx with bedside nursing. All questions answered. SMAART-E checklist completed and placed in appropriate folder. Medicare pt has received, reviewed, and signed 2nd IM letter informing them of their right to appeal the discharge. Signed copied has been placed on pt bedside chart.     Venancio Florez, MPH  Care Manager l [employer]  Available via [address] [place] [address] or

## 2022-08-31 NOTE — PROGRESS NOTES
[facility]   [address] [facility], [address] [facility]  Phone: [phone]   [phone]    [email or web address]     Nephrology Progress Note    Patient Name : Candelario Tovar      :      MRN : [id number]  Date of Admission : 2022  Date of Servive : 22    CC:  Follow up for ESRD       Assessment and Plan   ESRD- HD :  - Dialyzes at [facility] on MWF schedule   - has Joyce Craft for access   - HD today  - no UF    Cardiac Cirrhosis   RV failure, severe TR  PAF  Chronic HFrEF   Chronic hypotension    Anemia in CKD   - hgb stable, [facility]  - continue home meds     Hx of Hep C  DM-II  HTN      Interval History:  Seen and examined. About to start dialysis. No cp, sob, n/v/d reported. Review of Systems: A comprehensive review of systems was negative except for that written in the HPI.     Current Medications:   Current Facility-Administered Medications   Medication Dose Route Frequency    amiodarone (CORDARONE) tablet 400 mg  400 mg Oral BID    midodrine (PROAMATINE) tablet 5 mg  5 mg Oral TID WITH MEALS    oxyCODONE IR (ROXICODONE) tablet 2.5 mg  2.5 mg Oral Q4H PRN    prochlorperazine (COMPAZINE) injection 5 mg  5 mg IntraVENous Q6H PRN    sodium chloride (OCEAN) 0.65 % nasal squeeze bottle 2 Spray  2 Spray Both Nostrils PRN    famotidine (PEPCID) tablet 20 mg  20 mg Oral DAILY PRN    metoprolol (LOPRESSOR) injection 5 mg  5 mg IntraVENous Q3H PRN    albumin human 25% (BUMINATE) solution 25 g  25 g IntraVENous Q6H PRN    ipratropium (ATROVENT) 0.02 % nebulizer solution 0.5 mg  0.5 mg Nebulization Q4H PRN    albumin human 25% (BUMINATE) solution 12.5 g  12.5 g IntraVENous DIALYSIS PRN    ascorbic acid (vitamin C) (VITAMIN C) tablet 500 mg  500 mg Oral DAILY    cholecalciferol (VITAMIN D3) (1000 Units /25 mcg) tablet 2,000 Units  2,000 Units Oral DAILY    ferrous sulfate tablet 325 mg  325 mg Oral DAILY WITH BREAKFAST    lactulose (CHRONULAC) 10 gram/15 mL solution 30 mL  20 g Oral BID    sevelamer carbonate (RENVELA) tab 1,600 mg  1,600 mg Oral TID WITH MEALS    sodium chloride (NS) flush 5-40 mL  5-40 mL IntraVENous Q8H    sodium chloride (NS) flush 5-40 mL  5-40 mL IntraVENous PRN    acetaminophen (TYLENOL) tablet 650 mg  650 mg Oral Q6H PRN    Or    acetaminophen (TYLENOL) suppository 650 mg  650 mg Rectal Q6H PRN    polyethylene glycol (MIRALAX) packet 17 g  17 g Oral DAILY PRN    ondansetron (ZOFRAN ODT) tablet 4 mg  4 mg Oral Q8H PRN    Or    ondansetron (ZOFRAN) injection 4 mg  4 mg IntraVENous Q6H PRN    insulin lispro (HUMALOG) injection   SubCUTAneous TIDAC    glucose chewable tablet 16 g  4 Tablet Oral PRN    glucagon (GLUCAGEN) injection 1 mg  1 mg IntraMUSCular PRN    dextrose 10 % infusion 0-250 mL  0-250 mL IntraVENous PRN    diphenhydrAMINE (BENADRYL) capsule 25 mg  25 mg Oral Q6H PRN      Allergies   Allergen Reactions    Aleve [Naproxen Sodium] Itching, Swelling and Other (comments)    Amlodipine Rash, Hives and Itching    Aspirin Other (comments), Nausea Only and Unknown (comments)     GI bleed  GI bleeding      Heparin Unknown (comments)     bleeding      Ibuprofen Nausea and Vomiting    Metformin Other (comments)     swelling       Objective:  Vitals:    Vitals:    08/31/22 0353 08/31/22 0407 08/31/22 0601 08/31/22 0815   BP: (!) 101/53  122/63    Pulse: 73 72 74    Resp: 18  16    Temp: 98.3 °F (36.8 °C)  97.5 °F (36.4 °C)    SpO2: 100% 98% 100%    Weight:    62.2 kg (137 lb 2 oz)   Height:         Intake and Output:  No intake/output data recorded.   08/29 1901 - 08/31 0700  In: -   Out: -135     Physical Examination:        General: NAD,Conversant   Neck:  Supple, no mass  Resp:  Lungs CTA B/L, no wheezing , normal respiratory effort  CV:  RRR,  no murmur or rub, LE edema  GI:  Soft, nontender  Neurologic:  Non focal  Dialysis Access : [facility]- C/D/I    []    High complexity decision making was performed  []    Patient is at high-risk of decompensation with multiple organ involvement    Lab Data Personally Reviewed: I have reviewed all the pertinent labs, microbiology data and radiology studies during assessment. Recent Labs     08/31/22 0411 08/30/22 0047 08/29/22 0352    140 141   K 4.7 3.3* 5.2*    105 107   CO2 25 31 27   * 75 70   BUN 20 11 27*   CREA 6.31* 3.47* 7.63*   CA 8.0* 8.0* 8.7   MG 2.4  --  2.4   PHOS 4.0 2.2* 4.3       Recent Labs     08/31/22 0411 08/30/22 0047 08/29/22 0352   WBC 6.5 5.2 8.3   HGB 10.9* 10.7* 12.0   HCT 36.5 36.7 39.3    163 184       No results found for: SDES  Lab Results   Component Value Date/Time    Culture result: NO GROWTH 4 DAYS 08/24/2022 10:42 AM    Culture result: MRSA NOT PRESENT 08/22/2022 10:58 PM    Culture result:  08/22/2022 10:58 PM     Screening of patient nares for MRSA is for surveillance purposes and, if positive, to facilitate isolation considerations in high risk settings. It is not intended for automatic decolonization interventions per se as regimens are not sufficiently effective to warrant routine use. Culture result: NO GROWTH 5 DAYS 08/22/2022 10:42 AM    Culture result: NO GROWTH 4 DAYS 11/19/2020 02:50 PM             I have reviewed the flowsheets. Chart and Pertinent Notes have been reviewed. No change in PMH ,family and social history from Consult note.       Junior Naranjo MD  [facility]

## 2022-08-31 NOTE — PROGRESS NOTES
Physical Therapy    Reviewed chart. Noted pt is on dialysis currently and has scheduled discharge today. Will defer and continue to follow until discharge.

## 2022-08-31 NOTE — PROGRESS NOTES
0730 Bedside shift change report given to Shadi (oncoming nurse) by April (offgoing nurse). Report included the following information SBAR, Kardex, Intake/Output, MAR, and Recent Results. 1740 I have reviewed discharge instructions with the patient. The patient verbalized understanding. Discharge medications reviewed with patient and appropriate educational materials and side effects teaching were provided. Current Discharge Medication List        START taking these medications    Details   !! amiodarone (PACERONE) 400 mg tablet Take 1 Tablet by mouth two (2) times a day for 7 days. Qty: 14 Tablet, Refills: 0  Start date: 8/31/2022, End date: 9/7/2022      !! amiodarone (PACERONE) 100 mg tablet Take 2 Tablets by mouth daily for 30 days. Qty: 60 Tablet, Refills: 0  Start date: 9/7/2022, End date: 10/7/2022      midodrine (PROAMATINE) 5 mg tablet Take 1 Tablet by mouth three (3) times daily (with meals) for 30 days. Qty: 90 Tablet, Refills: 0  Start date: 8/31/2022, End date: 9/30/2022       !! - Potential duplicate medications found. Please discuss with provider. CONTINUE these medications which have NOT CHANGED    Details   triamcinolone acetonide (KENALOG) 0.1 % topical cream Apply  to affected area two (2) times a day. use thin layer   Indications: itching of the genital area, itching      polyvinyl alcohol-povidon,PF, (REFRESH CLASSIC) 1.4-0.6 % ophthalmic solution Administer 1-2 Drops to both eyes three (3) times daily. Indications: dry eye      ketotifen (ZADITOR) 0.025 % (0.035 %) ophthalmic solution Administer 1 Drop to both eyes two (2) times a day. Indications: allergic conjunctivitis      oxyCODONE IR (ROXICODONE) 5 mg immediate release tablet Take 5 mg by mouth every six (6) hours as needed for Pain. ascorbic acid, vitamin C, (Vitamin C) 500 mg tablet Take 500 mg by mouth daily. lactulose (CHRONULAC) 10 gram/15 mL solution Take 30 mL by mouth two (2) times a day.   Qty: 1 Bottle, Refills: 0      cholecalciferol (VITAMIN D3) 1,000 unit tablet Take 2,000 Units by mouth daily. ferrous sulfate 325 mg (65 mg iron) tablet Take 325 mg by mouth three (3) times daily (with meals). sevelamer (RENAGEL) 800 mg tablet Take 1,600 mg by mouth three (3) times daily (with meals). albuterol (PROVENTIL HFA, VENTOLIN HFA, PROAIR HFA) 90 mcg/actuation inhaler Take 2 Puffs by inhalation every four (4) hours as needed for Wheezing.

## 2022-08-31 NOTE — DISCHARGE SUMMARY
Discharge Summary       PATIENT ID: Renetta Beckford  MRN: [id number]   YOB: 1950    DATE OF ADMISSION: 8/22/2022  9:46 AM    DATE OF DISCHARGE: 08/31/22     PRIMARY CARE PROVIDER: Yunior Bose MD     ATTENDING PHYSICIAN: Mian Bro MD    DISCHARGING PROVIDER: Mian Bro MD    To contact this individual call [phone] and ask the  to page. If unavailable ask to be transferred the Adult Hospitalist Department. CONSULTATIONS: IP CONSULT TO NEPHROLOGY  IP CONSULT TO INTERVENTIONAL RADIOLOGY  IP CONSULT TO ELECTROPHYSIOLOGY  IP CONSULT TO PALLIATIVE CARE - PROVIDER  IP CONSULT TO CARDIOLOGY    PROCEDURES/SURGERIES: * No surgery found *    DISCHARGE DIAGNOSES:   Near syncope - resolved  Afib with RVR vs. Aflutter with RVR with abbarency vs. VT - wide complex tachycardia   Severe combined biventricular failure  ESRD on HD   Type 2 diabetes  Cirrhosis with ascites s/p paracentesis         [address] [facility] COURSE:   Renetta Beckford is a 66 y.o. female with past medical history of end-stage renal disease on hemodialysis Monday/Wednesday/Friday, type 2 diabetes mellitus, paroxysmal atrial fibrillation, chronic anemia, hypertension, congestive heart failure, cirrhosis who presented to the emergency department with lightheadedness and fall. Patient was taking a shower and suddenly she felt lightheadedness and went to the ground. Pt found to have wide complex tachycardia which was  possibly Afib with RVR vs. Aflutter with RVR with abbarency vs. VT or combination. Started on amiodarone with improvement. Unfortunately then was not able to tolerate due to hypotension. Underwent Echo which showed severe Bi-V failue. Dr Becky Guerra with EP had offered possibly AV tony ablation with PPM placement, however patient ultimately refused.  Episode of VT/Afib/Aflutter recurred on 8/29, and again improved with amiodarone, which she was able to tolerate due to initiation of midodrine. PT/Ot recommended possible SNF, however after multiple discussions with palliative decided to go home with HHPT. She is mostly wheelchair bound. She has a very poor prognosis, but is not yet ready for hospice. Plan DC today after dialysis. Remains high risk for recurrent arrhythmia, and high risk for readmission    DISCHARGE DIAGNOSES / PLAN:      Near syncope  w/ tachycardia POA resolved  Wide-complex tachycardia - Afib vs. Aflutter with abberancy vs. VT   Atrial fibrillation with RVR - RRT 8/29  History of paroxysmal atrial fibrillation  -Assessed by cardiology  -Unable to tolerate beta-blocker due to hypotension  -Previously unable to tolerate amiodarone gtt. due to hypotension  -Cardiology following,   -Dr. Lizzeth Byrnes continues to offer possible ablation with PPM placement, however patient continues to refuse and would like to instead focus on medical therapy. [facility]   - No OAC due to h/o GIB and varices   -I PO amiodarone 400mg BID x 1 week and then 200mg daily thereafter. Severe combined Biventricular heart failure    - TTE w/ EF 10-15 % , severe hypokinesis, TAPSE 1.0 cm severe TR and moderate MR   - Cardiology following  - Discussed possible lifevest with cardiology, and do not recommend at this time.   - Not able to tolerate BB d/t episodic hypotension   - Consult palliative care, prognosis very guarded < 6 months, appreciate assistance in her care, not yet ready for hospice     ESRD hemodialysis Monday Wednesday Friday  Hyperkalemia POA resolving  Anemia of chronic kidney disease  -Nephrology following  -Continue HD per nephrology  -Claudine Silverman as needed      Type 2 diabetes mellitus -A1c 4.9  - Lispro correctional scale, FSG AC HS  - Consistent carb diet, hypoglycemia protocol.       Pulmonary nodules per imaging  -Chest CT no specific small pulmonary nodules  -Outpatient follow-up with pulmonology for management     Cirrhosis on CT scan  Ascites  - Ultrasound liver ascites   - s/p paracentesis, drain removed yesterday 8/25  - Call IR to assess drain site for leaking.   - Albumin 25 gram for each 5 liters of fluid taken   - Outpatient follow-up with hepatology for further management     N/V - check KUB to r/o obstruction   - PRN antiemetics     BMI: Body mass index is 24.29 kg/m². . This patient: Has a BMI within normal limits. PENDING TEST RESULTS:   At the time of discharge the following test results are still pending: None     ADDITIONAL CARE RECOMMENDATIONS:   - Please take all medications as prescribed. Note changes as below. **Start midodrine to help keep your blood pressure up  **Start amiodarone 400mg x 1 week, and then decrease to 200mg daily   - Please make sure to follow up with your primary care physician within 1-2 weeks of discharge for hospital follow up. You should also follow up with cardiology and nephrology at the [address] [place] [address]  - Please make sure to continue to monitor for: Chest pain, shortness of breath, high fevers,  and return to the Emergency Department with any of these symptoms.   - Please get up slowly from a seated or laying position, avoid falls. - Avoid tobacco, alcohol and other illicit drug use. - Diet restrictions: resume prior, cardiac and renal   - Activity restrictions: As tolerated         NOTIFY YOUR PHYSICIAN FOR ANY OF THE FOLLOWING:   Fever over 101 degrees for 24 hours. Chest pain, shortness of breath, fever, chills, nausea, vomiting, diarrhea, change in mentation, falling, weakness, bleeding. Severe pain or pain not relieved by medications, as well as any other signs or symptoms that you may have questions about. FOLLOW UP APPOINTMENTS:    Follow-up Information       Follow up With Specialties Details Why Contact Info    [facility] CARDIAC REHAB Cardiac Rehabilitation Call Call to discuss participation in the outpatient cardiac rehab program for heart health.  [address] [facility]  [phone] Lorelei Waller MD Family Medicine   [address] [facility]  [phone]                 DIET: Cardiac Diet and Renal Diet    ACTIVITY: Activity as tolerated    EQUIPMENT needed: Owns    DISCHARGE MEDICATIONS:  Current Discharge Medication List        START taking these medications    Details   !! amiodarone (PACERONE) 400 mg tablet Take 1 Tablet by mouth two (2) times a day for 7 days. Qty: 14 Tablet, Refills: 0  Start date: 8/31/2022, End date: 9/7/2022      !! amiodarone (PACERONE) 100 mg tablet Take 2 Tablets by mouth daily for 30 days. Qty: 60 Tablet, Refills: 0  Start date: 9/7/2022, End date: 10/7/2022      midodrine (PROAMATINE) 5 mg tablet Take 1 Tablet by mouth three (3) times daily (with meals) for 30 days. Qty: 90 Tablet, Refills: 0  Start date: 8/31/2022, End date: 9/30/2022       !! - Potential duplicate medications found. Please discuss with provider. CONTINUE these medications which have NOT CHANGED    Details   triamcinolone acetonide (KENALOG) 0.1 % topical cream Apply  to affected area two (2) times a day. use thin layer   Indications: itching of the genital area, itching      polyvinyl alcohol-povidon,PF, (REFRESH CLASSIC) 1.4-0.6 % ophthalmic solution Administer 1-2 Drops to both eyes three (3) times daily. Indications: dry eye      ketotifen (ZADITOR) 0.025 % (0.035 %) ophthalmic solution Administer 1 Drop to both eyes two (2) times a day. Indications: allergic conjunctivitis      oxyCODONE IR (ROXICODONE) 5 mg immediate release tablet Take 5 mg by mouth every six (6) hours as needed for Pain. ascorbic acid, vitamin C, (Vitamin C) 500 mg tablet Take 500 mg by mouth daily. lactulose (CHRONULAC) 10 gram/15 mL solution Take 30 mL by mouth two (2) times a day. Qty: 1 Bottle, Refills: 0      cholecalciferol (VITAMIN D3) 1,000 unit tablet Take 2,000 Units by mouth daily.       ferrous sulfate 325 mg (65 mg iron) tablet Take 325 mg by mouth three (3) times daily (with meals). sevelamer (RENAGEL) 800 mg tablet Take 1,600 mg by mouth three (3) times daily (with meals). albuterol (PROVENTIL HFA, VENTOLIN HFA, PROAIR HFA) 90 mcg/actuation inhaler Take 2 Puffs by inhalation every four (4) hours as needed for Wheezing. DISPOSITION:   X Home With:   OT  PT X HH  RN       Long term SNF/Inpatient Rehab    Independent/assisted living    Hospice    Other:       PATIENT CONDITION AT DISCHARGE:     Functional status    Poor    X Deconditioned     Independent      Cognition   X  Lucid     Forgetful     Dementia      Catheters/lines (plus indication)    Cm     PICC     PEG    X Tunneled dialysis line (present on admission)     Code status    X Full code     DNR      PHYSICAL EXAMINATION AT DISCHARGE:  Visit Vitals  BP (!) 120/58   Pulse 78   Temp 97.8 °F (36.6 °C)   Resp 16   Ht 5' 3\" (1.6 m)   Wt 62.2 kg (137 lb 2 oz)   SpO2 99%   BMI 24.29 kg/m²     Gen: NAD, awake in bed, appears chronically ill   HEENT: NC/AT, sclera anicteric, PERRL, EOMI   CV: RRR no m/r/g, normal S1 and S2, no pedal edema.  Dialysis line c/d/I   Resp: CTA b/l no increased work of breathing, no wheezing or rhonchi, speaking in full sentences  Abd: NT/ND, normal bowel sounds, no rebound or guarding  Ext: 2+ pulses, no edema  Skin: No rashes or lesions          CHRONIC MEDICAL DIAGNOSES:  Problem List as of 8/31/2022 Date Reviewed: 12/1/2020            Codes Class Noted - Resolved    Chronic combined systolic and diastolic CHF (congestive heart failure) (HCC) (Chronic) ICD-10-CM: I50.42  ICD-9-CM: 428.42, 428.0  8/26/2022 - Present        Tachycardia ICD-10-CM: R00.0  ICD-9-CM: 785.0  8/22/2022 - Present        ESRD (end stage renal disease) ([facility] ICD-10-CM: N18.6  ICD-9-CM: 585.6  10/18/2020 - Present        Suspected COVID-19 virus infection ICD-10-CM: M74.430  ICD-9-CM: V01.79  10/18/2020 - Present        HTN (hypertension) ICD-10-CM: I10  ICD-9-CM: 401.9  10/17/2020 - Present Dependence on renal dialysis [facility]) ICD-10-CM: Z99.2  ICD-9-CM: V45.11  10/6/2020 - Present        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, or end stage renal disease (HCC) ICD-10-CM: I13.2  ICD-9-CM: 404.93, 428.9  10/6/2020 - Present        Other ascites ICD-10-CM: R18.8  ICD-9-CM: 789.59  10/6/2020 - Present        Other chronic pain ICD-10-CM: G89.29  ICD-9-CM: 338.29  10/6/2020 - Present        Paroxysmal atrial fibrillation (HCC) ICD-10-CM: I48.0  ICD-9-CM: 427.31  10/6/2020 - Present        Type 2 diabetes mellitus with hyperglycemia ([facility] ICD-10-CM: E11.65  ICD-9-CM: 250.00  10/6/2020 - Present        Unspecified cirrhosis of liver ([facility] ICD-10-CM: K74.60  ICD-9-CM: 571.5  10/6/2020 - Present        Other hypotension ICD-10-CM: I95.89  ICD-9-CM: 458.8  10/6/2020 - Present        Severe anemia ICD-10-CM: D64.9  ICD-9-CM: 285.9  5/2/2018 - Present        Dizziness ICD-10-CM: R42  ICD-9-CM: 780.4  3/16/2018 - Present        Generalized weakness ICD-10-CM: R53.1  ICD-9-CM: 780.79  3/16/2018 - Present        Rectal bleed ICD-10-CM: K62.5  ICD-9-CM: 569.3  3/16/2018 - Present        Symptomatic anemia ICD-10-CM: D64.9  ICD-9-CM: 285.9  3/16/2018 - Present        Acute blood loss anemia ICD-10-CM: D62  ICD-9-CM: 285.1  3/9/2018 - Present        Cirrhosis ([facility] ICD-10-CM: K74.60  ICD-9-CM: 571.5  12/17/2017 - Present        Anemia in chronic kidney disease ICD-10-CM: N18.9, D63.1  ICD-9-CM: 285.21  12/16/2017 - Present        GI bleed ICD-10-CM: K92.2  ICD-9-CM: 578.9  12/15/2017 - Present        Diabetes mellitus type 2, controlled ([facility] ICD-10-CM: E11.9  ICD-9-CM: 250.00  12/15/2017 - Present        RESOLVED: Anemia ICD-10-CM: D64.9  ICD-9-CM: 285.9  9/17/2020 - 8/3/2021        RESOLVED: Symptomatic anemia ICD-10-CM: D64.9  ICD-9-CM: 285.9  1/10/2018 - 3/9/2018        RESOLVED: Back pain ICD-10-CM: M54.9  ICD-9-CM: 724.5  12/17/2017 - 3/9/2018 RESOLVED: Constipation ICD-10-CM: K59.00  ICD-9-CM: 564.00  12/17/2017 - 3/9/2018        RESOLVED: ESRD (end stage renal disease) ([facility] ICD-10-CM: N18.6  ICD-9-CM: 585.6  12/15/2017 - 10/10/2020        RESOLVED: HTN (hypertension) ICD-10-CM: I10  ICD-9-CM: 401.9  12/15/2017 - 10/10/2020           Greater than 30 minutes were spent with the patient on counseling and coordination of care    Signed:   Jones Faulkner MD  8/31/2022  11:22 AM

## 2022-08-31 NOTE — PROGRESS NOTES
Comprehensive Nutrition Assessment    Type and Reason for Visit: Initial, RD nutrition re-screen/LOS    Nutrition Recommendations/Plan:   Remove Phos restriction from diet       Malnutrition Assessment:  Malnutrition Status: At risk for malnutrition (specify) (08/31/22 1113)    Context:  Chronic illness     Findings of the 6 clinical characteristics of malnutrition:   Energy Intake:  No significant decrease in energy intake  Weight Loss:  No significant weight loss     Body Fat Loss:  No significant body fat loss,     Muscle Mass Loss:  No significant muscle mass loss,    Fluid Accumulation:  Severe, Ascites, Generalized, Extremities (undergoes paracentesis every two weeks)   Strength:  Not performed        Nutrition Assessment:    Ms. Lucila Peñaloza is a 67year old female admitted for dizziness and abdominal distention. PMHx includes ESRD and on HD MWF, DM2, paroxysmal afib, chronic anemia, HTN, CHF, paracentesis every two weeks, liver cirrhosis, GERD, asthma, arthritis, tachycardia, Hep C, generalized edema, and small bowel AVM's. Visited pt at bedside where she was sitting upright in bed just about to begin her dialysis tx. Dialysis specialist and nurse were present in the room. Pt seemed in good spirits despite experiencing extreme nausea for the past three days and not eating during that time frame. The nurse stated that she did very well with her breakfast today. Pt reports good overall intake even on dialysis days (MWF). Pt denies needing any further dietary assistance (ONS) and states she feels she is eating enough and is satisfied. Pt states knowing how important protein is due to her HD treatments and states that she eats protein with most, if not all, meals. Exact protein sources and amounts were not recorded. Pt reports BM's once a day or once every other day are normal for her.  Pertaining to weight loss, pt states she might have gained or lost some weight but this is suspected to be from her fluctuating edema and fluid status - pt experienced abdominal distention and generalized discomfort for 2 weeks PTA. Pt currently on a Na, K, and Phos restricted diet but due to Phos levels being consistently normal (pt on a Phos binder - sevelamer), Phos restriction will be removed to allow for greater menu options. Labs reviewed: Cr 6.31, Ca 8.0. Nutritionally Significant Medications:  Iron, Vit C, Vit D3, Lactulose, NS, Sevelamer carbonate, Pepcid      Estimated Daily Nutrient Needs:  Energy Requirements Based On: Kcal/kg  Weight Used for Energy Requirements: Current  Energy (kcal/day): 2265-5388 kcals/day (25-30 kcals/kg)  Weight Used for Protein Requirements: Current  Protein (g/day): 75-87 g/day (1.2-1.4g/kg)  Method Used for Fluid Requirements: 1 ml/kcal  Fluid (ml/day): 4299-2812 ml/day (1ml/kcal)    Nutrition Related Findings:   Edema: Generalized: No Edema (8/31/2022  8:15 AM)  LLE: No Edema (8/31/2022  8:15 AM)  RLE: No Edema (8/31/2022  8:15 AM)    Last BM: 08/28/22, Formed, Soft    Wounds: Surgical incision (from paracentesis drain)      Current Nutrition Therapies:  Diet: Regular, Na, K, and Phos restricted   Supplements: none  Meal intake: Patient Vitals for the past 168 hrs:   % Diet Eaten   08/31/22 0921 76 - 100%   08/29/22 1530 0%   08/29/22 1200 0%   08/29/22 0826 1 - 25%   08/28/22 1154 51 - 75%   08/28/22 0907 26 - 50%   08/27/22 0907 51 - 75%   08/26/22 1524 51 - 75%   08/26/22 1102 26 - 50%   08/26/22 0816 51 - 75%   08/25/22 1600 0%   08/25/22 1200 51 - 75%   08/25/22 0903 51 - 75%   08/24/22 1127 26 - 50%         Anthropometric Measures:  Height: 5' 3\" (160 cm)  Ideal Body Weight (IBW): 115 lbs (52 kg)     Current Body Wt:  62.2 kg (137 lb 2 oz), 119.2 % IBW.  Bed scale  Current BMI (kg/m2): 24.3        Weight Adjustment: No adjustment                 BMI Category: Normal weight (BMI 22.0-24.9) age over 72    Wt Readings from Last 10 Encounters:   08/31/22 62.2 kg (137 lb 2 oz)   12/01/20 73.6 kg (162 lb 4.1 oz)   11/25/20 76.8 kg (169 lb 5 oz)   11/17/20 69.4 kg (153 lb)   10/25/20 73.4 kg (161 lb 13.1 oz)   10/06/20 69.6 kg (153 lb 8 oz)   10/03/20 64 kg (141 lb)   09/17/20 147 kg (324 lb 1.2 oz)   09/25/19 68 kg (150 lb)   05/05/19 72.6 kg (160 lb)           Nutrition Diagnosis:   Increased nutrient needs (protein) related to renal dysfunction, increased demand for energy/nutrients as evidenced by other (specify) (undergoing HD 3x/week)    Nutrition Interventions:   Food and/or Nutrient Delivery: Modify current diet (remove phosphorus restriction)  Nutrition Education/Counseling: No recommendations at this time  Coordination of Nutrition Care: No recommendation at this time       Goals:     Goals: Meet at least 75% of estimated needs, within 7 days       Nutrition Monitoring and Evaluation:   Behavioral-Environmental Outcomes: Knowledge or skill (patient had prior knowledge on importance of protein due to HD)  Food/Nutrient Intake Outcomes: Food and nutrient intake  Physical Signs/Symptoms Outcomes: Biochemical data, Meal time behavior, Nausea/vomiting, Weight    Discharge Planning:    Continue current diet    [address]  Dietetic Intern

## 2022-09-01 ENCOUNTER — PATIENT OUTREACH (OUTPATIENT)
Dept: CASE MANAGEMENT | Age: 72
End: 2022-09-01

## 2022-09-01 NOTE — PROGRESS NOTES
Care Transitions Initial Call    Call within 2 business days of discharge: Yes     Patient: Phyllis Mcmullen Patient : 1950 MRN: [id number]    Last Discharge  [address]       Date Complaint Diagnosis Description Type Department Provider    22 Dizziness Sepsis without acute organ dysfunction, due to unspecified organism ([facility] [address] . .. ED to Hosp-Admission (Discharged) (ADMIT) Junior Hill MD; Peg Shaper. [place] Was this an external facility discharge? No   Challenges to be reviewed by the provider   Additional needs identified to be addressed with provider: yes  advance care planning- Does not have an ACP or HIPAA on file    HEMOGLOBIN A1C WITH EAG  Order: [id number]  Collected 2022 04:47    Status: Final result    Next appt: 2022 at 09:40 AM in Sleep Medicine Amalia Zelaya MD)    0 Result Notes    1 HM Topic  Component 22 0447 10/18/20 2348   Hemoglobin A1c 4.9  <3.8 Low  CM            Method of communication with provider : none    Discussed COVID-19 related testing which was not done at this time. Test results were not done. Patient informed of results, if available? no     Advance Care Planning:   Does patient have an Advance Directive: decision makers updated, not on file; education provided. Inpatient Readmission Risk score: Unplanned Readmit Risk Score: 13    Was this a readmission?  no   Patient stated reason for the admission:     Patients top risk factors for readmission: falls and medical condition-HF, DM, Afib, anemia, HTN, Ascites    Interventions to address risk factors: Scheduled appointment with PCP-Patient to call and make an appt with PCP, Scheduled appointment with Maeve Ford  at 9:40 am, Cards -Dr Agustin Santos 10/18/22 at 10:20 am, and Assessment and support for treatment adherence and medication management-HF, DM, Afib, anemia, HTN, Ascites      Care Transition Nurse (CTN) contacted the patient by telephone to perform post hospital discharge assessment. Verified name and  with patient as identifiers. Provided introduction to self, and explanation of the CTN role. CTN reviewed discharge instructions, medical action plan and red flags with patient who verbalized understanding. Were discharge instructions available to patient? yes. Reviewed appropriate site of care based on symptoms and resources available to patient including: PCP and Specialist. Patient given an opportunity to ask questions and does not have any further questions or concerns at this time. The patient agrees to contact the PCP office for questions related to their healthcare. Medication reconciliation was performed with patient, who verbalizes understanding of administration of home medications. Referral to Pharm D needed: no     Home Health/Outpatient orders at discharge: home health care  [facility]: [facility]  Date of initial visit: 22    Durable Medical Equipment ordered at discharge: None  DWas patient discharged with a pulse oximeter? no    Discussed follow-up appointments. If no appointment was previously scheduled, appointment scheduling offered: yes. Is follow up appointment scheduled within 7 days of discharge? yes. [facility] follow up appointment(s):   Future Appointments   Date Time Provider Yamilex Bartlett   2022  9:40 AM [facility] [place] [facility] AMB   10/18/2022 10:20 AM Celine Henao MD [facility] AMB     Non-[facility] follow up appointment(s): PCP -Dr John Joyner for follow-up call in 5-7 days based on severity of symptoms and risk factors. CTN provided contact information for future needs. Goals Addressed                   This Visit's Progress     Attends follow-up appointments as directed. 22   Patient to call and make an appt with PCP for DIANA.   Patient has an appt with Dr David Do on  at 9:40 am.  Patient had an appt with Karlie Sanford on  at 1 pm, patient has dialysis M,W,F and this was changed to 10/18/at 10:20 am.   Patient has dialysis at the [facility] 11:15 am chair time, transport is with the [facility].    Monitor status of these appt on next call. Reji FRAGOSO, RN, CCM / Care Transition Nurse / [phone]        Prevent complications post hospitalization. 09/01/22   Patient has dialysis M,W,F at the [place] at 11:15 am I have called and spoke to Christianne [address], he does not have a 2728 on file for patient but this is not where she started dialysis. Patient started dialysis at [facility] in [facility], I have called and LM for them to call me back to let me know if patient has a 2728 on file. Patient lives alone at [facility], Is W/C bound and does her own ADL'S, can stand. Her children come and take her grocery shopping like once month. Patient does not have a scale, asked if she thought her children could purchase her 1 and she stated more like I purchase them 1. Patient to be seen by [facility] today, encouraged her to ask if they could get her a scale and she also asked about a shower chair. Patient cooks for herself and does not sound to low sodium, sent low sodium education via mail. Patient denies and SOB, does have vomiting clear mucus, denies pedal edema. Patient thinks she has an ACP needs to look for it, has 8 children 3 listed as her Primary Health Care decision makers. Monitor SOB, vomiting clear mucus, pedal edema, does she have a 2728 on file? HH status, scale, shower chair, did she get the low sodium diet letter I has sent?      Reji FRAGOSO, RN, CCM / Care Transition Nurse / [phone]

## 2022-09-01 NOTE — LETTER
9/1/2022 10:07 AM    Ms. Alexander Branham  [address],  As we discussed today I am sending you  low sodium education.     Sincerely,  Bill Issa MSN, RN, [address] [place] [address] Nurse   [phone]

## 2022-09-06 ENCOUNTER — OFFICE VISIT (OUTPATIENT)
Dept: SLEEP MEDICINE | Age: 72
End: 2022-09-06
Payer: MEDICARE

## 2022-09-06 VITALS
HEART RATE: 96 BPM | SYSTOLIC BLOOD PRESSURE: 115 MMHG | HEIGHT: 63 IN | TEMPERATURE: 98.6 F | DIASTOLIC BLOOD PRESSURE: 77 MMHG | WEIGHT: 135 LBS | OXYGEN SATURATION: 97 % | BODY MASS INDEX: 23.92 KG/M2

## 2022-09-06 DIAGNOSIS — Z86.79 H/O: HTN (HYPERTENSION): ICD-10-CM

## 2022-09-06 DIAGNOSIS — G47.33 OSA (OBSTRUCTIVE SLEEP APNEA): Primary | ICD-10-CM

## 2022-09-06 DIAGNOSIS — Z86.79 H/O HEART FAILURE: ICD-10-CM

## 2022-09-06 DIAGNOSIS — I48.91 ATRIAL FIBRILLATION WITH RAPID VENTRICULAR RESPONSE (HCC): ICD-10-CM

## 2022-09-06 PROCEDURE — G9231 DOC ESRD DIA TRANS PREG: HCPCS | Performed by: INTERNAL MEDICINE

## 2022-09-06 PROCEDURE — G8432 DEP SCR NOT DOC, RNG: HCPCS | Performed by: INTERNAL MEDICINE

## 2022-09-06 PROCEDURE — 1111F DSCHRG MED/CURRENT MED MERGE: CPT | Performed by: INTERNAL MEDICINE

## 2022-09-06 PROCEDURE — G8536 NO DOC ELDER MAL SCRN: HCPCS | Performed by: INTERNAL MEDICINE

## 2022-09-06 PROCEDURE — 1123F ACP DISCUSS/DSCN MKR DOCD: CPT | Performed by: INTERNAL MEDICINE

## 2022-09-06 PROCEDURE — G8427 DOCREV CUR MEDS BY ELIG CLIN: HCPCS | Performed by: INTERNAL MEDICINE

## 2022-09-06 PROCEDURE — G8400 PT W/DXA NO RESULTS DOC: HCPCS | Performed by: INTERNAL MEDICINE

## 2022-09-06 PROCEDURE — 99204 OFFICE O/P NEW MOD 45 MIN: CPT | Performed by: INTERNAL MEDICINE

## 2022-09-06 PROCEDURE — 1101F PT FALLS ASSESS-DOCD LE1/YR: CPT | Performed by: INTERNAL MEDICINE

## 2022-09-06 PROCEDURE — 1090F PRES/ABSN URINE INCON ASSESS: CPT | Performed by: INTERNAL MEDICINE

## 2022-09-06 PROCEDURE — 3017F COLORECTAL CA SCREEN DOC REV: CPT | Performed by: INTERNAL MEDICINE

## 2022-09-06 PROCEDURE — G8420 CALC BMI NORM PARAMETERS: HCPCS | Performed by: INTERNAL MEDICINE

## 2022-09-06 NOTE — PATIENT INSTRUCTIONS
[address] [facility] [place]. [place], [address]  Tel.  [phone]  Fax. 100 [facility] 60  [place], [address] [facility]  Tel.  [phone]  Fax. [phone] [address]  Tel.  [phone]  Fax. [phone]     Sleep Apnea: After Your Visit  Your Care Instructions  Sleep apnea occurs when you frequently stop breathing for 10 seconds or longer during sleep. It can be mild to severe, based on the number of times per hour that you stop breathing or have slowed breathing. Blocked or narrowed airways in your nose, mouth, or throat can cause sleep apnea. Your airway can become blocked when your throat muscles and tongue relax during sleep. Sleep apnea is common, occurring in 1 out of 20 individuals. Individuals having any of the following characteristics should be evaluated and treated right away due to high risk and detrimental consequences from untreated sleep apnea:  Obesity  Congestive Heart failure  Atrial Fibrillation  Uncontrolled Hypertension  Type II Diabetes  Night-time Arrhythmias  Stroke  Pulmonary Hypertension  High-risk Driving Populations (pilots, truck drivers, etc.)  Patients Considering Weight-loss Surgery    How do you know you have sleep apnea? You probably have sleep apnea if you answer 'yes' to 3 or more of the following questions:  S - Have you been told that you Snore? T - Are you often Tired during the day? O - Has anyone Observed you stop breathing while sleeping? P- Do you have (or are being treated for) high blood Pressure? B - Are you obese (Body Mass Index > 35)? A - Is your Age 48years old or older? N - Is your Neck size greater than 16 inches? G - Are you male Gender? A sleep physician can prescribe a breathing device that prevents tissues in the throat from blocking your airway. Or your doctor may recommend using a dental device (oral breathing device) to help keep your airway open.  In some cases, surgery may be needed to remove enlarged tissues in the throat. Follow-up care is a key part of your treatment and safety. Be sure to make and go to all appointments, and call your doctor if you are having problems. It's also a good idea to know your test results and keep a list of the medicines you take. How can you care for yourself at home? Lose weight, if needed. It may reduce the number of times you stop breathing or have slowed breathing. Go to bed at the same time every night. Sleep on your side. It may stop mild apnea. If you tend to roll onto your back, sew a pocket in the back of your paKalistick top. Put a tennis ball into the pocket, and stitch the pocket shut. This will help keep you from sleeping on your back. Avoid alcohol and medicines such as sleeping pills and sedatives before bed. Do not smoke. Smoking can make sleep apnea worse. If you need help quitting, talk to your doctor about stop-smoking programs and medicines. These can increase your chances of quitting for good. Prop up the head of your bed 4 to 6 inches by putting bricks under the legs of the bed. Treat breathing problems, such as a stuffy nose, caused by a cold or allergies. Use a continuous positive airway pressure (CPAP) breathing machine if lifestyle changes do not help your apnea and your doctor recommends it. The machine keeps your airway from closing when you sleep. If CPAP does not help you, ask your doctor whether you should try other breathing machines. A bilevel positive airway pressure machine has two types of air pressureâone for breathing in and one for breathing out. Another device raises or lowers air pressure as needed while you breathe. If your nose feels dry or bleeds when using one of these machines, talk with your doctor about increasing moisture in the air. A humidifier may help.   If your nose is runny or stuffy from using a breathing machine, talk with your doctor about using decongestants or a corticosteroid nasal spray.  When should you call for help? Watch closely for changes in your health, and be sure to contact your doctor if:  You still have sleep apnea even though you have made lifestyle changes. You are thinking of trying a device such as CPAP. You are having problems using a CPAP or similar machine. Where can you learn more? Go to [employer]. Enter L185 in the search box to learn more about \"Sleep Apnea: After Your Visit. \"   © 0447-9433 Healthwise, Incorporated. Care instructions adapted under license by [employer] (which disclaims liability or warranty for this information). This care instruction is for use with your licensed healthcare professional. If you have questions about a medical condition or this instruction, always ask your healthcare professional. Sukh Reus any warranty or liability for your use of this information. PROPER SLEEP HYGIENE    What to avoid  Do not have drinks with caffeine, such as coffee or black tea, for 8 hours before bed. Do not smoke or use other types of tobacco near bedtime. Nicotine is a stimulant and can keep you awake. Avoid drinking alcohol late in the evening, because it can cause you to wake in the middle of the night. Do not eat a big meal close to bedtime. If you are hungry, eat a light snack. Do not drink a lot of water close to bedtime, because the need to urinate may wake you up during the night. Do not read or watch TV in bed. Use the bed only for sleeping and sexual activity. What to try  Go to bed at the same time every night, and wake up at the same time every morning. Do not take naps during the day. Keep your bedroom quiet, dark, and cool. Get regular exercise, but not within 3 to 4 hours of your bedtime. .  Sleep on a comfortable pillow and mattress. If watching the clock makes you anxious, turn it facing away from you so you cannot see the time.   If you worry when you lie down, start a worry book. Well before bedtime, write down your worries, and then set the book and your concerns aside. Try meditation or other relaxation techniques before you go to bed. If you cannot fall asleep, get up and go to another room until you feel sleepy. Do something relaxing. Repeat your bedtime routine before you go to bed again. Make your house quiet and calm about an hour before bedtime. Turn down the lights, turn off the TV, log off the computer, and turn down the volume on music. This can help you relax after a busy day. Drowsy Driving  The [address] [place] [address] Road Traffic Safety Administration cites drowsiness as a causing factor in more than 003,687 police reported crashes annually, resulting in 76,000 injuries and 1,500 deaths. Other surveys suggest 55% of people polled have driven while drowsy in the past year, 23% had fallen asleep but not crashed, 3% crashed, and 2% had and accident due to drowsy driving. Who is at risk? Young Drivers: One study of drowsy driving accidents states that 55% of the drivers were under 25 years. Of those, 75% were male. Shift Workers and Travelers: People who work overnight or travel across time zones frequently are at higher risk of experiencing Circadian Rhythm Disorders. They are trying to work and function when their body is programed to sleep. Sleep Deprived: Lack of sleep has a serious impact on your ability to pay attention or focus on a task. Consistently getting less than the average of 8 hours your body needs creates partial or cumulative sleep deprivation. Untreated Sleep Disorders: Sleep Apnea, Narcolepsy, R.L.S., and other sleep disorders (untreated) prevent a person from getting enough restful sleep. This leads to excessive daytime sleepiness and increases the risk for drowsy driving accidents by up to 7 times. Medications / Alcohol: Even over the counter medications can cause drowsiness.  Medications that impair a drivers attention should have a warning label. Alcohol naturally makes you sleepy and on its own can cause accidents. Combined with excessive drowsiness its effects are amplified. Signs of Drowsy Driving:   * You don't remember driving the last few miles   * You may drift out of your yaya   * You are unable to focus and your thoughts wander   * You may yawn more often than normal   * You have difficulty keeping your eyes open / nodding off   * Missing traffic signs, speeding, or tailgating  Prevention-   Good sleep hygiene, lifestyle and behavioral choices have the most impact on drowsy driving. There is no substitute for sleep and the average person requires 8 hours nightly. If you find yourself driving drowsy, stop and sleep. Consider the sleep hygiene tips provided during your visit as well. Medication Refill Policy: Refills for all medications require 1 week advance notice. Please have your pharmacy fax a refill request. We are unable to fax, or call in \"controled substance\" medications and you will need to pick these prescriptions up from our office. Eventable Activation    Thank you for requesting access to Eventable. Please follow the instructions below to securely access and download your online medical record. Eventable allows you to send messages to your doctor, view your test results, renew your prescriptions, schedule appointments, and more. How Do I Sign Up? In your internet browser, go to [email or web address]. Click on the First Time User? Click Here link in the Sign In box. You will see the New Member Sign Up page. Enter your Eventable Access Code exactly as it appears below. You will not need to use this code after youve completed the sign-up process. If you do not sign up before the expiration date, you must request a new code.     Eventable Access Code: [email or web address]  Expires: 10/6/2022  9:54 AM (This is the date your Eventable access code will )    Enter the last four digits of your Social Security Number (xxxx) and Date of Birth (mm/dd/yyyy) as indicated and click Submit. You will be taken to the next sign-up page. Create a Competitor ID. This will be your Competitor login ID and cannot be changed, so think of one that is secure and easy to remember. Create a Competitor password. You can change your password at any time. Enter your Password Reset Question and Answer. This can be used at a later time if you forget your password. Enter your e-mail address. You will receive e-mail notification when new information is available in [address] Click Sign Up. You can now view and download portions of your medical record. Click the imagoo link to download a portable copy of your medical information. Additional Information    If you have questions, please call [phone]. Remember, Competitor is NOT to be used for urgent needs. For medical emergencies, dial 911.

## 2022-09-06 NOTE — PROGRESS NOTES
[address] [facility] [address]. [place], [address]  Tel.  [phone]  Fax. 100 [facility] 60  Dervictor manuel [facility], [address] [facility]  Tel.  [phone]  Fax. [phone] [address]  Tel.  [phone]  Fax. [phone]       Avel Anand is a 67y.o. year old female referred by Ms. Abdelrahman Jones NP  for evaluation of a sleep disorder. ASSESSMENT/PLAN:      ICD-10-CM ICD-9-CM    1. ANITHA (obstructive sleep apnea)  G47.33 327.23 POLYSOMNOGRAPHY 1 NIGHT      2. Atrial fibrillation with rapid ventricular response (HCC)  I48.91 427.31       3. H/O: HTN (hypertension)  Z86.79 V12.59       4. H/O heart failure  Z86.79 V12.59       5. BMI 23.0-23.9, adult  Z68.23 V85.1           Patient has a history and examination consistent with the diagnosis of sleep apnea. Follow-up and Dispositions    Return for telephone follow-up after testing is completed. * The patient currently has a Moderate Risk for having sleep apnea. STOP-BANG score 5.    * Sleep testing was ordered for initial evaluation. Orders Placed This Encounter    POLYSOMNOGRAPHY 1 NIGHT     Standing Status:   Future     Standing Expiration Date:   12/6/2022     Order Specific Question:   Reason for Exam     Answer:   ANITHA         * She was provided information on sleep apnea including corresponding risk factors and the importance of proper treatment. * Treatment options were reviewed in detail. she would like to proceed with PAP therapy. Patient will be seen in follow-up in 6-8 weeks after PAP setup to gauge treatment response and adherence to therapy. * The patient was counseled regarding proper sleep hygiene, with emphasis on ensuring sufficient total sleep time; safe driving and the benefits of exercise and weight loss. * All of her questions were addressed.     2. Atrial fibrillation with rapid ventricular response (HCC) - currently not on medication, she will continue to monitor her pulse and follow up with her care provider for reevaluation/adjustment of medications if warranted. I have reviewed the relationship between hypertension as it relates to sleep-disordered breathing. 3. H/O Hypertension -  currently not on blood pressure medications, she will continue to monitor her BP and follow up with her primary care provider for reevaluation/adjustment of medications if warranted. I have reviewed the relationship between hypertension as it relates to sleep-disordered breathing. 4. H/O Heart Failure -  continue on current regimen, she will continue to monitor her symptoms and follow up with her care provider for reevaluation/adjustment of medications if warranted. I have reviewed the relationship between heart failure as it relates to sleep-disordered breathing. SUBJECTIVE/OBJECTIVE:    Andreia Sanders is an 67 y.o. female referred for evaluation for a sleep disorder. She complains of snoring associated with awakening in the middle of the night because of dry mouth along with daytime tiredness and fatigue. Symptoms began 5 years ago, unchanged since that time. She usually can fall asleep in 10 minutes. Family or house members note snoring, periods of not breathing. She denies of symptoms indicative of cataplexy, sleep paralysis or sleep related hallucinations. She denies of a history of unusual movements occurring during sleep. Andreia Sanders does wake up frequently at night. She is bothered by waking up too early and left unable to get back to sleep. She actually sleeps about 6 hours at night and wakes up about 7 times during the night. She does not work shifts: Sadia Perkins indicates she does get too little sleep at night. Her bedtime is  . She awakens at  . She does not take naps. She takes   naps a week lasting  .  She has the following observed behaviors: Loud snoring, Light snoring, Sleep talking, Pauses in breathing, Sitting up in bed while still asleep, Getting out of the bed while still asleep;  . Other remarks:  Calculated LVEF is 48%. (11-)    The patient has not undergone diagnostic testing for the current problems. Review of Systems:  Constitutional:  No significant weight loss or weight gain  Eyes:  No blurred vision  CVS:  No significant chest pain  Pulm:  No significant shortness of breath  GI:  No significant nausea or vomiting  :  No significant nocturia  Musculoskeletal:  No significant joint pain at night  Skin:  No significant rashes  Neuro:  No significant dizziness   Psych:  No active mood issues    Sleep Review of Systems: notable for Negative difficulty falling asleep; Positive awakenings at night; Positive perceived regular dreaming; Negative nightmares; Positive  early morning headaches; Negative  memory problems; Negative  concentration issues; Negative caffeine;  Negative alcohol;   Negative history of any automobile or occupational accidents due to daytime drowsiness. Cornish Sleepiness Score: 20      Visit Vitals  /77   Pulse 96   Temp 98.6 °F (37 °C)   Ht 5' 3\" (1.6 m)   Wt 135 lb (61.2 kg)   SpO2 97%   BMI 23.91 kg/m²           General:   Alert, oriented, not in acute distress   Eyes:  Anicteric Sclerae; intact EOM's   Nose:  No obvious nasal septum deviation    Oropharynx:   Mallampati score 4, thick tongue base, uvula not seen due to low-lying soft palate, narrow tonsilo-pharyngeal pilars, tongue scalloped   Neck:   midline trachea,  no JVD   Chest/Lungs:  symmetrical lung expansion ,clear lung fields on auscultation    CVS:  Normal rate, regular rhythm    Extremities:  No obvious rashes, absent edema    Neuro:  No focal deficits; No obvious tremor    Psych:  Normal eye contact; normal  affect, normal countenance          Carlota Long MD, FAASM  Diplomate American Board of Sleep Medicine  Diplomate in Sleep Medicine - ABP    Electronically signed.  09/06/22

## 2022-09-08 ENCOUNTER — PATIENT OUTREACH (OUTPATIENT)
Dept: CASE MANAGEMENT | Age: 72
End: 2022-09-08

## 2022-09-08 NOTE — PROGRESS NOTES
Care Transitions Follow Up Call    Challenges to be reviewed by the provider   Additional needs identified to be addressed with provider: yes  DME- Patient needs a shower chair and scale ordered. Method of communication with provider : none    Care Transition Nurse (CTN) contacted the patient by telephone to follow up. Verified name and  with patient as identifiers. Addressed changes since last contact:  Seen by Sleep provider, sleep study 10/12/22  Follow up appointment completed? no.   Was follow up appointment scheduled within 7 days of discharge? no.     Advance Care Planning:   Does patient have an Advance Directive:  currently not on file; education provided     CTN reviewed discharge instructions, medical action plan and red flags with patient and discussed any barriers to care and/or understanding of plan of care after discharge. Discussed appropriate site of care based on symptoms and resources available to patient including: PCP and Specialist. The patient agrees to contact the PCP office for questions related to their healthcare. [facility] follow up appointment(s):   Future Appointments   Date Time Provider Yamilex Bartlett   10/12/2022  8:30 PM BEDROOM 7 [address] [place] [address] SLEEP LAB [place]   10/18/2022 10:20 AM [facility]     [facility] follow up appointment(s):     CTN provided contact information for future needs. Plan for follow-up call in 7-10 days based on severity of symptoms and risk factors. Goals Addressed                   This Visit's Progress     Attends follow-up appointments as directed. On track     22   Patient to call and make an appt with PCP for DIANA.   Patient has an appt with Dr Joya Montelongo on  at 9:40 am.  Patient had an appt with Karlie Sheehan on  at 1 pm, patient has dialysis M,W,F and this was changed to 10/18 at 10:20 am.   Patient has dialysis at the [facility] 11:15 am chair time, transport is with the [facility].    Monitor status of these appt on next call. Laura FRAGOSO, RN, CCM / Care Transition Nurse / [phone]     09/08/22   Patient did attend her sleep appt on 9/6/22. Has sleep study  on 10/12/22 at 8:30 pm.  Patient has an appt with Cards on 10/18/22 10:20 am.  Patient still needs to call and make an appt with her PCP at the [place]. Monitor status of PCP appt on next call. Laura FRAGOSO, RN, CCM / Care Transition Nurse / [phone]           Prevent complications post hospitalization. On track     09/01/22   Patient has dialysis M,W,F at the [place] at 11:15 am I have called and spoke to [address]ne [address], he does not have a 2728 on file for patient but this is not where she started dialysis. Patient started dialysis at [facility] in [facility], I have called and LM for them to call me back to let me know if patient has a 2728 on file. Patient lives alone at [facility], Is W/C bound and does her own ADL'S, can stand. Her children come and take her grocery shopping like once month. Patient does not have a scale, asked if she thought her children could purchase her 1 and she stated more like I purchase them 1. Patient to be seen by [facility] today, encouraged her to ask if they could get her a scale and she also asked about a shower chair. Patient cooks for herself and does not sound to low sodium, sent low sodium education via mail. Patient denies and SOB, does have vomiting clear mucus, denies pedal edema. Patient thinks she has an ACP needs to look for it, has 8 children 3 listed as her Primary Health Care decision makers. Monitor SOB, vomiting clear mucus, pedal edema, does she have a 2728 on file? HH status, scale, shower chair, did she get the low sodium diet letter I has sent? Laura FRAGOSO, RN, CCM / Care Transition Nurse / [phone]     09/08/22   Patient denies any SOB or cough has not purchased a scale.     Patient states he dry weight at dialysis has been lowered and they are still taking off fluid and then she gets leg cramps. Patient has not looked for her ACP she states the [facility] has it on file. Asked her to bring to Cards appt on 10/18/22 so we have it in our EMR. Patient has not received the low sodium diet information I had sent yet. Patient sates her grandson brought her some Ceci Rota and then her legs got puffy. Reviewed how sodium affects your body. Patient has been seen by PT, still wants a shower chair and scale, encouraged to make an appt with [facility] they are the person who has to order this. Monitor SOB, cough, pedal edema, get scale, get shower chair? Received low sodium education I have sent?      Bill Issa MSN, RN, CCM / Care Transition Nurse / [phone]

## 2022-09-20 ENCOUNTER — PATIENT OUTREACH (OUTPATIENT)
Dept: CASE MANAGEMENT | Age: 72
End: 2022-09-20

## 2022-09-20 NOTE — PROGRESS NOTES
Goals Addressed                   This Visit's Progress     Attends follow-up appointments as directed. 09/01/22   Patient to call and make an appt with PCP for DIANA. Patient has an appt with Dr Iqra Boucher on 9/6 at 9:40 am.  Patient had an appt with Cards Dr Nabila Tobin on 9/7 at 1 pm, patient has dialysis M,W,F and this was changed to 10/18 at 10:20 am.   Patient has dialysis at the [facility] 11:15 am chair time, transport is with the [facility].    Monitor status of these appt on next call. Simon FRAGOSO, RN, CCM / Care Transition Nurse / [phone]     09/08/22   Patient did attend her sleep appt on 9/6/22. Has sleep study  on 10/12/22 at 8:30 pm.  Patient has an appt with Cards on 10/18/22 10:20 am.  Patient still needs to call and make an appt with her PCP at the [facility]. Monitor status of PCP appt on next call. Simon FRAGOSO, RN, CCM / Care Transition Nurse / [phone]     09/20/22   Care Transitions Outreach Attempt-LMTCB. Simon FRAGOSO, RN, CCM / Care Transition Nurse / [phone]           Prevent complications post hospitalization. 09/01/22   Patient has dialysis M,W,F at the [facility] at 11:15 am I have called and spoke to Christianne [address], he does not have a 2728 on file for patient but this is not where she started dialysis. Patient started dialysis at [place] [address] in [facility], I have called and LM for them to call me back to let me know if patient has a 2728 on file. Patient lives alone at [facility], Is W/C bound and does her own ADL'S, can stand. Her children come and take her grocery shopping like once month. Patient does not have a scale, asked if she thought her children could purchase her 1 and she stated more like I purchase them 1. Patient to be seen by [facility] today, encouraged her to ask if they could get her a scale and she also asked about a shower chair.     Patient cooks for herself and does not sound to low sodium, sent low sodium education via mail.  Patient denies and SOB, does have vomiting clear mucus, denies pedal edema. Patient thinks she has an ACP needs to look for it, has 8 children 3 listed as her Primary Health Care decision makers. Monitor SOB, vomiting clear mucus, pedal edema, does she have a 2728 on file? HH status, scale, shower chair, did she get the low sodium diet letter I has sent? Geoffrey FRAGOSO, RN, CCM / Care Transition Nurse / [phone]     09/08/22   Patient denies any SOB or cough has not purchased a scale. Patient states her dry weight at dialysis has been lowered and they are still taking off fluid and then she gets leg cramps. Patient has not looked for her ACP she states the [address] has it on file. Asked her to bring to Cards appt on 10/18/22 so we have it in our EMR. Patient has not received the low sodium diet information I had sent yet. Patient sates her grandson brought her some Zerita Cane and then her legs got puffy. Reviewed how sodium affects your body. Patient has been seen by PT, still wants a shower chair and scale, encouraged to make an appt with [facility] they are the person who has to order this. Monitor SOB, cough, pedal edema, get scale, get shower chair? Received low sodium education I have sent? Geoffrey FRAGOSO, RN, CCM / Care Transition Nurse / [phone]     09/20/22   Care Transitions Outreach Attempt-LMTCB.     Geoffrey FRAGOSO, RN, CCM / Care Transition Nurse / [phone]

## 2022-10-04 ENCOUNTER — PATIENT OUTREACH (OUTPATIENT)
Dept: CASE MANAGEMENT | Age: 72
End: 2022-10-04

## 2022-10-04 NOTE — PROGRESS NOTES
Patient has graduated from the Transitions of Care Coordination  program on 10/4/22. Patient/family has the ability to self-manage at this time Care management goals have been completed. Patient was not referred to the [facility] team for further management. Goals Addressed                   This Visit's Progress     COMPLETED: Attends follow-up appointments as directed. 09/01/22   Patient to call and make an appt with PCP for DIANA. Patient has an appt with Dr Tanner Garcia on 9/6 at 9:40 am.  Patient had an appt with Cards Dr Colt Castillo on 9/7 at 1 pm, patient has dialysis M,W,F and this was changed to 10/18 at 10:20 am.   Patient has dialysis at the [facility] 11:15 am chair time, transport is with the [facility].    Monitor status of these appt on next call. Ger FRAGOSO, RN, CCM / Care Transition Nurse / [phone]     09/08/22   Patient did attend her sleep appt on 9/6/22. Has sleep study  on 10/12/22 at 8:30 pm.  Patient has an appt with Cards on 10/18/22 10:20 am.  Patient still needs to call and make an appt with her PCP at the [facility]. Monitor status of PCP appt on next call. Ger FRAGOSO, RN, CCM / Care Transition Nurse / [phone]     09/20/22   Care Transitions Outreach Attempt-LMTCB. Ger FRAGOSO, RN, CCM / Care Transition Nurse / [phone]      10/04/22   [facility] attended [facility]t. Care Transitions Outreach Attempt-LMTCB. Ger FRAGOSO, RN, CCM / Care Transition Nurse / [phone]           COMPLETED: Prevent complications post hospitalization. 09/01/22   Patient has dialysis M,W,F at the [facility] at 11:15 am I have called and spoke to [address], he does not have a 2728 on file for patient but this is not where she started dialysis. Patient started dialysis at [facility] in [facility], I have called and LM for them to call me back to let me know if patient has a 2728 on file.    Patient lives alone at [facility], Is W/C bound and does her own ADL'S, can stand. Her children come and take her grocery shopping like once month. Patient does not have a scale, asked if she thought her children could purchase her 1 and she stated more like I purchase them 1. Patient to be seen by [facility] today, encouraged her to ask if they could get her a scale and she also asked about a shower chair. Patient cooks for herself and does not sound to low sodium, sent low sodium education via mail. Patient denies and SOB, does have vomiting clear mucus, denies pedal edema. Patient thinks she has an ACP needs to look for it, has 8 children 3 listed as her Primary Health Care decision makers. Monitor SOB, vomiting clear mucus, pedal edema, does she have a 2728 on file? HH status, scale, shower chair, did she get the low sodium diet letter I has sent? Zeny Hansen MSN, RN, CCM / Care Transition Nurse / [phone]     09/08/22   Patient denies any SOB or cough has not purchased a scale. Patient states her dry weight at dialysis has been lowered and they are still taking off fluid and then she gets leg cramps. Patient has not looked for her ACP she states the [address] has it on file. Asked her to bring to Cards appt on 10/18/22 so we have it in our EMR. Patient has not received the low sodium diet information I had sent yet. Patient sates her grandson brought her some Kayode Halsted and then her legs got puffy. Reviewed how sodium affects your body. Patient has been seen by PT, still wants a shower chair and scale, encouraged to make an appt with [facility] they are the person who has to order this. Monitor SOB, cough, pedal edema, get scale, get shower chair? Received low sodium education I have sent? Zeny Hansen MSN, RN, CCM / Care Transition Nurse / [phone]     09/20/22   Care Transitions Outreach Attempt-LMTCB.     Zeny Hansen MSN, RN, CCM / Care Transition Nurse / [phone]      10/04/22   Care Transitions Outreach Attempt-LMTCB. Chester Jerod MSN, RN, [facility] / Care Transition Nurse / [phone]                Patient has Care Transition Nurse's contact information for any further questions, concerns, or needs.   Patients upcoming visits:    Future Appointments   Date Time Provider Yamilex Bartlett   10/12/2022  8:30 PM BEDROOM 7 [address] [place] [address] SLEEP LAB [place]   10/18/2022 10:20 AM Celine Henao MD [facility]

## 2022-10-22 PROBLEM — R65.20 SEVERE SEPSIS (HCC): Status: ACTIVE | Noted: 2022-01-01

## 2022-10-22 PROBLEM — A41.9 SEVERE SEPSIS (HCC): Status: ACTIVE | Noted: 2022-01-01

## 2022-10-22 NOTE — CONSULTS
Critical care brief consult note. Evaluated patient with attending intensivist. Patient awake alert oriented. Came in with A-fib with RVR. Missed dialysis on Friday. Blood pressure 90s over 50s. Patient restarted on midodrine. Increased to 20 mg per intensivist attending. Recommended giving a push of bicarb with temporizing medications for hyperkalemia. Patient given Lokelma and hyperkalemia cocktail. Repeat BMP at 0900. Nephrology was notified for dialysis this morning. Access is left chest wall HD catheter. Patient currently on 4 L nasal cannula. At this time patient needs dialysis, blood pressure is soft but if nephrology believes patient can tolerate hemodialysis can be done on the floor or in ED. If patient becomes more hemodynamically unstable and requires CRRT then call critical care back for ICU admission. At this time patient can be admitted to [facility]. If patient decompensates or becomes unstable please call critical care back. Thank you.      Patient Vitals for the past 4 hrs:   Temp Pulse Resp BP SpO2   10/22/22 0700 -- 95 22 (!) 117/57 97 %   10/22/22 0641 -- 87 29 (!) 96/52 98 %   10/22/22 0626 -- 88 18 (!) 96/52 99 %   10/22/22 0615 -- 93 18 (!) 96/52 99 %   10/22/22 0600 -- 94 26 (!) 98/49 99 %   10/22/22 0545 -- 94 24 (!) 97/51 98 %   10/22/22 0530 -- 93 24 (!) 86/37 99 %   10/22/22 0515 -- 92 24 (!) 86/36 99 %   10/22/22 0501 -- 94 24 -- 99 %   10/22/22 0500 -- 93 24 (!) 90/49 99 %   10/22/22 0446 -- 92 (!) 34 (!) 109/48 (!) 62 %   10/22/22 0431 -- (!) 102 27 (!) 100/52 99 %   10/22/22 0416 -- (!) 109 (!) 31 (!) 80/69 (!) 59 %   10/22/22 0401 -- (!) 132 26 (!) 80/53 98 %   10/22/22 0349 99.3 °F (37.4 °C) (!) 137 27 (!) 90/57 98 %   10/22/22 0344 -- (!) 138 28 (!) 90/57 99 %   10/22/22 0332 -- (!) 133 -- (!) 95/54 --       Physical Examination:   General appearance - oriented to person, place, and time and acyanotic, in no respiratory distress  Mental status - alert, oriented to person, place, and time  Eyes - pupils equal and reactive, extraocular eye movements intact  Mouth - mucous membranes moist, pharynx normal without lesions  Neck - supple, no significant adenopathy  Heart - S1 and S2 normal, normal rate, and irregular rhythm   Abdomen - soft, nontender, nondistended, no masses or organomegaly  Neurological - alert, oriented, normal speech, no focal findings or movement disorder noted  Extremities - peripheral pulses normal, no pedal edema, no clubbing or cyanosis  Skin - normal coloration and turgor, no rashes, no suspicious skin lesions noted    Discussed with Dr. Michelle Crews and Dr. Alvin Mack.     [facility]     [address] [facility]

## 2022-10-22 NOTE — PROGRESS NOTES
10/22/2022; 1515 -   CM received call from nursing and CM Consult x2 identifying that the patient's 15 YO grandson is present at bedside and has been since last night. Patient to be admitted for Severe Sepsis. Patient missed OP HD, is currently on O2 but not at baseline, and was started on a Cardizem drip. CM met with patient, with Nursing and Attending present at bedside. The patient confirmed that she has been the minor child's legal guardian since 2016. Patient identified that she and her grandson moved to the [facility] area about 1 year ago due to the patient's medical conditions. Additional family lives in [place] and do not drive. Attending recommended to for the patient to remain admitted to the hospital.    CM identified that by [facility], [facility], there are no set limits on a minor child being left at home alone; [place] provides recommendations, including that a minor child aged 13 or less should not be left at home alone over night. CM discussed the above with Nursing Supervisor, who discussed patient's case with Risk Management. Per Risk, patient's grandson cannot be boarded in the hospital while the patient is admitted due to it being a liability. CM discussed patient's situation with CM Manager. CM Team determined that appropriate course of action is to notify the patient of [place] state recommendations and that it is her choice as the minor child's legal guardian on if he is okay to be at home alone over night. CM discussed the above with patient and patient's grandson. Patient stated that she is supposed to go to [facility] on Monday and wants to discharge to home. CM attempted to discuss that patient would be at greater risk of complications should she decide to leave.   CM also inquired if the patient and grandson have discussed if they would be okay with the child being home alone; patient identified that it would not be the first time of him being at home alone.  CM identified ability to transport the child to home location on [facility]. Patient remained adamant about discharging. Attending and Nursing are aware of the above. Patient would be leaving AMA; Attending to discuss risks of leaving with the patient. CRM: Sage Booker MPH, Brent RIBERA 18.: [phone] or [phone] -   CM was notified by nursing that patient has opted to stay for admission if CM Team can transport the grandson to home. Patient's grandson: Jeffrey Middelton  Location: [facility]; the patient confirmed that facility administration is aware of the grandson's presence    Cost: $13-17    Round Trip Justification      Date/Time:  10/22/2022; 5:40PM    Patient will be discharged from [facility] on 10/22/22. Transportation Mode: car     Is Transportation paid for by [employer]: no    If transport is not covered by [employer] does the patient live within the 25-30 mile service area: yes    Justification for LYFT Transport:  There is no other feasible option of transportation, The mode of transportation provided is modest and only meets the patients basic needs, The patient lives within the primary service area of the [facility] facility, The transport improves the patients access to care, The patient poses no risk of harm to other patients, The patient poses no risk of harm to [facility])  CRM: Sage Booker MPH, Brent RIBERA 18.: [phone] or [phone]

## 2022-10-22 NOTE — ED NOTES
Shift change report to [facility], RN (oncoming nurse) to include SBAR, plan of care, post dialysis plan if pt tolerates and labs are good she can be discharged home by hospitalist, spoke with hospitalist about this regarding pt's 11yo grandson being here with her and there is no family to pick him up.

## 2022-10-22 NOTE — CONSULTS
NEPHROLOGY CONSULT NOTE     Patient: Cristin Peñaloza MRN: [id number]  PCP: Isrrael Dodson MD   :     1950  Age:   67 y.o. Sex:  female      Referring physician: Janina Quinn DO  Reason for consultation: 67 y.o. female with No admission diagnoses are documented for this encounter. complicated by BENTLEY   Admission Date: 10/22/2022  1:23 AM  LOS: 0 days     DISCUSSION / PLAN :   ESRD on dialysis   - Patient of [address] [place] [address]  - Last HD Wednesday  - Access: Left PC   - chest x-ray clear  - HD ordered, Lacey Arellano made aware   - prn albumin for hypotension   - Renally dose medication   - Trend renal indices     Hyperkalemia  - K+ 6.1  - Temp meds ordered- insulin/dextrose, lokelma, and calcium   - HD ordered per above     Sepsis   Lactic acidosis   - started on empiric antibiotics     Cardiac Cirrhosis   RV failure, severe TR  PAF  Chronic HFrEF   Chronic hypotension    Anemia   - no need for CAROL ANN     Hep C, hx of  DMII       Subjective:   HPI: Cristin Peñaloza is a 67 y.o.  female who has a past medical history significant for ESRD on dialysis, cirrhosis, CHF, diabetes hypertension and AFIB. Presented to the ER with chief complaints of generalized weakness and a cough that started yesterday. She was unable to go to her dialysis center due to the aforementioned symptoms. On arrival to the ER patient is hypotensive, tachycardic, tachypneic and febrile. Lab evaluation revealed a serum potassium of 6.1, sCR 6.85 mg/dl, BUN 30, lactic 2.6. On exam patient denies any CP, n/v/d, or edema. She endorses shortness of breath, distended abdomen, fatigue and productive cough.    Patient is being admitted to the hospital for further evaluation/treatment   - [facility] [address] [place] [address] [facility]  - [facility] Wednesday  - Access: Left PC  - She gets bi-weekly paracentesis, next due on this Tuesday   - Patient was started on empiric antibiotics-- vancomycin, Levaquin, cefepime      Past Medical Hx:   Past Medical History:   Diagnosis Date    Arthritis     Asthma     Chronic combined systolic and diastolic CHF (congestive heart failure) ([facility] 8/26/2022    Chronic kidney disease     Chronic pain     Cirrhosis ([facility] 12/17/2017    Diabetes ([facility] diet controlled    GERD (gastroesophageal reflux disease)     Hypertension     Paroxysmal atrial fibrillation ([facility] 10/6/2020        Past Surgical Hx:     Past Surgical History:   Procedure Laterality Date    COLONOSCOPY N/A 1/12/2018    COLONOSCOPY performed by Rick Monroe MD at [facility] ENDOSCOPY    COLONOSCOPY N/A 3/19/2018    COLONOSCOPY performed by Rick Monroe MD at [facility] ENDOSCOPY    HX GYN  c section    HX VASCULAR ACCESS      dialysis     IR BX TRANSCATHETER  11/24/2020    IR PARACENTESIS ABD W IMAGE  10/22/2020       Medications:  Prior to Admission medications    Medication Sig Start Date End Date Taking? Authorizing Provider   triamcinolone acetonide (KENALOG) 0.1 % topical cream Apply  to affected area two (2) times a day. use thin layer   Indications: itching of the genital area, itching    Provider, Historical   polyvinyl alcohol-povidon,PF, (REFRESH CLASSIC) 1.4-0.6 % ophthalmic solution Administer 1-2 Drops to both eyes three (3) times daily. Indications: dry eye    Provider, Historical   ketotifen (ZADITOR) 0.025 % (0.035 %) ophthalmic solution Administer 1 Drop to both eyes two (2) times a day. Indications: allergic conjunctivitis    Provider, Historical   oxyCODONE IR (ROXICODONE) 5 mg immediate release tablet Take 5 mg by mouth every six (6) hours as needed for Pain. Provider, Historical   ascorbic acid, vitamin C, (VITAMIN C) 500 mg tablet Take 500 mg by mouth daily. Provider, Historical   lactulose (CHRONULAC) 10 gram/15 mL solution Take 30 mL by mouth two (2) times a day. 10/10/20   Vickey Fernandez MD   cholecalciferol (VITAMIN D3) (1000 Units /25 mcg) tablet Take 2,000 Units by mouth daily.     Provider, Historical   ferrous sulfate 325 mg (65 mg iron) tablet Take 325 mg by mouth three (3) times daily (with meals). Provider, Historical   sevelamer (RENAGEL) 800 mg tablet Take 1,600 mg by mouth three (3) times daily (with meals). Provider, Historical   albuterol (PROVENTIL HFA, VENTOLIN HFA, PROAIR HFA) 90 mcg/actuation inhaler Take 2 Puffs by inhalation every four (4) hours as needed for Wheezing. Provider, Historical       Allergies   Allergen Reactions    Aleve [Naproxen Sodium] Itching, Swelling and Other (comments)    Amlodipine Rash, Hives and Itching    Aspirin Other (comments), Nausea Only and Unknown (comments)     GI bleed  GI bleeding      Heparin Unknown (comments)     bleeding      Ibuprofen Nausea and Vomiting    Metformin Other (comments)     swelling       Social Hx:  reports that she has never smoked. She has never used smokeless tobacco. She reports that she does not drink alcohol and does not use drugs. Family History   Family history unknown: Yes       Review of Systems:  A twelve point review of system was performed today. Pertinent positives and negatives are mentioned in the HPI. The reminder of the ROS is negative and noncontributory. Objective:    Vitals:    Vitals:    10/22/22 0214 10/22/22 0215 10/22/22 0219 10/22/22 0230   BP: 93/61 (!) 88/64  92/75   Pulse: (!) 141 (!) 144 (!) 139 (!) 139   Resp: (!) 33 30 (!) 31 (!) 43   Temp:       SpO2:  96% 100%    Weight:       Height:         I&O's:  10/21 0701 - 10/22 0700  In: 250 [I.V.:250]  Out: -   Visit Vitals  BP 92/75   Pulse (!) 139   Temp (!) 100.7 °F (38.2 °C)   Resp (!) 43   Ht 5' (1.524 m)   Wt 61.2 kg (135 lb)   SpO2 100%   BMI 26.37 kg/m²       Physical Exam:  General:Alert, No distress   HEENT: Eyes are PERRL. Conjunctiva without pallor ,erythema.    Neck: Supple,no mass palpable  Lungs : tachypneic  CVS: Tachycardia, + LE edema  Abdomen: Distended, bowel sounds present  Extremities: No cyanosis, No clubbing  Skin: No rash or lesions. Neurologic: non focal, AAO x 3  Psych: normal affect    Laboratory Results:    Lab Results   Component Value Date    BUN 30 (H) 10/22/2022     10/22/2022    K 6.1 (H) 10/22/2022     10/22/2022    CO2 24 10/22/2022       Lab Results   Component Value Date    BUN 30 (H) 10/22/2022    BUN 20 08/31/2022    BUN 11 08/30/2022    BUN 27 (H) 08/29/2022    BUN 22 (H) 08/28/2022    K 6.1 (H) 10/22/2022    K 4.7 08/31/2022    K 3.3 (L) 08/30/2022    K 5.2 (H) 08/29/2022    K 4.8 08/28/2022       Lab Results   Component Value Date    WBC 8.2 10/22/2022    RBC 5.76 (H) 10/22/2022    HGB 16.1 (H) 10/22/2022    HCT 54.5 (H) 10/22/2022    MCV 94.6 10/22/2022    MCH 28.0 10/22/2022    RDW 19.5 (H) 10/22/2022     (L) 10/22/2022       Lab Results   Component Value Date    PHOS 4.0 08/31/2022       Urine dipstick:   No results found for: COLOR, APPRN, SPGRU, REFSG, VERENA, PROTU, GLUCU, KETU, BILU, UROU, TIBURCIO, LEUKU, GLUKE, EPSU, BACTU, WBCU, RBCU, CASTS, UCRY    I have reviewed the following: All pertinent labs, microbiology data, radiology imaging for my assessment     ECG- Rev: Yes / No  Xray/CT/US/MRI REV: Yes/ No    Care Plan discussed with:  patient, jaime nurse, ED physician      Chart reviewed. Medications list Personally Reviewed   [x]      Yes     []               No      Thank you for allowing us to participate in the care of this patient. We will follow patient. Please dont hesitate to call with any questions    Mallory Jones NP  10/22/2022    [facility]  [address], [address]  Phone - [phone]  Fax - [phone]  [email or web address]

## 2022-10-22 NOTE — PROGRESS NOTES
Clinical Pharmacy Note - Extended Infusion Cefepime Dosing    This patient has been ordered cefepime at 1 g IV every 8 hours. P&T protocol allows automatic substitution to extended infusion cefepime and dose adjustment based on indication and renal function (adjustment required if creatinine clearance < 60 mL/min). Indication: sepsis    CrCl: ESRD on HD    Per P&T protocol, the cefepime dosing will be adjusted to 1 g IV every 24 hours (each dose will be infused over 4 hours). Please call pharmacy with any questions.

## 2022-10-22 NOTE — ED NOTES
Dialysis nurse to bedside, set up room for comfort, chris at bedside, pt repositioned in bed for comfort

## 2022-10-22 NOTE — ED PROVIDER NOTES
HPI     Date of Service:  10/22/2022    Patient:  Chrissy Jaramillo    Chief Complaint:  Weakness       HPI:  Chrissy Jaramillo is a 67 y.o.  female with a past medical history of ESRD on HD MWF (last dialyzed Wednesday (, paroxysmal atrial fibrillation on amiodarone, diabetes, cirrhosis who presents for evaluation of weakness. Patient reports she was too weak today to go to her dialysis session. She is also been feeling fatigued and tired. She endorses a cough. Denies shortness of breath. Denies chest or abdominal pain. Does endorse that she had nausea and vomiting earlier today. Per EMS patient had a fever, patient was unaware of the fever. Her grandson is home with flulike symptoms.       Past Medical History:   Diagnosis Date    Arthritis     Asthma     Chronic combined systolic and diastolic CHF (congestive heart failure) ([facility] [address] 8/26/2022    Chronic kidney disease     Chronic pain     Cirrhosis ([facility] [address] 12/17/2017    Diabetes ([facility] [address] diet controlled    GERD (gastroesophageal reflux disease)     Hypertension     Paroxysmal atrial fibrillation ([facility] [address] 10/6/2020       Past Surgical History:   Procedure Laterality Date    COLONOSCOPY N/A 1/12/2018    COLONOSCOPY performed by Shazia Ocasio MD at THE [facility] ENDOSCOPY    COLONOSCOPY N/A 3/19/2018    COLONOSCOPY performed by Shazia Ocasio MD at THE [facility] ENDOSCOPY    HX GYN  c section    HX VASCULAR ACCESS      dialysis     IR BX TRANSCATHETER  11/24/2020    IR PARACENTESIS ABD W IMAGE  10/22/2020         Family History:   Family history unknown: Yes       Social History     Socioeconomic History    Marital status: SINGLE     Spouse name: Not on file    Number of children: Not on file    Years of education: Not on file    Highest education level: Not on file   Occupational History    Not on file   Tobacco Use    Smoking status: Never    Smokeless tobacco: Never   Vaping Use    Vaping Use: Never used   Substance and Sexual Activity    Alcohol use: No    Drug use: No    Sexual activity: Not on file   Other Topics Concern     Service Not Asked    Blood Transfusions Yes    Caffeine Concern Not Asked    Occupational Exposure Not Asked    Hobby Hazards Not Asked    Sleep Concern Not Asked    Stress Concern Not Asked    Weight Concern Not Asked    Special Diet Not Asked    Back Care Not Asked    Exercise Not Asked    Bike Helmet Not Asked    Seat Belt Not Asked    Self-Exams Not Asked   Social History Narrative    Not on file     Social Determinants of Health     Financial Resource Strain: Not on file   Food Insecurity: Not on file   Transportation Needs: Not on file   Physical Activity: Not on file   Stress: Not on file   Social Connections: Not on file   Intimate Partner Violence: Not on file   Housing Stability: Not on file         ALLERGIES: Aleve [naproxen sodium], Amlodipine, Aspirin, Heparin, Ibuprofen, and Metformin    Review of Systems   Constitutional:  Positive for fatigue. Negative for chills and fever. HENT:  Negative for congestion and rhinorrhea. Eyes:  Negative for discharge and redness. Respiratory:  Positive for cough. Negative for shortness of breath. Cardiovascular:  Negative for chest pain and leg swelling. Gastrointestinal:  Positive for nausea and vomiting. Negative for abdominal pain and diarrhea. Genitourinary:         Pt is anuric    Musculoskeletal:  Negative for back pain and neck stiffness. Skin:  Negative for rash and wound. Neurological:  Positive for weakness. Negative for speech difficulty. Psychiatric/Behavioral:  Negative for agitation and confusion. Vitals:    10/22/22 0214 10/22/22 0215 10/22/22 0219 10/22/22 0230   BP: 93/61 (!) 88/64  92/75   Pulse: (!) 141 (!) 144 (!) 139 (!) 139   Resp: (!) 33 30 (!) 31 (!) 43   Temp:       SpO2:  96% 100%    Weight:       Height:                Physical Exam  Vitals and nursing note reviewed. Constitutional:       General: She is in acute distress. Appearance: Normal appearance. She is ill-appearing. HENT:      Head: Normocephalic. Eyes:      Extraocular Movements: Extraocular movements intact. Conjunctiva/sclera: Conjunctivae normal.   Cardiovascular:      Rate and Rhythm: Regular rhythm. Tachycardia present. Pulses: Normal pulses. Pulmonary:      Effort: Tachypnea present. Breath sounds: Rhonchi present. Abdominal:      General: Abdomen is protuberant. Palpations: Abdomen is soft. There is fluid wave. Tenderness: There is no abdominal tenderness. Musculoskeletal:         General: Normal range of motion. Skin:     General: Skin is warm and dry. Capillary Refill: Capillary refill takes less than 2 seconds. Neurological:      General: No focal deficit present. Mental Status: She is alert and oriented to person, place, and time. Psychiatric:         Mood and Affect: Mood normal.         Behavior: Behavior normal.        MDM  Number of Diagnoses or Management Options  Acute hyperkalemia  Cardiac arrhythmia, unspecified cardiac arrhythmia type  Influenza A  Sepsis, due to unspecified organism, unspecified whether acute organ dysfunction present [facility])  Diagnosis management comments:     DECISION MAKING:  Ed Ashwini is a 67 y.o. female who comes in as above. On arrival, patient is febrile at 38.2 Celsius, tachycardic at 141 bpm and blood pressure is low at 61/54. On my examination, she is ill-appearing, somewhat drowsy but alert and oriented x3. At time of my evaluation I am concerned for sepsis, sepsis work-up initiated with labs, blood cultures and empiric antibiotics. A standard fluid resuscitation of 30 cc/kg would harm the patient because the patient has Renal failure. Given her missed dialysis will hold on fluid administration until volume status is confirmed. EKG on arrival did show a wide-complex regular tachycardia.   Venous blood gas obtained and did show a potassium of 8, I immediately ordered hyperkalemia medications with 2 g calcium gluconate. Initially ordered insulin and dextrose, but patient was found to be hypoglycemic in the 60s therefore insulin initially held and she was given D10 administration with improvement of her glucose. Tylenol ordered for fever control. Patient is supposed to be on midodrine 3 times daily, did not take any of her midodrine yesterday a dose of midodrine was also ordered. I discussed her case with nephrology NP, who evaluated the patient and will plan for dialysis this morning. She ordered additional medications for her hyperkalemia in the meantime; albuterol administration held given her tachy arrhythmia. CBC without leukocytosis. Metabolic panel does show hyperkalemia with a potassium of 6.1. BUN and creatinine are elevated at 30 and 6.85, respectively this is consistent with her ESRD status. High-sensitivity troponin is elevated at 235, suspect this is likely demand ischemia from her tachy arrhythmia, aspirin held as she is allergic. She is denying any chest pain or shortness of breath. Will trend troponin. Lactate was elevated at 2.6, chest x-ray subsequently clear without any volume overload therefore will give a 500 cc fluid bolus and reassess. Patient subsequently COVID-19 negative. She did test positive for influenza A. I spoke with pharmacy regarding Tamiflu administration, given her ESRD status it is recommended to give just prior to dialysis and afterwards, will hold on ordering this and notify admitting team regarding need to order this. Patient's heart rate subsequently improved to the low 100s and BP  107/69 after fever control and fluid administration (500cc IVF bolus, antibiotics, calcium gluconate and dextrose). On my reevaluation, patient appears much improved, less drowsy and states she is feeling much better. Discussed plan for admission with patient, she is in agreement.       Perfect Serve Consult for Admission  4:28 AM    ED Room Number: ER12/12  Patient Name and age:  Keily Salas 67 y.o.  female  Working Diagnosis: Sepsis, due to unspecified organism, unspecified whether acute organ dysfunction present ([address]  (primary encounter diagnosis)  Cardiac arrhythmia, unspecified cardiac arrhythmia type  Acute hyperkalemia  Influenza A    COVID-19 Suspicion:  no  Sepsis present:  yes  Reassessment needed: yes  Code Status:  Full Code  Readmission: no  Isolation Requirements:  yes  Recommended Level of Care:  telemetry  Department:[facility] Adult ED - 21   Other:  67 y.o.  female with a past medical history of ESRD on HD MWF (last dialyzed Wednesday (, paroxysmal atrial fibrillation on amiodarone, diabetes, cirrhosis who presented for weakness. Found to be febrile at 38.2 C, tachycardic to 140's and hypotensive at 88/64. C/f sepsis, therefore sepsis work-up initiated with empiric antibiotics but IV fluids held as she missed dialysis on Friday and was requiring 2 L nasal cannula oxygen for low sats. . EKG showed wide complex tachycardia, VBG with potassium of 8 so she was given 2gm of Calcium gluconate. She was hypoglycemic in the 60's and given D10. Potassium on CMP was 6.1. BUN 30, creatinine 6.85. Troponin is elevated at 235, suspect this is due to demand ischemia and she is not having any chest pain. Chest x-ray negative. COVID-19 negative. Influenza A positive, I discussion with pharmacy given her ESRD status Tamiflu is recommended as 30 mg immediately prior to dialysis and 30 mg after dialysis, therefore  I did not order it yet. Nephrology is on consult and they ordered other measures for her hyperkalemia and planning for dialysis. /52 and HR improved to the low 100's after fever control and fluids (500 bolus + abx).         The hospitalist requested I speak with ICU given her comorbidities and presentation, patient was subsequently seen and evaluated by ICU team who at this time do not feel she requires ICU admission as her pressures are more stabilized and heart rate has improved. They recommend increased dose of midodrine with dialysis which today ordered. I updated the hospitalist regarding ICU consultation. Amount and/or Complexity of Data Reviewed  Clinical lab tests: reviewed  Tests in the radiology section of CPT®: reviewed        Total critical care time (not including time spent performing separately reportable procedures): 120 minutes       ED Course as of 10/22/22 1503   Sat Oct 22, 2022   0256 EKG, 12 LEAD, INITIAL  ECG at 0 1:46 AM: Wide-complex regular tachycardia, rate 140 bpm.  Left axis deviation. QRS wide at 180 ms. Does not meet Sgarbossa criteria. [SH]   1426 EKG performed at 1419 shows sinus rhythm with frequent ventricular supraventricular complexes and bigeminy. Rate of 100. Prolonged QTC [WG]      ED Course User Index  [SH] Chilango Ferrell DO  [WG] Adam Many, DO       Procedures                 LABS:  Recent Results (from the past 6 hour(s))   TYPE & SCREEN    Collection Time: 10/22/22  2:02 AM   Result Value Ref Range    Crossmatch Expiration 10/25/2022,2359     ABO/Rh(D) Liliana Luis POSITIVE     Antibody screen PENDING     Comment       Previously identified anti Maddi, anti E, and probable anti VS   PROTHROMBIN TIME + INR    Collection Time: 10/22/22  2:02 AM   Result Value Ref Range    INR 1.3 (H) 0.9 - 1.1      Prothrombin time 13.0 (H) 9.0 - 11.1 sec   SAMPLES BEING HELD    Collection Time: 10/22/22  2:02 AM   Result Value Ref Range    SAMPLES BEING HELD 1RED     COMMENT        Add-on orders for these samples will be processed based on acceptable specimen integrity and analyte stability, which may vary by analyte.    BLOOD GAS,CHEM8,LACTIC ACID POC    Collection Time: 10/22/22  2:10 AM   Result Value Ref Range    Calcium, ionized (POC) 1.01 (L) 1.12 - 1.32 mmol/L    BICARBONATE 26 mmol/L    Base deficit (POC) 2.7 mmol/L    Sample source VENOUS BLOOD      CO2, POC 27 (H) 19 - 24 MMOL/L    Sodium,  136 - 145 MMOL/L Potassium, POC 8.1 (HH) 3.5 - 5.5 MMOL/L    Chloride,  100 - 108 MMOL/L    Glucose, POC 86 74 - 106 MG/DL    Creatinine, POC 6.9 (H) 0.6 - 1.3 MG/DL    Lactic Acid (POC) 2.54 (HH) 0.40 - 2.00 mmol/L    Critical value read back [id number]     pH, venous (POC) 7.26 (L) 7.32 - 7.42      pCO2, venous (POC) 57.7 (H) 41 - 51 MMHG    pO2, venous (POC) 22 (L) 25 - 40 mmHg   CBC WITH AUTOMATED DIFF    Collection Time: 10/22/22  2:13 AM   Result Value Ref Range    WBC 8.2 3.6 - 11.0 K/uL    RBC 5.76 (H) 3.80 - 5.20 M/uL    HGB 16.1 (H) 11.5 - 16.0 g/dL    HCT 54.5 (H) 35.0 - 47.0 %    MCV 94.6 80.0 - 99.0 FL    MCH 28.0 26.0 - 34.0 PG    MCHC 29.5 (L) 30.0 - 36.5 g/dL    RDW 19.5 (H) 11.5 - 14.5 %    PLATELET 777 (L) 744 - 400 K/uL    MPV 10.2 8.9 - 12.9 FL    NRBC 0.2 (H) 0  WBC    ABSOLUTE NRBC 0.02 (H) 0.00 - 0.01 K/uL    NEUTROPHILS 81 (H) 32 - 75 %    LYMPHOCYTES 7 (L) 12 - 49 %    MONOCYTES 8 5 - 13 %    EOSINOPHILS 3 0 - 7 %    BASOPHILS 0 0 - 1 %    IMMATURE GRANULOCYTES 1 (H) 0.0 - 0.5 %    ABS. NEUTROPHILS 6.6 1.8 - 8.0 K/UL    ABS. LYMPHOCYTES 0.6 (L) 0.8 - 3.5 K/UL    ABS. MONOCYTES 0.7 0.0 - 1.0 K/UL    ABS. EOSINOPHILS 0.2 0.0 - 0.4 K/UL    ABS. BASOPHILS 0.0 0.0 - 0.1 K/UL    ABS. IMM.  GRANS. 0.1 (H) 0.00 - 0.04 K/UL    DF SMEAR SCANNED      PLATELET COMMENTS Large Platelets      RBC COMMENTS ANISOCYTOSIS  1+        RBC COMMENTS RAINE CELLS  PRESENT       METABOLIC PANEL, COMPREHENSIVE    Collection Time: 10/22/22  2:13 AM   Result Value Ref Range    Sodium 137 136 - 145 mmol/L    Potassium 6.1 (H) 3.5 - 5.1 mmol/L    Chloride 104 97 - 108 mmol/L    CO2 24 21 - 32 mmol/L    Anion gap 9 5 - 15 mmol/L    Glucose 85 65 - 100 mg/dL    BUN 30 (H) 6 - 20 MG/DL    Creatinine 6.85 (H) 0.55 - 1.02 MG/DL    BUN/Creatinine ratio 4 (L) 12 - 20      eGFR 6 (L) >60 ml/min/1.73m2    Calcium 9.0 8.5 - 10.1 MG/DL    Bilirubin, total 0.7 0.2 - 1.0 MG/DL    ALT (SGPT) 20 12 - 78 U/L    AST (SGOT) 42 (H) 15 - 37 U/L    Alk.  phosphatase 175 (H) 45 - 117 U/L    Protein, total 7.0 6.4 - 8.2 g/dL    Albumin 3.3 (L) 3.5 - 5.0 g/dL    Globulin 3.7 2.0 - 4.0 g/dL    A-G Ratio 0.9 (L) 1.1 - 2.2     TROPONIN-HIGH SENSITIVITY    Collection Time: 10/22/22  2:13 AM   Result Value Ref Range    Troponin-High Sensitivity 235 (HH) 0 - 51 ng/L   GLUCOSE, POC    Collection Time: 10/22/22  2:27 AM   Result Value Ref Range    Glucose (POC) 69 65 - 117 mg/dL    Performed by Chava Granados (KANIKA)    LACTIC ACID    Collection Time: 10/22/22  2:38 AM   Result Value Ref Range    Lactic acid 2.6 (HH) 0.4 - 2.0 MMOL/L   GLUCOSE, POC    Collection Time: 10/22/22  3:17 AM   Result Value Ref Range    Glucose (POC) 140 (H) 65 - 117 mg/dL    Performed by 1 Guy Del Real A+B VIRAL AGS    Collection Time: 10/22/22  3:35 AM   Result Value Ref Range    Influenza A Antigen Positive (A) NEG      Influenza B Antigen Negative NEG     COVID-19 RAPID TEST    Collection Time: 10/22/22  3:35 AM   Result Value Ref Range    Specimen source Nasopharyngeal      COVID-19 rapid test Not detected NOTD          IMAGING:  XR CHEST PORT   Final Result      No acute process seen               Medications During Visit:  Medications   vancomycin (VANCOCIN) 1500 mg in  ml infusion (1,500 mg IntraVENous New Bag 10/22/22 0421)   dextrose 10 % infusion 125-250 mL (has no administration in time range)   calcium gluconate 1 gram in sodium chloride (ISO-OSM) 50 mL infusion ( IntraVENous Canceled Entry 10/22/22 0216)   cefepime (MAXIPIME) 2 g in 0.9% sodium chloride 10 mL IV syringe (2 g IntraVENous Given 10/22/22 0235)   levoFLOXacin (LEVAQUIN) 750 mg in D5W IVPB (0 mg IntraVENous IV Completed 10/22/22 0421)   acetaminophen (TYLENOL) tablet 650 mg (650 mg Oral Given 10/22/22 0231)   calcium gluconate 1 gram in sodium chloride (ISO-OSM) 50 mL infusion (1,000 mg IntraVENous Bolus 10/22/22 0215)   insulin regular (NOVOLIN R, HUMULIN R) injection 10 Units (10 Units IntraVENous Given 10/22/22 0339)   dextrose 10 % infusion 250 mL (0 mL IntraVENous IV Completed 10/22/22 0255)   calcium gluconate 100 mg/mL (10%) injection (1 g  Given 10/22/22 0223)   dextrose 10 % infusion 250 mL (0 mL IntraVENous IV Completed 10/22/22 0402)   sodium chloride 0.9 % bolus infusion 500 mL (0 mL IntraVENous IV Completed 10/22/22 0421)   midodrine (PROAMATINE) tablet 5 mg (5 mg Oral Given 10/22/22 0332)   sodium zirconium cyclosilicate (LOKELMA) powder packet 15 g (15 g Oral Given 10/22/22 0354)         IMPRESSION:  1. Sepsis, due to unspecified organism, unspecified whether acute organ dysfunction present ([facility] [address]    2. Cardiac arrhythmia, unspecified cardiac arrhythmia type    3.  Acute hyperkalemia        DISPOSITION:  Admitted      Kenna Lombardo DO

## 2022-10-22 NOTE — PROCEDURES
[facility]  [phone]    Vitals   Pre   Post   Assessment   Pre   Post     Temp  Temp: 98.1 °F (36.7 °C) (10/22/22 0721)  97.9 LOC  Alert and oriented Easily awakens to voice   HR   Pulse (Heart Rate): 83 (10/22/22 0721) 111 Lungs   Diminished, o2 via nasal cannula  Even and unlabored at rest   B/P   BP: 107/69 (10/22/22 0721) 127/69 Cardiac   Irregular bedside monitoring  Irregular bedside monitoring   Resp   Resp Rate: 19 (10/22/22 0721) 18 Skin   Warm and dry lower legs skin integrity not intact  Warm and dry    Pain level  Pain Intensity 1: 10 (10/22/22 0154) No complaints at this time Edema  Abdominal ascites paracentisis twice a month due to be done again on Tuesday 1 BLE Abdominal ascites, BLE 1   Orders:    Duration:   Start:    2191 End:    1051 Total:   3.5   Dialyzer:   Dialyzer/Set Up Inspection: Revaclear (10/22/22 0721)   Ashley [place] Bath:   Dialysate K (mEq/L): 2 (10/22/22 0721)   Ca Bath:   Dialysate CA (mEq/L): 2.5 (10/22/22 0721)   Na/Bicarb:   Dialysate NA (mEq/L): 140 (10/22/22 0721)   Target Fluid Removal:   Goal/Amount of Fluid to Remove (mL): 2000 mL (10/22/22 0721)   Access     Type & Location:   Right tunneled cvc, dressing change per policy and procedure, Each catheter limb disinfected per p&p, caps removed, hubs disinfected per p&p.   Aspirated 5 ml each limb, labs drawn flushed easily    Labs     Obtained/Reviewed   Critical Results Called   Date when labs were drawn-  Hgb-    HGB   Date Value Ref Range Status   10/22/2022 16.1 (H) 11.5 - 16.0 g/dL Final     K-    Potassium   Date Value Ref Range Status   10/22/2022 5.1 3.5 - 5.1 mmol/L Final     Comment:     SPECIMEN HEMOLYZED, RESULTS MAY BE AFFECTED     Ca-   Calcium   Date Value Ref Range Status   10/22/2022 7.7 (L) 8.5 - 10.1 MG/DL Final     Bun-   BUN   Date Value Ref Range Status   10/22/2022 30 (H) 6 - 20 MG/DL Final     Creat-   Creatinine   Date Value Ref Range Status   10/22/2022 6.80 (H) 0.55 - 1.02 MG/DL Final        Medications/ Blood Products Given     Name   Dose   Route and Time                     Blood Volume Processed (BVP):    68 Net Fluid   Removed:  1000   Comments   Time Out Done: 2589  Primary Nurse Rpt Pre: GEORGE Morales RN   Primary Nurse Rpt Post: Lucina RIBEIRO   Pt Education: procedural   Care Plan: continue current HD plan of care   Tx Summary:  32 82 12 HD initiated as ordered   0945 spoke with dr. Gorge Sever in regards to blood pressure, heart rate and now patient complains of cramping in hands and legs, UF decreased to 1 kg. 4[address] [facility] Dr Gorge Sever in to see patient on dialysis, discussed current treatment no further changes. 1051 treatment completed, all possible blood returned, Each dialysis catheter limb disinfected per p&p, blood returned per p&p, each dialysis hub disinfected per p&p, post dialysis catheter dwell instilled per order, and caps applied. All dialysis related medications have been reviewed.'  Admiting Diagnosis: hyperkalemia  [facility]   Consent signed - Informed Consent Verified: Yes (informed consent applies) (10/22/22 0721)  Hepatitis Status- 10/2222 antigen negative susceptible connect care   Machine #- Machine Number: [id number] (10/22/22 5897)  Telemetry status- bedside  Pre-dialysis wt. -  n/a

## 2022-10-22 NOTE — PROGRESS NOTES
Pharmacist Note - Vancomycin Dosing (Hemodialysis Patient)    Consult provided for this 67 y.o. female for indication of sepsus. This patient is also on the following antibiotic regimen(s): vancomycin + cefepime  Patient on vancomycin PTA? NO     Lab Results   Component Value Date/Time    Creatinine 6.80 (H) 10/22/2022 05:36 AM    Creatinine, POC 6.9 (H) 10/22/2022 02:10 AM    WBC 8.2 10/22/2022 02:13 AM    BUN 30 (H) 10/22/2022 05:36 AM   Temp (24hrs), Av.4 °F (37.4 °C), Min:98.1 °F (36.7 °C), Max:100.7 °F (38.2 °C)      Estimated CrCl:  ESRD on HD ( to be determine )  Cultures:  10/22 blood, pending  MRSA Swab ordered (if applicable)? YES    For this HD patient, will initiate therapy with a loading dose of Vancomycin 1500 mg, to be followed with a dose of 500 mg after each HD session. Dose will be adjusted to maintain a pre-HD trough of approximately 20 - 25 mcg/mL for therapeutic goal of 15 - 20 mcg/mL as approximately 35% of drug will be removed by HD filtration. Pharmacy to follow patient daily and order levels / make dose adjustments as appropriate.

## 2022-10-22 NOTE — H&P
History & Physical    Primary Care Provider: Remington Colbert MD  Source of Information: Patient chart as pt in on HD    Please note that this dictation was completed with Traitify, the computer voice recognition software. Quite often unanticipated grammatical, syntax, homophones, and other interpretive errors are inadvertently transcribed by the computer software. Please disregard these errors. Please excuse any errors that have escaped final proofreading. CC: weakness/ SOB/ Missed HD    History of Presenting Illness:   Jarred Amos is a 67 y.o. female who presents with sob. And came in with A-fib with RVR. Missed dialysis on Friday. Patient was also found hypertensive and febrile 100.7 noted on admission. Initially patient was evaluated by the ICU attending and started on midodrine for low blood pressure. Increased to 20 mg per intensivist attending. Patient was also found to have severe hypokalemia which was treated with calcium gluconate and Anthony Bonner in the ED. nephrology was consulted for emergent hemodialysis in the ED. access is left chest wall HD catheter. Patient currently on 4 L nasal cannula. At this time patient needs dialysis, blood pressure is soft but if nephrology believes patient can tolerate hemodialysis can be done on the floor or in ED. If patient becomes more hemodynamically unstable and requires CRRT then call critical care back for ICU admission. Patient also complains of bright red blood per rectum as per RN in the ED patient was admitted for further management. The patient denies any fever, chills, chest pain, cough, congestion, recent illness, palpitations, or dysuria. Review of Systems:  Pertinent items are noted in the History of Present Illness.      Past Medical History:   Diagnosis Date    Arthritis     Asthma     Chronic combined systolic and diastolic CHF (congestive heart failure) ([facility] [address] 8/26/2022    Chronic kidney disease Chronic pain     Cirrhosis ([facility] [address] 12/17/2017    Diabetes ([facility] diet controlled    GERD (gastroesophageal reflux disease)     Hypertension     Paroxysmal atrial fibrillation ([facility] [address] 10/6/2020      Past Surgical History:   Procedure Laterality Date    COLONOSCOPY N/A 1/12/2018    COLONOSCOPY performed by Angelic Wood MD at THE [facility] ENDOSCOPY    COLONOSCOPY N/A 3/19/2018    COLONOSCOPY performed by Angelic Wood MD at THE [facility] ENDOSCOPY    HX GYN  c section    HX VASCULAR ACCESS      dialysis     IR BX TRANSCATHETER  11/24/2020    IR PARACENTESIS ABD W IMAGE  10/22/2020     Prior to Admission medications    Medication Sig Start Date End Date Taking? Authorizing Provider   triamcinolone acetonide (KENALOG) 0.1 % topical cream Apply  to affected area two (2) times a day. use thin layer   Indications: itching of the genital area, itching    Provider, Historical   polyvinyl alcohol-povidon,PF, (REFRESH CLASSIC) 1.4-0.6 % ophthalmic solution Administer 1-2 Drops to both eyes three (3) times daily. Indications: dry eye    Provider, Historical   ketotifen (ZADITOR) 0.025 % (0.035 %) ophthalmic solution Administer 1 Drop to both eyes two (2) times a day. Indications: allergic conjunctivitis    Provider, Historical   oxyCODONE IR (ROXICODONE) 5 mg immediate release tablet Take 5 mg by mouth every six (6) hours as needed for Pain. Provider, Historical   ascorbic acid, vitamin C, (VITAMIN C) 500 mg tablet Take 500 mg by mouth daily. Provider, Historical   lactulose (CHRONULAC) 10 gram/15 mL solution Take 30 mL by mouth two (2) times a day. 10/10/20   Trini Ortiz MD   cholecalciferol (VITAMIN D3) (1000 Units /25 mcg) tablet Take 2,000 Units by mouth daily. Provider, Historical   ferrous sulfate 325 mg (65 mg iron) tablet Take 325 mg by mouth three (3) times daily (with meals). Provider, Historical   sevelamer (RENAGEL) 800 mg tablet Take 1,600 mg by mouth three (3) times daily (with meals).     Provider, Historical   albuterol (PROVENTIL HFA, VENTOLIN HFA, PROAIR HFA) 90 mcg/actuation inhaler Take 2 Puffs by inhalation every four (4) hours as needed for Wheezing. Provider, Historical     Allergies   Allergen Reactions    Aleve [Naproxen Sodium] Itching, Swelling and Other (comments)    Amlodipine Rash, Hives and Itching    Aspirin Other (comments), Nausea Only and Unknown (comments)     GI bleed  GI bleeding      Heparin Unknown (comments)     bleeding      Ibuprofen Nausea and Vomiting    Metformin Other (comments)     swelling      Family History   Family history unknown: Yes        SOCIAL HISTORY:  Patient resides:  Independently    Assisted Living    SNF    With family care x      Smoking history:   None x   Former    Chronic      Alcohol history:   None x   Social    Chronic      Ambulates:   Independently x   w/cane    w/walker    w/wc    CODE STATUS:  DNR    Full x   Other      Objective:     Physical Exam:     Visit Vitals  /65   Pulse 92   Temp 98.1 °F (36.7 °C) (Oral)   Resp 22   Ht 5' (1.524 m)   Wt 61.2 kg (135 lb)   SpO2 100%   BMI 26.37 kg/m²    O2 Flow Rate (L/min): 4 l/min O2 Device: Nasal cannula    General:  Alert, cooperative, no distress, appears stated age. Head:  Normocephalic, without obvious abnormality, atraumatic. Eyes:  Conjunctivae/corneas clear. PERRL, EOMs intact. Nose: Nares normal. Septum midline. Mucosa normal. No drainage or sinus tenderness. Throat: Lips, mucosa, and tongue normal. Teeth and gums normal.   Neck: Supple, symmetrical, trachea midline, no adenopathy, thyroid: no enlargement/tenderness/nodules, no carotid bruit and no JVD. Lungs:   Clear to auscultation bilaterally. Heart:  Regular rate and rhythm, S1, S2 normal, no murmur, click, rub or gallop. Abdomen:   Soft, non-tender. Bowel sounds normal. No masses,  No organomegaly. Extremities: Extremities normal, atraumatic, no cyanosis or edema. Pulses: 2+ and symmetric all extremities. Skin: Skin color, texture, turgor normal. No rashes or lesions   Neurologic: CNII-XII intact. EKG: No EKG found from the ED      Data Review:     Recent Days:  Recent Labs     10/22/22  0213   WBC 8.2   HGB 16.1*   HCT 54.5*   *     Recent Labs     10/22/22  0536 10/22/22  0213 10/22/22  0202    137  --    K 5.1 6.1*  --     104  --    CO2 24 24  --    GLU 99 85  --    BUN 30* 30*  --    CREA 6.80* 6.85*  --    CA 7.7* 9.0  --    ALB  --  3.3*  --    TBILI  --  0.7  --    ALT  --  20  --    INR  --   --  1.3*     Recent Labs     10/22/22  0210   HCO3 26       24 Hour Results:  Recent Results (from the past 24 hour(s))   TYPE & SCREEN    Collection Time: 10/22/22  2:02 AM   Result Value Ref Range    Crossmatch Expiration 10/25/2022,2359     ABO/Rh(D) Liliana Smith POSITIVE     Antibody screen POS     Comment       Previously identified anti Maddi, anti E, and probable anti VS    Antibody ID NONSPECIFIC ANTIBODY    PROTHROMBIN TIME + INR    Collection Time: 10/22/22  2:02 AM   Result Value Ref Range    INR 1.3 (H) 0.9 - 1.1      Prothrombin time 13.0 (H) 9.0 - 11.1 sec   SAMPLES BEING HELD    Collection Time: 10/22/22  2:02 AM   Result Value Ref Range    SAMPLES BEING HELD 1RED     COMMENT        Add-on orders for these samples will be processed based on acceptable specimen integrity and analyte stability, which may vary by analyte.    BLOOD GAS,CHEM8,LACTIC ACID POC    Collection Time: 10/22/22  2:10 AM   Result Value Ref Range    Calcium, ionized (POC) 1.01 (L) 1.12 - 1.32 mmol/L    BICARBONATE 26 mmol/L    Base deficit (POC) 2.7 mmol/L    Sample source VENOUS BLOOD      CO2, POC 27 (H) 19 - 24 MMOL/L    Sodium,  136 - 145 MMOL/L    Potassium, POC 8.1 (HH) 3.5 - 5.5 MMOL/L    Chloride,  100 - 108 MMOL/L    Glucose, POC 86 74 - 106 MG/DL    Creatinine, POC 6.9 (H) 0.6 - 1.3 MG/DL    Lactic Acid (POC) 2.54 (HH) 0.40 - 2.00 mmol/L    Critical value read back [id number]     pH, venous (POC) 7.26 (L) 7.32 - 7.42      pCO2, venous (POC) 57.7 (H) 41 - 51 MMHG    pO2, venous (POC) 22 (L) 25 - 40 mmHg   CBC WITH AUTOMATED DIFF    Collection Time: 10/22/22  2:13 AM   Result Value Ref Range    WBC 8.2 3.6 - 11.0 K/uL    RBC 5.76 (H) 3.80 - 5.20 M/uL    HGB 16.1 (H) 11.5 - 16.0 g/dL    HCT 54.5 (H) 35.0 - 47.0 %    MCV 94.6 80.0 - 99.0 FL    MCH 28.0 26.0 - 34.0 PG    MCHC 29.5 (L) 30.0 - 36.5 g/dL    RDW 19.5 (H) 11.5 - 14.5 %    PLATELET 950 (L) 541 - 400 K/uL    MPV 10.2 8.9 - 12.9 FL    NRBC 0.2 (H) 0  WBC    ABSOLUTE NRBC 0.02 (H) 0.00 - 0.01 K/uL    NEUTROPHILS 81 (H) 32 - 75 %    LYMPHOCYTES 7 (L) 12 - 49 %    MONOCYTES 8 5 - 13 %    EOSINOPHILS 3 0 - 7 %    BASOPHILS 0 0 - 1 %    IMMATURE GRANULOCYTES 1 (H) 0.0 - 0.5 %    ABS. NEUTROPHILS 6.6 1.8 - 8.0 K/UL    ABS. LYMPHOCYTES 0.6 (L) 0.8 - 3.5 K/UL    ABS. MONOCYTES 0.7 0.0 - 1.0 K/UL    ABS. EOSINOPHILS 0.2 0.0 - 0.4 K/UL    ABS. BASOPHILS 0.0 0.0 - 0.1 K/UL    ABS. IMM. GRANS. 0.1 (H) 0.00 - 0.04 K/UL    DF SMEAR SCANNED      PLATELET COMMENTS Large Platelets      RBC COMMENTS ANISOCYTOSIS  1+        RBC COMMENTS RAINE CELLS  PRESENT       METABOLIC PANEL, COMPREHENSIVE    Collection Time: 10/22/22  2:13 AM   Result Value Ref Range    Sodium 137 136 - 145 mmol/L    Potassium 6.1 (H) 3.5 - 5.1 mmol/L    Chloride 104 97 - 108 mmol/L    CO2 24 21 - 32 mmol/L    Anion gap 9 5 - 15 mmol/L    Glucose 85 65 - 100 mg/dL    BUN 30 (H) 6 - 20 MG/DL    Creatinine 6.85 (H) 0.55 - 1.02 MG/DL    BUN/Creatinine ratio 4 (L) 12 - 20      eGFR 6 (L) >60 ml/min/1.73m2    Calcium 9.0 8.5 - 10.1 MG/DL    Bilirubin, total 0.7 0.2 - 1.0 MG/DL    ALT (SGPT) 20 12 - 78 U/L    AST (SGOT) 42 (H) 15 - 37 U/L    Alk.  phosphatase 175 (H) 45 - 117 U/L    Protein, total 7.0 6.4 - 8.2 g/dL    Albumin 3.3 (L) 3.5 - 5.0 g/dL    Globulin 3.7 2.0 - 4.0 g/dL    A-G Ratio 0.9 (L) 1.1 - 2.2     TROPONIN-HIGH SENSITIVITY    Collection Time: 10/22/22  2:13 AM   Result Value Ref Range Troponin-High Sensitivity 235 (HH) 0 - 51 ng/L   GLUCOSE, POC    Collection Time: 10/22/22  2:27 AM   Result Value Ref Range    Glucose (POC) 69 65 - 117 mg/dL    Performed by Mack Godoy (CON)    LACTIC ACID    Collection Time: 10/22/22  2:38 AM   Result Value Ref Range    Lactic acid 2.6 (HH) 0.4 - 2.0 MMOL/L   GLUCOSE, POC    Collection Time: 10/22/22  3:17 AM   Result Value Ref Range    Glucose (POC) 140 (H) 65 - 117 mg/dL    Performed by 1 Guy Del Real A+B VIRAL AGS    Collection Time: 10/22/22  3:35 AM   Result Value Ref Range    Influenza A Antigen Positive (A) NEG      Influenza B Antigen Negative NEG     COVID-19 RAPID TEST    Collection Time: 10/22/22  3:35 AM   Result Value Ref Range    Specimen source Nasopharyngeal      COVID-19 rapid test Not detected NOTD     METABOLIC PANEL, BASIC    Collection Time: 10/22/22  5:36 AM   Result Value Ref Range    Sodium 138 136 - 145 mmol/L    Potassium 5.1 3.5 - 5.1 mmol/L    Chloride 108 97 - 108 mmol/L    CO2 24 21 - 32 mmol/L    Anion gap 6 5 - 15 mmol/L    Glucose 99 65 - 100 mg/dL    BUN 30 (H) 6 - 20 MG/DL    Creatinine 6.80 (H) 0.55 - 1.02 MG/DL    BUN/Creatinine ratio 4 (L) 12 - 20      eGFR 6 (L) >60 ml/min/1.73m2    Calcium 7.7 (L) 8.5 - 10.1 MG/DL   POC LACTIC ACID    Collection Time: 10/22/22  6:01 AM   Result Value Ref Range    Lactic Acid (POC) 2.95 (HH) 0.40 - 2.00 mmol/L   HEP B SURFACE AB    Collection Time: 10/22/22  6:48 AM   Result Value Ref Range    Hepatitis B surface Ab 9.48 mIU/mL    Hep B surface Ab Interp. NONREACTIVE NR           Imaging:     XR CHEST PORT    Result Date: 10/22/2022  No acute process seen       Admit to inpatient status      Patient was explained about the risk of admission including and not a complete list including risk of falls,fractures,blood clots,allergic reactions,infections.  Patient/family also understands and agrees to the treatment plan including medications and side effect profiles and also understand the risk with radiation while undergoing imaging studies. The patient and the family/friends (after permission given by the patient to discuss) understand this and agree with the admission plan. Assessment:     Active Problems:    Severe sepsis ([facility] [address] (10/22/2022)           Plan:     Acute hypoxic respiratory failure due to volume overload from missed hemodialysis  --Admit patient to intermediate care  --Supportive care with nasal cannula oxygen  --Urgent hemodialysis and wean off oxygen as tolerates  --Support blood pressure with midodrine for now may require pressors  --See attending evaluated the patient in the ED    End-stage renal disease with volume overload from missed dialysis  --Missed hemodialysis on Friday  --Continue dialysis as per nephrology may need additional session if tolerates    Fever hypotension with hypotensive shock?     Sepsis unknown source --sepsis supported by fever hypotension elevated lactate tachycardia  --We will start cefepime source unclear at this time  --Lactate elevated and patient had fever  --Check respiratory viral panel  --Start empiric antibiotics  --Blood culture requested    Bright red blood per rectum hemoglobin 16  --Unsure if patient is really having bleeding requested RN to check stool occult  --Patient hemoglobin is 16 more than targeted value for end-stage renal disease    Severe hypokalemia POA due to missed hemodialysis  --No EKG found  --Patient was treated with calcium gluconate and Cathy Sony in the ED  --Currently on hemodialysis should be able to remove excess potassium from the system  --Repeat lab after finishing hemodialysis    A. fib with RVR possibly in the setting of volume overloaded with missed hemodialysis and possible sepsis  -- Amiodarone as home medication 200 mg twice daily  --If A. fib persist may need cardiology consultation    Full code lives with family  Further management as per clinical course  Critically ill high risk for deterioration    CRITICAL CARE ATTESTATION:  I had a face to face encounter with the patient, reviewed and interpreted patient data including clinical events, labs, images, vital signs, I/O's, and examined patient. I have discussed the case and the plan and management of the patient's care with the consulting services, the bedside nurses and necessary ancillary providers. NOTE OF PERSONAL INVOLVEMENT IN CARE   This patient has a high probability of imminent, clinically significant deterioration, which requires the highest level of preparedness to intervene urgently. I participated in the decision-making and personally managed or directed the management of the following life and organ supporting interventions that required my frequent assessment to treat or prevent imminent deterioration. I personally spent 45 minutes of critical care time. This is time spent at this critically ill patient's bedside actively involved in patient care as well as the coordination of care and discussions with the patient's family. This does not include any procedural time which has been billed separately.           Signed By: Hannah Case MD     October 22, 2022

## 2022-10-22 NOTE — PROGRESS NOTES
Admission Medication Reconciliation:      Spoke with technician at the [address], who recited patient's active medication list.  Unable to speak directly with [facility] pharmacist. Details of refill history were not provided, and patient unable to participate in interview at this time. Notes:  Amiodarone: per instructions should be taking 200 mg twice daily  Numerous topicals (ointments and creams); details not provided but listed on OTHER in case patient requests    Medication changes (since last review): Added:  Wixela  Atorvastatin  Sertraline  Singulair  Metoprolol tartrate  Methocarbamol  Salsalate  Pantoprazole  Docusate  Senna  Hydrocortisone suppository  Amiodarone  Midodrine  Chlorhexidine mouth wash  Fluticasone nasal spray  Tessalon pearls  Diphenhydramine  Sodium zirconium powder  Lidocaine patch (switched to our formulary)  APAP  Capsaicin cream  Claritin    Revised:  Oxycodone    Removed:  Kenalog cream    Thank you for allowing me to participate in the care of your patient. Acacia Jackson PharmD, RN # [phone]       [facility] pharmacy benefit data reflects medications filled and processed through the patient's insurance, however   this data does NOT capture whether the medication was picked up or is currently being taken by the patient.     Allergies:  Aleve [naproxen sodium], Amlodipine, Aspirin, Heparin, Ibuprofen, and Metformin    Significant PMH/Disease States:   Past Medical History:   Diagnosis Date    Arthritis     Asthma     Chronic combined systolic and diastolic CHF (congestive heart failure) ([facility] [address] 8/26/2022    Chronic kidney disease     Chronic pain     Cirrhosis ([facility] [address] 12/17/2017    Diabetes ([facility] [address] diet controlled    GERD (gastroesophageal reflux disease)     Hypertension     Paroxysmal atrial fibrillation ([facility] [address] 10/6/2020     Chief Complaint for this Admission:    Chief Complaint   Patient presents with    Fatigue    Fever    Abdominal Pain    Vomiting     Pt arrives from home via ems/bls for c/o weakness this am didn't go to dialysis, woke at 10pm with abd pain, n/v x 2 episodes, fever 101.5 by EMS, room air sats 90% placed on o2 via nc at 2lpm up to 99%, bp's ranging from 80/60's down to 64/44, they noted  wheezing left anterior lungs     Prior to Admission Medications:   Prior to Admission Medications   Prescriptions Last Dose Informant Taking? OTHER   Yes   Sig: Numerous topical ointments and creams:  Lidocaine topical 5%  Tretinoin with clobetasol  Betamethasone  Clotrimazole  Lac-Hydrin  Urea 5%  Canicream  Camphor-menthol lotion   Senna 8.6 mg tablet   Yes   Sig: Take 1 Tablet by mouth daily as needed for Constipation. acetaminophen (TYLENOL) 500 mg tablet   Yes   Sig: Take 500 mg by mouth every eight (8) hours as needed for Pain. Max 3 grams daily   albuterol (PROVENTIL HFA, VENTOLIN HFA, PROAIR HFA) 90 mcg/actuation inhaler   Yes   Sig: Take 2 Puffs by inhalation every four (4) hours as needed for Wheezing. amiodarone (CORDARONE) 200 mg tablet   Yes   Sig: Take 200 mg by mouth two (2) times a day. ascorbic acid, vitamin C, (VITAMIN C) 500 mg tablet   Yes   Sig: Take 500 mg by mouth daily. atorvastatin (LIPITOR) 80 mg tablet   Yes   Sig: Take 40 mg by mouth nightly. benzonatate (Tessalon Perles) 100 mg capsule   Yes   Sig: Take 100 mg by mouth two (2) times daily as needed for Cough. capsicum oleoresin 0.025 % topical cream   Yes   Sig: Apply  to affected area three (3) times daily. chlorhexidine (PERIDEX) 0.12 % solution   Yes   Sig: 15 mL by Swish and Spit route every twelve (12) hours. Oral care   cholecalciferol (VITAMIN D3) (1000 Units /25 mcg) tablet   Yes   Sig: Take 4,000 Units by mouth daily. cyanocobalamin (VITAMIN B12) 500 mcg tablet   Yes   Sig: Take 1,000 mcg by mouth daily. diphenhydrAMINE (BENADRYL) 25 mg capsule   Yes   Sig: Take 25 mg by mouth three (3) times daily as needed.    docusate sodium (COLACE) 100 mg capsule   Yes   Sig: Take 100 mg by mouth two (2) times a day. ferrous sulfate 325 mg (65 mg iron) tablet   Yes   Sig: Take 325 mg by mouth three (3) times daily (with meals). fluticasone propion-salmeteroL (Wixela Inhub) 100-50 mcg/dose diskus inhaler   Yes   Sig: Take 1 Puff by inhalation two (2) times a day. fluticasone propionate (FLONASE) 50 mcg/actuation nasal spray   Yes   Si [place] by Both Nostrils route daily. hydrocortisone (ANUSOL-HC) 25 mg supp   Yes   Sig: Insert 25 mg into rectum every twelve (12) hours as needed. ketotifen (ZADITOR) 0.025 % (0.035 %) ophthalmic solution   Yes   Sig: Administer 1 Drop to both eyes two (2) times a day. Indications: allergic conjunctivitis   lactulose (CHRONULAC) 10 gram/15 mL solution   Yes   Sig: Take 30 mL by mouth two (2) times a day. lidocaine 4 % patch   Yes   Si Patch by TransDERmal route every twenty-four (24) hours. loratadine (Claritin) 10 mg tablet   Yes   Sig: Take 10 mg by mouth every other day. Every other day   methocarbamoL (ROBAXIN) 500 mg tablet   Yes   Sig: Take 500 mg by mouth every eight (8) hours as needed for Muscle Spasm(s). metoprolol tartrate (LOPRESSOR) 25 mg tablet   Yes   Sig: Take 12.5 mg by mouth two (2) times a day. midodrine (PROAMATINE) 5 mg tablet   Yes   Sig: Take 5 mg by mouth three (3) times daily (with meals). montelukast (SINGULAIR) 10 mg tablet   Yes   Sig: Take 10 mg by mouth nightly. oxyCODONE IR (ROXICODONE) 5 mg immediate release tablet   Yes   Sig: Take 5 mg by mouth every twelve (12) hours as needed for Pain.   pantoprazole (PROTONIX) 40 mg tablet   Yes   Sig: Take 40 mg by mouth two (2) times a day. polyvinyl alcohol-povidon,PF, (REFRESH CLASSIC) 1.4-0.6 % ophthalmic solution   Yes   Sig: Administer 1-2 Drops to both eyes three (3) times daily. Indications: dry eye   salsalate (DISALCID) 750 mg tablet   Yes   Sig: Take 750 mg by mouth two (2) times daily as needed for Pain.    sertraline (ZOLOFT) 50 mg tablet   Yes   Sig: Take 25 mg by mouth daily. sevelamer (RENAGEL) 800 mg tablet   Yes   Sig: Take 2,400 mg by mouth three (3) times daily (with meals). sodium zirconium cyclosilicate (LOKELMA) 10 gram powder packet   Yes   Sig: Take 10 g by mouth daily. Facility-Administered Medications: None     Please contact the main inpatient pharmacy with any questions or concerns at [phone] and we will direct you to the clinical pharmacist covering this patient's care while in-house.    CELSA Richards

## 2022-10-22 NOTE — ED NOTES
Pt reports that she is guardian of her 13yo grandson that is with her and there is no one that could pick him up, his mother will be in town next week Wednesday. She also states they were in the ER with him a few days ago and he was diagnosed with the Flu. She is aware that he wont be able to stay with her while admitted, she offered to just send him home now and I explained that isnt an option to send him in any form of transport by himself.  MD and charge nurse aware

## 2022-10-22 NOTE — ED NOTES
NP Gena Carmona and Dr. Terll Steiner to bedside, discussed dialysis plan while in the ER and bp concern with medication plan, if she tolerates dialysis and repeat labs are stable she will be sent home today

## 2022-10-22 NOTE — ED NOTES
TRANSFER - OUT REPORT:    Verbal report given to Travis Lakhani RN (name) on Jarred Amos  being transferred to [address] [place] [address] (unit) for routine progression of care       Report consisted of patients Situation, Background, Assessment and   Recommendations(SBAR). Information from the following report(s) SBAR, ED Summary, Intake/Output, MAR, Recent Results, and Med Rec Status was reviewed with the receiving nurse. Lines:   Peripheral IV 11/18/80 Left Basilic (Active)   Site Assessment Clean, dry, & intact 10/22/22 0210   Phlebitis Assessment 0 10/22/22 0210   Infiltration Assessment 0 10/22/22 0210   Dressing Status Clean, dry, & intact; New 10/22/22 0210   Dressing Type Transparent;Tape 10/22/22 0210   Hub Color/Line Status Green;Patent; Flushed 10/22/22 0210   Action Taken Blood drawn 10/22/22 0210       Peripheral IV 10/22/22 Left Wrist (Active)   Site Assessment Clean, dry, & intact 10/22/22 0202   Phlebitis Assessment 0 10/22/22 0202   Infiltration Assessment 0 10/22/22 0202   Dressing Status Clean, dry, & intact; New 10/22/22 0202   Dressing Type Transparent;Tape 10/22/22 0202   Hub Color/Line Status Pink;Patent; Flushed 10/22/22 0202   Action Taken Blood drawn 10/22/22 0202        Opportunity for questions and clarification was provided.       Patient transported with:   Registered Nurse

## 2022-10-22 NOTE — PROGRESS NOTES
Admission Medication Reconciliation: In progress:    Unable to speak with patient face to face at this time due to clinical condition. Called the [facility] @ [phone], put on hold for 30 minutes by their pharmacy and no one came back to the phone. Will try again, as their is no RX Query data and only 15year old grandchild with patient at this time. Of note: apparently there is a \"release of information form\" that needs to be signed by patient before the [facility] will agree to provide information. Thank you for allowing me to participate in the care of your patient. Brigitte Tomlinson PharmD, RN # [phone]       [facility] pharmacy benefit data reflects medications filled and processed through the patient's insurance, however   this data does NOT capture whether the medication was picked up or is currently being taken by the patient.     Allergies:  Aleve [naproxen sodium], Amlodipine, Aspirin, Heparin, Ibuprofen, and Metformin    Significant PMH/Disease States:   Past Medical History:   Diagnosis Date    Arthritis     Asthma     Chronic combined systolic and diastolic CHF (congestive heart failure) ([facility] 8/26/2022    Chronic kidney disease     Chronic pain     Cirrhosis ([facility] 12/17/2017    Diabetes ([facility] diet controlled    GERD (gastroesophageal reflux disease)     Hypertension     Paroxysmal atrial fibrillation ([facility] 10/6/2020     Chief Complaint for this Admission:    Chief Complaint   Patient presents with    Fatigue    Fever    Abdominal Pain    Vomiting     Pt arrives from home via ems/bls for c/o weakness this am didn't go to dialysis, woke at 10pm with abd pain, n/v x 2 episodes, fever 101.5 by EMS, room air sats 90% placed on o2 via nc at 2lpm up to 99%, bp's ranging from 80/60's down to 64/44, they noted  wheezing left anterior lungs       Please contact the main inpatient pharmacy with any questions or concerns at [phone] and we will direct you to the clinical pharmacist covering this patient's care while in-house.    CELSA Mejía

## 2022-10-22 NOTE — PROGRESS NOTES
Known patient with end-stage renal disease on hemodialysis presented at the emergency room and suspected septic shock. Patient discussed with ED physician. We will await input from intensivist to determine whether the patient needs ICU care. ADT not placed while awaiting this determination.

## 2022-10-23 NOTE — PROGRESS NOTES
[address] [facility] Adult  Hospitalist Group                                                                                          Hospitalist Progress Note  Basim Sanchez MD  Answering service: [phone] from in house phone        Date of Service:  10/23/2022  NAME:  Jennifer Elias  :  1950  MRN:  [id number]      Admission Summary:   Jennifer Elias is a 67 y.o. female who presents with sob. And came in with A-fib with RVR. Missed dialysis on Friday. Patient was also found hypertensive and febrile 100.7 noted on admission. Initially patient was evaluated by the ICU attending and started on midodrine for low blood pressure. Increased to 20 mg per intensivist attending. Patient was also found to have severe hypokalemia which was treated with calcium gluconate and Cathy Sony in the ED. nephrology was consulted for emergent hemodialysis in the ED. access is left chest wall HD catheter. Patient currently on 4 L nasal cannula. At this time patient needs dialysis, blood pressure is soft but if nephrology believes patient can tolerate hemodialysis can be done on the floor or in ED. If patient becomes more hemodynamically unstable and requires CRRT then call critical care back for ICU admission. Patient also complains of bright red blood per rectum as per RN in the ED patient was admitted for further management. The patient denies any fever, chills, chest pain, cough, congestion, recent illness, palpitations, or dysuria.     Interval history / Subjective:   Seen and examined feels much better heart rate more well controlled patient is on oxygen patient is on dialysis  usually transport takes her to the dialysis center     Assessment & Plan:     Acute hypoxic respiratory failure due to volume overload from missed hemodialysis  --Supportive care with nasal cannula oxygen--wean off as tolerates  --Urgent hemodialysis --done yesterday may repeat tomorrow  --Support blood pressure with midodrine      End-stage renal disease with volume overload from missed dialysis  --Missed hemodialysis on Friday  -- Dialyzed Monday Wednesday Friday at outpatient dialysis center  --Management per nephrology     Fever hypotension with hypotensive shock? Sepsis unknown source --sepsis supported by fever hypotension elevated lactate tachycardia--now resolved  -- Continue Vanco and cefepime pharmacy to dose renal  --Lactate elevated and patient had fever--repeat lactate fever resolved  -- Rapid COVID test negative  -- Continue empiric antibiotics  --Blood culture requested     Bright red blood per rectum hemoglobin 16  --Unsure if patient is really having bleeding requested RN to check stool occult  -- Patient hemoglobin 13 drop of 3 g from yesterday  --Stool occult test is still pending     Severe hyperkalemia POA due to missed hemodialysis  --Patient was treated with calcium gluconate and Lokelma in the ED  -- Repeat potassium requested today after hemodialysis should be corrected     A. fib with RVR possibly in the setting of volume overloaded with missed hemodialysis and possible sepsis  -- Resume home med amiodarone  -- Patient is not on any anticoagulants  --Was on Cardizem drip wean off as tolerates    Code status: Full  DVT prophylaxis: 3 Ana Chavez discussed with: Patient/Family and Nurse  Anticipated Disposition: Home w/Family  Anticipated Discharge: 24 hours to 48 hours    CRITICAL CARE ATTESTATION:  I had a face to face encounter with the patient, reviewed and interpreted patient data including clinical events, labs, images, vital signs, I/O's, and examined patient. I have discussed the case and the plan and management of the patient's care with the consulting services, the bedside nurses and necessary ancillary providers.        NOTE OF PERSONAL INVOLVEMENT IN CARE   This patient has a high probability of imminent, clinically significant deterioration, which requires the highest level of preparedness to intervene urgently. I participated in the decision-making and personally managed or directed the management of the following life and organ supporting interventions that required my frequent assessment to treat or prevent imminent deterioration. I personally spent 45 minutes of critical care time. This is time spent at this critically ill patient's bedside actively involved in patient care as well as the coordination of care and discussions with the patient's family. This does not include any procedural time which has been billed separately. Hospital Problems  Date Reviewed: 9/6/2022            Codes Class Noted POA    Severe sepsis [facility]) ICD-10-CM: A41.9, R65.20  ICD-9-CM: 038.9, 995.92  10/22/2022 Unknown             Review of Systems:   A comprehensive review of systems was negative. Vital Signs:    Last 24hrs VS reviewed since prior progress note.  Most recent are:  Visit Vitals  /71 (BP 1 Location: Right upper arm, BP Patient Position: At rest)   Pulse (!) 57   Temp 97.4 °F (36.3 °C)   Resp 20   Ht 5' (1.524 m)   Wt 61.2 kg (135 lb)   SpO2 100%   BMI 26.37 kg/m²         Intake/Output Summary (Last 24 hours) at 10/23/2022 1002  Last data filed at 10/22/2022 1051  Gross per 24 hour   Intake --   Output 1000 ml   Net -1000 ml        Physical Examination:     I had a face to face encounter with this patient and independently examined them on 10/23/2022 as outlined below:          General : alert x 3, awake, no acute distress, resting in bed, pleasant   HEENT: PEERL, EOMI, moist mucus membrane, TM clear  Neck: supple, no JVD, no meningeal signs  Chest: Clear to auscultation bilaterally   CVS: S1 S2 heard, Capillary refill less than 2 seconds  Abd: soft/ Non tender, non distended, BS physiological,   Ext: no clubbing, no cyanosis, no edema, brisk 2+ DP pulses  Neuro/Psych: pleasant mood and affect, CN 2-12 grossly intact  Skin: warm     Data Review:    I personally reviewed  Image and LABS      Labs:     Recent Labs     10/23/22  0638 10/22/22  0213   WBC 8.4 8.2   HGB 13.9 16.1*   HCT 48.5* 54.5*   * 143*     Recent Labs     10/23/22  0435 10/22/22  1827 10/22/22  0536    139 138   K 4.9 4.3 5.1    105 108   CO2 24 27 24   BUN 21* 15 30*   CREA 5.17* 4.31* 6.80*   GLU 44* 58* 99   CA 8.3* 7.9* 7.7*     Recent Labs     10/22/22  0213   ALT 20   *   TBILI 0.7   TP 7.0   ALB 3.3*   GLOB 3.7     Recent Labs     10/22/22  1827 10/22/22  0202   INR 1.5* 1.3*   PTP 15.2* 13.0*      No results for input(s): FE, TIBC, PSAT, FERR in the last 72 hours. Lab Results   Component Value Date/Time    Folate 16.4 10/19/2020 09:34 AM      No results for input(s): PH, PCO2, PO2 in the last 72 hours. No results for input(s): CPK, CKNDX, TROIQ in the last 72 hours.     No lab exists for component: CPKMB  Lab Results   Component Value Date/Time    Cholesterol, total 114 11/25/2020 03:21 AM    HDL Cholesterol 59 11/25/2020 03:21 AM    LDL, calculated 38 11/25/2020 03:21 AM    Triglyceride 85 11/25/2020 03:21 AM    CHOL/HDL Ratio 1.9 11/25/2020 03:21 AM     Lab Results   Component Value Date/Time    Glucose (POC) 107 10/23/2022 09:20 AM    Glucose (POC) 78 10/23/2022 07:18 AM    Glucose (POC) 50 (LL) 10/23/2022 06:49 AM    Glucose (POC) 39 (LL) 10/23/2022 06:28 AM    Glucose (POC) 140 (H) 10/22/2022 03:17 AM    Glucose, POC 86 10/22/2022 02:10 AM     No results found for: COLOR, APPRN, SPGRU, REFSG, VERENA, PROTU, GLUCU, KETU, BILU, UROU, TIBURCIO, LEUKU, GLUKE, EPSU, BACTU, WBCU, RBCU, CASTS, UCRY      Medications Reviewed:     Current Facility-Administered Medications   Medication Dose Route Frequency    dextrose 10 % infusion 125-250 mL  125-250 mL IntraVENous PRN    midodrine (PROAMATINE) tablet 20 mg  20 mg Oral Q8H    sodium chloride (NS) flush 5-40 mL  5-40 mL IntraVENous Q8H    sodium chloride (NS) flush 5-40 mL  5-40 mL IntraVENous PRN    acetaminophen (TYLENOL) tablet 650 mg  650 mg Oral Q6H PRN    Or    acetaminophen (TYLENOL) suppository 650 mg  650 mg Rectal Q6H PRN    polyethylene glycol (MIRALAX) packet 17 g  17 g Oral DAILY PRN    ondansetron (ZOFRAN ODT) tablet 4 mg  4 mg Oral Q8H PRN    Or    ondansetron (ZOFRAN) injection 4 mg  4 mg IntraVENous Q6H PRN    cefepime (MAXIPIME) 1 g in 0.9% sodium chloride (MBP/ADV) 50 mL MBP  1 g IntraVENous Q24H    Vancomycin- Pharmacy to dose (HD)   Other PRN    amiodarone (CORDARONE) tablet 200 mg  200 mg Oral BID    atorvastatin (LIPITOR) tablet 40 mg  40 mg Oral QHS    lactulose (CHRONULAC) 10 gram/15 mL solution 30 mL  20 g Oral BID    metoprolol tartrate (LOPRESSOR) tablet 12.5 mg  12.5 mg Oral BID    methocarbamoL (ROBAXIN) tablet 500 mg  500 mg Oral Q8H PRN    arformoteroL (BROVANA) neb solution 15 mcg  15 mcg Nebulization BID RT    And    budesonide (PULMICORT) 500 mcg/2 ml nebulizer suspension  500 mcg Nebulization BID RT    dilTIAZem (CARDIZEM) 125 mg in dextrose 5% 125 mL infusion  0-15 mg/hr IntraVENous TITRATE     ______________________________________________________________________  EXPECTED LENGTH OF STAY: - - -  ACTUAL LENGTH OF STAY:          1      Please note that this dictation was completed with [employer], the [employer] voice recognition software. Quite often unanticipated grammatical, syntax, homophones, and other interpretive errors are inadvertently transcribed by the computer software. Please disregard these errors. Please excuse any errors that have escaped final proofreading.                 Lisha Foster MD

## 2022-10-24 NOTE — PROGRESS NOTES
Nephrology Progress Note  Amita Lobe Fenton  Date of Admission : 10/22/2022    CC:  Follow up for ESRD       Assessment and Plan     ESRD on dialysis   - Patient of [address] [facility] MWF  - HD today per routine  - no UF given hypotension and need for paracentesis    Ascites:  - for LVP today     Hyperkalemia  - resolved post dialysis     Cardiac Cirrhosis   RV failure, severe TR  PAF  Chronic HFrEF   Chronic hypotension     Anemia   - no need for CAROL ANN      Hep C, hx of  DMII       Interval History:  Seen and examined. For HD later today. More abd distention. Plans for possible LVP today. No cp or sob reported. Current Medications: all current  Medications have been eviewed in EPIC  Review of Systems: Pertinent items are noted in HPI. Objective:  Vitals:    Vitals:    10/24/22 0706 10/24/22 0737 10/24/22 0845 10/24/22 1000   BP: 103/60  107/60    Pulse: 71  74 73   Resp: 23      Temp: 97.3 °F (36.3 °C)      TempSrc:       SpO2: 100% 100%     Weight:       Height:         Intake and Output:  No intake/output data recorded. No intake/output data recorded. Physical Examination:    General: NAD,Conversant   Neck:  Supple, no mass  Resp:  Lungs CTA B/L, no wheezing , normal respiratory effort  CV:  RRR,  no murmur or rub, no LE edema  GI:  Soft, NT, + distention and fluid wave  Neurologic:  Non focal  Psych:             AAO x 3 appropriate affect   Skin:  No Rash  Access:           [facility]    []    High complexity decision making was performed  []    Patient is at high-risk of decompensation with multiple organ involvement    Lab Data Personally Reviewed: I have reviewed all the pertinent labs, microbiology data and radiology studies during assessment.     Recent Labs     10/24/22  0155 10/23/22  0435 10/22/22  1827 10/22/22  0536 10/22/22  0213 10/22/22  0202    137 139   < > 137  --    K 4.6 4.9 4.3   < > 6.1*  --     103 105   < > 104  --    CO2 23 24 27   < > 24  --    GLU 76 44* 58*   < > 85  -- BUN 23* 21* 15   < > 30*  --    CREA 6.01* 5.17* 4.31*   < > 6.85*  --    CA 8.0* 8.3* 7.9*   < > 9.0  --    ALB  --   --   --   --  3.3*  --    ALT  --   --   --   --  20  --    INR  --   --  1.5*  --   --  1.3*    < > = values in this interval not displayed. Recent Labs     10/23/22  0638 10/22/22  0213   WBC 8.4 8.2   HGB 13.9 16.1*   HCT 48.5* 54.5*   * 143*     No results found for: SDES  Lab Results   Component Value Date/Time    Culture result: MRSA NOT PRESENT 10/22/2022 06:27 PM    Culture result:  10/22/2022 06:27 PM     Screening of patient nares for MRSA is for surveillance purposes and, if positive, to facilitate isolation considerations in high risk settings. It is not intended for automatic decolonization interventions per se as regimens are not sufficiently effective to warrant routine use.       Culture result: NO GROWTH 2 DAYS 10/22/2022 02:13 AM    Culture result: NO GROWTH 2 DAYS 10/22/2022 02:13 AM     Recent Results (from the past 24 hour(s))   GLUCOSE, POC    Collection Time: 10/23/22  4:50 PM   Result Value Ref Range    Glucose (POC) 67 65 - 117 mg/dL    Performed by [address], POC    Collection Time: 10/23/22  5:13 PM   Result Value Ref Range    Glucose (POC) 74 65 - 117 mg/dL    Performed by [address], POC    Collection Time: 10/23/22  9:40 PM   Result Value Ref Range    Glucose (POC) 82 65 - 117 mg/dL    Performed by Lucie Luke RN    METABOLIC PANEL, BASIC    Collection Time: 10/24/22  1:55 AM   Result Value Ref Range    Sodium 137 136 - 145 mmol/L    Potassium 4.6 3.5 - 5.1 mmol/L    Chloride 103 97 - 108 mmol/L    CO2 23 21 - 32 mmol/L    Anion gap 11 5 - 15 mmol/L    Glucose 76 65 - 100 mg/dL    BUN 23 (H) 6 - 20 MG/DL    Creatinine 6.01 (H) 0.55 - 1.02 MG/DL    BUN/Creatinine ratio 4 (L) 12 - 20      eGFR 7 (L) >60 ml/min/1.73m2    Calcium 8.0 (L) 8.5 - 10.1 MG/DL   GLUCOSE, POC    Collection Time: 10/24/22 8:45 AM   Result Value Ref Range    Glucose (POC) 67 65 - 117 mg/dL    Performed by Dimple Martin (CON) PCT    GLUCOSE, POC    Collection Time: 10/24/22 10:17 AM   Result Value Ref Range    Glucose (POC) 107 65 - 117 mg/dL    Performed by Dimple Martin (CON) PCT              Brittanie Tenorio MD  [address]  Phone - [phone]   Fax - [phone]  [email or web address]

## 2022-10-24 NOTE — PROGRESS NOTES
[facility]                                                                                          Hospitalist Progress Note  Jaun Blake MD  Answering service: [phone] from in house phone        Date of Service:  10/24/2022  NAME:  Aron Calzada  :  1950  MRN:  [id number]      Admission Summary:   Aron Calzada is a 67 y.o. female who presents with sob. And came in with A-fib with RVR. Missed dialysis on Friday. Patient was also found hypertensive and febrile 100.7 noted on admission. Initially patient was evaluated by the ICU attending and started on midodrine for low blood pressure. Increased to 20 mg per intensivist attending. Patient was also found to have severe hypokalemia which was treated with calcium gluconate and Joel Yen in the ED. nephrology was consulted for emergent hemodialysis in the ED. access is left chest wall HD catheter. Patient currently on 4 L nasal cannula. At this time patient needs dialysis, blood pressure is soft but if nephrology believes patient can tolerate hemodialysis can be done on the floor or in ED. If patient becomes more hemodynamically unstable and requires CRRT then call critical care back for ICU admission. Patient also complains of bright red blood per rectum as per RN in the ED patient was admitted for further management. The patient denies any fever, chills, chest pain, cough, congestion, recent illness, palpitations, or dysuria. Interval history / Subjective:   Seen and examined awaiting hemodialysis today.   Patient also complains of lot of fluid in the abdomen cannot lay down flat for that  Later in the afternoon patient developed hypotension  Patient scheduled for hemodialysis and paracentesis later in the afternoon     Assessment & Plan:     Acute hypoxic respiratory failure due to volume overload from missed hemodialysis--resume hemodialysis   --Supportive care with nasal cannula oxygen--wean off as tolerates  --Urgent hemodialysis --done on admission and repeated next day  --Support blood pressure with midodrine   --May need albumin after paracentesis as well today after dialysis  --Holding beta-blocker continue amiodarone     End-stage renal disease with volume overload from missed dialysis  -- Resume hemodialysis Monday Wednesday Friday per nephrology  -- Dialyzed Monday Wednesday Friday at outpatient dialysis center  --Management per nephrology     Fever hypotension with hypotensive shock? Sepsis unknown source --sepsis supported by fever hypotension elevated lactate tachycardia--now resolved  -- DC vancomycin negative and cefepime pharmacy to dose renal  --Lactate elevated and patient had fever--repeat lactate fever resolved  -- Rapid COVID test negative  -- Continue empiric antibiotics  -- Blood culture repeat     Bright red blood per rectum hemoglobin 16  --Unsure if patient is really having bleeding requested RN to check stool occult--still pending  -- Patient hemoglobin 13 drop of 3 g from yesterday     Severe hyperkalemia POA due to missed hemodialysis  --Patient was treated with calcium gluconate and Ed Sprang in the ED  -- Repeat potassium requested today after hemodialysis should be corrected     A. fib with RVR possibly in the setting of volume overloaded with missed hemodialysis and possible sepsis  -- Resume home med amiodarone  -- Patient is not on any anticoagulants  --Was on Cardizem drip wean off as tolerates    Code status: Full  DVT prophylaxis: 3 [facility] discussed with: Patient/Family and Nurse  Anticipated Disposition: Home w/Family  Anticipated Discharge: 24 hours to 48 hours         Hospital Problems  Date Reviewed: 9/6/2022            Codes Class Noted POA    Severe sepsis ([facility] [address] ICD-10-CM: A41.9, R65.20  ICD-9-CM: 038.9, 995.92  10/22/2022 Unknown           Review of Systems:   A comprehensive review of systems was negative.        Vital Signs: Last 24hrs VS reviewed since prior progress note. Most recent are:  Visit Vitals  BP (!) 97/56   Pulse 71   Temp 97.9 °F (36.6 °C)   Resp 13   Ht 5' (1.524 m)   Wt 71.5 kg (157 lb 10.1 oz)   SpO2 100%   BMI 30.78 kg/m²       No intake or output data in the 24 hours ending 10/24/22 1316       Physical Examination:     I had a face to face encounter with this patient and independently examined them on 10/24/2022 as outlined below:          General : alert x 3, awake, no acute distress, resting in bed, pleasant   HEENT: PEERL, EOMI, moist mucus membrane, TM clear  Neck: supple, no JVD, no meningeal signs  Chest: Clear to auscultation bilaterally   CVS: S1 S2 heard, Capillary refill less than 2 seconds  Abd: soft/ Non tender, non distended, BS physiological,   Ext: no clubbing, no cyanosis, no edema, brisk 2+ DP pulses  Neuro/Psych: pleasant mood and affect, CN 2-12 grossly intact  Skin: warm     Data Review:    I personally reviewed  Image and LABS      Labs:     Recent Labs     10/23/22  0638 10/22/22  0213   WBC 8.4 8.2   HGB 13.9 16.1*   HCT 48.5* 54.5*   * 143*       Recent Labs     10/24/22  0155 10/23/22  0435 10/22/22  1827    137 139   K 4.6 4.9 4.3    103 105   CO2 23 24 27   BUN 23* 21* 15   CREA 6.01* 5.17* 4.31*   GLU 76 44* 58*   CA 8.0* 8.3* 7.9*       Recent Labs     10/22/22  0213   ALT 20   *   TBILI 0.7   TP 7.0   ALB 3.3*   GLOB 3.7       Recent Labs     10/22/22  1827 10/22/22  0202   INR 1.5* 1.3*   PTP 15.2* 13.0*        No results for input(s): FE, TIBC, PSAT, FERR in the last 72 hours. Lab Results   Component Value Date/Time    Folate 16.4 10/19/2020 09:34 AM        No results for input(s): PH, PCO2, PO2 in the last 72 hours. No results for input(s): CPK, CKNDX, TROIQ in the last 72 hours.     No lab exists for component: CPKMB  Lab Results   Component Value Date/Time    Cholesterol, total 114 11/25/2020 03:21 AM    HDL Cholesterol 59 11/25/2020 03:21 AM    LDL, calculated 38 11/25/2020 03:21 AM    Triglyceride 85 11/25/2020 03:21 AM    CHOL/HDL Ratio 1.9 11/25/2020 03:21 AM     Lab Results   Component Value Date/Time    Glucose (POC) 107 10/24/2022 10:17 AM    Glucose (POC) 67 10/24/2022 08:45 AM    Glucose (POC) 82 10/23/2022 09:40 PM    Glucose (POC) 74 10/23/2022 05:13 PM    Glucose (POC) 67 10/23/2022 04:50 PM     No results found for: COLOR, APPRN, SPGRU, REFSG, VERENA, PROTU, GLUCU, KETU, BILU, UROU, TIBURCIO, LEUKU, GLUKE, EPSU, BACTU, WBCU, RBCU, CASTS, UCRY      Medications Reviewed:     Current Facility-Administered Medications   Medication Dose Route Frequency    albuterol-ipratropium (DUO-NEB) 2.5 MG-0.5 MG/3 ML  3 mL Nebulization Q4H PRN    dextrose 10 % infusion 125-250 mL  125-250 mL IntraVENous PRN    midodrine (PROAMATINE) tablet 20 mg  20 mg Oral Q8H    sodium chloride (NS) flush 5-40 mL  5-40 mL IntraVENous Q8H    sodium chloride (NS) flush 5-40 mL  5-40 mL IntraVENous PRN    acetaminophen (TYLENOL) tablet 650 mg  650 mg Oral Q6H PRN    Or    acetaminophen (TYLENOL) suppository 650 mg  650 mg Rectal Q6H PRN    polyethylene glycol (MIRALAX) packet 17 g  17 g Oral DAILY PRN    ondansetron (ZOFRAN ODT) tablet 4 mg  4 mg Oral Q8H PRN    Or    ondansetron (ZOFRAN) injection 4 mg  4 mg IntraVENous Q6H PRN    cefepime (MAXIPIME) 1 g in 0.9% sodium chloride (MBP/ADV) 50 mL MBP  1 g IntraVENous Q24H    Vancomycin- Pharmacy to dose (HD)   Other PRN    amiodarone (CORDARONE) tablet 200 mg  200 mg Oral BID    atorvastatin (LIPITOR) tablet 40 mg  40 mg Oral QHS    lactulose (CHRONULAC) 10 gram/15 mL solution 30 mL  20 g Oral BID    [Held by provider] metoprolol tartrate (LOPRESSOR) tablet 12.5 mg  12.5 mg Oral BID    methocarbamoL (ROBAXIN) tablet 500 mg  500 mg Oral Q8H PRN    arformoteroL (BROVANA) neb solution 15 mcg  15 mcg Nebulization BID RT    And    budesonide (PULMICORT) 500 mcg/2 ml nebulizer suspension  500 mcg Nebulization BID RT    dilTIAZem (CARDIZEM) 125 mg in dextrose 5% 125 mL infusion  0-15 mg/hr IntraVENous TITRATE     ______________________________________________________________________  EXPECTED LENGTH OF STAY: - - -  ACTUAL LENGTH OF STAY:          2      Please note that this dictation was completed with [employer], the computer voice recognition software. Quite often unanticipated grammatical, syntax, homophones, and other interpretive errors are inadvertently transcribed by the computer software. Please disregard these errors. Please excuse any errors that have escaped final proofreading.                 Edinson Coronado MD

## 2022-10-24 NOTE — PROCEDURES
Hemodialysis / [phone]    Vitals Pre Post Assessment Pre Post   BP BP: 111/72 (10/24/22 1515) 113/70 LOC AXOX4 NO CHANGE   HR 70   72 Lungs CLEAR NO CHANGE   Resp Resp Rate: 15 (10/24/22 1515) 15 Cardiac NSR NO CHANGE   Temp Temp: 97.9 °F (36.6 °C) (10/24/22 1114) 97.6 Skin WDI NO CHANGE   Weight    Edema GENERALIZED,  DECREASED,    Tele status   Pain Pain Intensity 1: 0 (10/23/22 1924) PT DENIES     Orders   Duration: Start: 8900 End: 1900 Total: 3.5   Dialyzer: Dialyzer/Set Up Inspection: Revaclear (10/24/22 1515)   K Bath: Dialysate K (mEq/L): 2 (10/24/22 1515)   Ca Bath: Dialysate CA (mEq/L): 2.5 (10/24/22 1515)   Na: Dialysate NA (mEq/L): 140 (10/24/22 1515)   Bicarb: Dialysate HCO3 (mEq/L): 35 (10/24/22 1515)   Target Fluid Removal: Goal/Amount of Fluid to Remove (mL): 0 mL (10/24/22 1515)     Access   Type & Location:  Lex / PC : Pre- Assessment: Dressing CDI. No s/s of infection. Both lumens aspirate & flush well. Running well at . Post assessment: Dressing CDI. No changes.     Comments:                                        Labs   HBsAg (Antigen) / date:                                        10/22/22 NEG       HBsAb (Antibody) / date: 10/22/22 [facility] [place]   Source:    Obtained/Reviewed  Critical Results Called HGB   Date Value Ref Range Status   10/23/2022 13.9 11.5 - 16.0 g/dL Final     Potassium   Date Value Ref Range Status   10/24/2022 4.6 3.5 - 5.1 mmol/L Final     Comment:     SPECIMEN HEMOLYZED, RESULTS MAY BE AFFECTED     Calcium   Date Value Ref Range Status   10/24/2022 8.0 (L) 8.5 - 10.1 MG/DL Final     BUN   Date Value Ref Range Status   10/24/2022 23 (H) 6 - 20 MG/DL Final     Creatinine   Date Value Ref Range Status   10/24/2022 6.01 (H) 0.55 - 1.02 MG/DL Final        Meds Given   Name Dose Route                    Adequacy / Fluid    Total Liters Process: 73.0    Net Fluid Removed: 0.0       Comments   Time Out Done:   (Time) 1614   YES EDEMA, SOB, BENTLEY   Consent obtained/signed: Informed Consent Verified: Yes (10/24/22 1515)   Machine / RO # Machine Number: [id number] (10/24/22 1515)   Primary Nurse Rpt Pre: Karissa Buckner RN   Primary Nurse Rpt Post: Karissa Buckner RN   Pt Education: TX TIME/ FLUID GOALS   Care Plan: FOLLOW MD [address]   Pts outpatient clinic:      Tx Summary    SBAR received from Primary RN. Pt A&Ox4. Consent signed & on file OR Chronic consent applies. 1511: Each catheter limb disinfected per p&p, caps removed, hubs disinfected per p&p. Each lumen aspirated for blood return and flushed with Normal Saline per policy. 1530 VSS. Dialysis Tx initiated. 1900: Tx ended. VSS. Each dialysis catheter limb disinfected per p&p, all possible blood returned per p&p, and each dialysis hub disinfected per p&p. Each lumen flushed, post dialysis catheter Heparin dwell instilled per order, and caps applied. Bed locked and in the lowest position, call bell and belongings in reach. SBAR given to Primary, RN. Patient is stable at time of their/ my departure. All Dialysis related medications have been reviewed.        Comments:

## 2022-10-24 NOTE — PROGRESS NOTES
1234: pts BP is 88/43 and she is dizzy sitting up in the chair put her legs up and her BP improved to 97/56 MD cancino

## 2022-10-25 NOTE — PROGRESS NOTES
Problem: Risk for Spread of Infection  Goal: Prevent transmission of infectious organism to others  Description: Prevent the transmission of infectious organisms to other patients, staff members, and visitors. Outcome: Progressing Towards Goal     Problem: Patient Education:  Go to Education Activity  Goal: Patient/Family Education  Outcome: Progressing Towards Goal     Problem: Falls - Risk of  Goal: *Absence of Falls  Description: Document Maryam Ochoa Fall Risk and appropriate interventions in the flowsheet.   Outcome: Progressing Towards Goal  Note: Fall Risk Interventions:  Mobility Interventions: Bed/chair exit alarm, Patient to call before getting OOB         Medication Interventions: Bed/chair exit alarm, Patient to call before getting OOB, Teach patient to arise slowly    Elimination Interventions: Bed/chair exit alarm, Call light in reach, Toilet paper/wipes in reach, Patient to call for help with toileting needs    History of Falls Interventions: Bed/chair exit alarm, Door open when patient unattended, Room close to nurse's station         Problem: Patient Education: Go to Patient Education Activity  Goal: Patient/Family Education  Outcome: Progressing Towards Goal

## 2022-10-25 NOTE — PROGRESS NOTES
Nephrology Progress Note  Kristyn Cruz Fenton  Date of Admission : 10/22/2022    CC:  Follow up for ESRD       Assessment and Plan     ESRD on dialysis   - [facility] M[facility]  - HD tomorrow as an oupt    Ascites:  - s/p LVP yesterday     Hyperkalemia  - resolved post dialysis     Cardiac Cirrhosis   RV failure, severe TR  PAF  Chronic HFrEF   Chronic hypotension     Anemia   - no need for CAROL ANN      Hep C, hx of  DMII       Interval History:  Seen and examined. Tolerated HD yesterday, no UF. LVP 4 L. No cp or sob. For d/c home today. Current Medications: all current  Medications have been eviewed in EPIC  Review of Systems: Pertinent items are noted in HPI. Objective:  Vitals:    Vitals:    10/25/22 0752 10/25/22 0904 10/25/22 1000 10/25/22 1057   BP:  113/69  108/66   Pulse:  71 72 87   Resp:    25   Temp:    97.5 °F (36.4 °C)   TempSrc:       SpO2: 98%   100%   Weight:       Height:         Intake and Output:  No intake/output data recorded. 10/23 1901 - 10/25 0700  In: -   Out: 3950 [Urine:250]    Physical Examination:    General: NAD,Conversant   Neck:  Supple, no mass  Resp:  Lungs CTA B/L, no wheezing , normal respiratory effort  CV:  RRR,  no murmur or rub, no LE edema  GI:  Soft, NT, + distention and fluid wave  Neurologic:  Non focal  Psych:             AAO x 3 appropriate affect   Skin:  No Rash  Access:           [facility]    []    High complexity decision making was performed  []    Patient is at high-risk of decompensation with multiple organ involvement    Lab Data Personally Reviewed: I have reviewed all the pertinent labs, microbiology data and radiology studies during assessment.     Recent Labs     10/25/22  0349 10/24/22  0155 10/23/22  0435 10/22/22  1827    137 137 139   K 4.1 4.6 4.9 4.3    103 103 105   CO2 26 23 24 27   GLU 54* 76 44* 58*   BUN 16 23* 21* 15   CREA 4.27* 6.01* 5.17* 4.31*   CA 8.8 8.0* 8.3* 7.9*   INR  --   --   --  1.5*       Recent Labs 10/25/22  0349 10/23/22  0638   WBC 7.3 8.4   HGB 14.4 13.9   HCT 49.3* 48.5*   * 111*       No results found for: SDES  Lab Results   Component Value Date/Time    Culture result: MRSA NOT PRESENT 10/22/2022 06:27 PM    Culture result:  10/22/2022 06:27 PM     Screening of patient nares for MRSA is for surveillance purposes and, if positive, to facilitate isolation considerations in high risk settings. It is not intended for automatic decolonization interventions per se as regimens are not sufficiently effective to warrant routine use. Culture result: NO GROWTH 3 DAYS 10/22/2022 02:13 AM    Culture result: NO GROWTH 3 DAYS 10/22/2022 02:13 AM     Recent Results (from the past 24 hour(s))   SAMPLES BEING HELD    Collection Time: 10/24/22  2:45 PM   Result Value Ref Range    SAMPLES BEING HELD 1RED TOP FLD, 1LAV TOP FLD, 1CUP OF FLD     COMMENT        Add-on orders for these samples will be processed based on acceptable specimen integrity and analyte stability, which may vary by analyte.    GLUCOSE, POC    Collection Time: 10/24/22  4:39 PM   Result Value Ref Range    Glucose (POC) 68 65 - 117 mg/dL    Performed by [address], POC    Collection Time: 10/24/22  5:13 PM   Result Value Ref Range    Glucose (POC) 71 65 - 117 mg/dL    [facility]    METABOLIC PANEL, BASIC    Collection Time: 10/25/22  3:49 AM   Result Value Ref Range    Sodium 137 136 - 145 mmol/L    Potassium 4.1 3.5 - 5.1 mmol/L    Chloride 103 97 - 108 mmol/L    CO2 26 21 - 32 mmol/L    Anion gap 8 5 - 15 mmol/L    Glucose 54 (L) 65 - 100 mg/dL    BUN 16 6 - 20 MG/DL    Creatinine 4.27 (H) 0.55 - 1.02 MG/DL    BUN/Creatinine ratio 4 (L) 12 - 20      eGFR 10 (L) >60 ml/min/1.73m2    Calcium 8.8 8.5 - 10.1 MG/DL   CBC W/O DIFF    Collection Time: 10/25/22  3:49 AM   Result Value Ref Range    WBC 7.3 3.6 - 11.0 K/uL    RBC 5.19 3.80 - 5.20 M/uL    HGB 14.4 11.5 - 16.0 g/dL    HCT 49.3 (H) 35.0 - 47.0 %    MCV 95.0 80.0 - 99.0 FL    MCH 27.7 26.0 - 34.0 PG    MCHC 29.2 (L) 30.0 - 36.5 g/dL    RDW 18.6 (H) 11.5 - 14.5 %    PLATELET 716 (L) 238 - 400 K/uL    NRBC 0.7 (H) 0  WBC    ABSOLUTE NRBC 0.05 (H) 0.00 - 0.01 K/uL             Rosaura Johnson MD  [address]  Phone - [phone]   Fax - [phone]  [email or web address]

## 2022-10-25 NOTE — PROGRESS NOTES
Transition Plan of Care  RUR 22%-High  Disposition-was discharged this afternoon , but patient does not want to be discharged today. According to bedside nurse, she wants to be taken by [facility] to the [facility]. She is still currently on 2 lpm CM requested nursing wean oxygen as able. Attending made aware that she does not want to discharge and she wants to be transferred to the [place]. There is no clinical reason for transfer at this time. She now wants medical transport to take her to the ED at the [place]. CM will follow for any disposition issues.

## 2022-10-25 NOTE — PROGRESS NOTES
0800: Bedside and Verbal shift change report given to Isaac RN (oncoming nurse) by Evelina Shahid RN (offgoing nurse). Report included the following information SBAR, Kardex, Recent Results, and Cardiac Rhythm SR .     2000: Bedside and Verbal shift change report given to Heladio Ibarra RN (oncoming nurse) by Bello Arteaga RN (offgoing nurse).  Report included the following information SBAR, Kardex, Recent Results, and Cardiac Rhythm SR .

## 2022-10-25 NOTE — DISCHARGE INSTRUCTIONS
Discharge Instructions       PATIENT ID: Santos Maradiaga  MRN: [id number]   YOB: 1950    DATE OF ADMISSION: [unfilled]    DATE OF DISCHARGE: 10/25/2022    PRIMARY CARE PROVIDER: @PCP@     ATTENDING PHYSICIAN: [unfilled]  DISCHARGING PROVIDER: Aruna Purvis MD    To contact this individual call [phone] and ask the  to page. If unavailable ask to be transferred the Adult Hospitalist Department. DISCHARGE DIAGNOSES  ESRD missed HD    CONSULTATIONS: [unfilled]    PROCEDURES/SURGERIES: * No surgery found *    PENDING TEST RESULTS:   At the time of discharge the following test results are still pending:     FOLLOW UP APPOINTMENTS:   [unfilled]     ADDITIONAL CARE RECOMMENDATIONS:     DIET: Renal Diet    ACTIVITY: Activity as tolerated    WOUND CARE:     EQUIPMENT needed:       Radiology      DISCHARGE MEDICATIONS:   See Medication Reconciliation Form    It is important that you take the medication exactly as they are prescribed. Keep your medication in the bottles provided by the pharmacist and keep a list of the medication names, dosages, and times to be taken in your wallet. Do not take other medications without consulting your doctor. NOTIFY YOUR PHYSICIAN FOR ANY OF THE FOLLOWING:   Fever over 101 degrees for 24 hours. Chest pain, shortness of breath, fever, chills, nausea, vomiting, diarrhea, change in mentation, falling, weakness, bleeding. Severe pain or pain not relieved by medications. Or, any other signs or symptoms that you may have questions about.       DISPOSITION:  x  Home With:   OT x PT x HH  RN       SNF/Inpatient Rehab/LTAC    Independent/assisted living    Hospice    Other:     CDMP Checked:   Yes x     PROBLEM LIST Updated:  Yes x       Signed:   Aruna Purvis MD  10/25/2022  10:47 AM

## 2022-10-25 NOTE — DISCHARGE SUMMARY
Discharge Summary       PATIENT ID: Sidney Felty  MRN: [id number]   YOB: 1950    DATE OF ADMISSION: 10/22/2022  1:23 AM    DATE OF DISCHARGE: 10/25/22   PRIMARY CARE PROVIDER: Shilpa Conde MD     ATTENDING PHYSICIAN: Chaka Carmichael  DISCHARGING PROVIDER: Richard Talbert MD    To contact this individual call [phone] and ask the  to page. If unavailable ask to be transferred the Adult Hospitalist Department. CONSULTATIONS: IP CONSULT TO NEPHROLOGY    PROCEDURES/SURGERIES: * No surgery found *    DISCHARGE DIAGNOSES: ESRD and missed HD     Sidney Felty is a 67 y.o. female who presents with sob. And came in with A-fib with RVR. Missed dialysis on Friday. Patient was also found hypertensive and febrile 100.7 noted on admission. Initially patient was evaluated by the ICU attending and started on midodrine for low blood pressure. Increased to 20 mg per intensivist attending. Patient was also found to have severe hypokalemia which [facility] nephrology was consulted for emergent hemodialysis in the ED. access is left chest wall HD catheter. Patient currently on 4 L nasal cannula. At this time patient needs dialysis, blood pressure is soft but if nephrology believes patient can tolerate hemodialysis can be done on the floor or in ED. If patient becomes more hemodynamically unstable and requires CRRT then call critical care back for ICU admission. Patient also complains of bright red blood per rectum as per RN in the ED patient was admitted for further management. The patient denies any fever, chills, chest pain, cough, congestion, recent illness, palpitations, or dysuria.     [address] [facility],[address] COURSE:   Acute hypoxic respiratory failure due to volume overload from missed hemodialysis--resume hemodialysis Monday Wednesday Friday     End-stage renal disease with volume overload from missed dialysis  -- Resume hemodialysis Monday Wednesday Friday per nephrology follow-up as a scheduled as outpatient dialysis center     Fever hypotension with hypotensive shock? --Resolved  Sepsis unknown source --sepsis supported by fever hypotension elevated lactate tachycardia--now resolved  -- DC vancomycin MRSA negative and cefepime pharmacy to dose renal no source of infection found cultures negative so defer antibiotics at discharge  -- Lactate elevated and patient had fever--repeat lactate fever resolved  -- Rapid COVID test negative     Bright red blood per rectum hemoglobin 16  --Unsure if patient is really having bleeding requested RN to check stool occult--never checked it and hemoglobin remained stable 14     Severe hyperkalemia POA due to missed hemodialysis  --Patient was treated with calcium gluconate and Lokelma in the ED     A. fib with RVR possibly in the setting of volume overloaded with missed hemodialysis and possible sepsis  -- Resume home med amiodarone  -- Patient is not on any anticoagulants       DISCHARGE DIAGNOSES / PLAN:      Discharge home    BMI: Body mass index is 30.78 kg/m². . This patient: Meets criteria for obesity given BMI >/= 30 and < 40 due to excess calories/nutritional. Weight loss and lifestyle modifications should be encouraged as an outpatient. PENDING TEST RESULTS:   At the time of discharge the following test results are still pending:      ADDITIONAL CARE RECOMMENDATIONS:        NOTIFY YOUR PHYSICIAN FOR ANY OF THE FOLLOWING:   Fever over 101 degrees for 24 hours. Chest pain, shortness of breath, fever, chills, nausea, vomiting, diarrhea, change in mentation, falling, weakness, bleeding. Severe pain or pain not relieved by medications, as well as any other signs or symptoms that you may have questions about.     FOLLOW UP APPOINTMENTS:    Follow-up Information       Follow up With Specialties Details Why Contact Info    John Vasquez MD Family Medicine Follow up in 1 week(s)  [id number] [facility] [address] [facility] [address]  [phone]                 DIET: Renal Diet    ACTIVITY: Activity as tolerated    EQUIPMENT needed:     DISCHARGE MEDICATIONS:  Current Discharge Medication List        CONTINUE these medications which have NOT CHANGED    Details   amiodarone (CORDARONE) 200 mg tablet Take 200 mg by mouth two (2) times a day. fluticasone propion-salmeteroL (Wixela Inhub) 100-50 mcg/dose diskus inhaler Take 1 Puff by inhalation two (2) times a day. midodrine (PROAMATINE) 5 mg tablet Take 5 mg by mouth three (3) times daily (with meals). metoprolol tartrate (LOPRESSOR) 25 mg tablet Take 12.5 mg by mouth two (2) times a day. atorvastatin (LIPITOR) 80 mg tablet Take 40 mg by mouth nightly. cyanocobalamin (VITAMIN B12) 500 mcg tablet Take 1,000 mcg by mouth daily. sertraline (ZOLOFT) 50 mg tablet Take 25 mg by mouth daily. montelukast (SINGULAIR) 10 mg tablet Take 10 mg by mouth nightly. chlorhexidine (PERIDEX) 0.12 % solution 15 mL by Swish and Spit route every twelve (12) hours. Oral care      fluticasone propionate (FLONASE) 50 mcg/actuation nasal spray 1 [place] by Both Nostrils route daily. benzonatate (Tessalon Perles) 100 mg capsule Take 100 mg by mouth two (2) times daily as needed for Cough. diphenhydrAMINE (BENADRYL) 25 mg capsule Take 25 mg by mouth three (3) times daily as needed. sodium zirconium cyclosilicate (LOKELMA) 10 gram powder packet Take 10 g by mouth daily. lidocaine 4 % patch 1 Patch by TransDERmal route every twenty-four (24) hours. Senna 8.6 mg tablet Take 1 Tablet by mouth daily as needed for Constipation. methocarbamoL (ROBAXIN) 500 mg tablet Take 500 mg by mouth every eight (8) hours as needed for Muscle Spasm(s). docusate sodium (COLACE) 100 mg capsule Take 100 mg by mouth two (2) times a day. pantoprazole (PROTONIX) 40 mg tablet Take 40 mg by mouth two (2) times a day.       acetaminophen (TYLENOL) 500 mg tablet Take 500 mg by mouth every eight (8) hours as needed for Pain. Max 3 grams daily      loratadine (Claritin) 10 mg tablet Take 10 mg by mouth every other day. Every other day      capsicum oleoresin 0.025 % topical cream Apply  to affected area three (3) times daily. hydrocortisone (ANUSOL-HC) 25 mg supp Insert 25 mg into rectum every twelve (12) hours as needed. polyvinyl alcohol-povidon,PF, (REFRESH CLASSIC) 1.4-0.6 % ophthalmic solution Administer 1-2 Drops to both eyes three (3) times daily. Indications: dry eye      ketotifen (ZADITOR) 0.025 % (0.035 %) ophthalmic solution Administer 1 Drop to both eyes two (2) times a day. Indications: allergic conjunctivitis      oxyCODONE IR (ROXICODONE) 5 mg immediate release tablet Take 5 mg by mouth every twelve (12) hours as needed for Pain. ascorbic acid, vitamin C, (VITAMIN C) 500 mg tablet Take 500 mg by mouth daily. lactulose (CHRONULAC) 10 gram/15 mL solution Take 30 mL by mouth two (2) times a day. Qty: 1 Bottle, Refills: 0      cholecalciferol (VITAMIN D3) (1000 Units /25 mcg) tablet Take 4,000 Units by mouth daily. ferrous sulfate 325 mg (65 mg iron) tablet Take 325 mg by mouth three (3) times daily (with meals). sevelamer (RENAGEL) 800 mg tablet Take 2,400 mg by mouth three (3) times daily (with meals). albuterol (PROVENTIL HFA, VENTOLIN HFA, PROAIR HFA) 90 mcg/actuation inhaler Take 2 Puffs by inhalation every four (4) hours as needed for Wheezing.            STOP taking these medications       OTHER Comments:   Reason for Stopping:         salsalate (DISALCID) 750 mg tablet Comments:   Reason for Stopping:               DISPOSITION:  x  Home With:   OT x PT x HH  RN       Long term SNF/Inpatient Rehab    Independent/assisted living    Hospice    Other:       PATIENT CONDITION AT DISCHARGE:     Functional status    Poor    x Deconditioned     Independent      Cognition   x  Lucid     Forgetful Dementia      Catheters/lines (plus indication)    Cm     PICC     PEG    x None      Code status   x  Full code     DNR      PHYSICAL EXAMINATION AT DISCHARGE:  General:          Alert, cooperative, no distress, appears stated age. HEENT:           Atraumatic, anicteric sclerae, pink conjunctivae                          No oral ulcers, mucosa moist, throat clear, dentition fair  Neck:               Supple, symmetrical  Lungs:             Clear to auscultation bilaterally. No Wheezing or Rhonchi. No rales. Chest wall:      No tenderness  No Accessory muscle use. Heart:              Regular  rhythm,  No  murmur   No edema  Abdomen:        Soft, non-tender. Not distended. Bowel sounds normal  Extremities:     No cyanosis. No clubbing,                            Skin turgor normal, Capillary refill normal  Skin:                Not pale. Not Jaundiced  No rashes   Psych:             Not anxious or agitated.   Neurologic:      Alert, moves all extremities, answers questions appropriately and responds to commands       CHRONIC MEDICAL DIAGNOSES:  Problem List as of 10/25/2022 Date Reviewed: 9/6/2022            Codes Class Noted - Resolved    Severe sepsis [facility]) ICD-10-CM: A41.9, R65.20  ICD-9-CM: 038.9, 995.92  10/22/2022 - Present        Atrial fibrillation with rapid ventricular response ([facility] ICD-10-CM: I48.91  ICD-9-CM: 427.31  8/31/2022 - Present        Chronic combined systolic and diastolic CHF (congestive heart failure) (HCC) (Chronic) ICD-10-CM: I50.42  ICD-9-CM: 428.42, 428.0  8/26/2022 - Present        Tachycardia ICD-10-CM: R00.0  ICD-9-CM: 785.0  8/22/2022 - Present        ESRD (end stage renal disease) ([facility] ICD-10-CM: N18.6  ICD-9-CM: 585.6  10/18/2020 - Present        Suspected COVID-19 virus infection ICD-10-CM: Y10.533  ICD-9-CM: V01.79  10/18/2020 - Present        HTN (hypertension) ICD-10-CM: I10  ICD-9-CM: 401.9  10/17/2020 - Present        Dependence on renal dialysis ([facility] ICD-10-CM: Z99.2  ICD-9-CM: V45.11  10/6/2020 - Present        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, or end stage renal disease (HCC) ICD-10-CM: I13.2  ICD-9-CM: 404.93, 428.9  10/6/2020 - Present        Other ascites ICD-10-CM: R18.8  ICD-9-CM: 789.59  10/6/2020 - Present        Other chronic pain ICD-10-CM: G89.29  ICD-9-CM: 338.29  10/6/2020 - Present        Paroxysmal atrial fibrillation (HCC) ICD-10-CM: I48.0  ICD-9-CM: 427.31  10/6/2020 - Present        Type 2 diabetes mellitus with hyperglycemia ([facility] ICD-10-CM: E11.65  ICD-9-CM: 250.00  10/6/2020 - Present        Unspecified cirrhosis of liver ([facility] ICD-10-CM: K74.60  ICD-9-CM: 571.5  10/6/2020 - Present        Other hypotension ICD-10-CM: I95.89  ICD-9-CM: 458.8  10/6/2020 - Present        Severe anemia ICD-10-CM: D64.9  ICD-9-CM: 285.9  5/2/2018 - Present        Dizziness ICD-10-CM: R42  ICD-9-CM: 780.4  3/16/2018 - Present        Generalized weakness ICD-10-CM: R53.1  ICD-9-CM: 780.79  3/16/2018 - Present        Rectal bleed ICD-10-CM: K62.5  ICD-9-CM: 569.3  3/16/2018 - Present        Symptomatic anemia ICD-10-CM: D64.9  ICD-9-CM: 285.9  3/16/2018 - Present        Acute blood loss anemia ICD-10-CM: D62  ICD-9-CM: 285.1  3/9/2018 - Present        Cirrhosis ([facility] ICD-10-CM: K74.60  ICD-9-CM: 571.5  12/17/2017 - Present        Anemia in chronic kidney disease ICD-10-CM: N18.9, D63.1  ICD-9-CM: 285.21  12/16/2017 - Present        GI bleed ICD-10-CM: K92.2  ICD-9-CM: 578.9  12/15/2017 - Present        Diabetes mellitus type 2, controlled ([facility] ICD-10-CM: E11.9  ICD-9-CM: 250.00  12/15/2017 - Present        RESOLVED: Anemia ICD-10-CM: D64.9  ICD-9-CM: 285.9  9/17/2020 - 8/3/2021        RESOLVED: Symptomatic anemia ICD-10-CM: D64.9  ICD-9-CM: 285.9  1/10/2018 - 3/9/2018        RESOLVED: Back pain ICD-10-CM: M54.9  ICD-9-CM: 724.5  12/17/2017 - 3/9/2018        RESOLVED: Constipation ICD-10-CM: K59.00  ICD-9-CM: 564.00  12/17/2017 - 3/9/2018        RESOLVED: ESRD (end stage renal disease) ([facility] ICD-10-CM: N18.6  ICD-9-CM: 585.6  12/15/2017 - 10/10/2020        RESOLVED: HTN (hypertension) ICD-10-CM: I10  ICD-9-CM: 401.9  12/15/2017 - 10/10/2020           Greater than 30  minutes were spent with the patient on counseling and coordination of care    Signed:   Lisha Foster MD  10/25/2022  10:50 AM

## 2022-10-25 NOTE — PROGRESS NOTES
Bedside and Verbal shift change report given to KEEGAN RN (oncoming nurse) by Nito Martin LPN (offgoing nurse).  Report included the following information SBAR, Kardex, Recent Results, and Cardiac Rhythm SR .

## 2022-10-26 NOTE — DIALYSIS
\[facility] [place] [facility]\"" / [phone]    Vitals Pre Post Assessment Pre Post   BP BP: 136/67 (10/26/22 0956) 122/68 LOC Lethargic, responds to verbal and tactile stimuli Alert    HR Pulse (Heart Rate): (!) 103 (10/26/22 0956)   113-irregular Lungs 4L NC-\"I'm short of breath when i'm so low in this bed\", patient repositioned with primary nurse prior to initiation of HD and head of bed elevated Says she's SOB 99% saturation on 3 L NC, respiratory rate. Asking to go back on biPap   Resp Resp Rate: 26 (10/26/22 0956) 27 Cardiac Irregular, tachycardic, bedside monitor Irregular, tachycardic, bedside monitor   Temp Temp: 97.7 °F (36.5 °C) (10/26/22 0341) 96.5 axillary Skin Dry, warm, abdominal drain, brief on with loose stool prior to HD Dry, warm, abdominal drain, brief on with loose stools during HD   Weight Pre-Dialysis Weight:  (bedscale reading 62.2 kg-last recorded weight 71.5 kg on 10/23/22) (10/26/22 0956) Bedscale not calibrated Edema none none   Tele status Beside monitor  Pain Pain Intensity 1: 0 (10/26/22 0341) Headache, abdominal pain, returning shortness of breath     Orders   Duration: 3.5 hours Start: 0956 End: 12:55 Total: 2.5 hours   Dialyzer: Dialyzer/Set Up Inspection: Wilver Santos (10/26/22 0956)   Addy Mccracken Bath: Dialysate K (mEq/L): 2 (10/26/22 0956)   Ca Bath: Dialysate CA (mEq/L): 2.5 (10/26/22 0956)   Na: Dialysate NA (mEq/L): 138 (10/26/22 0956)   Bicarb: Dialysate HCO3 (mEq/L): 38 (10/26/22 0956)   Target Fluid Removal: Goal/Amount of Fluid to Remove (mL): 0 mL (10/26/22 0956)     Access   Type & Location: Left subclavian CVC   Comments: Dressing and biopatch clean, dry, and intact dated 10/22/22, limbs and hubs disinfected per p&p on initiation and discontinuation of HD.  At end of HD all possible blood returned, heparin dwell instilled, limbs clamped, q-syte and purehub caps applied                                   Labs   HBsAg (Antigen) / date: Negative 10/22/22 HBsAb (Antibody) / date: Susceptible 10/22/22   Source: Epic   Obtained/Reviewed  Critical Results Called HGB   Date Value Ref Range Status   10/25/2022 14.4 11.5 - 16.0 g/dL Final     Potassium   Date Value Ref Range Status   10/25/2022 HEMOLYZED,RECOLLECT REQUESTED 3.5 - 5.1 mmol/L Final     Calcium   Date Value Ref Range Status   10/25/2022 8.9 8.5 - 10.1 MG/DL Final     BUN   Date Value Ref Range Status   10/25/2022 20 6 - 20 MG/DL Final     Creatinine   Date Value Ref Range Status   10/25/2022 5.30 (H) 0.55 - 1.02 MG/DL Final        Meds Given   Name Dose Route   albumin 25%/50mL PRN SBP <100 with HD-given see comments   heparin 2400 units Catheter dwell each limb post HD-refused patient says she does not get heparin outpatient and does not want it in her catheter          Adequacy / Fluid    Total Liters Process: 44 liters   Net Fluid Removed: +500mL (venous chamber clotted, additional saline required for returning blood to patient and new blood tubing prime)      Comments   Time Out Done:   (Time) Yes 0948   Admitting Diagnosis: Severe Sepsis   Consent obtained/signed: Informed Consent Verified:  (chronic dialysis patient-outpatient clinic [address] [place] [address]) (10/26/22 7534)   Machine / Amy Snowden # Machine Number: [id number] (10/26/22 5512)   Primary Nurse Rpt Pre: Kaushik Perez RN   Primary Nurse Rpt Post: Kaushik Perez RN   Pt Education: Procedural, access care   Care Plan: Continue HD on MWF as ordered while inpatient   Pts outpatient clinic: [address] [place] [address] Summary   Comments:      0708-Midodrine given by primary RN   0915-loose bowel movement, brief changed by primary RN   0956-HD initiated          10:23- at bedside, orders reviewed           1035: oxygen saturation sustaining 87% with good pleth. Primary RN at bedside to assess.  NC temporarily increased to 6L NC with an improvement to 100% saturation, decreased within minutes back to 3L NC by primary RN with O2 saturation of 93%  11:26 albumin 12.5gm initiated when pump and iV tubing available. Previous reading 96/50 current reading 106/47  12:03-sudden elevation to venous pressures, all possible blood returned, visible clotting to venous drip chamber, but none present in venous blood line to patient and access limbs aspirate and flush with ease. New blood lines and filter, primed, tested, and recirculated per P P&P and treatment resumed. While machine retesting and patient off dialysis she had another loose bowel movement as was cleaned and brief changed by PCT. 12:40-treatment resumed, UF goal increased to reflect additional saline received on additional discontinuation and initiation. 12:55-patient requesting to come off dialysis now with one hour remaining. Patient educated on the importance of receiving ordered dialysis treatment, but is adamant she does not want to remain on dialysis.  notified of treatment length and UF. Treatment completed.  Primary nurse at bedside to assess patient complaints of abdominal pain and returning shortness of breath

## 2022-10-26 NOTE — PROGRESS NOTES
Day #1 of Oseltamivir  Indication:  Flu A  Current regimen:  30 mg daily    Recent Labs     10/26/22  1416 10/25/22  2105 10/25/22  0349   WBC 8.6  --  7.3   CREA 3.34* 5.30* 4.27*   BUN 12 20 16     Est CrCl: ESRD on HD   Temp (24hrs), Av.3 °F (36.3 °C), Min:96.5 °F (35.8 °C), Max:97.7 °F (36.5 °C)      Plan:Change to 30 mg x 1, then 30 mg after each HD session

## 2022-10-26 NOTE — PROGRESS NOTES
[address] [facility] Adult  Hospitalist Group                                                                                          Hospitalist Progress Note  Lai Duenas MD  Answering service: [phone] from in house phone        Date of Service:  10/26/2022  NAME:  Pb Lewis  :  1950  MRN:  [id number]      Admission Summary:   Pb Lewis is a 67 y.o. female who presents with sob. And came in with A-fib with RVR. Missed dialysis on Friday. Patient was also found hypertensive and febrile 100.7 noted on admission. Initially patient was evaluated by the ICU attending and started on midodrine for low blood pressure. Increased to 20 mg per intensivist attending. Patient was also found to have severe hypokalemia which was treated with calcium gluconate and Bonnielee Crisp in the ED. nephrology was consulted for emergent hemodialysis in the ED. access is left chest wall HD catheter. Patient currently on 4 L nasal cannula. At this time patient needs dialysis, blood pressure is soft but if nephrology believes patient can tolerate hemodialysis can be done on the floor or in ED. If patient becomes more hemodynamically unstable and requires CRRT then call critical care back for ICU admission. Patient also complains of bright red blood per rectum as per RN in the ED patient was admitted for further management. The patient denies any fever, chills, chest pain, cough, congestion, recent illness, palpitations, or dysuria.     Interval history / Subjective:    Not doing well , SOB  Dialysis was terminated as because patient was not feeling well  Today's dialysis plan was not to remove any fluid  Last night is status post RRT as well  ABG reviewed from last night this morning still complains of short of breath we will get chest x-ray placed on BiPAP  RRT called in the noon time around 2 PM for above as well     Assessment & Plan:     Acute hypoxic respiratory failure due to volume overload from missed hemodialysis--resume hemodialysis Monday Wednesday Friday  -- Currently on BiPAP wean off as tolerates ABG compared from last night and reviewed as well significantly improved but is still patient is short of breath get chest x-ray may benefit from a CT scan patient is flu A positive  --HD per nephrology  --Support blood pressure with midodrine   --need albumin   --Holding beta-blocker continue amiodarone     End-stage renal disease with volume overload from missed dialysis  -- Resume hemodialysis Monday Wednesday Friday per nephrology  -- Dialyzed Monday Wednesday Friday at outpatient dialysis center     Fever hypotension with hypotensive shock?   POA  Sepsis unknown source --sepsis supported by fever hypotension elevated lactate tachycardia   -- DC vancomycin MRSA  negative and cefepime pharmacy to dose renal  -- Lactate elevated and patient had fever--repeat lactate fever resolved--recheck lactate today in the setting of tachycardia and hypotension and respiratory failure  -- Rapid COVID test negative Flu positive  -- Continue empiric antibiotics on cefepime  -- Blood culture repeat     Bright red blood per rectum hemoglobin 16  --Unsure if patient is really having bleeding requested RN to check stool occult--still pending Patient hemoglobin 13 drop of 3 g from yesterday     Severe hyperkalemia POA due to missed hemodialysis  --Patient was treated with calcium gluconate and Dimas Due in the ED  -- Repeat potassium requested today after hemodialysis should be corrected     A. fib with RVR possibly in the setting of volume overloaded with missed hemodialysis and possible sepsis  -- Resume home med amiodarone  -- Patient is not on any anticoagulants    Code status: Full  DVT prophylaxis: 3 Ana Chavez discussed with: Patient/Family and Nurse  Anticipated Disposition: Home w/Family  Anticipated Discharge: 24 hours to 48 hours    CRITICAL CARE ATTESTATION:  I had a face to face encounter with the patient, reviewed and interpreted patient data including clinical events, labs, images, vital signs, I/O's, and examined patient. I have discussed the case and the plan and management of the patient's care with the consulting services, the bedside nurses and necessary ancillary providers. NOTE OF PERSONAL INVOLVEMENT IN CARE   This patient has a high probability of imminent, clinically significant deterioration, which requires the highest level of preparedness to intervene urgently. I participated in the decision-making and personally managed or directed the management of the following life and organ supporting interventions that required my frequent assessment to treat or prevent imminent deterioration. I personally spent 45 minutes of critical care time. This is time spent at this critically ill patient's bedside actively involved in patient care as well as the coordination of care and discussions with the patient's family. This does not include any procedural time which has been billed separately. Hospital Problems  Date Reviewed: 9/6/2022            Codes Class Noted POA    Severe sepsis [facility]) ICD-10-CM: A41.9, R65.20  ICD-9-CM: 038.9, 995.92  10/22/2022 Unknown         Review of Systems:   A comprehensive review of systems was negative. Vital Signs:    Last 24hrs VS reviewed since prior progress note.  Most recent are:  Visit Vitals  /60   Pulse (!) 110   Temp (!) 96.5 °F (35.8 °C) (Axillary)   Resp 29   Ht 5' (1.524 m)   Wt 71.5 kg (157 lb 10.1 oz)   SpO2 100%   BMI 30.78 kg/m²         Intake/Output Summary (Last 24 hours) at 10/26/2022 1440  Last data filed at 10/26/2022 1255  Gross per 24 hour   Intake --   Output -49 ml   Net 49 ml          Physical Examination:     I had a face to face encounter with this patient and independently examined them on 10/26/2022 as outlined below:          General : alert x 3, awake, no acute distress, resting in bed, pleasant   HEENT: PEERL, EOMI, moist mucus membrane, TM clear  Neck: supple, no JVD, no meningeal signs  Chest: Clear to auscultation bilaterally   CVS: S1 S2 heard, Capillary refill less than 2 seconds  Abd: soft/ Non tender, non distended, BS physiological,   Ext: no clubbing, no cyanosis, no edema, brisk 2+ DP pulses  Neuro/Psych: pleasant mood and affect, CN 2-12 grossly intact  Skin: warm     Data Review:    I personally reviewed  Image and LABS      Labs:     Recent Labs     10/25/22  0349   WBC 7.3   HGB 14.4   HCT 49.3*   *       Recent Labs     10/25/22  2105 10/25/22  0349 10/24/22  0155   * 137 137   K HEMOLYZED,RECOLLECT REQUESTED 4.1 4.6    103 103   CO2 24 26 23   BUN 20 16 23*   CREA 5.30* 4.27* 6.01*   GLU 86 54* 76   CA 8.9 8.8 8.0*   MG HEMOLYZED,RECOLLECT REQUESTED  --   --    PHOS HEMOLYZED,RECOLLECT REQUESTED  --   --        No results for input(s): ALT, AP, TBIL, TBILI, TP, ALB, GLOB, GGT, AML, LPSE in the last 72 hours. No lab exists for component: SGOT, GPT, AMYP, HLPSE    No results for input(s): INR, PTP, APTT, INREXT, INREXT in the last 72 hours. No results for input(s): FE, TIBC, PSAT, FERR in the last 72 hours. Lab Results   Component Value Date/Time    Folate 16.4 10/19/2020 09:34 AM        No results for input(s): PH, PCO2, PO2 in the last 72 hours. No results for input(s): CPK, CKNDX, TROIQ in the last 72 hours.     No lab exists for component: CPKMB  Lab Results   Component Value Date/Time    Cholesterol, total 114 11/25/2020 03:21 AM    HDL Cholesterol 59 11/25/2020 03:21 AM    LDL, calculated 38 11/25/2020 03:21 AM    Triglyceride 85 11/25/2020 03:21 AM    CHOL/HDL Ratio 1.9 11/25/2020 03:21 AM     Lab Results   Component Value Date/Time    Glucose (POC) 71 10/26/2022 02:06 PM    Glucose (POC) 87 10/26/2022 08:29 AM    Glucose (POC) 80 10/25/2022 10:01 PM    Glucose (POC) 82 10/25/2022 08:24 PM    Glucose (POC) 94 10/25/2022 02:04 PM     No results found for: COLOR, APPRN, [address], REFSG, VERENA, PROTU, GLUCU, KETU, BILU, UROU, TIBURCIO, LEUKU, GLUKE, EPSU, BACTU, WBCU, RBCU, CASTS, UCRY      Medications Reviewed:     Current Facility-Administered Medications   Medication Dose Route Frequency    heparin (porcine) 1,000 unit/mL injection 2,400 Units  2,400 Units InterCATHeter DIALYSIS PRN    And    heparin (porcine) 1,000 unit/mL injection 2,400 Units  2,400 Units InterCATHeter DIALYSIS PRN    albumin human 25% (BUMINATE) solution 12.5 g  12.5 g IntraVENous DIALYSIS PRN    amiodarone (CORDARONE) 375 mg/250 mL D5W infusion  0.5-1 mg/min IntraVENous TITRATE    albuterol-ipratropium (DUO-NEB) 2.5 MG-0.5 MG/3 ML  3 mL Nebulization Q4H PRN    dextrose 10 % infusion 125-250 mL  125-250 mL IntraVENous PRN    midodrine (PROAMATINE) tablet 20 mg  20 mg Oral Q8H    sodium chloride (NS) flush 5-40 mL  5-40 mL IntraVENous Q8H    sodium chloride (NS) flush 5-40 mL  5-40 mL IntraVENous PRN    acetaminophen (TYLENOL) tablet 650 mg  650 mg Oral Q6H PRN    Or    acetaminophen (TYLENOL) suppository 650 mg  650 mg Rectal Q6H PRN    polyethylene glycol (MIRALAX) packet 17 g  17 g Oral DAILY PRN    ondansetron (ZOFRAN ODT) tablet 4 mg  4 mg Oral Q8H PRN    Or    ondansetron (ZOFRAN) injection 4 mg  4 mg IntraVENous Q6H PRN    cefepime (MAXIPIME) 1 g in 0.9% sodium chloride (MBP/ADV) 50 mL MBP  1 g IntraVENous Q24H    amiodarone (CORDARONE) tablet 200 mg  200 mg Oral BID    atorvastatin (LIPITOR) tablet 40 mg  40 mg Oral QHS    lactulose (CHRONULAC) 10 gram/15 mL solution 30 mL  20 g Oral BID    [Held by provider] metoprolol tartrate (LOPRESSOR) tablet 12.5 mg  12.5 mg Oral BID    methocarbamoL (ROBAXIN) tablet 500 mg  500 mg Oral Q8H PRN    arformoteroL (BROVANA) neb solution 15 mcg  15 mcg Nebulization BID RT    And    budesonide (PULMICORT) 500 mcg/2 ml nebulizer suspension  500 mcg Nebulization BID RT     ______________________________________________________________________  EXPECTED LENGTH OF STAY: 4d 19h  ACTUAL LENGTH OF STAY:          4      Please note that this dictation was completed with flex[employer]ceipts, the computer voice recognition software. Quite often unanticipated grammatical, syntax, homophones, and other interpretive errors are inadvertently transcribed by the computer software. Please disregard these errors. Please excuse any errors that have escaped final proofreading.                 Evaristo David MD

## 2022-10-26 NOTE — PROCEDURES
ADULT HOSPITALIST      Procedure Note - Central Venous Access:   Performed by Elsi Luz MD    Obtained emergent Consent. Immediately prior to the procedure, the patient was reevaluated and found suitable for the planned procedure and any planned medications. Immediately prior to the procedure a time out was called to verify the correct patient, procedure, equipment, staff, and marking as appropriate. Central line Bundle:  Full sterile barrier precautions used. 7-Step Sterility Protocol followed. (cap, mask sterile gown, sterile gloves, large sterile sheet, hand hygiene, 2% chlorhexidine for cutaneous antisepsis)  5 mL 1% Lidocaine placed at insertion site. Patient positioned in Trendelenburg?yes   The site was prepped with ChloraPrep. Using Seldinger technique a Triple Lumen CVC was placed in the Right via direct cannulation with 1 number of attempts for Monitoring, Blood Drawing, and IV Access. Ultrasound Guidance was utilized. There was good dark, non-pulsatile blood return in all ports. Femoral Site? no. If Yes, reason femoral site was chosen: NA  Catheter secured. Biopatch in place? yes. Sterile Bio-occlusive dressing placed. The following complications were encountered: None. A follow-up chest x-ray was ordered post procedure. The procedure was tolerated well.       Elsi Luz MD  Adult Hospitalist

## 2022-10-26 NOTE — CONSULTS
CRITICAL CARE ADMISSION NOTE      Name: Jennifer Elias   : 1950   MRN: [id number]   Date: 10/26/2022      Reason for ICU Admission: Wide complex tachycardia     ICU PROBLEM LIST   Wide complex tachycardia   Acute hypoxic respiratory failure  ESRD  HFrEF  Cirrhosis  DM    HISTORY OF PRESENT ILLNESS:   Pt is a 67 y.o F w/ PMH of ESRD on HD, Afib, HFrEF (10-15%), Cirrhosis, DM, GERD, ANITHA who was admitted to [facility] for Afib w/ RVR, hyperkalemia, and hypoxic respiratory failure with improvement w/ HD and abx course. Pt noted to be flu (+)ve. Initially plan for discharge 10/25, however pt wished to remain in hospital and be transferred to [facility]. RRT called PM of 10/25 for decreased mental status, hypotension and tachycardia. Of note last HD 10/24 (no UF), and had an abdominal drain placed 10/24 for recurrent ascites w/ almost 4L drainage at time of placement. Pt also noted to have refused lactulose for several days. CCM consulted 10/25 for evaluation of hypotension w/ resolution after gentle IVF resuscitation. Pt w/ increasing tachycardia and dyspnea throughout 10/26, stopped HD 1hr early (no UF) around 1800 pt w/ acute increase in HR from 120s to 150-160s, wide complex on EKG. CXR w/o acute findings. Pt given Adenosine 6mg x2, w/o improvement.  CCM consulted for evaluation, will transfer to CCU.     24 HOUR EVENTS:     Pt transferred to CCU, started on amio gtt    NEUROLOGICAL:    Precedex gtt PRN  C/w lactulose  Mentation otherwise appropriate    PULMONOLOGY:   O2 per NC, PRN BiPAP  IS, OOB as tolerated  Antimicrobials as noted below  PRN breathing treatments    CARDIOVASCULAR:   Likely RVR w/ aberrancy, likely not VT  Amiodarone gtt  Telemetry monitoring  Goal euvolemic  C/w midodrine, statin    GASTROINTESTINAL:   C/w lactulose  Remove abdominal drain     RENAL/ELECTROLYTE/FLUIDS:   Strict I/Os  HD per nephrology  MG/Phos/K>2/3/4  Remove cai    ENDOCRINE:   POCT glucose, SSI    HEMATOLOGY/ONCOLOGY: SCDs    ID/MICRO:   On cefepime, continue at this time  Start tamiflu, renal dose    Cx NGTD      ICU DAILY CHECKLIST     Code Status:full  DVT Prophylaxis:SCDs  T/L/D: PAULETTE Cm CVC, LIJ TDC  SUP: NA  Diet: Renal/cardiac diet  Activity Level:ad elsa  ABCDEF Bundle/Checklist Completed:Yes  Disposition: Stay in ICU, likely transfer in AM  Multidisciplinary Rounds Completed:  Pending  Patient/Family Updated: Yes    SUBJECTIVE:     As noted above    Review of Systems:     Review of Systems   Constitutional:  Negative for chills and fever. HENT:  Negative for congestion, nosebleeds and sinus pain. Eyes:  Negative for double vision, photophobia and pain. Respiratory:  Positive for shortness of breath. Negative for hemoptysis. Cardiovascular:  Positive for palpitations. Negative for chest pain and leg swelling. Gastrointestinal:  Negative for abdominal pain, nausea and vomiting. Genitourinary:  Negative for dysuria and urgency. Musculoskeletal:  Negative for back pain and neck pain. Skin:  Negative for rash. Neurological:  Negative for dizziness, sensory change, speech change, focal weakness and headaches. Psychiatric/Behavioral:  Negative for hallucinations. The patient is nervous/anxious. Past Medical History:      has a past medical history of Arthritis, Asthma, Chronic combined systolic and diastolic CHF (congestive heart failure) ([address] (8/26/2022), Chronic kidney disease, Chronic pain, Cirrhosis ([address] (12/17/2017), Diabetes ([address] (diet controlled), GERD (gastroesophageal reflux disease), Hypertension, and Paroxysmal atrial fibrillation ([address] (10/6/2020).     She has no past medical history of CAD (coronary artery disease), Cancer ([address] Chronic obstructive pulmonary disease ([address] Coagulation disorder ([address] Endocarditis, Morbid obesity ([address] Nicotine vapor product user, Non-nicotine vapor product user, Psychiatric disorder, PUD (peptic ulcer disease), Seizures ([address] Sleep apnea, Stroke ([address] Thromboembolus ([address] or Thyroid disease. Past Surgical History:      has a past surgical history that includes hx gyn (c section); colonoscopy (N/A, 1/12/2018); colonoscopy (N/A, 3/19/2018); hx vascular access; ir paracentesis abd w image (10/22/2020); and ir bx transcatheter (11/24/2020). Home Medications:     Prior to Admission medications    Medication Sig Start Date End Date Taking? Authorizing Provider   amiodarone (CORDARONE) 200 mg tablet Take 200 mg by mouth two (2) times a day. 9/19/22 10/31/22 Yes Provider, Historical   fluticasone propion-salmeteroL (Wixela Inhub) 100-50 mcg/dose diskus inhaler Take 1 Puff by inhalation two (2) times a day. Yes Provider, Historical   midodrine (PROAMATINE) 5 mg tablet Take 5 mg by mouth three (3) times daily (with meals). Yes Provider, Historical   metoprolol tartrate (LOPRESSOR) 25 mg tablet Take 12.5 mg by mouth two (2) times a day. Yes Provider, Historical   atorvastatin (LIPITOR) 80 mg tablet Take 40 mg by mouth nightly. Yes Provider, Historical   cyanocobalamin (VITAMIN B12) 500 mcg tablet Take 1,000 mcg by mouth daily. Yes Provider, Historical   OTHER Numerous topical ointments and creams:  Lidocaine topical 5%  Tretinoin with clobetasol  Betamethasone  Clotrimazole  Lac-Hydrin  Urea 5%  Canicream  Camphor-menthol lotion   Yes Provider, Historical   sertraline (ZOLOFT) 50 mg tablet Take 25 mg by mouth daily. Yes Provider, Historical   montelukast (SINGULAIR) 10 mg tablet Take 10 mg by mouth nightly. Yes Provider, Historical   chlorhexidine (PERIDEX) 0.12 % solution 15 mL by Swish and Spit route every twelve (12) hours. Oral care   Yes Provider, Historical   fluticasone propionate (FLONASE) 50 mcg/actuation nasal spray 1 [place] by Both Nostrils route daily. Yes Provider, Historical   benzonatate (Tessalon Perles) 100 mg capsule Take 100 mg by mouth two (2) times daily as needed for Cough.    Yes Provider, Historical   diphenhydrAMINE (BENADRYL) 25 mg capsule Take 25 mg by mouth three (3) times daily as needed. Yes Provider, Historical   sodium zirconium cyclosilicate (LOKELMA) 10 gram powder packet Take 10 g by mouth daily. Yes Provider, Historical   lidocaine 4 % patch 1 Patch by TransDERmal route every twenty-four (24) hours. Yes Provider, Historical   salsalate (DISALCID) 750 mg tablet Take 750 mg by mouth two (2) times daily as needed for Pain. Yes Provider, Historical   Senna 8.6 mg tablet Take 1 Tablet by mouth daily as needed for Constipation. Yes Provider, Historical   methocarbamoL (ROBAXIN) 500 mg tablet Take 500 mg by mouth every eight (8) hours as needed for Muscle Spasm(s). Yes Provider, Historical   docusate sodium (COLACE) 100 mg capsule Take 100 mg by mouth two (2) times a day. Yes Provider, Historical   pantoprazole (PROTONIX) 40 mg tablet Take 40 mg by mouth two (2) times a day. Yes Provider, Historical   acetaminophen (TYLENOL) 500 mg tablet Take 500 mg by mouth every eight (8) hours as needed for Pain. Max 3 grams daily   Yes Provider, Historical   loratadine (Claritin) 10 mg tablet Take 10 mg by mouth every other day. Every other day   Yes Provider, Historical   capsicum oleoresin 0.025 % topical cream Apply  to affected area three (3) times daily. Yes Provider, Historical   hydrocortisone (ANUSOL-HC) 25 mg supp Insert 25 mg into rectum every twelve (12) hours as needed. Yes Provider, Historical   polyvinyl alcohol-povidon,PF, (REFRESH CLASSIC) 1.4-0.6 % ophthalmic solution Administer 1-2 Drops to both eyes three (3) times daily. Indications: dry eye   Yes Provider, Historical   ketotifen (ZADITOR) 0.025 % (0.035 %) ophthalmic solution Administer 1 Drop to both eyes two (2) times a day. Indications: allergic conjunctivitis   Yes Provider, Historical   oxyCODONE IR (ROXICODONE) 5 mg immediate release tablet Take 5 mg by mouth every twelve (12) hours as needed for Pain.    Yes Provider, Historical   ascorbic acid, vitamin C, (VITAMIN C) 500 mg tablet Take 500 mg by mouth daily. Yes Provider, Historical   lactulose (CHRONULAC) 10 gram/15 mL solution Take 30 mL by mouth two (2) times a day. 10/10/20  Yes Mami Mariano MD   cholecalciferol (VITAMIN D3) (1000 Units /25 mcg) tablet Take 4,000 Units by mouth daily. Yes Provider, Historical   ferrous sulfate 325 mg (65 mg iron) tablet Take 325 mg by mouth three (3) times daily (with meals). Yes Provider, Historical   sevelamer (RENAGEL) 800 mg tablet Take 2,400 mg by mouth three (3) times daily (with meals). Yes Provider, Historical   albuterol (PROVENTIL HFA, VENTOLIN HFA, PROAIR HFA) 90 mcg/actuation inhaler Take 2 Puffs by inhalation every four (4) hours as needed for Wheezing. Yes Provider, Historical       Allergies/Social/Family History: Allergies   Allergen Reactions    Aleve [Naproxen Sodium] Itching, Swelling and Other (comments)    Amlodipine Rash, Hives and Itching    Aspirin Other (comments), Nausea Only and Unknown (comments)     GI bleed  GI bleeding      Heparin Unknown (comments)     bleeding      Ibuprofen Nausea and Vomiting    Metformin Other (comments)     swelling      Social History     Tobacco Use    Smoking status: Never    Smokeless tobacco: Never   Substance Use Topics    Alcohol use: No      Family History   Family history unknown: Yes         OBJECTIVE:     Labs and Data: Reviewed 10/26/22  Medications: Reviewed 10/26/22  Imaging: Reviewed 10/26/22    Physical Exam  Vitals and nursing note reviewed. Constitutional:       Appearance: Normal appearance. She is not diaphoretic. Comments: anxious   HENT:      Head: Normocephalic and atraumatic. Nose: Nose normal. No congestion. Mouth/Throat:      Mouth: Mucous membranes are moist.      Pharynx: Oropharynx is clear. No oropharyngeal exudate. Eyes:      General: No scleral icterus. Extraocular Movements: Extraocular movements intact.       Conjunctiva/sclera: Conjunctivae normal.      Pupils: Pupils are equal, round, and reactive to light. Neck:      Comments: RIJ CVC, LIJ TDC  Cardiovascular:      Rate and Rhythm: Regular rhythm. Tachycardia present. Pulses: Normal pulses. Heart sounds: Normal heart sounds. No murmur heard. Pulmonary:      Breath sounds: Normal breath sounds. No wheezing or rales. Comments: Dyspneic 2/2 anxiety  Abdominal:      General: Abdomen is flat. Bowel sounds are normal. There is no distension. Palpations: Abdomen is soft. Musculoskeletal:         General: Normal range of motion. Cervical back: Normal range of motion and neck supple. Right lower leg: No edema. Left lower leg: No edema. Skin:     General: Skin is warm and dry. Capillary Refill: Capillary refill takes less than 2 seconds. Findings: Lesion (BLE small plaque like lesions) present. Neurological:      General: No focal deficit present. Mental Status: She is alert and oriented to person, place, and time. Cranial Nerves: No cranial nerve deficit. Psychiatric:         Mood and Affect: Mood normal.         Behavior: Behavior normal.        Visit Vitals  /83   Pulse (!) 158   Temp (!) 96.5 °F (35.8 °C) (Axillary)   Resp 30   Ht 5' (1.524 m)   Wt 71.5 kg (157 lb 10.1 oz)   SpO2 97%   BMI 30.78 kg/m²    O2 Flow Rate (L/min): 2 l/min O2 Device: Nasal cannula Temp (24hrs), Av.3 °F (36.3 °C), Min:96.5 °F (35.8 °C), Max:97.7 °F (36.5 °C)           Intake/Output:     Intake/Output Summary (Last 24 hours) at 10/26/2022 191  Last data filed at 10/26/2022 1255  Gross per 24 hour   Intake --   Output -49 ml   Net 49 ml       Imaging    22    ECHO ADULT COMPLETE 2022    Interpretation Summary    Left Ventricle: Severely reduced left ventricular systolic function with a visually estimated EF of 10 -15%. Left ventricle size is normal. Mass index 2D is 234.3 g/m2.  Findings consistent with severe asymmetric hypertrophy. Severe global hypokinesis present. Right Ventricle: Severely reduced systolic function. TAPSE is abnormal. TAPSE is 1.0 cm. Aortic Valve: Severe sclerosis of the aortic valve cusp. No stenosis. Mitral Valve: Findings consistent with myxomatous degeneration and decreased excusrion. Moderately calcified leaflet, at the anterior and posterior leaflets. Mild annular calcification of the mitral valve. Moderate regurgitation with an eccentrically directed jet and may underestimate severity. Tricuspid Valve: Mal-coaptation of tricuspid valve. Severe regurgitation with an eccentrically directed jet and may underestimate severity. Right Atrium: Right atrium is severely dilated. Pulmonary Arteries: Mild pulmonary hypertension present. The estimated PASP is 45 mmHg. IVC/SVC: IVC diameter is greater than 21 mm and decreases less than 50% during inspiration; therefore the estimated right atrial pressure is elevated (~15 mmHg). IVC is dilated. Signed by: Stacia Cardenas MD on 8/24/2022  5:14 PM           CRITICAL CARE DOCUMENTATION  I had a face to face encounter with the patient, reviewed and interpreted patient data including clinical events, labs, images, vital signs, I/O's, and examined patient. I have discussed the case and the plan and management of the patient's care with the consulting services, the bedside nurses and the respiratory therapist.      NOTE OF PERSONAL INVOLVEMENT IN CARE   This patient has a high probability of imminent, clinically significant deterioration, which requires the highest level of preparedness to intervene urgently. I participated in the decision-making and personally managed or directed the management of the following life and organ supporting interventions that required my frequent assessment to treat or prevent imminent deterioration. I personally spent 35 minutes of critical care time.   This is time spent at this critically ill patient's bedside actively involved in patient care as well as the coordination of care. This does not include any procedural time which has been billed separately. Ranjan Garcia MD  Staff [facility]

## 2022-10-26 NOTE — PROGRESS NOTES
0800: Bedside and Verbal shift change report given to QUINTIN Gray (oncoming nurse) by Crow Varela RN (offgoing nurse). Report included the following information SBAR, Kardex, Recent Results, and Cardiac Rhythm SR .    1240: Pt to be weaned off 2L NC per MD.  94 %   by Pia Jeter RN at 10/25/22 1315   86 % Abnormal    by Marcus Jimenes RN at 10/25/22 1313   95 %   by Marcus Jimenes RN at 10/25/22 1250          Pt desated and was put back on 2L. [id number]: RR called on patient d/t respiratory distress complaining of sudden SOB w HR 130s O2 sats 70s on NRB. RR nurse and MD at bedside  Orders received:  Amiodarone 150mg (given)  Digoxin 125 mcg (given)  Albumin 5% (given)  MD placed central line at bedside   Pt placed on BiPAP      2145: Bedside and Verbal shift change report given to [place] [id number] [facility] (oncoming nurse) by Romy Clark RN (offgoing nurse).  Report included the following information SBAR, Kardex, Recent Results, and Cardiac Rhythm SR .

## 2022-10-26 NOTE — PROGRESS NOTES
Transitions of care: Expected discharge remains home with no new skilled services. ED CM indicated that she remained on oxygen 10/25 but wearing oxygen is not her baseline. 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 Pt receives medical care normally at the [facility].    Pt initially discharged yesterday but discharge order cancelled by provider. Noted that [facility] called on pt in overnight of 10/25 and remains in hospital.    CM will continue to follow and assist with disposition needs.     Bobby Leigh 51   [phone]

## 2022-10-26 NOTE — PROGRESS NOTES
Problem: Risk for Spread of Infection  Goal: Prevent transmission of infectious organism to others  Description: Prevent the transmission of infectious organisms to other patients, staff members, and visitors. Outcome: Progressing Towards Goal     Problem: Patient Education:  Go to Education Activity  Goal: Patient/Family Education  Outcome: Progressing Towards Goal     Problem: Falls - Risk of  Goal: *Absence of Falls  Description: Document Azra Medina Fall Risk and appropriate interventions in the flowsheet.   Outcome: Progressing Towards Goal  Note: Fall Risk Interventions:  Mobility Interventions: PT Consult for assist device competence    Mentation Interventions: Door open when patient unattended, Reorient patient, Room close to nurse's station    Medication Interventions: Patient to call before getting OOB, Teach patient to arise slowly    Elimination Interventions: Bed/chair exit alarm, Call light in reach, Toileting schedule/hourly rounds    History of Falls Interventions: Room close to nurse's station, Utilize gait belt for transfer/ambulation, Assess for delayed presentation/identification of injury for 48 hrs (comment for end date)         Problem: Patient Education: Go to Patient Education Activity  Goal: Patient/Family Education  Outcome: Progressing Towards Goal

## 2022-10-26 NOTE — PROGRESS NOTES
[address] [facility] Adult  Hospitalist Group                                                                                          Hospitalist Progress Note  Lisha Foster MD  Answering service: [phone] from in house phone        Date of Service:  10/26/2022  NAME:  Sixto Merchant  :  1950  MRN:  [id number]      Admission Summary:   Sixto Merchant is a 67 y.o. female who presents with sob. And came in with A-fib with RVR. Missed dialysis on Friday. Patient was also found hypertensive and febrile 100.7 noted on admission. Initially patient was evaluated by the ICU attending and started on midodrine for low blood pressure. Increased to 20 mg per intensivist attending. Patient was also found to have severe hypokalemia which was treated with calcium gluconate and Dimas Due in the ED. nephrology was consulted for emergent hemodialysis in the ED. access is left chest wall HD catheter. Patient currently on 4 L nasal cannula. At this time patient needs dialysis, blood pressure is soft but if nephrology believes patient can tolerate hemodialysis can be done on the floor or in ED. If patient becomes more hemodynamically unstable and requires CRRT then call critical care back for ICU admission. Patient also complains of bright red blood per rectum as per RN in the ED patient was admitted for further management. The patient denies any fever, chills, chest pain, cough, congestion, recent illness, palpitations, or dysuria.     Interval history / Subjective:    Not doing well , SOB  Hypotension developed , pt said she has SOB and she is FLU A positive  D/c'd discharge plan as pt didn't feel good     Assessment & Plan:     Acute hypoxic respiratory failure due to volume overload from missed hemodialysis--resume hemodialysis   --Supportive care with nasal cannula oxygen  --HD per nephrology  --Support blood pressure with midodrine   --need albumin   --Holding beta-blocker continue amiodarone     End-stage renal disease with volume overload from missed dialysis  -- Resume hemodialysis Monday Wednesday Friday per nephrology  -- Dialyzed Monday Wednesday Friday at outpatient dialysis center     Fever hypotension with hypotensive shock? POA  Sepsis unknown source --sepsis supported by fever hypotension elevated lactate tachycardia   -- DC vancomycin MRSA  negative and cefepime pharmacy to dose renal  --Lactate elevated and patient had fever--repeat lactate fever resolved  -- Rapid COVID test negative Flu positive  -- Continue empiric antibiotics  -- Blood culture repeat     Bright red blood per rectum hemoglobin 16  --Unsure if patient is really having bleeding requested RN to check stool occult--still pending Patient hemoglobin 13 drop of 3 g from yesterday     Severe hyperkalemia POA due to missed hemodialysis  --Patient was treated with calcium gluconate and YINA BRIDGER [facility] in the ED  -- Repeat potassium requested today after hemodialysis should be corrected     A. fib with RVR possibly in the setting of volume overloaded with missed hemodialysis and possible sepsis  -- Resume home med amiodarone  -- Patient is not on any anticoagulants  --Was on Cardizem drip wean off as tolerates    Code status: Full  DVT prophylaxis: 3 Ana Chavez discussed with: Patient/Family and Nurse  Anticipated Disposition: Home w/Family  Anticipated Discharge: 24 hours to 48 hours    CRITICAL CARE ATTESTATION:  I had a face to face encounter with the patient, reviewed and interpreted patient data including clinical events, labs, images, vital signs, I/O's, and examined patient. I have discussed the case and the plan and management of the patient's care with the consulting services, the bedside nurses and necessary ancillary providers.        NOTE OF PERSONAL INVOLVEMENT IN CARE   This patient has a high probability of imminent, clinically significant deterioration, which requires the highest level of preparedness to intervene urgently. I participated in the decision-making and personally managed or directed the management of the following life and organ supporting interventions that required my frequent assessment to treat or prevent imminent deterioration. I personally spent 45 minutes of critical care time. This is time spent at this critically ill patient's bedside actively involved in patient care as well as the coordination of care and discussions with the patient's family. This does not include any procedural time which has been billed separately. Hospital Problems  Date Reviewed: 9/6/2022            Codes Class Noted POA    Severe sepsis [facility]) ICD-10-CM: A41.9, R65.20  ICD-9-CM: 038.9, 995.92  10/22/2022 Unknown         Review of Systems:   A comprehensive review of systems was negative. Vital Signs:    Last 24hrs VS reviewed since prior progress note.  Most recent are:  Visit Vitals  /60   Pulse (!) 110   Temp (!) 96.5 °F (35.8 °C) (Axillary)   Resp 29   Ht 5' (1.524 m)   Wt 71.5 kg (157 lb 10.1 oz)   SpO2 100%   BMI 30.78 kg/m²         Intake/Output Summary (Last 24 hours) at 10/26/2022 1413  Last data filed at 10/26/2022 1255  Gross per 24 hour   Intake --   Output -49 ml   Net 49 ml          Physical Examination:     I had a face to face encounter with this patient and independently examined them on 10/26/2022 as outlined below:          General : alert x 3, awake, no acute distress, resting in bed, pleasant   HEENT: PEERL, EOMI, moist mucus membrane, TM clear  Neck: supple, no JVD, no meningeal signs  Chest: Clear to auscultation bilaterally   CVS: S1 S2 heard, Capillary refill less than 2 seconds  Abd: soft/ Non tender, non distended, BS physiological,   Ext: no clubbing, no cyanosis, no edema, brisk 2+ DP pulses  Neuro/Psych: pleasant mood and affect, CN 2-12 grossly intact  Skin: warm     Data Review:    I personally reviewed  Image and LABS      Labs:     Recent Labs 10/25/22  0349   WBC 7.3   HGB 14.4   HCT 49.3*   *       Recent Labs     10/25/22  2105 10/25/22  0349 10/24/22  0155   * 137 137   K HEMOLYZED,RECOLLECT REQUESTED 4.1 4.6    103 103   CO2 24 26 23   BUN 20 16 23*   CREA 5.30* 4.27* 6.01*   GLU 86 54* 76   CA 8.9 8.8 8.0*   MG HEMOLYZED,RECOLLECT REQUESTED  --   --    PHOS HEMOLYZED,RECOLLECT REQUESTED  --   --        No results for input(s): ALT, AP, TBIL, TBILI, TP, ALB, GLOB, GGT, AML, LPSE in the last 72 hours. No lab exists for component: SGOT, GPT, AMYP, HLPSE    No results for input(s): INR, PTP, APTT, INREXT, INREXT in the last 72 hours. No results for input(s): FE, TIBC, PSAT, FERR in the last 72 hours. Lab Results   Component Value Date/Time    Folate 16.4 10/19/2020 09:34 AM        No results for input(s): PH, PCO2, PO2 in the last 72 hours. No results for input(s): CPK, CKNDX, TROIQ in the last 72 hours.     No lab exists for component: CPKMB  Lab Results   Component Value Date/Time    Cholesterol, total 114 11/25/2020 03:21 AM    HDL Cholesterol 59 11/25/2020 03:21 AM    LDL, calculated 38 11/25/2020 03:21 AM    Triglyceride 85 11/25/2020 03:21 AM    CHOL/HDL Ratio 1.9 11/25/2020 03:21 AM     Lab Results   Component Value Date/Time    Glucose (POC) 71 10/26/2022 02:06 PM    Glucose (POC) 87 10/26/2022 08:29 AM    Glucose (POC) 80 10/25/2022 10:01 PM    Glucose (POC) 82 10/25/2022 08:24 PM    Glucose (POC) 94 10/25/2022 02:04 PM     No results found for: COLOR, APPRN, SPGRU, REFSG, VERENA, PROTU, GLUCU, KETU, BILU, UROU, TIBURCIO, LEUKU, GLUKE, EPSU, BACTU, WBCU, RBCU, CASTS, UCRY      Medications Reviewed:     Current Facility-Administered Medications   Medication Dose Route Frequency    heparin (porcine) 1,000 unit/mL injection 2,400 Units  2,400 Units InterCATHeter DIALYSIS PRN    And    heparin (porcine) 1,000 unit/mL injection 2,400 Units  2,400 Units InterCATHeter DIALYSIS PRN    albumin human 25% (BUMINATE) solution 12.5 g 12.5 g IntraVENous DIALYSIS PRN    amiodarone (CORDARONE) 375 mg/250 mL D5W infusion  0.5-1 mg/min IntraVENous TITRATE    albuterol-ipratropium (DUO-NEB) 2.5 MG-0.5 MG/3 ML  3 mL Nebulization Q4H PRN    dextrose 10 % infusion 125-250 mL  125-250 mL IntraVENous PRN    midodrine (PROAMATINE) tablet 20 mg  20 mg Oral Q8H    sodium chloride (NS) flush 5-40 mL  5-40 mL IntraVENous Q8H    sodium chloride (NS) flush 5-40 mL  5-40 mL IntraVENous PRN    acetaminophen (TYLENOL) tablet 650 mg  650 mg Oral Q6H PRN    Or    acetaminophen (TYLENOL) suppository 650 mg  650 mg Rectal Q6H PRN    polyethylene glycol (MIRALAX) packet 17 g  17 g Oral DAILY PRN    ondansetron (ZOFRAN ODT) tablet 4 mg  4 mg Oral Q8H PRN    Or    ondansetron (ZOFRAN) injection 4 mg  4 mg IntraVENous Q6H PRN    cefepime (MAXIPIME) 1 g in 0.9% sodium chloride (MBP/ADV) 50 mL MBP  1 g IntraVENous Q24H    amiodarone (CORDARONE) tablet 200 mg  200 mg Oral BID    atorvastatin (LIPITOR) tablet 40 mg  40 mg Oral QHS    lactulose (CHRONULAC) 10 gram/15 mL solution 30 mL  20 g Oral BID    [Held by provider] metoprolol tartrate (LOPRESSOR) tablet 12.5 mg  12.5 mg Oral BID    methocarbamoL (ROBAXIN) tablet 500 mg  500 mg Oral Q8H PRN    arformoteroL (BROVANA) neb solution 15 mcg  15 mcg Nebulization BID RT    And    budesonide (PULMICORT) 500 mcg/2 ml nebulizer suspension  500 mcg Nebulization BID RT     ______________________________________________________________________  EXPECTED LENGTH OF STAY: 4d 19h  ACTUAL LENGTH OF STAY:          4      Please note that this dictation was completed with [employer], the [employer] voice recognition software. Quite often unanticipated grammatical, syntax, homophones, and other interpretive errors are inadvertently transcribed by the computer software. Please disregard these errors. Please excuse any errors that have escaped final proofreading.                 Macario Woods MD

## 2022-10-26 NOTE — CONSULTS
CRITICAL CARE CONSULT NOTE      Name: Josie Padilla   : 1950   MRN: [id number]   Date: 10/25/2022      Reason for ICU Consult: hypotension       HISTORY OF PRESENT ILLNESS:   Pt is a 67 y.o F w/ PMH of ESRD on HD, Afib, HFrEF (10-15%), Cirrhosis, DM, GERD, ANITHA who was admitted to [facility] for Afib w/ RVR, hyperkalemia, and hypoxic respiratory failure with improvement w/ HD and abx course. Pt noted to be flu (+)ve. Initially plan for discharge 10/25, however pt wished to remain in hospital and be transferred to Loc[facility]. RRT called PM of 10/25 for decreased mental status, hypotension and tachycardia. Of note last HD 10/24 (no UF), and had an abdominal drain placed 10/24 for recurrent ascites w/ almost 4L drainage. Pt also noted to have refused lactulose for several days. ABG obtained w/ respiratory acidosis, started on BiPAP. Pt given albumin, amiodarone bolus, and digoxin. Critical care consulted for hypotension. HR and BP improved after IVFs, mentation and respiratory acidosis improving w/ PPV. CXR grossly unremarkable for acute process.       Assessment/Recommendations    Hypotension, IVVD vs early distributive  Afib   Flu A  Acute hypoxemic and hypercapnic respiratory failure  ESRD  Cirrhosis  CO2 narcosis vs Metabolic encephalopathy, Hepatic encephalopathy    - Improvement in HR likely 2/2 IVFs, would defer amio gtt or further digoxin at this time and resume home regimen  - Goal euvolemia, cautious resuscitation   - Continue abx, would also start antivirals for flu  - Consider sending abdominal fluid for Cx, cell counts  - repeat BCx  - Resume lactulose, NGT vs CO if unable to take PO  - Wean BiPAP as tolerated, goal spO2 88-92%  -  PRN breathing treatments    No indication for HLOC at this time as pt improving w/ current therapy, please re-consult if acute change in clinical status    Above discussed w/ on call hospitalist, agreeable with plan    Review of Systems:     Review of Systems   Reason unable to perform ROS: patient undergoing procedure. Past Medical History:      has a past medical history of Arthritis, Asthma, Chronic combined systolic and diastolic CHF (congestive heart failure) ([facility] [address] (8/26/2022), Chronic kidney disease, Chronic pain, Cirrhosis ([address] (12/17/2017), Diabetes ([address] (diet controlled), GERD (gastroesophageal reflux disease), Hypertension, and Paroxysmal atrial fibrillation ([facility] [address] (10/6/2020). She has no past medical history of CAD (coronary artery disease), Cancer ([address] Chronic obstructive pulmonary disease ([address] Coagulation disorder ([address] Endocarditis, Morbid obesity ([facility] [address] Nicotine vapor product user, Non-nicotine vapor product user, Psychiatric disorder, PUD (peptic ulcer disease), Seizures ([facility] [address] Sleep apnea, Stroke ([facility] [address] Thromboembolus ([facility] [address] or Thyroid disease. Past Surgical History:      has a past surgical history that includes hx gyn (c section); colonoscopy (N/A, 1/12/2018); colonoscopy (N/A, 3/19/2018); hx vascular access; ir paracentesis abd w image (10/22/2020); and ir bx transcatheter (11/24/2020). Home Medications:     Prior to Admission medications    Medication Sig Start Date End Date Taking? Authorizing Provider   amiodarone (CORDARONE) 200 mg tablet Take 200 mg by mouth two (2) times a day. 9/19/22 10/31/22 Yes Provider, Historical   fluticasone propion-salmeteroL (Wixela Inhub) 100-50 mcg/dose diskus inhaler Take 1 Puff by inhalation two (2) times a day. Yes Provider, Historical   midodrine (PROAMATINE) 5 mg tablet Take 5 mg by mouth three (3) times daily (with meals). Yes Provider, Historical   metoprolol tartrate (LOPRESSOR) 25 mg tablet Take 12.5 mg by mouth two (2) times a day. Yes Provider, Historical   atorvastatin (LIPITOR) 80 mg tablet Take 40 mg by mouth nightly. Yes Provider, Historical   cyanocobalamin (VITAMIN B12) 500 mcg tablet Take 1,000 mcg by mouth daily.    Yes Provider, Historical   OTHER Numerous topical ointments and creams:  Lidocaine topical 5%  Tretinoin with clobetasol  Betamethasone  Clotrimazole  Lac-Hydrin  Urea 5%  Canicream  Camphor-menthol lotion   Yes Provider, Historical   sertraline (ZOLOFT) 50 mg tablet Take 25 mg by mouth daily. Yes Provider, Historical   montelukast (SINGULAIR) 10 mg tablet Take 10 mg by mouth nightly. Yes Provider, Historical   chlorhexidine (PERIDEX) 0.12 % solution 15 mL by Swish and Spit route every twelve (12) hours. Oral care   Yes Provider, Historical   fluticasone propionate (FLONASE) 50 mcg/actuation nasal spray 1 [place] by Both Nostrils route daily. Yes Provider, Historical   benzonatate (Tessalon Perles) 100 mg capsule Take 100 mg by mouth two (2) times daily as needed for Cough. Yes Provider, Historical   diphenhydrAMINE (BENADRYL) 25 mg capsule Take 25 mg by mouth three (3) times daily as needed. Yes Provider, Historical   sodium zirconium cyclosilicate (LOKELMA) 10 gram powder packet Take 10 g by mouth daily. Yes Provider, Historical   lidocaine 4 % patch 1 Patch by TransDERmal route every twenty-four (24) hours. Yes Provider, Historical   salsalate (DISALCID) 750 mg tablet Take 750 mg by mouth two (2) times daily as needed for Pain. Yes Provider, Historical   Senna 8.6 mg tablet Take 1 Tablet by mouth daily as needed for Constipation. Yes Provider, Historical   methocarbamoL (ROBAXIN) 500 mg tablet Take 500 mg by mouth every eight (8) hours as needed for Muscle Spasm(s). Yes Provider, Historical   docusate sodium (COLACE) 100 mg capsule Take 100 mg by mouth two (2) times a day. Yes Provider, Historical   pantoprazole (PROTONIX) 40 mg tablet Take 40 mg by mouth two (2) times a day. Yes Provider, Historical   acetaminophen (TYLENOL) 500 mg tablet Take 500 mg by mouth every eight (8) hours as needed for Pain. Max 3 grams daily   Yes Provider, Historical   loratadine (Claritin) 10 mg tablet Take 10 mg by mouth every other day.  Every other day   Yes Provider, Historical   capsicum oleoresin 0.025 % topical cream Apply  to affected area three (3) times daily. Yes Provider, Historical   hydrocortisone (ANUSOL-HC) 25 mg supp Insert 25 mg into rectum every twelve (12) hours as needed. Yes Provider, Historical   polyvinyl alcohol-povidon,PF, (REFRESH CLASSIC) 1.4-0.6 % ophthalmic solution Administer 1-2 Drops to both eyes three (3) times daily. Indications: dry eye   Yes Provider, Historical   ketotifen (ZADITOR) 0.025 % (0.035 %) ophthalmic solution Administer 1 Drop to both eyes two (2) times a day. Indications: allergic conjunctivitis   Yes Provider, Historical   oxyCODONE IR (ROXICODONE) 5 mg immediate release tablet Take 5 mg by mouth every twelve (12) hours as needed for Pain. Yes Provider, Historical   ascorbic acid, vitamin C, (VITAMIN C) 500 mg tablet Take 500 mg by mouth daily. Yes Provider, Historical   lactulose (CHRONULAC) 10 gram/15 mL solution Take 30 mL by mouth two (2) times a day. 10/10/20  Yes Torres Escalante MD   cholecalciferol (VITAMIN D3) (1000 Units /25 mcg) tablet Take 4,000 Units by mouth daily. Yes Provider, Historical   ferrous sulfate 325 mg (65 mg iron) tablet Take 325 mg by mouth three (3) times daily (with meals). Yes Provider, Historical   sevelamer (RENAGEL) 800 mg tablet Take 2,400 mg by mouth three (3) times daily (with meals). Yes Provider, Historical   albuterol (PROVENTIL HFA, VENTOLIN HFA, PROAIR HFA) 90 mcg/actuation inhaler Take 2 Puffs by inhalation every four (4) hours as needed for Wheezing. Yes Provider, Historical       Allergies/Social/Family History:      Allergies   Allergen Reactions    Aleve [Naproxen Sodium] Itching, Swelling and Other (comments)    Amlodipine Rash, Hives and Itching    Aspirin Other (comments), Nausea Only and Unknown (comments)     GI bleed  GI bleeding      Heparin Unknown (comments)     bleeding      Ibuprofen Nausea and Vomiting    Metformin Other (comments)     swelling Social History     Tobacco Use    Smoking status: Never    Smokeless tobacco: Never   Substance Use Topics    Alcohol use: No      Family History   Family history unknown: Yes         OBJECTIVE:     Labs and Data: Reviewed 10/25/22  Medications: Reviewed 10/25/22  Imaging: Reviewed 10/25/22    Physical Exam  Vitals (Exam limited as pt undergoing CVC placement) and nursing note reviewed. Constitutional:       General: She is not in acute distress. Cardiovascular:      Rate and Rhythm: Normal rate and regular rhythm. Pulmonary:      Effort: Pulmonary effort is normal.      Comments: BiPAP  Skin:     General: Skin is warm and dry. Capillary Refill: Capillary refill takes less than 2 seconds. Neurological:      Mental Status: She is alert. She is disoriented. Visit Vitals  BP (!) 83/51   Pulse (!) 138   Temp 97.3 °F (36.3 °C)   Resp 20   Ht 5' (1.524 m)   Wt 71.5 kg (157 lb 10.1 oz)   SpO2 99%   BMI 30.78 kg/m²    O2 Flow Rate (L/min): 2 l/min O2 Device: BIPAP Temp (24hrs), Av.5 °F (36.4 °C), Min:97.3 °F (36.3 °C), Max:97.8 °F (36.6 °C)           Intake/Output:     Intake/Output Summary (Last 24 hours) at 10/25/2022 2143  Last data filed at 10/25/2022 1918  Gross per 24 hour   Intake --   Output 450 ml   Net -450 ml       Imaging    22    ECHO ADULT COMPLETE 2022    Interpretation Summary    Left Ventricle: Severely reduced left ventricular systolic function with a visually estimated EF of 10 -15%. Left ventricle size is normal. Mass index 2D is 234.3 g/m2. Findings consistent with severe asymmetric hypertrophy. Severe global hypokinesis present. Right Ventricle: Severely reduced systolic function. TAPSE is abnormal. TAPSE is 1.0 cm. Aortic Valve: Severe sclerosis of the aortic valve cusp. No stenosis. Mitral Valve: Findings consistent with myxomatous degeneration and decreased excusrion. Moderately calcified leaflet, at the anterior and posterior leaflets.  Mild annular calcification of the mitral valve. Moderate regurgitation with an eccentrically directed jet and may underestimate severity. Tricuspid Valve: Mal-coaptation of tricuspid valve. Severe regurgitation with an eccentrically directed jet and may underestimate severity. Right Atrium: Right atrium is severely dilated. Pulmonary Arteries: Mild pulmonary hypertension present. The estimated PASP is 45 mmHg. IVC/SVC: IVC diameter is greater than 21 mm and decreases less than 50% during inspiration; therefore the estimated right atrial pressure is elevated (~15 mmHg). IVC is dilated. Signed by: Antonella Silver MD on 8/24/2022  5:14 PM           CRITICAL CARE DOCUMENTATION  I had a face to face encounter with the patient, reviewed and interpreted patient data including clinical events, labs, images, vital signs, I/O's, and examined patient. I have discussed the case and the plan and management of the patient's care with the consulting services, the bedside nurses and the respiratory therapist.      NOTE OF PERSONAL INVOLVEMENT IN CARE   This patient has a high probability of imminent, clinically significant deterioration, which requires the highest level of preparedness to intervene urgently. I participated in the decision-making and personally managed or directed the management of the following life and organ supporting interventions that required my frequent assessment to treat or prevent imminent deterioration. I personally spent 35 minutes of critical care time. This is time spent at this critically ill patient's bedside actively involved in patient care as well as the coordination of care. This does not include any procedural time which has been billed separately. Ranjan Garcia MD  Staff [facility]

## 2022-10-26 NOTE — PROGRESS NOTES
[id number]: RR called on patient d/t respiratory distress complaining of sudden SOB w HR 130s O2 sats 70s on NRB.     RR nurse and MD at bedside  Orders received:  Amiodarone 150mg (given)  Digoxin 125 mcg (given)  Albumin 5% (given)  ABG drawn  MD placed central line at bedside   Pt placed on BiPAP

## 2022-10-26 NOTE — PROGRESS NOTES
Nephrology Progress Note  Kristyn Cruz Fenton  Date of Admission : 10/22/2022    CC:  Follow up for ESRD       Assessment and Plan     ESRD on dialysis   - Patient of [facility], [address] MWF  - HD now per routine  - no UF    Ascites:  - s/p paracentesis    Hypotension:  - stable now on dialysis     Cardiac Cirrhosis   RV failure, severe TR  PAF  Chronic HFrEF   Chronic hypotension     Anemia   - no need for CAROL ANN      Hep C, hx of  DMII       Interval History:  Seen and examined on dialysis. Rapid response called yesterday evening for hypotension. Improved with IVF. Feeling ok. BP stable on dialysis. No UF. Current Medications: all current  Medications have been eviewed in EPIC  Review of Systems: Pertinent items are noted in HPI. Objective:  Vitals:    Vitals:    10/26/22 0933 10/26/22 0956 10/26/22 1011 10/26/22 1026   BP: (!) 119/53 136/67 136/62 (!) 126/56   Pulse: (!) 103 (!) 103 (!) 104 (!) 105   Resp:  26 27 27   Temp:       TempSrc:       SpO2:    91%   Weight:       Height:         Intake and Output:  No intake/output data recorded. 10/24 1901 - 10/26 0700  In: -   Out: 6839 [Urine:250]    Physical Examination:    General: NAD,Conversant   Neck:  Supple, no mass  Resp:  Lungs CTA B/L, no wheezing , normal respiratory effort  CV:  RRR,  no murmur or rub, no LE edema  GI:  Soft, NT, + distention, paracentesis catheter in place  Neurologic:  Non focal  Psych:             AAO x 3 appropriate affect   Skin:  No Rash  Access:           [facility]    []    High complexity decision making was performed  []    Patient is at high-risk of decompensation with multiple organ involvement    Lab Data Personally Reviewed: I have reviewed all the pertinent labs, microbiology data and radiology studies during assessment.     Recent Labs     10/25/22  2105 10/25/22  0349 10/24/22  0155   * 137 137   K HEMOLYZED,RECOLLECT REQUESTED 4.1 4.6    103 103   CO2 24 26 23   GLU 86 54* 76   BUN 20 16 23*   CREA 5.30* 4.27* 6.01*   CA 8.9 8.8 8.0*   MG HEMOLYZED,RECOLLECT REQUESTED  --   --    PHOS HEMOLYZED,RECOLLECT REQUESTED  --   --        Recent Labs     10/25/22  0349   WBC 7.3   HGB 14.4   HCT 49.3*   *       No results found for: SDES  Lab Results   Component Value Date/Time    Culture result: MRSA NOT PRESENT 10/22/2022 06:27 PM    Culture result:  10/22/2022 06:27 PM     Screening of patient nares for MRSA is for surveillance purposes and, if positive, to facilitate isolation considerations in high risk settings. It is not intended for automatic decolonization interventions per se as regimens are not sufficiently effective to warrant routine use. Culture result: NO GROWTH 4 DAYS 10/22/2022 02:13 AM    Culture result: NO GROWTH 4 DAYS 10/22/2022 02:13 AM     Recent Results (from the past 24 hour(s))   GLUCOSE, POC    Collection Time: 10/25/22 12:09 PM   Result Value Ref Range    Glucose (POC) 70 65 - 117 mg/dL    Performed by [facility] [place], POC    Collection Time: 10/25/22  2:04 PM   Result Value Ref Range    Glucose (POC) 94 65 - 117 mg/dL    Performed by [address] [place] [address] [place], POC    Collection Time: 10/25/22  8:24 PM   Result Value Ref Range    Glucose (POC) 82 65 - 117 mg/dL    Performed by Glennis Dance    POC G3 - PUL    Collection Time: 10/25/22  8:31 PM   Result Value Ref Range    FIO2 (POC) 100 %    pH (POC) 7.16 (LL) 7.35 - 7.45      pCO2 (POC) 62.0 (H) 35.0 - 45.0 MMHG    pO2 (POC) 124 (H) 80 - 100 MMHG    HCO3 (POC) 22.2 22 - 26 MMOL/L    sO2 (POC) 97.5 (H) 92 - 97 %    Base deficit (POC) 8.0 mmol/L    Site LEFT RADIAL      Device: Non rebreather      Allens test (POC) NOT APPLICABLE      Specimen type (POC) ARTERIAL      Critical value read back DANAE RYDER.  RN    MAGNESIUM    Collection Time: 10/25/22  9:05 PM   Result Value Ref Range    Magnesium HEMOLYZED,RECOLLECT REQUESTED 1.6 - 2.4 mg/dL   METABOLIC PANEL, BASIC    Collection Time: 10/25/22  9:05 PM   Result Value Ref Range    Sodium 135 (L) 136 - 145 mmol/L    Potassium HEMOLYZED,RECOLLECT REQUESTED 3.5 - 5.1 mmol/L    Chloride 103 97 - 108 mmol/L    CO2 24 21 - 32 mmol/L    Anion gap 8 5 - 15 mmol/L    Glucose 86 65 - 100 mg/dL    BUN 20 6 - 20 MG/DL    Creatinine 5.30 (H) 0.55 - 1.02 MG/DL    BUN/Creatinine ratio 4 (L) 12 - 20      eGFR 8 (L) >60 ml/min/1.73m2    Calcium 8.9 8.5 - 10.1 MG/DL   PHOSPHORUS    Collection Time: 10/25/22  9:05 PM   Result Value Ref Range    Phosphorus HEMOLYZED,RECOLLECT REQUESTED 2.6 - 4.7 MG/DL   POC EG7    Collection Time: 10/25/22  9:19 PM   Result Value Ref Range    Calcium, ionized (POC) 1.07 (L) 1.12 - 1.32 mmol/L    FIO2 (POC) 100 %    pH (POC) 7.21 (LL) 7.35 - 7.45      pCO2 (POC) 49.8 (H) 35.0 - 45.0 MMHG    pO2 (POC) 400 (H) 80 - 100 MMHG    HCO3 (POC) 19.7 (L) 22 - 26 MMOL/L    Base deficit (POC) 8.5 mmol/L    sO2 (POC) 99.9 (H) 92 - 97 %    Site RIGHT RADIAL      Device: BIPAP MASK      Mode Non Invasive      PEEP/CPAP (POC) 8 cmH2O    Pressure support 14 cmH2O    Allens test (POC) Positive      Specimen type (POC) ARTERIAL      Critical value read back BLECK. MATTHEW MEZA    AMMONIA    Collection Time: 10/25/22  9:44 PM   Result Value Ref Range    Ammonia, plasma 53 (H) <32 UMOL/L   GLUCOSE, POC    Collection Time: 10/25/22 10:01 PM   Result Value Ref Range    Glucose (POC) 80 65 - 117 mg/dL    Performed by Shaheen Luke RN    SAMPLES BEING HELD    Collection Time: 10/26/22  4:15 AM   Result Value Ref Range    SAMPLES BEING HELD 1GOLD     COMMENT        Add-on orders for these samples will be processed based on acceptable specimen integrity and analyte stability, which may vary by analyte.    GLUCOSE, POC    Collection Time: 10/26/22  8:29 AM   Result Value Ref Range    Glucose (POC) 87 65 - 117 mg/dL    Performed by Manuela Coyne MD  [facility]   [address] [facility], [place] , [facility]  Phone - [phone]   Fax - [phone]  [email or web address]

## 2022-10-26 NOTE — PROGRESS NOTES
Critical Care Documentation    Name: Lorenzo Gibbons  YOB: 1950  MRN: [id number]  Admission Date: 10/22/2022  1:23 AM    Date of service: 10/26/2022    Active Diagnoses:  Severe sepsis  A. fib with RVR  Hypovolemia  Respiratory failure    Critical Illness Complaint:  A. fib with RVR, respiratory failure, hypertension    Clinical Presentation:  65-year-old -American female admitted for respiratory failure, ESRD hypotension. RRT activated for hypotension, A. fib with RVR and worsening respiratory failure. Physical Exam:   General:  Alert, patient in acute distress   Head:  Normocephalic, without obvious abnormality, atraumatic. Eyes:  Conjunctivae/corneas clear. Throat: Lips, mucosa, and tongue normal.   Neck: Supple, symmetrical, trachea midline. no enlargement/tenderness/nodules   Back:   Symmetric, no curvature. ROM normal. No CVA tenderness. Lungs:   Breath sounds bilaterally   Heart:  Irregular irregular rapid A. fib   Abdomen:   Soft, non-tender. Bowel sounds normal. No masses,  No organomegaly. Right middle quadrant paracentesis drain   Extremities: Extremities normal, atraumatic, no cyanosis or edema. Pulses: 2+ and symmetric all extremities. Skin: Skin color, texture, turgor normal. No rashes or lesions   Neurologic: Normal strength, sensation and reflexes throughout. Data Reviewed: All diagnostic labs and studies have been reviewed.       Medications/Therapies Administered:   Anxiolytics: [  ] yes [ x ] no   Antiarrhythmics: [ x ] yes [  ] no   Antihypertensives: [  ] yes [  x] no   IVFs: [  x] yes [  ] no   Blood Transfusion: [  ] yes [ x ] no    Imaging Ordered and Reviewed:   [ x ] CXR    [  ] CT Scan    [  ] MRI    [  ] Ultrasound    Prognosis / Plan of Care Discussed With:  [  x] Patient  [  ] Family Members / Surrogate Decision-makers (patient unable / incompetent to give history and/or make treatment decisions, and such discussion is medically necessary)  [ x ] Nursing Staff  [  ] Specialist Physicians    Assessment and Plan:    A. fib with RVR  -Rate/rhythm controlled obtained with dig 125 and amnio 150 bolus + amnio gtt    Hypotension  -Improved with rate control of A. fib as well as 500 cc bolus and albumin 5% infusion    Acute on chronic respiratory failure  -Improved with BiPAP support  -Wean BiPAP as tolerated  -Consider CTA chest in a.m. Critical Care Attestation: This patient is unstable and critically ill. Due to a high probability of clinically significant, life threatening deterioration, the patient required my highest level of preparedness to intervene emergently and I personally spent this critical care time directly and personally managing the patient, and was immediately available to the patient. This critical care time included obtaining a history; examining the patient; pulse oximetry; ordering and review of labs/studies; arranging urgent treatment with development of a management plan; evaluation of patient's response to treatment; frequent reassessment; and, discussions with other providers and/or family. This critical care time was performed to assess and manage the high probability of imminent, life-threatening deterioration that could result in multi-organ failure and death. Time Spent:     I personally spent 120 minutes in providing critical care time. It was exclusive of separately billable procedures, treating other patients, and teaching time.     Arnold Zepeda MD  10/26/2022  6:15 AM

## 2022-10-27 PROBLEM — R06.02 SHORTNESS OF BREATH: Status: ACTIVE | Noted: 2022-01-01

## 2022-10-27 PROBLEM — R45.1 AGITATION: Status: ACTIVE | Noted: 2022-01-01

## 2022-10-27 PROBLEM — Z51.5 PALLIATIVE CARE ENCOUNTER: Status: ACTIVE | Noted: 2022-01-01

## 2022-10-27 PROBLEM — R53.81 DEBILITY: Status: ACTIVE | Noted: 2018-03-16

## 2022-10-27 NOTE — CONSULTS
Palliative Medicine Consult  [place]: [phone] ([phone])    Patient Name: Josie Padilla  YOB: 1950    Date of Initial Consult: 10/27/2022  Reason for Consult: care decisions   Requesting Provider: Uhce Bar    Primary Care Physician: Amparo Mosley MD     SUMMARY:   Josie Padilla is a 67 y.o. admitted on 10/22/2022 from [facility] due to shortness of breath, elevated temp and low BP. Patient missed her HD on 10/21. Admitted with a diagnosis of Afib with RVR, hypokalemia, ESRD on HD, sepsis, hypotension, acute hypoxic respiratory failure, volume overload, cirrhosis (paracentesis drain 10/24)     PMH:  ESRD, severe TR, HFrEF-10-15%, chronic hypotension, CAD, cancer, COPD, GERD, ANITHA, anemia, Hep C, DM, COPD, obesity, cirrhosis    Hospitalizations:  8/22/2022-8/25/2022- near syncope, tachycardia, afib, biventricular HF, cirrhosis    Patient normally goes to the [address] [place] [address] and she gets her dialysis at the [address] [place] [address] as well    Current medical issues leading to Palliative Medicine involvement include: care decisions. Social:  . 8 children. Patient lives at [facility]. She fixes her own meals and bathes self. Does use a walker. The dtr, Lu Cordero, reports she has fallen recently and has been working with PT at [facility]. Sometimes the one of the teenage grandsons will stay with her. Patient is a retired RN. She loves shopping in [employer] stores and [facility]. She can shop for hours,per her dtr. Patient does not drive. Her family members take her shopping. Patient also loves dogs. Has AMD that is on file at the [address] [place] [address]. Per previous ACP note, patient has 3 children as decision makers    PALLIATIVE DIAGNOSES:   Palliative care encounter  Shortness of breath  Agitation   Debility        PLAN:   Examined patient in the CCU. Patient is on Precedex at 1.5 ug/kg/hr. She is on amiodarone at 1 ug/min. Patient is sedated. Using abdominal, accessory muscles to breath.    Sats 93% on 4 LPM  Called the patient's dtr, Marilin Larsen, via phone. She was unaware the patient had been transferred to the CCU. She had been trying to call the patient in her previous room. Updated the dtr on the patient's current condition. We also reviewed her many complex medical issues. Prateek Jernigan will update her other siblings (8 total). AMD on file at the [place]. The ACP note from August 2022  states the patient wanted full restorative measures- she wants to live \"as long as I can\" but also said she wants her time to be quality time. Code status discussed at that time, she was familiar with this- and full code status confirmed, however states that if she is on life support w/out chance of any good recovery that her children know she would not want ongoing measures and would remove her from artificial support. Reviewed the above ACP note from August 2022 with Prateek Jernigan and she says this is her mother's wishes. So will continue full supportive care and Full Code status for now. Will plan to reassess patient's goals of care once she is off sedation. Palliative will follow.     Initial consult note routed to primary continuity provider and/or primary health care team members  Communicated plan of care with: Palliative [place] [facility] Team     GOALS OF CARE / TREATMENT PREFERENCES:     GOALS OF CARE:  Patient/Health Care Proxy Stated Goals:  (patient is sedated on precedex, goals of care mtg)    TREATMENT PREFERENCES:   Code Status: Full Code    Patient and family's personal goals include: per ACP note in August 2022,    [x] Unable to obtain this information    Important upcoming milestones or family events: none reported     The patient identifies the following as important for living well:  [x] Unable to obtain this information      Advance Care Planning:  [] The [facility] Interdisciplinary Team has updated the ACP Navigator with [address] and Patient Capacity      Primary Decision Maker: Jeanie Pa - Daughter - [phone]    Primary Decision Maker: Yoel Velez - Daughter - [phone]    Primary Decision Maker: Mejia Mora - Son - [phone]  Advance Care Planning 10/22/2022   Patient's [address] is: -   Primary Decision Maker Name -   Primary Decision Maker Phone Number -   Primary Decision Maker Relationship to Patient -   Secondary Decision [address] Name -   Secondary Decision [address] Phone Number -   Secondary Decision Maker Relationship to Patient -   Confirm Advance Directive None   Patient Would Like to Complete Advance Directive -   Does the patient have other document types -               Other:    As far as possible, the palliative care team has discussed with patient / health care proxy about goals of care / treatment preferences for patient. HISTORY:     History obtained from: chart, team, family    CHIEF COMPLAINT: Pt admitted with SOB    HPI/SUBJECTIVE:    The patient is:   [] Verbal and participatory  [x] Non-participatory due to: sedation        Clinical Pain Assessment (nonverbal scale for severity on nonverbal patients):          Activity (Movement): Lying quietly, normal position    Duration: for how long has pt been experiencing pain (e.g., 2 days, 1 month, years)  Frequency: how often pain is an issue (e.g., several times per day, once every few days, constant)     FUNCTIONAL ASSESSMENT:     Palliative Performance Scale (PPS):  PPS: 10       PSYCHOSOCIAL/SPIRITUAL SCREENING:     Palliative IDT has assessed this patient for cultural preferences / practices and a referral made as appropriate to needs (Cultural Services, Patient Advocacy, Ethics, etc.)    Any spiritual / Christianity concerns:  [] Yes /  [x] No  [] Unable to obtain this information  If \"Yes\" to discuss with pastoral care during IDT     Does caregiver feel burdened by caring for their loved one:   [] Yes /  [x] No /  [] No Caregiver Present/Available [] No Caregiver [] Pt Lives at [facility]  If \"Yes\" to discuss with social work during IDT    Anticipatory grief assessment:   [x] Normal  / [] Maladaptive   [] Unable to obtain this information  [] n/a  If \"Maladaptive\" to discuss with social work during IDT    ESAS Anxiety:      ESAS Depression:          REVIEW OF SYSTEMS:     Positive and pertinent negative findings in ROS are noted above in HPI. The following systems were [x] reviewed / [] unable to be reviewed as noted in HPI  Other findings are noted below. Systems: constitutional, ears/nose/mouth/throat, respiratory, gastrointestinal, genitourinary, musculoskeletal, integumentary, neurologic, psychiatric, endocrine. Positive findings noted below. Modified ESAS Completed by: provider     Drowsiness: 10                 Dyspnea: 0           Stool Occurrence(s): 1        PHYSICAL EXAM:     From RN flowsheet:  Wt Readings from Last 3 Encounters:   10/27/22 143 lb 8.3 oz (65.1 kg)   09/06/22 135 lb (61.2 kg)   08/31/22 137 lb 2 oz (62.2 kg)     Blood pressure (!) 109/59, pulse 89, temperature 98.7 °F (37.1 °C), resp. rate 22, height 5' (1.524 m), weight 143 lb 8.3 oz (65.1 kg), SpO2 98 %.     Pain Scale 1: Numeric (0 - 10)  Pain Intensity 1: 0  Pain Onset 1: chronic but worse yesterday  Pain Location 1: Back, Head, Neck  Pain Orientation 1: Posterior (pt reports all over)  Pain Description 1: Aching  Pain Intervention(s) 1: Medication (see MAR)    Last bowel movement, if known:     Constitutional: patient resting on her side, on 1.5 ug/kg/hr  Eyes:eyes closed   ENMT: no nasal discharge, moist mucous membranes  Cardiovascular: afib  Respiratory:  using abdominal muscles to breath, O2 @ 4LPM, sats 93%   Gastrointestinal: large abdomen (likely ascites)  Musculoskeletal: no deformity, no tenderness to palpation  Skin: warm, dry, dried crusted lesions on both lower legs, L > R  Neurologic: not following commands  Psychiatric: sedated  Other:       HISTORY:     Active Problems:    Severe sepsis ([facility] (10/22/2022)    Past Medical History:   Diagnosis Date    Arthritis     Asthma     Chronic combined systolic and diastolic CHF (congestive heart failure) ([facility] 8/26/2022    Chronic kidney disease     Chronic pain     Cirrhosis ([facility] 12/17/2017    Diabetes ([facility] diet controlled    GERD (gastroesophageal reflux disease)     Hypertension     Paroxysmal atrial fibrillation ([facility] 10/6/2020      Past Surgical History:   Procedure Laterality Date    COLONOSCOPY N/A 1/12/2018    COLONOSCOPY performed by Merari Jordan MD at [facility] ENDOSCOPY    COLONOSCOPY N/A 3/19/2018    COLONOSCOPY performed by Merari Jordan MD at THE [facility] ENDOSCOPY    HX GYN  c section    HX VASCULAR ACCESS      dialysis     IR BX TRANSCATHETER  11/24/2020    IR PARACENTESIS ABD W IMAGE  10/22/2020      Family History   Family history unknown: Yes      History reviewed, no pertinent family history.   Social History     Tobacco Use    Smoking status: Never    Smokeless tobacco: Never   Substance Use Topics    Alcohol use: No     Allergies   Allergen Reactions    Aleve [Naproxen Sodium] Itching, Swelling and Other (comments)    Amlodipine Rash, Hives and Itching    Aspirin Other (comments), Nausea Only and Unknown (comments)     GI bleed  GI bleeding      Heparin Unknown (comments)     bleeding      Ibuprofen Nausea and Vomiting    Metformin Other (comments)     swelling      Current Facility-Administered Medications   Medication Dose Route Frequency    heparin (porcine) injection 5,000 Units  5,000 Units SubCUTAneous Q12H    rifAXIMin (XIFAXAN) tablet 550 mg  550 mg Oral BID    OLANZapine (ZyPREXA) tablet 5 mg  5 mg Oral QHS    heparin (porcine) 1,000 unit/mL injection 2,400 Units  2,400 Units InterCATHeter DIALYSIS PRN    And    heparin (porcine) 1,000 unit/mL injection 2,400 Units  2,400 Units InterCATHeter DIALYSIS PRN    albumin human 25% (BUMINATE) solution 12.5 g  12.5 g IntraVENous DIALYSIS PRN    dexmedeTOMidine in 0.9 % NaCl (PRECEDEX) 400 mcg/100 mL (4 mcg/mL) infusion soln  0.1-1.5 mcg/kg/hr IntraVENous TITRATE    [START ON 10/28/2022] oseltamivir (TAMIFLU) 6 mg/mL oral suspension 30 mg  30 mg Oral Q MON, WED & FRI    alteplase (CATHFLO) 1 mg in sterile water (preservative free) 1 mL injection  1 mg InterCATHeter PRN    amiodarone (CORDARONE) 375 mg/250 mL D5W infusion  0.5-1 mg/min IntraVENous TITRATE    albuterol-ipratropium (DUO-NEB) 2.5 MG-0.5 MG/3 ML  3 mL Nebulization Q4H PRN    dextrose 10 % infusion 125-250 mL  125-250 mL IntraVENous PRN    midodrine (PROAMATINE) tablet 20 mg  20 mg Oral Q8H    sodium chloride (NS) flush 5-40 mL  5-40 mL IntraVENous Q8H    sodium chloride (NS) flush 5-40 mL  5-40 mL IntraVENous PRN    acetaminophen (TYLENOL) tablet 650 mg  650 mg Oral Q6H PRN    Or    acetaminophen (TYLENOL) suppository 650 mg  650 mg Rectal Q6H PRN    polyethylene glycol (MIRALAX) packet 17 g  17 g Oral DAILY PRN    ondansetron (ZOFRAN ODT) tablet 4 mg  4 mg Oral Q8H PRN    Or    ondansetron (ZOFRAN) injection 4 mg  4 mg IntraVENous Q6H PRN    cefepime (MAXIPIME) 1 g in 0.9% sodium chloride (MBP/ADV) 50 mL MBP  1 g IntraVENous Q24H    amiodarone (CORDARONE) tablet 200 mg  200 mg Oral BID    atorvastatin (LIPITOR) tablet 40 mg  40 mg Oral QHS    lactulose (CHRONULAC) 10 gram/15 mL solution 30 mL  20 g Oral BID    [Held by provider] metoprolol tartrate (LOPRESSOR) tablet 12.5 mg  12.5 mg Oral BID    methocarbamoL (ROBAXIN) tablet 500 mg  500 mg Oral Q8H PRN    arformoteroL (BROVANA) neb solution 15 mcg  15 mcg Nebulization BID RT    And    budesonide (PULMICORT) 500 mcg/2 ml nebulizer suspension  500 mcg Nebulization BID RT          LAB AND IMAGING FINDINGS:     Lab Results   Component Value Date/Time    WBC 10.9 10/27/2022 04:19 AM    HGB 13.9 10/27/2022 04:19 AM    PLATELET 773 (L) 46/96/5799 04:19 AM     Lab Results   Component Value Date/Time    Sodium 135 (L) 10/27/2022 04:19 AM    Potassium 3.9 10/27/2022 04:19 AM Chloride 100 10/27/2022 04:19 AM    CO2 25 10/27/2022 04:19 AM    BUN 15 10/27/2022 04:19 AM    Creatinine 4.33 (H) 10/27/2022 04:19 AM    Calcium 8.5 10/27/2022 04:19 AM    Magnesium 1.9 10/27/2022 04:19 AM    Phosphorus 3.9 10/27/2022 04:19 AM      Lab Results   Component Value Date/Time    Alk. phosphatase 142 (H) 10/27/2022 04:19 AM    Protein, total 5.1 (L) 10/27/2022 04:19 AM    Albumin 2.5 (L) 10/27/2022 04:19 AM    Globulin 2.6 10/27/2022 04:19 AM     Lab Results   Component Value Date/Time    INR 1.5 (H) 10/22/2022 06:27 PM    Prothrombin time 15.2 (H) 10/22/2022 06:27 PM    aPTT 32.8 (H) 08/30/2022 12:47 AM    aPTT 28.6 09/25/2019 07:30 PM      Lab Results   Component Value Date/Time    Iron 43 11/27/2020 02:28 AM    TIBC 310 11/27/2020 02:28 AM    Iron % saturation 14 (L) 11/27/2020 02:28 AM    Ferritin 37 11/27/2020 02:28 AM      No results found for: PH, PCO2, PO2  No components found for: [place]   Lab Results   Component Value Date/Time    CK 78 09/25/2019 07:30 PM    CK - MB 3.5 09/25/2019 07:30 PM                Total time:  50 minutes  Counseling / coordination time, spent as noted above:  40 minutes  > 50% counseling / coordination?: yes    Prolonged service was provided for  []30 min   []75 min in face to face time in the presence of the patient, spent as noted above. Time Start:   Time End:   Note: this can only be billed with 27593 (initial) or 22853 (follow up). If multiple start / stop times, list each separately.

## 2022-10-27 NOTE — PROCEDURES
Indication - Hypercapnic respiratory failure  Diagnosis - As above    The patient was placed in a flat position. Induction and paralysis were achieved using versed and rocuronium. The patient was easily ventilated using an ambu bag. The [place] Shape was used and inserted into the oropharynx at which time there was a Grade 1 view of the vocal cords. A 7-Indonesian endotracheal tube was inserted and visualized going through the vocal cords. The stylette was removed. Colorimetric change was visualized on the CO2 meter. Breath sounds were heard in both lung fields equally. The endotracheal tube was placed at 22cm, measured at the teeth.     Daughter Luna Ernandez informed

## 2022-10-27 NOTE — PROGRESS NOTES
0800: Bedside and Verbal shift change report given to Isaac RN (oncoming nurse) by Enriqueta Carcamo RN (offgoing nurse). Report included the following information SBAR, Kardex, Recent Results, and Cardiac Rhythm Afib .      1400: RRT called on pt d/t increased WOB pt placed back on BIPAP  Respiratory Therapist,Attending MD ,  Rapid Nurse, intensivist at bedside. BMP/CBC ordered, ABG taken , CXR/KUB ordered. 1845: RN entered the room to give Meds and pull drain while in the room pt converted to sustained v-tach rapid response called  (/83,  pt alert and oriented O2 96% on 3l,) Attending,Rapid nurse, Intensivist @ bedside. 1858: 6mg adenosine given hr 168 no improvement     1901: 6mg adenosine given hr 163 no improvement     Pt transferred to CCU. 1930: Bedside and Verbal shift change report given to Enriqueta Carcamo RN (oncoming nurse) by Elio Castillo RN (offgoing nurse). Report included the following information SBAR, Kardex, Recent Results, and Cardiac Rhythm SVT.

## 2022-10-27 NOTE — PROGRESS NOTES
Occupational Therapy    Chart reviewed, discussed with RN, patient with AFIB with RVR and now back on BiPAP. Was restless all night and now settled, recommending to defer and follow up as appropriate.      Luis Martines MS, OTR/L

## 2022-10-27 NOTE — PROGRESS NOTES
HISTORY OF PRESENT ILLNESS: 67 y.o F w/ PMH of ESRD on HD, Afib, HFrEF (10-15%), Cirrhosis, DM, GERD, ANITHA who was admitted to [facility] for Afib w/ RVR, hyperkalemia, and hypoxic respiratory failure with improvement w/ HD and abx course. Pt noted to be flu (+)ve. Initially plan for discharge 10/25, however pt wished to remain in hospital and be transferred to [facility]. RRT called PM of 10/25 for decreased mental status, hypotension and tachycardia. Of note last HD 10/24 (no UF), and had an abdominal drain placed 10/24 for recurrent ascites w/ almost 4L drainage at time of placement. Pt also noted to have refused lactulose for several days. CCM consulted 10/25 for evaluation of hypotension w/ resolution after gentle IVF resuscitation. Pt w/ increasing tachycardia and dyspnea throughout 10/26, stopped HD 1hr early (no UF) around 1800 pt w/ acute increase in HR from 120s to 150-160s, wide complex on EKG. CXR w/o acute findings. Pt given Adenosine 6mg x2, w/o improvement.       Interval history    10/27-still requiring amiodarone infusion, on/off BiPAP      Current Facility-Administered Medications:     heparin (porcine) injection 5,000 Units, 5,000 Units, SubCUTAneous, Q12H, Moody CHRISTINA MD, 5,000 Units at 10/27/22 0818    rifAXIMin (XIFAXAN) tablet 550 mg, 550 mg, Oral, BID, Pietro Ballesteros MD, 550 mg at 10/27/22 1324    OLANZapine (ZyPREXA) tablet 5 mg, 5 mg, Oral, QHS, Pietro Ballesteros MD    heparin (porcine) 1,000 unit/mL injection 2,400 Units, 2,400 Units, InterCATHeter, DIALYSIS PRN **AND** heparin (porcine) 1,000 unit/mL injection 2,400 Units, 2,400 Units, InterCATHeter, DIALYSIS PRN, Valentina Temple MD    albumin human 25% (BUMINATE) solution 12.5 g, 12.5 g, IntraVENous, DIALYSIS PRN, Pradeep Nieves MD, 12.5 g at 10/26/22 1121    dexmedeTOMidine in 0.9 % NaCl (PRECEDEX) 400 mcg/100 mL (4 mcg/mL) infusion soln, 0.1-1.5 mcg/kg/hr, IntraVENous, TITRATE, Orlin Gaming MD, Last Rate: 26.8 mL/hr at 10/27/22 1324, 1.5 mcg/kg/hr at 10/27/22 1324    [START ON 10/28/2022] oseltamivir (TAMIFLU) 6 mg/mL oral suspension 30 mg, 30 mg, Oral, Q MON, WED & FRI, Ranjan Pickens MD    alteplase (CATHFLO) 1 mg in sterile water (preservative free) 1 mL injection, 1 mg, InterCATHeter, PRN, Italia Waller MD, 1 mg at 10/27/22 1342    amiodarone (CORDARONE) 375 mg/250 mL D5W infusion, 0.5-1 mg/min, IntraVENous, TITRATE, Yair Salazar MD, Last Rate: 40 mL/hr at 10/27/22 1232, 1 mg/min at 10/27/22 1232    albuterol-ipratropium (DUO-NEB) 2.5 MG-0.5 MG/3 ML, 3 mL, Nebulization, Q4H PRN, Koffi Matthews MD, 3 mL at 10/26/22 1828    dextrose 10 % infusion 125-250 mL, 125-250 mL, IntraVENous, PRN, Koffi Matthews MD, 125 mL at 10/26/22 2020    midodrine (PROAMATINE) tablet 20 mg, 20 mg, Oral, Q8H, Koffi Matthews MD, 20 mg at 10/27/22 1324    sodium chloride (NS) flush 5-40 mL, 5-40 mL, IntraVENous, Q8H, Koffi Matthews MD, 10 mL at 10/27/22 1342    sodium chloride (NS) flush 5-40 mL, 5-40 mL, IntraVENous, PRN, Koffi Matthews MD    acetaminophen (TYLENOL) tablet 650 mg, 650 mg, Oral, Q6H PRN, 650 mg at 10/25/22 1912 **OR** acetaminophen (TYLENOL) suppository 650 mg, 650 mg, Rectal, Q6H PRN, Koffi Matthews MD    polyethylene glycol (MIRALAX) packet 17 g, 17 g, Oral, DAILY PRN, Koffi Matthews MD    ondansetron (ZOFRAN ODT) tablet 4 mg, 4 mg, Oral, Q8H PRN **OR** ondansetron (ZOFRAN) injection 4 mg, 4 mg, IntraVENous, Q6H PRN, Koffi Matthews MD, 4 mg at 10/24/22 0501    cefepime (MAXIPIME) 1 g in 0.9% sodium chloride (MBP/ADV) 50 mL MBP, 1 g, IntraVENous, Q24H, Koffi Matthews MD, Last Rate: 12.5 mL/hr at 10/27/22 0224, 1 g at 10/27/22 7355    amiodarone (CORDARONE) tablet 200 mg, 200 mg, Oral, BID, Koffi Matthews MD, 200 mg at 10/27/22 1325    atorvastatin (LIPITOR) tablet 40 mg, 40 mg, Oral, QHS, Koffi Matthews MD, 40 mg at 10/25/22 6159    lactulose (CHRONULAC) 10 gram/15 mL solution 30 mL, 20 g, Oral, BID, Eulalio Tejada MD, 30 mL at 10/27/22 1324    [Held by provider] metoprolol tartrate (LOPRESSOR) tablet 12.5 mg, 12.5 mg, Oral, BID, Eulalio Tejada MD, 12.5 mg at 10/24/22 0845    methocarbamoL (ROBAXIN) tablet 500 mg, 500 mg, Oral, Q8H PRN, Eulalio Tejada MD, 500 mg at 10/25/22 1642    arformoteroL (BROVANA) neb solution 15 mcg, 15 mcg, Nebulization, BID RT, 15 mcg at 10/27/22 0756 **AND** budesonide (PULMICORT) 500 mcg/2 ml nebulizer suspension, 500 mcg, Nebulization, BID RT, Eulalio Tejada MD, 500 mcg at 10/27/22 0756      VITAL SIGNS:  Visit Vitals  BP (!) 109/59   Pulse 89   Temp 98.7 °F (37.1 °C)   Resp 22   Ht 5' (1.524 m)   Wt 65.1 kg (143 lb 8.3 oz)   SpO2 98%   BMI 28.03 kg/m²     PHYSICAL EXAMINATION:  General-in moderate respiratory distress  Neuro-sleepy, moves upper spontaneously, not following commands  Cardiac-irregular  Lungs-use of accessory muscles, crackles in the bases  Abdomen-soft, slightly distended, nontender  Extremities-warm      LABORATORY ANALYSIS:  Recent Results (from the past 24 hour(s))   EKG, 12 LEAD, INITIAL    Collection Time: 10/26/22  6:50 PM   Result Value Ref Range    Ventricular Rate 165 BPM    Atrial Rate 165 BPM    QRS Duration 160 ms    Q-T Interval 290 ms    QTC Calculation (Bezet) 480 ms    Calculated R Axis -70 degrees    Calculated T Axis 136 degrees    Diagnosis       Wide QRS tachycardia  Left axis deviation  Left ventricular hypertrophy with QRS widening  Possible Lateral infarct , age undetermined  Inferior infarct , age undetermined  When compared with ECG of 22-OCT-2022 14:19,  Wide QRS tachycardia has replaced Sinus rhythm  Vent.  rate has increased BY  65 BPM     GLUCOSE, POC    Collection Time: 10/26/22  7:29 PM   Result Value Ref Range    Glucose (POC) 70 65 - 117 mg/dL    Performed by Shreyas Sanford, POC    Collection Time: 10/26/22  8:36 PM   Result Value Ref Range    Glucose (POC) 149 (H) 65 - 117 mg/dL    Performed by Lilia Styles    POC G3 - PUL    Collection Time: 10/26/22  9:25 PM   Result Value Ref Range    FIO2 (POC) 40 %    pH (POC) 7.37 7.35 - 7.45      pCO2 (POC) 41.0 35.0 - 45.0 MMHG    pO2 (POC) 85 80 - 100 MMHG    HCO3 (POC) 23.5 22 - 26 MMOL/L    sO2 (POC) 96.1 92 - 97 %    Base deficit (POC) 1.8 mmol/L    Site RIGHT RADIAL      Device: BIPAP MASK      Mode BILEVEL      PEEP/CPAP (POC) 5 cmH2O    Pressure support 5 cmH2O    Allens test (POC) NOT APPLICABLE      Specimen type (POC) ARTERIAL     CBC WITH AUTOMATED DIFF    Collection Time: 10/27/22  4:19 AM   Result Value Ref Range    WBC 10.9 3.6 - 11.0 K/uL    RBC 5.13 3.80 - 5.20 M/uL    HGB 13.9 11.5 - 16.0 g/dL    HCT 47.2 (H) 35.0 - 47.0 %    MCV 92.0 80.0 - 99.0 FL    MCH 27.1 26.0 - 34.0 PG    MCHC 29.4 (L) 30.0 - 36.5 g/dL    RDW 18.7 (H) 11.5 - 14.5 %    PLATELET 713 (L) 793 - 400 K/uL    MPV 10.8 8.9 - 12.9 FL    NRBC 0.7 (H) 0  WBC    ABSOLUTE NRBC 0.08 (H) 0.00 - 0.01 K/uL    NEUTROPHILS 90 (H) 32 - 75 %    LYMPHOCYTES 3 (L) 12 - 49 %    MONOCYTES 6 5 - 13 %    EOSINOPHILS 0 0 - 7 %    BASOPHILS 0 0 - 1 %    IMMATURE GRANULOCYTES 1 (H) 0.0 - 0.5 %    ABS. NEUTROPHILS 9.8 (H) 1.8 - 8.0 K/UL    ABS. LYMPHOCYTES 0.3 (L) 0.8 - 3.5 K/UL    ABS. MONOCYTES 0.7 0.0 - 1.0 K/UL    ABS. EOSINOPHILS 0.0 0.0 - 0.4 K/UL    ABS. BASOPHILS 0.0 0.0 - 0.1 K/UL    ABS. IMM.  GRANS. 0.1 (H) 0.00 - 0.04 K/UL    DF SMEAR SCANNED      RBC COMMENTS ANISOCYTOSIS  1+        RBC COMMENTS OVALOCYTES  1+        RBC COMMENTS POLYCHROMASIA  PRESENT        RBC COMMENTS RAINE CELLS  PRESENT       METABOLIC PANEL, COMPREHENSIVE    Collection Time: 10/27/22  4:19 AM   Result Value Ref Range    Sodium 135 (L) 136 - 145 mmol/L    Potassium 3.9 3.5 - 5.1 mmol/L    Chloride 100 97 - 108 mmol/L    CO2 25 21 - 32 mmol/L    Anion gap 10 5 - 15 mmol/L    Glucose 168 (H) 65 - 100 mg/dL    BUN 15 6 - 20 MG/DL    Creatinine 4.33 (H) 0.55 - 1.02 MG/DL    BUN/Creatinine ratio 3 (L) 12 - 20 eGFR 10 (L) >60 ml/min/1.73m2    Calcium 8.5 8.5 - 10.1 MG/DL    Bilirubin, total 1.0 0.2 - 1.0 MG/DL    ALT (SGPT) 22 12 - 78 U/L    AST (SGOT) 35 15 - 37 U/L    Alk. phosphatase 142 (H) 45 - 117 U/L    Protein, total 5.1 (L) 6.4 - 8.2 g/dL    Albumin 2.5 (L) 3.5 - 5.0 g/dL    Globulin 2.6 2.0 - 4.0 g/dL    A-G Ratio 1.0 (L) 1.1 - 2.2     MAGNESIUM    Collection Time: 10/27/22  4:19 AM   Result Value Ref Range    Magnesium 1.9 1.6 - 2.4 mg/dL   PHOSPHORUS    Collection Time: 10/27/22  4:19 AM   Result Value Ref Range    Phosphorus 3.9 2.6 - 4.7 MG/DL     No results displayed because visit has over 200 results. RADIOGRAPHIC STUDIES:  XR ABD (KUB)  Narrative: EXAM:  XR ABD (KUB)    INDICATION: Dobbhoff tube placement    COMPARISON: 10/26/2022. TECHNIQUE: Supine frontal abdomen (KUB). FINDINGS: No dilated small bowel. Dobbhoff tube within the gastric lumen. Stool  and gas are present in the colon. No pathologic calcification. Osseous  structures are age appropriate. Impression: Dobbhoff tube within the gastric lumen.         DIAGNOSES:  Wide complex tachycardia   Acute hypoxic respiratory failure  ESRD  HFrEF  Cirrhosis  DM    IMPRESSION AND PLAN:    Precedex as needed for agitation, start olanzapine, continue lactulose therapy-titrated to 3 bowel movements a day, start rifaximin as well, delirium prevention strategies    Continue hemodynamic monitoring, map goal greater than 65, currently requiring high-dose midodrine, norepinephrine if needed, most recent echo showed an EF of 10 to 15%, severe TR, moderate MR, and PASP's in the mid 40s, A. fib RVR with aberrancy-continue amiodarone infusion, heart rate goal less than 110, keep magnesium above 2 and potassium above 4, continue Lipitor    On BiPAP, high risk of needing to be intubated, wean as tolerated, saturation goal greater than 90%    Feeding tube placed-start trickle feeds, as above-ensure bowel movements    ESRD on [id number] nephrology recommendations, dose medication renally, correct electrolyte derangements as needed    Heparin for DVT prophylaxis    Flu +10/22, Tamiflu started by my colleague 10/26, on empiric cefepime-continue for now, bacterial micro studies negative to date    Keep glucose less than 180    Critical care time-40 minutes    The patient is critically ill with single or multiple systems failure, severe metabolic derangement and/or infection, has potential for life threatening deterioration, requires high complexity decision making, frequent evaluation and titration of therapies and interpretation of data. This note has been written with voice recognition software. While this note has been edited for accuracy, the software periodically misinterprets speech resulting in errors that might not have been caught in editing. In the event an unusual error is found in this record, please read the chart carefully and recognize, using context, where these substitutions or errors have occurred and please notify me to resolve the errors.

## 2022-10-27 NOTE — PROGRESS NOTES
Problem: Risk for Spread of Infection  Goal: Prevent transmission of infectious organism to others  Description: Prevent the transmission of infectious organisms to other patients, staff members, and visitors. Outcome: Progressing Towards Goal     Problem: Patient Education:  Go to Education Activity  Goal: Patient/Family Education  Outcome: Progressing Towards Goal     Problem: Falls - Risk of  Goal: *Absence of Falls  Description: Document Lettie Duverney Fall Risk and appropriate interventions in the flowsheet.   Outcome: Progressing Towards Goal  Note: Fall Risk Interventions:  Mobility Interventions: Assess mobility with egress test, Bed/chair exit alarm, Communicate number of staff needed for ambulation/transfer, OT consult for ADLs, Patient to call before getting OOB, PT Consult for mobility concerns, PT Consult for assist device competence, Strengthening exercises (ROM-active/passive), Utilize walker, cane, or other assistive device, Utilize gait belt for transfers/ambulation    Mentation Interventions: Adequate sleep, hydration, pain control, Bed/chair exit alarm, Door open when patient unattended, Evaluate medications/consider consulting pharmacy, More frequent rounding, Increase mobility, Reorient patient, Room close to nurse's station, Toileting rounds, Update white board    Medication Interventions: Bed/chair exit alarm, Evaluate medications/consider consulting pharmacy    Elimination Interventions: Bed/chair exit alarm, Call light in reach, Patient to call for help with toileting needs, Toileting schedule/hourly rounds    History of Falls Interventions: Bed/chair exit alarm, Consult care management for discharge planning, Door open when patient unattended, Evaluate medications/consider consulting pharmacy, Investigate reason for fall, Room close to nurse's station, Utilize gait belt for transfer/ambulation, Assess for delayed presentation/identification of injury for 48 hrs (comment for end date), Vital signs minimum Q4HRs X 24 hrs (comment for end date)         Problem: Patient Education: Go to Patient Education Activity  Goal: Patient/Family Education  Outcome: Progressing Towards Goal     Problem: Pressure Injury - Risk of  Goal: *Prevention of pressure injury  Description: Document Vic Scale and appropriate interventions in the flowsheet. Outcome: Progressing Towards Goal  Note: Pressure Injury Interventions:  Sensory Interventions: Assess changes in LOC, Assess need for specialty bed, Avoid rigorous massage over bony prominences, Check visual cues for pain, Float heels, Keep linens dry and wrinkle-free, Maintain/enhance activity level, Monitor skin under medical devices, Turn and reposition approx. every two hours (pillows and wedges if needed)    Moisture Interventions: Absorbent underpads, Apply protective barrier, creams and emollients, Assess need for specialty bed, Check for incontinence Q2 hours and as needed, Maintain skin hydration (lotion/cream), Minimize layers    Activity Interventions: Assess need for specialty bed, Increase time out of bed, Pressure redistribution bed/mattress(bed type)    Mobility Interventions: Assess need for specialty bed, Float heels, Pressure redistribution bed/mattress (bed type), Turn and reposition approx.  every two hours(pillow and wedges)    Nutrition Interventions: Document food/fluid/supplement intake, Discuss nutritional consult with provider    Friction and Shear Interventions: Apply protective barrier, creams and emollients, Foam dressings/transparent film/skin sealants, Lift sheet, Minimize layers, Transferring/repositioning devices                Problem: Patient Education: Go to Patient Education Activity  Goal: Patient/Family Education  Outcome: Progressing Towards Goal     Problem: Pain  Goal: *Control of Pain  Outcome: Progressing Towards Goal  Goal: *PALLIATIVE CARE:  Alleviation of Pain  Outcome: Progressing Towards Goal     Problem: Patient Education: Go to Patient Education Activity  Goal: Patient/Family Education  Outcome: Progressing Towards Goal     Problem: Chronic Renal Failure  Goal: *Fluid and electrolytes stabilized  Outcome: Progressing Towards Goal     Problem: Patient Education: Go to Patient Education Activity  Goal: Patient/Family Education  Outcome: Progressing Towards Goal     Problem: Infection - Risk of, Central Venous Catheter-Associated Bloodstream Infection  Goal: *Absence of infection signs and symptoms  Outcome: Progressing Towards Goal     Problem: Patient Education: Go to Patient Education Activity  Goal: Patient/Family Education  Outcome: Progressing Towards Goal     Problem: Patient Education: Go to Patient Education Activity  Goal: Patient/Family Education  Outcome: Progressing Towards Goal     Problem: Afib Pathway: Day 1  Goal: Off Pathway (Use only if patient is Off Pathway)  Outcome: Progressing Towards Goal  Goal: Activity/Safety  Outcome: Progressing Towards Goal  Goal: Consults, if ordered  Outcome: Progressing Towards Goal  Goal: Diagnostic Test/Procedures  Outcome: Progressing Towards Goal  Goal: Nutrition/Diet  Outcome: Progressing Towards Goal  Goal: Discharge Planning  Outcome: Progressing Towards Goal  Goal: Medications  Outcome: Progressing Towards Goal  Goal: Respiratory  Outcome: Progressing Towards Goal  Goal: Treatments/Interventions/Procedures  Outcome: Progressing Towards Goal  Goal: Psychosocial  Outcome: Progressing Towards Goal  Goal: *Optimal pain control at patient's stated goal  Outcome: Progressing Towards Goal  Goal: *Hemodynamically stable  Outcome: Progressing Towards Goal  Goal: *Stable cardiac rhythm  Outcome: Progressing Towards Goal  Goal: *Lungs clear or at baseline  Outcome: Progressing Towards Goal  Goal: *Labs within defined limits  Outcome: Progressing Towards Goal  Goal: *Describes available resources and support systems  Outcome: Progressing Towards Goal     Problem: Afib Pathway: Day 2  Goal: Off Pathway (Use only if patient is Off Pathway)  Outcome: Progressing Towards Goal  Goal: Activity/Safety  Outcome: Progressing Towards Goal  Goal: Consults, if ordered  Outcome: Progressing Towards Goal  Goal: Diagnostic Test/Procedures  Outcome: Progressing Towards Goal  Goal: Nutrition/Diet  Outcome: Progressing Towards Goal  Goal: Discharge Planning  Outcome: Progressing Towards Goal  Goal: Medications  Outcome: Progressing Towards Goal  Goal: Respiratory  Outcome: Progressing Towards Goal  Goal: Treatments/Interventions/Procedures  Outcome: Progressing Towards Goal  Goal: Psychosocial  Outcome: Progressing Towards Goal  Goal: *Hemodynamically stable  Outcome: Progressing Towards Goal  Goal: *Optimal pain control at patient's stated goal  Outcome: Progressing Towards Goal  Goal: *Stable cardiac rhythm  Outcome: Progressing Towards Goal  Goal: *Lungs clear or at baseline  Outcome: Progressing Towards Goal  Goal: *Describes available resources and support systems  Outcome: Progressing Towards Goal     Problem: Afib Pathway: Day 3  Goal: Off Pathway (Use only if patient is Off Pathway)  Outcome: Progressing Towards Goal  Goal: Activity/Safety  Outcome: Progressing Towards Goal  Goal: Diagnostic Test/Procedures  Outcome: Progressing Towards Goal  Goal: Nutrition/Diet  Outcome: Progressing Towards Goal  Goal: Discharge Planning  Outcome: Progressing Towards Goal  Goal: Medications  Outcome: Progressing Towards Goal  Goal: Respiratory  Outcome: Progressing Towards Goal  Goal: Treatments/Interventions/Procedures  Outcome: Progressing Towards Goal  Goal: Psychosocial  Outcome: Progressing Towards Goal     Problem: Afib: Discharge Outcomes (not in CAT)  Goal: *Hemodynamically stable  Outcome: Progressing Towards Goal  Goal: *Stable cardiac rhythm  Outcome: Progressing Towards Goal  Goal: *Lungs clear or at baseline  Outcome: Progressing Towards Goal  Goal: *Optimal pain control at patient's stated goal  Outcome: Progressing Towards Goal  Goal: *Identifies cardiac risk factors  Outcome: Progressing Towards Goal  Goal: *Verbalizes home exercise program, activity guidelines, cardiac precautions  Outcome: Progressing Towards Goal  Goal: *Verbalizes understanding and describes prescribed diet  Outcome: Progressing Towards Goal  Goal: *Verbalizes understanding and describes medication purposes and frequencies  Outcome: Progressing Towards Goal  Goal: *Anxiety reduced or absent  Outcome: Progressing Towards Goal  Goal: *Understands and describes signs and symptoms to report to providers(Stroke Metric)  Outcome: Progressing Towards Goal  Goal: *Describes follow-up/return visits to physicians  Outcome: Progressing Towards Goal  Goal: *Describes available resources and support systems  Outcome: Progressing Towards Goal  Goal: *Influenza immunization  Outcome: Progressing Towards Goal  Goal: *Pneumococcal immunization  Outcome: Progressing Towards Goal  Goal: *Describes smoking cessation resources  Outcome: Progressing Towards Goal

## 2022-10-27 NOTE — PROGRESS NOTES
Nephrology Progress Note  Lyle Alarcon Fenton  Date of Admission : 10/22/2022    CC:  Follow up for ESRD       Assessment and Plan     ESRD on dialysis   - Patient of [address] [facility] [place], [address] [facility] MWF  - HD tomorrow per routine    Ascites:  - s/p paracentesis, drain removed    Hypotension:  - stable now     Afib w/ RVR:  - on amio drip     Cardiac Cirrhosis   RV failure, severe TR  Chronic HFrEF   Chronic hypotension     Anemia   - no need for CAROL ANN      Hep C, hx of  DMII       Interval History:  Seen and examined. Tx to CCU for afib w/ RVR, resp distress. On precedex, amio drip and BiPAP. Should be able to come off. HD yesterday, UF 500cc. Unable to obtain ROS    Current Medications: all current  Medications have been eviewed in EPIC  Review of Systems: Pertinent items are noted in HPI. Objective:  Vitals:    Vitals:    10/27/22 0700 10/27/22 0756 10/27/22 0800 10/27/22 0900   BP: 108/62  (!) 111/56 116/75   Pulse: 87  79 (!) 108   Resp: (!) 31  28 22   Temp:   97.4 °F (36.3 °C)    TempSrc:       SpO2: 99% 94% 100% 98%   Weight:       Height:         Intake and Output:  10/27 0701 - 10/27 1900  In: 24.3 [I.V.:24.3]  Out: -   10/25 1901 - 10/27 0700  In: 895.3 [I.V.:895.3]  Out: -49     Physical Examination:    General: NAD,Conversant   Neck:  Supple, no mass  Resp:  Lungs CTA B/L, no wheezing , normal respiratory effort  CV:  RRR,  no murmur or rub, no LE edema  GI:  Soft, NT, + distention, paracentesis catheter in place  Neurologic:  Non focal  Psych:             AAO x 3 appropriate affect   Skin:  No Rash  Access:           [facility]    []    High complexity decision making was performed  []    Patient is at high-risk of decompensation with multiple organ involvement    Lab Data Personally Reviewed: I have reviewed all the pertinent labs, microbiology data and radiology studies during assessment.     Recent Labs     10/27/22  0419 10/26/22  1416 10/25/22  2105   * 138 135*   K 3.9 3.6 HEMOLYZED,RECOLLECT REQUESTED   CL 100 101 103   CO2 25 30 24   * 62* 86   BUN 15 12 20   CREA 4.33* 3.34* 5.30*   CA 8.5 8.1* 8.9   MG 1.9  --  HEMOLYZED,RECOLLECT REQUESTED   PHOS 3.9  --  HEMOLYZED,RECOLLECT REQUESTED   ALB 2.5*  --   --    ALT 22  --   --        Recent Labs     10/27/22  0419 10/26/22  1416 10/25/22  0349   WBC 10.9 8.6 7.3   HGB 13.9 13.5 14.4   HCT 47.2* 45.9 49.3*   * 117* 125*       No results found for: SDES  Lab Results   Component Value Date/Time    Culture result: MRSA NOT PRESENT 10/22/2022 06:27 PM    Culture result:  10/22/2022 06:27 PM     Screening of patient nares for MRSA is for surveillance purposes and, if positive, to facilitate isolation considerations in high risk settings. It is not intended for automatic decolonization interventions per se as regimens are not sufficiently effective to warrant routine use.       Culture result: NO GROWTH 5 DAYS 10/22/2022 02:13 AM    Culture result: NO GROWTH 5 DAYS 10/22/2022 02:13 AM     Recent Results (from the past 24 hour(s))   GLUCOSE, POC    Collection Time: 10/26/22  2:06 PM   Result Value Ref Range    Glucose (POC) 71 65 - 117 mg/dL    Performed by Fairmount Mantel    METABOLIC PANEL, BASIC    Collection Time: 10/26/22  2:16 PM   Result Value Ref Range    Sodium 138 136 - 145 mmol/L    Potassium 3.6 3.5 - 5.1 mmol/L    Chloride 101 97 - 108 mmol/L    CO2 30 21 - 32 mmol/L    Anion gap 7 5 - 15 mmol/L    Glucose 62 (L) 65 - 100 mg/dL    BUN 12 6 - 20 MG/DL    Creatinine 3.34 (H) 0.55 - 1.02 MG/DL    BUN/Creatinine ratio 4 (L) 12 - 20      eGFR 14 (L) >60 ml/min/1.73m2    Calcium 8.1 (L) 8.5 - 10.1 MG/DL   CBC WITH AUTOMATED DIFF    Collection Time: 10/26/22  2:16 PM   Result Value Ref Range    WBC 8.6 3.6 - 11.0 K/uL    RBC 4.96 3.80 - 5.20 M/uL    HGB 13.5 11.5 - 16.0 g/dL    HCT 45.9 35.0 - 47.0 %    MCV 92.5 80.0 - 99.0 FL    MCH 27.2 26.0 - 34.0 PG    MCHC 29.4 (L) 30.0 - 36.5 g/dL    RDW 18.6 (H) 11.5 - 14.5 %    PLATELET 761 (L) 510 - 400 K/uL    MPV 10.8 8.9 - 12.9 FL    NRBC 0.9 (H) 0  WBC    ABSOLUTE NRBC 0.08 (H) 0.00 - 0.01 K/uL    NEUTROPHILS 88 (H) 32 - 75 %    LYMPHOCYTES 4 (L) 12 - 49 %    MONOCYTES 6 5 - 13 %    EOSINOPHILS 1 0 - 7 %    BASOPHILS 0 0 - 1 %    IMMATURE GRANULOCYTES 1 (H) 0.0 - 0.5 %    ABS. NEUTROPHILS 7.6 1.8 - 8.0 K/UL    ABS. LYMPHOCYTES 0.3 (L) 0.8 - 3.5 K/UL    ABS. MONOCYTES 0.5 0.0 - 1.0 K/UL    ABS. EOSINOPHILS 0.1 0.0 - 0.4 K/UL    ABS. BASOPHILS 0.0 0.0 - 0.1 K/UL    ABS. IMM.  GRANS. 0.1 (H) 0.00 - 0.04 K/UL    DF SMEAR SCANNED      PLATELET COMMENTS Large Platelets      RBC COMMENTS RAINE CELLS  PRESENT        RBC COMMENTS OVALOCYTES  PRESENT        RBC COMMENTS ANISOCYTOSIS  1+       POC G3 - PUL    Collection Time: 10/26/22  2:16 PM   Result Value Ref Range    FIO2 (POC) 40 %    pH (POC) 7.38 7.35 - 7.45      pCO2 (POC) 44.7 35.0 - 45.0 MMHG    pO2 (POC) 82 80 - 100 MMHG    HCO3 (POC) 26.4 (H) 22 - 26 MMOL/L    sO2 (POC) 95.7 92 - 97 %    Base excess (POC) 0.8 mmol/L    Site LEFT RADIAL      Device: BIPAP MASK      Set Rate 12 bpm    PEEP/CPAP (POC) 5 cmH2O    Pressure support 10 cmH2O    Allens test (POC) Positive      Specimen type (POC) ARTERIAL     POC CHEM8    Collection Time: 10/26/22  2:19 PM   Result Value Ref Range    Glucose, POC 72 (L) 74 - 106 MG/DL    Creatinine, POC 3.4 (H) 0.6 - 1.3 MG/DL    eGFR (POC) 14 (L) >60 ml/min/1.73m2    Sodium,  136 - 145 MMOL/L    Potassium, POC 3.4 (L) 3.5 - 5.5 MMOL/L    Calcium, ionized (POC) 1.09 (L) 1.12 - 1.32 mmol/L    Chloride, POC 98 (L) 100 - 108 MMOL/L   POC LACTIC ACID    Collection Time: 10/26/22  2:19 PM   Result Value Ref Range    Lactic Acid (POC) 1.60 0.40 - 2.00 mmol/L   EKG, 12 LEAD, INITIAL    Collection Time: 10/26/22  6:50 PM   Result Value Ref Range    Ventricular Rate 165 BPM    Atrial Rate 165 BPM    QRS Duration 160 ms    Q-T Interval 290 ms    QTC Calculation (Bezet) 480 ms    Calculated R Axis -70 degrees    Calculated T Axis 136 degrees Diagnosis       Wide QRS tachycardia  Left axis deviation  Left ventricular hypertrophy with QRS widening  Possible Lateral infarct , age undetermined  Inferior infarct , age undetermined  When compared with ECG of 22-OCT-2022 14:19,  Wide QRS tachycardia has replaced Sinus rhythm  Vent. rate has increased BY  65 BPM     GLUCOSE, POC    Collection Time: 10/26/22  7:29 PM   Result Value Ref Range    Glucose (POC) 70 65 - 117 mg/dL    Performed by [employer] Digital Ringer    POC G3 - PUL    Collection Time: 10/26/22  9:25 PM   Result Value Ref Range    FIO2 (POC) 40 %    pH (POC) 7.37 7.35 - 7.45      pCO2 (POC) 41.0 35.0 - 45.0 MMHG    pO2 (POC) 85 80 - 100 MMHG    HCO3 (POC) 23.5 22 - 26 MMOL/L    sO2 (POC) 96.1 92 - 97 %    Base deficit (POC) 1.8 mmol/L    Site RIGHT RADIAL      Device: BIPAP MASK      Mode BILEVEL      PEEP/CPAP (POC) 5 cmH2O    Pressure support 5 cmH2O    Allens test (POC) NOT APPLICABLE      Specimen type (POC) ARTERIAL     CBC WITH AUTOMATED DIFF    Collection Time: 10/27/22  4:19 AM   Result Value Ref Range    WBC 10.9 3.6 - 11.0 K/uL    RBC 5.13 3.80 - 5.20 M/uL    HGB 13.9 11.5 - 16.0 g/dL    HCT 47.2 (H) 35.0 - 47.0 %    MCV 92.0 80.0 - 99.0 FL    MCH 27.1 26.0 - 34.0 PG    MCHC 29.4 (L) 30.0 - 36.5 g/dL    RDW 18.7 (H) 11.5 - 14.5 %    PLATELET 399 (L) 006 - 400 K/uL    MPV 10.8 8.9 - 12.9 FL    NRBC 0.7 (H) 0  WBC    ABSOLUTE NRBC 0.08 (H) 0.00 - 0.01 K/uL    NEUTROPHILS 90 (H) 32 - 75 %    LYMPHOCYTES 3 (L) 12 - 49 %    MONOCYTES 6 5 - 13 %    EOSINOPHILS 0 0 - 7 %    BASOPHILS 0 0 - 1 %    IMMATURE GRANULOCYTES 1 (H) 0.0 - 0.5 %    ABS. NEUTROPHILS 9.8 (H) 1.8 - 8.0 K/UL    ABS. LYMPHOCYTES 0.3 (L) 0.8 - 3.5 K/UL    ABS. MONOCYTES 0.7 0.0 - 1.0 K/UL    ABS. EOSINOPHILS 0.0 0.0 - 0.4 K/UL    ABS. BASOPHILS 0.0 0.0 - 0.1 K/UL    ABS. IMM.  GRANS. 0.1 (H) 0.00 - 0.04 K/UL    DF SMEAR SCANNED      RBC COMMENTS ANISOCYTOSIS  1+        RBC COMMENTS OVALOCYTES  1+        RBC COMMENTS POLYCHROMASIA  PRESENT        RBC COMMENTS RAINE CELLS  PRESENT       METABOLIC PANEL, COMPREHENSIVE    Collection Time: 10/27/22  4:19 AM   Result Value Ref Range    Sodium 135 (L) 136 - 145 mmol/L    Potassium 3.9 3.5 - 5.1 mmol/L    Chloride 100 97 - 108 mmol/L    CO2 25 21 - 32 mmol/L    Anion gap 10 5 - 15 mmol/L    Glucose 168 (H) 65 - 100 mg/dL    BUN 15 6 - 20 MG/DL    Creatinine 4.33 (H) 0.55 - 1.02 MG/DL    BUN/Creatinine ratio 3 (L) 12 - 20      eGFR 10 (L) >60 ml/min/1.73m2    Calcium 8.5 8.5 - 10.1 MG/DL    Bilirubin, total 1.0 0.2 - 1.0 MG/DL    ALT (SGPT) 22 12 - 78 U/L    AST (SGOT) 35 15 - 37 U/L    Alk. phosphatase 142 (H) 45 - 117 U/L    Protein, total 5.1 (L) 6.4 - 8.2 g/dL    Albumin 2.5 (L) 3.5 - 5.0 g/dL    Globulin 2.6 2.0 - 4.0 g/dL    A-G Ratio 1.0 (L) 1.1 - 2.2     MAGNESIUM    Collection Time: 10/27/22  4:19 AM   Result Value Ref Range    Magnesium 1.9 1.6 - 2.4 mg/dL   PHOSPHORUS    Collection Time: 10/27/22  4:19 AM   Result Value Ref Range    Phosphorus 3.9 2.6 - 4.7 MG/DL             Alison Velasquez MD  [address]  Phone - [phone]   Fax - [phone]  [email or web address]

## 2022-10-27 NOTE — PROGRESS NOTES
700 Guy Report received from [place], [facility]. Precedex @1.5, Amio @1, Apoorva Chikis @10.     1600 Noted draining around old paracentesis drain. CHG bath completed. Pt c/o back pain and wanting to go home. Pt repositioned. 1630 Irregular, labored breathing noted. Unable to obtain O2 saturations. Made MD aware. Orders for ABGs. Paged RT.     [id number] RT @bedside attempting to obtain ABGs. 1651 Pt HR 47bpm. RN and MD reports to room. Pulse present, pt unresponsive to sternal rub. Precedex and Amio gtts stopped. RN bagging pt. RT placed pt on Bipap. Noninvasive BP 160s/100s. Unable to obtain O2 saturations. 1655 Unable to obtain ABG. VBG obtained and reviewed results with MD. RT/MD adjusting Bipap settings. Dr. Jesús Childress orders for Levo gtt. [address] [facility] Dr. Jesús Childress @bedside placing left arterial line. Plans for ABG in 30 minutes. 1725 Pt continues to be unresponsive with agonal breathing. MD orders to prepare for intubation. 1733 2mg Versed IVP administered. 1734 50mg Jimmy IVP administered. Dr. Jesús Childress and RT @bedside intubating pt.     0471 Precedex gtt started @0.8. Fent gtt ordered. 1736 7.0 [email or web address] [email or web address]. A/C R24, 400, P5, FiO2 100%. CXR to follow to confirm placement. Restraints placed for safety. Weaning Levo gtt as pt tolerates. 1800 MD and RN updated pt's daughters via telephone. 1830 CXR @bedside. 1845 EKG obtained. 1900 Paged RT for ABGs. 1930 Bedside and Verbal shift change report given to Jaime Hawthorne RN (oncoming nurse) by Brian Lerma RN (offgoing nurse). Report included the following information SBAR, Kardex, Intake/Output, MAR, Accordion, Recent Results, and Cardiac Rhythm BBB Afib/Atrial Rolando-see EKG .

## 2022-10-27 NOTE — PROGRESS NOTES
Bedside shift change report given to CAROLINE Kinney (oncoming nurse) by Odilia Pal RN (offgoing nurse). Report included the following information SBAR, Kardex, ED Summary, Intake/Output, MAR, Accordion, Recent Results, Med Rec Status, Cardiac Rhythm A-Fib, Alarm Parameters , and Dual Neuro Assessment. 1162: Pt refusing AM medications. Explained importance of taking them and patient stating \"I don't care, I'm not going to take them, just let me go. \"     3036: Pt noted to be in A-Fib RVR and pt restless in bed yelling to get out of bed. Juan Gaming MD notified. Precedex increased to 1 and Amiodarone to 1. Pt out of A Fib RVR shortly after and resting comfortably.

## 2022-10-27 NOTE — PROGRESS NOTES
Comprehensive Nutrition Assessment    Type and Reason for Visit: Initial, Consult    Nutrition Recommendations/Plan:     -Start trophic tube feeding with Nepro    -If tolerated-advance to goal tomorrow: Nepro @ 35 ml/hr with 1 packet Prosource daily and 100 ml water flush q 4 hr       Malnutrition Assessment:  Malnutrition Status: At risk for malnutrition (specify) (10/27/22 5142)    Context:  Acute illness     Findings of the 6 clinical characteristics of malnutrition:   Energy Intake:  Unable to assess  Weight Loss:  Unable to assess     Body Fat Loss:  No significant body fat loss,     Muscle Mass Loss: Moderate muscle mass loss, Thigh (quadriceps)  Fluid Accumulation:  Mild, Ascites   Strength:  Not performed        Nutrition Assessment:    Pt admitted with Severe sepsis. PMHx: ESRD, DM 2, HFrEF, HTN, Afib, Cardiac Cirrhosis-gets biweekly paracentesis. Initially admitted to hospital with acute hypoxic respiratory failure and hyperkalemia d/t missing dialysis-improved with HD. Also noted Sepsis with unknown source. Transferred to CCU yesterday evening-wide complex tachycardia and dyspnea. Poor mentation 2/2 pt refusing lactulose for several days. +Influenza A. Discussed during MDR-plan to place DHT for medication and EN until mentation improves. Pt unable to take medications this morning. Pt at risk for malnutrition given acute on chronic medical conditions. Appears to have muscle wasting of LE's. Unable to determine dry weight and subsequently accurate weight changes PTA. Episodes of hypoglycemia noted since admission. No insulin ordered. Last A1c 8/24/22 was 4.9%. Question adequacy of PO intake prior to and during this hospitalization. BG elevated this morning-prior to starting EN. Monitor for need to add Humalog. Will start trophic tube feeding today with Nepro. If tolerated recommend advancing to above goal tomorrow.  This will provide 770 ml, 1410 calories, 76 gm protein and 1200 ml free water (tube feeding/flush) per day. Nutritionally Significant Medications:  Lipitor, lactulose, Rifaximin      Estimated Daily Nutrient Needs:  Energy Requirements Based On: Formula  Weight Used for Energy Requirements: Current (65 kg)  Energy (kcal/day): 1400 (MSJ x 1.2 x 1.1)  Weight Used for Protein Requirements: Current  Protein (g/day): 72-85 (1.1-1.3g/kg)  Method Used for Fluid Requirements: Standard renal  Fluid (ml/day): 1500    Nutrition Related Findings:   Edema: Ascites, No edema  Last BM: 10/27/22, Soft    Wounds: None      Current Nutrition Therapies:  Diet: NPO  Meal intake: No data found. Supplement intake: No data found. Nutrition Support: None      Anthropometric Measures:  Height: 5' (152.4 cm)  Ideal Body Weight (IBW): 100 lbs (45 kg)     Current Body Wt:  65.1 kg (143 lb 8.3 oz), 143.5 % IBW. Bed scale  Current BMI (kg/m2): 28                          BMI Category: Overweight (BMI 25.0-29. 9)    Wt Readings from Last 10 Encounters:   10/27/22 65.1 kg (143 lb 8.3 oz)   09/06/22 61.2 kg (135 lb)   08/31/22 62.2 kg (137 lb 2 oz)   12/01/20 73.6 kg (162 lb 4.1 oz)   11/25/20 76.8 kg (169 lb 5 oz)   11/17/20 69.4 kg (153 lb)   10/25/20 73.4 kg (161 lb 13.1 oz)   10/06/20 69.6 kg (153 lb 8 oz)   10/03/20 64 kg (141 lb)   09/17/20 147 kg (324 lb 1.2 oz)           Nutrition Diagnosis:   Inadequate oral intake related to impaired respiratory function, cognitive or neurological impairment as evidenced by NPO or clear liquid status due to medical condition.     Nutrition Interventions:   Food and/or Nutrient Delivery: Start tube feeding, Continue NPO  Nutrition Education/Counseling: No recommendations at this time  Coordination of Nutrition Care: Continue to monitor while inpatient, Interdisciplinary rounds       Goals:     Goals: Initiate nutrition support (in next 24 hr.)       Nutrition Monitoring and Evaluation:   Behavioral-Environmental Outcomes: None identified  Food/Nutrient Intake Outcomes: Food and nutrient intake, Supplement intake, Enteral nutrition intake/tolerance  Physical Signs/Symptoms Outcomes: Biochemical data, Weight    Discharge Planning:     Too soon to determine    Marcia Carroll RD CNSC  Available via [address] [place] [address]

## 2022-10-27 NOTE — PROGRESS NOTES
1400: RRT called on pt d/t increased WOB pt placed back on BIPAP  Respiratory Therapist,Attending MD ,  Rapid Nurse, intensivist at bedside. BMP/CBC ordered, ABG taken , CXR/KUB ordered. 1845: RN entered the room to give Meds and pull drain while in the room pt converted to sustained v-tach rapid response called  (/83,  pt alert and oriented O2 96% on 3l,) Attending,Rapid nurse, Intensivist @ bedside. 1858: 6mg adenosine given hr 168 no improvement    1901: 6mg adenosine given hr 163 no improvement    Pt transferred to CCU.

## 2022-10-27 NOTE — PROGRESS NOTES
Physical Therapy     Chart reviewed, discussed with RN/OT, patient with AFIB with RVR and now back on BiPAP. Was restless all night and now settled, recommending to defer and follow up as appropriate.       Thank you,  Sandie Menjivar, PT, DPT

## 2022-10-27 NOTE — PROGRESS NOTES
TRANSFER - IN REPORT:  Verbal report received from Mo RN(name) on Keily Salas  being received from [facility]) for urgent transfer    Report consisted of patients Situation, Background, Assessment and   Recommendations(SBAR). Information from the following report(s) SBAR, Kardex, Procedure Summary, Intake/Output, MAR, and Cardiac Rhythm vtach  was reviewed with the receiving nurse. Opportunity for questions and clarification was provided. Assessment completed upon patients arrival to unit and care assumed. Primary Nurse Sherlyn Parada and Tessa Jensen, RN performed a dual skin assessment on this patient Impairment noted- see wound doc flow sheet  Current Bed:   LOLITA Dinh  Vic score is 15    1930: Pt arrived with HR in 160s,wide QRS tachycardia. Dr Stoney Sanchez at bedside, orders received for amiodarone bolus and gtt. Precedex gtt if needed. 1945: Amio bolus started. Pt complaining of not being able to breathe, RR 35. Respiratory at bedside, patient placed on bipap. 1955: Pt anxious, pulling off bipap, stating she cant breathe, 02 sats 97%. Nasal cannula placed on patient. Precedex gtt started for comfort and bipap compliance. 2020: 125 cc of D10 given for blood sugar of 70. Unable to take P.O.  2030: Pt calm, bipap placed back on patient. 2035: HR now 110s - Afib. 2037: Pt less responsive, responding to pain. Precedex gtt paused. Stat ABG ordered. Abg results WNL. 2200: Paracentesis drain pulled with no difficulties. 0000: Pt incontinent of stool and large amount of output from old paracentesis site. Bathed with chg.  0200: Pt pulling off bipap mask, switched to nasal cannula 2L  0415: labs drawn. 0500: Pt crying wanting to get out of bed. Increased WOB, refusing bipap mask. 0730: Bedside and Verbal shift change report given to RN (oncoming nurse) by Marina Beckford (offgoing nurse).  Report included the following information SBAR, Kardex, Procedure Summary, Intake/Output, Accordion, Recent Results, and Cardiac Rhythm afib .

## 2022-10-28 PROBLEM — J96.21 ACUTE ON CHRONIC RESPIRATORY FAILURE WITH HYPOXIA AND HYPERCAPNIA (HCC): Status: ACTIVE | Noted: 2022-01-01

## 2022-10-28 PROBLEM — Z99.2 ESRD (END STAGE RENAL DISEASE) ON DIALYSIS (HCC): Status: ACTIVE | Noted: 2020-10-18

## 2022-10-28 PROBLEM — J96.22 ACUTE ON CHRONIC RESPIRATORY FAILURE WITH HYPOXIA AND HYPERCAPNIA (HCC): Status: ACTIVE | Noted: 2022-01-01

## 2022-10-28 NOTE — H&P
ANTICOAGULATION MANAGEMENT     Valentina Olmedo 60 year old female is on warfarin with subtherapeutic INR result. (Goal INR 2.0-3.0)    Recent labs: (last 7 days)     10/28/22  1538   INR 1.7*       ASSESSMENT       Source(s): Chart Review and Patient/Caregiver Call       Warfarin doses taken: Warfarin taken as instructed    Diet: No new diet changes identified    New illness, injury, or hospitalization: No    Medication/supplement changes: None noted    Signs or symptoms of bleeding or clotting: No    Previous INR: Subtherapeutic     Additional findings: patient no longer has [facility] home [facility] setting up medications-- new nurse is through [facility] and can do her INR as well; they come on Mondays around 11.        PLAN     Recommended plan for no diet, medication or health factor changes affecting INR     Dosing Instructions: Increase your warfarin dose (6.5% change) with next INR in 2 weeks       Summary  As of 10/28/2022    Full warfarin instructions:  10 mg every Mon, Fri; 12.5 mg all other days; Starting 10/28/2022   Next INR check:  11/11/2022             Telephone call with Valentina who agrees to plan and repeated back plan correctly  Detailed voice message left for  [facility] care with dosing instructions and follow up date.     Orders given to  Homecare nurse/facility to recheck    Education provided:     Contact [phone] with any changes, questions or concerns.     Plan made per [facility] anticoagulation protocol    Donna Marquez RN  Anticoagulation Clinic  10/28/2022    _______________________________________________________________________     Anticoagulation Episode Summary     Current INR goal:  2.0-3.0   TTR:  33.8 % (1 y)   Target end date:  Indefinite   Send INR reminders to:  PIPO ARAYA    Indications    History of pulmonary embolism [Z86.711]  Pulmonary embolism with infarction (H) [I26.99]  Long term (current) use of anticoagulants [Z79.01]           Comments:          History and Physical    Subjective:     Angelita Henriquez is a 69 y.o. female  has a past medical history of Arthritis, Asthma, Chronic kidney disease, Chronic pain, Cirrhosis (Kaela Valenzuela) (12/17/2017), Diabetes (Kaela Valenzuela) (diet controlled), GERD (gastroesophageal reflux disease), Hypertension, and Paroxysmal atrial fibrillation (Kaela Valenzuela) (10/6/2020). She also has no past medical history of CAD (coronary artery disease), Cancer (Kaela Valenzuela), Chronic obstructive pulmonary disease (Kaela Valenzuela), Coagulation disorder (Kaela Valenzuela), Endocarditis, Heart failure (Kaela Valenzuela), Morbid obesity (Kaela Valenzuela), Nicotine vapor product user, Non-nicotine vapor product user, Psychiatric disorder, PUD (peptic ulcer disease), Seizures (Kaela Valenzuela), Sleep apnea, Stroke (Kaela Valenzuela), Thromboembolus (Kaela Valenzuela), or Thyroid disease. Patient was evaluated on her service sitting up in bed after just eating dinner in no acute distress. She indicates that she been feeling weak especially after her dialysis this morning which she gets on Tuesday Thursday and Saturday. She has had an ongoing problem with anemia and being managed for anemia of CKD. She has required frequent transfusions previously. Case was discussed with on-call nephrology who agreed with 1 to 2 units. CBC will be checked after first unit to determine if a second unit is required.     Patient will be admitted as observation for transfusion possible discharge from the department upon completion.       Past Medical History:   Diagnosis Date    Arthritis     Asthma     Chronic kidney disease     Chronic pain     Cirrhosis (Kaela Valenzuela) 12/17/2017    Diabetes (Kaela Valenzuela) diet controlled    GERD (gastroesophageal reflux disease)     Hypertension     Paroxysmal atrial fibrillation (Kaela Valenzuela) 10/6/2020      Past Surgical History:   Procedure Laterality Date    COLONOSCOPY N/A 1/12/2018    COLONOSCOPY performed by Chase Guzman MD at [address] COLONOSCOPY N/A 3/19/2018    COLONOSCOPY performed by Chase Guzman MD at [address] HX   Anticoagulation Care Providers     Provider Role Specialty Phone number    Promise Vanegas MD Referring Family Medicine [phone]    Donald Rooney MD Referring Internal Medicine [phone]    Jose Maria Morin MD Referring Family Medicine [phone]             GYN  c section    HX VASCULAR ACCESS      dialysis      Family History   Family history unknown: Yes      Social History     Tobacco Use    Smoking status: Never Smoker    Smokeless tobacco: Never Used   Substance Use Topics    Alcohol use: No       Prior to Admission medications    Medication Sig Start Date End Date Taking? Authorizing Provider   hydrALAZINE (APRESOLINE) 25 mg tablet Take 1 Tab by mouth three (3) times daily. 10/10/20   Santiago Barboza MD   lactulose (CHRONULAC) 10 gram/15 mL solution Take 30 mL by mouth two (2) times a day. 10/10/20   Santiago Barboza MD   cholecalciferol (VITAMIN D3) 1,000 unit tablet Take 2,000 Units by mouth daily. Provider, Historical   ferrous sulfate 325 mg (65 mg iron) tablet Take 325 mg by mouth three (3) times daily (with meals). Provider, Historical   sevelamer (RENAGEL) 800 mg tablet Take 1,600 mg by mouth three (3) times daily (with meals). Provider, Historical   albuterol (PROVENTIL HFA, VENTOLIN HFA, PROAIR HFA) 90 mcg/actuation inhaler Take 2 Puffs by inhalation every four (4) hours as needed for Wheezing. Provider, Historical     Allergies   Allergen Reactions    Amlodipine Rash, Hives and Itching    Aspirin Other (comments), Nausea Only and Unknown (comments)     GI bleed  GI bleeding      Heparin Unknown (comments)     bleeding      Ibuprofen Nausea and Vomiting    Metformin Other (comments)     swelling        Review of Systems:  Review of Systems   Constitutional: Positive for activity change and fatigue. Negative for appetite change, chills, diaphoresis and fever. HENT: Negative. Eyes: Negative. Respiratory: Positive for shortness of breath. Negative for choking, chest tightness and wheezing. Gastrointestinal: Positive for constipation. Negative for anal bleeding, blood in stool, diarrhea and rectal pain. Endocrine: Positive for cold intolerance. Negative for polydipsia, polyphagia and polyuria.    Genitourinary: Positive for decreased urine volume, vaginal bleeding, vaginal discharge and vaginal pain. Negative for dyspareunia. Musculoskeletal: Positive for arthralgias, back pain and neck pain. Negative for gait problem and joint swelling. Skin: Negative. Allergic/Immunologic: Negative. Neurological: Negative. Psychiatric/Behavioral: Negative. Objective:     Vitals:  Visit Vitals  /69 (BP 1 Location: Right arm)   Pulse 99   Temp 98.1 °F (36.7 °C)   Resp 16   SpO2 100%       Physical Exam:  General: Alert, cooperative, no distress. Head:  Normocephalic, without obvious abnormality, atraumatic. Eyes:  Conjunctivae/corneas clear. Pupils equal, round, reactive to light. Extraocular movements intact. Lungs:  Clear to auscultation bilaterally, no wheezes, crackles  Chest wall: No tenderness or deformity. Heart:  Regular rate and rhythm, S1, S2 normal, no murmur, click, rub, or gallop. Abdomen:   Soft, non-tender. Bowel sounds normal. No masses. No organomegaly. Back:  No spine tenderness to palpation  Extremities: Extremities normal, atraumatic, no cyanosis or edema. Pulses: Symmetric all extremities. Skin: Skin color, texture, turgor normal.   Lymph nodes: Cervical nodes normal.  Neurologic: Awake and oriented,       Labs:  Recent Results (from the past 24 hour(s))   CBC WITH AUTOMATED DIFF    Collection Time: 10/17/20  1:10 PM   Result Value Ref Range    WBC 4.4 4.4 - 11.3 K/uL    RBC 2.00 (L) 4.50 - 5.90 M/uL    HGB 5.5 (LL) 13.5 - 17.5 g/dL    HCT 17.6 (LL) 36 - 46 %    MCV 87.7 80 - 100 FL    MCH 27.5 (L) 31 - 34 PG    MCHC 31.4 31.0 - 36.0 g/dL    RDW 19.5 (H) 11.5 - 14.5 %    PLATELET 046 K/uL    MPV 7.7 6.5 - 11.5 FL    NRBC 0.0  WBC    ABSOLUTE NRBC 0.00 K/uL    NEUTROPHILS 77 (H) 42 - 75 %    LYMPHOCYTES 11 (L) 20.5 - 51.1 %    MONOCYTES 8 1.7 - 9.3 %    EOSINOPHILS 2 0.9 - 2.9 %    BASOPHILS 2 0.0 - 2.5 %    ABS. NEUTROPHILS 3.5 1.8 - 7.7 K/UL    ABS.  LYMPHOCYTES 0.5 (L) 1.0 - 4.8 K/UL    ABS. MONOCYTES 0.3 0.2 - 2.4 K/UL    ABS. EOSINOPHILS 0.1 0.0 - 0.7 K/UL    ABS. BASOPHILS 0.1 0.0 - 0.2 K/UL   BLOOD TYPE, (ABO+RH)    Collection Time: 10/17/20  1:10 PM   Result Value Ref Range    ABO/Rh(D) O Positive    METABOLIC PANEL, COMPREHENSIVE    Collection Time: 10/17/20  1:10 PM   Result Value Ref Range    Sodium 137 136 - 145 mmol/L    Potassium 4.1 3.5 - 5.1 mmol/L    Chloride 100 97 - 108 mmol/L    CO2 30 21 - 32 mmol/L    Anion gap 7 5 - 15 mmol/L    Glucose 117 (H) 65 - 100 mg/dL    BUN 33 (H) 6 - 20 mg/dL    Creatinine 3.63 (H) 0.55 - 1.02 mg/dL    BUN/Creatinine ratio 9 (L) 12 - 20      GFR est AA 15 (L) >60 ml/min/1.73m2    GFR est non-AA 12 (L) >60 ml/min/1.73m2    Calcium 8.3 (L) 8.5 - 10.1 mg/dL    Bilirubin, total 0.5 0.2 - 1.0 mg/dL    AST (SGOT) 18 15 - 37 U/L    ALT (SGPT) 17 12 - 78 U/L    Alk. phosphatase 136 (H) 45 - 117 U/L    Protein, total 6.1 (L) 6.4 - 8.2 g/dL    Albumin 2.8 (L) 3.5 - 5.0 g/dL    Globulin 3.3 2.0 - 4.0 g/dL    A-G Ratio 0.8 (L) 1.1 - 2.2     PROTHROMBIN TIME + INR    Collection Time: 10/17/20  1:10 PM   Result Value Ref Range    Prothrombin time 10.6 9.0 - 11.1 sec    INR 1.1 0.9 - 1.1         Imaging:  No results found.      Assessment & Plan:       Symptomatic anemia  -Type and screen 2 units PRBCs  -Transfused 2 units PRBCs  -H&H every 6 hours  -Current H&H shows globin 5.5 and hematocrit 17.6 with symptoms of anemia  -No acute complaints or symptoms of blood loss    Diabetes mellitus type 2, controlled (HCC)  -Insulin sliding scale  -Accu-Chek before meals bedtime    HTN (hypertension)  -Blood pressure mildly elevated upon admission  -Continue with home medication  -Continue to monitor    Dependence on renal dialysis [facility])  -Dialysis on Tuesday Thursday and Saturday  -Only received two thirds of her standard hemodialysis session today  -Discontinued due to lower extremity cramping/pain  -Discussed plan of care with Dr Nicky Fagan who agreed with plan of care      Code Status: Full    Prophylaxis: SCD and Hold Lovenox     Electronically Signed : Marcus Tamayo, PhD, PA-C, [facility]

## 2022-10-28 NOTE — PROGRESS NOTES
0730 : Bedside and Verbal shift change report given to Angel RN (oncoming nurse) by Brian Miller RN (offgoing nurse). Report included the following information SBAR, Intake/Output, MAR, Recent Results, Cardiac Rhythm NSR, Alarm Parameters , and Dual Neuro Assessment. 0730 : HD @ bedside, no orders to pull fluid    0900 blood cultures obtained and sent    1200 Trickle feeds initiated    1215 Precedex stopped for SAT    1300 Fentanyl gtt stopped for SAT    1600 Pt continues to be unresponsive despite off sedation, head+ chest CT ordered. Pt transported with RT and RNx2.    1615 Pt restless during CT scan, eyes open and moving BUE. Pt transported back to CCU.    1630 Dialysis RN @ bedside placing pt on CRRT.    1700 Precedex restarted per MD.     1800 CRRT factor now @50cc/hr. 1930 Bedside and Verbal shift change report given to Armond Boeck, RN (oncoming nurse) by Alyse Pettit RN (offgoing nurse). Report included the following information SBAR, Kardex, Intake/Output, MAR, and Accordion. Precedex @0.4, KVO @8, Levo @10.

## 2022-10-28 NOTE — PROGRESS NOTES
Spiritual Care Assessment/Progress Note  [facility]      NAME: Tenzin Alaniz      MRN: [id number]  AGE: 67 y.o. SEX: female  Advent Affiliation: Jono Burnett   Language: English     10/28/2022     Total Time (in minutes): 17     Spiritual Assessment begun in [facility] CORONARY CARE through conversation with:         []Patient        [] Family    [] Friend(s)        Reason for Consult: Palliative Care, Initial/Spiritual Assessment     Spiritual beliefs: (Please include comment if needed)     [] Identifies with a nadine tradition:         [] Supported by a nadine community:            [] Claims no spiritual orientation:           [] Seeking spiritual identity:                [] Adheres to an individual form of spirituality:           [x] Not able to assess:                           Identified resources for coping:      [] Prayer                               [] Music                  [] Guided Imagery     [] Family/friends                 [] Pet visits     [] Devotional reading                         [] Unknown     [] Other:                                               Interventions offered during this visit: (See comments for more details)    Patient Interventions: Initial visit           Plan of Care:     [] Support spiritual and/or cultural needs    [] Support AMD and/or advance care planning process      [] Support grieving process   [] Coordinate Rites and/or Rituals    [] Coordination with community clergy   [] No spiritual needs identified at this time   [] Detailed Plan of Care below (See Comments)  [] Make referral to Music Therapy  [] Make referral to Pet Therapy     [] Make referral to Addiction services  [] Make referral to [facility]  [] Make referral to Spiritual Care Partner  [] No future visits requested        [] Contact Spiritual Care for further referrals     Comments: Visited Ms Juancarlos Saldana in [facility]/ Reynaldo Beckman [facility] for initial Palliative Care spiritual assessment; reviewed patient's chart prior to visit.  Ms Fenton was resting quietly with eyes closed and on vent support; no family was present. Unable to do spiritual assessment at that time. This  was updated by patient's nurse regarding patient and family status and POC. : Rev. Mikael Borges.  Pravin Hylton; [facility], to contact [phone] [address] [facility] call: [phone]

## 2022-10-28 NOTE — PROGRESS NOTES
HISTORY OF PRESENT ILLNESS: 67 y.o F w/ PMH of ESRD on HD, Afib, HFrEF (10-15%), Cirrhosis, DM, GERD, ANITHA who was admitted to [facility] for Afib w/ RVR, hyperkalemia, and hypoxic respiratory failure with improvement w/ HD and abx course. Pt noted to be flu (+)ve. Initially plan for discharge 10/25, however pt wished to remain in hospital and be transferred to [facility]. RRT called PM of 10/25 for decreased mental status, hypotension and tachycardia. Of note last HD 10/24 (no UF), and had an abdominal drain placed 10/24 for recurrent ascites w/ almost 4L drainage at time of placement. Pt also noted to have refused lactulose for several days. CCM consulted 10/25 for evaluation of hypotension w/ resolution after gentle IVF resuscitation. Pt w/ increasing tachycardia and dyspnea throughout 10/26, stopped HD 1hr early (no UF) around 1800 pt w/ acute increase in HR from 120s to 150-160s, wide complex on EKG. CXR w/o acute findings. Pt given Adenosine 6mg x2, w/o improvement.       Interval history    10/27-still requiring amiodarone infusion, on/off BiPAP    10/28 - Intubated yest evening for severe hypercapnic resp failure, requiring pressors      Current Facility-Administered Medications:     [COMPLETED] piperacillin-tazobactam (ZOSYN) 4.5 g in 0.9% sodium chloride (MBP/ADV) 100 mL MBP, 4.5 g, IntraVENous, NOW, Last Rate: 200 mL/hr at 10/28/22 1220, 4.5 g at 10/28/22 1220 **FOLLOWED BY** piperacillin-tazobactam (ZOSYN) 3.375 g in 0.9% sodium chloride (MBP/ADV) 100 mL MBP, 3.375 g, IntraVENous, Q12H, Pietro Ballesteros MD    bicarbonate dialysis (PRISMASOL) BG K 4/Ca 2.5 5000 ml solution, , Extracorporeal, DIALYSIS CONTINUOUS, Marvin Temple MD    heparin (porcine) injection 5,000 Units, 5,000 Units, SubCUTAneous, Q12H, Royal Rambo CHRISTINA MD, 5,000 Units at 10/28/22 0810    rifAXIMin (XIFAXAN) tablet 550 mg, 550 mg, Oral, BID, Royal Rambo CHRISTINA MD, 550 mg at 10/28/22 1226    [Held by provider] OLANZapine (ZyPREXA) tablet 5 mg, 5 mg, Oral, QHS, Pietro Ballesteros MD, 5 mg at 10/27/22 2128    NOREPINephrine (LEVOPHED) 8 mg in 5% dextrose 250mL (32 mcg/mL) infusion, 0.5-16 mcg/min, IntraVENous, TITRATE, Pietro Ballesteros MD, Last Rate: 15 mL/hr at 10/28/22 1423, 8 mcg/min at 10/28/22 1423    fentaNYL (PF) 1,500 mcg/30 mL (50 mcg/mL) infusion, 0-200 mcg/hr, IntraVENous, TITRATE, Jenifer CHRISTINA MD, Stopped at 10/28/22 1300    heparin (porcine) 1,000 unit/mL injection 2,400 Units, 2,400 Units, InterCATHeter, DIALYSIS PRN, 2,400 Units at 10/28/22 0745 **AND** heparin (porcine) 1,000 unit/mL injection 2,400 Units, 2,400 Units, InterCATHeter, DIALYSIS PRN, Na Oliver MD, 2,400 Units at 10/28/22 0744    albumin human 25% (BUMINATE) solution 12.5 g, 12.5 g, IntraVENous, DIALYSIS PRN, Na Oliver MD, Last Rate: 50 mL/hr at 10/28/22 0742, 12.5 g at 10/28/22 0742    dexmedeTOMidine in 0.9 % NaCl (PRECEDEX) 400 mcg/100 mL (4 mcg/mL) infusion soln, 0.1-1.5 mcg/kg/hr, IntraVENous, TITRATE, Alphonso Olson MD, Stopped at 10/28/22 1216    oseltamivir (TAMIFLU) 6 mg/mL oral suspension 30 mg, 30 mg, Oral, Q MON, WED & Celio Simpson Anibal A, MD    alteplase (CATHFLO) 1 mg in sterile water (preservative free) 1 mL injection, 1 mg, InterCATHeter, PRN, Alphonso Olson MD, 1 mg at 10/27/22 1342    amiodarone (CORDARONE) 375 mg/250 mL D5W infusion, 0.5-1 mg/min, IntraVENous, TITRATE, Yair Salazar MD, Stopped at 10/27/22 1651    albuterol-ipratropium (DUO-NEB) 2.5 MG-0.5 MG/3 ML, 3 mL, Nebulization, Q4H PRN, Caden Mariano MD, 3 mL at 10/26/22 1828    dextrose 10 % infusion 125-250 mL, 125-250 mL, IntraVENous, PRN, Caden Mariano MD, 125 mL at 10/26/22 2020    midodrine (PROAMATINE) tablet 20 mg, 20 mg, Oral, Q8H, Caden Mariano MD, 20 mg at 10/28/22 1231    sodium chloride (NS) flush 5-40 mL, 5-40 mL, IntraVENous, Q8H, Ray Albarran MD, 10 mL at 10/27/22 2129    sodium chloride (NS) flush 5-40 mL, 5-40 mL, IntraVENous, PRN, Eulalio Tejada MD    acetaminophen (TYLENOL) tablet 650 mg, 650 mg, Oral, Q6H PRN, 650 mg at 10/28/22 1530 **OR** acetaminophen (TYLENOL) suppository 650 mg, 650 mg, Rectal, Q6H PRN, Eulalio Tejada MD    polyethylene glycol (MIRALAX) packet 17 g, 17 g, Oral, DAILY PRN, Eulalio Tejada MD    ondansetron (ZOFRAN ODT) tablet 4 mg, 4 mg, Oral, Q8H PRN **OR** ondansetron (ZOFRAN) injection 4 mg, 4 mg, IntraVENous, Q6H PRN, Eulalio Tejada MD, 4 mg at 10/24/22 0501    amiodarone (CORDARONE) tablet 200 mg, 200 mg, Oral, BID, Eulalio Tejada MD, 200 mg at 10/28/22 1225    atorvastatin (LIPITOR) tablet 40 mg, 40 mg, Oral, QHS, Eulalio Tejada MD, 40 mg at 10/27/22 2128    lactulose (CHRONULAC) 10 gram/15 mL solution 30 mL, 20 g, Oral, BID, Eulalio Tejada MD, 30 mL at 10/28/22 1221    [Held by provider] metoprolol tartrate (LOPRESSOR) tablet 12.5 mg, 12.5 mg, Oral, BID, Eulalio Tejada MD, 12.5 mg at 10/24/22 0845    methocarbamoL (ROBAXIN) tablet 500 mg, 500 mg, Oral, Q8H PRN, Eulalio Tejada MD, 500 mg at 10/25/22 1642    arformoteroL (BROVANA) neb solution 15 mcg, 15 mcg, Nebulization, BID RT, 15 mcg at 10/28/22 0834 **AND** budesonide (PULMICORT) 500 mcg/2 ml nebulizer suspension, 500 mcg, Nebulization, BID RT, Eulalio Tejada MD, 500 mcg at 10/28/22 0834      VITAL SIGNS:  Visit Vitals  BP (!) 144/48 (BP 1 Location: Left lower arm, BP Patient Position: At rest)   Pulse 80   Temp 100.2 °F (37.9 °C)   Resp 15   Ht 5' (1.524 m)   Wt 65.5 kg (144 lb 6.4 oz)   SpO2 100%   BMI 28.20 kg/m²     PHYSICAL EXAMINATION:  General- Intubated, sedated  Neuro-pupils reactive, not withdrawing   Cardiac-irregular  Lungs- coarse BL  Abdomen-soft, slightly distended, nontender  Extremities-warm      LABORATORY ANALYSIS:  Recent Results (from the past 24 hour(s))   POC EG7    Collection Time: 10/27/22  5:00 PM   Result Value Ref Range    Calcium, ionized (POC) 1.23 1.12 - 1.32 mmol/L    FIO2 (POC) 40 %    pH (POC) 7.09 (LL) 7.35 - 7.45      pCO2 (POC) 87.9 (H) 35.0 - 45.0 MMHG    pO2 (POC) 43 (LL) 80 - 100 MMHG    HCO3 (POC) 26.4 (H) 22 - 26 MMOL/L    Base deficit (POC) 6.8 mmol/L    sO2 (POC) 58.0 (L) 92 - 97 %    Site CENTRAL LINE      Device: BIPAP MASK      PEEP/CPAP (POC) 5 cmH2O    PIP (POC) 10      Allens test (POC) NOT APPLICABLE      Specimen type (POC) VENOUS BLOOD      Critical value read back RICHMOND MEZA    EKG, 12 LEAD, INITIAL    Collection Time: 10/27/22  6:37 PM   Result Value Ref Range    Ventricular Rate 61 BPM    Atrial Rate 59 BPM    QRS Duration 162 ms    Q-T Interval 560 ms    QTC Calculation (Bezet) 563 ms    Calculated P Axis -47 degrees    Calculated R Axis -60 degrees    Calculated T Axis 112 degrees    Diagnosis       Undetermined rhythm  Left axis deviation  Nonspecific intraventricular block  Possible Lateral infarct , age undetermined  Inferior infarct , age undetermined  When compared with ECG of 26-OCT-2022 18:51,  Wide QRS rhythm has replaced Wide QRS tachycardia  Vent.  rate has decreased  BPM  Confirmed by My Dwyer M.D., Betzaida Keene ([id number]) on 10/28/2022 2:41:46 PM     POC G3 - PUL    Collection Time: 10/27/22  7:59 PM   Result Value Ref Range    FIO2 (POC) 100 %    pH (POC) 7.42 7.35 - 7.45      pCO2 (POC) 30.0 (L) 35.0 - 45.0 MMHG    pO2 (POC) 473 (H) 80 - 100 MMHG    HCO3 (POC) 19.6 (L) 22 - 26 MMOL/L    sO2 (POC) 100.0 (H) 92 - 97 %    Base deficit (POC) 3.4 mmol/L    Site DRAWN FROM ARTERIAL LINE      Device: ADULT VENT      Mode ASSIST CONTROL      Tidal volume 400 ml    Set Rate 24 bpm    PEEP/CPAP (POC) 5 cmH2O    Allens test (POC) NOT APPLICABLE      Specimen type (POC) ARTERIAL     CBC WITH AUTOMATED DIFF    Collection Time: 10/28/22  4:31 AM   Result Value Ref Range    WBC 9.4 3.6 - 11.0 K/uL    RBC 5.33 (H) 3.80 - 5.20 M/uL    HGB 14.7 11.5 - 16.0 g/dL    HCT 47.2 (H) 35.0 - 47.0 %    MCV 88.6 80.0 - 99.0 FL    MCH 27.6 26.0 - 34.0 PG    MCHC 31.1 30.0 - 36.5 g/dL    RDW 19.2 (H) 11.5 - 14.5 %    PLATELET 936 (L) 190 - 400 K/uL    MPV ABNORMAL 8.9 - 12.9 FL    NRBC 0.6 (H) 0  WBC    ABSOLUTE NRBC 0.06 (H) 0.00 - 0.01 K/uL    NEUTROPHILS 89 (H) 32 - 75 %    LYMPHOCYTES 4 (L) 12 - 49 %    MONOCYTES 7 5 - 13 %    EOSINOPHILS 0 0 - 7 %    BASOPHILS 0 0 - 1 %    IMMATURE GRANULOCYTES 0 %    ABS. NEUTROPHILS 8.3 (H) 1.8 - 8.0 K/UL    ABS. LYMPHOCYTES 0.4 (L) 0.8 - 3.5 K/UL    ABS. MONOCYTES 0.7 0.0 - 1.0 K/UL    ABS. EOSINOPHILS 0.0 0.0 - 0.4 K/UL    ABS. BASOPHILS 0.0 0.0 - 0.1 K/UL    ABS. IMM. GRANS. 0.0 K/UL    DF MANUAL      RBC COMMENTS ANISOCYTOSIS  1+        RBC COMMENTS RAINE CELLS  PRESENT        RBC COMMENTS POLYCHROMASIA  PRESENT       METABOLIC PANEL, COMPREHENSIVE    Collection Time: 10/28/22  4:31 AM   Result Value Ref Range    Sodium 135 (L) 136 - 145 mmol/L    Potassium 4.4 3.5 - 5.1 mmol/L    Chloride 102 97 - 108 mmol/L    CO2 22 21 - 32 mmol/L    Anion gap 11 5 - 15 mmol/L    Glucose 181 (H) 65 - 100 mg/dL    BUN 25 (H) 6 - 20 MG/DL    Creatinine 5.34 (H) 0.55 - 1.02 MG/DL    BUN/Creatinine ratio 5 (L) 12 - 20      eGFR 8 (L) >60 ml/min/1.73m2    Calcium 8.9 8.5 - 10.1 MG/DL    Bilirubin, total 1.5 (H) 0.2 - 1.0 MG/DL    ALT (SGPT) 28 12 - 78 U/L    AST (SGOT) 51 (H) 15 - 37 U/L    Alk.  phosphatase 158 (H) 45 - 117 U/L    Protein, total 5.2 (L) 6.4 - 8.2 g/dL    Albumin 2.3 (L) 3.5 - 5.0 g/dL    Globulin 2.9 2.0 - 4.0 g/dL    A-G Ratio 0.8 (L) 1.1 - 2.2     MAGNESIUM    Collection Time: 10/28/22  4:31 AM   Result Value Ref Range    Magnesium 2.0 1.6 - 2.4 mg/dL   PHOSPHORUS    Collection Time: 10/28/22  4:31 AM   Result Value Ref Range    Phosphorus 4.1 2.6 - 4.7 MG/DL   LACTIC ACID    Collection Time: 10/28/22  4:31 AM   Result Value Ref Range    Lactic acid 2.0 0.4 - 2.0 MMOL/L   BLOOD GAS, ARTERIAL    Collection Time: 10/28/22  8:57 AM   Result Value Ref Range    pH 7.34 (L) 7.35 - 7.45      PCO2 44 35 - 45 mmHg    PO2 107 (H) 80 - 100 mmHg    O2 SAT 98 (H) 92 - 97 %    BICARBONATE 23 22 - 26 mmol/L    BASE DEFICIT 2.8 mmol/L    O2 METHOD VENT      FIO2 40 %    MODE ASSIST CONTROL      Tidal volume 400.0      SET RATE 18      PEEP/CPAP 5.0      Sample source ARTERIAL      SITE DRAWN FROM ARTERIAL LINE      BETZY'S TEST NOT APPLICABLE     PROCALCITONIN    Collection Time: 10/28/22  9:40 AM   Result Value Ref Range    Procalcitonin 6.11 ng/mL   PROCALCITONIN    Collection Time: 10/28/22  9:40 AM   Result Value Ref Range    Procalcitonin 6.21 ng/mL     No results displayed because visit has over 200 results. RADIOGRAPHIC STUDIES:  XR CHEST PORT  Narrative: EXAM:  XR CHEST PORT    INDICATION: s/p intubation    COMPARISON: Chest radiograph 10/26/2022    TECHNIQUE: Semiupright portable chest AP view    FINDINGS:     ET tube terminates approximately 3.5 cm above the jhony. Large bore enteric  tube extends inferiorly off the field-of-view. Stable remaining support  apparatus. Cardiomediastinal silhouette is stably enlarged. Resolved left midlung opacities. pleural spaces are grossly clear. Impression: 1. Support apparatus as above. 2.  Resolved left midlung opacities. XR ABD (KUB)  Narrative: EXAM:  XR ABD (KUB)    INDICATION: Dobbhoff tube placement    COMPARISON: 10/26/2022. TECHNIQUE: Supine frontal abdomen (KUB). FINDINGS: No dilated small bowel. Dobbhoff tube within the gastric lumen. Stool  and gas are present in the colon. No pathologic calcification. Osseous  structures are age appropriate. Impression: Dobbhoff tube within the gastric lumen.         DIAGNOSES:  Wide complex tachycardia   Acute hypoxic respiratory failure  ESRD  HFrEF  Cirrhosis  DM    IMPRESSION AND PLAN:    Analgosedation with precedex and fentanyl as needed, daily SATs, continue lactulose therapy-titrated to 3 bowel movements a day, delirium prevention strategies    Continue hemodynamic monitoring, map goal greater than 60, currently requiring high-dose midodrine, on norepinephrine as well, most recent echo showed an EF of 10 to 15%, severe TR, moderate MR, and PASP's in the mid 40s, A. fib RVR with aberrancy-continue PO amiodarone, heart rate goal less than 110, keep magnesium above 2 and potassium above 4, continue Lipitor    Cont lung protective mech vent, daily SBTs, cont bronchodilators    Feeding tube placed-start trickle feeds, as above-ensure bowel movements    ESRD on [id number] nephrology recommendations, dose medication renally, correct electrolyte derangements as needed    Heparin for DVT prophylaxis    Flu A+10/22, Tamiflu started by my colleague 10/26, on empiric cefepime- spiked temps ON, broadened to zosyn, rpt micro studies sent today    Keep glucose less than 180    Prognosis very guarded at this time, f/u PCT [address] discussions with Javad    Critical care time-40 minutes    The patient is critically ill with single or multiple systems failure, severe metabolic derangement and/or infection, has potential for life threatening deterioration, requires high complexity decision making, frequent evaluation and titration of therapies and interpretation of data. This note has been written with voice recognition software. While this note has been edited for accuracy, the software periodically misinterprets speech resulting in errors that might not have been caught in editing. In the event an unusual error is found in this record, please read the chart carefully and recognize, using context, where these substitutions or errors have occurred and please notify me to resolve the errors.

## 2022-10-28 NOTE — PROGRESS NOTES
Remains on pressors, fluid overloaded  Will transition to CRRT  Factor /hr    Jacklyn Terry MD  [facility]   [address] [address]  Phone - [phone]   Fax - [phone]  [email or web address]

## 2022-10-28 NOTE — PROGRESS NOTES
Physical Therapy:  10/28/2022    Orders received and chart reviewed in preparation for PT evaluation and treatment. Noted patient with continued respiratory failure requiring intubation last night. Also noted patient currently receiving HD treatment with MAP <60, nephrology note stating patient may require CRRT. Will hold PT evaluation at this time and will continue to follow pending medical stability.      Thank you,  Abbott Rubinstein, PT, DPT

## 2022-10-28 NOTE — PROGRESS NOTES
Nephrology Progress Note  Kristyn Cruz Fenton  Date of Admission : 10/22/2022    CC:  Follow up for ESRD       Assessment and Plan     ESRD on dialysis   - Patient of [facility], [address] MWF  - HD today, no UF  - if her hemodynamics decline, will switch to CRRT    Ascites:  - s/p paracentesis, drain removed    Hypotension:  - on levophed    Afib w/ RVR:  - on amio drip     Resp failure:  - intubated 10/27  - per [facility]    Cardiac Cirrhosis   RV failure, severe TR  Chronic HFrEF   Chronic hypotension     Anemia   - no need for CAROL ANN      Influenza A +    Hep C, hx of  DMII       Interval History:  Seen and examined on dialysis. Tolerating for now. No UF at this time. On small dose of levophed. Intubated last night. Current Medications: all current  Medications have been eviewed in EPIC  Review of Systems: Pertinent items are noted in HPI. Objective:  Vitals:    Vitals:    10/28/22 0845 10/28/22 0900 10/28/22 0915 10/28/22 0930   BP: 109/63 (!) 125/49 127/64 114/81   Pulse: 85 87 86 88   Resp:       Temp:       TempSrc:       SpO2:       Weight:       Height:         Intake and Output:  No intake/output data recorded. 10/26 1901 - 10/28 0700  In: 1898.1 [I.V.:1758.1]  Out: -     Physical Examination:    General: Sedated on the vent  Neck:  Supple, no mass  Resp:  Lungs CTA B/L, no wheezing , normal respiratory effort  CV:  RRR,  no murmur or rub, no LE edema  GI:  Soft, NT, mild distention, no fluid wave  Neurologic:  sedated  Psych:             unable to evaluate  Skin:  No Rash  Access:           [facility]    []    High complexity decision making was performed  []    Patient is at high-risk of decompensation with multiple organ involvement    Lab Data Personally Reviewed: I have reviewed all the pertinent labs, microbiology data and radiology studies during assessment.     Recent Labs     10/28/22  0431 10/27/22  0419 10/26/22  1416 10/25/22  2105   * 135* 138 135*   K 4.4 3.9 3.6 HEMOLYZED,RECOLLECT REQUESTED  100 101 103   CO2 22 25 30 24   * 168* 62* 86   BUN 25* 15 12 20   CREA 5.34* 4.33* 3.34* 5.30*   CA 8.9 8.5 8.1* 8.9   MG 2.0 1.9  --  HEMOLYZED,RECOLLECT REQUESTED   PHOS 4.1 3.9  --  HEMOLYZED,RECOLLECT REQUESTED   ALB 2.3* 2.5*  --   --    ALT 28 22  --   --        Recent Labs     10/28/22  0431 10/27/22  0419 10/26/22  1416   WBC 9.4 10.9 8.6   HGB 14.7 13.9 13.5   HCT 47.2* 47.2* 45.9   * 118* 117*       No results found for: SDES  Lab Results   Component Value Date/Time    Culture result: MRSA NOT PRESENT 10/22/2022 06:27 PM    Culture result:  10/22/2022 06:27 PM     Screening of patient nares for MRSA is for surveillance purposes and, if positive, to facilitate isolation considerations in high risk settings. It is not intended for automatic decolonization interventions per se as regimens are not sufficiently effective to warrant routine use.       Culture result: NO GROWTH 5 DAYS 10/22/2022 02:13 AM    Culture result: NO GROWTH 5 DAYS 10/22/2022 02:13 AM     Recent Results (from the past 24 hour(s))   POC EG7    Collection Time: 10/27/22  5:00 PM   Result Value Ref Range    Calcium, ionized (POC) 1.23 1.12 - 1.32 mmol/L    FIO2 (POC) 40 %    pH (POC) 7.09 (LL) 7.35 - 7.45      pCO2 (POC) 87.9 (H) 35.0 - 45.0 MMHG    pO2 (POC) 43 (LL) 80 - 100 MMHG    HCO3 (POC) 26.4 (H) 22 - 26 MMOL/L    Base deficit (POC) 6.8 mmol/L    sO2 (POC) 58.0 (L) 92 - 97 %    Site CENTRAL LINE      Device: BIPAP MASK      PEEP/CPAP (POC) 5 cmH2O    PIP (POC) 10      Allens test (POC) NOT APPLICABLE      Specimen type (POC) VENOUS BLOOD      Critical value read back RICHMOND MEZA    EKG, 12 LEAD, INITIAL    Collection Time: 10/27/22  6:37 PM   Result Value Ref Range    Ventricular Rate 61 BPM    Atrial Rate 59 BPM    QRS Duration 162 ms    Q-T Interval 560 ms    QTC Calculation (Bezet) 563 ms    Calculated P Axis -47 degrees    Calculated R Axis -60 degrees    Calculated T Axis 112 degrees    Diagnosis Unusual P axis, possible ectopic atrial bradycardia with AV dissociation and   Wide QRS rhythm  Left axis deviation  Nonspecific intraventricular block  Possible Lateral infarct , age undetermined  Inferior infarct , age undetermined  When compared with ECG of 26-OCT-2022 18:51,  Wide QRS rhythm has replaced Wide QRS tachycardia  Vent. rate has decreased  BPM     POC G3 - PUL    Collection Time: 10/27/22  7:59 PM   Result Value Ref Range    FIO2 (POC) 100 %    pH (POC) 7.42 7.35 - 7.45      pCO2 (POC) 30.0 (L) 35.0 - 45.0 MMHG    pO2 (POC) 473 (H) 80 - 100 MMHG    HCO3 (POC) 19.6 (L) 22 - 26 MMOL/L    sO2 (POC) 100.0 (H) 92 - 97 %    Base deficit (POC) 3.4 mmol/L    Site DRAWN FROM ARTERIAL LINE      Device: ADULT VENT      Mode ASSIST CONTROL      Tidal volume 400 ml    Set Rate 24 bpm    PEEP/CPAP (POC) 5 cmH2O    Allens test (POC) NOT APPLICABLE      Specimen type (POC) ARTERIAL     CBC WITH AUTOMATED DIFF    Collection Time: 10/28/22  4:31 AM   Result Value Ref Range    WBC 9.4 3.6 - 11.0 K/uL    RBC 5.33 (H) 3.80 - 5.20 M/uL    HGB 14.7 11.5 - 16.0 g/dL    HCT 47.2 (H) 35.0 - 47.0 %    MCV 88.6 80.0 - 99.0 FL    MCH 27.6 26.0 - 34.0 PG    MCHC 31.1 30.0 - 36.5 g/dL    RDW 19.2 (H) 11.5 - 14.5 %    PLATELET 775 (L) 489 - 400 K/uL    MPV ABNORMAL 8.9 - 12.9 FL    NRBC 0.6 (H) 0  WBC    ABSOLUTE NRBC 0.06 (H) 0.00 - 0.01 K/uL    NEUTROPHILS 89 (H) 32 - 75 %    LYMPHOCYTES 4 (L) 12 - 49 %    MONOCYTES 7 5 - 13 %    EOSINOPHILS 0 0 - 7 %    BASOPHILS 0 0 - 1 %    IMMATURE GRANULOCYTES 0 %    ABS. NEUTROPHILS 8.3 (H) 1.8 - 8.0 K/UL    ABS. LYMPHOCYTES 0.4 (L) 0.8 - 3.5 K/UL    ABS. MONOCYTES 0.7 0.0 - 1.0 K/UL    ABS. EOSINOPHILS 0.0 0.0 - 0.4 K/UL    ABS. BASOPHILS 0.0 0.0 - 0.1 K/UL    ABS. IMM.  GRANS. 0.0 K/UL    DF MANUAL      RBC COMMENTS ANISOCYTOSIS  1+        RBC COMMENTS RAINE CELLS  PRESENT        RBC COMMENTS POLYCHROMASIA  PRESENT       METABOLIC PANEL, COMPREHENSIVE    Collection Time: 10/28/22  4:31 AM   Result Value Ref Range    Sodium 135 (L) 136 - 145 mmol/L    Potassium 4.4 3.5 - 5.1 mmol/L    Chloride 102 97 - 108 mmol/L    CO2 22 21 - 32 mmol/L    Anion gap 11 5 - 15 mmol/L    Glucose 181 (H) 65 - 100 mg/dL    BUN 25 (H) 6 - 20 MG/DL    Creatinine 5.34 (H) 0.55 - 1.02 MG/DL    BUN/Creatinine ratio 5 (L) 12 - 20      eGFR 8 (L) >60 ml/min/1.73m2    Calcium 8.9 8.5 - 10.1 MG/DL    Bilirubin, total 1.5 (H) 0.2 - 1.0 MG/DL    ALT (SGPT) 28 12 - 78 U/L    AST (SGOT) 51 (H) 15 - 37 U/L    Alk. phosphatase 158 (H) 45 - 117 U/L    Protein, total 5.2 (L) 6.4 - 8.2 g/dL    Albumin 2.3 (L) 3.5 - 5.0 g/dL    Globulin 2.9 2.0 - 4.0 g/dL    A-G Ratio 0.8 (L) 1.1 - 2.2     MAGNESIUM    Collection Time: 10/28/22  4:31 AM   Result Value Ref Range    Magnesium 2.0 1.6 - 2.4 mg/dL   PHOSPHORUS    Collection Time: 10/28/22  4:31 AM   Result Value Ref Range    Phosphorus 4.1 2.6 - 4.7 MG/DL   LACTIC ACID    Collection Time: 10/28/22  4:31 AM   Result Value Ref Range    Lactic acid 2.0 0.4 - 2.0 MMOL/L   BLOOD GAS, ARTERIAL    Collection Time: 10/28/22  8:57 AM   Result Value Ref Range    pH 7.34 (L) 7.35 - 7.45      PCO2 44 35 - 45 mmHg    PO2 107 (H) 80 - 100 mmHg    O2 SAT 98 (H) 92 - 97 %    BICARBONATE 23 22 - 26 mmol/L    BASE DEFICIT 2.8 mmol/L    O2 METHOD VENT      FIO2 40 %    MODE ASSIST CONTROL      Tidal volume 400.0      SET RATE 18      PEEP/CPAP 5.0      Sample source ARTERIAL      SITE DRAWN FROM ARTERIAL LINE      BETZY'S TEST NOT APPLICABLE               Shannan Arroyo MD  [address]  Phone - [phone]   Fax - [phone]  [email or web address]

## 2022-10-28 NOTE — PROGRESS NOTES
DIANA: Anticipate discharge home w/HH v SNF pending medical progress. Transportation likely in car with family v BLS. RUR: 24%    Disposition: Patient admitted 10/22 with severe sepsis. Patient from home where she was independent. Past IPR at [facility]. Receives dialysis at the [facility]. Lives at [facility]. Patient also guardian of her 15 y/o grandson Fanta Cary. Fanta Cary was visiting grandmother on this date. CM introduced herself and let him know she was available if he needed to talk. Fanta Cary stated that he is currently staying with his mother. Patient is currently intubated and sedated. CM will continue to follow for discharge needs.     Livia Desai, [facility],[address]  [phone]

## 2022-10-28 NOTE — PROCEDURES
Indication - HD monitoring  Diagnosis - Shock    Done emergently. Aungs test was performed to ensure adequate perfusion. The patients left wrist was prepped and draped in sterile fashion. 1% Lidocaine was used to anesthetize the area. A 20G Arrow arterial line was introduced into the radial artery. The catheter was threaded over the guide wire and the needle was removed with appropriate pulsatile blood return. The catheter was then sutured in place to the skin and a sterile dressing applied. Perfusion to the extremity distal to the point of catheter insertion was checked and found to be adequate.

## 2022-10-28 NOTE — PROGRESS NOTES
Occupational Therapy    Orders received and chart reviewed in preparation for OT evaluation and treatment. Noted patient with continued respiratory failure requiring intubation last night. Also noted patient currently receiving HD treatment with MAP <60, nephrology note stating patient may require CRRT. Will hold OT evaluation at this time and will continue to follow pending medical stability.      Conchis Zaman MS, OTR/L

## 2022-10-28 NOTE — PROGRESS NOTES
Renal Dosing/Monitoring  Medication: Zosyn   Current regimen:  2.25 gm IV every 12 hr  Recent Labs     10/28/22  0431 10/27/22  0419 10/26/22  1416   CREA 5.34* 4.33* 3.34*   BUN 25* 15 12     Estimated CrCl:  ESRD on HD  Plan:   Dose adjusted based on extended infusion beta lactam protocol to:  Zosyn 4.5 gm LD over 30 minutes, followed by  3.375 gm IV q12h over 4 hours; first dose to be given 8 hours after LD.

## 2022-10-28 NOTE — PROCEDURES
Hemodialysis / [phone]    Vitals Pre Post Assessment Pre Post   /60 136/59 LOC sedated sedated   HR 57 84 Lungs intubated intubated   Resp 18 196 Cardiac irregular irregular   Temp 101.1 98.4 Skin cool cool   Weight N/A N/A Edema None noted None noted   Tele status monitor monitor Pain 0/10 0/10     Orders   Duration: Start: 1375 End: 1055 Total: 3.5 hours   Dialyzer: Dialyzer/Set Up Inspection: Revaclear (10/28/22 0723)   Davidson Barnacle Bath: Dialysate K (mEq/L): 2 (10/28/22 0723)   Ca Bath: Dialysate CA (mEq/L): 2.5 (10/28/22 0723)   Na: Dialysate NA (mEq/L): 138 (10/28/22 0723)   Bicarb: Dialysate HCO3 (mEq/L): 35 (10/28/22 0723)   Target Fluid Removal: Goal/Amount of Fluid to Remove (mL): 0 mL (10/28/22 0723)     Access   Type & Location: Left cvc   Comments:    patent; dressing dry and intact dated 10/28/22; no s/s of infection noted; post treatment both ports clamped and new caps applied securely                                    Labs   HBsAg (Antigen) / date:   Negative on 10/22/22                                            HBsAb (Antibody) / date: Susceptible on 10/22/22    Source: [facility] care   Obtained/Reviewed  Critical Results Called HGB   Date Value Ref Range Status   10/28/2022 14.7 11.5 - 16.0 g/dL Final     Potassium   Date Value Ref Range Status   10/28/2022 4.4 3.5 - 5.1 mmol/L Final     Calcium   Date Value Ref Range Status   10/28/2022 8.9 8.5 - 10.1 MG/DL Final     BUN   Date Value Ref Range Status   10/28/2022 25 (H) 6 - 20 MG/DL Final     Creatinine   Date Value Ref Range Status   10/28/2022 5.34 (H) 0.55 - 1.02 MG/DL Final        Meds Given   Name Dose Route   Heparin (1000 units/ml) Arterial 2.4 ml  Venous 2.4 ml IC   Albumin 12.5g  IV          Adequacy / Fluid    Total Liters Process: 66.2   Net Fluid Removed: 0      Comments   Time Out Done:   (Time) Yes, 0720   Admitting Diagnosis: Severe Sepsis   Consent obtained/signed: Informed Consent Verified: Yes (10/28/22 0723)   Machine / RO # Machine Number: [id number] (10/28/22 6780)   Primary Nurse Rpt Pre: Christina Ramírez RN   Primary Nurse Rpt Post: Tanja Ogden RN   Pt Education: Hemodialysis procedural with family   Care Plan: Continue hemodialysis per nephrologist   Pts outpatient clinic: [facility] Summary   Comments:    0602 primary nurse Tania gave midodrine as ordered  5002 treatment started  22 [id number] requested Albumin noted BP 96/40 HR 49  0740 noted SBP in the 80's to 90's; primary nurse Cary restarted Levophed and decreased fentanyl; this nurse started Albumin 12.5g as orderd      0820 primary nurse increased Levophed due to map less than Frank Fret Dr Thomasine Nyhan at bedside given an update no additional orders given at this time   1055 treatment completed

## 2022-10-28 NOTE — DIALYSIS
CRRT / [phone]    Orders   Mode: CVVHD   Blood Flow Rate: 180 ml/min   Prismasol Dose: 25ml/kg/hr  Dialysate: 1700 ml/hr   Prismasol Concentrate: 4K/3.5Ca   Blood Warmer Temp: 37 *C   Net Fluid Removal:  as tolerated     Metrics   BP: 123/39   HR: 64   Access Pressure: -41   Filter Pressure: 110   Return Pressure: 74   TMP: 12   Pressure Drop: 29     Access   Type & Location: LIJ tunneled CVC   Comments:  Tegaderm CHG dsg CDI, dated 10/28/22. Each catheter limb disinfected for 60 seconds per limb with alcohol swabs and hubs scrubbed with Prevantics for 15 seconds, followed by 5 second dry time per Hospital P&P. +aspiration/flush x 2 ports     Labs   HBsAg (Antigen) / date: Negative   (10/22/22)   HBsAb (Antibody) / date: 9/susceptible (10/22/22)   Source: EPIC     Safety:   Time Out Done:   (Time) 1630   Consent obtained/signed: Obtained   Education: Intubated/sedated   Primary Nurse Rpt Pre: TOMASZ Callahan RN   Primary Nurse Rpt Post: TOMASZ Callahan RN     Comments / Plan:   Patient, code status, labs, and orders verified. Consent on chart. Time out complete. CVVHD initiated per MD orders. Machine & HF-1000 set-up, primed 1L NS, tested & running well at this time. Lines visible & connections secure with blood warmer supported on return line, set at 37*C. Education & pre/post report to primary RN.

## 2022-10-28 NOTE — PROGRESS NOTES
0000-Bedside shift change report given to Malissa Guerra RN (oncoming nurse) by Maria Sanders RN (offgoing nurse). Report included the following information SBAR, Kardex, Intake/Output, MAR, Recent Results, and Cardiac Rhythm NSR/sinus rody/BBB . Drips: Erick@Richard Pauer - 3P, fentanyl@100    0700-HD RN at bedside, confirmed with MD that HD can be prior to attempting SAT/SBT. 0730-Bedside shift change report given to Adam Rossi RN (oncoming nurse) by Malissa Guerra RN (offgoing nurse). Report included the following information SBAR, Kardex, Intake/Output, MAR, Recent Results, and Cardiac Rhythm NSR/sinus rody w/ BBB .      Drips: Erick@Richard Pauer - 3P, fentanyl@100

## 2022-10-28 NOTE — PROGRESS NOTES
1930: Bedside shift report received from BRIDGER TOLBERT [facility].  0000: Bedside and Verbal shift change report given to [address] [place] [address] (oncoming nurse) by Sarah Pacheco (offgoing nurse).  Report included the following information SBAR, Kardex, Procedure Summary, Intake/Output, Accordion, and Cardiac Rhythm SR .

## 2022-10-28 NOTE — PROGRESS NOTES
Palliative Medicine Consult  Bryant: [phone] ([phone])    Patient Name: Sixto Merchant  YOB: 1950    Date of Initial Consult: 10/27/2022  Reason for Consult: care decisions   Requesting Provider: Uche Bar    Primary Care Physician: Trenton Shah MD     SUMMARY:   Sixto Merchant is a 67 y.o. admitted on 10/22/2022 from [facility] due to shortness of breath, elevated temp and low BP. Patient missed her HD on 10/21. Admitted with a diagnosis of Afib with RVR, hypokalemia, ESRD on HD, sepsis, hypotension, acute hypoxic respiratory failure, volume overload, cirrhosis (paracentesis drain 10/24)     PMH:  ESRD, severe TR, HFrEF-10-15%, chronic hypotension, CAD, cancer, COPD, GERD, ANITHA, anemia, Hep C, DM, COPD, obesity, cirrhosis    Hospitalizations:  8/22/2022-8/25/2022- near syncope, tachycardia, afib, biventricular HF, cirrhosis    Patient normally goes to the [address] [place] [address] and she gets her dialysis at the [address] [place] [address] as well    Current medical issues leading to Palliative Medicine involvement include: care decisions. Social:  . 8 children. Patient lives at [facility]. She fixes her own meals and bathes self. Does use a walker. The dtr, Nel Landaverde, reports she has fallen recently and has been working with PT at [facility]. Sometimes the one of the teenage grandsons will stay with her. Patient is a retired RN. She loves shopping in [employer]. She can shop for hours,per her dtr. Patient does not drive. Her family members take her shopping. Patient also loves dogs. Has AMD that is on file at the [address] [place] [address]. Per previous ACP note, patient has 2 children as decision makers    PALLIATIVE DIAGNOSES:   Palliative care encounter  Shortness of breath  Agitation   Debility        PLAN:   Patient was intubated yesterday afternoon. On AC 18, 40%, , 5 peep. Patient is on Precedex and fentanyl.   She looks restful  Some of the patient's children are here and have questions. Staff reports two of the family members have been quite upset. This seems to be from a lack of understanding of medical terms and getting different information. I met with the children and Dr. Prema Penaloza [place] [facility] [place] Present were: Angelic Mayo, [address] and via phone Wernerneelima Tere. Dr. Prema Penaloza first asked about their understanding of her medical problems. Several of the children spoke about her heart, liver and kidney issues. She is mostly hospitalized at the [address] [place] [address] and is usually admitted every 1-2 months. Dr. Prema Penaloza talked about the reason the patient was intubated. Some family was upset as they were not informed. Others seemed upset as her condition was 'stable' but then she was intubated. Dr. Prema Penaloza clarified the patient's respiratory status was not stable. He then used the word medically induced coma. The word coma really upset them. He clarified that the coma just means she is on sedation and that is why she is asleep. He explained that each day they would lower her sedation and see if she is able to come off the ventilator. He did say that he did not think this was likely today. Family seemed to have all questions answered by Dr. Prema Penaloza. He did prepare them about the possibility of needing a trach in the next 10+ days if she is still ventilated. We then talked further about the patient's care goals. I reviewed the conversation in August 2022 with Dr. Mary Carmen Perea. Full code, full support but she would not want to be on machines for a prolonged period. The family and I talked about a trach and what this involves. Most said she would not want a trach. We then said the focus should be day by day and see how she is progressing. I will check back in with them on Monday, October 31. I asked that they designate one person as the contact to call and check on their mother and that she then get info back to the family. They decided on [place]. The patient's AMD is on file at the [place]. No one has a copy. The family said Clint Parsons and Jocelyn Rodriguez are the decision makers. CCU staff have reported a concern about the patient's grandson, whom she is the guardian. He lives in her home. Staff reported he called yesterday to see when she was coming home and he needed to get to school. The family today assured me that he is not home alone. 10/27/2022  Examined patient in the CCU. Patient is on Precedex at 1.5 ug/kg/hr. She is on amiodarone at 1 ug/min. Patient is sedated. Using abdominal, accessory muscles to breath. Sats 93% on 4 LPM  Called the patient's dtr, Felix Davidson, via phone. She was unaware the patient had been transferred to the CCU. She had been trying to call the patient in her previous room. Updated the dtr on the patient's current condition. We also reviewed her many complex medical issues. Yoselyn Bhandari will update her other siblings (8 total). AMD on file at the [place]. The ACP note from August 2022  states the patient wanted full restorative measures- she wants to live \"as long as I can\" but also said she wants her time to be quality time. Code status discussed at that time, she was familiar with this- and full code status confirmed, however states that if she is on life support w/out chance of any good recovery that her children know she would not want ongoing measures and would remove her from artificial support. Reviewed the above ACP note from August 2022 with Yoselyn Bhandari and she says this is her mother's wishes. So will continue full supportive care and Full Code status for now. Will plan to reassess patient's goals of care once she is off sedation. Palliative will follow. I returned a call to [facility], one of patient's dtrs. She was confused about the patient being unresponsive. Makenzie Perez know the patient is on sedation. .   Initial consult note routed to primary continuity provider and/or primary health care team members  Communicated plan of care with: Palliative [place] [facility] [address] Team     GOALS OF CARE / TREATMENT PREFERENCES:     GOALS OF CARE:  Patient/Health Care Proxy Stated Goals: Prolong life (per ACP note in 8/2022)    TREATMENT PREFERENCES:   Code Status: Full Code    Patient and family's personal goals include: per ACP note in August 2022,  spoke to family member on 10/27 and reviewed the goals for full code, full support but not to be on the breathing machine for a prolonged period. Important upcoming milestones or family events: none reported     The patient identifies the following as important for living well:  per the family, the patient likes shopping and time with her dtr's dog       Advance Care Planning:  [x] The [place] [address] Team has updated the ACP Navigator with [address] and Patient Capacity        Primary Decision Maker (Active): Pollo pastor - Daughter - [phone]    Primary Decision Maker: Mejia Mora - Son - [phone]  Advance Care Planning 10/22/2022   Patient's Healthcare Decision Maker is: -   Primary Decision Maker Name -   Primary Decision Maker Phone Number -   Primary Decision Maker Relationship to Patient -   Secondary Decision [address] Name -   Secondary Decision [address] Phone Number -   Secondary Decision Maker Relationship to Patient -   Confirm Advance Directive None   Patient Would Like to Complete Advance Directive -   Does the patient have other document types -       Medical Interventions: Full interventions       Other:    As far as possible, the palliative care team has discussed with patient / health care proxy about goals of care / treatment preferences for patient.      HISTORY:     History obtained from: chart, team, family    CHIEF COMPLAINT: Pt admitted with SOB    HPI/SUBJECTIVE:    The patient is:   [] Verbal and participatory  [x] Non-participatory due to: sedation        Clinical Pain Assessment (nonverbal scale for severity on nonverbal patients): Activity (Movement): Lying quietly, normal position    Duration: for how long has pt been experiencing pain (e.g., 2 days, 1 month, years)  Frequency: how often pain is an issue (e.g., several times per day, once every few days, constant)     FUNCTIONAL ASSESSMENT:     Palliative Performance Scale (PPS):  PPS: 10       PSYCHOSOCIAL/SPIRITUAL SCREENING:     Palliative IDT has assessed this patient for cultural preferences / practices and a referral made as appropriate to needs (Cultural Services, Patient Advocacy, Ethics, etc.)    Any spiritual / Buddhist concerns:  [] Yes /  [x] No  [] Unable to obtain this information  If \"Yes\" to discuss with pastoral care during IDT     Does caregiver feel burdened by caring for their loved one:   [] Yes /  [x] No /  [] No Caregiver Present/Available [] No Caregiver [] Pt Lives at Facility  If \"Yes\" to discuss with social work during IDT    Anticipatory grief assessment:   [x] Normal  / [] Maladaptive   [] Unable to obtain this information  [] n/a  If \"Maladaptive\" to discuss with social work during IDT    ESAS Anxiety: Anxiety: 0    ESAS Depression:          REVIEW OF SYSTEMS:     Positive and pertinent negative findings in ROS are noted above in HPI. The following systems were [x] reviewed / [] unable to be reviewed as noted in HPI  Other findings are noted below. Systems: constitutional, ears/nose/mouth/throat, respiratory, gastrointestinal, genitourinary, musculoskeletal, integumentary, neurologic, psychiatric, endocrine. Positive findings noted below.   Modified ESAS Completed by: provider   Fatigue:  (on ventilator, sedated on precedex, fentanyl) Drowsiness:  (sedated)         Anxiety: 0       Dyspnea: 0     Constipation: No (BMs on 10/26)     Stool Occurrence(s): 1        PHYSICAL EXAM:     From RN flowsheet:  Wt Readings from Last 3 Encounters:   10/28/22 144 lb 6.4 oz (65.5 kg)   09/06/22 135 lb (61.2 kg)   08/31/22 137 lb 2 oz (62.2 kg)     Blood pressure (!) 136/59, pulse 85, temperature 98.4 °F (36.9 °C), resp. rate 19, height 5' (1.524 m), weight 144 lb 6.4 oz (65.5 kg), SpO2 94 %.     Pain Scale 1: Adult Nonverbal Pain Scale  Pain Intensity 1: 0  Pain Onset 1: chronic but worse yesterday  Pain Location 1: Back, Head, Neck  Pain Orientation 1: Posterior (pt reports all over)  Pain Description 1: Aching  Pain Intervention(s) 1: Medication (see MAR)    Last bowel movement, if known: 10/27    Constitutional: patient intubated, on Precedex 0.4 mug/kg/hr and fentanyl 14 ug /min  Eyes:eyes closed   ENMT: no nasal discharge, moist mucous membranes  Cardiovascular: regular rhythm   Respiratory: intubated:  AC 18, , 40%, 5 peep   Gastrointestinal: large abdomen (likely ascites)  Musculoskeletal: no deformity, no tenderness to palpation  Skin: warm, dry, dried crusted lesions on both lower legs, L > R  Neurologic: not following commands  Psychiatric: sedated  Other:       HISTORY:     Active Problems:    Debility (3/16/2018)      Severe sepsis ([address] (10/22/2022)      Palliative care encounter (10/27/2022)      Shortness of breath (10/27/2022)      Agitation (10/27/2022)    Past Medical History:   Diagnosis Date    Arthritis     Asthma     Chronic combined systolic and diastolic CHF (congestive heart failure) ([address] 8/26/2022    Chronic kidney disease     Chronic pain     Cirrhosis ([address] 12/17/2017    Diabetes ([address] diet controlled    GERD (gastroesophageal reflux disease)     Hypertension     Paroxysmal atrial fibrillation ([address] 10/6/2020      Past Surgical History:   Procedure Laterality Date    COLONOSCOPY N/A 1/12/2018    COLONOSCOPY performed by Shazia Ocasio MD at THE [facility] ENDOSCOPY    COLONOSCOPY N/A 3/19/2018    COLONOSCOPY performed by Shazia Ocasio MD at THE [facility] ENDOSCOPY    HX GYN  c section    HX VASCULAR ACCESS      dialysis     IR BX TRANSCATHETER  11/24/2020    IR PARACENTESIS ABD W IMAGE  10/22/2020      Family History Family history unknown: Yes      History reviewed, no pertinent family history.   Social History     Tobacco Use    Smoking status: Never    Smokeless tobacco: Never   Substance Use Topics    Alcohol use: No     Allergies   Allergen Reactions    Aleve [Naproxen Sodium] Itching, Swelling and Other (comments)    Amlodipine Rash, Hives and Itching    Aspirin Other (comments), Nausea Only and Unknown (comments)     GI bleed  GI bleeding      Heparin Unknown (comments)     bleeding      Ibuprofen Nausea and Vomiting    Metformin Other (comments)     swelling      Current Facility-Administered Medications   Medication Dose Route Frequency    piperacillin-tazobactam (ZOSYN) 4.5 g in 0.9% sodium chloride (MBP/ADV) 100 mL MBP  4.5 g IntraVENous NOW    Followed by    piperacillin-tazobactam (ZOSYN) 3.375 g in 0.9% sodium chloride (MBP/ADV) 100 mL MBP  3.375 g IntraVENous Q12H    heparin (porcine) injection 5,000 Units  5,000 Units SubCUTAneous Q12H    rifAXIMin (XIFAXAN) tablet 550 mg  550 mg Oral BID    OLANZapine (ZyPREXA) tablet 5 mg  5 mg Oral QHS    NOREPINephrine (LEVOPHED) 8 mg in 5% dextrose 250mL (32 mcg/mL) infusion  0.5-16 mcg/min IntraVENous TITRATE    fentaNYL (PF) 1,500 mcg/30 mL (50 mcg/mL) infusion  0-200 mcg/hr IntraVENous TITRATE    heparin (porcine) 1,000 unit/mL injection 2,400 Units  2,400 Units InterCATHeter DIALYSIS PRN    And    heparin (porcine) 1,000 unit/mL injection 2,400 Units  2,400 Units InterCATHeter DIALYSIS PRN    albumin human 25% (BUMINATE) solution 12.5 g  12.5 g IntraVENous DIALYSIS PRN    dexmedeTOMidine in 0.9 % NaCl (PRECEDEX) 400 mcg/100 mL (4 mcg/mL) infusion soln  0.1-1.5 mcg/kg/hr IntraVENous TITRATE    oseltamivir (TAMIFLU) 6 mg/mL oral suspension 30 mg  30 mg Oral Q MON, WED & FRI    alteplase (CATHFLO) 1 mg in sterile water (preservative free) 1 mL injection  1 mg InterCATHeter PRN    amiodarone (CORDARONE) 375 mg/250 mL D5W infusion  0.5-1 mg/min IntraVENous TITRATE albuterol-ipratropium (DUO-NEB) 2.5 MG-0.5 MG/3 ML  3 mL Nebulization Q4H PRN    dextrose 10 % infusion 125-250 mL  125-250 mL IntraVENous PRN    midodrine (PROAMATINE) tablet 20 mg  20 mg Oral Q8H    sodium chloride (NS) flush 5-40 mL  5-40 mL IntraVENous Q8H    sodium chloride (NS) flush 5-40 mL  5-40 mL IntraVENous PRN    acetaminophen (TYLENOL) tablet 650 mg  650 mg Oral Q6H PRN    Or    acetaminophen (TYLENOL) suppository 650 mg  650 mg Rectal Q6H PRN    polyethylene glycol (MIRALAX) packet 17 g  17 g Oral DAILY PRN    ondansetron (ZOFRAN ODT) tablet 4 mg  4 mg Oral Q8H PRN    Or    ondansetron (ZOFRAN) injection 4 mg  4 mg IntraVENous Q6H PRN    amiodarone (CORDARONE) tablet 200 mg  200 mg Oral BID    atorvastatin (LIPITOR) tablet 40 mg  40 mg Oral QHS    lactulose (CHRONULAC) 10 gram/15 mL solution 30 mL  20 g Oral BID    [Held by provider] metoprolol tartrate (LOPRESSOR) tablet 12.5 mg  12.5 mg Oral BID    methocarbamoL (ROBAXIN) tablet 500 mg  500 mg Oral Q8H PRN    arformoteroL (BROVANA) neb solution 15 mcg  15 mcg Nebulization BID RT    And    budesonide (PULMICORT) 500 mcg/2 ml nebulizer suspension  500 mcg Nebulization BID RT          LAB AND IMAGING FINDINGS:     Lab Results   Component Value Date/Time    WBC 9.4 10/28/2022 04:31 AM    HGB 14.7 10/28/2022 04:31 AM    PLATELET 702 (L) 84/07/5021 04:31 AM     Lab Results   Component Value Date/Time    Sodium 135 (L) 10/28/2022 04:31 AM    Potassium 4.4 10/28/2022 04:31 AM    Chloride 102 10/28/2022 04:31 AM    CO2 22 10/28/2022 04:31 AM    BUN 25 (H) 10/28/2022 04:31 AM    Creatinine 5.34 (H) 10/28/2022 04:31 AM    Calcium 8.9 10/28/2022 04:31 AM    Magnesium 2.0 10/28/2022 04:31 AM    Phosphorus 4.1 10/28/2022 04:31 AM      Lab Results   Component Value Date/Time    Alk.  phosphatase 158 (H) 10/28/2022 04:31 AM    Protein, total 5.2 (L) 10/28/2022 04:31 AM    Albumin 2.3 (L) 10/28/2022 04:31 AM    Globulin 2.9 10/28/2022 04:31 AM     Lab Results Component Value Date/Time    INR 1.5 (H) 10/22/2022 06:27 PM    Prothrombin time 15.2 (H) 10/22/2022 06:27 PM    aPTT 32.8 (H) 08/30/2022 12:47 AM    aPTT 28.6 09/25/2019 07:30 PM      Lab Results   Component Value Date/Time    Iron 43 11/27/2020 02:28 AM    TIBC 310 11/27/2020 02:28 AM    Iron % saturation 14 (L) 11/27/2020 02:28 AM    Ferritin 37 11/27/2020 02:28 AM      Lab Results   Component Value Date/Time    pH 7.34 (L) 10/28/2022 08:57 AM    PCO2 44 10/28/2022 08:57 AM    PO2 107 (H) 10/28/2022 08:57 AM     No components found for: [place]   Lab Results   Component Value Date/Time    CK 78 09/25/2019 07:30 PM    CK - MB 3.5 09/25/2019 07:30 PM                Total time:  35 minutes  Counseling / coordination time, spent as noted above:  0 minutes  > 50% counseling / coordination?: yes    Prolonged service was provided for  [x]30 min   []75 min in face to face time in the presence of the patient, spent as noted above. Time Start: 10:00  Time End: 11:15  Note: this can only be billed with 87386 (initial) or 75969 (follow up). If multiple start / stop times, list each separately.

## 2022-10-29 NOTE — DIALYSIS
CRRT / [phone]    Orders:   Mode: CVVHD restarted @ 1410   Blood Flow Rate: 180 mL/min   Prismasol Dose: 25ml/kg/hr  Dialysate: 1700 ml/hr   Prismasol Concentrate: 4K/2.5Ca   Blood Warmer Temp: 37 *C   Net Fluid Removal:  as able     Metrics:   BP: 132/37   HR: 52   Access Pressure: -48   Filter Pressure: 116   Return Pressure: 71   TMP: 24   Pressure Drop: 16     Access:   Type & Location: LIJ Tunneled CVC   Comments:  CHG Tegaderm dressing CDI and dated 10/28/22. Each catheter limb disinfected for 60 seconds per limb with alcohol swabs and each dialysis CVC hub scrubbed with Prevantics for 15 seconds, followed by a 5 second dry time per Hospital P&P. Labs:   HBsAg (Antigen) /date: Negative (10/22/22)     HBsAb (Antibody) /date: Susceptible (10/22/22)   Source: EPIC     Safety:   Time Out Done:   (Time) 1400   Consent obtained/signed: Verified   Education: sedated   Primary Nurse Rpt Pre: Debby Villa RN   Primary Nurse Rpt Post: Debby Villa RN     Comments / Plan:   Patient, code status, labs, and orders verified. Consent on chart. Time out complete. CVVHD restarted d/t trending PD and filter pressures, clotting imminent. [id number] set-up, primed 1L NS, tested and running well. Lines visible and connections secure with blood warmer supported on return line, set at 37*C. Education & pre/post report to primary RN.

## 2022-10-29 NOTE — PROGRESS NOTES
HISTORY OF PRESENT ILLNESS: 67 y.o F w/ PMH of ESRD on HD, Afib, HFrEF (10-15%), Cirrhosis, DM, GERD, ANITHA who was admitted to [facility] for Afib w/ RVR, hyperkalemia, and hypoxic respiratory failure with improvement w/ HD and abx course. Pt noted to be flu (+)ve. Initially plan for discharge 10/25, however pt wished to remain in hospital and be transferred to [facility]. RRT called PM of 10/25 for decreased mental status, hypotension and tachycardia. Of note last HD 10/24 (no UF), and had an abdominal drain placed 10/24 for recurrent ascites w/ almost 4L drainage at time of placement. Pt also noted to have refused lactulose for several days. CCM consulted 10/25 for evaluation of hypotension w/ resolution after gentle IVF resuscitation. Pt w/ increasing tachycardia and dyspnea throughout 10/26, stopped HD 1hr early (no UF) around 1800 pt w/ acute increase in HR from 120s to 150-160s, wide complex on EKG. CXR w/o acute findings. Pt given Adenosine 6mg x2, w/o improvement.       Interval history    10/27-still requiring amiodarone infusion, on/off BiPAP    10/28 - Intubated yest evening for severe hypercapnic resp failure, requiring pressors    10/29-extubated this afternoon to BiPAP, still has slight CO2 retention on BiPAP, on CRRT-started yesterday      Current Facility-Administered Medications:     vasopressin (VASOSTRICT) 20 Units in 0.9% sodium chloride 100 mL infusion, 0.04 Units/min, IntraVENous, CONTINUOUS, Pietro Ballesteros MD, Last Rate: 9 mL/hr at 10/29/22 1652, 0.03 Units/min at 10/29/22 1652    hydrocortisone Sod Succ (PF) (SOLU-CORTEF) injection 100 mg, 100 mg, IntraVENous, Q8H, Pietro Ballesteros MD, 100 mg at 10/29/22 1333    pantoprazole (PROTONIX) 40 mg in 0.9% sodium chloride 10 mL injection, 40 mg, IntraVENous, DAILY, Pietro Ballesteros MD, 40 mg at 10/29/22 0840    albumin human 25% (BUMINATE) solution 25 g, 25 g, IntraVENous, Q6H, Pietro Ballesteros MD, 25 g at 10/29/22 1333    ipratropium (ATROVENT) 0.02 % nebulizer solution 0.5 mg, 0.5 mg, Nebulization, Q6H RT, Pietro Ballesteros MD, 0.5 mg at 10/29/22 1447    DOBUTamine (DOBUTREX) 500 mg/250 mL (2,000 mcg/mL) infusion, 5 mcg/kg/min, IntraVENous, CONTINUOUS, Pietro Ballesteros MD, Last Rate: 9.8 mL/hr at 10/29/22 1710, 5 mcg/kg/min at 10/29/22 1710    [COMPLETED] piperacillin-tazobactam (ZOSYN) 4.5 g in 0.9% sodium chloride (MBP/ADV) 100 mL MBP, 4.5 g, IntraVENous, NOW, IV Completed at 10/28/22 1900 **FOLLOWED BY** piperacillin-tazobactam (ZOSYN) 3.375 g in 0.9% sodium chloride (MBP/ADV) 100 mL MBP, 3.375 g, IntraVENous, Q12H, Pietro Ballesteros MD, Last Rate: 25 mL/hr at 10/29/22 0526, 3.375 g at 10/29/22 0526    bicarbonate dialysis (PRISMASOL) BG K 4/Ca 2.5 5000 ml solution, , Extracorporeal, DIALYSIS CONTINUOUS, Isela Gaines MD, Last Rate: 2,000 mL/hr at 10/29/22 1637, New Bag at 10/29/22 1637    heparin (porcine) injection 5,000 Units, 5,000 Units, SubCUTAneous, Q12H, Pietro Ballesteros MD, 5,000 Units at 10/29/22 0702    rifAXIMin (XIFAXAN) tablet 550 mg, 550 mg, Oral, BID, Yeny CHRISTINA MD, 550 mg at 10/29/22 0840    [Held by provider] OLANZapine (ZyPREXA) tablet 5 mg, 5 mg, Oral, QHS, Pietro Ballesteros MD, 5 mg at 10/27/22 2128    NOREPINephrine (LEVOPHED) 8 mg in 5% dextrose 250mL (32 mcg/mL) infusion, 0.5-30 mcg/min, IntraVENous, TITRATE, Manual Dines A, MD, Last Rate: 11.3 mL/hr at 10/29/22 1652, 6 mcg/min at 10/29/22 1652    fentaNYL (PF) 1,500 mcg/30 mL (50 mcg/mL) infusion, 0-200 mcg/hr, IntraVENous, TITRATE, Brea Ballesteros MD, Stopped at 10/28/22 1300    heparin (porcine) 1,000 unit/mL injection 2,400 Units, 2,400 Units, InterCATHeter, DIALYSIS PRN, 2,400 Units at 10/28/22 0745 **AND** heparin (porcine) 1,000 unit/mL injection 2,400 Units, 2,400 Units, InterCATHeter, DIALYSIS PRN, Isela Gaines MD, 2,400 Units at 10/28/22 0744    albumin human 25% (BUMINATE) solution 12.5 g, 12.5 g, IntraVENous, DIALYSIS PRN, Lauren Mena MD, IV Completed at 10/28/22 1900    dexmedeTOMidine in 0.9 % NaCl (PRECEDEX) 400 mcg/100 mL (4 mcg/mL) infusion soln, 0.1-1.5 mcg/kg/hr, IntraVENous, TITRATE, Mary Hoffman MD, Stopped at 10/29/22 1350    oseltamivir (TAMIFLU) 6 mg/mL oral suspension 30 mg, 30 mg, Oral, Q MON, WED & Jame BATISTA MD, 30 mg at 10/28/22 2050    alteplase (CATHFLO) 1 mg in sterile water (preservative free) 1 mL injection, 1 mg, InterCATHeter, PRN, Mary Hoffman MD, 1 mg at 10/27/22 1342    amiodarone (CORDARONE) 375 mg/250 mL D5W infusion, 0.5-1 mg/min, IntraVENous, TITRATE, Yair Salazar MD, Stopped at 10/27/22 1651    albuterol-ipratropium (DUO-NEB) 2.5 MG-0.5 MG/3 ML, 3 mL, Nebulization, Q4H PRN, Ledy Burrows MD, 3 mL at 10/26/22 1828    dextrose 10 % infusion 125-250 mL, 125-250 mL, IntraVENous, PRN, Ledy Burrows MD, 125 mL at 10/26/22 2020    midodrine (PROAMATINE) tablet 20 mg, 20 mg, Oral, Q8H, Ledy Burrows MD, 20 mg at 10/29/22 1333    sodium chloride (NS) flush 5-40 mL, 5-40 mL, IntraVENous, Q8H, Ledy Burrows MD, 10 mL at 10/29/22 1519    sodium chloride (NS) flush 5-40 mL, 5-40 mL, IntraVENous, PRN, Ledy Burrows MD    acetaminophen (TYLENOL) tablet 650 mg, 650 mg, Oral, Q6H PRN, 650 mg at 10/28/22 1530 **OR** acetaminophen (TYLENOL) suppository 650 mg, 650 mg, Rectal, Q6H PRN, Ledy Burrows MD    polyethylene glycol (MIRALAX) packet 17 g, 17 g, Oral, DAILY PRN, Ledy Burrows MD    ondansetron (ZOFRAN ODT) tablet 4 mg, 4 mg, Oral, Q8H PRN **OR** ondansetron (ZOFRAN) injection 4 mg, 4 mg, IntraVENous, Q6H PRN, Ledy Burrows MD, 4 mg at 10/24/22 0501    amiodarone (CORDARONE) tablet 200 mg, 200 mg, Oral, BID, Ledy Burrows MD, 200 mg at 10/29/22 0840    atorvastatin (LIPITOR) tablet 40 mg, 40 mg, Oral, QHS, Ledy Burrows MD, 40 mg at 10/28/22 2100    lactulose (CHRONULAC) 10 gram/15 mL solution 30 mL, 20 g, Oral, BID, Candice Simmons MD, 30 mL at 10/29/22 1333    [Held by provider] metoprolol tartrate (LOPRESSOR) tablet 12.5 mg, 12.5 mg, Oral, BID, Candice Simmons MD, 12.5 mg at 10/24/22 0845    methocarbamoL (ROBAXIN) tablet 500 mg, 500 mg, Oral, Q8H PRN, Candice Simmons MD, 500 mg at 10/25/22 1642    arformoteroL (BROVANA) neb solution 15 mcg, 15 mcg, Nebulization, BID RT, 15 mcg at 10/29/22 1228 **AND** budesonide (PULMICORT) 500 mcg/2 ml nebulizer suspension, 500 mcg, Nebulization, BID RT, Candice Simmons MD, 500 mcg at 10/29/22 1228      VITAL SIGNS:  Visit Vitals  BP (!) 132/37   Pulse (!) 59   Temp (!) 96.3 °F (35.7 °C)   Resp 15   Ht 5' (1.524 m)   Wt 65.4 kg (144 lb 2.9 oz)   SpO2 98%   BMI 28.16 kg/m²     PHYSICAL EXAMINATION:  General-in mild respiratory distress  Neuro-pupils reactive, withdraws in uppers, not following commands  Cardiac-bradycardic, irregular  Lungs-use of accessory muscles, clear anteriorly  Abdomen-soft, slightly distended, nontender  Extremities-warm      LABORATORY ANALYSIS:  Recent Results (from the past 24 hour(s))   CBC WITH AUTOMATED DIFF    Collection Time: 10/29/22  3:27 AM   Result Value Ref Range    WBC 15.4 (H) 3.6 - 11.0 K/uL    RBC 5.54 (H) 3.80 - 5.20 M/uL    HGB 15.1 11.5 - 16.0 g/dL    HCT 49.3 (H) 35.0 - 47.0 %    MCV 89.0 80.0 - 99.0 FL    MCH 27.3 26.0 - 34.0 PG    MCHC 30.6 30.0 - 36.5 g/dL    RDW 19.9 (H) 11.5 - 14.5 %    PLATELET 441 (L) 141 - 400 K/uL    NRBC 1.2 (H) 0  WBC    ABSOLUTE NRBC 0.19 (H) 0.00 - 0.01 K/uL    NEUTROPHILS 88 (H) 32 - 75 %    LYMPHOCYTES 3 (L) 12 - 49 %    MONOCYTES 7 5 - 13 %    EOSINOPHILS 0 0 - 7 %    BASOPHILS 1 0 - 1 %    IMMATURE GRANULOCYTES 1 (H) 0.0 - 0.5 %    ABS. NEUTROPHILS 13.4 (H) 1.8 - 8.0 K/UL    ABS. LYMPHOCYTES 0.5 (L) 0.8 - 3.5 K/UL    ABS. MONOCYTES 1.1 (H) 0.0 - 1.0 K/UL    ABS. EOSINOPHILS 0.0 0.0 - 0.4 K/UL    ABS. BASOPHILS 0.2 (H) 0.0 - 0.1 K/UL    ABS. IMM.  GRANS. 0.2 (H) 0.00 - 0.04 K/UL    DF SMEAR SCANNED RBC COMMENTS ANISOCYTOSIS  2+        RBC COMMENTS OVALOCYTES  1+        RBC COMMENTS POLYCHROMASIA  1+        RBC COMMENTS RAINE CELLS  1+       METABOLIC PANEL, COMPREHENSIVE    Collection Time: 10/29/22  3:27 AM   Result Value Ref Range    Sodium 138 136 - 145 mmol/L    Potassium 4.0 3.5 - 5.1 mmol/L    Chloride 106 97 - 108 mmol/L    CO2 23 21 - 32 mmol/L    Anion gap 9 5 - 15 mmol/L    Glucose 163 (H) 65 - 100 mg/dL    BUN 10 6 - 20 MG/DL    Creatinine 2.31 (H) 0.55 - 1.02 MG/DL    BUN/Creatinine ratio 4 (L) 12 - 20      eGFR 22 (L) >60 ml/min/1.73m2    Calcium 9.0 8.5 - 10.1 MG/DL    Bilirubin, total 2.7 (H) 0.2 - 1.0 MG/DL    ALT (SGPT) 37 12 - 78 U/L    AST (SGOT) 105 (H) 15 - 37 U/L    Alk.  phosphatase 181 (H) 45 - 117 U/L    Protein, total 5.0 (L) 6.4 - 8.2 g/dL    Albumin 2.2 (L) 3.5 - 5.0 g/dL    Globulin 2.8 2.0 - 4.0 g/dL    A-G Ratio 0.8 (L) 1.1 - 2.2     PHOSPHORUS    Collection Time: 10/29/22  3:27 AM   Result Value Ref Range    Phosphorus 2.6 2.6 - 4.7 MG/DL   MAGNESIUM    Collection Time: 10/29/22  3:28 AM   Result Value Ref Range    Magnesium 2.2 1.6 - 2.4 mg/dL   POC G3 - PUL    Collection Time: 10/29/22  4:37 AM   Result Value Ref Range    FIO2 (POC) 40 %    pH (POC) 7.36 7.35 - 7.45      pCO2 (POC) 37.0 35.0 - 45.0 MMHG    pO2 (POC) 84 80 - 100 MMHG    HCO3 (POC) 20.6 (L) 22 - 26 MMOL/L    sO2 (POC) 95.9 92 - 97 %    Base deficit (POC) 4.3 mmol/L    Site DRAWN FROM ARTERIAL LINE      Device: ADULT VENT      Mode ASSIST CONTROL      Tidal volume 400 ml    Set Rate 18 bpm    PEEP/CPAP (POC) 5 cmH2O    Allens test (POC) NOT APPLICABLE      Specimen type (POC) ARTERIAL     POC EG7    Collection Time: 10/29/22 12:09 PM   Result Value Ref Range    Calcium, ionized (POC) 1.28 1.12 - 1.32 mmol/L    FIO2 (POC) 74 %    pH (POC) 7.25 (L) 7.35 - 7.45      pCO2 (POC) 55.0 (H) 35.0 - 45.0 MMHG    pO2 (POC) 311 (H) 80 - 100 MMHG    HCO3 (POC) 24.1 22 - 26 MMOL/L    Base deficit (POC) 4.3 mmol/L    sO2 (POC) 99.9 (H) 92 - 97 %    Site DRAWN FROM ARTERIAL LINE      Device: BIPAP MASK      Set Rate 20 bpm    PEEP/CPAP (POC) 5 cmH2O    PIP (POC) 14      Allens test (POC) Positive      Specimen type (POC) ARTERIAL     POC EG7    Collection Time: 10/29/22 12:53 PM   Result Value Ref Range    Calcium, ionized (POC) 1.27 1.12 - 1.32 mmol/L    FIO2 (POC) 39 %    pH (POC) 7.28 (L) 7.35 - 7.45      pCO2 (POC) 49.5 (H) 35.0 - 45.0 MMHG    pO2 (POC) 130 (H) 80 - 100 MMHG    HCO3 (POC) 23.5 22 - 26 MMOL/L    Base deficit (POC) 3.9 mmol/L    sO2 (POC) 98.5 (H) 92 - 97 %    Site DRAWN FROM ARTERIAL LINE      Device: BIPAP MASK      PEEP/CPAP (POC) 5 cmH2O    PIP (POC) 14      Allens test (POC) NOT APPLICABLE      Specimen type (POC) ARTERIAL     POC EG7    Collection Time: 10/29/22  2:50 PM   Result Value Ref Range    Calcium, ionized (POC) 1.28 1.12 - 1.32 mmol/L    FIO2 (POC) 39 %    pH (POC) 7.27 (L) 7.35 - 7.45      pCO2 (POC) 50.2 (H) 35.0 - 45.0 MMHG    pO2 (POC) 135 (H) 80 - 100 MMHG    HCO3 (POC) 23.2 22 - 26 MMOL/L    Base deficit (POC) 4.3 mmol/L    sO2 (POC) 98.6 (H) 92 - 97 %    Site DRAWN FROM ARTERIAL LINE      Device: BIPAP MASK      PEEP/CPAP (POC) 5 cmH2O    PIP (POC) 15      Allens test (POC) NOT APPLICABLE      Specimen type (POC) ARTERIAL       No results displayed because visit has over 200 results. RADIOGRAPHIC STUDIES:  XR CHEST PORT  Narrative: EXAM:  XR CHEST PORT    INDICATION: Endotracheal tube. COMPARISON: CT 10/28/2022 and chest x-ray 10/27/2022. TECHNIQUE: Semierect portable chest AP view    FINDINGS: Endotracheal tube, enteric tube, right central venous catheter, and  left hemodialysis catheter project in stable and expected positions. The cardiac  silhouette is within normal limits. The pulmonary vasculature is within normal  limits. The lungs and pleural spaces show patchy airspace opacity in the right lower  lung but otherwise are clear.  The visualized bones and upper abdomen are  age-appropriate. Impression: Support lines in expected positions as above. Patchy airspace  opacity in the right lower lung may reflect atelectasis or pneumonia. DIAGNOSES:  Wide complex tachycardia   Acute hypoxic respiratory failure  ESRD  HFrEF  Cirrhosis  DM    IMPRESSION AND PLAN:    Precedex as needed for agitation, continue lactulose therapy-titrated to 3 bowel movements a day, delirium prevention strategies    Continue hemodynamic monitoring, map goal greater than 60, requiring inotropes/pressors-dobutamine, Levophed, vasopressin, stress dose steroids started today,, most recent echo showed an EF of 10 to 15%, severe TR, moderate MR, and PASP's in the mid 40s, A. fib RVR with aberrancy, heart rate goal less than 110, keep magnesium above 2 and potassium above 4, continue Lipitor    On BiPAP, retaining CO2, at risk of getting reintubated, ensure bronchodilators    Feeding tube placed-start trickle feeds, as above-ensure bowel movements    ESRD on [id number] nephrology recommendations-on CRRT n.p.o. for now, dose medication renally, correct electrolyte derangements as needed    Heparin for DVT prophylaxis    Flu A+10/22, Tamiflu started by my colleague 10/26, on empiric Zosyn, follow-up micro studies  Keep glucose less than 180    Prognosis very guarded at this time, f/u PCT [address] discussions with SARANYAKs    Critical care time-40 minutes    The patient is critically ill with single or multiple systems failure, severe metabolic derangement and/or infection, has potential for life threatening deterioration, requires high complexity decision making, frequent evaluation and titration of therapies and interpretation of data. This note has been written with voice recognition software. While this note has been edited for accuracy, the software periodically misinterprets speech resulting in errors that might not have been caught in editing.  In the event an unusual error is found in this record, please read the chart carefully and recognize, using context, where these substitutions or errors have occurred and please notify me to resolve the errors.

## 2022-10-29 NOTE — PROGRESS NOTES
Nephrology Progress Note  Lawanda Avina Fenton  Date of Admission : 10/22/2022    CC:  Follow up for ESRD       Assessment and Plan     ESRD on dialysis   - Patient of [address] [facility], [facility] MWF  - Continue CRRT with 25 cc factor for now     Ascites:  - s/p paracentesis, drain removed    Hypotension:  - on nor epine    Afib w/ RVR:  - on amio drip     Resp failure:  - intubated 10/27  - per CCM,   - extubated 10/29 , on bipap     Cardiac Cirrhosis   RV failure, severe TR  Chronic HFrEF   Chronic hypotension     Anemia   - no need for CAROL ANN      Influenza A +    Hep C, hx of  DMII       Interval History:  Seen and  examined and d/w son at bed side. On bipap and on NE   Mentation is poor     Current Medications: all current  Medications have been eviewed in EPIC  Review of Systems: Pertinent items are noted in HPI. Objective:  Vitals:    Vitals:    10/29/22 1100 10/29/22 1200 10/29/22 1300 10/29/22 1410   BP:    (!) 132/37   Pulse: (!) 51 (!) 48 (!) 46 (!) 52   Resp: 17 19 19    Temp: 97.5 °F (36.4 °C) 97.5 °F (36.4 °C) 97.3 °F (36.3 °C)    TempSrc:       SpO2: 96% 100%     Weight:       Height:         Intake and Output:  10/29 0701 - 10/29 1900  In: 478.8 [I.V.:318.8]  Out: 485   10/27 1901 - 10/29 0700  In: 2693.2 [I.V.:1613.2]  Out: 3098     Physical Examination:    General: On bipap   Neck:  Supple, no mass  Resp:  Lungs CTA B/L, no wheezing , normal respiratory effort  CV:  RRR,  no murmur or rub, no LE edema  GI:  Soft, NT, mild distention, no fluid wave  Neurologic:  unable to asses   Psych:             unable to evaluate  Skin:  No Rash  Access:           [facility]    [x]    High complexity decision making was performed  [x]    Patient is at high-risk of decompensation with multiple organ involvement    Lab Data Personally Reviewed: I have reviewed all the pertinent labs, microbiology data and radiology studies during assessment.     Recent Labs     10/29/22  0328 10/29/22  0327 10/28/22  0431 10/27/22  0419   NA  --  138 135* 135*   K  --  4.0 4.4 3.9   CL  --  106 102 100   CO2  --  23 22 25   GLU  --  163* 181* 168*   BUN  --  10 25* 15   CREA  --  2.31* 5.34* 4.33*   CA  --  9.0 8.9 8.5   MG 2.2  --  2.0 1.9   PHOS  --  2.6 4.1 3.9   ALB  --  2.2* 2.3* 2.5*   ALT  --  37 28 22       Recent Labs     10/29/22  0327 10/28/22  0431 10/27/22  0419   WBC 15.4* 9.4 10.9   HGB 15.1 14.7 13.9   HCT 49.3* 47.2* 47.2*   * 115* 118*       No results found for: SDES  Lab Results   Component Value Date/Time    Culture result: NO GROWTH AFTER 18 HOURS 10/28/2022 09:38 AM    Culture result: MRSA NOT PRESENT 10/22/2022 06:27 PM    Culture result:  10/22/2022 06:27 PM     Screening of patient nares for MRSA is for surveillance purposes and, if positive, to facilitate isolation considerations in high risk settings. It is not intended for automatic decolonization interventions per se as regimens are not sufficiently effective to warrant routine use. Recent Results (from the past 24 hour(s))   CBC WITH AUTOMATED DIFF    Collection Time: 10/29/22  3:27 AM   Result Value Ref Range    WBC 15.4 (H) 3.6 - 11.0 K/uL    RBC 5.54 (H) 3.80 - 5.20 M/uL    HGB 15.1 11.5 - 16.0 g/dL    HCT 49.3 (H) 35.0 - 47.0 %    MCV 89.0 80.0 - 99.0 FL    MCH 27.3 26.0 - 34.0 PG    MCHC 30.6 30.0 - 36.5 g/dL    RDW 19.9 (H) 11.5 - 14.5 %    PLATELET 962 (L) 050 - 400 K/uL    NRBC 1.2 (H) 0  WBC    ABSOLUTE NRBC 0.19 (H) 0.00 - 0.01 K/uL    NEUTROPHILS 88 (H) 32 - 75 %    LYMPHOCYTES 3 (L) 12 - 49 %    MONOCYTES 7 5 - 13 %    EOSINOPHILS 0 0 - 7 %    BASOPHILS 1 0 - 1 %    IMMATURE GRANULOCYTES 1 (H) 0.0 - 0.5 %    ABS. NEUTROPHILS 13.4 (H) 1.8 - 8.0 K/UL    ABS. LYMPHOCYTES 0.5 (L) 0.8 - 3.5 K/UL    ABS. MONOCYTES 1.1 (H) 0.0 - 1.0 K/UL    ABS. EOSINOPHILS 0.0 0.0 - 0.4 K/UL    ABS. BASOPHILS 0.2 (H) 0.0 - 0.1 K/UL    ABS. IMM.  GRANS. 0.2 (H) 0.00 - 0.04 K/UL    DF SMEAR SCANNED      RBC COMMENTS ANISOCYTOSIS  2+        RBC COMMENTS OVALOCYTES  1+ RBC COMMENTS POLYCHROMASIA  1+        RBC COMMENTS RAINE CELLS  1+       METABOLIC PANEL, COMPREHENSIVE    Collection Time: 10/29/22  3:27 AM   Result Value Ref Range    Sodium 138 136 - 145 mmol/L    Potassium 4.0 3.5 - 5.1 mmol/L    Chloride 106 97 - 108 mmol/L    CO2 23 21 - 32 mmol/L    Anion gap 9 5 - 15 mmol/L    Glucose 163 (H) 65 - 100 mg/dL    BUN 10 6 - 20 MG/DL    Creatinine 2.31 (H) 0.55 - 1.02 MG/DL    BUN/Creatinine ratio 4 (L) 12 - 20      eGFR 22 (L) >60 ml/min/1.73m2    Calcium 9.0 8.5 - 10.1 MG/DL    Bilirubin, total 2.7 (H) 0.2 - 1.0 MG/DL    ALT (SGPT) 37 12 - 78 U/L    AST (SGOT) 105 (H) 15 - 37 U/L    Alk.  phosphatase 181 (H) 45 - 117 U/L    Protein, total 5.0 (L) 6.4 - 8.2 g/dL    Albumin 2.2 (L) 3.5 - 5.0 g/dL    Globulin 2.8 2.0 - 4.0 g/dL    A-G Ratio 0.8 (L) 1.1 - 2.2     PHOSPHORUS    Collection Time: 10/29/22  3:27 AM   Result Value Ref Range    Phosphorus 2.6 2.6 - 4.7 MG/DL   MAGNESIUM    Collection Time: 10/29/22  3:28 AM   Result Value Ref Range    Magnesium 2.2 1.6 - 2.4 mg/dL   POC G3 - PUL    Collection Time: 10/29/22  4:37 AM   Result Value Ref Range    FIO2 (POC) 40 %    pH (POC) 7.36 7.35 - 7.45      pCO2 (POC) 37.0 35.0 - 45.0 MMHG    pO2 (POC) 84 80 - 100 MMHG    HCO3 (POC) 20.6 (L) 22 - 26 MMOL/L    sO2 (POC) 95.9 92 - 97 %    Base deficit (POC) 4.3 mmol/L    Site DRAWN FROM ARTERIAL LINE      Device: ADULT VENT      Mode ASSIST CONTROL      Tidal volume 400 ml    Set Rate 18 bpm    PEEP/CPAP (POC) 5 cmH2O    Allens test (POC) NOT APPLICABLE      Specimen type (POC) ARTERIAL     POC EG7    Collection Time: 10/29/22 12:09 PM   Result Value Ref Range    Calcium, ionized (POC) 1.28 1.12 - 1.32 mmol/L    FIO2 (POC) 74 %    pH (POC) 7.25 (L) 7.35 - 7.45      pCO2 (POC) 55.0 (H) 35.0 - 45.0 MMHG    pO2 (POC) 311 (H) 80 - 100 MMHG    HCO3 (POC) 24.1 22 - 26 MMOL/L    Base deficit (POC) 4.3 mmol/L    sO2 (POC) 99.9 (H) 92 - 97 %    Site DRAWN FROM ARTERIAL LINE      Device: BIPAP MASK      Set Rate 20 bpm    PEEP/CPAP (POC) 5 cmH2O    PIP (POC) 14      Allens test (POC) Positive      Specimen type (POC) ARTERIAL     POC EG7    Collection Time: 10/29/22 12:53 PM   Result Value Ref Range    Calcium, ionized (POC) 1.27 1.12 - 1.32 mmol/L    FIO2 (POC) 39 %    pH (POC) 7.28 (L) 7.35 - 7.45      pCO2 (POC) 49.5 (H) 35.0 - 45.0 MMHG    pO2 (POC) 130 (H) 80 - 100 MMHG    HCO3 (POC) 23.5 22 - 26 MMOL/L    Base deficit (POC) 3.9 mmol/L    sO2 (POC) 98.5 (H) 92 - 97 %    Site DRAWN FROM ARTERIAL LINE      Device: BIPAP MASK      PEEP/CPAP (POC) 5 cmH2O    PIP (POC) 14      Allens test (POC) NOT APPLICABLE      Specimen type (POC) ARTERIAL               Siri William MD  [address]  Phone - [phone]   Fax - [phone]  [email or web address]

## 2022-10-29 NOTE — PROGRESS NOTES
1930: [facility]  [facility] Appropriateness and documentation of restraints has been reviewed with the off-going RN. Restraint order is current and valid. 2000: Pt hypotermic, placed on bairhugger. 0400: Angeles Leroy RN at bedside. 0500: Temp 36.3*C, bairhugger removed. 0730: Bedside report given to Dominga RN  Appropriateness and documentation of restraints has been reviewed with the off-going RN. Restraint order is current and valid.

## 2022-10-29 NOTE — PROGRESS NOTES
0730 Bedside and Verbal shift change report given to Halle Pina RN (oncoming nurse) by Dave Khalil, RN (offgoing nurse). Report included the following information SBAR, Kardex, Intake/Output, MAR, Recent Results, and Cardiac Rhythm NSR/SB . 0930 Dr. Rafael Ayoub at bedside - placed pt on SBT  1140 RT at bedside - Pt extubated to BiPap per Dr. Rafael Ayoub  1155 Unable to get SpO2 reading on pt - Dr. Rafael Ayoub made aware - orders received to check ABG  1210 ABG completed  pH 7.25 CO2 55 pO2 311 - Dr. Rene Butcher made aware -BiPap changes made & orders received to recheck in 30 mins  1245 ABG completed  pH 7.28 CO2 49.5 - Dr. Rafael Ayoub made aware - orders received to recheck ABG in 2 hrs  1350 /44 (77) - levo @ 4mcg & vaso @ 0.04 - spoke w/ Dr. Rafael Ayoub - orders received to stop vaso   Precedex weaned off  26 Finn RIBEIRO at bedside - CRRT  filter changed  1450 ABG completed  pH 7.27 CO2 50.2 - Dr. Rafael Ayoub updated - BiPap settings changed - orders to recheck @ 2000  1515 /34 (56) HR 55 - updated Dr. Rafael Ayoub - orders received for dobutamine gtt @ 2.5 & monitor CVP  1625 MAP 54 - levo @ 15 - restarted vaso per Dr. Rafael Ayoub  1700 MAP 72 HR 59, levo @ 6mcg vaso @ 0.3 dobut @ 2.5mcg - Dr. Rafael Ayoub at bedside, orders received to increase dobut to 5mcg  1900 Temp 35.5C - bairhugger placed on pt  1930 Bedside and Verbal shift change report given to Halle Pina, QUINTIN (oncoming nurse) by Dave Khalil, RN (offgoing nurse). Report included the following information SBAR, Kardex, Procedure Summary, Intake/Output, MAR, Recent Results, and Cardiac Rhythm NSR/SB .

## 2022-10-29 NOTE — PROGRESS NOTES
Physical Therapy:   10/29/2022    Orders received and chart reviewed in preparation for PT evaluation. Noted patient remains intubated with no command following per RN, also noted to have initiated CRRT and hypothermic last night. Will hold PT evaluation and continue to follow pending improved medical stability and ability to participate.      Thank you,  La Brandt, PT, DPT

## 2022-10-29 NOTE — DIALYSIS
CRRT / [phone]    Orders   Mode: CVVHD rounds   Blood Flow Rate: 180 ml/min   Prismasol Dose: 25ml/kg/hr  Dialysate: 1700 ml/hr   Prismasol Concentrate: 4K/3.5Ca   Blood Warmer Temp: 37 *C   Net Fluid Removal:  as tolerated     Metrics   BP: 116/44   HR: 67   Access Pressure: -130   Filter Pressure: 189   Return Pressure: 80   TMP: 12   Pressure Drop: 77     Access   Type & Location: LIJ tunneled CVC   Comments:  Tegaderm CHG dsg CDI, dated 10/28/22. Labs   HBsAg (Antigen) / date: Negative   (10/22/22)   HBsAb (Antibody) / date: 9/susceptible (10/22/22)   Source: EPIC     Safety:   Time Out Done:   (Time) 5551   Consent obtained/signed: Obtained   Education: Intubated/sedated   Primary Nurse Rpt Pre: Alejandro Davila RN   Primary Nurse Rpt Post: QUINTIN Angel     Comments / Plan:   Patient, code status, labs, and orders verified. Consent on chart. Time out complete. CVVHD rounded. No indication for filter change at this time. Lines visible & connections secure with blood warmer supported on return line, set at 37*C. Education & pre/post report to primary RN.

## 2022-10-30 NOTE — PROGRESS NOTES
HISTORY OF PRESENT ILLNESS: 67 y.o F w/ PMH of ESRD on HD, Afib, HFrEF (10-15%), Cirrhosis, DM, GERD, ANITHA who was admitted to [facility] for Afib w/ RVR, hyperkalemia, and hypoxic respiratory failure with improvement w/ HD and abx course. Pt noted to be flu (+)ve. Initially plan for discharge 10/25, however pt wished to remain in hospital and be transferred to [facility]. RRT called PM of 10/25 for decreased mental status, hypotension and tachycardia. Of note last HD 10/24 (no UF), and had an abdominal drain placed 10/24 for recurrent ascites w/ almost 4L drainage at time of placement. Pt also noted to have refused lactulose for several days. CCM consulted 10/25 for evaluation of hypotension w/ resolution after gentle IVF resuscitation. Pt w/ increasing tachycardia and dyspnea throughout 10/26, stopped HD 1hr early (no UF) around 1800 pt w/ acute increase in HR from 120s to 150-160s, wide complex on EKG. CXR w/o acute findings. Pt given Adenosine 6mg x2, w/o improvement.       Interval history    10/27-still requiring amiodarone infusion, on/off BiPAP    10/28 - Intubated yest evening for severe hypercapnic resp failure, requiring pressors    10/29-extubated this afternoon to BiPAP, still has slight CO2 retention on BiPAP, on CRRT-started yesterday    10/30 - On continuous BiPAP - high support and still retaining some C02, on high pressor support and inotropy, echo showed slight improvement of EF albeit on dobutamine, it did however show RV dysfunction and PAPs in the 70s, on CRRT,       Current Facility-Administered Medications:     vasopressin (VASOSTRICT) 20 Units in 0.9% sodium chloride 100 mL infusion, 0.04 Units/min, IntraVENous, CONTINUOUS, Pietro Ballesteros MD, Last Rate: 12 mL/hr at 10/30/22 1306, 0.04 Units/min at 10/30/22 1306    hydrocortisone Sod Succ (PF) (SOLU-CORTEF) injection 100 mg, 100 mg, IntraVENous, Q8H, Pietro Ballesteros MD, 100 mg at 10/30/22 1310    pantoprazole (PROTONIX) 40 mg in 0.9% sodium chloride 10 mL injection, 40 mg, IntraVENous, DAILY, Pietro Ballesteros MD, 40 mg at 10/30/22 0901    ipratropium (ATROVENT) 0.02 % nebulizer solution 0.5 mg, 0.5 mg, Nebulization, Q6H RT, Pietro Ballesteros MD, 0.5 mg at 10/30/22 1421    DOBUTamine (DOBUTREX) 500 mg/250 mL (2,000 mcg/mL) infusion, 5 mcg/kg/min, IntraVENous, CONTINUOUS, Pietro Ballesteros MD, Last Rate: 9.8 mL/hr at 10/30/22 1629, 5 mcg/kg/min at 10/30/22 1629    [COMPLETED] piperacillin-tazobactam (ZOSYN) 4.5 g in 0.9% sodium chloride (MBP/ADV) 100 mL MBP, 4.5 g, IntraVENous, NOW, IV Completed at 10/28/22 1900 **FOLLOWED BY** piperacillin-tazobactam (ZOSYN) 3.375 g in 0.9% sodium chloride (MBP/ADV) 100 mL MBP, 3.375 g, IntraVENous, Q12H, Pietro Ballesteros MD, Last Rate: 25 mL/hr at 10/30/22 0505, 3.375 g at 10/30/22 0505    bicarbonate dialysis (PRISMASOL) BG K 4/Ca 2.5 5000 ml solution, , Extracorporeal, DIALYSIS CONTINUOUS, Cele Temple MD, Last Rate: 2,000 mL/hr at 10/30/22 1555, New Bag at 10/30/22 1555    rifAXIMin (XIFAXAN) tablet 550 mg, 550 mg, Oral, BID, Reena CHRISTINA MD, 550 mg at 10/30/22 0901    [Held by provider] OLANZapine (ZyPREXA) tablet 5 mg, 5 mg, Oral, QHS, Pietro Ballesteros MD, 5 mg at 10/27/22 2128    NOREPINephrine (LEVOPHED) 8 mg in 5% dextrose 250mL (32 mcg/mL) infusion, 0.5-30 mcg/min, IntraVENous, TITRATE, Dave BATISTA MD, Last Rate: 33.8 mL/hr at 10/30/22 1306, 18 mcg/min at 10/30/22 1306    heparin (porcine) 1,000 unit/mL injection 2,400 Units, 2,400 Units, InterCATHeter, DIALYSIS PRN, 2,400 Units at 10/28/22 0745 **AND** heparin (porcine) 1,000 unit/mL injection 2,400 Units, 2,400 Units, InterCATHeter, DIALYSIS PRN, David Vega MD, 2,400 Units at 10/28/22 0744    albumin human 25% (BUMINATE) solution 12.5 g, 12.5 g, IntraVENous, DIALYSIS PRN, David Vega MD, IV Completed at 10/28/22 1900    dexmedeTOMidine in 0.9 % NaCl (PRECEDEX) 400 mcg/100 mL (4 mcg/mL) infusion soln, 0.1-1.5 mcg/kg/hr, IntraVENous, TITRATE, Jaylene BATISTA MD, Last Rate: 7.2 mL/hr at 10/30/22 1442, 0.4 mcg/kg/hr at 10/30/22 1442    oseltamivir (TAMIFLU) 6 mg/mL oral suspension 30 mg, 30 mg, Oral, Q MON, WED & Milagros BATISTA MD, 30 mg at 10/28/22 2050    alteplase (CATHFLO) 1 mg in sterile water (preservative free) 1 mL injection, 1 mg, InterCATHeter, PRN, Lucretia Jeong MD, 1 mg at 10/27/22 1342    albuterol-ipratropium (DUO-NEB) 2.5 MG-0.5 MG/3 ML, 3 mL, Nebulization, Q4H PRN, Sebastian Belcher MD, 3 mL at 10/26/22 1828    dextrose 10 % infusion 125-250 mL, 125-250 mL, IntraVENous, PRN, Sebastian Belcher MD, 125 mL at 10/26/22 2020    midodrine (PROAMATINE) tablet 20 mg, 20 mg, Oral, Q8H, Sebastian Belcher MD, 20 mg at 10/30/22 1303    sodium chloride (NS) flush 5-40 mL, 5-40 mL, IntraVENous, Q8H, Sebastian Belcehr MD, 10 mL at 10/30/22 1555    sodium chloride (NS) flush 5-40 mL, 5-40 mL, IntraVENous, PRN, Sebastian Belcher MD    acetaminophen (TYLENOL) tablet 650 mg, 650 mg, Oral, Q6H PRN, 650 mg at 10/28/22 1530 **OR** acetaminophen (TYLENOL) suppository 650 mg, 650 mg, Rectal, Q6H PRN, Sebastian Belcher MD    polyethylene glycol (MIRALAX) packet 17 g, 17 g, Oral, DAILY PRN, Sebastian Belcher MD    ondansetron (ZOFRAN ODT) tablet 4 mg, 4 mg, Oral, Q8H PRN **OR** ondansetron (ZOFRAN) injection 4 mg, 4 mg, IntraVENous, Q6H PRN, Sebastian Belcher MD, 4 mg at 10/24/22 0501    amiodarone (CORDARONE) tablet 200 mg, 200 mg, Oral, BID, Sebastian Belcher MD, 200 mg at 10/30/22 0901    atorvastatin (LIPITOR) tablet 40 mg, 40 mg, Oral, QHS, Sebastian Belcher MD, 40 mg at 10/29/22 2106    lactulose (CHRONULAC) 10 gram/15 mL solution 30 mL, 20 g, Oral, BID, Sebastian Belcher MD, 30 mL at 10/30/22 1251    [Held by provider] metoprolol tartrate (LOPRESSOR) tablet 12.5 mg, 12.5 mg, Oral, BID, Sebastian Belcher MD, 12.5 mg at 10/24/22 0845    methocarbamoL (ROBAXIN) tablet 500 mg, 500 mg, Oral, Q8H PRN, Anen Marie Figueroa MD, 500 mg at 10/25/22 1642    arformoteroL (BROVANA) neb solution 15 mcg, 15 mcg, Nebulization, BID RT, 15 mcg at 10/30/22 0737 **AND** budesonide (PULMICORT) 500 mcg/2 ml nebulizer suspension, 500 mcg, Nebulization, BID RT, Anne Marie Figueroa MD, 500 mcg at 10/30/22 0737      VITAL SIGNS:  Visit Vitals  BP (!) 131/33   Pulse 81   Temp (!) 96.3 °F (35.7 °C)   Resp 21   Ht 5' (1.524 m)   Wt 64 kg (141 lb 1.5 oz)   SpO2 95%   BMI 27.56 kg/m²     PHYSICAL EXAMINATION:  General-in mild respiratory distress  Neuro-pupils reactive, withdraws in uppers, not following commands  Cardiac-bradycardic, irregular  Lungs-use of accessory muscles, clear anteriorly  Abdomen-soft, slightly distended, nontender  Extremities-warm      LABORATORY ANALYSIS:  Recent Results (from the past 24 hour(s))   GLUCOSE, POC    Collection Time: 10/29/22  7:38 PM   Result Value Ref Range    Glucose (POC) 169 (H) 65 - 117 mg/dL    Performed by Anu Castro    POC G3 - PUL    Collection Time: 10/29/22  7:46 PM   Result Value Ref Range    FIO2 (POC) 30 %    pH (POC) 7.33 (L) 7.35 - 7.45      pCO2 (POC) 41.9 35.0 - 45.0 MMHG    pO2 (POC) 78 (L) 80 - 100 MMHG    HCO3 (POC) 22.1 22 - 26 MMOL/L    sO2 (POC) 94.4 92 - 97 %    Base deficit (POC) 3.7 mmol/L    Site DRAWN FROM ARTERIAL LINE      Device: BIPAP MASK      PEEP/CPAP (POC) 5 cmH2O    Mean Airway Pressure 18 cmH2O    Allens test (POC) NOT APPLICABLE      Specimen type (POC) ARTERIAL     POC G3 - PUL    Collection Time: 10/29/22 11:32 PM   Result Value Ref Range    FIO2 (POC) 40 %    pH (POC) 7.32 (L) 7.35 - 7.45      pCO2 (POC) 42.5 35.0 - 45.0 MMHG    pO2 (POC) 86 80 - 100 MMHG    HCO3 (POC) 21.7 (L) 22 - 26 MMOL/L    sO2 (POC) 95.5 92 - 97 %    Base deficit (POC) 4.3 mmol/L    Site DRAWN FROM ARTERIAL LINE      Device: BIPAP MASK      Allens test (POC) NOT APPLICABLE      Specimen type (POC) ARTERIAL     CBC WITH AUTOMATED DIFF    Collection Time: 10/30/22  3:16 AM Result Value Ref Range    WBC 13.2 (H) 3.6 - 11.0 K/uL    RBC 4.30 3.80 - 5.20 M/uL    HGB 11.5 11.5 - 16.0 g/dL    HCT 38.2 35.0 - 47.0 %    MCV 88.8 80.0 - 99.0 FL    MCH 26.7 26.0 - 34.0 PG    MCHC 30.1 30.0 - 36.5 g/dL    RDW 19.7 (H) 11.5 - 14.5 %    PLATELET 59 (L) 378 - 400 K/uL    NRBC 0.5 (H) 0  WBC    ABSOLUTE NRBC 0.07 (H) 0.00 - 0.01 K/uL    NEUTROPHILS 86 (H) 32 - 75 %    LYMPHOCYTES 2 (L) 12 - 49 %    MONOCYTES 6 5 - 13 %    EOSINOPHILS 6 0 - 7 %    BASOPHILS 0 0 - 1 %    IMMATURE GRANULOCYTES 0 %    ABS. NEUTROPHILS 11.3 (H) 1.8 - 8.0 K/UL    ABS. LYMPHOCYTES 0.3 (L) 0.8 - 3.5 K/UL    ABS. MONOCYTES 0.8 0.0 - 1.0 K/UL    ABS. EOSINOPHILS 0.8 (H) 0.0 - 0.4 K/UL    ABS. BASOPHILS 0.0 0.0 - 0.1 K/UL    ABS. IMM. GRANS. 0.0 K/UL    DF MANUAL      RBC COMMENTS ANISOCYTOSIS  1+        RBC COMMENTS OVALOCYTES  PRESENT        RBC COMMENTS RAINE CELLS  PRESENT        RBC COMMENTS POLYCHROMASIA  PRESENT       METABOLIC PANEL, COMPREHENSIVE    Collection Time: 10/30/22  3:16 AM   Result Value Ref Range    Sodium 139 136 - 145 mmol/L    Potassium 3.8 3.5 - 5.1 mmol/L    Chloride 105 97 - 108 mmol/L    CO2 24 21 - 32 mmol/L    Anion gap 10 5 - 15 mmol/L    Glucose 206 (H) 65 - 100 mg/dL    BUN 9 6 - 20 MG/DL    Creatinine 1.49 (H) 0.55 - 1.02 MG/DL    BUN/Creatinine ratio 6 (L) 12 - 20      eGFR 37 (L) >60 ml/min/1.73m2    Calcium 9.1 8.5 - 10.1 MG/DL    Bilirubin, total 3.8 (H) 0.2 - 1.0 MG/DL    ALT (SGPT) 51 12 - 78 U/L    AST (SGOT) 139 (H) 15 - 37 U/L    Alk.  phosphatase 142 (H) 45 - 117 U/L    Protein, total 5.1 (L) 6.4 - 8.2 g/dL    Albumin 3.4 (L) 3.5 - 5.0 g/dL    Globulin 1.7 (L) 2.0 - 4.0 g/dL    A-G Ratio 2.0 1.1 - 2.2     PHOSPHORUS    Collection Time: 10/30/22  3:16 AM   Result Value Ref Range    Phosphorus 1.7 (L) 2.6 - 4.7 MG/DL   MAGNESIUM    Collection Time: 10/30/22  3:16 AM   Result Value Ref Range    Magnesium 2.3 1.6 - 2.4 mg/dL   AMMONIA    Collection Time: 10/30/22  3:16 AM   Result Value Ref Range    Ammonia, plasma 17 <32 UMOL/L   VITAMIN B12    Collection Time: 10/30/22  3:16 AM   Result Value Ref Range    Vitamin B12 >2,000 (H) 193 - 986 pg/mL   TSH 3RD GENERATION    Collection Time: 10/30/22  3:16 AM   Result Value Ref Range    TSH 0.14 (L) 0.36 - 3.74 uIU/mL   FOLATE    Collection Time: 10/30/22  3:16 AM   Result Value Ref Range    Folate 5.6 5.0 - 21.0 ng/mL   POC G3 - PUL    Collection Time: 10/30/22  5:04 AM   Result Value Ref Range    FIO2 (POC) 40 %    pH (POC) 7.34 (L) 7.35 - 7.45      pCO2 (POC) 42.2 35.0 - 45.0 MMHG    pO2 (POC) 104 (H) 80 - 100 MMHG    HCO3 (POC) 22.7 22 - 26 MMOL/L    sO2 (POC) 97.6 (H) 92 - 97 %    Base deficit (POC) 3.1 mmol/L    Site DRAWN FROM ARTERIAL LINE      Device: BIPAP MASK      Mode ASSIST CONTROL      PEEP/CPAP (POC) 5 cmH2O    Mean Airway Pressure 18 cmH2O    Allens test (POC) NOT APPLICABLE      Specimen type (POC) ARTERIAL     ECHO ADULT COMPLETE    Collection Time: 10/30/22 10:15 AM   Result Value Ref Range    IVSd 1.1 (A) 0.6 - 0.9 cm    LVIDd 5.3 3.9 - 5.3 cm    LVIDs 4.5 cm    LVPWd 1.0 (A) 0.6 - 0.9 cm    EF BP 29 (A) 55 - 100 %    LV Ejection Fraction A2C 37 %    LV Ejection Fraction A4C 17 %    LV EDV A2C 103 mL    LV EDV A4C 77 mL    LV EDV BP 90 56 - 104 mL    LV ESV A2C 65 mL    LV ESV A4C 64 mL    LV ESV BP 65 (A) 19 - 49 mL    LVOT Peak Gradient 5 mmHg    LVOT Mean Gradient 2 mmHg    LVOT Peak Velocity 1.2 m/s    LVOT VTI 17.5 cm    LA Diameter 4.2 cm    AV Peak Gradient 12 mmHg    AV Mean Gradient 6 mmHg    AV Peak Velocity 1.8 m/s    AV Mean Velocity 1.2 m/s    AV VTI 30.8 cm    MV A Velocity 1.08 m/s    MV E Wave Deceleration Time 242.7 ms    MV E Velocity 1.25 m/s    LV E' Lateral Velocity 4 cm/s    LV E' Septal Velocity 3 cm/s    MV PHT 70.4 ms    MV Area by PHT 3.1 cm2    MV Peak Gradient 8 mmHg    MV Mean Gradient 4 mmHg    MV Max Velocity 1.4 m/s    MV Mean Velocity 0.9 m/s    MV VTI 35.9 cm    PV Peak Gradient 11 mmHg    PV Max Velocity 1.6 m/s    TAPSE 1.5 (A) 1.7 cm    TR Peak Gradient 72 mmHg    TR Max Velocity 4.25 m/s    Aortic Root 3.2 cm    Fractional Shortening 2D 15 28 - 44 %    LV ESV Index BP 40 mL/m2    LV EDV Index BP 56 mL/m2    LV ESV Index A4C 40 mL/m2    LV EDV Index A4C 48 mL/m2    LV ESV Index A2C 40 mL/m2    LV EDV Index A2C 64 mL/m2    LVIDd Index 3.29 cm/m2    LVIDs Index 2.80 cm/m2    LV RWT Ratio 0.38     LV Mass 2D 213.9 (A) 67 - 162 g    LV Mass 2D Index 132.8 (A) 43 - 95 g/m2    MV E/A 1.16     E/E' Ratio (Averaged) 36.46     E/E' Lateral 31.25     E/E' Septal 41.67     LA Size Index 2.61 cm/m2    LA/AO Root Ratio 1.31     Ao Root Index 1.99 cm/m2    AV Velocity Ratio 0.67     LVOT:AV VTI Index 0.57     MV:LVOT VTI Index 2.05     PASP 75 mmHg   BLOOD GAS + IONIZED CALCIUM    Collection Time: 10/30/22  2:13 PM   Result Value Ref Range    pH 7.28 (L) 7.35 - 7.45      PCO2 52 (H) 35 - 45 mmHg    PO2 537 (H) 80 - 100 mmHg    BICARBONATE 24 22 - 26 mmol/L    BASE DEFICIT 3.2 mmol/L    O2  (H) 92 - 97 %    Calcium, ionized 1.19 1.13 - 1.32 mmol/L    O2 METHOD BIPAP      FIO2 100 %    MODE BIPAP      SET RATE 1      PEEP/CPAP 5.0      Sample source ARTERIAL      SITE DRAWN FROM ARTERIAL LINE      BETZY'S TEST NOT APPLICABLE     CBC WITH AUTOMATED DIFF    Collection Time: 10/30/22  4:04 PM   Result Value Ref Range    WBC 17.6 (H) 3.6 - 11.0 K/uL    RBC 4.05 3.80 - 5.20 M/uL    HGB 10.9 (L) 11.5 - 16.0 g/dL    HCT 35.6 35.0 - 47.0 %    MCV 87.9 80.0 - 99.0 FL    MCH 26.9 26.0 - 34.0 PG    MCHC 30.6 30.0 - 36.5 g/dL    RDW 19.8 (H) 11.5 - 14.5 %    PLATELET 61 (L) 802 - 400 K/uL    NRBC 0.5 (H) 0  WBC    ABSOLUTE NRBC 0.09 (H) 0.00 - 0.01 K/uL    NEUTROPHILS PENDING %    LYMPHOCYTES PENDING %    MONOCYTES PENDING %    EOSINOPHILS PENDING %    BASOPHILS PENDING %    IMMATURE GRANULOCYTES PENDING %    ABS. NEUTROPHILS PENDING K/UL    ABS. LYMPHOCYTES PENDING K/UL    ABS. MONOCYTES PENDING K/UL    ABS. EOSINOPHILS PENDING K/UL    ABS. BASOPHILS PENDING K/UL    ABS. IMM. GRANS. PENDING K/UL    DF PENDING      No results displayed because visit has over 200 results. RADIOGRAPHIC STUDIES:  ECHO ADULT COMPLETE    Left Ventricle: Severely reduced left ventricular systolic function   with a visually estimated EF of 20 - 25%. Left ventricle size is normal.   Normal wall thickness. Severe global hypokinesis present. Normal diastolic   function. Right Ventricle: Moderately reduced systolic function. TAPSE is   abnormal. TAPSE is 1.5 cm. Mitral Valve: Moderately calcified leaflet. Moderate annular   calcification of the mitral valve. Mild to moderate regurgitation with an   eccentrically directed jet and may underestimate severity. Mild stenosis   noted. MV mean gradient is 4 mmHg. Tricuspid Valve: Severe regurgitation. Severely elevated RVSP. The   estimated PASP is 75 mmHg. Left Atrium: Left atrium is moderately dilated. Right Atrium: Right atrium is mildly dilated.         DIAGNOSES:  Wide complex tachycardia   Acute hypoxic respiratory failure  ESRD  HFrEF  Cirrhosis  DM    IMPRESSION AND PLAN:    Precedex as needed for agitation, continue lactulose therapy-titrated to 3 bowel movements a day, delirium prevention strategies    Continue hemodynamic monitoring, map goal greater than 60, requiring inotropes/pressors-dobutamine, Levophed, vasopressin, cont stress dose steroids,, most recent echo showed an EF of 20 to 25%, severe TR, moderate MR, and PASPs in the mid 70s, A. fib RVR with aberrancy, cont amiodarone, heart rate goal less than 110, keep magnesium above 2 and potassium above 4, continue Lipitor    On BiPAP, retaining CO2, at high risk of getting reintubated, ensure bronchodilators    Npo for now due to high pressor requirements    ESRD on [id number] nephrology recommendations-on CRRT  dose medication renally, correct electrolyte derangements as needed    Heparin for DVT prophylaxis, impressive drop in plt count, hold heparin, send HIT    Flu A+10/22, Tamiflu started by my colleague 10/26, on empiric Zosyn, follow-up micro studies  Keep glucose less than 180    Prognosis very guarded at this time, f/u PCT [address] discussions with Javad    Critical care time-40 minutes    The patient is critically ill with single or multiple systems failure, severe metabolic derangement and/or infection, has potential for life threatening deterioration, requires high complexity decision making, frequent evaluation and titration of therapies and interpretation of data. This note has been written with voice recognition software. While this note has been edited for accuracy, the software periodically misinterprets speech resulting in errors that might not have been caught in editing. In the event an unusual error is found in this record, please read the chart carefully and recognize, using context, where these substitutions or errors have occurred and please notify me to resolve the errors.

## 2022-10-30 NOTE — DIALYSIS
CRRT / [phone]    Orders   Mode: CVVHD rounding   Blood Flow Rate: 180 ml/min   Prismasol Dose: 1700 ml/hr   Prismasol Concentrate: 4K / 2.5Ca   Blood Warmer Temp: 37*C   Net Fluid Removal: 50 ml/hr  Currently 25 ml/hr     Metrics   BP: 135/33   HR: 79   Access Pressure: -109   Filter Pressure: 122   Return Pressure: 62   TMP: 4   Pressure Drop: 37     Access   Type & Location: LIJ tunneled CVC C/D/I with transparent dressing and biopatch in place dated 10/28/22. Labs   HBsAg (Antigen) / date: Negative  10/22/22                                              HBsAb (Antibody) / date: Susceptible  10/22/22   Source: Epic     Safety:   Time Out Done:   (Time) 0200   Consent obtained/signed: Verified   Education: Access/Site Care   Primary Nurse Rpt Pre: JAILENE Mosqueda RN   Primary Nurse Rpt Post: JAILENE Mosqueda RN     Comments / Plan:   Patient, orders, line and consent verified. Labs, notes and code status reviewed. [id number] filter running well with no indications for change at this time. Lines visible/secure with blood warmer attached to the return line and set to 37C*. Education and Pre/Post with primary RN.

## 2022-10-30 NOTE — PROGRESS NOTES
Nephrology Progress Note  Gerry Beckford Fenton  Date of Admission : 10/22/2022    CC:  Follow up for ESRD       Assessment and Plan     ESRD on dialysis   - Patient of [facility], [address] MWF  -Continue CRRT, No UF for now   - kphos replaced     Ascites:  - s/p paracentesis, drain removed    Hypotension:  - on nor epine    Afib w/ RVR:  - on amio drip     Resp failure:  - intubated 10/27  - per CCM,   - extubated 10/29 , on bipap     Cardiac Cirrhosis   RV failure, severe TR  Chronic HFrEF   Chronic hypotension     Anemia   - no need for CAROL ANN      Influenza A +    Hep C, hx of  DMII       Interval History:  Seen and  examined and On bipap and on NE +Dobutamine and also Vasopressin   Mentation is poor     Current Medications: all current  Medications have been eviewed in EPIC  Review of Systems: Pertinent items are noted in HPI. Objective:  Vitals:    Vitals:    10/30/22 0900 10/30/22 1000 10/30/22 1015 10/30/22 1100   BP:   (!) 131/33    Pulse: 83 91  86   Resp: 20 21  24   Temp: 97.3 °F (36.3 °C) 97.3 °F (36.3 °C)  97.2 °F (36.2 °C)   TempSrc:       SpO2: 96% 93%  93%   Weight:   64 kg (141 lb 1.5 oz)    Height:   5' (1.524 m)      Intake and Output:  10/30 0701 - 10/30 1900  In: 466.9 [I.V.:416.9]  Out: 407   10/28 1901 - 10/30 0700  In: 2663.1 [I.V.:1843.1]  Out: 3317     Physical Examination:    General: On bipap   Neck:  Supple, no mass  Resp:  Lungs CTA B/L, no wheezing , normal respiratory effort  CV:  RRR,  no murmur or rub, no LE edema  GI:  Soft, NT, mild distention, no fluid wave  Neurologic:  unable to asses   Psych:             unable to evaluate  Skin:  No Rash  Access:           [facility]    [x]    High complexity decision making was performed  [x]    Patient is at high-risk of decompensation with multiple organ involvement    Lab Data Personally Reviewed: I have reviewed all the pertinent labs, microbiology data and radiology studies during assessment.     Recent Labs     10/30/22  5479 10/29/22  8817 10/29/22  0327 10/28/22  0431     --  138 135*   K 3.8  --  4.0 4.4     --  106 102   CO2 24  --  23 22   *  --  163* 181*   BUN 9  --  10 25*   CREA 1.49*  --  2.31* 5.34*   CA 9.1  --  9.0 8.9   MG 2.3 2.2  --  2.0   PHOS 1.7*  --  2.6 4.1   ALB 3.4*  --  2.2* 2.3*   ALT 51  --  37 28       Recent Labs     10/30/22  0316 10/29/22  0327 10/28/22  0431   WBC 13.2* 15.4* 9.4   HGB 11.5 15.1 14.7   HCT 38.2 49.3* 47.2*   PLT 59* 120* 115*       No results found for: SDES  Lab Results   Component Value Date/Time    Culture result: NO GROWTH 2 DAYS 10/28/2022 09:38 AM    Culture result: MRSA NOT PRESENT 10/22/2022 06:27 PM    Culture result:  10/22/2022 06:27 PM     Screening of patient nares for MRSA is for surveillance purposes and, if positive, to facilitate isolation considerations in high risk settings. It is not intended for automatic decolonization interventions per se as regimens are not sufficiently effective to warrant routine use.        Recent Results (from the past 24 hour(s))   POC EG7    Collection Time: 10/29/22 12:09 PM   Result Value Ref Range    Calcium, ionized (POC) 1.28 1.12 - 1.32 mmol/L    FIO2 (POC) 74 %    pH (POC) 7.25 (L) 7.35 - 7.45      pCO2 (POC) 55.0 (H) 35.0 - 45.0 MMHG    pO2 (POC) 311 (H) 80 - 100 MMHG    HCO3 (POC) 24.1 22 - 26 MMOL/L    Base deficit (POC) 4.3 mmol/L    sO2 (POC) 99.9 (H) 92 - 97 %    Site DRAWN FROM ARTERIAL LINE      Device: BIPAP MASK      Set Rate 20 bpm    PEEP/CPAP (POC) 5 cmH2O    PIP (POC) 14      Allens test (POC) Positive      Specimen type (POC) ARTERIAL     POC EG7    Collection Time: 10/29/22 12:53 PM   Result Value Ref Range    Calcium, ionized (POC) 1.27 1.12 - 1.32 mmol/L    FIO2 (POC) 39 %    pH (POC) 7.28 (L) 7.35 - 7.45      pCO2 (POC) 49.5 (H) 35.0 - 45.0 MMHG    pO2 (POC) 130 (H) 80 - 100 MMHG    HCO3 (POC) 23.5 22 - 26 MMOL/L    Base deficit (POC) 3.9 mmol/L    sO2 (POC) 98.5 (H) 92 - 97 %    Site DRAWN FROM ARTERIAL LINE Device: BIPAP MASK      PEEP/CPAP (POC) 5 cmH2O    PIP (POC) 14      Allens test (POC) NOT APPLICABLE      Specimen type (POC) ARTERIAL     POC EG7    Collection Time: 10/29/22  2:50 PM   Result Value Ref Range    Calcium, ionized (POC) 1.28 1.12 - 1.32 mmol/L    FIO2 (POC) 39 %    pH (POC) 7.27 (L) 7.35 - 7.45      pCO2 (POC) 50.2 (H) 35.0 - 45.0 MMHG    pO2 (POC) 135 (H) 80 - 100 MMHG    HCO3 (POC) 23.2 22 - 26 MMOL/L    Base deficit (POC) 4.3 mmol/L    sO2 (POC) 98.6 (H) 92 - 97 %    Site DRAWN FROM ARTERIAL LINE      Device: BIPAP MASK      PEEP/CPAP (POC) 5 cmH2O    PIP (POC) 15      Allens test (POC) NOT APPLICABLE      Specimen type (POC) ARTERIAL     GLUCOSE, POC    Collection Time: 10/29/22  7:38 PM   Result Value Ref Range    Glucose (POC) 169 (H) 65 - 117 mg/dL    Performed by Deisi Phillip    POC G3 - PUL    Collection Time: 10/29/22  7:46 PM   Result Value Ref Range    FIO2 (POC) 30 %    pH (POC) 7.33 (L) 7.35 - 7.45      pCO2 (POC) 41.9 35.0 - 45.0 MMHG    pO2 (POC) 78 (L) 80 - 100 MMHG    HCO3 (POC) 22.1 22 - 26 MMOL/L    sO2 (POC) 94.4 92 - 97 %    Base deficit (POC) 3.7 mmol/L    Site DRAWN FROM ARTERIAL LINE      Device: BIPAP MASK      PEEP/CPAP (POC) 5 cmH2O    Mean Airway Pressure 18 cmH2O    Allens test (POC) NOT APPLICABLE      Specimen type (POC) ARTERIAL     POC G3 - PUL    Collection Time: 10/29/22 11:32 PM   Result Value Ref Range    FIO2 (POC) 40 %    pH (POC) 7.32 (L) 7.35 - 7.45      pCO2 (POC) 42.5 35.0 - 45.0 MMHG    pO2 (POC) 86 80 - 100 MMHG    HCO3 (POC) 21.7 (L) 22 - 26 MMOL/L    sO2 (POC) 95.5 92 - 97 %    Base deficit (POC) 4.3 mmol/L    Site DRAWN FROM ARTERIAL LINE      Device: BIPAP MASK      Allens test (POC) NOT APPLICABLE      Specimen type (POC) ARTERIAL     CBC WITH AUTOMATED DIFF    Collection Time: 10/30/22  3:16 AM   Result Value Ref Range    WBC 13.2 (H) 3.6 - 11.0 K/uL    RBC 4.30 3.80 - 5.20 M/uL    HGB 11.5 11.5 - 16.0 g/dL    HCT 38.2 35.0 - 47.0 % MCV 88.8 80.0 - 99.0 FL    MCH 26.7 26.0 - 34.0 PG    MCHC 30.1 30.0 - 36.5 g/dL    RDW 19.7 (H) 11.5 - 14.5 %    PLATELET 59 (L) 204 - 400 K/uL    NRBC 0.5 (H) 0  WBC    ABSOLUTE NRBC 0.07 (H) 0.00 - 0.01 K/uL    NEUTROPHILS 86 (H) 32 - 75 %    LYMPHOCYTES 2 (L) 12 - 49 %    MONOCYTES 6 5 - 13 %    EOSINOPHILS 6 0 - 7 %    BASOPHILS 0 0 - 1 %    IMMATURE GRANULOCYTES 0 %    ABS. NEUTROPHILS 11.3 (H) 1.8 - 8.0 K/UL    ABS. LYMPHOCYTES 0.3 (L) 0.8 - 3.5 K/UL    ABS. MONOCYTES 0.8 0.0 - 1.0 K/UL    ABS. EOSINOPHILS 0.8 (H) 0.0 - 0.4 K/UL    ABS. BASOPHILS 0.0 0.0 - 0.1 K/UL    ABS. IMM. GRANS. 0.0 K/UL    DF MANUAL      RBC COMMENTS ANISOCYTOSIS  1+        RBC COMMENTS OVALOCYTES  PRESENT        RBC COMMENTS RAINE CELLS  PRESENT        RBC COMMENTS POLYCHROMASIA  PRESENT       METABOLIC PANEL, COMPREHENSIVE    Collection Time: 10/30/22  3:16 AM   Result Value Ref Range    Sodium 139 136 - 145 mmol/L    Potassium 3.8 3.5 - 5.1 mmol/L    Chloride 105 97 - 108 mmol/L    CO2 24 21 - 32 mmol/L    Anion gap 10 5 - 15 mmol/L    Glucose 206 (H) 65 - 100 mg/dL    BUN 9 6 - 20 MG/DL    Creatinine 1.49 (H) 0.55 - 1.02 MG/DL    BUN/Creatinine ratio 6 (L) 12 - 20      eGFR 37 (L) >60 ml/min/1.73m2    Calcium 9.1 8.5 - 10.1 MG/DL    Bilirubin, total 3.8 (H) 0.2 - 1.0 MG/DL    ALT (SGPT) 51 12 - 78 U/L    AST (SGOT) 139 (H) 15 - 37 U/L    Alk.  phosphatase 142 (H) 45 - 117 U/L    Protein, total 5.1 (L) 6.4 - 8.2 g/dL    Albumin 3.4 (L) 3.5 - 5.0 g/dL    Globulin 1.7 (L) 2.0 - 4.0 g/dL    A-G Ratio 2.0 1.1 - 2.2     PHOSPHORUS    Collection Time: 10/30/22  3:16 AM   Result Value Ref Range    Phosphorus 1.7 (L) 2.6 - 4.7 MG/DL   MAGNESIUM    Collection Time: 10/30/22  3:16 AM   Result Value Ref Range    Magnesium 2.3 1.6 - 2.4 mg/dL   AMMONIA    Collection Time: 10/30/22  3:16 AM   Result Value Ref Range    Ammonia, plasma 17 <32 UMOL/L   VITAMIN B12    Collection Time: 10/30/22  3:16 AM   Result Value Ref Range    Vitamin B12 >2,000 (H) 193 - 986 pg/mL   TSH 3RD GENERATION    Collection Time: 10/30/22  3:16 AM   Result Value Ref Range    TSH 0.14 (L) 0.36 - 3.74 uIU/mL   FOLATE    Collection Time: 10/30/22  3:16 AM   Result Value Ref Range    Folate 5.6 5.0 - 21.0 ng/mL   POC G3 - PUL    Collection Time: 10/30/22  5:04 AM   Result Value Ref Range    FIO2 (POC) 40 %    pH (POC) 7.34 (L) 7.35 - 7.45      pCO2 (POC) 42.2 35.0 - 45.0 MMHG    pO2 (POC) 104 (H) 80 - 100 MMHG    HCO3 (POC) 22.7 22 - 26 MMOL/L    sO2 (POC) 97.6 (H) 92 - 97 %    Base deficit (POC) 3.1 mmol/L    Site DRAWN FROM ARTERIAL LINE      Device: BIPAP MASK      Mode ASSIST CONTROL      PEEP/CPAP (POC) 5 cmH2O    Mean Airway Pressure 18 cmH2O    Allens test (POC) NOT APPLICABLE      Specimen type (POC) ARTERIAL     ECHO ADULT COMPLETE    Collection Time: 10/30/22 10:15 AM   Result Value Ref Range    IVSd 1.1 (A) 0.6 - 0.9 cm    LVIDd 5.3 3.9 - 5.3 cm    LVIDs 4.5 cm    LVPWd 1.0 (A) 0.6 - 0.9 cm    EF BP 29 (A) 55 - 100 %    LV Ejection Fraction A2C 37 %    LV Ejection Fraction A4C 17 %    LV EDV A2C 103 mL    LV EDV A4C 77 mL    LV EDV BP 90 56 - 104 mL    LV ESV A2C 65 mL    LV ESV A4C 64 mL    LV ESV BP 65 (A) 19 - 49 mL    LVOT Peak Gradient 5 mmHg    LVOT Mean Gradient 2 mmHg    LVOT Peak Velocity 1.2 m/s    LVOT VTI 17.5 cm    LA Diameter 4.2 cm    AV Peak Gradient 12 mmHg    AV Mean Gradient 6 mmHg    AV Peak Velocity 1.8 m/s    AV Mean Velocity 1.2 m/s    AV VTI 30.8 cm    MV A Velocity 1.08 m/s    MV E Wave Deceleration Time 242.7 ms    MV E Velocity 1.25 m/s    LV E' Lateral Velocity 4 cm/s    LV E' Septal Velocity 3 cm/s    MV PHT 70.4 ms    MV Area by PHT 3.1 cm2    MV Peak Gradient 8 mmHg    MV Mean Gradient 4 mmHg    MV Max Velocity 1.4 m/s    MV Mean Velocity 0.9 m/s    MV VTI 35.9 cm    PV Peak Gradient 11 mmHg    PV Max Velocity 1.6 m/s    TAPSE 1.5 (A) 1.7 cm    TR Peak Gradient 72 mmHg    TR Max Velocity 4.25 m/s    Aortic Root 3.2 cm    Fractional Shortening 2D 15 28 - 44 %    LV ESV Index BP 40 mL/m2    LV EDV Index BP 56 mL/m2    LV ESV Index A4C 40 mL/m2    LV EDV Index A4C 48 mL/m2    LV ESV Index A2C 40 mL/m2    LV EDV Index A2C 64 mL/m2    LVIDd Index 3.29 cm/m2    LVIDs Index 2.80 cm/m2    LV RWT Ratio 0.38     LV Mass 2D 213.9 (A) 67 - 162 g    LV Mass 2D Index 132.8 (A) 43 - 95 g/m2    MV E/A 1.16     E/E' Ratio (Averaged) 36.46     E/E' Lateral 31.25     E/E' Septal 41.67     LA Size Index 2.61 cm/m2    LA/AO Root Ratio 1.31     Ao Root Index 1.99 cm/m2    AV Velocity Ratio 0.67     LVOT:AV VTI Index 0.57     MV:LVOT VTI Index 2.05              [address]  Phone - [phone]   Fax - [phone]  [email or web address]

## 2022-10-30 NOTE — PROGRESS NOTES
1930: Bedside report received from Dominga RIBEIRO    0200: Debi Poole RN at bedside. 0730:  Bedside report given to [facility] GENERAL MARIELOS MCKEE RN

## 2022-10-30 NOTE — PROGRESS NOTES
0730 Bedside and Verbal shift change report given to QUINTIN Orr (oncoming nurse) by QUINTIN Chavarria (offgoing nurse). Report included the following information SBAR, Kardex, Procedure Summary, Intake/Output, MAR, Recent Results, and Cardiac Rhythm NSR .   1015 Echo tech at bedside  1045 Dr. Roz Billy at bedside - BiPap changed to 15/5, orders received to check ABG @ 1400  1130 Pt w/ multiple large watery BMs  Dr. Roz Billy made aware - orders received for FMS  FMS placed  1200 Dr. Jas Barr at bedside - orders to pull factor of 0  1230 Pt HR sustaining 120's - Dr. Roz Billy made aware - orders received to hold dobumatine for now  1400 ABG obtained  pH 7.28 CO2 52   1620 MAP 47 HR 70's - restarted dobutamine @ 5mcg  1640 Updated Dr. Roz Billy on pts status - orders received to decrease dobutamine to 2mcg  1815 Dr. Roz Billy at bedside - orders pull factor of 25   1930 Bedside and Verbal shift change report given to QUINTIN Chavarria (oncoming nurse) by Megan Amos RN (offgoing nurse). Report included the following information SBAR, Kardex, Intake/Output, MAR, Recent Results, and Cardiac Rhythm NSR/SB .

## 2022-10-31 NOTE — PROGRESS NOTES
0730 Bedside, Verbal, and Written shift change report given to Sydney Jorgensen RN and Dhruv Villagran Student Nurse (oncoming nurse) by Renata Bamberger, RN offgoing nurse). Report included the following information SBAR, Kardex, Intake/Output, MAR, and Recent Results. 1059 BP 60/37. Dr. John Multani notified. Levo increased to 30.     1120 BP 80/39. Albumin bolus given per Dr. John Multani.     1145 BP 90/40. Dr. John Multani notified. Epi increased to 15.     1507 RT at bedside. Veletri started. 1930 Bedside, Verbal, and Written shift change report given to QUINTIN Barber (oncoming nurse) by Sydney Jorgensen RN and Dhruv Villagran Student Nurse (offgoing nurse). Report included the following information SBAR, Kardex, Intake/Output, MAR, Med Rec Status, and Cardiac Rhythm   .

## 2022-10-31 NOTE — PROGRESS NOTES
Occupational Therapy:     Chart reviewed in prep for OT evaluation and tx. Noted, pt remains intubated, sedated, now requiring CRRT. In discussion with RN, pt remains medically inappropriate for participation in skilled therapy. As pt has been inappropriate for participation in therapy for 3 consecutive attempts, acute OT will sign off. Please re-consult when pt is medically appropriate and following basic commands.      Thank you,   Marguerita Boas, OTD, OTR/L

## 2022-10-31 NOTE — DIALYSIS
CRRT / [phone]    Orders   Mode: CVVHD restarted @ 0200   Blood Flow Rate: 180 ml/min   Dialysate Flow Rate: 1700 ml/hr   Prismasol Concentrate: 4K / 2.5Ca   Blood Warmer Temp: 37*C   Net Fluid Removal: 50 ml/hr  Currently 25 ml/hr (was 0 ml/hr for most of day)     Metrics   BP: 161/63   HR: 117   Access Pressure: -37   Filter Pressure: 104   Return Pressure: 68   TMP: 26   Pressure Drop: 6     Access   Type & Location: [facility] tunneled CVC, dressing C/D/I with bio-patch dated 10/28/22. No signs of redness, drainage, or infection visualized. Each catheter limb disinfected for 60 seconds per limb with alcohol swabs. Caps removed, dialysis CVC hub scrubbed with Prevantics for 15 seconds, followed by a 5 second dry time per Hospital P&P. +asp/+flush x 2 ports. Labs   HBsAg (Antigen) / date: Negative  10/22/22                                              HBsAb (Antibody) / date: Susceptible  10/22/22   Source: Epic     Safety:   Time Out Done:   (Time) 0145   Consent obtained/signed: Verified   Education: Access/Site Care   Primary Nurse Rpt Pre: JAILENE Mosqueda RN   Primary Nurse Rpt Post: JAILENE Mosqueda RN     Comments / Plan:   Mathew Dyson RN at bedside to change filter d/t pt increasing PDs and filter pressures. Patient, code status, labs, and orders verified. Consents on chart. Time out completed. Mathew Dyson RN able to return all pt blood from circuit (165 mls.) Old set discarded in red biohazard bin. HF-1000 filter set-up, primed w/ 1L NS, tested and running well. Lines visible and connections secure with blood warmer to return line at 37*C. Education, pre and post to primary RN.

## 2022-10-31 NOTE — PROCEDURES
[facility]      Procedure Note - Intubation:   Performed by Shila Alanis DO .   Staff Anesthesiologist/Intensivist    Diagnosis: AHRF   Insertion Date: 8/31/22 Time:0130   Obtained Consent? No, emergent   Procedure Location:  ICU. Immediately prior to the procedure, the patient was reevaluated and found suitable for the planned procedure and any planned medications. Immediately prior to the procedure a time out was called to verify the correct patient, procedure, equipment, staff, and marking as appropriate. Medications given were 100 Jimmy, 10 etomidate. A number 7.5 ETT was placed to 23 cm at the teeth. Placement was evaluated by noting bilateral, symmetric breath sounds, good end-tidal CO2 detector color change , no breath sounds over stomach, and bulb aspirator expands promptly. Attempts required: 1 . Complications: none . RSI was used. .  The procedure was tolerated well. CXR is pending.     Deidra Kansas   Staff Intensivist  [facility]  10/31/2022

## 2022-10-31 NOTE — PROGRESS NOTES
1930: Bedside report received from [id number] [facility]    2100: Levo maxed, MAP 55, MD notified, goal changed to SBP >110.      2130:  SBP 90, MD notified orders for 2 amp bicarb received. 2230: SBP 70's. Orders for 3 amps bicarb received. Dr. Waldo Peace and RT at bedside for intubation. 2300: Pt successfully intubated. 7.5 ETT 24 at the lip. DHT removed and replaced with Juab-Sump NGT to low intermittent suction 300ml of orange liquid removed immediately. 2340: Xray at bedside to confirm placement of ETT and NGT. Fentanyl and Epi gtts started and Dobutamine stopped. 0000: Spoke with radiologist, ETT 1cm above jhony, result relayed to Dr. Waldo Peace and ETT repositioned to 21 at the teeth. 0130: David Real RN at bedside    0400: Pt hypothermic, bairhugger reapplied. 0630: Dr. Lv Reyes at [facility], CRRT factor decreased to 0.      0730:  Bedside report given to Allen Stacy

## 2022-10-31 NOTE — PROGRESS NOTES
HISTORY OF PRESENT ILLNESS: 67 y.o F w/ PMH of ESRD on HD, Afib, HFrEF (10-15%), Cirrhosis, DM, GERD, ANITHA who was admitted to [facility] for Afib w/ RVR, hyperkalemia, and hypoxic respiratory failure with improvement w/ HD and abx course. Pt noted to be flu (+)ve. Initially plan for discharge 10/25, however pt wished to remain in hospital and be transferred to [facility]. RRT called PM of 10/25 for decreased mental status, hypotension and tachycardia. Of note last HD 10/24 (no UF), and had an abdominal drain placed 10/24 for recurrent ascites w/ almost 4L drainage at time of placement. Pt also noted to have refused lactulose for several days. CCM consulted 10/25 for evaluation of hypotension w/ resolution after gentle IVF resuscitation. Pt w/ increasing tachycardia and dyspnea throughout 10/26, stopped HD 1hr early (no UF) around 1800 pt w/ acute increase in HR from 120s to 150-160s, wide complex on EKG. CXR w/o acute findings. Pt given Adenosine 6mg x2, w/o improvement.       Interval history    10/27-still requiring amiodarone infusion, on/off BiPAP    10/28 - Intubated yest evening for severe hypercapnic resp failure, requiring pressors    10/29-extubated this afternoon to BiPAP, still has slight CO2 retention on BiPAP, on CRRT-started yesterday    10/30 - On continuous BiPAP - high support and still retaining some C02, on high pressor support and inotropy, echo showed slight improvement of EF albeit on dobutamine, it did however show RV dysfunction and PAPs in the 70s, on CRRT    10/31 - Intubated ON for impending HD collapse, on multiple pressors, on CRRT      Current Facility-Administered Medications:     sodium phosphate 15.36 mmol in 0.9% sodium chloride 250 mL infusion, , IntraVENous, PRN, Valetta Salt, DO, Last Rate: 63.8 mL/hr at 10/31/22 0943, New Bag at 10/31/22 0943    potassium, sodium phosphates (NEUTRA-PHOS) packet 2 Packet, 2 Packet, Oral, QID, Vlad Concepcion MD, 2 Packet at 10/31/22 1305 EPINEPHrine (ADRENALIN) 5 mg in 0.9% sodium chloride 250 mL infusion, 1-30 mcg/min, IntraVENous, TITRATE, Pietro Ballesteros MD, Last Rate: 36 mL/hr at 10/31/22 1311, 12 mcg/min at 10/31/22 1311    NOREPINephrine (LEVOPHED) 8 mg in 5% dextrose 250mL (32 mcg/mL) infusion, 0.5-30 mcg/min, IntraVENous, TITRATE, Simmie Ros, DO, Last Rate: 56.3 mL/hr at 10/31/22 1059, 30 mcg/min at 10/31/22 1059    fentaNYL (PF) 1,500 mcg/30 mL (50 mcg/mL) infusion, 0-200 mcg/hr, IntraVENous, TITRATE, Simmie Ros, DO, Last Rate: 1 mL/hr at 10/30/22 2324, 50 mcg/hr at 10/30/22 2324    vasopressin (VASOSTRICT) 20 Units in 0.9% sodium chloride 100 mL infusion, 0.04 Units/min, IntraVENous, CONTINUOUS, Pietro Ballesteros MD, Last Rate: 12 mL/hr at 10/31/22 0522, 0.04 Units/min at 10/31/22 0522    hydrocortisone Sod Succ (PF) (SOLU-CORTEF) injection 100 mg, 100 mg, IntraVENous, Q8H, Pietro Ballesteros MD, 100 mg at 10/31/22 0522    pantoprazole (PROTONIX) 40 mg in 0.9% sodium chloride 10 mL injection, 40 mg, IntraVENous, DAILY, Pietro Ballesteros MD, 40 mg at 10/31/22 0840    ipratropium (ATROVENT) 0.02 % nebulizer solution 0.5 mg, 0.5 mg, Nebulization, Q6H RT, Pietro Ballesteros MD, 0.5 mg at 10/31/22 0747    DOBUTamine (DOBUTREX) 500 mg/250 mL (2,000 mcg/mL) infusion, 2 mcg/kg/min, IntraVENous, CONTINUOUS, Pietro Ballesteros MD, Stopped at 10/30/22 2329    [COMPLETED] piperacillin-tazobactam (ZOSYN) 4.5 g in 0.9% sodium chloride (MBP/ADV) 100 mL MBP, 4.5 g, IntraVENous, NOW, IV Completed at 10/28/22 1900 **FOLLOWED BY** piperacillin-tazobactam (ZOSYN) 3.375 g in 0.9% sodium chloride (MBP/ADV) 100 mL MBP, 3.375 g, IntraVENous, Q12H, Pietro Ballesteros MD, Last Rate: 25 mL/hr at 10/31/22 0522, 3.375 g at 10/31/22 0522    bicarbonate dialysis (PRISMASOL) BG K 4/Ca 2.5 5000 ml solution, , Extracorporeal, DIALYSIS CONTINUOUS, Jad Pisano MD, Last Rate: 1,200 mL/hr at 10/31/22 1038, New Bag at 10/31/22 1038    rifAXIMin (XIFAXAN) tablet 550 mg, 550 mg, Oral, BID, Mary CHRISTINA MD, 550 mg at 10/31/22 0847    [Held by provider] OLANZapine (ZyPREXA) tablet 5 mg, 5 mg, Oral, QHS, Pietro Ballesteros MD, 5 mg at 10/27/22 2128    heparin (porcine) 1,000 unit/mL injection 2,400 Units, 2,400 Units, InterCATHeter, DIALYSIS PRN, 2,400 Units at 10/28/22 0745 **AND** heparin (porcine) 1,000 unit/mL injection 2,400 Units, 2,400 Units, InterCATHeter, DIALYSIS PRN, Staci Pereira MD, 2,400 Units at 10/28/22 0744    albumin human 25% (BUMINATE) solution 12.5 g, 12.5 g, IntraVENous, DIALYSIS PRN, Staci Pereira MD, Last Rate: 0 mL/hr at 10/28/22 1900, 12.5 g at 10/31/22 1119    dexmedeTOMidine in 0.9 % NaCl (PRECEDEX) 400 mcg/100 mL (4 mcg/mL) infusion soln, 0.1-1.5 mcg/kg/hr, IntraVENous, TITRATE, Rosio Vanegas MD, Stopped at 10/30/22 2324    oseltamivir (TAMIFLU) 6 mg/mL oral suspension 30 mg, 30 mg, Oral, Q MON, WED & Sarah BATISTA MD, 30 mg at 10/28/22 2050    alteplase (CATHFLO) 1 mg in sterile water (preservative free) 1 mL injection, 1 mg, InterCATHeter, PRN, Rosio Vanegas MD, 1 mg at 10/27/22 1342    albuterol-ipratropium (DUO-NEB) 2.5 MG-0.5 MG/3 ML, 3 mL, Nebulization, Q4H PRN, Anne Marie Figueroa MD, 3 mL at 10/26/22 1828    dextrose 10 % infusion 125-250 mL, 125-250 mL, IntraVENous, PRN, Anne Marie Figueroa MD, 125 mL at 10/26/22 2020    midodrine (PROAMATINE) tablet 20 mg, 20 mg, Oral, Q8H, Anne Marie Figueroa MD, 20 mg at 10/30/22 2105    sodium chloride (NS) flush 5-40 mL, 5-40 mL, IntraVENous, Q8H, Anne Marie Figueroa MD, 10 mL at 10/30/22 1555    sodium chloride (NS) flush 5-40 mL, 5-40 mL, IntraVENous, PRN, Anne Marie Figueroa MD    acetaminophen (TYLENOL) tablet 650 mg, 650 mg, Oral, Q6H PRN, 650 mg at 10/28/22 1530 **OR** acetaminophen (TYLENOL) suppository 650 mg, 650 mg, Rectal, Q6H PRN, Anne Marie Figueroa MD    polyethylene glycol (MIRALAX) packet 17 g, 17 g, Oral, DAILY PRN, Julianne Santos MD    ondansetron (ZOFRAN ODT) tablet 4 mg, 4 mg, Oral, Q8H PRN **OR** ondansetron (ZOFRAN) injection 4 mg, 4 mg, IntraVENous, Q6H PRN, Julianne Santos MD, 4 mg at 10/24/22 0501    amiodarone (CORDARONE) tablet 200 mg, 200 mg, Oral, BID, Julianne Santos MD, 200 mg at 10/31/22 0847    atorvastatin (LIPITOR) tablet 40 mg, 40 mg, Oral, QHS, Julianne Santos MD, 40 mg at 10/30/22 2105    lactulose (CHRONULAC) 10 gram/15 mL solution 30 mL, 20 g, Oral, BID, Julianne Santos MD, 30 mL at 10/31/22 1305    [Held by provider] metoprolol tartrate (LOPRESSOR) tablet 12.5 mg, 12.5 mg, Oral, BID, Julianne Santos MD, 12.5 mg at 10/24/22 0845    methocarbamoL (ROBAXIN) tablet 500 mg, 500 mg, Oral, Q8H PRN, Julianne Santos MD, 500 mg at 10/25/22 1642    arformoteroL (BROVANA) neb solution 15 mcg, 15 mcg, Nebulization, BID RT, 15 mcg at 10/31/22 0747 **AND** budesonide (PULMICORT) 500 mcg/2 ml nebulizer suspension, 500 mcg, Nebulization, BID RT, Julianne Santos MD, 500 mcg at 10/31/22 0747      VITAL SIGNS:  Visit Vitals  BP (!) 150/52   Pulse 91   Temp 97.3 °F (36.3 °C)   Resp 20   Ht 5' (1.524 m)   Wt 64.6 kg (142 lb 6.7 oz)   SpO2 93%   BMI 27.81 kg/m²     PHYSICAL EXAMINATION:  General-Intubated, sedated  Neuro-pupils reactive, withdraws in uppers  Cardiac-irregular  Lungs- clear anteriorly  Abdomen-soft, distended, nontender  Extremities-warm      LABORATORY ANALYSIS:  Recent Results (from the past 24 hour(s))   BLOOD GAS + IONIZED CALCIUM    Collection Time: 10/30/22  2:13 PM   Result Value Ref Range    pH 7.28 (L) 7.35 - 7.45      PCO2 52 (H) 35 - 45 mmHg    PO2 537 (H) 80 - 100 mmHg    BICARBONATE 24 22 - 26 mmol/L    BASE DEFICIT 3.2 mmol/L    O2  (H) 92 - 97 %    Calcium, ionized 1.19 1.13 - 1.32 mmol/L    O2 METHOD BIPAP      FIO2 100 %    MODE BIPAP      SET RATE 1      PEEP/CPAP 5.0      Sample source ARTERIAL      SITE DRAWN FROM ARTERIAL LINE      BETZY'S TEST NOT APPLICABLE     CBC WITH AUTOMATED DIFF    Collection Time: 10/30/22  4:04 PM   Result Value Ref Range    WBC 17.6 (H) 3.6 - 11.0 K/uL    RBC 4.05 3.80 - 5.20 M/uL    HGB 10.9 (L) 11.5 - 16.0 g/dL    HCT 35.6 35.0 - 47.0 %    MCV 87.9 80.0 - 99.0 FL    MCH 26.9 26.0 - 34.0 PG    MCHC 30.6 30.0 - 36.5 g/dL    RDW 19.8 (H) 11.5 - 14.5 %    PLATELET 61 (L) 911 - 400 K/uL    NRBC 0.5 (H) 0  WBC    ABSOLUTE NRBC 0.09 (H) 0.00 - 0.01 K/uL    NEUTROPHILS 86 (H) 32 - 75 %    LYMPHOCYTES 1 (L) 12 - 49 %    MONOCYTES 7 5 - 13 %    EOSINOPHILS 4 0 - 7 %    BASOPHILS 0 0 - 1 %    METAMYELOCYTES 2 (H) 0 %    IMMATURE GRANULOCYTES 0 %    ABS. NEUTROPHILS 15.1 (H) 1.8 - 8.0 K/UL    ABS. LYMPHOCYTES 0.2 (L) 0.8 - 3.5 K/UL    ABS. MONOCYTES 1.2 (H) 0.0 - 1.0 K/UL    ABS. EOSINOPHILS 0.7 (H) 0.0 - 0.4 K/UL    ABS. BASOPHILS 0.0 0.0 - 0.1 K/UL    ABS. IMM.  GRANS. 0.0 K/UL    DF MANUAL      RBC COMMENTS ANISOCYTOSIS  1+        RBC COMMENTS RAINE CELLS  PRESENT       POC G3 - PUL    Collection Time: 10/30/22  9:33 PM   Result Value Ref Range    FIO2 (POC) 40 %    pH (POC) 7.37 7.35 - 7.45      pCO2 (POC) 52.4 (H) 35.0 - 45.0 MMHG    pO2 (POC) 110 (H) 80 - 100 MMHG    HCO3 (POC) 30.1 (H) 22 - 26 MMOL/L    sO2 (POC) 98.0 (H) 92 - 97 %    Base excess (POC) 3.7 mmol/L    Site DRAWN FROM ARTERIAL LINE      Device: BIPAP MASK      PEEP/CPAP (POC) 5 cmH2O    Mean Airway Pressure 15 cmH2O    Allens test (POC) NOT APPLICABLE      Specimen type (POC) ARTERIAL     POC EG7    Collection Time: 10/31/22 12:24 AM   Result Value Ref Range    Calcium, ionized (POC) 1.12 1.12 - 1.32 mmol/L    FIO2 (POC) 100 %    pH (POC) 7.43 7.35 - 7.45      pCO2 (POC) 43.8 35.0 - 45.0 MMHG    pO2 (POC) 451 (H) 80 - 100 MMHG    HCO3 (POC) 29.2 (H) 22 - 26 MMOL/L    Base excess (POC) 4.2 mmol/L    sO2 (POC) 100.0 (H) 92 - 97 %    Site DRAWN FROM ARTERIAL LINE      Device: ADULT VENT      Mode ASSIST CONTROL      Tidal volume 400 ml    Set Rate 22 bpm    PEEP/CPAP (POC) 5 cmH2O Allens test (POC) NOT APPLICABLE      Specimen type (POC) ARTERIAL     CBC WITH AUTOMATED DIFF    Collection Time: 10/31/22  3:24 AM   Result Value Ref Range    WBC 21.7 (H) 3.6 - 11.0 K/uL    RBC 4.40 3.80 - 5.20 M/uL    HGB 12.0 11.5 - 16.0 g/dL    HCT 39.0 35.0 - 47.0 %    MCV 88.6 80.0 - 99.0 FL    MCH 27.3 26.0 - 34.0 PG    MCHC 30.8 30.0 - 36.5 g/dL    RDW 20.3 (H) 11.5 - 14.5 %    PLATELET 59 (L) 255 - 400 K/uL    NRBC 0.6 (H) 0  WBC    ABSOLUTE NRBC 0.12 (H) 0.00 - 0.01 K/uL    NEUTROPHILS 93 (H) 32 - 75 %    LYMPHOCYTES 2 (L) 12 - 49 %    MONOCYTES 5 5 - 13 %    EOSINOPHILS 0 0 - 7 %    BASOPHILS 0 0 - 1 %    IMMATURE GRANULOCYTES 0 %    ABS. NEUTROPHILS 20.2 (H) 1.8 - 8.0 K/UL    ABS. LYMPHOCYTES 0.4 (L) 0.8 - 3.5 K/UL    ABS. MONOCYTES 1.1 (H) 0.0 - 1.0 K/UL    ABS. EOSINOPHILS 0.0 0.0 - 0.4 K/UL    ABS. BASOPHILS 0.0 0.0 - 0.1 K/UL    ABS. IMM. GRANS. 0.0 K/UL    DF MANUAL      PLATELET COMMENTS Large Platelets      RBC COMMENTS ANISOCYTOSIS  2+        RBC COMMENTS RAINE CELLS  PRESENT        RBC COMMENTS OVALOCYTES  PRESENT       METABOLIC PANEL, COMPREHENSIVE    Collection Time: 10/31/22  3:24 AM   Result Value Ref Range    Sodium 142 136 - 145 mmol/L    Potassium 3.9 3.5 - 5.1 mmol/L    Chloride 107 97 - 108 mmol/L    CO2 27 21 - 32 mmol/L    Anion gap 8 5 - 15 mmol/L    Glucose 178 (H) 65 - 100 mg/dL    BUN 6 6 - 20 MG/DL    Creatinine 1.09 (H) 0.55 - 1.02 MG/DL    BUN/Creatinine ratio 6 (L) 12 - 20      eGFR 54 (L) >60 ml/min/1.73m2    Calcium 8.5 8.5 - 10.1 MG/DL    Bilirubin, total 4.3 (H) 0.2 - 1.0 MG/DL    ALT (SGPT) 83 (H) 12 - 78 U/L    AST (SGOT) 227 (H) 15 - 37 U/L    Alk.  phosphatase 203 (H) 45 - 117 U/L    Protein, total 5.2 (L) 6.4 - 8.2 g/dL    Albumin 2.8 (L) 3.5 - 5.0 g/dL    Globulin 2.4 2.0 - 4.0 g/dL    A-G Ratio 1.2 1.1 - 2.2     PHOSPHORUS    Collection Time: 10/31/22  3:24 AM   Result Value Ref Range    Phosphorus 1.3 (L) 2.6 - 4.7 MG/DL   MAGNESIUM    Collection Time: 10/31/22  3:24 AM   Result Value Ref Range    Magnesium 2.4 1.6 - 2.4 mg/dL   SAMPLES BEING HELD    Collection Time: 10/31/22  3:24 AM   Result Value Ref Range    SAMPLES BEING HELD 1sst     COMMENT        Add-on orders for these samples will be processed based on acceptable specimen integrity and analyte stability, which may vary by analyte. POC EG7    Collection Time: 10/31/22 11:33 AM   Result Value Ref Range    Calcium, ionized (POC) 1.11 (L) 1.12 - 1.32 mmol/L    FIO2 (POC) 40 %    pH (POC) 7.32 (L) 7.35 - 7.45      pCO2 (POC) 40.5 35.0 - 45.0 MMHG    pO2 (POC) 79 (L) 80 - 100 MMHG    HCO3 (POC) 20.7 (L) 22 - 26 MMOL/L    Base deficit (POC) 5.2 mmol/L    sO2 (POC) 94.5 92 - 97 %    Site DRAWN FROM ARTERIAL LINE      Device: ADULT VENT      Mode ASSIST CONTROL      Tidal volume 400 ml    Set Rate 20 bpm    PEEP/CPAP (POC) 5 cmH2O    Allens test (POC) NOT APPLICABLE      Specimen type (POC) ARTERIAL       No results displayed because visit has over 200 results. RADIOGRAPHIC STUDIES:  XR CHEST PORT  Narrative: EXAM:  XR CHEST PORT    INDICATION: Endotracheal tube placement. COMPARISON: Chest x-ray one day prior    TECHNIQUE: Supine portable chest AP view    FINDINGS: Endotracheal tube in position with the tip approximately one  centimeters above the jhony. No significant change in position of enteric tube,  right central venous catheter, or left hemodialysis catheter. The cardiac  silhouette is within normal limits. The pulmonary vasculature is within normal  limits. The lungs and pleural spaces show pulmonary congestion and mild interstitial  prominence but otherwise are clear. The visualized bones and upper abdomen are  age-appropriate. Impression: Endotracheal tube tip approximately 1 cm above jhony and withdrawal  should be considered. Congestion and interstitial pulmonary edema likely  reflecting congestive failure.     The findings were called to patient's nurse, Dotty Yeung, on 10/31/2022 at 00:07 by  myself. [address]:  Wide complex tachycardia   Acute hypoxic respiratory failure  ESRD  HFrEF  Cirrhosis  DM    IMPRESSION AND PLAN:    Analgosedation with opioids and precedex as needed, continue lactulose therapy-titrated to 3 bowel movements a day, delirium prevention strategies    Continue hemodynamic monitoring, map goal greater than 60, requiring high dose inotropes/pressors-epi, Levophed, vasopressin, cont stress dose steroids,, most recent echo showed an EF of 20 to 25%, severe TR, moderate MR, and PASPs in the mid 70s, will start veletri/flolan, A. fib RVR with aberrancy, cont amiodarone, heart rate goal less than 110, keep magnesium above 2 and potassium above 4, continue Lipitor    Cont lung protective mech vent, ensure bronchodilators    Npo for now due to high pressor requirements    ESRD on NQ-oyesel-cy nephrology recommendations-on CRRT  dose medication renally, correct electrolyte derangements as needed     impressive drop in plt count, held heparin, f/u HIT    Flu A+10/22, Tamiflu started by my colleague 10/26, on empiric Zosyn, follow-up micro studies - NGTD, WBC going up, will add empiric antifunfungal therapy    Keep glucose less than 180    Prognosis very guarded to poor at this time, f/u PCT GOC discussions with Javad    Critical care time-40 minutes    The patient is critically ill with single or multiple systems failure, severe metabolic derangement and/or infection, has potential for life threatening deterioration, requires high complexity decision making, frequent evaluation and titration of therapies and interpretation of data. This note has been written with voice recognition software. While this note has been edited for accuracy, the software periodically misinterprets speech resulting in errors that might not have been caught in editing.  In the event an unusual error is found in this record, please read the chart carefully and recognize, using context, where these substitutions or errors have occurred and please notify me to resolve the errors.

## 2022-10-31 NOTE — PROGRESS NOTES
Paged to support the family after they received difficult update about MsVivek Fenton's prognosis. They let me know that they are believing for God's will. They are leaning on each other for emotional needs. I offered prayer, as requested. Advised of  support as needed. For additional spiritual care, please contact the  on-call at [phone]). .  Kristel Garcia MDiv, MS, [facility]  Staff

## 2022-10-31 NOTE — PROGRESS NOTES
Physical Therapy:  10/31/2022    Chart reviewed in preparation for PT evaluation. Patient intubated 10/27, extubated 10/29, and now re-intubated 10/30, remains on CRRT and hemodynamically unstable per chart. Noted PT hold for last 4 days due to medical instability. Spoke with RN; will sign off for PT at this time, RN agreeable to re-consulting PT if patient's medical stability improves and patient becomes appropriate for PT interventions.      Thank you,  Cary Ferrer, PT, DPT

## 2022-10-31 NOTE — PROGRESS NOTES
Palliative Medicine Consult  [place]: [phone] ([phone])    Patient Name: Jarred Amos  YOB: 1950    Date of Initial Consult: 10/27/2022  Reason for Consult: care decisions   Requesting Provider: Uche Bar    Primary Care Physician: Remington Colbert MD     SUMMARY:   Jarred Amos is a 67 y.o. admitted on 10/22/2022 from [facility] due to shortness of breath, elevated temp and low BP. Patient missed her HD on 10/21. Admitted with a diagnosis of Afib with RVR, hypokalemia, ESRD on HD, sepsis, hypotension, acute hypoxic respiratory failure, volume overload, cirrhosis (paracentesis drain 10/24)     PMH:  ESRD, severe TR, HFrEF-10-15%, chronic hypotension, CAD, cancer, COPD, GERD, ANITHA, anemia, Hep C, DM, COPD, obesity, cirrhosis    Hospitalizations:  8/22/2022-8/25/2022- near syncope, tachycardia, afib, biventricular HF, cirrhosis    Patient normally goes to the [facility] and she gets her dialysis at the [place] as well    Current medical issues leading to Palliative Medicine involvement include: care decisions. Social:  . 8 children. Patient lives at [facility]. She fixes her own meals and bathes self. Does use a walker. The dtr, Saqib Velez, reports she has fallen recently and has been working with PT at [facility]. Sometimes the one of the teenage grandsons will stay with her. Patient is a retired RN. She loves shopping in [employer] and [employer]. She can shop for hours,per her dtr. Patient does not drive. Her family members take her shopping. Patient also loves dogs. Has AMD that is on file at the [place]. Per previous ACP note, patient has 2 children as decision makers    PALLIATIVE DIAGNOSES:   Palliative care encounter  Respiratory failure  ESRD    Debility        PLAN:   2 pm  Family asking to meet today. Dr. Shruthi Torres unable to meet at this time.    He communicated that he had spoken with the family and told them the patient is on full support and likely will not survive considering all of her medical conditions. Nephrology has said it is likely that the patient will not be able to tolerate regular dialysis moving forward. I met with the family members present on Friday. Another sibling was present in addition to many grandchildren. Family has a lot of questions. They indicated the night shift intensivist said if she had a trach, she would likely have to be on a breathing machine. It is not clear what he told them about dialysis moving forward. Family indicated that what this physician told them was more hopeful than what they are hearing today. We talked about the patient's wishes. She had indicated this summer that she did want CPR but not to be on a machine for an extended time. They were asking how long this period could be. We talked about giving her more time to see how she will do. I did stress that the likelihood of her being able to be extubated is not likely at this time. We talked about giving her until the end of this week and then re-evaluating  We also talked about withdrawing care and what this would involve. There was much discussion. Some family members when back to the fact that the geraldine wanted to be at the [place] . Some talked about the day she had to be transferred to the  CCU and no one was notified. They feel the hospital just sedated her as she was agitated. I explained this is when her respiratory status declined and she was sedated to assist with use of the oxygen. Another family member mentioned she has a pressure ulcer and she did not have one on admission  Some family members expressed that they would want her to be comfortable and others want to give her more time. We talked about code status. I stated that if we don't make a decision today about the ventilator that I would encourage them to consider a DNR> Family members were then upset as the patient had said in August she wanted CPR.    Anatoly Peters RN in the CCU was updated. Dr. Diane Shetty will plan to meet with the family this afternoon. 10/28/2022  Patient was intubated yesterday afternoon. On AC 18, 40%, , 5 peep. Patient is on Precedex and fentanyl. She looks restful  Some of the patient's children are here and have questions. Staff reports two of the family members have been quite upset. This seems to be from a lack of understanding of medical terms and getting different information. I met with the children and Dr. Diane Shetty in the [facility] area. Present were: Jessica Antoine, [address] and via phone Edd Hutchison. Dr. Diane Shetty first asked about their understanding of her medical problems. Several of the children spoke about her heart, liver and kidney issues. She is mostly hospitalized at the [place] and is usually admitted every 1-2 months. Dr. Diane Shetty talked about the reason the patient was intubated. Some family was upset as they were not informed. Others seemed upset as her condition was 'stable' but then she was intubated. Dr. Diane Shetty clarified the patient's respiratory status was not stable. He then used the word medically induced coma. The word coma really upset them. He clarified that the coma just means she is on sedation and that is why she is asleep. He explained that each day they would lower her sedation and see if she is able to come off the ventilator. He did say that he did not think this was likely today. Family seemed to have all questions answered by Dr. Diane Shetty. He did prepare them about the possibility of needing a trach in the next 10+ days if she is still ventilated. We then talked further about the patient's care goals. I reviewed the conversation in August 2022 with Dr. Ray Garner. Full code, full support but she would not want to be on machines for a prolonged period. The family and I talked about a trach and what this involves. Most said she would not want a trach.      We then said the focus should be day by day and see how she is progressing. I will check back in with them on Monday, October 31. I asked that they designate one person as the contact to call and check on their mother and that she then get info back to the family. They decided on [place]. The patient's AMD is on file at the [place]. No one has a copy. The family said Courtney Daniel and Berelupe Santiago are the decision makers. CCU staff have reported a concern about the patient's grandson, whom she is the guardian. He lives in her home. Staff reported he called yesterday to see when she was coming home and he needed to get to school. The family today assured me that he is not home alone. 10/27/2022  Examined patient in the CCU. Patient is on Precedex at 1.5 ug/kg/hr. She is on amiodarone at 1 ug/min. Patient is sedated. Using abdominal, accessory muscles to breath. Sats 93% on 4 LPM  Called the patient's dtr, Zach Mckoy, via phone. She was unaware the patient had been transferred to the CCU. She had been trying to call the patient in her previous room. Updated the dtr on the patient's current condition. We also reviewed her many complex medical issues. Jim Peters will update her other siblings (8 total). AMD on file at the [place]. The ACP note from August 2022  states the patient wanted full restorative measures- she wants to live \"as long as I can\" but also said she wants her time to be quality time. Code status discussed at that time, she was familiar with this- and full code status confirmed, however states that if she is on life support w/out chance of any good recovery that her children know she would not want ongoing measures and would remove her from artificial support. Reviewed the above ACP note from August 2022 with Jim Peters and she says this is her mother's wishes. So will continue full supportive care and Full Code status for now.    Will plan to reassess patient's goals of care once she is off sedation. Palliative will follow. I returned a call to [facility], one of patient's dtrs. She was confused about the patient being unresponsive. Adan Palacios know the patient is on sedation. .   Initial consult note routed to primary continuity provider and/or primary health care team members  Communicated plan of care with: Palliative [place] [facility] [address] Team     GOALS OF CARE / TREATMENT PREFERENCES:     GOALS OF CARE:  Patient/Health Care Proxy Stated Goals: Prolong life    TREATMENT PREFERENCES:   Code Status: Full Code    Patient and family's personal goals include: per ACP note in August 2022,  spoke to family member on 10/27 and reviewed the goals for full code, full support but not to be on the breathing machine for a prolonged period. Important upcoming milestones or family events: none reported     The patient identifies the following as important for living well:  per the family, the patient likes shopping and time with her dtr's dog       Advance Care Planning:  [x] The [place] [address] Team has updated the ACP Navigator with [address] and Patient Capacity        Primary Decision Maker (Active): Pollo pastor - Daughter - [phone]    Primary Decision Maker: Meija Mora - Son - [phone]  Advance Care Planning 10/22/2022   Patient's Healthcare Decision Maker is: -   Primary Decision Maker Name -   Primary Decision Maker Phone Number -   Primary Decision Maker Relationship to Patient -   Secondary Decision [address] Name -   Secondary Decision [address] Phone Number -   Secondary Decision Maker Relationship to Patient -   Confirm Advance Directive None   Patient Would Like to Complete Advance Directive -   Does the patient have other document types -       Medical Interventions: Full interventions       Other:    As far as possible, the palliative care team has discussed with patient / health care proxy about goals of care / treatment preferences for patient. HISTORY:     History obtained from: chart, team, family    CHIEF COMPLAINT: Pt admitted with SOB    HPI/SUBJECTIVE:    The patient is:   [] Verbal and participatory  [x] Non-participatory due to: sedation        Clinical Pain Assessment (nonverbal scale for severity on nonverbal patients): Activity (Movement): Lying quietly, normal position    Duration: for how long has pt been experiencing pain (e.g., 2 days, 1 month, years)  Frequency: how often pain is an issue (e.g., several times per day, once every few days, constant)     FUNCTIONAL ASSESSMENT:     Palliative Performance Scale (PPS):  PPS: 10       PSYCHOSOCIAL/SPIRITUAL SCREENING:     Palliative IDT has assessed this patient for cultural preferences / practices and a referral made as appropriate to needs (Cultural Services, Patient Advocacy, Ethics, etc.)    Any spiritual / Alevism concerns:  [] Yes /  [x] No  [] Unable to obtain this information  If \"Yes\" to discuss with pastoral care during IDT     Does caregiver feel burdened by caring for their loved one:   [] Yes /  [x] No /  [] No Caregiver Present/Available [] No Caregiver [] Pt Lives at Facility  If \"Yes\" to discuss with social work during IDT    Anticipatory grief assessment:   [x] Normal  / [] Maladaptive   [] Unable to obtain this information  [] n/a  If \"Maladaptive\" to discuss with social work during IDT    ESAS Anxiety: Anxiety: 0    ESAS Depression:          REVIEW OF SYSTEMS:     Positive and pertinent negative findings in ROS are noted above in HPI. The following systems were [x] reviewed / [] unable to be reviewed as noted in HPI  Other findings are noted below. Systems: constitutional, ears/nose/mouth/throat, respiratory, gastrointestinal, genitourinary, musculoskeletal, integumentary, neurologic, psychiatric, endocrine. Positive findings noted below.   Modified ESAS Completed by: provider   Fatigue:  (on ventilator, sedated on precedex, fentanyl) Drowsiness:  (sedated) Anxiety: 0       Dyspnea: 0     Constipation: No (BMs on 10/26)     Stool Occurrence(s): 1        PHYSICAL EXAM:     From RN flowsheet:  Wt Readings from Last 3 Encounters:   10/31/22 142 lb 6.7 oz (64.6 kg)   09/06/22 135 lb (61.2 kg)   08/31/22 137 lb 2 oz (62.2 kg)     Blood pressure (!) 173/54, pulse 87, temperature 98.1 °F (36.7 °C), resp. rate 20, height 5' (1.524 m), weight 142 lb 6.7 oz (64.6 kg), SpO2 (!) 60 %.     Pain Scale 1: Adult Nonverbal Pain Scale  Pain Intensity 1: 1  Pain Onset 1: chronic but worse yesterday  Pain Location 1: Back, Head, Neck  Pain Orientation 1: Posterior (pt reports all over)  Pain Description 1: Aching  Pain Intervention(s) 1: Medication (see MAR)    Last bowel movement, if known: 10/27    Constitutional: patient intubated, on fentanyl   Eyes:eyes closed   ENMT: no nasal discharge, moist mucous membranes  Cardiovascular: regular rhythm   Respiratory: intubated:  AC 20, , 40%, 5 peep   Gastrointestinal: large abdomen (likely ascites)  Musculoskeletal: no deformity, no tenderness to palpation  Skin: warm, dry, dried crusted lesions on both lower legs, L > R  Neurologic: not following commands  Psychiatric: sedated  Other:       HISTORY:     Active Problems:    Debility (3/16/2018)      ESRD (end stage renal disease) on dialysis ([facility] (10/18/2020)      Severe sepsis ([facility] (10/22/2022)      Palliative care encounter (10/27/2022)      Shortness of breath (10/27/2022)      Agitation (10/27/2022)      Acute on chronic respiratory failure with hypoxia and hypercapnia (HCC) (10/28/2022)    Past Medical History:   Diagnosis Date    Arthritis     Asthma     Chronic combined systolic and diastolic CHF (congestive heart failure) ([address] 8/26/2022    Chronic kidney disease     Chronic pain     Cirrhosis ([address] 12/17/2017    Diabetes ([facility] diet controlled    GERD (gastroesophageal reflux disease)     Hypertension     Paroxysmal atrial fibrillation ([address] 10/6/2020      Past Surgical History:   Procedure Laterality Date    COLONOSCOPY N/A 1/12/2018    COLONOSCOPY performed by Susan Valentino MD at [facility] ENDOSCOPY    COLONOSCOPY N/A 3/19/2018    COLONOSCOPY performed by Susan Valentino MD at THE [facility] ENDOSCOPY    HX GYN  c section    HX VASCULAR ACCESS      dialysis     IR BX TRANSCATHETER  11/24/2020    IR PARACENTESIS ABD W IMAGE  10/22/2020      Family History   Family history unknown: Yes      History reviewed, no pertinent family history.   Social History     Tobacco Use    Smoking status: Never    Smokeless tobacco: Never   Substance Use Topics    Alcohol use: No     Allergies   Allergen Reactions    Aleve [Naproxen Sodium] Itching, Swelling and Other (comments)    Amlodipine Rash, Hives and Itching    Aspirin Other (comments), Nausea Only and Unknown (comments)     GI bleed  GI bleeding      Heparin Unknown (comments)     bleeding      Ibuprofen Nausea and Vomiting    Metformin Other (comments)     swelling      Current Facility-Administered Medications   Medication Dose Route Frequency    potassium, sodium phosphates (NEUTRA-PHOS) packet 2 Packet  2 Packet Oral QID    EPINEPHrine (ADRENALIN) 5 mg in 0.9% sodium chloride 250 mL infusion  1-30 mcg/min IntraVENous TITRATE    epoprostenol (VELETRI) 30 mcg/mL in 0.9% sodium chloride 50 mL inhalation solution  30 ng/kg/min (Ideal) Inhalation CONTINUOUS    anidulafungin (ERAXIS) 200 mg in 0.9% sodium chloride 260 mL IVPB  200 mg IntraVENous ONCE    [START ON 11/1/2022] anidulafungin (ERAXIS) 100 mg in 0.9% sodium chloride 130 mL IVPB  100 mg IntraVENous Q24H    NOREPINephrine (LEVOPHED) 8 mg in 5% dextrose 250mL (32 mcg/mL) infusion  0.5-30 mcg/min IntraVENous TITRATE    fentaNYL (PF) 1,500 mcg/30 mL (50 mcg/mL) infusion  0-200 mcg/hr IntraVENous TITRATE    vasopressin (VASOSTRICT) 20 Units in 0.9% sodium chloride 100 mL infusion  0.04 Units/min IntraVENous CONTINUOUS    hydrocortisone Sod Succ (PF) (SOLU-CORTEF) injection 100 mg  100 mg IntraVENous Q8H    pantoprazole (PROTONIX) 40 mg in 0.9% sodium chloride 10 mL injection  40 mg IntraVENous DAILY    ipratropium (ATROVENT) 0.02 % nebulizer solution 0.5 mg  0.5 mg Nebulization Q6H RT    DOBUTamine (DOBUTREX) 500 mg/250 mL (2,000 mcg/mL) infusion  2 mcg/kg/min IntraVENous CONTINUOUS    piperacillin-tazobactam (ZOSYN) 3.375 g in 0.9% sodium chloride (MBP/ADV) 100 mL MBP  3.375 g IntraVENous Q12H    bicarbonate dialysis (PRISMASOL) BG K 4/Ca 2.5 5000 ml solution   Extracorporeal DIALYSIS CONTINUOUS    rifAXIMin (XIFAXAN) tablet 550 mg  550 mg Oral BID    [Held by provider] OLANZapine (ZyPREXA) tablet 5 mg  5 mg Oral QHS    heparin (porcine) 1,000 unit/mL injection 2,400 Units  2,400 Units InterCATHeter DIALYSIS PRN    And    heparin (porcine) 1,000 unit/mL injection 2,400 Units  2,400 Units InterCATHeter DIALYSIS PRN    albumin human 25% (BUMINATE) solution 12.5 g  12.5 g IntraVENous DIALYSIS PRN    dexmedeTOMidine in 0.9 % NaCl (PRECEDEX) 400 mcg/100 mL (4 mcg/mL) infusion soln  0.1-1.5 mcg/kg/hr IntraVENous TITRATE    oseltamivir (TAMIFLU) 6 mg/mL oral suspension 30 mg  30 mg Oral Q MON, WED & FRI    alteplase (CATHFLO) 1 mg in sterile water (preservative free) 1 mL injection  1 mg InterCATHeter PRN    albuterol-ipratropium (DUO-NEB) 2.5 MG-0.5 MG/3 ML  3 mL Nebulization Q4H PRN    dextrose 10 % infusion 125-250 mL  125-250 mL IntraVENous PRN    midodrine (PROAMATINE) tablet 20 mg  20 mg Oral Q8H    sodium chloride (NS) flush 5-40 mL  5-40 mL IntraVENous Q8H    sodium chloride (NS) flush 5-40 mL  5-40 mL IntraVENous PRN    acetaminophen (TYLENOL) tablet 650 mg  650 mg Oral Q6H PRN    Or    acetaminophen (TYLENOL) suppository 650 mg  650 mg Rectal Q6H PRN    polyethylene glycol (MIRALAX) packet 17 g  17 g Oral DAILY PRN    ondansetron (ZOFRAN ODT) tablet 4 mg  4 mg Oral Q8H PRN    Or    ondansetron (ZOFRAN) injection 4 mg  4 mg IntraVENous Q6H PRN    amiodarone (CORDARONE) tablet 200 mg  200 mg Oral BID    atorvastatin (LIPITOR) tablet 40 mg  40 mg Oral QHS    lactulose (CHRONULAC) 10 gram/15 mL solution 30 mL  20 g Oral BID    [Held by provider] metoprolol tartrate (LOPRESSOR) tablet 12.5 mg  12.5 mg Oral BID    methocarbamoL (ROBAXIN) tablet 500 mg  500 mg Oral Q8H PRN    arformoteroL (BROVANA) neb solution 15 mcg  15 mcg Nebulization BID RT    And    budesonide (PULMICORT) 500 mcg/2 ml nebulizer suspension  500 mcg Nebulization BID RT          LAB AND IMAGING FINDINGS:     Lab Results   Component Value Date/Time    WBC 21.7 (H) 10/31/2022 03:24 AM    HGB 12.0 10/31/2022 03:24 AM    PLATELET 59 (L) 29/94/4298 03:24 AM     Lab Results   Component Value Date/Time    Sodium 142 10/31/2022 03:24 AM    Potassium 3.9 10/31/2022 03:24 AM    Chloride 107 10/31/2022 03:24 AM    CO2 27 10/31/2022 03:24 AM    BUN 6 10/31/2022 03:24 AM    Creatinine 1.09 (H) 10/31/2022 03:24 AM    Calcium 8.5 10/31/2022 03:24 AM    Magnesium 2.4 10/31/2022 03:24 AM    Phosphorus 1.3 (L) 10/31/2022 03:24 AM      Lab Results   Component Value Date/Time    Alk.  phosphatase 203 (H) 10/31/2022 03:24 AM    Protein, total 5.2 (L) 10/31/2022 03:24 AM    Albumin 2.8 (L) 10/31/2022 03:24 AM    Globulin 2.4 10/31/2022 03:24 AM     Lab Results   Component Value Date/Time    INR 1.5 (H) 10/22/2022 06:27 PM    Prothrombin time 15.2 (H) 10/22/2022 06:27 PM    aPTT 32.8 (H) 08/30/2022 12:47 AM    aPTT 28.6 09/25/2019 07:30 PM      Lab Results   Component Value Date/Time    Iron 43 11/27/2020 02:28 AM    TIBC 310 11/27/2020 02:28 AM    Iron % saturation 14 (L) 11/27/2020 02:28 AM    Ferritin 37 11/27/2020 02:28 AM      Lab Results   Component Value Date/Time    pH 7.28 (L) 10/30/2022 02:13 PM    PCO2 52 (H) 10/30/2022 02:13 PM    PO2 537 (H) 10/30/2022 02:13 PM     No components found for: [place]   Lab Results   Component Value Date/Time    CK 78 09/25/2019 07:30 PM    CK - MB 3.5 09/25/2019 07:30 PM                Total time:  35 minutes  Counseling / coordination time, spent as noted above:  0 minutes  > 50% counseling / coordination?: yes    Prolonged service was provided for  [x]30 min   []75 min in face to face time in the presence of the patient, spent as noted above. Time Start: 10:00  Time End: 11:15  Note: this can only be billed with 11106 (initial) or 84663 (follow up). If multiple start / stop times, list each separately.

## 2022-11-01 NOTE — ACP (ADVANCE CARE PLANNING)
Maintained hypotension necessitating maximum triple vasopressor support. Not able to tolerate CVVH. Family informed and aware that she is in an active dying process. They are accepting that CPR will not reverse her outcome and thus will not be offered. Family at bedside, all questions answered. Code status now DNR.     Jamia Oliver DO  Staff Intensivist  [facility] [facility]  11/1/2022

## 2022-11-01 NOTE — PROGRESS NOTES
2000 - Report received from [facility].  [address] of care discussed with intensivist. Titrate Epinephrine up to max of 15, then d/c CRRT and code status to be discussed with family again per MD.  2300 - SBPs 90s, Epinephrine titrated. 0000 - Increasing pressor requirements. 0130 - Pt maxed on all pressors per MD orders with SBPs 70s. Intensivist at bedside to discuss plan of care & code status. CRRT stopped per MD orders, all blood returned. Pascual Monday made aware. 0230 - MD at bedside to pronounce. TOD [id number]. Family remains at bedside, pastoral care paged. [employer] notified. Paperwork initiated. Nursing supervisors made aware.

## 2022-11-01 NOTE — DISCHARGE SUMMARY
Admit date: 10/22/2022   Admitting Provider: Yazan Mnan MD    Discharge date: 2022  Discharging Provider: Miesha Watts DO      * Admission Diagnoses: Severe sepsis ([facility] [A41.9, R65.20]    * Discharge Diagnoses:    Hospital Problems as of 2022 Date Reviewed: 2022            Codes Class Noted - Resolved POA    Acute on chronic respiratory failure with hypoxia and hypercapnia (HCC) ICD-10-CM: J96.21, J96.22  ICD-9-CM: 518.84, 786.09, 799.02  10/28/2022 - Present Unknown        Palliative care encounter ICD-10-CM: Z51.5  ICD-9-CM: V66.7  10/27/2022 - Present Unknown        Shortness of breath ICD-10-CM: R06.02  ICD-9-CM: 786.05  10/27/2022 - Present Unknown        Agitation ICD-10-CM: R45.1  ICD-9-CM: 307.9  10/27/2022 - Present Unknown        Severe sepsis ([facility] ICD-10-CM: A41.9, R65.20  ICD-9-CM: 038.9, 995.92  10/22/2022 - Present Unknown        ESRD (end stage renal disease) on dialysis [facility]) ICD-10-CM: N18.6, Z99.2  ICD-9-CM: 585.6, V45.11  10/18/2020 - Present Yes        Debility ICD-10-CM: R53.81  ICD-9-CM: 799.3  3/16/2018 - Present Yes           * Hospital Course: 72F with ESRD Afib, Cirrhosis, HFrEF presenting with AHRF and Afib RVR, hospitalized for treatment of influenza pneumonia. On 10/25 developed severe encephalopathy progressing into septic shock, respiratory failure requiring mechanical ventilation, RV failure requiring CRRT. Course developed by multiorgan system failure. On  patietn no longer able to tolerate CVVH due to labile hemodynamics. Disucssino with family led to DNR. Patient soon  with family at bedside. * Discharge Condition:   * Disposition:  end of life arrangements    Discharge Medications:  Current Discharge Medication List        CONTINUE these medications which have NOT CHANGED    Details   fluticasone propion-salmeteroL (Wigeela Inhub) 100-50 mcg/dose diskus inhaler Take 1 Puff by inhalation two (2) times a day.       midodrine (PROAMATINE) 5 mg tablet Take 5 mg by mouth three (3) times daily (with meals). metoprolol tartrate (LOPRESSOR) 25 mg tablet Take 12.5 mg by mouth two (2) times a day. atorvastatin (LIPITOR) 80 mg tablet Take 40 mg by mouth nightly. cyanocobalamin (VITAMIN B12) 500 mcg tablet Take 1,000 mcg by mouth daily. sertraline (ZOLOFT) 50 mg tablet Take 25 mg by mouth daily. montelukast (SINGULAIR) 10 mg tablet Take 10 mg by mouth nightly. chlorhexidine (PERIDEX) 0.12 % solution 15 mL by Swish and Spit route every twelve (12) hours. Oral care      fluticasone propionate (FLONASE) 50 mcg/actuation nasal spray 1 [place] by Both Nostrils route daily. benzonatate (Tessalon Perles) 100 mg capsule Take 100 mg by mouth two (2) times daily as needed for Cough. diphenhydrAMINE (BENADRYL) 25 mg capsule Take 25 mg by mouth three (3) times daily as needed. lidocaine 4 % patch 1 Patch by TransDERmal route every twenty-four (24) hours. Senna 8.6 mg tablet Take 1 Tablet by mouth daily as needed for Constipation. methocarbamoL (ROBAXIN) 500 mg tablet Take 500 mg by mouth every eight (8) hours as needed for Muscle Spasm(s). docusate sodium (COLACE) 100 mg capsule Take 100 mg by mouth two (2) times a day. pantoprazole (PROTONIX) 40 mg tablet Take 40 mg by mouth two (2) times a day. acetaminophen (TYLENOL) 500 mg tablet Take 500 mg by mouth every eight (8) hours as needed for Pain. Max 3 grams daily      loratadine (Claritin) 10 mg tablet Take 10 mg by mouth every other day. Every other day      capsicum oleoresin 0.025 % topical cream Apply  to affected area three (3) times daily. hydrocortisone (ANUSOL-HC) 25 mg supp Insert 25 mg into rectum every twelve (12) hours as needed. polyvinyl alcohol-povidon,PF, (REFRESH CLASSIC) 1.4-0.6 % ophthalmic solution Administer 1-2 Drops to both eyes three (3) times daily.  Indications: dry eye      ketotifen (ZADITOR) 0.025 % (0.035 %) ophthalmic solution Administer 1 Drop to both eyes two (2) times a day. Indications: allergic conjunctivitis      oxyCODONE IR (ROXICODONE) 5 mg immediate release tablet Take 5 mg by mouth every twelve (12) hours as needed for Pain. ascorbic acid, vitamin C, (VITAMIN C) 500 mg tablet Take 500 mg by mouth daily. lactulose (CHRONULAC) 10 gram/15 mL solution Take 30 mL by mouth two (2) times a day. Qty: 1 Bottle, Refills: 0      cholecalciferol (VITAMIN D3) (1000 Units /25 mcg) tablet Take 4,000 Units by mouth daily. ferrous sulfate 325 mg (65 mg iron) tablet Take 325 mg by mouth three (3) times daily (with meals). albuterol (PROVENTIL HFA, VENTOLIN HFA, PROAIR HFA) 90 mcg/actuation inhaler Take 2 Puffs by inhalation every four (4) hours as needed for Wheezing. STOP taking these medications       amiodarone (CORDARONE) 200 mg tablet Comments:   Reason for Stopping:         OTHER Comments:   Reason for Stopping:         salsalate (DISALCID) 750 mg tablet Comments:   Reason for Stopping:                  Follow-up Information       Follow up With Specialties Details Why Contact Info    Melisa Caceres MD Family Medicine Follow up in 1 week(s)  [address] [place] [address]  [phone]              Signed:  Miguel Lee DO  11/1/2022  2:32 AM

## 2022-11-01 NOTE — PROGRESS NOTES
RN paged to notify me of pt's death. I provided bereavement care to large family present at the bedside and prayer for those in the waiting area. Family expressed appreciation for my visit and support. For additional spiritual care, please contact the  on-call at [phone]). .  Camilo Malone MDiv, MS, [facility]  Staff

## 2022-11-01 NOTE — PROGRESS NOTES
Patient death recorded on the [facility] internal restraint log on 11/01/2022 at 0930.

## 2022-11-01 NOTE — DEATH NOTE
Death Pronouncement Note    Patient's Name: Phoenix Gutierrez   Patient's YOB: 1950  MRN Number: [id number]    Admitting Provider: Irma Bales MD  Attending Provider: Hilaria Martinez MD     Patient was examined and the following were absent: Pulses, Blood Pressure, and Respiratory effort    I declared the patient dead on 0229 on 11/1/22 . Family informed, condolences offered.     Electronically signed by Miguel Lee DO on 11/1/2022 at 2:31 AM

## 2022-11-02 LAB
BACTERIA SPEC CULT: NORMAL
SERVICE CMNT-IMP: NORMAL

## 2022-11-28 NOTE — PROCEDURES
[facility]  [phone]    Vitals   Pre   Post   Assessment   Pre   Post     Temp  Temp: 98.3 °F (36.8 °C) (11/24/20 0600)  98 LOC  A&OX4 A&OX4   HR   Pulse (Heart Rate): (P) 75 (11/24/20 0830) 80 Lungs   diminished  diminished   B/P   BP: (!) (P) 131/46 (11/24/20 0830) 113/54 Cardiac   NSR  NSR   Resp   Resp Rate: (P) 16 (11/24/20 0830) 16 Skin   Warm and dry  warm and dry   Pain level  Pain Intensity 1: 6 (11/23/20 2200) 1 Edema    1+HOUSTON   1+HOUSTON   Orders:    Duration:   Start:   0830 End:   1200 Total:   3.5 hrs   Dialyzer:   Dialyzer/Set Up Inspection: (P) Revaclear (11/24/20 0830)   K Bath:   Dialysate K (mEq/L): (P) 2 (11/24/20 0830)   Ca Bath:   Dialysate CA (mEq/L): (P) 2.5 (11/24/20 0830)   Na/Bicarb:   Dialysate NA (mEq/L): (P) 140 (11/24/20 0830)   Target Fluid Removal:   Goal/Amount of Fluid to Remove (mL): (P) 1500 mL (11/24/20 0830)   Access     Type & Location:   [facility]. Dressing change done. Each catheter limb disinfected per p&p, caps removed, hubs disinfected per p&p. Each dialysis catheter limb disinfected per p&p, blood returned per p&p, each dialysis hub disinfected per p&p, post dialysis catheter dwell instilled per order, and caps applied.      Labs     Obtained/Reviewed   Critical Results Called   Date when labs were drawn-  Hgb-    HGB   Date Value Ref Range Status   11/24/2020 6.7 (L) 11.5 - 16.0 g/dL Final     K-    Potassium   Date Value Ref Range Status   11/24/2020 5.9 (H) 3.5 - 5.1 mmol/L Final     Ca-   Calcium   Date Value Ref Range Status   11/24/2020 9.3 8.5 - 10.1 MG/DL Final     Bun-   BUN   Date Value Ref Range Status   11/24/2020 68 (H) 6 - 20 MG/DL Final     Creat-   Creatinine   Date Value Ref Range Status   11/24/2020 6.61 (H) 0.55 - 1.02 MG/DL Final        Medications/ Blood Products Given     Name   Dose   Route and Time     Packed RBC 1 unit/300 ml  Via dialysis circuit/1005             Blood Volume Processed (BVP):    77.7 liters Net Fluid   Removed:  1500 ml   Comments Patient tolerated tx well, no complaints during or after. I unit packed cells infused via dialysis circuit. Report to LESTER Mota RN  Time Out Done: yes, 0830  Primary Nurse Rpt Pre: Rose Rodriguez RN  Primary Nurse Rpt Post: Rose Rodriguez RN  Pt Education: Procedural  Care Plan: routine HD with blood transfusion  Tx Summary: 3.5 hours on 2 k 2.5 Ca 140/35 removed 1500 ml net  Admiting Diagnosis:  Pt's previous clinic-[facility]  Consent signed - Informed Consent Verified: (P) Yes (11/24/20 0830)  [facility] Consent -   Hepatitis Status- antigen negative and immune 11-17-20  Machine #- Machine Number: (P) [id number] (11/24/20 0830)  Telemetry status-on tele  Pre-dialysis wt. - Pre-Dialysis Weight: 79.1 kg (174 lb 6.1 oz) (11/19/20 2828) Statement Selected

## 2024-11-22 NOTE — PROGRESS NOTES
Nephrology Progress Note  Josseline Ill Fenton  Date of Admission : 10/22/2022    CC:  Follow up for ESRD       Assessment and Plan     ESRD on dialysis   - Patient of [facility], [address] MWF  -Continue CRRT using LIJ permacath, UF as tolerated   - reduced DFR to 1200 cc  - blood cx from 10/28 : no growth  - consider palliative care consult for possible Hospice     Hypo Phos :  - 2/2 CRRT, lack of nutrition   - CRRT adjusted and replete PRN  - stopped Binders     Ascites:  - s/p paracentesis, drain removed  - consider diagnostic Tap for SBP     Hypotension:  - on nor epi, leviphed     Afib w/ RVR:  - on amio drip     Resp failure:  - intubated 10/27  - per [facility],   - extubated 10/29 , re-intubated 10/30    Cardiac Cirrhosis   RV failure, severe TR  Chronic HFrEF   Chronic hypotension     Anemia   - no need for CAROL ANN      Influenza A +    Hep C, hx of  DMII       Interval History:  Seen and examined. D/w CCU team,   Re-intubated overnight and now on epi   Hemodynamically unstable and on factor of 25 w/ CRRT   Hypo Phos being repleaced     Current Medications: all current  Medications have been eviewed in EPIC  Review of Systems: Review of systems not obtained due to patient factors.     Objective:  Vitals:    Vitals:    10/31/22 0400 10/31/22 0433 10/31/22 0500 10/31/22 0600   BP: (!) 176/65      Pulse: 94 99 93 82   Resp: 20 20 20 20   Temp: (!) 93 °F (33.9 °C)  (!) 93.2 °F (34 °C) (!) 93.7 °F (34.3 °C)   TempSrc:       SpO2: 97% 96% 98% 98%   Weight: 64.6 kg (142 lb 6.7 oz)      Height:         Intake and Output:  10/30 1901 - 10/31 0700  In: 7696 [I.V.:1289]  Out: 963 [Drains:50]  10/29 0701 - 10/30 1900  In: 8714 [I.V.:2167]  Out: 3183     Physical Examination:    General: On vent   Neck:  Supple, no mass  Resp:  Diminished   CV:  irregular  no murmur or rub, no LE edema  GI:  Soft, NT, mild distention, no fluid wave  Neurologic:  unable to asses   Psych:             unable to evaluate  Skin:  No Rash  Access:           [facility]    [x]    High complexity decision making was performed  [x]    Patient is at high-risk of decompensation with multiple organ involvement    Lab Data Personally Reviewed: I have reviewed all the pertinent labs, microbiology data and radiology studies during assessment. Recent Labs     10/31/22  0324 10/30/22  0316 10/29/22  0328 10/29/22  0327    139  --  138   K 3.9 3.8  --  4.0    105  --  106   CO2 27 24  --  23   * 206*  --  163*   BUN 6 9  --  10   CREA 1.09* 1.49*  --  2.31*   CA 8.5 9.1  --  9.0   MG 2.4 2.3 2.2  --    PHOS 1.3* 1.7*  --  2.6   ALB 2.8* 3.4*  --  2.2*   ALT 83* 51  --  37       Recent Labs     10/31/22  0324 10/30/22  1604 10/30/22  0316   WBC 21.7* 17.6* 13.2*   HGB 12.0 10.9* 11.5   HCT 39.0 35.6 38.2   PLT 59* 61* 59*       No results found for: SDES  Lab Results   Component Value Date/Time    Culture result: NO GROWTH 3 DAYS 10/28/2022 09:38 AM    Culture result: MRSA NOT PRESENT 10/22/2022 06:27 PM    Culture result:  10/22/2022 06:27 PM     Screening of patient nares for MRSA is for surveillance purposes and, if positive, to facilitate isolation considerations in high risk settings. It is not intended for automatic decolonization interventions per se as regimens are not sufficiently effective to warrant routine use.        Recent Results (from the past 24 hour(s))   ECHO ADULT COMPLETE    Collection Time: 10/30/22 10:15 AM   Result Value Ref Range    IVSd 1.1 (A) 0.6 - 0.9 cm    LVIDd 5.3 3.9 - 5.3 cm    LVIDs 4.5 cm    LVPWd 1.0 (A) 0.6 - 0.9 cm    EF BP 29 (A) 55 - 100 %    LV Ejection Fraction A2C 37 %    LV Ejection Fraction A4C 17 %    LV EDV A2C 103 mL    LV EDV A4C 77 mL    LV EDV BP 90 56 - 104 mL    LV ESV A2C 65 mL    LV ESV A4C 64 mL    LV ESV BP 65 (A) 19 - 49 mL    LVOT Peak Gradient 5 mmHg    LVOT Mean Gradient 2 mmHg    LVOT Peak Velocity 1.2 m/s    LVOT VTI 17.5 cm    LA Diameter 4.2 cm    AV Peak Gradient 12 mmHg    AV Mean Gradient 6 mmHg    AV Peak Velocity 1.8 m/s    AV Mean Velocity 1.2 m/s    AV VTI 30.8 cm    MV A Velocity 1.08 m/s    MV E Wave Deceleration Time 242.7 ms    MV E Velocity 1.25 m/s    LV E' Lateral Velocity 4 cm/s    LV E' Septal Velocity 3 cm/s    MV PHT 70.4 ms    MV Area by PHT 3.1 cm2    MV Peak Gradient 8 mmHg    MV Mean Gradient 4 mmHg    MV Max Velocity 1.4 m/s    MV Mean Velocity 0.9 m/s    MV VTI 35.9 cm    PV Peak Gradient 11 mmHg    PV Max Velocity 1.6 m/s    TAPSE 1.5 (A) 1.7 cm    TR Peak Gradient 72 mmHg    TR Max Velocity 4.25 m/s    Aortic Root 3.2 cm    Fractional Shortening 2D 15 28 - 44 %    LV ESV Index BP 40 mL/m2    LV EDV Index BP 56 mL/m2    LV ESV Index A4C 40 mL/m2    LV EDV Index A4C 48 mL/m2    LV ESV Index A2C 40 mL/m2    LV EDV Index A2C 64 mL/m2    LVIDd Index 3.29 cm/m2    LVIDs Index 2.80 cm/m2    LV RWT Ratio 0.38     LV Mass 2D 213.9 (A) 67 - 162 g    LV Mass 2D Index 132.8 (A) 43 - 95 g/m2    MV E/A 1.16     E/E' Ratio (Averaged) 36.46     E/E' Lateral 31.25     E/E' Septal 41.67     LA Size Index 2.61 cm/m2    LA/AO Root Ratio 1.31     Ao Root Index 1.99 cm/m2    AV Velocity Ratio 0.67     LVOT:AV VTI Index 0.57     MV:LVOT VTI Index 2.05     PASP 75 mmHg   BLOOD GAS + IONIZED CALCIUM    Collection Time: 10/30/22  2:13 PM   Result Value Ref Range    pH 7.28 (L) 7.35 - 7.45      PCO2 52 (H) 35 - 45 mmHg    PO2 537 (H) 80 - 100 mmHg    BICARBONATE 24 22 - 26 mmol/L    BASE DEFICIT 3.2 mmol/L    O2  (H) 92 - 97 %    Calcium, ionized 1.19 1.13 - 1.32 mmol/L    O2 METHOD BIPAP      FIO2 100 %    MODE BIPAP      SET RATE 1      PEEP/CPAP 5.0      Sample source ARTERIAL      SITE DRAWN FROM ARTERIAL LINE      BETZY'S TEST NOT APPLICABLE     CBC WITH AUTOMATED DIFF    Collection Time: 10/30/22  4:04 PM   Result Value Ref Range    WBC 17.6 (H) 3.6 - 11.0 K/uL    RBC 4.05 3.80 - 5.20 M/uL    HGB 10.9 (L) 11.5 - 16.0 g/dL    HCT 35.6 35.0 - 47.0 %    MCV 87.9 80.0 - 99.0 FL    MCH 26.9 26.0 - 34.0 PG MCHC 30.6 30.0 - 36.5 g/dL    RDW 19.8 (H) 11.5 - 14.5 %    PLATELET 61 (L) 018 - 400 K/uL    NRBC 0.5 (H) 0  WBC    ABSOLUTE NRBC 0.09 (H) 0.00 - 0.01 K/uL    NEUTROPHILS 86 (H) 32 - 75 %    LYMPHOCYTES 1 (L) 12 - 49 %    MONOCYTES 7 5 - 13 %    EOSINOPHILS 4 0 - 7 %    BASOPHILS 0 0 - 1 %    METAMYELOCYTES 2 (H) 0 %    IMMATURE GRANULOCYTES 0 %    ABS. NEUTROPHILS 15.1 (H) 1.8 - 8.0 K/UL    ABS. LYMPHOCYTES 0.2 (L) 0.8 - 3.5 K/UL    ABS. MONOCYTES 1.2 (H) 0.0 - 1.0 K/UL    ABS. EOSINOPHILS 0.7 (H) 0.0 - 0.4 K/UL    ABS. BASOPHILS 0.0 0.0 - 0.1 K/UL    ABS. IMM.  GRANS. 0.0 K/UL    DF MANUAL      RBC COMMENTS ANISOCYTOSIS  1+        RBC COMMENTS RAINE CELLS  PRESENT       POC G3 - PUL    Collection Time: 10/30/22  9:33 PM   Result Value Ref Range    FIO2 (POC) 40 %    pH (POC) 7.37 7.35 - 7.45      pCO2 (POC) 52.4 (H) 35.0 - 45.0 MMHG    pO2 (POC) 110 (H) 80 - 100 MMHG    HCO3 (POC) 30.1 (H) 22 - 26 MMOL/L    sO2 (POC) 98.0 (H) 92 - 97 %    Base excess (POC) 3.7 mmol/L    Site DRAWN FROM ARTERIAL LINE      Device: BIPAP MASK      PEEP/CPAP (POC) 5 cmH2O    Mean Airway Pressure 15 cmH2O    Allens test (POC) NOT APPLICABLE      Specimen type (POC) ARTERIAL     POC EG7    Collection Time: 10/31/22 12:24 AM   Result Value Ref Range    Calcium, ionized (POC) 1.12 1.12 - 1.32 mmol/L    FIO2 (POC) 100 %    pH (POC) 7.43 7.35 - 7.45      pCO2 (POC) 43.8 35.0 - 45.0 MMHG    pO2 (POC) 451 (H) 80 - 100 MMHG    HCO3 (POC) 29.2 (H) 22 - 26 MMOL/L    Base excess (POC) 4.2 mmol/L    sO2 (POC) 100.0 (H) 92 - 97 %    Site DRAWN FROM ARTERIAL LINE      Device: ADULT VENT      Mode ASSIST CONTROL      Tidal volume 400 ml    Set Rate 22 bpm    PEEP/CPAP (POC) 5 cmH2O    Allens test (POC) NOT APPLICABLE      Specimen type (POC) ARTERIAL     CBC WITH AUTOMATED DIFF    Collection Time: 10/31/22  3:24 AM   Result Value Ref Range    WBC 21.7 (H) 3.6 - 11.0 K/uL    RBC 4.40 3.80 - 5.20 M/uL    HGB 12.0 11.5 - 16.0 g/dL    HCT 39.0 35.0 - 47.0 %    MCV 88.6 80.0 - 99.0 FL    MCH 27.3 26.0 - 34.0 PG    MCHC 30.8 30.0 - 36.5 g/dL    RDW 20.3 (H) 11.5 - 14.5 %    PLATELET 59 (L) 500 - 400 K/uL    NRBC 0.6 (H) 0  WBC    ABSOLUTE NRBC 0.12 (H) 0.00 - 0.01 K/uL    NEUTROPHILS 93 (H) 32 - 75 %    LYMPHOCYTES 2 (L) 12 - 49 %    MONOCYTES 5 5 - 13 %    EOSINOPHILS 0 0 - 7 %    BASOPHILS 0 0 - 1 %    IMMATURE GRANULOCYTES 0 %    ABS. NEUTROPHILS 20.2 (H) 1.8 - 8.0 K/UL    ABS. LYMPHOCYTES 0.4 (L) 0.8 - 3.5 K/UL    ABS. MONOCYTES 1.1 (H) 0.0 - 1.0 K/UL    ABS. EOSINOPHILS 0.0 0.0 - 0.4 K/UL    ABS. BASOPHILS 0.0 0.0 - 0.1 K/UL    ABS. IMM. GRANS. 0.0 K/UL    DF MANUAL      PLATELET COMMENTS Large Platelets      RBC COMMENTS ANISOCYTOSIS  2+        RBC COMMENTS RAINE CELLS  PRESENT        RBC COMMENTS OVALOCYTES  PRESENT       METABOLIC PANEL, COMPREHENSIVE    Collection Time: 10/31/22  3:24 AM   Result Value Ref Range    Sodium 142 136 - 145 mmol/L    Potassium 3.9 3.5 - 5.1 mmol/L    Chloride 107 97 - 108 mmol/L    CO2 27 21 - 32 mmol/L    Anion gap 8 5 - 15 mmol/L    Glucose 178 (H) 65 - 100 mg/dL    BUN 6 6 - 20 MG/DL    Creatinine 1.09 (H) 0.55 - 1.02 MG/DL    BUN/Creatinine ratio 6 (L) 12 - 20      eGFR 54 (L) >60 ml/min/1.73m2    Calcium 8.5 8.5 - 10.1 MG/DL    Bilirubin, total 4.3 (H) 0.2 - 1.0 MG/DL    ALT (SGPT) 83 (H) 12 - 78 U/L    AST (SGOT) 227 (H) 15 - 37 U/L    Alk.  phosphatase 203 (H) 45 - 117 U/L    Protein, total 5.2 (L) 6.4 - 8.2 g/dL    Albumin 2.8 (L) 3.5 - 5.0 g/dL    Globulin 2.4 2.0 - 4.0 g/dL    A-G Ratio 1.2 1.1 - 2.2     PHOSPHORUS    Collection Time: 10/31/22  3:24 AM   Result Value Ref Range    Phosphorus 1.3 (L) 2.6 - 4.7 MG/DL   MAGNESIUM    Collection Time: 10/31/22  3:24 AM   Result Value Ref Range    Magnesium 2.4 1.6 - 2.4 mg/dL   SAMPLES BEING HELD    Collection Time: 10/31/22  3:24 AM   Result Value Ref Range    SAMPLES BEING HELD 1sst     COMMENT        Add-on orders for these samples will be processed based on acceptable specimen integrity and analyte stability, which may vary by analyte. Thanh Martines MD  [facility]   [address]  Phone - [phone]   Fax - [phone]  [email or web address] Ramona Carreon- daughter

## (undated) DEVICE — ENDO CARRY-ON PROCEDURE KIT INCLUDES ENZYMATIC SPONGE, GAUZE, BIOHAZARD LABEL, TRAY, LUBRICANT, DIRTY SCOPE LABEL, WATER LABEL, TRAY, DRAWSTRING PAD, AND DEFENDO 4-PIECE KIT.: Brand: ENDO CARRY-ON PROCEDURE KIT

## (undated) DEVICE — FIAPC® PROBE W/ FILTER 2200 A OD 2.3MM/6.9FR; L 2.2M/7.2FT: Brand: ERBE

## (undated) DEVICE — SYRINGE 50ML E/T

## (undated) DEVICE — SNARE POLYP SM W13MMXL240CM SHTH DIA2.4MM OVL FLX DISP

## (undated) DEVICE — SPONGE GZ W4XL4IN COT 12 PLY TYP VII WVN C FLD DSGN

## (undated) DEVICE — SYR 5ML 1/5 GRAD LL NSAF LF --

## (undated) DEVICE — REM POLYHESIVE ADULT PATIENT RETURN ELECTRODE: Brand: VALLEYLAB

## (undated) DEVICE — SOLUTION IV 500ML 0.9% SOD CHL FLX CONT

## (undated) DEVICE — SET ADMIN 16ML TBNG L100IN 2 Y INJ SITE IV PIGGY BK DISP

## (undated) DEVICE — CATH SUC CTRL PRT TRIFLO 14FR --

## (undated) DEVICE — TRNQT TEXT 1X18IN BLU LF DISP -- CONVERT TO ITEM 362165

## (undated) DEVICE — ERBE NESSY®PLATE 170 SPLIT; 168CM²; CABLE 3M: Brand: ERBE

## (undated) DEVICE — MEDI-VAC NON-CONDUCTIVE SUCTION TUBING: Brand: CARDINAL HEALTH

## (undated) DEVICE — CANNULA CUSH AD W/ 14FT TBG

## (undated) DEVICE — MAJ-1414 SINGLE USE ADPATER BIOPSY VALV: Brand: SINGLE USE ADAPTOR BIOPSY VALVE

## (undated) DEVICE — CATH IV SAFE STR 22GX1IN BLU -- PROTECTIV PLUS

## (undated) DEVICE — CAPSULE ENDOSCP L26.2MM DIA11.4MM BIOCOMPATIBLE PLAS SB 3

## (undated) DEVICE — WRISTBAND ID AD W2.5XL9.5CM RED VYN ADH CLSR UNI-PRINT

## (undated) DEVICE — TUBING HYDR IRR --

## (undated) DEVICE — SPONGE GZ W4XL4IN RAYON POLY 4 PLY NONWOVEN FASTER WICKING

## (undated) DEVICE — NDL FLTR TIP 5 MIC 18GX1.5IN --

## (undated) DEVICE — KENDALL RADIOLUCENT FOAM MONITORING ELECTRODE RECTANGULAR SHAPE: Brand: KENDALL

## (undated) DEVICE — SYR 3ML LL TIP 1/10ML GRAD --

## (undated) DEVICE — TRAP SPEC COLL POLYP POLYSTYR --

## (undated) DEVICE — NDL PRT INJ NSAF BLNT 18GX1.5 --

## (undated) DEVICE — SINGLE PORT MANIFOLD: Brand: NEPTUNE 2

## (undated) DEVICE — Z DISCONTINUIED USE 2173699 ENDOSCOPE CAPSULE PILLCAM SB2

## (undated) DEVICE — ESOPHAGEAL/PYLORIC/COLONIC/BILIARY WIREGUIDED BALLOON DILATATION CATHETER: Brand: CRE™ PRO

## (undated) DEVICE — MOUTHPIECE ENDOSCP 20X27MM --

## (undated) DEVICE — BAG SPEC BIOHZRD 10 X 10 IN --

## (undated) DEVICE — CONNECTOR ELECSURG ARCONNECT AR PRB SGL USE DISP